# Patient Record
Sex: MALE | Race: WHITE | NOT HISPANIC OR LATINO | Employment: UNEMPLOYED | URBAN - METROPOLITAN AREA
[De-identification: names, ages, dates, MRNs, and addresses within clinical notes are randomized per-mention and may not be internally consistent; named-entity substitution may affect disease eponyms.]

---

## 2017-08-22 ENCOUNTER — ALLSCRIPTS OFFICE VISIT (OUTPATIENT)
Dept: OTHER | Facility: OTHER | Age: 52
End: 2017-08-22

## 2017-08-22 DIAGNOSIS — E66.01 MORBID (SEVERE) OBESITY DUE TO EXCESS CALORIES (HCC): ICD-10-CM

## 2017-08-22 DIAGNOSIS — R03.0 ELEVATED BLOOD PRESSURE READING WITHOUT DIAGNOSIS OF HYPERTENSION: ICD-10-CM

## 2017-08-22 DIAGNOSIS — Z12.5 ENCOUNTER FOR SCREENING FOR MALIGNANT NEOPLASM OF PROSTATE: ICD-10-CM

## 2017-08-22 DIAGNOSIS — E78.5 HYPERLIPIDEMIA: ICD-10-CM

## 2017-08-22 DIAGNOSIS — R74.8 ABNORMAL LEVELS OF OTHER SERUM ENZYMES: ICD-10-CM

## 2017-08-22 DIAGNOSIS — E11.9 TYPE 2 DIABETES MELLITUS WITHOUT COMPLICATIONS (HCC): ICD-10-CM

## 2018-01-13 VITALS
WEIGHT: 315 LBS | OXYGEN SATURATION: 98 % | SYSTOLIC BLOOD PRESSURE: 134 MMHG | BODY MASS INDEX: 59.91 KG/M2 | HEART RATE: 75 BPM | TEMPERATURE: 98.8 F | DIASTOLIC BLOOD PRESSURE: 84 MMHG

## 2018-02-10 DIAGNOSIS — E11.9 TYPE 2 DIABETES MELLITUS WITHOUT COMPLICATION, WITHOUT LONG-TERM CURRENT USE OF INSULIN (HCC): Primary | ICD-10-CM

## 2018-02-12 RX ORDER — GLIMEPIRIDE 4 MG/1
TABLET ORAL
Qty: 30 TABLET | Refills: 5 | Status: SHIPPED | OUTPATIENT
Start: 2018-02-12 | End: 2018-09-20 | Stop reason: SDUPTHER

## 2018-06-09 DIAGNOSIS — K21.9 GASTROESOPHAGEAL REFLUX DISEASE, ESOPHAGITIS PRESENCE NOT SPECIFIED: Primary | ICD-10-CM

## 2018-06-12 RX ORDER — OMEPRAZOLE 40 MG/1
CAPSULE, DELAYED RELEASE ORAL
Qty: 90 CAPSULE | Refills: 3 | Status: SHIPPED | OUTPATIENT
Start: 2018-06-12 | End: 2018-09-20 | Stop reason: SDUPTHER

## 2018-06-12 RX ORDER — OMEPRAZOLE 20 MG/1
CAPSULE, DELAYED RELEASE ORAL
Qty: 90 CAPSULE | Refills: 3 | Status: SHIPPED | OUTPATIENT
Start: 2018-06-12 | End: 2018-09-20 | Stop reason: SDUPTHER

## 2018-08-13 DIAGNOSIS — E11.9 TYPE 2 DIABETES MELLITUS WITHOUT COMPLICATION, WITHOUT LONG-TERM CURRENT USE OF INSULIN (HCC): ICD-10-CM

## 2018-08-13 RX ORDER — GLIMEPIRIDE 4 MG/1
TABLET ORAL
Qty: 30 TABLET | Refills: 5 | OUTPATIENT
Start: 2018-08-13

## 2018-08-13 NOTE — TELEPHONE ENCOUNTER
Pt overdue for labs/appointment, please schedule so we can continue to rx his medication    Thanks!   Jordan Mendoza, DO

## 2018-09-19 ENCOUNTER — TRANSCRIBE ORDERS (OUTPATIENT)
Dept: LAB | Facility: CLINIC | Age: 53
End: 2018-09-19

## 2018-09-20 ENCOUNTER — OFFICE VISIT (OUTPATIENT)
Dept: FAMILY MEDICINE CLINIC | Facility: OTHER | Age: 53
End: 2018-09-20

## 2018-09-20 VITALS
TEMPERATURE: 99.3 F | DIASTOLIC BLOOD PRESSURE: 84 MMHG | BODY MASS INDEX: 55.81 KG/M2 | OXYGEN SATURATION: 98 % | HEIGHT: 63 IN | HEART RATE: 102 BPM | WEIGHT: 315 LBS | SYSTOLIC BLOOD PRESSURE: 128 MMHG

## 2018-09-20 DIAGNOSIS — E66.01 MORBID OBESITY (HCC): ICD-10-CM

## 2018-09-20 DIAGNOSIS — Z87.2 HISTORY OF CELLULITIS: ICD-10-CM

## 2018-09-20 DIAGNOSIS — I10 ESSENTIAL HYPERTENSION: ICD-10-CM

## 2018-09-20 DIAGNOSIS — R03.0 PREHYPERTENSION: ICD-10-CM

## 2018-09-20 DIAGNOSIS — I87.2 VENOUS INSUFFICIENCY: ICD-10-CM

## 2018-09-20 DIAGNOSIS — K21.9 GASTROESOPHAGEAL REFLUX DISEASE WITHOUT ESOPHAGITIS: ICD-10-CM

## 2018-09-20 DIAGNOSIS — E11.9 TYPE 2 DIABETES MELLITUS WITHOUT COMPLICATION, WITHOUT LONG-TERM CURRENT USE OF INSULIN (HCC): ICD-10-CM

## 2018-09-20 DIAGNOSIS — K21.9 GASTROESOPHAGEAL REFLUX DISEASE, ESOPHAGITIS PRESENCE NOT SPECIFIED: ICD-10-CM

## 2018-09-20 DIAGNOSIS — R60.9 EDEMA, UNSPECIFIED TYPE: ICD-10-CM

## 2018-09-20 DIAGNOSIS — E11.8 TYPE 2 DIABETES MELLITUS WITH COMPLICATION, UNSPECIFIED WHETHER LONG TERM INSULIN USE: Primary | ICD-10-CM

## 2018-09-20 DIAGNOSIS — Z12.11 SCREENING FOR COLON CANCER: ICD-10-CM

## 2018-09-20 DIAGNOSIS — L40.9 PSORIASIS: ICD-10-CM

## 2018-09-20 DIAGNOSIS — R74.8 ELEVATED LIVER ENZYMES: ICD-10-CM

## 2018-09-20 DIAGNOSIS — E78.2 MIXED HYPERLIPIDEMIA: ICD-10-CM

## 2018-09-20 DIAGNOSIS — Z12.5 SCREENING FOR PROSTATE CANCER: ICD-10-CM

## 2018-09-20 PROBLEM — M10.9 GOUT: Status: ACTIVE | Noted: 2018-09-20

## 2018-09-20 PROCEDURE — 99213 OFFICE O/P EST LOW 20 MIN: CPT | Performed by: NURSE PRACTITIONER

## 2018-09-20 RX ORDER — LISINOPRIL 5 MG/1
5 TABLET ORAL DAILY
Qty: 30 TABLET | Refills: 5 | Status: SHIPPED | OUTPATIENT
Start: 2018-09-20 | End: 2019-03-08 | Stop reason: SDUPTHER

## 2018-09-20 RX ORDER — OMEPRAZOLE 20 MG/1
20 CAPSULE, DELAYED RELEASE ORAL DAILY
Qty: 30 CAPSULE | Refills: 5 | Status: SHIPPED | OUTPATIENT
Start: 2018-09-20 | End: 2019-09-18 | Stop reason: HOSPADM

## 2018-09-20 RX ORDER — GLIMEPIRIDE 4 MG/1
4 TABLET ORAL DAILY
Qty: 30 TABLET | Refills: 5 | Status: SHIPPED | OUTPATIENT
Start: 2018-09-20 | End: 2019-03-06 | Stop reason: SDUPTHER

## 2018-09-20 RX ORDER — OMEPRAZOLE 40 MG/1
40 CAPSULE, DELAYED RELEASE ORAL DAILY
Qty: 30 CAPSULE | Refills: 5 | Status: SHIPPED | OUTPATIENT
Start: 2018-09-20 | End: 2019-11-17 | Stop reason: SDUPTHER

## 2018-09-20 RX ORDER — CEPHALEXIN 500 MG/1
500 CAPSULE ORAL EVERY 6 HOURS SCHEDULED
Qty: 28 CAPSULE | Refills: 1 | Status: SHIPPED | OUTPATIENT
Start: 2018-09-20 | End: 2018-09-27

## 2018-09-20 NOTE — PROGRESS NOTES
Assessment/Plan:         Problem List Items Addressed This Visit     Diabetes mellitus ([facility] ) - Primary  --Suspect suboptimally controlled, but he is overdue for A1C + other labs (ordered a year ago, never done)-->reordered, advised to get done ASAP  --May need to start on insulin/other injectable, but potential cost is an issue  --Foot exam today  --Overdue for diabetic eye exam which he was advised to schedule  Relevant Medications    glimepiride (AMARYL) 4 mg tablet    metFORMIN (GLUCOPHAGE) 1000 MG tablet    Other Relevant Orders    CBC and differential    Comprehensive metabolic panel    Hemoglobin A1C    TSH, 3rd generation with Free T4 reflex    Urinalysis with reflex to microscopic    Microalbumin / creatinine urine ratio    C-peptide    Diabetic foot exam    Essential hypertension  --Suboptimal for diabetic--restart low dose ACEI  --Continue dietary, weight loss interventions  --Home BP readings encouraged  Relevant Medications   lisinopril (ZESTRIL) 5 mg tablet       Esophageal reflux  --Advised tapering off to PRN use only  He will consider  Previously discussed potential AE's  --Overdue for repeat EGD which he was advised to schedule with GI (FH gastric cancer also)  --Continue dietary, weight loss interventions  Relevant Medications    omeprazole (PriLOSEC) 20 mg delayed release capsule    omeprazole (PriLOSEC) 40 MG capsule    Hyperlipidemia  --He stopped statin d/t AE's (ED)  Does not wish to restart at this time, despite my recommendation (based on diabetic guidelines)  --Repeat lipids  Relevant Orders    Lipid Panel with Direct LDL reflex    Morbid obesity ([facility] )  --Continued weight loss measures discussed/encouraged, including regular use of gym, vegetarian, low-sugar/low-carb diet, with small meals  --Would likely benefit from bariatric referral, back lack of insurance is an issue at present     --Denies DAHIANA symptoms as long as he sleeps on his side      Venous insufficiency + edema  --Improved c/w past   Ongoing venous stasis changes +/- lymphedema noted on exam  --Continued use of compression stockings, leg elevation encouraged  --Weight loss, avoid excess sodium in diet  --Call for worsening and/or signs of cellulitis (see below)      Elevated liver enzymes  --Likely weight related (fatty liver)  Does not drink alcohol  Slip given for repeat CMP  Psoriasis  --Mild symptoms, arms only  Controlled with OTC cream            Other Visit Diagnoses     Screening for colon cancer + family history  --Overdue for screening colonoscopy which he was advised to schedule    Relevant Orders    Ambulatory referral to Gastroenterology    Screening for prostate cancer        Relevant Orders    PSA, Total Screen    History of cellulitis      --No recent episodes, but he is requesting written Rx to have on hand just in case  Advised to call office should he have any symptoms  Relevant Medications    cephalexin (KEFLEX) 500 mg capsule          Wishes to go to health clinic for flu shot (more affordable for him)    RTO 6 months      Subjective:      Patient ID: Agata Castillo is a 48 y o  male  Here with wife for routine follow-up  Ran out of meds two weeks ago  Needs refills  Tolerating without AE's  Never got blood work done (ordered last year)  Home sugars low 100's-300's when he checks  Some increase in nighttime urination since running out of meds  No acute vision changes  Continued vegetarian diet, avoidance of most sugars, unhealthy carbs  Goes to [employer] regularly  Mix of cardio and weights  Decent stamina  No longer takes statin because it seemed to case ED--resolved since stopping  No longer takes BP medication  Didn't feel like he needed it  No recent home readings  No CP, palpitations, cough, dyspnea  Minimal heartburn symptoms as long as he takes his Prilosec   Was able to decrease from 40 mg bid to 40 mg in morning, 20 mg in evening, over the past year  Tries to avoid dietary triggers  No N/V, abdominal pain, stool changes  Never scheduled EGD/colonoscopy d/t continued lack of insurance (previously referred)  Continued mild intermittent toe numbness  No pain, sores  Checks feet regularly  Chronic LE swelling improved over the past 1-2 years  No recent episodes of cellulitis, but would like to have written Rx for antibiotic (Keflex) to have on hand just in case  Denies fatigue, unrefreshed sleep, apnea, as long as he sleeps on his die  Denies depression, mood issues  Denies smoking, alcohol use  No eye exam x years  The following portions of the patient's history were reviewed and updated as appropriate: current medications, past family history, past medical history, past social history, past surgical history and problem list     Review of Systems   Constitutional: Negative for fatigue, fever and unexpected weight change  HENT: Negative for sore throat  Respiratory: Negative for cough and shortness of breath  Cardiovascular: Negative for chest pain and palpitations  Gastrointestinal: Negative for abdominal pain, blood in stool, nausea and vomiting  Genitourinary: Negative for dysuria  Musculoskeletal: Negative for arthralgias  Skin: Negative for rash  Neurological: Negative for dizziness  Psychiatric/Behavioral: Negative for dysphoric mood  Objective:      /84   Pulse 102   Temp 99 3 °F (37 4 °C) (Tympanic)   Ht 5' 3" (1 6 m)   Wt (!) 166 kg (365 lb 8 oz)   SpO2 98%   BMI 64 75 kg/m²           Physical Exam   Constitutional: He is oriented to person, place, and time  He appears well-developed and well-nourished  HENT:   Head: Normocephalic and atraumatic  Right Ear: External ear normal    Left Ear: External ear normal    Nose: Nose normal    Mouth/Throat: Oropharynx is clear and moist    Eyes: Conjunctivae are normal  Pupils are equal, round, and reactive to light  Neck: Normal range of motion  Neck supple  No thyromegaly present  Cardiovascular: Normal rate, regular rhythm, normal heart sounds and intact distal pulses  Pulses are no weak pulses  Pulses:       Dorsalis pedis pulses are 2+ on the right side, and 2+ on the left side  Posterior tibial pulses are 2+ on the right side, and 2+ on the left side  Pulmonary/Chest: Effort normal and breath sounds normal    Abdominal: Soft  Bowel sounds are normal  There is no tenderness  Musculoskeletal: He exhibits edema  He exhibits no tenderness  Both LE's with ongoing 2+ pitting edema + venous stasis changes  Nontender without signs of cellulitis  Feet:   Right Foot:   Skin Integrity: Negative for ulcer, skin breakdown, erythema, warmth, callus or dry skin  Left Foot:   Skin Integrity: Negative for ulcer, skin breakdown, erythema, warmth, callus or dry skin  Lymphadenopathy:     He has no cervical adenopathy  Neurological: He is alert and oriented to person, place, and time  He has normal reflexes  Skin: Skin is warm and dry  Psychiatric: He has a normal mood and affect  His behavior is normal  Judgment and thought content normal          Patient's shoes and socks removed  Right Foot/Ankle   Right Foot Inspection  Skin Exam: skin normal and skin intact no dry skin, no warmth, no callus, no erythema, no maceration, no abnormal color, no pre-ulcer, no ulcer and no callus                          Toe Exam: ROM and strength within normal limits  Sensory   Vibration: intact  Proprioception: intact   Monofilament testing: intact  Vascular  Capillary refills: < 3 seconds  The right DP pulse is 2+  The right PT pulse is 2+       Left Foot/Ankle  Left Foot Inspection  Skin Exam: skin normal and skin intactno dry skin, no warmth, no erythema, no maceration, normal color, no pre-ulcer, no ulcer and no callus                         Toe Exam: ROM and strength within normal limits                   Sensory Vibration: intact  Proprioception: intact  Monofilament: intact  Vascular  Capillary refills: < 3 seconds  The left DP pulse is 2+  The left PT pulse is 2+  Assign Risk Category:  No deformity present; No loss of protective sensation;  No weak pulses       Risk: 0   fo

## 2018-10-05 ENCOUNTER — PATIENT MESSAGE (OUTPATIENT)
Dept: FAMILY MEDICINE CLINIC | Facility: OTHER | Age: 53
End: 2018-10-05

## 2018-10-05 ENCOUNTER — TELEPHONE (OUTPATIENT)
Dept: FAMILY MEDICINE CLINIC | Facility: OTHER | Age: 53
End: 2018-10-05

## 2018-10-08 ENCOUNTER — TELEPHONE (OUTPATIENT)
Dept: FAMILY MEDICINE CLINIC | Facility: OTHER | Age: 53
End: 2018-10-08

## 2018-10-08 NOTE — TELEPHONE ENCOUNTER
Shannan Fiore, [address] 10/5/2018  2:29 PM EDT        ----- Message -----  From: Peggy Ayoub  Sent: 10/5/2018   2:26 PM  To: [facility]  Subject: Visit Follow-Up Question                         Wally Beyer, I left a message for you today at the office  I was in to see you for a follow up visit  I am a self-payer and I paid $67 50 at my visit  Later, [employer] then sends me an additional invoice for $60 50  Can you tell me why they would do this please? thanks in advance    Mamadou Montelongo, [phone]      10/5/18- spoke to billing and they are going to write off the balance and spoke to patient is informed

## 2018-10-18 DIAGNOSIS — L03.119 CELLULITIS OF LOWER EXTREMITY, UNSPECIFIED LATERALITY: Primary | ICD-10-CM

## 2018-10-18 RX ORDER — CEPHALEXIN 500 MG/1
500 CAPSULE ORAL EVERY 6 HOURS SCHEDULED
Qty: 40 CAPSULE | Refills: 1 | Status: SHIPPED | OUTPATIENT
Start: 2018-10-18 | End: 2018-10-28

## 2018-10-18 RX ORDER — CEPHALEXIN 500 MG/1
CAPSULE ORAL
Qty: 28 CAPSULE | Refills: 0 | OUTPATIENT
Start: 2018-10-18

## 2018-11-15 ENCOUNTER — TELEPHONE (OUTPATIENT)
Dept: FAMILY MEDICINE CLINIC | Facility: OTHER | Age: 53
End: 2018-11-15

## 2018-11-15 DIAGNOSIS — L03.119 RECURRENT CELLULITIS OF LOWER EXTREMITY: Primary | ICD-10-CM

## 2018-11-15 RX ORDER — CEPHALEXIN 500 MG/1
500 CAPSULE ORAL EVERY 6 HOURS SCHEDULED
Qty: 40 CAPSULE | Refills: 1 | Status: SHIPPED | OUTPATIENT
Start: 2018-11-15 | End: 2018-11-25

## 2018-11-15 NOTE — TELEPHONE ENCOUNTER
----- Message from Mirella Daigle MA sent at 11/14/2018 10:22 AM EST -----  Regarding: FW: Prescription Question  Contact: [phone]      ----- Message -----  From: Delfin Escalante  Sent: 11/14/2018   9:59 AM  To: [facility]  Subject: RE: Prescription Question                        Debbie Suarez,  Thanks for the additional prescription  I ended up taking a course of about 13 days, and felt fully recovered, no more symptoms etc  However, after about a week, I again relapsed for the third time  Once again, I caught it super early, AND the symptoms started going away within 48 hrs, however, this has never happened to me before  For now, after 5 days or so, I am just taking Cephalexin, like the last times, but I am writing you for advice as to what I should or could be doing this time to make absolute sure I don't get cellulitis back a 4th time  What do you recommend, additional days of Cephalexin? Something else? I REALLY need to stop it from coming back  Please let me know or give me a call at [phone]  For now I am on the final refill, with a few days left    ----- Message -----  From: ANN Davies  Sent: 10/18/2018  9:33 AM EDT  To: Delfin Escalante  Subject: RE: Prescription Question    Sorry about that, Dick Brown  I went ahead and sent in a new full 10-day prescription for the antibiotic to [facility], with one additional refill if needed again in the future  You should, of course, contact us if the antibiotic is not resulting in improvement within 48-72 hours of starting  Warm compresses and elevation/rest are usually recommended also      Thanks,   Debbie Suarez      ----- Message -----     From: Delfin Escalante     Sent: 10/17/2018  4:07 PM EDT       To: ANN Davies  Subject: Prescription Question    Debbie Suarez,  It was a very good thing my wife asked you for an advance prescription of cephalexin for cellulitis, because three days later thats exactly what I came down with, apparently due to the kids with strep in your office waiting room that day  However this is what happened: The round a prescription you prescribed was only seven days worth, and while I caught the cellulitis very early, and also felt fine by the end of the course of antibiotics, I relapsed a couple of weeks later  A few days ago in the middle of the night I came down with cellulitis once again in a different area of the body  So this time what I need is to make sure I have a 10 day round of cephalexin  I was able to use the one refill to get seven days worth, but that will run out in two days  I have asked the pharmacy to refill it so I have enough to cover me, however it was not approved because apparently no one is in the office today  I am writing to make sure that I am approved for at least one more refill so I can get 10 day coverage  Im already recovering but want to be sure I dont relapse this time, thank you!     Sincerely Artis Montgomery, [phone]

## 2018-12-17 DIAGNOSIS — L03.119 RECURRENT CELLULITIS OF LOWER EXTREMITY: Primary | ICD-10-CM

## 2018-12-17 RX ORDER — DOXYCYCLINE HYCLATE 100 MG/1
100 CAPSULE ORAL EVERY 12 HOURS SCHEDULED
Qty: 20 CAPSULE | Refills: 1 | Status: SHIPPED | OUTPATIENT
Start: 2018-12-17 | End: 2019-09-03 | Stop reason: SDUPTHER

## 2018-12-17 RX ORDER — CLINDAMYCIN HYDROCHLORIDE 150 MG/1
150 CAPSULE ORAL DAILY
Qty: 30 CAPSULE | Refills: 3 | Status: SHIPPED | OUTPATIENT
Start: 2018-12-17 | End: 2019-01-04 | Stop reason: SINTOL

## 2019-01-02 ENCOUNTER — TELEPHONE (OUTPATIENT)
Dept: FAMILY MEDICINE CLINIC | Facility: OTHER | Age: 54
End: 2019-01-02

## 2019-01-02 NOTE — TELEPHONE ENCOUNTER
----- Message from Bettina Doeah sent at 1/1/2019  5:39 PM EST -----  Regarding: RE: Prescription Question  Contact: [phone]  Drake Wally Beyer,  had to completely stop taking it yesterday after taking it around 6 days  The side effects were so amazingly painful I was not able to function or work  Both unstoppable diarrhea as well as lower abdominal cramping and pain  The symptoms would appear around eight hours after taking the antibiotic, and would last for around eight hours  Since stopping yesterday things are getting better already  I was taking probiotics two times per day  Please advise, although I am still taking the other advised treatments     Shi Osborne      ----- Message -----  From: ANN Fletcher  Sent: 12/31/18, 2:50 PM  To: Bettina Brownlee  Subject: RE: Prescription Question      It is a possible side effect and should get better, but not a definite  Can try taking antibiotic every other day and adding probiotic/yogurt to diet to see if this helps  Let me know  Thanks  Wally Beyer      ----- Message -----     From: Bettina Brownlee     Sent: 12/28/2018  3:39 PM EST       To: ANN Fletcher  Subject: RE: Prescription Question    Wally Beyer: ok  finished the 7 days of doxycycline, no problems, but  John Miller upon starting the other, preventative, one a day antibiotic I am experiencing very bad diarrhea  I know this is a side effect of the drug, but will it gradually dissipate or should I perhaps try something different? Sorry to bug you but, It's been rough! Shi Osborne  ----- Message -----  From: ANN Fletcher  Sent: 12/19/2018  5:49 PM EST  To: Bettina Brownlee  Subject: RE: Prescription Question    That's fine  I would take the doxycyline for at least 7 days  Extend to 10 days if not fully resolved at the end of this period      Wally Beyer       ----- Message -----     From: Bettina Brownlee     Sent: 12/19/2018  9:15 AM EST       To: ANN Fletcher  Subject: RE: Prescription Question    Aw carlos  I already switched for one day  And I have to tell you that my stomach disturbances are better as a result  ( I have been having alot of stomach issues from the Cephalexin ) Hope this is ok? How long should I continue taking the Doxycycline? I was on Cephalexin for about 6 days  ----- Message -----  From: ANN Crooks  Sent: 12/18/2018  4:41 PM EST  To: Malik Vela  Subject: RE: Prescription Question    Samantha Gordon: If things are improving with the cephalexin and you haven't yet taken it for a full 7-10 days, I would complete that, reserving the doxycycline for the next time you have a flare  If you are currently taking the cephalexin and it is NOT helping, I would stop it and take the doxycycline for 10 days  Then, when things are back to normal, you can start the daily low dose clindamycin as a preventative  Hope this helps  Theodore Hamilton       ----- Message -----     From: Malik Dane     Sent: 12/18/2018  9:21 AM EST       To: ANN Crooks  Subject: RE: Prescription Question    Theodore Hamilton: Thanks so much for the alternative antibiotic  I will get it picked up today  Just to confirm: I have been using Cephalexin again and should discontinue that round and start anew with the Doxycycline, yes? Should I use that for an additional 10 days? Afterwards, I will use the clindamycin as prescribed, as well as the other measures  Abdifatah Gordon  ----- Message -----  From: ANN Crooks  Sent: 12/17/2018  9:24 AM EST  To: Malik Vela  Subject: RE: Prescription Question    Samantha Gordon,     I sent in a different antibiotic (doxycyline, taken twice a day x 10 days) for outbreaks of cellulitis  To prevent future episodes we can try the following:    --Once a day low dose antibiotic    Rx for clindamycin, which is used for this, sent to pharmacy  --Compression stockings--let me know if you need Rx for this  --Reduce bacteria on the skin with the following recommended regimen: 1) topical antibiotic (mupirocin; Rx sent in previously) applied to inside of nose three times a day for a week; 2) antibacterial body washes three times a week x 1-2 weeks; can either use chlorhexadine wash (available OTC) or dilute bleach baths (add 1-2 TBS of bleach to tub of water)  Hope this helps  Let me know if further questions         ----- Message -----     From: Anne Garcia     Sent: 12/14/2018  5:30 PM EST       To: ANN Granda  Subject: Prescription Question    Colletta Legato: thank you for all your messages and advice  I was doing well for about three weeks, and used the nasal antibiotic as you prescribed  Unfortunately I have relapsed three weeks later, in the same area  The Cephalexin does not seem to be knocking it out of me completely, even though the last two rounds I did were 14 days each  Outside of a referral to infectious diseases, which is probably going to be way above my budget, isn't there anything else that can be done? Maybe a different antibiotic? Please let me know thank you       Ford Pinedo

## 2019-01-04 DIAGNOSIS — L03.119 RECURRENT CELLULITIS OF LOWER EXTREMITY: Primary | ICD-10-CM

## 2019-01-04 RX ORDER — SULFAMETHOXAZOLE AND TRIMETHOPRIM 800; 160 MG/1; MG/1
1 TABLET ORAL DAILY
Qty: 30 TABLET | Refills: 3 | Status: SHIPPED | OUTPATIENT
Start: 2019-01-04 | End: 2019-02-03

## 2019-01-04 NOTE — TELEPHONE ENCOUNTER
Can we call back Hernan:    Sorry to hear that he had trouble with the clindamycin, but glad that he is getting better  I will put this on his "allergy" list     As an alternative, for prevention of cellulitis, I sent in an Rx for a different antibiotic (Bactrim) which he can take once a day  This is usually easier on the stomach, so I would not expect him to have any problems with this, but he should let us know if he does       Thanks

## 2019-01-28 DIAGNOSIS — R10.9 ABDOMINAL CRAMPING: ICD-10-CM

## 2019-01-28 DIAGNOSIS — R19.7 DIARRHEA, UNSPECIFIED TYPE: Primary | ICD-10-CM

## 2019-01-28 RX ORDER — DICYCLOMINE HCL 20 MG
20 TABLET ORAL EVERY 6 HOURS
Qty: 20 TABLET | Refills: 1 | Status: SHIPPED | OUTPATIENT
Start: 2019-01-28 | End: 2019-09-18 | Stop reason: HOSPADM

## 2019-02-14 DIAGNOSIS — R19.7 DIARRHEA, UNSPECIFIED TYPE: Primary | ICD-10-CM

## 2019-02-14 DIAGNOSIS — R10.9 ABDOMINAL CRAMPING: ICD-10-CM

## 2019-03-06 DIAGNOSIS — E11.9 TYPE 2 DIABETES MELLITUS WITHOUT COMPLICATION, WITHOUT LONG-TERM CURRENT USE OF INSULIN (HCC): ICD-10-CM

## 2019-03-07 RX ORDER — GLIMEPIRIDE 4 MG/1
TABLET ORAL
Qty: 30 TABLET | Refills: 5 | Status: SHIPPED | OUTPATIENT
Start: 2019-03-07 | End: 2019-08-22 | Stop reason: SDUPTHER

## 2019-03-08 DIAGNOSIS — E11.8 TYPE 2 DIABETES MELLITUS WITH COMPLICATION, UNSPECIFIED WHETHER LONG TERM INSULIN USE: ICD-10-CM

## 2019-03-08 DIAGNOSIS — I10 ESSENTIAL HYPERTENSION: ICD-10-CM

## 2019-03-08 RX ORDER — LISINOPRIL 5 MG/1
TABLET ORAL
Qty: 30 TABLET | Refills: 5 | Status: SHIPPED | OUTPATIENT
Start: 2019-03-08 | End: 2019-08-26 | Stop reason: SDUPTHER

## 2019-06-28 DIAGNOSIS — E11.8 TYPE 2 DIABETES MELLITUS WITH COMPLICATION, UNSPECIFIED WHETHER LONG TERM INSULIN USE: Primary | ICD-10-CM

## 2019-08-22 DIAGNOSIS — E11.9 TYPE 2 DIABETES MELLITUS WITHOUT COMPLICATION, WITHOUT LONG-TERM CURRENT USE OF INSULIN (HCC): ICD-10-CM

## 2019-08-22 RX ORDER — GLIMEPIRIDE 4 MG/1
TABLET ORAL
Qty: 90 TABLET | Refills: 1 | Status: SHIPPED | OUTPATIENT
Start: 2019-08-22 | End: 2019-09-18 | Stop reason: HOSPADM

## 2019-08-26 DIAGNOSIS — I10 ESSENTIAL HYPERTENSION: ICD-10-CM

## 2019-08-26 DIAGNOSIS — E11.8 TYPE 2 DIABETES MELLITUS WITH COMPLICATION, UNSPECIFIED WHETHER LONG TERM INSULIN USE: ICD-10-CM

## 2019-08-26 RX ORDER — LISINOPRIL 5 MG/1
TABLET ORAL
Qty: 90 TABLET | Refills: 1 | Status: SHIPPED | OUTPATIENT
Start: 2019-08-26 | End: 2019-09-18 | Stop reason: HOSPADM

## 2019-09-03 DIAGNOSIS — L03.119 RECURRENT CELLULITIS OF LOWER EXTREMITY: ICD-10-CM

## 2019-09-03 RX ORDER — DOXYCYCLINE HYCLATE 100 MG/1
100 CAPSULE ORAL EVERY 12 HOURS SCHEDULED
Qty: 20 CAPSULE | Refills: 1 | Status: SHIPPED | OUTPATIENT
Start: 2019-09-03 | End: 2019-09-18 | Stop reason: HOSPADM

## 2019-09-06 ENCOUNTER — ANESTHESIA EVENT (INPATIENT)
Dept: PERIOP | Facility: HOSPITAL | Age: 54
DRG: 710 | End: 2019-09-06
Payer: COMMERCIAL

## 2019-09-06 ENCOUNTER — APPOINTMENT (INPATIENT)
Dept: RADIOLOGY | Facility: HOSPITAL | Age: 54
DRG: 710 | End: 2019-09-06
Payer: COMMERCIAL

## 2019-09-06 ENCOUNTER — HOSPITAL ENCOUNTER (INPATIENT)
Facility: HOSPITAL | Age: 54
LOS: 12 days | Discharge: HOME HEALTH | DRG: 710 | End: 2019-09-18
Attending: EMERGENCY MEDICINE | Admitting: INTERNAL MEDICINE
Payer: COMMERCIAL

## 2019-09-06 ENCOUNTER — ANESTHESIA (INPATIENT)
Dept: PERIOP | Facility: HOSPITAL | Age: 54
DRG: 710 | End: 2019-09-06
Payer: COMMERCIAL

## 2019-09-06 ENCOUNTER — APPOINTMENT (EMERGENCY)
Dept: CT IMAGING | Facility: HOSPITAL | Age: 54
DRG: 710 | End: 2019-09-06
Payer: COMMERCIAL

## 2019-09-06 ENCOUNTER — TELEPHONE (OUTPATIENT)
Dept: FAMILY MEDICINE CLINIC | Facility: OTHER | Age: 54
End: 2019-09-06

## 2019-09-06 ENCOUNTER — APPOINTMENT (EMERGENCY)
Dept: RADIOLOGY | Facility: HOSPITAL | Age: 54
DRG: 710 | End: 2019-09-06
Payer: COMMERCIAL

## 2019-09-06 DIAGNOSIS — M79.89 NECROTIZING SOFT TISSUE INFECTION: ICD-10-CM

## 2019-09-06 DIAGNOSIS — A41.9 SEPSIS, DUE TO UNSPECIFIED ORGANISM: ICD-10-CM

## 2019-09-06 DIAGNOSIS — G47.00 INSOMNIA: ICD-10-CM

## 2019-09-06 DIAGNOSIS — N49.3 FOURNIER GANGRENE: ICD-10-CM

## 2019-09-06 DIAGNOSIS — E11.8 TYPE 2 DIABETES MELLITUS WITH COMPLICATION, WITH LONG-TERM CURRENT USE OF INSULIN (HCC): ICD-10-CM

## 2019-09-06 DIAGNOSIS — F41.9 ANXIETY: ICD-10-CM

## 2019-09-06 DIAGNOSIS — Z79.4 TYPE 2 DIABETES MELLITUS WITH COMPLICATION, WITH LONG-TERM CURRENT USE OF INSULIN (HCC): ICD-10-CM

## 2019-09-06 DIAGNOSIS — E11.8 TYPE 2 DIABETES MELLITUS WITH COMPLICATION, UNSPECIFIED WHETHER LONG TERM INSULIN USE: Primary | ICD-10-CM

## 2019-09-06 DIAGNOSIS — L03.119 RECURRENT CELLULITIS OF LOWER EXTREMITY: ICD-10-CM

## 2019-09-06 DIAGNOSIS — E66.01 MORBID OBESITY (HCC): ICD-10-CM

## 2019-09-06 DIAGNOSIS — E11.8 TYPE 2 DIABETES MELLITUS WITH COMPLICATION (HCC): ICD-10-CM

## 2019-09-06 DIAGNOSIS — I95.9 HYPOTENSION: ICD-10-CM

## 2019-09-06 PROBLEM — E87.1 HYPONATREMIA: Status: ACTIVE | Noted: 2019-09-06

## 2019-09-06 PROBLEM — D72.825 BANDEMIA: Status: ACTIVE | Noted: 2019-09-06

## 2019-09-06 PROBLEM — R65.21 SEPTIC SHOCK (HCC): Status: ACTIVE | Noted: 2019-09-06

## 2019-09-06 PROBLEM — E80.6 HYPERBILIRUBINEMIA: Status: ACTIVE | Noted: 2019-09-06

## 2019-09-06 PROBLEM — E87.20 LACTIC ACIDOSIS: Status: ACTIVE | Noted: 2019-09-06

## 2019-09-06 PROBLEM — N17.9 AKI (ACUTE KIDNEY INJURY) (HCC): Status: ACTIVE | Noted: 2019-09-06

## 2019-09-06 PROBLEM — R00.0 SINUS TACHYCARDIA: Status: ACTIVE | Noted: 2019-09-06

## 2019-09-06 PROBLEM — E87.2 LACTIC ACIDOSIS: Status: ACTIVE | Noted: 2019-09-06

## 2019-09-06 LAB
ABO GROUP BLD: NORMAL
ALBUMIN SERPL BCP-MCNC: 1.9 G/DL (ref 3.5–5)
ALBUMIN SERPL BCP-MCNC: 2 G/DL (ref 3.5–5)
ALP SERPL-CCNC: 130 U/L (ref 46–116)
ALP SERPL-CCNC: 165 U/L (ref 46–116)
ALT SERPL W P-5'-P-CCNC: 26 U/L (ref 12–78)
ALT SERPL W P-5'-P-CCNC: 37 U/L (ref 12–78)
ANION GAP SERPL CALCULATED.3IONS-SCNC: 13 MMOL/L (ref 4–13)
ANION GAP SERPL CALCULATED.3IONS-SCNC: 15 MMOL/L (ref 4–13)
APTT PPP: 31 SECONDS (ref 23–37)
AST SERPL W P-5'-P-CCNC: 28 U/L (ref 5–45)
AST SERPL W P-5'-P-CCNC: 40 U/L (ref 5–45)
BASE EXCESS BLDA CALC-SCNC: -13.6 MMOL/L
BASE EXCESS BLDA CALC-SCNC: -7 MMOL/L (ref -2–3)
BASOPHILS # BLD AUTO: 0.04 THOUSANDS/ΜL (ref 0–0.1)
BASOPHILS # BLD MANUAL: 0 THOUSAND/UL (ref 0–0.1)
BASOPHILS NFR BLD AUTO: 0 % (ref 0–1)
BASOPHILS NFR MAR MANUAL: 0 % (ref 0–1)
BILIRUB SERPL-MCNC: 1.2 MG/DL (ref 0.2–1)
BILIRUB SERPL-MCNC: 1.4 MG/DL (ref 0.2–1)
BLD GP AB SCN SERPL QL: NEGATIVE
BUN SERPL-MCNC: 19 MG/DL (ref 5–25)
BUN SERPL-MCNC: 20 MG/DL (ref 5–25)
CA-I BLD-SCNC: 1.03 MMOL/L (ref 1.12–1.32)
CA-I BLD-SCNC: 1.18 MMOL/L (ref 1.12–1.32)
CALCIUM SERPL-MCNC: 8 MG/DL (ref 8.3–10.1)
CALCIUM SERPL-MCNC: 9.2 MG/DL (ref 8.3–10.1)
CHLORIDE SERPL-SCNC: 93 MMOL/L (ref 100–108)
CHLORIDE SERPL-SCNC: 99 MMOL/L (ref 100–108)
CO2 SERPL-SCNC: 18 MMOL/L (ref 21–32)
CO2 SERPL-SCNC: 21 MMOL/L (ref 21–32)
CREAT SERPL-MCNC: 1 MG/DL (ref 0.6–1.3)
CREAT SERPL-MCNC: 1.37 MG/DL (ref 0.6–1.3)
DOHLE BOD BLD QL SMEAR: PRESENT
EOSINOPHIL # BLD AUTO: 0.13 THOUSAND/ΜL (ref 0–0.61)
EOSINOPHIL # BLD MANUAL: 0 THOUSAND/UL (ref 0–0.4)
EOSINOPHIL NFR BLD AUTO: 1 % (ref 0–6)
EOSINOPHIL NFR BLD MANUAL: 0 % (ref 0–6)
ERYTHROCYTE [DISTWIDTH] IN BLOOD BY AUTOMATED COUNT: 13.6 % (ref 11.6–15.1)
ERYTHROCYTE [DISTWIDTH] IN BLOOD BY AUTOMATED COUNT: 13.7 % (ref 11.6–15.1)
EST. AVERAGE GLUCOSE BLD GHB EST-MCNC: 275 MG/DL
GFR SERPL CREATININE-BSD FRML MDRD: 59 ML/MIN/1.73SQ M
GFR SERPL CREATININE-BSD FRML MDRD: 86 ML/MIN/1.73SQ M
GLUCOSE SERPL-MCNC: 268 MG/DL (ref 65–140)
GLUCOSE SERPL-MCNC: 297 MG/DL (ref 65–140)
GLUCOSE SERPL-MCNC: 298 MG/DL (ref 65–140)
GLUCOSE SERPL-MCNC: 298 MG/DL (ref 65–140)
GLUCOSE SERPL-MCNC: 299 MG/DL (ref 65–140)
GLUCOSE SERPL-MCNC: 375 MG/DL (ref 65–140)
HBA1C MFR BLD: 11.2 % (ref 4.2–6.3)
HCO3 BLDA-SCNC: 13.2 MMOL/L (ref 22–28)
HCO3 BLDA-SCNC: 21.2 MMOL/L (ref 22–28)
HCT VFR BLD AUTO: 33.9 % (ref 36.5–49.3)
HCT VFR BLD AUTO: 41.9 % (ref 36.5–49.3)
HCT VFR BLD CALC: 40 % (ref 36.5–49.3)
HGB BLD-MCNC: 10.1 G/DL (ref 12–17)
HGB BLD-MCNC: 11.3 G/DL (ref 12–17)
HGB BLD-MCNC: 14.2 G/DL (ref 12–17)
HGB BLDA-MCNC: 13.6 G/DL (ref 12–17)
IMM GRANULOCYTES # BLD AUTO: >0.5 THOUSAND/UL (ref 0–0.2)
IMM GRANULOCYTES NFR BLD AUTO: 6 % (ref 0–2)
INR PPP: 1.29 (ref 0.84–1.19)
LACTATE SERPL-SCNC: 3.2 MMOL/L (ref 0.5–2)
LACTATE SERPL-SCNC: 3.2 MMOL/L (ref 0.5–2)
LACTATE SERPL-SCNC: 3.4 MMOL/L (ref 0.5–2)
LACTATE SERPL-SCNC: 4.7 MMOL/L (ref 0.5–2)
LYMPHOCYTES # BLD AUTO: 1.96 THOUSAND/UL (ref 0.6–4.47)
LYMPHOCYTES # BLD AUTO: 10 % (ref 14–44)
LYMPHOCYTES # BLD AUTO: 4.24 THOUSANDS/ΜL (ref 0.6–4.47)
LYMPHOCYTES NFR BLD AUTO: 16 % (ref 14–44)
MAGNESIUM SERPL-MCNC: 1.3 MG/DL (ref 1.6–2.6)
MCH RBC QN AUTO: 31.1 PG (ref 26.8–34.3)
MCH RBC QN AUTO: 31.4 PG (ref 26.8–34.3)
MCHC RBC AUTO-ENTMCNC: 33.3 G/DL (ref 31.4–37.4)
MCHC RBC AUTO-ENTMCNC: 33.9 G/DL (ref 31.4–37.4)
MCV RBC AUTO: 92 FL (ref 82–98)
MCV RBC AUTO: 94 FL (ref 82–98)
METAMYELOCYTES NFR BLD MANUAL: 2 % (ref 0–1)
MONOCYTES # BLD AUTO: 1.76 THOUSAND/UL (ref 0–1.22)
MONOCYTES # BLD AUTO: 2.23 THOUSAND/ΜL (ref 0.17–1.22)
MONOCYTES NFR BLD AUTO: 8 % (ref 4–12)
MONOCYTES NFR BLD: 9 % (ref 4–12)
NEUTROPHILS # BLD AUTO: 18.9 THOUSANDS/ΜL (ref 1.85–7.62)
NEUTROPHILS # BLD MANUAL: 14.71 THOUSAND/UL (ref 1.85–7.62)
NEUTS BAND NFR BLD MANUAL: 18 % (ref 0–8)
NEUTS SEG NFR BLD AUTO: 57 % (ref 43–75)
NEUTS SEG NFR BLD AUTO: 69 % (ref 43–75)
NRBC BLD AUTO-RTO: 0 /100 WBCS
NRBC BLD AUTO-RTO: 0 /100 WBCS
O2 CT BLDA-SCNC: 26.1 ML/DL (ref 16–23)
OXYHGB MFR BLDA: 95.5 % (ref 94–97)
PCO2 BLD: 23 MMOL/L (ref 21–32)
PCO2 BLD: 52.2 MM HG (ref 36–44)
PCO2 BLDA: 34.3 MM HG (ref 36–44)
PH BLD: 7.22 [PH] (ref 7.35–7.45)
PH BLDA: 7.2 [PH] (ref 7.35–7.45)
PHOSPHATE SERPL-MCNC: 4 MG/DL (ref 2.7–4.5)
PLATELET # BLD AUTO: 221 THOUSANDS/UL (ref 149–390)
PLATELET # BLD AUTO: 226 THOUSANDS/UL (ref 149–390)
PLATELET BLD QL SMEAR: ADEQUATE
PMV BLD AUTO: 9.1 FL (ref 8.9–12.7)
PMV BLD AUTO: 9.2 FL (ref 8.9–12.7)
PO2 BLD: 65 MM HG (ref 75–129)
PO2 BLDA: 96.9 MM HG (ref 75–129)
POLYCHROMASIA BLD QL SMEAR: PRESENT
POTASSIUM BLD-SCNC: 3.7 MMOL/L (ref 3.5–5.3)
POTASSIUM SERPL-SCNC: 3.6 MMOL/L (ref 3.5–5.3)
POTASSIUM SERPL-SCNC: 4 MMOL/L (ref 3.5–5.3)
PROCALCITONIN SERPL-MCNC: 2.8 NG/ML
PROT SERPL-MCNC: 6 G/DL (ref 6.4–8.2)
PROT SERPL-MCNC: 7.5 G/DL (ref 6.4–8.2)
PROTHROMBIN TIME: 15.4 SECONDS (ref 11.6–14.5)
RBC # BLD AUTO: 3.6 MILLION/UL (ref 3.88–5.62)
RBC # BLD AUTO: 4.56 MILLION/UL (ref 3.88–5.62)
RBC MORPH BLD: NORMAL
RH BLD: POSITIVE
SAO2 % BLD FROM PO2: 87 % (ref 95–98)
SODIUM BLD-SCNC: 133 MMOL/L (ref 136–145)
SODIUM SERPL-SCNC: 127 MMOL/L (ref 136–145)
SODIUM SERPL-SCNC: 132 MMOL/L (ref 136–145)
SPECIMEN EXPIRATION DATE: NORMAL
SPECIMEN SOURCE: ABNORMAL
SPECIMEN SOURCE: ABNORMAL
TOTAL CELLS COUNTED SPEC: 100
TOXIC GRANULES BLD QL SMEAR: PRESENT
VARIANT LYMPHS # BLD AUTO: 4 %
WBC # BLD AUTO: 19.61 THOUSAND/UL (ref 4.31–10.16)
WBC # BLD AUTO: 27.1 THOUSAND/UL (ref 4.31–10.16)

## 2019-09-06 PROCEDURE — 83036 HEMOGLOBIN GLYCOSYLATED A1C: CPT | Performed by: INTERNAL MEDICINE

## 2019-09-06 PROCEDURE — 71045 X-RAY EXAM CHEST 1 VIEW: CPT

## 2019-09-06 PROCEDURE — 85018 HEMOGLOBIN: CPT | Performed by: PHYSICIAN ASSISTANT

## 2019-09-06 PROCEDURE — ND001 PR NO DOCUMENTATION: Performed by: INTERNAL MEDICINE

## 2019-09-06 PROCEDURE — 5A1955Z RESPIRATORY VENTILATION, GREATER THAN 96 CONSECUTIVE HOURS: ICD-10-PCS | Performed by: INTERNAL MEDICINE

## 2019-09-06 PROCEDURE — 88307 TISSUE EXAM BY PATHOLOGIST: CPT | Performed by: PATHOLOGY

## 2019-09-06 PROCEDURE — 83605 ASSAY OF LACTIC ACID: CPT | Performed by: NURSE PRACTITIONER

## 2019-09-06 PROCEDURE — 87176 TISSUE HOMOGENIZATION CULTR: CPT | Performed by: SURGERY

## 2019-09-06 PROCEDURE — 73701 CT LOWER EXTREMITY W/DYE: CPT

## 2019-09-06 PROCEDURE — 99291 CRITICAL CARE FIRST HOUR: CPT | Performed by: PHYSICIAN ASSISTANT

## 2019-09-06 PROCEDURE — 0JDM0ZZ EXTRACTION OF LEFT UPPER LEG SUBCUTANEOUS TISSUE AND FASCIA, OPEN APPROACH: ICD-10-PCS | Performed by: SURGERY

## 2019-09-06 PROCEDURE — 87070 CULTURE OTHR SPECIMN AEROBIC: CPT | Performed by: SURGERY

## 2019-09-06 PROCEDURE — 85007 BL SMEAR W/DIFF WBC COUNT: CPT | Performed by: PHYSICIAN ASSISTANT

## 2019-09-06 PROCEDURE — 36415 COLL VENOUS BLD VENIPUNCTURE: CPT | Performed by: PHYSICIAN ASSISTANT

## 2019-09-06 PROCEDURE — 96361 HYDRATE IV INFUSION ADD-ON: CPT

## 2019-09-06 PROCEDURE — 84295 ASSAY OF SERUM SODIUM: CPT

## 2019-09-06 PROCEDURE — 86901 BLOOD TYPING SEROLOGIC RH(D): CPT | Performed by: ANESTHESIOLOGY

## 2019-09-06 PROCEDURE — 82330 ASSAY OF CALCIUM: CPT | Performed by: NURSE PRACTITIONER

## 2019-09-06 PROCEDURE — 83735 ASSAY OF MAGNESIUM: CPT | Performed by: NURSE PRACTITIONER

## 2019-09-06 PROCEDURE — 99285 EMERGENCY DEPT VISIT HI MDM: CPT

## 2019-09-06 PROCEDURE — 82948 REAGENT STRIP/BLOOD GLUCOSE: CPT

## 2019-09-06 PROCEDURE — 71046 X-RAY EXAM CHEST 2 VIEWS: CPT

## 2019-09-06 PROCEDURE — 84100 ASSAY OF PHOSPHORUS: CPT | Performed by: NURSE PRACTITIONER

## 2019-09-06 PROCEDURE — 87040 BLOOD CULTURE FOR BACTERIA: CPT | Performed by: PHYSICIAN ASSISTANT

## 2019-09-06 PROCEDURE — 87205 SMEAR GRAM STAIN: CPT | Performed by: SURGERY

## 2019-09-06 PROCEDURE — 86900 BLOOD TYPING SEROLOGIC ABO: CPT | Performed by: ANESTHESIOLOGY

## 2019-09-06 PROCEDURE — 96365 THER/PROPH/DIAG IV INF INIT: CPT

## 2019-09-06 PROCEDURE — 85025 COMPLETE CBC W/AUTO DIFF WBC: CPT | Performed by: NURSE PRACTITIONER

## 2019-09-06 PROCEDURE — 94150 VITAL CAPACITY TEST: CPT

## 2019-09-06 PROCEDURE — 87075 CULTR BACTERIA EXCEPT BLOOD: CPT | Performed by: SURGERY

## 2019-09-06 PROCEDURE — 0JDB0ZZ EXTRACTION OF PERINEUM SUBCUTANEOUS TISSUE AND FASCIA, OPEN APPROACH: ICD-10-PCS | Performed by: SURGERY

## 2019-09-06 PROCEDURE — 85027 COMPLETE CBC AUTOMATED: CPT | Performed by: PHYSICIAN ASSISTANT

## 2019-09-06 PROCEDURE — 94760 N-INVAS EAR/PLS OXIMETRY 1: CPT

## 2019-09-06 PROCEDURE — 83605 ASSAY OF LACTIC ACID: CPT | Performed by: PHYSICIAN ASSISTANT

## 2019-09-06 PROCEDURE — 0BH18EZ INSERTION OF ENDOTRACHEAL AIRWAY INTO TRACHEA, VIA NATURAL OR ARTIFICIAL OPENING ENDOSCOPIC: ICD-10-PCS | Performed by: INTERNAL MEDICINE

## 2019-09-06 PROCEDURE — 87077 CULTURE AEROBIC IDENTIFY: CPT | Performed by: SURGERY

## 2019-09-06 PROCEDURE — 82805 BLOOD GASES W/O2 SATURATION: CPT | Performed by: NURSE PRACTITIONER

## 2019-09-06 PROCEDURE — 80053 COMPREHEN METABOLIC PANEL: CPT | Performed by: NURSE PRACTITIONER

## 2019-09-06 PROCEDURE — 87181 SC STD AGAR DILUTION PER AGT: CPT | Performed by: SURGERY

## 2019-09-06 PROCEDURE — 80053 COMPREHEN METABOLIC PANEL: CPT | Performed by: PHYSICIAN ASSISTANT

## 2019-09-06 PROCEDURE — 82330 ASSAY OF CALCIUM: CPT

## 2019-09-06 PROCEDURE — 82803 BLOOD GASES ANY COMBINATION: CPT

## 2019-09-06 PROCEDURE — 85610 PROTHROMBIN TIME: CPT | Performed by: PHYSICIAN ASSISTANT

## 2019-09-06 PROCEDURE — 82947 ASSAY GLUCOSE BLOOD QUANT: CPT

## 2019-09-06 PROCEDURE — 84145 PROCALCITONIN (PCT): CPT | Performed by: NURSE PRACTITIONER

## 2019-09-06 PROCEDURE — 84132 ASSAY OF SERUM POTASSIUM: CPT

## 2019-09-06 PROCEDURE — 85014 HEMATOCRIT: CPT

## 2019-09-06 PROCEDURE — 87147 CULTURE TYPE IMMUNOLOGIC: CPT | Performed by: SURGERY

## 2019-09-06 PROCEDURE — 85730 THROMBOPLASTIN TIME PARTIAL: CPT | Performed by: PHYSICIAN ASSISTANT

## 2019-09-06 PROCEDURE — 94002 VENT MGMT INPAT INIT DAY: CPT

## 2019-09-06 PROCEDURE — 86850 RBC ANTIBODY SCREEN: CPT | Performed by: ANESTHESIOLOGY

## 2019-09-06 RX ORDER — FENTANYL CITRATE 50 UG/ML
50 INJECTION, SOLUTION INTRAMUSCULAR; INTRAVENOUS
Status: DISCONTINUED | OUTPATIENT
Start: 2019-09-06 | End: 2019-09-07

## 2019-09-06 RX ORDER — PROPOFOL 10 MG/ML
5-50 INJECTION, EMULSION INTRAVENOUS
Status: DISCONTINUED | OUTPATIENT
Start: 2019-09-06 | End: 2019-09-06

## 2019-09-06 RX ORDER — FENTANYL CITRATE-0.9 % NACL/PF 10 MCG/ML
25 PLASTIC BAG, INJECTION (ML) INTRAVENOUS CONTINUOUS
Status: DISCONTINUED | OUTPATIENT
Start: 2019-09-06 | End: 2019-09-11

## 2019-09-06 RX ORDER — DICYCLOMINE HCL 20 MG
20 TABLET ORAL EVERY 6 HOURS
Status: DISCONTINUED | OUTPATIENT
Start: 2019-09-06 | End: 2019-09-06

## 2019-09-06 RX ORDER — SODIUM CHLORIDE 9 MG/ML
INJECTION, SOLUTION INTRAVENOUS CONTINUOUS PRN
Status: DISCONTINUED | OUTPATIENT
Start: 2019-09-06 | End: 2019-09-06 | Stop reason: SURG

## 2019-09-06 RX ORDER — ALBUTEROL SULFATE 90 UG/1
AEROSOL, METERED RESPIRATORY (INHALATION) AS NEEDED
Status: DISCONTINUED | OUTPATIENT
Start: 2019-09-06 | End: 2019-09-06 | Stop reason: SURG

## 2019-09-06 RX ORDER — SUCCINYLCHOLINE/SOD CL,ISO/PF 100 MG/5ML
SYRINGE (ML) INTRAVENOUS AS NEEDED
Status: DISCONTINUED | OUTPATIENT
Start: 2019-09-06 | End: 2019-09-06 | Stop reason: SURG

## 2019-09-06 RX ORDER — VASOPRESSIN 20 U/ML
INJECTION PARENTERAL AS NEEDED
Status: DISCONTINUED | OUTPATIENT
Start: 2019-09-06 | End: 2019-09-06 | Stop reason: SURG

## 2019-09-06 RX ORDER — ALBUMIN, HUMAN INJ 5% 5 %
12.5 SOLUTION INTRAVENOUS ONCE
Status: COMPLETED | OUTPATIENT
Start: 2019-09-06 | End: 2019-09-06

## 2019-09-06 RX ORDER — PROPOFOL 10 MG/ML
INJECTION, EMULSION INTRAVENOUS AS NEEDED
Status: DISCONTINUED | OUTPATIENT
Start: 2019-09-06 | End: 2019-09-06 | Stop reason: SURG

## 2019-09-06 RX ORDER — CLINDAMYCIN PHOSPHATE 600 MG/50ML
600 INJECTION INTRAVENOUS ONCE
Status: COMPLETED | OUTPATIENT
Start: 2019-09-06 | End: 2019-09-07

## 2019-09-06 RX ORDER — SODIUM CHLORIDE, SODIUM GLUCONATE, SODIUM ACETATE, POTASSIUM CHLORIDE, MAGNESIUM CHLORIDE, SODIUM PHOSPHATE, DIBASIC, AND POTASSIUM PHOSPHATE .53; .5; .37; .037; .03; .012; .00082 G/100ML; G/100ML; G/100ML; G/100ML; G/100ML; G/100ML; G/100ML
1000 INJECTION, SOLUTION INTRAVENOUS ONCE
Status: COMPLETED | OUTPATIENT
Start: 2019-09-06 | End: 2019-09-06

## 2019-09-06 RX ORDER — SODIUM CHLORIDE, SODIUM LACTATE, POTASSIUM CHLORIDE, CALCIUM CHLORIDE 600; 310; 30; 20 MG/100ML; MG/100ML; MG/100ML; MG/100ML
INJECTION, SOLUTION INTRAVENOUS CONTINUOUS PRN
Status: DISCONTINUED | OUTPATIENT
Start: 2019-09-06 | End: 2019-09-06 | Stop reason: SURG

## 2019-09-06 RX ORDER — PANTOPRAZOLE SODIUM 40 MG/1
40 TABLET, DELAYED RELEASE ORAL
Status: DISCONTINUED | OUTPATIENT
Start: 2019-09-06 | End: 2019-09-06

## 2019-09-06 RX ORDER — LIDOCAINE HYDROCHLORIDE 10 MG/ML
INJECTION, SOLUTION EPIDURAL; INFILTRATION; INTRACAUDAL; PERINEURAL
Status: COMPLETED
Start: 2019-09-06 | End: 2019-09-06

## 2019-09-06 RX ORDER — CHLORHEXIDINE GLUCONATE 0.12 MG/ML
15 RINSE ORAL EVERY 12 HOURS SCHEDULED
Status: DISCONTINUED | OUTPATIENT
Start: 2019-09-06 | End: 2019-09-11

## 2019-09-06 RX ORDER — SODIUM CHLORIDE, SODIUM GLUCONATE, SODIUM ACETATE, POTASSIUM CHLORIDE, MAGNESIUM CHLORIDE, SODIUM PHOSPHATE, DIBASIC, AND POTASSIUM PHOSPHATE .53; .5; .37; .037; .03; .012; .00082 G/100ML; G/100ML; G/100ML; G/100ML; G/100ML; G/100ML; G/100ML
75 INJECTION, SOLUTION INTRAVENOUS CONTINUOUS
Status: DISCONTINUED | OUTPATIENT
Start: 2019-09-06 | End: 2019-09-09

## 2019-09-06 RX ORDER — MAGNESIUM HYDROXIDE 1200 MG/15ML
LIQUID ORAL AS NEEDED
Status: DISCONTINUED | OUTPATIENT
Start: 2019-09-06 | End: 2019-09-06 | Stop reason: HOSPADM

## 2019-09-06 RX ORDER — CHLORHEXIDINE GLUCONATE 0.12 MG/ML
15 RINSE ORAL EVERY 12 HOURS SCHEDULED
Status: DISCONTINUED | OUTPATIENT
Start: 2019-09-06 | End: 2019-09-06

## 2019-09-06 RX ORDER — CLINDAMYCIN PHOSPHATE 600 MG/50ML
600 INJECTION INTRAVENOUS EVERY 6 HOURS
Status: DISCONTINUED | OUTPATIENT
Start: 2019-09-06 | End: 2019-09-07

## 2019-09-06 RX ORDER — ROCURONIUM BROMIDE 10 MG/ML
INJECTION, SOLUTION INTRAVENOUS AS NEEDED
Status: DISCONTINUED | OUTPATIENT
Start: 2019-09-06 | End: 2019-09-06 | Stop reason: SURG

## 2019-09-06 RX ORDER — FENTANYL CITRATE/PF 50 MCG/ML
100 SYRINGE (ML) INJECTION ONCE
Status: COMPLETED | OUTPATIENT
Start: 2019-09-06 | End: 2019-09-06

## 2019-09-06 RX ORDER — ALBUMIN, HUMAN INJ 5% 5 %
SOLUTION INTRAVENOUS CONTINUOUS PRN
Status: DISCONTINUED | OUTPATIENT
Start: 2019-09-06 | End: 2019-09-06 | Stop reason: SURG

## 2019-09-06 RX ORDER — FENTANYL CITRATE 50 UG/ML
INJECTION, SOLUTION INTRAMUSCULAR; INTRAVENOUS AS NEEDED
Status: DISCONTINUED | OUTPATIENT
Start: 2019-09-06 | End: 2019-09-06 | Stop reason: SURG

## 2019-09-06 RX ORDER — MAGNESIUM SULFATE HEPTAHYDRATE 40 MG/ML
2 INJECTION, SOLUTION INTRAVENOUS ONCE
Status: COMPLETED | OUTPATIENT
Start: 2019-09-06 | End: 2019-09-06

## 2019-09-06 RX ADMIN — CLINDAMYCIN PHOSPHATE 600 MG: 600 INJECTION, SOLUTION INTRAVENOUS at 14:12

## 2019-09-06 RX ADMIN — MAGNESIUM SULFATE HEPTAHYDRATE 2 G: 40 INJECTION, SOLUTION INTRAVENOUS at 19:38

## 2019-09-06 RX ADMIN — PIPERACILLIN SODIUM AND TAZOBACTAM SODIUM 3.38 G: 36; 4.5 INJECTION, POWDER, FOR SOLUTION INTRAVENOUS at 13:40

## 2019-09-06 RX ADMIN — ROCURONIUM BROMIDE 20 MG: 10 INJECTION, SOLUTION INTRAVENOUS at 17:26

## 2019-09-06 RX ADMIN — CHLORHEXIDINE GLUCONATE 0.12% ORAL RINSE 15 ML: 1.2 LIQUID ORAL at 18:43

## 2019-09-06 RX ADMIN — SODIUM CHLORIDE 2000 ML: 0.9 INJECTION, SOLUTION INTRAVENOUS at 13:56

## 2019-09-06 RX ADMIN — ALBUMIN (HUMAN): 12.5 SOLUTION INTRAVENOUS at 16:50

## 2019-09-06 RX ADMIN — ALBUMIN (HUMAN) 12.5 G: 12.5 SOLUTION INTRAVENOUS at 19:36

## 2019-09-06 RX ADMIN — ROCURONIUM BROMIDE 20 MG: 10 INJECTION, SOLUTION INTRAVENOUS at 16:01

## 2019-09-06 RX ADMIN — VANCOMYCIN HYDROCHLORIDE 2000 MG: 1 INJECTION, POWDER, LYOPHILIZED, FOR SOLUTION INTRAVENOUS at 15:42

## 2019-09-06 RX ADMIN — CHLORHEXIDINE GLUCONATE 0.12% ORAL RINSE 15 ML: 1.2 LIQUID ORAL at 21:11

## 2019-09-06 RX ADMIN — SODIUM CHLORIDE 1000 ML: 0.9 INJECTION, SOLUTION INTRAVENOUS at 12:11

## 2019-09-06 RX ADMIN — ALBUMIN (HUMAN): 12.5 SOLUTION INTRAVENOUS at 16:35

## 2019-09-06 RX ADMIN — VASOPRESSIN 2 UNITS: 20 INJECTION INTRAVENOUS at 17:17

## 2019-09-06 RX ADMIN — CALCIUM GLUCONATE 1 G: 98 INJECTION, SOLUTION INTRAVENOUS at 20:25

## 2019-09-06 RX ADMIN — LIDOCAINE HYDROCHLORIDE 3 ML: 10 INJECTION, SOLUTION EPIDURAL; INFILTRATION; INTRACAUDAL; PERINEURAL at 21:57

## 2019-09-06 RX ADMIN — FENTANYL CITRATE 50 MCG: 50 INJECTION INTRAMUSCULAR; INTRAVENOUS at 15:49

## 2019-09-06 RX ADMIN — PROPOFOL 200 MG: 10 INJECTION, EMULSION INTRAVENOUS at 15:54

## 2019-09-06 RX ADMIN — ALBUMIN (HUMAN) 12.5 G: 12.5 SOLUTION INTRAVENOUS at 21:11

## 2019-09-06 RX ADMIN — PROPOFOL 50 MCG/KG/MIN: 10 INJECTION, EMULSION INTRAVENOUS at 18:44

## 2019-09-06 RX ADMIN — SODIUM CHLORIDE, SODIUM GLUCONATE, SODIUM ACETATE, POTASSIUM CHLORIDE, MAGNESIUM CHLORIDE, SODIUM PHOSPHATE, DIBASIC, AND POTASSIUM PHOSPHATE 1000 ML: .53; .5; .37; .037; .03; .012; .00082 INJECTION, SOLUTION INTRAVENOUS at 19:39

## 2019-09-06 RX ADMIN — SODIUM CHLORIDE, SODIUM LACTATE, POTASSIUM CHLORIDE, AND CALCIUM CHLORIDE: .6; .31; .03; .02 INJECTION, SOLUTION INTRAVENOUS at 15:51

## 2019-09-06 RX ADMIN — PHENYLEPHRINE HYDROCHLORIDE 400 MCG: 10 INJECTION INTRAVENOUS at 16:39

## 2019-09-06 RX ADMIN — FENTANYL CITRATE 50 MCG: 50 INJECTION INTRAMUSCULAR; INTRAVENOUS at 15:45

## 2019-09-06 RX ADMIN — CEFEPIME HYDROCHLORIDE 2000 MG: 2 INJECTION, POWDER, FOR SOLUTION INTRAVENOUS at 19:38

## 2019-09-06 RX ADMIN — Medication 7 UNITS/HR: at 19:04

## 2019-09-06 RX ADMIN — PHENYLEPHRINE HYDROCHLORIDE 200 MCG: 10 INJECTION INTRAVENOUS at 16:36

## 2019-09-06 RX ADMIN — VASOPRESSIN 2 UNITS: 20 INJECTION INTRAVENOUS at 16:40

## 2019-09-06 RX ADMIN — CLINDAMYCIN PHOSPHATE 600 MG: 600 INJECTION, SOLUTION INTRAVENOUS at 20:25

## 2019-09-06 RX ADMIN — Medication 200 MG: at 15:54

## 2019-09-06 RX ADMIN — ALBUTEROL SULFATE 8 PUFF: 90 AEROSOL, METERED RESPIRATORY (INHALATION) at 16:09

## 2019-09-06 RX ADMIN — NOREPINEPHRINE BITARTRATE 2 MCG/MIN: 1 INJECTION INTRAVENOUS at 19:01

## 2019-09-06 RX ADMIN — SODIUM CHLORIDE, SODIUM GLUCONATE, SODIUM ACETATE, POTASSIUM CHLORIDE, MAGNESIUM CHLORIDE, SODIUM PHOSPHATE, DIBASIC, AND POTASSIUM PHOSPHATE 150 ML/HR: .53; .5; .37; .037; .03; .012; .00082 INJECTION, SOLUTION INTRAVENOUS at 22:30

## 2019-09-06 RX ADMIN — SODIUM CHLORIDE: 0.9 INJECTION, SOLUTION INTRAVENOUS at 15:51

## 2019-09-06 RX ADMIN — METRONIDAZOLE 500 MG: 500 INJECTION, SOLUTION INTRAVENOUS at 18:43

## 2019-09-06 RX ADMIN — FENTANYL CITRATE 100 MCG: 50 INJECTION INTRAMUSCULAR; INTRAVENOUS at 19:36

## 2019-09-06 RX ADMIN — VASOPRESSIN 2 UNITS: 20 INJECTION INTRAVENOUS at 16:52

## 2019-09-06 RX ADMIN — VASOPRESSIN 2 UNITS: 20 INJECTION INTRAVENOUS at 17:23

## 2019-09-06 RX ADMIN — FENTANYL CITRATE 100 MCG: 50 INJECTION INTRAMUSCULAR; INTRAVENOUS at 15:54

## 2019-09-06 RX ADMIN — NOREPINEPHRINE BITARTRATE 18 MCG/MIN: 1 INJECTION INTRAVENOUS at 23:14

## 2019-09-06 RX ADMIN — IOHEXOL 100 ML: 350 INJECTION, SOLUTION INTRAVENOUS at 13:01

## 2019-09-06 RX ADMIN — SODIUM CHLORIDE, SODIUM LACTATE, POTASSIUM CHLORIDE, AND CALCIUM CHLORIDE: .6; .31; .03; .02 INJECTION, SOLUTION INTRAVENOUS at 17:26

## 2019-09-06 RX ADMIN — ENOXAPARIN SODIUM 40 MG: 40 INJECTION SUBCUTANEOUS at 18:44

## 2019-09-06 RX ADMIN — VASOPRESSIN 2 UNITS: 20 INJECTION INTRAVENOUS at 17:13

## 2019-09-06 RX ADMIN — SODIUM CHLORIDE: 0.9 INJECTION, SOLUTION INTRAVENOUS at 16:48

## 2019-09-06 RX ADMIN — Medication 50 MCG/HR: at 18:43

## 2019-09-06 RX ADMIN — METRONIDAZOLE 500 MG: 500 INJECTION, SOLUTION INTRAVENOUS at 22:48

## 2019-09-06 RX ADMIN — DEXMEDETOMIDINE HYDROCHLORIDE 0.7 MCG/KG/HR: 100 INJECTION, SOLUTION INTRAVENOUS at 22:49

## 2019-09-06 RX ADMIN — VASOPRESSIN 0.04 UNITS/MIN: 20 INJECTION INTRAVENOUS at 19:37

## 2019-09-06 RX ADMIN — VASOPRESSIN 2 UNITS: 20 INJECTION INTRAVENOUS at 16:43

## 2019-09-06 RX ADMIN — DEXMEDETOMIDINE HYDROCHLORIDE 0.2 MCG/KG/HR: 100 INJECTION, SOLUTION INTRAVENOUS at 19:55

## 2019-09-06 RX ADMIN — ROCURONIUM BROMIDE 30 MG: 10 INJECTION, SOLUTION INTRAVENOUS at 16:26

## 2019-09-06 NOTE — H&P
History and Physical - Critical Care   Binh Fisher 48 y o  male MRN: [id number]  Unit/Bed#: OR [place] Encounter: [id number]    Reason for Admission / Chief Complaint:  Worsening cellulitis    History of Present Illness:  Binh Fisher is a 48 y o  male with a history of uncontrolled diabetes and morbidly obese , who presents in ER with worsening cellulitis for the last week  He has history of recurrent cellulitis in different areas, which according to him started on the left lower leg in the lateral area which looked like his typical cellulitis  He was taking antibiotic doxcycline 100 mg every 12 hours for episodes of cellulitis  Within the 5 days, he said that cellulitis went from lower left leg in the lateral area continued to go upward to the left upper thigh in the medial area up to his groin which he described as fast   Sometimes 1 area of the cellulitis will improve but will show up on another area  He did not worry about it because it looked similar to his previous cellulitis  He noticed, when he sat on the toilet that there was bleeding with foul smell  Since, it is not getting better, and the pain is worsening to the point that he cannot lay flat in bed  He decided to go to the hospital     In the ER patient was found out to be tachycardic heart rate in 110s, respiration of 22, systolic blood pressure from 90 -110, lactic acid was 4 7, WBC of 19 61, his CT scan of the left femur showed gas-forming organism (Anna's gangrene) in the left groin and left proximal to mid thigh    Patient was quickly brought to the OR    History obtained from patient   ______________________________________________________________________    Assessment:   Principal Problem:    Anna gangrene  Active Problems:    Septic shock ([address] )    Esophageal reflux    Hyperlipidemia    Morbid obesity (HCC)    Recurrent cellulitis of lower extremity    Type 2 diabetes mellitus with complication (HCC)    Hyponatremia    DEAN (acute kidney injury) ([facility] [address] )    Hyperbilirubinemia    Sinus tachycardia    Lactic acidosis  Resolved Problems:    * No resolved hospital problems  *        Plan:    Neuro:   Pain control:  Patient has no pain when laying down and not moving will have significant pain when he turns  · Will order combination of PO and IV analgesia based on patient presentation after surgery    CV:   HTN  · Will hold BP meds for now: lisinopril    Tachycardic, hyperlipedemia  · In tele monitor:  Sinus tachycardia possibly from low-grade fever and pain  · IV fluids per protocol ideal body weight  · Based on patient history patient was refusing to take his blood cholesterol medications due to adverse effects    Pulm:   · Patient on room air, respiration 16-22, SaO2 99%  · Encourage deep breathing  · IS use post surgery    GI:   Esophageal reflux  · Change patient home med omeprazole BID  to Protonix 40 mg b i d  Hyperbilirubinemia likely from sepsis  · 1 4 total bilirubin: will trend bilirubin with CMP in am  · Consult GI if does not improve    :   · DEAN secondary to prerenal/ septic shock/duration  · Creatinine of 1 37 (baseline 1 02)  · IV fluids per septic shock protocol using ideal body weight   · Will address IV fluids after OR procedure  · Repeat BMP post operation    F/E/N:  Morbid obesity  · NPO for now  · Calorie controlled diet when patient is no longer NPO  · Inpatient consult for nutritional service    Hyponatremia  · Sodium of 127 (baseline 135)  · CMP in a m  · BMP after surgery    ID:    Anna gangrene/  Recurrent cellulitis of lower extremity  · CT femur left w contrast: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  Mildly prominent left external iliac chain and left inguinal lymph nodes, likely reactive    · Surgery consult  · Patient was brought to the OR  · Blood culture x2 pending      Septic shock   · Patient presented with tachycardia heart rate 110, tachypnea respiration 22, Leukocytosis 19 61, lactic acid of 4 7>3 2  · Patient was placed on IV clindamycin x1 dose  · His Zosyn x1 dose  · Cefepime 2 g every 8 hours,  Flagyl 500mg every 8 hours, vancomycin per pharmacy    Lactic acidosis  · lactic acid of 4 7>3 2  · Will recheck lactic acidosis after OR to trend  · IV fluids as needed    Heme:   Hemoglobin 14 2 hematocrit of 41 9  · CBC in a m  Endo:   Uncontrolled Type 2 diabetes mellitus with complication neuropathy  · Previous record hemoglobin A1c of 12 (2015)  · Recheck hemoglobin A1c  · Sliding scale insulin  · Hold Amaryl 4mg OD and metformin 1g BID  · Will keep -180 will start insulin gtt as needed      Msk/Skin:   · PT OT consult when stable    Disposition:  Critical care admission after OR      Counseling / Coordination of Care  Total Critical Care time spent 40 minutes excluding procedures, teaching and family updates  ______________________________________________________________________  Past Medical History:  Past Medical History:   Diagnosis Date    Cellulitis     last assessed 12/10/15    Diabetes mellitus ([address] )     Edema     Elevated liver enzymes     Esophageal reflux     Gluten intolerance     Gout     last assessed 09/05/13    Hyperglycemia     Hypertension     IBS (irritable bowel syndrome)     Insomnia     Obesity     Osteoarthritis of knee     last assessed 02/10/14    Prehypertension     last assessed 08/22/17    Venous insufficiency     last assessed 08/22/17    Villonodular synovitis of the hand, right     last assessed 11/14/2013       Past Surgical History:  History reviewed  No pertinent surgical history      Past Family History:  Family History   Problem Relation Age of Onset   [facility] Cancer Mother         gastrc    Colon cancer Father     Heart failure Father        Social History:  Social History     Tobacco Use   Smoking Status Never Smoker   Smokeless Tobacco Never Used Social History     Substance and Sexual Activity   Alcohol Use No     Social History     Substance and Sexual Activity   Drug Use No     Marital Status: /Civil Union      Medications:  Current Facility-Administered Medications   Medication Dose Route Frequency    [MAR Hold] cefepime (MAXIPIME) 2 g/50 mL dextrose IVPB  2,000 mg Intravenous Q8H    [MAR Hold] chlorhexidine (PERIDEX) 0 12 % oral rinse 15 mL  15 mL Swish & Spit Q12H [facility]    [MAR Hold] enoxaparin (LOVENOX) subcutaneous injection 40 mg  40 mg Subcutaneous Daily    [MAR Hold] insulin lispro (HumaLOG) 100 units/mL subcutaneous injection 1-6 Units  1-6 Units Subcutaneous HS    [MAR Hold] insulin lispro (HumaLOG) 100 units/mL subcutaneous injection 2-12 Units  2-12 Units Subcutaneous TID AC    [MAR Hold] metroNIDAZOLE (FLAGYL) IVPB (premix) 500 mg  500 mg Intravenous Q8H    [MAR Hold] pantoprazole (PROTONIX) EC tablet 40 mg  40 mg Oral BID AC    [MAR Hold] vancomycin (VANCOCIN) 2,000 mg in sodium chloride 0 9 % 500 mL IVPB  2,000 mg Intravenous Once     Home medications:  Prior to Admission medications    Medication Sig Start Date End Date Taking?  Authorizing Provider   doxycycline hyclate (VIBRAMYCIN) 100 mg capsule Take 1 capsule (100 mg total) by mouth every 12 (twelve) hours for 20 days For episodes of cellulitis 9/3/19 9/23/19 Yes ANN Pereira   glimepiride (AMARYL) 4 mg tablet TAKE 1 TABLET BY MOUTH EVERY DAY 8/22/19  Yes ANN Pereira   lisinopril (ZESTRIL) 5 mg tablet TAKE 1 TABLET BY MOUTH EVERY DAY 8/26/19  Yes ANN Pereira   metFORMIN (GLUCOPHAGE) 1000 MG tablet TAKE 1 TABLET BY MOUTH TWICE A DAY 8/26/19  Yes ANN Pereira   omeprazole (PriLOSEC) 20 mg delayed release capsule Take 1 capsule (20 mg total) by mouth daily Take in PM in addition to 40 mg dose (taken in AM) 9/20/18  Yes ANN Pereira   omeprazole (PriLOSEC) 40 MG capsule Take 1 capsule (40 mg total) by mouth daily Take in AM (in addition to 20 mg dose taken in PM) 18  Yes ANN Esqueda   dicyclomine (BENTYL) 20 mg tablet Take 1 tablet (20 mg total) by mouth every 6 (six) hours as needed for abdominal cramping/diarrhea 19   ANN Esqueda   Insulin Pen Needle (B-D UF III MINI PEN NEEDLES) 31G X 5 MM MISC by Does not apply route 13   Historical Provider, MD   mupirocin (BACTROBAN) 2 % ointment Apply topically to inside of nose three times a day for a week  Patient not taking: Reported on 2019 11/15/18   ANN Esqueda     Allergies: Allergies   Allergen Reactions    Clindamycin Diarrhea       ROS:   Review of Systems   Constitutional: Positive for activity change and appetite change  Negative for chills, diaphoresis and fever  HENT: Positive for congestion and postnasal drip  Respiratory: Positive for cough  Negative for shortness of breath  Gastrointestinal: Negative  Genitourinary: Negative for dysuria  Neurological: Positive for headaches (occasional )  Negative for dizziness  Vitals:  Vitals:    19 1240 19 1359 19 1415 19 1447   BP: 96/62 115/67 113/61 118/63   BP Location: Right arm Left arm Left arm    Pulse: (!) 114 (!) 108 (!) 110 (!) 110   Resp: 16 16 16 20   Temp:    100 5 °F (38 1 °C)   TempSrc:    Temporal   SpO2: 97% 98% 95% 96%   Weight:       Height:    5' 4" (1 626 m)     Temperature:   Temp (24hrs), Av °F (37 2 °C), Min:97 4 °F (36 3 °C), Max:100 5 °F (38 1 °C)    Current Temperature: 100 5 °F (38 1 °C)    Weights:   IBW: 59 2 kg  Body mass index is 61 79 kg/m²  Hemodynamic Monitoring:  N/A     Non-Invasive/Invasive Ventilation Settings:  Respiratory    Lab Data (Last 4 hours)    None         O2/Vent Data (Last 4 hours)    None              No results found for: PHART, SBR0DNR, PO2ART, WZK7UFI, Q0WESDJG, BEART, SOURCE  SpO2: SpO2: 96 %     Physical Exam:  Physical Exam   Constitutional: He is oriented to person, place, and time   He appears well-developed and well-nourished  Morbidly obese BMI of 63 77   HENT:   Head: Normocephalic and atraumatic  Eyes: EOM are normal    Neck: Normal range of motion  Neck supple  Cardiovascular: Regular rhythm, normal heart sounds and intact distal pulses  Tachycardic   Pulmonary/Chest: Effort normal and breath sounds normal    Abdominal: Soft  Bowel sounds are normal    Musculoskeletal: He exhibits edema (Bilateral lower extremity +2 edema)  Full range of motion in upper extremity 5/5 motor strength  Limited range of motion and lower extremity due to pain 3/5 monitor   Neurological: He is alert and oriented to person, place, and time  Skin: Skin is warm and dry  2 cm ulcer and the left inner thigh area,  Erythema, in  both groin area, and left medial and posterior thigh and some redness on the left lower lateral leg   Psychiatric: He has a normal mood and affect  Labs:  Results from last 7 days   Lab Units 09/06/19  1212   WBC Thousand/uL 19 61*   HEMOGLOBIN g/dL 14 2   HEMATOCRIT % 41 9   PLATELETS Thousands/uL 226   BANDS PCT % 18*   MONO PCT % 9     Results from last 7 days   Lab Units 09/06/19  1212   SODIUM mmol/L 127*   POTASSIUM mmol/L 3 6   CHLORIDE mmol/L 93*   CO2 mmol/L 21   ANION GAP mmol/L 13   BUN mg/dL 20   CREATININE mg/dL 1 37*   CALCIUM mg/dL 9 2   GLUCOSE RANDOM mg/dL 375*   ALT U/L 37   AST U/L 40   ALK PHOS U/L 165*   ALBUMIN g/dL 2 0*   TOTAL BILIRUBIN mg/dL 1 40*          Results from last 7 days   Lab Units 09/06/19  1212   INR  1 29*   PTT seconds 31          Results from last 7 days   Lab Units 09/06/19  1356 09/06/19  1230   LACTIC ACID mmol/L 3 2* 4 7*     ABG:    VBG:            Imaging: CT femur Left with contrast, Xray of chest  I have personally reviewed pertinent reports        Micro:  Blood culture x2 pending          VTE Pharmacologic Prophylaxis: Enoxaparin (Lovenox)  VTE Mechanical Prophylaxis: sequential compression device    Invasive lines and devices: Invasive Devices     Peripheral Intravenous Line            Peripheral IV 09/06/19 Right Antecubital less than 1 day    Peripheral IV 09/06/19 Right Hand less than 1 day                Code Status: Level 1 - Full Code  POA:   according to patient he wants his wife to make medical decisions for him if anything happens to him, where he cannot make his own decisions  POLST:   not discussed    Given critical illness, patient length of stay will require greater than two midnights  Portions of the record may have been created with voice recognition software  Occasional wrong word or "sound a like" substitutions may have occurred due to the inherent limitations of voice recognition software  Read the chart carefully and recognize, using context, where substitutions have occurred        Kg Lim MD

## 2019-09-06 NOTE — OP NOTE
OPERATIVE REPORT  PATIENT NAME: Aguilar Cao    :  1965  MRN: [id number]  Pt Location: AN OR ROOM 04    SURGERY DATE: 2019    Surgeon(s) and Role:     * Liya Whittington,  - Primary     * Jason Hernandez PA-C - Assisting  No qualified resident was available  Her assistance was required for exposure retraction throughout the case    Preop Diagnosis:  Left thigh and perineal necrotizing soft tissue infection    Post-Op Diagnosis Codes:   Left thigh and perineal necrotizing soft tissue infection    Procedure: Incision, drainage, and debridement of left thigh and perineal necrotizing soft tissue infection    Specimen(s):  ID Type Source Tests Collected by Time Destination   1 : left thigh tissue Tissue Soft Tissue, Other TISSUE EXAM Carlos Ruano, DO 2019 1635    A : left thigh Tissue Groin ANAEROBIC CULTURE AND GRAM STAIN, CULTURE, TISSUE AND GRAM STAIN Carlos Ruano, DO 2019 1534        Estimated Blood Loss:   500 mL    Drains:  Urethral Catheter Latex 16 Fr  (Active)   Number of days: 0       Anesthesia Type:   General    Operative Indications:  Type 2 diabetes mellitus with complication, unspecified whether long term insulin use (HCC) [E11 8]  Morbid obesity ([address] )   Extensive necrotizing soft tissue infection of left thigh and perineum    Operative Findings:  Extensive soft tissue infection of the subcutaneous fat of the upper left thigh up into the perineal region  All muscular tissue was pink and healthy  No signs of necrotizing fasciitis  Aerobic and anaerobic cultures were taken  Two rolls of Betadine-soaked Kerlix were placed into the wound  ASA 4A    Wound class 3  Height 64 inches weight 100 and 63 kg/260 lb BMI 62       Cardiovascular  Hyperlipidemia, Hypertension ,     Pulmonary     Comment: cough      GI/Hepatic     GERD ,                 Endo/Other  Diabetes poorly controlled type 2 ,        GYN         Hematology  Negative hematology ROS        Musculoskeletal     Arthritis      Neurology  Negative neurology ROS        Psychology   Negative psychology ROS                 Complications:   None    Procedure and Technique:  Patient was brought to the operative suite and identified by visualization, conversation, by armband  He just received Zosyn, vancomycin, clindamycin in the emergency room  Sequential compression pumps were placed  He was placed under general anesthesia  Duarte catheter was placed under sterile technique  His legs were placed up in stirrups help expose his upper left thigh and perineal region  This areas prepped with Betadine and sterilely draped  Time-out was performed  Skin incision was made through large indurated area the left upper inner thigh  Underlying subcutaneous tissue revealed bloody looking purulent type drainage  Aerobic and anaerobic cultures were taken  Using digital fractionation of the fatty tissue was able to get up towards the top of the adductor muscles towards the pubic tubercle  All muscle in countered was healthy in appearance  He did have necrotic fatty tissue which was removed using sharp scalpel dissection as well as hot cautery  Few bleeding points in the fatty tissue were controlled with 0 Vicryl suture ligatures  Evident that more of the inferior aspect of the skin and to be removed as some necrotic fatty tissue was removed and I was concerned about the viability of the skin  Ultimately could get both hands into the wound  It appeared to be adequately drained  There was good hemostasis  3 L of Pulsavac irrigation was then carried out of the entire wound  Two rolls of Betadine-soaked Kerlix were placed into the wound  Dressings and mesh underwear were then placed to help hold the dressings in place  Anesthesia was able place both arterial line as well as central line  He remained intubated postoperatively  He returned to the ICU in critical condition on a ventilator        I was present for the entire procedure    Patient Disposition:  PACU     SIGNATURE: Cassi Mullen DO  DATE: September 6, 2019  TIME: 5:56 PM

## 2019-09-06 NOTE — SEPSIS NOTE
Sepsis Note   Rashawn Galvan 48 y o  male MRN: [id number]  Unit/Bed#: ED 07 Encounter: [id number]      qSOFA     Row Name 09/06/19 1240 09/06/19 1113             Altered mental status GCS < 15           Respiratory Rate > / =22  0  1       Systolic BP < / =849  1         Q Sofa Score  1             Initial Sepsis Screening     Row Name 09/06/19 1300 09/06/19 1221             Is the patient's history suggestive of a new or worsening infection? (!) Yes (Proceed)  -GS  (!) Yes (Proceed)  -GS       Suspected source of infection  soft tissue  -GS  soft tissue  -GS       Are two or more of the following signs & symptoms of infection both present and new to the patient? (!) Yes (Proceed)  -GS  (!) Yes (Proceed)  -GS       Indicate SIRS criteria  Tachycardia > 90 bpm;Tachypnea > 20 resp per min;WBC > 10% bands  -GS  Tachypnea > 20 resp per min; Tachycardia > 90 bpm  -GS       If the answer is yes to both questions, suspicion of sepsis is present  [de-identified]         If severe sepsis is present AND tissue hypoperfusion perists in the hour after fluid resuscitation or lactate > 4, the patient meets criteria for SEPTIC SHOCK           Are any of the following organ dysfunction criteria present within 6 hours of suspected infection and SIRS criteria that are NOT considered to be chronic conditions? (!) Yes  -GS         Organ dysfunction  Lactate > 2 0 mmol/L  -GS         Date of presentation of severe sepsis           Time of presentation of severe sepsis           Tissue hypoperfusion persists in the hour after crystalloid fluid administration, evidenced, by either:           Was hypotension present within one hour of the conclusion of crystalloid fluid administration?   Derinda Seton       Date of presentation of septic shock           Time of presentation of septic shock             User Key  (r) = Recorded By, (t) = Taken By, (c) = Cosigned By    [address] Name Provider Type    DANTE Carlos PA-C Physician Assistant

## 2019-09-06 NOTE — SEPSIS NOTE
Sepsis Note   Ralph Ríos 48 y o  male MRN: [id number]  Unit/Bed#: ED 07 Encounter: [id number]      qSOFA     Row Name 09/06/19 1240 09/06/19 1113             Altered mental status GCS < 15           Respiratory Rate > / =22  0  1       Systolic BP < / =728  1         Q Sofa Score  1             Initial Sepsis Screening     Row Name 09/06/19 1300 09/06/19 1221             Is the patient's history suggestive of a new or worsening infection? (!) Yes (Proceed)  -GS  (!) Yes (Proceed)  -GS       Suspected source of infection  soft tissue  -GS  soft tissue  -GS       Are two or more of the following signs & symptoms of infection both present and new to the patient? (!) Yes (Proceed)  -GS  (!) Yes (Proceed)  -GS       Indicate SIRS criteria  Tachycardia > 90 bpm;Tachypnea > 20 resp per min;WBC > 10% bands  -GS  Tachypnea > 20 resp per min; Tachycardia > 90 bpm  -GS       If the answer is yes to both questions, suspicion of sepsis is present  [employer]         If severe sepsis is present AND tissue hypoperfusion perists in the hour after fluid resuscitation or lactate > 4, the patient meets criteria for SEPTIC SHOCK           Are any of the following organ dysfunction criteria present within 6 hours of suspected infection and SIRS criteria that are NOT considered to be chronic conditions? (!) Yes  -GS         Organ dysfunction  Lactate >/equal 4 0 mmol/L  -AW         Date of presentation of severe sepsis  09/06/19  -AW         Time of presentation of severe sepsis  1335  -AW         Tissue hypoperfusion persists in the hour after crystalloid fluid administration, evidenced, by either:           Was hypotension present within one hour of the conclusion of crystalloid fluid administration?   [place] Quick       Date of presentation of septic shock           Time of presentation of septic shock             User Key  (r) = Recorded By, (t) = Taken By, (c) = Cosigned By    [address] Name Provider Type    DANTE Hendricks JORDAN Mukherjee Physician Assistant    Otoniel Roblero DO Physician          Patient given IV fluids based on ideal body weight of 56 9 kg

## 2019-09-06 NOTE — PROGRESS NOTES
Vancomycin Assessment    Main Maynard is a 48 y o  male who is currently receiving vancomycin 2000 mg every 12 hours for skin-soft tissue infection     Relevant clinical data and objective history reviewed:  Creatinine   Date Value Ref Range Status   09/06/2019 1 00 0 60 - 1 30 mg/dL Final     Comment:     Standardized to IDMS reference method   09/06/2019 1 37 (H) 0 60 - 1 30 mg/dL Final     Comment:     Standardized to IDMS reference method   12/03/2015 1 02 0 60 - 1 30 mg/dL Final     Comment:     Standardized to IDMS reference method   02/06/2014 0 76 0 60 - 1 30 mg/dL Final     Comment:     Standardized to IDMS reference method   10/10/2013 0 73 0 60 - 1 30 mg/dL Final     /56   Pulse (!) 118   Temp 98 8 °F (37 1 °C) (Oral)   Resp (!) 34   Ht 5' 4" (1 626 m)   Wt (!) 166 kg (367 lb 1 1 oz)   SpO2 95%   BMI 63 01 kg/m²   I/O last 3 completed shifts: In: 3500 [I V :3000; IV Piggyback:500]  Out: 700 [Urine:200; Blood:500]  Lab Results   Component Value Date/Time    BUN 19 09/06/2019 06:34 PM    BUN 14 12/03/2015 01:25 PM    WBC 27 10 (H) 09/06/2019 06:34 PM    WBC 6 71 12/03/2015 01:25 PM    HGB 11 3 (L) 09/06/2019 06:34 PM    HGB 16 7 12/03/2015 01:25 PM    HCT 33 9 (L) 09/06/2019 06:34 PM    HCT 48 1 12/03/2015 01:25 PM    MCV 94 09/06/2019 06:34 PM    MCV 90 12/03/2015 01:25 PM     09/06/2019 06:34 PM     12/03/2015 01:25 PM     Temp Readings from Last 3 Encounters:   09/06/19 98 8 °F (37 1 °C) (Oral)   09/20/18 99 3 °F (37 4 °C) (Tympanic)   08/22/17 98 8 °F (37 1 °C) (Tympanic)     Vancomycin Days of Therapy: 1    Assessment/Plan  The patient is currently on vancomycin utilizing scheduled dosing based on adjusted body weight (due to obesity)  The patient is currently receiving 2000 mg every 12 hours and is clinically appropriate and dose will be continued  Pharmacy will also follow closely for s/sx of nephrotoxicity, infusion reactions and appropriateness of therapy    BMP and CBC will be ordered per protocol  Plan for trough as patient approaches steady state, prior to the 5th  dose at approximately 1500 on 9/8  Due to infection severity, will target a trough of 15-20 (appropriate for most indications)   Pharmacy will continue to follow the patients culture results and clinical progress daily      Leonor Herrera, Pharmacist

## 2019-09-06 NOTE — ED NOTES
Dr Neida Edwards and surgical NP at bedside  Will go to OR  #2 lactic drawn and sent       Di Kerr RN  09/06/19 8944

## 2019-09-06 NOTE — SEPSIS NOTE
Sepsis Note   Andreina Alcala 48 y o  male MRN: [id number]  Unit/Bed#: OR POOL Encounter: [id number]      qSOFA     Row Name 09/06/19 1447 09/06/19 1415 09/06/19 1359 09/06/19 1240 09/06/19 1113    Altered mental status GCS < 15              Respiratory Rate > / =22  0  0  0  0  1    Systolic BP < / =938  0  0  0  1      Q Sofa Score  0  0  0  1          Initial Sepsis Screening     Row Name 09/06/19 1300 09/06/19 1221             Is the patient's history suggestive of a new or worsening infection? (!) Yes (Proceed)  -GS  (!) Yes (Proceed)  -GS       Suspected source of infection  soft tissue  -GS  soft tissue  -GS       Are two or more of the following signs & symptoms of infection both present and new to the patient? (!) Yes (Proceed)  -GS  (!) Yes (Proceed)  -GS       Indicate SIRS criteria  Tachycardia > 90 bpm;Tachypnea > 20 resp per min;WBC > 10% bands  -GS  Tachypnea > 20 resp per min; Tachycardia > 90 bpm  -GS       If the answer is yes to both questions, suspicion of sepsis is present  Arnol Patella         If severe sepsis is present AND tissue hypoperfusion perists in the hour after fluid resuscitation or lactate > 4, the patient meets criteria for SEPTIC SHOCK           Are any of the following organ dysfunction criteria present within 6 hours of suspected infection and SIRS criteria that are NOT considered to be chronic conditions? (!) Yes  -GS         Organ dysfunction  Lactate >/equal 4 0 mmol/L  -AW         Date of presentation of severe sepsis  09/06/19  -AW         Time of presentation of severe sepsis  1335  -AW         Tissue hypoperfusion persists in the hour after crystalloid fluid administration, evidenced, by either:           Was hypotension present within one hour of the conclusion of crystalloid fluid administration?   Crissie Sacramento       Date of presentation of septic shock           Time of presentation of septic shock             User Key  (r) = Recorded By, (t) = Taken By, (c) = Cosigned By    [address] Name Provider Type    DANTE Herring PA-C Physician Assistant    BRAN Griffin DO Physician               Default Flowsheet Data (last 720 hours)      Sepsis Reassess     Row Name 09/06/19 2660                   Repeat Volume Status and Tissue Perfusion Assessment Performed    Repeat Volume Status and Tissue Perfusion Assessment Performed  Yes  -SP           Volume Status and Tissue Perfusion Post Fluid Resuscitation * Must Document All *    Vital Signs Reviewed (HR, RR, BP, T)  Yes  -SP        Shock Index Reviewed  Yes  -SP        Arterial Oxygen Saturation Reviewed (POx, SaO2 or SpO2)  Yes (comment %) SpO2 95%  -SP        Cardio  (!) Normal S1/S2; Regular rate and rhythm; Tachycardia  -SP        Pulmonary  (!) Normal effort; Tachypnea  -SP        Capillary Refill  Brisk  -SP        Peripheral Pulses  Radial;Dorsalis Pedis  -SP        Peripheral Pulse  +2  -SP        Dorsalis Pedis  +1  -SP        Skin  Warm;Dry;Normal  -SP        Urine output assessed  None  -SP           *OR*   Intensive Monitoring- Must Document One of the Following Four *:    Vital Signs Reviewed          * Central Venous Pressure (CVP or RAP)          * Central Venous Oxygen (SVO2, ScvO2 or Oxygen saturation via central catheter)          * Bedside Cardiovascular US in IVC diameter and % collapse          * Passive Leg Raise OR Crystalloid Challenge            User Key  (r) = Recorded By, (t) = Taken By, (c) = Cosigned By    Initials Name Provider Type    Zamzam Stevenson Nurse Practitioner          Patient given 3L IVF, which is over 30ml/kg based on ideal body weight given obesity  Lactate improved to 3 2

## 2019-09-06 NOTE — ED PROVIDER NOTES
History  Chief Complaint   Patient presents with    Cellulitis     Pt  c/o left leg wound with pain, swelling and redness for six days and has been tx outpatient doxycycline  Pt  has hx  of cellulitis  44-year-old male presents to the emergency department with complaints of cellulitis of the left lower extremity  States that he started with a small area of cellulitis on the left thigh about midway down the leg  States that symptoms started 6 days ago and have travel  Notes that he started on doxycycline 5 days ago and has been taking this twice daily  States that he had a fever upon starting the antibiotic which dissipated the next day  Reports history of recurrent cellulitis in this extremity  States that pain is worse with ambulation or when palpated  Noticed some bloody drainage from an area on the left upper inner thigh yesterday and today  He denies testicular pain or swelling  History provided by:  Patient and spouse   used: No        Prior to Admission Medications   Prescriptions Last Dose Informant Patient Reported? Taking?    Insulin Pen Needle (B-D UF III MINI PEN NEEDLES) 31G X 5 MM MISC   Yes No   Sig: by Does not apply route   dicyclomine (BENTYL) 20 mg tablet Past Week at Unknown time  No Yes   Sig: Take 1 tablet (20 mg total) by mouth every 6 (six) hours as needed for abdominal cramping/diarrhea   doxycycline hyclate (VIBRAMYCIN) 100 mg capsule 9/6/2019 at Unknown time  No Yes   Sig: Take 1 capsule (100 mg total) by mouth every 12 (twelve) hours for 20 days For episodes of cellulitis   glimepiride (AMARYL) 4 mg tablet Past Week at Unknown time  No Yes   Sig: TAKE 1 TABLET BY MOUTH EVERY DAY   lisinopril (ZESTRIL) 5 mg tablet Past Week at Unknown time  No Yes   Sig: TAKE 1 TABLET BY MOUTH EVERY DAY   metFORMIN (GLUCOPHAGE) 1000 MG tablet Past Week at Unknown time  No Yes   Sig: TAKE 1 TABLET BY MOUTH TWICE A DAY   mupirocin (BACTROBAN) 2 % ointment Not Taking at Unknown time  No No   Sig: Apply topically to inside of nose three times a day for a week   Patient not taking: Reported on 9/6/2019   omeprazole (PriLOSEC) 20 mg delayed release capsule Past Week at Unknown time  No Yes   Sig: Take 1 capsule (20 mg total) by mouth daily Take in PM in addition to 40 mg dose (taken in AM)   omeprazole (PriLOSEC) 40 MG capsule Past Week at Unknown time  No Yes   Sig: Take 1 capsule (40 mg total) by mouth daily Take in AM (in addition to 20 mg dose taken in PM)      Facility-Administered Medications: None       Past Medical History:   Diagnosis Date    Cellulitis     last assessed 12/10/15    Diabetes mellitus ([facility] [address] )     Edema     Elevated liver enzymes     Esophageal reflux     Gluten intolerance     Gout     last assessed 09/05/13    Hyperglycemia     Hypertension     IBS (irritable bowel syndrome)     Insomnia     Obesity     Osteoarthritis of knee     last assessed 02/10/14    Prehypertension     last assessed 08/22/17    Venous insufficiency     last assessed 08/22/17    Villonodular synovitis of the hand, right     last assessed 11/14/2013       History reviewed  No pertinent surgical history  Family History   Problem Relation Age of Onset    Cancer Mother         gastrc    Colon cancer Father     Heart failure Father      I have reviewed and agree with the history as documented  Social History     Tobacco Use    Smoking status: Never Smoker    Smokeless tobacco: Never Used   Substance Use Topics    Alcohol use: No    Drug use: No        Review of Systems   Constitutional: Positive for chills and fever  Negative for activity change and appetite change  HENT: Negative for congestion, dental problem, drooling, ear discharge, ear pain, mouth sores, nosebleeds, rhinorrhea, sore throat and trouble swallowing  Eyes: Negative for pain, discharge and itching  Respiratory: Negative for cough, chest tightness, shortness of breath and wheezing  Cardiovascular: Negative for chest pain and palpitations  Gastrointestinal: Negative for abdominal pain, blood in stool, constipation, diarrhea, nausea and vomiting  Endocrine: Negative for cold intolerance and heat intolerance  Genitourinary: Negative for difficulty urinating, dysuria, flank pain, frequency and urgency  Musculoskeletal:        Leg pain, drainage     Skin: Positive for color change  Allergic/Immunologic: Negative for food allergies and immunocompromised state  Neurological: Negative for dizziness, seizures, syncope, weakness, numbness and headaches  Psychiatric/Behavioral: Negative for agitation, behavioral problems and confusion  Physical Exam  Physical Exam   Constitutional: He is oriented to person, place, and time  He appears well-developed and well-nourished  No distress  HENT:   Head: Normocephalic and atraumatic  Right Ear: External ear normal    Left Ear: External ear normal    Mouth/Throat: Oropharynx is clear and moist  No oropharyngeal exudate  Eyes: Conjunctivae are normal    Neck: No JVD present  No tracheal deviation present  Cardiovascular: Regular rhythm and normal heart sounds  Tachycardia present  Exam reveals no gallop and no friction rub  No murmur heard  Pulmonary/Chest: Effort normal and breath sounds normal  No respiratory distress  He has no wheezes  He has no rales  He exhibits no tenderness  Genitourinary:   Genitourinary Comments: Erythema of the scrotum without tenderness  Musculoskeletal: Normal range of motion  He exhibits no edema, tenderness or deformity  Left hip: He exhibits swelling  He exhibits no crepitus  Legs:  Lymphadenopathy:     He has no cervical adenopathy  Neurological: He is alert and oriented to person, place, and time  Skin: Skin is warm and dry  No rash noted  He is not diaphoretic  No erythema  Psychiatric: He has a normal mood and affect   His behavior is normal    Nursing note and vitals reviewed        Vital Signs  ED Triage Vitals   Temperature Pulse Respirations Blood Pressure SpO2   09/06/19 1118 09/06/19 1113 09/06/19 1113 09/06/19 1240 09/06/19 1113   (!) 97 4 °F (36 3 °C) (!) 138 22 96/62 99 %      Temp Source Heart Rate Source Patient Position - Orthostatic VS BP Location FiO2 (%)   09/06/19 1118 09/06/19 1240 09/06/19 1240 09/06/19 1240 --   Oral Monitor Lying Right arm       Pain Score       09/06/19 1240       No Pain           Vitals:    09/06/19 1240 09/06/19 1359 09/06/19 1415 09/06/19 1447   BP: 96/62 115/67 113/61 118/63   Pulse: (!) 114 (!) 108 (!) 110 (!) 110   Patient Position - Orthostatic VS: Lying Lying Lying          Visual Acuity      ED Medications  Medications   vancomycin (VANCOCIN) 2,000 mg in sodium chloride 0 9 % 500 mL IVPB ( Intravenous MAR Hold 9/6/19 1441)   chlorhexidine (PERIDEX) 0 12 % oral rinse 15 mL ( Swish & Spit Dose Auto Held 9/9/19 2100)   enoxaparin (LOVENOX) subcutaneous injection 40 mg ( Subcutaneous Dose Auto Held 9/9/19 0900)   pantoprazole (PROTONIX) EC tablet 40 mg ( Oral Dose Auto Held 9/9/19 1600)   cefepime (MAXIPIME) 2 g/50 mL dextrose IVPB ( Intravenous Dose Auto Held 9/9/19 2230)   metroNIDAZOLE (FLAGYL) IVPB (premix) 500 mg ( Intravenous Dose Auto Held 9/9/19 2230)   insulin lispro (HumaLOG) 100 units/mL subcutaneous injection 2-12 Units ( Subcutaneous Dose Auto Held 9/9/19 1600)   insulin lispro (HumaLOG) 100 units/mL subcutaneous injection 1-6 Units ( Subcutaneous Dose Auto Held 9/9/19 2200)   sodium chloride 0 9 % bolus 1,000 mL (0 mL Intravenous Stopped 9/6/19 1311)   iohexol (OMNIPAQUE) 350 MG/ML injection (MULTI-DOSE) 100 mL (100 mL Intravenous Given 9/6/19 1301)   piperacillin-tazobactam (ZOSYN) 3 375 g in sodium chloride 0 9 % 50 mL IVPB (0 g Intravenous Stopped 9/6/19 1412)   sodium chloride 0 9 % bolus 2,000 mL (2,000 mL Intravenous New Bag 9/6/19 1356)   clindamycin (CLEOCIN) IVPB (premix) 600 mg (600 mg Intravenous New Bag 9/6/19 1412)       Diagnostic Studies  Results Reviewed     Procedure Component Value Units Date/Time    Hemoglobin A1C w/ EAG Estimation [[id number]] Collected:  09/06/19 1212    Lab Status: In process Specimen:  Blood from Arm, Right Updated:  09/06/19 1450    Lactic acid x2 Q2H [[id number]]  (Abnormal) Collected:  09/06/19 1356    Lab Status:  Final result Specimen:  Blood from Hand, Right Updated:  09/06/19 1432     LACTIC ACID 3 2 mmol/L     Narrative:       Result may be elevated if tourniquet was used during collection  Platelet count [[id number]]     Lab Status:  No result Specimen:  Blood     Lactic acid x2 Q2H [[id number]]  (Abnormal) Collected:  09/06/19 1230    Lab Status:  Final result Specimen:  Blood from Arm, Right Updated:  09/06/19 1316     LACTIC ACID 4 7 mmol/L     Narrative:       Result may be elevated if tourniquet was used during collection  CBC and differential [[id number]]  (Abnormal) Collected:  09/06/19 1212    Lab Status:  Final result Specimen:  Blood from Arm, Right Updated:  09/06/19 1259     WBC 19 61 Thousand/uL      RBC 4 56 Million/uL      Hemoglobin 14 2 g/dL      Hematocrit 41 9 %      MCV 92 fL      MCH 31 1 pg      MCHC 33 9 g/dL      RDW 13 6 %      MPV 9 1 fL      Platelets 725 Thousands/uL      nRBC 0 /100 WBCs     Narrative: This is an appended report  These results have been appended to a previously verified report      Comprehensive metabolic panel [[id number]]  (Abnormal) Collected:  09/06/19 1212    Lab Status:  Final result Specimen:  Blood from Arm, Right Updated:  09/06/19 1240     Sodium 127 mmol/L      Potassium 3 6 mmol/L      Chloride 93 mmol/L      CO2 21 mmol/L      ANION GAP 13 mmol/L      BUN 20 mg/dL      Creatinine 1 37 mg/dL      Glucose 375 mg/dL      Calcium 9 2 mg/dL      AST 40 U/L      ALT 37 U/L      Alkaline Phosphatase 165 U/L      Total Protein 7 5 g/dL      Albumin 2 0 g/dL      Total Bilirubin 1 40 mg/dL      eGFR 59 ml/min/1 73sq m Narrative:       National Kid[employer] Foundation guidelines for Chronic Kidney Disease (CKD):     Stage 1 with normal or high GFR (GFR > 90 mL/min/1 73 square meters)    Stage 2 Mild CKD (GFR = 60-89 mL/min/1 73 square meters)    Stage 3A Moderate CKD (GFR = 45-59 mL/min/1 73 square meters)    Stage 3B Moderate CKD (GFR = 30-44 mL/min/1 73 square meters)    Stage 4 Severe CKD (GFR = 15-29 mL/min/1 73 square meters)    Stage 5 End Stage CKD (GFR <15 mL/min/1 73 square meters)  Note: GFR calculation is accurate only with a steady state creatinine    Protime-INR [[id number]]  (Abnormal) Collected:  09/06/19 1212    Lab Status:  Final result Specimen:  Blood from Arm, Right Updated:  09/06/19 1236     Protime 15 4 seconds      INR 1 29    APTT [[id number]]  (Normal) Collected:  09/06/19 1212    Lab Status:  Final result Specimen:  Blood from Arm, Right Updated:  09/06/19 1236     PTT 31 seconds     Blood culture #2 [[id number]] Collected:  09/06/19 1212    Lab Status: In process Specimen:  Blood from Arm, Right Updated:  09/06/19 1220    Blood culture #1 [[id number]] Collected:  09/06/19 1212    Lab Status: In process Specimen:  Blood from Arm, Right Updated:  09/06/19 1220                 CT femur left w contrast   Final Result by Elian Fields MD (09/06 9234)      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  No localized fluid collection /abscess  Immediate surgical consultation is recommended  Mildly prominent left external iliac chain and left inguinal lymph nodes, likely reactive  I personally discussed this study with Dr Katlin Junior on 9/6/2019 at 1:33 PM                Workstation performed: [id number]         XR chest 2 views   ED Interpretation by Megan Ochoa PA-C (09/06 8459)   Cardiomegaly  No focal consolidation  Final Result by Ned Brownlee MD (09/06 3292)      Cardiomegaly    No active pulmonary disease  Workstation performed: [id number]                    Procedures  CriticalCare Time  Performed by: Klarissa Collins PA-C  Authorized by: Klarissa Collins PA-C     Critical care provider statement:     Critical care time (minutes):  35    Critical care was necessary to treat or prevent imminent or life-threatening deterioration of the following conditions:  Sepsis    Critical care was time spent personally by me on the following activities:  Blood draw for specimens, obtaining history from patient or surrogate, development of treatment plan with patient or surrogate, discussions with consultants, examination of patient, re-evaluation of patient's condition, ordering and review of radiographic studies, ordering and review of laboratory studies and ordering and performing treatments and interventions           ED Course  ED Course as of Sep 06 1502   Fri Sep 06, 2019   1318 Call placed to reading room for official read on CT       1318 Call placed to surgical PA       8607 Spoke with surgery  Will come and evaluate  1323 Sepsis alert called  Initial Sepsis Screening     Row Name 09/06/19 1300 09/06/19 1221             Is the patient's history suggestive of a new or worsening infection? (!) Yes (Proceed)  -GS  (!) Yes (Proceed)  -GS       Suspected source of infection  soft tissue  -GS  soft tissue  -GS       Are two or more of the following signs & symptoms of infection both present and new to the patient? (!) Yes (Proceed)  -GS  (!) Yes (Proceed)  -GS       Indicate SIRS criteria  Tachycardia > 90 bpm;Tachypnea > 20 resp per min;WBC > 10% bands  -GS  Tachypnea > 20 resp per min; Tachycardia > 90 bpm  -GS       If the answer is yes to both questions, suspicion of sepsis is present  [de-identified]         If severe sepsis is present AND tissue hypoperfusion perists in the hour after fluid resuscitation or lactate > 4, the patient meets criteria for SEPTIC SHOCK     Are any of the following organ dysfunction criteria present within 6 hours of suspected infection and SIRS criteria that are NOT considered to be chronic conditions? (!) Yes  -GS         Organ dysfunction  Lactate >/equal 4 0 mmol/L  -AW         Date of presentation of severe sepsis  09/06/19  -AW         Time of presentation of severe sepsis  1335  -AW         Tissue hypoperfusion persists in the hour after crystalloid fluid administration, evidenced, by either:           Was hypotension present within one hour of the conclusion of crystalloid fluid administration? Virginia Bobo       Date of presentation of septic shock           Time of presentation of septic shock             User Key  (r) = Recorded By, (t) = Taken By, (c) = Cosigned By    Initials Name Provider Type    DANTE Romero PA-C Physician Assistant    BRAN Valdez DO Physician           Default Flowsheet Data (last 720 hours)      Sepsis Reassess     Row Name 09/06/19 1459                   Repeat Volume Status and Tissue Perfusion Assessment Performed    Repeat Volume Status and Tissue Perfusion Assessment Performed  Yes  -SP           Volume Status and Tissue Perfusion Post Fluid Resuscitation * Must Document All *    Vital Signs Reviewed (HR, RR, BP, T)  Yes  -SP        Shock Index Reviewed  Yes  -SP        Arterial Oxygen Saturation Reviewed (POx, SaO2 or SpO2)  Yes (comment %) SpO2 95%  -SP        Cardio  (!) Normal S1/S2; Regular rate and rhythm; Tachycardia  -SP        Pulmonary  (!) Normal effort; Tachypnea  -SP        Capillary Refill  Brisk  -SP        Peripheral Pulses  Radial;Dorsalis Pedis  -SP        Peripheral Pulse  +2  -SP        Dorsalis Pedis  +1  -SP        Skin  Warm;Dry;Normal  -SP        Urine output assessed  None  -SP           *OR*   Intensive Monitoring- Must Document One of the Following Four *:    Vital Signs Reviewed          * Central Venous Pressure (CVP or RAP)          * Central Venous Oxygen (SVO2, ScvO2 or Oxygen saturation via central catheter)          * Bedside Cardiovascular US in IVC diameter and % collapse          * Passive Leg Raise OR Crystalloid Challenge            User Key  (r) = Recorded By, (t) = Taken By, (c) = Cosigned By    Initials Name Provider Type    IVON Cordero, Zamzam Bishop Nurse Practitioner                MDM  Number of Diagnoses or Management Options  Necrotizing soft tissue infection:   Sepsis, due to unspecified organism [facility]):   Diagnosis management comments: Differential diagnosis includes but not limited to:  Cellulitis, necrotizing fasciitis, necrotizing skin infection, sepsis         Amount and/or Complexity of Data Reviewed  Clinical lab tests: reviewed and ordered  Tests in the radiology section of CPT®: reviewed and ordered  Discussion of test results with the performing providers: yes  Obtain history from someone other than the patient: yes  Discuss the patient with other providers: yes  Independent visualization of images, tracings, or specimens: yes        Disposition  Final diagnoses:   Necrotizing soft tissue infection   Sepsis, due to unspecified organism [facility])     Time reflects when diagnosis was documented in both MDM as applicable and the Disposition within this note     Time User Action Codes Description Comment    9/6/2019  1:50 PM Sha Aldana Add [E11 8] Type 2 diabetes mellitus with complication, unspecified whether long term insulin use ([facility] )     9/6/2019  1:50 PM Sha Aldana Modify [E11 8] Type 2 diabetes mellitus with complication, unspecified whether long term insulin use ([facility] )     9/6/2019  1:50 PM Sha Aldana Add [E66 01] Morbid obesity ([facility] )     9/6/2019  2:06 PM Karl Snyder 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     9/6/2019  2:06 PM Mar Mukherjee Sera Add [M79 89] Necrotizing soft tissue infection     9/6/2019  2:06 PM Mar Mukherjee Sera Add [A41 9] Sepsis, due to unspecified organism [facility])       ED Disposition     ED Disposition Condition Date/Time Comment    Admit Stable Fri Sep 6, 2019  2:06 PM Case was discussed with Critical Care and the patient's admission status was agreed to be Admission Status: inpatient status to the service of Dr Tony Mg   Follow-up Information    None         Current Discharge Medication List      CONTINUE these medications which have NOT CHANGED    Details   dicyclomine (BENTYL) 20 mg tablet Take 1 tablet (20 mg total) by mouth every 6 (six) hours as needed for abdominal cramping/diarrhea  Qty: 20 tablet, Refills: 1    Associated Diagnoses: Diarrhea, unspecified type; Abdominal cramping      doxycycline hyclate (VIBRAMYCIN) 100 mg capsule Take 1 capsule (100 mg total) by mouth every 12 (twelve) hours for 20 days For episodes of cellulitis  Qty: 20 capsule, Refills: 1    Associated Diagnoses: Recurrent cellulitis of lower extremity      glimepiride (AMARYL) 4 mg tablet TAKE 1 TABLET BY MOUTH EVERY DAY  Qty: 90 tablet, Refills: 1    Associated Diagnoses: Type 2 diabetes mellitus without complication, without long-term current use of insulin (HCC)      lisinopril (ZESTRIL) 5 mg tablet TAKE 1 TABLET BY MOUTH EVERY DAY  Qty: 90 tablet, Refills: 1    Associated Diagnoses: Essential hypertension      metFORMIN (GLUCOPHAGE) 1000 MG tablet TAKE 1 TABLET BY MOUTH TWICE A DAY  Qty: 180 tablet, Refills: 1    Associated Diagnoses: Type 2 diabetes mellitus with complication, unspecified whether long term insulin use (HCC)      ! ! omeprazole (PriLOSEC) 20 mg delayed release capsule Take 1 capsule (20 mg total) by mouth daily Take in PM in addition to 40 mg dose (taken in AM)  Qty: 30 capsule, Refills: 5    Comments: PLEASE REFILL FOR #90  PATIENT STATES NOT ALTERNATING  TAKING 1 DAILY OF 20MG AND 40MG   TOTAL DOSE 60MG  Associated Diagnoses: Gastroesophageal reflux disease, esophagitis presence not specified      !! omeprazole (PriLOSEC) 40 MG capsule Take 1 capsule (40 mg total) by mouth daily Take in AM (in addition to 20 mg dose taken in PM)  Qty: 30 capsule, Refills: 5    Comments: PLEASE REFILL FOR #90  PATIENT STATES NOT ALTERNATING  TAKING 1 DAILY OF 20 AND 40MG CAPSULE  TOTAL DOSE 60MG  Associated Diagnoses: Gastroesophageal reflux disease, esophagitis presence not specified      Insulin Pen Needle (B-D UF III MINI PEN NEEDLES) 31G X 5 MM MISC by Does not apply route      mupirocin (BACTROBAN) 2 % ointment Apply topically to inside of nose three times a day for a week  Qty: 22 g, Refills: 0    Associated Diagnoses: Recurrent cellulitis of lower extremity       ! ! - Potential duplicate medications found  Please discuss with provider  No discharge procedures on file      ED Provider  Electronically Signed by           Elham Bain PA-C  09/06/19 8936

## 2019-09-06 NOTE — CONSULTS
Consultation -General Surgery  Nova Vasquez 48 y o  male MRN: [id number]  Unit/Bed#: ED 07 Encounter: [id number]        Consults    ASSESSMENT/PLAN:  Problem List     Diabetes mellitus [facility])    Lab Results   Component Value Date    HGBA1C 12 0 (H) 12/03/2015       No results for input(s): POCGLU in the last 72 hours  Blood Sugar Average: Last 72 hrs:          Edema    Elevated liver enzymes    Esophageal reflux    Hyperlipidemia    Irritable bowel syndrome    Morbid obesity ([facility] )    Psoriasis    Venous insufficiency    Gout    Overview Signed 9/20/2018  2:50 PM by ANN Tiwari     last assessed 09/05/13         Essential hypertension    Recurrent cellulitis of lower extremity    Abdominal cramping    Diarrhea    Type 2 diabetes mellitus with complication ([facility] )    Overview Signed 9/6/2019  1:50 PM by Cherise Celeste PA-C     Added automatically from request for surgery [id number]         Lab Results   Component Value Date    HGBA1C 12 0 (H) 12/03/2015       No results for input(s): POCGLU in the last 72 hours  Blood Sugar Average: Last 72 hrs:            -Admit to critical care  -OR for I&D  -NPO  -IV antibiotics  -pain control  -fluids      Reason for Consult / Principal Problem: cellulitis     HPI: Nova Vasquez is a 48y o  year old male who presents with worsening cellulitis for the last week  He has history of recurrent cellulitis in different areas but this area hr states is new  He was placed on PO antibiotics by his family care doctor 5 days ago and came to the ED because it was not getting better  He denies fevers/chills  Poorly controlled diabetic  Review of Systems   Constitutional: Negative  HENT: Negative  Eyes: Negative  Respiratory: Negative  Cardiovascular: Negative  Gastrointestinal: Negative  Endocrine: Negative  Genitourinary: Negative  Musculoskeletal: Negative  Skin: Positive for color change and wound  Allergic/Immunologic: Negative  Neurological: Negative  Hematological: Negative  Psychiatric/Behavioral: Negative  Historical Information   Past Medical History:   Diagnosis Date    Cellulitis     last assessed 12/10/15    Diabetes mellitus ([address] )     Edema     Elevated liver enzymes     Esophageal reflux     Gluten intolerance     Gout     last assessed 09/05/13    Hyperglycemia     Hypertension     IBS (irritable bowel syndrome)     Insomnia     Obesity     Osteoarthritis of knee     last assessed 02/10/14    Prehypertension     last assessed 08/22/17    Venous insufficiency     last assessed 08/22/17    Villonodular synovitis of the hand, right     last assessed 11/14/2013     History reviewed  No pertinent surgical history  Social History   Social History     Substance and Sexual Activity   Alcohol Use No     Social History     Substance and Sexual Activity   Drug Use No     Social History     Tobacco Use   Smoking Status Never Smoker   Smokeless Tobacco Never Used     Family History   Problem Relation Age of Onset    Cancer Mother         gastrc    Colon cancer Father        Meds/Allergies       (Not in a hospital admission)  Current Facility-Administered Medications   Medication Dose Route Frequency    clindamycin (CLEOCIN) IVPB (premix) 600 mg  600 mg Intravenous Once    piperacillin-tazobactam (ZOSYN) 3 375 g in sodium chloride 0 9 % 50 mL IVPB  3 375 g Intravenous Once    sodium chloride 0 9 % bolus 2,000 mL  2,000 mL Intravenous Once    vancomycin (VANCOCIN) 2,000 mg in sodium chloride 0 9 % 500 mL IVPB  2,000 mg Intravenous Once       Allergies   Allergen Reactions    Clindamycin Diarrhea       Objective     Blood pressure 96/62, pulse (!) 114, temperature (!) 97 4 °F (36 3 °C), temperature source Oral, resp  rate 16, weight (!) 163 kg (360 lb), SpO2 97 %    No intake or output data in the 24 hours ending 09/06/19 1352    PHYSICAL EXAM  General appearance: alert and oriented, in no acute distress and alert  Skin: extensive cellulitis extending from bilateral groin fold, left greater than right into perineal space  areas weeping sporadically  foul smell     Head: Normocephalic, without obvious abnormality  Heart: regular rate and rhythm, S1, S2 normal, no murmur, click, rub or gallop  Lungs: clear to auscultation bilaterally  Abdomen: soft, non-tender; bowel sounds normal; no masses,  no organomegaly  Back: negative  Rectal: deferred  Neurological: normal without focal findings, mental status, speech normal, alert and oriented x3, GAUTAM and reflexes normal and symmetric    Lab Results:   Admission on 09/06/2019   Component Date Value    WBC 09/06/2019 19 61*    RBC 09/06/2019 4 56     Hemoglobin 09/06/2019 14 2     Hematocrit 09/06/2019 41 9     MCV 09/06/2019 92     MCH 09/06/2019 31 1     MCHC 09/06/2019 33 9     RDW 09/06/2019 13 6     MPV 09/06/2019 9 1     Platelets 67/43/2852 226     nRBC 09/06/2019 0     Protime 09/06/2019 15 4*    INR 09/06/2019 1 29*    PTT 09/06/2019 31     Sodium 09/06/2019 127*    Potassium 09/06/2019 3 6     Chloride 09/06/2019 93*    CO2 09/06/2019 21     ANION GAP 09/06/2019 13     BUN 09/06/2019 20     Creatinine 09/06/2019 1 37*    Glucose 09/06/2019 375*    Calcium 09/06/2019 9 2     AST 09/06/2019 40     ALT 09/06/2019 37     Alkaline Phosphatase 09/06/2019 165*    Total Protein 09/06/2019 7 5     Albumin 09/06/2019 2 0*    Total Bilirubin 09/06/2019 1 40*    eGFR 09/06/2019 59     LACTIC ACID 09/06/2019 4 7*    Segmented % 09/06/2019 57     Bands % 09/06/2019 18*    Lymphocytes % 09/06/2019 10*    Monocytes % 09/06/2019 9     Eosinophils, % 09/06/2019 0     Basophils % 09/06/2019 0     Metamyelocytes% 09/06/2019 2*    Atypical Lymphocytes % 09/06/2019 4*    Absolute Neutrophils 09/06/2019 14 71*    Lymphocytes Absolute 09/06/2019 1 96     Monocytes Absolute 09/06/2019 1 76*    Eosinophils Absolute 09/06/2019 0 00     Basophils Absolute 09/06/2019 0 00     Total Counted 09/06/2019 100     Dohle Bodies 09/06/2019 Present     Toxic Granulation 09/06/2019 Present     RBC Morphology 09/06/2019 Normal     Polychromasia 09/06/2019 Present     Platelet Estimate 68/01/0845 Adequate      Imaging Studies: I have personally reviewed pertinent reports  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

## 2019-09-06 NOTE — ANESTHESIA PREPROCEDURE EVALUATION
Review of Systems/Medical History      No history of anesthetic complications     Cardiovascular  Hyperlipidemia, Hypertension ,    Pulmonary    Comment: cough     GI/Hepatic    GERD ,             Endo/Other  Diabetes poorly controlled type 2 ,      GYN       Hematology  Negative hematology ROS      Musculoskeletal    Arthritis     Neurology  Negative neurology ROS      Psychology   Negative psychology ROS              Physical Exam    Airway    Mallampati score: II  TM Distance: >3 FB  Neck ROM: full     Dental       Cardiovascular      Pulmonary      Other Findings        Anesthesia Plan  ASA Score- 3     Anesthesia Type- general with ASA Monitors  Additional Monitors:   Airway Plan: ETT  Comment: General anesthesia, endotracheal tube; standard ASA monitors  Risks and benefits discussed with patient; patient consented and agrees to proceed  I saw and evaluated the patient  If seen with CRNA, we have discussed the anesthetic plan and I am in agreement that the plan is appropriate for the patient        Plan Factors-    Induction- intravenous  Postoperative Plan- Plan for postoperative opioid use  Planned trial extubation    Informed Consent- Anesthetic plan and risks discussed with patient  I personally reviewed this patient with the CRNA  Discussed and agreed on the Anesthesia Plan with the CRNA  Diana Ye

## 2019-09-06 NOTE — SEPSIS NOTE
Sepsis Note   Agata Castillo 48 y o  male MRN: [id number]  Unit/Bed#: ED 07 Encounter: [id number]      qSOFA     [address] Name 09/06/19 1113                Altered mental status GCS < 15          Respiratory Rate > / =28  1        Systolic BP < / =267          Q Sofa Score              Initial Sepsis Screening     Row Name 09/06/19 1221                Is the patient's history suggestive of a new or worsening infection? (!) Yes (Proceed)  -GS        Suspected source of infection  soft tissue  -GS        Are two or more of the following signs & symptoms of infection both present and new to the patient? (!) Yes (Proceed)  -GS        Indicate SIRS criteria  Tachypnea > 20 resp per min; Tachycardia > 90 bpm  -GS        If the answer is yes to both questions, suspicion of sepsis is present          If severe sepsis is present AND tissue hypoperfusion perists in the hour after fluid resuscitation or lactate > 4, the patient meets criteria for SEPTIC SHOCK          Are any of the following organ dysfunction criteria present within 6 hours of suspected infection and SIRS criteria that are NOT considered to be chronic conditions?         Organ dysfunction          Date of presentation of severe sepsis          Time of presentation of severe sepsis          Tissue hypoperfusion persists in the hour after crystalloid fluid administration, evidenced, by either:          Was hypotension present within one hour of the conclusion of crystalloid fluid administration?           Date of presentation of septic shock          Time of presentation of septic shock            User Key  (r) = Recorded By, (t) = Taken By, (c) = Cosigned By    Initials Name Provider Type    GS Carmenza Roldan PA-C Physician Assistant

## 2019-09-06 NOTE — ANESTHESIA PROCEDURE NOTES
Central Line Insertion  Performed by: Colin Vickers MD  Authorized by: Colin Vickers MD   Date/Time: 9/6/2019 5:23 PM  Catheter Type:  triple lumen  Consent: The procedure was performed in an emergent situation  Consent given by: decision made intra-op  Required items: required blood products, implants, devices, and special equipment available  Patient identity confirmed: verbally with patient and arm band  Time out: Immediately prior to procedure a "time out" was called to verify the correct patient, procedure, equipment, support staff and site/side marked as required  Indications: central pressure monitoring  Location details: right internal jugular  Anesthesia method: ga    Assessment: blood return through all ports and free fluid flow  Preparation: skin prepped with ChloraPrep  Skin prep agent dried: skin prep agent completely dried prior to procedure  Sterile barriers: all five maximum sterile barriers used - cap, mask, sterile gown, sterile gloves, and large sterile sheet  Hand hygiene: hand hygiene performed prior to central venous catheter insertion  Ultrasound guidance: yes  sterile gel and probe cover used in ultrasound-guided central venous catheter insertionVessel of Catheter Tip End: central venous  Number of attempts: 1  Successful placement: yes  Post-procedure: chlorhexidine patch applied,  dressing applied and line sutured  Patient tolerance: Patient tolerated the procedure well with no immediate complications

## 2019-09-06 NOTE — ED ATTENDING ATTESTATION
IKim, DO, saw and evaluated the patient  I have discussed the patient with the resident/non-physician practitioner and agree with the resident's/non-physician practitioner's findings, Plan of Care, and MDM as documented in the resident's/non-physician practitioner's note, except where noted  All available labs and Radiology studies were reviewed  I was present for key portions of any procedure(s) performed by the resident/non-physician practitioner and I was immediately available to provide assistance  At this point I agree with the current assessment done in the Emergency Department  I have conducted an independent evaluation of this patient a history and physical is as follows:      Critical Care Time  Procedures   61-year-old male with a history of recurrent cellulitis and morbid obesity presents emergency department for evaluation of left lower leg pain  Patient states that he was started on clindamycin last week for recurrent cellulitis of the posterior aspect of the left thigh  Patient states that the initial site of inflammation has improved however redness and tenderness has tracked cephalad to involve the skin of the medial thigh just below the buttocks to the perineum  Patient denies scrotal pain or discomfort  Patient states he did have fever last week however this resolved when he started taking clindamycin  Patient has noticed drainage from the medial aspect of the left upper inner thigh  He has increased pain when he ambulates  Past medical history:  Insulin-dependent diabetes, morbid obesity, recurrent cellulitis    Physical exam:  Obese male resting in gurney in mild distress, mucous membranes are dry  Neck is supple  Heart is tachycardic  Lungs are decreased sounds at the bases  Abdomen is soft and nontender  There is erythema warmth and tenderness of the posterior aspect of the left thigh that tracks from the mid  to the perineum    There is foul-smelling bloody drainage expressed with palpation of the edematous area  No crepitus  Strength and sensation is intact  No focal neurologic deficits  Plan: Will check CT scan of lower extremity to evaluate for deep space infection  Start IV fluids and IV antibiotics  Pain control  Patient will need admission, dispo to be decided based on CT scan results

## 2019-09-06 NOTE — ANESTHESIA PROCEDURE NOTES
Arterial Line Insertion  Performed by: Yoly Cortes MD  Authorized by: Yoly Cortes MD   Consent given by: decision made post-induction in OR  Required items: required blood products, implants, devices, and special equipment available  Patient identity confirmed: verbally with patient and arm band  Time out: Immediately prior to procedure a "time out" was called to verify the correct patient, procedure, equipment, support staff and site/side marked as required  Preparation: Patient was prepped and draped in the usual sterile fashion    Indications: multiple ABGs and hemodynamic monitoring  Orientation:  Right  Location: radial artery  Procedure Details:  Needle gauge: 20  Seldinger technique: Seldinger technique used  Number of attempts: 1    Post-procedure:  Post-procedure: dressing applied  Waveform: good waveform and waveform confirmed  Patient tolerance: Patient tolerated the procedure well with no immediate complications

## 2019-09-07 ENCOUNTER — ANESTHESIA (INPATIENT)
Dept: PERIOP | Facility: HOSPITAL | Age: 54
DRG: 710 | End: 2019-09-07
Payer: COMMERCIAL

## 2019-09-07 ENCOUNTER — APPOINTMENT (INPATIENT)
Dept: RADIOLOGY | Facility: HOSPITAL | Age: 54
DRG: 710 | End: 2019-09-07
Payer: COMMERCIAL

## 2019-09-07 PROBLEM — I95.9 HYPOTENSION: Status: ACTIVE | Noted: 2019-09-07

## 2019-09-07 PROBLEM — R79.89 ELEVATED PROCALCITONIN: Status: ACTIVE | Noted: 2019-09-07

## 2019-09-07 PROBLEM — R41.82 ALTERED MENTAL STATUS: Status: ACTIVE | Noted: 2019-09-07

## 2019-09-07 PROBLEM — E87.6 HYPOKALEMIA: Status: ACTIVE | Noted: 2019-09-07

## 2019-09-07 PROBLEM — R00.0 SINUS TACHYCARDIA: Status: RESOLVED | Noted: 2019-09-06 | Resolved: 2019-09-07

## 2019-09-07 PROBLEM — J96.01 ACUTE HYPOXEMIC RESPIRATORY FAILURE (HCC): Status: ACTIVE | Noted: 2019-09-07

## 2019-09-07 PROBLEM — G93.41 METABOLIC ENCEPHALOPATHY: Status: ACTIVE | Noted: 2019-09-07

## 2019-09-07 PROBLEM — E80.6 HYPERBILIRUBINEMIA: Status: RESOLVED | Noted: 2019-09-06 | Resolved: 2019-09-07

## 2019-09-07 PROBLEM — E83.51 HYPOCALCEMIA: Status: ACTIVE | Noted: 2019-09-07

## 2019-09-07 LAB
ALBUMIN SERPL BCP-MCNC: 2 G/DL (ref 3.5–5)
ALP SERPL-CCNC: 102 U/L (ref 46–116)
ALT SERPL W P-5'-P-CCNC: 25 U/L (ref 12–78)
ANION GAP SERPL CALCULATED.3IONS-SCNC: 10 MMOL/L (ref 4–13)
ANION GAP SERPL CALCULATED.3IONS-SCNC: 12 MMOL/L (ref 4–13)
ARTERIAL PATENCY WRIST A: YES
AST SERPL W P-5'-P-CCNC: 27 U/L (ref 5–45)
BASE EXCESS BLDA CALC-SCNC: -10.8 MMOL/L
BASE EXCESS BLDA CALC-SCNC: -7.6 MMOL/L
BASE EXCESS BLDA CALC-SCNC: -8 MMOL/L
BASE EXCESS BLDA CALC-SCNC: -8.9 MMOL/L
BASE EXCESS BLDA CALC-SCNC: -9 MMOL/L (ref -2–3)
BASE EXCESS BLDA CALC-SCNC: -9.5 MMOL/L
BASOPHILS # BLD MANUAL: 0 THOUSAND/UL (ref 0–0.1)
BASOPHILS NFR MAR MANUAL: 0 % (ref 0–1)
BILIRUB SERPL-MCNC: 0.9 MG/DL (ref 0.2–1)
BODY TEMPERATURE: 100.4 DEGREES FEHRENHEIT
BUN SERPL-MCNC: 22 MG/DL (ref 5–25)
BUN SERPL-MCNC: 23 MG/DL (ref 5–25)
CA-I BLD-SCNC: 1.01 MMOL/L (ref 1.12–1.32)
CALCIUM SERPL-MCNC: 7.1 MG/DL (ref 8.3–10.1)
CALCIUM SERPL-MCNC: 7.5 MG/DL (ref 8.3–10.1)
CHLORIDE SERPL-SCNC: 102 MMOL/L (ref 100–108)
CHLORIDE SERPL-SCNC: 103 MMOL/L (ref 100–108)
CO2 SERPL-SCNC: 20 MMOL/L (ref 21–32)
CO2 SERPL-SCNC: 21 MMOL/L (ref 21–32)
CREAT SERPL-MCNC: 0.97 MG/DL (ref 0.6–1.3)
CREAT SERPL-MCNC: 1.09 MG/DL (ref 0.6–1.3)
DOHLE BOD BLD QL SMEAR: PRESENT
EOSINOPHIL # BLD MANUAL: 0 THOUSAND/UL (ref 0–0.4)
EOSINOPHIL NFR BLD MANUAL: 0 % (ref 0–6)
ERYTHROCYTE [DISTWIDTH] IN BLOOD BY AUTOMATED COUNT: 14.1 % (ref 11.6–15.1)
GFR SERPL CREATININE-BSD FRML MDRD: 78 ML/MIN/1.73SQ M
GFR SERPL CREATININE-BSD FRML MDRD: 89 ML/MIN/1.73SQ M
GLUCOSE SERPL-MCNC: 140 MG/DL (ref 65–140)
GLUCOSE SERPL-MCNC: 144 MG/DL (ref 65–140)
GLUCOSE SERPL-MCNC: 151 MG/DL (ref 65–140)
GLUCOSE SERPL-MCNC: 153 MG/DL (ref 65–140)
GLUCOSE SERPL-MCNC: 153 MG/DL (ref 65–140)
GLUCOSE SERPL-MCNC: 154 MG/DL (ref 65–140)
GLUCOSE SERPL-MCNC: 158 MG/DL (ref 65–140)
GLUCOSE SERPL-MCNC: 158 MG/DL (ref 65–140)
GLUCOSE SERPL-MCNC: 159 MG/DL (ref 65–140)
GLUCOSE SERPL-MCNC: 162 MG/DL (ref 65–140)
GLUCOSE SERPL-MCNC: 177 MG/DL (ref 65–140)
GLUCOSE SERPL-MCNC: 189 MG/DL (ref 65–140)
GLUCOSE SERPL-MCNC: 239 MG/DL (ref 65–140)
GLUCOSE SERPL-MCNC: 246 MG/DL (ref 65–140)
HCO3 BLDA-SCNC: 15.1 MMOL/L (ref 22–28)
HCO3 BLDA-SCNC: 15.6 MMOL/L (ref 22–28)
HCO3 BLDA-SCNC: 16.3 MMOL/L (ref 22–28)
HCO3 BLDA-SCNC: 17.4 MMOL/L (ref 22–28)
HCO3 BLDA-SCNC: 17.9 MMOL/L (ref 22–28)
HCO3 BLDA-SCNC: 18 MMOL/L (ref 22–28)
HCT VFR BLD AUTO: 29.3 % (ref 36.5–49.3)
HCT VFR BLD CALC: 29 % (ref 36.5–49.3)
HGB BLD-MCNC: 9.7 G/DL (ref 12–17)
HGB BLD-MCNC: 9.8 G/DL (ref 12–17)
HGB BLDA-MCNC: 9.9 G/DL (ref 12–17)
HOROWITZ INDEX BLDA+IHG-RTO: 50 MM[HG]
HOROWITZ INDEX BLDA+IHG-RTO: 60 MM[HG]
LACTATE SERPL-SCNC: 1.3 MMOL/L (ref 0.5–2)
LACTATE SERPL-SCNC: 1.9 MMOL/L (ref 0.5–2)
LACTATE SERPL-SCNC: 2.3 MMOL/L (ref 0.5–2)
LACTATE SERPL-SCNC: 3.1 MMOL/L (ref 0.5–2)
LYMPHOCYTES # BLD AUTO: 14 % (ref 14–44)
LYMPHOCYTES # BLD AUTO: 3.7 THOUSAND/UL (ref 0.6–4.47)
MAGNESIUM SERPL-MCNC: 2 MG/DL (ref 1.6–2.6)
MCH RBC QN AUTO: 31.4 PG (ref 26.8–34.3)
MCHC RBC AUTO-ENTMCNC: 33.1 G/DL (ref 31.4–37.4)
MCV RBC AUTO: 95 FL (ref 82–98)
MONOCYTES # BLD AUTO: 2.12 THOUSAND/UL (ref 0–1.22)
MONOCYTES NFR BLD: 8 % (ref 4–12)
NEUTROPHILS # BLD MANUAL: 20.64 THOUSAND/UL (ref 1.85–7.62)
NEUTS BAND NFR BLD MANUAL: 17 % (ref 0–8)
NEUTS SEG NFR BLD AUTO: 61 % (ref 43–75)
NRBC BLD AUTO-RTO: 1 /100 WBC (ref 0–2)
NRBC BLD AUTO-RTO: 1 /100 WBCS
O2 CT BLDA-SCNC: 11.5 ML/DL (ref 16–23)
O2 CT BLDA-SCNC: 12.8 ML/DL (ref 16–23)
O2 CT BLDA-SCNC: 13.3 ML/DL (ref 16–23)
O2 CT BLDA-SCNC: 13.4 ML/DL (ref 16–23)
O2 CT BLDA-SCNC: 14.1 ML/DL (ref 16–23)
OXYHGB MFR BLDA: 88.6 % (ref 94–97)
OXYHGB MFR BLDA: 90 % (ref 94–97)
OXYHGB MFR BLDA: 90.4 % (ref 94–97)
OXYHGB MFR BLDA: 90.9 % (ref 94–97)
OXYHGB MFR BLDA: 93.2 % (ref 94–97)
PCO2 BLD: 19 MMOL/L (ref 21–32)
PCO2 BLD: 43 MM HG (ref 36–44)
PCO2 BLDA: 26.4 MM HG (ref 36–44)
PCO2 BLDA: 29.3 MM HG (ref 36–44)
PCO2 BLDA: 31 MM HG (ref 36–44)
PCO2 BLDA: 33.3 MM HG (ref 36–44)
PCO2 BLDA: 52.8 MM HG (ref 36–44)
PEEP RESPIRATORY: 8 CM[H2O]
PEEP RESPIRATORY: 8 CM[H2O]
PH BLD: 7.23 [PH] (ref 7.35–7.45)
PH BLDA: 7.15 [PH] (ref 7.35–7.45)
PH BLDA: 7.32 [PH] (ref 7.35–7.45)
PH BLDA: 7.33 [PH] (ref 7.35–7.45)
PH BLDA: 7.36 [PH] (ref 7.35–7.45)
PH BLDA: 7.38 [PH] (ref 7.35–7.45)
PHOSPHATE SERPL-MCNC: 3.1 MG/DL (ref 2.7–4.5)
PLATELET # BLD AUTO: 213 THOUSANDS/UL (ref 149–390)
PLATELET BLD QL SMEAR: ADEQUATE
PMV BLD AUTO: 8.9 FL (ref 8.9–12.7)
PO2 BLD: 61 MM HG (ref 75–129)
PO2 BLDA: 60.1 MM HG (ref 75–129)
PO2 BLDA: 61.8 MM HG (ref 75–129)
PO2 BLDA: 62.3 MM HG (ref 75–129)
PO2 BLDA: 69.3 MM HG (ref 75–129)
PO2 BLDA: 69.6 MM HG (ref 75–129)
POLYCHROMASIA BLD QL SMEAR: PRESENT
POTASSIUM BLD-SCNC: 3.5 MMOL/L (ref 3.5–5.3)
POTASSIUM SERPL-SCNC: 3.4 MMOL/L (ref 3.5–5.3)
POTASSIUM SERPL-SCNC: 4 MMOL/L (ref 3.5–5.3)
PROCALCITONIN SERPL-MCNC: 2.96 NG/ML
PROT SERPL-MCNC: 5.8 G/DL (ref 6.4–8.2)
RBC # BLD AUTO: 3.09 MILLION/UL (ref 3.88–5.62)
SAO2 % BLD FROM PO2: 86 % (ref 95–98)
SODIUM BLD-SCNC: 136 MMOL/L (ref 136–145)
SODIUM SERPL-SCNC: 134 MMOL/L (ref 136–145)
SODIUM SERPL-SCNC: 134 MMOL/L (ref 136–145)
SPECIMEN SOURCE: ABNORMAL
TOTAL CELLS COUNTED SPEC: 100
TOXIC GRANULES BLD QL SMEAR: PRESENT
VENT AC: 12
VENT- AC: AC
VENT- AC: AC
VT SETTING VENT: 450 ML
VT SETTING VENT: 450 ML
WBC # BLD AUTO: 26.46 THOUSAND/UL (ref 4.31–10.16)

## 2019-09-07 PROCEDURE — 80053 COMPREHEN METABOLIC PANEL: CPT | Performed by: NURSE PRACTITIONER

## 2019-09-07 PROCEDURE — 83605 ASSAY OF LACTIC ACID: CPT | Performed by: PHYSICIAN ASSISTANT

## 2019-09-07 PROCEDURE — 82330 ASSAY OF CALCIUM: CPT

## 2019-09-07 PROCEDURE — 84295 ASSAY OF SERUM SODIUM: CPT

## 2019-09-07 PROCEDURE — 83735 ASSAY OF MAGNESIUM: CPT | Performed by: NURSE PRACTITIONER

## 2019-09-07 PROCEDURE — 82805 BLOOD GASES W/O2 SATURATION: CPT | Performed by: PHYSICIAN ASSISTANT

## 2019-09-07 PROCEDURE — 82947 ASSAY GLUCOSE BLOOD QUANT: CPT

## 2019-09-07 PROCEDURE — 82948 REAGENT STRIP/BLOOD GLUCOSE: CPT

## 2019-09-07 PROCEDURE — 85018 HEMOGLOBIN: CPT | Performed by: NURSE PRACTITIONER

## 2019-09-07 PROCEDURE — 94003 VENT MGMT INPAT SUBQ DAY: CPT

## 2019-09-07 PROCEDURE — 84132 ASSAY OF SERUM POTASSIUM: CPT

## 2019-09-07 PROCEDURE — 99291 CRITICAL CARE FIRST HOUR: CPT | Performed by: INTERNAL MEDICINE

## 2019-09-07 PROCEDURE — 99254 IP/OBS CNSLTJ NEW/EST MOD 60: CPT | Performed by: INTERNAL MEDICINE

## 2019-09-07 PROCEDURE — 0JBM0ZZ EXCISION OF LEFT UPPER LEG SUBCUTANEOUS TISSUE AND FASCIA, OPEN APPROACH: ICD-10-PCS | Performed by: SURGERY

## 2019-09-07 PROCEDURE — 85007 BL SMEAR W/DIFF WBC COUNT: CPT | Performed by: NURSE PRACTITIONER

## 2019-09-07 PROCEDURE — 84100 ASSAY OF PHOSPHORUS: CPT | Performed by: NURSE PRACTITIONER

## 2019-09-07 PROCEDURE — 80048 BASIC METABOLIC PNL TOTAL CA: CPT | Performed by: NURSE PRACTITIONER

## 2019-09-07 PROCEDURE — 83605 ASSAY OF LACTIC ACID: CPT | Performed by: NURSE PRACTITIONER

## 2019-09-07 PROCEDURE — 71045 X-RAY EXAM CHEST 1 VIEW: CPT

## 2019-09-07 PROCEDURE — 82803 BLOOD GASES ANY COMBINATION: CPT

## 2019-09-07 PROCEDURE — 94760 N-INVAS EAR/PLS OXIMETRY 1: CPT

## 2019-09-07 PROCEDURE — 84145 PROCALCITONIN (PCT): CPT | Performed by: NURSE PRACTITIONER

## 2019-09-07 PROCEDURE — 0JBB0ZZ EXCISION OF PERINEUM SUBCUTANEOUS TISSUE AND FASCIA, OPEN APPROACH: ICD-10-PCS | Performed by: SURGERY

## 2019-09-07 PROCEDURE — 85027 COMPLETE CBC AUTOMATED: CPT | Performed by: NURSE PRACTITIONER

## 2019-09-07 PROCEDURE — 94150 VITAL CAPACITY TEST: CPT

## 2019-09-07 PROCEDURE — 82805 BLOOD GASES W/O2 SATURATION: CPT | Performed by: NURSE PRACTITIONER

## 2019-09-07 PROCEDURE — 85014 HEMATOCRIT: CPT

## 2019-09-07 RX ORDER — SODIUM HYPOCHLORITE 0.5 %
SOLUTION, NON-ORAL MISCELLANEOUS AS NEEDED
Status: DISCONTINUED | OUTPATIENT
Start: 2019-09-07 | End: 2019-09-07 | Stop reason: HOSPADM

## 2019-09-07 RX ORDER — POTASSIUM CHLORIDE 14.9 MG/ML
20 INJECTION INTRAVENOUS
Status: DISCONTINUED | OUTPATIENT
Start: 2019-09-07 | End: 2019-09-07

## 2019-09-07 RX ORDER — CLINDAMYCIN PHOSPHATE 600 MG/50ML
600 INJECTION INTRAVENOUS EVERY 6 HOURS
Status: DISCONTINUED | OUTPATIENT
Start: 2019-09-07 | End: 2019-09-07

## 2019-09-07 RX ORDER — SODIUM CHLORIDE, SODIUM GLUCONATE, SODIUM ACETATE, POTASSIUM CHLORIDE, MAGNESIUM CHLORIDE, SODIUM PHOSPHATE, DIBASIC, AND POTASSIUM PHOSPHATE .53; .5; .37; .037; .03; .012; .00082 G/100ML; G/100ML; G/100ML; G/100ML; G/100ML; G/100ML; G/100ML
1000 INJECTION, SOLUTION INTRAVENOUS ONCE
Status: COMPLETED | OUTPATIENT
Start: 2019-09-07 | End: 2019-09-07

## 2019-09-07 RX ORDER — MAGNESIUM HYDROXIDE 1200 MG/15ML
LIQUID ORAL AS NEEDED
Status: DISCONTINUED | OUTPATIENT
Start: 2019-09-07 | End: 2019-09-07 | Stop reason: HOSPADM

## 2019-09-07 RX ORDER — ROCURONIUM BROMIDE 10 MG/ML
INJECTION, SOLUTION INTRAVENOUS AS NEEDED
Status: DISCONTINUED | OUTPATIENT
Start: 2019-09-07 | End: 2019-09-07 | Stop reason: SURG

## 2019-09-07 RX ORDER — SODIUM CHLORIDE 9 MG/ML
INJECTION, SOLUTION INTRAVENOUS CONTINUOUS PRN
Status: DISCONTINUED | OUTPATIENT
Start: 2019-09-07 | End: 2019-09-07 | Stop reason: SURG

## 2019-09-07 RX ORDER — ACETAMINOPHEN 160 MG/5ML
650 SUSPENSION, ORAL (FINAL DOSE FORM) ORAL EVERY 6 HOURS PRN
Status: DISCONTINUED | OUTPATIENT
Start: 2019-09-07 | End: 2019-09-08

## 2019-09-07 RX ORDER — POTASSIUM CHLORIDE 20MEQ/15ML
20 LIQUID (ML) ORAL ONCE
Status: COMPLETED | OUTPATIENT
Start: 2019-09-07 | End: 2019-09-07

## 2019-09-07 RX ORDER — FENTANYL CITRATE 50 UG/ML
100 INJECTION, SOLUTION INTRAMUSCULAR; INTRAVENOUS
Status: DISCONTINUED | OUTPATIENT
Start: 2019-09-07 | End: 2019-09-09

## 2019-09-07 RX ADMIN — CEFEPIME HYDROCHLORIDE 2000 MG: 2 INJECTION, POWDER, FOR SOLUTION INTRAVENOUS at 03:27

## 2019-09-07 RX ADMIN — NOREPINEPHRINE BITARTRATE 7 MCG/MIN: 1 INJECTION INTRAVENOUS at 04:49

## 2019-09-07 RX ADMIN — FENTANYL CITRATE 50 MCG: 50 INJECTION INTRAMUSCULAR; INTRAVENOUS at 10:49

## 2019-09-07 RX ADMIN — VASOPRESSIN 0.04 UNITS/MIN: 20 INJECTION INTRAVENOUS at 11:29

## 2019-09-07 RX ADMIN — Medication 20 MG: at 17:49

## 2019-09-07 RX ADMIN — VANCOMYCIN HYDROCHLORIDE 2000 MG: 1 INJECTION, POWDER, LYOPHILIZED, FOR SOLUTION INTRAVENOUS at 15:31

## 2019-09-07 RX ADMIN — VANCOMYCIN HYDROCHLORIDE 2000 MG: 1 INJECTION, POWDER, LYOPHILIZED, FOR SOLUTION INTRAVENOUS at 04:18

## 2019-09-07 RX ADMIN — Medication 20 MG: at 09:00

## 2019-09-07 RX ADMIN — SODIUM CHLORIDE, SODIUM GLUCONATE, SODIUM ACETATE, POTASSIUM CHLORIDE, MAGNESIUM CHLORIDE, SODIUM PHOSPHATE, DIBASIC, AND POTASSIUM PHOSPHATE 150 ML/HR: .53; .5; .37; .037; .03; .012; .00082 INJECTION, SOLUTION INTRAVENOUS at 05:18

## 2019-09-07 RX ADMIN — CEFEPIME HYDROCHLORIDE 2000 MG: 2 INJECTION, POWDER, FOR SOLUTION INTRAVENOUS at 20:25

## 2019-09-07 RX ADMIN — VASOPRESSIN 0.04 UNITS/MIN: 20 INJECTION INTRAVENOUS at 02:26

## 2019-09-07 RX ADMIN — FENTANYL CITRATE 50 MCG: 50 INJECTION INTRAMUSCULAR; INTRAVENOUS at 19:56

## 2019-09-07 RX ADMIN — FENTANYL CITRATE 50 MCG: 50 INJECTION INTRAMUSCULAR; INTRAVENOUS at 18:48

## 2019-09-07 RX ADMIN — NOREPINEPHRINE BITARTRATE 5 MCG/MIN: 1 INJECTION INTRAVENOUS at 16:03

## 2019-09-07 RX ADMIN — FENTANYL CITRATE 50 MCG: 50 INJECTION INTRAMUSCULAR; INTRAVENOUS at 00:18

## 2019-09-07 RX ADMIN — FENTANYL CITRATE 50 MCG: 50 INJECTION INTRAMUSCULAR; INTRAVENOUS at 21:01

## 2019-09-07 RX ADMIN — DEXMEDETOMIDINE HYDROCHLORIDE 0.7 MCG/KG/HR: 100 INJECTION, SOLUTION INTRAVENOUS at 07:08

## 2019-09-07 RX ADMIN — METRONIDAZOLE 500 MG: 500 INJECTION, SOLUTION INTRAVENOUS at 14:02

## 2019-09-07 RX ADMIN — POTASSIUM CHLORIDE 20 MEQ: 20 SOLUTION ORAL at 10:20

## 2019-09-07 RX ADMIN — CLINDAMYCIN PHOSPHATE 600 MG: 600 INJECTION, SOLUTION INTRAVENOUS at 11:47

## 2019-09-07 RX ADMIN — DEXMEDETOMIDINE HYDROCHLORIDE 1 MCG/KG/HR: 100 INJECTION, SOLUTION INTRAVENOUS at 16:42

## 2019-09-07 RX ADMIN — CHLORHEXIDINE GLUCONATE 0.12% ORAL RINSE 15 ML: 1.2 LIQUID ORAL at 21:26

## 2019-09-07 RX ADMIN — CHLORHEXIDINE GLUCONATE 0.12% ORAL RINSE 15 ML: 1.2 LIQUID ORAL at 10:20

## 2019-09-07 RX ADMIN — METRONIDAZOLE 500 MG: 500 INJECTION, SOLUTION INTRAVENOUS at 23:11

## 2019-09-07 RX ADMIN — SODIUM CHLORIDE, SODIUM GLUCONATE, SODIUM ACETATE, POTASSIUM CHLORIDE, MAGNESIUM CHLORIDE, SODIUM PHOSPHATE, DIBASIC, AND POTASSIUM PHOSPHATE 150 ML/HR: .53; .5; .37; .037; .03; .012; .00082 INJECTION, SOLUTION INTRAVENOUS at 13:11

## 2019-09-07 RX ADMIN — FENTANYL CITRATE 100 MCG: 50 INJECTION INTRAMUSCULAR; INTRAVENOUS at 23:07

## 2019-09-07 RX ADMIN — ROCURONIUM BROMIDE 30 MG: 10 SOLUTION INTRAVENOUS at 08:22

## 2019-09-07 RX ADMIN — DEXMEDETOMIDINE HYDROCHLORIDE 1 MCG/KG/HR: 100 INJECTION, SOLUTION INTRAVENOUS at 11:01

## 2019-09-07 RX ADMIN — DEXMEDETOMIDINE HYDROCHLORIDE 1.2 MCG/KG/HR: 100 INJECTION, SOLUTION INTRAVENOUS at 22:42

## 2019-09-07 RX ADMIN — FENTANYL CITRATE 50 MCG: 50 INJECTION INTRAMUSCULAR; INTRAVENOUS at 16:43

## 2019-09-07 RX ADMIN — CLINDAMYCIN PHOSPHATE 600 MG: 600 INJECTION, SOLUTION INTRAVENOUS at 02:26

## 2019-09-07 RX ADMIN — ENOXAPARIN SODIUM 40 MG: 40 INJECTION SUBCUTANEOUS at 10:19

## 2019-09-07 RX ADMIN — Medication 4 UNITS/HR: at 07:08

## 2019-09-07 RX ADMIN — CEFEPIME HYDROCHLORIDE 2000 MG: 2 INJECTION, POWDER, FOR SOLUTION INTRAVENOUS at 12:17

## 2019-09-07 RX ADMIN — DEXMEDETOMIDINE HYDROCHLORIDE 1 MCG/KG/HR: 100 INJECTION, SOLUTION INTRAVENOUS at 13:13

## 2019-09-07 RX ADMIN — Medication 20 MG: at 12:17

## 2019-09-07 RX ADMIN — FENTANYL CITRATE 50 MCG: 50 INJECTION INTRAMUSCULAR; INTRAVENOUS at 22:07

## 2019-09-07 RX ADMIN — VASOPRESSIN 0.04 UNITS/MIN: 20 INJECTION INTRAVENOUS at 20:25

## 2019-09-07 RX ADMIN — DEXMEDETOMIDINE HYDROCHLORIDE 1.2 MCG/KG/HR: 100 INJECTION, SOLUTION INTRAVENOUS at 19:56

## 2019-09-07 RX ADMIN — ACETAMINOPHEN 650 MG: 160 SUSPENSION ORAL at 17:22

## 2019-09-07 RX ADMIN — SODIUM CHLORIDE: 0.9 INJECTION, SOLUTION INTRAVENOUS at 08:18

## 2019-09-07 RX ADMIN — ROCURONIUM BROMIDE 20 MG: 10 SOLUTION INTRAVENOUS at 09:01

## 2019-09-07 RX ADMIN — Medication 75 MCG/HR: at 05:10

## 2019-09-07 RX ADMIN — ACETAMINOPHEN 650 MG: 160 SUSPENSION ORAL at 23:06

## 2019-09-07 RX ADMIN — Medication 75 MCG/HR: at 17:21

## 2019-09-07 RX ADMIN — SODIUM CHLORIDE, SODIUM GLUCONATE, SODIUM ACETATE, POTASSIUM CHLORIDE, MAGNESIUM CHLORIDE, SODIUM PHOSPHATE, DIBASIC, AND POTASSIUM PHOSPHATE 1000 ML: .53; .5; .37; .037; .03; .012; .00082 INJECTION, SOLUTION INTRAVENOUS at 10:22

## 2019-09-07 RX ADMIN — VASOPRESSIN 0.04 UNITS/MIN: 20 INJECTION INTRAVENOUS at 10:14

## 2019-09-07 RX ADMIN — METRONIDAZOLE 500 MG: 500 INJECTION, SOLUTION INTRAVENOUS at 07:10

## 2019-09-07 RX ADMIN — Medication 1 APPLICATION: at 10:21

## 2019-09-07 RX ADMIN — DEXMEDETOMIDINE HYDROCHLORIDE 0.7 MCG/KG/HR: 100 INJECTION, SOLUTION INTRAVENOUS at 03:05

## 2019-09-07 NOTE — ANESTHESIA PREPROCEDURE EVALUATION
Review of Systems/Medical History      No history of anesthetic complications     Cardiovascular  Hyperlipidemia, Hypertension ,    Pulmonary    Comment: cough     GI/Hepatic    GERD ,             Endo/Other  Diabetes poorly controlled type 2 ,      GYN       Hematology  Negative hematology ROS      Musculoskeletal    Arthritis     Neurology  Negative neurology ROS      Psychology   Negative psychology ROS              Physical Exam    Airway  Comment: Intubated  Mallampati score: II  TM Distance: >3 FB  Neck ROM: full     Dental       Cardiovascular      Pulmonary      Other Findings        Anesthesia Plan  ASA Score- 4     Anesthesia Type- general with ASA Monitors  Additional Monitors:   Airway Plan: ETT  Comment: General anesthesia, endotracheal tube; standard ASA monitors  Risks and benefits discussed with patient; patient consented and agrees to proceed  I saw and evaluated the patient  If seen with CRNA, we have discussed the anesthetic plan and I am in agreement that the plan is appropriate for the patient        Plan Factors-    Induction- inhalational     Postoperative Plan-     Informed Consent- Anesthetic plan and risks discussed with patient  I personally reviewed this patient with the CRNA  Discussed and agreed on the Anesthesia Plan with the CRNA  Nahid Rockwell

## 2019-09-07 NOTE — PROGRESS NOTES
Progress Note - Critical Care   Matt Zamarripa 48 y o  male MRN: [id number]  Unit/Bed#: OR POOL Encounter: [id number]    Attending Physician: Nilay Membreno MD      ______________________________________________________________________  Assessment and Plan:   Principal Problem:    Necrotizing soft tissue infection  Active Problems:    Septic shock ([facility] )    Type 2 diabetes mellitus with complication ([place] [address] )    Recurrent cellulitis of lower extremity    Hyponatremia    Lactic acidosis    Bandemia    Hypotension    Hypokalemia    Elevated procalcitonin    Esophageal reflux    Hyperlipidemia    Morbid obesity ([place] [address] )    DEAN (acute kidney injury) ([place] [address] )    Hypocalcemia    Metabolic encephalopathy    Acute hypoxemic respiratory failure ([place] [address] )  Resolved Problems:    Sinus tachycardia    Hyperbilirubinemia    Neuro:     Metabolic encephalopathy secondary to sedatives and pain medication  Patient respond to name, responded that he was at home but was very easy to reorient  · Delirium precautions  · Come ICU per protocol  · Regular  sleep-wake cycle   · Soft restraints x2  · Precedex 0 70 mcg/ kg/ per hour  · JASPREET with a goal : -1  · Will adjust his pain medication and sedative for patient comfort    Pain control:  · Patient pain controlled with fentanyl 75 mcg/hr  · With prn fentanyl     CV:   Hx HTN  · Will hold BP meds for now: lisinopril    Septic shock   Patient presented with tachycardia heart rate 110, tachypnea respiration 22, Leukocytosis 19 61, lactic acid of 4 7>3 2>1 9  · Keep map over 65  · Norepinephrine at 15mcg/min  · Vasopressin 0 04 units per minute  · Blood pressure 84 -120/ 58-86  · MAP> 65 to the night  · 9/6 :Patient received a total of 7 L IV fluids  · 9/6:Albumin 500  · Isolyte 150 mL/hour will change when patient comes back from OR  · WBC 19 61-27      Tachycardic(resolved) , hyperlipedemia  · In tele monitor:  Normal Sinus   · Based on patient history patient was refusing to take his blood cholesterol medications due to adverse effects     Pulm:   Acute hypoxic respiratory failure  · Mechanical ventilator  · Title volume 420, PEEP from 8-10, FiO2 decreased from 70-60%  · Will maintain ventilator patient is going for surgery today  · Mechanical ventilator order set was ordered yesterday  · 9/7:Chest x-ray for ET tube placement  · Chest Xray in am after surgery for possible vascular congestion/CHF? · Peridex as ordered  · Monitor secretion Suction ET p r n  · Chest Xray am  · Will consider CT chest after OR     GI:   Esophageal reflux  · Prilosec 20 mg p o  B i d  Per home med     Hyperbilirubinemia likely from sepsis (resolved)  · 1 4 total bilirubin> 1 2>0 9      Oneill catheter  · Will continue oneill for strict I&O     :   · DEAN secondary to prerenal/ septic shock/duration  · Creatinine of 1 37 (baseline 1 02)> 1  · 9/4: IV fluids per septic shock protocol using ideal body weight   · Continue Isolyte 150 mL/hour will adjust when he come back from OR  · CMP in am     F/E/N:  Morbid obesity  · NPO for now  · Will consider tube feeding when he comes back from OR  · Calorie controlled diet when patient is no longer in ventilator  · Inpatient consult for nutritional service     Hyponatremia  · Sodium of 127 (baseline 135)>134  · Will monitor sodium trend    Hypokalemia  · Potassium of 3 4 will give 40 mEq KCL IV x1  · Recheck in a m  Hypocalcemia in CMP  · Calcium of 7 5 (corrected calcium 9 1), falsely lowered due to albumin       ID:    necrotizing fascitis/ Recurrent cellulitis of lower extremity  · CT femur left w contrast: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  Mildly prominent left external iliac chain and left inguinal lymph nodes, likely reactive  · OR notes:Extensive soft tissue infection of the subcutaneous fat of the upper left thigh up into the perineal region  All muscular tissue was pink and healthy    No signs of necrotizing fasciitis  Aerobic and anaerobic cultures were taken  Two rolls of Betadine-soaked Kerlix were placed into the wound  · Surgery seeing the patient will go to the OR at 8:00 a m  · Blood culture x2 in process  · Tissue from the groin cultures and Gram stain in process  Antibiotics  · Cefepime 2 g every 8 hours day 2  · Clindamycin 600 mg q 6 hours day 2  · Metronidazole 500 q 8 hours day 2  · Vancomycin 2 g q 12 hours per pharmacy consult day 2  · In the ER patient receives Zosyn, clindamycin and Vancomycin  · ID consult          Elevated procalcitonin  · Procalcitonin 2 8     Lactic acidosis  · lactic acid of 4 7>1 9  · Continue IV fluids      Heme:   Hemoglobin 14 2>10 1 hematocrit of 41 9>33 9  · Estimated blood loss 500, decrease in hemoglobin and hematocrit combination of blood loss and hemodilution received 7 L of IV fluid yesterday  · Will trend CBC in a m   · Transfuse packed RBC if hemoglobin is less than 7     Endo:   Uncontrolled Type 2 diabetes mellitus with complication neuropathy  · Previous record hemoglobin A1c of 12 (2015)> recheck hemoglobin A1c 11 2  · Insulin regular 4 units /hr  · Hold Amaryl 4mg OD and metformin 1g BID  · Will keep -180      Msk/Skin:   · PT OT consult when stable     Disposition:  Critical care         Counseling / Coordination of Care  Total Critical Care time spent 40 minutes excluding procedures, teaching and family updates        Code Status: Level 1 - Full Code    Counseling / Coordination of Care  Total Critical Care time spent 30 minutes excluding procedures, teaching and family updates  ______________________________________________________________________    Chief Complaint:  Worsening cellulitis    24 Hour Events:   Upon admission to the ER patient was taken directly from the ER to the OR where they did debridement, patient was put on a ventilator during the procedure arterial line was placed    Ventilator settings tidal volume 420, PEEP 8, FIO2 70% AC,  Received a total IV fluids of 7 L, estimated blood loss 500, albumin 500, the when patient was awake patient was confused tried to removed the lines patient was placed on sedatives Precedex, patient was also hypotensive was given vasopressor and norepinephrine and IV fluids 150 mL/hour  Arterial line was lost during the night  IV antibiotics was started cefepime, clindamycin, metronidazole and vancomycin    ______________________________________________________________________    Physical Exam:   Physical Exam   Constitutional: He appears well-developed and well-nourished  Morbidly obese   HENT:   Head: Normocephalic and atraumatic  Eyes: EOM are normal    Neck: Normal range of motion  Neck supple  Cardiovascular: Normal rate, regular rhythm, normal heart sounds and intact distal pulses  DP +1, cold extremities   Pulmonary/Chest: Effort normal and breath sounds normal    No secretions during ET suction   Abdominal: Soft  Bowel sounds are normal    Musculoskeletal: He exhibits edema (+1 ankles)  During assessment patient can follow commands tense crease fingers when instructed, controlled 2 fingers when instructed, can wiggle  the toes when instructed in both feet   Neurological:   Drowsy, Alert x2   Skin: Skin is dry     Patient has wound the abdominal area, dressing is dry and intact, outer dressing recently just been changed by the nurse       ______________________________________________________________________  Vitals:    19 0615 19 0630 19 0653 19 0717   BP: (!) 89/54 91/58 (!) 89/55    BP Location:   Left arm    Pulse: 83 80 81    Resp: (!) 28 (!) 28 (!) 30    Temp:   (!) 97 1 °F (36 2 °C)    TempSrc:   Axillary    SpO2: 93% 92% 92% 96%   Weight:       Height:           Temperature:   Temp (24hrs), Av 6 °F (37 °C), Min:97 1 °F (36 2 °C), Max:100 5 °F (38 1 °C)    Current Temperature: (!) 97 1 °F (36 2 °C)  Weights:   IBW: 59 2 kg    Body mass index is 63 95 kg/m²  Weight (last 2 days)     Date/Time   Weight    09/07/19 0551   (!) 169 (372 58)    09/06/19 1828   (!) 166 (367 07)    09/06/19 1113   (!) 163 (360)            Hemodynamic Monitoring:  N/A     Non-Invasive/Invasive Ventilation Settings:  Respiratory    Lab Data (Last 4 hours)      09/07 0501            pH, Arterial       7 363           pCO2, Arterial       29 3           pO2, Arterial       69 6           HCO3, Arterial       16 3           Base Excess, Arterial       -8 0                O2/Vent Data       09/07 0349   Most Recent         Vent Mode AC/VC  AC/VC      Resp Rate (BPM) (BPM) 12  12      Vt (mL) (mL) 450  420      FIO2 (%) (%) 70  60      PEEP (cmH2O) (cmH2O) 8  10      MV 13 5  12 7                Lab Results   Component Value Date    PHART 7 363 09/07/2019    JDA5BDX 29 3 (LL) 09/07/2019    PO2ART 69 6 (L) 09/07/2019    RXI2ALJ 16 3 (L) 09/07/2019    BEART -8 0 09/07/2019    SOURCE Radial, Left 09/07/2019     SpO2: SpO2: 96 %  Intake and Outputs:  I/O       09/05 0701 - 09/06 0700 09/06 0701 - 09/07 0700    I V  (mL/kg)  6221 3 (36 8)    IV Piggyback  4050    Total Intake(mL/kg)  90415 3 (60 8)    Urine (mL/kg/hr)  875    Emesis/NG output  400    Blood  500    Total Output  1775    Net  +8496 3              UOP: 56 25 ml/hr   Nutrition:        Diet Orders   (From admission, onward)             Start     Ordered    09/06/19 1928  Room Service  Once     Question:  Type of Service  Answer:  Room Service- Not Appropriate    09/06/19 1927 09/06/19 1410  Diet NPO  Diet effective now     Question Answer Comment   Diet Type NPO    RD to adjust diet per protocol?  No        09/06/19 1416                Labs:   Results from last 7 days   Lab Units 09/07/19  0444 09/06/19  2118 09/06/19  1834 09/06/19  1636 09/06/19  1212   WBC Thousand/uL 26 46*  --  27 10*  --  19 61*   HEMOGLOBIN g/dL 9 7* 10 1* 11 3*  --  14 2   I STAT HEMOGLOBIN g/dl  --   --   --  13 6  --    HEMATOCRIT % 29 3*  -- 33 9*  --  41 9   HEMATOCRIT, ISTAT %  --   --   --  40  --    PLATELETS Thousands/uL 213  --  221  --  226   NEUTROS PCT %  --   --  69  --   --    BANDS PCT % 17*  --   --   --  18*   MONOS PCT %  --   --  8  --   --    MONO PCT % 8  --   --   --  9     Results from last 7 days   Lab Units 09/07/19  0444 09/06/19  1834 09/06/19  1636 09/06/19  1212   SODIUM mmol/L 134* 132*  --  127*   POTASSIUM mmol/L 3 4* 4 0  --  3 6   CHLORIDE mmol/L 102 99*  --  93*   CO2 mmol/L 20* 18*  --  21   CO2, I-STAT mmol/L  --   --  23  --    ANION GAP mmol/L 12 15*  --  13   BUN mg/dL 22 19  --  20   CREATININE mg/dL 0 97 1 00  --  1 37*   CALCIUM mg/dL 7 5* 8 0*  --  9 2   GLUCOSE RANDOM mg/dL 154* 297*  --  375*   ALT U/L 25 26  --  37   AST U/L 27 28  --  40   ALK PHOS U/L 102 130*  --  165*   ALBUMIN g/dL 2 0* 1 9*  --  2 0*   TOTAL BILIRUBIN mg/dL 0 90 1 20*  --  1 40*     Results from last 7 days   Lab Units 09/07/19 0444 09/06/19  1834   MAGNESIUM mg/dL 2 0 1 3*   PHOSPHORUS mg/dL 3 1 4 0      Results from last 7 days   Lab Units 09/06/19  1212   INR  1 29*   PTT seconds 31          Results from last 7 days   Lab Units 09/07/19  0444 09/07/19  0244 09/07/19  0016 09/06/19  2118 09/06/19  1834 09/06/19  1356 09/06/19  1230   LACTIC ACID mmol/L 1 9 2 3* 3 1* 3 4* 3 2* 3 2* 4 7*     ABG:  Results from last 7 days   Lab Units 09/07/19  0501   PH ART  7 363   PCO2 ART mm Hg 29 3*   PO2 ART mm Hg 69 6*   HCO3 ART mmol/L 16 3*   BASE EXC ART mmol/L -8 0   ABG SOURCE  Radial, Left     VBG:  Results from last 7 days   Lab Units 09/07/19  0501   ABG SOURCE  Radial, Left     Results from last 7 days   Lab Units 09/06/19  1834   PROCALCITONIN ng/ml 2 80*       Imaging:  Chest x-ray I have personally reviewed pertinent reports  Micro: In process      Allergies:    Allergies   Allergen Reactions    Clindamycin Diarrhea     Medications:   Scheduled Meds:    Current Facility-Administered Medications:  [MAR Hold] cefepime 2,000 mg Intravenous Q8H Alena Delacruz CRNP Last Rate: 2,000 mg (09/07/19 0327)   chlorhexidine 15 [facility]    Farhat Font Hold] clindamycin 600 mg Intravenous Q6H [place] Saber, CRNP Last Rate: Stopped (09/07/19 0300)   dexmedetomidine 0 1-0 7 mcg/kg/hr Intravenous Titrated Lajune Carroll, PA-C Last Rate: 0 7 mcg/kg/hr (09/07/19 0818)   enoxaparin 40 mg Subcutaneous Daily Lelo Saber, CRNP    fentaNYL 75 mcg/hr Intravenous Continuous Lajune Carroll, PA-C Last Rate: 75 mcg/hr (09/07/19 0818)   [MAR Hold] fentanyl citrate (PF) 50 mcg Intravenous Q1H PRN Moon Joyner MD    insulin regular (HumuLIN R,NovoLIN R) infusion 0 3-21 Units/hr Intravenous Titrated Lelo Saber, CRNP Last Rate: 4 Units/hr (09/07/19 0818)   metroNIDAZOLE 500 mg Intravenous Q8H ANN Lopez Last Rate: 500 mg (09/07/19 0710)   multi-electrolyte 150 mL/hr Intravenous Continuous Lajune Carroll, PA-C Last Rate: 150 mL/hr (09/07/19 0518)   norepinephrine 1-30 mcg/min Intravenous Titrated Lelo Saber, CRNP Last Rate: 5 mcg/min (09/07/19 0821)   [MAR Hold] omeprazole (PRILOSEC) suspension 2 mg/mL 20 mg Oral BID Moon Joyner MD    Farhat Font Hold] potassium chloride 20 mEq Intravenous Q2H Moon Joyner MD    sodium hypochlorite 1 application Irrigation Daily Carlos Ruano DO    Farhat Font Hold] vancomycin 20 mg/kg (Adjusted) Intravenous Q12H Moon Joyner MD Last Rate: 2,000 mg (09/07/19 0418)   vasopressin (PITRESSIN) in 0 9 % sodium chloride 100 mL 0 04 Units/min Intravenous Continuous Lajune Carroll, PA-C Last Rate: 0 1 Units/min (09/07/19 9430)     Facility-Administered Medications Ordered in Other Encounters:  norepinephrine   JOSE Holt MD   ROCuronium   JOSE Holt MD   sodium chloride   Continuous JOSE Holt MD     Continuous Infusions:    dexmedetomidine 0 1-0 7 mcg/kg/hr Last Rate: 0 7 mcg/kg/hr (09/07/19 0818)   fentaNYL 75 mcg/hr Last Rate: 75 mcg/hr (09/07/19 0818)   insulin regular (HumuLIN R,NovoLIN R) infusion 0 3-21 Units/hr Last Rate: 4 Units/hr (09/07/19 0818)   multi-electrolyte 150 mL/hr Last Rate: 150 mL/hr (09/07/19 0518)   norepinephrine 1-30 mcg/min Last Rate: 5 mcg/min (09/07/19 0821)   vasopressin (PITRESSIN) in 0 9 % sodium chloride 100 mL 0 04 Units/min Last Rate: 0 1 Units/min (09/07/19 0821)     PRN Meds:    [MAR Hold] fentanyl citrate (PF) 50 mcg Q1H PRN     VTE Pharmacologic Prophylaxis: Enoxaparin (Lovenox)  VTE Mechanical Prophylaxis: sequential compression device  Invasive lines and devices: Invasive Devices     Central Venous Catheter Line            CVC Central Lines 09/06/19 Triple Right less than 1 day          Peripheral Intravenous Line            Peripheral IV 09/06/19 Left Hand less than 1 day    Peripheral IV 09/06/19 Right Antecubital less than 1 day    Peripheral IV 09/06/19 Right Hand less than 1 day          Arterial Line            Arterial Line 09/07/19 Right Radial less than 1 day          Drain            NG/OG/Enteral Tube Orogastric 14 Fr less than 1 day    Urethral Catheter Latex 16 Fr  less than 1 day          Airway            ETT  Cuffed;Oral;Inflated 7 mm less than 1 day                     Portions of the record may have been created with voice recognition software  Occasional wrong word or "sound a like" substitutions may have occurred due to the inherent limitations of voice recognition software  Read the chart carefully and recognize, using context, where substitutions have occurred      Pat Leblanc MD

## 2019-09-07 NOTE — CONSULTS
Consultation - Infectious Disease   Aguilar Cao 48 y o  male MRN: [id number]  Unit/Bed#:  Encounter: [id number]      IMPRESSION & RECOMMENDATIONS:   Impression/Recommendations:  1  Septic shock  Leukocytosis, tachycardia, fever, lactic acidosis  Secondary to #2  Also consideration for bacteremia  Admission blood cultures are still pending  Patient is still requiring multiple vasopressors  Will leave on broad-spectrum antibiotics for now pending further culture data     -antibiotic plan as below  -monitor temperatures and hemodynamics  -wean off vasopressors as able  -follow up pending blood cultures  -repeat CBC in a m   -supportive care per critical care service    2  Left thigh/groin soft tissue infection  Status post debridement on 09/06  Intraoperative findings revealed extensive infection involving subcutaneous fat, which was debrided  Muscle appeared healthy and there was no intraoperative evidence of necrotizing fasciitis  Would continue broad coverage for gram-positive and gram-negative organisms at this point  Consider GAS toxic shock     -continue IV vancomycin for now with dosing recommendations per pharmacy  -continue IV cefepime  -continue IV Flagyl  -continue high-dose IV clindamycin  -follow up OR cultures and blood cultures to guide further recommendations  -if blood cultures are negative, we can discontinue clindamycin  -close surgical follow-up ongoing  -serial exams    3  Acute hypoxic respiratory failure  Likely in the setting of #1  Chest x-ray did reveal bilateral opacities which I suspect are more likely secondary to volume rather than pneumonia  Patient is requiring significant amount of ventilatory support     -continue antibiotics as above  -monitor secretions  -wean off vent as able  -monitor vent requirements    4  Acute kidney injury  In the setting of septic shock  Renal function is improving  Will dose adjust antibiotics based on renal function    Continue daily monitoring of creatinine  5  Uncontrolled diabetes mellitus  With hyperglycemia  Hemoglobin A1c was 12  Likely primary risk factor for development of such severe infection  Recommend much improved blood sugar control to aid in adequate wound healing and control of infection  6  Morbid obesity  Antibiotics:  Vancomycin/cefepime/Flagyl/clindamycin  POD1    I discussed above plan with patient, and with Abbie Puente from [address] [place] [address]  Thank you for this consultation  We will follow along with you  HISTORY OF PRESENT ILLNESS:  Reason for Consult:  Left thigh/groin soft tissue infection    HPI: Trent Pringle is a 48 y o  male with morbid obesity, uncontrolled diabetes mellitus, recurrent lower extremity cellulitis who was started on oral doxycycline on 09/01 for treatment of recurrent left thigh cellulitis  Due to persistence of infection, patient presented to the ER on 09/06  Evaluation revealed extensive cellulitis of the left thigh and groin with a necrotic area in the left groin  CT revealed extensive inflammation with gas in the subcutaneous tissues of the left upper in inner thigh into the groin  Patient was taken emergently to the OR on 09/06 and underwent debridement of extensive soft tissue infection involving the subcutaneous fat  All muscular tissue appeared healthy and there was no intraoperative evidence of necrotizing fasciitis  Patient was started empirically on vancomycin, cefepime, Flagyl, clindamycin  He returned to the OR this morning for reexploration and entire wound appeared healthy with no further evidence of undrained infection  Overnight, patient required initiation of vasopressors and currently remains on vasopressin and Levophed drips  Currently intubated and sedated  REVIEW OF SYSTEMS:  A complete system-based review of systems is otherwise negative      PAST MEDICAL HISTORY:  Past Medical History:   Diagnosis Date    Cellulitis     last assessed 12/10/15    Diabetes mellitus ([facility] )     Edema     Elevated liver enzymes     Esophageal reflux     Gluten intolerance     Gout     last assessed 13    Hyperglycemia     Hypertension     IBS (irritable bowel syndrome)     Insomnia     Obesity     Osteoarthritis of knee     last assessed 02/10/14    Prehypertension     last assessed 17    Venous insufficiency     last assessed 17    Villonodular synovitis of the hand, right     last assessed 2013     History reviewed  No pertinent surgical history  FAMILY HISTORY:  Non-contributory    SOCIAL HISTORY:  Social History     Substance and Sexual Activity   Alcohol Use No     Social History     Substance and Sexual Activity   Drug Use No     Social History     Tobacco Use   Smoking Status Never Smoker   Smokeless Tobacco Never Used       ALLERGIES:  Allergies   Allergen Reactions    Clindamycin Diarrhea       MEDICATIONS:  All current active medications have been reviewed  PHYSICAL EXAM:  Vitals:  Temp:  [97 1 °F (36 2 °C)-100 5 °F (38 1 °C)] 97 1 °F (36 2 °C)  HR:  [] 82  Resp:  [16-46] 27  BP: ()/(52-86) 128/58  SpO2:  [91 %-100 %] 92 %  Temp (24hrs), Av 9 °F (37 2 °C), Min:97 1 °F (36 2 °C), Max:100 5 °F (38 1 °C)  Current: Temperature: (!) 97 1 °F (36 2 °C)     Physical Exam:  General:  Intubated, sedated  Eyes:  Conjunctive clear with no hemorrhages or effusions  Oropharynx:  ET tube in place  Neck:  Supple, no lymphadenopathy  Lungs:  Ventilated breath sounds  Cardiac:  Regular rate and rhythm, no murmurs  Abdomen:  Soft, non-tender, non-distended  Extremities:  Bilateral lower extremity edema  Skin:  No rashes, left groin dressing intact with some periwound erythema  Neurological:  Sedated      LABS, IMAGING, & OTHER STUDIES:  Lab Results:  I have personally reviewed pertinent labs    Results from last 7 days   Lab Units 19  0846 19  0444 19  1834 19  1636 19  1212   POTASSIUM mmol/L  --  3 4* 4 0  --  3 6   CHLORIDE mmol/L  --  102 99*  --  93*   CO2 mmol/L  --  20* 18*  --  21   CO2, I-STAT mmol/L 19*  --   --  23  --    BUN mg/dL  --  22 19  --  20   CREATININE mg/dL  --  0 97 1 00  --  1 37*   EGFR ml/min/1 73sq m  --  89 86  --  59   GLUCOSE, ISTAT mg/dl 144*  --   --  298*  --    CALCIUM mg/dL  --  7 5* 8 0*  --  9 2   AST U/L  --  27 28  --  40   ALT U/L  --  25 26  --  37   ALK PHOS U/L  --  102 130*  --  165*     Results from last 7 days   Lab Units 09/07/19  1218 09/07/19  0846 09/07/19  0444  09/06/19  1834  09/06/19  1212   WBC Thousand/uL  --   --  26 46*  --  27 10*  --  19 61*   HEMOGLOBIN g/dL 9 8*  --  9 7*   < > 11 3*  --  14 2   I STAT HEMOGLOBIN g/dl  --  9 9*  --   --   --    < >  --    PLATELETS Thousands/uL  --   --  213  --  221  --  226    < > = values in this interval not displayed  Imaging Studies:   I have personally reviewed pertinent imaging study reports and images in PACS  Chest x-ray shows persistent bilateral opacities  CT left femur suggestive of infection with gas-forming organism in the left groin and left proximal to mid thigh  No localized fluid collection  EKG, Pathology, and Other Studies:   I have personally reviewed pertinent reports

## 2019-09-07 NOTE — PLAN OF CARE
Problem: SAFETY,RESTRAINT: NV/NON-SELF DESTRUCTIVE BEHAVIOR  Goal: Remains free of harm/injury (restraint for non violent/non self-detsructive behavior)  Description  INTERVENTIONS:  - Instruct patient/family regarding restraint use   - Assess and monitor physiologic and psychological status   - Provide interventions and comfort measures to meet assessed patient needs   - Identify and implement measures to help patient regain control  - Assess readiness for release of restraint   Outcome: Progressing  Goal: Returns to optimal restraint-free functioning  Description  INTERVENTIONS:  - Assess the patient's behavior and symptoms that indicate continued need for restraint  - Identify and implement measures to help patient regain control  - Assess readiness for release of restraint   Outcome: Progressing

## 2019-09-07 NOTE — RESPIRATORY THERAPY NOTE
RT Ventilator Management Note  Andreina Alcala 48 y o  male MRN: [id number]  Unit/Bed#:  Encounter: [id number]      Daily Screen     No data found in the last 10 encounters  Physical Exam:   Assessment Type: Assess only  General Appearance: Sedated  Respiratory Pattern: Assisted, Normal  Chest Assessment: Chest expansion symmetrical  Bilateral Breath Sounds: Coarse      Patient currently intubated with size 8 0 ETT well secured with ETT crespo at 23 cm at lip  Breath sounds coarse bilaterally throughout shift  Patient remains sedated and intubated  Currently on [address] 20/450/50%/10P  Tolerating current vent settings at this time without issue, will continue to monitor

## 2019-09-07 NOTE — ANESTHESIA PROCEDURE NOTES
Arterial Line Insertion  Performed by: Singh Lin MD  Authorized by: Singh Lin MD   Consent given by: patient  Preparation: Patient was prepped and draped in the usual sterile fashion    Indications: multiple ABGs and hemodynamic monitoring  Orientation:  Right  Location: radial artery  Procedure Details:  Needle gauge: 20  Seldinger technique: Seldinger technique used  Number of attempts: 2    Post-procedure:  Post-procedure: chlorhexidine patch applied  Waveform: good waveform  Post-procedure CNS: normal

## 2019-09-07 NOTE — PROGRESS NOTES
Vancomycin IV Pharmacy-to-Dose Consultation    Joyce Abrams is a 48 y o  male who is currently receiving Vancomycin IV with management by the Pharmacy Consult service  Assessment/Plan:  The patient was reviewed  Renal function is stable and no signs or symptoms of nephrotoxicity and/or infusion reactions were documented in the chart  Based on todays assessment, continue current vancomycin (day # 2 ) dosing of 2000 mg IV q12h with a plan for trough to be drawn at 1500 on 09/08  We will continue to follow the patients culture results and clinical progress daily      Eulalia Ribeiro, Pharmacist

## 2019-09-07 NOTE — CONSULTS
Suggest TwoCalHN ( use magnifying glass to search for product - does not automatically appear as enteral option) This product is lower in carbohydrate  Goal rate 45 ml/hr, provides 2160 kcal, 90 g protein, 756 ml free water    Add 3 packets Prosource for additional 180 kcal  45 g protein  With addition of Prosource Total calories 2340, total protein 135 g  ~800 ml total  free water  Suggest continue IV  fluids taking into account 800 ml free water provided by formula/Prosource; adjust as appropriate

## 2019-09-07 NOTE — ANESTHESIA POSTPROCEDURE EVALUATION
Post-Op Assessment Note    CV Status:  Stable    Pain management: adequate     Mental Status:  Alert and awake   Hydration Status:  Euvolemic   PONV Controlled:  Controlled   Airway Patency:  Patent   Post Op Vitals Reviewed: Yes      Staff: CRNA, Anesthesiologist   Comments: transported to icu with monitor and 100% o2  vss report to rn  dismissed by icu team  all questions answered           BP  120/74   Temp     Pulse  74   Resp     SpO2   93

## 2019-09-07 NOTE — PLAN OF CARE
Problem: Prexisting or High Potential for Compromised Skin Integrity  Goal: Skin integrity is maintained or improved  Description  INTERVENTIONS:  - Identify patients at risk for skin breakdown  - Assess and monitor skin integrity  - Assess and monitor nutrition and hydration status  - Monitor labs   - Assess for incontinence   - Turn and reposition patient  - Assist with mobility/ambulation  - Relieve pressure over bony prominences  - Avoid friction and shearing  - Provide appropriate hygiene as needed including keeping skin clean and dry  - Evaluate need for skin moisturizer/barrier cream  - Collaborate with interdisciplinary team   - Patient/family teaching  - Consider wound care consult   Outcome: Progressing     Problem: Potential for Falls  Goal: Patient will remain free of falls  Description  INTERVENTIONS:  - Assess patient frequently for physical needs  -  Identify cognitive and physical deficits and behaviors that affect risk of falls    -  Robson fall precautions as indicated by assessment   - Educate patient/family on patient safety including physical limitations  - Instruct patient to call for assistance with activity based on assessment  - Modify environment to reduce risk of injury  - Consider OT/PT consult to assist with strengthening/mobility  Outcome: Progressing     Problem: CARDIOVASCULAR - ADULT  Goal: Maintains optimal cardiac output and hemodynamic stability  Description  INTERVENTIONS:  - Monitor I/O, vital signs and rhythm  - Monitor for S/S and trends of decreased cardiac output  - Administer and titrate ordered vasoactive medications to optimize hemodynamic stability  - Assess quality of pulses, skin color and temperature  - Assess for signs of decreased coronary artery perfusion  - Instruct patient to report change in severity of symptoms  Outcome: Progressing  Goal: Absence of cardiac dysrhythmias or at baseline rhythm  Description  INTERVENTIONS:  - Continuous cardiac monitoring, vital signs, obtain 12 lead EKG if ordered  - Administer antiarrhythmic and heart rate control medications as ordered  - Monitor electrolytes and administer replacement therapy as ordered  Outcome: Progressing     Problem: RESPIRATORY - ADULT  Goal: Achieves optimal ventilation and oxygenation  Description  INTERVENTIONS:  - Assess for changes in respiratory status  - Assess for changes in mentation and behavior  - Position to facilitate oxygenation and minimize respiratory effort  - Oxygen administered by appropriate delivery if ordered  - Initiate smoking cessation education as indicated  - Encourage broncho-pulmonary hygiene including cough, deep breathe, Incentive Spirometry  - Assess the need for suctioning and aspirate as needed  - Assess and instruct to report SOB or any respiratory difficulty  - Respiratory Therapy support as indicated  Outcome: Progressing     Problem: METABOLIC, FLUID AND ELECTROLYTES - ADULT  Goal: Electrolytes maintained within normal limits  Description  INTERVENTIONS:  - Monitor labs and assess patient for signs and symptoms of electrolyte imbalances  - Administer electrolyte replacement as ordered  - Monitor response to electrolyte replacements, including repeat lab results as appropriate  - Instruct patient on fluid and nutrition as appropriate  Outcome: Progressing  Goal: Fluid balance maintained  Description  INTERVENTIONS:  - Monitor labs   - Monitor I/O and WT  - Instruct patient on fluid and nutrition as appropriate  - Assess for signs & symptoms of volume excess or deficit  Outcome: Progressing  Goal: Glucose maintained within target range  Description  INTERVENTIONS:  - Monitor Blood Glucose as ordered  - Assess for signs and symptoms of hyperglycemia and hypoglycemia  - Administer ordered medications to maintain glucose within target range  - Assess nutritional intake and initiate nutrition service referral as needed  Outcome: Progressing     Problem: SKIN/TISSUE INTEGRITY - ADULT  Goal: Skin integrity remains intact  Description  INTERVENTIONS  - Identify patients at risk for skin breakdown  - Assess and monitor skin integrity  - Assess and monitor nutrition and hydration status  - Monitor labs (i e  albumin)  - Assess for incontinence   - Turn and reposition patient  - Assist with mobility/ambulation  - Relieve pressure over bony prominences  - Avoid friction and shearing  - Provide appropriate hygiene as needed including keeping skin clean and dry  - Evaluate need for skin moisturizer/barrier cream  - Collaborate with interdisciplinary team (i e  Nutrition, Rehabilitation, etc )   - Patient/family teaching  Outcome: Progressing  Goal: Incision(s), wounds(s) or drain site(s) healing without S/S of infection  Description  INTERVENTIONS  - Assess and document risk factors for skin impairment   - Assess and document dressing, incision, wound bed, drain sites and surrounding tissue  - Consider nutrition services referral as needed  - Oral mucous membranes remain intact  - Provide patient/ family education  Outcome: Progressing  Goal: Oral mucous membranes remain intact  Description  INTERVENTIONS  - Assess oral mucosa and hygiene practices  - Implement preventative oral hygiene regimen  - Implement oral medicated treatments as ordered  - Initiate Nutrition services referral as needed  Outcome: Progressing     Problem: HEMATOLOGIC - ADULT  Goal: Maintains hematologic stability  Description  INTERVENTIONS  - Assess for signs and symptoms of bleeding or hemorrhage  - Monitor labs  - Administer supportive blood products/factors as ordered and appropriate  Outcome: Progressing

## 2019-09-08 ENCOUNTER — APPOINTMENT (INPATIENT)
Dept: RADIOLOGY | Facility: HOSPITAL | Age: 54
DRG: 710 | End: 2019-09-08
Payer: COMMERCIAL

## 2019-09-08 PROBLEM — D72.825 BANDEMIA: Status: RESOLVED | Noted: 2019-09-06 | Resolved: 2019-09-08

## 2019-09-08 PROBLEM — E87.20 LACTIC ACIDOSIS: Status: RESOLVED | Noted: 2019-09-06 | Resolved: 2019-09-08

## 2019-09-08 PROBLEM — E83.51 HYPOCALCEMIA: Status: RESOLVED | Noted: 2019-09-07 | Resolved: 2019-09-08

## 2019-09-08 PROBLEM — N17.9 AKI (ACUTE KIDNEY INJURY) (HCC): Status: RESOLVED | Noted: 2019-09-06 | Resolved: 2019-09-08

## 2019-09-08 PROBLEM — E87.2 LACTIC ACIDOSIS: Status: RESOLVED | Noted: 2019-09-06 | Resolved: 2019-09-08

## 2019-09-08 LAB
ALBUMIN SERPL BCP-MCNC: 1.7 G/DL (ref 3.5–5)
ALP SERPL-CCNC: 101 U/L (ref 46–116)
ALT SERPL W P-5'-P-CCNC: 23 U/L (ref 12–78)
ANION GAP SERPL CALCULATED.3IONS-SCNC: 11 MMOL/L (ref 4–13)
ANION GAP SERPL CALCULATED.3IONS-SCNC: 9 MMOL/L (ref 4–13)
AST SERPL W P-5'-P-CCNC: 41 U/L (ref 5–45)
BACTERIA SPEC ANAEROBE CULT: NORMAL
BASE EXCESS BLDA CALC-SCNC: -8.4 MMOL/L
BASE EXCESS BLDA CALC-SCNC: -8.6 MMOL/L
BASE EXCESS BLDA CALC-SCNC: -9.1 MMOL/L
BASOPHILS # BLD AUTO: 0.18 THOUSANDS/ΜL (ref 0–0.1)
BASOPHILS NFR BLD AUTO: 1 % (ref 0–1)
BILIRUB SERPL-MCNC: 0.6 MG/DL (ref 0.2–1)
BUN SERPL-MCNC: 29 MG/DL (ref 5–25)
BUN SERPL-MCNC: 30 MG/DL (ref 5–25)
CA-I BLD-SCNC: 0.98 MMOL/L (ref 1.12–1.32)
CALCIUM SERPL-MCNC: 7.1 MG/DL (ref 8.3–10.1)
CALCIUM SERPL-MCNC: 7.3 MG/DL (ref 8.3–10.1)
CHLORIDE SERPL-SCNC: 105 MMOL/L (ref 100–108)
CHLORIDE SERPL-SCNC: 106 MMOL/L (ref 100–108)
CO2 SERPL-SCNC: 19 MMOL/L (ref 21–32)
CO2 SERPL-SCNC: 20 MMOL/L (ref 21–32)
CREAT SERPL-MCNC: 0.88 MG/DL (ref 0.6–1.3)
CREAT SERPL-MCNC: 1.09 MG/DL (ref 0.6–1.3)
EOSINOPHIL # BLD AUTO: 0.06 THOUSAND/ΜL (ref 0–0.61)
EOSINOPHIL NFR BLD AUTO: 0 % (ref 0–6)
ERYTHROCYTE [DISTWIDTH] IN BLOOD BY AUTOMATED COUNT: 14.7 % (ref 11.6–15.1)
GFR SERPL CREATININE-BSD FRML MDRD: 78 ML/MIN/1.73SQ M
GFR SERPL CREATININE-BSD FRML MDRD: 99 ML/MIN/1.73SQ M
GLUCOSE SERPL-MCNC: 132 MG/DL (ref 65–140)
GLUCOSE SERPL-MCNC: 133 MG/DL (ref 65–140)
GLUCOSE SERPL-MCNC: 134 MG/DL (ref 65–140)
GLUCOSE SERPL-MCNC: 146 MG/DL (ref 65–140)
GLUCOSE SERPL-MCNC: 150 MG/DL (ref 65–140)
GLUCOSE SERPL-MCNC: 151 MG/DL (ref 65–140)
GLUCOSE SERPL-MCNC: 151 MG/DL (ref 65–140)
GLUCOSE SERPL-MCNC: 167 MG/DL (ref 65–140)
GLUCOSE SERPL-MCNC: 188 MG/DL (ref 65–140)
GLUCOSE SERPL-MCNC: 189 MG/DL (ref 65–140)
GLUCOSE SERPL-MCNC: 199 MG/DL (ref 65–140)
GLUCOSE SERPL-MCNC: 200 MG/DL (ref 65–140)
GLUCOSE SERPL-MCNC: 97 MG/DL (ref 65–140)
HCO3 BLDA-SCNC: 16.4 MMOL/L (ref 22–28)
HCO3 BLDA-SCNC: 16.5 MMOL/L (ref 22–28)
HCO3 BLDA-SCNC: 16.7 MMOL/L (ref 22–28)
HCT VFR BLD AUTO: 28.6 % (ref 36.5–49.3)
HGB BLD-MCNC: 9.2 G/DL (ref 12–17)
HOROWITZ INDEX BLDA+IHG-RTO: 50 MM[HG]
HOROWITZ INDEX BLDA+IHG-RTO: 80 MM[HG]
HOROWITZ INDEX BLDA+IHG-RTO: 85 MM[HG]
IMM GRANULOCYTES # BLD AUTO: >0.5 THOUSAND/UL (ref 0–0.2)
IMM GRANULOCYTES NFR BLD AUTO: 6 % (ref 0–2)
LYMPHOCYTES # BLD AUTO: 4.32 THOUSANDS/ΜL (ref 0.6–4.47)
LYMPHOCYTES NFR BLD AUTO: 19 % (ref 14–44)
MAGNESIUM SERPL-MCNC: 2.2 MG/DL (ref 1.6–2.6)
MCH RBC QN AUTO: 30.7 PG (ref 26.8–34.3)
MCHC RBC AUTO-ENTMCNC: 32.2 G/DL (ref 31.4–37.4)
MCV RBC AUTO: 95 FL (ref 82–98)
MONOCYTES # BLD AUTO: 1.39 THOUSAND/ΜL (ref 0.17–1.22)
MONOCYTES NFR BLD AUTO: 6 % (ref 4–12)
NEUTROPHILS # BLD AUTO: 15.65 THOUSANDS/ΜL (ref 1.85–7.62)
NEUTS SEG NFR BLD AUTO: 68 % (ref 43–75)
NRBC BLD AUTO-RTO: 1 /100 WBCS
O2 CT BLDA-SCNC: 12.5 ML/DL (ref 16–23)
O2 CT BLDA-SCNC: 13 ML/DL (ref 16–23)
O2 CT BLDA-SCNC: 13.2 ML/DL (ref 16–23)
OXYHGB MFR BLDA: 91.5 % (ref 94–97)
OXYHGB MFR BLDA: 92.5 % (ref 94–97)
OXYHGB MFR BLDA: 93.1 % (ref 94–97)
PCO2 BLDA: 32 MM HG (ref 36–44)
PCO2 BLDA: 33.3 MM HG (ref 36–44)
PCO2 BLDA: 33.7 MM HG (ref 36–44)
PEEP RESPIRATORY: 12 CM[H2O]
PEEP RESPIRATORY: 15 CM[H2O]
PEEP RESPIRATORY: 15 CM[H2O]
PH BLDA: 7.3 [PH] (ref 7.35–7.45)
PH BLDA: 7.32 [PH] (ref 7.35–7.45)
PH BLDA: 7.33 [PH] (ref 7.35–7.45)
PHOSPHATE SERPL-MCNC: 2.7 MG/DL (ref 2.7–4.5)
PLATELET # BLD AUTO: 189 THOUSANDS/UL (ref 149–390)
PMV BLD AUTO: 9.2 FL (ref 8.9–12.7)
PO2 BLDA: 66.7 MM HG (ref 75–129)
PO2 BLDA: 69.9 MM HG (ref 75–129)
PO2 BLDA: 72.3 MM HG (ref 75–129)
POTASSIUM SERPL-SCNC: 3.4 MMOL/L (ref 3.5–5.3)
POTASSIUM SERPL-SCNC: 3.5 MMOL/L (ref 3.5–5.3)
PROCALCITONIN SERPL-MCNC: 2.16 NG/ML
PROT SERPL-MCNC: 5.7 G/DL (ref 6.4–8.2)
RBC # BLD AUTO: 3 MILLION/UL (ref 3.88–5.62)
SODIUM SERPL-SCNC: 135 MMOL/L (ref 136–145)
SODIUM SERPL-SCNC: 135 MMOL/L (ref 136–145)
SPECIMEN SOURCE: ABNORMAL
VANCOMYCIN TROUGH SERPL-MCNC: 21.8 UG/ML (ref 10–20)
VENT - PS: ABNORMAL
VENT AC: 20
VENT- AC: AC
VT SETTING VENT: 450 ML
WBC # BLD AUTO: 22.96 THOUSAND/UL (ref 4.31–10.16)

## 2019-09-08 PROCEDURE — 99233 SBSQ HOSP IP/OBS HIGH 50: CPT | Performed by: INTERNAL MEDICINE

## 2019-09-08 PROCEDURE — 82948 REAGENT STRIP/BLOOD GLUCOSE: CPT

## 2019-09-08 PROCEDURE — 80202 ASSAY OF VANCOMYCIN: CPT | Performed by: INTERNAL MEDICINE

## 2019-09-08 PROCEDURE — 82805 BLOOD GASES W/O2 SATURATION: CPT | Performed by: NURSE PRACTITIONER

## 2019-09-08 PROCEDURE — 94760 N-INVAS EAR/PLS OXIMETRY 1: CPT

## 2019-09-08 PROCEDURE — 80053 COMPREHEN METABOLIC PANEL: CPT | Performed by: NURSE PRACTITIONER

## 2019-09-08 PROCEDURE — 84145 PROCALCITONIN (PCT): CPT | Performed by: NURSE PRACTITIONER

## 2019-09-08 PROCEDURE — 71045 X-RAY EXAM CHEST 1 VIEW: CPT

## 2019-09-08 PROCEDURE — 80048 BASIC METABOLIC PNL TOTAL CA: CPT | Performed by: PHYSICIAN ASSISTANT

## 2019-09-08 PROCEDURE — 82805 BLOOD GASES W/O2 SATURATION: CPT | Performed by: PHYSICIAN ASSISTANT

## 2019-09-08 PROCEDURE — 82330 ASSAY OF CALCIUM: CPT | Performed by: NURSE PRACTITIONER

## 2019-09-08 PROCEDURE — 84100 ASSAY OF PHOSPHORUS: CPT | Performed by: NURSE PRACTITIONER

## 2019-09-08 PROCEDURE — 99291 CRITICAL CARE FIRST HOUR: CPT | Performed by: INTERNAL MEDICINE

## 2019-09-08 PROCEDURE — 94003 VENT MGMT INPAT SUBQ DAY: CPT

## 2019-09-08 PROCEDURE — 83735 ASSAY OF MAGNESIUM: CPT | Performed by: NURSE PRACTITIONER

## 2019-09-08 PROCEDURE — 85025 COMPLETE CBC W/AUTO DIFF WBC: CPT | Performed by: NURSE PRACTITIONER

## 2019-09-08 PROCEDURE — 94150 VITAL CAPACITY TEST: CPT

## 2019-09-08 RX ORDER — ACETAMINOPHEN 160 MG/5ML
650 SUSPENSION, ORAL (FINAL DOSE FORM) ORAL EVERY 4 HOURS PRN
Status: DISCONTINUED | OUTPATIENT
Start: 2019-09-08 | End: 2019-09-11

## 2019-09-08 RX ORDER — POTASSIUM CHLORIDE 20MEQ/15ML
40 LIQUID (ML) ORAL ONCE
Status: COMPLETED | OUTPATIENT
Start: 2019-09-08 | End: 2019-09-08

## 2019-09-08 RX ORDER — PROPOFOL 10 MG/ML
5-50 INJECTION, EMULSION INTRAVENOUS
Status: DISCONTINUED | OUTPATIENT
Start: 2019-09-08 | End: 2019-09-09

## 2019-09-08 RX ORDER — MIDAZOLAM HYDROCHLORIDE 1 MG/ML
INJECTION INTRAMUSCULAR; INTRAVENOUS
Status: COMPLETED
Start: 2019-09-08 | End: 2019-09-08

## 2019-09-08 RX ORDER — IBUPROFEN 400 MG/1
600 TABLET ORAL ONCE
Status: COMPLETED | OUTPATIENT
Start: 2019-09-08 | End: 2019-09-08

## 2019-09-08 RX ORDER — MINERAL OIL AND PETROLATUM 150; 830 MG/G; MG/G
OINTMENT OPHTHALMIC
Status: DISCONTINUED | OUTPATIENT
Start: 2019-09-08 | End: 2019-09-09

## 2019-09-08 RX ORDER — SENNOSIDES 8.8 MG/5ML
8.8 LIQUID ORAL
Status: DISCONTINUED | OUTPATIENT
Start: 2019-09-08 | End: 2019-09-09

## 2019-09-08 RX ORDER — BISACODYL 10 MG
10 SUPPOSITORY, RECTAL RECTAL DAILY PRN
Status: DISCONTINUED | OUTPATIENT
Start: 2019-09-08 | End: 2019-09-10

## 2019-09-08 RX ORDER — LORAZEPAM 2 MG/ML
1 INJECTION INTRAMUSCULAR EVERY 4 HOURS PRN
Status: DISCONTINUED | OUTPATIENT
Start: 2019-09-08 | End: 2019-09-11

## 2019-09-08 RX ORDER — MIDAZOLAM HYDROCHLORIDE 1 MG/ML
2 INJECTION INTRAMUSCULAR; INTRAVENOUS ONCE
Status: COMPLETED | OUTPATIENT
Start: 2019-09-08 | End: 2019-09-08

## 2019-09-08 RX ADMIN — Medication: at 22:08

## 2019-09-08 RX ADMIN — DEXMEDETOMIDINE HYDROCHLORIDE 1 MCG/KG/HR: 100 INJECTION, SOLUTION INTRAVENOUS at 20:38

## 2019-09-08 RX ADMIN — DEXMEDETOMIDINE HYDROCHLORIDE 1.2 MCG/KG/HR: 100 INJECTION, SOLUTION INTRAVENOUS at 14:05

## 2019-09-08 RX ADMIN — PROPOFOL 30 MCG/KG/MIN: 10 INJECTION, EMULSION INTRAVENOUS at 11:12

## 2019-09-08 RX ADMIN — VANCOMYCIN HYDROCHLORIDE 2000 MG: 1 INJECTION, POWDER, LYOPHILIZED, FOR SOLUTION INTRAVENOUS at 15:54

## 2019-09-08 RX ADMIN — CEFEPIME HYDROCHLORIDE 2000 MG: 2 INJECTION, POWDER, FOR SOLUTION INTRAVENOUS at 18:17

## 2019-09-08 RX ADMIN — NOREPINEPHRINE BITARTRATE 4 MCG/MIN: 1 INJECTION INTRAVENOUS at 06:39

## 2019-09-08 RX ADMIN — Medication 1 APPLICATION: at 14:02

## 2019-09-08 RX ADMIN — LORAZEPAM 1 MG: 2 INJECTION INTRAMUSCULAR; INTRAVENOUS at 00:11

## 2019-09-08 RX ADMIN — Medication 1 APPLICATION: at 17:39

## 2019-09-08 RX ADMIN — METRONIDAZOLE 500 MG: 500 INJECTION, SOLUTION INTRAVENOUS at 22:24

## 2019-09-08 RX ADMIN — CHLORHEXIDINE GLUCONATE 0.12% ORAL RINSE 15 ML: 1.2 LIQUID ORAL at 08:23

## 2019-09-08 RX ADMIN — VASOPRESSIN 0.04 UNITS/MIN: 20 INJECTION INTRAVENOUS at 02:29

## 2019-09-08 RX ADMIN — Medication 100 MCG/HR: at 05:48

## 2019-09-08 RX ADMIN — DEXMEDETOMIDINE HYDROCHLORIDE 1.2 MCG/KG/HR: 100 INJECTION, SOLUTION INTRAVENOUS at 11:19

## 2019-09-08 RX ADMIN — Medication: at 20:08

## 2019-09-08 RX ADMIN — CEFEPIME HYDROCHLORIDE 2000 MG: 2 INJECTION, POWDER, FOR SOLUTION INTRAVENOUS at 12:17

## 2019-09-08 RX ADMIN — CEFEPIME HYDROCHLORIDE 2000 MG: 2 INJECTION, POWDER, FOR SOLUTION INTRAVENOUS at 02:04

## 2019-09-08 RX ADMIN — SODIUM CHLORIDE, SODIUM GLUCONATE, SODIUM ACETATE, POTASSIUM CHLORIDE, MAGNESIUM CHLORIDE, SODIUM PHOSPHATE, DIBASIC, AND POTASSIUM PHOSPHATE 75 ML/HR: .53; .5; .37; .037; .03; .012; .00082 INJECTION, SOLUTION INTRAVENOUS at 15:56

## 2019-09-08 RX ADMIN — Medication 1 APPLICATION: at 08:23

## 2019-09-08 RX ADMIN — Medication 9 UNITS/HR: at 06:11

## 2019-09-08 RX ADMIN — DEXMEDETOMIDINE HYDROCHLORIDE 1.2 MCG/KG/HR: 100 INJECTION, SOLUTION INTRAVENOUS at 09:17

## 2019-09-08 RX ADMIN — PROPOFOL 40 MCG/KG/MIN: 10 INJECTION, EMULSION INTRAVENOUS at 13:04

## 2019-09-08 RX ADMIN — MIDAZOLAM HYDROCHLORIDE 2 MG: 1 INJECTION, SOLUTION INTRAMUSCULAR; INTRAVENOUS at 09:17

## 2019-09-08 RX ADMIN — VASOPRESSIN 0.04 UNITS/MIN: 20 INJECTION INTRAVENOUS at 18:17

## 2019-09-08 RX ADMIN — Medication 1 APPLICATION: at 16:29

## 2019-09-08 RX ADMIN — Medication 20 MG: at 17:39

## 2019-09-08 RX ADMIN — SODIUM CHLORIDE 0.1 MCG/KG/MIN: 0.9 INJECTION, SOLUTION INTRAVENOUS at 13:41

## 2019-09-08 RX ADMIN — ENOXAPARIN SODIUM 40 MG: 40 INJECTION SUBCUTANEOUS at 20:08

## 2019-09-08 RX ADMIN — ACETAMINOPHEN 650 MG: 160 SUSPENSION ORAL at 03:29

## 2019-09-08 RX ADMIN — VASOPRESSIN 0.04 UNITS/MIN: 20 INJECTION INTRAVENOUS at 09:17

## 2019-09-08 RX ADMIN — METRONIDAZOLE 500 MG: 500 INJECTION, SOLUTION INTRAVENOUS at 14:03

## 2019-09-08 RX ADMIN — DEXMEDETOMIDINE HYDROCHLORIDE 1.2 MCG/KG/HR: 100 INJECTION, SOLUTION INTRAVENOUS at 00:26

## 2019-09-08 RX ADMIN — Medication 20 MG: at 08:29

## 2019-09-08 RX ADMIN — MIDAZOLAM HYDROCHLORIDE 2 MG: 1 INJECTION INTRAMUSCULAR; INTRAVENOUS at 09:17

## 2019-09-08 RX ADMIN — PROPOFOL 30 MCG/KG/MIN: 10 INJECTION, EMULSION INTRAVENOUS at 18:16

## 2019-09-08 RX ADMIN — VANCOMYCIN HYDROCHLORIDE 2000 MG: 1 INJECTION, POWDER, LYOPHILIZED, FOR SOLUTION INTRAVENOUS at 03:10

## 2019-09-08 RX ADMIN — PROPOFOL 40 MCG/KG/MIN: 10 INJECTION, EMULSION INTRAVENOUS at 15:25

## 2019-09-08 RX ADMIN — Medication 100 MCG/HR: at 16:26

## 2019-09-08 RX ADMIN — METRONIDAZOLE 500 MG: 500 INJECTION, SOLUTION INTRAVENOUS at 05:56

## 2019-09-08 RX ADMIN — DEXMEDETOMIDINE HYDROCHLORIDE 1.2 MCG/KG/HR: 100 INJECTION, SOLUTION INTRAVENOUS at 06:51

## 2019-09-08 RX ADMIN — LORAZEPAM 1 MG: 2 INJECTION INTRAMUSCULAR; INTRAVENOUS at 11:26

## 2019-09-08 RX ADMIN — LORAZEPAM 1 MG: 2 INJECTION INTRAMUSCULAR; INTRAVENOUS at 05:51

## 2019-09-08 RX ADMIN — DEXMEDETOMIDINE HYDROCHLORIDE 1.2 MCG/KG/HR: 100 INJECTION, SOLUTION INTRAVENOUS at 16:23

## 2019-09-08 RX ADMIN — DEXMEDETOMIDINE HYDROCHLORIDE 1.2 MCG/KG/HR: 100 INJECTION, SOLUTION INTRAVENOUS at 18:17

## 2019-09-08 RX ADMIN — ENOXAPARIN SODIUM 40 MG: 40 INJECTION SUBCUTANEOUS at 08:23

## 2019-09-08 RX ADMIN — PROPOFOL 20 MCG/KG/MIN: 10 INJECTION, EMULSION INTRAVENOUS at 20:05

## 2019-09-08 RX ADMIN — IBUPROFEN 600 MG: 400 TABLET ORAL at 01:18

## 2019-09-08 RX ADMIN — POTASSIUM CHLORIDE 40 MEQ: 20 SOLUTION ORAL at 05:55

## 2019-09-08 RX ADMIN — DEXMEDETOMIDINE HYDROCHLORIDE 1.2 MCG/KG/HR: 100 INJECTION, SOLUTION INTRAVENOUS at 05:02

## 2019-09-08 RX ADMIN — CHLORHEXIDINE GLUCONATE 0.12% ORAL RINSE 15 ML: 1.2 LIQUID ORAL at 20:08

## 2019-09-08 RX ADMIN — DEXMEDETOMIDINE HYDROCHLORIDE 1.2 MCG/KG/HR: 100 INJECTION, SOLUTION INTRAVENOUS at 02:29

## 2019-09-08 NOTE — ASSESSMENT & PLAN NOTE
S/p Excisional debridement and washout in OR 9/6, 9/7  Continue antibiotics -- currently on vanc/cefepime/flagyl  Plan for bedside dressing change today with Dakin's solution  Maintain glucose control with insulin gtt per ICU  WBC slowly improving  Wean pressors as tolerated

## 2019-09-08 NOTE — ADDENDUM NOTE
Addendum  created 09/07/19 2107 by Amy Walter MD    Intraprocedure LDAs edited, LDA properties accepted

## 2019-09-08 NOTE — PROGRESS NOTES
Progress Note - Critical Care   Joelle Hughes 48 y o  male MRN: [id number]  Unit/Bed#:  Encounter: [id number]    Attending Physician: Brandt Rashid MD      ______________________________________________________________________  Assessment and Plan:   Principal Problem:    Necrotizing soft tissue infection  Active Problems:    Esophageal reflux    Hyperlipidemia    Morbid obesity ([facility] )    Recurrent cellulitis of lower extremity    Type 2 diabetes mellitus with complication ([facility] )    Septic shock ([facility] )    Hyponatremia    Hypotension    Hypokalemia    Elevated procalcitonin    Metabolic encephalopathy    Acute hypoxemic respiratory failure ([facility] )  Resolved Problems:    DEAN (acute kidney injury) ([facility] )    Hyperbilirubinemia    Sinus tachycardia    Lactic acidosis    Bandemia    Hypocalcemia    Neuro:   Acute metabolic encephalopathy likely secondary to sedatives and pain medication  -Trend neuro exam  -Delirium precautions  -CAM ICU per protocol  -Regulate sleep-wake cycle   -Precedex 1 20 mcg/ kg/hour  -Ativan prn: received 1 mg given x2 overnight  -RASS with a goal : -1  -Pain controlled on fentanyl 100 mcg/hr, With prn fentanyl-received 100 mcg x1 overnight    CV:   Hx HTN  -Will hold lisinopril 2/2 hypotension     Hypotension 2/2 Septic shock   -Norepinephrine at 4 mcg/min  -Vasopressin at 0 04 units/min  -Map goal >65  -Isolyte 75 mL/hour  -NSR on telemetry     Hx hyperlipedemia  -Patient was refusing to take his blood cholesterol medications due to adverse effects according to chart review     Pulm:   Acute hypoxic respiratory failure  -AC/VC rate 20, TV volume 450, PEEP 15, FiO2 80%  -Maintain mechanical ventilation- possible OR 09/09  -CXR-patchy opacities, slight improvement in right lower lobe     GI:   GERD  -Prilosec 20 mg B i d      :   DEAN secondary to prerenal/ septic shock-resolved  -Baseline creatinine baseline 1 02, now 1 09  -Continue Isolyte 75 mL/hour  -Trend BUN/creatinine  -Strict I&O  -Maintain oneill catheter for accurate I&O     F/E/N:  Morbid obesity  -Diet: NPO, Jevity at 45 ml/hour via OG tube  -Calorie controlled diet when patient is no longer in ventilator  -Inpatient consult for nutritional services     Hyponatremia  -Sodium of 127 on admission, now 135 which is baseline  -Will monitor sodium trend     Hypokalemia  -Potassium of 3 4, replacement given  -Trend electrolytes     ID:   Septic shock 2/2 necrotizing fascitis/ Hx of recurrent cellulitis  -Initial presentation with tachycardia heart rate 110, tachypnea respiration 22, Leukocytosis 19 61, lactic acid of 4 7, bandemia 18  -CT femur left w contrast: 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  Mildly prominent left external iliac chain and left inguinal lymph nodes, likely reactive    -S/P debridement 09/06 and 09/07  -Surgery on consult  -Blood culture x2 oending  -Tissue from groin cultures and Gram stain pending  -Cefepime 2 g every 8 hours day 3  -Metronidazole 500 q 8 hours day 3  -Vancomycin 2 g q 12 hours per pharmacy consult day 3  -Clindamycin 600 mg q 6 hours-discontinued 09/07  -Zosyn given x1 in ED  -ID on consult-appreciate recommendations  -WBCs trending down: 27, 26, 22  -Bandemia resolved  -PCT pending today, was 2 8, 2 9  -Lactic acid cleared on 09/07      Heme:   -Hemoglobin and plts trending down: hgb 9 2 from 9 7, plts 189 from 213  -Decreased likely secondary to EBL and IV resuscitation, will trend CBC  -Transfuse PRBCs for hgb < 7 0     Endo:   Uncontrolled Type 2 diabetes mellitus with complication neuropathy  -Hemoglobin A1c of 12 (2015), recheck hemoglobin A1c 11 2  -Continue insulin gtt, goal glucose 140-180  -Hold Amaryl 4mg OD and metformin 1g BID     Msk/Skin:   -PT OT consult when stable/appropriate     Disposition:  Continue ICU level care      Code Status: Level 1 - Full Code    Counseling / Coordination of Care  Total Critical Care time spent 30 minutes excluding procedures, teaching and family updates  ______________________________________________________________________    24 Hour Events:   Remains febrile with max temperature of 102 2 farenheit; now on cooling blanket  Patient restless/aggitated with repositioning  Ativan 2 mg IV given total and fentanyl 100 mcg IV given overnight in addition to Fentanyl infusion at 100 mcg/hr and precedex infusion at 1 2 mcg/kg/hr  Levophed remains at 0 4 mcg/min and vasopressin at 0 04 units/min  Review of Systems   Unable to perform ROS: Mental status change     ______________________________________________________________________    Physical Exam:   Physical Exam   Constitutional: No distress  HENT:   Head: Normocephalic and atraumatic  Mouth/Throat: Oropharynx is clear and moist  No oropharyngeal exudate  Eyes: Pupils are equal, round, and reactive to light  Conjunctivae are normal  No scleral icterus  Neck: Normal range of motion  Neck supple  No JVD present  Cardiovascular: Normal rate, regular rhythm, normal heart sounds and intact distal pulses  No murmur heard  Pulmonary/Chest: Effort normal  No stridor  No respiratory distress  He has no wheezes  He has no rales  He exhibits no tenderness  Diminished breath sounds   Abdominal: Soft  Bowel sounds are normal    Genitourinary:   Genitourinary Comments: Duarte in place, mesh pants with dressing intact to perineum/abdomen   Musculoskeletal: He exhibits no edema, tenderness or deformity  Lymphadenopathy:     He has no cervical adenopathy  Neurological: GCS eye subscore is 2  GCS verbal subscore is 1  GCS motor subscore is 5  Skin: Skin is warm and dry  Capillary refill takes less than 2 seconds  No rash noted  He is not diaphoretic  No erythema  No pallor          ______________________________________________________________________  Vitals:    09/08/19 0500 09/08/19 3070 09/08/19 0600 09/08/19 9641 BP:  (!) 80/53 108/58    BP Location:       Pulse: 76  77    Resp: (!) 24  (!) 24    Temp: (!) 101 5 °F (38 6 °C)  (!) 101 5 °F (38 6 °C)    TempSrc: Rectal  Rectal Rectal   SpO2: 98%  96%    Weight:   (!) 175 kg (386 lb 3 9 oz)    Height:           Temperature:   Temp (24hrs), Av 2 °F (38 4 °C), Min:97 6 °F (36 4 °C), Max:102 2 °F (39 °C)    Current Temperature: (!) 101 5 °F (38 6 °C)  Weights:   IBW: 59 2 kg    Body mass index is 66 3 kg/m²  Weight (last 2 days)     Date/Time   Weight    19 0600   (!) 175 (386 25)    19 0551   (!) 169 (372 58)    19 1828   (!) 166 (367 07)    19 1113   (!) 163 (360)            Hemodynamic Monitoring:  N/A     Non-Invasive/Invasive Ventilation Settings:  Respiratory    Lab Data (Last 4 hours)       0509            pH, Arterial       7 317           pCO2, Arterial       33 3           pO2, Arterial       66 7           HCO3, Arterial       16 7           Base Excess, Arterial       -8 6                O2/Vent Data        0334   Most Recent         Vent Mode AC/VC  AC/VC      Resp Rate (BPM) (BPM) 20  20      Vt (mL) (mL) 450  450      FIO2 (%) (%) 85  85      PEEP (cmH2O) (cmH2O) 12  12      MV 12  12                Lab Results   Component Value Date    PHART 7 317 (L) 2019    ULM1NRZ 33 3 (L) 2019    PO2ART 66 7 (L) 2019    EVR2KXQ 16 7 (L) 2019    BEART -8 6 2019    SOURCE Line, Arterial 2019     SpO2 Activity: SpO2 Activity: At Rest, SpO2 Device: O2 Device: Other (comment)(bipap)  Intake and Outputs:  I/O       701 -  07 -  07    P  O   0    I V  (mL/kg) 6221 3 (36 8) 6308 7 (36)    NG/GT  781    IV Piggyback 4050 1900    Feedings  311    Total Intake(mL/kg) 83552 3 (60 8) 9300 7 (53 1)    Urine (mL/kg/hr) 875 905 (0 2)    Emesis/NG output 400     Blood 500     Total Output 1775 905    Net +8496 3 +8395 7              UOP: >37 ml/hr   Nutrition:        Diet Orders (From admission, onward)             Start     Ordered    09/07/19 1301  Diet Enteral/Parenteral; Tube Feeding No Oral Diet; Two Terrence HN; Continuous; 45; Prosource Protein Liquid - Three Packets  Diet effective now     Question Answer Comment   Diet Type Enteral/Parenteral    Enteral/Parenteral Tube Feeding No Oral Diet    Tube Feeding Formula: Two Terrence HN    Bolus/Cyclic/Continuous Continuous    Tube Feeding Goal Rate (mL/hr): 45    Prosource Protein Liquid - No Carb Prosource Protein Liquid - Three Packets    RD to adjust diet per protocol? No        09/07/19 1301    09/06/19 1928  Room Service  Once     Question:  Type of Service  Answer:  Room Service- Not Appropriate    09/06/19 1927              TF currently running at goal; 45 ml/hr    Labs:   Results from last 7 days   Lab Units 09/08/19  0508 09/07/19  1218 09/07/19  0846 09/07/19  0444 09/06/19  2118 09/06/19  1834 09/06/19  1636 09/06/19  1212   WBC Thousand/uL 22 96*  --   --  26 46*  --  27 10*  --  19 61*   HEMOGLOBIN g/dL 9 2* 9 8*  --  9 7* 10 1* 11 3*  --  14 2   I STAT HEMOGLOBIN g/dl  --   --  9 9*  --   --   --  13 6  --    HEMATOCRIT % 28 6*  --   --  29 3*  --  33 9*  --  41 9   HEMATOCRIT, ISTAT %  --   --  29*  --   --   --  40  --    PLATELETS Thousands/uL 189  --   --  213  --  221  --  226   NEUTROS PCT % 68  --   --   --   --  69  --   --    BANDS PCT %  --   --   --  17*  --   --   --  18*   MONOS PCT % 6  --   --   --   --  8  --   --    MONO PCT %  --   --   --  8  --   --   --  9     Results from last 7 days   Lab Units 09/08/19  0508 09/07/19  1218 09/07/19  0846 09/07/19  0444 09/06/19  1834 09/06/19  1636 09/06/19  1212   SODIUM mmol/L 135* 134*  --  134* 132*  --  127*   POTASSIUM mmol/L 3 4* 4 0  --  3 4* 4 0  --  3 6   CHLORIDE mmol/L 105 103  --  102 99*  --  93*   CO2 mmol/L 19* 21  --  20* 18*  --  21   CO2, I-STAT mmol/L  --   --  19*  --   --  23  --    ANION GAP mmol/L 11 10  --  12 15*  --  13   BUN mg/dL 29* 23  --  22 19 --  20   CREATININE mg/dL 1 09 1 09  --  0 97 1 00  --  1 37*   CALCIUM mg/dL 7 3* 7 1*  --  7 5* 8 0*  --  9 2   ALT U/L 23  --   --  25 26  --  37   AST U/L 41  --   --  27 28  --  40   ALK PHOS U/L 101  --   --  102 130*  --  165*   ALBUMIN g/dL 1 7*  --   --  2 0* 1 9*  --  2 0*   TOTAL BILIRUBIN mg/dL 0 60  --   --  0 90 1 20*  --  1 40*     Results from last 7 days   Lab Units 09/08/19  0508 09/07/19  0444 09/06/19  1834   MAGNESIUM mg/dL 2 2 2 0 1 3*   PHOSPHORUS mg/dL 2 7 3 1 4 0      Results from last 7 days   Lab Units 09/06/19  1212   INR  1 29*   PTT seconds 31         Results from last 7 days   Lab Units 09/07/19  0946 09/07/19  0444 09/07/19  0244 09/07/19  0016 09/06/19  2118 09/06/19  1834 09/06/19  1356   LACTIC ACID mmol/L 1 3 1 9 2 3* 3 1* 3 4* 3 2* 3 2*     ABG:  Lab Results   Component Value Date    PHART 7 317 (L) 09/08/2019    [id number] 33 3 (L) 09/08/2019    PO2ART 66 7 (L) 09/08/2019    BWH0YEN 16 7 (L) 09/08/2019    BEART -8 6 09/08/2019    SOURCE Line, Arterial 09/08/2019     VBG:  Results from last 7 days   Lab Units 09/08/19  0509   ABG SOURCE  Line, Arterial     Results from last 7 days   Lab Units 09/07/19  0946 09/06/19  1834   PROCALCITONIN ng/ml 2 96* 2 80*     No results found for: [facility]   Imaging: CXR shows improvement of right lower lobe, persistent patchy opacities   I have personally reviewed pertinent reports  and I have personally reviewed pertinent films in PACS  EKG: NSR on telemetry  Micro:  Results from last 7 days   Lab Units 09/06/19  1534 09/06/19  1212   BLOOD CULTURE   --  No Growth at 24 hrs  No Growth at 24 hrs  GRAM STAIN RESULT  No polys seen*  4+ Gram negative rods*  3+ Gram positive cocci in pairs*  --      Allergies:    Allergies   Allergen Reactions    Clindamycin Diarrhea     Medications:   Scheduled Meds:  Current Facility-Administered Medications:  acetaminophen 650 mg Oral Q4H PRN Clair Ace PA-C    cefepime 2,000 mg Intravenous Q8H Sami Naga, CRNP Last Rate: 2,000 mg (09/08/19 0204)   chlorhexidine 15 mL Swish & Spit Q12H [facility] Sami Naga, CRNP    dexmedetomidine 0 1-1 2 mcg/kg/hr Intravenous Titrated Sami Naga, CRNP Last Rate: 1 2 mcg/kg/hr (09/08/19 0651)   enoxaparin 40 mg Subcutaneous Daily Sami Phoenix, CRNP    fentaNYL 100 mcg/hr Intravenous Continuous Erasto Simmonds, PA-C Last Rate: 100 mcg/hr (09/08/19 0548)   fentanyl citrate (PF) 100 mcg Intravenous Q1H PRN Erasto Simmonds, PA-C    insulin regular (HumuLIN R,NovoLIN R) infusion 0 3-21 Units/hr Intravenous Titrated Sami Naga, CRNP Last Rate: 9 Units/hr (09/08/19 7111)   LORazepam 1 mg Intravenous Q4H PRN Erasto Simmonds, PA-C    metroNIDAZOLE 500 mg Intravenous Q8H Sami Phoenix, CRNP Last Rate: 500 mg (09/08/19 0556)   multi-electrolyte 75 mL/hr Intravenous Continuous Erasto Simmonds, PA-C Last Rate: 75 mL/hr (09/07/19 2138)   norepinephrine 1-30 mcg/min Intravenous Titrated Sami Phoenix, CRNP Last Rate: 4 mcg/min (09/08/19 8643)   omeprazole (PRILOSEC) suspension 2 mg/mL 20 mg Oral BID Sami Naga, CRNP    sodium hypochlorite 1 application Irrigation Daily Carlos Ruano DO    vancomycin 20 mg/kg (Adjusted) Intravenous Q12H Sami Naga, CRNP Last Rate: 2,000 mg (09/08/19 0310)   vasopressin (PITRESSIN) in 0 9 % sodium chloride 100 mL 0 04 Units/min Intravenous Continuous Erasto Simmonds, PA-C Last Rate: 0 04 Units/min (09/08/19 0229)     Continuous Infusions:  dexmedetomidine 0 1-1 2 mcg/kg/hr Last Rate: 1 2 mcg/kg/hr (09/08/19 0651)   fentaNYL 100 mcg/hr Last Rate: 100 mcg/hr (09/08/19 0548)   insulin regular (HumuLIN R,NovoLIN R) infusion 0 3-21 Units/hr Last Rate: 9 Units/hr (09/08/19 2240)   multi-electrolyte 75 mL/hr Last Rate: 75 mL/hr (09/07/19 5760)   norepinephrine 1-30 mcg/min Last Rate: 4 mcg/min (09/08/19 5067)   vasopressin (PITRESSIN) in 0 9 % sodium chloride 100 mL 0 04 Units/min Last Rate: 0 04 Units/min (09/08/19 0229)     PRN Meds:    acetaminophen 650 mg Q4H PRN   fentanyl citrate (PF) 100 mcg Q1H PRN   LORazepam 1 mg Q4H PRN     VTE Pharmacologic Prophylaxis: Enoxaparin (Lovenox)  VTE Mechanical Prophylaxis: sequential compression device  Invasive lines and devices: Invasive Devices     Central Venous Catheter Line            CVC Central Lines 09/06/19 Triple Right 1 day          Peripheral Intravenous Line            Peripheral IV 09/06/19 Left Hand 1 day    Peripheral IV 09/06/19 Right Antecubital 1 day    Peripheral IV 09/06/19 Right Hand 1 day          Arterial Line            Arterial Line 09/07/19 Right Radial less than 1 day          Drain            NG/OG/Enteral Tube Orogastric 14 Fr 1 day    Urethral Catheter Latex 16 Fr  1 day          Airway            ETT  Cuffed;Oral;Inflated 8 mm 1 day                     Portions of the record may have been created with voice recognition software  Occasional wrong word or "sound a like" substitutions may have occurred due to the inherent limitations of voice recognition software  Read the chart carefully and recognize, using context, where substitutions have occurred      ANN Grace

## 2019-09-08 NOTE — UTILIZATION REVIEW
Initial Clinical Review    Admission: Date/Time/Statement: Inpatient Admission Orders (From admission, onward)     Ordered        09/06/19 1416  Inpatient Admission  Once         09/06/19 1408  Inpatient Admission (expected length of stay for this patient Order details is greater than two midnights)  Once                   Orders Placed This Encounter   Procedures    Inpatient Admission (expected length of stay for this patient Order details is greater than two midnights)     Standing Status:   Standing     Number of Occurrences:   1     Order Specific Question:   Admitting Physician     Answer: Berto Peoples [id number]     Order Specific Question:   Level of Care     Answer:   Level 1 Stepdown [13]     Order Specific Question:   Estimated length of stay     Answer:   More than 2 Midnights     Order Specific Question:   Certification     Answer:   I certify that inpatient services are medically necessary for this patient for a duration of greater than two midnights  See H&P and MD Progress Notes for additional information about the patient's course of treatment   Inpatient Admission     Standing Status:   Standing     Number of Occurrences:   1     Order Specific Question:   Admitting Physician     Answer: Berto Peoples [id number]     Order Specific Question:   Level of Care     Answer:   Critical Care [15]     Order Specific Question:   Estimated length of stay     Answer:   More than 2 Midnights     Order Specific Question:   Certification     Answer:   I certify that inpatient services are medically necessary for this patient for a duration of greater than two midnights  See H&P and MD Progress Notes for additional information about the patient's course of treatment       ED Arrival Information     Expected Arrival Acuity Means of Arrival Escorted By Service Admission Type    - 9/6/2019 11:06 Urgent Walk-In Family Member Critical Care/ICU Urgent    Arrival Complaint    cellulitis        Chief Complaint Patient presents with    Cellulitis     Pt  c/o left leg wound with pain, swelling and redness for six days and has been tx outpatient doxycycline  Pt  has hx  of cellulitis  Assessment/Plan: 49 yo male presents to ED 9/6 due to cellulitis left thigh  Started doxycycline for this cellulitis 5 days ago  Exam reveals erythema of left upperthigh extending to inguinal skin and posteriorly to perineal area  +tenderness  + erythema of scrotum  + drainage bloody, foul smelling from medial thigh posteriorly  CT scan reveals extensive inflammation with gas and the subcutaneous tissues of the left upper inner thigh into the groin  Pt admitted as INPATIENT to critical care due to Necrotizing soft tissue infection left thigh groinSurgery consulted and plan for I&D, debridement of left thigh, groin and perineum   To OR 9/6  Incision, drainage, and debridement of left thigh and perineal necrotizing soft tissue infection  Operative findings  Extensive soft tissue infection of the subcutaneous fat of the upper left thigh up into the perineal region  All muscular tissue was pink and healthy  No signs of necrotizing fasciitis  Aerobic and anaerobic cultures were taken  Two rolls of Betadine-soaked Kerlix were placed into the wound  ASA 4A    Wound class 3     ED Triage Vitals   Temperature Pulse Respirations Blood Pressure SpO2   09/06/19 1118 09/06/19 1113 09/06/19 1113 09/06/19 1240 09/06/19 1113   (!) 97 4 °F (36 3 °C) (!) 138 22 96/62 99 %      Temp Source Heart Rate Source Patient Position - Orthostatic VS BP Location FiO2 (%)   09/06/19 1118 09/06/19 1240 09/06/19 1240 09/06/19 1240 09/06/19 1810   Oral Monitor Lying Right arm 70      Pain Score       09/06/19 1240       No Pain        Wt Readings from Last 1 Encounters:   09/08/19 (!) 175 kg (386 lb 3 9 oz)     Additional Vital Signs:   09/08/19 1200    71  20      96/64  75 mmHg  93 %       O2 Device: vent at 09/08/19 1127   09/08/19 0800  100 6 °F (38 1 °C)Abnormal                      O2 Device: vent at 09/08/19 0743   09/08/19 0714  101 3 °F (38 5 °C)Abnormal   77  26   122                     Pertinent Labs/Diagnostic Test Results:   Results from last 7 days   Lab Units 09/08/19  0508 09/07/19  1218 09/07/19  0846 09/07/19  0444 09/06/19  2118 09/06/19  1834 09/06/19  1636 09/06/19  1212   WBC Thousand/uL 22 96*  --   --  26 46*  --  27 10*  --  19 61*   HEMOGLOBIN g/dL 9 2* 9 8*  --  9 7* 10 1* 11 3*  --  14 2   I STAT HEMOGLOBIN g/dl  --   --  9 9*  --   --   --  13 6  --    HEMATOCRIT % 28 6*  --   --  29 3*  --  33 9*  --  41 9   HEMATOCRIT, ISTAT %  --   --  29*  --   --   --  40  --    PLATELETS Thousands/uL 189  --   --  213  --  221  --  226   NEUTROS ABS Thousands/µL 15 65*  --   --   --   --  18 90*  --   --    BANDS PCT %  --   --   --  17*  --   --   --  18*         Results from last 7 days   Lab Units 09/08/19  0508 09/07/19  1218 09/07/19  0846 09/07/19  0444 09/06/19  1834 09/06/19  1636 09/06/19  1212   SODIUM mmol/L 135* 134*  --  134* 132*  --  127*   POTASSIUM mmol/L 3 4* 4 0  --  3 4* 4 0  --  3 6   CHLORIDE mmol/L 105 103  --  102 99*  --  93*   CO2 mmol/L 19* 21  --  20* 18*  --  21   CO2, I-STAT mmol/L  --   --  19*  --   --  23  --    ANION GAP mmol/L 11 10  --  12 15*  --  13   BUN mg/dL 29* 23  --  22 19  --  20   CREATININE mg/dL 1 09 1 09  --  0 97 1 00  --  1 37*   EGFR ml/min/1 73sq m 78 78  --  89 86  --  59   CALCIUM mg/dL 7 3* 7 1*  --  7 5* 8 0*  --  9 2   CALCIUM, IONIZED mmol/L 0 98*  --   --   --  1 03*  --   --    CALCIUM, IONIZED, ISTAT mmol/L  --   --  1 01*  --   --  1 18  --    MAGNESIUM mg/dL 2 2  --   --  2 0 1 3*  --   --    PHOSPHORUS mg/dL 2 7  --   --  3 1 4 0  --   --      Results from last 7 days   Lab Units 09/08/19  0508 09/07/19  0444 09/06/19  1834 09/06/19  1212   AST U/L 41 27 28 40   ALT U/L 23 25 26 37   ALK PHOS U/L 101 102 130* 165*   TOTAL PROTEIN g/dL 5 7* 5 8* 6 0* 7 5   ALBUMIN g/dL 1 7* 2  0* 1 9* 2 0*   TOTAL BILIRUBIN mg/dL 0 60 0 90 1 20* 1 40*     Results from last 7 days   Lab Units 09/08/19  0757 09/08/19  0607 09/08/19  0423 09/08/19  0154 09/07/19  2357 09/07/19  2203 09/07/19  1958 09/07/19  1758 09/07/19  1537 09/07/19  1353   POC GLUCOSE mg/dl 134 189* 188* 200* 167* 153* 151* 159* 189* 162*     Results from last 7 days   Lab Units 09/08/19  0508 09/07/19  1218 09/07/19  0444 09/06/19  1834 09/06/19  1212   GLUCOSE RANDOM mg/dL 199* 153* 154* 297* 375*         Results from last 7 days   Lab Units 09/06/19  1212   HEMOGLOBIN A1C % 11 2*   EAG mg/dl 275     No results found for: BETA-HYDROXYBUTYRATE   Results from last 7 days   Lab Units 09/08/19  0509 09/08/19  0211 09/07/19  2315   PH ART  7 317* 7 331* 7 319*   PCO2 ART mm Hg 33 3* 32 0* 31 0*   PO2 ART mm Hg 66 7* 69 9* 61 8*   HCO3 ART mmol/L 16 7* 16 5* 15 6*   BASE EXC ART mmol/L -8 6 -8 4 -9 5   O2 CONTENT ART mL/dL 12 5* 13 2* 12 8*   O2 HGB, ARTERIAL % 91 5* 92 5* 90 0*   ABG SOURCE  Line, Arterial Line, Arterial Line, Arterial         Results from last 7 days   Lab Units 09/07/19  0846 09/06/19  1636   I STAT BASE EXC mmol/L -9* -7*   I STAT O2 SAT % 86* 87*   ISTAT PH ART  7 226* 7 216*   I STAT ART PCO2 mm HG 43 0 52 2*   I STAT ART PO2 mm HG 61 0* 65 0*   I STAT ART HCO3 mmol/L 17 9* 21 2*                 Results from last 7 days   Lab Units 09/06/19  1212   PROTIME seconds 15 4*   INR  1 29*   PTT seconds 31         Results from last 7 days   Lab Units 09/07/19  0946 09/06/19  1834   PROCALCITONIN ng/ml 2 96* 2 80*     Results from last 7 days   Lab Units 09/07/19  0946 09/07/19  0444 09/07/19  0244 09/07/19  0016 09/06/19  2118 09/06/19  1834 09/06/19  1356   LACTIC ACID mmol/L 1 3 1 9 2 3* 3 1* 3 4* 3 2* 3 2*           Results from last 7 days   Lab Units 09/06/19  1534 09/06/19  1212   BLOOD CULTURE   --  No Growth at 24 hrs  No Growth at 24 hrs     GRAM STAIN RESULT  No polys seen*  4+ Gram negative rods*  3+ Gram positive cocci in pairs*  --      Results from last 7 days   Lab Units 09/07/19  0444 09/06/19  1212   TOTAL COUNTED  100 100           ED Treatment:   Medication Administration from 09/06/2019 1106 to 09/06/2019 1441       Date/Time Order Dose Route Action Action by Comments     09/06/2019 1311 sodium chloride 0 9 % bolus 1,000 mL 0 mL Intravenous Stopped Virgel Aschoff, MOHAN      09/06/2019 1211 sodium chloride 0 9 % bolus 1,000 mL 1,000 mL Intravenous New Bag Lacie Swetha, MOHAN      09/06/2019 1301 iohexol (OMNIPAQUE) 350 MG/ML injection (MULTI-DOSE) 100 mL 100 mL Intravenous Given Tripp Givens      09/06/2019 1412 piperacillin-tazobactam (ZOSYN) 3 375 g in sodium chloride 0 9 % 50 mL IVPB 0 g Intravenous Stopped Lacie Posada, MOHAN      09/06/2019 1340 piperacillin-tazobactam (ZOSYN) 3 375 g in sodium chloride 0 9 % 50 mL IVPB 3 375 g Intravenous New Bag Lacie Posada, MOHAN      09/06/2019 1441 vancomycin (VANCOCIN) 2,000 mg in sodium chloride 0 9 % 500 mL IVPB   Intravenous MAR Hold Automatic Transfer Provider      09/06/2019 1356 sodium chloride 0 9 % bolus 2,000 mL 2,000 mL Intravenous New Bag Lacie Posada, MOHAN      09/06/2019 1412 clindamycin (CLEOCIN) IVPB (premix) 600 mg 600 mg Intravenous New Bag Lacie Posada, MOHAN      09/06/2019 1441 chlorhexidine (PERIDEX) 0 12 % oral rinse 15 mL   Swish & Spit [facility] Hold Automatic Transfer Provider      09/06/2019 1441 enoxaparin (LOVENOX) subcutaneous injection 40 mg   Subcutaneous MAR Hold Automatic Transfer Provider      09/06/2019 1441 pantoprazole (PROTONIX) EC tablet 40 mg   Oral MAR Hold Automatic Transfer Provider      09/06/2019 1441 cefepime (MAXIPIME) 2 g/50 mL dextrose IVPB   Intravenous MAR Hold Automatic Transfer Provider      09/06/2019 1441 metroNIDAZOLE (FLAGYL) IVPB (premix) 500 mg   Intravenous MAR Hold Automatic Transfer Provider         Past Medical History:   Diagnosis Date    Cellulitis     last assessed 12/10/15    Diabetes mellitus ([facility] )     Edema     Elevated liver enzymes     Esophageal reflux     Gluten intolerance     Gout     last assessed 09/05/13    Hyperglycemia     Hypertension     IBS (irritable bowel syndrome)     Insomnia     Obesity     Osteoarthritis of knee     last assessed 02/10/14    Prehypertension     last assessed 08/22/17    Venous insufficiency     last assessed 08/22/17    Villonodular synovitis of the hand, right     last assessed 11/14/2013     Present on Admission:   Morbid obesity ([place] [address] )   Type 2 diabetes mellitus with complication ([place] [address] )   Septic shock ([place] [address] )   Hyponatremia   Esophageal reflux   Recurrent cellulitis of lower extremity   Hyperlipidemia   (Resolved) DEAN (acute kidney injury) ([place] [address] )   (Resolved) Hyperbilirubinemia   (Resolved) Sinus tachycardia   (Resolved) Lactic acidosis   Necrotizing soft tissue infection   (Resolved) Bandemia      Admitting Diagnosis: Morbid obesity ([place] [address] ) [E66 01]  Cellulitis [L03 90]  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]  Necrotizing soft tissue infection [M79 89]  Sepsis, due to unspecified organism ([place] [address] ) [A41 9]  Type 2 diabetes mellitus with complication, unspecified whether long term insulin use ([place] [address] ) [E11 8]  Age/Sex: 48 y o  male  Admission Orders:    Current Facility-Administered Medications:  acetaminophen 650 mg Oral Q4H PRN Tanja Sherman PA-C    bisacodyl 10 mg Rectal Daily PRN ANN Mobley    cefepime 2,000 mg Intravenous Q8H ANN Lopez Last Rate: 2,000 mg (09/08/19 0204)   chlorhexidine 15 mL Swish & Spit Q12H [address] NAN Lopez    dexmedetomidine 0 1-1 2 mcg/kg/hr Intravenous Titrated ANN Montano Last Rate: 1 2 mcg/kg/hr (09/08/19 1119)   enoxaparin 40 mg Subcutaneous Daily ANN Castro    fentaNYL 100 mcg/hr Intravenous Continuous Tanja Sherman PA-C Last Rate: 100 mcg/hr (09/08/19 0800)   fentanyl citrate (PF) 100 mcg Intravenous Q1H PRN Tanja Sherman PA-C    insulin regular (HumuLIN R,NovoLIN R) infusion 0 3-21 Units/hr Intravenous Titrated Otilia Parody, CRNP Last Rate: 3 Units/hr (09/08/19 0805)   LORazepam 1 mg Intravenous Q4H PRN Mally Cronin PA-C    metroNIDAZOLE 500 mg Intravenous Q8H Otilia Parody, CRNP Last Rate: 500 mg (09/08/19 0556)   multi-electrolyte 75 mL/hr Intravenous Continuous Mally Cronin PA-C Last Rate: 75 mL/hr (09/08/19 0800)   norepinephrine 1-30 mcg/min Intravenous Titrated Otilia Parody, CRNP Last Rate: 4 mcg/min (09/08/19 0800)   omeprazole (PRILOSEC) suspension 2 mg/mL 20 mg Oral BID Otilia Parocooper, CRNP    propofol 5-50 mcg/kg/min Intravenous Titrated Suly Patterson CRNP Last Rate: 30 mcg/kg/min (09/08/19 1112)   senna 8 8 mg Oral HS Suly Patterson CRGREGORY    sodium hypochlorite 1 application Irrigation Daily Carlos Ruano DO    vancomycin 20 mg/kg (Adjusted) Intravenous Q12H Otilia Parody, CRNP Last Rate: 2,000 mg (09/08/19 0310)   vasopressin (PITRESSIN) in 0 9 % sodium chloride 100 mL 0 04 Units/min Intravenous Continuous Mally Cronin PA-C Last Rate: 0 04 Units/min (09/08/19 0917)     Critical care post-op  Mechanical ventilation /A line monitoring    IP CONSULT TO CASE MANAGEMENT  IP CONSULT TO PHARMACY  IP CONSULT TO NUTRITION SERVICES  IP CONSULT TO INFECTIOUS DISEASES    Network Utilization Review Department  Phone: [phone]; Fax [phone]  [email or web address]  ATTENTION: Please call with any questions or concerns to [phone]  and carefully listen to the prompts so that you are directed to the right person  Send all requests for admission clinical reviews, approved or denied determinations and any other requests to fax [phone]   All voicemails are confidential

## 2019-09-08 NOTE — PLAN OF CARE
Problem: Prexisting or High Potential for Compromised Skin Integrity  Goal: Skin integrity is maintained or improved  Description  INTERVENTIONS:  - Identify patients at risk for skin breakdown  - Assess and monitor skin integrity  - Assess and monitor nutrition and hydration status  - Monitor labs   - Assess for incontinence   - Turn and reposition patient  - Assist with mobility/ambulation  - Relieve pressure over bony prominences  - Avoid friction and shearing  - Provide appropriate hygiene as needed including keeping skin clean and dry  - Evaluate need for skin moisturizer/barrier cream  - Collaborate with interdisciplinary team   - Patient/family teaching  - Consider wound care consult   Outcome: Progressing     Problem: Potential for Falls  Goal: Patient will remain free of falls  Description  INTERVENTIONS:  - Assess patient frequently for physical needs  -  Identify cognitive and physical deficits and behaviors that affect risk of falls    -  [place] fall precautions as indicated by assessment   - Educate patient/family on patient safety including physical limitations  - Instruct patient to call for assistance with activity based on assessment  - Modify environment to reduce risk of injury  - Consider OT/PT consult to assist with strengthening/mobility  Outcome: Progressing     Problem: CARDIOVASCULAR - ADULT  Goal: Maintains optimal cardiac output and hemodynamic stability  Description  INTERVENTIONS:  - Monitor I/O, vital signs and rhythm  - Monitor for S/S and trends of decreased cardiac output  - Administer and titrate ordered vasoactive medications to optimize hemodynamic stability  - Assess quality of pulses, skin color and temperature  - Assess for signs of decreased coronary artery perfusion  - Instruct patient to report change in severity of symptoms  Outcome: Progressing  Goal: Absence of cardiac dysrhythmias or at baseline rhythm  Description  INTERVENTIONS:  - Continuous cardiac monitoring, vital signs, obtain 12 lead EKG if ordered  - Administer antiarrhythmic and heart rate control medications as ordered  - Monitor electrolytes and administer replacement therapy as ordered  Outcome: Progressing     Problem: RESPIRATORY - ADULT  Goal: Achieves optimal ventilation and oxygenation  Description  INTERVENTIONS:  - Assess for changes in respiratory status  - Assess for changes in mentation and behavior  - Position to facilitate oxygenation and minimize respiratory effort  - Oxygen administered by appropriate delivery if ordered  - Initiate smoking cessation education as indicated  - Encourage broncho-pulmonary hygiene including cough, deep breathe, Incentive Spirometry  - Assess the need for suctioning and aspirate as needed  - Assess and instruct to report SOB or any respiratory difficulty  - Respiratory Therapy support as indicated  Outcome: Progressing     Problem: METABOLIC, FLUID AND ELECTROLYTES - ADULT  Goal: Electrolytes maintained within normal limits  Description  INTERVENTIONS:  - Monitor labs and assess patient for signs and symptoms of electrolyte imbalances  - Administer electrolyte replacement as ordered  - Monitor response to electrolyte replacements, including repeat lab results as appropriate  - Instruct patient on fluid and nutrition as appropriate  Outcome: Progressing  Goal: Fluid balance maintained  Description  INTERVENTIONS:  - Monitor labs   - Monitor I/O and WT  - Instruct patient on fluid and nutrition as appropriate  - Assess for signs & symptoms of volume excess or deficit  Outcome: Progressing  Goal: Glucose maintained within target range  Description  INTERVENTIONS:  - Monitor Blood Glucose as ordered  - Assess for signs and symptoms of hyperglycemia and hypoglycemia  - Administer ordered medications to maintain glucose within target range  - Assess nutritional intake and initiate nutrition service referral as needed  Outcome: Progressing     Problem: SKIN/TISSUE INTEGRITY - ADULT  Goal: Skin integrity remains intact  Description  INTERVENTIONS  - Identify patients at risk for skin breakdown  - Assess and monitor skin integrity  - Assess and monitor nutrition and hydration status  - Monitor labs (i e  albumin)  - Assess for incontinence   - Turn and reposition patient  - Assist with mobility/ambulation  - Relieve pressure over bony prominences  - Avoid friction and shearing  - Provide appropriate hygiene as needed including keeping skin clean and dry  - Evaluate need for skin moisturizer/barrier cream  - Collaborate with interdisciplinary team (i e  Nutrition, Rehabilitation, etc )   - Patient/family teaching  Outcome: Progressing  Goal: Incision(s), wounds(s) or drain site(s) healing without S/S of infection  Description  INTERVENTIONS  - Assess and document risk factors for skin impairment   - Assess and document dressing, incision, wound bed, drain sites and surrounding tissue  - Consider nutrition services referral as needed  - Oral mucous membranes remain intact  - Provide patient/ family education  Outcome: Progressing  Goal: Oral mucous membranes remain intact  Description  INTERVENTIONS  - Assess oral mucosa and hygiene practices  - Implement preventative oral hygiene regimen  - Implement oral medicated treatments as ordered  - Initiate Nutrition services referral as needed  Outcome: Progressing     Problem: HEMATOLOGIC - ADULT  Goal: Maintains hematologic stability  Description  INTERVENTIONS  - Assess for signs and symptoms of bleeding or hemorrhage  - Monitor labs  - Administer supportive blood products/factors as ordered and appropriate  Outcome: Progressing     Problem: Nutrition/Hydration-ADULT  Goal: Nutrient/Hydration intake appropriate for improving, restoring or maintaining nutritional needs  Description  Monitor and assess patient's nutrition/hydration status for malnutrition  Collaborate with interdisciplinary team and initiate plan and interventions as ordered  Monitor patient's weight and dietary intake as ordered or per policy  Utilize nutrition screening tool and intervene as necessary  Determine patient's food preferences and provide high-protein, high-caloric foods as appropriate       INTERVENTIONS:  - Monitor oral intake, urinary output, labs, and treatment plans  - Assess nutrition and hydration status and recommend course of action  - Evaluate amount of meals eaten  - Assist patient with eating if necessary   - Allow adequate time for meals  - Recommend/ encourage appropriate diets, oral nutritional supplements, and vitamin/mineral supplements  - Order, calculate, and assess calorie counts as needed  - Recommend, monitor, and adjust tube feedings and TPN/PPN based on assessed needs  - Assess need for intravenous fluids  - Provide specific nutrition/hydration education as appropriate  - Include patient/family/caregiver in decisions related to nutrition  Outcome: Progressing     Problem: SAFETY,RESTRAINT: NV/NON-SELF DESTRUCTIVE BEHAVIOR  Goal: Remains free of harm/injury (restraint for non violent/non self-detsructive behavior)  Description  INTERVENTIONS:  - Instruct patient/family regarding restraint use   - Assess and monitor physiologic and psychological status   - Provide interventions and comfort measures to meet assessed patient needs   - Identify and implement measures to help patient regain control  - Assess readiness for release of restraint   Outcome: Progressing  Goal: Returns to optimal restraint-free functioning  Description  INTERVENTIONS:  - Assess the patient's behavior and symptoms that indicate continued need for restraint  - Identify and implement measures to help patient regain control  - Assess readiness for release of restraint   Outcome: Progressing

## 2019-09-08 NOTE — PROGRESS NOTES
Progress Note - General Surgery   Rashawn Galvan 48 y o  male MRN: [id number]  Unit/Bed#:  Encounter: [id number]    Assessment/Plan:  48 y o  male with LLE necrotizing soft tissue infection    * Necrotizing soft tissue infection  Assessment & Plan  S/p Excisional debridement and washout in OR 9/6, 9/7  Continue antibiotics -- currently on vanc/cefepime/flagyl  Plan for bedside dressing change 9/9  WBC slowly improving  Wean pressors as tolerated        Subjective/Objective     Subjective: Patient remains critically ill on pressors  Pressor requirements slowly improving  Objective:     Vitals: Temp:  [97 6 °F (36 4 °C)-102 2 °F (39 °C)] 101 3 °F (38 5 °C)  HR:  [76-87] 77  Resp:  [19-29] 26  BP: ()/(53-64) 122/62  Arterial Line BP: ()/(51-75) 105/65  FiO2 (%):  [50-85] 80  Body mass index is 66 3 kg/m²  I/O       09/06 0701 - 09/07 0700 09/07 0701 - 09/08 0700 09/08 0701 - 09/09 0700    P  O   0     I V  (mL/kg) 6221 3 (36 8) 6308 7 (36)     NG/GT  781     IV Piggyback 4050 1900     Feedings  311     Total Intake(mL/kg) 20051 3 (60 8) 9300 7 (53 1)     Urine (mL/kg/hr) 875 905 (0 2)     Emesis/NG output 400      Blood 500      Total Output 1775 905     Net +8496 3 +8395 7                  Physical Exam:  GEN: Critically ill  HEENT: MMM  CV: RRR  Lung: on vent AC 20 450 50% 10  Ab:  Soft, NT/ND  Extrem: L thigh with posterior erythema, warm to touch  Neuro: intubated and sedated    Lab, Imaging and other studies:   CBC with diff:   Lab Results   Component Value Date    WBC 22 96 (H) 09/08/2019    HGB 9 2 (L) 09/08/2019    HCT 28 6 (L) 09/08/2019    MCV 95 09/08/2019     09/08/2019    MCH 30 7 09/08/2019    MCHC 32 2 09/08/2019    RDW 14 7 09/08/2019    MPV 9 2 09/08/2019    NRBC 1 09/08/2019   , BMP/CMP:   Lab Results   Component Value Date    SODIUM 135 (L) 09/08/2019    K 3 4 (L) 09/08/2019     09/08/2019    CO2 19 (L) 09/08/2019    CO2 19 (L) 09/07/2019    BUN 29 (H) 09/08/2019    CREATININE 1 09 09/08/2019    GLUCOSE 144 (H) 09/07/2019    CALCIUM 7 3 (L) 09/08/2019    AST 41 09/08/2019    ALT 23 09/08/2019    ALKPHOS 101 09/08/2019    EGFR 78 09/08/2019   , Magnesium: No components found for: MAG  VTE Pharmacologic Prophylaxis: Enoxaparin (Lovenox)  VTE Mechanical Prophylaxis: sequential compression device

## 2019-09-08 NOTE — RESPIRATORY THERAPY NOTE
RT Ventilator Management Note  Main Maynard 48 y o  male MRN: [id number]  Unit/Bed#:  Encounter: [id number]      Daily Screen     No data found in the last 10 encounters  Physical Exam:   Assessment Type: Assess only  General Appearance: Sedated  Respiratory Pattern: Assisted, Normal  Chest Assessment: Chest expansion symmetrical  Bilateral Breath Sounds: Diminished, Coarse      Patient currently intubated with size 8 0 ETT well secured with ETT crespo at 26 cm at lip  Breath sounds coarse and diminished bilaterally throughout shift  FIO2 and PEEP weaned appropriately per SPO2 and ABG results  Patient is currently on [address] 20/450/60/12P  He is tolerating these vent settings without issue at this time  Will continue to monitor

## 2019-09-08 NOTE — PROGRESS NOTES
Vancomycin Assessment    Rashawn Galvan is a 48 y o  male who is currently receiving vancomycin 2000 mg IV q12h  for skin-soft tissue infection     Relevant clinical data and objective history reviewed:  Creatinine   Date Value Ref Range Status   09/08/2019 1 09 0 60 - 1 30 mg/dL Final     Comment:     Standardized to IDMS reference method   09/07/2019 1 09 0 60 - 1 30 mg/dL Final     Comment:     Standardized to IDMS reference method   09/07/2019 0 97 0 60 - 1 30 mg/dL Final     Comment:     Standardized to IDMS reference method   12/03/2015 1 02 0 60 - 1 30 mg/dL Final     Comment:     Standardized to IDMS reference method   02/06/2014 0 76 0 60 - 1 30 mg/dL Final     Comment:     Standardized to IDMS reference method   10/10/2013 0 73 0 60 - 1 30 mg/dL Final     /62 (BP Location: Left arm)   Pulse 64   Temp (!) 100 6 °F (38 1 °C) (Probe)   Resp 20   Ht 5' 4" (1 626 m)   Wt (!) 175 kg (386 lb 3 9 oz)   SpO2 97%   BMI 66 30 kg/m²   I/O last 3 completed shifts: In: 63120 9 [I V :9529 9; NG/GT:781; IV [id number]:7348; Feedings:311]  Out: 1980 [Urine:1580; Emesis/NG output:400]  Lab Results   Component Value Date/Time    BUN 29 (H) 09/08/2019 05:08 AM    BUN 14 12/03/2015 01:25 PM    WBC 22 96 (H) 09/08/2019 05:08 AM    WBC 6 71 12/03/2015 01:25 PM    HGB 9 2 (L) 09/08/2019 05:08 AM    HGB 16 7 12/03/2015 01:25 PM    HCT 28 6 (L) 09/08/2019 05:08 AM    HCT 48 1 12/03/2015 01:25 PM    MCV 95 09/08/2019 05:08 AM    MCV 90 12/03/2015 01:25 PM     09/08/2019 05:08 AM     12/03/2015 01:25 PM     Temp Readings from Last 3 Encounters:   09/08/19 (!) 100 6 °F (38 1 °C) (Probe)   09/20/18 99 3 °F (37 4 °C) (Tympanic)   08/22/17 98 8 °F (37 1 °C) (Tympanic)     Vancomycin Days of Therapy: 3    Assessment/Plan  The patient is currently on vancomycin utilizing scheduled dosing  Baseline risks associated with therapy include: concomitant nephrotoxic medications and dehydration    The patient is receiving 2000 mg IV q12h  with the most recent vancomycin level being not at steady-state and supratherapeutic  (21 8) based on a goal of 15-20 (appropriate for most indications) ; therefore, after clinical evaluation will be changed to 1500 mg IV q12h    Pharmacy will continue to follow closely for s/sx of nephrotoxicity, infusion reactions and appropriateness of therapy  BMP and CBC will be ordered per protocol  Plan for trough as patient approaches steady state, prior to the 4th  dose at approximately 1930 on 09/10  Pharmacy will continue to follow the patients culture results and clinical progress daily      Francine Lin, Pharmacist

## 2019-09-08 NOTE — PROGRESS NOTES
Progress Note - Infectious Disease   Paul Fink 48 y o  male MRN: [id number]  Unit/Bed#:  Encounter: [id number]      IMPRESSION & RECOMMENDATIONS:   Impression/Recommendations:  1  Septic shock  Leukocytosis, tachycardia, fever, lactic acidosis  Secondary to #2  Also consideration for bacteremia  Admission blood cultures are negative at 24 hours  Patient is still requiring multiple vasopressors but with decreasing requirement  Patient is still having high fevers  WBC count is slowly trending down  Will leave on broad-spectrum antibiotics for now pending further culture data      -antibiotic plan as below  -monitor temperatures and hemodynamics  -wean off vasopressors as able  -follow up pending blood cultures  -repeat CBC in a m   -supportive care per critical care service     2  Left thigh/groin soft tissue infection  Status post debridement on 09/06  Intraoperative findings revealed extensive infection involving subcutaneous fat, which was debrided  Muscle appeared healthy and there was no intraoperative evidence of necrotizing fasciitis  Gram stain stain reveals gram-negative rods, gram-positive cocci in pairs  Would continue broad coverage for now pending further culture results      -continue IV vancomycin for now with dosing recommendations per pharmacy  Follow up trough today   -continue IV cefepime  -continue IV Flagyl  -discontinue clindamycin as blood cultures are negative and no intraoperative evidence of necrotizing fasciitis  -follow up OR cultures and blood cultures to guide further recommendations  -close surgical follow-up ongoing  -serial exams     3  Acute hypoxic respiratory failure  Likely in the setting of #1  Chest x-ray did reveal bilateral opacities which I suspect are more likely secondary to volume rather than pneumonia  Also possible ARDS  Patient is still requiring significant amount of ventilatory support    No secretions      -continue antibiotics as above  -monitor secretions  -wean off vent as able  -monitor vent requirements     4  Acute kidney injury  In the setting of septic shock  Renal function is improving  Will dose adjust antibiotics based on renal function  Continue daily monitoring of creatinine      5  Uncontrolled diabetes mellitus  With hyperglycemia  Hemoglobin A1c was 12  Likely primary risk factor for development of such severe infection  Recommend much improved blood sugar control to aid in adequate wound healing and control of infection      6  Morbid obesity      Antibiotics:  Vancomycin/cefepime/Flagyl/clindamycin  POD2     I discussed above with with Dr Gladys Braswell from critical care service  Subjective:  Still requiring vasopressors  T-max was 102 2°  Remains sedated and intubated  Still high FiO2 requirements  No secretions per nursing  Patient having periods of agitation so he is going to be paralyzed today  Intraoperative wound re-evaluation yesterday showed clean healthy tissues  Objective:  Vitals:  Temp:  [97 6 °F (36 4 °C)-102 2 °F (39 °C)] 100 6 °F (38 1 °C)  HR:  [76-87] 77  Resp:  [19-29] 26  BP: ()/(53-64) 122/62  SpO2:  [82 %-98 %] 95 %  Temp (24hrs), Av 2 °F (38 4 °C), Min:97 6 °F (36 4 °C), Max:102 2 °F (39 °C)  Current: Temperature: (!) 100 6 °F (38 1 °C)    Physical Exam:   General:  Intubated, sedated  Eyes:  Conjunctive clear with no hemorrhages or effusions  Oropharynx:  ET tube in place  Neck:  Supple, no lymphadenopathy  Lungs:  Clear to auscultation bilaterally, no accessory muscle use  Cardiac:  Regular rate and rhythm, no murmurs  Abdomen:  Soft, non-tender, non-distented  Extremities:  Bilateral lower extremity edema  Skin:  No rashes, left groin dressing intact with stable mild surrounding erythema  Neurological:  Sedated    Lab Results:  I have personally reviewed pertinent labs    Results from last 7 days   Lab Units 19  0508 19  1218 19  7267 19  9510 09/06/19  1834 09/06/19  1636   POTASSIUM mmol/L 3 4* 4 0  --  3 4* 4 0  --    CHLORIDE mmol/L 105 103  --  102 99*  --    CO2 mmol/L 19* 21  --  20* 18*  --    CO2, I-STAT mmol/L  --   --  19*  --   --  23   BUN mg/dL 29* 23  --  22 19  --    CREATININE mg/dL 1 09 1 09  --  0 97 1 00  --    EGFR ml/min/1 73sq m 78 78  --  89 86  --    GLUCOSE, ISTAT mg/dl  --   --  144*  --   --  298*   CALCIUM mg/dL 7 3* 7 1*  --  7 5* 8 0*  --    AST U/L 41  --   --  27 28  --    ALT U/L 23  --   --  25 26  --    ALK PHOS U/L 101  --   --  102 130*  --      Results from last 7 days   Lab Units 09/08/19  0508 09/07/19  1218 09/07/19  0846 09/07/19  0444  09/06/19  1834   WBC Thousand/uL 22 96*  --   --  26 46*  --  27 10*   HEMOGLOBIN g/dL 9 2* 9 8*  --  9 7*   < > 11 3*   I STAT HEMOGLOBIN g/dl  --   --  9 9*  --   --   --    PLATELETS Thousands/uL 189  --   --  213  --  221    < > = values in this interval not displayed  Results from last 7 days   Lab Units 09/06/19  1534 09/06/19  1212   BLOOD CULTURE   --  No Growth at 24 hrs  No Growth at 24 hrs  GRAM STAIN RESULT  No polys seen*  4+ Gram negative rods*  3+ Gram positive cocci in pairs*  --        Imaging Studies:   I have personally reviewed pertinent imaging study reports and images in PACS  Repeat chest x-ray from 09/08 shows mild interval improvement in aeration in the right lower lung field  Patchy opacities in the retrocardiac region and left lower lung re-demonstrated    EKG, Pathology, and Other Studies:   I have personally reviewed pertinent reports

## 2019-09-09 ENCOUNTER — APPOINTMENT (INPATIENT)
Dept: RADIOLOGY | Facility: HOSPITAL | Age: 54
DRG: 710 | End: 2019-09-09
Payer: COMMERCIAL

## 2019-09-09 ENCOUNTER — APPOINTMENT (INPATIENT)
Dept: NON INVASIVE DIAGNOSTICS | Facility: HOSPITAL | Age: 54
DRG: 710 | End: 2019-09-09
Payer: COMMERCIAL

## 2019-09-09 LAB
ALBUMIN SERPL BCP-MCNC: 1.7 G/DL (ref 3.5–5)
ALP SERPL-CCNC: 105 U/L (ref 46–116)
ALT SERPL W P-5'-P-CCNC: 30 U/L (ref 12–78)
ANION GAP SERPL CALCULATED.3IONS-SCNC: 11 MMOL/L (ref 4–13)
ANION GAP SERPL CALCULATED.3IONS-SCNC: 13 MMOL/L (ref 4–13)
AST SERPL W P-5'-P-CCNC: 64 U/L (ref 5–45)
BASE EXCESS BLDA CALC-SCNC: -9.8 MMOL/L
BASOPHILS # BLD AUTO: 0.17 THOUSANDS/ΜL (ref 0–0.1)
BASOPHILS NFR BLD AUTO: 1 % (ref 0–1)
BILIRUB SERPL-MCNC: 0.7 MG/DL (ref 0.2–1)
BUN SERPL-MCNC: 27 MG/DL (ref 5–25)
BUN SERPL-MCNC: 30 MG/DL (ref 5–25)
CALCIUM SERPL-MCNC: 7.1 MG/DL (ref 8.3–10.1)
CALCIUM SERPL-MCNC: 7.5 MG/DL (ref 8.3–10.1)
CHLORIDE SERPL-SCNC: 107 MMOL/L (ref 100–108)
CHLORIDE SERPL-SCNC: 107 MMOL/L (ref 100–108)
CO2 SERPL-SCNC: 18 MMOL/L (ref 21–32)
CO2 SERPL-SCNC: 19 MMOL/L (ref 21–32)
CREAT SERPL-MCNC: 0.82 MG/DL (ref 0.6–1.3)
CREAT SERPL-MCNC: 0.85 MG/DL (ref 0.6–1.3)
EOSINOPHIL # BLD AUTO: 0.1 THOUSAND/ΜL (ref 0–0.61)
EOSINOPHIL NFR BLD AUTO: 1 % (ref 0–6)
ERYTHROCYTE [DISTWIDTH] IN BLOOD BY AUTOMATED COUNT: 15.3 % (ref 11.6–15.1)
GFR SERPL CREATININE-BSD FRML MDRD: 100 ML/MIN/1.73SQ M
GFR SERPL CREATININE-BSD FRML MDRD: 102 ML/MIN/1.73SQ M
GLUCOSE SERPL-MCNC: 119 MG/DL (ref 65–140)
GLUCOSE SERPL-MCNC: 125 MG/DL (ref 65–140)
GLUCOSE SERPL-MCNC: 125 MG/DL (ref 65–140)
GLUCOSE SERPL-MCNC: 132 MG/DL (ref 65–140)
GLUCOSE SERPL-MCNC: 134 MG/DL (ref 65–140)
GLUCOSE SERPL-MCNC: 145 MG/DL (ref 65–140)
GLUCOSE SERPL-MCNC: 156 MG/DL (ref 65–140)
GLUCOSE SERPL-MCNC: 159 MG/DL (ref 65–140)
GLUCOSE SERPL-MCNC: 166 MG/DL (ref 65–140)
GLUCOSE SERPL-MCNC: 168 MG/DL (ref 65–140)
GLUCOSE SERPL-MCNC: 168 MG/DL (ref 65–140)
GLUCOSE SERPL-MCNC: 176 MG/DL (ref 65–140)
GLUCOSE SERPL-MCNC: 177 MG/DL (ref 65–140)
HCO3 BLDA-SCNC: 15.6 MMOL/L (ref 22–28)
HCT VFR BLD AUTO: 30.6 % (ref 36.5–49.3)
HGB BLD-MCNC: 10.1 G/DL (ref 12–17)
HOROWITZ INDEX BLDA+IHG-RTO: 50 MM[HG]
IMM GRANULOCYTES # BLD AUTO: >0.5 THOUSAND/UL (ref 0–0.2)
IMM GRANULOCYTES NFR BLD AUTO: 7 % (ref 0–2)
LYMPHOCYTES # BLD AUTO: 2.77 THOUSANDS/ΜL (ref 0.6–4.47)
LYMPHOCYTES NFR BLD AUTO: 13 % (ref 14–44)
MAGNESIUM SERPL-MCNC: 2.1 MG/DL (ref 1.6–2.6)
MAGNESIUM SERPL-MCNC: 2.3 MG/DL (ref 1.6–2.6)
MCH RBC QN AUTO: 31.9 PG (ref 26.8–34.3)
MCHC RBC AUTO-ENTMCNC: 33 G/DL (ref 31.4–37.4)
MCV RBC AUTO: 97 FL (ref 82–98)
MONOCYTES # BLD AUTO: 1.2 THOUSAND/ΜL (ref 0.17–1.22)
MONOCYTES NFR BLD AUTO: 6 % (ref 4–12)
NEUTROPHILS # BLD AUTO: 15.72 THOUSANDS/ΜL (ref 1.85–7.62)
NEUTS SEG NFR BLD AUTO: 72 % (ref 43–75)
NRBC BLD AUTO-RTO: 1 /100 WBCS
O2 CT BLDA-SCNC: 14.4 ML/DL (ref 16–23)
OXYHGB MFR BLDA: 95.9 % (ref 94–97)
PCO2 BLDA: 32.9 MM HG (ref 36–44)
PEEP RESPIRATORY: 12 CM[H2O]
PH BLDA: 7.29 [PH] (ref 7.35–7.45)
PHOSPHATE SERPL-MCNC: 2.8 MG/DL (ref 2.7–4.5)
PLATELET # BLD AUTO: 179 THOUSANDS/UL (ref 149–390)
PMV BLD AUTO: 9.6 FL (ref 8.9–12.7)
PO2 BLDA: 96.9 MM HG (ref 75–129)
POTASSIUM SERPL-SCNC: 3.4 MMOL/L (ref 3.5–5.3)
POTASSIUM SERPL-SCNC: 4 MMOL/L (ref 3.5–5.3)
PROT SERPL-MCNC: 6.3 G/DL (ref 6.4–8.2)
RBC # BLD AUTO: 3.17 MILLION/UL (ref 3.88–5.62)
SODIUM SERPL-SCNC: 137 MMOL/L (ref 136–145)
SODIUM SERPL-SCNC: 138 MMOL/L (ref 136–145)
SPECIMEN SOURCE: ABNORMAL
VENT AC: 20
VENT- AC: AC
VT SETTING VENT: 450 ML
WBC # BLD AUTO: 21.54 THOUSAND/UL (ref 4.31–10.16)

## 2019-09-09 PROCEDURE — 84100 ASSAY OF PHOSPHORUS: CPT | Performed by: NURSE PRACTITIONER

## 2019-09-09 PROCEDURE — 93306 TTE W/DOPPLER COMPLETE: CPT | Performed by: INTERNAL MEDICINE

## 2019-09-09 PROCEDURE — 99292 CRITICAL CARE ADDL 30 MIN: CPT | Performed by: INTERNAL MEDICINE

## 2019-09-09 PROCEDURE — 94640 AIRWAY INHALATION TREATMENT: CPT

## 2019-09-09 PROCEDURE — 71045 X-RAY EXAM CHEST 1 VIEW: CPT

## 2019-09-09 PROCEDURE — 82948 REAGENT STRIP/BLOOD GLUCOSE: CPT

## 2019-09-09 PROCEDURE — 99291 CRITICAL CARE FIRST HOUR: CPT | Performed by: INTERNAL MEDICINE

## 2019-09-09 PROCEDURE — 94760 N-INVAS EAR/PLS OXIMETRY 1: CPT

## 2019-09-09 PROCEDURE — 94664 DEMO&/EVAL PT USE INHALER: CPT

## 2019-09-09 PROCEDURE — 83735 ASSAY OF MAGNESIUM: CPT | Performed by: NURSE PRACTITIONER

## 2019-09-09 PROCEDURE — 82805 BLOOD GASES W/O2 SATURATION: CPT | Performed by: NURSE PRACTITIONER

## 2019-09-09 PROCEDURE — 93306 TTE W/DOPPLER COMPLETE: CPT

## 2019-09-09 PROCEDURE — 94003 VENT MGMT INPAT SUBQ DAY: CPT

## 2019-09-09 PROCEDURE — 85025 COMPLETE CBC W/AUTO DIFF WBC: CPT | Performed by: NURSE PRACTITIONER

## 2019-09-09 PROCEDURE — 99233 SBSQ HOSP IP/OBS HIGH 50: CPT | Performed by: INTERNAL MEDICINE

## 2019-09-09 PROCEDURE — 80053 COMPREHEN METABOLIC PANEL: CPT | Performed by: NURSE PRACTITIONER

## 2019-09-09 PROCEDURE — 80048 BASIC METABOLIC PNL TOTAL CA: CPT | Performed by: NURSE PRACTITIONER

## 2019-09-09 PROCEDURE — 94150 VITAL CAPACITY TEST: CPT

## 2019-09-09 RX ORDER — DOCUSATE SODIUM 100 MG/1
100 CAPSULE, LIQUID FILLED ORAL 2 TIMES DAILY
Status: DISCONTINUED | OUTPATIENT
Start: 2019-09-09 | End: 2019-09-10

## 2019-09-09 RX ORDER — FENTANYL CITRATE 50 UG/ML
75 INJECTION, SOLUTION INTRAMUSCULAR; INTRAVENOUS
Status: DISCONTINUED | OUTPATIENT
Start: 2019-09-09 | End: 2019-09-09

## 2019-09-09 RX ORDER — SENNOSIDES 8.8 MG/5ML
8.8 LIQUID ORAL 2 TIMES DAILY
Status: DISCONTINUED | OUTPATIENT
Start: 2019-09-09 | End: 2019-09-10

## 2019-09-09 RX ORDER — SODIUM CHLORIDE FOR INHALATION 0.9 %
3 VIAL, NEBULIZER (ML) INHALATION
Status: DISCONTINUED | OUTPATIENT
Start: 2019-09-09 | End: 2019-09-12

## 2019-09-09 RX ORDER — BISACODYL 10 MG
10 SUPPOSITORY, RECTAL RECTAL ONCE
Status: COMPLETED | OUTPATIENT
Start: 2019-09-09 | End: 2019-09-09

## 2019-09-09 RX ORDER — FENTANYL CITRATE 50 UG/ML
100 INJECTION, SOLUTION INTRAMUSCULAR; INTRAVENOUS
Status: DISCONTINUED | OUTPATIENT
Start: 2019-09-09 | End: 2019-09-11

## 2019-09-09 RX ORDER — FUROSEMIDE 10 MG/ML
40 INJECTION INTRAMUSCULAR; INTRAVENOUS ONCE
Status: COMPLETED | OUTPATIENT
Start: 2019-09-09 | End: 2019-09-09

## 2019-09-09 RX ORDER — LEVALBUTEROL 1.25 MG/.5ML
1.25 SOLUTION, CONCENTRATE RESPIRATORY (INHALATION)
Status: DISCONTINUED | OUTPATIENT
Start: 2019-09-09 | End: 2019-09-12

## 2019-09-09 RX ORDER — PROPOFOL 10 MG/ML
5-50 INJECTION, EMULSION INTRAVENOUS
Status: DISCONTINUED | OUTPATIENT
Start: 2019-09-09 | End: 2019-09-10

## 2019-09-09 RX ADMIN — Medication: at 08:06

## 2019-09-09 RX ADMIN — ISODIUM CHLORIDE 3 ML: 0.03 SOLUTION RESPIRATORY (INHALATION) at 19:54

## 2019-09-09 RX ADMIN — CEFEPIME HYDROCHLORIDE 2000 MG: 2 INJECTION, POWDER, FOR SOLUTION INTRAVENOUS at 03:20

## 2019-09-09 RX ADMIN — LEVALBUTEROL 1.25 MG: 1.25 SOLUTION, CONCENTRATE RESPIRATORY (INHALATION) at 19:54

## 2019-09-09 RX ADMIN — PROPOFOL 20 MCG/KG/MIN: 10 INJECTION, EMULSION INTRAVENOUS at 11:28

## 2019-09-09 RX ADMIN — ENOXAPARIN SODIUM 40 MG: 40 INJECTION SUBCUTANEOUS at 21:34

## 2019-09-09 RX ADMIN — METRONIDAZOLE 500 MG: 500 INJECTION, SOLUTION INTRAVENOUS at 06:25

## 2019-09-09 RX ADMIN — Medication: at 00:29

## 2019-09-09 RX ADMIN — PROPOFOL 15 MCG/KG/MIN: 10 INJECTION, EMULSION INTRAVENOUS at 16:08

## 2019-09-09 RX ADMIN — DEXMEDETOMIDINE HYDROCHLORIDE 0.4 MCG/KG/HR: 100 INJECTION, SOLUTION INTRAVENOUS at 16:10

## 2019-09-09 RX ADMIN — Medication 20 MG: at 08:57

## 2019-09-09 RX ADMIN — CEFEPIME HYDROCHLORIDE 2000 MG: 2 INJECTION, POWDER, FOR SOLUTION INTRAVENOUS at 11:58

## 2019-09-09 RX ADMIN — METRONIDAZOLE 500 MG: 500 INJECTION, SOLUTION INTRAVENOUS at 22:30

## 2019-09-09 RX ADMIN — CHLORHEXIDINE GLUCONATE 0.12% ORAL RINSE 15 ML: 1.2 LIQUID ORAL at 08:56

## 2019-09-09 RX ADMIN — SENNOSIDES A AND B 8.8 MG: 415.36 LIQUID ORAL at 18:03

## 2019-09-09 RX ADMIN — BISACODYL 10 MG: 10 SUPPOSITORY RECTAL at 11:00

## 2019-09-09 RX ADMIN — ENOXAPARIN SODIUM 40 MG: 40 INJECTION SUBCUTANEOUS at 08:57

## 2019-09-09 RX ADMIN — Medication 75 MCG/HR: at 16:11

## 2019-09-09 RX ADMIN — Medication 20 MG: at 18:03

## 2019-09-09 RX ADMIN — VASOPRESSIN 0.04 UNITS/MIN: 20 INJECTION INTRAVENOUS at 11:37

## 2019-09-09 RX ADMIN — SODIUM CHLORIDE, SODIUM GLUCONATE, SODIUM ACETATE, POTASSIUM CHLORIDE, MAGNESIUM CHLORIDE, SODIUM PHOSPHATE, DIBASIC, AND POTASSIUM PHOSPHATE 75 ML/HR: .53; .5; .37; .037; .03; .012; .00082 INJECTION, SOLUTION INTRAVENOUS at 06:31

## 2019-09-09 RX ADMIN — Medication 1 APPLICATION: at 08:57

## 2019-09-09 RX ADMIN — FUROSEMIDE 40 MG: 10 INJECTION, SOLUTION INTRAMUSCULAR; INTRAVENOUS at 16:02

## 2019-09-09 RX ADMIN — CEFEPIME HYDROCHLORIDE 2000 MG: 2 INJECTION, POWDER, FOR SOLUTION INTRAVENOUS at 18:36

## 2019-09-09 RX ADMIN — Medication: at 06:25

## 2019-09-09 RX ADMIN — METRONIDAZOLE 500 MG: 500 INJECTION, SOLUTION INTRAVENOUS at 14:38

## 2019-09-09 RX ADMIN — PERFLUTREN 0.8 ML/MIN: 6.52 INJECTION, SUSPENSION INTRAVENOUS at 09:00

## 2019-09-09 RX ADMIN — LEVALBUTEROL 1.25 MG: 1.25 SOLUTION, CONCENTRATE RESPIRATORY (INHALATION) at 13:09

## 2019-09-09 RX ADMIN — VANCOMYCIN HYDROCHLORIDE 1500 MG: 1 INJECTION, POWDER, LYOPHILIZED, FOR SOLUTION INTRAVENOUS at 19:47

## 2019-09-09 RX ADMIN — Medication: at 03:21

## 2019-09-09 RX ADMIN — DEXMEDETOMIDINE HYDROCHLORIDE 0.7 MCG/KG/HR: 100 INJECTION, SOLUTION INTRAVENOUS at 20:59

## 2019-09-09 RX ADMIN — NOREPINEPHRINE BITARTRATE 2 MCG/MIN: 1 INJECTION INTRAVENOUS at 03:20

## 2019-09-09 RX ADMIN — Medication: at 02:09

## 2019-09-09 RX ADMIN — VASOPRESSIN 0.04 UNITS/MIN: 20 INJECTION INTRAVENOUS at 03:20

## 2019-09-09 RX ADMIN — FUROSEMIDE 40 MG: 10 INJECTION, SOLUTION INTRAMUSCULAR; INTRAVENOUS at 10:14

## 2019-09-09 RX ADMIN — Medication 75 MCG/HR: at 03:27

## 2019-09-09 RX ADMIN — DEXMEDETOMIDINE HYDROCHLORIDE 0.4 MCG/KG/HR: 100 INJECTION, SOLUTION INTRAVENOUS at 00:23

## 2019-09-09 RX ADMIN — CHLORHEXIDINE GLUCONATE 0.12% ORAL RINSE 15 ML: 1.2 LIQUID ORAL at 21:33

## 2019-09-09 RX ADMIN — Medication 4 UNITS/HR: at 02:10

## 2019-09-09 RX ADMIN — VANCOMYCIN HYDROCHLORIDE 1500 MG: 1 INJECTION, POWDER, LYOPHILIZED, FOR SOLUTION INTRAVENOUS at 07:49

## 2019-09-09 RX ADMIN — PROPOFOL 5 MCG/KG/MIN: 10 INJECTION, EMULSION INTRAVENOUS at 00:54

## 2019-09-09 NOTE — PROGRESS NOTES
Progress Note - General Surgery   Socrates Olsen 48 y o  male MRN: [id number]  Unit/Bed#:  Encounter: [id number]    Assessment/Plan:  48 y o  male with NSTI of L thigh/groin     * Necrotizing soft tissue infection  Assessment & Plan  S/p Excisional debridement and washout in OR 9/6, 9/7  Continue antibiotics -- currently on vanc/cefepime/flagyl  Plan for bedside dressing change today with Dakin's solution  Maintain glucose control with insulin gtt per ICU  WBC slowly improving  Wean pressors as tolerated        Subjective/Objective     Subjective: Patient remains critically ill  Nimbex started yesterday  Febrile yesterday morning  Per nursing, dark purulent drainage from wound  Objective:     Vitals: Temp:  [97 7 °F (36 5 °C)-101 3 °F (38 5 °C)] 99 1 °F (37 3 °C)  HR:  [63-78] 72  Resp:  [12-26] 20  BP: (101-122)/(57-65) 105/65  Arterial Line BP: ()/(58-73) 93/62  FiO2 (%):  [50-80] 50  Body mass index is 66 41 kg/m²  I/O       09/07 0701 - 09/08 0700 09/08 0701 - 09/09 0700    P  O  0 0    I V  (mL/kg) 6308 7 (36) 4309 5 (24 6)    NG/ 30    IV Piggyback 1900 400    Feedings 311 0    Total Intake(mL/kg) 9300 7 (53 1) 4739 5 (27 1)    Urine (mL/kg/hr) 905 (0 2) 1155 (0 3)    Emesis/NG output  1275    Total Output 905 2430    Net +8395 7 +2309 5                Physical Exam:  GEN: critically ill  HEENT: MMM  CV: RRR  Lung: Normal effort  Ab: Soft, NT/ND  Extrem: No CCE  Neuro: intubated and sedated            Lab, Imaging and other studies:   CBC with diff:   Lab Results   Component Value Date    WBC 21 54 (H) 09/09/2019    HGB 10 1 (L) 09/09/2019    HCT 30 6 (L) 09/09/2019    MCV 97 09/09/2019     09/09/2019    MCH 31 9 09/09/2019    MCHC 33 0 09/09/2019    RDW 15 3 (H) 09/09/2019    MPV 9 6 09/09/2019    NRBC 1 09/09/2019   , BMP/CMP:   Lab Results   Component Value Date    SODIUM 137 09/09/2019    K 4 0 09/09/2019     09/09/2019    CO2 19 (L) 09/09/2019    BUN 30 (H) 09/09/2019    CREATININE 0 82 09/09/2019    CALCIUM 7 5 (L) 09/09/2019    AST 64 (H) 09/09/2019    ALT 30 09/09/2019    ALKPHOS 105 09/09/2019    EGFR 102 09/09/2019   , Magnesium: No components found for: MAG  VTE Pharmacologic Prophylaxis: Enoxaparin (Lovenox)  VTE Mechanical Prophylaxis: sequential compression device

## 2019-09-09 NOTE — PROGRESS NOTES
Noted pt now receiving propofol at 21 ml/hr, providing about 554 kcal daily  Recommend to adjust TF goal to TwoCal HN @ 35 ml/hr + 4 packets of Prosource daily to meet increased protein needs  Including kcal from propofol, goal rate provides 2470 kcal, 130g protein  Consider addition of extra fluids as goal TF only provides about 588 ml free water daily, insufficient to meet pt's estimated fluid needs  Will continue to monitor tolerance, changes in propofol rate, etc  and make further recommendations as indicated

## 2019-09-09 NOTE — PROGRESS NOTES
Progress Note - Critical Care   Sandhya Godfrey 48 y o  male MRN: [id number]  Unit/Bed#:  Encounter: [id number]    Attending Physician: Deborah Habermann, MD      ______________________________________________________________________  Assessment and Plan:    Septic shock secondary to soft tissue infection  Acute hypoxic respiratory failure secondary to severe ARDS  Necrotizing soft tissue infection  Acute metabolic encephalopathy  Recurrent cellulitis  Type 2 diabetes mellitus  Morbid obesity  Hyperlipidemia  GERD      Neuro:   Acute metabolic encephalopathy likely secondary to sedatives and pain medication with component of shock  -Trend neuro exam  -Delirium precautions  -[facility] ICU per protocol  -Regulate sleep-wake cycle   -Precedex 0 4 mcg/ kg/hour, weaning as able  -Fentanyl 75 mcg/hr  -Fentanyl prn-none in past 24 hours  -Ativan prn-last dose yesterday 1126 AM  -Started on nimbex and propofol yesterday for BIS goal 40-60 and TOF 2/4    -Propofol at 5 mcg/kg  min    -Nimbex at 0 9 mcg/kg/min     CV:   Hypotension 2/2 Septic shock   -Norepinephrine at 1 mcg/min, continue to wean off as able  -Vasopressin at 0 04 units/min  -Map goal >65  -Isolyte 75 mL/hour  -NSR on telemetry  -ECHO pending     Hx hyperlipedemia  -Patient was refusing to take his blood cholesterol medications due to adverse effects according to chart review    Hx HTN  -Will hold lisinopril 2/2 hypotension     Pulm:   Acute hypoxic respiratory failure 2/2 suspected ARDS  -AC/VC 20/450/50/12  -ABG this am: 7 29/32 9/96 9/15/6  -PF ratio 194 on PEEP 12  -Ppl 22, Ppeak 28  -Started on nimbex yesterday for severe ARDS  -ECHO pending  -CXR-patchy bilateral opacities  -No SBT done 2/2 nimbex  -Respiratory protocol    GI:   GERD  -Prilosec 20 mg B i d   -NPO  -Bowel regimen with dulcolax prn, senna HS     :   -DEAN secondary to prerenal/ septic shock-resolved  -Baseline creatinine baseline 1 02, now 0 82  -Continue Isolyte 75 mL/hour  -Trend BUN/creatinine  -Strict I&O  -Maintain oneill catheter for accurate I&O     F/E/N:  Morbid obesity  -BMI 66  -Diet: NPO  -Calorie controlled diet when patient is no longer in ventilator  -Inpatient consult for nutritional services  -Hyponatremia-resolved  -Sodium of 127 on admission, now 135 which is baseline  -Hypokalemia-resolved  -Trend electrolytes and replete as needed  -Net positive 19 liters since admission, positive 2 2 liters over past 24 hours  Consider diuresis      ID:   Septic shock 2/2 necrotizing fascitis with Hx of recurrent cellulitis  -Initial presentation with tachycardia , tachypnea-rate22, Leukocytosis 19 61 bandemia 18, lactic acid 4 7  -CT femur left w contrast: 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 and left proximal to mid thigh  Mildly prominent left external iliac chain and left inguinal lymph nodes, likely reactive    -S/P OR debridement 09/06 and 09/07  -Anticipate bedside debridement today by Surgery  -Surgery on consult  -Blood culture x2 pending, no growth 48 hours  -Re-culture is febrile, patient has been afebrile for 24 hours  -Tissue from groin cultures and Gram stain pending  -Cefepime 2 g every 8 hours day 4  -Metronidazole 500 q 8 hours day 4  -Vancomycin with pharmacy consult day 4  -Clindamycin 600 mg q 6 hours-discontinued 09/07  -Zosyn given x1 in ED  -ID on consult-appreciate recommendations  -WBCs trending down: 27, 26, 22, 21  -Bandemia resolved  -PCT pending today, was 2 8, 2 9, 2 16  -Lactic acid cleared on 09/07      Heme:   -Hemoglobin stable, currently 10 1  -Platelets 944  -Monitor CBC  -Transfuse PRBCs for hgb < 7 0     Endo:   Uncontrolled Type 2 diabetes mellitus with complication neuropathy  -Hemoglobin A1c of 12 (2015), recheck hemoglobin A1c 11 2  -Continue insulin gtt, goal glucose 140-180  -Hold Amaryl 4mg OD and metformin 1g BID     Msk/Skin:   -PT OT consult when stable/appropriate  -Dressing changes per surgery     Disposition:  Continue ICU level care  Plan for bedside debridement by Surgery  Code Status: Level 1 - Full Code    Counseling / Coordination of Care  Total Critical Care time spent 35 minutes excluding procedures, teaching and family updates  ______________________________________________________________________    24 Hour Events: Started on propofolol and nimbex yesterday for severe ARDS  Overnight was able to be weaned to levophed 1 and precedex off  Review of Systems   Unable to perform ROS: Mental status change     ______________________________________________________________________    Physical Exam:   Physical Exam   Constitutional: He appears well-developed and well-nourished  No distress  HENT:   Head: Normocephalic and atraumatic  Mouth/Throat: Oropharynx is clear and moist  No oropharyngeal exudate  Eyes: Pupils are equal, round, and reactive to light  Conjunctivae are normal  No scleral icterus  Neck: Normal range of motion  Neck supple  No JVD present  Cardiovascular: Normal rate, regular rhythm and normal heart sounds  Pulmonary/Chest: Effort normal and breath sounds normal  No respiratory distress  Abdominal: Soft  He exhibits no distension  Hypoactive bowel sounds   Musculoskeletal: He exhibits no edema, tenderness or deformity  Left inner thigh firm and warm to touch, dressing intact to abd/yaya area   Lymphadenopathy:     He has no cervical adenopathy  Neurological: GCS eye subscore is 1  GCS verbal subscore is 1  GCS motor subscore is 1  On paralytic   Skin: Skin is warm and dry  Capillary refill takes less than 2 seconds  He is not diaphoretic  Vitals reviewed         ______________________________________________________________________  Vitals:    09/09/19 0515 09/09/19 0600 09/09/19 0625 09/09/19 0700   BP:       BP Location:       Pulse:  70 72 73   Resp:  20 20 20   Temp:  99 1 °F (37 3 °C) 99 1 °F (37 3 °C) 99 7 °F (37 6 °C) TempSrc:  Rectal Rectal Probe   SpO2:  99% 100% 100%   Weight: (!) 176 kg (386 lb 14 5 oz)      Height:           Temperature:   Temp (24hrs), Av 8 °F (37 1 °C), Min:97 7 °F (36 5 °C), Max:100 6 °F (38 1 °C)    Current Temperature: 99 7 °F (37 6 °C)  Weights:   IBW: 59 2 kg    Body mass index is 66 41 kg/m²  Weight (last 2 days)     Date/Time   Weight    19 0515   (!) 176 (386 91)    19 0600   (!) 175 (386 25)    19 0551   (!) 169 (372 58)            Hemodynamic Monitoring:  N/A     Non-Invasive/Invasive Ventilation Settings:  Respiratory    Lab Data (Last 4 hours)       0426            pH, Arterial       7 295           pCO2, Arterial       32 9           pO2, Arterial       96 9           HCO3, Arterial       15 6           Base Excess, Arterial       -9 8                O2/Vent Data        0313   Most Recent         Vent Mode AC/VC        Resp Rate (BPM) (BPM) 20        Vt (mL) (mL) 450        FIO2 (%) (%) 50        PEEP (cmH2O) (cmH2O) 12        MV 10 1                  Lab Results   Component Value Date    PHART 7 295 (L) 2019    WAQ1HBV 32 9 (L) 2019    PO2ART 96 9 2019    MNC4ACG 15 6 (L) 2019    BEART -9 8 2019    SOURCE Line, Arterial 2019     SpO2 Activity: SpO2 Activity: At Rest, SpO2 Device: O2 Device: Ventilator  Intake and Outputs:  I/O       701 -  07 -  07 - 09/10 0700    P  O  0 0     I V  (mL/kg) 6308 7 (36) 4309 5 (24 6)     NG/ 30     IV Piggyback 1900 400     Feedings 311 0     Total Intake(mL/kg) 9300 7 (53 1) 4739 5 (27 1)     Urine (mL/kg/hr) 905 (0 2) 1155 (0 3)     Emesis/NG output  1275     Blood       Total Output 905 2430     Net +8395 7 +2309 5                UOP: >48  ml/hr   Nutrition:        Diet Orders   (From admission, onward)             Start     Ordered    19 1306  Diet NPO  Diet effective now     Question Answer Comment   Diet Type NPO    RD to adjust diet per protocol?  No        09/08/19 1305    09/06/19 1928  Room Service  Once     Question:  Type of Service  Answer:  Room Service- Not Appropriate    09/06/19 1927                Labs:   Results from last 7 days   Lab Units 09/09/19 0426 09/08/19  0508 09/07/19  1218 09/07/19  0846 09/07/19  0444 09/06/19  2118 09/06/19  1834 09/06/19  1636 09/06/19  1212   WBC Thousand/uL 21 54* 22 96*  --   --  26 46*  --  27 10*  --  19 61*   HEMOGLOBIN g/dL 10 1* 9 2* 9 8*  --  9 7* 10 1* 11 3*  --  14 2   I STAT HEMOGLOBIN g/dl  --   --   --  9 9*  --   --   --  13 6  --    HEMATOCRIT % 30 6* 28 6*  --   --  29 3*  --  33 9*  --  41 9   HEMATOCRIT, ISTAT %  --   --   --  29*  --   --   --  40  --    PLATELETS Thousands/uL 179 189  --   --  213  --  221  --  226   NEUTROS PCT % 72 68  --   --   --   --  69  --   --    BANDS PCT %  --   --   --   --  17*  --   --   --  18*   MONOS PCT % 6 6  --   --   --   --  8  --   --    MONO PCT %  --   --   --   --  8  --   --   --  9     Results from last 7 days   Lab Units 09/09/19 0426 09/08/19 2018 09/08/19  0508 09/07/19  1218 09/07/19  0846 09/07/19  0444 09/06/19  1834  09/06/19  1212   SODIUM mmol/L 137 135* 135* 134*  --  134* 132*  --  127*   POTASSIUM mmol/L 4 0 3 5 3 4* 4 0  --  3 4* 4 0  --  3 6   CHLORIDE mmol/L 107 106 105 103  --  102 99*  --  93*   CO2 mmol/L 19* 20* 19* 21  --  20* 18*  --  21   CO2, I-STAT mmol/L  --   --   --   --  19*  --   --    < >  --    ANION GAP mmol/L 11 9 11 10  --  12 15*  --  13   BUN mg/dL 30* 30* 29* 23  --  22 19  --  20   CREATININE mg/dL 0 82 0 88 1 09 1 09  --  0 97 1 00  --  1 37*   CALCIUM mg/dL 7 5* 7 1* 7 3* 7 1*  --  7 5* 8 0*  --  9 2   ALT U/L 30  --  23  --   --  25 26  --  37   AST U/L 64*  --  41  --   --  27 28  --  40   ALK PHOS U/L 105  --  101  --   --  102 130*  --  165*   ALBUMIN g/dL 1 7*  --  1 7*  --   --  2 0* 1 9*  --  2 0*   TOTAL BILIRUBIN mg/dL 0 70  --  0 60  --   --  0 90 1 20*  --  1 40*    < > = values in this interval not displayed  Results from last 7 days   Lab Units 09/09/19  0426 09/08/19  0508 09/07/19  0444 09/06/19  1834   MAGNESIUM mg/dL 2 3 2 2 2 0 1 3*   PHOSPHORUS mg/dL 2 8 2 7 3 1 4 0      Results from last 7 days   Lab Units 09/06/19  1212   INR  1 29*   PTT seconds 31         Results from last 7 days   Lab Units 09/07/19  0946 09/07/19  0444 09/07/19  0244 09/07/19  0016 09/06/19  2118 09/06/19  1834 09/06/19  1356   LACTIC ACID mmol/L 1 3 1 9 2 3* 3 1* 3 4* 3 2* 3 2*     ABG:  Lab Results   Component Value Date    PHART 7 295 (L) 09/09/2019    NMW0WVN 32 9 (L) 09/09/2019    PO2ART 96 9 09/09/2019    RRZ5IYE 15 6 (L) 09/09/2019    BEART -9 8 09/09/2019    SOURCE Line, Arterial 09/09/2019     VBG:  Results from last 7 days   Lab Units 09/09/19  0426   ABG SOURCE  Line, Arterial     Results from last 7 days   Lab Units 09/08/19  0802 09/07/19  0946 09/06/19  1834   PROCALCITONIN ng/ml 2 16* 2 96* 2 80*     Vancomycin Tr   Date Value Ref Range Status   09/08/2019 21 8 (HH) 10 0 - 20 0 ug/mL Final      Imaging: CXR bilateral opacities, improvement from previous imagine on 09/08 I have personally reviewed pertinent reports  and I have personally reviewed pertinent films in PACS  EKG: NSR on telemetry  I have personally reviewed this  Micro:  Results from last 7 days   Lab Units 09/06/19  1534 09/06/19  1212   BLOOD CULTURE   --  No Growth at 48 hrs  No Growth at 48 hrs  GRAM STAIN RESULT  No polys seen*  4+ Gram negative rods*  3+ Gram positive cocci in pairs*  --      Allergies:    Allergies   Allergen Reactions    Clindamycin Diarrhea     Medications:   Scheduled Meds:  Current Facility-Administered Medications:  acetaminophen 650 mg Oral Q4H PRN Mar Nava PA-C    cisatracurium (NIMBEX) in 0 9 % sodium chloride 500 mL infusion 0 1-5 mcg/kg/min Intravenous Titrated Sis Alexandra , PA-C Last Rate: 0 9 mcg/kg/min (09/09/19 8068)   And        artificial tear  Both Eyes Q2H Opal Vila Jr, PA-C    bisacodyl 10 mg Rectal Daily PRN ANN Mobley    cefepime 2,000 mg Intravenous Q8H ANN Newton Last Rate: 2,000 mg (09/09/19 0320)   chlorhexidine 15 mL Swish & Spit Q12H [facility] ANN Newton    dexmedetomidine 0 1-1 2 mcg/kg/hr Intravenous Titrated ANN Newton Last Rate: 0 2 mcg/kg/hr (09/09/19 0624)   enoxaparin 40 mg Subcutaneous Q12H [facility] ANN Mobley    fentaNYL 75 mcg/hr Intravenous Continuous Kathrin Mason PA-C Last Rate: 75 mcg/hr (09/09/19 0327)   fentanyl citrate (PF) 100 mcg Intravenous Q1H PRN Kathrin Mason PA-C    insulin regular (HumuLIN R,NovoLIN R) infusion 0 3-21 Units/hr Intravenous Titrated ANN Newton Last Rate: 4 Units/hr (09/09/19 0425)   LORazepam 1 mg Intravenous Q4H PRN Kathrin Mason PA-C    metroNIDAZOLE 500 mg Intravenous Q8H ANN Newton Last Rate: 500 mg (09/09/19 0625)   multi-electrolyte 75 mL/hr Intravenous Continuous Kathrin Mason PA-C Last Rate: 75 mL/hr (09/09/19 0631)   norepinephrine 1-30 mcg/min Intravenous Titrated Kathrin Mason PA-C Last Rate: 2 mcg/min (09/09/19 0320)   omeprazole (PRILOSEC) suspension 2 mg/mL 20 mg Oral BID ANN Newton    propofol 5-50 mcg/kg/min Intravenous Titrated ANN Mobley Last Rate: 5 mcg/kg/min (09/09/19 0054)   senna 8 8 mg Oral HS ANN Mobley    sodium hypochlorite 1 application Irrigation Daily Carlos Ruano, DO    vancomycin 1,500 mg Intravenous Q12H ANN Lopez    vasopressin (PITRESSIN) in 0 9 % sodium chloride 100 mL 0 04 Units/min Intravenous Continuous Kathrin Mason PA-C Last Rate: 0 04 Units/min (09/09/19 0320)     Continuous Infusions:  cisatracurium (NIMBEX) in 0 9 % sodium chloride 500 mL infusion 0 1-5 mcg/kg/min Last Rate: 0 9 mcg/kg/min (09/09/19 0648)   dexmedetomidine 0 1-1 2 mcg/kg/hr Last Rate: 0 2 mcg/kg/hr (09/09/19 0624)   fentaNYL 75 mcg/hr Last Rate: 75 mcg/hr (09/09/19 0327)   insulin regular (HumuLIN R,NovoLIN R) infusion 0 3-21 Units/hr Last Rate: 4 Units/hr (09/09/19 0425)   multi-electrolyte 75 mL/hr Last Rate: 75 mL/hr (09/09/19 0631)   norepinephrine 1-30 mcg/min Last Rate: 2 mcg/min (09/09/19 0320)   propofol 5-50 mcg/kg/min Last Rate: 5 mcg/kg/min (09/09/19 0054)   vasopressin (PITRESSIN) in 0 9 % sodium chloride 100 mL 0 04 Units/min Last Rate: 0 04 Units/min (09/09/19 0320)     PRN Meds:    acetaminophen 650 mg Q4H PRN   bisacodyl 10 mg Daily PRN   fentanyl citrate (PF) 100 mcg Q1H PRN   LORazepam 1 mg Q4H PRN     VTE Pharmacologic Prophylaxis: Enoxaparin (Lovenox)  VTE Mechanical Prophylaxis: sequential compression device  Invasive lines and devices: Invasive Devices     Central Venous Catheter Line            CVC Central Lines 09/06/19 Triple Right 2 days          Peripheral Intravenous Line            Peripheral IV 09/06/19 Left Hand 2 days    Peripheral IV 09/06/19 Right Antecubital 2 days    Peripheral IV 09/06/19 Right Hand 2 days          Arterial Line            Arterial Line 09/07/19 Right Radial 1 day          Drain            NG/OG/Enteral Tube Orogastric 14 Fr 2 days    Urethral Catheter Latex 16 Fr  2 days          Airway            ETT  Cuffed;Oral;Inflated 8 mm 2 days                     Portions of the record may have been created with voice recognition software  Occasional wrong word or "sound a like" substitutions may have occurred due to the inherent limitations of voice recognition software  Read the chart carefully and recognize, using context, where substitutions have occurred      ANN Carrasco

## 2019-09-09 NOTE — PROGRESS NOTES
Progress Note - Infectious Disease   Krystin Quintanilla 48 y o  male MRN: [id number]  Unit/Bed#:  Encounter: [id number]      Impression/Recommendations:  1  Septic shock   POA:  Fever, WBC, refractory hypotension  Due to #2   No other appreciable source  Blood cultures negative  Fevers and WBC improving but remains critically ill on pressors, vent    Rec:  · Continue antibiotics as below  · Follow up blood cultures as below  · Follow temperatures closely;  · Recheck CBC in AM     2  Left thigh/groin soft tissue infection   Prelim cultures with Group B Strep, Gamma-Strep  Gram stain also with GNRs  Consider also anaerobed  Status post I&D 9/6   OR findings showed infection confined to SQ tissues; muscle healthy, no necrotizing fasciitis   Wound noted to be clean on dressing change today  Rec:  · Continue vancomycin/cefepime/Flagyl for now  · Follow up final OR cultures and tailor antibiotics as indicated  · Serial exams and [address] per surgery  · Anticipate eventual transition to PO antibiotics once clinically improved      3  Acute hypoxic respiratory failure   Likely ARDS in the setting of #1  Scant secretions makes pneumonia less likely  Antibiotics as above would treat regardless  Rec:  · No additional antibiotics indicated  · Supportive care as per the primary service     4  DEAN   In the setting of septic shock   Improved  Rec:  · Follow creatinine closely and dose-adjust antibiotics as indicated  · Recheck BMP in AM     5  Uncontrolled DM with hyperglycemia   Hemoglobin A1c 12  Risk factor for all of above  Rec:  · Continue management per primary     6  Morbid obesity  Risk factor for all of above  Rec:  · Needs weight loss     Antibiotics:  Vancomycin/cefepime/Flagyl/clindamycin  POD #3    Subjective:  Patient seen on PM rounds  Unable to provide ROS due to intubation and sedation  24 Hour Events:  Dressing changed by surgery  Wound clean and no further debridement planned    Worsening hypoxia, felt to have ARDS  No documented fevers, chills, sweats, nausea, vomiting, or diarrhea  WBC trending down  Objective:  Vitals:  Temp:  [97 7 °F (36 5 °C)-99 7 °F (37 6 °C)] 99 1 °F (37 3 °C)  HR:  [63-89] 84  Resp:  [12-34] 34  BP: (101-114)/(55-65) 103/60  SpO2:  [76 %-100 %] 97 %  Temp (24hrs), Av 8 °F (37 1 °C), Min:97 7 °F (36 5 °C), Max:99 7 °F (37 6 °C)  Current: Temperature: 99 1 °F (37 3 °C)    Physical Exam:   General:  No acute distress, sedated  Eyes:  Normal lids and conjunctivae  ENT:  Normal external ears and nose  Neck:  Neck symmetric with midline trachea  Pulmonary:  Normal respiratory effort without accessory muscle use  Cardiovascular:  Regular rate and rhythm; no peripheral edema  Gastrointestinal:  No tenderness or distention, obese abdomen  Musculoskeletal:  No digital clubbing or cyanosis  Skin:  No visible rashes; No palpable nodules; Left thigh wound packed with no appreciable surrounding cellulitis  Wound photo reviewed:  klarge beefy red wound left inner thigh without purulence or necrosis  Neurologic:  Sensation grossly intact to light touch  Psychiatric:  Intubated and sedated    Lab Results:  I have personally reviewed pertinent labs    Results from last 7 days   Lab Units 19  0426 19  0508  19  0846 19  0444  19  1636   POTASSIUM mmol/L 4 0 3 5 3 4*   < >  --  3 4*   < >  --    CHLORIDE mmol/L 107 106 105   < >  --  102   < >  --    CO2 mmol/L 19* 20* 19*   < >  --  20*   < >  --    CO2, I-STAT mmol/L  --   --   --   --  19*  --   --  23   BUN mg/dL 30* 30* 29*   < >  --  22   < >  --    CREATININE mg/dL 0 82 0 88 1 09   < >  --  0 97   < >  --    EGFR ml/min/1 73sq m 102 99 78   < >  --  89   < >  --    GLUCOSE, ISTAT mg/dl  --   --   --   --  144*  --   --  298*   CALCIUM mg/dL 7 5* 7 1* 7 3*   < >  --  7 5*   < >  --    AST U/L 64*  --  41  --   --  27   < >  --    ALT U/L 30  --  23  --   --  25   < >  --    ALK PHOS U/L 105  --  101  --   --  102   < >  --     < > = values in this interval not displayed  Results from last 7 days   Lab Units 09/09/19  0426 09/08/19  0508 09/07/19  1218  09/07/19  0444   WBC Thousand/uL 21 54* 22 96*  --   --  26 46*   HEMOGLOBIN g/dL 10 1* 9 2* 9 8*  --  9 7*   I STAT HEMOGLOBIN   --   --   --    < >  --    PLATELETS Thousands/uL 179 189  --   --  213    < > = values in this interval not displayed  Results from last 7 days   Lab Units 09/06/19  1534 09/06/19  1212   BLOOD CULTURE   --  No Growth at 48 hrs  No Growth at 48 hrs  GRAM STAIN RESULT  No polys seen*  4+ Gram negative rods*  3+ Gram positive cocci in pairs*  --        Imaging Studies:   I have personally reviewed pertinent imaging study reports and images in PACS  CXR reviewed personally limited by body habitus, slightly increased prominance of left basilar airspace opacity    EKG, Pathology, and Other Studies:   I have personally reviewed pertinent reports

## 2019-09-09 NOTE — RESPIRATORY THERAPY NOTE
RT Ventilator Management Note  Aguilar Cao 48 y o  male MRN: [id number]  Unit/Bed#:  Encounter: [id number]      Daily Screen     No data found in the last 10 encounters  09/08/19 2322   Vent Information   Vent    Vent type     Vent Mode AC/VC   $ Pulse Oximetry Spot Check Charge Completed   SpO2 97 %   AC/VC Settings   Resp Rate (BPM) 20 BPM   Vt (mL) 450 mL   FIO2 (%) 50 %   PEEP (cmH2O) 12 cmH2O   Flow (LPM) 60 L/min   Trigger Sensitivity Flow (lpm) -3 %   Humidification Heater   Heater Temperature (Set) 98 8 °F (37 1 °C)   AC/VC Actuals   Resp Rate (BPM) 50 BPM   VT (mL) 474   MV 9 49   MAP (cmH2O) 17 cmH2O   Peak Pressure (cmH2O) 29 cmH2O   I/E Ratio (Obs) 1:2 7   Heater Temperature (Obs) 98 4 °F (36 9 °C)   Static Compliance (mL/cmH20) 45 mL/cmH2O   Plateau Pressure (cm H2O) 25 cm H2O   AC/VC Alarms   High Peak Pressure (cmH2O) 40   High Resp Rate (BPM) 40 BPM   MV High (L/min) 18 L/min   MV Low (L/min) 4 L/min   Vt High (mL) 950 mL   Vt Low (mL) 255 mL   AC/VC Apnea Settings   Resp Rate (BPM) 20 BPM   VT (mL) 450 mL   FIO2 (%) 100 %   Apnea Time (s) 20 S   Apnea Flow (L/min) 60 L/min   Maintenance   Alarm (pink) cable attached Yes   Resuscitation bag with peep valve at bedside Yes   Water bag changed No   Circuit changed No   IHI Ventilator Associated Pneumonia Bundle   Head of Bed Elevated HOB 30  (reverse trendelenberg)   Oral Care Mouth suctioned   ETT  Cuffed;Oral;Inflated 8 mm   Placement Date/Time: 09/06/19 1555   Mask Ventilation: Mask ventilation not attempted (0)  Preoxygenated: Yes  Technique: Direct laryngoscopy;Rapid sequence  Type: Cuffed;Oral;Inflated  Tube Size: 8 mm  Laryngoscope: Mac  Blade Size: 3  Location: Oral     Secured at (cm) 26   Measured from Lips   Secured Location Left   Secured by Commercial tube crespo   Site Condition Dry   Cuff Pressure (cm H2O) 30 cm H2O       Physical Exam:   Assessment Type: Assess only  General Appearance: Sedated  Respiratory Pattern: Assisted, Normal  Chest Assessment: Chest expansion symmetrical  Bilateral Breath Sounds: Diminished, Coarse  O2 Device: /450/50/12p & #8 ETT secured at [email or web address]  Pt has periods of desaturation with any movement  Pt is repositioned with left lung up & spO2 is now 96-97  Pt will continue to be monitored        Resp Comments: Pt remains on ventilator at [facility] 20

## 2019-09-09 NOTE — RESPIRATORY THERAPY NOTE
RT Ventilator Management Note  Rosanne Ayers 48 y o  male MRN: [id number]  Unit/Bed#:  Encounter: [id number]      Daily Screen       9/9/2019  5938             Patient safety screen outcome[de-identified]  Failed    Not Ready for Weaning due to[de-identified]  PEEP > 8cmH2O;Hemodynamically unstable; Underline problem not resolved            Physical Exam:   Assessment Type: Assess only  General Appearance: Sedated  Respiratory Pattern: Assisted  Chest Assessment: Chest expansion symmetrical  Bilateral Breath Sounds: Diminished  Cough: None  Suction: ET Tube      Unable to wean patient today, due to high PEEP levels, bedside procedure and patient was paraylized this morning, did titrate PEEP down to +8 will continue to monitor and re evaluate   Patient remains on [address] 20/450/50%/8P

## 2019-09-09 NOTE — RESPIRATORY THERAPY NOTE
RT Protocol Note  Nabeel Allan 48 y o  male MRN: [id number]  Unit/Bed#:  Encounter: [id number]    Assessment    Principal Problem:    Necrotizing soft tissue infection  Active Problems:    Esophageal reflux    Hyperlipidemia    [facility] ([facility] )    Recurrent cellulitis of lower extremity    Type 2 diabetes mellitus with complication (HCC)    Septic shock (HCC)    Hyponatremia    Hypotension    Hypokalemia    Elevated procalcitonin    Metabolic encephalopathy    Acute hypoxemic respiratory failure (HCC)      Home Pulmonary Medications:  No home meds       Past Medical History:   Diagnosis Date    Cellulitis     last assessed 12/10/15    Diabetes mellitus ([facility] )     Edema     Elevated liver enzymes     Esophageal reflux     Gluten intolerance     Gout     last assessed 09/05/13    Hyperglycemia     Hypertension     IBS (irritable bowel syndrome)     Insomnia     Obesity     Osteoarthritis of knee     last assessed 02/10/14    Prehypertension     last assessed 08/22/17    Venous insufficiency     last assessed 08/22/17    Villonodular synovitis of the hand, right     last assessed 11/14/2013     Social History     Socioeconomic History    Marital status: /Civil Union     Spouse name: None    Number of children: None    Years of education: None    Highest education level: None   Occupational History    None   Social Needs    Financial resource strain: None    Food insecurity:     Worry: None     Inability: None    Transportation needs:     Medical: None     Non-medical: None   Tobacco Use    Smoking status: Never Smoker    Smokeless tobacco: Never Used   Substance and Sexual Activity    Alcohol use: No    Drug use: No    Sexual activity: None   Lifestyle    Physical activity:     Days per week: None     Minutes per session: None    Stress: None   Relationships    Social connections:     Talks on phone: None     Gets together: None     Attends Christianity service: None Active member of club or organization: None     Attends meetings of clubs or organizations: None     Relationship status: None    Intimate partner violence:     Fear of current or ex partner: None     Emotionally abused: None     Physically abused: None     Forced sexual activity: None   Other Topics Concern    None   Social History Narrative    None       Subjective         Objective    Physical Exam:   Assessment Type: Assess only  General Appearance: Sedated  Respiratory Pattern: Assisted  Chest Assessment: Chest expansion symmetrical  Bilateral Breath Sounds: Diminished  Cough: None  Suction: ET Tube  O2 Device: /450/50/12p & #8 ETT secured at [email or web address]  Pt has periods of desaturation with any movement  Pt is repositioned with left lung up & spO2 is now 96-97  Pt will continue to be monitored  Vitals:  Blood pressure 114/55, pulse 86, temperature 99 7 °F (37 6 °C), resp  rate 20, height 5' 4" (1 626 m), weight (!) 176 kg (386 lb 14 5 oz), SpO2 100 %  Results from last 7 days   Lab Units 09/09/19  0426  09/07/19  0501   PH ART  7 295*   < > 7 363   PCO2 ART mm Hg 32 9*   < > 29 3*   PO2 ART mm Hg 96 9   < > 69 6*   HCO3 ART mmol/L 15 6*   < > 16 3*   BASE EXC ART mmol/L -9 8   < > -8 0   O2 CONTENT ART mL/dL 14 4*   < > 13 4*   O2 HGB, ARTERIAL % 95 9   < > 93 2*   ABG SOURCE  Line, Arterial   < > Radial, Left   SARA TEST   --   --  Yes    < > = values in this interval not displayed  Imaging and other studies: reviewed    O2 Device: /450/50/12p & #8 ETT secured at [email or web address]  Pt has periods of desaturation with any movement  Pt is repositioned with left lung up & spO2 is now 96-97  Pt will continue to be monitored       Plan    Respiratory Plan: Vent/NIV/HFNC        Resp Comments: Pt remains on ventilator at [facility]   Pt started on Xopenex 1 25mg

## 2019-09-09 NOTE — PROGRESS NOTES
Vancomycin IV Pharmacy-to-Dose Consultation    Andreina Alcala is a 48 y o  male who is currently receiving Vancomycin IV with management by the Pharmacy Consult service  Assessment/Plan:  The patient was reviewed  Renal function is stable and no signs or symptoms of nephrotoxicity and/or infusion reactions were documented in the chart  Based on todays assessment, continue current vancomycin (day # 4) dosing of 1500 mg every 12 hours, with a plan for trough to be drawn at [address] [place] [address] on 9/10  We will continue to follow the patients culture results and clinical progress daily      Gwyn Lopez, Pharmacist

## 2019-09-10 ENCOUNTER — APPOINTMENT (INPATIENT)
Dept: RADIOLOGY | Facility: HOSPITAL | Age: 54
DRG: 710 | End: 2019-09-10
Payer: COMMERCIAL

## 2019-09-10 PROBLEM — I95.9 HYPOTENSION: Status: RESOLVED | Noted: 2019-09-07 | Resolved: 2019-09-10

## 2019-09-10 PROBLEM — E87.70 VOLUME OVERLOAD: Status: ACTIVE | Noted: 2019-09-10

## 2019-09-10 PROBLEM — E87.1 HYPONATREMIA: Status: RESOLVED | Noted: 2019-09-06 | Resolved: 2019-09-10

## 2019-09-10 LAB
ANION GAP SERPL CALCULATED.3IONS-SCNC: 10 MMOL/L (ref 4–13)
ANION GAP SERPL CALCULATED.3IONS-SCNC: 8 MMOL/L (ref 4–13)
ANISOCYTOSIS BLD QL SMEAR: PRESENT
BASE EXCESS BLDA CALC-SCNC: -4.7 MMOL/L
BASOPHILS # BLD MANUAL: 0 THOUSAND/UL (ref 0–0.1)
BASOPHILS NFR MAR MANUAL: 0 % (ref 0–1)
BUN SERPL-MCNC: 27 MG/DL (ref 5–25)
BUN SERPL-MCNC: 28 MG/DL (ref 5–25)
CALCIUM SERPL-MCNC: 7.3 MG/DL (ref 8.3–10.1)
CALCIUM SERPL-MCNC: 7.5 MG/DL (ref 8.3–10.1)
CHLORIDE SERPL-SCNC: 110 MMOL/L (ref 100–108)
CHLORIDE SERPL-SCNC: 111 MMOL/L (ref 100–108)
CO2 SERPL-SCNC: 21 MMOL/L (ref 21–32)
CO2 SERPL-SCNC: 22 MMOL/L (ref 21–32)
CREAT SERPL-MCNC: 0.99 MG/DL (ref 0.6–1.3)
CREAT SERPL-MCNC: 1 MG/DL (ref 0.6–1.3)
EOSINOPHIL # BLD MANUAL: 0.34 THOUSAND/UL (ref 0–0.4)
EOSINOPHIL NFR BLD MANUAL: 2 % (ref 0–6)
ERYTHROCYTE [DISTWIDTH] IN BLOOD BY AUTOMATED COUNT: 15.4 % (ref 11.6–15.1)
GFR SERPL CREATININE-BSD FRML MDRD: 86 ML/MIN/1.73SQ M
GFR SERPL CREATININE-BSD FRML MDRD: 87 ML/MIN/1.73SQ M
GLUCOSE SERPL-MCNC: 148 MG/DL (ref 65–140)
GLUCOSE SERPL-MCNC: 148 MG/DL (ref 65–140)
GLUCOSE SERPL-MCNC: 151 MG/DL (ref 65–140)
GLUCOSE SERPL-MCNC: 155 MG/DL (ref 65–140)
GLUCOSE SERPL-MCNC: 162 MG/DL (ref 65–140)
GLUCOSE SERPL-MCNC: 162 MG/DL (ref 65–140)
GLUCOSE SERPL-MCNC: 172 MG/DL (ref 65–140)
GLUCOSE SERPL-MCNC: 174 MG/DL (ref 65–140)
GLUCOSE SERPL-MCNC: 177 MG/DL (ref 65–140)
GLUCOSE SERPL-MCNC: 183 MG/DL (ref 65–140)
GLUCOSE SERPL-MCNC: 184 MG/DL (ref 65–140)
GLUCOSE SERPL-MCNC: 189 MG/DL (ref 65–140)
GLUCOSE SERPL-MCNC: 206 MG/DL (ref 65–140)
GLUCOSE SERPL-MCNC: 212 MG/DL (ref 65–140)
GLUCOSE SERPL-MCNC: 213 MG/DL (ref 65–140)
HCO3 BLDA-SCNC: 20 MMOL/L (ref 22–28)
HCT VFR BLD AUTO: 27.1 % (ref 36.5–49.3)
HGB BLD-MCNC: 8.7 G/DL (ref 12–17)
HOROWITZ INDEX BLDA+IHG-RTO: 50 MM[HG]
LYMPHOCYTES # BLD AUTO: 20 % (ref 14–44)
LYMPHOCYTES # BLD AUTO: 3.39 THOUSAND/UL (ref 0.6–4.47)
MAGNESIUM SERPL-MCNC: 2.3 MG/DL (ref 1.6–2.6)
MCH RBC QN AUTO: 31.3 PG (ref 26.8–34.3)
MCHC RBC AUTO-ENTMCNC: 32.1 G/DL (ref 31.4–37.4)
MCV RBC AUTO: 98 FL (ref 82–98)
METAMYELOCYTES NFR BLD MANUAL: 3 % (ref 0–1)
MONOCYTES # BLD AUTO: 1.19 THOUSAND/UL (ref 0–1.22)
MONOCYTES NFR BLD: 7 % (ref 4–12)
MYELOCYTES NFR BLD MANUAL: 1 % (ref 0–1)
NEUTROPHILS # BLD MANUAL: 11.34 THOUSAND/UL (ref 1.85–7.62)
NEUTS BAND NFR BLD MANUAL: 7 % (ref 0–8)
NEUTS SEG NFR BLD AUTO: 60 % (ref 43–75)
NRBC BLD AUTO-RTO: 1 /100 WBC (ref 0–2)
NRBC BLD AUTO-RTO: 1 /100 WBCS
O2 CT BLDA-SCNC: 12.6 ML/DL (ref 16–23)
OXYHGB MFR BLDA: 93.8 % (ref 94–97)
PCO2 BLDA: 35.1 MM HG (ref 36–44)
PEEP RESPIRATORY: 8 CM[H2O]
PH BLDA: 7.37 [PH] (ref 7.35–7.45)
PLATELET # BLD AUTO: 168 THOUSANDS/UL (ref 149–390)
PLATELET BLD QL SMEAR: ADEQUATE
PMV BLD AUTO: 9.4 FL (ref 8.9–12.7)
PO2 BLDA: 75 MM HG (ref 75–129)
POLYCHROMASIA BLD QL SMEAR: PRESENT
POTASSIUM SERPL-SCNC: 3.1 MMOL/L (ref 3.5–5.3)
POTASSIUM SERPL-SCNC: 3.8 MMOL/L (ref 3.5–5.3)
RBC # BLD AUTO: 2.78 MILLION/UL (ref 3.88–5.62)
SODIUM SERPL-SCNC: 141 MMOL/L (ref 136–145)
SODIUM SERPL-SCNC: 141 MMOL/L (ref 136–145)
SPECIMEN SOURCE: ABNORMAL
TOTAL CELLS COUNTED SPEC: 100
VENT AC: 20
VENT- AC: AC
VT SETTING VENT: 450 ML
WBC # BLD AUTO: 16.93 THOUSAND/UL (ref 4.31–10.16)

## 2019-09-10 PROCEDURE — 83735 ASSAY OF MAGNESIUM: CPT | Performed by: PHYSICIAN ASSISTANT

## 2019-09-10 PROCEDURE — 82948 REAGENT STRIP/BLOOD GLUCOSE: CPT

## 2019-09-10 PROCEDURE — 87040 BLOOD CULTURE FOR BACTERIA: CPT | Performed by: NURSE PRACTITIONER

## 2019-09-10 PROCEDURE — 94640 AIRWAY INHALATION TREATMENT: CPT

## 2019-09-10 PROCEDURE — 94003 VENT MGMT INPAT SUBQ DAY: CPT

## 2019-09-10 PROCEDURE — 94760 N-INVAS EAR/PLS OXIMETRY 1: CPT

## 2019-09-10 PROCEDURE — 85027 COMPLETE CBC AUTOMATED: CPT | Performed by: PHYSICIAN ASSISTANT

## 2019-09-10 PROCEDURE — 82805 BLOOD GASES W/O2 SATURATION: CPT | Performed by: PHYSICIAN ASSISTANT

## 2019-09-10 PROCEDURE — 85007 BL SMEAR W/DIFF WBC COUNT: CPT | Performed by: PHYSICIAN ASSISTANT

## 2019-09-10 PROCEDURE — 99291 CRITICAL CARE FIRST HOUR: CPT | Performed by: INTERNAL MEDICINE

## 2019-09-10 PROCEDURE — 80048 BASIC METABOLIC PNL TOTAL CA: CPT | Performed by: NURSE PRACTITIONER

## 2019-09-10 PROCEDURE — 80048 BASIC METABOLIC PNL TOTAL CA: CPT | Performed by: PHYSICIAN ASSISTANT

## 2019-09-10 PROCEDURE — 99232 SBSQ HOSP IP/OBS MODERATE 35: CPT | Performed by: INTERNAL MEDICINE

## 2019-09-10 PROCEDURE — 94150 VITAL CAPACITY TEST: CPT

## 2019-09-10 RX ORDER — FUROSEMIDE 10 MG/ML
40 INJECTION INTRAMUSCULAR; INTRAVENOUS ONCE
Status: COMPLETED | OUTPATIENT
Start: 2019-09-10 | End: 2019-09-10

## 2019-09-10 RX ORDER — POTASSIUM CHLORIDE 29.8 MG/ML
40 INJECTION INTRAVENOUS ONCE
Status: COMPLETED | OUTPATIENT
Start: 2019-09-10 | End: 2019-09-10

## 2019-09-10 RX ORDER — FUROSEMIDE 10 MG/ML
40 INJECTION INTRAMUSCULAR; INTRAVENOUS
Status: DISCONTINUED | OUTPATIENT
Start: 2019-09-11 | End: 2019-09-15

## 2019-09-10 RX ORDER — FUROSEMIDE 10 MG/ML
40 INJECTION INTRAMUSCULAR; INTRAVENOUS
Status: DISCONTINUED | OUTPATIENT
Start: 2019-09-10 | End: 2019-09-10

## 2019-09-10 RX ORDER — POTASSIUM CHLORIDE 20MEQ/15ML
40 LIQUID (ML) ORAL ONCE
Status: COMPLETED | OUTPATIENT
Start: 2019-09-10 | End: 2019-09-10

## 2019-09-10 RX ORDER — POTASSIUM CHLORIDE 20MEQ/15ML
20 LIQUID (ML) ORAL ONCE
Status: COMPLETED | OUTPATIENT
Start: 2019-09-10 | End: 2019-09-10

## 2019-09-10 RX ADMIN — Medication 75 MCG/HR: at 17:41

## 2019-09-10 RX ADMIN — CEFTRIAXONE SODIUM 2000 MG: 10 INJECTION, POWDER, FOR SOLUTION INTRAVENOUS at 10:40

## 2019-09-10 RX ADMIN — ACETAMINOPHEN 650 MG: 160 SUSPENSION ORAL at 00:06

## 2019-09-10 RX ADMIN — LEVALBUTEROL 1.25 MG: 1.25 SOLUTION, CONCENTRATE RESPIRATORY (INHALATION) at 07:53

## 2019-09-10 RX ADMIN — DEXMEDETOMIDINE HYDROCHLORIDE 0.7 MCG/KG/HR: 100 INJECTION, SOLUTION INTRAVENOUS at 04:46

## 2019-09-10 RX ADMIN — FUROSEMIDE 40 MG: 10 INJECTION, SOLUTION INTRAMUSCULAR; INTRAVENOUS at 16:24

## 2019-09-10 RX ADMIN — Medication 75 MCG/HR: at 05:36

## 2019-09-10 RX ADMIN — PROPOFOL 5 MCG/KG/MIN: 10 INJECTION, EMULSION INTRAVENOUS at 17:42

## 2019-09-10 RX ADMIN — FUROSEMIDE 40 MG: 10 INJECTION, SOLUTION INTRAMUSCULAR; INTRAVENOUS at 22:38

## 2019-09-10 RX ADMIN — Medication 75 MCG/HR: at 19:19

## 2019-09-10 RX ADMIN — Medication 1 APPLICATION: at 09:14

## 2019-09-10 RX ADMIN — ISODIUM CHLORIDE 3 ML: 0.03 SOLUTION RESPIRATORY (INHALATION) at 13:52

## 2019-09-10 RX ADMIN — POTASSIUM CHLORIDE 40 MEQ: 20 SOLUTION ORAL at 06:43

## 2019-09-10 RX ADMIN — ISODIUM CHLORIDE 3 ML: 0.03 SOLUTION RESPIRATORY (INHALATION) at 19:31

## 2019-09-10 RX ADMIN — DEXMEDETOMIDINE HYDROCHLORIDE 0.7 MCG/KG/HR: 100 INJECTION, SOLUTION INTRAVENOUS at 16:47

## 2019-09-10 RX ADMIN — PROPOFOL 5 MCG/KG/MIN: 10 INJECTION, EMULSION INTRAVENOUS at 14:00

## 2019-09-10 RX ADMIN — LEVALBUTEROL 1.25 MG: 1.25 SOLUTION, CONCENTRATE RESPIRATORY (INHALATION) at 13:52

## 2019-09-10 RX ADMIN — DEXMEDETOMIDINE HYDROCHLORIDE 0.7 MCG/KG/HR: 100 INJECTION, SOLUTION INTRAVENOUS at 12:55

## 2019-09-10 RX ADMIN — CHLORHEXIDINE GLUCONATE 0.12% ORAL RINSE 15 ML: 1.2 LIQUID ORAL at 21:30

## 2019-09-10 RX ADMIN — Medication 20 MG: at 09:15

## 2019-09-10 RX ADMIN — LEVALBUTEROL 1.25 MG: 1.25 SOLUTION, CONCENTRATE RESPIRATORY (INHALATION) at 19:31

## 2019-09-10 RX ADMIN — FUROSEMIDE 40 MG: 10 INJECTION, SOLUTION INTRAMUSCULAR; INTRAVENOUS at 10:03

## 2019-09-10 RX ADMIN — CEFEPIME HYDROCHLORIDE 2000 MG: 2 INJECTION, POWDER, FOR SOLUTION INTRAVENOUS at 03:05

## 2019-09-10 RX ADMIN — DEXMEDETOMIDINE HYDROCHLORIDE 0.6 MCG/KG/HR: 100 INJECTION, SOLUTION INTRAVENOUS at 20:41

## 2019-09-10 RX ADMIN — FENTANYL CITRATE 100 MCG: 50 INJECTION INTRAMUSCULAR; INTRAVENOUS at 05:36

## 2019-09-10 RX ADMIN — Medication 20 MG: at 17:42

## 2019-09-10 RX ADMIN — DEXMEDETOMIDINE HYDROCHLORIDE 0.7 MCG/KG/HR: 100 INJECTION, SOLUTION INTRAVENOUS at 08:30

## 2019-09-10 RX ADMIN — PROPOFOL 10 MCG/KG/MIN: 10 INJECTION, EMULSION INTRAVENOUS at 00:20

## 2019-09-10 RX ADMIN — ENOXAPARIN SODIUM 40 MG: 40 INJECTION SUBCUTANEOUS at 21:30

## 2019-09-10 RX ADMIN — POTASSIUM CHLORIDE 40 MEQ: 400 INJECTION, SOLUTION INTRAVENOUS at 06:41

## 2019-09-10 RX ADMIN — ACETAMINOPHEN 650 MG: 160 SUSPENSION ORAL at 14:04

## 2019-09-10 RX ADMIN — ENOXAPARIN SODIUM 40 MG: 40 INJECTION SUBCUTANEOUS at 09:14

## 2019-09-10 RX ADMIN — DEXMEDETOMIDINE HYDROCHLORIDE 0.7 MCG/KG/HR: 100 INJECTION, SOLUTION INTRAVENOUS at 00:30

## 2019-09-10 RX ADMIN — ISODIUM CHLORIDE 3 ML: 0.03 SOLUTION RESPIRATORY (INHALATION) at 07:53

## 2019-09-10 RX ADMIN — FENTANYL CITRATE 100 MCG: 50 INJECTION INTRAMUSCULAR; INTRAVENOUS at 03:37

## 2019-09-10 RX ADMIN — POTASSIUM CHLORIDE 20 MEQ: 20 SOLUTION ORAL at 17:41

## 2019-09-10 RX ADMIN — CHLORHEXIDINE GLUCONATE 0.12% ORAL RINSE 15 ML: 1.2 LIQUID ORAL at 09:14

## 2019-09-10 RX ADMIN — METRONIDAZOLE 500 MG: 500 INJECTION, SOLUTION INTRAVENOUS at 05:36

## 2019-09-10 RX ADMIN — Medication 4 UNITS/HR: at 05:36

## 2019-09-10 RX ADMIN — FENTANYL CITRATE 100 MCG: 50 INJECTION INTRAMUSCULAR; INTRAVENOUS at 13:14

## 2019-09-10 NOTE — PROGRESS NOTES
Progress Note - General Surgery   Paul Fink 48 y o  male MRN: [id number]  Unit/Bed#:  Encounter: [id number]    Assessment/Plan  Paul Fink is a 48 y o  male     1  Necrotizing soft tissue infection s/p excisional debridement and washout in OR 9/6  On vanc/cefepime/flagyl  WBC 16 93 trending down from  21 54, febrile early this AM Tmax 100 6  Weaning pressors, levophed currently held    Daily bedside packing changes by nursing  Pack wound with 2 Kerlix soaked in Dakin's solution and cover   Continue antibiotics, ID following, blood cultures negative after 72hr  Insulin gtt per ICU  Trend labs  Wean pressors as tolerated  Subjective/Objective     Subjective: Intubated, sedated, weaning pressors as tolerated    Objective:     Blood pressure (!) 83/51, pulse 86, temperature (!) 100 6 °F (38 1 °C), temperature source Axillary, resp  rate (!) 24, height 5' 4" (1 626 m), weight (!) 176 kg (388 lb 10 7 oz), SpO2 96 %  ,Body mass index is 66 72 kg/m²        Intake/Output Summary (Last 24 hours) at 9/10/2019 0858  Last data filed at 9/10/2019 0800  Gross per 24 hour   Intake 2596 86 ml   Output 2275 ml   Net 321 86 ml       Invasive Devices     Central Venous Catheter Line            CVC Central Lines 09/06/19 Triple Right 3 days          Peripheral Intravenous Line            Peripheral IV 09/06/19 Left Hand 3 days    Peripheral IV 09/06/19 Right Antecubital 3 days    Peripheral IV 09/06/19 Right Hand 3 days    Peripheral IV 09/10/19 Left Forearm less than 1 day          Arterial Line            Arterial Line 09/07/19 Right Radial 3 days          Drain            NG/OG/Enteral Tube Orogastric 14 Fr 3 days    Urethral Catheter Latex 16 Fr  3 days          Airway            ETT  Cuffed;Oral;Inflated 8 mm 3 days                Physical Exam: BP (!) 83/51 (BP Location: Left arm)   Pulse 86   Temp (!) 100 6 °F (38 1 °C) (Axillary)   Resp (!) 24   Ht 5' 4" (1 626 m)   Wt (!) 176 kg (388 lb 10 7 oz) SpO2 96%   BMI 66 72 kg/m²   General appearance: intubated, sedated, ill appearing  Lungs: clear to auscultation bilaterally, ventilated  Heart: regular rate and rhythm, S1, S2 normal, no murmur, click, rub or gallop  Extremities: large wound on medial left thigh, packed with kerlix, improved erythema and induration, no purulent drainage    Lab, Imaging and other studies:I have personally reviewed pertinent lab results       VTE Pharmacologic Prophylaxis: Enoxaparin (Lovenox)  VTE Mechanical Prophylaxis: sequential compression device    Recent Results (from the past 36 hour(s))   Fingerstick Glucose (POCT)    Collection Time: 09/08/19 10:26 PM   Result Value Ref Range    POC Glucose 97 65 - 140 mg/dl   Fingerstick Glucose (POCT)    Collection Time: 09/09/19 12:26 AM   Result Value Ref Range    POC Glucose 134 65 - 140 mg/dl   Fingerstick Glucose (POCT)    Collection Time: 09/09/19  2:18 AM   Result Value Ref Range    POC Glucose 166 (H) 65 - 140 mg/dl   Fingerstick Glucose (POCT)    Collection Time: 09/09/19  4:22 AM   Result Value Ref Range    POC Glucose 159 (H) 65 - 140 mg/dl   Comprehensive metabolic panel    Collection Time: 09/09/19  4:26 AM   Result Value Ref Range    Sodium 137 136 - 145 mmol/L    Potassium 4 0 3 5 - 5 3 mmol/L    Chloride 107 100 - 108 mmol/L    CO2 19 (L) 21 - 32 mmol/L    ANION GAP 11 4 - 13 mmol/L    BUN 30 (H) 5 - 25 mg/dL    Creatinine 0 82 0 60 - 1 30 mg/dL    Glucose 156 (H) 65 - 140 mg/dL    Calcium 7 5 (L) 8 3 - 10 1 mg/dL    AST 64 (H) 5 - 45 U/L    ALT 30 12 - 78 U/L    Alkaline Phosphatase 105 46 - 116 U/L    Total Protein 6 3 (L) 6 4 - 8 2 g/dL    Albumin 1 7 (L) 3 5 - 5 0 g/dL    Total Bilirubin 0 70 0 20 - 1 00 mg/dL    eGFR 102 ml/min/1 73sq m   CBC and differential    Collection Time: 09/09/19  4:26 AM   Result Value Ref Range    WBC 21 54 (H) 4 31 - 10 16 Thousand/uL    RBC 3 17 (L) 3 88 - 5 62 Million/uL    Hemoglobin 10 1 (L) 12 0 - 17 0 g/dL    Hematocrit 30 6 (L) 36 5 - 49 3 %    MCV 97 82 - 98 fL    MCH 31 9 26 8 - 34 3 pg    MCHC 33 0 31 4 - 37 4 g/dL    RDW 15 3 (H) 11 6 - 15 1 %    MPV 9 6 8 9 - 12 7 fL    Platelets 676 047 - 888 Thousands/uL    nRBC 1 /100 WBCs    Neutrophils Relative 72 43 - 75 %    Immat GRANS % 7 (H) 0 - 2 %    Lymphocytes Relative 13 (L) 14 - 44 %    Monocytes Relative 6 4 - 12 %    Eosinophils Relative 1 0 - 6 %    Basophils Relative 1 0 - 1 %    Neutrophils Absolute 15 72 (H) 1 85 - 7 62 Thousands/µL    Immature Grans Absolute >0 50 (H) 0 00 - 0 20 Thousand/uL    Lymphocytes Absolute 2 77 0 60 - 4 47 Thousands/µL    Monocytes Absolute 1 20 0 17 - 1 22 Thousand/µL    Eosinophils Absolute 0 10 0 00 - 0 61 Thousand/µL    Basophils Absolute 0 17 (H) 0 00 - 0 10 Thousands/µL   Blood gas, arterial    Collection Time: 09/09/19  4:26 AM   Result Value Ref Range    pH, Arterial 7 295 (L) 7 350 - 7 450    pCO2, Arterial 32 9 (L) 36 0 - 44 0 mm Hg    pO2, Arterial 96 9 75 0 - 129 0 mm Hg    HCO3, Arterial 15 6 (L) 22 0 - 28 0 mmol/L    Base Excess, Arterial -9 8 mmol/L    O2 Content, Arterial 14 4 (L) 16 0 - 23 0 mL/dL    O2 HGB,Arterial  95 9 94 0 - 97 0 %    SOURCE Line, Arterial     Vent Type- AC AC     AC Rate 20     Tidal Volume 450 ml    Inspired Air (FIO2) 50     PEEP 12    Magnesium    Collection Time: 09/09/19  4:26 AM   Result Value Ref Range    Magnesium 2 3 1 6 - 2 6 mg/dL   Phosphorus    Collection Time: 09/09/19  4:26 AM   Result Value Ref Range    Phosphorus 2 8 2 7 - 4 5 mg/dL   Fingerstick Glucose (POCT)    Collection Time: 09/09/19  6:23 AM   Result Value Ref Range    POC Glucose 145 (H) 65 - 140 mg/dl   Fingerstick Glucose (POCT)    Collection Time: 09/09/19  8:05 AM   Result Value Ref Range    POC Glucose 132 65 - 140 mg/dl   Fingerstick Glucose (POCT)    Collection Time: 09/09/19 10:06 AM   Result Value Ref Range    POC Glucose 125 65 - 140 mg/dl   Fingerstick Glucose (POCT)    Collection Time: 09/09/19 12:03 PM   Result Value Ref Range POC Glucose 119 65 - 140 mg/dl   Fingerstick Glucose (POCT)    Collection Time: 09/09/19  1:56 PM   Result Value Ref Range    POC Glucose 125 65 - 140 mg/dl   Fingerstick Glucose (POCT)    Collection Time: 09/09/19  4:04 PM   Result Value Ref Range    POC Glucose 168 (H) 65 - 140 mg/dl   Basic metabolic panel    Collection Time: 09/09/19  5:43 PM   Result Value Ref Range    Sodium 138 136 - 145 mmol/L    Potassium 3 4 (L) 3 5 - 5 3 mmol/L    Chloride 107 100 - 108 mmol/L    CO2 18 (L) 21 - 32 mmol/L    ANION GAP 13 4 - 13 mmol/L    BUN 27 (H) 5 - 25 mg/dL    Creatinine 0 85 0 60 - 1 30 mg/dL    Glucose 176 (H) 65 - 140 mg/dL    Calcium 7 1 (L) 8 3 - 10 1 mg/dL    eGFR 100 ml/min/1 73sq m   Magnesium    Collection Time: 09/09/19  5:43 PM   Result Value Ref Range    Magnesium 2 1 1 6 - 2 6 mg/dL   Fingerstick Glucose (POCT)    Collection Time: 09/09/19  6:08 PM   Result Value Ref Range    POC Glucose 168 (H) 65 - 140 mg/dl   Fingerstick Glucose (POCT)    Collection Time: 09/09/19  8:00 PM   Result Value Ref Range    POC Glucose 177 (H) 65 - 140 mg/dl   Fingerstick Glucose (POCT)    Collection Time: 09/09/19 10:04 PM   Result Value Ref Range    POC Glucose 189 (H) 65 - 140 mg/dl   Fingerstick Glucose (POCT)    Collection Time: 09/10/19 12:02 AM   Result Value Ref Range    POC Glucose 177 (H) 65 - 140 mg/dl   Fingerstick Glucose (POCT)    Collection Time: 09/10/19  2:09 AM   Result Value Ref Range    POC Glucose 213 (H) 65 - 140 mg/dl   Fingerstick Glucose (POCT)    Collection Time: 09/10/19  4:13 AM   Result Value Ref Range    POC Glucose 172 (H) 65 - 140 mg/dl   Blood gas, arterial    Collection Time: 09/10/19  4:46 AM   Result Value Ref Range    pH, Arterial 7 373 7 350 - 7 450    pCO2, Arterial 35 1 (L) 36 0 - 44 0 mm Hg    pO2, Arterial 75 0 75 0 - 129 0 mm Hg    HCO3, Arterial 20 0 (L) 22 0 - 28 0 mmol/L    Base Excess, Arterial -4 7 mmol/L    O2 Content, Arterial 12 6 (L) 16 0 - 23 0 mL/dL    O2 HGB,Arterial 93 8 (L) 94 0 - 97 0 %    SOURCE Line, Arterial     Vent Type- AC AC     AC Rate 20     Tidal Volume 450 ml    Inspired Air (FIO2) 50     PEEP 8    CBC and differential    Collection Time: 09/10/19  4:47 AM   Result Value Ref Range    WBC 16 93 (H) 4 31 - 10 16 Thousand/uL    RBC 2 78 (L) 3 88 - 5 62 Million/uL    Hemoglobin 8 7 (L) 12 0 - 17 0 g/dL    Hematocrit 27 1 (L) 36 5 - 49 3 %    MCV 98 82 - 98 fL    MCH 31 3 26 8 - 34 3 pg    MCHC 32 1 31 4 - 37 4 g/dL    RDW 15 4 (H) 11 6 - 15 1 %    MPV 9 4 8 9 - 12 7 fL    Platelets 967 760 - 434 Thousands/uL    nRBC 1 /100 WBCs   Basic metabolic panel    Collection Time: 09/10/19  4:47 AM   Result Value Ref Range    Sodium 141 136 - 145 mmol/L    Potassium 3 1 (L) 3 5 - 5 3 mmol/L    Chloride 110 (H) 100 - 108 mmol/L    CO2 21 21 - 32 mmol/L    ANION GAP 10 4 - 13 mmol/L    BUN 28 (H) 5 - 25 mg/dL    Creatinine 0 99 0 60 - 1 30 mg/dL    Glucose 183 (H) 65 - 140 mg/dL    Calcium 7 5 (L) 8 3 - 10 1 mg/dL    eGFR 87 ml/min/1 73sq m   Magnesium    Collection Time: 09/10/19  4:47 AM   Result Value Ref Range    Magnesium 2 3 1 6 - 2 6 mg/dL   Manual Differential(PHLEBS Do Not Order)    Collection Time: 09/10/19  4:47 AM   Result Value Ref Range    Segmented % 60 43 - 75 %    Bands % 7 0 - 8 %    Lymphocytes % 20 14 - 44 %    Monocytes % 7 4 - 12 %    Eosinophils, % 2 0 - 6 %    Basophils % 0 0 - 1 %    Metamyelocytes% 3 (H) 0 - 1 %    Myelocytes % 1 0 - 1 %    Absolute Neutrophils 11 34 (H) 1 85 - 7 62 Thousand/uL    Lymphocytes Absolute 3 39 0 60 - 4 47 Thousand/uL    Monocytes Absolute 1 19 0 00 - 1 22 Thousand/uL    Eosinophils Absolute 0 34 0 00 - 0 40 Thousand/uL    Basophils Absolute 0 00 0 00 - 0 10 Thousand/uL    Total Counted 100     nRBC 1 0 - 2 /100 WBC    Anisocytosis Present     Polychromasia Present     Platelet Estimate Adequate Adequate   Fingerstick Glucose (POCT)    Collection Time: 09/10/19  6:13 AM   Result Value Ref Range    POC Glucose 162 (H) 65 - 140 mg/dl   Fingerstick Glucose (POCT)    Collection Time: 09/10/19  7:55 AM   Result Value Ref Range    POC Glucose 174 (H) 65 - 140 mg/dl

## 2019-09-10 NOTE — PROGRESS NOTES
Progress Note - Critical Care   Giacomo Ambriz 48 y o  male MRN: [id number]  Unit/Bed#:  Encounter: [id number]    Attending Physician: Jacki Kong MD  ______________________________________________________________________  Assessment and Plan:   Principal Problem:    Necrotizing soft tissue infection  Active Problems:    Esophageal reflux    Hyperlipidemia    Morbid obesity ([facility] )    Recurrent cellulitis of lower extremity    Type 2 diabetes mellitus with complication ([facility] )    Septic shock ([facility] )    Hypotension    Hypokalemia    Elevated procalcitonin    Metabolic encephalopathy    Acute hypoxemic respiratory failure ([facility] )  Resolved Problems:    Hyponatremia    DEAN (acute kidney injury) ([facility] )    Hyperbilirubinemia    Sinus tachycardia    Lactic acidosis    Bandemia    Hypocalcemia    Neuro  Acute Toxic Metabolic Encephalopathy  -Delirium precautions  -CAM ICU per protocol  -Regulate sleep-wake cycle   -Precedex 0 4 mcg/kg/hour, weaning as able  -Fentanyl 75 mcg/hr  -Propofol 10mcg/kg/min    Cardiovascular  Hypotension 2/2 Septic Shock   -Norepinephrine at 1 mcg/min, continue to wean off as able  -Map goal >65  -NSR on telemetry   Hx: Hyperlipedemia  -Patient was refusing to take his blood cholesterol medications due to adverse effects according to chart review   Hx: HTN  -Holding lisinopril 2/2 hypotension/DEAN     Respiratory  Acute Hypoxic Respiratory Failure  -IBW: 59 2; AC/VC 20/450/50/8  -Paralyzed with Nimbex for approximately 24 hours, D/C'd on 9/9 after improvement in oxygenation   -Respiratory protocol  -Wean as able     GI  Hx: GERD  -Continue PTA Prilosec  -OGT for enteral access  -Continue tube feeds with Two Terrence HN  -Bowel regimen: Senna, Colace     Renal/  DEAN - Resolved  -Received 80mg IV Lasix yesterday with 2 2L output, still 19L positive since admission  -Will need additional Lasix dosing, consider 40mg IV BID with goal net negative 500-1000mL per day   -Trend labs and replace electrolytes PRN  -Duarte for accurate I&O's    Infectious Disease  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  Hx: Recurrent Cellulitis  -Initial presentation with tachycardia , tachypnea-rate22, Leukocytosis 19 61 bandemia 18, lactic acid 4 7  -CT femur left w contrast: 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 and left proximal to mid thigh  Mildly prominent left external iliac chain and left inguinal lymph nodes, likely reactive  -S/P OR debridement 09/06 and 09/07, now daily dressing changes at the bedside with surgery team  -No evidence of necrosis within muscle tissue, necrotic fat removed   -Blood cultures NGTD, tissue from groin cultures pending   -Cefepime 2 g every 8 hours day 5, Metronidazole 500 q 8 hours day 5, Vancomycin with pharmacy consult day 5, Clindamycin 600 mg q 6 hours-discontinued 09/07, Zosyn given x1 in ED     Heme/Onc  -No acute issues  -Lovenox and SCDs for DVT PPx     Endocrine  DM2  -Hemoglobin A1c of 12 (2015), recheck hemoglobin A1c 11 2  -Continue insulin gtt, goal glucose 140-180  -Hold Amaryl 4mg OD and metformin 1g BID     Musculoskeletal/Derm  Fourier's Gangrene   -POD#4 from I&D - surgery changing packing daily at the bedside; see ID section  -Frequent turns and repositioning  -Will need PT/OT when medically able      Disposition: Continue ICU level care  Plan for daily bedside debridement by surgery team      Code Status: Level 1 - Full Code    Counseling / Coordination of Care  Total Critical Care time spent 37 minutes excluding procedures, teaching and family updates  ______________________________________________________________________    24 Hour Events: Mr Missy Soriano was examined bedside this AM, with no acute events overnight  He remains sedated and intubated  Weaning vasopressors  Mildly elevated temps overnight       Review of Systems   Unable to perform ROS: Intubated _____________________________________________________________________    Physical Exam:   Physical Exam   Constitutional: He appears well-developed  He appears ill  He is sedated, intubated and restrained  HENT:   Head: Normocephalic and atraumatic  Mouth/Throat: Oropharynx is clear and moist and mucous membranes are normal    Eyes: Pupils are equal, round, and reactive to light  Conjunctivae are normal    Neck: Neck supple  Cardiovascular: Normal rate, regular rhythm, normal heart sounds and intact distal pulses  Exam reveals no gallop and no friction rub  No murmur heard  Pulmonary/Chest: Effort normal  He is intubated  He has decreased breath sounds  He has no wheezes  He has rhonchi  Coarse, mechanical breath sounds   Abdominal: Soft  Normal appearance and bowel sounds are normal  He exhibits no distension  There is no tenderness  obese   Musculoskeletal: He exhibits edema  Skin: Skin is warm  He is diaphoretic  Incision covered with kerlex and mesh pants    Nursing note and vitals reviewed  ______________________________________________________________________  Vitals:    09/10/19 0300 09/10/19 0356 09/10/19 0400 09/10/19 0500   BP:       BP Location:       Pulse: 75  78 77   Resp: 22  20 21   Temp: (!) 100 6 °F (38 1 °C)      TempSrc: Axillary      SpO2: 98% 95% 95% 97%   Weight:       Height:         Temperature:   Temp (24hrs), Av 8 °F (37 7 °C), Min:99 °F (37 2 °C), Max:100 8 °F (38 2 °C)    Current Temperature: (!) 100 6 °F (38 1 °C)     Weights:   IBW: 59 2 kg    Body mass index is 66 41 kg/m²    Weight (last 2 days)     Date/Time   Weight    19 0515   (!) 176 (386 91)    19 0600   (!) 175 (386 25)            Non-Invasive/Invasive Ventilation Settings:  Respiratory    Lab Data (Last 4 hours)      09/10 0446            pH, Arterial       7 373           pCO2, Arterial       35 1           pO2, Arterial       75 0           HCO3, Arterial       20 0           Base Excess, Arterial       -4 7                O2/Vent Data       09/10 0356   Most Recent         Vent Mode AC/VC  AC/VC      Resp Rate (BPM) (BPM) 20  20      Vt (mL) (mL) 450  450      FIO2 (%) (%) 50  50      PEEP (cmH2O) (cmH2O) 8  8      MV 9 07  9 07                Lab Results   Component Value Date    PHART 7 373 09/10/2019    TSE8HZM 35 1 (L) 09/10/2019    PO2ART 75 0 09/10/2019    FQK6TPN 20 0 (L) 09/10/2019    BEART -4 7 09/10/2019    SOURCE Line, Arterial 09/10/2019     SpO2: SpO2: 97 %     Intake and Outputs:  I/O       09/08 0701 - 09/09 0700 09/09 0701 - 09/10 0700    P  O  0     I V  (mL/kg) 4309 5 (24 6) 1360 4 (7 8)    NG/GT 30 0    IV Piggyback 400 680    Feedings 0 264    Total Intake(mL/kg) 4739 5 (27 1) 2304 4 (13 2)    Urine (mL/kg/hr) 1155 (0 3) 1990 (0 5)    Emesis/NG output 1275 250    Stool  0    Total Output 2430 2240    Net +2309 5 +64 4          Unmeasured Stool Occurrence  1 x        Nutrition:        Diet Orders   (From admission, onward)             Start     Ordered    09/09/19 1108  Diet Enteral/Parenteral; Tube Feeding No Oral Diet; Two Terrence HN; Continuous; 35; Prosource Protein Liquid - Three Packets  Diet effective now     Comments:  Start at 20 ml/hr, increase every 4 hours by 20 ml under goal reached    Add prosource protein liquid-four packets   Question Answer Comment   Diet Type Enteral/Parenteral    Enteral/Parenteral Tube Feeding No Oral Diet    Tube Feeding Formula: Two Terrence HN    Bolus/Cyclic/Continuous Continuous    Tube Feeding Goal Rate (mL/hr): 35    Prosource Protein Liquid - No Carb Prosource Protein Liquid - Three Packets    RD to adjust diet per protocol? No        09/09/19 1109    09/06/19 1928  Room Service  Once     Question:  Type of Service  Answer:  Room Service- Not Appropriate    09/06/19 1927              TF currently running at 45 ml/hr with a goal of 45mL/hr   Formula: Two Terrence HN     Labs:   Results from last 7 days   Lab Units 09/10/19  3795 09/09/19  0426 09/08/19  0508 09/07/19  1218 09/07/19  0846 09/07/19  0444 09/06/19  2118 09/06/19  1834 09/06/19  1636 09/06/19  1212   WBC Thousand/uL 16 93* 21 54* 22 96*  --   --  26 46*  --  27 10*  --  19 61*   HEMOGLOBIN g/dL 8 7* 10 1* 9 2* 9 8*  --  9 7* 10 1* 11 3*  --  14 2   I STAT HEMOGLOBIN g/dl  --   --   --   --  9 9*  --   --   --  13 6  --    HEMATOCRIT % 27 1* 30 6* 28 6*  --   --  29 3*  --  33 9*  --  41 9   HEMATOCRIT, ISTAT %  --   --   --   --  29*  --   --   --  40  --    PLATELETS Thousands/uL 168 179 189  --   --  213  --  221  --  226   NEUTROS PCT %  --  72 68  --   --   --   --  69  --   --    BANDS PCT % 7  --   --   --   --  17*  --   --   --  18*   MONOS PCT %  --  6 6  --   --   --   --  8  --   --    MONO PCT % 7  --   --   --   --  8  --   --   --  9     Results from last 7 days   Lab Units 09/10/19  0447 09/09/19  1743 09/09/19  0426 09/08/19 2018 09/08/19  0508 09/07/19  1218 09/07/19  0846 09/07/19  0444 09/06/19  1834  09/06/19  1212   SODIUM mmol/L 141 138 137 135* 135* 134*  --  134* 132*  --  127*   POTASSIUM mmol/L 3 1* 3 4* 4 0 3 5 3 4* 4 0  --  3 4* 4 0  --  3 6   CHLORIDE mmol/L 110* 107 107 106 105 103  --  102 99*  --  93*   CO2 mmol/L 21 18* 19* 20* 19* 21  --  20* 18*  --  21   CO2, I-STAT mmol/L  --   --   --   --   --   --  19*  --   --    < >  --    ANION GAP mmol/L 10 13 11 9 11 10  --  12 15*  --  13   BUN mg/dL 28* 27* 30* 30* 29* 23  --  22 19  --  20   CREATININE mg/dL 0 99 0 85 0 82 0 88 1 09 1 09  --  0 97 1 00  --  1 37*   CALCIUM mg/dL 7 5* 7 1* 7 5* 7 1* 7 3* 7 1*  --  7 5* 8 0*  --  9 2   ALT U/L  --   --  30  --  23  --   --  25 26  --  37   AST U/L  --   --  64*  --  41  --   --  27 28  --  40   ALK PHOS U/L  --   --  105  --  101  --   --  102 130*  --  165*   ALBUMIN g/dL  --   --  1 7*  --  1 7*  --   --  2 0* 1 9*  --  2 0*   TOTAL BILIRUBIN mg/dL  --   --  0 70  --  0 60  --   --  0 90 1 20*  --  1 40*    < > = values in this interval not displayed  Results from last 7 days   Lab Units 09/10/19  0447 09/09/19  1743 09/09/19  0426 09/08/19  0508 09/07/19  0444 09/06/19  1834   MAGNESIUM mg/dL 2 3 2 1 2 3 2 2 2 0 1 3*   PHOSPHORUS mg/dL  --   --  2 8 2 7 3 1 4 0      Results from last 7 days   Lab Units 09/06/19  1212   INR  1 29*   PTT seconds 31         Results from last 7 days   Lab Units 09/07/19  0946 09/07/19  0444 09/07/19  0244 09/07/19  0016 09/06/19  2118 09/06/19  1834 09/06/19  1356   LACTIC ACID mmol/L 1 3 1 9 2 3* 3 1* 3 4* 3 2* 3 2*     ABG:  Lab Results   Component Value Date    PHART 7 373 09/10/2019    UTZ8DNN 35 1 (L) 09/10/2019    PO2ART 75 0 09/10/2019    NTW3DHN 20 0 (L) 09/10/2019    BEART -4 7 09/10/2019    SOURCE Line, Arterial 09/10/2019     VBG:  Results from last 7 days   Lab Units 09/10/19  0446   ABG SOURCE  Line, Arterial     Results from last 7 days   Lab Units 09/08/19  0802 09/07/19  0946 09/06/19  1834   PROCALCITONIN ng/ml 2 16* 2 96* 2 80*     Vancomycin Tr   Date Value Ref Range Status   09/08/2019 21 8 (HH) 10 0 - 20 0 ug/mL Final      Imaging:  I have personally reviewed pertinent reports  and I have personally reviewed pertinent films in PACS     EKG: Telemetry reviewed  Micro:  Results from last 7 days   Lab Units 09/06/19  1534 09/06/19  1212   BLOOD CULTURE   --  No Growth at 72 hrs  No Growth at 72 hrs  GRAM STAIN RESULT  No polys seen*  4+ Gram negative rods*  3+ Gram positive cocci in pairs*  --      Allergies:    Allergies   Allergen Reactions    Clindamycin Diarrhea     Medications:   Scheduled Meds:    Current Facility-Administered Medications:  acetaminophen 650 mg Oral Q4H PRN George Arteaga PA-C    bisacodyl 10 mg Rectal Daily PRN ANN Mobley    cefepime 2,000 mg Intravenous Q8H ANN Elizabeth Last Rate: 2,000 mg (09/10/19 0305)   chlorhexidine 15 mL [employer]ish [employer] Spit Q12H [address] ANN Lopez    dexmedetomidine 0 1-1 2 mcg/kg/hr Intravenous Titrated ANN Mobley Last Rate: 0 7 mcg/kg/hr (09/10/19 0446)   docusate sodium 100 mg Oral BID ANN Mobley    enoxaparin 40 mg Subcutaneous Q12H [facility] ANN Mobley    fentaNYL 75 mcg/hr Intravenous Continuous Gabe Simmonds, PA-C Last Rate: 75 mcg/hr (09/10/19 0536)   fentanyl citrate (PF) 100 mcg Intravenous Q1H PRN Gabe Simmonds, PA-C    insulin regular (HumuLIN R,NovoLIN R) infusion 0 3-21 Units/hr Intravenous Titrated Sami Naga, CRNP Last Rate: 4 Units/hr (09/10/19 0536)   levalbuterol 1 25 mg Nebulization Q6H While awake Marilin Lanier MD    LORazepam 1 mg Intravenous Q4H PRN Gabe Simmonds, PA-C    metroNIDAZOLE 500 mg Intravenous Q8H Sami NagaHALEY roaNP Last Rate: 500 mg (09/10/19 0536)   norepinephrine 1-30 mcg/min Intravenous Titrated Gabe Simmonds, PA-C Last Rate: 1 mcg/min (09/10/19 0317)   omeprazole (PRILOSEC) suspension 2 mg/mL 20 mg Oral BID ANN Pitts    propofol 5-50 mcg/kg/min Intravenous Titrated ANN Mobley Last Rate: 10 mcg/kg/min (09/10/19 0020)   senna 8 8 mg Oral BID ANN Mobley    sodium chloride 3 mL Nebulization Q6H Elder Gutierrez PA-C    sodium hypochlorite 1 application Irrigation Daily Carlos Ruano DO    vancomycin 1,500 mg Intravenous Q12H Sami [place], CRNP Last Rate: Stopped (09/09/19 2117)     Continuous Infusions:    dexmedetomidine 0 1-1 2 mcg/kg/hr Last Rate: 0 7 mcg/kg/hr (09/10/19 0446)   fentaNYL 75 mcg/hr Last Rate: 75 mcg/hr (09/10/19 0536)   insulin regular (HumuLIN R,NovoLIN R) infusion 0 3-21 Units/hr Last Rate: 4 Units/hr (09/10/19 0536)   norepinephrine 1-30 mcg/min Last Rate: 1 mcg/min (09/10/19 0317)   propofol 5-50 mcg/kg/min Last Rate: 10 mcg/kg/min (09/10/19 0020)     PRN Meds:    acetaminophen 650 mg Q4H PRN   bisacodyl 10 mg Daily PRN   fentanyl citrate (PF) 100 mcg Q1H PRN   LORazepam 1 mg Q4H PRN     VTE Pharmacologic Prophylaxis: Enoxaparin (Lovenox)  VTE Mechanical Prophylaxis: sequential compression device     Invasive lines and devices: Invasive Devices     Central Venous Catheter Line            CVC Central Lines 09/06/19 Triple Right 3 days          Peripheral Intravenous Line            Peripheral IV 09/06/19 Left Hand 3 days    Peripheral IV 09/06/19 Right Antecubital 3 days    Peripheral IV 09/06/19 Right Hand 3 days          Arterial Line            Arterial Line 09/07/19 Right Radial 2 days          Drain            NG/OG/Enteral Tube Orogastric 14 Fr 3 days    Urethral Catheter Latex 16 Fr  3 days          Airway            ETT  Cuffed;Oral;Inflated 8 mm 3 days              Portions of the record may have been created with voice recognition software  Occasional wrong word or "sound a like" substitutions may have occurred due to the inherent limitations of voice recognition software  Read the chart carefully and recognize, using context, where substitutions have occurred      Darrian Hurtado PA-C

## 2019-09-10 NOTE — PROGRESS NOTES
Progress Note - Infectious Disease   Yosvany Mitchell 48 y o  male MRN: [id number]  Unit/Bed#:  Encounter: [id number]      Impression/Recommendations:  1  Septic shock   POA:  Fever, WBC, refractory hypotension  Due to #2   No other appreciable source  Blood cultures negative  Fevers and WBC improving but remains critically ill on vent  Low grade temps likely multifactorial - consider drug fever versus atelectasis    Rec:  ? Continue antibiotics as below  ? Follow temperatures closely  ? Recheck CBC in AM     2  Left thigh/groin soft tissue infection   Cultures with Group B Strep, Strep anginosis  Status post I&D 9/6   OR findings showed infection confined to SQ tissues; muscle healthy, no necrotizing fasciitis   Wound noted to be clean on recent dressing changes  Rec:  ? Change antibiotics to ceftriaxone  ? Follow up final OR cultures and tailor antibiotics as indicated  ? Serial exams and [address] per surgery  ? Anticipate eventual transition to PO antibiotics once clinically improved      3  Acute hypoxic respiratory failure   Likely ARDS in the setting of #1  Scant secretions makes pneumonia less likely  Antibiotics as above would treat regardless  Rec:  ? No additional antibiotics indicated  ? Supportive care as per the primary service     4  DEAN   In the setting of septic shock   Improved  Rec:  ? Fortunately ceftriaxone requires no renal dose adjustment     5  Uncontrolled DM with hyperglycemia   Hemoglobin A1c 12  Risk factor for all of above  Rec:  ? Continue management per primary     6  Morbid obesity  Risk factor for all of above  Rec:  ? Needs weight loss     Antibiotics:  Vancomycin/cefepime/Flagyl  POD #4    Subjective:  Patient seen on AM rounds  Unable to provide ROS due to sedation  24 Hour Events:  Low-grade temps  WBC trending down  Pressors weaning  RN reports wound continues to look clean at dressing changes      Objective:  Vitals:  Temp:  [99 °F (37 2 °C)-100 8 °F (38 2 °C)] 100 6 °F (38 1 °C)  HR:  [] 86  Resp:  [20-34] 24  BP: ()/(50-72) 83/51  SpO2:  [92 %-100 %] 92 %  Temp (24hrs), Av 1 °F (37 8 °C), Min:99 °F (37 2 °C), Max:100 8 °F (38 2 °C)  Current: Temperature: (!) 100 6 °F (38 1 °C)    Physical Exam:   General:  No acute distress  Psychiatric:  Awake and alert  Pulmonary:  Normal respiratory excursion without accessory muscle use  Abdomen:  Soft, nontender  Extremities:  Left thigh wound packed with wet gauze, no surrounding cellulitis  Skin:  No rashes    Lab Results:  I have personally reviewed pertinent labs  Results from last 7 days   Lab Units 09/10/19  0447 19  1743 19  0426  19  0508  19  0846 19  0444  19  1636   POTASSIUM mmol/L 3 1* 3 4* 4 0   < > 3 4*   < >  --  3 4*   < >  --    CHLORIDE mmol/L 110* 107 107   < > 105   < >  --  102   < >  --    CO2 mmol/L 21 18* 19*   < > 19*   < >  --  20*   < >  --    CO2, I-STAT mmol/L  --   --   --   --   --   --  19*  --   --  23   BUN mg/dL 28* 27* 30*   < > 29*   < >  --  22   < >  --    CREATININE mg/dL 0 99 0 85 0 82   < > 1 09   < >  --  0 97   < >  --    EGFR ml/min/1 73sq m 87 100 102   < > 78   < >  --  89   < >  --    GLUCOSE, ISTAT mg/dl  --   --   --   --   --   --  144*  --   --  298*   CALCIUM mg/dL 7 5* 7 1* 7 5*   < > 7 3*   < >  --  7 5*   < >  --    AST U/L  --   --  64*  --  41  --   --  27   < >  --    ALT U/L  --   --  30  --  23  --   --  25   < >  --    ALK PHOS U/L  --   --  105  --  101  --   --  102   < >  --     < > = values in this interval not displayed  Results from last 7 days   Lab Units 09/10/19  0447 19  0426 19  0508   WBC Thousand/uL 16 93* 21 54* 22 96*   HEMOGLOBIN g/dL 8 7* 10 1* 9 2*   PLATELETS Thousands/uL 168 179 189     Results from last 7 days   Lab Units 19  1534 19  1212   BLOOD CULTURE   --  No Growth at 72 hrs  No Growth at 72 hrs     GRAM STAIN RESULT  No polys seen*  4+ Gram negative rods*  3+ Gram positive cocci in pairs*  --        Imaging Studies:   I have personally reviewed pertinent imaging study reports and images in PACS  EKG, Pathology, and Other Studies:   I have personally reviewed pertinent reports

## 2019-09-10 NOTE — RESPIRATORY THERAPY NOTE
RT Ventilator Management Note  Nova Vasquez 48 y o  male MRN: [id number]  Unit/Bed#:  Encounter: [id number]      Daily Screen       9/9/2019  1067             Patient safety screen outcome[de-identified]  Failed    Not Ready for Weaning due to[de-identified]  PEEP > 8cmH2O;Hemodynamically unstable; Underline problem not resolved            Physical Exam:          Resp Comments: Recieved pt mechanically ventilated with settings of AC/VC 20/450/50%/8+  ETT size 8 0 secured at 26 cm at lips  All alarms on and functioning  Sx thick moderate yellow secretions  Pt tolerating settings well  Goal is to wean pt when pt meets requirements  Will continue to monitor pt

## 2019-09-11 ENCOUNTER — APPOINTMENT (INPATIENT)
Dept: RADIOLOGY | Facility: HOSPITAL | Age: 54
DRG: 710 | End: 2019-09-11
Payer: COMMERCIAL

## 2019-09-11 LAB
ANION GAP SERPL CALCULATED.3IONS-SCNC: 7 MMOL/L (ref 4–13)
ANION GAP SERPL CALCULATED.3IONS-SCNC: 9 MMOL/L (ref 4–13)
BACTERIA BLD CULT: NORMAL
BACTERIA BLD CULT: NORMAL
BACTERIA TISS AEROBE CULT: ABNORMAL
BASE EXCESS BLDA CALC-SCNC: -2.2 MMOL/L
BASOPHILS # BLD AUTO: 0.11 THOUSANDS/ΜL (ref 0–0.1)
BASOPHILS NFR BLD AUTO: 1 % (ref 0–1)
BUN SERPL-MCNC: 22 MG/DL (ref 5–25)
BUN SERPL-MCNC: 24 MG/DL (ref 5–25)
CALCIUM SERPL-MCNC: 7.6 MG/DL (ref 8.3–10.1)
CALCIUM SERPL-MCNC: 7.6 MG/DL (ref 8.3–10.1)
CHLORIDE SERPL-SCNC: 111 MMOL/L (ref 100–108)
CHLORIDE SERPL-SCNC: 111 MMOL/L (ref 100–108)
CO2 SERPL-SCNC: 23 MMOL/L (ref 21–32)
CO2 SERPL-SCNC: 26 MMOL/L (ref 21–32)
CREAT SERPL-MCNC: 0.77 MG/DL (ref 0.6–1.3)
CREAT SERPL-MCNC: 0.87 MG/DL (ref 0.6–1.3)
EOSINOPHIL # BLD AUTO: 0.21 THOUSAND/ΜL (ref 0–0.61)
EOSINOPHIL NFR BLD AUTO: 1 % (ref 0–6)
ERYTHROCYTE [DISTWIDTH] IN BLOOD BY AUTOMATED COUNT: 15.7 % (ref 11.6–15.1)
GFR SERPL CREATININE-BSD FRML MDRD: 104 ML/MIN/1.73SQ M
GFR SERPL CREATININE-BSD FRML MDRD: 99 ML/MIN/1.73SQ M
GLUCOSE SERPL-MCNC: 120 MG/DL (ref 65–140)
GLUCOSE SERPL-MCNC: 126 MG/DL (ref 65–140)
GLUCOSE SERPL-MCNC: 128 MG/DL (ref 65–140)
GLUCOSE SERPL-MCNC: 133 MG/DL (ref 65–140)
GLUCOSE SERPL-MCNC: 142 MG/DL (ref 65–140)
GLUCOSE SERPL-MCNC: 145 MG/DL (ref 65–140)
GLUCOSE SERPL-MCNC: 145 MG/DL (ref 65–140)
GLUCOSE SERPL-MCNC: 149 MG/DL (ref 65–140)
GLUCOSE SERPL-MCNC: 165 MG/DL (ref 65–140)
GLUCOSE SERPL-MCNC: 176 MG/DL (ref 65–140)
GLUCOSE SERPL-MCNC: 182 MG/DL (ref 65–140)
GLUCOSE SERPL-MCNC: 183 MG/DL (ref 65–140)
GLUCOSE SERPL-MCNC: 185 MG/DL (ref 65–140)
GLUCOSE SERPL-MCNC: 187 MG/DL (ref 65–140)
GRAM STN SPEC: ABNORMAL
HCO3 BLDA-SCNC: 20.8 MMOL/L (ref 22–28)
HCT VFR BLD AUTO: 27.5 % (ref 36.5–49.3)
HGB BLD-MCNC: 9 G/DL (ref 12–17)
IMM GRANULOCYTES # BLD AUTO: >0.5 THOUSAND/UL (ref 0–0.2)
IMM GRANULOCYTES NFR BLD AUTO: 8 % (ref 0–2)
LYMPHOCYTES # BLD AUTO: 3.05 THOUSANDS/ΜL (ref 0.6–4.47)
LYMPHOCYTES NFR BLD AUTO: 15 % (ref 14–44)
MAGNESIUM SERPL-MCNC: 2.1 MG/DL (ref 1.6–2.6)
MAGNESIUM SERPL-MCNC: 2.1 MG/DL (ref 1.6–2.6)
MCH RBC QN AUTO: 31.6 PG (ref 26.8–34.3)
MCHC RBC AUTO-ENTMCNC: 32.7 G/DL (ref 31.4–37.4)
MCV RBC AUTO: 97 FL (ref 82–98)
MONOCYTES # BLD AUTO: 1.31 THOUSAND/ΜL (ref 0.17–1.22)
MONOCYTES NFR BLD AUTO: 6 % (ref 4–12)
NEUTROPHILS # BLD AUTO: 14.02 THOUSANDS/ΜL (ref 1.85–7.62)
NEUTS SEG NFR BLD AUTO: 69 % (ref 43–75)
NRBC BLD AUTO-RTO: 1 /100 WBCS
O2 CT BLDA-SCNC: 13.8 ML/DL (ref 16–23)
OXYHGB MFR BLDA: 90.7 % (ref 94–97)
PCO2 BLDA: 30.1 MM HG (ref 36–44)
PH BLDA: 7.46 [PH] (ref 7.35–7.45)
PHOSPHATE SERPL-MCNC: 1.6 MG/DL (ref 2.7–4.5)
PHOSPHATE SERPL-MCNC: 2.1 MG/DL (ref 2.7–4.5)
PLATELET # BLD AUTO: 195 THOUSANDS/UL (ref 149–390)
PMV BLD AUTO: 9.7 FL (ref 8.9–12.7)
PO2 BLDA: 58.5 MM HG (ref 75–129)
POTASSIUM SERPL-SCNC: 3.5 MMOL/L (ref 3.5–5.3)
POTASSIUM SERPL-SCNC: 3.5 MMOL/L (ref 3.5–5.3)
RBC # BLD AUTO: 2.85 MILLION/UL (ref 3.88–5.62)
SODIUM SERPL-SCNC: 143 MMOL/L (ref 136–145)
SODIUM SERPL-SCNC: 144 MMOL/L (ref 136–145)
SPECIMEN SOURCE: ABNORMAL
WBC # BLD AUTO: 20.37 THOUSAND/UL (ref 4.31–10.16)

## 2019-09-11 PROCEDURE — 83735 ASSAY OF MAGNESIUM: CPT | Performed by: INTERNAL MEDICINE

## 2019-09-11 PROCEDURE — 94640 AIRWAY INHALATION TREATMENT: CPT

## 2019-09-11 PROCEDURE — 94150 VITAL CAPACITY TEST: CPT

## 2019-09-11 PROCEDURE — 83735 ASSAY OF MAGNESIUM: CPT | Performed by: PHYSICIAN ASSISTANT

## 2019-09-11 PROCEDURE — 80048 BASIC METABOLIC PNL TOTAL CA: CPT | Performed by: INTERNAL MEDICINE

## 2019-09-11 PROCEDURE — 80048 BASIC METABOLIC PNL TOTAL CA: CPT | Performed by: PHYSICIAN ASSISTANT

## 2019-09-11 PROCEDURE — 99291 CRITICAL CARE FIRST HOUR: CPT | Performed by: INTERNAL MEDICINE

## 2019-09-11 PROCEDURE — 82948 REAGENT STRIP/BLOOD GLUCOSE: CPT

## 2019-09-11 PROCEDURE — 84100 ASSAY OF PHOSPHORUS: CPT | Performed by: PHYSICIAN ASSISTANT

## 2019-09-11 PROCEDURE — 93005 ELECTROCARDIOGRAM TRACING: CPT

## 2019-09-11 PROCEDURE — 94003 VENT MGMT INPAT SUBQ DAY: CPT

## 2019-09-11 PROCEDURE — 94760 N-INVAS EAR/PLS OXIMETRY 1: CPT

## 2019-09-11 PROCEDURE — 99233 SBSQ HOSP IP/OBS HIGH 50: CPT | Performed by: INTERNAL MEDICINE

## 2019-09-11 PROCEDURE — 82805 BLOOD GASES W/O2 SATURATION: CPT | Performed by: NURSE PRACTITIONER

## 2019-09-11 PROCEDURE — 85025 COMPLETE CBC W/AUTO DIFF WBC: CPT | Performed by: INTERNAL MEDICINE

## 2019-09-11 PROCEDURE — 71045 X-RAY EXAM CHEST 1 VIEW: CPT

## 2019-09-11 RX ORDER — ACETAMINOPHEN 325 MG/1
650 TABLET ORAL EVERY 6 HOURS PRN
Status: DISCONTINUED | OUTPATIENT
Start: 2019-09-11 | End: 2019-09-18 | Stop reason: HOSPADM

## 2019-09-11 RX ORDER — FENTANYL CITRATE 50 UG/ML
50 INJECTION, SOLUTION INTRAMUSCULAR; INTRAVENOUS EVERY 2 HOUR PRN
Status: DISCONTINUED | OUTPATIENT
Start: 2019-09-11 | End: 2019-09-12

## 2019-09-11 RX ORDER — SENNOSIDES 8.6 MG
1 TABLET ORAL 2 TIMES DAILY
Status: DISCONTINUED | OUTPATIENT
Start: 2019-09-11 | End: 2019-09-12

## 2019-09-11 RX ORDER — POTASSIUM CHLORIDE 20MEQ/15ML
40 LIQUID (ML) ORAL ONCE
Status: COMPLETED | OUTPATIENT
Start: 2019-09-11 | End: 2019-09-11

## 2019-09-11 RX ORDER — OXYCODONE HYDROCHLORIDE 5 MG/1
5 TABLET ORAL EVERY 4 HOURS PRN
Status: DISCONTINUED | OUTPATIENT
Start: 2019-09-11 | End: 2019-09-12

## 2019-09-11 RX ADMIN — ISODIUM CHLORIDE 3 ML: 0.03 SOLUTION RESPIRATORY (INHALATION) at 13:40

## 2019-09-11 RX ADMIN — ACETAMINOPHEN 650 MG: 325 TABLET, FILM COATED ORAL at 18:39

## 2019-09-11 RX ADMIN — CALCIUM GLUCONATE 1 G: 98 INJECTION, SOLUTION INTRAVENOUS at 18:14

## 2019-09-11 RX ADMIN — ISODIUM CHLORIDE 3 ML: 0.03 SOLUTION RESPIRATORY (INHALATION) at 19:48

## 2019-09-11 RX ADMIN — LEVALBUTEROL 1.25 MG: 1.25 SOLUTION, CONCENTRATE RESPIRATORY (INHALATION) at 19:48

## 2019-09-11 RX ADMIN — LEVALBUTEROL 1.25 MG: 1.25 SOLUTION, CONCENTRATE RESPIRATORY (INHALATION) at 08:20

## 2019-09-11 RX ADMIN — POTASSIUM PHOSPHATE, MONOBASIC AND POTASSIUM PHOSPHATE, DIBASIC 30 MMOL: 224; 236 INJECTION, SOLUTION, CONCENTRATE INTRAVENOUS at 18:21

## 2019-09-11 RX ADMIN — Medication 9 UNITS/HR: at 00:09

## 2019-09-11 RX ADMIN — POTASSIUM CHLORIDE 40 MEQ: 20 SOLUTION ORAL at 06:39

## 2019-09-11 RX ADMIN — ISODIUM CHLORIDE 3 ML: 0.03 SOLUTION RESPIRATORY (INHALATION) at 08:20

## 2019-09-11 RX ADMIN — FUROSEMIDE 40 MG: 10 INJECTION, SOLUTION INTRAMUSCULAR; INTRAVENOUS at 17:00

## 2019-09-11 RX ADMIN — SODIUM PHOSPHATE, MONOBASIC, MONOHYDRATE 30 MMOL: 276; 142 INJECTION, SOLUTION INTRAVENOUS at 08:53

## 2019-09-11 RX ADMIN — DEXMEDETOMIDINE HYDROCHLORIDE 0.6 MCG/KG/HR: 100 INJECTION, SOLUTION INTRAVENOUS at 01:06

## 2019-09-11 RX ADMIN — ENOXAPARIN SODIUM 40 MG: 40 INJECTION SUBCUTANEOUS at 21:34

## 2019-09-11 RX ADMIN — Medication 1 SPRAY: at 22:40

## 2019-09-11 RX ADMIN — LEVALBUTEROL 1.25 MG: 1.25 SOLUTION, CONCENTRATE RESPIRATORY (INHALATION) at 13:40

## 2019-09-11 RX ADMIN — FENTANYL CITRATE 50 MCG: 50 INJECTION INTRAMUSCULAR; INTRAVENOUS at 17:44

## 2019-09-11 RX ADMIN — CHLORHEXIDINE GLUCONATE 0.12% ORAL RINSE 15 ML: 1.2 LIQUID ORAL at 08:47

## 2019-09-11 RX ADMIN — LEVALBUTEROL 1.25 MG: 1.25 SOLUTION, CONCENTRATE RESPIRATORY (INHALATION) at 01:49

## 2019-09-11 RX ADMIN — ENOXAPARIN SODIUM 40 MG: 40 INJECTION SUBCUTANEOUS at 08:46

## 2019-09-11 RX ADMIN — Medication 20 MG: at 08:53

## 2019-09-11 RX ADMIN — ISODIUM CHLORIDE 3 ML: 0.03 SOLUTION RESPIRATORY (INHALATION) at 01:49

## 2019-09-11 RX ADMIN — Medication 1 APPLICATION: at 08:46

## 2019-09-11 RX ADMIN — OXYCODONE HYDROCHLORIDE 5 MG: 5 TABLET ORAL at 21:34

## 2019-09-11 RX ADMIN — Medication 12 UNITS/HR: at 20:22

## 2019-09-11 RX ADMIN — DEXMEDETOMIDINE HYDROCHLORIDE 0.6 MCG/KG/HR: 100 INJECTION, SOLUTION INTRAVENOUS at 05:40

## 2019-09-11 RX ADMIN — FUROSEMIDE 40 MG: 10 INJECTION, SOLUTION INTRAMUSCULAR; INTRAVENOUS at 08:49

## 2019-09-11 RX ADMIN — Medication 8 UNITS/HR: at 13:30

## 2019-09-11 RX ADMIN — CEFTRIAXONE SODIUM 2000 MG: 10 INJECTION, POWDER, FOR SOLUTION INTRAVENOUS at 10:13

## 2019-09-11 NOTE — PROGRESS NOTES
Progress Note - General Surgery   Yaakov Morse 48 y o  male MRN: [id number]  Unit/Bed#:  Encounter: [id number]    Assessment:  Necrotizing soft tissue infection, status post excisional debridement and washout in OR on in 9/ 6/19    Plan:  Continue IV antibiotics per ID  Daily bedside packing changes by nursing-done today, Pac wound with Kerlix soaked in Dakin solution and cover  Medical management per ICU  Check labs in the morning-blood culture pending    Subjective/Objective       Subjective: Intubated, sedated, opening his eyes, and moving  his head  WBC yesterday 16 93 today is 20  Blood pressure 95/57, pulse 77, temperature 99 7 °F (37 6 °C), temperature source Axillary, resp  rate 19, height 5' 4" (1 626 m), weight (!) 176 kg (387 lb 12 6 oz), SpO2 95 %  ,Body mass index is 66 56 kg/m²        Intake/Output Summary (Last 24 hours) at 9/11/2019 1010  Last data filed at 9/11/2019 0800  Gross per 24 hour   Intake 1726 31 ml   Output 2740 ml   Net -1013 69 ml       Invasive Devices     Central Venous Catheter Line            CVC Central Lines 09/06/19 Triple Right 4 days          Peripheral Intravenous Line            Peripheral IV 09/10/19 Left Forearm 1 day          Arterial Line            Arterial Line 09/07/19 Right Radial 4 days          Drain            NG/OG/Enteral Tube Orogastric 14 Fr 4 days    Urethral Catheter Latex 16 Fr  4 days          Airway            ETT  Cuffed;Oral;Inflated 8 mm 4 days                Physical Exam: General appearance: Intubated, sedated, ill appearing  Lungs: clear to auscultation bilaterally  Heart: regular rate and rhythm, S1, S2 normal, no murmur, click, rub or gallop  Extremities: Large wound on the medial left thigh, packed with Kerlix, appears improving erythema and induration, no purulent dicharge    Lab, Imaging and other studies:  CBC:   Lab Results   Component Value Date    WBC 20 37 (H) 09/11/2019    HGB 9 0 (L) 09/11/2019    HCT 27 5 (L) 09/11/2019 MCV 97 09/11/2019     09/11/2019    MCH 31 6 09/11/2019    MCHC 32 7 09/11/2019    RDW 15 7 (H) 09/11/2019    MPV 9 7 09/11/2019    NRBC 1 09/11/2019   , CMP:   Lab Results   Component Value Date    SODIUM 143 09/11/2019    K 3 5 09/11/2019     (H) 09/11/2019    CO2 23 09/11/2019    BUN 24 09/11/2019    CREATININE 0 87 09/11/2019    CALCIUM 7 6 (L) 09/11/2019    EGFR 99 09/11/2019   , Coagulation: No results found for: PT, INR, APTT, Urinalysis: No results found for: COLORU, CLARITYU, SPECGRAV, PHUR, LEUKOCYTESUR, NITRITE, PROTEINUA, GLUCOSEU, KETONESU, BILIRUBINUR, BLOODU, Amylase: No results found for: AMYLASE, Lipase: No results found for: LIPASE  VTE Pharmacologic Prophylaxis: Enoxaparin (Lovenox)  VTE Mechanical Prophylaxis: sequential compression device

## 2019-09-11 NOTE — PROGRESS NOTES
Progress Note - Critical Care   Vel Turner 48 y o  male MRN: [id number]  Unit/Bed#:  Encounter: [id number]    ----------------------------------------------------------------------------------------------  Chief Complaint: Patient intubated and sedated    HPI/24hr events: Patient remained off vasopressors  He was transitioned to PSV 5/5 for which he was tolerating yesterday  He was placed back on [facility] for rest overnight  He was given 2 doses of lasix for volume overload for a net negative of 1L yesterday   ID changed abx to Rocephin   ----------------------------------------------------------------------------------------------  Assessment and Plan  Principal Problem:    Necrotizing soft tissue infection  Active Problems:    GERD (gastroesophageal reflux disease)    Hyperlipidemia    Morbid obesity (HCC)    Recurrent cellulitis of lower extremity    Type 2 diabetes mellitus with complication ([facility])    Septic shock ([facility])    Hypokalemia    Elevated procalcitonin    Acute metabolic encephalopathy    Acute hypoxemic respiratory failure ([facility])    Volume overload  Resolved Problems:    Hyponatremia    DEAN (acute kidney injury) ([facility] [address] )    Hyperbilirubinemia    Sinus tachycardia    Lactic acidosis    Bandemia    Hypotension    Hypocalcemia        Neuro:    Sleep/wake cycle regulation   CAM-ICU daily   Continue daily sedation vacation   Sedation/analgesia   o  Current medications: , Fentanyl 75 mcg/hr and Precedex 0 6 mcg/kg/hr   o DC prn ativan (patient has not received sicne 9/8)  o Continue prn fentanyl   o Goal RASS 0 to -1    CV:    Patient continues to appear overload  Continue with lasix BID  Goal net negative 1000ml   Monitor on Telemetry   Maintain MAP >65    Pulm:   Continue with PSV wean  Will attempt to wean sedation and liberate from the ventilator    Suction as needed and closely monitor secretions  Maintain HOB >30 degrees   Q4h oral care with chlorhexidine BID   Maintain SpO2 >92%   Continue xopenex     GI:    DC OGT if extubated    Stress ulcer prophylaxis: Continue PPI    Bowel regimen:  Senna    :    Surgery following s/p surgical debridement for NSTI  Continue daily bedside packing changes with dakin's    Baseline creatinine 0 9   Creatinine peak 1 0   Current creatinine 0 87   Strict q1h I/O monitoring   Continue to follow renal function tests    F/E/N:    Patient tolerating TF  Will hold in order to extuated    Replete electrolytes with as needed to maintain K >4 0, Mag >2 0, Phos >3 0    Heme/Onc:    VTE prophylaxis:  Lovenox , SCD's to BLE    Endo:    Continue insulin gtt  Currently at 6u/hr    Lab Results   Component Value Date    HGBA1C 11 2 (H) 2019    HGBA1C 12 0 (H) 2015     ID:    ID following  Antibiotics changes to Rocephin yesterday   Continue to monitor fever and WBC curve     MSK/Skin:    Reposition q2h, eliminate pressure points while in bed   Close skin surveillance     VTE Pharmacologic Prophylaxis: Enoxaparin (Lovenox)  VTE Mechanical Prophylaxis: sequential compression device    Dispo: Remain in ICU    Code Status: Level 1 - Full Code    ------------------------------------------------------------------------------------------    Vitals:  T maximum in 24 hours: Temp (24hrs), Av 1 °F (37 8 °C), Min:98 1 °F (36 7 °C), Max:102 5 °F (39 2 °C)    Current: Temperature: 98 1 °F (36 7 °C) Height and Weights:  Height: 5' 4" (162 6 cm)  IBW: 59 2 kg  Weight (last 2 days)     Date/Time   Weight    09/10/19 0600   (!) 176 (388 67)    19 0515   (!) 176 (386 91)    19 0600   (!) 175 (386 25)           Intake and Output:  I/O       701 - 09/10 0700 09/10 0701 - 09/11 0700    P  O       I V  (mL/kg) 1663 3 (9 5) 712 9 (4 1)    NG/GT 0 30    IV Piggyback 780 100    Feedings 474 385    Total Intake(mL/kg) 2917 3 (16 6) 1227 9 (7)    Urine (mL/kg/hr) 2115 (0 5) 1560 (0 6)    Emesis/NG output 250     Stool 0 0    Total Output 4964 7461    Net +552 3 -332 1          Unmeasured Stool Occurrence 1 x 2 x           ---------------------------------------------------------------------------------------  Physical Exam   Constitutional: No distress  He is intubated  HENT:   Head: Normocephalic  Eyes: Pupils are equal, round, and reactive to light  Cardiovascular: Normal rate and regular rhythm  Pulmonary/Chest: He is intubated  He has decreased breath sounds in the right lower field and the left lower field  He has no wheezes  He has no rales  Abdominal:   Obese   Genitourinary:   Genitourinary Comments: Duarte in place with dark yellow urine  L groin wound packed with kerlix  Surrounding skin without erythema   Musculoskeletal: Normal range of motion  Neurological:   Patient awake and alert on precedex and fentanyl infusions  He appears calm   Skin: Skin is warm and dry  Capillary refill takes less than 2 seconds  Vitals reviewed  ---------------------------------------------------------------------------------------    Review of Systems - Unable to be obtained secondary to the patient being intubated       Invasive/non-invasive ventilation settings   Respiratory    Lab Data (Last 4 hours)    None         O2/Vent Data (Last 4 hours)      09/10 1931           Vent Mode CPAP/PS Spont       FIO2 (%) (%) 50       PEEP (cmH2O) (cmH2O) 5       Pressure Support (cmH2O) (cmH20) 5       MV (Obs) 12 1                   Hemodynamic Monitoring  N/A       Nutrition       Diet Orders   (From admission, onward)             Start     Ordered    09/09/19 1108  Diet Enteral/Parenteral; Tube Feeding No Oral Diet;  Two Terrence HN; Continuous; 35; Prosource Protein Liquid - Three Packets  Diet effective now     Comments:  Start at 20 ml/hr, increase every 4 hours by 20 ml under goal reached    Add prosource protein liquid-four packets   Question Answer Comment   Diet Type Enteral/Parenteral    Enteral/Parenteral Tube Feeding No Oral Diet Tube Feeding Formula: Two Terrence HN    Bolus/Cyclic/Continuous Continuous    Tube Feeding Goal Rate (mL/hr): 35    Prosource Protein Liquid - No Carb Prosource Protein Liquid - Three Packets    RD to adjust diet per protocol? No        09/09/19 1109    09/06/19 1928  Room Service  Once     Question:  Type of Service  Answer:  Room Service- Not Appropriate    09/06/19 1927              TF currently running at 35 ml/hr with a goal of 35   Formula:2calHN    Laboratory and Diagnostics:  Results from last 7 days   Lab Units 09/10/19  0447 09/09/19  0426 09/08/19  0508 09/07/19  1218 09/07/19  0846 09/07/19  0444 09/06/19  2118 09/06/19  1834 09/06/19  1636 09/06/19  1212   WBC Thousand/uL 16 93* 21 54* 22 96*  --   --  26 46*  --  27 10*  --  19 61*   HEMOGLOBIN g/dL 8 7* 10 1* 9 2* 9 8*  --  9 7* 10 1* 11 3*  --  14 2   I STAT HEMOGLOBIN g/dl  --   --   --   --  9 9*  --   --   --  13 6  --    HEMATOCRIT % 27 1* 30 6* 28 6*  --   --  29 3*  --  33 9*  --  41 9   HEMATOCRIT, ISTAT %  --   --   --   --  29*  --   --   --  40  --    PLATELETS Thousands/uL 168 179 189  --   --  213  --  221  --  226   NEUTROS PCT %  --  72 68  --   --   --   --  69  --   --    BANDS PCT % 7  --   --   --   --  17*  --   --   --  18*   MONOS PCT %  --  6 6  --   --   --   --  8  --   --    MONO PCT % 7  --   --   --   --  8  --   --   --  9     Results from last 7 days   Lab Units 09/10/19  1613 09/10/19  0447 09/09/19  1743 09/09/19  0426 09/08/19  2018 09/08/19  0508 09/07/19  1218  09/07/19  0444 09/06/19  1834 09/06/19  1212   SODIUM mmol/L 141 141 138 137 135* 135* 134*  --  134* 132*  --  127*   POTASSIUM mmol/L 3 8 3 1* 3 4* 4 0 3 5 3 4* 4 0  --  3 4* 4 0  --  3 6   CHLORIDE mmol/L 111* 110* 107 107 106 105 103  --  102 99*  --  93*   CO2 mmol/L 22 21 18* 19* 20* 19* 21  --  20* 18*  --  21   CO2, I-STAT   --   --   --   --   --   --   --    < >  --   --    < >  --    ANION GAP mmol/L 8 10 13 11 9 11 10  --  12 15*  --  13   BUN mg/dL 27* 28* 27* 30* 30* 29* 23  --  22 19  --  20   CREATININE mg/dL 1 00 0 99 0 85 0 82 0 88 1 09 1 09  --  0 97 1 00  --  1 37*   CALCIUM mg/dL 7 3* 7 5* 7 1* 7 5* 7 1* 7 3* 7 1*  --  7 5* 8 0*  --  9 2   ALT U/L  --   --   --  30  --  23  --   --  25 26  --  37   AST U/L  --   --   --  64*  --  41  --   --  27 28  --  40   ALK PHOS U/L  --   --   --  105  --  101  --   --  102 130*  --  165*   ALBUMIN g/dL  --   --   --  1 7*  --  1 7*  --   --  2 0* 1 9*  --  2 0*   TOTAL BILIRUBIN mg/dL  --   --   --  0 70  --  0 60  --   --  0 90 1 20*  --  1 40*    < > = values in this interval not displayed  Results from last 7 days   Lab Units 09/10/19  0447 09/09/19  1743 09/09/19  0426 09/08/19  0508 09/07/19  0444 09/06/19  1834   MAGNESIUM mg/dL 2 3 2 1 2 3 2 2 2 0 1 3*   PHOSPHORUS mg/dL  --   --  2 8 2 7 3 1 4 0     Results from last 7 days   Lab Units 09/06/19  1212   INR  1 29*   PTT seconds 31         Results from last 7 days   Lab Units 09/07/19  0946 09/07/19  0444 09/07/19  0244 09/07/19  0016 09/06/19  2118 09/06/19  1834 09/06/19  1356   LACTIC ACID mmol/L 1 3 1 9 2 3* 3 1* 3 4* 3 2* 3 2*     ABG:  Lab Results   Component Value Date    PHART 7 373 09/10/2019    DUN2JPM 35 1 (L) 09/10/2019    PO2ART 75 0 09/10/2019    ADL2BOG 20 0 (L) 09/10/2019    BEART -4 7 09/10/2019    SOURCE Line, Arterial 09/10/2019     VBG:  Results from last 7 days   Lab Units 09/10/19  0446   ABG SOURCE  Line, Arterial     Results from last 7 days   Lab Units 09/08/19  0802 09/07/19  0946 09/06/19  1834   PROCALCITONIN ng/ml 2 16* 2 96* 2 80*     Vancomycin Tr   Date Value Ref Range Status   09/08/2019 21 8 (HH) 10 0 - 20 0 ug/mL Final        Micro  Results from last 7 days   Lab Units 09/06/19  1534 09/06/19  1212   BLOOD CULTURE   --  No Growth After 4 Days  No Growth After 4 Days     GRAM STAIN RESULT  No polys seen*  4+ Gram negative rods*  3+ Gram positive cocci in pairs*  --        EKG: NSR on the monitor   Imaging: I have personally reviewed pertinent reports   , I have personally reviewed pertinent films in PACS and CXR does not show any consolidation    Allergies   Allergies   Allergen Reactions    Clindamycin Diarrhea       Active Medications:  Scheduled Meds:    Current Facility-Administered Medications:  acetaminophen 650 mg Oral Q4H PRN Rosalind Wright PA-C    cefTRIAXone 2,000 mg Intravenous Q24H Kinsey David MD Last Rate: 2,000 mg (09/10/19 1040)   chlorhexidine 15 mL Swish & Spit Q12H [address] ANN Roca    dexmedetomidine 0 1-1 2 mcg/kg/hr Intravenous Titrated ANN Mobley Last Rate: 0 6 mcg/kg/hr (09/10/19 2041)   enoxaparin 40 mg Subcutaneous Q12H [address] ANN Mobley    fentaNYL 75 mcg/hr Intravenous Continuous Rosalind Wright PA-C Last Rate: 75 mcg/hr (09/10/19 1919)   fentanyl citrate (PF) 100 mcg Intravenous Q1H PRN Rosalind Wright PA-C    [START ON 9/11/2019] furosemide 40 mg Intravenous BID (diuretic) ANN Mobley    insulin regular (HumuLIN R,NovoLIN R) infusion 0 3-21 Units/hr Intravenous Titrated ANN Roca Last Rate: 6 Units/hr (09/10/19 2200)   levalbuterol 1 25 mg Nebulization Q6H While awake Irma Terry MD    LORazepam 1 mg Intravenous Q4H PRN Rosalind Wright PA-C    omeprazole (PRILOSEC) suspension 2 mg/mL 20 mg Oral BID ANN Roca    propofol 5-50 mcg/kg/min Intravenous Titrated ANN Mobley Last Rate: Stopped (09/10/19 2009)   sodium chloride 3 mL Nebulization Q6H Rosanne Vila Jr, PA-C    sodium hypochlorite 1 application Irrigation Daily Carlos Ruano DO     Continuous Infusions:    dexmedetomidine 0 1-1 2 mcg/kg/hr Last Rate: 0 6 mcg/kg/hr (09/10/19 2041)   fentaNYL 75 mcg/hr Last Rate: 75 mcg/hr (09/10/19 1919)   insulin regular (HumuLIN R,NovoLIN R) infusion 0 3-21 Units/hr Last Rate: 6 Units/hr (09/10/19 2200)   propofol 5-50 mcg/kg/min Last Rate: Stopped (09/10/19 2009)      PRN Meds:     acetaminophen 650 mg Q4H PRN   fentanyl citrate (PF) 100 mcg Q1H PRN   LORazepam 1 mg Q4H PRN        Invasive  Devices  Invasive Devices     Central Venous Catheter Line            CVC Central Lines 09/06/19 Triple Right 4 days          Peripheral Intravenous Line            Peripheral IV 09/10/19 Left Forearm less than 1 day    Peripheral IV 09/10/19 Left;Upper;Ventral (anterior) Arm less than 1 day          Arterial Line            Arterial Line 09/07/19 Right Radial 3 days          Drain            NG/OG/Enteral Tube Orogastric 14 Fr 4 days    Urethral Catheter Latex 16 Fr  4 days          Airway            ETT  Cuffed;Oral;Inflated 8 mm 4 days              Counseling / Coordination of Care  Total Critical Care time spent 35 minutes excluding procedures, teaching and family updates  Sheridan Perez PA-C    Portions of the record may have been created with voice recognition software  Occasional wrong word or "sound a like" substitutions may have occurred due to the inherent limitations of voice recognition software    Read the chart carefully and recognize, using context, where substitutions have occurred

## 2019-09-11 NOTE — RESPIRATORY THERAPY NOTE
09/10/19 2310   Vent Information   Vent    Vent type     Vent Mode AC/VC   $ Vent Daily Charge-Subsequent Yes   $ Vital Capacity Mech/Peak Flow Yes   $ Pulse Oximetry Spot Check Charge Completed   SpO2 95 %   AC/VC Settings   Resp Rate (BPM) 14 BPM   Vt (mL) 450 mL   FIO2 (%) 50 %   PEEP (cmH2O) 5 cmH2O   Flow (LPM) 60 L/min   Trigger Sensitivity Flow (lpm) 3 %   Humidification Heater   Heater Temperature (Set) 98 6 °F (37 °C)   AC/VC Actuals   Resp Rate (BPM) 22 BPM   VT (mL) 478   MV 11 1   MAP (cmH2O) 7 2 cmH2O   Peak Pressure (cmH2O) 17 cmH2O   I/E Ratio (Obs) 1:2 6   Heater Temperature (Obs) 96 8 °F (36 °C)   Static Compliance (mL/cmH20) 19 mL/cmH2O   Plateau Pressure (cm H2O) 21 cm H2O   AC/VC Alarms   High Peak Pressure (cmH2O) 45   High Resp Rate (BPM) 45 BPM   MV High (L/min) 20 L/min   MV Low (L/min) 4 L/min   Vt High (mL) 850 mL   Vt Low (mL) 250 mL   AC/VC Apnea Settings   FIO2 (%) 100 %   Apnea Time (s) 20 S   Maintenance   Alarm (pink) cable attached Yes   Resuscitation bag with peep valve at bedside Yes   Water bag changed Yes   Circuit changed No   ETT  Cuffed;Oral;Inflated 8 mm   Placement Date/Time: 09/06/19 1555   Mask Ventilation: Mask ventilation not attempted (0)  Preoxygenated: Yes  Technique: Direct laryngoscopy;Rapid sequence  Type: Cuffed;Oral;Inflated  Tube Size: 8 mm  Laryngoscope: Mac  Blade Size: 3  Location: Oral  Secured at (cm) 26   Measured from [facility] by Commercial tube crespo   RT Ventilator Management Note  Paul Fink 48 y o  male MRN: [id number]  Unit/Bed#:  Encounter: [id number]      Daily Screen       9/9/2019 0758 9/10/2019  0752          Patient safety screen outcome[de-identified]  Failed  Failed      Not Ready for Weaning due to[de-identified]  PEEP > 8cmH2O;Hemodynamically unstable; Underline problem not resolved  Underline problem not resolved              Physical Exam:          Pt resting comfortably on vent at this time no distress no changes made

## 2019-09-11 NOTE — PROGRESS NOTES
Pt tolerating current TF order per chart review and discussion with RN  Noted propofol now d/c, no longer receiving additional kcal from propofol  Consider increasing rate of TF to TwoCal HN @ 45 ml/hr with 3 packets of Prosource daily  New goal rate will provide 2340 kcal (13 kcal/kg), 135g protein (2 2 g/kg IBW), 756 ml free water  Will need additional free water to meet estimated fluid needs  Will continue to monitor

## 2019-09-11 NOTE — RESPIRATORY THERAPY NOTE
09/11/19 0545   Vent Information   Vent    Vent type     Vent Mode CPAP/PS Spont   $ Vent Daily Charge-Subsequent Yes   $ Pulse Oximetry Spot Check Charge Completed   SpO2 96 %   CPAP/PS Spont Settings   FIO2 (%) 50 %   PEEP (cmH2O) 5 cmH2O   Pressure Support (cmH2O) 5 cmH20   Trigger Sensitivity Flow (lpm) 3 LPM   Rise Time (%) 50 %   Esens % 25 %   Humidification Heater   Heater Temp 98 6 °F (37 °C)   CPAP/PS Spont Actuals   Resp Rate (BPM) 24 BPM   VT (mL) 592 mL   MV (Obs) 13 6   MAP (cmH2O) 7 6 cmH2O   Peak Pressure (cmH2O) 12 cmH2O   I/E Ratio (Obs) 1:2   Heater Temperature (Obs) 96 8 °F (36 °C)   CPAP/PS Spont Alarms   High Peak Pressure (cmH20) 45 cmH2O   High Resp Rate (BPM) 45 BPM   High MV (L/min) 24 L/min   Low MV (L/min) 4 L/min   High Subhash VTE (mL) 850 mL   Low Subhash VTE (mL) 250 mL   High SPONT VTE (mL) 850 mL   Low Spont VTE (mL) 250 mL   CPAP/PS Spont Apnea Settings   Resp Rate (BPM) 14 BPM   VT (mL) 450 mL   FIO2 (%) 100 %   Apnea Time (s) 20 S   Apnea Flow (LPM) 60 LPM   Maintenance   Alarm (pink) cable attached Yes   Resuscitation bag with peep valve at bedside Yes   Water bag changed No   Circuit changed No   ETT  Cuffed;Oral;Inflated 8 mm   Placement Date/Time: 09/06/19 1555   Mask Ventilation: Mask ventilation not attempted (0)  Preoxygenated: Yes  Technique: Direct laryngoscopy;Rapid sequence  Type: Cuffed;Oral;Inflated  Tube Size: 8 mm  Laryngoscope: Mac  Blade Size: 3  Location: Oral  Secured at (cm) 26   Measured from [address] by Commercial tube crespo   RT Ventilator Management Note  Hedda Lapping 48 y o  male MRN: [id number]  Unit/Bed#:  Encounter: [id number]      Daily Screen       9/9/2019  0758 9/10/2019  0752          Patient safety screen outcome[de-identified]  Failed  Failed      Not Ready for Weaning due to[de-identified]  PEEP > 8cmH2O;Hemodynamically unstable; Underline problem not resolved  Underline problem not resolved Physical Exam:        Pt placed onto CPAP/PS Spontaneous for daily weaning

## 2019-09-11 NOTE — RESPIRATORY THERAPY NOTE
RT Ventilator Management Note  Yaakov Morse 48 y o  male MRN: [id number]  Unit/Bed#:  Encounter: [id number]       09/11/19 0329   Vent Information   Vent    Vent type     Vent Mode AC/VC   $ Vent Daily Charge-Subsequent Yes   $ Vital Capacity Mech/Peak Flow Yes   $ Pulse Oximetry Spot Check Charge Completed   SpO2 97 %   AC/VC Settings   Resp Rate (BPM) 14 BPM   Vt (mL) 450 mL   FIO2 (%) 50 %   PEEP (cmH2O) 5 cmH2O   Flow (LPM) 60 L/min   Trigger Sensitivity Flow (lpm) 3 %   Humidification Heater   Heater Temperature (Set) 98 6 °F (37 °C)   AC/VC Actuals   Resp Rate (BPM) 24 BPM   VT (mL) 463   MV 11 7   MAP (cmH2O) 5 7 cmH2O   Peak Pressure (cmH2O) 16 cmH2O   I/E Ratio (Obs) 1:2   Heater Temperature (Obs) 97 3 °F (36 3 °C)   Static Compliance (mL/cmH20) 14 mL/cmH2O   Plateau Pressure (cm H2O) 25 cm H2O   AC/VC Alarms   High Peak Pressure (cmH2O) 45   High Resp Rate (BPM) 45 BPM   MV High (L/min) 20 L/min   MV Low (L/min) 4 L/min   Vt High (mL) 850 mL   Vt Low (mL) 250 mL   AC/VC Apnea Settings   FIO2 (%) 100 %   Apnea Time (s) 20 S   Maintenance   Alarm (pink) cable attached Yes   Resuscitation bag with peep valve at bedside Yes   Water bag changed No   Circuit changed No   ETT  Cuffed;Oral;Inflated 8 mm   Placement Date/Time: 09/06/19 1555   Mask Ventilation: Mask ventilation not attempted (0)  Preoxygenated: Yes  Technique: Direct laryngoscopy;Rapid sequence  Type: Cuffed;Oral;Inflated  Tube Size: 8 mm  Laryngoscope: Mac  Blade Size: 3  Location: Oral  Secured at (cm) 26   Measured from [address] by Commercial tube crespo     Daily Screen       9/9/2019  1846 9/10/2019  0752          Patient safety screen outcome[de-identified]  Failed  Failed      Not Ready for Weaning due to[de-identified]  PEEP > 8cmH2O;Hemodynamically unstable; Underline problem not resolved  Underline problem not resolved              Physical Exam:    Pt resting comfortably on vent at this time no distress no changes made

## 2019-09-11 NOTE — PROGRESS NOTES
Progress Note - Infectious Disease   Matt Zamarripa 48 y o  male MRN: [id number]  Unit/Bed#:  Encounter: [id number]      Impression/Recommendations:  1  Septic shock   POA:  Fever, WBC, refractory hypotension   Due to #2   No other appreciable source   Blood cultures negative   Worsening fever, WBC over past 24 hours, although overall clinically improving  Repeat blood cultures pending  CXR unchanged  Consider drug fever, atelectasis  Remains critically ill  Rec:  ? Continue antibiotics as below  ? Follow temperatures closely  ? Follow up repeat blood cultures  ? Recheck CBC, procalcitonin in AM     2  Left thigh/groin soft tissue infection   Cultures with Group B Strep, Strep anginosis  Status post I&D   OR findings showed infection confined to SQ tissues; muscle healthy, no necrotizing fasciitis   Wound noted to be clean on recent dressing changes  Rec:  ? Continue ceftriaxone for at least another day (?7 days total)  ? Serial exams and [address] per surgery     3  Acute hypoxic respiratory failure   Likely ARDS in the setting of #1   Scant secretions makes pneumonia less likely   Antibiotics as above would treat regardless  Improving slowly  Vent parameters weaning  Rec:  ? No additional antibiotics indicated  ? Supportive care as per the primary service     4  DEAN   In the setting of septic shock   Improved  Rec:  ? Fortunately ceftriaxone requires no renal dose adjustment     5  Uncontrolled DM with hyperglycemia   Hemoglobin A1c 12  Risk factor for all of above  Rec:  ? Continue management per primary     6  Morbid obesity   Risk factor for all of above  Rec:  ? Needs weight loss    The above plan was discussed in detail with the [facility] service      Antibiotics:  Ceftriaxone #2  Antibiotics #6  POD #5    Subjective:  Patient seen on AM rounds  Unable to provide ROS due to intubation,k lethargy  24 Hour Events: Tolerated PSV yesterday  Remains off pressors    Fever in the afternoon and WBC slightly up  Repeat blood cultures obtained  No nausea, vomiting, or diarrhea  Objective:  Vitals:  Temp:  [98 1 °F (36 7 °C)-102 5 °F (39 2 °C)] 99 7 °F (37 6 °C)  HR:  [] 77  Resp:  [18-33] 19  BP: ()/(51-75) 95/57  SpO2:  [90 %-97 %] 95 %  Temp (24hrs), Av 7 °F (37 6 °C), Min:98 1 °F (36 7 °C), Max:102 5 °F (39 2 °C)  Current: Temperature: 99 7 °F (37 6 °C)    Physical Exam:   General:  No acute distress  Eyes:  Normal lids and conjunctivae  ENT:  Normal external ears and nose  Neck:  Neck symmetric with midline trachea  Pulmonary:  Ventilated breath sounds without accessory muscle use  Cardiovascular:  Regular rate and rhythm; no peripheral edema  Gastrointestinal:  No tenderness or distention  Musculoskeletal:  No digital clubbing or cyanosis  Skin:  No visible rashes; No palpable nodules, left though wound packed without surrounding cellulitis  Neurologic:  Sensation grossly intact to light touch  Psychiatric:  Arousable but then lethargic  Lab Results:  I have personally reviewed pertinent labs    Results from last 7 days   Lab Units 19  0315 09/10/19  1613 09/10/19  0447  19  0426  19  0508  19  0846 19  0444  19  1636   POTASSIUM mmol/L 3 5 3 8 3 1*   < > 4 0   < > 3 4*   < >  --  3 4*   < >  --    CHLORIDE mmol/L 111* 111* 110*   < > 107   < > 105   < >  --  102   < >  --    CO2 mmol/L 23 22 21   < > 19*   < > 19*   < >  --  20*   < >  --    CO2, I-STAT mmol/L  --   --   --   --   --   --   --   --  19*  --   --  23   BUN mg/dL 24 27* 28*   < > 30*   < > 29*   < >  --  22   < >  --    CREATININE mg/dL 0 87 1 00 0 99   < > 0 82   < > 1 09   < >  --  0 97   < >  --    EGFR ml/min/1 73sq m 99 86 87   < > 102   < > 78   < >  --  89   < >  --    GLUCOSE, ISTAT mg/dl  --   --   --   --   --   --   --   --  144*  --   --  298*   CALCIUM mg/dL 7 6* 7 3* 7 5*   < > 7 5*   < > 7 3*   < >  --  7 5*   < >  --    AST U/L  --   --   --   --  64*  --  41  --   -- 27   < >  --    ALT U/L  --   --   --   --  30  --  23  --   --  25   < >  --    ALK PHOS U/L  --   --   --   --  105  --  101  --   --  102   < >  --     < > = values in this interval not displayed  Results from last 7 days   Lab Units 09/11/19  0315 09/10/19  0447 09/09/19  0426   WBC Thousand/uL 20 37* 16 93* 21 54*   HEMOGLOBIN g/dL 9 0* 8 7* 10 1*   PLATELETS Thousands/uL 195 168 179     Results from last 7 days   Lab Units 09/06/19  1534 09/06/19  1212   BLOOD CULTURE   --  No Growth After 4 Days  No Growth After 4 Days  GRAM STAIN RESULT  No polys seen*  4+ Gram negative rods*  3+ Gram positive cocci in pairs*  --        Imaging Studies:   I have personally reviewed pertinent imaging study reports and images in PACS  CXR reviewed personally limited due to body habitus but clear lungs with no new infiltrates    EKG, Pathology, and Other Studies:   I have personally reviewed pertinent reports

## 2019-09-11 NOTE — RESPIRATORY THERAPY NOTE
RT Ventilator Management Note  Rashawn Galvan 48 y o  male MRN: [id number]  Unit/Bed#:  Encounter: [id number]     09/10/19 1931   Vent Information   Vent    Vent type     Vent Mode CPAP/PS Spont   $ Vent Daily Charge-Subsequent Yes   $ Vital Capacity Mech/Peak Flow Yes   $ Pulse Oximetry Spot Check Charge Completed   SpO2 96 %   CPAP/PS Spont Settings   FIO2 (%) 50 %   PEEP (cmH2O) 5 cmH2O   Pressure Support (cmH2O) 5 cmH20   Trigger Sensitivity Flow (lpm) 3 LPM   Rise Time (%) 50 %   Esens % 25 %   Humidification Heater   Heater Temp 98 6 °F (37 °C)   CPAP/PS Spont Actuals   Resp Rate (BPM) 21 BPM   VT (mL) 529 mL   MV (Obs) 12 1   MAP (cmH2O) 7 cmH2O   Peak Pressure (cmH2O) 13 cmH2O   I/E Ratio (Obs) 1:2 9   Heater Temperature (Obs) 97 9 °F (36 6 °C)   CPAP/PS Spont Alarms   High Peak Pressure (cmH20) 45 cmH2O   High Resp Rate (BPM) 45 BPM   High MV (L/min) 24 L/min   Low MV (L/min) 4 L/min   High Subhash VTE (mL) 850 mL   Low Subhash VTE (mL) 250 mL   High SPONT VTE (mL) 850 mL   Low Spont VTE (mL) 250 mL   CPAP/PS Spont Apnea Settings   Resp Rate (BPM) 20 BPM   VT (mL) 450 mL   FIO2 (%) 100 %   Apnea Time (s) 20 S   Apnea Flow (LPM) 60 LPM   Maintenance   Alarm (pink) cable attached Yes   Resuscitation bag with peep valve at bedside Yes   Water bag changed No   Circuit changed No   ETT  Cuffed;Oral;Inflated 8 mm   Placement Date/Time: 09/06/19 1555   Mask Ventilation: Mask ventilation not attempted (0)  Preoxygenated: Yes  Technique: Direct laryngoscopy;Rapid sequence  Type: Cuffed;Oral;Inflated  Tube Size: 8 mm  Laryngoscope: Mac  Blade Size: 3  Location: Oral  Secured at (cm) 26   Measured from [facility] by Commercial tube crespo       Daily Screen       9/9/2019  6907 9/10/2019  9030          Patient safety screen outcome[de-identified]  Failed  Failed      Not Ready for Weaning due to[de-identified]  PEEP > 8cmH2O;Hemodynamically unstable; Underline problem not resolved  Underline problem not resolved              Physical Exam:        Pt resting comfortably on vent at this time no distress no changes made

## 2019-09-12 PROBLEM — K90.41 GLUTEN INTOLERANCE: Status: ACTIVE | Noted: 2019-09-12

## 2019-09-12 LAB
ANION GAP SERPL CALCULATED.3IONS-SCNC: 11 MMOL/L (ref 4–13)
ATRIAL RATE: 114 BPM
BUN SERPL-MCNC: 23 MG/DL (ref 5–25)
CALCIUM SERPL-MCNC: 7.6 MG/DL (ref 8.3–10.1)
CHLORIDE SERPL-SCNC: 105 MMOL/L (ref 100–108)
CO2 SERPL-SCNC: 23 MMOL/L (ref 21–32)
CREAT SERPL-MCNC: 0.74 MG/DL (ref 0.6–1.3)
ERYTHROCYTE [DISTWIDTH] IN BLOOD BY AUTOMATED COUNT: 16.5 % (ref 11.6–15.1)
GFR SERPL CREATININE-BSD FRML MDRD: 106 ML/MIN/1.73SQ M
GLUCOSE SERPL-MCNC: 104 MG/DL (ref 65–140)
GLUCOSE SERPL-MCNC: 106 MG/DL (ref 65–140)
GLUCOSE SERPL-MCNC: 114 MG/DL (ref 65–140)
GLUCOSE SERPL-MCNC: 120 MG/DL (ref 65–140)
GLUCOSE SERPL-MCNC: 123 MG/DL (ref 65–140)
GLUCOSE SERPL-MCNC: 126 MG/DL (ref 65–140)
GLUCOSE SERPL-MCNC: 139 MG/DL (ref 65–140)
GLUCOSE SERPL-MCNC: 143 MG/DL (ref 65–140)
GLUCOSE SERPL-MCNC: 156 MG/DL (ref 65–140)
GLUCOSE SERPL-MCNC: 158 MG/DL (ref 65–140)
GLUCOSE SERPL-MCNC: 171 MG/DL (ref 65–140)
GLUCOSE SERPL-MCNC: 172 MG/DL (ref 65–140)
GLUCOSE SERPL-MCNC: 179 MG/DL (ref 65–140)
HCT VFR BLD AUTO: 29.4 % (ref 36.5–49.3)
HGB BLD-MCNC: 9.5 G/DL (ref 12–17)
MAGNESIUM SERPL-MCNC: 1.9 MG/DL (ref 1.6–2.6)
MCH RBC QN AUTO: 31.6 PG (ref 26.8–34.3)
MCHC RBC AUTO-ENTMCNC: 32.3 G/DL (ref 31.4–37.4)
MCV RBC AUTO: 98 FL (ref 82–98)
P AXIS: 41 DEGREES
PHOSPHATE SERPL-MCNC: 3.2 MG/DL (ref 2.7–4.5)
PLATELET # BLD AUTO: 240 THOUSANDS/UL (ref 149–390)
PMV BLD AUTO: 9.6 FL (ref 8.9–12.7)
POTASSIUM SERPL-SCNC: 3.8 MMOL/L (ref 3.5–5.3)
PROCALCITONIN SERPL-MCNC: 0.66 NG/ML
QRS AXIS: 71 DEGREES
QRSD INTERVAL: 84 MS
QT INTERVAL: 292 MS
QTC INTERVAL: 402 MS
RBC # BLD AUTO: 3.01 MILLION/UL (ref 3.88–5.62)
SODIUM SERPL-SCNC: 139 MMOL/L (ref 136–145)
T WAVE AXIS: 21 DEGREES
VENTRICULAR RATE: 114 BPM
WBC # BLD AUTO: 24.91 THOUSAND/UL (ref 4.31–10.16)

## 2019-09-12 PROCEDURE — 84100 ASSAY OF PHOSPHORUS: CPT | Performed by: INTERNAL MEDICINE

## 2019-09-12 PROCEDURE — 83735 ASSAY OF MAGNESIUM: CPT | Performed by: INTERNAL MEDICINE

## 2019-09-12 PROCEDURE — 99233 SBSQ HOSP IP/OBS HIGH 50: CPT | Performed by: INTERNAL MEDICINE

## 2019-09-12 PROCEDURE — G8979 MOBILITY GOAL STATUS: HCPCS

## 2019-09-12 PROCEDURE — NC001 PR NO CHARGE: Performed by: PHYSICIAN ASSISTANT

## 2019-09-12 PROCEDURE — 94760 N-INVAS EAR/PLS OXIMETRY 1: CPT

## 2019-09-12 PROCEDURE — 94640 AIRWAY INHALATION TREATMENT: CPT

## 2019-09-12 PROCEDURE — 80048 BASIC METABOLIC PNL TOTAL CA: CPT | Performed by: INTERNAL MEDICINE

## 2019-09-12 PROCEDURE — 87493 C DIFF AMPLIFIED PROBE: CPT | Performed by: PHYSICIAN ASSISTANT

## 2019-09-12 PROCEDURE — G8978 MOBILITY CURRENT STATUS: HCPCS

## 2019-09-12 PROCEDURE — 82948 REAGENT STRIP/BLOOD GLUCOSE: CPT

## 2019-09-12 PROCEDURE — 93010 ELECTROCARDIOGRAM REPORT: CPT | Performed by: INTERNAL MEDICINE

## 2019-09-12 PROCEDURE — 84145 PROCALCITONIN (PCT): CPT | Performed by: INTERNAL MEDICINE

## 2019-09-12 PROCEDURE — 92610 EVALUATE SWALLOWING FUNCTION: CPT

## 2019-09-12 PROCEDURE — 97163 PT EVAL HIGH COMPLEX 45 MIN: CPT

## 2019-09-12 PROCEDURE — 97110 THERAPEUTIC EXERCISES: CPT

## 2019-09-12 PROCEDURE — G8997 SWALLOW GOAL STATUS: HCPCS

## 2019-09-12 PROCEDURE — 99232 SBSQ HOSP IP/OBS MODERATE 35: CPT | Performed by: INTERNAL MEDICINE

## 2019-09-12 PROCEDURE — 85027 COMPLETE CBC AUTOMATED: CPT | Performed by: INTERNAL MEDICINE

## 2019-09-12 PROCEDURE — G8996 SWALLOW CURRENT STATUS: HCPCS

## 2019-09-12 RX ORDER — POTASSIUM CHLORIDE 14.9 MG/ML
20 INJECTION INTRAVENOUS ONCE
Status: DISCONTINUED | OUTPATIENT
Start: 2019-09-12 | End: 2019-09-12

## 2019-09-12 RX ORDER — SACCHAROMYCES BOULARDII 250 MG
250 CAPSULE ORAL 2 TIMES DAILY
Status: DISCONTINUED | OUTPATIENT
Start: 2019-09-12 | End: 2019-09-18 | Stop reason: HOSPADM

## 2019-09-12 RX ORDER — MAGNESIUM SULFATE HEPTAHYDRATE 40 MG/ML
2 INJECTION, SOLUTION INTRAVENOUS ONCE
Status: COMPLETED | OUTPATIENT
Start: 2019-09-12 | End: 2019-09-12

## 2019-09-12 RX ORDER — POTASSIUM CHLORIDE 20 MEQ/1
20 TABLET, EXTENDED RELEASE ORAL ONCE
Status: COMPLETED | OUTPATIENT
Start: 2019-09-12 | End: 2019-09-12

## 2019-09-12 RX ORDER — HYDROCODONE BITARTRATE AND ACETAMINOPHEN 5; 325 MG/1; MG/1
1 TABLET ORAL EVERY 6 HOURS PRN
Status: DISCONTINUED | OUTPATIENT
Start: 2019-09-12 | End: 2019-09-18 | Stop reason: HOSPADM

## 2019-09-12 RX ORDER — ACETAMINOPHEN 325 MG/1
325 TABLET ORAL EVERY 6 HOURS SCHEDULED
Status: DISCONTINUED | OUTPATIENT
Start: 2019-09-12 | End: 2019-09-18 | Stop reason: HOSPADM

## 2019-09-12 RX ADMIN — FENTANYL CITRATE 50 MCG: 50 INJECTION INTRAMUSCULAR; INTRAVENOUS at 13:39

## 2019-09-12 RX ADMIN — ENOXAPARIN SODIUM 40 MG: 40 INJECTION SUBCUTANEOUS at 09:45

## 2019-09-12 RX ADMIN — ACETAMINOPHEN 325 MG: 325 TABLET ORAL at 18:09

## 2019-09-12 RX ADMIN — SENNOSIDES 8.6 MG: 8.6 TABLET, FILM COATED ORAL at 09:46

## 2019-09-12 RX ADMIN — MAGNESIUM SULFATE HEPTAHYDRATE 2 G: 40 INJECTION, SOLUTION INTRAVENOUS at 07:44

## 2019-09-12 RX ADMIN — ENOXAPARIN SODIUM 40 MG: 40 INJECTION SUBCUTANEOUS at 21:28

## 2019-09-12 RX ADMIN — FENTANYL CITRATE 50 MCG: 50 INJECTION INTRAMUSCULAR; INTRAVENOUS at 11:32

## 2019-09-12 RX ADMIN — ACETAMINOPHEN 325 MG: 325 TABLET ORAL at 23:34

## 2019-09-12 RX ADMIN — POTASSIUM CHLORIDE 20 MEQ: 1500 TABLET, EXTENDED RELEASE ORAL at 07:47

## 2019-09-12 RX ADMIN — FUROSEMIDE 40 MG: 10 INJECTION, SOLUTION INTRAMUSCULAR; INTRAVENOUS at 07:44

## 2019-09-12 RX ADMIN — ISODIUM CHLORIDE 3 ML: 0.03 SOLUTION RESPIRATORY (INHALATION) at 07:16

## 2019-09-12 RX ADMIN — Medication 1 APPLICATION: at 11:36

## 2019-09-12 RX ADMIN — OXYCODONE HYDROCHLORIDE 5 MG: 5 TABLET ORAL at 01:36

## 2019-09-12 RX ADMIN — ISODIUM CHLORIDE 3 ML: 0.03 SOLUTION RESPIRATORY (INHALATION) at 01:41

## 2019-09-12 RX ADMIN — Medication 250 MG: at 18:07

## 2019-09-12 RX ADMIN — Medication 20 MG: at 13:02

## 2019-09-12 RX ADMIN — Medication 250 MG: at 09:46

## 2019-09-12 RX ADMIN — ACETAMINOPHEN 650 MG: 325 TABLET, FILM COATED ORAL at 19:46

## 2019-09-12 RX ADMIN — HYDROCODONE BITARTRATE AND ACETAMINOPHEN 1 TABLET: 5; 325 TABLET ORAL at 18:09

## 2019-09-12 RX ADMIN — HYDROCODONE BITARTRATE AND ACETAMINOPHEN 1 TABLET: 5; 325 TABLET ORAL at 23:34

## 2019-09-12 RX ADMIN — CEFTRIAXONE SODIUM 2000 MG: 10 INJECTION, POWDER, FOR SOLUTION INTRAVENOUS at 09:36

## 2019-09-12 RX ADMIN — LEVALBUTEROL 1.25 MG: 1.25 SOLUTION, CONCENTRATE RESPIRATORY (INHALATION) at 07:16

## 2019-09-12 RX ADMIN — LEVALBUTEROL 1.25 MG: 1.25 SOLUTION, CONCENTRATE RESPIRATORY (INHALATION) at 01:41

## 2019-09-12 RX ADMIN — Medication 3 UNITS/HR: at 09:33

## 2019-09-12 RX ADMIN — FUROSEMIDE 40 MG: 10 INJECTION, SOLUTION INTRAMUSCULAR; INTRAVENOUS at 17:00

## 2019-09-12 RX ADMIN — ACETAMINOPHEN 325 MG: 325 TABLET ORAL at 14:52

## 2019-09-12 RX ADMIN — Medication 20 MG: at 18:07

## 2019-09-12 NOTE — PHYSICAL THERAPY NOTE
PHYSICAL THERAPY TREATMENT NOTE    Patient Name: Nabeel Preston  LFZTH'D Date: 9/12/2019 09/12/19 1621   Pain Assessment   Pain Assessment FLACC   Pain Location Abdomen;Buttocks;Leg   Pain Orientation Bilateral   Pain Rating: FLACC (Rest) - Face 1   Pain Rating: FLACC (Rest) - Legs 0   Pain Rating: FLACC (Rest) - Activity 0   Pain Rating: FLACC (Rest) - Cry 1   Pain Rating: FLACC (Rest) - Consolability 1   Score: FLACC (Rest) 3   Pain Rating: FLACC (Activity) - Face 1   Pain Rating: FLACC (Activity) - Legs 1   Pain Rating: FLACC (Activity) - Activity 1   Pain Rating: FLACC (Activity) - Cry 1   Pain Rating: FLACC (Activity) - Consolability 1   Score: FLACC (Activity) 5   Restrictions/Precautions   Other Precautions Multiple lines;Telemetry; Fall Risk;Pain   General   Chart Reviewed Yes   Family/Caregiver Present Yes   Cognition   Arousal/Participation Cooperative   Attention Attends with cues to redirect   Orientation Level Oriented to person; Other (Comment)  (pt was identified w/ full name, brith date)   Following Commands Follows one step commands with increased time or repetition   Subjective   Subjective pt agreed to participate in PT intervention  Activity Tolerance   Activity Tolerance Patient limited by fatigue;Patient limited by pain   Nurse Made Aware [facility] Use   Comments ankle pumps 20  quad sets and glut sets 10 each  Assessment   Prognosis Guarded   Problem List Decreased strength;Decreased range of motion;Decreased endurance; Impaired balance;Decreased mobility; Decreased coordination;Decreased safety awareness; Obesity; Decreased skin integrity;Pain   Assessment Pt was initiated w/ participation in exercise program to address physical and mobility deficits noted during eval  Pt required occasional input to maintain technique and complete all reps   Handout was provided to expedite understanding of technique  Rest breaks were needed due to fatigue and pain  Further inpatient PT intervention is necessary to decrease fall risk and maximize functional independence  Goals   Patient Goals get back to everything   STG Expiration Date 09/22/19   Short Term Goal #1 pt will: Increase bilateral LE strength 1/2 grade to facilitate independent mobility, Perform rolling and repositioning in bed w/ modx1 to decrease fall risk factors, Complete exercise program independently to increase strength and endurance, Tolerate 3 hr OOB to faciliate upright tolerance and Improve Barthel Index score to 45 or greater to facilitate independence  PT to see when sitting edge of bed is appropriate  Treatment Day 1   Plan   Treatment/Interventions LE strengthening/ROM; Therapeutic exercise; Endurance training;Patient/family training;Equipment eval/education; Bed mobility  (PT to see when sitting edge of bed is appropriate)   Progress Progressing toward goals   PT Frequency 5x/wk   Recommendation   Recommendation Short-term skilled PT;OT consult   Equipment Recommended Other (Comment)  (pt needs dependent means for out of bed mobilization )     Skilled inpatient PT recommended while in hospital to progress pt toward treatment goals      Zully Bceerra, PT

## 2019-09-12 NOTE — PLAN OF CARE
Problem: PHYSICAL THERAPY ADULT  Goal: Performs mobility at highest level of function for planned discharge setting  See evaluation for individualized goals  Description  Treatment/Interventions: LE strengthening/ROM, Therapeutic exercise, Endurance training, Patient/family training, Equipment eval/education, Bed mobility(PT to see when sitting edge of bed is appropriate )  Equipment Recommended: Other (Comment)(pt needs dependent means for out of bed mobilization )       See flowsheet documentation for full assessment, interventions and recommendations  Outcome: Progressing  Note:   Prognosis: Guarded  Problem List: Decreased strength, Decreased range of motion, Decreased endurance, Impaired balance, Decreased mobility, Decreased coordination, Decreased safety awareness, Obesity, Decreased skin integrity, Pain  Assessment: Pt presents in ER with worsening cellulitis for the last week  Dx: septic shock, left thigh/groin soft tissue infection, diarrhea, acute hypoxic respiratory failure, a-fib, DEAN, uncontrolled DM w/ hyperglycemia, and morbid obesity  9/7/19 excisional debridement of wound  9/6/19 incision, drainage, and debridement of left thigh and perineal necrotizing soft tissue infection  order placed for PT eval and tx  Akiko Schmidt stated pt is appropriate for PT assessment and mobility training  pt presents w/ comorbidities of morbid obesity, cellulitis, gout, HTN, OA, venous insufficiency, and DM and personal factors of living in 2 story house and stair(s) to enter home  pt presents w/ pain, weakness, decreased ROM, decreased endurance, impaired coordination, decreased safety awareness, fall risk, LE edema and impaired skin integrity   these impairments are evident in findings from physical examination (weakness, decreased ROM, impaired coordination, impaired skin integrity and edema of extremities), mobility assessment (need for assist x2 for rolling and repositioning in bed, inability to mobilize out of bed, need for input for mobility and breathing technique), and Barthel Index: 20/100  pt needed input for task focus and mobility technique  pt is at risk for falls due to physical and safety awareness deficits  pt's clinical presentation is unstable/unpredictable (evident in tachycardia, need for assist x2 w/ bed mobility when usually mobilizing independently, pain impacting overall mobility status and need for input for mobility technique/safety)  pt needs inpatient PT tx to improve mobility deficits and progress mobility training as appropriate  discharge recommendation is for inpatient rehab to reduce fall risk and maximize level of functional independence  Pt would benefit from OT consult to address cognition and safety awareness  Pt needs dependent means for out of bed mobilization  Recommendation: Short-term skilled PT, OT consult          See flowsheet documentation for full assessment

## 2019-09-12 NOTE — DISCHARGE INSTR - DIET
Regular diet w/ thin liquids  meds as tolerated  Fully upright as able for meals  Aspiration precautions

## 2019-09-12 NOTE — PLAN OF CARE
Delayed cough noted after liquids- ?  Aspiration risk  Maintain aspiration precautions- fully upright as able

## 2019-09-12 NOTE — PROGRESS NOTES
Progress Note - Infectious Disease   Mari Pedroza 48 y o  male MRN: [id number]  Unit/Bed#:  Encounter: [id number]      Impression/Recommendations:  1  Septic shock   POA:  Fever, WBC, refractory hypotension   Due to #2   No other appreciable source   Blood cultures negative   Worsening fever, WBC over past 48 hours, although overall clinically improving  Repeat blood cultures negative  Repeat CXR unchanged  Consider drug fever, atelectasis  Consider C  Diff  Procalcitonin improving 0 66 << 2 96  Rec:  ? Discontinue antibiotics as below to complete treatment course  ? Follow temperatures closely  ? Follow up repeat blood cultures  ? Recheck CBC in AM  ? If worsens without source, may need repeat imaging of leg/perineum     2  Left thigh/groin soft tissue infection   Cultures with Group B Strep, Strep anginosis   Status post I&D   OR findings showed infection confined to SQ tissues; muscle healthy, no necrotizing fasciitis   Wound noted to be clean on recent dressing changes  Rec:  ? Discontinue ceftriaxone today to complete treatment course  ? Serial exams and [address] per surgery     3  Diarrhea  Consider antibiotic associated  Consider due to bowel regimen although only got Senna x1 9/9 and again this AM   Consider C  Diff given fever, rising WBC  Rec:  · Follow stool output closely  · If diarrhea continues check C  Diff  4   Acute hypoxic respiratory failure   Likely ARDS in the setting of #1   Scant secretions makes pneumonia less likely   Antibiotics as above would treat regardless  Improved, now extubated  Rec:  ? No additional antibiotics indicated  ? Supportive care as per the primary service     5   DEAN   In the setting of septic shock   Improved  Rec:  ? Fortunately ceftriaxone requires no renal dose adjustment     6  Uncontrolled DM with hyperglycemia   Hemoglobin A1c 12  Risk factor for all of above  Rec:  ? Continue management per primary     7    Morbid obesity   Risk factor for all of above  Rec:  ? Needs weight loss     The above plan was discussed in detail with the [facility] service       Antibiotics:  Ceftriaxone #3  Antibiotics #7  POD #6    Subjective:  Patient seen on PM rounds  Denies fevers, chills, sweats, nausea, vomiting  Tired after just being rolled to be cleaned up     24 Hour Events:  Seen by Speech and noted to have delayed cough after liquids, possibly aspirating  Multiple watery stools yesterday but none so far today   Fever overnight and WBC rising  Objective:  Vitals:  Temp:  [98 °F (36 7 °C)-101 3 °F (38 5 °C)] 98 1 °F (36 7 °C)  HR:  [104-116] 115  Resp:  [16-30] 22  BP: (123-150)/(73-90) 129/80  SpO2:  [94 %-100 %] 96 %  Temp (24hrs), Av 3 °F (37 4 °C), Min:98 °F (36 7 °C), Max:101 3 °F (38 5 °C)  Current: Temperature: 98 1 °F (36 7 °C)    Physical Exam:   General:  No acute distress  Eyes:  Normal lids and conjunctivae  ENT:  Normal external ears and nose  Neck:  Neck symmetric with midline trachea  Pulmonary:  Normal respiratory effort without accessory muscle use  Cardiovascular:  Regular rate and rhythm; no peripheral edema  Gastrointestinal:  No tenderness or distention  Musculoskeletal:  No digital clubbing or cyanosis  Skin:  No visible rashes; No palpable nodules  Neurologic:  Sensation grossly intact to light touch  Psychiatric:  Alert and oriented; Normal mood    Lab Results:  I have personally reviewed pertinent labs    Results from last 7 days   Lab Units 19  0354 19  1708 19  0315  19  0426  19  0508  19  0846 19  0444  19  1636   POTASSIUM mmol/L 3 8 3 5 3 5   < > 4 0   < > 3 4*   < >  --  3 4*   < >  --    CHLORIDE mmol/L 105 111* 111*   < > 107   < > 105   < >  --  102   < >  --    CO2 mmol/L 23 26 23   < > 19*   < > 19*   < >  --  20*   < >  --    CO2, I-STAT mmol/L  --   --   --   --   --   --   --   --  19*  --   --  23   BUN mg/dL 23 22 24   < > 30*   < > 29*   < >  --  22   < >  -- CREATININE mg/dL 0 74 0 77 0 87   < > 0 82   < > 1 09   < >  --  0 97   < >  --    EGFR ml/min/1 73sq m 106 104 99   < > 102   < > 78   < >  --  89   < >  --    GLUCOSE, ISTAT mg/dl  --   --   --   --   --   --   --   --  144*  --   --  298*   CALCIUM mg/dL 7 6* 7 6* 7 6*   < > 7 5*   < > 7 3*   < >  --  7 5*   < >  --    AST U/L  --   --   --   --  64*  --  41  --   --  27   < >  --    ALT U/L  --   --   --   --  30  --  23  --   --  25   < >  --    ALK PHOS U/L  --   --   --   --  105  --  101  --   --  102   < >  --     < > = values in this interval not displayed  Results from last 7 days   Lab Units 09/12/19  0354 09/11/19  0315 09/10/19  0447   WBC Thousand/uL 24 91* 20 37* 16 93*   HEMOGLOBIN g/dL 9 5* 9 0* 8 7*   PLATELETS Thousands/uL 240 195 168     Results from last 7 days   Lab Units 09/10/19  1403 09/10/19  1401 09/06/19  1534 09/06/19  1212   BLOOD CULTURE  No Growth at 24 hrs  No Growth at 24 hrs   --  No Growth After 5 Days  No Growth After 5 Days  GRAM STAIN RESULT   --   --  No polys seen*  4+ Gram negative rods*  3+ Gram positive cocci in pairs*  --        Imaging Studies:   I have personally reviewed pertinent imaging study reports and images in PACS  EKG, Pathology, and Other Studies:   I have personally reviewed pertinent reports

## 2019-09-12 NOTE — SPEECH THERAPY NOTE
Speech-Language Pathology Bedside Swallow Evaluation        Patient Name: Pedro Phelps    TQIYI'W Date: 9/12/2019     Problem List  Principal Problem:    Necrotizing soft tissue infection  Active Problems:    GERD (gastroesophageal reflux disease)    Hyperlipidemia    Morbid obesity ([address] )    Recurrent cellulitis of lower extremity    Type 2 diabetes mellitus with complication (HCC)    Septic shock (HCC)    Hypokalemia    Elevated procalcitonin    Acute metabolic encephalopathy    Acute hypoxemic respiratory failure (HCC)    Volume overload    Gluten intolerance         Past Medical History  Past Medical History:   Diagnosis Date    Cellulitis     last assessed 12/10/15    Diabetes mellitus ([address] )     Edema     Elevated liver enzymes     Esophageal reflux     Gluten intolerance     Gout     last assessed 09/05/13    Hyperglycemia     Hypertension     IBS (irritable bowel syndrome)     Insomnia     Obesity     Osteoarthritis of knee     last assessed 02/10/14    Prehypertension     last assessed 08/22/17    Venous insufficiency     last assessed 08/22/17    Villonodular synovitis of the hand, right     last assessed 11/14/2013       Past Surgical History  Past Surgical History:   Procedure Laterality Date    INCISION AND DRAINAGE OF WOUND Left 9/6/2019    Procedure: INCISION AND DRAINAGE (I&D) GROIN;  Surgeon: Evelio Arteaga DO;  Location: [facility];  Service: General    WOUND DEBRIDEMENT Left 9/7/2019    Procedure: EXCISIONAL DEBRIDEMENT;  Surgeon: Evelio Arteaga DO;  Location[facility];  Service: General       Summary    Pt presents with questionable mild oropharyngeal dysphagia due to generalized weakness, fatigue, and difficulty sitting fully upright  occas delayed cough noted during meal- ?  Aspiration risk      Recommendations:   Diet: regular diet and thin liquids   Meds: whole with liquid   Frequent Oral care: 2x/day  Aspiration precautions and compensatory swallowing strategies: upright posture, slow rate of feeding and small bites/sips  Other Recommendations/ considerations: will follow up x 1-2 for diet tolerance and ensure minimal aspiration risk        Current Medical Status  Pt is a 48 y o  male who presented to [address] [place] [address] with necrotizing soft tissue infection  Pt presents in ER with worsening cellulitis for the last week  He has history of recurrent cellulitis in different areas, which according to him started on the left lower leg in the lateral area which looked like his typical cellulitis  He was taking antibiotic doxcycline 100 mg every 12 hours for episodes of cellulitis  Within the 5 days, he said that cellulitis went from lower left leg in the lateral area continued to go upward to the left upper thigh in the medial area up to his groin which he described as fast   Sometimes 1 area of the cellulitis will improve but will show up on another area  He did not worry about it because it looked similar to his previous cellulitis  He noticed, when he sat on the toilet that there was bleeding with foul smell  Since, it is not getting better, and the pain is worsening to the point that he cannot lay flat in bed  He decided to go to the hospital     Pt was intubated and taken emergently to the OR for excision and debridement  Pt was extubated 9/11/19  Past medical history:   Please see H&P for details    Special Studies:  CXR: 9/11/19 Improved aeration at the lung bases      Social/Education/Vocational Hx:  Pt lives with family    Swallow Information   Current Risks for Dysphagia & Aspiration: recent intubation  Current Symptoms/Concerns: passed nsg screen   Current Diet: regular diet and thin liquids   Baseline Diet: regular diet and thin liquids    Baseline Assessment   Behavior/Cognition: alert and confused at times   Speech/Language Status: able to participate in conversation and able to follow commands  Patient Positioning: upright in bed     Swallow Mechanism Exam Facial: symmetrical  Labial: WFL  Lingual: WFL  Velum: unable to visualize  Mandible: adequate ROM  Dentition: adequate  Vocal quality:clear/adequate   Volitional Cough: strong/productive   Respiratory: NC    Consistencies Assessed and Performance   Consistencies Administered:  Pt seen at breakfast w/ omelet, fresh fruit cup and diet soda by straw    Oral Stage: pt was able to bite large fruit pcs  And drink from straw w/o difficulty  Pt w/ slow but functional mastication/manipulation & transfer  Adequate oral control of liquids  Pharyngeal Stage: Swallows appeared timely and complete, occas delayed cough w/ fresh fruit (pineapple) and after drinking soda by straw- ? Aspiration risk         Esophageal Concerns: none reported      Results Reviewed with: patient, RN, MD and family   Dysphagia Goals: pt will tolerate regular diet  with thin liquids without s/s of aspiration x48 hours       Peter Ng CCC-SLP  Speech Pathogist  Available via  tiger text

## 2019-09-12 NOTE — PLAN OF CARE
Problem: PHYSICAL THERAPY ADULT  Goal: Performs mobility at highest level of function for planned discharge setting  See evaluation for individualized goals  Description  Treatment/Interventions: LE strengthening/ROM, Therapeutic exercise, Endurance training, Patient/family training, Equipment eval/education, Bed mobility(PT to see when sitting edge of bed is appropriate )  Equipment Recommended: Other (Comment)(pt needs dependent means for out of bed mobilization )       See flowsheet documentation for full assessment, interventions and recommendations  9/12/2019 1707 by Robel Mandujano PT  Outcome: Progressing  Note:   Prognosis: Guarded  Problem List: Decreased strength, Decreased range of motion, Decreased endurance, Impaired balance, Decreased mobility, Decreased coordination, Decreased safety awareness, Obesity, Decreased skin integrity, Pain  Assessment: Pt was initiated w/ participation in exercise program to address physical and mobility deficits noted during eval  Pt required occasional input to maintain technique and complete all reps  Handout was provided to expedite understanding of technique  Rest breaks were needed due to fatigue and pain  Further inpatient PT intervention is necessary to decrease fall risk and maximize functional independence  Recommendation: Short-term skilled PT, OT consult          See flowsheet documentation for full assessment  9/12/2019 1703 by Robel Mandujano PT  Outcome: Progressing  Note:   Prognosis: Guarded  Problem List: Decreased strength, Decreased range of motion, Decreased endurance, Impaired balance, Decreased mobility, Decreased coordination, Decreased safety awareness, Obesity, Decreased skin integrity, Pain  Assessment: Pt presents in ER with worsening cellulitis for the last week   Dx: septic shock, left thigh/groin soft tissue infection, diarrhea, acute hypoxic respiratory failure, a-fib, DEAN, uncontrolled DM w/ hyperglycemia, and morbid obesity  9/7/19 excisional debridement of wound  9/6/19 incision, drainage, and debridement of left thigh and perineal necrotizing soft tissue infection  order placed for PT eval and tx  Milind Clemons stated pt is appropriate for PT assessment and mobility training  pt presents w/ comorbidities of morbid obesity, cellulitis, gout, HTN, OA, venous insufficiency, and DM and personal factors of living in 2 story house and stair(s) to enter home  pt presents w/ pain, weakness, decreased ROM, decreased endurance, impaired coordination, decreased safety awareness, fall risk, LE edema and impaired skin integrity  these impairments are evident in findings from physical examination (weakness, decreased ROM, impaired coordination, impaired skin integrity and edema of extremities), mobility assessment (need for assist x2 for rolling and repositioning in bed, inability to mobilize out of bed, need for input for mobility and breathing technique), and Barthel Index: 20/100  pt needed input for task focus and mobility technique  pt is at risk for falls due to physical and safety awareness deficits  pt's clinical presentation is unstable/unpredictable (evident in tachycardia, need for assist x2 w/ bed mobility when usually mobilizing independently, pain impacting overall mobility status and need for input for mobility technique/safety)  pt needs inpatient PT tx to improve mobility deficits and progress mobility training as appropriate  discharge recommendation is for inpatient rehab to reduce fall risk and maximize level of functional independence  Pt would benefit from OT consult to address cognition and safety awareness  Pt needs dependent means for out of bed mobilization  Recommendation: Short-term skilled PT, OT consult          See flowsheet documentation for full assessment

## 2019-09-12 NOTE — PHYSICAL THERAPY NOTE
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]  Necrotizing soft tissue infection [M79 89]  Sepsis, due to unspecified organism ([place] [address] ) [A41 9]  Type 2 diabetes mellitus with complication, unspecified whether long term insulin use ([place] [address] ) [E11 8]    Past Medical History:   Diagnosis Date    Cellulitis     last assessed 12/10/15    Diabetes mellitus ([place] [address] )     Edema     Elevated liver enzymes     Esophageal reflux     Gluten intolerance     Gout     last assessed 09/05/13    Hyperglycemia     Hypertension     IBS (irritable bowel syndrome)     Insomnia     Obesity     Osteoarthritis of knee     last assessed 02/10/14    Prehypertension     last assessed 08/22/17    Venous insufficiency     last assessed 08/22/17    Villonodular synovitis of the hand, right     last assessed 11/14/2013     Length Of Stay: 6  PHYSICAL THERAPY EVALUATION :    09/12/19 1611   Pain Assessment   Pain Assessment FLACC   Pain Location Abdomen;Buttocks;Leg   Pain Rating: FLACC (Rest) - Face 1   Pain Rating: FLACC (Rest) - Legs 0   Pain Rating: FLACC (Rest) - Activity 0   Pain Rating: FLACC (Rest) - Cry 1   Pain Rating: FLACC (Rest) - Consolability 1   Score: FLACC (Rest) 3   Pain Rating: FLACC (Activity) - Face 1   Pain Rating: FLACC (Activity) - Legs 1   Pain Rating: FLACC (Activity) - Activity 1   Pain Rating: FLACC (Activity) - Cry 1   Pain Rating: FLACC (Activity) - Consolability 1   Score: FLACC (Activity) 5   Home Living   Type of Home House   Home Layout Two level;1/2 bath on main level;Bed/bath upstairs; Other (Comment)  (2 ANTELMO)   Additional Comments lives w/ spouse  ambulates w/o device  independent w/ ADLs and driving  no falls in last 6 months      Prior Function   Comments pt seen supine in bed w/ wife present  agreed to participate in PT eval  c/o pain as noted above, unable to provide numeric rating  pt states goal is to get back to everything  Restrictions/Precautions   Other Precautions Multiple lines;Telemetry; Fall Risk;Pain   General   Additional Pertinent History room air resting pulse ox 96% and 112 BPM, acvtive 94% and 126 BPM    Family/Caregiver Present Yes   Cognition   Arousal/Participation Alert   Orientation Level Oriented to person; Other (Comment)  (pt was identified w/ full name, brith date)   Following Commands Follows one step commands with increased time or repetition   Comments 2+ edema all extremities   RUE Assessment   RUE Assessment X  (3/5, shoulder 3-/5)   LUE Assessment   LUE Assessment X  (3/5, shoulders 3-/5)   RLE Assessment   RLE Assessment X  (hip 2+/5, knee flex 2-/5 extension 3-/5, ankle 3-/5)   LLE Assessment   LLE Assessment X  (hip 2+/5, knee flex 2-/5 extension 3-/5, ankle 3-/5)   Coordination   Movements are Fluid and Coordinated 0   Coordination and Movement Description impaired coordination UEs   Light Touch   RLE Light Touch Grossly intact   LLE Light Touch Grossly intact   Bed Mobility   Rolling R 2  Maximal assistance   Additional items Assist x 2;Bedrails; Increased time required;Verbal cues;LE management   Rolling L 1  Dependent   Additional items Assist x 2;Bedrails; Increased time required;Verbal cues;LE management   Supine to Sit Unable to assess   Balance   Static Sitting Zero   Activity Tolerance   Activity Tolerance Patient limited by fatigue;Patient limited by pain   Nurse Made Aware [facility] CM   Assessment   Prognosis Guarded   Problem List Decreased strength;Decreased range of motion;Decreased endurance; Impaired balance;Decreased mobility; Decreased coordination;Decreased safety awareness; Obesity; Decreased skin integrity;Pain   Assessment Pt presents in ER with worsening cellulitis for the last week  Dx: septic shock, left thigh/groin soft tissue infection, diarrhea, acute hypoxic respiratory failure, a-fib, DEAN, uncontrolled DM w/ hyperglycemia, and morbid obesity  9/7/19 excisional debridement of wound  9/6/19 incision, drainage, and debridement of left thigh and perineal necrotizing soft tissue infection  order placed for PT eval and tx  Radha Salgado stated pt is appropriate for PT assessment and mobility training  pt presents w/ comorbidities of morbid obesity, cellulitis, gout, HTN, OA, venous insufficiency, and DM and personal factors of living in 2 story house and stair(s) to enter home  pt presents w/ pain, weakness, decreased ROM, decreased endurance, impaired coordination, decreased safety awareness, fall risk, LE edema and impaired skin integrity  these impairments are evident in findings from physical examination (weakness, decreased ROM, impaired coordination, impaired skin integrity and edema of extremities), mobility assessment (need for assist x2 for rolling and repositioning in bed, inability to mobilize out of bed, need for input for mobility and breathing technique), and Barthel Index: 20/100  pt needed input for task focus and mobility technique  pt is at risk for falls due to physical and safety awareness deficits  pt's clinical presentation is unstable/unpredictable (evident in tachycardia, need for assist x2 w/ bed mobility when usually mobilizing independently, pain impacting overall mobility status and need for input for mobility technique/safety)  pt needs inpatient PT tx to improve mobility deficits and progress mobility training as appropriate  discharge recommendation is for inpatient rehab to reduce fall risk and maximize level of functional independence  Pt would benefit from OT consult to address cognition and safety awareness  Pt needs dependent means for out of bed mobilization     Goals   Patient Goals get back to everything   STG Expiration Date 09/22/19   Short Term Goal #1 pt will: Increase bilateral LE strength 1/2 grade to facilitate independent mobility, Perform rolling and repositioning in bed w/ modx1 to decrease fall risk factors, Complete exercise program independently to increase strength and endurance, Tolerate 3 hr OOB to faciliate upright tolerance and Improve Barthel Index score to 45 or greater to facilitate independence  PT to see when sitting edge of bed is appropriate  Plan   Treatment/Interventions LE strengthening/ROM; Therapeutic exercise; Endurance training;Patient/family training;Equipment eval/education; Bed mobility  (PT to see when sitting edge of bed is appropriate )   PT Frequency 5x/wk   Recommendation   Recommendation Short-term skilled PT;OT consult   Equipment Recommended Other (Comment)  (pt needs dependent means for out of bed mobilization )   Barthel Index   Feeding 10   Bathing 0   Grooming Score 5   Dressing Score 5   Bladder Score 0   Bowels Score 0   Toilet Use Score 0   Transfers (Bed/Chair) Score 0   Mobility (Level Surface) Score 0   Stairs Score 0   Barthel Index Score 20     Skilled PT recommended while in hospital and upon DC to progress pt toward treatment goals       Celsa Pena, PT

## 2019-09-12 NOTE — PROGRESS NOTES
Progress Note - Critical Care   Manju Casanova 48 y o  male MRN: [id number]  Unit/Bed#:  Encounter: [id number]    ----------------------------------------------------------------------------------------------  Chief Complaint: Mild groin pain     HPI/24hr events: Patient extubated yesterday  He was weaned off precedex and fentanyl infusions  Patient spike temperature yesterday/overnigt and received tylenol  Tmax 101 3  He was cleared for a diet following bedside dysphagia screen  Groin wound being packed daily with Dakin's soaked kerlix   ----------------------------------------------------------------------------------------------  Assessment and Plan  Principal Problem:    Necrotizing soft tissue infection  Active Problems:    GERD (gastroesophageal reflux disease)    Hyperlipidemia    Morbid obesity (HCC)    Recurrent cellulitis of lower extremity    Type 2 diabetes mellitus with complication ([facility])    Septic shock ([facility])    Hypokalemia    Elevated procalcitonin    Acute metabolic encephalopathy    Acute hypoxemic respiratory failure ([facility])    Volume overload  Resolved Problems:    Hyponatremia    DEAN (acute kidney injury) ([facility] )    Hyperbilirubinemia    Sinus tachycardia    Lactic acidosis    Bandemia    Hypotension    Hypocalcemia        Neuro:    Tylenol, oxycodone for mild-severe pain  Fentanyl IV for severe breakthrough pain and dressing change    Sleep/wake cycle regulation   CAM-ICU daily    CV:    Increasing tachycardia overnight likely secondary to fever/pain  Will continue to monitor    Monitor on Telemetry   Patient continues to appear volume overload  He continues to respond well to lasix  He is negative 500cc in the past 24h  Plan to continue BID lasix for now     Pulm:   Continue xopenex Q6 neb    Continue pulmonary hygiene  Incentive spirometer q1h while awake, encourage coughing and deep breathing   Upright positioning   Maintain SpO2 >92%    GI:    Patient cleared for regular diet following nursing dysphagia screening   Stress ulcer prophylaxis: Continue home PPI    Bowel regimen:  Continue senna     :    Duarte removed yesterday    DEAN resolved  Creatine 0 74   Monitor I/Os as we continue with diuresis    Continue to follow renal function tests    F/E/N:    Continue carb controlled diet    Replete electrolytes with as needed to maintain K >4 0, Mag >2 0, Phos >3 0    Heme/Onc:    Continue to trend CBC   Current hemoglobin 9 5   VTE prophylaxis:  Heparin SQ, SCD's to BLE    Endo:    Continue insulin gtt  Consider starting long acting insulin    Will benefit from endo consult following transfer out of the ICU     Lab Results   Component Value Date    HGBA1C 11 2 (H) 2019    HGBA1C 12 0 (H) 2015     ID:    ID following  Patient to complete 7 day course of antibiotics  Will defer to ID for possible longer course    Continue to monitor fever and WBC curve     MSK/Skin:    Surgery following for NSTI  Continue Dakin's soaked kerlix dressings  Will likely need to go to the OR later this week    Reposition q2h, eliminate pressure points while in bed   Close skin surveillance     VTE Pharmacologic Prophylaxis: Heparin  VTE Mechanical Prophylaxis: sequential compression device    Dispo: Transfer to [place]    Code Status: Level 1 - Full Code    ------------------------------------------------------------------------------------------    Vitals:  T maximum in 24 hours: Temp (24hrs), Av 6 °F (37 6 °C), Min:98 °F (36 7 °C), Max:101 3 °F (38 5 °C)    Current: Temperature: 100 °F (37 8 °C) Height and Weights:  Height: 5' 4" (162 6 cm)  IBW: 59 2 kg  Weight (last 2 days)     Date/Time   Weight    19 06   (!) 176 (387 79)    09/10/19 06   (!) 176 (388 67)           Intake and Output:  I/O       09/10 0701 -  0700 701 -  07    P  O   450    I V  (mL/kg) 1042 6 (5 9) 247 4 (1 4)    NG/ 30    IV Piggyback 100 400    Feedings 595 105    Total Intake(mL/kg) 1837 6 (10 4) 1232 4 (7)    Urine (mL/kg/hr) 2785 (0 7) 2201 (0 9)    Emesis/NG output      Stool 0 1    Total Output 2785 2202    Net -947 4 -529 6          Unmeasured Urine Occurrence  1 x    Unmeasured Stool Occurrence 2 x 1 x           ---------------------------------------------------------------------------------------  Physical Exam   Constitutional: He is oriented to person, place, and time  No distress  HENT:   Head: Normocephalic  Eyes: Pupils are equal, round, and reactive to light  Cardiovascular: Tachycardia present  Pulmonary/Chest: Effort normal  No respiratory distress  Abdominal: There is no tenderness  Obese   Neurological: He is alert and oriented to person, place, and time  Skin: Skin is warm  L groin wound without surrounding erythema  Wound packed with kerlix   Psychiatric: He has a normal mood and affect  Vitals reviewed  ---------------------------------------------------------------------------------------    Review of Systems - groin pain      Invasive/non-invasive ventilation settings   Respiratory    Lab Data (Last 4 hours)    None         O2/Vent Data (Last 4 hours)    None                Hemodynamic Monitoring  N/A       Nutrition       Diet Orders   (From admission, onward)             Start     Ordered    09/11/19 2019  Diet Terrence/CHO Controlled; Consistent Carbohydrate Diet Level 1 (4 carb servings/60 grams CHO/meal)  Diet effective now     Question Answer Comment   Diet Type Terrence/CHO Controlled    Terrence/CHO Controlled Consistent Carbohydrate Diet Level 1 (4 carb servings/60 grams CHO/meal)    RD to adjust diet per protocol?  No        09/11/19 2019 09/06/19 1928  Room Service  Once     Question:  Type of Service  Answer:  Room Service- Not Appropriate    09/06/19 1927                Laboratory and Diagnostics:  Results from last 7 days   Lab Units 09/12/19  0354 09/11/19  0315 09/10/19  3460 09/09/19  0083 09/08/19  5951 09/07/19  1218 09/07/19  6684 09/07/19  0444  09/06/19  1834  09/06/19  1212   WBC Thousand/uL 24 91* 20 37* 16 93* 21 54* 22 96*  --   --  26 46*  --  27 10*  --  19 61*   HEMOGLOBIN g/dL 9 5* 9 0* 8 7* 10 1* 9 2* 9 8*  --  9 7*   < > 11 3*  --  14 2   I STAT HEMOGLOBIN g/dl  --   --   --   --   --   --  9 9*  --   --   --    < >  --    HEMATOCRIT % 29 4* 27 5* 27 1* 30 6* 28 6*  --   --  29 3*  --  33 9*  --  41 9   HEMATOCRIT, ISTAT %  --   --   --   --   --   --  29*  --   --   --    < >  --    PLATELETS Thousands/uL 240 195 168 179 189  --   --  213  --  221  --  226   NEUTROS PCT %  --  69  --  72 68  --   --   --   --  69  --   --    BANDS PCT %  --   --  7  --   --   --   --  17*  --   --   --  18*   MONOS PCT %  --  6  --  6 6  --   --   --   --  8  --   --    MONO PCT %  --   --  7  --   --   --   --  8  --   --   --  9    < > = values in this interval not displayed       Results from last 7 days   Lab Units 09/12/19  0354 09/11/19  1708 09/11/19  0315 09/10/19  1613 09/10/19  0447 09/09/19  1743 09/09/19  0426  09/08/19  0508  09/07/19  0444 09/06/19  1834  09/06/19  1212   SODIUM mmol/L 139 144 143 141 141 138 137   < > 135*   < > 134* 132*  --  127*   POTASSIUM mmol/L 3 8 3 5 3 5 3 8 3 1* 3 4* 4 0   < > 3 4*   < > 3 4* 4 0  --  3 6   CHLORIDE mmol/L 105 111* 111* 111* 110* 107 107   < > 105   < > 102 99*  --  93*   CO2 mmol/L 23 26 23 22 21 18* 19*   < > 19*   < > 20* 18*  --  21   CO2, I-STAT   --   --   --   --   --   --   --   --   --    < >  --   --    < >  --    ANION GAP mmol/L 11 7 9 8 10 13 11   < > 11   < > 12 15*  --  13   BUN mg/dL 23 22 24 27* 28* 27* 30*   < > 29*   < > 22 19  --  20   CREATININE mg/dL 0 74 0 77 0 87 1 00 0 99 0 85 0 82   < > 1 09   < > 0 97 1 00  --  1 37*   CALCIUM mg/dL 7 6* 7 6* 7 6* 7 3* 7 5* 7 1* 7 5*   < > 7 3*   < > 7 5* 8 0*  --  9 2   ALT U/L  --   --   --   --   --   --  30  --  23  --  25 26  --  37   AST U/L  --   --   --   --   --   --  64*  --  41  --  27 28  --  40   ALK PHOS U/L  --   -- --   --   --   --  105  --  101  --  102 130*  --  165*   ALBUMIN g/dL  --   --   --   --   --   --  1 7*  --  1 7*  --  2 0* 1 9*  --  2 0*   TOTAL BILIRUBIN mg/dL  --   --   --   --   --   --  0 70  --  0 60  --  0 90 1 20*  --  1 40*    < > = values in this interval not displayed  Results from last 7 days   Lab Units 09/12/19  0354 09/11/19  1708 09/11/19  0315 09/10/19  0447 09/09/19  1743 09/09/19  0426 09/08/19  0508 09/07/19  0444 09/06/19  1834   MAGNESIUM mg/dL 1 9 2 1 2 1 2 3 2 1 2 3 2 2 2 0 1 3*   PHOSPHORUS mg/dL 3 2 2 1* 1 6*  --   --  2 8 2 7 3 1 4 0     Results from last 7 days   Lab Units 09/06/19  1212   INR  1 29*   PTT seconds 31         Results from last 7 days   Lab Units 09/07/19  0946 09/07/19  0444 09/07/19  0244 09/07/19  0016 09/06/19  2118 09/06/19  1834 09/06/19  1356   LACTIC ACID mmol/L 1 3 1 9 2 3* 3 1* 3 4* 3 2* 3 2*     ABG:  Lab Results   Component Value Date    PHART 7 458 (H) 09/11/2019    OUL1ZDA 30 1 (L) 09/11/2019    PO2ART 58 5 (LL) 09/11/2019    TIV1XRB 20 8 (L) 09/11/2019    BEART -2 2 09/11/2019    SOURCE Line, Arterial 09/11/2019     VBG:  Results from last 7 days   Lab Units 09/11/19  0908   ABG SOURCE  Line, Arterial     Results from last 7 days   Lab Units 09/08/19  0802 09/07/19  0946 09/06/19  1834   PROCALCITONIN ng/ml 2 16* 2 96* 2 80*     Vancomycin Tr   Date Value Ref Range Status   09/08/2019 21 8 (HH) 10 0 - 20 0 ug/mL Final        Micro  Results from last 7 days   Lab Units 09/10/19  1403 09/10/19  1401 09/06/19  1534 09/06/19  1212   BLOOD CULTURE  No Growth at 24 hrs  No Growth at 24 hrs   --  No Growth After 5 Days  No Growth After 5 Days     GRAM STAIN RESULT   --   --  No polys seen*  4+ Gram negative rods*  3+ Gram positive cocci in pairs*  --        EKG: ST on the monitor  Imaging: NA    Allergies   Allergies   Allergen Reactions    Clindamycin Diarrhea       Active Medications:  Scheduled Meds:    Current Facility-Administered Medications:  acetaminophen 650 mg Oral Q6H PRN Jose Luis Porras PA-C    cefTRIAXone 2,000 mg Intravenous Q24H Renate Morales MD Last Rate: Stopped (09/11/19 1100)   enoxaparin 40 mg Subcutaneous Q12H [address] ANN [place]    fentanyl citrate (PF) 50 mcg Intravenous Q2H PRN Jose Luis Porras PA-C    furosemide 40 mg Intravenous BID (diuretic) ANN oMbley    insulin regular (HumuLIN R,NovoLIN R) infusion 0 3-21 Units/hr Intravenous Titrated HALEYNP Last Rate: 6 Units/hr (09/12/19 0359)   levalbuterol 1 25 mg Nebulization Q6H While awake Neo Winter MD    omeprazole (PRILOSEC) suspension 2 mg/mL 20 mg Oral BID ANN    oxyCODONE 5 mg Oral Q4H PRN Jose Luis Porras PA-C    phenol 1 spray Mouth/Throat Q2H PRN Jose Luis Porras PA-C    senna 1 tablet Oral BID Jose Luis Porras PA-C    sodium chloride 3 mL Nebulization Q6H Mahnaz Vila Jr, PA-C    sodium hypochlorite 1 application Irrigation Daily Carlos Ruano DO     Continuous Infusions:    insulin regular (HumuLIN R,NovoLIN R) infusion 0 3-21 Units/hr Last Rate: 6 Units/hr (09/12/19 0359)      PRN Meds:     acetaminophen 650 mg Q6H PRN   fentanyl citrate (PF) 50 mcg Q2H PRN   oxyCODONE 5 mg Q4H PRN   phenol 1 spray Q2H PRN        Invasive  Devices  Invasive Devices     Peripheral Intravenous Line            Peripheral IV 09/10/19 Left Forearm 1 day    Long-Dwell Peripheral IV (Midline) 84/63/27 Left Cephalic less than 1 day                Advance Directive and Living Will:      Power of :    POLST:      Counseling / Coordination of Care  Total Critical Care time spent 27 minutes excluding procedures, teaching and family updates  Jose Luis Porras PA-C    Portions of the record may have been created with voice recognition software  Occasional wrong word or "sound a like" substitutions may have occurred due to the inherent limitations of voice recognition software    Read the chart carefully and recognize, using context, where substitutions have occurred

## 2019-09-13 DIAGNOSIS — Z12.11 SCREENING FOR COLON CANCER: Primary | ICD-10-CM

## 2019-09-13 PROBLEM — R79.89 ELEVATED PROCALCITONIN: Status: RESOLVED | Noted: 2019-09-07 | Resolved: 2019-09-13

## 2019-09-13 PROBLEM — D64.9 ANEMIA: Status: ACTIVE | Noted: 2019-09-13

## 2019-09-13 PROBLEM — G93.41 ACUTE METABOLIC ENCEPHALOPATHY: Status: RESOLVED | Noted: 2019-09-07 | Resolved: 2019-09-13

## 2019-09-13 LAB
ANION GAP SERPL CALCULATED.3IONS-SCNC: 12 MMOL/L (ref 4–13)
BASOPHILS # BLD AUTO: 0.14 THOUSANDS/ΜL (ref 0–0.1)
BASOPHILS NFR BLD AUTO: 1 % (ref 0–1)
BUN SERPL-MCNC: 24 MG/DL (ref 5–25)
C DIFF TOX GENS STL QL NAA+PROBE: NORMAL
CALCIUM SERPL-MCNC: 8 MG/DL (ref 8.3–10.1)
CHLORIDE SERPL-SCNC: 101 MMOL/L (ref 100–108)
CO2 SERPL-SCNC: 26 MMOL/L (ref 21–32)
CREAT SERPL-MCNC: 0.73 MG/DL (ref 0.6–1.3)
EOSINOPHIL # BLD AUTO: 0.51 THOUSAND/ΜL (ref 0–0.61)
EOSINOPHIL NFR BLD AUTO: 3 % (ref 0–6)
ERYTHROCYTE [DISTWIDTH] IN BLOOD BY AUTOMATED COUNT: 16.6 % (ref 11.6–15.1)
FERRITIN SERPL-MCNC: 150 NG/ML (ref 8–388)
GFR SERPL CREATININE-BSD FRML MDRD: 107 ML/MIN/1.73SQ M
GLUCOSE SERPL-MCNC: 105 MG/DL (ref 65–140)
GLUCOSE SERPL-MCNC: 114 MG/DL (ref 65–140)
GLUCOSE SERPL-MCNC: 126 MG/DL (ref 65–140)
GLUCOSE SERPL-MCNC: 130 MG/DL (ref 65–140)
GLUCOSE SERPL-MCNC: 131 MG/DL (ref 65–140)
GLUCOSE SERPL-MCNC: 132 MG/DL (ref 65–140)
GLUCOSE SERPL-MCNC: 154 MG/DL (ref 65–140)
GLUCOSE SERPL-MCNC: 195 MG/DL (ref 65–140)
GLUCOSE SERPL-MCNC: 210 MG/DL (ref 65–140)
GLUCOSE SERPL-MCNC: 216 MG/DL (ref 65–140)
HCT VFR BLD AUTO: 29.1 % (ref 36.5–49.3)
HGB BLD-MCNC: 9.2 G/DL (ref 12–17)
IMM GRANULOCYTES # BLD AUTO: >0.5 THOUSAND/UL (ref 0–0.2)
IMM GRANULOCYTES NFR BLD AUTO: 8 % (ref 0–2)
IRON SATN MFR SERPL: 17 %
IRON SERPL-MCNC: 33 UG/DL (ref 65–175)
LYMPHOCYTES # BLD AUTO: 3.89 THOUSANDS/ΜL (ref 0.6–4.47)
LYMPHOCYTES NFR BLD AUTO: 19 % (ref 14–44)
MCH RBC QN AUTO: 31.1 PG (ref 26.8–34.3)
MCHC RBC AUTO-ENTMCNC: 31.6 G/DL (ref 31.4–37.4)
MCV RBC AUTO: 98 FL (ref 82–98)
MONOCYTES # BLD AUTO: 1.08 THOUSAND/ΜL (ref 0.17–1.22)
MONOCYTES NFR BLD AUTO: 5 % (ref 4–12)
NEUTROPHILS # BLD AUTO: 13.27 THOUSANDS/ΜL (ref 1.85–7.62)
NEUTS SEG NFR BLD AUTO: 64 % (ref 43–75)
NRBC BLD AUTO-RTO: 1 /100 WBCS
PLATELET # BLD AUTO: 256 THOUSANDS/UL (ref 149–390)
PMV BLD AUTO: 9.3 FL (ref 8.9–12.7)
POTASSIUM SERPL-SCNC: 3.8 MMOL/L (ref 3.5–5.3)
RBC # BLD AUTO: 2.96 MILLION/UL (ref 3.88–5.62)
SODIUM SERPL-SCNC: 139 MMOL/L (ref 136–145)
TIBC SERPL-MCNC: 193 UG/DL (ref 250–450)
WBC # BLD AUTO: 20.47 THOUSAND/UL (ref 4.31–10.16)

## 2019-09-13 PROCEDURE — 85025 COMPLETE CBC W/AUTO DIFF WBC: CPT | Performed by: INTERNAL MEDICINE

## 2019-09-13 PROCEDURE — G8988 SELF CARE GOAL STATUS: HCPCS

## 2019-09-13 PROCEDURE — 80048 BASIC METABOLIC PNL TOTAL CA: CPT | Performed by: INTERNAL MEDICINE

## 2019-09-13 PROCEDURE — 99254 IP/OBS CNSLTJ NEW/EST MOD 60: CPT | Performed by: INTERNAL MEDICINE

## 2019-09-13 PROCEDURE — 99233 SBSQ HOSP IP/OBS HIGH 50: CPT | Performed by: INTERNAL MEDICINE

## 2019-09-13 PROCEDURE — 99232 SBSQ HOSP IP/OBS MODERATE 35: CPT | Performed by: INTERNAL MEDICINE

## 2019-09-13 PROCEDURE — G8987 SELF CARE CURRENT STATUS: HCPCS

## 2019-09-13 PROCEDURE — 97167 OT EVAL HIGH COMPLEX 60 MIN: CPT

## 2019-09-13 PROCEDURE — 83550 IRON BINDING TEST: CPT | Performed by: NURSE PRACTITIONER

## 2019-09-13 PROCEDURE — 82948 REAGENT STRIP/BLOOD GLUCOSE: CPT

## 2019-09-13 PROCEDURE — NC001 PR NO CHARGE: Performed by: NURSE PRACTITIONER

## 2019-09-13 PROCEDURE — 97535 SELF CARE MNGMENT TRAINING: CPT

## 2019-09-13 PROCEDURE — 97530 THERAPEUTIC ACTIVITIES: CPT

## 2019-09-13 PROCEDURE — 83540 ASSAY OF IRON: CPT | Performed by: NURSE PRACTITIONER

## 2019-09-13 PROCEDURE — 82728 ASSAY OF FERRITIN: CPT | Performed by: NURSE PRACTITIONER

## 2019-09-13 RX ORDER — INSULIN GLARGINE 100 [IU]/ML
45 INJECTION, SOLUTION SUBCUTANEOUS
Status: DISCONTINUED | OUTPATIENT
Start: 2019-09-14 | End: 2019-09-15

## 2019-09-13 RX ORDER — PANTOPRAZOLE SODIUM 40 MG/1
40 TABLET, DELAYED RELEASE ORAL
Status: DISCONTINUED | OUTPATIENT
Start: 2019-09-14 | End: 2019-09-18 | Stop reason: HOSPADM

## 2019-09-13 RX ORDER — OMEPRAZOLE 20 MG/1
20 CAPSULE, DELAYED RELEASE ORAL
Status: DISCONTINUED | OUTPATIENT
Start: 2019-09-13 | End: 2019-09-13

## 2019-09-13 RX ORDER — KETOROLAC TROMETHAMINE 30 MG/ML
15 INJECTION, SOLUTION INTRAMUSCULAR; INTRAVENOUS ONCE
Status: DISCONTINUED | OUTPATIENT
Start: 2019-09-13 | End: 2019-09-13

## 2019-09-13 RX ORDER — KETOROLAC TROMETHAMINE 30 MG/ML
15 INJECTION, SOLUTION INTRAMUSCULAR; INTRAVENOUS ONCE
Status: COMPLETED | OUTPATIENT
Start: 2019-09-13 | End: 2019-09-13

## 2019-09-13 RX ORDER — POTASSIUM CHLORIDE 20 MEQ/1
20 TABLET, EXTENDED RELEASE ORAL ONCE
Status: COMPLETED | OUTPATIENT
Start: 2019-09-13 | End: 2019-09-13

## 2019-09-13 RX ORDER — INSULIN GLARGINE 100 [IU]/ML
45 INJECTION, SOLUTION SUBCUTANEOUS ONCE
Status: COMPLETED | OUTPATIENT
Start: 2019-09-13 | End: 2019-09-13

## 2019-09-13 RX ADMIN — ACETAMINOPHEN 325 MG: 325 TABLET ORAL at 17:55

## 2019-09-13 RX ADMIN — FUROSEMIDE 40 MG: 10 INJECTION, SOLUTION INTRAMUSCULAR; INTRAVENOUS at 08:53

## 2019-09-13 RX ADMIN — ENOXAPARIN SODIUM 40 MG: 40 INJECTION SUBCUTANEOUS at 20:30

## 2019-09-13 RX ADMIN — ACETAMINOPHEN 325 MG: 325 TABLET ORAL at 13:03

## 2019-09-13 RX ADMIN — HYDROCODONE BITARTRATE AND ACETAMINOPHEN 1 TABLET: 5; 325 TABLET ORAL at 20:29

## 2019-09-13 RX ADMIN — ACETAMINOPHEN 325 MG: 325 TABLET ORAL at 05:00

## 2019-09-13 RX ADMIN — INSULIN LISPRO 2 UNITS: 100 INJECTION, SOLUTION INTRAVENOUS; SUBCUTANEOUS at 13:04

## 2019-09-13 RX ADMIN — Medication 250 MG: at 08:52

## 2019-09-13 RX ADMIN — POTASSIUM CHLORIDE 20 MEQ: 1500 TABLET, EXTENDED RELEASE ORAL at 08:52

## 2019-09-13 RX ADMIN — INSULIN LISPRO 4 UNITS: 100 INJECTION, SOLUTION INTRAVENOUS; SUBCUTANEOUS at 16:41

## 2019-09-13 RX ADMIN — ACETAMINOPHEN 650 MG: 325 TABLET, FILM COATED ORAL at 16:13

## 2019-09-13 RX ADMIN — Medication 250 MG: at 17:54

## 2019-09-13 RX ADMIN — INSULIN LISPRO 4 UNITS: 100 INJECTION, SOLUTION INTRAVENOUS; SUBCUTANEOUS at 22:54

## 2019-09-13 RX ADMIN — Medication 1 APPLICATION: at 08:53

## 2019-09-13 RX ADMIN — INSULIN LISPRO 15 UNITS: 100 INJECTION, SOLUTION INTRAVENOUS; SUBCUTANEOUS at 20:09

## 2019-09-13 RX ADMIN — INSULIN GLARGINE 45 UNITS: 100 INJECTION, SOLUTION SUBCUTANEOUS at 16:40

## 2019-09-13 RX ADMIN — HYDROCODONE BITARTRATE AND ACETAMINOPHEN 1 TABLET: 5; 325 TABLET ORAL at 13:32

## 2019-09-13 RX ADMIN — FUROSEMIDE 40 MG: 10 INJECTION, SOLUTION INTRAMUSCULAR; INTRAVENOUS at 16:40

## 2019-09-13 RX ADMIN — ENOXAPARIN SODIUM 40 MG: 40 INJECTION SUBCUTANEOUS at 08:53

## 2019-09-13 RX ADMIN — HYDROCODONE BITARTRATE AND ACETAMINOPHEN 1 TABLET: 5; 325 TABLET ORAL at 07:50

## 2019-09-13 RX ADMIN — Medication 20 MG: at 08:52

## 2019-09-13 RX ADMIN — KETOROLAC TROMETHAMINE 15 MG: 30 INJECTION, SOLUTION INTRAMUSCULAR; INTRAVENOUS at 11:18

## 2019-09-13 NOTE — ASSESSMENT & PLAN NOTE
· History of recurrent cellulitis  · Presented to ED with worsening cellulitis for one week-Within the 5 days, he said that cellulitis went from lower left leg in the lateral area continued to go upward to the left upper thigh in the medial area up to his groin which he described as fast   · Reported when he sat on the toilet that there was bleeding with foul smell  · PTA was taking doxicycline 100 mg Q 12 hours  · 9/6 CT left femur: infection with gas-forming organism in the L groin and L prox-mid thigh  Consistent with Anna's gangrene   No localized fluid collection  · S/p excisional debridement and washout in OR 9/6 and 9/7  · Remained intubated after OR 9/6, extubated 9/11  · Completed 7 days of antibiotics on 9/12  · ID following  · Daily dressing changes by nursing-per surgery

## 2019-09-13 NOTE — ASSESSMENT & PLAN NOTE
· Now resolved as 9/12  · Intubated for OR on 9/6, remained intubated post operatively for anticipation of repeat OR  · Patient extubated on 9/11  · Hypoxia post operatively, initially felt to have ARDS however confirmed to be volume overload  · Continue lasix 40 mg BID  · Continue pulmonary hygiene  Incentive spirometer q1h while awake, encourage coughing and deep breathing     · Upright positioning and OOB to chair  · Maintain SpO2 >92%

## 2019-09-13 NOTE — CONSULTS
Consultation - Paul Fink 48 y o  male MRN: [id number]    Unit/Bed#:  Encounter: [id number]      Assessment/Plan     Assessment: This is a 48y o -year-old male with type 2 diabetes, uncontrolled admitted with hyperglycemia, as well as necrotizing soft tissue infection of left groin and left proximal to mid thigh, status post debridement and washout on September 6 and September 11, 2019, completed antibiotic course,  Blood sugars are improving    Plan:  1  Type 2 diabetes with hyperglycemia-based on his A1c of 11 2% average blood sugars are elevated in 300-350 range     -start Lantus 45 units at bedtime, from tomorrow  -give Lantus 45 units now  -start Humalog 15 units with meals plus scale  -start Humalog scale with algorithm 4  -follow-up hypoglycemia protoco in blood sugar less than 70  -keep carbohydrate consistent with meals  -he will need basal bolus insulin regimen for discharge  -he will also need insulin pen teaching before discharge   -he will need follow-up appointment with endocrinology in 2 weeks after discharge      Upon discharge, if Lantus is not the preferred basal insulin for the patient's insurance company, we can use Tresiba, Basaglar, Levemir, Toujeo at the same dose instead      Upon discharge, if Humalog is not the preferred mealtime insulin for the patient's insurance, we can use NovoLog, Fiasp, or Apidra at the same dose instead      If Novolin 70/30 is more affordable given work and insurance/affordability concerns, total daily dose of insulin would be the same split into twice a day with breakfast and dinner    2  Soft tissue infection of groin- followed by ID and critical care  Completed course of antibiotics    3  Acute hypoxic respiratory failure ,resolved    4   Morbid obesity- he will need medical nutrition consultation, for diabetes as well as for low-calorie diet    Thank you for consulting Endocrinology, please do not hesitate to call if you have any questions        CC: Diabetes Consult    History of Present Illness     HPI: Erica Terrazas is a 48y o  year old male with type type 2 diabetes, uncontrolled on oral medications, was admitted for necrotizing soft tissue infection of left groin and left proximal to mid thigh, status post debridement and washout in OR on September 6, 2019, and again on September 11, 2019  He was started on insulin infusion, was requiring 2 units/hour overnight, insulin infusion was stopped in the morning  Blood sugars have improved    Patient gives history of type 2 diabetes for many years and was treated with Lantus, and oral medication 3 years ago  He lost insurance, was not able to get Lantus because of the cost   At home he is only taking Amaryl and metformin  His A1c is uncontrolled at 11 2%  He also complains of diabetic neuropathy, tingling and numbness in his extremities  He denies blurry vision  He denies chest pain, shortness of breath    Lab Results   Component Value Date    HGBA1C 11 2 (H) 09/06/2019         Consults    Review of Systems   Constitutional: Positive for activity change and fatigue  Negative for diaphoresis, fever and unexpected weight change  HENT: Negative  Eyes: Negative for visual disturbance  Respiratory: Negative for cough, chest tightness and shortness of breath  Cardiovascular: Negative for chest pain, palpitations and leg swelling  Gastrointestinal: Negative for abdominal pain, constipation, diarrhea, nausea and vomiting  Endocrine: Negative for cold intolerance, heat intolerance, polydipsia, polyphagia and polyuria  Genitourinary: Negative for dysuria, enuresis, frequency and urgency  Musculoskeletal: Positive for myalgias  Negative for arthralgias  Skin: Negative for pallor, rash and wound  Allergic/Immunologic: Negative  Neurological: Positive for weakness  Negative for dizziness, tremors and numbness  Hematological: Negative  Psychiatric/Behavioral: Negative  Historical Information   Past Medical History:   Diagnosis Date    Cellulitis     last assessed 12/10/15    Diabetes mellitus ([facility] [address] )     Edema     Elevated liver enzymes     Esophageal reflux     Gluten intolerance     Gout     last assessed 09/05/13    Hyperglycemia     Hypertension     IBS (irritable bowel syndrome)     Insomnia     Obesity     Osteoarthritis of knee     last assessed 02/10/14    Prehypertension     last assessed 08/22/17    Venous insufficiency     last assessed 08/22/17    Villonodular synovitis of the hand, right     last assessed 11/14/2013     Past Surgical History:   Procedure Laterality Date    INCISION AND DRAINAGE OF WOUND Left 9/6/2019    Procedure: INCISION AND DRAINAGE (I&D) GROIN;  Surgeon: Gautam Childers DO;  [facility];  Service: General    WOUND DEBRIDEMENT Left 9/7/2019    Procedure: EXCISIONAL DEBRIDEMENT;  Surgeon: Gautam Childers DO;  [facility];  Service: General     Social History   Social History     Substance and Sexual Activity   Alcohol Use No     Social History     Substance and Sexual Activity   Drug Use No     Social History     Tobacco Use   Smoking Status Never Smoker   Smokeless Tobacco Never Used     Family History:   Family History   Problem Relation Age of Onset    Cancer Mother         gastrc    Colon cancer Father     Heart failure Father        Meds/Allergies   Current Facility-Administered Medications   Medication Dose Route Frequency Provider Last Rate Last Dose    HYDROcodone-acetaminophen (NORCO) 5-325 mg per tablet 1 tablet  1 tablet Oral Q6H PRN Andres Chris PA-C   1 tablet at 09/13/19 1332    And    acetaminophen (TYLENOL) tablet 325 mg  325 mg Oral Q6H [address] Alena Sahu PA-C   325 mg at 09/13/19 1303    acetaminophen (TYLENOL) tablet 650 mg  650 mg Oral Q6H PRN Sotero Oseguera PA-C   650 mg at 09/12/19 1946    enoxaparin (LOVENOX) subcutaneous injection 40 mg  40 mg Subcutaneous Q12H [address] ANN Mobley   40 mg at 09/13/19 0853    furosemide (LASIX) injection 40 mg  40 mg Intravenous BID (diuretic) HALEY MobleyNP   40 mg at 09/13/19 0853    insulin lispro (HumaLOG) 100 units/mL subcutaneous injection 1-6 Units  1-6 Units Subcutaneous HS ANN Mobley        insulin lispro (HumaLOG) 100 units/mL subcutaneous injection 2-12 Units  2-12 Units Subcutaneous TID AC ANN Mobley   2 Units at 09/13/19 1304    [START ON 9/14/2019] pantoprazole (PROTONIX) EC tablet 40 mg  40 mg Oral Early Morning ANN Mobley        phenol (CHLORASEPTIC) 1 4 % mucosal liquid 1 spray  1 spray Mouth/Throat Q2H PRN Janice Turner PA-C   1 spray at 09/11/19 2240    saccharomyces boulardii (FLORASTOR) capsule 250 mg  250 mg Oral BID EVE Ferguson-C   250 mg at 09/13/19 7674    sodium hypochlorite (DAKIN'S) 0 5 percent topical solution 1 application  1 application Irrigation Daily Carlos Ruano, DO   1 application at 75/70/64 0853     Allergies   Allergen Reactions    Clindamycin Diarrhea       Objective   Vitals: Blood pressure 124/74, pulse (!) 106, temperature 97 9 °F (36 6 °C), temperature source Axillary, resp  rate 22, height 5' 4" (1 626 m), weight (!) 176 kg (387 lb 9 1 oz), SpO2 98 %  Intake/Output Summary (Last 24 hours) at 9/13/2019 1511  Last data filed at 9/13/2019 1007  Gross per 24 hour   Intake 1068 61 ml   Output 1075 ml   Net -6 39 ml     Invasive Devices     Peripheral Intravenous Line            Peripheral IV 09/10/19 Left Forearm 3 days    Long-Dwell Peripheral IV (Midline) 87/05/25 Left Cephalic 2 days                Physical Exam   Constitutional: He is oriented to person, place, and time  He appears well-developed and well-nourished  No distress  HENT:   Head: Normocephalic and atraumatic  Eyes: Pupils are equal, round, and reactive to light  Conjunctivae and EOM are normal  Right eye exhibits no discharge  Left eye exhibits no discharge     Neck: Normal range of motion  Neck supple  No thyromegaly present  Cardiovascular: Normal rate, regular rhythm, normal heart sounds and intact distal pulses  Exam reveals no friction rub  No murmur heard  Pulmonary/Chest: Effort normal and breath sounds normal  No respiratory distress  He has no wheezes  Abdominal: Soft  Bowel sounds are normal  He exhibits no distension  There is no tenderness  Musculoskeletal: Normal range of motion  He exhibits edema and deformity  He exhibits no tenderness  Neurological: He is alert and oriented to person, place, and time  He has normal reflexes  He displays normal reflexes  Skin: Skin is warm and dry  No rash noted  He is not diaphoretic  No erythema  Psychiatric: He has a normal mood and affect  His behavior is normal    Vitals reviewed  The history was obtained from the review of the chart, patient  Lab Results:       Lab Results   Component Value Date    WBC 20 47 (H) 09/13/2019    HGB 9 2 (L) 09/13/2019    HCT 29 1 (L) 09/13/2019    MCV 98 09/13/2019     09/13/2019     Lab Results   Component Value Date/Time    BUN 24 09/13/2019 04:16 AM    BUN 14 12/03/2015 01:25 PM     (L) 12/03/2015 01:25 PM    K 3 8 09/13/2019 04:16 AM    K 4 0 12/03/2015 01:25 PM     09/13/2019 04:16 AM    CL 98 12/03/2015 01:25 PM    CO2 26 09/13/2019 04:16 AM    CO2 19 (L) 09/07/2019 08:46 AM    CREATININE 0 73 09/13/2019 04:16 AM    CREATININE 1 02 12/03/2015 01:25 PM    AST 64 (H) 09/09/2019 04:26 AM    AST 64 (H) 12/03/2015 01:25 PM    ALT 30 09/09/2019 04:26 AM     (H) 12/03/2015 01:25 PM    ALB 1 7 (L) 09/09/2019 04:26 AM    ALB 3 7 12/03/2015 01:25 PM     No results for input(s): CHOL, HDL, LDL, TRIG, VLDL in the last 72 hours    No results found for: [place]  POC Glucose (mg/dl)   Date Value   09/13/2019 195 (H)   09/13/2019 130   09/13/2019 105   09/13/2019 132   09/13/2019 114   09/13/2019 154 (H)   09/13/2019 131   09/12/2019 104 09/12/2019 172 (H)   09/12/2019 171 (H)       Imaging Studies: I have personally reviewed pertinent reports  Portions of the record may have been created with voice recognition software

## 2019-09-13 NOTE — ASSESSMENT & PLAN NOTE
· Body mass index is 66 53 kg/m²     · Nutrition on consult  · Terrence/CHO controlled diet, Fluid restriction of 1500 ml

## 2019-09-13 NOTE — ASSESSMENT & PLAN NOTE
· History of recurrent left lower extremity cellulitis  · PTA doxycycline not continued on admission  · See plan under Necrotizing soft tissue infection  · ID on consult

## 2019-09-13 NOTE — ASSESSMENT & PLAN NOTE
· 9/9 ECHO: EF 65%, normal size and function of LV and RV  · Lasix 40 mg BID  · Net positive >19 liters from admission  · Daily goal negative one liter  · Monitor I&O, net balance likely inaccurate 2/2 leaking of urine around oneill  · 1 5 L fluid restriction  · Daily weight

## 2019-09-13 NOTE — ASSESSMENT & PLAN NOTE
Lab Results   Component Value Date    HGBA1C 11 2 (H) 09/06/2019       Recent Labs     09/13/19  0412 09/13/19  0609 09/13/19  0738 09/13/19  0956   POCGLU 114 132 105 130       Blood Sugar Average: Last 72 hrs:  (P) 132 [id number]     · PTA metformin and glimepiride on hold  · Patient does not have Endocrinologist that he follows with  · Consult Endocrinology for assistance with management of uncontrolled DM  · Insulin gtt discontinued 9/13, continue accuchecks with sliding scale insulin coverage QID  · Nutrition on consult  · Terrence/CHO consistent diet

## 2019-09-13 NOTE — PROGRESS NOTES
Progress Note - Critical Care   Agata Castillo 48 y o  male MRN: [id number]  Unit/Bed#:  Encounter: [id number]    ----------------------------------------------------------------------------------------------  Chief Complaint: Mild groin pain     HPI/24hr events: Antibiotics discontinued yesterday by ID  Patient having lose stool- Cdiff ordered to rule out source of fevers and leukocytosis   ----------------------------------------------------------------------------------------------  Assessment and Plan  Principal Problem:    Necrotizing soft tissue infection  Active Problems:    GERD (gastroesophageal reflux disease)    Hyperlipidemia    Morbid obesity ([facility] [address] )    Recurrent cellulitis of lower extremity    Type 2 diabetes mellitus with complication (HCC)    Septic shock (HCC)    Hypokalemia    Elevated procalcitonin    Acute metabolic encephalopathy    Acute hypoxemic respiratory failure (HCC)    Volume overload    Gluten intolerance  Resolved Problems:    Hyponatremia    DEAN (acute kidney injury) ([facility] [address] )    Hyperbilirubinemia    Sinus tachycardia    Lactic acidosis    Bandemia    Hypotension    Hypocalcemia      Neuro:    Continue norco for pain  DC IV fentanyl    Sleep/wake cycle regulation   CAM-ICU daily    CV:    Tachycardia improving  HR remains in low 100's    Monitor on Telemetry   He continues to respond to lasix  He is net negative x 24h  Continue BID lasix for today  Pulm:   Continue xopenex Q6 neb    Continue pulmonary hygiene  Incentive spirometer q1h while awake, encourage coughing and deep breathing  Upright positioning    Maintain SpO2 >92%   Monitor for risk of aspiration per SLP     GI:    Continue regular diet  SLP concern for aspiration  Continue aspiration risks    Stress ulcer prophylaxis: Continue home PPI    Patient having intermittent lose stool  Cdiff ordered yesterday     :    DEAN resolved   Creatinine 0 73   I/O monitoring   Continue to follow renal function tests    F/E/N:    Continue carb controlled diet    Replete electrolytes with as needed to maintain K >4 0, Mag >2 0, Phos >3 0    Heme/Onc:    Continue to trend leukocytosis      VTE prophylaxis:  Lovenox SQ, SCD's to BLE    Endo:    Insulin gtt 3-6u  Consider starting long acting insulin and transitioning to ISS    Lab Results   Component Value Date    HGBA1C 11 2 (H) 2019    HGBA1C 12 0 (H) 2015     ID:    ID following and recommend completion of IV rocephin for NSTI    Cdiff ordered to evaluate source of leukocytosis   Continue to monitor fever and WBC curve     MSK/Skin:    Surgery following for NSTI of the L groin s/p debridement  Currently packing with kerlix soaked in Dakin's  ?plan to return to the OR   Reposition q2h, eliminate pressure points while in bed   Close skin surveillance     VTE Pharmacologic Prophylaxis: Enoxaparin (Lovenox)  VTE Mechanical Prophylaxis: sequential compression device    Dispo: Transfer to [facility]     Code Status: Level 1 - Full Code    ------------------------------------------------------------------------------------------    Vitals:  T maximum in 24 hours: Temp (24hrs), Av 7 °F (37 1 °C), Min:98 °F (36 7 °C), Max:101 °F (38 3 °C)    Current: Temperature: 98 3 °F (36 8 °C) Height and Weights:  Height: 5' 4" (162 6 cm)  IBW: 59 2 kg  Weight (last 2 days)     Date/Time   Weight    19   (!) 176 (387 57)    19 06   (!) 176 (387 79)           Intake and Output:  I/O       701 -  0700  07 -  0700    P  O  2370 1020    I V  (mL/kg) 314 8 (1 8) 143 (0 8)    NG/GT 30     IV Piggyback 750 100    Feedings 105     Total Intake(mL/kg) 3569 8 (20 3) 1263 (7 2)    Urine (mL/kg/hr) 2821 (0 7) 1425 (0 5)    Stool 2     Total Output 2823 1425    Net +746 8 -162 1          Unmeasured Urine Occurrence 2 x     Unmeasured Stool Occurrence 2 x ---------------------------------------------------------------------------------------  Physical Exam   Constitutional: He is oriented to person, place, and time  No distress  HENT:   Head: Normocephalic  Eyes: Pupils are equal, round, and reactive to light  Neck: Normal range of motion  Cardiovascular: Normal pulses  Tachycardia present  Pulmonary/Chest: Effort normal and breath sounds normal  No respiratory distress  Abdominal: There is no tenderness  Obese   Neurological: He is alert and oriented to person, place, and time  Skin: Skin is warm and dry  Capillary refill takes less than 2 seconds  L groin wound without surrounding erythema  Wound packed with Kerlix    Psychiatric: He has a normal mood and affect  Vitals reviewed  ---------------------------------------------------------------------------------------    Review of Systems - Negative except left groin pain       Invasive/non-invasive ventilation settings   Respiratory    Lab Data (Last 4 hours)    None         O2/Vent Data (Last 4 hours)    None                Hemodynamic Monitoring  N/A       Nutrition       Diet Orders   (From admission, onward)             Start     Ordered    09/12/19 0820  Diet Terrence/CHO Controlled; Consistent Carbohydrate Diet Level 1 (4 carb servings/60 grams CHO/meal); Fluid Restriction 1500 ML, Gluten Free  Diet effective now     Question Answer Comment   Diet Type Terrence/CHO Controlled    Terrence/CHO Controlled Consistent Carbohydrate Diet Level 1 (4 carb servings/60 grams CHO/meal)    Other Restriction(s): Fluid Restriction 1500 ML    Other Restriction(s): Gluten Free    RD to adjust diet per protocol?  No        09/12/19 0819 09/06/19 1928  Room Service  Once     Question:  Type of Service  Answer:  Room Service- Not Appropriate    09/06/19 1927                Laboratory and Diagnostics:  Results from last 7 days   Lab Units 09/13/19  0416 09/12/19  0354 09/11/19  0315 09/10/19  0447 09/09/19  7756 09/08/19  0508 09/07/19  1218 09/07/19  0846 09/07/19 0444 09/06/19  1834 09/06/19  1212   WBC Thousand/uL 20 47* 24 91* 20 37* 16 93* 21 54* 22 96*  --   --  26 46*  --  27 10*  --  19 61*   HEMOGLOBIN g/dL 9 2* 9 5* 9 0* 8 7* 10 1* 9 2* 9 8*  --  9 7*   < > 11 3*  --  14 2   I STAT HEMOGLOBIN g/dl  --   --   --   --   --   --   --  9 9*  --   --   --    < >  --    HEMATOCRIT % 29 1* 29 4* 27 5* 27 1* 30 6* 28 6*  --   --  29 3*  --  33 9*  --  41 9   HEMATOCRIT, ISTAT %  --   --   --   --   --   --   --  29*  --   --   --    < >  --    PLATELETS Thousands/uL 256 240 195 168 179 189  --   --  213  --  221  --  226   NEUTROS PCT % 64  --  69  --  72 68  --   --   --   --  69  --   --    BANDS PCT %  --   --   --  7  --   --   --   --  17*  --   --   --  18*   MONOS PCT % 5  --  6  --  6 6  --   --   --   --  8  --   --    MONO PCT %  --   --   --  7  --   --   --   --  8  --   --   --  9    < > = values in this interval not displayed       Results from last 7 days   Lab Units 09/13/19  0416 09/12/19  0354 09/11/19  1708 09/11/19  0315 09/10/19  1613 09/10/19  0447 09/09/19  1743 09/09/19  0426  09/08/19  0508  09/07/19 0444 09/06/19  1834 09/06/19  1212   SODIUM mmol/L 139 139 144 143 141 141 138 137   < > 135*   < > 134* 132*  --  127*   POTASSIUM mmol/L 3 8 3 8 3 5 3 5 3 8 3 1* 3 4* 4 0   < > 3 4*   < > 3 4* 4 0  --  3 6   CHLORIDE mmol/L 101 105 111* 111* 111* 110* 107 107   < > 105   < > 102 99*  --  93*   CO2 mmol/L 26 23 26 23 22 21 18* 19*   < > 19*   < > 20* 18*  --  21   CO2, I-STAT   --   --   --   --   --   --   --   --   --   --    < >  --   --    < >  --    ANION GAP mmol/L 12 11 7 9 8 10 13 11   < > 11   < > 12 15*  --  13   BUN mg/dL 24 23 22 24 27* 28* 27* 30*   < > 29*   < > 22 19  --  20   CREATININE mg/dL 0 73 0 74 0 77 0 87 1 00 0 99 0 85 0 82   < > 1 09   < > 0 97 1 00  --  1 37*   CALCIUM mg/dL 8 0* 7 6* 7 6* 7 6* 7 3* 7 5* 7 1* 7 5*   < > 7 3*   < > 7 5* 8 0*  --  9 2   ALT U/L  --   -- --   --   --   --   --  30  --  23  --  25 26  --  37   AST U/L  --   --   --   --   --   --   --  64*  --  41  --  27 28  --  40   ALK PHOS U/L  --   --   --   --   --   --   --  105  --  101  --  102 130*  --  165*   ALBUMIN g/dL  --   --   --   --   --   --   --  1 7*  --  1 7*  --  2 0* 1 9*  --  2 0*   TOTAL BILIRUBIN mg/dL  --   --   --   --   --   --   --  0 70  --  0 60  --  0 90 1 20*  --  1 40*    < > = values in this interval not displayed  Results from last 7 days   Lab Units 09/12/19  0354 09/11/19  1708 09/11/19  0315 09/10/19  0447 09/09/19  1743 09/09/19  0426 09/08/19  0508 09/07/19  0444 09/06/19  1834   MAGNESIUM mg/dL 1 9 2 1 2 1 2 3 2 1 2 3 2 2 2 0 1 3*   PHOSPHORUS mg/dL 3 2 2 1* 1 6*  --   --  2 8 2 7 3 1 4 0     Results from last 7 days   Lab Units 09/06/19  1212   INR  1 29*   PTT seconds 31         Results from last 7 days   Lab Units 09/07/19  0946 09/07/19  0444 09/07/19  0244 09/07/19  0016 09/06/19  2118 09/06/19  1834 09/06/19  1356   LACTIC ACID mmol/L 1 3 1 9 2 3* 3 1* 3 4* 3 2* 3 2*     ABG:  Lab Results   Component Value Date    PHART 7 458 (H) 09/11/2019    [id number] 30 1 (L) 09/11/2019    PO2ART 58 5 (LL) 09/11/2019    [id number] 20 8 (L) 09/11/2019    BEART -2 2 09/11/2019    SOURCE Line, Arterial 09/11/2019     VBG:  Results from last 7 days   Lab Units 09/11/19  0908   ABG SOURCE  Line, Arterial     Results from last 7 days   Lab Units 09/12/19  0354 09/08/19  0802 09/07/19  0946 09/06/19  1834   PROCALCITONIN ng/ml 0 66* 2 16* 2 96* 2 80*     Vancomycin Tr   Date Value Ref Range Status   09/08/2019 21 8 (HH) 10 0 - 20 0 ug/mL Final        Micro  Results from last 7 days   Lab Units 09/10/19  1403 09/10/19  1401 09/06/19  1534 09/06/19  1212   BLOOD CULTURE  No Growth at 48 hrs  No Growth at 48 hrs  --  No Growth After 5 Days  No Growth After 5 Days     GRAM STAIN RESULT   --   --  No polys seen*  4+ Gram negative rods*  3+ Gram positive cocci in pairs*  --        EKG: ST on the monitor   Imaging: NA    Allergies   Allergies   Allergen Reactions    Clindamycin Diarrhea       Active Medications:  Scheduled Meds:    Current Facility-Administered Medications:  HYDROcodone-acetaminophen 1 tablet Oral Q6H PRN Chriss Abarca PA-C    And        acetaminophen 325 mg Oral Q6H [facility] Alena BurchJORDAN cao    acetaminophen 650 mg Oral Q6H PRN Henry Duran PA-C    enoxaparin 40 mg Subcutaneous Q12H [facility] ANN Mobley    furosemide 40 mg Intravenous BID (diuretic) ANN Mobley    insulin regular (HumuLIN R,NovoLIN R) infusion 0 3-21 Units/hr Intravenous Titrated ANN Roca Last Rate: 2 Units/hr (09/13/19 0420)   omeprazole (PRILOSEC) suspension 2 mg/mL 20 mg Oral BID ANN Flower    phenol 1 spray Mouth/Throat Q2H PRN Henry Duran PA-C    saccharomyces boulardii 250 mg Oral BID JORDAN Rosario    sodium hypochlorite 1 application Irrigation Daily Carlos Ruano DO     Continuous Infusions:    insulin regular (HumuLIN R,NovoLIN R) infusion 0 3-21 Units/hr Last Rate: 2 Units/hr (09/13/19 0420)      PRN Meds:     acetaminophen 650 mg Q6H PRN   HYDROcodone-acetaminophen 1 tablet Q6H PRN   phenol 1 spray Q2H PRN        Invasive  Devices  Invasive Devices     Peripheral Intravenous Line            Peripheral IV 09/10/19 Left Forearm 2 days    Long-Dwell Peripheral IV (Midline) 35/98/73 Left Cephalic 1 day                  Counseling / Coordination of Care  Total Critical Care time spent 23 minutes excluding procedures, teaching and family updates  Henry Duran PA-C    Portions of the record may have been created with voice recognition software  Occasional wrong word or "sound a like" substitutions may have occurred due to the inherent limitations of voice recognition software    Read the chart carefully and recognize, using context, where substitutions have occurred

## 2019-09-13 NOTE — PROGRESS NOTES
Progress Note - Rosanne Ayers 1965, 48 y o  male MRN: [id number]    Unit/Bed#:  Encounter: [id number]    Primary Care Provider: ANN Moeller   Date and time admitted to hospital: 9/6/2019 11:12 AM        * Necrotizing soft tissue infection  Assessment & Plan  · History of recurrent cellulitis  · Presented to ED with worsening cellulitis for one week-Within the 5 days, he said that cellulitis went from lower left leg in the lateral area continued to go upward to the left upper thigh in the medial area up to his groin which he described as fast   · Reported when he sat on the toilet that there was bleeding with foul smell  · PTA was taking doxicycline 100 mg Q 12 hours  · 9/6 CT left femur: infection with gas-forming organism in the L groin and L prox-mid thigh  Consistent with Anna's gangrene   No localized fluid collection  · S/p excisional debridement and washout in OR 9/6 and 9/7  · Remained intubated after OR 9/6, extubated 9/11  · Completed 7 days of antibiotics on 9/12  · ID following  · Daily dressing changes by nursing-per surgery    Septic shock ([address] )  Assessment & Plan  · Now resolved  · In the ER patient was found out to be tachycardic in 110s, respiration of 22, systolic blood pressure from 90 -110, lactic acid was 4 7, WBC of 19 61  · CT scan of the left femur showed gas-forming organism (Anna's gangrene) in the left groin and left proximal to mid thigh  · S/p excisional debridement and washout in OR 9/6 and 9/7  · Remained intubated after OR 9/6, extubated 9/11  · Completed 7 days of antibiotics on 9/12  · ID following    Acute hypoxemic respiratory failure ([address] )  Assessment & Plan  · Now resolved as 9/12  · Intubated for OR on 9/6, remained intubated post operatively for anticipation of repeat OR  · Patient extubated on 9/11  · Hypoxia post operatively, initially felt to have ARDS however confirmed to be volume overload  · Continue lasix 40 mg BID  · Continue pulmonary hygiene  Incentive spirometer q1h while awake, encourage coughing and deep breathing  · Upright positioning and OOB to chair  · Maintain SpO2 >92%    Volume overload  Assessment & Plan  · 9/9 ECHO: EF 65%, normal size and function of LV and RV  · Lasix 40 mg BID  · Net positive >19 liters from admission  · Daily goal negative one liter  · Monitor I&O, net balance likely inaccurate 2/2 leaking of urine around oneill  · 1 5 L fluid restriction  · Daily weight    Type 2 diabetes mellitus with complication [facility])  Assessment & Plan  Lab Results   Component Value Date    HGBA1C 11 2 (H) 09/06/2019       Recent Labs     09/13/19  0412 09/13/19  0609 09/13/19  0738 09/13/19  0956   POCGLU 114 132 105 130       Blood Sugar Average: Last 72 hrs:  (P) 930 [id number]     · PTA metformin and glimepiride on hold  · Patient does not have Endocrinologist that he follows with  · Consult Endocrinology for assistance with management of uncontrolled DM  · Insulin gtt discontinued 9/13, continue accuchecks with sliding scale insulin coverage QID  · Nutrition on consult  · Terrence/CHO consistent diet    Morbid obesity ([address] )  Assessment & Plan  · Body mass index is 66 53 kg/m²     · Nutrition on consult  · Terrence/CHO controlled diet, Fluid restriction of 1500 ml    Recurrent cellulitis of lower extremity  Assessment & Plan  · History of recurrent left lower extremity cellulitis  · PTA doxycycline not continued on admission  · See plan under Necrotizing soft tissue infection  · ID on consult    Anemia  Assessment & Plan  · Continue to trend CBC  · Iron studies ordered  · VTE prophylaxis:  Heparin SQ, SCD's to BLE    Gluten intolerance  Assessment & Plan  · Nutrition consult    Hypokalemia  Assessment & Plan  · Monitor electrolytes and replete as needed    Diarrhea  Assessment & Plan  · Began with diarrhea on 9/10  · Antibiotics stopped 9/12  · Stool for C diff pending  · ID on consult  · Continue florastor    Hyperlipidemia  Assessment & Plan  · HDL 35, , Triglycerides 279  · Based on patient history patient was refusing to take his blood cholesterol medications due to adverse effects    GERD (gastroesophageal reflux disease)  Assessment & Plan  · PTA prilosec for GERD  · Continue with pharmacy substitute: Protonix PO        Code Status: Level 1 - Full Code  POA:    POLST:      Reason for ICU admission:   Septic Shock 2/2 soft tissue infection    Active problems:   Principal Problem:    Necrotizing soft tissue infection  Active Problems:    Septic shock ([address] )    Acute hypoxemic respiratory failure (HCC)    Volume overload    Type 2 diabetes mellitus with complication (HCC)    Morbid obesity (HCC)    Recurrent cellulitis of lower extremity    GERD (gastroesophageal reflux disease)    Hyperlipidemia    Diarrhea    Hypokalemia    Gluten intolerance    Anemia  Resolved Problems:    Hyponatremia    DEAN (acute kidney injury) ([address] )    Hyperbilirubinemia    Sinus tachycardia    Lactic acidosis    Bandemia    Hypotension    Elevated procalcitonin    Hypocalcemia    Acute metabolic encephalopathy      Consultants:   Infectious Disease  General Surgery    History of Present Illness:   Jossue Castillo is a 51-year-old male with significant past medical history of uncontrolled diabetes, morbid obesity, recurrent cellulitis, presented [facility] ED on 9/6 with complaints of worsening cellulitis of left lower extremity  Patient reported cellulitis started approximately 1 week ago and was on his left lower leg  Approximately 2 days after onset of infection/cellulitis, patient reports cellulitis continue to stent upward to the left upper thigh in the medial area and groin  Patient was taking doxycycline prior to admission  Patient states he was not initially concerned because it looked similar to previous episodes of cellulitis  Also reported when he sat on the toilet he did notice bleeding and foul smell    Patient was evaluated in [place] found to be tachycardic with heart rate 110s, tachypneic with rate 22, systolic blood pressure is 90 to 110, lactic acid 4 7, WBCs 19 61  CT scan of left femur revealed gas-forming organism in the left groin and left proximal to mid thigh  Surgery was consulted and patient was taken to OR  Summary of clinical course:   On 9/6 the patient was taken to the OR for suspicion of foreigners gangrene/necrotizing facititis  Patient underwent debridement and wash out for extensive soft tissue infection of the SQ fat of the upper L thigh into perineal area  All muscular tissue was pink and healthy and there was no sign of nec fasciitis  Patient remained intubated post operatively for planned repeat OR  On 9/7  Patient went to OR for small debridement and washout  On 9/7, CXR showed bibasilar opacities  Patient required increased PEEP and eventually 24 hours of paralytics (discontinued on 9/10) for concern of ARDS verses fluid overload  Paralytics were discontinue on 9/10 and after starting lasix  On 9/11 the patient was successfully weaned/extubated  ID followed patient throughout course and on 9/12 antibiotics were discontinued  Today the patient is hemodynamically stable and no longer has critical care needs; plan to downgrade to med-surg      Recent or scheduled procedures:   9/6 and 9/7 OR debridement and washout  9/9 ECHO: EF 65%, normal size and function of LV and RV     Outstanding/pending diagnostics:   9/12 C Diff pending  9/10 Blood cultures x2: NGTD at 48 hours    Cultures:   9/12 C Diff pending  9/10 Blood cultures x2: NGTD at 48 hours  9/6 Tissue culture gram stain: Group B strep, Strep anginosus  9/6 Blood cultures x2: negative     Mobilization Plan:   PT and OT consults pending    Nutrition Plan:   Terrence/CHO controlled diet, nutrition consulted    VTE Pharmacologic Prophylaxis: Enoxaparin (Lovenox)  VTE Mechanical Prophylaxis: sequential compression device    Discharge Plan:   Patient should be ready for discharge pending PT/OT evaluations and Surgery    Initial Physical Therapy Recommendations: pending  Initial Occupational Therapy Recommendations: pending  Initial /Plan: on-going    Home medications that are not reordered and reason why:   Lisinopril, glimepiride, metformin      Spoke with Dr Jeniffer Khan regarding transfer  Please call [phone] with any questions or concerns  Portions of the record may have been created with voice recognition software  Occasional wrong word or "sound a like" substitutions may have occurred due to the inherent limitations of voice recognition software    Read the chart carefully and

## 2019-09-13 NOTE — PHYSICAL THERAPY NOTE
PHYSICAL THERAPY TREATMENT NOTE    Patient Name: Yosvany CANALES Date: 9/13/2019 09/13/19 1226   Pain Assessment   Pain Assessment FLACC   Pain Rating: FLACC (Rest) - Face 0   Pain Rating: FLACC (Rest) - Legs 0   Pain Rating: FLACC (Rest) - Activity 0   Pain Rating: FLACC (Rest) - Cry 0   Pain Rating: FLACC (Rest) - Consolability 0   Score: FLACC (Rest) 0   Pain Rating: FLACC (Activity) - Face 1   Pain Rating: FLACC (Activity) - Legs 1   Pain Rating: FLACC (Activity) - Activity 1   Pain Rating: FLACC (Activity) - Cry 2   Pain Rating: FLACC (Activity) - Consolability 1   Score: FLACC (Activity) 6   Restrictions/Precautions   Other Precautions Contact/isolation;Multiple lines;Telemetry; Fall Risk;Pain   General   Chart Reviewed Yes   Additional Pertinent History spoke to [facility] prior to session - stated pt is appropriate for sitting in shuttle chair or bedside chair  Family/Caregiver Present Yes   Cognition   Arousal/Participation Cooperative   Attention Attends with cues to redirect   Orientation Level Oriented to person; Other (Comment)  (pt was identified w/ full name, birth date)   Following Commands Follows one step commands with increased time or repetition   Comments resting pulse ox 97% and 112 BPM, active 94% and 126 BPM    Subjective   Subjective pt seen supine in bed w/ spouse present  family entered during session  pt agreed to participate in PT intervention  pt needed occasional redirection for task focus  Bed Mobility   Rolling R 1  Dependent   Additional items Assist x 2;Bedrails; Increased time required;Verbal cues;LE management  (for bedrail use, breathing technique)   Rolling L 1  Dependent   Additional items Assist x 2;Bedrails; Increased time required;Verbal cues;LE management  (for bedrail use, breathing technique)   Supine to Sit 1  Dependent  (flat spin edge of bed)   Additional items HOB elevated; Bedrails; Increased time required;Verbal cues;LE management  (assist x4  input for bedrail use, breathing technique )   Sit to Supine 1  Dependent  (flat spin)   Additional items Increased time required;LE management  (assist x4  input for breathing technique )   Additional Comments pt sat edge of bed approximately 30 seconds w/ totalx2  pt was unable to maintain seated position due to loss of balance and was returned to bed  pt is not presently appropriate for sitting edge of bed and needs dependent means for out of bed mobilization  pt is not appropriate for use of mechanical lift due to lack of skin integrity and sling placement  pt will needs [facility] transition to shuttle chair for out of bed mobilization  Transfers   Sit to Stand Unable to assess   Balance   Static Sitting Zero   Activity Tolerance   Activity Tolerance Patient limited by fatigue;Patient limited by pain   Nurse Made Aware spoke to [facility] [place] PCA, Myriam OT, Chayo Armendariz [facility] Use   Comments Pt requires PT/OT co-treat due to signficant assistance with mobility and cognitive-behavioral impairments  Assessment   Prognosis Guarded   Problem List Decreased strength;Decreased range of motion;Decreased endurance; Impaired balance;Decreased mobility; Decreased coordination;Decreased safety awareness; Obesity; Decreased skin integrity;Pain   Assessment therapist attempted progression of mobility training to sitting on edge of bed  pt needed total assist x4 for mobilization to edge of bed and required total assist x2 to maintain seated position  given pt's significant assist for bed mobility and limited sitting tolerance, sitting edge of bed training is not presently appropriate  pt needs dependent means for out of bed mobilization  pt would benefit from continued inpatient PT to reduce fall risk factors and progress mobility training as appropriate   discharge recommendation is for inpatient rehab to maximize level of functional independence  Goals   Patient Goals get better and return to work   STG Expiration Date 09/22/19   Short Term Goal #1 pt will: Increase bilateral LE strength 1/2 grade to facilitate independent mobility, Perform rolling and repositioning in bed w/ modx1 to decrease fall risk factors, Complete exercise program independently to increase strength and endurance, Tolerate 3 hr OOB to faciliate upright tolerance and Improve Barthel Index score to 45 or greater to facilitate independence  PT to see when sitting edge of bed is appropriate  Treatment Day 2   Plan   Treatment/Interventions LE strengthening/ROM; Therapeutic exercise; Endurance training;Patient/family training;Equipment eval/education; Bed mobility  (PT to see when sitting edge of bed is appropriate)   Progress Progressing toward goals   PT Frequency 5x/wk; Other (Comment)  (and 1 to 2x on weekend)   Recommendation   Recommendation Short-term skilled PT   Equipment Recommended Other (Comment)  (pt needs dependent means for out of bed moblization)   Additional Comments therapist contacted purchasing about renting [address] chair B  pt's body habitus is 32 inches and sitting deck width for shuttle A chair that hospital owns is 28 inches  Skilled inpatient PT recommended while in hospital to progress pt toward treatment goals      Celsa Pena, PT

## 2019-09-13 NOTE — ASSESSMENT & PLAN NOTE
· Now resolved  · In the ER patient was found out to be tachycardic in 110s, respiration of 22, systolic blood pressure from 90 -110, lactic acid was 4 7, WBC of 19 61  · CT scan of the left femur showed gas-forming organism (Anna's gangrene) in the left groin and left proximal to mid thigh  · S/p excisional debridement and washout in OR 9/6 and 9/7  · Remained intubated after OR 9/6, extubated 9/11  · Completed 7 days of antibiotics on 9/12  · ID following

## 2019-09-13 NOTE — PROGRESS NOTES
Patient is now extubated  Feels quite well  Off supplemental oxygen  Noted pictures in the media tab from yesterday  Wound appears clean  Antibiotics have been completed  Will continue with [employer] wet to dry dressings of his thigh wound      Protein supplements    PT OT

## 2019-09-13 NOTE — OCCUPATIONAL THERAPY NOTE
[address]  Evaluation     Patient Name: Sandhya Godfrey  WICDA'U Date: 9/13/2019  Problem List  Principal Problem:    Necrotizing soft tissue infection  Active Problems:    GERD (gastroesophageal reflux disease)    Hyperlipidemia    Morbid obesity ([facility] )    Recurrent cellulitis of lower extremity    Diarrhea    Type 2 diabetes mellitus with complication (HCC)    Septic shock (HCC)    Hypokalemia    Acute hypoxemic respiratory failure (HCC)    Volume overload    Gluten intolerance    Anemia    Past Medical History  Past Medical History:   Diagnosis Date    Cellulitis     last assessed 12/10/15    Diabetes mellitus ([facility] )     Edema     Elevated liver enzymes     Esophageal reflux     Gluten intolerance     Gout     last assessed 09/05/13    Hyperglycemia     Hypertension     IBS (irritable bowel syndrome)     Insomnia     Obesity     Osteoarthritis of knee     last assessed 02/10/14    Prehypertension     last assessed 08/22/17    Venous insufficiency     last assessed 08/22/17    Villonodular synovitis of the hand, right     last assessed 11/14/2013     Past Surgical History  Past Surgical History:   Procedure Laterality Date    INCISION AND DRAINAGE OF WOUND Left 9/6/2019    Procedure: INCISION AND DRAINAGE (I&D) GROIN;  Surgeon: Tara Mederos DO;  [facility]: [facility];  Service: General    WOUND DEBRIDEMENT Left 9/7/2019    Procedure: EXCISIONAL DEBRIDEMENT;  Surgeon: Tara Mederos DO;  [facility];  Service: General           09/13/19 1216   Note Type   Note type Eval/Treat   Restrictions/Precautions   Weight Bearing Precautions Per Order No   Other Precautions Contact/isolation;Cognitive;Multiple lines;Telemetry; Fall Risk;Pain   Pain Assessment   Pain Assessment FLACC   Pain Location Buttocks;Groin; Foot   Pain Orientation Bilateral   Hospital Pain Intervention(s) Repositioned; Ambulation/increased activity; Emotional support   Pain Rating: FLACC (Rest) - Face 1   Pain Rating: FLACC (Rest) - Legs 1   Pain Rating: FLACC (Rest) - Activity 0   Pain Rating: FLACC (Rest) - Cry 1   Pain Rating: FLACC (Rest) - Consolability 1   Score: FLACC (Rest) 4   Pain Rating: FLACC (Activity) - Face 1   Pain Rating: FLACC (Activity) - Legs 1   Pain Rating: FLACC (Activity) - Activity 1   Pain Rating: FLACC (Activity) - Cry 2   Pain Rating: FLACC (Activity) - Consolability 1   Score: FLACC (Activity) 6   Home Living   Type of Home House   Home Layout Two level;Bed/bath upstairs; Other (Comment)  (full bath w/ small walk in shower on first floor)   Bathroom Shower/Tub Tub/shower unit  (jacuzzi tub / shower w/ seat and grab bars)   Bathroom Toilet Standard   Bathroom Equipment Other (Comment)  (no DME)   [address]   (no AD)   Additional Comments Pt reports living w/ wife, friend, and adult step child in [address] [place] [address]   Prior Function   Level of [place] Independent with ADLs and functional mobility   Lives With Spouse; Family;Friend(s)   ADL Assistance Independent   IADLs Independent   Falls in the last 6 months 0   Vocational Full time employment   Comments Pt reports I w/ ADL w/ out AD or DME; working full time   Lifestyle   Autonomy Pt reports I w/ ADL/ IADL PTA including driving w/ out use of AD or DME   Reciprocal Relationships Pt reports living w/ wife, friend, family  Pt's wife present during eval and assisted w/ social history   Service to Others 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, band PTA   Intrinsic Gratification Pt reports enjoying music   ADL   Grooming Assistance 4  Minimal Assistance   Grooming Deficit Setup;Supervision/safety; Increased time to complete   UB Bathing Assistance 2  Maximal Assistance   LB Bathing Assistance 1  Total Assistance   UB Dressing Assistance 3  Moderate Assistance   LB Dressing Assistance 1  Total Assistance   Toileting Assistance  1  Total Assistance   Toileting Deficit Use of bedpan/urinal setup; Setup;Verbal cueing; Increased time to complete;Perineal hygiene  (use of bedrail)   Additional Comments cue to consistently follow directions during ADL performance   Bed Mobility   Rolling R 2  Maximal assistance   Additional items Assist x 2; Increased time required; Bedrails;Verbal cues;LE management   Rolling L 2  Maximal assistance   Additional items Assist x 2;Bedrails; Increased time required;Verbal cues;LE management   Supine to Sit Unable to assess   Additional Comments Pt able to partially roll onto L side w/ max A x1 during toileting to assist w/ removing bedpan  Pt required max A x2 to complete roll R<>L  Transfers   Sit to Stand Unable to assess   Balance   Static Sitting Zero   Activity Tolerance   Activity Tolerance Patient limited by fatigue;Patient limited by pain   Medical Staff Made Aware spoke to PT, KARINA and Antonieta MARTIN spoke to Livia PRESTON; Sophie HEWITT   RUMAKAYLA Assessment   RUE Assessment WFL   RUE Strength   RUE Overall Strength Deficits;Due to cognitive deficits; Other (Comment)  (at least 3+/5 distally and 3/5 shoulder)   Edema   RUE Edema Non-pitting   LUE Assessment   LUE Assessment WFL   LUE Strength   LUE Overall Strength Deficits;Due to cognitive deficits; Other (Comment)  (at least 3+/5 distally (grasp) and 3/5 shoulder)   Edema   LUE Edema Non-pitting   Hand Function   Gross Motor Coordination Functional   Fine Motor Coordination   (R hand dominance; will continue to assess)   Sensation   Light Touch Not tested   Sharp/Dull Not tested   Additional Comments + time, cues to isolate R 2nd finger extension to manage bed cotnrols on bedrails   Cognition   Overall Cognitive Status Impaired   Arousal/Participation Alert; Cooperative   Attention Difficulty attending to directions   Orientation Level Oriented to person;Oriented to place  (will continue to assess; able to introduce wife present)   Memory Decreased recall of recent events;Decreased short term memory   Following Commands Follows one step commands with increased time or repetition   Comments Identified pt by full name and birthdate  Pt benefits from cues / prompts and + time to consistently follow directions and sustain attention to task during ADL and bed mobility  Pt alert and oriented to person, place  Pt able to provide home set- up, social history w/ + time and cues to sustain attention  Will continue to assess functional cognitive skills to assist in safe discharge planning   Assessment   Limitation Decreased ADL status; Decreased UE strength;Decreased cognition;Decreased Safe judgement during ADL;Decreased endurance;Decreased self-care trans;Decreased fine motor control;Decreased high-level ADLs   Assessment Pt is a 47yo male [facility]  Pt presents w/ necrotizing soft tissue infection and significant PMH impacting his occupational performance including DM, morbid obesity, HTN, edema, Gout, osteoarthritis of knee, villonodular synovitis of R hand  Pt presents w/ worsening cellulitis that progressed up to groin  Pt intubated on 9/6/2019 and s/p excisional debridement and washout on 9/6/2019; extubated 9/11/2019  Pt currently on room air  Pt reports living w/ wife and family / friend in [address] [place] [address] w/ 2nd floor bed / bath PTA  Pt reports I w/ ADL/ IADL w/ out use of AD or DME  Upon eval, pt alert and oriented to at least person, place  Pt required cues to consistently sustain attention and follow directions during ADL performance  Pt able to provide home set-up and introduce wife present  Pt demonstrated B UE AROM WFL to complete ADL and reports R hand dominance  Pt required max A x2 to roll R<>L  Pt completed UBD w/ mod A and LBD w/ total A  Pt required total A to complete toileting using bed pan   Pt presents w/ increased edema, increased pain, decreased activity tolerance, decreased endurance, decreased cognition, generalized weakness /deconditioning, decreased sitting tolerance impacting his I w/ dressing, bathing, functional mobility, functional transfers, activity tolerance, oral hygiene, community mobility  Pt would benefit from OT while in acute care to address deficits and post acute rehab when medically stable for discharge from acute care  Will continue to follow   Goals   Patient Goals Pt stated that he would like to get better so he can return to work  Pt added that he will have to start over  Plan   Treatment Interventions ADL retraining;Functional transfer training;UE strengthening/ROM; Endurance training;Patient/family training;Equipment evaluation/education; Compensatory technique education; Fine motor coordination activities;Continued evaluation; Energy conservation; Activityengagement   Goal Expiration Date 09/25/19   OT Frequency 3-5x/wk   Additional Treatment Session   Start Time 3326   End Time 9351   Treatment Assessment Pt seen for OT tx session day1 following eval focusing on challenging activity tolerance and continued evaluation to assess bed mobility supine <> sit  Pt agreeable and motivated to participate  Pt seen w/ PT, KARINA for co -tx and benefits from A x2 to max I w/ ADL performance and improve activity assistance due to assist of at least 2 and significant cues / prompts  Pt required total X 4 to complete supine <> sit at EOB briefly using sheet  Pt able to adjust trunk position when seated at EOB using L bedrail  Pt reurned to bed at end of session w/ total A x 2 sit to supine  Pt required total A x2 using sheet /pad w/ bed in trendelenburg to adjust position towards HOB  Continue to recommend post acute rehab when medically stable for discharge from acute care   Will continue to follow   Additional Treatment Day 1   Recommendation   OT Discharge Recommendation Other (Comment)  (post acute rehab)   Equipment Recommended   (will continue to assess)   OT - OK to Discharge   (post acute rehab when stable)   Barthel Index   Feeding 5   Bathing 0   Grooming Score 5   Dressing Score 0   Bladder Score 10   Bowels Score 5   Toilet Use Score 0   Transfers (Bed/Chair) Score 0   Mobility (Level Surface) Score 0   Stairs Score 5   Barthel Index Score 30   Modified Solano Scale   Modified Solano Scale 5   Pt goals to be met by 9/25/2019:  1  Pt will complete bed mobility supine <> sit w/ mod A x2 to actively participate in ADL for at least 10 minutes unsupported at EOB  2  Pt will complete bed mobility to roll R<>L w/ mod A x1 to max I w/ ADL performance and improve activity engagement  3  Pt will complete UBD w/ S after set- up  4  Pt will demonstrate good attention and participation in continued evaluation to assess functional cognitive skills to assist in safe discharge planning  5  Pt will demonstrate good attention and participation to participate in continued evaluation to assess functional transfers, standing tolerance to assist in safe discharge planning  6  Pt will demonstrate good attention and consistently follow multi - step direction w/ good recall to max I and safety w/ ADL performance  7  Pt will demonstrate increased activity tolerance to actively participate in ADL for at least 10 minutes while seated at EOB unsupported      KATE Marin/SHE

## 2019-09-13 NOTE — PLAN OF CARE
Problem: PHYSICAL THERAPY ADULT  Goal: Performs mobility at highest level of function for planned discharge setting  See evaluation for individualized goals  Description  Treatment/Interventions: LE strengthening/ROM, Therapeutic exercise, Endurance training, Patient/family training, Equipment eval/education, Bed mobility(PT to see when sitting edge of bed is appropriate )  Equipment Recommended: Other (Comment)(pt needs dependent means for out of bed mobilization )       See flowsheet documentation for full assessment, interventions and recommendations  Outcome: Progressing  Note:   Prognosis: Guarded  Problem List: Decreased strength, Decreased range of motion, Decreased endurance, Impaired balance, Decreased mobility, Decreased coordination, Decreased safety awareness, Obesity, Decreased skin integrity, Pain  Assessment: therapist attempted progression of mobility training to sitting on edge of bed  pt needed total assist x4 for mobilization to edge of bed and required total assist x2 to maintain seated position  given pt's significant assist for bed mobility and limited sitting tolerance, sitting edge of bed training is not presently appropriate  pt needs dependent means for out of bed mobilization  pt would benefit from continued inpatient PT to reduce fall risk factors and progress mobility training as appropriate  discharge recommendation is for inpatient rehab to maximize level of functional independence  Recommendation: Short-term skilled PT          See flowsheet documentation for full assessment

## 2019-09-13 NOTE — SOCIAL WORK
LOS 6  Patient is not a bundle  Patient is not a readmission  CM met with patient's wife Bobby Howard and completed CM assessment  Patient lives with his souse, his 27year old son, and a friend in a 2 story home  There are 2 ANTELMO  There is a full bath on the first floor with a small walk in shower however wife reported it may not be large enough for patient  The main bedroom and bathroom (with a tub and lots of grab bars) are on the 2nd floor and there are 2 rails on the stairs  Patient does not use any DME at home  No HX of VNA or STR  Patient was independent with ADL's prior to admission however required some assistance with his socks  Patient has no HX of D&A or MH issues  Patient is self employed and does not have any insurance or RX coverage  They pay $10/month for [employer] gold card and use [facility] in [place]  Patient was driving himself prior to admission and has a PCP but would like to find a different one within the [employer] network as they feel his PCP is part of the reason he ended up here  CM discussed patient's likely need for STR at discharge and that it will need to be at a facility that accepts MA  She stated that she had met with the PATHS team already and needs to send them some additional information  CM also explained that when it comes time to find a facility the search area may need to be increased due to patient's (MA status, weight, and mobility)  CM reviewed discharge planning process including the following: identifying caregivers at home, preference for d/c planning needs, [facility] Meds to Bed program, availability of treatment team to discuss questions or concerns patient and/or family may have regarding diagnosis, plan of care, old or new medications and discharge planning  CM name and role reviewed  CM will continue to follow for care coordination and update assessment as necessary

## 2019-09-13 NOTE — PROGRESS NOTES
Progress Note - Infectious Disease   Binh Fisher 48 y o  male MRN: [id number]  Unit/Bed#:  Encounter: [id number]      Impression/Recommendations:  1  Septic shock   POA:  Fever, WBC, refractory hypotension   Due to #2   No other appreciable source   Blood cultures negative   Recurrent fever, WBC, although overall clinically improving daily   Repeat blood cultures, CXR negative  Consider drug fever, atelectasis   Consider C  Diff  Procalcitonin improving 0 66 << 2 96  Rec:  ? Continue to follow closely off antibiotics  ? Follow up C  diff  ? Follow temperatures closely  ? Follow up repeat blood cultures  ? Recheck CBC in AM  ? If worsens without source, may need repeat imaging of leg/perineum     2  Left thigh/groin soft tissue infection   Cultures with Group B Strep, Strep anginosis   Status post I&D   OR findings showed infection confined to SQ tissues; muscle healthy, no necrotizing fasciitis   Wound noted to be clean on recent dressing changes  Rec:  ? Continue to follow closely off antibiotics  ? Serial exams and [address] per surgery     3  Diarrhea  Consider antibiotic associated  Consider due to bowel regimen although only got Senna x1 9/9 and again this AM   Consider C  Diff given fever, WBC  Rec:  ? Follow up C  Diff  ? Follow stool output closely     4  Acute hypoxic respiratory failure   Likely ARDS in the setting of #1   Scant secretions makes pneumonia less likely   Antibiotics as above would treat regardless   Improved, now extubated  Rec:  ? No additional antibiotics indicated  ? Supportive care as per the primary service     5   DEAN   In the setting of septic shock   Improved      6  Uncontrolled DM with hyperglycemia   Hemoglobin A1c 12  Risk factor for all of above     7    Morbid obesity   Risk factor for all of above      The above plan was discussed in detail with the patient and his family member at the bedside      Antibiotics:  Off antibiotics #1    Subjective:  Patient seen on AM rounds  Denies fevers, chills, sweats, nausea, vomiting  Working with PT     24 Hour Events:  Isolated temp to 101 last night but otherwise afebrile  WBC trending down  Loose stool overnight and C  Diff sent  Objective:  Vitals:  Temp:  [97 7 °F (36 5 °C)-101 °F (38 3 °C)] 97 7 °F (36 5 °C)  HR:  [] 105  Resp:  [20-33] 22  BP: (110-136)/(66-82) 136/82  SpO2:  [91 %-98 %] 93 %  Temp (24hrs), Av 6 °F (37 °C), Min:97 7 °F (36 5 °C), Max:101 °F (38 3 °C)  Current: Temperature: 97 7 °F (36 5 °C)    Physical Exam:   General:  No acute distress  Psychiatric:  Awake and alert  Pulmonary:  Normal respiratory excursion without accessory muscle use  Abdomen:  Soft, nontender  Extremities:  No edema  Skin:  No rashes  Wound pics reviewed:  Large open wound left thigh/groin with visible fat, some darker areas of fat necrosis  No purulence or cellulitis  Lab Results:  I have personally reviewed pertinent labs    Results from last 7 days   Lab Units 19  0416 19  0354 19  1708  19  0426  19  0508  19  0846 19  0444  19  1636   POTASSIUM mmol/L 3 8 3 8 3 5   < > 4 0   < > 3 4*   < >  --  3 4*   < >  --    CHLORIDE mmol/L 101 105 111*   < > 107   < > 105   < >  --  102   < >  --    CO2 mmol/L 26 23 26   < > 19*   < > 19*   < >  --  20*   < >  --    CO2, I-STAT mmol/L  --   --   --   --   --   --   --   --  19*  --   --  23   BUN mg/dL 24 23 22   < > 30*   < > 29*   < >  --  22   < >  --    CREATININE mg/dL 0 73 0 74 0 77   < > 0 82   < > 1 09   < >  --  0 97   < >  --    EGFR ml/min/1 73sq m 107 106 104   < > 102   < > 78   < >  --  89   < >  --    GLUCOSE, ISTAT mg/dl  --   --   --   --   --   --   --   --  144*  --   --  298*   CALCIUM mg/dL 8 0* 7 6* 7 6*   < > 7 5*   < > 7 3*   < >  --  7 5*   < >  --    AST U/L  --   --   --   --  64*  --  41  --   --  27   < >  --    ALT U/L  --   --   --   --  30  --  23  --   --  25   < >  --    ALK PHOS U/L  --   -- --   --  105  --  101  --   --  102   < >  --     < > = values in this interval not displayed  Results from last 7 days   Lab Units 09/13/19  0416 09/12/19  0354 09/11/19  0315   WBC Thousand/uL 20 47* 24 91* 20 37*   HEMOGLOBIN g/dL 9 2* 9 5* 9 0*   PLATELETS Thousands/uL 256 240 195     Results from last 7 days   Lab Units 09/10/19  1403 09/10/19  1401 09/06/19  1534 09/06/19  1212   BLOOD CULTURE  No Growth at 48 hrs  No Growth at 48 hrs  --  No Growth After 5 Days  No Growth After 5 Days  GRAM STAIN RESULT   --   --  No polys seen*  4+ Gram negative rods*  3+ Gram positive cocci in pairs*  --        Imaging Studies:   I have personally reviewed pertinent imaging study reports and images in PACS  EKG, Pathology, and Other Studies:   I have personally reviewed pertinent reports

## 2019-09-13 NOTE — PLAN OF CARE
Problem: OCCUPATIONAL THERAPY ADULT  Goal: Performs self-care activities at highest level of function for planned discharge setting  See evaluation for individualized goals  Description  Treatment Interventions: ADL retraining, Functional transfer training, UE strengthening/ROM, Endurance training, Patient/family training, Equipment evaluation/education, Compensatory technique education, Fine motor coordination activities, Continued evaluation, Energy conservation, Activityengagement  Equipment Recommended: (will continue to assess)       See flowsheet documentation for full assessment, interventions and recommendations  Note:   Limitation: Decreased ADL status, Decreased UE strength, Decreased cognition, Decreased Safe judgement during ADL, Decreased endurance, Decreased self-care trans, Decreased fine motor control, Decreased high-level ADLs     Assessment: Pt is a 47yo male admitted to [facility] on 9/6/2019  Pt presents w/ necrotizing soft tissue infection and significant PMH impacting his occupational performance including DM, morbid obesity, HTN, edema, Gout, osteoarthritis of knee, villonodular synovitis of R hand  Pt presents w/ worsening cellulitis that progressed up to groin  Pt intubated on 9/6/2019 and s/p excisional debridement and washout on 9/6/2019; extubated 9/11/2019  Pt currently on room air  Pt reports living w/ wife and family / friend in [address] [place] [address] w/ 2nd floor bed / bath PTA  Pt reports I w/ ADL/ IADL w/ out use of AD or DME  Upon eval, pt alert and oriented to at least person, place  Pt required cues to consistently sustain attention and follow directions during ADL performance  Pt able to provide home set-up and introduce wife present  Pt demonstrated B UE AROM WFL to complete ADL and reports R hand dominance  Pt required max A x2 to roll R<>L  Pt completed UBD w/ mod A and LBD w/ total A  Pt required total A to complete toileting using bed pan   Pt presents w/ increased edema, increased pain, decreased activity tolerance, decreased endurance, decreased cognition, generalized weakness /deconditioning, decreased sitting tolerance impacting his I w/ dressing, bathing, functional mobility, functional transfers, activity tolerance, oral hygiene, community mobility  Pt would benefit from OT while in acute care to address deficits and post acute rehab when medically stable for discharge from acute care   Will continue to follow     OT Discharge Recommendation: Other (Comment)(post acute rehab)  OT - OK to Discharge: (post acute rehab when stable)

## 2019-09-13 NOTE — ASSESSMENT & PLAN NOTE
· Began with diarrhea on 9/10  · Antibiotics stopped 9/12  · Stool for C diff pending  · ID on consult  · Continue florastor

## 2019-09-13 NOTE — ASSESSMENT & PLAN NOTE
· HDL 35, , Triglycerides 279  · Based on patient history patient was refusing to take his blood cholesterol medications due to adverse effects

## 2019-09-13 NOTE — PROGRESS NOTES
Nutrition Follow-up:    Pt now extubated, on diet  Pt reports that he chooses to follow gluten-free diet as was recommended in the past by his doctor and now feels better when he avoids gluten  Noted pt with loose stools, c diff pending  Based on family report of pt's intake 9/12, not meeting estimated needs  Agreeable to supplementation  Pt has had Glucerna in the past  Recommend to provide Diony BID at breakfast and dinner as well as Glucerna at lunch meals  RD currently does not have protocol to make orders  Also recommend to adjust diet to CCD 2 to better meet pt's estimated nutrition needs  Will continue to monitor and make additional recommendations as indicated

## 2019-09-14 LAB
ANION GAP SERPL CALCULATED.3IONS-SCNC: 7 MMOL/L (ref 4–13)
ANISOCYTOSIS BLD QL SMEAR: PRESENT
BASOPHILS # BLD MANUAL: 0 THOUSAND/UL (ref 0–0.1)
BASOPHILS NFR MAR MANUAL: 0 % (ref 0–1)
BUN SERPL-MCNC: 21 MG/DL (ref 5–25)
CALCIUM SERPL-MCNC: 8 MG/DL (ref 8.3–10.1)
CHLORIDE SERPL-SCNC: 103 MMOL/L (ref 100–108)
CO2 SERPL-SCNC: 25 MMOL/L (ref 21–32)
CREAT SERPL-MCNC: 0.68 MG/DL (ref 0.6–1.3)
EOSINOPHIL # BLD MANUAL: 0.33 THOUSAND/UL (ref 0–0.4)
EOSINOPHIL NFR BLD MANUAL: 2 % (ref 0–6)
ERYTHROCYTE [DISTWIDTH] IN BLOOD BY AUTOMATED COUNT: 16.7 % (ref 11.6–15.1)
GFR SERPL CREATININE-BSD FRML MDRD: 110 ML/MIN/1.73SQ M
GLUCOSE SERPL-MCNC: 160 MG/DL (ref 65–140)
GLUCOSE SERPL-MCNC: 168 MG/DL (ref 65–140)
GLUCOSE SERPL-MCNC: 168 MG/DL (ref 65–140)
GLUCOSE SERPL-MCNC: 178 MG/DL (ref 65–140)
GLUCOSE SERPL-MCNC: 196 MG/DL (ref 65–140)
GLUCOSE SERPL-MCNC: 210 MG/DL (ref 65–140)
HCT VFR BLD AUTO: 30.6 % (ref 36.5–49.3)
HGB BLD-MCNC: 9.8 G/DL (ref 12–17)
LYMPHOCYTES # BLD AUTO: 26 % (ref 14–44)
LYMPHOCYTES # BLD AUTO: 4.25 THOUSAND/UL (ref 0.6–4.47)
MAGNESIUM SERPL-MCNC: 2.1 MG/DL (ref 1.6–2.6)
MCH RBC QN AUTO: 31.8 PG (ref 26.8–34.3)
MCHC RBC AUTO-ENTMCNC: 32 G/DL (ref 31.4–37.4)
MCV RBC AUTO: 99 FL (ref 82–98)
METAMYELOCYTES NFR BLD MANUAL: 1 % (ref 0–1)
MONOCYTES # BLD AUTO: 0.82 THOUSAND/UL (ref 0–1.22)
MONOCYTES NFR BLD: 5 % (ref 4–12)
NEUTROPHILS # BLD MANUAL: 10.79 THOUSAND/UL (ref 1.85–7.62)
NEUTS BAND NFR BLD MANUAL: 3 % (ref 0–8)
NEUTS SEG NFR BLD AUTO: 63 % (ref 43–75)
NRBC BLD AUTO-RTO: 1 /100 WBCS
PLATELET # BLD AUTO: 288 THOUSANDS/UL (ref 149–390)
PLATELET BLD QL SMEAR: ADEQUATE
PMV BLD AUTO: 8.9 FL (ref 8.9–12.7)
POLYCHROMASIA BLD QL SMEAR: PRESENT
POTASSIUM SERPL-SCNC: 3.9 MMOL/L (ref 3.5–5.3)
RBC # BLD AUTO: 3.08 MILLION/UL (ref 3.88–5.62)
SODIUM SERPL-SCNC: 135 MMOL/L (ref 136–145)
TOTAL CELLS COUNTED SPEC: 100
WBC # BLD AUTO: 16.35 THOUSAND/UL (ref 4.31–10.16)

## 2019-09-14 PROCEDURE — 80048 BASIC METABOLIC PNL TOTAL CA: CPT | Performed by: NURSE PRACTITIONER

## 2019-09-14 PROCEDURE — 85027 COMPLETE CBC AUTOMATED: CPT | Performed by: NURSE PRACTITIONER

## 2019-09-14 PROCEDURE — 99232 SBSQ HOSP IP/OBS MODERATE 35: CPT | Performed by: INTERNAL MEDICINE

## 2019-09-14 PROCEDURE — 83735 ASSAY OF MAGNESIUM: CPT | Performed by: NURSE PRACTITIONER

## 2019-09-14 PROCEDURE — 85007 BL SMEAR W/DIFF WBC COUNT: CPT | Performed by: NURSE PRACTITIONER

## 2019-09-14 PROCEDURE — 82948 REAGENT STRIP/BLOOD GLUCOSE: CPT

## 2019-09-14 PROCEDURE — 97535 SELF CARE MNGMENT TRAINING: CPT

## 2019-09-14 PROCEDURE — 99232 SBSQ HOSP IP/OBS MODERATE 35: CPT | Performed by: FAMILY MEDICINE

## 2019-09-14 PROCEDURE — 97530 THERAPEUTIC ACTIVITIES: CPT

## 2019-09-14 RX ORDER — LANOLIN ALCOHOL/MO/W.PET/CERES
6 CREAM (GRAM) TOPICAL
Status: DISCONTINUED | OUTPATIENT
Start: 2019-09-14 | End: 2019-09-18 | Stop reason: HOSPADM

## 2019-09-14 RX ORDER — LANOLIN ALCOHOL/MO/W.PET/CERES
6 CREAM (GRAM) TOPICAL
Status: DISCONTINUED | OUTPATIENT
Start: 2019-09-14 | End: 2019-09-14

## 2019-09-14 RX ADMIN — PANTOPRAZOLE SODIUM 40 MG: 40 TABLET, DELAYED RELEASE ORAL at 05:56

## 2019-09-14 RX ADMIN — INSULIN LISPRO 4 UNITS: 100 INJECTION, SOLUTION INTRAVENOUS; SUBCUTANEOUS at 18:53

## 2019-09-14 RX ADMIN — ACETAMINOPHEN 325 MG: 325 TABLET ORAL at 22:21

## 2019-09-14 RX ADMIN — HYDROCODONE BITARTRATE AND ACETAMINOPHEN 1 TABLET: 5; 325 TABLET ORAL at 17:22

## 2019-09-14 RX ADMIN — INSULIN GLARGINE 45 UNITS: 100 INJECTION, SOLUTION SUBCUTANEOUS at 22:21

## 2019-09-14 RX ADMIN — MELATONIN 6 MG: at 22:21

## 2019-09-14 RX ADMIN — ENOXAPARIN SODIUM 40 MG: 40 INJECTION SUBCUTANEOUS at 08:17

## 2019-09-14 RX ADMIN — INSULIN LISPRO 2 UNITS: 100 INJECTION, SOLUTION INTRAVENOUS; SUBCUTANEOUS at 22:21

## 2019-09-14 RX ADMIN — FUROSEMIDE 40 MG: 10 INJECTION, SOLUTION INTRAMUSCULAR; INTRAVENOUS at 08:17

## 2019-09-14 RX ADMIN — FUROSEMIDE 40 MG: 10 INJECTION, SOLUTION INTRAMUSCULAR; INTRAVENOUS at 17:23

## 2019-09-14 RX ADMIN — ENOXAPARIN SODIUM 40 MG: 40 INJECTION SUBCUTANEOUS at 20:13

## 2019-09-14 RX ADMIN — INSULIN LISPRO 4 UNITS: 100 INJECTION, SOLUTION INTRAVENOUS; SUBCUTANEOUS at 02:32

## 2019-09-14 RX ADMIN — Medication 250 MG: at 17:23

## 2019-09-14 RX ADMIN — ACETAMINOPHEN 650 MG: 325 TABLET, FILM COATED ORAL at 10:07

## 2019-09-14 RX ADMIN — INSULIN LISPRO 15 UNITS: 100 INJECTION, SOLUTION INTRAVENOUS; SUBCUTANEOUS at 08:18

## 2019-09-14 RX ADMIN — ACETAMINOPHEN 325 MG: 325 TABLET ORAL at 00:54

## 2019-09-14 RX ADMIN — ACETAMINOPHEN 325 MG: 325 TABLET ORAL at 05:57

## 2019-09-14 RX ADMIN — HYDROCODONE BITARTRATE AND ACETAMINOPHEN 1 TABLET: 5; 325 TABLET ORAL at 05:57

## 2019-09-14 RX ADMIN — Medication 1 APPLICATION: at 10:07

## 2019-09-14 RX ADMIN — INSULIN LISPRO 15 UNITS: 100 INJECTION, SOLUTION INTRAVENOUS; SUBCUTANEOUS at 13:22

## 2019-09-14 RX ADMIN — INSULIN LISPRO 2 UNITS: 100 INJECTION, SOLUTION INTRAVENOUS; SUBCUTANEOUS at 13:22

## 2019-09-14 RX ADMIN — ACETAMINOPHEN 325 MG: 325 TABLET ORAL at 17:22

## 2019-09-14 RX ADMIN — INSULIN LISPRO 15 UNITS: 100 INJECTION, SOLUTION INTRAVENOUS; SUBCUTANEOUS at 18:54

## 2019-09-14 RX ADMIN — MELATONIN 6 MG: at 02:28

## 2019-09-14 RX ADMIN — Medication 250 MG: at 08:17

## 2019-09-14 RX ADMIN — HYDROCODONE BITARTRATE AND ACETAMINOPHEN 1 TABLET: 5; 325 TABLET ORAL at 11:02

## 2019-09-14 NOTE — PLAN OF CARE
Problem: PHYSICAL THERAPY ADULT  Goal: Performs mobility at highest level of function for planned discharge setting  See evaluation for individualized goals  Description  Treatment/Interventions: LE strengthening/ROM, Therapeutic exercise, Endurance training, Patient/family training, Equipment eval/education, Bed mobility(PT to see when sitting edge of bed is appropriate )  Equipment Recommended: Other (Comment)(pt needs dependent means for out of bed mobilization )       See flowsheet documentation for full assessment, interventions and recommendations  9/14/2019 1538 by Burt Harry, PT  Outcome: Progressing  Note:   Prognosis: Fair  Problem List: Decreased strength, Decreased range of motion, Decreased endurance, Impaired balance, Decreased mobility, Decreased coordination, Decreased safety awareness, Obesity, Decreased skin integrity, Pain  Assessment: pt was able to complete upper body mobilization in chair  additional mobility training not possible due to pain and fatigue  pt needs continued inpatient PT to reduce fall risk and improve mobility status  Recommendation: Short-term skilled PT          See flowsheet documentation for full assessment  9/14/2019 1346 by Burt Harry, PT  Outcome: Progressing  Note:   Prognosis: Fair  Problem List: Decreased strength, Decreased range of motion, Decreased endurance, Impaired balance, Decreased mobility, Decreased coordination, Decreased safety awareness, Obesity, Decreased skin integrity, Pain  Assessment: pt shows progression of mobility status w/ attainment of seated position at bedside using sizewise shuttle chair B  pt was better able to assist w/ rolling and repositioning in bed  pt remains at risk for falls and needs continued inpatient PT to progress mobility training as appropriate  discharge recommendation is for inpatient rehab to maximize level of independence          Recommendation: Short-term skilled PT          See flowsheet documentation for full assessment

## 2019-09-14 NOTE — PLAN OF CARE
Problem: Prexisting or High Potential for Compromised Skin Integrity  Goal: Skin integrity is maintained or improved  Description  INTERVENTIONS:  - Identify patients at risk for skin breakdown  - Assess and monitor skin integrity  - Assess and monitor nutrition and hydration status  - Monitor labs   - Assess for incontinence   - Turn and reposition patient  - Assist with mobility/ambulation  - Relieve pressure over bony prominences  - Avoid friction and shearing  - Provide appropriate hygiene as needed including keeping skin clean and dry  - Evaluate need for skin moisturizer/barrier cream  - Collaborate with interdisciplinary team   - Patient/family teaching  - Consider wound care consult   Outcome: Progressing     Problem: Potential for Falls  Goal: Patient will remain free of falls  Description  INTERVENTIONS:  - Assess patient frequently for physical needs  -  Identify cognitive and physical deficits and behaviors that affect risk of falls    -  [place] fall precautions as indicated by assessment   - Educate patient/family on patient safety including physical limitations  - Instruct patient to call for assistance with activity based on assessment  - Modify environment to reduce risk of injury  - Consider OT/PT consult to assist with strengthening/mobility  Outcome: Progressing     Problem: CARDIOVASCULAR - ADULT  Goal: Maintains optimal cardiac output and hemodynamic stability  Description  INTERVENTIONS:  - Monitor I/O, vital signs and rhythm  - Monitor for S/S and trends of decreased cardiac output  - Administer and titrate ordered vasoactive medications to optimize hemodynamic stability  - Assess quality of pulses, skin color and temperature  - Assess for signs of decreased coronary artery perfusion  - Instruct patient to report change in severity of symptoms  Outcome: Progressing  Goal: Absence of cardiac dysrhythmias or at baseline rhythm  Description  INTERVENTIONS:  - Continuous cardiac monitoring, vital signs, obtain 12 lead EKG if ordered  - Administer antiarrhythmic and heart rate control medications as ordered  - Monitor electrolytes and administer replacement therapy as ordered  Outcome: Progressing     Problem: RESPIRATORY - ADULT  Goal: Achieves optimal ventilation and oxygenation  Description  INTERVENTIONS:  - Assess for changes in respiratory status  - Assess for changes in mentation and behavior  - Position to facilitate oxygenation and minimize respiratory effort  - Oxygen administered by appropriate delivery if ordered  - Initiate smoking cessation education as indicated  - Encourage broncho-pulmonary hygiene including cough, deep breathe, Incentive Spirometry  - Assess the need for suctioning and aspirate as needed  - Assess and instruct to report SOB or any respiratory difficulty  - Respiratory Therapy support as indicated  Outcome: Progressing     Problem: METABOLIC, FLUID AND ELECTROLYTES - ADULT  Goal: Electrolytes maintained within normal limits  Description  INTERVENTIONS:  - Monitor labs and assess patient for signs and symptoms of electrolyte imbalances  - Administer electrolyte replacement as ordered  - Monitor response to electrolyte replacements, including repeat lab results as appropriate  - Instruct patient on fluid and nutrition as appropriate  Outcome: Progressing  Goal: Fluid balance maintained  Description  INTERVENTIONS:  - Monitor labs   - Monitor I/O and WT  - Instruct patient on fluid and nutrition as appropriate  - Assess for signs & symptoms of volume excess or deficit  Outcome: Progressing  Goal: Glucose maintained within target range  Description  INTERVENTIONS:  - Monitor Blood Glucose as ordered  - Assess for signs and symptoms of hyperglycemia and hypoglycemia  - Administer ordered medications to maintain glucose within target range  - Assess nutritional intake and initiate nutrition service referral as needed  Outcome: Progressing     Problem: SKIN/TISSUE INTEGRITY - ADULT  Goal: Skin integrity remains intact  Description  INTERVENTIONS  - Identify patients at risk for skin breakdown  - Assess and monitor skin integrity  - Assess and monitor nutrition and hydration status  - Monitor labs (i e  albumin)  - Assess for incontinence   - Turn and reposition patient  - Assist with mobility/ambulation  - Relieve pressure over bony prominences  - Avoid friction and shearing  - Provide appropriate hygiene as needed including keeping skin clean and dry  - Evaluate need for skin moisturizer/barrier cream  - Collaborate with interdisciplinary team (i e  Nutrition, Rehabilitation, etc )   - Patient/family teaching  Outcome: Progressing  Goal: Incision(s), wounds(s) or drain site(s) healing without S/S of infection  Description  INTERVENTIONS  - Assess and document risk factors for skin impairment   - Assess and document dressing, incision, wound bed, drain sites and surrounding tissue  - Consider nutrition services referral as needed  - Oral mucous membranes remain intact  - Provide patient/ family education  Outcome: Progressing  Goal: Oral mucous membranes remain intact  Description  INTERVENTIONS  - Assess oral mucosa and hygiene practices  - Implement preventative oral hygiene regimen  - Implement oral medicated treatments as ordered  - Initiate Nutrition services referral as needed  Outcome: Progressing     Problem: HEMATOLOGIC - ADULT  Goal: Maintains hematologic stability  Description  INTERVENTIONS  - Assess for signs and symptoms of bleeding or hemorrhage  - Monitor labs  - Administer supportive blood products/factors as ordered and appropriate  Outcome: Progressing     Problem: Nutrition/Hydration-ADULT  Goal: Nutrient/Hydration intake appropriate for improving, restoring or maintaining nutritional needs  Description  Monitor and assess patient's nutrition/hydration status for malnutrition  Collaborate with interdisciplinary team and initiate plan and interventions as ordered  Monitor patient's weight and dietary intake as ordered or per policy  Utilize nutrition screening tool and intervene as necessary  Determine patient's food preferences and provide high-protein, high-caloric foods as appropriate       INTERVENTIONS:  - Monitor oral intake, urinary output, labs, and treatment plans  - Assess nutrition and hydration status and recommend course of action  - Evaluate amount of meals eaten  - Assist patient with eating if necessary   - Allow adequate time for meals  - Recommend/ encourage appropriate diets, oral nutritional supplements, and vitamin/mineral supplements  - Order, calculate, and assess calorie counts as needed  - Assess need for intravenous fluids  - Provide specific nutrition/hydration education as appropriate  - Include patient/family/caregiver in decisions related to nutrition   Outcome: Progressing

## 2019-09-14 NOTE — PROGRESS NOTES
Progress Note - Ralph Ríos 1965, 48 y o  male MRN: [id number]    Unit/Bed#: -01 Encounter: [id number]    Primary Care Provider: ANN Fairchild   Date and time admitted to hospital: 9/6/2019 11:12 AM        Anemia  Assessment & Plan  · Hemoglobin was 9 8 today  Will continue to monitor  Type 2 diabetes mellitus with complication [facility])  Assessment & Plan  Lab Results   Component Value Date    HGBA1C 11 2 (H) 09/06/2019       Recent Labs     09/14/19  0154 09/14/19  0702 09/14/19  1040 09/14/19  1606   POCGLU 168* 168* 196* 210*       Blood Sugar Average: Last 72 hrs:  (P) 308 [id number]     · PTA metformin and glimepiride on hold  · Patient does not have Endocrinologist that he follows with  · Appreciate assistance from endocrinology  Will continue Lantus and Humalog for now  Patient does not have insurance and may need to go on Novolin 70/30 as an outpatient  His preference would be to go on Lantus if he is able to  Recurrent cellulitis of lower extremity  Assessment & Plan  · History of recurrent left lower extremity cellulitis  · PTA doxycycline not continued on admission  · See plan under Necrotizing soft tissue infection  · ID on consult    Morbid obesity ([address] )  Assessment & Plan  · Body mass index is 66 36 kg/m²  · Nutrition on consult  · Terrence/CHO controlled diet      Hyperlipidemia  Assessment & Plan  · HDL 35, , Triglycerides 279  · Based on patient history patient was refusing to take his blood cholesterol medications due to adverse effects    GERD (gastroesophageal reflux disease)  Assessment & Plan  · PTA prilosec for GERD  · Continue with pharmacy substitute: Protonix PO    * Necrotizing soft tissue infection  Assessment & Plan  · History of recurrent cellulitis  · Presented to ED with worsening cellulitis for one week-Within the 5 days, he said that cellulitis went from lower left leg in the lateral area continued to go upward to the left upper thigh in the medial area up to his groin which he described as fast   · Reported when he sat on the toilet that there was bleeding with foul smell  · PTA was taking doxicycline 100 mg Q 12 hours  · 9/6 CT left femur: infection with gas-forming organism in the L groin and L prox-mid thigh  Consistent with Anna's gangrene  No localized fluid collection  · S/p excisional debridement and washout in OR 9/6 and 9/7  It appears the infection was mainly in the subcutaneous tissue  The muscle appeared healthy  · Remained intubated after OR 9/6, extubated 9/11  · Completed 7 days of antibiotics on 9/12  · ID following  · Daily dressing changes by nursing-per surgery            Subjective/Objective     Subjective:   Patient was seen and examined this evening with family at bedside  He is doing fine his pain is controlled  He denies any fevers or chills  No other issues reported  Objective:  Vitals: Blood pressure 130/77, pulse (!) 111, temperature 98 2 °F (36 8 °C), temperature source Oral, resp  rate 18, height 5' 4" (1 626 m), weight (!) 175 kg (386 lb 9 6 oz), SpO2 97 %  ,Body mass index is 66 36 kg/m²  Intake/Output Summary (Last 24 hours) at 9/14/2019 1759  Last data filed at 9/14/2019 1100  Gross per 24 hour   Intake 200 ml   Output 800 ml   Net -600 ml       Invasive Devices     Peripheral Intravenous Line            Long-Dwell Peripheral IV (Midline) 32/86/00 Left Cephalic 3 days                Physical Exam: /77 (BP Location: Right arm)   Pulse (!) 111   Temp 98 2 °F (36 8 °C) (Oral)   Resp 18   Ht 5' 4" (1 626 m)   Wt (!) 175 kg (386 lb 9 6 oz)   SpO2 97%   BMI 66 36 kg/m²   General appearance: alert and oriented, in no acute distress  Head: Normocephalic, without obvious abnormality, atraumatic  Eyes: No scleral icterus    Lungs: clear to auscultation bilaterally  Heart:  Tachycardic with regular rhythm, S1, S2 normal, no murmur, click, rub or gallop  Extremities: Intact wound dressing on left upper thigh   Neurologic: Grossly normal    Lab, Imaging and other studies: I have personally reviewed pertinent reports      VTE Pharmacologic Prophylaxis: Enoxaparin (Lovenox)  VTE Mechanical Prophylaxis: sequential compression device

## 2019-09-14 NOTE — PROGRESS NOTES
Endocrine Ongoing Consultation           *Date*: 9/14/2019     *Time*: 9:21 AM        Medical Decision Making:      Impression  1  Necrotizing fasciitis, Anna's Gangrene  2  DM2 uncontrolled A1C 11%      Recommendations:  ?  His BGs this morning look adequate on basal/bolus insulin therapy  As inpatient for now continue lantus 45 units qHS and lispro 15 units TID AC  He presently has no health insurance  He will need to be discharged on insulin to help his infection heal and prevent further soft tissue infections  The most affordable insulin is at [employer] ([employer]) Novolin 70/30 Relion brand for $25/vial  If his inpatient insulin doses remain the same and require no further adjustment I would discharge him on 54 units in AM with breakfast and 36 units in PM with dinner  I would also continue metformin 1g BID AC on discharge if GFR stays >45 on discharge  No further amaryl  He will need insulin syringes on discharge  Follow up with our Endocrinology office and call anytime he has any issues with getting insulin  Information placed on d/c paperwork  Jhon ELDER             Reason for Endocrine Consult/Chief Complaint: DM management     History of Present Illness:   Mr Missy Soriano is a 54yr old male who presented to the hospital and was found to have necrotizing soft tissue infection Anna's gangrene  He had excisional debridement and washout 9/6 and 9/7 and completed antibiotic course  Has worsening DM2 on presentation to the hospital A1C 11%  Was previously on oral agents but was using insulin several years ago  ? Overnight Events:     NAEO, AM BG in 160s  Appetite slightly diminished  Has no insurance coverage right now          Review of Systems:     Review of Systems   Constitutional: Negative  HENT: Negative  Eyes: Negative  Respiratory: Negative  Cardiovascular: Negative  Gastrointestinal: Negative  Endocrine: Negative  Genitourinary: Negative      Musculoskeletal: Negative  Skin: Negative  Allergic/Immunologic: Negative  Neurological: Negative  Hematological: Negative  Psychiatric/Behavioral: Negative  ?      Patient History:      Past Medical History:   Diagnosis Date    Cellulitis     last assessed 12/10/15    Diabetes mellitus ([facility] [address] )     Edema     Elevated liver enzymes     Esophageal reflux     Gluten intolerance     Gout     last assessed 09/05/13    Hyperglycemia     Hypertension     IBS (irritable bowel syndrome)     Insomnia     Obesity     Osteoarthritis of knee     last assessed 02/10/14    Prehypertension     last assessed 08/22/17    Venous insufficiency     last assessed 08/22/17    Villonodular synovitis of the hand, right     last assessed 11/14/2013     Past Surgical History:   Procedure Laterality Date    INCISION AND DRAINAGE OF WOUND Left 9/6/2019    Procedure: INCISION AND DRAINAGE (I&D) GROIN;  Surgeon: Frantz Preston DO;  [facility];  Service: General    WOUND DEBRIDEMENT Left 9/7/2019    Procedure: EXCISIONAL DEBRIDEMENT;  Surgeon: Frantz Preston DO;  [facility];  Service: General     Social History     Socioeconomic History    Marital status: /Civil Union     Spouse name: Not on file    Number of children: Not on file    Years of education: Not on file    Highest education level: Not on file   Occupational History    Not on file   Social Needs    Financial resource strain: Not on file    Food insecurity:     Worry: Not on file     Inability: Not on file    Transportation needs:     Medical: Not on file     Non-medical: Not on file   Tobacco Use    Smoking status: Never Smoker    Smokeless tobacco: Never Used   Substance and Sexual Activity    Alcohol use: No    Drug use: No    Sexual activity: Not on file   Lifestyle    Physical activity:     Days per week: Not on file     Minutes per session: Not on file    Stress: Not on file   Relationships    Social connections: Talks on phone: Not on file     Gets together: Not on file     Attends [employer] service: Not on file     Active member of club or organization: Not on file     Attends meetings of clubs or organizations: Not on file     Relationship status: Not on file    Intimate partner violence:     Fear of current or ex partner: Not on file     Emotionally abused: Not on file     Physically abused: Not on file     Forced sexual activity: Not on file   Other Topics Concern    Not on file   Social History Narrative    Not on file     Family History   Problem Relation Age of Onset    Cancer Mother         gastrc    Colon cancer Father     Heart failure Father          Current Medications: At the time this note was written these were the medications the patient was on       Current Facility-Administered Medications   Medication Dose Route Frequency Provider Last Rate Last Dose    HYDROcodone-acetaminophen (NORCO) 5-325 mg per tablet 1 tablet  1 tablet Oral Q6H PRN ANN Mobley   1 tablet at 09/14/19 0557    And    acetaminophen (TYLENOL) tablet 325 mg  325 mg Oral Q6H [address] ANN Mobley   325 mg at 09/14/19 0557    acetaminophen (TYLENOL) tablet 650 mg  650 mg Oral Q6H PRN ANN Mobley   650 mg at 09/13/19 1613    enoxaparin (LOVENOX) subcutaneous injection 40 mg  40 mg Subcutaneous Q12H [address] ANN Mobley   40 mg at 09/14/19 0817    furosemide (LASIX) injection 40 mg  40 mg Intravenous BID (diuretic) ANN Mobley   40 mg at 09/14/19 0817    insulin glargine (LANTUS) subcutaneous injection 45 Units 0 45 mL  45 Units Subcutaneous HS Diego Reyna PA-C        insulin lispro (HumaLOG) 100 units/mL subcutaneous injection 15 Units  15 Units Subcutaneous TID With Meals Shanelle Whitehead PA-C   15 Units at 09/14/19 0818    insulin lispro (HumaLOG) 100 units/mL subcutaneous injection 2-12 Units  2-12 Units Subcutaneous TID AC ANN Mobley   4 Units at 09/13/19 1641    insulin lispro (HumaLOG) 100 units/mL subcutaneous injection 2-12 Units  2-12 Units Subcutaneous HS Nory JORDAN Perez   4 Units at 09/13/19 2254    melatonin tablet 6 mg  6 mg Oral HS Vanessa Mistry PA-C   6 mg at 09/14/19 0228    pantoprazole (PROTONIX) EC tablet 40 mg  40 mg Oral Early Morning ANN Mobley   40 mg at 09/14/19 0556    phenol (CHLORASEPTIC) 1 4 % mucosal liquid 1 spray  1 spray Mouth/Throat Q2H PRN ANN Mobley   1 spray at 09/11/19 2240    saccharomyces boulardii (FLORASTOR) capsule 250 mg  250 mg Oral BID ANN Mobley   250 mg at 09/14/19 0817    sodium hypochlorite (DAKIN'S) 0 5 percent topical solution 1 application  1 application Irrigation Daily ANN Mobley   1 application at 74/34/30 0853        Allergies: Clindamycin        Physical Exam:      Vital Signs:   /75 (BP Location: Right arm)   Pulse 100   Temp 98 °F (36 7 °C) (Oral)   Resp 18   Ht 5' 4" (1 626 m)   Wt (!) 175 kg (386 lb 9 6 oz)   SpO2 95%   BMI 66 36 kg/m²     Physical Exam   Constitutional: He is oriented to person, place, and time  He appears well-developed and well-nourished  HENT:   Head: Normocephalic and atraumatic  Nose: Nose normal    Mouth/Throat: Oropharynx is clear and moist  No oropharyngeal exudate  Eyes: Pupils are equal, round, and reactive to light  Conjunctivae and EOM are normal    Neck: Normal range of motion  Neck supple  Cardiovascular: Normal rate, regular rhythm and normal heart sounds  Exam reveals no gallop and no friction rub  No murmur heard  Pulmonary/Chest: Effort normal and breath sounds normal  No stridor  No respiratory distress  He has no wheezes  He has no rales  Abdominal: Soft  Bowel sounds are normal  He exhibits no distension and no mass  There is no tenderness  There is no rebound and no guarding  Musculoskeletal: Normal range of motion  He exhibits no edema     Neurological: He is alert and oriented to person, place, and time    Skin: Skin is warm and dry  Psychiatric: He has a normal mood and affect   His behavior is normal  Judgment and thought content normal          Labs and Imaging:          Recent Results (from the past 24 hour(s))   Fingerstick Glucose (POCT)    Collection Time: 09/13/19  9:56 AM   Result Value Ref Range    POC Glucose 130 65 - 140 mg/dl   Fingerstick Glucose (POCT)    Collection Time: 09/13/19 12:21 PM   Result Value Ref Range    POC Glucose 195 (H) 65 - 140 mg/dl   Fingerstick Glucose (POCT)    Collection Time: 09/13/19  4:38 PM   Result Value Ref Range    POC Glucose 210 (H) 65 - 140 mg/dl   Fingerstick Glucose (POCT)    Collection Time: 09/13/19  9:07 PM   Result Value Ref Range    POC Glucose 216 (H) 65 - 140 mg/dl   Fingerstick Glucose (POCT)    Collection Time: 09/14/19  1:54 AM   Result Value Ref Range    POC Glucose 168 (H) 65 - 140 mg/dl   CBC and differential    Collection Time: 09/14/19  6:00 AM   Result Value Ref Range    WBC 16 35 (H) 4 31 - 10 16 Thousand/uL    RBC 3 08 (L) 3 88 - 5 62 Million/uL    Hemoglobin 9 8 (L) 12 0 - 17 0 g/dL    Hematocrit 30 6 (L) 36 5 - 49 3 %    MCV 99 (H) 82 - 98 fL    MCH 31 8 26 8 - 34 3 pg    MCHC 32 0 31 4 - 37 4 g/dL    RDW 16 7 (H) 11 6 - 15 1 %    MPV 8 9 8 9 - 12 7 fL    Platelets 911 734 - 669 Thousands/uL    nRBC 1 /100 WBCs   Basic metabolic panel    Collection Time: 09/14/19  6:00 AM   Result Value Ref Range    Sodium 135 (L) 136 - 145 mmol/L    Potassium 3 9 3 5 - 5 3 mmol/L    Chloride 103 100 - 108 mmol/L    CO2 25 21 - 32 mmol/L    ANION GAP 7 4 - 13 mmol/L    BUN 21 5 - 25 mg/dL    Creatinine 0 68 0 60 - 1 30 mg/dL    Glucose 160 (H) 65 - 140 mg/dL    Calcium 8 0 (L) 8 3 - 10 1 mg/dL    eGFR 110 ml/min/1 73sq m   Magnesium    Collection Time: 09/14/19  6:00 AM   Result Value Ref Range    Magnesium 2 1 1 6 - 2 6 mg/dL   Manual Differential(PHLEBS Do Not Order)    Collection Time: 09/14/19  6:00 AM   Result Value Ref Range    Segmented % 63 43 - 75 %    Bands % 3 0 - 8 %    Lymphocytes % 26 14 - 44 %    Monocytes % 5 4 - 12 %    Eosinophils, % 2 0 - 6 %    Basophils % 0 0 - 1 %    Metamyelocytes% 1 0 - 1 %    Absolute Neutrophils 10 79 (H) 1 85 - 7 62 Thousand/uL    Lymphocytes Absolute 4 25 0 60 - 4 47 Thousand/uL    Monocytes Absolute 0 82 0 00 - 1 22 Thousand/uL    Eosinophils Absolute 0 33 0 00 - 0 40 Thousand/uL    Basophils Absolute 0 00 0 00 - 0 10 Thousand/uL    Total Counted 100     Anisocytosis Present     Polychromasia Present     Platelet Estimate Adequate Adequate   Fingerstick Glucose (POCT)    Collection Time: 09/14/19  7:02 AM   Result Value Ref Range    POC Glucose 168 (H) 65 - 140 mg/dl                ?

## 2019-09-14 NOTE — OCCUPATIONAL THERAPY NOTE
Occupational Therapy Cancellation Note        Patient Name: Agata Castillo  [id number] Date: 9/14/2019    Chart review completed  Care coordination w/ PT, RJ and RN  Contact made w/ pt  Pt declined participation at this time even w/ max encouragement from therapist due to pain following wound care  Pt added that he was motivated to participate but unable to tolerate at this time due to pain  Pt requested therapist return later after noon  Will continue to follow as appropriate and schedule allows      Joanna Jose OTR/L

## 2019-09-14 NOTE — PHYSICAL THERAPY NOTE
PHYSICAL THERAPY TREATMENT NOTE      Patient Name: Binh Fisher  [id number] Date: 9/14/2019 09/14/19 2011   Pain Assessment   Pain Assessment FLACC   Pain Rating: FLACC (Rest) - Face 0   Pain Rating: FLACC (Rest) - Legs 0   Pain Rating: FLACC (Rest) - Activity 0   Pain Rating: FLACC (Rest) - Cry 0   Pain Rating: FLACC (Rest) - Consolability 0   Score: FLACC (Rest) 0   Pain Rating: FLACC (Activity) - Face 1   Pain Rating: FLACC (Activity) - Legs 1   Pain Rating: FLACC (Activity) - Activity 1   Pain Rating: FLACC (Activity) - Cry 1   Pain Rating: FLACC (Activity) - Consolability 1   Score: FLACC (Activity) 5   Restrictions/Precautions   Weight Bearing Precautions Per Order No   Other Precautions Multiple lines;Telemetry; Fall Risk;Pain   General   Family/Caregiver Present No   Cognition   Attention Attends with cues to redirect   Orientation Level Oriented to person; Other (Comment)  (pt was identified w/ full name, birth date)   Following Commands Follows one step commands with increased time or repetition   Subjective   Subjective pt seen sitting in shuttle chair w/ friends present  pt c/o back pain w/ movement and wants to go back to bed  Bed Mobility   Rolling R 2  Maximal assistance   Additional items Assist x 2;Bedrails; Increased time required;Verbal cues;LE management   Rolling L 2  Maximal assistance   Additional items Assist x 2;Bedrails; Increased time required;Verbal cues;LE management   Additional Comments pt was returned to bed from shuttle chair using hovermatt and assist x4  pt rolled in bed to reposition and have linens changed  Transfers   Additional Comments while seated in chair pt utilized repositioning poles to pull upper body forward 5 seconds 5x  rest breaks were needed due to fatigue and pain     Balance   Static Sitting Fair   Static Standing Zero   Activity Tolerance   Activity Tolerance Patient limited by fatigue;Patient limited by pain   Nurse Made Aware spoke to [facility], Jaylin Rodriguez and Sade PCA   Assessment   Prognosis Fair   Problem List Decreased strength;Decreased range of motion;Decreased endurance; Impaired balance;Decreased mobility; Decreased coordination;Decreased safety awareness; Obesity; Decreased skin integrity;Pain   Assessment pt was able to complete upper body mobilization in chair  additional mobility training not possible due to pain and fatigue  pt needs continued inpatient PT to reduce fall risk and improve mobility status  Goals   Patient Goals go back to bed   STG Expiration Date 09/22/19   Short Term Goal #1 pt will: Increase bilateral LE strength 1/2 grade to facilitate independent mobility, Perform rolling and repositioning in bed w/ modx1 to decrease fall risk factors, Complete exercise program independently to increase strength and endurance, Tolerate 3 hr OOB to faciliate upright tolerance and Improve Barthel Index score to 45 or greater to facilitate independence  PT to see when sitting edge of bed is appropriate  Treatment Day 3   Plan   Treatment/Interventions LE strengthening/ROM; Therapeutic exercise; Endurance training;Patient/family training;Equipment eval/education; Bed mobility  (PT to see when sitting edge of bed is appropriate )   Progress Progressing toward goals   PT Frequency 5x/wk; Other (Comment)  (and 1 to 2x on weekend)   Recommendation   Recommendation Short-term skilled PT   Equipment Recommended Other (Comment)  (pt needs hovermatt transition to shuttle chair B for OOB )     Skilled inpatient PT recommended while in hospital to progress pt toward treatment goals      Payam Zuleta, PT

## 2019-09-14 NOTE — PLAN OF CARE
Problem: PHYSICAL THERAPY ADULT  Goal: Performs mobility at highest level of function for planned discharge setting  See evaluation for individualized goals  Description  Treatment/Interventions: LE strengthening/ROM, Therapeutic exercise, Endurance training, Patient/family training, Equipment eval/education, Bed mobility(PT to see when sitting edge of bed is appropriate )  Equipment Recommended: Other (Comment)(pt needs dependent means for out of bed mobilization )       See flowsheet documentation for full assessment, interventions and recommendations  Outcome: Progressing  Note:   Prognosis: Fair  Problem List: Decreased strength, Decreased range of motion, Decreased endurance, Impaired balance, Decreased mobility, Decreased coordination, Decreased safety awareness, Obesity, Decreased skin integrity, Pain  Assessment: pt shows progression of mobility status w/ attainment of seated position at bedside using sizewise shuttle chair B  pt was better able to assist w/ rolling and repositioning in bed  pt remains at risk for falls and needs continued inpatient PT to progress mobility training as appropriate  discharge recommendation is for inpatient rehab to maximize level of independence  Recommendation: Short-term skilled PT          See flowsheet documentation for full assessment

## 2019-09-14 NOTE — PROGRESS NOTES
Progress Note - Infectious Disease   Andreina Alcala 48 y o  male MRN: [id number]  Unit/Bed#: -01 Encounter: [id number]      Impression/Recommendations:  1  Septic shock   POA:  Fever, WBC, refractory hypotension   Due to #2   No other appreciable source   Blood cultures negative   Recurrent fever, WBC, although overall clinically improving daily   Repeat blood cultures, CXR, C  diff negative  Consider drug fever (antibiotics now stopped), atelectasis   Procalcitonin improving 2 96 >> 0 66  Now afebrile with WBC trending down  Rec:  ? Continue to follow closely off antibiotics  ? Follow temperatures closely  ? Follow up final repeat blood cultures     2  Left thigh/groin soft tissue infection   Cultures with Group B Strep, Strep anginosis   Status post I&D   OR findings showed infection confined to SQ tissues; muscle healthy, no necrotizing fasciitis   Wound noted to be clean on recent dressing changes  Status post 7 days IV antibiotics  Rec:  ? Continue to follow closely off antibiotics  ? Serial exams and LWC per surgery     3   Diarrhea   Consider antibiotic associated  Lemond Mcdaniel due to bowel regimen although only got Senna x1 9/9 and again this AM   C  Diff negative      4   Acute hypoxic respiratory failure   Likely ARDS in the setting of #1   Scant secretions makes pneumonia less likely   Antibiotics as above would treat regardless   Improved, now extubated      5   DEAN   In the setting of septic shock   Improved      6   Uncontrolled DM with hyperglycemia   Hemoglobin A1c 12  Risk factor for all of above     7   Morbid obesity   Risk factor for all of above  Patient motivated to continue to lose weight      The patient is stable from an ID standpoint  The above plan was discussed in detail with the patient and his family at the bedside      Antibiotics:  Off antibiotics #2    Subjective:  Patient seen on AM rounds  Feels well    Reports that he has actually been losing weight with diet and exercise after a peak of >>400 lbs  Denies fevers, chills, sweats, nausea, vomiting, or diarrhea  24 Hour Events:  No documented fevers, chills, sweats, nausea, vomiting, or diarrhea  C  Diff negative  Objective:  Vitals:  Temp:  [97 9 °F (36 6 °C)-98 4 °F (36 9 °C)] 98 °F (36 7 °C)  HR:  [100-111] 100  Resp:  [16-22] 18  BP: (124-143)/(74-90) 125/75  SpO2:  [93 %-98 %] 95 %  Temp (24hrs), Av 1 °F (36 7 °C), Min:97 9 °F (36 6 °C), Max:98 4 °F (36 9 °C)  Current: Temperature: 98 °F (36 7 °C)    Physical Exam:   General:  No acute distress  Psychiatric:  Awake and alert  Pulmonary:  Normal respiratory excursion without accessory muscle use  Abdomen:  Soft, nontender  Extremities:  No edema  Skin:  No rashes    Lab Results:  I have personally reviewed pertinent labs  Results from last 7 days   Lab Units 19  0600 19  0416 19  0354  19  0426  19  0508  19  0846   POTASSIUM mmol/L 3 9 3 8 3 8   < > 4 0   < > 3 4*   < >  --    CHLORIDE mmol/L 103 101 105   < > 107   < > 105   < >  --    CO2 mmol/L 25 26 23   < > 19*   < > 19*   < >  --    CO2, I-STAT mmol/L  --   --   --   --   --   --   --   --  19*   BUN mg/dL 21 24 23   < > 30*   < > 29*   < >  --    CREATININE mg/dL 0 68 0 73 0 74   < > 0 82   < > 1 09   < >  --    EGFR ml/min/1 73sq m 110 107 106   < > 102   < > 78   < >  --    GLUCOSE, ISTAT mg/dl  --   --   --   --   --   --   --   --  144*   CALCIUM mg/dL 8 0* 8 0* 7 6*   < > 7 5*   < > 7 3*   < >  --    AST U/L  --   --   --   --  64*  --  41  --   --    ALT U/L  --   --   --   --  30  --  23  --   --    ALK PHOS U/L  --   --   --   --  105  --  101  --   --     < > = values in this interval not displayed       Results from last 7 days   Lab Units 19  0600 19  0416 19  0354   WBC Thousand/uL 16 35* 20 47* 24 91*   HEMOGLOBIN g/dL 9 8* 9 2* 9 5*   PLATELETS Thousands/uL 288 256 240     Results from last 7 days   Lab Units 19  2100 09/10/19  1906 09/10/19  1401   BLOOD CULTURE   --  No Growth at 72 hrs  No Growth at 72 hrs  C DIFF TOXIN B  NEGATIVE for C difficle toxin by PCR    --   --        Imaging Studies:   I have personally reviewed pertinent imaging study reports and images in PACS  EKG, Pathology, and Other Studies:   I have personally reviewed pertinent reports

## 2019-09-14 NOTE — ASSESSMENT & PLAN NOTE
Lab Results   Component Value Date    HGBA1C 11 2 (H) 09/06/2019       Recent Labs     09/14/19  0154 09/14/19  0702 09/14/19  1040 09/14/19  1606   POCGLU 168* 168* 196* 210*       Blood Sugar Average: Last 72 hrs:  (P) 882 [id number]     · PTA metformin and glimepiride on hold  · Patient does not have Endocrinologist that he follows with  · Appreciate assistance from endocrinology  Will continue Lantus and Humalog for now  Patient does not have insurance and may need to go on Novolin 70/30 as an outpatient  His preference would be to go on Lantus if he is able to

## 2019-09-14 NOTE — PHYSICAL THERAPY NOTE
Physical Therapy Cancellation Note      Attempted to see pt for PT intervention but pt refused participation due to being in too much pain  Will follow and continue PT as appropriate      Orlean Notice, PT

## 2019-09-14 NOTE — ASSESSMENT & PLAN NOTE
· History of recurrent cellulitis  · Presented to ED with worsening cellulitis for one week-Within the 5 days, he said that cellulitis went from lower left leg in the lateral area continued to go upward to the left upper thigh in the medial area up to his groin which he described as fast   · Reported when he sat on the toilet that there was bleeding with foul smell  · PTA was taking doxicycline 100 mg Q 12 hours  · 9/6 CT left femur: infection with gas-forming organism in the L groin and L prox-mid thigh  Consistent with Anna's gangrene  No localized fluid collection  · S/p excisional debridement and washout in OR 9/6 and 9/7  It appears the infection was mainly in the subcutaneous tissue  The muscle appeared healthy    · Remained intubated after OR 9/6, extubated 9/11  · Completed 7 days of antibiotics on 9/12  · ID following  · Daily dressing changes by nursing-per surgery

## 2019-09-14 NOTE — PLAN OF CARE
Problem: OCCUPATIONAL THERAPY ADULT  Goal: Performs self-care activities at highest level of function for planned discharge setting  See evaluation for individualized goals  Description  Treatment Interventions: ADL retraining, Functional transfer training, UE strengthening/ROM, Endurance training, Patient/family training, Equipment evaluation/education, Compensatory technique education, Fine motor coordination activities, Continued evaluation, Energy conservation, Activityengagement  Equipment Recommended: (will continue to assess)       See flowsheet documentation for full assessment, interventions and recommendations  Outcome: Progressing  Note:   Limitation: Decreased ADL status, Decreased UE strength, Decreased cognition, Decreased Safe judgement during ADL, Decreased endurance, Decreased self-care trans, Decreased fine motor control, Decreased high-level ADLs     Assessment: Pt seen for OT tx session day 1 this afternoon w/ PT, RJ  Pt required consistent A x2 to rol R<>L using bedrail and mod cues  Pt required total A x 3-4 using Hovermatt to facilitated OOB ot [address]  Pt able to complete feeding w/ mod I while seated in Shuttle Chair at tray table  Continue to recommend post acute rehab when medically stable for discharge from acute care   Will continue to follow     OT Discharge Recommendation: Other (Comment)(post acute rehab)  OT - OK to Discharge: (post acute rehab when stable)

## 2019-09-14 NOTE — OCCUPATIONAL THERAPY NOTE
OccupationalTherapy Progress Note     Patient Name: Jayne Barros  WTWYA'A Date: 9/14/2019  Problem List  Principal Problem:    Necrotizing soft tissue infection  Active Problems:    GERD (gastroesophageal reflux disease)    Hyperlipidemia    Morbid obesity ([address] )    Recurrent cellulitis of lower extremity    Diarrhea    Type 2 diabetes mellitus with complication (HCC)    Septic shock (HCC)    Hypokalemia    Acute hypoxemic respiratory failure (HCC)    Volume overload    Gluten intolerance    Anemia          09/14/19 1319   Restrictions/Precautions   Weight Bearing Precautions Per Order No   Other Precautions Multiple lines; Fall Risk;Pain;Cognitive   General   Response to Previous Treatment Patient with no complaints from previous session   Family/Caregiver Present Pt's friend present   Pain Assessment   Pain Assessment FLACC   Pain Type Acute pain   Pain Location Groin;Leg;Foot  (B feet)   Pain Orientation Left  (B feet; L LE)   Hospital Pain Intervention(s) Repositioned; Ambulation/increased activity; Emotional support  (RNAwa medicated pt prior to session)   Pain Rating: FLACC (Rest) - Face 0   Pain Rating: FLACC (Rest) - Legs 0   Pain Rating: FLACC (Rest) - Activity 0   Pain Rating: FLACC (Rest) - Cry 0   Pain Rating: FLACC (Rest) - Consolability 0   Score: FLACC (Rest) 0   Pain Rating: FLACC (Activity) - Face 1   Pain Rating: FLACC (Activity) - Legs 1   Pain Rating: FLACC (Activity) - Activity 1   Pain Rating: FLACC (Activity) - Cry 2   Pain Rating: FLACC (Activity) - Consolability 1   Score: FLACC (Activity) 6   ADL   Eating Assistance 6  Modified independent   Eating Deficit Setup   Eating Comments seated in Shuttle Chair at tray table at end of session   LB Dressing Assistance 1  Total Assistance   LB Dressing Deficit Don/doff R sock; Don/doff L sock   Bed Mobility   Rolling R 2  Maximal assistance   Additional items Assist x 2;Bedrails; Increased time required;Verbal cues;LE management   Rolling L 2 Maximal assistance   Additional items Assist x 2;Bedrails; Increased time required;Verbal cues;LE management   Supine to Sit Unable to assess   Additional Comments Pt required max A 2 to roll R<>L using bedrail to faciliate placement of Hovermatt  Pt supine HOB elevated upon therapist arrival and seated OOB in [address] at end of session w/ needs met, call bell in reach   Transfers   Sit to Stand Unable to assess   Additional Comments Pt seen w/ PTKARINA  Pt benefits from co-tx due to physical assistance required and signficant cues / prompts  Utilized Hovermatt to facilitate transition bed to Shuttle Chair w/ total A x 3-4  Functional Mobility   Additional Comments will continue to assess as appropriate  Pt reported that he would like to get into the bathroom to use toilet and wash up   Cognition   Overall Cognitive Status Impaired   Arousal/Participation Alert; Cooperative  (w/ encouragement and cues for tech)   Attention Attends with cues to redirect   Orientation Level Oriented to person;Oriented to place;Oriented to situation   Memory Decreased recall of recent events;Decreased short term memory;Decreased recall of precautions   Following Commands Follows one step commands with increased time or repetition   Comments Identified pt by full name and wristband  Pt recalled therapist from previous session and able to follow one step directions w/ + time and mod cues  Pt alert and generally oriented  Limited recall details recent events, short term  Activity Tolerance   Activity Tolerance Patient limited by fatigue;Patient limited by pain   Medical Staff Made Aware spoke to PTKARINA; RN, Sanjiv Carlisle   Assessment   Assessment Pt seen for OT tx session day 1 this afternoon w/ KARINA RANKIN  Pt required consistent A x2 to rol R<>L using bedrail and mod cues  Pt required total A x 3-4 using Hovermatt to facilitated OOB ot [address]  Pt able to complete feeding w/ mod I while seated in Shuttle Chair at tray table   Continue to recommend post acute rehab when medically stable for discharge from acute care  Will continue to follow   Plan   Treatment Interventions ADL retraining;Functional transfer training; Endurance training;Patient/family training;Equipment evaluation/education; Compensatory technique education;Continued evaluation; Energy conservation; Activityengagement   Goal Expiration Date 09/25/19   Treatment Day 1   OT Frequency 3-5x/wk   Recommendation   OT Discharge Recommendation Other (Comment)  (post acute rehab)   Equipment Recommended Bedside commode;Tub seat with back; Other (comment)  (will continue to assess)   OT - OK to Discharge   (post acute rehab when stable)   Barthel Index   Feeding 10   Bathing 0   Grooming Score 5   Dressing Score 0   Bladder Score 10   Bowels Score 0   Toilet Use Score 0   Transfers (Bed/Chair) Score 0   Mobility (Level Surface) Score 0   Stairs Score 0   Barthel Index Score 25   Modified Peach Scale   Modified Peach Scale 5   Yue Escobar, OTR/L

## 2019-09-14 NOTE — PHYSICAL THERAPY NOTE
PHYSICAL THERAPY TREATMENT NOTE    Patient Name: Socrates LOYAZ Date: 9/14/2019 09/14/19 1322   Pain Assessment   Pain Assessment FLACC   Pain Rating: FLACC (Rest) - Face 0   Pain Rating: FLACC (Rest) - Legs 0   Pain Rating: FLACC (Rest) - Activity 0   Pain Rating: FLACC (Rest) - Cry 0   Pain Rating: FLACC (Rest) - Consolability 0   Score: FLACC (Rest) 0   Pain Rating: FLACC (Activity) - Face 1   Pain Rating: FLACC (Activity) - Legs 1   Pain Rating: FLACC (Activity) - Activity 1   Pain Rating: FLACC (Activity) - Cry 1   Pain Rating: FLACC (Activity) - Consolability 1   Score: FLACC (Activity) 5   Restrictions/Precautions   Weight Bearing Precautions Per Order No   Other Precautions Multiple lines; Fall Risk;Pain   General   Chart Reviewed Yes   Family/Caregiver Present No   Cognition   Overall Cognitive Status Impaired   Arousal/Participation Alert; Cooperative   Attention Difficulty attending to directions   Orientation Level Oriented to person;Oriented to place  (will continue to assess; able to introduce wife present)   Memory Decreased recall of recent events;Decreased short term memory   Following Commands Follows one step commands with increased time or repetition   Subjective   Subjective pt seen supine in bed w/ friend present  pt agreed to participate in PT session  reported groin and abdominal pain  pt wants to get out of bed and use the bathroom  Bed Mobility   Rolling R 2  Maximal assistance   Additional items Assist x 2;Bedrails; Increased time required;LE management   Rolling L 2  Maximal assistance   Additional items Assist x 2;Bedrails; Increased time required;LE management   Additional [facility]  pt was laterally mobilized from bed to [employer] B chair w/ assist x4  pt's lunch arrived during mobilization     Transfers   Additional Comments education was provided to pt during session regarding progression of mobility training, need for appropriate adaptive equipment to meet needs regarding mobility/skin integrity, appropriate progression of mobility training  Balance   Static Sitting Poor   Static Standing Zero   Activity Tolerance   Activity Tolerance Patient limited by fatigue;Patient limited by pain   Nurse Made Aware spoke to [facility], Stanton OT, Ritu Nava PCA   Equipment Use   Comments Pt requires PT/OT co-treat due to signficant assistance with mobility and cognitive-behavioral impairments  Assessment   Prognosis Fair   Problem List Decreased strength;Decreased range of motion;Decreased endurance; Impaired balance;Decreased mobility; Decreased coordination;Decreased safety awareness; Obesity; Decreased skin integrity;Pain   Assessment pt shows progression of mobility status w/ attainment of seated position at bedside using sizewise shuttle chair B  pt was better able to assist w/ rolling and repositioning in bed  pt remains at risk for falls and needs continued inpatient PT to progress mobility training as appropriate  discharge recommendation is for inpatient rehab to maximize level of independence  Goals   Patient Goals get out of bed, use the bathroom   STG Expiration Date 09/22/19   Short Term Goal #1 pt will: Increase bilateral LE strength 1/2 grade to facilitate independent mobility, Perform rolling and repositioning in bed w/ modx1 to decrease fall risk factors, Complete exercise program independently to increase strength and endurance, Tolerate 3 hr OOB to faciliate upright tolerance and Improve Barthel Index score to 45 or greater to facilitate independence  PT to see when sitting edge of bed is appropriate  Treatment Day 3   Plan   Treatment/Interventions LE strengthening/ROM; Therapeutic exercise; Endurance training;Patient/family training;Equipment eval/education; Bed mobility  (PT to see when sitting edge of bed is appropriate )   Progress Progressing toward goals   PT Frequency 5x/wk; Other (Comment)  (and 1 to 2x on weekend)   Recommendation   Recommendation Short-term skilled PT   Equipment Recommended Other (Comment)  (pt needs hovermatt transition to shuttle chair for OOB )     Skilled inpatient PT recommended while in hospital to progress pt toward treatment goals      Hallie Ca, PT

## 2019-09-15 ENCOUNTER — APPOINTMENT (INPATIENT)
Dept: RADIOLOGY | Facility: HOSPITAL | Age: 54
DRG: 710 | End: 2019-09-15
Payer: COMMERCIAL

## 2019-09-15 LAB
ANION GAP SERPL CALCULATED.3IONS-SCNC: 8 MMOL/L (ref 4–13)
BACTERIA BLD CULT: NORMAL
BACTERIA BLD CULT: NORMAL
BASOPHILS # BLD AUTO: 0.12 THOUSANDS/ΜL (ref 0–0.1)
BASOPHILS NFR BLD AUTO: 1 % (ref 0–1)
BUN SERPL-MCNC: 19 MG/DL (ref 5–25)
CALCIUM SERPL-MCNC: 7.5 MG/DL (ref 8.3–10.1)
CHLORIDE SERPL-SCNC: 107 MMOL/L (ref 100–108)
CO2 SERPL-SCNC: 24 MMOL/L (ref 21–32)
CREAT SERPL-MCNC: 0.66 MG/DL (ref 0.6–1.3)
EOSINOPHIL # BLD AUTO: 0.23 THOUSAND/ΜL (ref 0–0.61)
EOSINOPHIL NFR BLD AUTO: 2 % (ref 0–6)
ERYTHROCYTE [DISTWIDTH] IN BLOOD BY AUTOMATED COUNT: 16.6 % (ref 11.6–15.1)
GFR SERPL CREATININE-BSD FRML MDRD: 111 ML/MIN/1.73SQ M
GLUCOSE SERPL-MCNC: 123 MG/DL (ref 65–140)
GLUCOSE SERPL-MCNC: 142 MG/DL (ref 65–140)
GLUCOSE SERPL-MCNC: 161 MG/DL (ref 65–140)
GLUCOSE SERPL-MCNC: 163 MG/DL (ref 65–140)
GLUCOSE SERPL-MCNC: 169 MG/DL (ref 65–140)
HCT VFR BLD AUTO: 31.6 % (ref 36.5–49.3)
HGB BLD-MCNC: 9.9 G/DL (ref 12–17)
IMM GRANULOCYTES # BLD AUTO: 0.24 THOUSAND/UL (ref 0–0.2)
IMM GRANULOCYTES NFR BLD AUTO: 2 % (ref 0–2)
LYMPHOCYTES # BLD AUTO: 3.11 THOUSANDS/ΜL (ref 0.6–4.47)
LYMPHOCYTES NFR BLD AUTO: 24 % (ref 14–44)
MCH RBC QN AUTO: 31.4 PG (ref 26.8–34.3)
MCHC RBC AUTO-ENTMCNC: 31.3 G/DL (ref 31.4–37.4)
MCV RBC AUTO: 100 FL (ref 82–98)
MONOCYTES # BLD AUTO: 0.86 THOUSAND/ΜL (ref 0.17–1.22)
MONOCYTES NFR BLD AUTO: 7 % (ref 4–12)
NEUTROPHILS # BLD AUTO: 8.51 THOUSANDS/ΜL (ref 1.85–7.62)
NEUTS SEG NFR BLD AUTO: 64 % (ref 43–75)
NRBC BLD AUTO-RTO: 0 /100 WBCS
PLATELET # BLD AUTO: 317 THOUSANDS/UL (ref 149–390)
PMV BLD AUTO: 8.6 FL (ref 8.9–12.7)
POTASSIUM SERPL-SCNC: 3.4 MMOL/L (ref 3.5–5.3)
RBC # BLD AUTO: 3.15 MILLION/UL (ref 3.88–5.62)
SODIUM SERPL-SCNC: 139 MMOL/L (ref 136–145)
WBC # BLD AUTO: 13.07 THOUSAND/UL (ref 4.31–10.16)

## 2019-09-15 PROCEDURE — 71045 X-RAY EXAM CHEST 1 VIEW: CPT

## 2019-09-15 PROCEDURE — G8978 MOBILITY CURRENT STATUS: HCPCS

## 2019-09-15 PROCEDURE — 99233 SBSQ HOSP IP/OBS HIGH 50: CPT | Performed by: FAMILY MEDICINE

## 2019-09-15 PROCEDURE — 97530 THERAPEUTIC ACTIVITIES: CPT

## 2019-09-15 PROCEDURE — 82948 REAGENT STRIP/BLOOD GLUCOSE: CPT

## 2019-09-15 PROCEDURE — 97164 PT RE-EVAL EST PLAN CARE: CPT

## 2019-09-15 PROCEDURE — 80048 BASIC METABOLIC PNL TOTAL CA: CPT | Performed by: FAMILY MEDICINE

## 2019-09-15 PROCEDURE — 99232 SBSQ HOSP IP/OBS MODERATE 35: CPT | Performed by: INTERNAL MEDICINE

## 2019-09-15 PROCEDURE — 97535 SELF CARE MNGMENT TRAINING: CPT

## 2019-09-15 PROCEDURE — G8979 MOBILITY GOAL STATUS: HCPCS

## 2019-09-15 PROCEDURE — 85025 COMPLETE CBC W/AUTO DIFF WBC: CPT | Performed by: FAMILY MEDICINE

## 2019-09-15 RX ORDER — DIPHENHYDRAMINE HCL 25 MG
25 TABLET ORAL
Status: DISCONTINUED | OUTPATIENT
Start: 2019-09-15 | End: 2019-09-18 | Stop reason: HOSPADM

## 2019-09-15 RX ORDER — INSULIN ASPART 100 [IU]/ML
54 INJECTION, SUSPENSION SUBCUTANEOUS
Status: DISCONTINUED | OUTPATIENT
Start: 2019-09-16 | End: 2019-09-18 | Stop reason: HOSPADM

## 2019-09-15 RX ORDER — NYSTATIN 100000 [USP'U]/G
POWDER TOPICAL 2 TIMES DAILY
Status: DISCONTINUED | OUTPATIENT
Start: 2019-09-15 | End: 2019-09-18 | Stop reason: HOSPADM

## 2019-09-15 RX ORDER — INSULIN ASPART 100 [IU]/ML
36 INJECTION, SUSPENSION SUBCUTANEOUS
Status: DISCONTINUED | OUTPATIENT
Start: 2019-09-16 | End: 2019-09-18 | Stop reason: HOSPADM

## 2019-09-15 RX ADMIN — HYDROCODONE BITARTRATE AND ACETAMINOPHEN 1 TABLET: 5; 325 TABLET ORAL at 05:27

## 2019-09-15 RX ADMIN — ENOXAPARIN SODIUM 40 MG: 40 INJECTION SUBCUTANEOUS at 08:32

## 2019-09-15 RX ADMIN — DIPHENHYDRAMINE HCL 25 MG: 25 TABLET ORAL at 23:27

## 2019-09-15 RX ADMIN — Medication 250 MG: at 17:39

## 2019-09-15 RX ADMIN — HYDROCODONE BITARTRATE AND ACETAMINOPHEN 1 TABLET: 5; 325 TABLET ORAL at 11:04

## 2019-09-15 RX ADMIN — FUROSEMIDE 40 MG: 10 INJECTION, SOLUTION INTRAMUSCULAR; INTRAVENOUS at 08:32

## 2019-09-15 RX ADMIN — INSULIN LISPRO 2 UNITS: 100 INJECTION, SOLUTION INTRAVENOUS; SUBCUTANEOUS at 22:00

## 2019-09-15 RX ADMIN — NYSTATIN 1 APPLICATION: 100000 POWDER TOPICAL at 23:27

## 2019-09-15 RX ADMIN — INSULIN LISPRO 15 UNITS: 100 INJECTION, SOLUTION INTRAVENOUS; SUBCUTANEOUS at 07:34

## 2019-09-15 RX ADMIN — HYDROCODONE BITARTRATE AND ACETAMINOPHEN 1 TABLET: 5; 325 TABLET ORAL at 23:33

## 2019-09-15 RX ADMIN — ACETAMINOPHEN 325 MG: 325 TABLET ORAL at 05:03

## 2019-09-15 RX ADMIN — Medication 1 APPLICATION: at 08:41

## 2019-09-15 RX ADMIN — ENOXAPARIN SODIUM 40 MG: 40 INJECTION SUBCUTANEOUS at 21:59

## 2019-09-15 RX ADMIN — ACETAMINOPHEN 325 MG: 325 TABLET ORAL at 17:39

## 2019-09-15 RX ADMIN — ACETAMINOPHEN 325 MG: 325 TABLET ORAL at 12:50

## 2019-09-15 RX ADMIN — INSULIN LISPRO 15 UNITS: 100 INJECTION, SOLUTION INTRAVENOUS; SUBCUTANEOUS at 12:49

## 2019-09-15 RX ADMIN — PANTOPRAZOLE SODIUM 40 MG: 40 TABLET, DELAYED RELEASE ORAL at 05:04

## 2019-09-15 RX ADMIN — HYDROCODONE BITARTRATE AND ACETAMINOPHEN 1 TABLET: 5; 325 TABLET ORAL at 17:39

## 2019-09-15 RX ADMIN — INSULIN LISPRO 2 UNITS: 100 INJECTION, SOLUTION INTRAVENOUS; SUBCUTANEOUS at 18:39

## 2019-09-15 RX ADMIN — ACETAMINOPHEN 325 MG: 325 TABLET ORAL at 23:27

## 2019-09-15 NOTE — PLAN OF CARE
Problem: PHYSICAL THERAPY ADULT  Goal: Performs mobility at highest level of function for planned discharge setting  See evaluation for individualized goals  Description  Treatment/Interventions: LE strengthening/ROM, Therapeutic exercise, Endurance training, Patient/family training, Equipment eval/education, Bed mobility(PT to see when sitting edge of bed is appropriate )  Equipment Recommended: Other (Comment)(pt needs dependent means for out of bed mobilization )       See flowsheet documentation for full assessment, interventions and recommendations  Outcome: Progressing  Note:   Prognosis: Fair  Problem List: Decreased strength, Decreased range of motion, Decreased endurance, Impaired balance, Decreased mobility, Decreased coordination, Decreased safety awareness, Obesity, Decreased skin integrity, Pain  Assessment: Pt shows progression of mobility status w/ introduction of transfer and pregait activities  Pt needs assist during all phases of mobility and is unable to mobilize to chair (due to failure of Reena's Egress Test per hospital policy)  Mobility impairments are related to pain, weakness, impaired balance, decreased endurance, and edema of extremities  Mobility impairment is also evident in Barthel Index score of 35/100  Pt needs continued inpatient PT to reduce fall risk factors  Discharge recommendation is for inpatient rehab to expedite return to independent level of mobility and function  Recommendation: Short-term skilled PT          See flowsheet documentation for full assessment

## 2019-09-15 NOTE — PROGRESS NOTES
Progress Note - Infectious Disease   Ralph Ríos 48 y o  male MRN: [id number]  Unit/Bed#: -01 Encounter: [id number]      Impression/Recommendations:  1  Septic shock   POA:  Fever, WBC, refractory hypotension   Due to #2   No other appreciable source   Blood cultures negative   Blood cultures, CXR, C  diff negative   Procalcitonin 2 96 >> 0 66  Improved  Rec:  ? Continue to follow closely off antibiotics  ? Follow temperatures closely     2  Left thigh/groin soft tissue infection   Group B Strep, Strep anginosis   Status post I&D   OR findings showed infection confined to SQ tissues; muscle healthy, no necrotizing fasciitis   Status post 7 days IV antibiotics  Wound noted to be clean on recent dressing changes  Rec:  ? Continue to follow closely off antibiotics  ? Serial exams and [facility] per surgery     3   Uncontrolled DM with hyperglycemia   Hemoglobin A1c 12  Risk factor for all of above     4   Morbid obesity   Risk factor for all of above  Patient motivated to continue to lose weight      The patient is stable from an ID standpoint  Antibiotics:  Off antibiotics #3    Subjective:  Patient seen on PM rounds  Just work with PT so is tired  Denies fevers, chills, sweats, nausea, vomiting, or diarrhea  24 Hour Events:  No documented fevers, chills, sweats, nausea, vomiting, or diarrhea  WBC down to 13 07    Objective:  Vitals:  Temp:  [98 2 °F (36 8 °C)-98 6 °F (37 °C)] 98 2 °F (36 8 °C)  HR:  [102-113] 113  Resp:  [18] 18  BP: (129-134)/(77-83) 129/83  SpO2:  [94 %-97 %] 97 %  Temp (24hrs), Av 3 °F (36 8 °C), Min:98 2 °F (36 8 °C), Max:98 6 °F (37 °C)  Current: Temperature: 98 2 °F (36 8 °C)    Physical Exam:   General:  No acute distress  Psychiatric:  Awake and alert  Pulmonary:  Normal respiratory excursion without accessory muscle use  Abdomen:  Soft, nontender, obese  Extremities:  No edema  Skin:  No rashes    Lab Results:  I have personally reviewed pertinent labs    Results from last 7 days   Lab Units 09/15/19  0452 09/14/19  0600 09/13/19  0416  09/09/19  0426   POTASSIUM mmol/L 3 4* 3 9 3 8   < > 4 0   CHLORIDE mmol/L 107 103 101   < > 107   CO2 mmol/L 24 25 26   < > 19*   BUN mg/dL 19 21 24   < > 30*   CREATININE mg/dL 0 66 0 68 0 73   < > 0 82   EGFR ml/min/1 73sq m 111 110 107   < > 102   CALCIUM mg/dL 7 5* 8 0* 8 0*   < > 7 5*   AST U/L  --   --   --   --  64*   ALT U/L  --   --   --   --  30   ALK PHOS U/L  --   --   --   --  105    < > = values in this interval not displayed  Results from last 7 days   Lab Units 09/15/19  0627 09/14/19  0600 09/13/19  0416   WBC Thousand/uL 13 07* 16 35* 20 47*   HEMOGLOBIN g/dL 9 9* 9 8* 9 2*   PLATELETS Thousands/uL 317 288 256     Results from last 7 days   Lab Units 09/12/19  2100 09/10/19  1403 09/10/19  1401   BLOOD CULTURE   --  No Growth After 4 Days  No Growth After 4 Days  C DIFF TOXIN B  NEGATIVE for C difficle toxin by PCR    --   --        Imaging Studies:   I have personally reviewed pertinent imaging study reports and images in PACS  EKG, Pathology, and Other Studies:   I have personally reviewed pertinent reports

## 2019-09-15 NOTE — PLAN OF CARE
Problem: PHYSICAL THERAPY ADULT  Goal: Performs mobility at highest level of function for planned discharge setting  See evaluation for individualized goals  Description  Treatment/Interventions: LE strengthening/ROM, Therapeutic exercise, Endurance training, Patient/family training, Equipment eval/education, Bed mobility(PT to see when sitting edge of bed is appropriate )  Equipment Recommended: Other (Comment)(pt needs dependent means for out of bed mobilization )       See flowsheet documentation for full assessment, interventions and recommendations  9/15/2019 1548 by Robel Mandujano PT  Outcome: Progressing  Note:   Prognosis: Fair  Problem List: Decreased strength, Decreased range of motion, Decreased endurance, Impaired balance, Decreased mobility, Decreased coordination, Decreased safety awareness, Obesity, Decreased skin integrity, Pain  Assessment: pt was mobilized to shuttle chair using hovermatt  pt does not presently have adequate mobility status to mobilize to chair and per failing Reena's Egress Test  continued inpatient PT is needed to reduce fall risk factors  inpatient rehab is recommended to maximize functional independence  Recommendation: Short-term skilled PT          See flowsheet documentation for full assessment  9/15/2019 1412 by Robel Mandujano PT  Outcome: Progressing  Note:   Prognosis: Fair  Problem List: Decreased strength, Decreased range of motion, Decreased endurance, Impaired balance, Decreased mobility, Decreased coordination, Decreased safety awareness, Obesity, Decreased skin integrity, Pain  Assessment: Pt shows progression of mobility status w/ introduction of transfer and pregait activities  Pt needs assist during all phases of mobility and is unable to mobilize to chair (due to failure of Reena's Egress Test per hospital policy)  Mobility impairments are related to pain, weakness, impaired balance, decreased endurance, and edema of extremities  Mobility impairment is also evident in Barthel Index score of 35/100  Pt needs continued inpatient PT to reduce fall risk factors  Discharge recommendation is for inpatient rehab to expedite return to independent level of mobility and function  Recommendation: Short-term skilled PT          See flowsheet documentation for full assessment

## 2019-09-15 NOTE — OCCUPATIONAL THERAPY NOTE
[address] Progress Note     Patient Name: Luciano Addison  [id number]'[id number] Date: 9/15/2019  Problem List  Principal Problem:    Necrotizing soft tissue infection  Active Problems:    GERD (gastroesophageal reflux disease)    Hyperlipidemia    Morbid obesity ([address] )    Type 2 diabetes mellitus with complication (HCC)    Anemia        09/15/19 1155   Restrictions/Precautions   Weight Bearing Precautions Per Order No   Other Precautions Fall Risk;Pain;Multiple lines  (purewick catheter)   General   Response to Previous Treatment Patient with no complaints from previous session   Family/Caregiver Present Pt son and daughter present during session   Pain Assessment   Pain Assessment FLACC   Pain Type Acute pain   Pain Location [facility] Pain Intervention(s) Repositioned; Ambulation/increased activity; Emotional support  (RNAlex medicated pt prior to session)   Pain Rating: FLACC (Rest) - Face 0   Pain Rating: FLACC (Rest) - Legs 0   Pain Rating: FLACC (Rest) - Activity 0   Pain Rating: FLACC (Rest) - Cry 0   Pain Rating: FLACC (Rest) - Consolability 0   Score: FLACC (Rest) 0   Pain Rating: FLACC (Activity) - Face 1   Pain Rating: FLACC (Activity) - Legs 1   Pain Rating: FLACC (Activity) - Activity 1   Pain Rating: FLACC (Activity) - Cry 1   Pain Rating: FLACC (Activity) - Consolability 1   Score: FLACC (Activity) 5   ADL   Where Assessed Edge of bed  (vs supine HOB elevated vs standing)   Grooming Assistance 6  Modified Independent   Grooming Deficit Supervision/safety; Increased time to complete   Grooming Comments after set- up supine HOB elevated   UB Dressing Assistance 4  Minimal Assistance   UB Dressing Deficit Setup;Pull around back; Supervision/safety; Increased time to complete   UB Dressing Comments seated at EOB to adjust gown; able to shift weight while seated EOB to clear buttocks   LB Dressing Assistance 1  Total Assistance   LB Dressing Deficit Don/doff L sock; Don/doff R sock; Setup; Increased time to complete;Verbal cueing   Bed Mobility   Supine to Sit 1  Dependent   Additional items Verbal cues; Increased time required;Comment  (total A x4 using sheet)   Sit to Supine 3  Moderate assistance   Additional items Assist x 2; Increased time required;Verbal cues;LE management   Additional Comments Pt seen w/ PT, RJ for co -tx and benefits from co -tx due to physical assistance and cues needed  Pt completed bed mobility sit to supine w/ mod A x2 for LE mgmt and cues for tech  Transfers   Sit to Stand 3  Moderate assistance  (progressed to min A x2 (stand by A of 2nd for safety))   Additional items Assist x 2; Increased time required;Verbal cues   Stand to Sit 3  Moderate assistance  (progressed to min A x2 w/ additional trials)   Additional items Assist x 2; Increased time required;Verbal cues   Stand pivot Unable to assess  (pt unable to complete Dionnes Egress test)   Additional Comments Pt performed sit <> stand from EOB w/ mattress deflated 5-6 X requiing mod A x2 initial sit <> stand using modified belt and hammock tech  Pt progressed to min A x2 (assist of 2nd for safety, cues)   Functional Mobility   Additional Comments Will continue to assess as appropriate  Pt able to take few lateral side steps to his ride using wide / jaime RW w/ min A X 2  Cognition   Overall Cognitive Status WFL   Arousal/Participation Alert; Cooperative   Attention Attends with cues to redirect   Orientation Level Oriented X4   Memory Decreased recall of recent events;Decreased recall of precautions; Other (Comment)  (details, limited insight into deficits)   Following Commands Follows one step commands with increased time or repetition   Comments Identified pt by full name and birthdate  Pt recalled therapist from previous session  Pt able to follow one step directions and continues to benefit from cues / prompts to improve insight into defiticts and safety      Activity Tolerance   Activity Tolerance Patient limited by fatigue;Patient limited by pain   Medical Staff Made Aware spoke to PT, Luis Daniel giang RN, Cem HEWITT, Raina [place]   Assessment   Assessment Pt seen for OT tx session day 2  Pt agreeable and motivated to participate in session  Pt reports wanting to get OOB and added that he would like to be able to get into bathroom  Pt required less physical assistance to complete bed mobility sit to supine and tolerated sitting at EOB for at least 10 minutes this afternoon w/ fair - balance  Pt completed sit <> stand several times from EOB w/ min A x2  Continue to recommend post acute rehab when medically stable for discharge from acute care  Please see below for additional goals to be met by 9/20/2019  Will continue to follow   Plan   Treatment Interventions ADL retraining;Functional transfer training; Endurance training;Cognitive reorientation;Patient/family training;Equipment evaluation/education; Compensatory technique education; Energy conservation; Activityengagement   Goal Expiration Date 09/25/19   Treatment Day 2   OT Frequency 3-5x/wk   Recommendation   OT Discharge Recommendation Other (Comment)  (post acute rehab)   Equipment Recommended Bedside commode;Tub seat with back   OT - OK to Discharge   (post acute rehab when stable)   Barthel Index   Feeding 10   Bathing 0   Grooming Score 5   Dressing Score 5   Bladder Score 0   Bowels Score 0   Toilet Use Score 0   Transfers (Bed/Chair) Score 0   Mobility (Level Surface) Score 0   Stairs Score 0   Barthel Index Score 20   Modified Tynan Scale   Modified Aleks Scale 5   Yue Escobar, OTR/L    Pt goals to be met by 9/25/2019:  1  Pt will complete bed mobility supine <> sit w/ mod A x2 to actively participate in ADL for at least 10 minutes unsupported at EOB  2  Pt will complete bed mobility to roll R<>L w/ mod A x1 to max I w/ ADL performance and improve activity engagement  3  Pt will complete UBD w/ S after set- up  4   Pt will demonstrate good attention and participation in continued evaluation to assess functional cognitive skills to assist in safe discharge planning  5  Pt will demonstrate good attention and participation to participate in continued evaluation to assess functional transfers, standing tolerance to assist in safe discharge planning  6  Pt will demonstrate good attention and consistently follow multi - step direction w/ good recall to max I and safety w/ ADL performance  7  Pt will demonstrate increased activity tolerance to actively participate in ADL for at least 10 minutes while seated at EOB unsupported      8   Pt will complete sit <> stand from EOB w/ S using least restrictive device to max I w/ ADL performance and participate in continued evaluation to assess functional transfers to chair, commode, bed   9  Pt will demonstrate increased functional standing tolerance for at least 10' while engaged in grooming / oral hygiene w/ S to max I w/ ADL performance and functional mobility to return to [facility] [employer], OTR/L

## 2019-09-15 NOTE — PHYSICAL THERAPY NOTE
PHYSICAL THERAPY TREATMENT NOTE     Patient Name: Vel Turner  [id number]'U Date: 9/15/2019     09/15/19 6899   Pain Assessment   Pain Assessment 0-10   Pain Score 6   Pain Location Abdomen;Groin  (left leg)   General   Chart Reviewed Yes   Family/Caregiver Present Yes   Cognition   Arousal/Participation Alert; Cooperative   Attention Attends with cues to redirect   Orientation Level Oriented to person; Other (Comment)  (pt was identified w/ full name, birth date)   Following Commands Follows one step commands with increased time or repetition   Subjective   Subjective pt seen supine in bed w/ family present  pt agreed to participate in PT intervention  therapist provided repeated education that pt was unable to pass Reena's Egress Test and needs dependent means for out of bed mobilization  Bed Mobility   Rolling R 1  Dependent   Additional items Assist x 1; Increased time required;Verbal cues;LE management   Rolling L 1  Dependent   Additional items Assist x 1; Increased time required;Verbal cues;LE management   Additional Comments pt was mobilized to shuttle B chair using hovermatt and assist x3  pt had good tolerance to sitting position  Activity Tolerance   Activity Tolerance Patient limited by fatigue;Patient limited by pain   Nurse Made Aware spoke to CHAVEZ Kim SINGLETON Tirzi CM   Assessment   Prognosis Fair   Problem List Decreased strength;Decreased range of motion;Decreased endurance; Impaired balance;Decreased mobility; Decreased coordination;Decreased safety awareness; Obesity; Decreased skin integrity;Pain   Assessment pt was mobilized to shuttle chair using hovermatt  pt does not presently have adequate mobility status to mobilize to chair and per failing Reena's Egress Test  continued inpatient PT is needed to reduce fall risk factors  inpatient rehab is recommended to maximize functional independence     Goals Patient Goals be able to walk to the bathroom   STG Expiration Date 09/25/19   Short Term Goal #1 pt will: Increase bilateral LE strength to 4/5 or greater to facilitate independent mobility, Perform all bed mobility tasks modified independent to decrease fall risk factors, Perform sit <---> stand transfers 3x modified independent to improve independence,Complete standing weight shifts 3x bilaterally w/ jaime roller walker to decrease caregiver burden, Complete standing advance/retreat 3x bilaterally w/ jaime roller walker and supervision to facilitate mobilization to bathroom, Ambulate 25 ft  w/ jaime roller walker w/ supervision w/o LOB to facilitate safe return home, Increase all balance 1 grade to decrease risk for falls, Complete exercise program independently to improve strength and endurance, Tolerate 3 hr OOB to faciliate upright tolerance and Improve Barthel Index score to 60 or greater to facilitate independence   Treatment Day 1   Plan   Treatment/Interventions Functional transfer training;LE strengthening/ROM; Therapeutic exercise; Endurance training;Patient/family training;Equipment eval/education; Bed mobility;Gait training  (PT to see when stair training is appropriate )   Progress Progressing toward goals   PT Frequency 5x/wk; Other (Comment)  (and 2x on weekend)   Recommendation   Recommendation Short-term skilled PT   Equipment Recommended Other (Comment)  (jaime roller walker  needs shuttle chair for out of bed )     Skilled inpatient PT recommended while in hospital to progress pt toward treatment goals      Zully Becerra, PT

## 2019-09-15 NOTE — PROGRESS NOTES
Progress Note - Yuriy Guerrero 1965, 48 y o  male MRN: [id number]    Unit/Bed#: -01 Encounter: [id number]    Primary Care Provider: ANN Ricks   Date and time admitted to hospital: 9/6/2019 11:12 AM        Anemia  Assessment & Plan  · Hemoglobin stable  It was 9 9 today  Type 2 diabetes mellitus with complication (HCC)  Assessment & Plan  · Blood glucose has been controlled  However, because patient does not currently have insurance he will not be able to get Lantus as an outpatient as it is cost prohibitive  Discussed with Endocrinology this morning  Will go ahead and switched to Novolin 70/30 starting tomorrow  Morbid obesity ([facility] [address] )  Assessment & Plan  · Body mass index is 67 77 kg/m²  · Nutrition on consult  · Terrence/CHO controlled diet  Hyperlipidemia  Assessment & Plan  · HDL 35, , Triglycerides 279  · Based on patient history patient was refusing to take his blood cholesterol medications due to adverse effects    GERD (gastroesophageal reflux disease)  Assessment & Plan  · PTA prilosec for GERD  · Continue with pharmacy substitute: Protonix PO    * Necrotizing soft tissue infection  Assessment & Plan  · History of recurrent cellulitis  · Presented to ED with worsening cellulitis for one week-Within the 5 days, he said that cellulitis went from lower left leg in the lateral area continued to go upward to the left upper thigh in the medial area up to his groin which he described as fast   · Reported when he sat on the toilet that there was bleeding with foul smell  · PTA was taking doxicycline 100 mg Q 12 hours  · 9/6 CT left femur: infection with gas-forming organism in the L groin and L prox-mid thigh  Consistent with Anna's gangrene  No localized fluid collection  · S/p excisional debridement and washout in OR 9/6 and 9/7  It appears the infection was mainly in the subcutaneous tissue  The muscle appeared healthy    · Remained intubated after OR 9/6, extubated 9/11  · Completed 7 days of antibiotics on 9/12  · ID following  · Daily dressing changes by nursing-per surgery  Will go ahead and consult wound care  Subjective/Objective     Subjective:   Patient seen and examined with son and friend at bedside  He is doing okay  The pain in his left thigh is controlled  He expressed a lot of frustration at this current situation  No other issues reported  Objective:  Vitals: Blood pressure 124/84, pulse (!) 106, temperature 98 1 °F (36 7 °C), temperature source Oral, resp  rate 17, height 5' 4" (1 626 m), weight (!) 179 kg (394 lb 13 5 oz), SpO2 95 %  ,Body mass index is 67 77 kg/m²  Intake/Output Summary (Last 24 hours) at 9/15/2019 1743  Last data filed at 9/15/2019 0523  Gross per 24 hour   Intake 300 ml   Output 1110 ml   Net -810 ml       Invasive Devices     Peripheral Intravenous Line            Long-Dwell Peripheral IV (Midline) 62/26/31 Left Cephalic 4 days                Physical Exam: /84 (BP Location: Right arm)   Pulse (!) 106   Temp 98 1 °F (36 7 °C) (Oral)   Resp 17   Ht 5' 4" (1 626 m)   Wt (!) 179 kg (394 lb 13 5 oz)   SpO2 95%   BMI 67 77 kg/m²   General appearance: alert and oriented, in no acute distress  Head: Normocephalic, without obvious abnormality, atraumatic  Eyes: No scleral icterus  Lungs: clear to auscultation bilaterally  Heart: tachycardic with regular rhythm, S1, S2 normal, no murmur, click, rub or gallop  Extremities: Wound dressing in left thigh is intact  Lab, Imaging and other studies: I have personally reviewed pertinent reports      VTE Pharmacologic Prophylaxis: Enoxaparin (Lovenox)  VTE Mechanical Prophylaxis: sequential compression device

## 2019-09-15 NOTE — PHYSICAL THERAPY NOTE
PHYSICAL THERAPY REEVAL NOTE    Patient Name: Jayne RAY'Z Date: 9/15/2019     09/15/19 1156   Pain Assessment   Pain Assessment 0-10   Pain Score 7   Pain Location Abdomen  (left leg)   Restrictions/Precautions   Other Precautions Fall Risk;Pain;Multiple lines   General   Chart Reviewed Yes   Additional Pertinent History Pt seen previously for PT intervention  Reeval completed due to pt exceeding previously established goals  Dx: anemia, DM, recurrent LE cellulitis, morbid obesity, and necrotizing soft tissue infection  Comorbidities: morbid obesity, cellulitis, gout, HTN, OA, venous insufficiency, and DM  Personal factors: living in 2 story house and stair(s) to enter home  UEs: WFL active ROM  LEs: WFL active ROM (hip flexion and knee flexion limited by soft tissue approximation)  2+ edema LEs  Clinical presentation: unstable/unpredictable (evident in need for assist x 2 to 4 to maintain mobility safety, need for input for mobility technique, pain affecting mobility status)  Family/Caregiver Present Yes   Cognition   Arousal/Participation Alert; Cooperative   Attention Attends with cues to redirect   Orientation Level Oriented to person; Other (Comment)  (pt was identified w/ full name, birth date)   Following Commands Follows one step commands with increased time or repetition   Subjective   Subjective pt seen supine in bed w/ family present at bedside  pt agreed to paticipate in PT session  c/o abdominal and L LE pain  pt reports wanting to be able to ambulate to bathroom in the middle of the night  therapist provided education regarding need for appropriate progression of mobility status and Egress test for out of bed mobilization  pt stated understanding education provided  pt also relates difficulty sleeping due to discomfort of being in bed      Bed Mobility   Supine to Sit   (assist x4 using bed sheet for flat spin)   Additional items Increased time required;Verbal cues;LE management   Sit to Supine 3  Moderate assistance  (log roll)   Additional items Assist x 2; Increased time required;Verbal cues;LE management   Additional Comments sit to stand initially completed using belt in hammock technique initial 3x, progressing to use of roller walker for final 3x  pt utilized bariatric roller walker to side step to right towards head of bed  Transfers   Sit to Stand 3  Moderate assistance  (progressing to modx1 (w/ 2nd person for safety))   Additional items Assist x 2; Increased time required;Verbal cues  (for LE positioning, hand placement)   Stand to Sit 3  Moderate assistance  (progressing to modx1 (w/ 2nd person present for safety))   Additional items Assist x 2; Increased time required;Verbal cues; Bed elevated  (for body positioning, hand placement)   Stand pivot Unable to assess   Additional Comments pt stood 20 to 30 seconds x3 w/ belt in hammock assist  pt stood 45 seconds w/ jaime roller walker and modx1  pt then side stepped toward head of bed twice w/ jaime roller walker and maxx1  pt performed standing weight shift 3x bilaterally but was unable to complete advance/retreat  pt is Egress Test FAIL and needs dependent means for out of bed mobilization  Ambulation/Elevation   Gait pattern Not appropriate   Assistive Device Bariatric Rolling walker   Balance   Static Sitting Fair   Static Standing Poor  (w/ bariatric roller walker)   Activity Tolerance   Activity Tolerance Patient limited by fatigue;Patient limited by pain   Nurse Made Aware spoke to Myriam MONGE OT, Louise Gentle PCA   Equipment Use   Comments Pt requires PT/OT co-treat due to signficant assistance with mobility and cognitive-behavioral impairments  Assessment   Prognosis Fair   Problem List Decreased strength;Decreased range of motion;Decreased endurance; Impaired balance;Decreased mobility; Decreased coordination;Decreased safety awareness; Obesity; Decreased skin integrity;Pain   Assessment Pt shows progression of mobility status w/ introduction of transfer and pregait activities  Pt needs assist during all phases of mobility and is unable to mobilize to chair (due to failure of Reena's Egress Test per hospital policy)  Mobility impairments are related to pain, weakness, impaired balance, decreased endurance, and edema of extremities  Mobility impairment is also evident in Barthel Index score of 35/100  Pt needs continued inpatient PT to reduce fall risk factors  Discharge recommendation is for inpatient rehab to expedite return to independent level of mobility and function  Goals   Patient Goals be able to walk to the bathroom   STG Expiration Date 09/25/19   Short Term Goal #1 pt will: Increase bilateral LE strength to 4/5 or greater to facilitate independent mobility, Perform all bed mobility tasks modified independent to decrease fall risk factors, Perform sit <---> stand transfers 3x modified independent to improve independence,Complete standing weight shifts 3x bilaterally w/ jaime roller walker to decrease caregiver burden, Complete standing advance/retreat 3x bilaterally w/ jaime roller walker and supervision to facilitate mobilization to bathroom, Ambulate 25 ft  w/ jaime roller walker w/ supervision w/o LOB to facilitate safe return home, Increase all balance 1 grade to decrease risk for falls, Complete exercise program independently to improve strength and endurance, Tolerate 3 hr OOB to faciliate upright tolerance and Improve Barthel Index score to 60 or greater to facilitate independence   Treatment Day 1   Plan   Treatment/Interventions Functional transfer training;LE strengthening/ROM; Therapeutic exercise; Endurance training;Patient/family training;Equipment eval/education; Bed mobility;Gait training  (PT to see when stair training is appropriate)   Progress Progressing toward goals   PT Frequency 5x/wk; Other (Comment)  (and 2x on weekend)   Recommendation   Recommendation Short-term skilled PT   Equipment Recommended Other (Comment)  (jaime roller walker  need shuttle chair for out of bed )     Skilled inpatient PT recommended while in hospital to progress pt toward treatment goals      Richi Gonsales, PT

## 2019-09-15 NOTE — SOCIAL WORK
CM informed by SLIM that patient is nearing medical stability for discharge and will need SNF upon discharge  CM met with patient at bedside, patient alert and oriented  Patient accompanied by his friend/roommate Alexandra Montejo, his mother in law and his spouse Eloisa Zepeda who was sleeping on the couch  CM reviewed PT/OT recommendation for SNF  Patient stated his spouse is not in agreement and wants him to come home, however, he is open to exploring the possibility of SNF and would like for CM to send referrals  CM discussed blanket referral as previously mentioned by MARIO Sawant and patient was agreeable  [facility] d/t no insurance and need for bariatric bed  CM Department will follow up on referrals and will continue to follow patient

## 2019-09-15 NOTE — ASSESSMENT & PLAN NOTE
· Blood glucose has been controlled  However, because patient does not currently have insurance he will not be able to get Lantus as an outpatient as it is cost prohibitive  Discussed with Endocrinology this morning  Will go ahead and switched to Novolin 70/30 starting tomorrow

## 2019-09-15 NOTE — ASSESSMENT & PLAN NOTE
· History of recurrent cellulitis  · Presented to ED with worsening cellulitis for one week-Within the 5 days, he said that cellulitis went from lower left leg in the lateral area continued to go upward to the left upper thigh in the medial area up to his groin which he described as fast   · Reported when he sat on the toilet that there was bleeding with foul smell  · PTA was taking doxicycline 100 mg Q 12 hours  · 9/6 CT left femur: infection with gas-forming organism in the L groin and L prox-mid thigh  Consistent with Anna's gangrene  No localized fluid collection  · S/p excisional debridement and washout in OR 9/6 and 9/7  It appears the infection was mainly in the subcutaneous tissue  The muscle appeared healthy  · Remained intubated after OR 9/6, extubated 9/11  · Completed 7 days of antibiotics on 9/12  · ID following  · Daily dressing changes by nursing-per surgery  Will go ahead and consult wound care

## 2019-09-15 NOTE — PLAN OF CARE
Problem: OCCUPATIONAL THERAPY ADULT  Goal: Performs self-care activities at highest level of function for planned discharge setting  See evaluation for individualized goals  Description  Treatment Interventions: ADL retraining, Functional transfer training, UE strengthening/ROM, Endurance training, Patient/family training, Equipment evaluation/education, Compensatory technique education, Fine motor coordination activities, Continued evaluation, Energy conservation, Activityengagement  Equipment Recommended: (will continue to assess)       See flowsheet documentation for full assessment, interventions and recommendations  Outcome: Progressing  Note:   Limitation: Decreased ADL status, Decreased UE strength, Decreased cognition, Decreased Safe judgement during ADL, Decreased endurance, Decreased self-care trans, Decreased fine motor control, Decreased high-level ADLs     Assessment: Pt seen for OT tx session day 2  Pt agreeable and motivated to participate in session  Pt reports wanting to get OOB and added that he would like to be able to get into bathroom  Pt required less physical assistance to complete bed mobility sit to supine and tolerated sitting at EOB for at least 10 minutes this afternoon w/ fair - balance  Pt completed sit <> stand several times from EOB w/ min A x2  Continue to recommend post acute rehab when medically stable for discharge from acute care   Will continue to follow     OT Discharge Recommendation: Other (Comment)(post acute rehab)  OT - OK to Discharge: (post acute rehab when stable)

## 2019-09-16 LAB
GLUCOSE SERPL-MCNC: 149 MG/DL (ref 65–140)
GLUCOSE SERPL-MCNC: 181 MG/DL (ref 65–140)
GLUCOSE SERPL-MCNC: 222 MG/DL (ref 65–140)
GLUCOSE SERPL-MCNC: 80 MG/DL (ref 65–140)

## 2019-09-16 PROCEDURE — 97535 SELF CARE MNGMENT TRAINING: CPT

## 2019-09-16 PROCEDURE — 99222 1ST HOSP IP/OBS MODERATE 55: CPT | Performed by: PSYCHIATRY & NEUROLOGY

## 2019-09-16 PROCEDURE — 99232 SBSQ HOSP IP/OBS MODERATE 35: CPT | Performed by: INTERNAL MEDICINE

## 2019-09-16 PROCEDURE — 99232 SBSQ HOSP IP/OBS MODERATE 35: CPT | Performed by: FAMILY MEDICINE

## 2019-09-16 PROCEDURE — 97530 THERAPEUTIC ACTIVITIES: CPT

## 2019-09-16 PROCEDURE — 97116 GAIT TRAINING THERAPY: CPT

## 2019-09-16 PROCEDURE — 82948 REAGENT STRIP/BLOOD GLUCOSE: CPT

## 2019-09-16 PROCEDURE — 97112 NEUROMUSCULAR REEDUCATION: CPT

## 2019-09-16 RX ORDER — QUETIAPINE FUMARATE 25 MG/1
25 TABLET, FILM COATED ORAL
Status: DISCONTINUED | OUTPATIENT
Start: 2019-09-16 | End: 2019-09-18 | Stop reason: HOSPADM

## 2019-09-16 RX ORDER — QUETIAPINE FUMARATE 25 MG/1
25 TABLET, FILM COATED ORAL
Status: DISCONTINUED | OUTPATIENT
Start: 2019-09-16 | End: 2019-09-16

## 2019-09-16 RX ADMIN — PANTOPRAZOLE SODIUM 40 MG: 40 TABLET, DELAYED RELEASE ORAL at 05:15

## 2019-09-16 RX ADMIN — Medication 250 MG: at 17:17

## 2019-09-16 RX ADMIN — ACETAMINOPHEN 325 MG: 325 TABLET ORAL at 12:51

## 2019-09-16 RX ADMIN — Medication 250 MG: at 09:50

## 2019-09-16 RX ADMIN — INSULIN ASPART 36 UNITS: 100 INJECTION, SUSPENSION SUBCUTANEOUS at 17:17

## 2019-09-16 RX ADMIN — INSULIN LISPRO 4 UNITS: 100 INJECTION, SOLUTION INTRAVENOUS; SUBCUTANEOUS at 14:40

## 2019-09-16 RX ADMIN — NYSTATIN: 100000 POWDER TOPICAL at 09:51

## 2019-09-16 RX ADMIN — HYDROCODONE BITARTRATE AND ACETAMINOPHEN 1 TABLET: 5; 325 TABLET ORAL at 16:35

## 2019-09-16 RX ADMIN — MELATONIN 6 MG: at 22:33

## 2019-09-16 RX ADMIN — HYDROCODONE BITARTRATE AND ACETAMINOPHEN 1 TABLET: 5; 325 TABLET ORAL at 10:35

## 2019-09-16 RX ADMIN — INSULIN LISPRO 2 UNITS: 100 INJECTION, SOLUTION INTRAVENOUS; SUBCUTANEOUS at 10:36

## 2019-09-16 RX ADMIN — INSULIN ASPART 54 UNITS: 100 INJECTION, SUSPENSION SUBCUTANEOUS at 10:36

## 2019-09-16 RX ADMIN — NYSTATIN: 100000 POWDER TOPICAL at 17:17

## 2019-09-16 RX ADMIN — QUETIAPINE FUMARATE 25 MG: 25 TABLET ORAL at 22:33

## 2019-09-16 RX ADMIN — ENOXAPARIN SODIUM 40 MG: 40 INJECTION SUBCUTANEOUS at 09:50

## 2019-09-16 RX ADMIN — ACETAMINOPHEN 650 MG: 325 TABLET, FILM COATED ORAL at 14:41

## 2019-09-16 RX ADMIN — METOPROLOL TARTRATE 25 MG: 25 TABLET, FILM COATED ORAL at 17:17

## 2019-09-16 RX ADMIN — ACETAMINOPHEN 325 MG: 325 TABLET ORAL at 05:15

## 2019-09-16 RX ADMIN — ENOXAPARIN SODIUM 40 MG: 40 INJECTION SUBCUTANEOUS at 22:28

## 2019-09-16 NOTE — PROGRESS NOTES
Progress Note - General Surgery   Socrates Olsen 48 y o  male MRN: [id number]  Unit/Bed#: -01 Encounter: [id number]    Assessment/plan:  Left thigh/groin soft tissue infection  -s/p Excisional debridement and wash-out in OR 9/6, 9/7   -Wound continues to improve upon exam during dressing change     -Continue daily dressing changes, Kerlix x2 with Dakin solution   -Has completed antibiotics per ID, team his signed off  Continue to monitor closely for evidence of infection  S/p 7 days IV abx   -Continue medical management per hospitalist team, appreciate input  Subjective/Objective   Subjective:  Patient reports feeling continued improvement  Is happy to be getting out of bed periodically, but is anxious about return to normal life  Denies pain in the left thigh/groin area  Denies fevers, chills, nausea, vomiting  Objective:  Obese 51-year-old male lying comfortably in bed  No signs of acute distress, nontoxic appearance  No recent fevers  Requires assistance for moving but has been working with physical and occupational therapy  Blood pressure 141/85, pulse (!) 121, temperature 98 3 °F (36 8 °C), temperature source Oral, resp  rate 18, height 5' 4" (1 626 m), weight (!) 178 kg (393 lb), SpO2 95 %  ,Body mass index is 67 46 kg/m²  Intake/Output Summary (Last 24 hours) at 9/16/2019 1628  Last data filed at 9/16/2019 1438  Gross per 24 hour   Intake 180 ml   Output 275 ml   Net -95 ml       Invasive Devices     Peripheral Intravenous Line            Long-Dwell Peripheral IV (Midline) 56/94/48 Left Cephalic 5 days                Physical Exam: General appearance: alert and oriented, in no acute distress  Abdomen: soft, non-tender; bowel sounds normal; no masses,  no organomegaly  Extremities: Left thigh/groin incision area clean and dry, with dressings remaining in place    ABD pad and 2 Kerlix rolls were removed from wound and area was inspected revealing areas of healthy appearing granulation tissue with slight bleeding  No erythema, discharge, evidence of infection    Neurologic: Grossly normal      VTE Pharmacologic Prophylaxis: Enoxaparin (Lovenox)  VTE Mechanical Prophylaxis: sequential compression device

## 2019-09-16 NOTE — CONSULTS
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    Ame Forman is a 48 y o  male with complicated medical course post intubation from emergent debridement of gangrene now transferred to [facility] and had what is described as a panic attack last night  Patient and patient's family in friends reports that since his extubation he has had trouble sleeping, paranoia, delusions and that this is not like him  They understand that there was likely delirium due to his medical course better worried that the trauma from intubation was not addressed  While he is not in the time frame for PTSD there is likely an acute stress reaction from the extubation and he now has anticipatory anxiety with going to bed due to fears of being paralyzed again  We discussed starting low-dose Seroquel to help with both his sleep as well as the residual symptoms of his delirium (paranoia, delusions) he is educated on side effects and is told this would not be a long-term medication  We discussed with family present other coping skills such as deep breathing or guided relaxation and he is encouraged to write down his experience in the ICU as a way to the purge it  No prior psychiatric history other than occasional therapy no reported suicidal or homicidal ideation      Psychiatric Review Of Systems:  sleep: yes  energy/anergy:  Restless in bed due to pain  interest/pleasure/anhedonia: no  somatic symptoms:  Panic attack symptoms last night  anxiety/panic: yes  anthony: no  guilty/hopeless: no  self injurious behavior/risky behavior: no    Historical Information   Past Psychiatric History:   None  Past Suicide attempts:  None reported    Substance Abuse History:  Per chart none   I have assessed this patient for substance use within the past 12 months  Smoking history:  Never per chart  Family Psychiatric History:   Substance use in family    Social History  Marital history:   Living arrangement, social support: The patient lives in home with Family  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  Functioning Relationships: good support system    Other Pertinent History: None    Traumatic History:   Other Traumatic Events: Status post intubation and extubation ICU    Past Medical History:   Diagnosis Date    Cellulitis     last assessed 12/10/15    Diabetes mellitus ([address] )     Edema     Elevated liver enzymes     Esophageal reflux     Gluten intolerance     Gout     last assessed 09/05/13    Hyperglycemia     Hypertension     IBS (irritable bowel syndrome)     Insomnia     Obesity     Osteoarthritis of knee     last assessed 02/10/14    Prehypertension     last assessed 08/22/17    Venous insufficiency     last assessed 08/22/17    Villonodular synovitis of the hand, right     last assessed 11/14/2013       Medical Review Of Systems:  Review of Systems - Negative except HPI    Meds/Allergies   all current active meds have been reviewed and current meds:   Current Facility-Administered Medications   Medication Dose Route Frequency    HYDROcodone-acetaminophen (NORCO) 5-325 mg per tablet 1 tablet  1 tablet Oral Q6H PRN    And    acetaminophen (TYLENOL) tablet 325 mg  325 mg Oral Q6H [facility]    acetaminophen (TYLENOL) tablet 650 mg  650 mg Oral Q6H PRN    diphenhydrAMINE (BENADRYL) tablet 25 mg  25 mg Oral HS PRN    enoxaparin (LOVENOX) subcutaneous injection 40 mg  40 mg Subcutaneous Q12H [facility]    insulin aspart protamine-insulin aspart (NovoLOG 70/30) 100 units/mL subcutaneous injection 36 Units  36 Units Subcutaneous Daily With Dinner    insulin aspart protamine-insulin aspart (NovoLOG 70/30) 100 units/mL subcutaneous injection 54 Units  54 Units Subcutaneous Daily With Breakfast    insulin lispro (HumaLOG) 100 units/mL subcutaneous injection 2-12 Units  2-12 Units Subcutaneous TID AC    insulin lispro (HumaLOG) 100 units/mL subcutaneous injection 2-12 Units  2-12 Units Subcutaneous HS    melatonin tablet 6 mg  6 mg Oral HS    nystatin (MYCOSTATIN) powder   Topical BID    pantoprazole (PROTONIX) EC tablet 40 mg  40 mg Oral Early Morning    phenol (CHLORASEPTIC) 1 4 % mucosal liquid 1 spray  1 spray Mouth/Throat Q2H PRN    QUEtiapine (SEROquel) tablet 25 mg  25 mg Oral HS    saccharomyces boulardii (FLORASTOR) capsule 250 mg  250 mg Oral BID    sodium hypochlorite (DAKIN'S) 0 5 percent topical solution 1 application  1 application Irrigation Daily     Allergies   Allergen Reactions    Clindamycin Diarrhea       Objective   Vital signs in last 24 hours:  Temp:  [98 1 °F (36 7 °C)-98 6 °F (37 °C)] 98 6 °F (37 °C)  HR:  [106-119] 119  Resp:  [17-18] 18  BP: (124-140)/(65-84) 140/65      Intake/Output Summary (Last 24 hours) at 9/16/2019 1022  Last data filed at 9/15/2019 1600  Gross per 24 hour   Intake    Output 200 ml   Net -200 ml       Mental Status Evaluation:  Appearance:  overweight   Behavior:  normal   Speech:  normal pitch and normal volume   Mood:  anxious   Affect:  mood-congruent   Language: naming objects   Thought Process:  normal   Associations: intact associations   Thought Content:  obsessions   Perceptual Disturbances: None currently but describes very vivid hallucinations, delusions, paranoia   Risk Potential: Suicidal Ideations none and Homicidal Ideations none   Sensorium:  person, place, month of year and year   Memory:  recent and remote memory grossly intact   Cognition:  grossly intact   Consciousness:  alert and awake    Attention: attention span appeared shorter than expected for age   Intellect: not examined   Fund of Knowledge: awareness of current events: Intact   Insight:  age appropriate   Judgment: age appropriate   Muscle Strength and Tone: Laying in bed   Gait/Station: Laying in bed   Motor Activity: no abnormal movements     Lab Results:    I have personally reviewed all pertinent laboratory/tests results  Most Recent Labs:   Lab Results   Component Value Date    WBC 13 07 (H) 09/15/2019    RBC 3 15 (L) 09/15/2019    HGB 9 9 (L) 09/15/2019    HCT 31 6 (L) 09/15/2019     09/15/2019    RDW 16 6 (H) 09/15/2019    NEUTROABS 8 51 (H) 09/15/2019    SODIUM 139 09/15/2019    K 3 4 (L) 09/15/2019     09/15/2019    CO2 24 09/15/2019    BUN 19 09/15/2019    CREATININE 0 66 09/15/2019    GLUCOSE 144 (H) 09/07/2019    GLUC 142 (H) 09/15/2019    CALCIUM 7 5 (L) 09/15/2019    AST 64 (H) 09/09/2019    ALT 30 09/09/2019    ALKPHOS 105 09/09/2019    TP 6 3 (L) 09/09/2019    ALB 1 7 (L) 09/09/2019    TBILI 0 70 09/09/2019    HDL 35 12/03/2015    TRIG 279 12/03/2015    LDLCALC 140 (H) 12/03/2015    RKH8DOKSCLIV 4 493 (H) 12/03/2015    FREET4 1 15 12/03/2015    HGBA1C 11 2 (H) 09/06/2019     09/06/2019       Code Status: )Level 1 - Full Code    Assessment/Plan     Assessment:  Ame Forman is a 48 y o  male   Diagnosis:  Delirium, resolving  Acute stress disorder secondary to ICU stay, intubation/extubation  Plan:   1  Seroquel 25 mg HS started to help with insomnia, paranoia, delusions  Plan is to only stay on this medication while in the hospital and not to be discharged on it  2  Would benefit from CBT I as an outpatient  3  No need for inpatient psychiatric treatment  Risks, benefits and possible side effects of Medications:   Risks, benefits, and possible side effects of medications explained to patient and patient verbalizes understanding       Discussed EPS, metabolic syndrome      Parker Domínguez MD

## 2019-09-16 NOTE — PROGRESS NOTES
Patients wife removed guard rail to help patient reposition without nurse assistance  Patient slid off bed and landed onto the floor  Found sitting on floor  No bed alarm on the bed d/t the bariatric capella mattress  Patient in no pain and put back into bed with assistance of 3 nurses and 2 PCAs and use of hoverjack  Patient explained that his reasoning for repositioning without assistance was because he was trying to "escape the bed because I'm traumatized"  He further explained that he is terrified of falling asleep after having been intubated in the ICU  Patient says he "wants someone to talk to"  SLIM made aware of entire situation and psych consult was placed

## 2019-09-16 NOTE — PHYSICAL THERAPY NOTE
PHYSICAL THERAPY TREATMENT NOTE  NAME:  Aurelia Child  DATE: 09/16/19    Length Of Stay: 10  Performed at least 2 patient identifiers during session: Name and ID bracelet    TREATMENT:    09/16/19 4939   Pain Assessment   Pain Assessment 0-10   Pain Score 3   Pain Type Acute pain   Pain Location Leg; Foot   Pain Orientation Bilateral   Effect of Pain on Daily Activities limits speed and indep of mobility, limits AROM, limits ability to sit w/ legrests/calf pads while in the chair   Patient's Stated Pain Goal No pain   Hospital Pain Intervention(s) Repositioned; Ambulation/increased activity; Environmental changes; Emotional support  (requires use of step stool for LE support)   Restrictions/Precautions   Weight Bearing Precautions Per Order No   Other Precautions Bed Alarm; Chair Alarm; Fall Risk;Pain   General   Chart Reviewed Yes   Additional Pertinent History Per chart, pt slid OOB last night to the floor when family reportedly lowered the bedrail   Family/Caregiver Present Yes   Cognition   Overall Cognitive Status Impaired  (limited insight into deficits)   Arousal/Participation Alert   Attention Attends with cues to redirect   Orientation Level Oriented to person;Oriented to place;Oriented to time;Oriented to situation   Memory Decreased recall of recent events; Other (Comment)  (+ time, consistent recall details, events)   Following Commands Follows one step commands with increased time or repetition   Subjective   Subjective Pt agreeable to particiapte, needs intermittent redirection on plan of care during session as well as does family in room   Bed Mobility   Supine to Sit 4  Minimal assistance   Additional items Assist x 2;HOB elevated; Bedrails; Increased time required;Verbal cues   Sit to Supine 3  Moderate assistance   Additional items Assist x 2; Increased time required;Verbal cues; Bedrails;HOB elevated; Impulsive  (impulsive to lay down)   Additional Comments Pt able to reposition toward head of bed in [place] w/ a of 1  However substantial difficulty and caution for establishing DECK height and LEVEL thigh with Hill rom compella bariatric bed especially with pt's short stature even with waiting for seat deflate time delay (reported as 5'4" but pt appears shorter than that)    Transfers   Sit to Stand 4  Minimal assistance   Additional items Assist x 1;HOB elevated; Increased time required;Verbal cues  (belt and hammock avaible if needed)   Stand to Sit 4  Minimal assistance   Additional items Assist x 1; Increased time required;Verbal cues;Armrests  (belt and hammock avaible if needed)   Stand pivot 4  Minimal assistance   Additional items Assist x 2; Increased time required;Verbal cues;Armrests  (second for safety w  equipment)   Additional Comments eRena's Egresst test PASS  w/ min A for steadying A only, using wide rolling walker for support  However 2x during session pt stating "uhoh" and pretending to either fall back while sitting on edge of bed or leaning during WB transfer  Pt required redirection to participate with tasks   Ambulation/Elevation   Gait pattern Wide ALEXIS; Excessively slow; Step to;Short stride  (inceased walker advancement)   Gait Assistance 4  Minimal assist   Additional items Assist x 1;Verbal cues   Assistive Device   (wide rolling walker)   Distance First trial: advance retreat x3 then marching x3 each leg w/UE support of walker; later performed bed to WC and WC to bed as 2'x2 trials using sheet gait belt   Stair Management Assistance Not tested   Wheelchair Activities   Wheelchair Cushion Standard  (spoke to ORLÉANS who is getting [employer] cushion from [facility])   Pressure Relief Type Lateral lean  (but pt post pelvic tilts toward front of 26" wide WC)   Wheelchair Parts Management No  (despite education/cushion/stool, pt limited w/90-90 position)   Propulsion No   Balance   Static Sitting Fair   Static Standing Fair -   Ambulatory Fair -   Endurance Deficit   Endurance Deficit Yes Endurance Deficit Description limited sitting tolerance on egde of bed and in WC <1 hour in chair  Activity Tolerance   Activity Tolerance Patient limited by fatigue;Patient limited by pain; Other (Comment)  (treatment limited by fear/retreat, pt also needing redirecti)   Nurse Made Aware spoke to Ga Dubon [place] charge RN including re: bed recommendations   Medical Staff Made Aware messaged Psych, spoke to OT  Spoke to Dr China Gamez re: new bed recommendations --he agreed that pt was ok to NOT have daily weights in the bed to allow us to bring in Size wise lowboy bed  Exercises   Neuro re-ed Pt requires physical A and continued education/ facilitation to promote 90-90 positioning in chair, and needed A /for getting buttock, trunk to back of the jaime WC   Assessment   Prognosis Fair   Problem List Decreased strength;Decreased range of motion;Decreased endurance; Impaired balance;Decreased mobility; Decreased coordination;Decreased cognition; Impaired judgement;Decreased safety awareness; Obesity; Decreased skin integrity;Orthopedic restrictions  (fear/retreat)   Assessment Pt did have fall since last PT session -psych in to see pt in interim  From a PT mobility standpoint, pt continues to require less physical therapy A, and was able to progress to PASS Reena's Egress test using wide rolling walker  Pt able to tolerate short period of time in [facility], but is limited due to fatigue and pain  Pt would benefit from another bed such as low boy bed which has a deck height of about 7" deflated due to pt's reports of feeling like he is sliding off the edge of bed, and pt appears shorter than his reported height of 5'4"; this would promote pt to have a level thigh and proper deck height not only for transfers but also potentially for sitting edge of bed if pt had adequate longer time sitting tolerance and did not progress into posterior pelvic tilt (like he did in the [address])    Skilled PT recommended to progress pt toward treatment goals, emphasis on bed mobility from lower bed, and gait w/ rolling walker and WC follow  Goals still apply from 9/15/2019 session   Barriers to Discharge Decreased caregiver support; Inaccessible home environment   Goals   Patient Goals to get to a chair   STG Expiration Date 09/25/19   Treatment Day 2   Plan   Treatment/Interventions Functional transfer training;LE strengthening/ROM; Cognitive reorientation;Patient/family training;Gait training;Bed mobility; Equipment eval/education; Endurance training; Therapeutic exercise;Spoke to case management;Spoke to nursing   Progress Progressing toward goals   PT Frequency Weekend;7x/wk   Recommendation   Recommendation Short-term skilled PT   Equipment Recommended Walker; Wheelchair  (wide rolling walker, Luis WC for seating)   Pt requires PT /OT co-treat due to signficant assistance with mobility and cognitive-behavioral impairments including impulsivity, need for redirection        Vijaya Mcleod, PT, DPT

## 2019-09-16 NOTE — PROGRESS NOTES
Progress Note - Sandhya Godfrey 1965, 48 y o  male MRN: [id number]    Unit/Bed#: -01 Encounter: [id number]    Primary Care Provider: ANN Gonzalez   Date and time admitted to hospital: 9/6/2019 11:12 AM        Anemia  Assessment & Plan  · Hemoglobin stable  Sinus tachycardia  Assessment & Plan  Patient appears to have chronic sinus tachycardia  His echocardiogram was okay  Will start Lopressor  Type 2 diabetes mellitus with complication (HCC)  Assessment & Plan  · Blood glucose still high at times in the 200s  Most recent blood glucose that was 149  Also, we just switched him to NovoLog 70/30 as patient does not have insurance and will not be able to afford Lantus as an outpatient  Continue NovoLog 70/30  Upon discharge he should be prescribed Novolin 70/30  He can also resume metformin, but I would not resume glimepiride  Morbid obesity ([address] )  Assessment & Plan  · Body mass index is 67 46 kg/m²  · Nutrition on consult  · Terrence/CHO controlled diet  Hyperlipidemia  Assessment & Plan  · HDL 35, , Triglycerides 279  · Based on patient history patient was refusing to take his blood cholesterol medications due to adverse effects    GERD (gastroesophageal reflux disease)  Assessment & Plan  · PTA prilosec for GERD  · Continue with pharmacy substitute: Protonix PO    * Necrotizing soft tissue infection  Assessment & Plan  · History of recurrent cellulitis  · Presented to ED with worsening cellulitis for one week-Within the 5 days, he said that cellulitis went from lower left leg in the lateral area continued to go upward to the left upper thigh in the medial area up to his groin which he described as fast   · Reported when he sat on the toilet that there was bleeding with foul smell  · PTA was taking doxicycline 100 mg Q 12 hours  · 9/6 CT left femur: infection with gas-forming organism in the L groin and L prox-mid thigh  Consistent with Anna's gangrene   No localized fluid collection  · S/p excisional debridement and washout in OR 9/6 and 9/7  It appears the infection was mainly in the subcutaneous tissue  The muscle appeared healthy  · Remained intubated after OR 9/6, extubated 9/11  · Completed 7 days of antibiotics on 9/12  · ID following  · Discussed with surgery  Wound looks clean with healthy tissue  Continue current wound care as per surgery  Subjective/Objective     Subjective:   Patient was seen and examined this afternoon with General surgery and with wife and friend at bedside  He is doing okay  He had a fall out of bed last night  He also had a panic attack for which psychiatry saw him today  He states that his vision has not been the same since being admitted  His pain is controlled  He reports no other issues  Objective:  Vitals: Blood pressure 141/85, pulse (!) 121, temperature 98 3 °F (36 8 °C), temperature source Oral, resp  rate 18, height 5' 4" (1 626 m), weight (!) 178 kg (393 lb), SpO2 95 %  ,Body mass index is 67 46 kg/m²  Intake/Output Summary (Last 24 hours) at 9/16/2019 1705  Last data filed at 9/16/2019 1657  Gross per 24 hour   Intake 180 ml   Output 275 ml   Net -95 ml       Invasive Devices     Peripheral Intravenous Line            Long-Dwell Peripheral IV (Midline) 57/92/80 Left Cephalic 5 days                Physical Exam: /85 (BP Location: Right arm)   Pulse (!) 121   Temp 98 3 °F (36 8 °C) (Oral)   Resp 18   Ht 5' 4" (1 626 m)   Wt (!) 178 kg (393 lb)   SpO2 95%   BMI 67 46 kg/m²   General appearance: alert and oriented, in no acute distress  Head: Normocephalic, without obvious abnormality, atraumatic  Eyes: No scleral icterus  Lungs: clear to auscultation bilaterally  Heart: Tachycardic with regular rhythm, no murmurs, rubs or gallops  Extremities: Surgical wound and left thigh was viewed after packing was removed  Tissues appear healthy and there is granulation    No significant drainage noted   Neurologic: Grossly normal    Lab, Imaging and other studies: I have personally reviewed pertinent reports      VTE Pharmacologic Prophylaxis: Enoxaparin (Lovenox)  VTE Mechanical Prophylaxis: sequential compression device

## 2019-09-16 NOTE — PLAN OF CARE
Problem: OCCUPATIONAL THERAPY ADULT  Goal: Performs self-care activities at highest level of function for planned discharge setting  See evaluation for individualized goals  Description  Treatment Interventions: ADL retraining, Functional transfer training, UE strengthening/ROM, Endurance training, Patient/family training, Equipment evaluation/education, Compensatory technique education, Fine motor coordination activities, Continued evaluation, Energy conservation, Activityengagement  Equipment Recommended: (will continue to assess)       See flowsheet documentation for full assessment, interventions and recommendations  Note:   Limitation: Decreased ADL status, Decreased UE strength, Decreased cognition, Decreased Safe judgement during ADL, Decreased endurance, Decreased self-care trans, Decreased fine motor control, Decreased high-level ADLs     Assessment: Pt seen for OT tx session this afternoon w/ PT, Jael  Pt benefits from co -tx due significant cog - behavioral cues needed and physical assist to assess functional tranfers  Pt required less physical assistance to complete bed mobility supine to sit at EOB  Pt completed SPT bed <> bariatric w/c w/ wide/ jaime RW w/ min A x 2 ( assist 2nd for safety)  Pt educated on hand placement, tech and pacing  Care coordination w/ PT, nursing staff, and CM  Continue to recommend post acute rehab when medically stable for discharge from acute care at this time to return to baseline level of I   Will continue to follow     OT Discharge Recommendation: Other (Comment)(post acute rehab)  OT - OK to Discharge: (post acute rehab at this time)

## 2019-09-16 NOTE — SOCIAL WORK
Consult received for PCP as pt does not have a PCP to follow up with as he does not have insurance  Cm has provided information regarding clinica that are available to pt  This information will also be on pt's DCI

## 2019-09-16 NOTE — PROGRESS NOTES
Progress Note - Infectious Disease   Main Maynard 48 y o  male MRN: [id number]  Unit/Bed#: -01 Encounter: [id number]      Impression/Plan:  1  Septic shock   POA:  Fever, WBC, refractory hypotension   Due to #2   No other appreciable source   Blood cultures negative   Blood cultures, CXR, C  diff negative   Procalcitonin 2 96 >> 0 66   Improved  White count has declined to 13 07  Rec:  ? Continue to follow closely off antibiotics  ? Follow temperatures closely     2  Left thigh/groin soft tissue infection   Group B Strep, Strep anginosis   Status post I&D   OR findings showed infection confined to SQ tissues; muscle healthy, no necrotizing fasciitis   Status post 7 days IV antibiotics  Wound noted to be clean on recent dressing changes     Rec:  ? Continue to follow closely off antibiotics  ? Serial exams and [facility] per surgery     3   Uncontrolled DM with hyperglycemia   Hemoglobin A1c 12  Risk factor for all of above     4   Morbid obesity   Risk factor for all of above   Patient motivated to continue to lose weight      5  Groin intertrigo  Being managed with Nystatin powder  Above plan discussed in detail with patient, family at bedside, and RN      Antibiotics:  Off antibiotics #4     Subjective:  Patient denies fevers, chills, sweats, nausea, vomiting, or diarrhea  He reports that a yeast infection of groin was noted yesterday and powder started overnight  He has some mild itching under his abdominal pannus  Objective:  Vitals:  Temp:  [98 1 °F (36 7 °C)-98 6 °F (37 °C)] 98 6 °F (37 °C)  HR:  [106-119] 119  Resp:  [17-18] 18  BP: (124-140)/(65-84) 140/65  SpO2:  [95 %-96 %] 96 %  Temp (24hrs), Av 4 °F (36 9 °C), Min:98 1 °F (36 7 °C), Max:98 6 °F (37 °C)  Current: Temperature: 98 6 °F (37 °C)    Physical Exam:   General Appearance:  Alert, interactive, nontoxic, no acute distress  Throat: Oropharynx moist without lesions      Lungs:   Clear to auscultation bilaterally; respirations unlabored   Heart:  RRR   Abdomen:   Soft, non-tender, obese, positive bowel sounds  Groin:  Nystatin powder applied between groin and abdominal pannus folds without any pink macerated skin noted underneath  Gauze dressing intact over left groin wound without any strike through drainage or surrounding erythema of skin   Skin: No new rashes or lesions  Left upper arm IV site nontender     Labs, Imaging, & Other studies:   All pertinent labs and imaging studies were personally reviewed  Results from last 7 days   Lab Units 09/15/19  0627 09/14/19  0600 09/13/19  0416   WBC Thousand/uL 13 07* 16 35* 20 47*   HEMOGLOBIN g/dL 9 9* 9 8* 9 2*   PLATELETS Thousands/uL 317 288 256     Results from last 7 days   Lab Units 09/15/19  0452   POTASSIUM mmol/L 3 4*   CHLORIDE mmol/L 107   CO2 mmol/L 24   BUN mg/dL 19   CREATININE mg/dL 0 66   EGFR ml/min/1 73sq m 111   CALCIUM mg/dL 7 5*     Results from last 7 days   Lab Units 09/12/19  0354   PROCALCITONIN ng/ml 0 66*     Results from last 7 days   Lab Units 09/12/19  2100 09/10/19  1403 09/10/19  1401   BLOOD CULTURE   --  No Growth After 5 Days  No Growth After 5 Days     C DIFF TOXIN B  NEGATIVE for C difficle toxin by PCR    --   --

## 2019-09-16 NOTE — PROGRESS NOTES
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

## 2019-09-16 NOTE — PLAN OF CARE
Problem: Prexisting or High Potential for Compromised Skin Integrity  Goal: Skin integrity is maintained or improved  Description  INTERVENTIONS:  - Identify patients at risk for skin breakdown  - Assess and monitor skin integrity  - Assess and monitor nutrition and hydration status  - Monitor labs   - Assess for incontinence   - Turn and reposition patient  - Assist with mobility/ambulation  - Relieve pressure over bony prominences  - Avoid friction and shearing  - Provide appropriate hygiene as needed including keeping skin clean and dry  - Evaluate need for skin moisturizer/barrier cream  - Collaborate with interdisciplinary team   - Patient/family teaching  - Consider wound care consult   9/16/2019 1536 by Sharron Avila RN  Outcome: Progressing  9/16/2019 1535 by Sharron Avila RN  Outcome: Progressing  9/16/2019 0729 by Sharron Avila RN  Outcome: Progressing     Problem: Potential for Falls  Goal: Patient will remain free of falls  Description  INTERVENTIONS:  - Assess patient frequently for physical needs  -  Identify cognitive and physical deficits and behaviors that affect risk of falls    -  [place] fall precautions as indicated by assessment   - Educate patient/family on patient safety including physical limitations  - Instruct patient to call for assistance with activity based on assessment  - Modify environment to reduce risk of injury  - Consider OT/PT consult to assist with strengthening/mobility  9/16/2019 1536 by Sharron Avila RN  Outcome: Progressing  9/16/2019 1535 by Sharron Avila RN  Outcome: Progressing  9/16/2019 0729 by Sharron Avila RN  Outcome: Progressing     Problem: CARDIOVASCULAR - ADULT  Goal: Absence of cardiac dysrhythmias or at baseline rhythm  Description  INTERVENTIONS:  - Continuous cardiac monitoring, vital signs, obtain 12 lead EKG if ordered  - Administer antiarrhythmic and heart rate control medications as ordered  - Monitor electrolytes and administer replacement therapy as ordered  9/16/2019 1536 by Jami Davis RN  Outcome: Progressing  9/16/2019 1535 by Jami Davis RN  Outcome: Progressing  9/16/2019 0729 by Jami Davis RN  Outcome: Progressing     Problem: RESPIRATORY - ADULT  Goal: Achieves optimal ventilation and oxygenation  Description  INTERVENTIONS:  - Assess for changes in respiratory status  - Assess for changes in mentation and behavior  - Position to facilitate oxygenation and minimize respiratory effort  - Oxygen administered by appropriate delivery if ordered  - Initiate smoking cessation education as indicated  - Encourage broncho-pulmonary hygiene including cough, deep breathe, Incentive Spirometry  - Assess the need for suctioning and aspirate as needed  - Assess and instruct to report SOB or any respiratory difficulty  - Respiratory Therapy support as indicated  9/16/2019 1536 by Jami Davis RN  Outcome: Progressing  9/16/2019 1535 by Jami Davis RN  Outcome: Progressing  9/16/2019 0729 by Jami Davis RN  Outcome: Progressing     Problem: METABOLIC, FLUID AND ELECTROLYTES - ADULT  Goal: Electrolytes maintained within normal limits  Description  INTERVENTIONS:  - Monitor labs and assess patient for signs and symptoms of electrolyte imbalances  - Administer electrolyte replacement as ordered  - Monitor response to electrolyte replacements, including repeat lab results as appropriate  - Instruct patient on fluid and nutrition as appropriate  9/16/2019 1536 by Jami Dvais RN  Outcome: Progressing  9/16/2019 1535 by Jami Davis RN  Outcome: Progressing  9/16/2019 0729 by Jami Davis RN  Outcome: Progressing  Goal: Fluid balance maintained  Description  INTERVENTIONS:  - Monitor labs   - Monitor I/O and WT  - Instruct patient on fluid and nutrition as appropriate  - Assess for signs & symptoms of volume excess or deficit  9/16/2019 1536 by Jeanne Solano RN  Outcome: Progressing  9/16/2019 1535 by Jeanne Solano RN  Outcome: Progressing  9/16/2019 0729 by Jeanne Solano RN  Outcome: Progressing  Goal: Glucose maintained within target range  Description  INTERVENTIONS:  - Monitor Blood Glucose as ordered  - Assess for signs and symptoms of hyperglycemia and hypoglycemia  - Administer ordered medications to maintain glucose within target range  - Assess nutritional intake and initiate nutrition service referral as needed  9/16/2019 1536 by Jeanne Solano RN  Outcome: Progressing  9/16/2019 1535 by Jeanne Solano RN  Outcome: Progressing  9/16/2019 0729 by Jeanne Solano RN  Outcome: Progressing     Problem: METABOLIC, FLUID AND ELECTROLYTES - ADULT  Goal: Glucose maintained within target range  Description  INTERVENTIONS:  - Monitor Blood Glucose as ordered  - Assess for signs and symptoms of hyperglycemia and hypoglycemia  - Administer ordered medications to maintain glucose within target range  - Assess nutritional intake and initiate nutrition service referral as needed  9/16/2019 1536 by Jeanne Solano RN  Outcome: Progressing  9/16/2019 1535 by Jeanne Solano RN  Outcome: Progressing  9/16/2019 0729 by Jeanne Solano RN  Outcome: Progressing     Problem: SKIN/TISSUE INTEGRITY - ADULT  Goal: Skin integrity remains intact  Description  INTERVENTIONS  - Identify patients at risk for skin breakdown  - Assess and monitor skin integrity  - Assess and monitor nutrition and hydration status  - Monitor labs (i e  albumin)  - Assess for incontinence   - Turn and reposition patient  - Assist with mobility/ambulation  - Relieve pressure over bony prominences  - Avoid friction and shearing  - Provide appropriate hygiene as needed including keeping skin clean and dry  - Evaluate need for skin moisturizer/barrier cream  - Collaborate with interdisciplinary team (i e  Nutrition, Rehabilitation, etc )   - Patient/family teaching  9/16/2019 1536 by Chalino Mejia RN  Outcome: Progressing  9/16/2019 1535 by Chalino Mejia RN  Outcome: Progressing  9/16/2019 0729 by Chalino Mejia RN  Outcome: Progressing  Goal: Incision(s), wounds(s) or drain site(s) healing without S/S of infection  Description  INTERVENTIONS  - Assess and document risk factors for skin impairment   - Assess and document dressing, incision, wound bed, drain sites and surrounding tissue  - Consider nutrition services referral as needed  - Oral mucous membranes remain intact  - Provide patient/ family education  9/16/2019 1536 by Chalino Mejia RN  Outcome: Progressing  9/16/2019 1535 by Chalino Mejia RN  Outcome: Progressing  9/16/2019 0729 by Chalino Mejia RN  Outcome: Progressing  Goal: Oral mucous membranes remain intact  Description  INTERVENTIONS  - Assess oral mucosa and hygiene practices  - Implement preventative oral hygiene regimen  - Implement oral medicated treatments as ordered  - Initiate Nutrition services referral as needed  9/16/2019 1536 by Chalino Mejia RN  Outcome: Progressing  9/16/2019 1535 by Chalino Mejia RN  Outcome: Progressing  9/16/2019 0729 by Chalino Mejia RN  Outcome: Progressing     Problem: HEMATOLOGIC - ADULT  Goal: Maintains hematologic stability  Description  INTERVENTIONS  - Assess for signs and symptoms of bleeding or hemorrhage  - Monitor labs  - Administer supportive blood products/factors as ordered and appropriate  9/16/2019 1536 by Chalino Mejia RN  Outcome: Progressing  9/16/2019 1535 by Chalino Mejia RN  Outcome: Progressing  9/16/2019 0729 by Chalino Mejia RN  Outcome: Progressing     Problem: Nutrition/Hydration-ADULT  Goal: Nutrient/Hydration intake appropriate for improving, restoring or maintaining nutritional needs  Description  Monitor and assess patient's nutrition/hydration status for malnutrition  Collaborate with interdisciplinary team and initiate plan and interventions as ordered  Monitor patient's weight and dietary intake as ordered or per policy  Utilize nutrition screening tool and intervene as necessary  Determine patient's food preferences and provide high-protein, high-caloric foods as appropriate       INTERVENTIONS:  - Monitor oral intake, urinary output, labs, and treatment plans  - Assess nutrition and hydration status and recommend course of action  - Evaluate amount of meals eaten  - Assist patient with eating if necessary   - Allow adequate time for meals  - Recommend/ encourage appropriate diets, oral nutritional supplements, and vitamin/mineral supplements  - Order, calculate, and assess calorie counts as needed  - Assess need for intravenous fluids  - Provide specific nutrition/hydration education as appropriate  - Include patient/family/caregiver in decisions related to nutrition   9/16/2019 1536 by Brenna Ambriz RN  Outcome: Progressing  9/16/2019 1535 by Brenna Ambriz RN  Outcome: Progressing  9/16/2019 0729 by Brenna Ambriz, RN  Outcome: Progressing

## 2019-09-16 NOTE — PLAN OF CARE
Problem: PHYSICAL THERAPY ADULT  Goal: Performs mobility at highest level of function for planned discharge setting  See evaluation for individualized goals  Description  Treatment/Interventions: LE strengthening/ROM, Therapeutic exercise, Endurance training, Patient/family training, Equipment eval/education, Bed mobility(PT to see when sitting edge of bed is appropriate )  Equipment Recommended: Other (Comment)(pt needs dependent means for out of bed mobilization )       See flowsheet documentation for full assessment, interventions and recommendations  Outcome: Progressing  Note:   Prognosis: Fair  Problem List: Decreased strength, Decreased range of motion, Decreased endurance, Impaired balance, Decreased mobility, Decreased coordination, Decreased cognition, Impaired judgement, Decreased safety awareness, Obesity, Decreased skin integrity, Orthopedic restrictions(fear/retreat)  Assessment: Pt did have fall since last PT session -psych in to see pt in interim  From a PT mobility standpoint, pt continues to require less physical therapy A, and was able to progress to PASS Reena's Egress test using wide rolling walker  Pt able to tolerate short period of time in [facility], but is limited due to fatigue and pain  Pt would benefit from another bed such as low boy bed which has a deck height of about 7" deflated due to pt's reports of feeling like he is sliding off the edge of bed, and pt appears shorter than his reported height of 5'4"; this would promote pt to have a level thigh and proper deck height not only for transfers but also potentially for sitting edge of bed if pt had adequate longer time sitting tolerance and did not progress into posterior pelvic tilt (like he did in the [facility])  Skilled PT recommended to progress pt toward treatment goals, emphasis on bed mobility from lower bed, and gait w/ rolling walker and WC follow   Goals still apply from 9/15/2019 session  Barriers to Discharge: Decreased caregiver support, Inaccessible home environment     Recommendation: Short-term skilled PT          See flowsheet documentation for full assessment

## 2019-09-16 NOTE — PLAN OF CARE
Problem: Prexisting or High Potential for Compromised Skin Integrity  Goal: Skin integrity is maintained or improved  Description  INTERVENTIONS:  - Identify patients at risk for skin breakdown  - Assess and monitor skin integrity  - Assess and monitor nutrition and hydration status  - Monitor labs   - Assess for incontinence   - Turn and reposition patient  - Assist with mobility/ambulation  - Relieve pressure over bony prominences  - Avoid friction and shearing  - Provide appropriate hygiene as needed including keeping skin clean and dry  - Evaluate need for skin moisturizer/barrier cream  - Collaborate with interdisciplinary team   - Patient/family teaching  - Consider wound care consult   Outcome: Progressing     Problem: Potential for Falls  Goal: Patient will remain free of falls  Description  INTERVENTIONS:  - Assess patient frequently for physical needs  -  Identify cognitive and physical deficits and behaviors that affect risk of falls    -  [place] fall precautions as indicated by assessment   - Educate patient/family on patient safety including physical limitations  - Instruct patient to call for assistance with activity based on assessment  - Modify environment to reduce risk of injury  - Consider OT/PT consult to assist with strengthening/mobility  Outcome: Progressing     Problem: CARDIOVASCULAR - ADULT  Goal: Maintains optimal cardiac output and hemodynamic stability  Description  INTERVENTIONS:  - Monitor I/O, vital signs and rhythm  - Monitor for S/S and trends of decreased cardiac output  - Administer and titrate ordered vasoactive medications to optimize hemodynamic stability  - Assess quality of pulses, skin color and temperature  - Assess for signs of decreased coronary artery perfusion  - Instruct patient to report change in severity of symptoms  Outcome: Progressing  Goal: Absence of cardiac dysrhythmias or at baseline rhythm  Description  INTERVENTIONS:  - Continuous cardiac monitoring, vital signs, obtain 12 lead EKG if ordered  - Administer antiarrhythmic and heart rate control medications as ordered  - Monitor electrolytes and administer replacement therapy as ordered  Outcome: Progressing     Problem: RESPIRATORY - ADULT  Goal: Achieves optimal ventilation and oxygenation  Description  INTERVENTIONS:  - Assess for changes in respiratory status  - Assess for changes in mentation and behavior  - Position to facilitate oxygenation and minimize respiratory effort  - Oxygen administered by appropriate delivery if ordered  - Initiate smoking cessation education as indicated  - Encourage broncho-pulmonary hygiene including cough, deep breathe, Incentive Spirometry  - Assess the need for suctioning and aspirate as needed  - Assess and instruct to report SOB or any respiratory difficulty  - Respiratory Therapy support as indicated  Outcome: Progressing     Problem: METABOLIC, FLUID AND ELECTROLYTES - ADULT  Goal: Electrolytes maintained within normal limits  Description  INTERVENTIONS:  - Monitor labs and assess patient for signs and symptoms of electrolyte imbalances  - Administer electrolyte replacement as ordered  - Monitor response to electrolyte replacements, including repeat lab results as appropriate  - Instruct patient on fluid and nutrition as appropriate  Outcome: Progressing  Goal: Fluid balance maintained  Description  INTERVENTIONS:  - Monitor labs   - Monitor I/O and WT  - Instruct patient on fluid and nutrition as appropriate  - Assess for signs & symptoms of volume excess or deficit  Outcome: Progressing  Goal: Glucose maintained within target range  Description  INTERVENTIONS:  - Monitor Blood Glucose as ordered  - Assess for signs and symptoms of hyperglycemia and hypoglycemia  - Administer ordered medications to maintain glucose within target range  - Assess nutritional intake and initiate nutrition service referral as needed  Outcome: Progressing     Problem: SKIN/TISSUE INTEGRITY - ADULT  Goal: Skin integrity remains intact  Description  INTERVENTIONS  - Identify patients at risk for skin breakdown  - Assess and monitor skin integrity  - Assess and monitor nutrition and hydration status  - Monitor labs (i e  albumin)  - Assess for incontinence   - Turn and reposition patient  - Assist with mobility/ambulation  - Relieve pressure over bony prominences  - Avoid friction and shearing  - Provide appropriate hygiene as needed including keeping skin clean and dry  - Evaluate need for skin moisturizer/barrier cream  - Collaborate with interdisciplinary team (i e  Nutrition, Rehabilitation, etc )   - Patient/family teaching  Outcome: Progressing  Goal: Incision(s), wounds(s) or drain site(s) healing without S/S of infection  Description  INTERVENTIONS  - Assess and document risk factors for skin impairment   - Assess and document dressing, incision, wound bed, drain sites and surrounding tissue  - Consider nutrition services referral as needed  - Oral mucous membranes remain intact  - Provide patient/ family education  Outcome: Progressing  Goal: Oral mucous membranes remain intact  Description  INTERVENTIONS  - Assess oral mucosa and hygiene practices  - Implement preventative oral hygiene regimen  - Implement oral medicated treatments as ordered  - Initiate Nutrition services referral as needed  Outcome: Progressing     Problem: HEMATOLOGIC - ADULT  Goal: Maintains hematologic stability  Description  INTERVENTIONS  - Assess for signs and symptoms of bleeding or hemorrhage  - Monitor labs  - Administer supportive blood products/factors as ordered and appropriate  Outcome: Progressing     Problem: Nutrition/Hydration-ADULT  Goal: Nutrient/Hydration intake appropriate for improving, restoring or maintaining nutritional needs  Description  Monitor and assess patient's nutrition/hydration status for malnutrition  Collaborate with interdisciplinary team and initiate plan and interventions as ordered  Monitor patient's weight and dietary intake as ordered or per policy  Utilize nutrition screening tool and intervene as necessary  Determine patient's food preferences and provide high-protein, high-caloric foods as appropriate       INTERVENTIONS:  - Monitor oral intake, urinary output, labs, and treatment plans  - Assess nutrition and hydration status and recommend course of action  - Evaluate amount of meals eaten  - Assist patient with eating if necessary   - Allow adequate time for meals  - Recommend/ encourage appropriate diets, oral nutritional supplements, and vitamin/mineral supplements  - Order, calculate, and assess calorie counts as needed  - Assess need for intravenous fluids  - Provide specific nutrition/hydration education as appropriate  - Include patient/family/caregiver in decisions related to nutrition   Outcome: Progressing

## 2019-09-16 NOTE — SOCIAL WORK
Cm continues to review referrals that have been made within a 50 mile radius  Cm continues to provide additional clinical information that is being requested  Several facilities are requesting Optioning be completed  Cm to follow up with pt and wife

## 2019-09-16 NOTE — PLAN OF CARE
Problem: Prexisting or High Potential for Compromised Skin Integrity  Goal: Skin integrity is maintained or improved  Description  INTERVENTIONS:  - Identify patients at risk for skin breakdown  - Assess and monitor skin integrity  - Assess and monitor nutrition and hydration status  - Monitor labs   - Assess for incontinence   - Turn and reposition patient  - Assist with mobility/ambulation  - Relieve pressure over bony prominences  - Avoid friction and shearing  - Provide appropriate hygiene as needed including keeping skin clean and dry  - Evaluate need for skin moisturizer/barrier cream  - Collaborate with interdisciplinary team   - Patient/family teaching  - Consider wound care consult   Outcome: Progressing     Problem: Potential for Falls  Goal: Patient will remain free of falls  Description  INTERVENTIONS:  - Assess patient frequently for physical needs  -  Identify cognitive and physical deficits and behaviors that affect risk of falls    -  [facility] fall precautions as indicated by assessment   - Educate patient/family on patient safety including physical limitations  - Instruct patient to call for assistance with activity based on assessment  - Modify environment to reduce risk of injury  - Consider OT/PT consult to assist with strengthening/mobility  Outcome: Progressing     Problem: CARDIOVASCULAR - ADULT  Goal: Maintains optimal cardiac output and hemodynamic stability  Description  INTERVENTIONS:  - Monitor I/O, vital signs and rhythm  - Monitor for S/S and trends of decreased cardiac output  - Administer and titrate ordered vasoactive medications to optimize hemodynamic stability  - Assess quality of pulses, skin color and temperature  - Assess for signs of decreased coronary artery perfusion  - Instruct patient to report change in severity of symptoms  Outcome: Progressing  Goal: Absence of cardiac dysrhythmias or at baseline rhythm  Description  INTERVENTIONS:  - Continuous cardiac monitoring, vital signs, obtain 12 lead EKG if ordered  - Administer antiarrhythmic and heart rate control medications as ordered  - Monitor electrolytes and administer replacement therapy as ordered  Outcome: Progressing     Problem: RESPIRATORY - ADULT  Goal: Achieves optimal ventilation and oxygenation  Description  INTERVENTIONS:  - Assess for changes in respiratory status  - Assess for changes in mentation and behavior  - Position to facilitate oxygenation and minimize respiratory effort  - Oxygen administered by appropriate delivery if ordered  - Initiate smoking cessation education as indicated  - Encourage broncho-pulmonary hygiene including cough, deep breathe, Incentive Spirometry  - Assess the need for suctioning and aspirate as needed  - Assess and instruct to report SOB or any respiratory difficulty  - Respiratory Therapy support as indicated  Outcome: Progressing     Problem: METABOLIC, FLUID AND ELECTROLYTES - ADULT  Goal: Electrolytes maintained within normal limits  Description  INTERVENTIONS:  - Monitor labs and assess patient for signs and symptoms of electrolyte imbalances  - Administer electrolyte replacement as ordered  - Monitor response to electrolyte replacements, including repeat lab results as appropriate  - Instruct patient on fluid and nutrition as appropriate  Outcome: Progressing  Goal: Fluid balance maintained  Description  INTERVENTIONS:  - Monitor labs   - Monitor I/O and WT  - Instruct patient on fluid and nutrition as appropriate  - Assess for signs & symptoms of volume excess or deficit  Outcome: Progressing  Goal: Glucose maintained within target range  Description  INTERVENTIONS:  - Monitor Blood Glucose as ordered  - Assess for signs and symptoms of hyperglycemia and hypoglycemia  - Administer ordered medications to maintain glucose within target range  - Assess nutritional intake and initiate nutrition service referral as needed  Outcome: Progressing     Problem: SKIN/TISSUE INTEGRITY - ADULT  Goal: Skin integrity remains intact  Description  INTERVENTIONS  - Identify patients at risk for skin breakdown  - Assess and monitor skin integrity  - Assess and monitor nutrition and hydration status  - Monitor labs (i e  albumin)  - Assess for incontinence   - Turn and reposition patient  - Assist with mobility/ambulation  - Relieve pressure over bony prominences  - Avoid friction and shearing  - Provide appropriate hygiene as needed including keeping skin clean and dry  - Evaluate need for skin moisturizer/barrier cream  - Collaborate with interdisciplinary team (i e  Nutrition, Rehabilitation, etc )   - Patient/family teaching  Outcome: Progressing  Goal: Incision(s), wounds(s) or drain site(s) healing without S/S of infection  Description  INTERVENTIONS  - Assess and document risk factors for skin impairment   - Assess and document dressing, incision, wound bed, drain sites and surrounding tissue  - Consider nutrition services referral as needed  - Oral mucous membranes remain intact  - Provide patient/ family education  Outcome: Progressing  Goal: Oral mucous membranes remain intact  Description  INTERVENTIONS  - Assess oral mucosa and hygiene practices  - Implement preventative oral hygiene regimen  - Implement oral medicated treatments as ordered  - Initiate Nutrition services referral as needed  Outcome: Progressing     Problem: HEMATOLOGIC - ADULT  Goal: Maintains hematologic stability  Description  INTERVENTIONS  - Assess for signs and symptoms of bleeding or hemorrhage  - Monitor labs  - Administer supportive blood products/factors as ordered and appropriate  Outcome: Progressing     Problem: Nutrition/Hydration-ADULT  Goal: Nutrient/Hydration intake appropriate for improving, restoring or maintaining nutritional needs  Description  Monitor and assess patient's nutrition/hydration status for malnutrition  Collaborate with interdisciplinary team and initiate plan and interventions as ordered  Monitor patient's weight and dietary intake as ordered or per policy  Utilize nutrition screening tool and intervene as necessary  Determine patient's food preferences and provide high-protein, high-caloric foods as appropriate       INTERVENTIONS:  - Monitor oral intake, urinary output, labs, and treatment plans  - Assess nutrition and hydration status and recommend course of action  - Evaluate amount of meals eaten  - Assist patient with eating if necessary   - Allow adequate time for meals  - Recommend/ encourage appropriate diets, oral nutritional supplements, and vitamin/mineral supplements  - Order, calculate, and assess calorie counts as needed  - Assess need for intravenous fluids  - Provide specific nutrition/hydration education as appropriate  - Include patient/family/caregiver in decisions related to nutrition   Outcome: Progressing

## 2019-09-16 NOTE — ASSESSMENT & PLAN NOTE
Patient appears to have chronic sinus tachycardia  His echocardiogram was okay  Will start Lopressor

## 2019-09-16 NOTE — ASSESSMENT & PLAN NOTE
· Blood glucose still high at times in the 200s  Most recent blood glucose that was 149  Also, we just switched him to NovoLog 70/30 as patient does not have insurance and will not be able to afford Lantus as an outpatient  Continue NovoLog 70/30  Upon discharge he should be prescribed Novolin 70/30  He can also resume metformin, but I would not resume glimepiride

## 2019-09-16 NOTE — PROGRESS NOTES
09/16/19 1100   Clinical Encounter Type   Visited With Patient and family together   Referral From Nurse   Patient Spiritual Encounters   Spiritual Assessment 3  (Pt  is discussing his ICU experience & searching for meaning)   Suffering Severity 3  (Experiencing post-ICU spiritual distress)   Fear Level 1  (Patient reports fear preventing him from sleeping)   Feelings of Loneliness No   Feelings of Hopelessness Yes  (Patient fears he will never get over feeling of torture)   Coping 2   Social Interaction Cooperates in daily activities   Spiritual Encounter Notes Patient recently stepped down to Med/Surg unit after being in the ICU with septic shock and respiratory distress  Patient stated that he had been intubated for over five days, and that during this experience he was conscious while under sedation and experienced what was being done to him  Patient stated now that he is very affraid and cannot go to sleep because he is terrified of what will happen if he goes to sleep  Patient also expressed his frustration over a perception that people are not taking his feelings seriously   recognized that patient is in spiritual distress and also discribing the symptoms of ICU syndrome   validated the patient's feelings by pointing out that he is a "normal person who has experienced an abnormal situation"   encouraged patient to identify supportive persons and structures in his life that can help him through this experience   also encouraged patient to speak about his experience and allow himself to express the feelings that come up within a supportive environment   also encouraged the patient to consider attending the ICU support group post-discharge     Family Spiritual Encounters   Family Coping Anxiety

## 2019-09-16 NOTE — SOCIAL WORK
Cm returned to pt's room to discuss planning and pt's wife was there  Cm reviewed with pt and wife and friend that they had met with a CM over the weekend who had made rehab referrals in a 50 mile radius  Cm then asked pt if he still wants to go to rehab or if he and his wife discussed returning home  Pt asked what rehab inviolves and cm explained he would get 2 hours of therapy per day and he would have 24 hour care and once he was strong enough to go home, he would be able to do so  He stated he would prefer rehab and pt's wife is agreeable to this at this time  Cm reviewed those facilities that are considering or have accepted pt  All parties involved prefer pt got to [facility] in [facility] or [facility] in [facility] as this is close for both friend and wife to travel  Friend is concerned about rehab as she has been to [facility] and was not in the rehab portion of the facility  Cm explained the facilities are nursing facilities however they have separate rehab units for those who are there for rehab and short term care  She stated she would prefer to visit the 2 facilities  Cm explained she could go there without an appointment  Friend stated she will do this tomorrow  Cm reviewed that at this time pt needs rehab  Family stated that upon DC he may need a lower level of care as they do not know when the DC will be  If he does not need rehab at that time, we could always discuss VNA with therapy at home  Cm then asked both pt and wife if they would feel comfortable applying for Medicaid  Pt stated he has attempted to apply for Medicaid and he was denied  CM explained the difference between community Medicaid and Medicaid in the facility that because it costs so much to care for a pt in a facility, there are different income requirements  Both pt and wife are agreeable to applying for Medicaid for pt's rehab stay  Cm explained I would pass this on to the facilities    Cm will continue to follow throughout stay to assist with needs and planning

## 2019-09-16 NOTE — SOCIAL WORK
Cm met with PT/OT prior to going to see pt who stated pt did much better today and was able to stand and pivot with one person  Cm met with pt in who room who was sitting in a WC  Friend was present and pt stated she could be involved with the conversation as she assists with decisions  Cm congratulated pt and provided encouragement as he is progressing quite quickly  Pt stated he feels good about it however he was having pain and is very tired and would like to get back into bed  Cm informed staff however he wanted to know what I would be helping him with  Cm explained I was going to speak with him about DCP and rehab  He stated he wanted to talk with me however he wanted to get back into bed first      PT/OT came back into pt's room to assist with getting pt back into bed  Cm excused myself and explained cm would be back in a little while after he is settled  He reminded cm that he wanted to speak with me today    Cm will follow to assist

## 2019-09-16 NOTE — ASSESSMENT & PLAN NOTE
· History of recurrent cellulitis  · Presented to ED with worsening cellulitis for one week-Within the 5 days, he said that cellulitis went from lower left leg in the lateral area continued to go upward to the left upper thigh in the medial area up to his groin which he described as fast   · Reported when he sat on the toilet that there was bleeding with foul smell  · PTA was taking doxicycline 100 mg Q 12 hours  · 9/6 CT left femur: infection with gas-forming organism in the L groin and L prox-mid thigh  Consistent with Anna's gangrene  No localized fluid collection  · S/p excisional debridement and washout in OR 9/6 and 9/7  It appears the infection was mainly in the subcutaneous tissue  The muscle appeared healthy  · Remained intubated after OR 9/6, extubated 9/11  · Completed 7 days of antibiotics on 9/12  · ID following  · Discussed with surgery  Wound looks clean with healthy tissue  Continue current wound care as per surgery

## 2019-09-16 NOTE — PLAN OF CARE
Problem: Prexisting or High Potential for Compromised Skin Integrity  Goal: Skin integrity is maintained or improved  Description  INTERVENTIONS:  - Identify patients at risk for skin breakdown  - Assess and monitor skin integrity  - Assess and monitor nutrition and hydration status  - Monitor labs   - Assess for incontinence   - Turn and reposition patient  - Assist with mobility/ambulation  - Relieve pressure over bony prominences  - Avoid friction and shearing  - Provide appropriate hygiene as needed including keeping skin clean and dry  - Evaluate need for skin moisturizer/barrier cream  - Collaborate with interdisciplinary team   - Patient/family teaching  - Consider wound care consult   Outcome: Progressing     Problem: Potential for Falls  Goal: Patient will remain free of falls  Description  INTERVENTIONS:  - Assess patient frequently for physical needs  -  Identify cognitive and physical deficits and behaviors that affect risk of falls    -  [place] fall precautions as indicated by assessment   - Educate patient/family on patient safety including physical limitations  - Instruct patient to call for assistance with activity based on assessment  - Modify environment to reduce risk of injury  - Consider OT/PT consult to assist with strengthening/mobility  Outcome: Progressing     Problem: CARDIOVASCULAR - ADULT  Goal: Maintains optimal cardiac output and hemodynamic stability  Description  INTERVENTIONS:  - Monitor I/O, vital signs and rhythm  - Monitor for S/S and trends of decreased cardiac output  - Administer and titrate ordered vasoactive medications to optimize hemodynamic stability  - Assess quality of pulses, skin color and temperature  - Assess for signs of decreased coronary artery perfusion  - Instruct patient to report change in severity of symptoms  Outcome: Progressing  Goal: Absence of cardiac dysrhythmias or at baseline rhythm  Description  INTERVENTIONS:  - Continuous cardiac monitoring, vital signs, obtain 12 lead EKG if ordered  - Administer antiarrhythmic and heart rate control medications as ordered  - Monitor electrolytes and administer replacement therapy as ordered  Outcome: Progressing     Problem: RESPIRATORY - ADULT  Goal: Achieves optimal ventilation and oxygenation  Description  INTERVENTIONS:  - Assess for changes in respiratory status  - Assess for changes in mentation and behavior  - Position to facilitate oxygenation and minimize respiratory effort  - Oxygen administered by appropriate delivery if ordered  - Initiate smoking cessation education as indicated  - Encourage broncho-pulmonary hygiene including cough, deep breathe, Incentive Spirometry  - Assess the need for suctioning and aspirate as needed  - Assess and instruct to report SOB or any respiratory difficulty  - Respiratory Therapy support as indicated  Outcome: Progressing     Problem: METABOLIC, FLUID AND ELECTROLYTES - ADULT  Goal: Electrolytes maintained within normal limits  Description  INTERVENTIONS:  - Monitor labs and assess patient for signs and symptoms of electrolyte imbalances  - Administer electrolyte replacement as ordered  - Monitor response to electrolyte replacements, including repeat lab results as appropriate  - Instruct patient on fluid and nutrition as appropriate  Outcome: Progressing  Goal: Fluid balance maintained  Description  INTERVENTIONS:  - Monitor labs   - Monitor I/O and WT  - Instruct patient on fluid and nutrition as appropriate  - Assess for signs & symptoms of volume excess or deficit  Outcome: Progressing  Goal: Glucose maintained within target range  Description  INTERVENTIONS:  - Monitor Blood Glucose as ordered  - Assess for signs and symptoms of hyperglycemia and hypoglycemia  - Administer ordered medications to maintain glucose within target range  - Assess nutritional intake and initiate nutrition service referral as needed  Outcome: Progressing     Problem: SKIN/TISSUE INTEGRITY - ADULT  Goal: Skin integrity remains intact  Description  INTERVENTIONS  - Identify patients at risk for skin breakdown  - Assess and monitor skin integrity  - Assess and monitor nutrition and hydration status  - Monitor labs (i e  albumin)  - Assess for incontinence   - Turn and reposition patient  - Assist with mobility/ambulation  - Relieve pressure over bony prominences  - Avoid friction and shearing  - Provide appropriate hygiene as needed including keeping skin clean and dry  - Evaluate need for skin moisturizer/barrier cream  - Collaborate with interdisciplinary team (i e  Nutrition, Rehabilitation, etc )   - Patient/family teaching  Outcome: Progressing  Goal: Incision(s), wounds(s) or drain site(s) healing without S/S of infection  Description  INTERVENTIONS  - Assess and document risk factors for skin impairment   - Assess and document dressing, incision, wound bed, drain sites and surrounding tissue  - Consider nutrition services referral as needed  - Oral mucous membranes remain intact  - Provide patient/ family education  Outcome: Progressing  Goal: Oral mucous membranes remain intact  Description  INTERVENTIONS  - Assess oral mucosa and hygiene practices  - Implement preventative oral hygiene regimen  - Implement oral medicated treatments as ordered  - Initiate Nutrition services referral as needed  Outcome: Progressing     Problem: HEMATOLOGIC - ADULT  Goal: Maintains hematologic stability  Description  INTERVENTIONS  - Assess for signs and symptoms of bleeding or hemorrhage  - Monitor labs  - Administer supportive blood products/factors as ordered and appropriate  Outcome: Progressing

## 2019-09-16 NOTE — OCCUPATIONAL THERAPY NOTE
OccupationalTherapy Progress Note     Patient Name: Yuriy Guerrero  USFNF'H Date: 9/16/2019  Problem List  Principal Problem:    Necrotizing soft tissue infection  Active Problems:    GERD (gastroesophageal reflux disease)    Hyperlipidemia    Morbid obesity ([address] )    Type 2 diabetes mellitus with complication [facility])    Anemia     Split tx session [id number]; [id number]     09/16/19 1335   Restrictions/Precautions   Weight Bearing Precautions Per Order No   Other Precautions Chair Alarm;Cognitive; Fall Risk;Pain   General   Response to Previous Treatment Patient reporting fatigue but able to participate   Family/Caregiver Present Pt's wife and friend / tenant present during session   Pain Assessment   Pain Assessment 0-10   Pain Score 3   Pain Type Acute pain;Chronic pain   Pain Location Leg; Foot   Pain Orientation Bilateral  (sensitive to touch)   Effect of Pain on Daily Activities limits activity tolerance and I w/ ADL performance   Patient's Stated Pain Goal No pain   Bed Mobility   Supine to Sit 4  Minimal assistance   Additional items Assist x 2   Sit to Supine 3  Moderate assistance   Additional items Assist x 2;Verbal cues; Impulsive; Bedrails;LE management  (mod A X 2 sit to supine, impulsive; A for LE mgmt)   Additional Comments Pt seated OOB in bariatric w/c w/ chair alarm activated and B LE support on foot stool at end of session w/ needs met  Call bell in reach  Pt tolerated sitting OOB in w/c approx 20 minutes and requested to return to bed  Pt seen for split session, and completed SPT w/c to bed w/ min ( steadying ) assist x1 and assit of 2nd for safety  Pt required two attempts to complete sit <> stand from w/c and cues for hand placement  Following education, cues pt able to complete sit to stand from w/c min A  Transfers   Sit to Stand 4  Minimal assistance   Additional items Assist x 1; Increased time required;Verbal cues   Stand to Sit 4  Minimal assistance   Additional items Assist x 1; Increased time required;Verbal cues   Stand pivot 4  Minimal assistance  (min A x2 (A of 2nd for safety))   Additional items Assist x 1;Other;Verbal cues;Armrests   Additional Comments Pt seen w/ PTJael for co -tx and benefit from co-tx due to sifnificant cues / prompts for cog - behavioral and physical assist to participate in continued evaluation of functional transfesr and Dionnes Egress Test     Functional Mobility   Additional Comments Will continue to assess   Cognition   Overall Cognitive Status Impaired  (limited insight into deficits)   Arousal/Participation Alert; Cooperative   Attention Attends with cues to redirect   Orientation Level Oriented to person;Oriented to place;Oriented to time;Oriented to situation   Memory Decreased recall of recent events; Other (Comment)  (+ time, consistent recall details, events)   Following Commands Follows one step commands with increased time or repetition   Comments Identified pt by full name and wristband  Pt recalled therapist from previous session  Pt reported fatigue, tired today and added that he had a difficult night last night  Pt added that he had spoke w/ nurse overnight about getting OOB to commode, but was agreeable to use bedpan as he has not transferred to target surface from bed  Pt then reported that his wife lowered bedrail to assist pt reposition in bed, and that is when he ended up on the floor  Pt exhibited humor during session (inappropriately at times) during sit <> stand (joking that he was needed to sit)  Activity Tolerance   Activity Tolerance Patient limited by fatigue;Patient limited by pain   Medical Staff Made Aware spoke to RN, Ab Armando and [place], [facility]   Assessment   Assessment Pt seen for OT tx session this afternoon w/ [place], [facility] [place]  Pt benefits from co -tx due significant cog - behavioral cues needed and physical assist to assess functional tranfers  Pt required less physical assistance to complete bed mobility supine to sit at EOB   Pt completed SPT bed <> bariatric w/c w/ wide/ jaime RW w/ min A x 2 ( assist 2nd for safety)  Pt educated on hand placement, tech and pacing  Care coordination w/ PT, nursing staff, and CM  Continue to recommend post acute rehab when medically stable for discharge from acute care at this time to return to baseline level of I  Will continue to follow   Plan   Treatment Interventions ADL retraining;Functional transfer training; Endurance training;Cognitive reorientation;Patient/family training;Equipment evaluation/education; Compensatory technique education;Continued evaluation; Energy conservation; Activityengagement   Goal Expiration Date 09/25/19   Treatment Day 3   OT Frequency 3-5x/wk   Recommendation   OT Discharge Recommendation Other (Comment)  (post acute rehab)   Equipment Recommended Bedside commode;Tub seat with back; Other (comment)  (will continue to assess)   OT - OK to Discharge   (post acute rehab at this time)   Barthel Index   Feeding 10   Bathing 0   Grooming Score 5   Dressing Score 5   Bladder Score 0   Bowels Score 0   Toilet Use Score 0   Transfers (Bed/Chair) Score 10   Mobility (Level Surface) Score 0   Stairs Score 0   Barthel Index Score 30   Modified Nye Scale   Modified Nye Scale 4   Yue Escobar, OTR/L    Pt goals to be met by 9/25/2019:  1  Pt will complete bed mobility supine <> sit w/ mod A x2 to actively participate in ADL for at least 10 minutes unsupported at EOB  2  Pt will complete bed mobility to roll R<>L w/ mod A x1 to max I w/ ADL performance and improve activity engagement  3  Pt will complete UBD w/ S after set- up  4  Pt will demonstrate good attention and participation in continued evaluation to assess functional cognitive skills to assist in safe discharge planning  5  Pt will demonstrate good attention and participation to participate in continued evaluation to assess functional transfers, standing tolerance to assist in safe discharge planning  6   Pt will demonstrate good attention and consistently follow multi - step direction w/ good recall to max I and safety w/ ADL performance  7   Pt will demonstrate increased activity tolerance to actively participate in ADL for at least 10 minutes while seated at EOB unsupported    Additional goals to be met by 9/20/2019:  - Pt will complete functional transfers to bed, w/c, commode using AD w/ S to max I w/ ADL performance  - Pt will demonstrate increased functional standing tolerance for at least 10 minutes while engaged in ADL using AD as needed to max I w/ functional mobility and assist in safe discharge planning  - Pt will demonstrate good attention and participation in continued evaluation to assess functional mobility using least restrictive device to assist in safe discharge planning  - Pt will demonstrate good attention and verbalize understanding of energy conservation and coping strategies to max I w/ ADL performance  -Pt will complete bed mobility supine <> sit w/ min A x1 to max I w/ ADL performance  - Pt will demonstrate increased activity and sitting tolerance for at least 30 minutes while engage in ADL w/ S after set- up to improve activity engagement to return baseline level of I w/ out sign / symptoms of fatigue  Yue Escobar, OTR/L

## 2019-09-17 LAB
GLUCOSE SERPL-MCNC: 116 MG/DL (ref 65–140)
GLUCOSE SERPL-MCNC: 145 MG/DL (ref 65–140)
GLUCOSE SERPL-MCNC: 154 MG/DL (ref 65–140)
GLUCOSE SERPL-MCNC: 180 MG/DL (ref 65–140)

## 2019-09-17 PROCEDURE — 82948 REAGENT STRIP/BLOOD GLUCOSE: CPT

## 2019-09-17 PROCEDURE — 97116 GAIT TRAINING THERAPY: CPT

## 2019-09-17 PROCEDURE — 97535 SELF CARE MNGMENT TRAINING: CPT

## 2019-09-17 PROCEDURE — 99232 SBSQ HOSP IP/OBS MODERATE 35: CPT | Performed by: INTERNAL MEDICINE

## 2019-09-17 PROCEDURE — 97530 THERAPEUTIC ACTIVITIES: CPT

## 2019-09-17 RX ADMIN — Medication 250 MG: at 17:36

## 2019-09-17 RX ADMIN — Medication 1 APPLICATION: at 10:25

## 2019-09-17 RX ADMIN — QUETIAPINE FUMARATE 25 MG: 25 TABLET ORAL at 20:50

## 2019-09-17 RX ADMIN — ACETAMINOPHEN 650 MG: 325 TABLET, FILM COATED ORAL at 14:54

## 2019-09-17 RX ADMIN — ENOXAPARIN SODIUM 40 MG: 40 INJECTION SUBCUTANEOUS at 09:06

## 2019-09-17 RX ADMIN — ACETAMINOPHEN 325 MG: 325 TABLET ORAL at 00:58

## 2019-09-17 RX ADMIN — NYSTATIN: 100000 POWDER TOPICAL at 17:36

## 2019-09-17 RX ADMIN — ENOXAPARIN SODIUM 40 MG: 40 INJECTION SUBCUTANEOUS at 20:51

## 2019-09-17 RX ADMIN — ACETAMINOPHEN 325 MG: 325 TABLET ORAL at 12:33

## 2019-09-17 RX ADMIN — ACETAMINOPHEN 325 MG: 325 TABLET ORAL at 06:19

## 2019-09-17 RX ADMIN — HYDROCODONE BITARTRATE AND ACETAMINOPHEN 1 TABLET: 5; 325 TABLET ORAL at 17:39

## 2019-09-17 RX ADMIN — NYSTATIN: 100000 POWDER TOPICAL at 11:00

## 2019-09-17 RX ADMIN — INSULIN ASPART 54 UNITS: 100 INJECTION, SUSPENSION SUBCUTANEOUS at 09:06

## 2019-09-17 RX ADMIN — METOPROLOL TARTRATE 25 MG: 25 TABLET, FILM COATED ORAL at 20:50

## 2019-09-17 RX ADMIN — MELATONIN 6 MG: at 20:50

## 2019-09-17 RX ADMIN — HYDROCODONE BITARTRATE AND ACETAMINOPHEN 1 TABLET: 5; 325 TABLET ORAL at 03:59

## 2019-09-17 RX ADMIN — INSULIN LISPRO 2 UNITS: 100 INJECTION, SOLUTION INTRAVENOUS; SUBCUTANEOUS at 13:16

## 2019-09-17 RX ADMIN — ACETAMINOPHEN 325 MG: 325 TABLET ORAL at 20:50

## 2019-09-17 RX ADMIN — HYDROCODONE BITARTRATE AND ACETAMINOPHEN 1 TABLET: 5; 325 TABLET ORAL at 10:23

## 2019-09-17 RX ADMIN — PANTOPRAZOLE SODIUM 40 MG: 40 TABLET, DELAYED RELEASE ORAL at 06:19

## 2019-09-17 RX ADMIN — INSULIN LISPRO 2 UNITS: 100 INJECTION, SOLUTION INTRAVENOUS; SUBCUTANEOUS at 17:36

## 2019-09-17 RX ADMIN — METOPROLOL TARTRATE 25 MG: 25 TABLET, FILM COATED ORAL at 09:06

## 2019-09-17 RX ADMIN — Medication 250 MG: at 09:06

## 2019-09-17 RX ADMIN — INSULIN ASPART 36 UNITS: 100 INJECTION, SUSPENSION SUBCUTANEOUS at 17:36

## 2019-09-17 NOTE — PHYSICAL THERAPY NOTE
PHYSICAL THERAPY TREATMENT NOTE  NAME:  Erica Terrazas  DATE: 09/17/19    Length Of Stay: 11  Performed at least 2 patient identifiers during session: Name and ID bracelet    TREATMENT:      09/17/19 1320   Pain Assessment   Pain Assessment 0-10   Pain Score 3   Pain Type Acute pain   Pain Location Groin   Pain Orientation Left   Effect of Pain on Daily Activities limits speed and indep of mobility, limits sitting tolerance in chair/edge of bed   Patient's Stated Pain Goal No pain   Hospital Pain Intervention(s) Repositioned; Ambulation/increased activity; Emotional support   Restrictions/Precautions   Weight Bearing Precautions Per Order No   Other Precautions Bed Alarm; Chair Alarm;Cognitive; Fall Risk;Pain; Impulsive   General   Chart Reviewed Yes   Response to Previous Treatment Patient reporting fatigue but able to participate  Family/Caregiver Present Yes  (family)   Cognition   Overall Cognitive Status Impaired   Arousal/Participation Alert  (first trial, lethargic initially on second trial)   Attention Attends with cues to redirect   Memory Decreased recall of recent events   Following Commands Follows one step commands with increased time or repetition   Subjective   Subjective Pt >family needs redirection to focus on task, perfom better with goal-directed task at hand  Bed Mobility   Supine to Sit 3  Moderate assistance   Additional items Assist x 1;HOB elevated; Bedrails; Increased time required;Verbal cues; Trapeze bar   Sit to Supine 3  Moderate assistance   Additional items Assist x 2;HOB elevated; Bedrails; Increased time required;Verbal cues; Trapeze bar  (A of 2 first trial, A of 1 second trial)   Additional Comments Pt able to reposition w/ only S using head board of bed and trapeze when in [place] positioning, S on second attempt during latter split session   Transfers   Sit to Stand 4  Minimal assistance   Additional items Assist x 1;HOB elevated; Increased time required;Verbal cues;Armrests  (instruction for hand placement, LE placement)   Stand to Sit 4  Minimal assistance   Additional items Assist x 1; Increased time required;Verbal cues; Bedrails  (instruction for hand placement, &to minimize trunk lean)   Toilet transfer 4  Minimal assistance   Additional items Assist x 1; Increased time required;Verbal cues;Armrests  (grab bars; instruction for completion approachx 2 trials)   Ambulation/Elevation   Gait pattern Wide ALEXIS; Excessively slow  (intermittent incresed walker advancement)   Gait Assistance 4  Minimal assist   Additional items Assist x 1;Verbal cues   Assistive Device   (wide rolling walker, gait belt)   Distance 20+seated rest @ toilet+5' w/seated rest @ WC+10' to bed first session; 20'+20' w/ seated rest @ toilet second session   Stair Management Assistance Not tested   Wheelchair Activities   Wheelchair Cushion Standard  (but bariatric waffle cushion weaight rated to pt)   Pressure Relief Type Lateral lean   Balance   Static Sitting Fair +   Static Standing Fair -   Ambulatory Fair -   Endurance Deficit   Endurance Deficit Yes   Endurance Deficit Description limited amb distance, limited standing tolerance   Activity Tolerance   Activity Tolerance Patient limited by fatigue;Patient limited by pain  (limited by fear/retreat)   Nurse Made Aware spoke to Steven Zhang and PCA   Medical Staff Made Aware spoke to Dr Lindsay Spears from case management   Exercises   Neuro re-ed Pt requires physical A and continued education/ facilitation to promote 90-90 positioning in [facility] And on edge of bed   Assessment   Prognosis Fair   Problem List Decreased strength;Decreased range of motion;Decreased endurance;Decreased mobility; Impaired balance;Decreased coordination;Decreased cognition;Decreased skin integrity;Obesity   Assessment Pt continues to make mobility progress compared to yesterday   Pt requires only min A for transfers, and is amb further with rolling walker as well as more trials  Pt required split session due to fatigue, and needed encouragement on second split session due to lethargy  Pt declined sitting tolerance  Skilled PT recommended to progress pt toward treatment goals  Recommend continued amb trials w/rolling walker and WC follow, and trials w/ sitting tolerance as able      Barriers to Discharge Decreased caregiver support; Inaccessible home environment   Goals   Patient Goals to continue to walk to the bathroom or get OOB  in the evening   STG Expiration Date 09/25/19   Treatment Day 3   Plan   Treatment/Interventions Functional transfer training;LE strengthening/ROM; Therapeutic exercise; Endurance training;Patient/family training;Equipment eval/education; Bed mobility;Gait training;Cognitive reorientation;Spoke to nursing   Progress Progressing toward goals   PT Frequency 7x/wk   Recommendation   Recommendation Short-term skilled PT   Equipment Recommended Walker; Wheelchair   Pt requires PT /OT co-treat for part of first split session due to signficant assistance with mobility and cognitive-behavioral impairments      Anna Montejo, PT, DPT

## 2019-09-17 NOTE — ASSESSMENT & PLAN NOTE
· History of recurrent cellulitis  · Presented to ED with worsening cellulitis for one week-Within the 5 days, he said that cellulitis went from lower left leg in the lateral area continued to go upward to the left upper thigh in the medial area up to his groin which he described as fast   · Reported when he sat on the toilet that there was bleeding with foul smell  · PTA was taking doxicycline 100 mg Q 12 hours  · 9/6 CT left femur: infection with gas-forming organism in the L groin and L prox-mid thigh  Consistent with Anna's gangrene  No localized fluid collection  · S/p excisional debridement and washout in OR 9/6 and 9/7  It appears the infection was mainly in the subcutaneous tissue  The muscle appeared healthy    · Remained intubated after OR 9/6, extubated 9/11  · Completed 7 days of antibiotics on 9/12  · ID following  · Seen by surgery

## 2019-09-17 NOTE — PLAN OF CARE
Problem: Prexisting or High Potential for Compromised Skin Integrity  Goal: Skin integrity is maintained or improved  Description  INTERVENTIONS:  - Identify patients at risk for skin breakdown  - Assess and monitor skin integrity  - Assess and monitor nutrition and hydration status  - Monitor labs   - Assess for incontinence   - Turn and reposition patient  - Assist with mobility/ambulation  - Relieve pressure over bony prominences  - Avoid friction and shearing  - Provide appropriate hygiene as needed including keeping skin clean and dry  - Evaluate need for skin moisturizer/barrier cream  - Collaborate with interdisciplinary team   - Patient/family teaching  - Consider wound care consult   Outcome: Progressing     Problem: Potential for Falls  Goal: Patient will remain free of falls  Description  INTERVENTIONS:  - Assess patient frequently for physical needs  -  Identify cognitive and physical deficits and behaviors that affect risk of falls    -  [place] fall precautions as indicated by assessment   - Educate patient/family on patient safety including physical limitations  - Instruct patient to call for assistance with activity based on assessment  - Modify environment to reduce risk of injury  - Consider OT/PT consult to assist with strengthening/mobility  Outcome: Progressing     Problem: RESPIRATORY - ADULT  Goal: Achieves optimal ventilation and oxygenation  Description  INTERVENTIONS:  - Assess for changes in respiratory status  - Assess for changes in mentation and behavior  - Position to facilitate oxygenation and minimize respiratory effort  - Oxygen administered by appropriate delivery if ordered  - Initiate smoking cessation education as indicated  - Encourage broncho-pulmonary hygiene including cough, deep breathe, Incentive Spirometry  - Assess the need for suctioning and aspirate as needed  - Assess and instruct to report SOB or any respiratory difficulty  - Respiratory Therapy support as indicated  Outcome: Progressing     Problem: METABOLIC, FLUID AND ELECTROLYTES - ADULT  Goal: Electrolytes maintained within normal limits  Description  INTERVENTIONS:  - Monitor labs and assess patient for signs and symptoms of electrolyte imbalances  - Administer electrolyte replacement as ordered  - Monitor response to electrolyte replacements, including repeat lab results as appropriate  - Instruct patient on fluid and nutrition as appropriate  Outcome: Progressing  Goal: Fluid balance maintained  Description  INTERVENTIONS:  - Monitor labs   - Monitor I/O and WT  - Instruct patient on fluid and nutrition as appropriate  - Assess for signs & symptoms of volume excess or deficit  Outcome: Progressing  Goal: Glucose maintained within target range  Description  INTERVENTIONS:  - Monitor Blood Glucose as ordered  - Assess for signs and symptoms of hyperglycemia and hypoglycemia  - Administer ordered medications to maintain glucose within target range  - Assess nutritional intake and initiate nutrition service referral as needed  Outcome: Progressing     Problem: SKIN/TISSUE INTEGRITY - ADULT  Goal: Skin integrity remains intact  Description  INTERVENTIONS  - Identify patients at risk for skin breakdown  - Assess and monitor skin integrity  - Assess and monitor nutrition and hydration status  - Monitor labs (i e  albumin)  - Assess for incontinence   - Turn and reposition patient  - Assist with mobility/ambulation  - Relieve pressure over bony prominences  - Avoid friction and shearing  - Provide appropriate hygiene as needed including keeping skin clean and dry  - Evaluate need for skin moisturizer/barrier cream  - Collaborate with interdisciplinary team (i e  Nutrition, Rehabilitation, etc )   - Patient/family teaching  Outcome: Progressing  Goal: Incision(s), wounds(s) or drain site(s) healing without S/S of infection  Description  INTERVENTIONS  - Assess and document risk factors for skin impairment   - Assess and document dressing, incision, wound bed, drain sites and surrounding tissue  - Consider nutrition services referral as needed  - Oral mucous membranes remain intact  - Provide patient/ family education  Outcome: Progressing  Goal: Oral mucous membranes remain intact  Description  INTERVENTIONS  - Assess oral mucosa and hygiene practices  - Implement preventative oral hygiene regimen  - Implement oral medicated treatments as ordered  - Initiate Nutrition services referral as needed  Outcome: Progressing     Problem: HEMATOLOGIC - ADULT  Goal: Maintains hematologic stability  Description  INTERVENTIONS  - Assess for signs and symptoms of bleeding or hemorrhage  - Monitor labs  - Administer supportive blood products/factors as ordered and appropriate  Outcome: Progressing     Problem: Nutrition/Hydration-ADULT  Goal: Nutrient/Hydration intake appropriate for improving, restoring or maintaining nutritional needs  Description  Monitor and assess patient's nutrition/hydration status for malnutrition  Collaborate with interdisciplinary team and initiate plan and interventions as ordered  Monitor patient's weight and dietary intake as ordered or per policy  Utilize nutrition screening tool and intervene as necessary  Determine patient's food preferences and provide high-protein, high-caloric foods as appropriate       INTERVENTIONS:  - Monitor oral intake, urinary output, labs, and treatment plans  - Assess nutrition and hydration status and recommend course of action  - Evaluate amount of meals eaten  - Assist patient with eating if necessary   - Allow adequate time for meals  - Recommend/ encourage appropriate diets, oral nutritional supplements, and vitamin/mineral supplements  - Order, calculate, and assess calorie counts as needed  - Assess need for intravenous fluids  - Provide specific nutrition/hydration education as appropriate  - Include patient/family/caregiver in decisions related to nutrition   Outcome: Progressing     Problem: INFECTION - ADULT  Goal: Absence or prevention of progression during hospitalization  Description  INTERVENTIONS:  - Assess and monitor for signs and symptoms of infection  - Monitor lab/diagnostic results  - Monitor all insertion sites, i e  indwelling lines, tubes, and drains  - Monitor endotracheal if appropriate and nasal secretions for changes in amount and color  - [place] appropriate cooling/warming therapies per order  - Administer medications as ordered  - Instruct and encourage patient and family to use good hand hygiene technique  - Identify and instruct in appropriate isolation precautions for identified infection/condition  Outcome: Progressing  Goal: Absence of fever/infection during neutropenic period  Description  INTERVENTIONS:  - Monitor WBC    Outcome: Progressing     Problem: DISCHARGE PLANNING  Goal: Discharge to home or other facility with appropriate resources  Description  INTERVENTIONS:  - Identify barriers to discharge w/patient and caregiver  - Arrange for needed discharge resources and transportation as appropriate  - Identify discharge learning needs (meds, wound care, etc )  - Arrange for interpretive services to assist at discharge as needed  - Refer to Case Management Department for coordinating discharge planning if the patient needs post-hospital services based on physician/advanced practitioner order or complex needs related to functional status, cognitive ability, or social support system  Outcome: Progressing     Problem: Knowledge Deficit  Goal: Patient/family/caregiver demonstrates understanding of disease process, treatment plan, medications, and discharge instructions  Description  Complete learning assessment and assess knowledge base    Interventions:  - Provide teaching at level of understanding  - Provide teaching via preferred learning methods  Outcome: Progressing

## 2019-09-17 NOTE — PROGRESS NOTES
Left  upper thigh wound is pink and clean  Continue local care with [facility] [place]  Other medical issues per Medicine team     Can certainly fully participate in activities and physical therapy from my standpoint

## 2019-09-17 NOTE — PROGRESS NOTES
Progress Note - [address] 48 y o  male MRN: [id number]  Unit/Bed#: -01 Encounter: [id number]    Reports sleeping better last night and had a good day of therapy today  Felt less anticipatory anxiety with going to sleep but still had conflict with staff overnight about getting out of bed  Will cont time limited course of seroquel for now  Behavior over the last 24 hours:  improved  Sleep: improved  Appetite: normal  Medication side effects: No  ROS: no complaints    Mental Status Evaluation:  Appearance:  overweight   Behavior:  normal   Speech:  normal pitch and normal volume   Mood:  normal   Affect:  normal   Language: naming objects   Thought Process:  normal   Associations: intact associations   Thought Content:  normal   Perceptual Disturbances: None   Risk Potential: Suicidal Ideations none and Homicidal Ideations none   Sensorium:  person, place and situation   Memory:  recent and remote memory grossly intact   Cognition:  grossly intact   Consciousness:  alert and awake    Attention: attention span appeared shorter than expected for age   Intellect: within normal limits   Fund of Knowledge: awareness of current events: intact   Insight:  fair   Judgment: fair   Muscle Strength and Tone: in bed   Gait/Station: in bed, but did ambulated with PT to restroom   Motor Activity: no abnormal movements         Assessment/Plan  Erica Terrazas is a 48 y o  male   Diagnosis:  Delirium ,resolving  Acute stress reaction    Recommended Treatment:   1  cont short course of seroquel  2  Cont PT/OT  3  May benefit from outpatient psychiatry referral at D/C if interested        Medications: all current active meds have been reviewed      Risks, benefits and possible side effects of Medications:     Risks, benefits, and possible side effects of medications explained to patient and patient verbalizes understanding  Labs: I have personally reviewed all pertinent laboratory results  I have personally reviewed all pertinent laboratory/tests results    Most Recent Labs:   Lab Results   Component Value Date    WBC 13 07 (H) 09/15/2019    RBC 3 15 (L) 09/15/2019    HGB 9 9 (L) 09/15/2019    HCT 31 6 (L) 09/15/2019     09/15/2019    RDW 16 6 (H) 09/15/2019    NEUTROABS 8 51 (H) 09/15/2019    SODIUM 139 09/15/2019    K 3 4 (L) 09/15/2019     09/15/2019    CO2 24 09/15/2019    BUN 19 09/15/2019    CREATININE 0 66 09/15/2019    GLUCOSE 144 (H) 09/07/2019    GLUC 142 (H) 09/15/2019    CALCIUM 7 5 (L) 09/15/2019    AST 64 (H) 09/09/2019    ALT 30 09/09/2019    ALKPHOS 105 09/09/2019    TP 6 3 (L) 09/09/2019    ALB 1 7 (L) 09/09/2019    TBILI 0 70 09/09/2019    HDL 35 12/03/2015    TRIG 279 12/03/2015    LDLCALC 140 (H) 12/03/2015    PUR9QMCKEVUK 4 493 (H) 12/03/2015    FREET4 1 15 12/03/2015    HGBA1C 11 2 (H) 09/06/2019     09/06/2019         Kiah Tony MD

## 2019-09-17 NOTE — OCCUPATIONAL THERAPY NOTE
OccupationalTherapy Progress Note     Patient Name: Nabeel Preston  KFDCX'F Date: 9/17/2019  Problem List  Principal Problem:    Necrotizing soft tissue infection  Active Problems:    GERD (gastroesophageal reflux disease)    Hyperlipidemia    Morbid obesity ([facility] [address] )    Type 2 diabetes mellitus with complication ([facility] [address] )    Sinus tachycardia    Anemia          09/17/19 1039   Restrictions/Precautions   Weight Bearing Precautions Per Order No   Other Precautions Chair Alarm; Bed Alarm; Fall Risk;Pain   General   Response to Previous Treatment Patient with no complaints from previous session   Pain Assessment   Pain Assessment No/denies pain   Pain Score No Pain   Pain Rating: FLACC (Rest) - Face 1   Pain Rating: FLACC (Rest) - Legs 0   Pain Rating: FLACC (Rest) - Activity 0   Pain Rating: FLACC (Rest) - Cry 1   Pain Rating: FLACC (Rest) - Consolability 0   Score: FLACC (Rest) 2   Pain Rating: FLACC (Activity) - Face 1   Pain Rating: FLACC (Activity) - Legs 0   Pain Rating: FLACC (Activity) - Activity 0   Pain Rating: FLACC (Activity) - Cry 1   Pain Rating: FLACC (Activity) - Consolability 0   Score: FLACC (Activity) 2   ADL   Grooming Assistance 5  Supervision/Setup   Grooming Deficit Supervision/safety   Grooming Comments supine in bed   UB Bathing Assistance 4  Minimal Assistance   UB Bathing Deficit Abdomen   UB Bathing Comments supine in bed; encouragement provided for completion of task with maximal independence  educated that increased participation in ADL improve UB strength and overall activity tolerance needed for increased INDEP in daily routine   LB Bathing Assistance 1  Total Assistance   LB Bathing Comments supine   [address]  Total Assistance   LB Dressing Comments educated that [facility] can facilitate increased INDEP in [facility]   Pt agreeable to learning,   Toileting Assistance  2  Maximal Assistance Toileting Deficit Perineal hygiene   Toileting Comments able to complete commode transfer with Min A x 1 with SBA of 2nd  Pt required Max A for posterior hygiene   Bed Mobility   Supine to Sit 3  Moderate assistance   Additional items Assist x 1; Increased time required;Trapeze bar;Verbal cues;LE management  (SBA of 2nd)   Sit to Supine 3  Moderate assistance   Additional items Assist x 2; Increased time required;Verbal cues;LE management;Trapeze bar   Additional Comments Pt able to resposition self with SBA toward HOB using trapeze bar and with hospital bed in trandelenburg position  Pt supine in bed at start of session and agreeable to OT  Pt seen with PT due to decreased activity tolerance and need for Ax2 for safe pt handling and due to fall risk  Pt supine in bed at end of session with all needs met, call bell within reach, and bed alarm active  PCA and Psychiatrist present at end of session   Transfers   Sit to Stand 4  Minimal assistance   Additional items Assist x 1; Increased time required;Verbal cues  (VCs for safe hand placement and body mechanics)   Stand to Sit 4  Minimal assistance   Additional items Assist x 1; Increased time required;Verbal cues   Toilet transfer 4  Minimal assistance   Additional items Assist x 1; Increased time required;Verbal cues;Standard toilet  (SBA of 2nd for safety; VCs for safe positioning prior to sit)   Additional Comments RW for UE support   Functional Mobility   Functional Mobility 4  Minimal assistance   Additional Comments Pt completed functional mobility from EOB to bathroom with Min A x 1 using RW  Pt required SBA of 2nd, and A of 3rd for chair follow  Upon return to EOB pt required 2 seated rest breaks due to fatigue, in w/c  VCs provided for PLB, however pt reports he knows how to breathe due to his experience in [employer]     Additional items Rolling walker   Cognition   Overall Cognitive Status Impaired   Arousal/Participation Alert; Responsive;Arousable; Cooperative   Attention Attends with cues to redirect   Orientation Level Oriented X4   Memory Decreased recall of recent events   Following Commands Follows one step commands with increased time or repetition   Comments pt identified by full name and birthdate  Activity Tolerance   Activity Tolerance Patient limited by fatigue;Patient limited by pain   Medical Staff Made Aware RN Isaias Cain, PT [facility] [place]   Assessment Patient participated in Skilled OT session (time 2331-5024) this date with interventions consisting of ADL retraining with the use of correct body mechanics, energy conservation technique education, deep breathing technique education, safety awareness and fall prevention technique education, increase dynamic sit/stand balance during functional activity and increase OOB and sitting tolerance  Patient agreeable to OT treatment session, upon arrival patient was found supine in bed  Pt completed full ADL this session  Please refer to flowsheet for details  Pt required total A for LB ADLs, min A for UB ADLs, Max A for toileting hygiene, Mod A x 2 for sit>supine bed mobility, Min A x 1 for supine>sit, Min A x 1 for sit<>stand transfers, and Min A x 1 for functional mobility using RW  In comparison to previous session, patient with improvements in functional activity tolerance*   Patient requiring verbal cues for safety, verbal cues for pacing thru activity steps and frequent rest periods  Patient continues to be functioning below baseline level, occupational performance remains limited secondary to factors listed above and increased risk for falls and injury  From OT standpoint, recommendation at time of d/c would be post acute rehab  Patient to benefit from continued Occupational Therapy treatment while in the hospital to address deficits as defined above and maximize level of functional independence with ADLs and functional mobility      Plan   Treatment Interventions ADL retraining;Functional transfer training;UE strengthening/ROM; Endurance training;Patient/family training;Equipment evaluation/education;Cognitive reorientation; Compensatory technique education;Continued evaluation; Energy conservation; Activityengagement   Goal Expiration Date 09/25/19   Treatment Day 4   OT Frequency 3-5x/wk   Recommendation   OT Discharge Recommendation Other (Comment)  (to post acute rehab)   Equipment Recommended Bedside commode  (cont  to eval at rehab)   OT - OK to Discharge   (to post acute rehab)   Barthel Index   Feeding 10   Bathing 0   Grooming Score 5   Dressing Score 5   Bladder Score 0   Bowels Score 5   Toilet Use Score 5   Transfers (Bed/Chair) Score 10   Mobility (Level Surface) Score 0   Stairs Score 0   Barthel Index Score 40   Modified [place] Scale   Modified [place] Scale 4     Additional goals to be met by 9/25/19:      Pt will complete grooming/oral hygiene tasks Min A while standing at sink    Pt will complete UB bathing/dressing Supervision after set-up while seated at sink    Pt will complete LB bathing/dressing Supervision after set-up while seated using LHAE  Pt will improve functional activity tolerance while standing at sink to 5 minutes in order to increase safety and independence during functional transfers and ADL tasks  Pt will improve dynamic sitting/standing balance to good to increase safety and independence during functional transfers and ADL and decrease risk for falls  Pt will increase independence during STS transfers with or without AD Mod I     Pt will complete transfer to standard toilet without use of grab bars with Supervision after set-up     Pt will complete toileting tasks (clothing management up/down, hygiene) Min A     Pt will attend to continued cognitive assessment 100% of the time in order to provide most appropriate recommendations for d/c plans      Pt will identify and implement at least 3 healthy coping strategies to increase participation in meaningful activities and decrease risk for readmission      Georgia Li, OTR/L

## 2019-09-17 NOTE — ASSESSMENT & PLAN NOTE
· Patient appears to have chronic sinus tachycardia  · His echocardiogram was okay    · On metoprolol

## 2019-09-17 NOTE — PLAN OF CARE
Problem: OCCUPATIONAL THERAPY ADULT  Goal: Performs self-care activities at highest level of function for planned discharge setting  See evaluation for individualized goals  Description  Treatment Interventions: ADL retraining, Functional transfer training, UE strengthening/ROM, Endurance training, Patient/family training, Equipment evaluation/education, Compensatory technique education, Fine motor coordination activities, Continued evaluation, Energy conservation, Activityengagement  Equipment Recommended: (will continue to assess)       See flowsheet documentation for full assessment, interventions and recommendations  Outcome: Progressing  Note:   Limitation: Decreased ADL status, Decreased UE strength, Decreased cognition, Decreased Safe judgement during ADL, Decreased endurance, Decreased self-care trans, Decreased fine motor control, Decreased high-level ADLs     Assessment: Patient participated in Skilled OT session (time 6488-8438) this date with interventions consisting of ADL retraining with the use of correct body mechanics, energy conservation technique education, deep breathing technique education, safety awareness and fall prevention technique education, increase dynamic sit/stand balance during functional activity and increase OOB and sitting tolerance  Patient agreeable to OT treatment session, upon arrival patient was found supine in bed  Pt completed full ADL this session  Please refer to flowsheet for details  Pt required total A for LB ADLs, min A for UB ADLs, Max A for toileting hygiene, Mod A x 2 for sit>supine bed mobility, Min A x 1 for supine>sit, Min A x 1 for sit<>stand transfers, and Min A x 1 for functional mobility using RW  In comparison to previous session, patient with improvements in functional activity tolerance*   Patient requiring verbal cues for safety, verbal cues for pacing thru activity steps and frequent rest periods   Patient continues to be functioning below baseline level, occupational performance remains limited secondary to factors listed above and increased risk for falls and injury  From OT standpoint, recommendation at time of d/c would be post acute rehab  Patient to benefit from continued Occupational Therapy treatment while in the hospital to address deficits as defined above and maximize level of functional independence with ADLs and functional mobility        OT Discharge Recommendation: Other (Comment)(to post acute rehab)  OT - OK to Discharge: (to post acute rehab)

## 2019-09-17 NOTE — CONSULTS
Consult Note - Wound   Matt Zamarripa 48 y o  male MRN: [id number]  Unit/Bed#: -01 Encounter: [id number]      Assessment:   Pt  On bariatric bed  Left groin surgical wound  Surgery is covering this wound and has written wound care orders  Right groin with intact reddened fungal rash  Nystatin has been started  Heels unremarkable  Pt  Declined assessment of sacrum/buttocks  Wound/Skin Care Plan:   1  Calazime to buttocks/sacrum three times a day due to moisture from left groin wound  2  Continue Nystatin powder as ordered to right groin  3  Continue pressure redistribution measures  4  Bariatric pressure redistribution cushion to chair  5  Assist patient with frequent repositioning  Use wedges  6  Elevate heels off the bed  7  Hydraguard to heels once a day  Discussed with Danny Lopez RN, patient and family  Vitals: Blood pressure 141/85, pulse (!) 121, temperature 98 3 °F (36 8 °C), temperature source Oral, resp  rate 18, height 5' 4" (1 626 m), weight (!) 178 kg (393 lb), SpO2 95 %  ,Body mass index is 67 46 kg/m²  Wound 09/06/19 Incision Groin Bilateral (Active)   Wound Description Bleeding; Beefy red;Drainage;Fragile;Granulation tissue 9/16/2019  4:57 PM   Staging Unstagable 9/15/2019  7:20 AM   Chelsie-wound Assessment Fragile;Pink 9/15/2019  7:20 AM   Drainage Amount Moderate 9/16/2019  4:57 PM   Drainage Description Purulent;Bloody 9/16/2019  4:57 PM   Non-staged Wound Description Full thickness 9/16/2019  4:57 PM   Treatments Cleansed;Irrigation with NSS 9/15/2019  7:20 AM   Dressing ABD 9/16/2019  4:57 PM   Wound packed? Yes 9/16/2019  4:57 PM   Packing- # removed 2 9/16/2019  4:57 PM   Packing- # inserted 2 9/16/2019  4:57 PM   Dressing Changed New 9/16/2019  4:57 PM   Patient Tolerance Tolerated well 9/16/2019  4:57 PM   Dressing Status Clean;Dry; Intact 9/16/2019  4:57 PM       Wound 09/07/19 Incision Groin Left (Active)   Wound Description Pink; White;Brown 9/15/2019  7:20 AM   Chelsie-wound Assessment Pink;Fragile; Excoriated 9/15/2019  7:20 AM   Closure KALEB 9/16/2019  4:57 PM   Drainage Amount Moderate 9/15/2019  7:20 AM   Drainage Description Bloody;Purulent 9/15/2019  7:20 AM   Non-staged Wound Description Full thickness 9/15/2019  7:20 AM   Treatments Irrigation with NSS;Cleansed 9/15/2019  7:20 AM   Dressing ABD; Moist to Moist;Gauze 9/15/2019  7:20 AM   Wound packed? Yes 9/15/2019  7:20 AM   Packing- # removed 2 9/15/2019  7:20 AM   Packing- # inserted 2 9/15/2019  7:20 AM   Dressing Changed Changed 9/15/2019  7:20 AM   Patient Tolerance Tolerated well 9/15/2019  7:20 AM   Dressing Status Clean;Dry; Intact 9/15/2019  7:20 AM       Wound 09/10/19 Moisture associated skin damage Other (Comment) Buttocks Left (Active)   Wound Description Pink;Light purple;Fragile 9/16/2019  4:57 PM   Chelsie-wound Assessment Pink 9/15/2019  7:20 AM   Drainage Amount None 9/15/2019  7:20 AM   Treatments Cleansed 9/15/2019  8:00 PM   Dressing Open to air;Protective barrier 9/16/2019  4:57 PM   Patient Tolerance Tolerated well 9/15/2019  7:20 AM

## 2019-09-17 NOTE — SOCIAL WORK
Cm attempted to meet with pt and family  Pt was trying to sleep  Cm will follow up to provide assistance with DCP

## 2019-09-17 NOTE — PLAN OF CARE
Problem: PHYSICAL THERAPY ADULT  Goal: Performs mobility at highest level of function for planned discharge setting  See evaluation for individualized goals  Description  Treatment/Interventions: LE strengthening/ROM, Therapeutic exercise, Endurance training, Patient/family training, Equipment eval/education, Bed mobility(PT to see when sitting edge of bed is appropriate )  Equipment Recommended: Other (Comment)(pt needs dependent means for out of bed mobilization )       See flowsheet documentation for full assessment, interventions and recommendations  Outcome: Progressing  Note:   Prognosis: Fair  Problem List: Decreased strength, Decreased range of motion, Decreased endurance, Decreased mobility, Impaired balance, Decreased coordination, Decreased cognition, Decreased skin integrity, Obesity  Assessment: Pt continues to make mobility progress compared to yesterday  Pt requires only min A for transfers, and is amb further with rolling walker as well as more trials  Pt required split session due to fatigue, and needed encouragement on second split session due to lethargy  Pt declined sitting tolerance  Skilled PT recommended to progress pt toward treatment goals  Recommend continued amb trials w/rolling walker and WC follow, and trials w/ sitting tolerance as able     Barriers to Discharge: Decreased caregiver support, Inaccessible home environment     Recommendation: Short-term skilled PT          See flowsheet documentation for full assessment

## 2019-09-17 NOTE — PROGRESS NOTES
Progress Note - Andreina Alcala 1965, 48 y o  male MRN: [id number]    Unit/Bed#: -01 Encounter: [id number]    Primary Care Provider: ANN Becerril   Date and time admitted to hospital: 9/6/2019 11:12 AM      * Necrotizing soft tissue infection  Assessment & Plan  · History of recurrent cellulitis  · Presented to ED with worsening cellulitis for one week-Within the 5 days, he said that cellulitis went from lower left leg in the lateral area continued to go upward to the left upper thigh in the medial area up to his groin which he described as fast   · Reported when he sat on the toilet that there was bleeding with foul smell  · PTA was taking doxicycline 100 mg Q 12 hours  · 9/6 CT left femur: infection with gas-forming organism in the L groin and L prox-mid thigh  Consistent with Anna's gangrene  No localized fluid collection  · S/p excisional debridement and washout in OR 9/6 and 9/7  It appears the infection was mainly in the subcutaneous tissue  The muscle appeared healthy  · Remained intubated after OR 9/6, extubated 9/11  · Completed 7 days of antibiotics on 9/12  · ID following  · Seen by surgery    Type 2 diabetes mellitus with complication ([facility] [place] [facility] )  Assessment & Plan  · Blood glucose still high at times in the 200s  Most recent blood glucose that was 149  Also, we just switched him to NovoLog 70/30 as patient does not have insurance and will not be able to afford Lantus as an outpatient  Continue NovoLog 70/30  Upon discharge he should be prescribed Novolin 70/30  · Resume metformin on discharge    Morbid obesity ([facility] [address] )  Assessment & Plan  · Body mass index is 67 46 kg/m²  · Nutrition on consult  · Terrence/CHO controlled diet  GERD (gastroesophageal reflux disease)  Assessment & Plan  · PTA prilosec for GERD  · Continue with pharmacy substitute: Protonix PO    Sinus tachycardia  Assessment & Plan  · Patient appears to have chronic sinus tachycardia     · His echocardiogram was okay   · On metoprolol    Hyperlipidemia  Assessment & Plan  · HDL 35, , Triglycerides 279  · Based on patient history patient was refusing to take his blood cholesterol medications due to adverse effects    Anemia  Assessment & Plan  · Hemoglobin stable  VTE Pharmacologic Prophylaxis:   Pharmacologic: Enoxaparin (Lovenox)  Mechanical VTE Prophylaxis in Place: Yes    Patient Centered Rounds: I have performed bedside rounds with nursing staff today  Discussions with Specialists or Other Care Team Provider:  Nursing    Education and Discussions with Family / Patient:  Patient/family at bedside    Time Spent for Care: 30 minutes  More than 50% of total time spent on counseling and coordination of care as described above  Current Length of Stay: 11 day(s)    Current Patient Status: Inpatient   Certification Statement: The patient will continue to require additional inpatient hospital stay due to Uncontrolled diabetes    Discharge Plan:  SNF placement    Code Status: Level 1 - Full Code      Subjective:   No events reported  Objective:     Vitals:   Temp (24hrs), Av 2 °F (36 8 °C), Min:98 2 °F (36 8 °C), Max:98 3 °F (36 8 °C)    Temp:  [98 2 °F (36 8 °C)-98 3 °F (36 8 °C)] 98 2 °F (36 8 °C)  HR:  [119-126] 119  Resp:  [18] 18  BP: (123-147)/(79-87) 123/79  SpO2:  [92 %-96 %] 92 %  Body mass index is 67 46 kg/m²  Input and Output Summary (last 24 hours): Intake/Output Summary (Last 24 hours) at 2019 1424  Last data filed at 2019 1327  Gross per 24 hour   Intake 660 ml   Output 1900 ml   Net -1240 ml       Physical Exam:     Physical Exam    Gen -Patient comfortable at rest  Morbidly obese  Neck- Supple  No thyromegaly or lymphadenopathy  Lungs-Clear bilaterally without any wheeze or rales   Heart S1-S2, regular rate and rhythm, no murmurs  Abdomen-soft nontender, no organomegaly   Bowel sounds present  Extremities-no cyanosi,  clubbing or edema  Groin wound left  Skin- no rash  Neuro-nonfocal       Additional Data:     Labs:    Results from last 7 days   Lab Units 09/15/19  0627 09/14/19  0600   WBC Thousand/uL 13 07* 16 35*   HEMOGLOBIN g/dL 9 9* 9 8*   HEMATOCRIT % 31 6* 30 6*   PLATELETS Thousands/uL 317 288   BANDS PCT %  --  3   NEUTROS PCT % 64  --    LYMPHS PCT % 24  --    LYMPHO PCT %  --  26   MONOS PCT % 7  --    MONO PCT %  --  5   EOS PCT % 2 2     Results from last 7 days   Lab Units 09/15/19  0452   SODIUM mmol/L 139   POTASSIUM mmol/L 3 4*   CHLORIDE mmol/L 107   CO2 mmol/L 24   BUN mg/dL 19   CREATININE mg/dL 0 66   ANION GAP mmol/L 8   CALCIUM mg/dL 7 5*   GLUCOSE RANDOM mg/dL 142*         Results from last 7 days   Lab Units 09/17/19  1153 09/17/19  0835 09/16/19  2112 09/16/19  1612 09/16/19  1138 09/16/19  0730 09/15/19  2129 09/15/19  1641 09/15/19  1105 09/15/19  0733 09/14/19  2014 09/14/19  1606   POC GLUCOSE mg/dl 180* 145* 80 149* 222* 181* 163* 169* 123 161* 178* 210*         Results from last 7 days   Lab Units 09/12/19  0354   PROCALCITONIN ng/ml 0 66*           * I Have Reviewed All Lab Data Listed Above  * Additional Pertinent Lab Tests Reviewed: All Labs Within Last 24 Hours Reviewed    Imaging:    Imaging Reports Reviewed Today Include:   Imaging Personally Reviewed by Myself Includes:      Recent Cultures (last 7 days):     Results from last 7 days   Lab Units 09/12/19  2100   C DIFF TOXIN B  NEGATIVE for C difficle toxin by PCR          Last 24 Hours Medication List:     Current Facility-Administered Medications:  HYDROcodone-acetaminophen 1 tablet Oral Q6H PRN ANN Mobley   And       acetaminophen 325 mg Oral Q6H [address] ANN Mobley   acetaminophen 650 mg Oral Q6H PRN ANN Mobley   diphenhydrAMINE 25 mg Oral HS PRN Vanessa Mistry PA-C   enoxaparin 40 mg Subcutaneous Q12H [address] ANN Mobley   insulin aspart protamine-insulin aspart 36 Units Subcutaneous Daily With Dinner Ari GUY Rollins DO   insulin aspart protamine-insulin aspart 54 Units Subcutaneous Daily With Breakfast Ari Rollins,    insulin lispro 2-12 Units Subcutaneous TID AC ANN Mobley   insulin lispro 2-12 Units Subcutaneous HS Diego Reyna PA-C   melatonin 6 mg Oral HS Vanessa Mistry PA-C   metoprolol tartrate 25 mg Oral Q12H [address] Ari F Ria,    nystatin  Topical BID Vanessa Mistry PA-C   pantoprazole 40 mg Oral Early Morning ANN Mobley   phenol 1 spray Mouth/Throat Q2H PRN ANN Mobley   QUEtiapine 25 mg Oral HS Fanny Winter MD   saccharomyces boulardii 250 mg Oral BID ANN Mobley   sodium hypochlorite 1 application Irrigation Daily ANN Mobley        Today, Patient Was Seen By: Keyana Stephen MD    ** Please Note: Dictation voice to text software may have been used in the creation of this document   **

## 2019-09-17 NOTE — ASSESSMENT & PLAN NOTE
· Blood glucose still high at times in the 200s  Most recent blood glucose that was 149  Also, we just switched him to NovoLog 70/30 as patient does not have insurance and will not be able to afford Lantus as an outpatient  Continue NovoLog 70/30  Upon discharge he should be prescribed Novolin 70/30      · Resume metformin on discharge

## 2019-09-18 VITALS
DIASTOLIC BLOOD PRESSURE: 80 MMHG | WEIGHT: 315 LBS | HEIGHT: 64 IN | RESPIRATION RATE: 18 BRPM | SYSTOLIC BLOOD PRESSURE: 118 MMHG | OXYGEN SATURATION: 98 % | HEART RATE: 123 BPM | BODY MASS INDEX: 53.78 KG/M2 | TEMPERATURE: 98.3 F

## 2019-09-18 LAB
ANION GAP SERPL CALCULATED.3IONS-SCNC: 7 MMOL/L (ref 4–13)
BUN SERPL-MCNC: 14 MG/DL (ref 5–25)
CALCIUM SERPL-MCNC: 8.4 MG/DL (ref 8.3–10.1)
CHLORIDE SERPL-SCNC: 106 MMOL/L (ref 100–108)
CO2 SERPL-SCNC: 26 MMOL/L (ref 21–32)
CREAT SERPL-MCNC: 0.69 MG/DL (ref 0.6–1.3)
ERYTHROCYTE [DISTWIDTH] IN BLOOD BY AUTOMATED COUNT: 16.4 % (ref 11.6–15.1)
GFR SERPL CREATININE-BSD FRML MDRD: 109 ML/MIN/1.73SQ M
GLUCOSE SERPL-MCNC: 133 MG/DL (ref 65–140)
GLUCOSE SERPL-MCNC: 152 MG/DL (ref 65–140)
GLUCOSE SERPL-MCNC: 153 MG/DL (ref 65–140)
GLUCOSE SERPL-MCNC: 155 MG/DL (ref 65–140)
HCT VFR BLD AUTO: 30.7 % (ref 36.5–49.3)
HGB BLD-MCNC: 9.4 G/DL (ref 12–17)
MCH RBC QN AUTO: 30.6 PG (ref 26.8–34.3)
MCHC RBC AUTO-ENTMCNC: 30.6 G/DL (ref 31.4–37.4)
MCV RBC AUTO: 100 FL (ref 82–98)
PLATELET # BLD AUTO: 323 THOUSANDS/UL (ref 149–390)
PMV BLD AUTO: 8.2 FL (ref 8.9–12.7)
POTASSIUM SERPL-SCNC: 4.3 MMOL/L (ref 3.5–5.3)
RBC # BLD AUTO: 3.07 MILLION/UL (ref 3.88–5.62)
SODIUM SERPL-SCNC: 139 MMOL/L (ref 136–145)
WBC # BLD AUTO: 9.14 THOUSAND/UL (ref 4.31–10.16)

## 2019-09-18 PROCEDURE — 97535 SELF CARE MNGMENT TRAINING: CPT

## 2019-09-18 PROCEDURE — 97530 THERAPEUTIC ACTIVITIES: CPT

## 2019-09-18 PROCEDURE — 97116 GAIT TRAINING THERAPY: CPT

## 2019-09-18 PROCEDURE — 82948 REAGENT STRIP/BLOOD GLUCOSE: CPT

## 2019-09-18 PROCEDURE — 80048 BASIC METABOLIC PNL TOTAL CA: CPT | Performed by: INTERNAL MEDICINE

## 2019-09-18 PROCEDURE — 99239 HOSP IP/OBS DSCHRG MGMT >30: CPT | Performed by: INTERNAL MEDICINE

## 2019-09-18 PROCEDURE — 85027 COMPLETE CBC AUTOMATED: CPT | Performed by: INTERNAL MEDICINE

## 2019-09-18 RX ORDER — HYDROCODONE BITARTRATE AND ACETAMINOPHEN 5; 325 MG/1; MG/1
1 TABLET ORAL EVERY 6 HOURS PRN
Qty: 30 TABLET | Refills: 0 | Status: SHIPPED | OUTPATIENT
Start: 2019-09-18 | End: 2019-09-28

## 2019-09-18 RX ORDER — QUETIAPINE FUMARATE 25 MG/1
25 TABLET, FILM COATED ORAL
Qty: 20 TABLET | Refills: 0 | Status: SHIPPED | OUTPATIENT
Start: 2019-09-18 | End: 2019-11-13 | Stop reason: SDUPTHER

## 2019-09-18 RX ORDER — NYSTATIN 100000 [USP'U]/G
POWDER TOPICAL 2 TIMES DAILY
Qty: 15 G | Refills: 0 | Status: SHIPPED | OUTPATIENT
Start: 2019-09-18 | End: 2019-10-10 | Stop reason: SDUPTHER

## 2019-09-18 RX ADMIN — Medication 250 MG: at 09:12

## 2019-09-18 RX ADMIN — HYDROCODONE BITARTRATE AND ACETAMINOPHEN 1 TABLET: 5; 325 TABLET ORAL at 01:56

## 2019-09-18 RX ADMIN — ENOXAPARIN SODIUM 40 MG: 40 INJECTION SUBCUTANEOUS at 09:12

## 2019-09-18 RX ADMIN — NYSTATIN: 100000 POWDER TOPICAL at 17:21

## 2019-09-18 RX ADMIN — Medication 1 APPLICATION: at 13:43

## 2019-09-18 RX ADMIN — ACETAMINOPHEN 325 MG: 325 TABLET ORAL at 05:26

## 2019-09-18 RX ADMIN — INSULIN LISPRO 2 UNITS: 100 INJECTION, SOLUTION INTRAVENOUS; SUBCUTANEOUS at 17:21

## 2019-09-18 RX ADMIN — NYSTATIN 1 APPLICATION: 100000 POWDER TOPICAL at 09:12

## 2019-09-18 RX ADMIN — PANTOPRAZOLE SODIUM 40 MG: 40 TABLET, DELAYED RELEASE ORAL at 05:26

## 2019-09-18 RX ADMIN — ACETAMINOPHEN 650 MG: 325 TABLET, FILM COATED ORAL at 11:56

## 2019-09-18 RX ADMIN — HYDROCODONE BITARTRATE AND ACETAMINOPHEN 1 TABLET: 5; 325 TABLET ORAL at 15:53

## 2019-09-18 RX ADMIN — Medication 250 MG: at 17:21

## 2019-09-18 RX ADMIN — METOPROLOL TARTRATE 25 MG: 25 TABLET, FILM COATED ORAL at 09:11

## 2019-09-18 RX ADMIN — ACETAMINOPHEN 325 MG: 325 TABLET ORAL at 17:20

## 2019-09-18 RX ADMIN — INSULIN ASPART 36 UNITS: 100 INJECTION, SUSPENSION SUBCUTANEOUS at 17:21

## 2019-09-18 RX ADMIN — INSULIN ASPART 54 UNITS: 100 INJECTION, SUSPENSION SUBCUTANEOUS at 09:09

## 2019-09-18 RX ADMIN — HYDROCODONE BITARTRATE AND ACETAMINOPHEN 1 TABLET: 5; 325 TABLET ORAL at 09:19

## 2019-09-18 RX ADMIN — INSULIN LISPRO 2 UNITS: 100 INJECTION, SOLUTION INTRAVENOUS; SUBCUTANEOUS at 09:21

## 2019-09-18 NOTE — SOCIAL WORK
Cm met with pt and family to discuss DCP  [facility] come to visit with pt as this is one of the facilities that has accepted him for DC  Pt stated he does not want to go to rehab but wants to now go home  PT/OT came to the room and the care team discussed this with pt and family to develop the best POC possible for pt to go home  Cm suggested pt go home with at least VNA services to assist with PT, OT and his wound  Pt is agreeable and feels this is the best idea  Pt does not have active insurance and will need to use [facility]  Pt requested a referral be made to them  Referral has been made  They are able to accept pt  Wound Care instructions have been included with referral       PT/OT will treat pt and follow up with cm regarding equipment that is needed

## 2019-09-18 NOTE — OCCUPATIONAL THERAPY NOTE
OccupationalTherapy Progress Note     Patient Name: Sandhya Godfrey  WGILU'H Date: 9/18/2019  Problem List  Principal Problem:    Necrotizing soft tissue infection  Active Problems:    GERD (gastroesophageal reflux disease)    Hyperlipidemia    Morbid obesity ([facility] [address] )    Type 2 diabetes mellitus with complication ([facility] [address] )    Sinus tachycardia    Anemia        09/18/19 1233   Restrictions/Precautions   Weight Bearing Precautions Per Order No   Other Precautions Impulsive; Chair Alarm; Fall Risk;Pain   General   Response to Previous Treatment Patient with no complaints from previous session   Family/Caregiver Present Pt's wife and friend / tenant present during session   Pain Assessment   Pain Assessment 0-10   Pain Score 3   Pain Type Acute pain   Pain Location Groin;Leg   Pain Orientation Left   Effect of Pain on Daily Activities limits standing tolerance and activity tolerance during ADL performance   Patient's Stated Pain Goal No pain   Hospital Pain Intervention(s) Repositioned; Ambulation/increased activity; Emotional support   Response to Interventions tolerated   ADL   Where Assessed Edge of bed  (vs standing at sink)   Eating Assistance 6  Modified independent   Eating Deficit Setup   Eating Comments seated at EOB to manage beverage to take meds w/ Danyell PRESTON Cera   Grooming Assistance 4  Minimal Assistance   Grooming Deficit Setup;Steadying; Increased time to complete;Supervision/safety;Verbal cueing;Standing with assistive device   Grooming Comments tolerated standing approx 2' at sink w/ B UE forearm support on couner to complete oral hygiene  1 LOB w/ out B UE support when placing towel in garbage   [address] Unable to assess   LB Bathing Assistance Unable to assess    [address] around back; Fasteners;Supervision/safety; Increased time to complete   UB Dressing Comments to adjust hospital gown from under buttocks while seaeted at EOB; able to shift weight R<>L while seated   LB Dressing Assistance 2  Maximal Assistance   LB Dressing Deficit Don/doff R shoe;Don/doff L shoe  (don / doff slip on Crocs while seated at EOB)   LB Dressing Comments Pt able to lift R/L LE off floor when trunk on bed (supine at side of bed w/ feet on floor)  Impuslive and benefits from S, cues for safety / tech   Bed Mobility   Supine to Sit 4  Minimal assistance   Additional items Assist x 1;HOB elevated;Verbal cues; Increased time required; Bedrails;Trapeze bar   Sit to Supine 4  Minimal assistance   Additional items Assist x 1;Bedrails;Trapeze bar; Increased time required; Impulsive;Verbal cues;LE management   Additional Comments Pt able to adjust position towards HOB using headboard and trapeze bar w/ min A x 1  Pt impulsive during bed mobility and requires cues for pacing, tech, safety, and LE/ hand placement  Transfers   Sit to Stand 4  Minimal assistance  (A of 2nd for safety, walker mgmt)   Additional items Assist x 1; Increased time required; Bedrails; Impulsive;Verbal cues  (bed height elevated)   Stand to Sit 4  Minimal assistance  (w/ A of 2nd for walker mgmt, safety)   Additional items Assist x 1; Increased time required;Verbal cues; Bedrails   Additional Comments Pt requires trunk forward / back rocking for momentum   Functional Mobility   Functional Mobility 4  Minimal assistance   Additional Comments to / from bathroom w/ min A x1 and assist of 2nd for safety, walker mgmt   Additional items   (bariatric RW)   Cognition   Overall Cognitive Status Impaired   Arousal/Participation Alert; Cooperative   Attention Attends with cues to redirect   Orientation Level Oriented to person;Oriented to place;Oriented to situation;Oriented to time  (limited insight into deficits)   Memory Decreased recall of recent events;Decreased recall of precautions  (inconsistent, limited recall recent events)   Following Commands Follows one step commands with increased time or repetition Comments Identified pt by full name and birthdate  Pt recalled therapist but demonstrated limited recall recent and confusing events  Pt continues to benefit from education and cues to improve insight into deficits and safety during ADL performance  Chris Calix spoke w/ MD and CM regarding recommendation of post acute rehab  Pt reports that he is aware of recommendation but feels he can manage at home w/ family assist  Pt impulsive and requires cues / prompts to consistently follow directions  Activity Tolerance   Activity Tolerance Patient limited by fatigue;Patient limited by pain  (impulsive, cues to sustain attention at times)   Medical Staff Made Aware spoke to PT, [address] [place] [address]; CM, Leelee Alfred; RN, Morenita Hoff; PT spoke w/ MD, Dr Etienne Garrett participated in OT tx session this afternoon w/ PT, KARINA and benefits from co -tx due to signficant cues / prompts needed  Pt demonstrated increased activity tolerance and required less physical assistance this afternoon  Pt agreeable and motivated to participate and reports that he feels he can manage at home w/ his family  Pt completed sit <> stand w/ min A and assist of 2nd for safety  Pt engaged in functional mobility to bathroom using jaime RW w/ min A x2 (assist of 2nd for safety, cues) to complete oral hygiene standing at sink  Pt tolerated standing approx 2' w/ B UE forearm support  Pt engaged in LBD w/ max A  to don crocs while supine (trunk on bed)  Care coordination w/ PT, CM to facilitate safe discahrge planning  Pt continues to benefits from significant cues /prompts throughout session to sustain attention and for consistent recall  Pt mixing up recent events although alert and oriented  Continue to recommend post acute rehab when medically stable for discharge from acute care to return to baseline level of I   If pt declines rehab and chooses to return to [facility] w/ wife, family assist pt will need jaime RW and jaime commode and physical assist w/ all functional transfers and mobility as well as assistance w/ LB ADL  Will continue to follow pt in acute care  Plan   Treatment Interventions ADL retraining;Functional transfer training; Endurance training;Patient/family training;Equipment evaluation/education; Compensatory technique education; Energy conservation; Activityengagement   Goal Expiration Date 09/25/19   Treatment Day 5   OT Frequency 3-5x/wk   Recommendation   OT Discharge Recommendation Other (Comment)  (post acute rehab)   Equipment Recommended Bedside commode;Tub seat with back; Other (comment)  (will continue to assess)   OT - OK to Discharge   (post acute rehab when stable)   Barthel Index   Feeding 10   Bathing 0   Grooming Score 5   Dressing Score 5   Bladder Score 0   Bowels Score 5   Toilet Use Score 5   Transfers (Bed/Chair) Score 10   Mobility (Level Surface) Score 0   Stairs Score 0   Barthel Index Score 40   Modified Wayne Scale   Modified Wayne Scale 4   Yue Escobar, OTR/L

## 2019-09-18 NOTE — PHYSICAL THERAPY NOTE
PHYSICAL THERAPY TREATMENT NOTE    Patient Name: Trent Pringle  IDXDF'P Date: 9/18/2019 09/18/19 1232   Pain Assessment   Pain Score 3   Pain Location Groin   Restrictions/Precautions   Other Precautions Impulsive; Bed Alarm; Fall Risk;Pain   General   Chart Reviewed Yes   Family/Caregiver Present Yes   Cognition   Arousal/Participation Cooperative   Attention Attends with cues to redirect   Orientation Level Oriented to person; Other (Comment)  (pt was identified w/ full name, birth date)   Following Commands Follows one step commands with increased time or repetition   Subjective   Subjective pt seen supine in bed w/ spouse, friend, and Jeneile CM present  pt agreed to participate in PT session  pt reports wanting to return home  Bed Mobility   Supine to Sit 3  Moderate assistance   Additional items Assist x 1;HOB elevated; Bedrails;Trapeze bar; Increased time required;Verbal cues;LE management   Sit to Supine 3  Moderate assistance  (pt returned to bed due to need for NSG to replace dressings)   Additional items Assist x 1;Bedrails;Trapeze bar; Increased time required;Verbal cues;LE management   Additional Comments pt declined education and training in use of bariatric wheelchair, stating he will be "too active" to need a wheelchair at home  Transfers   Sit to Stand 4  Minimal assistance  (w/ assist of 2nd perform for safety/walker management)   Additional items Assist x 1; Impulsive;Verbal cues  (for hand placement, LE positioning, safety)   Stand to Sit 4  Minimal assistance  (w/ assist of 2nd perform for safety/walker management)   Additional items Impulsive;Verbal cues  (for hand placement, controlled descent, safety)   Additional Comments pt stood 30 seconds w/ bariatric roller walker and minx1  seated rest break x 4 minutes   pt then ambulated to bathroom, standing at sink and brushing teeth  while standing at sink, pt had 2 losses of balance that required substantial assist to maintain safety  pt ambulated back to edge of bed  seated rest break x 5 minutes  during review of mobility status, pt did not recall specifics of losing balance  pt stood to trial curb step then seated rest break x 3 minutes  pt stood 45 seconds w/ jaime roller walker and modx1 to change linens  pt was then returned to bed  Ambulation/Elevation   Gait pattern Wide ALEXIS; Forward Flexion; Excessively slow   Gait Assistance 4  Minimal assist  (assist varied between min and max to correct loss of balance)   Additional items Assist x 1;Verbal cues; Tactile cues  (for walker positionign)   Assistive Device Bariatric Rolling walker  (likely benefit from jaime wheelchair but pt refusing)   Curbs pt has 2+1 steps to enter front door or 1 ten inch step through garage  pt trialed performing step tap w/ jaime roller walker and 8 inch curb step  pt completed right step tap w/ maxx1 and was unable to complete w/ L LE  additional attempts not possible due to pain and fatigue  Balance   Static Sitting Fair +   Static Standing Poor +   Ambulatory Poor -  (w/ bariatric roller walker)   Activity Tolerance   Activity Tolerance Patient limited by fatigue;Patient limited by pain   Nurse Made Aware [facility] OT   Equipment Use   Comments Pt requires PT/OT co-treat during part of session due to signficant assistance with mobility and cognitive-behavioral impairments  Assessment   Prognosis Fair   Problem List Decreased strength;Decreased range of motion;Decreased endurance;Decreased mobility; Impaired balance;Decreased coordination;Decreased cognition;Decreased skin integrity;Obesity;Pain   Assessment Pt shows overall progression of mobility status w/ mobilizing to bathroom and attempt at stair training  Pt continues to need assist w/ all phases of mobility and frequent long rest breaks w/ activity   Input is needed for mobility technique/safety w/ inconsistent carryover of education received  Pt continues to be at risk for falling due to physical and safety awareness limitations  Pt reports wanting to go home after discharge from hospital  PT recommendation for discharge continues to be for inpatient rehab due to fall risk and high previous level of function  Therapist provided education regarding rehab levels, role of home/outpatient PT, typical treatment frequency, and homebound status  Therapist recommended use of wheelchair to improve mobility in home and access to community - pt refused  Pt was agreeable to continued use of bariatric roller walker, though inquired about cane use (which is not recommended at this point due to inadequate support)  Jaime roller is needed for home use to increase gait stability and level of independence  Pt would benefit from bariatric commode to improve safety w/ toileting  Again, inpatient rehab is recommended following discharge from hospital to maximize functional independence  Goals   Patient Goals go home   STG Expiration Date 09/25/19   Short Term Goal #1 pt will:  Increase bilateral LE strength to 4/5 or greater to facilitate independent mobility, Perform all bed mobility tasks modified independent to decrease fall risk factors, Perform sit <---> stand transfers 3x modified independent to improve independence,Complete standing weight shifts 3x bilaterally w/ jaime roller walker to decrease caregiver burden, Complete standing advance/retreat 3x bilaterally w/ jaime roller walker and supervision to facilitate mobilization to bathroom, Ambulate 25 ft  w/ jaime roller walker w/ supervision w/o LOB to facilitate safe return home, Increase all balance 1 grade to decrease risk for falls, Complete exercise program independently to improve strength and endurance, Tolerate 3 hr OOB to faciliate upright tolerance and Improve Barthel Index score to 60 or greater to facilitate independence   Treatment Day 4   Plan Treatment/Interventions Functional transfer training;LE strengthening/ROM; Therapeutic exercise; Endurance training;Cognitive reorientation;Patient/family training;Equipment eval/education; Bed mobility;Gait training  (PT to see when stair training is appropriate )   Progress Progressing toward goals   PT Frequency 7x/wk   Recommendation   Recommendation Short-term skilled PT   Equipment Recommended Other (Comment)  (bariatric roller walker, bariatric commode, jaime wheelchair)     Skilled inpatient PT recommended while in hospital to progress pt toward treatment goals      Payam Zuleta, PT

## 2019-09-18 NOTE — PLAN OF CARE
Problem: PHYSICAL THERAPY ADULT  Goal: Performs mobility at highest level of function for planned discharge setting  See evaluation for individualized goals  Description  Treatment/Interventions: LE strengthening/ROM, Therapeutic exercise, Endurance training, Patient/family training, Equipment eval/education, Bed mobility(PT to see when sitting edge of bed is appropriate )  Equipment Recommended: Other (Comment)(pt needs dependent means for out of bed mobilization )       See flowsheet documentation for full assessment, interventions and recommendations  Outcome: Progressing  Note:   Prognosis: Fair  Problem List: Decreased strength, Decreased range of motion, Decreased endurance, Decreased mobility, Impaired balance, Decreased coordination, Decreased cognition, Decreased skin integrity, Obesity, Pain  Assessment: (S) Pt shows overall progression of mobility status w/ mobilizing to bathroom and attempt at stair training  Pt continues to need assist w/ all phases of mobility and frequent long rest breaks w/ activity  Input is needed for mobility technique/safety w/ inconsistent carryover of education received  Pt continues to be at risk for falling due to physical and safety awareness limitations  Pt reports wanting to go home after discharge from hospital  PT recommendation for discharge continues to be for inpatient rehab due to fall risk and high previous level of function  Therapist provided education regarding rehab levels, role of home/outpatient PT, typical treatment frequency, and homebound status  Therapist recommended use of wheelchair to improve mobility in home and access to community - pt refused  Pt was agreeable to continued use of bariatric roller walker, though inquired about cane use (which is not recommended at this point due to inadequate support)  Luis roller is needed for home use to increase gait stability and level of independence   Pt would benefit from bariatric commode to improve safety w/ toileting  Again, inpatient rehab is recommended following discharge from hospital to maximize functional independence  Barriers to Discharge: Decreased caregiver support, Inaccessible home environment     Recommendation: (S) Short-term skilled PT          See flowsheet documentation for full assessment

## 2019-09-18 NOTE — DISCHARGE SUMMARY
[facility]    Patient Information: Giacomo Ambriz 48 y o  male MRN: [id number]  Unit/Bed#: -01 Encounter: [id number]    Discharging Physician / Practitioner: Jorge De Oliveira MD  PCP: ANN Alexander  Admission Date: 9/6/2019  Discharge Date: 09/18/19    Disposition:     Home    Reason for Admission:  Worsening cellulitis    Discharge Diagnoses:     Principal Problem:    Necrotizing soft tissue infection  Active Problems:    Type 2 diabetes mellitus with complication ([facility] )    Morbid obesity ([facility] )    GERD (gastroesophageal reflux disease)    Sinus tachycardia    Hyperlipidemia    Anemia  Resolved Problems:    Hyponatremia    DEAN (acute kidney injury) ([place] [id number] )    Hyperbilirubinemia    Lactic acidosis    Bandemia    Hypotension    Elevated procalcitonin    Hypocalcemia    Acute metabolic encephalopathy      Consultations During Hospital Stay:  · Infectious Disease  · General surgery  · Endocrinology  · Wound care  · Psychiatry    Procedures Performed:     · Chest x-ray showed no active disease  · CT left femur: 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  No localized fluid collection /abscess  Immediate surgical consultation is recommended  · 2D echocardiogram:  Ejection fraction noted to be at 65%  No regional wall motion abnormalities noted  · Arterial line placed and removed  · Central line placed and removed  · Endotracheal intubation performed and tube removed  · Incision and drainage of left thigh and perineal necrotizing soft tissue infection    Significant Findings / Test Results:     · As above    Incidental Findings:   · As above    Test Results Pending at Discharge (will require follow up):    · Non     Outpatient Tests Requested:  · None    Complications:  None    Hospital Course:     Giacomo Ambriz is a 48 y o  male patient with past medical significant for type 2 diabetes mellitus, and morbid obesity who originally presented to the hospital on 9/6/2019 due to worsening cellulitis  Patient reported that initially started cellulitis in the left lower leg, and he started taking doxycycline  Despite taking antibiotics infection continue to spread to left groin area  The emergency department patient was evaluated and noted to be tachycardic in 110s with tachypnea  Systolic blood pressure was 90 to 110  Lactic acid was elevated at 4 7  He had significant leukocytosis at 19 61  He was subsequently evaluated with left femur CT which showed 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  He was quickly evaluated by surgical team and was taken to OR for incision and drainage  He was subsequently admitted to intensive care unit for close monitoring  He was taken again to OR for further debridement and incision  Patient was seen by infectious disease team and received intravenous broad-spectrum antibiotics  Once clinically stabilized she was transferred to [facility] unit for further management  He was noted to have uncontrolled diabetes with elevated A1c at 11  He was seen by Endocrinology and was started on insulin regimen  Unfortunately patient does not have insurance, he was taking metformin and glipizide before  It was recommended patient to go home on [facility] [address] 30 and with outpatient endocrinology follow-up  Patient has completed antibiotic course  He was seen by surgical team for wound management  His wound is nicely healing  Patient was evaluated by Physical therapy and Occupational therapy, and strongly recommended short-term rehabilitation  However patient and family declined to go to rehabilitation  He wished to go home with home physical therapy and VNA services  He is being discharged home with above services  He was provided contact numbers for primary care for close follow-up    In addition he will also follow with Endocrinology for diabetes management  Condition at Discharge: good     Discharge Day Visit / Exam:     Subjective:  Feeling better  Wishes to go home instead of going to rehab  Vitals: Blood Pressure: 115/66 (09/18/19 0700)  Pulse: (!) 122 (09/18/19 0700)  Temperature: 98 5 °F (36 9 °C) (09/18/19 0700)  Temp Source: Oral (09/18/19 0700)  Respirations: 18 (09/18/19 0700)  Height: 5' 4" (162 6 cm) (09/07/19 1053)  Weight - Scale: (!) 178 kg (393 lb) (09/16/19 0600)  SpO2: 99 % (09/18/19 0700)  Exam:   Physical Exam     Gen -Patient comfortable at rest  Neck- Supple  No thyromegaly or lymphadenopathy  Lungs-Clear bilaterally without any wheeze or rales   Heart S1-S2, regular rate and rhythm, no murmurs  Abdomen-soft nontender, no organomegaly  Bowel sounds present  Left groin wound healing  Extremities-no cyanosi,  clubbing ; trace edema  Skin- no rash  Neuro-awake alert and oriented       Discussion with Family:  Family at bedside discussed in detail    Discharge instructions/Information to patient and family:   See after visit summary for information provided to patient and family  Provisions for Follow-Up Care:  See after visit summary for information related to follow-up care and any pertinent home health orders  Planned Readmission:  No     Discharge Statement:  I spent 35 minutes discharging the patient  This time was spent on the day of discharge  I had direct contact with the patient on the day of discharge  Greater than 50% of the total time was spent examining patient, answering all patient questions, arranging and discussing plan of care with patient as well as directly providing post-discharge instructions  Additional time then spent on discharge activities  Discharge Medications:  See after visit summary for reconciled discharge medications provided to patient and family        ** Please Note: This note has been constructed using a voice recognition system **

## 2019-09-18 NOTE — SOCIAL WORK
Per RJ with PT the recommendations are for pt to go home with bariatric WC, bariatric commode and extra wide bariatric RW  Per RJ, pt does not want the WC or the commode however he would like the RW  Pt would like a referral made to [facility] for the RW  CM contacted [facility] to have equipment delivered  Pt stated Ashish Cohen wants pt to have an extra wide bariatric RW  [facility] discussed this with Ashish Ortizelia [facility] then spoke with cm and stated [facility] does not have this RW and they would need to order it and it would take 1 to 2 weeks  Cm measured walker and discussed it with RJ  Cm contacted several DME co's: [facility], [facility] and [facility] however they did not have them in stock and it would be a special order and take approximately 2 weeks  Cm discussed with CM CC and looked through the Internet  One extra wide jaime RW was found and she would discuss this with CM Manager  Cm met with pt to explain the trouble with finding the RW  Pt stated he wants to go home today and will be happy to take the jaime RW that is not extra wide  Cm reviewed this with cm CC and PT and they both agreed it was Pt Choice  Cm made referral to [facility] for the jaime RW  Liaison Reilly Combs) was notified and he delivered the [place] RW  Cm contacted [facility] and spoke with Supa Fink to request bariatric transport for pt to go home  [facility] is able to  pt at 2030  Pt, SLIM (reddimallu), nurse John Lambert) and wife have been notified  CM reviewed the availability of treatment team to discuss questions or concerns patient and/or family may have regarding  understanding medications and recognizing signs and symptoms at discharge  CM also encouraged patient to follow up with all recommended appointments after discharge  CM reviewed the information that will be provided to pt/family on the discharge instructions    Patient advised of importance for patient and family to participate in managing patients medical well being

## 2019-09-18 NOTE — DISCHARGE INSTR - APPOINTMENTS
Wound/Skin Care Plan:   1  Calazime to buttocks/sacrum three times a day due to moisture from left groin wound  2  Continue Nystatin powder as ordered to right groin  3  Continue pressure redistribution measures  4  Bariatric pressure redistribution cushion to chair  5  Assist patient with frequent repositioning  Use wedges  6  Elevate heels off the bed  7  Hydraguard to heels once a day

## 2019-09-18 NOTE — TRANSPORTATION MEDICAL NECESSITY
Section I - General Information    Name of Patient: Nova Vasquez                 : 1965    Medicare #:   Transport Date: 19 (PCS is valid for round trips on this date and for all repetitive trips in the 60-day range as noted below )  Origin: [address]: [address] [facility] [address], [place] [address]  Is the pt's stay covered under Medicare Part A (PPS/DRG)   []     Closest appropriate facility? If no, why is transport to more distant facility required? Yes  If hospice pt, is this transport related to pt's terminal illness? NA       Section II - Medical Necessity Questionnaire  Ambulance transportation is medically necessary only if other means of transport are contraindicated or would be potentially harmful to the patient  To meet this requirement, the patient must either be "bed confined" or suffer from a condition such that transport by means other than ambulance is contraindicated by the patient's condition  The following questions must be answered by the medical professional signing below for this form to be valid:    1)  Describe the MEDICAL CONDITION (physical and/or mental) of this patient AT [address] [place] [address] that requires the patient to be transported in an ambulance and why transport by other means is contraindicated by the patient's condition: morbidly obese; Not safe to ride in [facility]    2) Is the patient "bed confined" as defined below? No  To be "be confined" the patient must satisfy all three of the following conditions: (1) unable to get up from bed without Assistance; AND (2) unable to ambulate; AND (3) unable to sit in a chair or wheelchair  3) Can this patient safely be transported by car or wheelchair van (i e , seated during transport without a medical attendant or monitoring)?    No    4) In addition to completing questions 1-3 above, please check any of the following conditions that apply*:   *Note: supporting documentation for any boxes checked must be maintained in the patient's medical records  If hosp-hosp transfer, describe services needed at 2nd facility not available at 1st facility? Unable to tolerate seated position for time needed to transport   Morbid obesity requires additional personnel/equipment to safely handle patient       Section III - Signature of Physician or Healthcare Professional  I certify that the above information is true and correct based on my evaluation of this patient, and represent that the patient requires transport by ambulance and that other forms of transport are contraindicated  I understand that this information will be used by the Centers for Medicare and Medicaid Services (CMS) to support the determination of medical necessity for ambulance services, and I represent that I have personal knowledge of the patient's condition at time of transport  []  If this box is checked, I also certify that the patient is physically or mentally incapable of signing the ambulance service's claim and that the institution with which I am affiliated has furnished care, services, or assistance to the patient  My signature below is made on behalf of the patient pursuant to 42 CFR §424 36(b)(4)  In accordance with 42 CFR §424 37, the specific reason(s) that the patient is physically or mentally incapable of signing the claim form is as follows: N/A  Signature of Physician* or Healthcare Professional______________________________________________________________  Signature Date 09/18/19 @ [phone] (For scheduled repetitive transports, this form is not valid for transports performed more than 60 days after this date)    Printed Name & Credentials of Physician or Healthcare Professional (MD, DO, RN, etc )__CHAVEZ Matt__  *Form must be signed by patient's attending physician for scheduled, repetitive transports   For non-repetitive, unscheduled ambulance transports, if unable to obtain the signature of the attending physician, any of the following may sign (choose appropriate option below)  [] Physician Assistant []  Clinical Nurse Specialist []  Registered Nurse  []  Nurse Practitioner  [x] Discharge Planner

## 2019-09-18 NOTE — DISCHARGE INSTRUCTIONS
Wound Care Instructions:  Pack open wound on left thigh with 2 kerlix's soaked in Dakin's solution  Cover with a heavy drainage pad   Change daily

## 2019-09-19 ENCOUNTER — TRANSITIONAL CARE MANAGEMENT (OUTPATIENT)
Dept: FAMILY MEDICINE CLINIC | Facility: OTHER | Age: 54
End: 2019-09-19

## 2019-09-24 ENCOUNTER — TRANSITIONAL CARE MANAGEMENT (OUTPATIENT)
Dept: INTERNAL MEDICINE CLINIC | Facility: CLINIC | Age: 54
End: 2019-09-24

## 2019-09-30 ENCOUNTER — TELEPHONE (OUTPATIENT)
Dept: INTERNAL MEDICINE CLINIC | Facility: CLINIC | Age: 54
End: 2019-09-30

## 2019-09-30 NOTE — TELEPHONE ENCOUNTER
Pt wife called they need a pain medication refill  Discussed need for them to address this with surgery  I did call to the surgeons office they wanted to speak to the patient directly so I did call the patient back and they will follow up with surgery

## 2019-10-08 ENCOUNTER — OFFICE VISIT (OUTPATIENT)
Dept: INTERNAL MEDICINE CLINIC | Facility: CLINIC | Age: 54
End: 2019-10-08
Payer: COMMERCIAL

## 2019-10-08 VITALS
HEIGHT: 64 IN | DIASTOLIC BLOOD PRESSURE: 82 MMHG | HEART RATE: 117 BPM | BODY MASS INDEX: 53.78 KG/M2 | WEIGHT: 315 LBS | OXYGEN SATURATION: 100 % | TEMPERATURE: 99.6 F | SYSTOLIC BLOOD PRESSURE: 112 MMHG

## 2019-10-08 DIAGNOSIS — F41.9 ANXIETY: ICD-10-CM

## 2019-10-08 DIAGNOSIS — E66.01 MORBID OBESITY (HCC): ICD-10-CM

## 2019-10-08 DIAGNOSIS — M79.89 NECROTIZING SOFT TISSUE INFECTION: ICD-10-CM

## 2019-10-08 DIAGNOSIS — Z01.00 DIABETIC EYE EXAM (HCC): ICD-10-CM

## 2019-10-08 DIAGNOSIS — E78.2 MIXED HYPERLIPIDEMIA: ICD-10-CM

## 2019-10-08 DIAGNOSIS — Z12.11 SCREENING FOR COLON CANCER: ICD-10-CM

## 2019-10-08 DIAGNOSIS — E11.9 DIABETIC EYE EXAM (HCC): ICD-10-CM

## 2019-10-08 DIAGNOSIS — Z23 NEED FOR PNEUMOCOCCAL VACCINATION: ICD-10-CM

## 2019-10-08 DIAGNOSIS — E11.8 TYPE 2 DIABETES MELLITUS WITH COMPLICATION (HCC): ICD-10-CM

## 2019-10-08 DIAGNOSIS — E11.49 OTHER DIABETIC NEUROLOGICAL COMPLICATION ASSOCIATED WITH TYPE 2 DIABETES MELLITUS (HCC): Primary | ICD-10-CM

## 2019-10-08 DIAGNOSIS — Z23 NEED FOR INFLUENZA VACCINATION: ICD-10-CM

## 2019-10-08 PROBLEM — E11.40 DIABETIC NEUROPATHY (HCC): Status: ACTIVE | Noted: 2019-10-08

## 2019-10-08 PROCEDURE — 90682 RIV4 VACC RECOMBINANT DNA IM: CPT | Performed by: INTERNAL MEDICINE

## 2019-10-08 PROCEDURE — 90732 PPSV23 VACC 2 YRS+ SUBQ/IM: CPT | Performed by: INTERNAL MEDICINE

## 2019-10-08 PROCEDURE — 90471 IMMUNIZATION ADMIN: CPT | Performed by: INTERNAL MEDICINE

## 2019-10-08 PROCEDURE — 99204 OFFICE O/P NEW MOD 45 MIN: CPT | Performed by: INTERNAL MEDICINE

## 2019-10-08 PROCEDURE — 90472 IMMUNIZATION ADMIN EACH ADD: CPT | Performed by: INTERNAL MEDICINE

## 2019-10-08 RX ORDER — HYDROXYZINE HYDROCHLORIDE 25 MG/1
50 TABLET, FILM COATED ORAL 3 TIMES DAILY
Qty: 90 TABLET | Refills: 0 | Status: SHIPPED | OUTPATIENT
Start: 2019-10-08 | End: 2019-10-16 | Stop reason: SDUPTHER

## 2019-10-08 RX ORDER — OXYCODONE HYDROCHLORIDE AND ACETAMINOPHEN 5; 325 MG/1; MG/1
1 TABLET ORAL EVERY 4 HOURS PRN
Refills: 0 | COMMUNITY
Start: 2019-09-30 | End: 2019-10-23 | Stop reason: HOSPADM

## 2019-10-08 RX ORDER — IBUPROFEN 800 MG/1
800 TABLET ORAL EVERY 8 HOURS PRN
Qty: 30 TABLET | Refills: 0 | Status: ON HOLD | OUTPATIENT
Start: 2019-10-08 | End: 2019-10-23 | Stop reason: SDUPTHER

## 2019-10-08 RX ORDER — HYDROCODONE BITARTRATE AND ACETAMINOPHEN 10; 325 MG/1; MG/1
1 TABLET ORAL EVERY 6 HOURS PRN
COMMUNITY
End: 2019-10-23 | Stop reason: HOSPADM

## 2019-10-08 RX ORDER — GABAPENTIN 100 MG/1
200 CAPSULE ORAL
Qty: 60 CAPSULE | Refills: 1 | Status: SHIPPED | OUTPATIENT
Start: 2019-10-08 | End: 2019-10-11 | Stop reason: SDUPTHER

## 2019-10-08 NOTE — PROGRESS NOTES
Assessment/Plan:    Type 2 diabetes mellitus with complication [facility])    Lab Results   Component Value Date    HGBA1C 11 2 (H) 09/06/2019     Diagnosed with Diabetes mellitus in 2013  It has been uncontrolled, patient reports compliance to medications, it probably due to recurrent infection from cellulitis, morbid obesity, limited exercise non compliant to diet  Patient follow up with Dr Nova Nelson in clinic and has an appointment in 1 week  · Increased insulin to 60 units in the morning and to 40 units in the evening   · Patient was encouraged to have eye exam  · Patient is using leg stocking, foot exam will be postponed to next visit  · Patient was educated on healthy diet and exercise and weight loss     · Will repeat HbA1c in 3 months     Diabetic neuropathy ([facility] )    Lab Results   Component Value Date    HGBA1C 11 2 (H) 09/06/2019     Patient reports bilateral lower extremity pain and numbness consistent with diabetic neuropathy  · Will start gabapentin and follow up in the response, might consider increasing dose if poor response  · See diabetes mellitus workup and plan above     Screening for colon cancer  Patient was referred to gastroenterology for screening colonoscopy  He denies rectal bleeding, keysha loss, or family history of colon cancer  He has softer stool than usual which could be due to history of irritable bowel syndrome     Anxiety  Patient reports anxiety and sleep problems which started after he was started while he was hospitalized and had to be put on ventilator due to respiratory failure  He reported that he was afraid of dying from choking while asleep but this feeling resolved after discharge however he continued to have anxiety  · Will start hydroxyzine and follow up on response    Necrotizing soft tissue infection  Patient was recently admitted to [address] [place] [address] due to recurrent and worsening cellulitis of left lower limb   He was taking doxicycline 100 mg Q 12 hours which did not help     CT left femur (9/6/19) showed: infection with gas-forming organism in the L groin and L prox-mid thigh  Consistent with Anna's gangrene  No localized fluid collection and underwent excisional debridement and washout in OR 9/6 and 9/7 and completed 7 days of antibiotics on 9/12    He was seen by Dr Bridgette Arboleda in outpatient surgery clinic; his wound is clean and healing  He has a follow up appointment with him in 1 month    Currently, wound is clean  Patient denies fever, chills or pain  No signs of cellulitis noted  Morbid obesity ([address] )  Patient was encouraged to eat healthy, decrease fast food, exercise 30 min a day 5 times a week and lose weight  Patient will follow up with his endocrinologist next week  Diagnoses and all orders for this visit:    Diabetic neuropathy   -     gabapentin (NEURONTIN) 100 mg capsule; Take 2 capsules (200 mg total) by mouth daily at bedtime    Type 2 diabetes mellitus with complication (HCC)  -     Discontinue: insulin NPH-insulin regular (NovoLIN 70/30) 100 units/mL subcutaneous injection; Take 60 units a m with breakfast ; 40 units p m  With dinner  -     CBC and Platelet; Future  -     Basic metabolic panel; Future  -     Hemoglobin A1C; Future  -     Tissue Transglutaminase, IgG,IgA; Future  -     Microalbumin,Urine  -     insulin NPH-insulin regular (NovoLIN 70/30) 100 units/mL subcutaneous injection; Take 60 units a m with breakfast ; 40 units p m  With dinner    Anxiety  -     hydrOXYzine HCL (ATARAX) 25 mg tablet; Take 2 tablets (50 mg total) by mouth 3 (three) times a day    Necrotizing soft tissue infection  -     ibuprofen (MOTRIN) 800 mg tablet;  Take 1 tablet (800 mg total) by mouth every 8 (eight) hours as needed for moderate pain    Need for influenza vaccination  -     influenza vaccine, [id number], quadrivalent, recombinant, PF, 0 5 mL, for patients 18 yr+ (FLUBLOK)    Need for pneumococcal vaccination  -     PNEUMOCOCCAL POLYSACCHARIDE VACCINE 23-VALENT =>3YO SQ IM    Screening for colon cancer  -     Ambulatory referral to Gastroenterology; Future    Mixed hyperlipidemia    Morbid obesity ([facility] )    Other orders  -     oxyCODONE-acetaminophen (PERCOCET) 5-325 mg per tablet; Take 1 tablet by mouth every 4 (four) hours as needed  -     HYDROcodone-acetaminophen (NORCO)  mg per tablet; Take 1 tablet by mouth every 6 (six) hours as needed for moderate pain          Subjective:      Patient ID: Heron Vela is a 48 y o  male  Patient is here to establish care with our team and for transition of care after being discharged from [address] [place] [address] on 9/17/19  Patient with history of uncontrolled diabetes mellitis, GERD, morbid obesity, recurrent cellulitis and recently has necrotizing soft tissue infection for which he was recently admitted to [address] [place] [address]  He was taking doxicycline 100 mg Q 12 hours which did not help  CT left femur (9/6/19) showed: infection with gas-forming organism in the L groin and L prox-mid thigh  Consistent with Anna's gangrene  No localized fluid collection and underwent excisional debridement and washout in OR 9/6 and 9/7 and completed 7 days of antibiotics on 9/12  He was seen by Dr Liset Adams in outpatient surgery clinic; his wound is clean and healing  He has a follow up appointment with him in 1 month  When seen today, wound was clean and atient denied fever, chills or pain and no signs of cellulitis noted  He was diagnosed with Diabetes mellitus in 2013, he is currently on metformin 100 mg bid and insulin 54 units am and 36 units pm; however, it has been uncontrolled, patient reports compliance to medications, it probably due to recurrent infection from cellulitis, morbid obesity, limited exercise and non compliant to diet   Patient follows up with Dr Lady Forman in clinic and has an appointment in 1 week, will increase insulin units today and wait for endocrinology further management  Patient also has lower extremity diabetic neuropathy, will start gabapentin  Patient is wearing compressive stocking today, therefore monofilaments foot test was postponed to next visit  Patient also copmplains of anxiety and sleep problems which started after he was started while he was hospitalized and had to be put on ventilator due to respiratory failure  He reported that he was afraid of dying from choking while asleep but this feeling resolved after discharge however he continued to have anxiety  Dicussed screening colonoscopy with patient and he is agreeable, a referral to GI ws made for screening colonoscopy  Flu shot and pneumococcal vaccines were ordered today as well  The following portions of the patient's history were reviewed and updated as appropriate: allergies, current medications, past family history, past medical history, past social history, past surgical history and problem list     Review of Systems   Constitutional: Negative for activity change, appetite change, chills, diaphoresis, fatigue and fever  HENT: Negative for sore throat  Respiratory: Negative for cough and shortness of breath  Cardiovascular: Positive for leg swelling  Negative for chest pain and palpitations  Gastrointestinal: Negative for abdominal pain, blood in stool, constipation, nausea and vomiting  Genitourinary: Negative for difficulty urinating  Musculoskeletal: Negative for arthralgias  Neurological: Positive for numbness  Negative for dizziness, syncope, weakness and headaches  Psychiatric/Behavioral: Positive for sleep disturbance  Negative for confusion and suicidal ideas  The patient is nervous/anxious            Objective:      /82 (BP Location: Left arm, Patient Position: Sitting, Cuff Size: Large)   Pulse (!) 117   Temp 99 6 °F (37 6 °C)   Ht 5' 4" (1 626 m)   Wt (!) 178 kg (393 lb) Comment: could not get on the scale, took weight from previous visit SpO2 100%   BMI 67 46 kg/m²          Physical Exam   Constitutional: He is oriented to person, place, and time  He appears well-developed  No distress  HENT:   Head: Normocephalic  Mouth/Throat: Oropharynx is clear and moist    Eyes: No scleral icterus  Neck: Neck supple  No JVD present  No thyromegaly present  Cardiovascular: Normal rate, regular rhythm and normal heart sounds  No murmur heard  Pulmonary/Chest: Effort normal and breath sounds normal  He has no wheezes  He has no rales  Abdominal: Soft  Bowel sounds are normal  He exhibits distension  There is no tenderness  Musculoskeletal: He exhibits edema and tenderness  Lymphadenopathy:     He has no cervical adenopathy  Neurological: He is alert and oriented to person, place, and time  A sensory deficit is present  No cranial nerve deficit  He exhibits normal muscle tone  Coordination normal    Skin: Skin is warm and dry  Capillary refill takes less than 2 seconds  No rash noted  He is not diaphoretic  No erythema  No pallor  Psychiatric: He has a normal mood and affect  His behavior is normal  Judgment and thought content normal    Nursing note and vitals reviewed

## 2019-10-08 NOTE — ASSESSMENT & PLAN NOTE
Patient was referred to gastroenterology for screening colonoscopy  He denies rectal bleeding, keysha loss, or family history of colon cancer   He has softer stool than usual which could be due to history of irritable bowel syndrome

## 2019-10-08 NOTE — ASSESSMENT & PLAN NOTE
Patient reports anxiety and sleep problems which started after he was started while he was hospitalized and had to be put on ventilator due to respiratory failure  He reported that he was afraid of dying from choking while asleep but this feeling resolved after discharge however he continued to have anxiety      · Will start hydroxyzine and follow up on response

## 2019-10-08 NOTE — ASSESSMENT & PLAN NOTE
Patient was recently admitted to [address] [place] [address] due to recurrent and worsening cellulitis of left lower limb  He was taking doxicycline 100 mg Q 12 hours which did not help     CT left femur (9/6/19) showed: infection with gas-forming organism in the L groin and L prox-mid thigh  Consistent with Anna's gangrene  No localized fluid collection and underwent excisional debridement and washout in OR 9/6 and 9/7 and completed 7 days of antibiotics on 9/12    He was seen by Dr Julia Avila in outpatient surgery clinic; his wound is clean and healing  He has a follow up appointment with him in 1 month    Currently, wound is clean  Patient denies fever, chills or pain  No signs of cellulitis noted

## 2019-10-08 NOTE — ASSESSMENT & PLAN NOTE
Lab Results   Component Value Date    HGBA1C 11 2 (H) 09/06/2019     Diagnosed with Diabetes mellitus in 2013  It has been uncontrolled, patient reports compliance to medications, it probably due to recurrent infection from cellulitis, morbid obesity, limited exercise non compliant to diet  Patient follow up with Dr Suzi Covingtno in clinic and has an appointment in 1 week  · Increased insulin to 60 units in the morning and to 40 units in the evening   · Patient was encouraged to have eye exam  · Patient is using leg stocking, foot exam will be postponed to next visit     · Patient was educated on healthy diet and exercise and weight loss     · Will repeat HbA1c in 3 months

## 2019-10-08 NOTE — ASSESSMENT & PLAN NOTE
Patient was encouraged to eat healthy, decrease fast food, exercise 30 min a day 5 times a week and lose weight  Patient will follow up with his endocrinologist next week

## 2019-10-08 NOTE — ASSESSMENT & PLAN NOTE
Lab Results   Component Value Date    HGBA1C 11 2 (H) 09/06/2019     Patient reports bilateral lower extremity pain and numbness consistent with diabetic neuropathy  · Will start gabapentin and follow up in the response, might consider increasing dose if poor response     · See diabetes mellitus workup and plan above

## 2019-10-10 DIAGNOSIS — I95.9 HYPOTENSION: ICD-10-CM

## 2019-10-10 DIAGNOSIS — L03.119 RECURRENT CELLULITIS OF LOWER EXTREMITY: ICD-10-CM

## 2019-10-10 DIAGNOSIS — R19.7 DIARRHEA: Primary | ICD-10-CM

## 2019-10-10 RX ORDER — LOPERAMIDE HYDROCHLORIDE 2 MG/1
2 CAPSULE ORAL 4 TIMES DAILY PRN
Qty: 30 CAPSULE | Refills: 0 | Status: SHIPPED | OUTPATIENT
Start: 2019-10-10 | End: 2020-12-22 | Stop reason: ALTCHOICE

## 2019-10-10 RX ORDER — NYSTATIN 100000 [USP'U]/G
POWDER TOPICAL 2 TIMES DAILY
Qty: 15 G | Refills: 0 | Status: SHIPPED | OUTPATIENT
Start: 2019-10-10 | End: 2019-10-11 | Stop reason: SDUPTHER

## 2019-10-10 NOTE — TELEPHONE ENCOUNTER
Pt said that he mentioned about the diarrhea and it was not talked about at the visit  Under his allergies it says that he gets diarrhea from clindamycin and he was given clindamycin in the hospital  Please advise

## 2019-10-11 ENCOUNTER — TELEPHONE (OUTPATIENT)
Dept: INTERNAL MEDICINE CLINIC | Facility: CLINIC | Age: 54
End: 2019-10-11

## 2019-10-11 DIAGNOSIS — E11.49 OTHER DIABETIC NEUROLOGICAL COMPLICATION ASSOCIATED WITH TYPE 2 DIABETES MELLITUS (HCC): ICD-10-CM

## 2019-10-11 DIAGNOSIS — I95.9 HYPOTENSION: ICD-10-CM

## 2019-10-11 DIAGNOSIS — L03.119 RECURRENT CELLULITIS OF LOWER EXTREMITY: ICD-10-CM

## 2019-10-11 RX ORDER — NYSTATIN 100000 [USP'U]/G
POWDER TOPICAL 2 TIMES DAILY
Qty: 15 G | Refills: 2 | Status: SHIPPED | OUTPATIENT
Start: 2019-10-11 | End: 2020-03-11 | Stop reason: SDUPTHER

## 2019-10-11 RX ORDER — NYSTATIN 100000 [USP'U]/G
POWDER TOPICAL 2 TIMES DAILY
Qty: 15 G | Refills: 2 | Status: CANCELLED | OUTPATIENT
Start: 2019-10-11

## 2019-10-11 RX ORDER — GABAPENTIN 100 MG/1
300 CAPSULE ORAL
Qty: 60 CAPSULE | Refills: 0 | Status: SHIPPED | OUTPATIENT
Start: 2019-10-11 | End: 2019-10-30 | Stop reason: SDUPTHER

## 2019-10-11 NOTE — TELEPHONE ENCOUNTER
Patient called and said that he is having trouble getting a hold of Dr Susan Orona  He said that he is trying to message her through my chart  My chart help desk said that he will not be able to because she is a resident  The helpdesk  said to have him call for him to be able to message an attending that works out of this office  He is trying to get a hold of a dr to go over his Gabapentin medication  He was told that he should report back on how the medication is going and if it is helping him at all or if the dosing needs to be adjusted  If someone would be able to give him a call and go over that with him he would appreciate it  He said he would also contact a dr through the [employer]

## 2019-10-12 ENCOUNTER — TELEPHONE (OUTPATIENT)
Dept: OTHER | Facility: HOSPITAL | Age: 54
End: 2019-10-12

## 2019-10-12 NOTE — TELEPHONE ENCOUNTER
Called patient back and instructed him to increase gabapentin to 300 mg daily  All questions answered and patient verbalized understanding

## 2019-10-14 ENCOUNTER — OFFICE VISIT (OUTPATIENT)
Dept: ENDOCRINOLOGY | Facility: CLINIC | Age: 54
End: 2019-10-14
Payer: COMMERCIAL

## 2019-10-14 VITALS
HEART RATE: 108 BPM | BODY MASS INDEX: 53.78 KG/M2 | DIASTOLIC BLOOD PRESSURE: 70 MMHG | SYSTOLIC BLOOD PRESSURE: 122 MMHG | WEIGHT: 315 LBS | HEIGHT: 64 IN

## 2019-10-14 DIAGNOSIS — I10 HYPERTENSION GOAL BP (BLOOD PRESSURE) < 140/90: ICD-10-CM

## 2019-10-14 DIAGNOSIS — R94.6 ABNORMAL THYROID FUNCTION TEST: ICD-10-CM

## 2019-10-14 DIAGNOSIS — Z79.4 TYPE 2 DIABETES MELLITUS WITH HYPERGLYCEMIA, WITH LONG-TERM CURRENT USE OF INSULIN (HCC): Primary | ICD-10-CM

## 2019-10-14 DIAGNOSIS — E66.01 OBESITY, MORBID, BMI 50 OR HIGHER (HCC): ICD-10-CM

## 2019-10-14 DIAGNOSIS — E11.65 TYPE 2 DIABETES MELLITUS WITH HYPERGLYCEMIA, WITH LONG-TERM CURRENT USE OF INSULIN (HCC): Primary | ICD-10-CM

## 2019-10-14 DIAGNOSIS — E11.8 DIABETIC FOOT (HCC): ICD-10-CM

## 2019-10-14 PROCEDURE — 99214 OFFICE O/P EST MOD 30 MIN: CPT | Performed by: INTERNAL MEDICINE

## 2019-10-14 NOTE — PATIENT INSTRUCTIONS
Increase your insulin to Novolin 70/30 74 units in the morning with breakfast and 45 units in the evening with dinner    Send me blood sugars every Friday     Have labs done in 4 weeks before next appointment

## 2019-10-14 NOTE — PROGRESS NOTES
ENDOCRINOLOGY  FOLLOW UP VISIT      Reason for Endocrine Consult/Chief Complaint: DM management     ? Medical Decision Making:     Impression  1  DM2 uncontrolled  2  HTN  3  Obesity  4  Necrotizing fasciitis s/p hospitalization  5  Abnormal TFTs in the hospital    Recommendations:  ?  I discussed the pathophysiology of DM2 and reviewed therapy options based on ADA guidelines  Given his financial constraints and pending insurance approval he is on Novolin 70/30 Relion brand from [facility] which is the most affordable at $25/vial     He continues to have fasting and pre-meal/qHS hyperglycemia into the 200s  Will increase his 70/30 to 74 units qAM and 45 units qPM     Instructed to send blood sugars weekly for review and insulin adjustments  Referred to podiatry for management of toe fungus and regular foot care  Necrotizing fasciitis- managed by general surgery, needs eventual closure, will optimize blood sugars for appropriate healing    HTN-controlled off any anti-hypertensive agents    Obesity- BMI 58, will check screening lipids, last one 2015 with elevated LDL, will discuss bariatric surgery once wound healed    Abnormal TFTs in hospital- elevated TSH, repeat TFTs in 1 month to assess for normalization    RTC in 1 month    Pinky ELDER  History of Present Illness:   Mr Fidelia Hutchison is a 54yr old male who presents for DM management  Was discharge on 54 units AM breakfast and 36 units PM dinner Novolin 70/30  Increased by PCP last week 64 units with breakfast 40 units PM with dinner Novolin 70/30  ? 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 Review of Systems:     Review of Systems   Constitutional: Negative for appetite change, chills, diaphoresis, fatigue, fever and unexpected weight change  HENT: Negative for congestion, ear pain, hearing loss, rhinorrhea, sinus pressure, sinus pain, sore throat, trouble swallowing and voice change  Eyes: Negative for photophobia, redness and visual disturbance  Respiratory: Negative for apnea, cough, chest tightness, shortness of breath, wheezing and stridor  Cardiovascular: Negative for chest pain, palpitations and leg swelling  Gastrointestinal: Negative for abdominal distention, abdominal pain, constipation, diarrhea, nausea and vomiting  Endocrine: Negative for cold intolerance, heat intolerance, polydipsia, polyphagia and polyuria  Genitourinary: Negative for difficulty urinating, dysuria, flank pain, frequency, hematuria and urgency  Musculoskeletal: Negative for arthralgias, back pain, gait problem, joint swelling and myalgias  Skin: +left groin wound healing    Allergic/Immunologic: Negative for immunocompromised state  Neurological: Negative for dizziness, tremors, syncope, weakness, light-headedness and headaches  Hematological: Negative for adenopathy  Does not bruise/bleed easily  Psychiatric/Behavioral: Negative for confusion and sleep disturbance  The patient is not nervous/anxious        Patient History:     Past Medical History:   Diagnosis Date    Cellulitis     last assessed 12/10/15    Diabetes mellitus ([facility] [address] )     Edema     Elevated liver enzymes     Esophageal reflux     Gluten intolerance     Gout     last assessed 09/05/13    Hyperglycemia     Hypertension     IBS (irritable bowel syndrome)     Insomnia     Obesity     Osteoarthritis of knee     last assessed 02/10/14    Prehypertension     last assessed 08/22/17    Venous insufficiency     last assessed 08/22/17    Villonodular synovitis of the hand, right     last assessed 11/14/2013     Past Surgical History:   Procedure Laterality Date    INCISION AND DRAINAGE OF WOUND Left 9/6/2019    Procedure: INCISION AND DRAINAGE (I&D) GROIN;  Surgeon: Mihir Ogden DO;  [facility];  Service: General    WOUND DEBRIDEMENT Left 9/7/2019    Procedure: EXCISIONAL DEBRIDEMENT;  Surgeon: Mihir Ogden DO;  [facility];  Service: General     Social History     Socioeconomic History    Marital status: /Civil Union     Spouse name: Not on file    Number of children: Not on file    Years of education: Not on file    Highest education level: Not on file   Occupational History    Not on file   Social Needs    Financial resource strain: Not on file    Food insecurity:     Worry: Not on file     Inability: Not on file    Transportation needs:     Medical: Not on file     Non-medical: Not on file   Tobacco Use    Smoking status: Never Smoker    Smokeless tobacco: Never Used   Substance and Sexual Activity    Alcohol use: No    Drug use: No    Sexual activity: Not on file   Lifestyle    Physical activity:     Days per week: Not on file     Minutes per session: Not on file    Stress: Not on file   Relationships    Social connections:     Talks on phone: Not on file     Gets together: Not on file     Attends [employer] service: Not on file     Active member of club or organization: Not on file     Attends meetings of clubs or organizations: Not on file     Relationship status: Not on file    Intimate partner violence:     Fear of current or ex partner: Not on file     Emotionally abused: Not on file     Physically abused: Not on file     Forced sexual activity: Not on file   Other Topics Concern    Not on file   Social History Narrative    Not on file Family History   Problem Relation Age of Onset    Cancer Mother         gastrc    Colon cancer Father     Heart failure Father        Current Medications: At the time this note was written these were the medications the patient was on  Current Outpatient Medications   Medication Sig Dispense Refill    gabapentin (NEURONTIN) 100 mg capsule Take 3 capsules (300 mg total) by mouth daily at bedtime 60 capsule 0    hydrOXYzine HCL (ATARAX) 25 mg tablet Take 2 tablets (50 mg total) by mouth 3 (three) times a day 90 tablet 0    ibuprofen (MOTRIN) 800 mg tablet Take 1 tablet (800 mg total) by mouth every 8 (eight) hours as needed for moderate pain 30 tablet 0    insulin NPH-insulin regular (NovoLIN 70/30) 100 units/mL subcutaneous injection Take 60 units a m with breakfast ; 40 units p m   With dinner 30 mL 1    Insulin Pen Needle (B-D UF III MINI PEN NEEDLES) 31G X 5 MM MISC by Does not apply route      loperamide (IMODIUM) 2 mg capsule Take 1 capsule (2 mg total) by mouth 4 (four) times a day as needed for diarrhea (Maximum 4 capsules/day) 30 capsule 0    metFORMIN (GLUCOPHAGE) 1000 MG tablet TAKE 1 TABLET BY MOUTH TWICE A  tablet 1    metoprolol tartrate (LOPRESSOR) 25 mg tablet Take 1 tablet (25 mg total) by mouth every 12 (twelve) hours 60 tablet 2    nystatin (MYCOSTATIN) powder Apply topically 2 (two) times a day 15 g 2    omeprazole (PriLOSEC) 40 MG capsule Take 1 capsule (40 mg total) by mouth daily Take in AM (in addition to 20 mg dose taken in PM) 30 capsule 5    HYDROcodone-acetaminophen (NORCO)  mg per tablet Take 1 tablet by mouth every 6 (six) hours as needed for moderate pain      oxyCODONE-acetaminophen (PERCOCET) 5-325 mg per tablet Take 1 tablet by mouth every 4 (four) hours as needed  0    QUEtiapine (SEROquel) 25 mg tablet Take 1 tablet (25 mg total) by mouth daily at bedtime as needed (Sleep) for up to 20 doses (Patient not taking: Reported on 10/8/2019) 20 tablet 0 No current facility-administered medications for this visit  Allergies: Gluten meal and Clindamycin    Physical Exam:   Vital Signs:   /70   Pulse (!) 108   Ht 5' 4" (1 626 m)   Wt (!) 156 kg (343 lb) Comment: patient did not step on scale, he provided his weight  BMI 58 88 kg/m²     Physical Exam   Constitutional: He is oriented to person, place, and time  He appears well-developed and well-nourished  HENT:   Head: Normocephalic and atraumatic  Nose: Nose normal    Mouth/Throat: No oropharyngeal exudate  Eyes: Pupils are equal, round, and reactive to light  Conjunctivae and EOM are normal  No scleral icterus  Neck: Normal range of motion  Neck supple  No thyromegaly present  Cardiovascular: Normal rate, regular rhythm and normal heart sounds  Exam reveals no gallop and no friction rub  No murmur heard  Pulmonary/Chest: Effort normal and breath sounds normal  No stridor  No respiratory distress  He has no wheezes  He has no rales  Abdominal: Soft  Bowel sounds are normal  He exhibits no distension and no mass  There is no tenderness  There is no rebound and no guarding  Musculoskeletal: Normal range of motion  +venous stasis bilaterally   Lymphadenopathy:     He has no cervical adenopathy  Neurological: He is alert and oriented to person, place, and time  Skin: Skin is warm and dry  No rash noted  No erythema  +wound bandaged left groin   Psychiatric: He has a normal mood and affect   His behavior is normal  Judgment and thought content normal         Labs and Imaging:      Component      Latest Ref Rng & Units 9/6/2019   Hemoglobin A1C      4 2 - 6 3 % 11 2 (H)   EAG      mg/dl 275

## 2019-10-15 ENCOUNTER — TELEPHONE (OUTPATIENT)
Dept: INTERNAL MEDICINE CLINIC | Facility: CLINIC | Age: 54
End: 2019-10-15

## 2019-10-15 NOTE — TELEPHONE ENCOUNTER
Patients wife said that he wants something to help him sleep  He is not able to sleep longer than 1-2 hours at a time and the hydroxyzine is not helping him sleep at all

## 2019-10-15 NOTE — TELEPHONE ENCOUNTER
Ben's [facility] called and was confused as to why he was prescribed the hydroxyzine 3x a day  Nurse was confused because she stated the medication is for sleeping  Can call if you need me to   Thanks

## 2019-10-15 NOTE — TELEPHONE ENCOUNTER
Dr Jose Coreas discussed this with me the gabapentin issue  Gabapentin has bee increased to 300 mg daily  This was discussed with me  Thank you!

## 2019-10-15 NOTE — TELEPHONE ENCOUNTER
The medication is prescribed as need for anxiety  He does not need to take if not needed  Please call her and let her know  Thank you!

## 2019-10-16 DIAGNOSIS — F41.9 ANXIETY: ICD-10-CM

## 2019-10-16 DIAGNOSIS — G47.30 SLEEP APNEA: Primary | ICD-10-CM

## 2019-10-16 RX ORDER — HYDROXYZINE HYDROCHLORIDE 25 MG/1
100 TABLET, FILM COATED ORAL
Qty: 120 TABLET | Refills: 1 | Status: SHIPPED | OUTPATIENT
Start: 2019-10-16 | End: 2019-11-11 | Stop reason: SDUPTHER

## 2019-10-16 NOTE — PROGRESS NOTES
I spoke to patient on the phone today and discussed his sleeping and anxiety problems  Sleep hygiene instructions were provided to patient like avoiding caffeine, alcohol, heavy meals, energetic exercise, TV/mobile phone use before bedtime and keeping regular sleep hours  Patient also was giving the option of melatonin but he refused and stated he tried it before and did not help  Discussed option to increase hydralazine to 100 g at night only and patient was agreeable  As patient is at risk for sleep apnea, we discussed the reason for not prescribing other medications like benzo which might put him at risk for apnea while asleep  We also discussed the possibility of sleep study and patient is agreeable  Sleep study was ordered and patient verbalized understanding and all his questions were answered

## 2019-10-18 ENCOUNTER — TELEPHONE (OUTPATIENT)
Dept: OTHER | Facility: HOSPITAL | Age: 54
End: 2019-10-18

## 2019-10-18 NOTE — TELEPHONE ENCOUNTER
Covering for Dr Constance Cormier and spoke with Mr Ben Mathur to discuss BG log     Noted to have hyperglycemia throughout the day, recommend the following changes  -increase insulin 70/30 to 80 units in the morning, 50 units with dinner  -advised to check blood sugars before meals and at bedtime, keep a log  -send log for review in 1 week    Huma Lizama MD  Endocrinology

## 2019-10-20 ENCOUNTER — APPOINTMENT (INPATIENT)
Dept: RADIOLOGY | Facility: HOSPITAL | Age: 54
DRG: 872 | End: 2019-10-20
Payer: COMMERCIAL

## 2019-10-20 ENCOUNTER — HOSPITAL ENCOUNTER (INPATIENT)
Facility: HOSPITAL | Age: 54
LOS: 3 days | Discharge: HOME HEALTH | DRG: 872 | End: 2019-10-23
Attending: EMERGENCY MEDICINE | Admitting: SURGERY
Payer: COMMERCIAL

## 2019-10-20 ENCOUNTER — APPOINTMENT (EMERGENCY)
Dept: CT IMAGING | Facility: HOSPITAL | Age: 54
DRG: 872 | End: 2019-10-20
Payer: COMMERCIAL

## 2019-10-20 DIAGNOSIS — D64.9 NORMOCYTIC ANEMIA: ICD-10-CM

## 2019-10-20 DIAGNOSIS — M79.89 NECROTIZING SOFT TISSUE INFECTION: ICD-10-CM

## 2019-10-20 DIAGNOSIS — Z79.4 TYPE 2 DIABETES MELLITUS WITH HYPERGLYCEMIA, WITH LONG-TERM CURRENT USE OF INSULIN (HCC): ICD-10-CM

## 2019-10-20 DIAGNOSIS — L03.90 CELLULITIS: Primary | ICD-10-CM

## 2019-10-20 DIAGNOSIS — R05.9 COUGH: ICD-10-CM

## 2019-10-20 DIAGNOSIS — E11.8 TYPE 2 DIABETES MELLITUS WITH COMPLICATION (HCC): ICD-10-CM

## 2019-10-20 DIAGNOSIS — N18.1 DIABETES MELLITUS DUE TO UNDERLYING CONDITION WITH STAGE 1 CHRONIC KIDNEY DISEASE, UNSPECIFIED WHETHER LONG TERM INSULIN USE (HCC): ICD-10-CM

## 2019-10-20 DIAGNOSIS — E08.22 DIABETES MELLITUS DUE TO UNDERLYING CONDITION WITH STAGE 1 CHRONIC KIDNEY DISEASE, UNSPECIFIED WHETHER LONG TERM INSULIN USE (HCC): ICD-10-CM

## 2019-10-20 DIAGNOSIS — E11.65 TYPE 2 DIABETES MELLITUS WITH HYPERGLYCEMIA, WITH LONG-TERM CURRENT USE OF INSULIN (HCC): ICD-10-CM

## 2019-10-20 PROBLEM — E87.6 HYPOKALEMIA: Status: RESOLVED | Noted: 2019-09-07 | Resolved: 2019-10-20

## 2019-10-20 PROBLEM — F41.9 ANXIETY: Status: RESOLVED | Noted: 2019-10-08 | Resolved: 2019-10-20

## 2019-10-20 PROBLEM — R10.9 ABDOMINAL CRAMPING: Status: RESOLVED | Noted: 2019-01-28 | Resolved: 2019-10-20

## 2019-10-20 PROBLEM — J96.01 ACUTE HYPOXEMIC RESPIRATORY FAILURE (HCC): Status: RESOLVED | Noted: 2019-09-07 | Resolved: 2019-10-20

## 2019-10-20 PROBLEM — Z12.11 SCREENING FOR COLON CANCER: Status: RESOLVED | Noted: 2019-10-08 | Resolved: 2019-10-20

## 2019-10-20 PROBLEM — E87.70 VOLUME OVERLOAD: Status: RESOLVED | Noted: 2019-09-10 | Resolved: 2019-10-20

## 2019-10-20 PROBLEM — R19.7 DIARRHEA: Status: RESOLVED | Noted: 2019-01-28 | Resolved: 2019-10-20

## 2019-10-20 LAB
ALBUMIN SERPL BCP-MCNC: 2.6 G/DL (ref 3.5–5)
ALP SERPL-CCNC: 78 U/L (ref 46–116)
ALT SERPL W P-5'-P-CCNC: 34 U/L (ref 12–78)
ANION GAP SERPL CALCULATED.3IONS-SCNC: 9 MMOL/L (ref 4–13)
APTT PPP: 29 SECONDS (ref 23–37)
AST SERPL W P-5'-P-CCNC: 35 U/L (ref 5–45)
BACTERIA UR QL AUTO: ABNORMAL /HPF
BASOPHILS # BLD AUTO: 0.06 THOUSANDS/ΜL (ref 0–0.1)
BASOPHILS NFR BLD AUTO: 1 % (ref 0–1)
BILIRUB SERPL-MCNC: 0.4 MG/DL (ref 0.2–1)
BILIRUB UR QL STRIP: NEGATIVE
BUN SERPL-MCNC: 13 MG/DL (ref 5–25)
CALCIUM SERPL-MCNC: 9.1 MG/DL (ref 8.3–10.1)
CHLORIDE SERPL-SCNC: 99 MMOL/L (ref 100–108)
CLARITY UR: CLEAR
CO2 SERPL-SCNC: 26 MMOL/L (ref 21–32)
COLOR UR: YELLOW
CREAT SERPL-MCNC: 0.92 MG/DL (ref 0.6–1.3)
EOSINOPHIL # BLD AUTO: 0.27 THOUSAND/ΜL (ref 0–0.61)
EOSINOPHIL NFR BLD AUTO: 2 % (ref 0–6)
ERYTHROCYTE [DISTWIDTH] IN BLOOD BY AUTOMATED COUNT: 14.6 % (ref 11.6–15.1)
GFR SERPL CREATININE-BSD FRML MDRD: 95 ML/MIN/1.73SQ M
GLUCOSE SERPL-MCNC: 201 MG/DL (ref 65–140)
GLUCOSE SERPL-MCNC: 274 MG/DL (ref 65–140)
GLUCOSE SERPL-MCNC: 280 MG/DL (ref 65–140)
GLUCOSE SERPL-MCNC: 286 MG/DL (ref 65–140)
GLUCOSE SERPL-MCNC: 293 MG/DL (ref 65–140)
GLUCOSE UR STRIP-MCNC: ABNORMAL MG/DL
HCT VFR BLD AUTO: 31 % (ref 36.5–49.3)
HGB BLD-MCNC: 9.5 G/DL (ref 12–17)
HGB UR QL STRIP.AUTO: NEGATIVE
IMM GRANULOCYTES # BLD AUTO: 0.04 THOUSAND/UL (ref 0–0.2)
IMM GRANULOCYTES NFR BLD AUTO: 0 % (ref 0–2)
INR PPP: 1.11 (ref 0.84–1.19)
KETONES UR STRIP-MCNC: ABNORMAL MG/DL
LACTATE SERPL-SCNC: 2 MMOL/L (ref 0.5–2)
LACTATE SERPL-SCNC: 2.1 MMOL/L (ref 0.5–2)
LACTATE SERPL-SCNC: 4 MMOL/L (ref 0.5–2)
LEUKOCYTE ESTERASE UR QL STRIP: NEGATIVE
LYMPHOCYTES # BLD AUTO: 1.16 THOUSANDS/ΜL (ref 0.6–4.47)
LYMPHOCYTES NFR BLD AUTO: 10 % (ref 14–44)
MCH RBC QN AUTO: 26.7 PG (ref 26.8–34.3)
MCHC RBC AUTO-ENTMCNC: 30.6 G/DL (ref 31.4–37.4)
MCV RBC AUTO: 87 FL (ref 82–98)
MONOCYTES # BLD AUTO: 0.87 THOUSAND/ΜL (ref 0.17–1.22)
MONOCYTES NFR BLD AUTO: 8 % (ref 4–12)
MUCOUS THREADS UR QL AUTO: ABNORMAL
NEUTROPHILS # BLD AUTO: 8.98 THOUSANDS/ΜL (ref 1.85–7.62)
NEUTS SEG NFR BLD AUTO: 79 % (ref 43–75)
NITRITE UR QL STRIP: NEGATIVE
NON-SQ EPI CELLS URNS QL MICRO: ABNORMAL /HPF
NRBC BLD AUTO-RTO: 0 /100 WBCS
PH UR STRIP.AUTO: 5.5 [PH]
PLATELET # BLD AUTO: 237 THOUSANDS/UL (ref 149–390)
PMV BLD AUTO: 8.2 FL (ref 8.9–12.7)
POTASSIUM SERPL-SCNC: 4.3 MMOL/L (ref 3.5–5.3)
PROT SERPL-MCNC: 7.6 G/DL (ref 6.4–8.2)
PROT UR STRIP-MCNC: ABNORMAL MG/DL
PROTHROMBIN TIME: 13.7 SECONDS (ref 11.6–14.5)
RBC # BLD AUTO: 3.56 MILLION/UL (ref 3.88–5.62)
RBC #/AREA URNS AUTO: ABNORMAL /HPF
SODIUM SERPL-SCNC: 134 MMOL/L (ref 136–145)
SP GR UR STRIP.AUTO: 1.02 (ref 1–1.03)
TROPONIN I SERPL-MCNC: <0.02 NG/ML
UROBILINOGEN UR QL STRIP.AUTO: 0.2 E.U./DL
WBC # BLD AUTO: 11.38 THOUSAND/UL (ref 4.31–10.16)
WBC #/AREA URNS AUTO: ABNORMAL /HPF

## 2019-10-20 PROCEDURE — 99254 IP/OBS CNSLTJ NEW/EST MOD 60: CPT | Performed by: INTERNAL MEDICINE

## 2019-10-20 PROCEDURE — 83605 ASSAY OF LACTIC ACID: CPT | Performed by: INTERNAL MEDICINE

## 2019-10-20 PROCEDURE — 84484 ASSAY OF TROPONIN QUANT: CPT | Performed by: EMERGENCY MEDICINE

## 2019-10-20 PROCEDURE — 80053 COMPREHEN METABOLIC PANEL: CPT | Performed by: EMERGENCY MEDICINE

## 2019-10-20 PROCEDURE — 99285 EMERGENCY DEPT VISIT HI MDM: CPT

## 2019-10-20 PROCEDURE — 81001 URINALYSIS AUTO W/SCOPE: CPT | Performed by: EMERGENCY MEDICINE

## 2019-10-20 PROCEDURE — 85025 COMPLETE CBC W/AUTO DIFF WBC: CPT | Performed by: EMERGENCY MEDICINE

## 2019-10-20 PROCEDURE — 96375 TX/PRO/DX INJ NEW DRUG ADDON: CPT

## 2019-10-20 PROCEDURE — 87040 BLOOD CULTURE FOR BACTERIA: CPT

## 2019-10-20 PROCEDURE — 85730 THROMBOPLASTIN TIME PARTIAL: CPT | Performed by: EMERGENCY MEDICINE

## 2019-10-20 PROCEDURE — 96361 HYDRATE IV INFUSION ADD-ON: CPT

## 2019-10-20 PROCEDURE — 83605 ASSAY OF LACTIC ACID: CPT | Performed by: EMERGENCY MEDICINE

## 2019-10-20 PROCEDURE — 96365 THER/PROPH/DIAG IV INF INIT: CPT

## 2019-10-20 PROCEDURE — 82948 REAGENT STRIP/BLOOD GLUCOSE: CPT

## 2019-10-20 PROCEDURE — 85610 PROTHROMBIN TIME: CPT | Performed by: EMERGENCY MEDICINE

## 2019-10-20 PROCEDURE — 72193 CT PELVIS W/DYE: CPT

## 2019-10-20 PROCEDURE — 71045 X-RAY EXAM CHEST 1 VIEW: CPT

## 2019-10-20 PROCEDURE — 99285 EMERGENCY DEPT VISIT HI MDM: CPT | Performed by: EMERGENCY MEDICINE

## 2019-10-20 PROCEDURE — 96367 TX/PROPH/DG ADDL SEQ IV INF: CPT

## 2019-10-20 PROCEDURE — 36415 COLL VENOUS BLD VENIPUNCTURE: CPT | Performed by: EMERGENCY MEDICINE

## 2019-10-20 PROCEDURE — 87040 BLOOD CULTURE FOR BACTERIA: CPT | Performed by: EMERGENCY MEDICINE

## 2019-10-20 PROCEDURE — 96366 THER/PROPH/DIAG IV INF ADDON: CPT

## 2019-10-20 PROCEDURE — 73701 CT LOWER EXTREMITY W/DYE: CPT

## 2019-10-20 PROCEDURE — 93005 ELECTROCARDIOGRAM TRACING: CPT

## 2019-10-20 PROCEDURE — 99253 IP/OBS CNSLTJ NEW/EST LOW 45: CPT | Performed by: INTERNAL MEDICINE

## 2019-10-20 RX ORDER — NYSTATIN 100000 [USP'U]/G
POWDER TOPICAL 2 TIMES DAILY
Status: DISCONTINUED | OUTPATIENT
Start: 2019-10-20 | End: 2019-10-23 | Stop reason: HOSPADM

## 2019-10-20 RX ORDER — ACETAMINOPHEN 325 MG/1
650 TABLET ORAL EVERY 4 HOURS PRN
Status: DISCONTINUED | OUTPATIENT
Start: 2019-10-20 | End: 2019-10-20

## 2019-10-20 RX ORDER — SODIUM CHLORIDE 9 MG/ML
100 INJECTION, SOLUTION INTRAVENOUS CONTINUOUS
Status: DISCONTINUED | OUTPATIENT
Start: 2019-10-20 | End: 2019-10-21

## 2019-10-20 RX ORDER — ONDANSETRON 2 MG/ML
4 INJECTION INTRAMUSCULAR; INTRAVENOUS EVERY 4 HOURS PRN
Status: DISCONTINUED | OUTPATIENT
Start: 2019-10-20 | End: 2019-10-23 | Stop reason: HOSPADM

## 2019-10-20 RX ORDER — ACETAMINOPHEN 325 MG/1
650 TABLET ORAL EVERY 4 HOURS PRN
Status: DISCONTINUED | OUTPATIENT
Start: 2019-10-20 | End: 2019-10-23 | Stop reason: HOSPADM

## 2019-10-20 RX ORDER — QUETIAPINE FUMARATE 25 MG/1
25 TABLET, FILM COATED ORAL
Status: DISCONTINUED | OUTPATIENT
Start: 2019-10-20 | End: 2019-10-23 | Stop reason: HOSPADM

## 2019-10-20 RX ORDER — GUAIFENESIN/DEXTROMETHORPHAN 100-10MG/5
10 SYRUP ORAL EVERY 4 HOURS PRN
Status: DISCONTINUED | OUTPATIENT
Start: 2019-10-20 | End: 2019-10-23 | Stop reason: HOSPADM

## 2019-10-20 RX ORDER — GABAPENTIN 300 MG/1
300 CAPSULE ORAL
Status: DISCONTINUED | OUTPATIENT
Start: 2019-10-20 | End: 2019-10-23 | Stop reason: HOSPADM

## 2019-10-20 RX ORDER — BENZONATATE 100 MG/1
100 CAPSULE ORAL 3 TIMES DAILY PRN
Status: DISCONTINUED | OUTPATIENT
Start: 2019-10-20 | End: 2019-10-23 | Stop reason: HOSPADM

## 2019-10-20 RX ORDER — FLUTICASONE PROPIONATE 50 MCG
1 SPRAY, SUSPENSION (ML) NASAL DAILY
Status: DISCONTINUED | OUTPATIENT
Start: 2019-10-20 | End: 2019-10-22

## 2019-10-20 RX ORDER — PANTOPRAZOLE SODIUM 40 MG/1
40 TABLET, DELAYED RELEASE ORAL
Status: DISCONTINUED | OUTPATIENT
Start: 2019-10-20 | End: 2019-10-23 | Stop reason: HOSPADM

## 2019-10-20 RX ORDER — LOPERAMIDE HYDROCHLORIDE 2 MG/1
2 CAPSULE ORAL 4 TIMES DAILY PRN
Status: DISCONTINUED | OUTPATIENT
Start: 2019-10-20 | End: 2019-10-23 | Stop reason: HOSPADM

## 2019-10-20 RX ORDER — CEFAZOLIN SODIUM 2 G/50ML
2000 SOLUTION INTRAVENOUS EVERY 8 HOURS
Status: DISCONTINUED | OUTPATIENT
Start: 2019-10-20 | End: 2019-10-23 | Stop reason: HOSPADM

## 2019-10-20 RX ORDER — MORPHINE SULFATE 4 MG/ML
4 INJECTION, SOLUTION INTRAMUSCULAR; INTRAVENOUS ONCE
Status: COMPLETED | OUTPATIENT
Start: 2019-10-20 | End: 2019-10-20

## 2019-10-20 RX ORDER — HYDROCODONE BITARTRATE AND ACETAMINOPHEN 5; 325 MG/1; MG/1
1 TABLET ORAL EVERY 4 HOURS PRN
Status: DISCONTINUED | OUTPATIENT
Start: 2019-10-20 | End: 2019-10-23 | Stop reason: HOSPADM

## 2019-10-20 RX ORDER — HYDROXYZINE HYDROCHLORIDE 25 MG/1
100 TABLET, FILM COATED ORAL
Status: DISCONTINUED | OUTPATIENT
Start: 2019-10-20 | End: 2019-10-23 | Stop reason: HOSPADM

## 2019-10-20 RX ADMIN — HYDROXYZINE HYDROCHLORIDE 100 MG: 25 TABLET ORAL at 21:30

## 2019-10-20 RX ADMIN — SODIUM CHLORIDE 125 ML/HR: 0.9 INJECTION, SOLUTION INTRAVENOUS at 07:49

## 2019-10-20 RX ADMIN — BENZONATATE 100 MG: 100 CAPSULE ORAL at 12:39

## 2019-10-20 RX ADMIN — NYSTATIN 1 APPLICATION: 100000 POWDER TOPICAL at 09:07

## 2019-10-20 RX ADMIN — ENOXAPARIN SODIUM 40 MG: 40 INJECTION SUBCUTANEOUS at 09:07

## 2019-10-20 RX ADMIN — CEFAZOLIN SODIUM 2000 MG: 2 SOLUTION INTRAVENOUS at 22:36

## 2019-10-20 RX ADMIN — INSULIN HUMAN 45 UNITS: 100 INJECTION, SUSPENSION SUBCUTANEOUS at 17:00

## 2019-10-20 RX ADMIN — VANCOMYCIN HYDROCHLORIDE 2000 MG: 1 INJECTION, POWDER, LYOPHILIZED, FOR SOLUTION INTRAVENOUS at 03:39

## 2019-10-20 RX ADMIN — NYSTATIN 1 APPLICATION: 100000 POWDER TOPICAL at 17:07

## 2019-10-20 RX ADMIN — GABAPENTIN 300 MG: 300 CAPSULE ORAL at 21:30

## 2019-10-20 RX ADMIN — METOPROLOL TARTRATE 25 MG: 25 TABLET, FILM COATED ORAL at 09:06

## 2019-10-20 RX ADMIN — INSULIN LISPRO 1 UNITS: 100 INJECTION, SOLUTION INTRAVENOUS; SUBCUTANEOUS at 21:35

## 2019-10-20 RX ADMIN — CEFAZOLIN SODIUM 2000 MG: 2 SOLUTION INTRAVENOUS at 16:00

## 2019-10-20 RX ADMIN — BENZONATATE 100 MG: 100 CAPSULE ORAL at 21:30

## 2019-10-20 RX ADMIN — SODIUM CHLORIDE 2000 ML: 0.9 INJECTION, SOLUTION INTRAVENOUS at 02:29

## 2019-10-20 RX ADMIN — GUAIFENESIN AND DEXTROMETHORPHAN 10 ML: 100; 10 SYRUP ORAL at 22:36

## 2019-10-20 RX ADMIN — HYDROCODONE BITARTRATE AND ACETAMINOPHEN 1 TABLET: 5; 325 TABLET ORAL at 17:12

## 2019-10-20 RX ADMIN — HYDROCODONE BITARTRATE AND ACETAMINOPHEN 1 TABLET: 5; 325 TABLET ORAL at 09:05

## 2019-10-20 RX ADMIN — METOPROLOL TARTRATE 25 MG: 25 TABLET, FILM COATED ORAL at 21:30

## 2019-10-20 RX ADMIN — SODIUM CHLORIDE 100 ML/HR: 0.9 INJECTION, SOLUTION INTRAVENOUS at 17:05

## 2019-10-20 RX ADMIN — GUAIFENESIN AND DEXTROMETHORPHAN 10 ML: 100; 10 SYRUP ORAL at 12:39

## 2019-10-20 RX ADMIN — PIPERACILLIN SODIUM,TAZOBACTAM SODIUM 3.38 G: 3; .375 INJECTION, POWDER, FOR SOLUTION INTRAVENOUS at 03:02

## 2019-10-20 RX ADMIN — CEFAZOLIN SODIUM 2000 MG: 2 SOLUTION INTRAVENOUS at 09:05

## 2019-10-20 RX ADMIN — HYDROCODONE BITARTRATE AND ACETAMINOPHEN 1 TABLET: 5; 325 TABLET ORAL at 21:30

## 2019-10-20 RX ADMIN — PANTOPRAZOLE SODIUM 40 MG: 40 TABLET, DELAYED RELEASE ORAL at 09:06

## 2019-10-20 RX ADMIN — INSULIN LISPRO 6 UNITS: 100 INJECTION, SOLUTION INTRAVENOUS; SUBCUTANEOUS at 17:00

## 2019-10-20 RX ADMIN — MORPHINE SULFATE 4 MG: 4 INJECTION INTRAVENOUS at 02:40

## 2019-10-20 RX ADMIN — IOHEXOL 100 ML: 350 INJECTION, SOLUTION INTRAVENOUS at 02:55

## 2019-10-20 RX ADMIN — INSULIN LISPRO 4 UNITS: 100 INJECTION, SOLUTION INTRAVENOUS; SUBCUTANEOUS at 09:06

## 2019-10-20 RX ADMIN — HYDROCODONE BITARTRATE AND ACETAMINOPHEN 1 TABLET: 5; 325 TABLET ORAL at 13:15

## 2019-10-20 NOTE — PLAN OF CARE
Problem: Potential for Falls  Goal: Patient will remain free of falls  Description  INTERVENTIONS:  - Assess patient frequently for physical needs  -  Identify cognitive and physical deficits and behaviors that affect risk of falls    -  [place] fall precautions as indicated by assessment   - Educate patient/family on patient safety including physical limitations  - Instruct patient to call for assistance with activity based on assessment  - Modify environment to reduce risk of injury  - Consider OT/PT consult to assist with strengthening/mobility  Outcome: Progressing     Problem: Prexisting or High Potential for Compromised Skin Integrity  Goal: Skin integrity is maintained or improved  Description  INTERVENTIONS:  - Identify patients at risk for skin breakdown  - Assess and monitor skin integrity  - Assess and monitor nutrition and hydration status  - Monitor labs   - Assess for incontinence   - Turn and reposition patient  - Assist with mobility/ambulation  - Relieve pressure over bony prominences  - Avoid friction and shearing  - Provide appropriate hygiene as needed including keeping skin clean and dry  - Evaluate need for skin moisturizer/barrier cream  - Collaborate with interdisciplinary team   - Patient/family teaching  - Consider wound care consult   Outcome: Progressing

## 2019-10-20 NOTE — CONSULTS
Consult- Cherelle Pope 1965, 48 y o  male MRN: [id number]    Unit/Bed#: -01 Encounter: [id number]    Primary Care Provider: Dorothea Gross MD   Date and time admitted to hospital: 10/20/2019  1:24 AM      Inpatient consult to Internal Medicine  Consult performed by: Zachery Guo MD  Consult ordered by: Elza Reeves MD      Assessment/Plan: Patient is a 47 yo M with hx of morbid obesity, HTN, HLD, poorly controlled type 2 DM on insulin, hx of L groin wound infection with septic shock associated with group B strep infection in September 2019 who is now admitted with fevers/chills and increasing pain and redness of his LLE and groin area concerning for cellulitis  1   LLE cellulitis/complicating L groin wound with severe sepsis POA  Hx of group B strep infection  · ID consulted - Continue IV cefazolin per their recommendations  No evidence of abscess on CT  · Monitor fever curve and WBC ct  · Would recheck lactic acid until <2 - continue IV fluids for now, would hold further fluids if LA <2 and tachycardia resolves  · F/u blood cx, trend procalciton  · Continue to monitor clinically  · Wound care consulted - follow-up recs  May want to consider podiatry consultation as involvement of LE  · Surgical team is primary - No indication for surgery at this time   · Pt eval when appropriate for dispo    2  DM2 - poorly controlled with A1c 11 2  · Follows with endocrinology - recently seen 10/14/19  · He is on Novolin 70/30 Relion due to cost restraints- recently increased to 74u qAM and 45u qPM   Given cost restraints will continue with this at this time  Will increase SSI TID wmeals to alg 4 and qHS to alg 2   BG goal 140-180 while in the hospital, will titrate as needed  · Hold home metformin  · Continue gabapentin 300mg qHS    3  HLD  · Not on statin? Check lipid panel    4  HTN  · Continue BB  · Should be on ACEi with hx of DM2  Check microalb/Cr ratio    May consider starting    5  Morbid obesity  · Counseled extensively on weight loss  Consider outpatient bariatric sx eval with his BMI >60 Kg/m2     6  Normocytic anemia  · Suspect component of chronic disease  No overt evidence of bleeding  · Check iron  Panel  · Transfuse for Hgb <7 or symptomatic anemia    7  Cough/shortness of breath  · Check CXR  Saturating well on RA  · For now, will provide symptomatic support with tessalon perles prn and robitussin prn  · Will give Flonase p r n  for postnasal drip  · F/u CXR      VTE Prophylaxis: Enoxaparin (Lovenox)  / sequential compression device     Recommendations for Discharge:  · Nadiya Steele 56 / Coordination of Care Time: 30 minutes  Greater than 50% of total time spent on patient counseling and coordination of care  Collaboration of Care: Were Recommendations Directly Discussed with Primary Treatment Team? - No     History of Present Illness:    Shana Shen is a 48 y o  male who is originally admitted to the surgical service  service due to left inguinal open wound cellulitis  We are consulted for Management of poorly controlled type 2 DM2    Briefly, patient is a history of hospitalization in September of this year for septic shock in the setting of a left groin wound infection associated with group B strep infection  He was initially treated for possible necrotizing soft tissue infection at that time though this was ruled out  Patient was doing well following discharge completed all of his antibiotics  He has been off antibiotics since discharge last month  Yesterday, he developed acute onset of fevers and chills as well as increasing pain and erythema on the medial aspect of his left thigh and groin area  This prompted him to come to the emergency department  He was given a dose of vancomycin and Zosyn and was transitioned to cefazolin by ID  He is being seen in consultation by ID    He was admitted to the surgical service for possible surgical I&D  There are no surgical plans at this time  Podiatry has been consulted for further wound care recommendations  Patient currently is fatigued  He has some mild tachycardia though is hemodynamically stable  Mild shortness of breath with exertion though saturating well on room air  He does have a cough that he states began yesterday  He does report some congestion and postnasal drip  It is nonproductive  Denies any chest pain    Review of Systems:    Review of Systems   Constitutional: Positive for activity change, chills, fatigue and fever  Negative for appetite change  HENT: Positive for congestion, postnasal drip and rhinorrhea  Negative for sneezing and sore throat  Respiratory: Positive for cough and shortness of breath  Negative for wheezing  Cardiovascular: Positive for leg swelling  Negative for chest pain  Gastrointestinal: Negative for abdominal distention, abdominal pain, blood in stool, constipation, diarrhea, nausea and vomiting  Genitourinary: Negative for dysuria  Musculoskeletal: Positive for arthralgias and back pain  Skin: Positive for color change  Neurological: Positive for weakness  Negative for dizziness, tremors, seizures, syncope, light-headedness, numbness and headaches         Past Medical and Surgical History:     Past Medical History:   Diagnosis Date    Cellulitis     last assessed 12/10/15    Diabetes mellitus ([facility] [address] )     Edema     Elevated liver enzymes     Esophageal reflux     Gluten intolerance     Gout     last assessed 09/05/13    Hyperglycemia     Hypertension     IBS (irritable bowel syndrome)     Insomnia     Obesity     Osteoarthritis of knee     last assessed 02/10/14    Prehypertension     last assessed 08/22/17    Venous insufficiency     last assessed 08/22/17    Villonodular synovitis of the hand, right     last assessed 11/14/2013       Past Surgical History:   Procedure Laterality Date    INCISION AND DRAINAGE OF WOUND Left 9/6/2019    Procedure: INCISION AND DRAINAGE (I&D) GROIN;  Surgeon: Allison Cornejo DO;  [facility];  Service: General    WOUND DEBRIDEMENT Left 9/7/2019    Procedure: EXCISIONAL DEBRIDEMENT;  Surgeon: Allison Cornejo DO;  [facility];  Service: General       Meds/Allergies:    all medications and allergies reviewed    Allergies: Allergies   Allergen Reactions    Gluten Meal     Clindamycin Diarrhea       Social History:     Marital Status: /Civil Union    Substance Use History:   Social History     Substance and Sexual Activity   Alcohol Use No     Social History     Tobacco Use   Smoking Status Never Smoker   Smokeless Tobacco Never Used     Social History     Substance and Sexual Activity   Drug Use No       Family History:    Family History   Problem Relation Age of Onset    Cancer Mother         gastrc    Colon cancer Father     Heart failure Father        Physical Exam:     Vitals:   Blood Pressure: 124/75 (10/20/19 0717)  Pulse: (!) 120 (10/20/19 0717)  Temperature: 100 °F (37 8 °C) (10/20/19 1200)  Temp Source: Oral (10/20/19 1200)  Respirations: 20 (10/20/19 0717)  Height: 5' 4" (162 6 cm) (10/20/19 0717)  Weight - Scale: (!) 160 kg (353 lb 9 9 oz) (10/20/19 0134)  SpO2: 94 % (10/20/19 0717)    Physical Exam   Constitutional: He is oriented to person, place, and time  He appears well-developed and well-nourished  No distress  HENT:   Head: Normocephalic and atraumatic  Eyes: Pupils are equal, round, and reactive to light  Neck: Normal range of motion  No JVD present  Cardiovascular: Exam reveals no friction rub  No murmur heard  Sinus tachycardia   Pulmonary/Chest: Breath sounds normal  He is in respiratory distress  He has no wheezes  He has no rales  Abdominal: Soft  He exhibits no distension  There is no tenderness  Morbidly obese abdomen   Musculoskeletal: Normal range of motion  He exhibits edema and tenderness     Neurological: He is alert and oriented to person, place, and time  Skin: Skin is warm and dry  He is not diaphoretic  There is erythema  Nursing note and vitals reviewed  Additional Data:     Lab Results: I have personally reviewed pertinent reports  Results from last 7 days   Lab Units 10/20/19  0207   WBC Thousand/uL 11 38*   HEMOGLOBIN g/dL 9 5*   HEMATOCRIT % 31 0*   PLATELETS Thousands/uL 237   NEUTROS PCT % 79*   LYMPHS PCT % 10*   MONOS PCT % 8   EOS PCT % 2     Results from last 7 days   Lab Units 10/20/19  0207   SODIUM mmol/L 134*   POTASSIUM mmol/L 4 3   CHLORIDE mmol/L 99*   CO2 mmol/L 26   BUN mg/dL 13   CREATININE mg/dL 0 92   ANION GAP mmol/L 9   CALCIUM mg/dL 9 1   ALBUMIN g/dL 2 6*   TOTAL BILIRUBIN mg/dL 0 40   ALK PHOS U/L 78   ALT U/L 34   AST U/L 35   GLUCOSE RANDOM mg/dL 280*     Results from last 7 days   Lab Units 10/20/19  0207   INR  1 11     Results from last 7 days   Lab Units 10/20/19  0207   TROPONIN I ng/mL <0 02     Lab Results   Component Value Date/Time    HGBA1C 11 2 (H) 09/06/2019 12:12 PM    HGBA1C 12 0 (H) 12/03/2015 01:25 PM    HGBA1C 6 3 (H) 02/06/2014 12:35 PM    HGBA1C 5 6 10/10/2013 12:19 PM     Results from last 7 days   Lab Units 10/20/19  1046 10/20/19  0719   POC GLUCOSE mg/dl 274* 286*     Results from last 7 days   Lab Units 10/20/19  0548 10/20/19  0207   LACTIC ACID mmol/L 2 1* 4 0*       Imaging: I have personally reviewed pertinent reports  CT pelvis w contrast   Final Result by Lani Park DO (10/20 6968)   No acute fracture or dislocation is seen  Large soft tissue defect in the anterior left thigh with associated edema/cellulitis in the left inguinal region, left thigh, the left perineum, the anterior abdominal and pelvic wall and the medial bilateral thighs distally, as described; left greater    than right        A small amount of subcutaneous emphysema is seen in the medial left thigh anteriorly, in the left inguinal region (axial image 110, series 3 for example) as well as the anterior abdominal wall which could be tracking in the externally, related to prior    intervention or related to soft tissue necrosis  Correlation with the patient's physical exam, symptoms, and laboratory values recommended  Shotty left inguinal lymph nodes, small-volume ascites, colonic diverticulosis without evidence of acute diverticulitis and other findings as above  Findings discussed with       Dr Ana Laura Solis by Dr Nancy Verdugo at 4:35 AM on 10/20/2019  Workstation performed: [id number]         CT femur left w contrast   Final Result by Gentry Casillas DO (10/20 7094)   No acute fracture or dislocation is seen  Large soft tissue defect in the anterior left thigh with associated edema/cellulitis in the left inguinal region, left thigh, the left perineum, the anterior abdominal and pelvic wall and the medial bilateral thighs distally, as described; left greater    than right  A small amount of subcutaneous emphysema is seen in the medial left thigh anteriorly, in the left inguinal region (axial image 110, series 3 for example) as well as the anterior abdominal wall which could be tracking in the externally, related to prior    intervention or related to soft tissue necrosis  Correlation with the patient's physical exam, symptoms, and laboratory values recommended  Shotty left inguinal lymph nodes, small-volume ascites, colonic diverticulosis without evidence of acute diverticulitis and other findings as above  Findings discussed with       Dr Ana Laura Solis by Dr Nancy Verdugo at 4:35 AM on 10/20/2019  Workstation performed: [id number]         XR chest pa & lateral    (Results Pending)       EKG, Pathology, and Other Studies Reviewed on Admission:   · EKG: Sinus tachycardia    ** Please Note: This note has been constructed using a voice recognition system   **

## 2019-10-20 NOTE — H&P
H&P Exam - Chris Ramirez 48 y o  male MRN: [id number]    Unit/Bed#: -01 Encounter: [id number]    Assessment:  Patient presents with a large left inguinal open wound which is being managed at home with visiting nurses  This is chronic wound care  He has been using Dakin's solution until recently  He presents with cellulitis, fever, tachycardia, a SIRS component is associated with his presentation  Plan:  Continue with wound care with chlorhexidine  Will add daily showers  No evidence for abscess on exploration or CT scan  Cellulitis over the left upper inner thigh is prominent  Will consult Infectious Disease as well as wound care  Insulin-dependent diabetes  His insulin requirements have been rising given his cellulitis  Will consult [facility]  He does follow with [facility] endocrinology as an outpatient  Morbid obesity  Patient states that he is been compliant with his diet and has been losing weight gradually    Consult physical therapy to assess whether a he is a excessive fall risk  Would like patient to walk with assistance to shower for improved wound care  History of Present Illness   Emergency room history of present illness reviewed and I agree  Patient has a history of a soft tissue infection treated with incision and drainage and debridement of soft tissues  He is been followed by doctor Ruano on as an outpatient  He presents with fever, chills, tachycardia, and cellulitis  Review of Systems   Constitutional: Positive for activity change, chills, fatigue and fever  Skin: Positive for color change and wound         Historical Information   Past Medical History:   Diagnosis Date    Cellulitis     last assessed 12/10/15    Diabetes mellitus ([facility] [address] )     Edema     Elevated liver enzymes     Esophageal reflux     Gluten intolerance     Gout     last assessed 09/05/13    Hyperglycemia     Hypertension     IBS (irritable bowel syndrome)     Insomnia     Obesity     Osteoarthritis of knee     last assessed 02/10/14    Prehypertension     last assessed 08/22/17    Venous insufficiency     last assessed 08/22/17    Villonodular synovitis of the hand, right     last assessed 11/14/2013     Past Surgical History:   Procedure Laterality Date    INCISION AND DRAINAGE OF WOUND Left 9/6/2019    Procedure: INCISION AND DRAINAGE (I&D) GROIN;  Surgeon: Leydi Rivera DO;  [facility];  Service: General    WOUND DEBRIDEMENT Left 9/7/2019    Procedure: EXCISIONAL DEBRIDEMENT;  Surgeon: Leydi Rivera DO;  [facility];  Service: General     Social History   Social History     Substance and Sexual Activity   Alcohol Use No     Social History     Substance and Sexual Activity   Drug Use No     Social History     Tobacco Use   Smoking Status Never Smoker   Smokeless Tobacco Never Used       Meds/Allergies   all medications and allergies reviewed  Allergies   Allergen Reactions    Gluten Meal     Clindamycin Diarrhea       Objective   First Vitals:   Blood Pressure: 155/82 (10/20/19 0134)  Pulse: 105 (10/20/19 0134)  Temperature: 98 1 °F (36 7 °C) (10/20/19 0134)  Temp Source: Oral (10/20/19 0134)  Respirations: 20 (10/20/19 0134)  Height: 5' 4" (162 6 cm) (10/20/19 0134)  Weight - Scale: (!) 160 kg (353 lb 9 9 oz) (10/20/19 0134)  SpO2: 98 % (10/20/19 0134)    Current Vitals:   Blood Pressure: 124/75 (10/20/19 0717)  Pulse: (!) 120 (10/20/19 0717)  Temperature: 100 4 °F (38 °C) (10/20/19 0717)  Temp Source: Oral (10/20/19 0717)  Respirations: 20 (10/20/19 0717)  Height: 5' 4" (162 6 cm) (10/20/19 0717)  Weight - Scale: (!) 160 kg (353 lb 9 9 oz) (10/20/19 0134)  SpO2: 94 % (10/20/19 0717)      Intake/Output Summary (Last 24 hours) at 10/20/2019 0915  Last data filed at 10/20/2019 0547  Gross per 24 hour   Intake 2550 ml   Output    Net 2550 ml       Invasive Devices     Peripheral Intravenous Line            Long-Dwell Peripheral IV (Midline) 61/00/76 Left Cephalic 38 days    Peripheral IV 10/20/19 Right Antecubital less than 1 day                Physical Exam   Constitutional: He is oriented to person, place, and time  He appears well-nourished  HENT:   Head: Normocephalic and atraumatic  Nose: Nose normal    Eyes: Pupils are equal, round, and reactive to light  EOM are normal    Neck: Normal range of motion  Cardiovascular: Normal rate and regular rhythm  Pulmonary/Chest: Effort normal    Abdominal: Soft  Morbid obesity present   Musculoskeletal: Normal range of motion  Neurological: He is alert and oriented to person, place, and time  Skin:   Patient has a 15 cm large open wound in the left upper inner thigh  There is chronic granulation tissue present  It undermines lateral and superior is no evidence for undrained collection or abscess formation  There is excoriation of the skin secondary to fungal rash within the inguinal creases       15 x 10 cm region of cellulitis noted in the upper anterior and medial thigh  Lab Results:  Reviewed  Imaging:  Reviewed  EKG, Pathology, and Other Studies:  Not reviewed    Code Status: Level 1 - Full Code  Advance Directive and Living Will:      Power of :    POLST:      Counseling / Coordination of Care: Total floor / unit time spent today Forty-five minutes

## 2019-10-20 NOTE — CONSULTS
Consultation - Infectious Disease   Minerva Antonio 48 y o  male MRN: [id number]  Unit/Bed#: -01 Encounter: [id number]      IMPRESSION & RECOMMENDATIONS:   1  Severe sepsis-POA  Fever, leukocytosis, tachycardia , lactic acidosis  Appears to be all secondary to a left lower extremity soft tissue infection with notable cellulitis  No other clear sources appreciated  Consideration for the possibility of bacteremia  Fortunately the patient remains hemodynamically stable despite his systemic illness  He seems to be tolerating the antibiotics without difficulty  -continue cefazolin for now at current dose  -follow-up blood cultures  -recheck CBC with diff and CMP  -check procalcitonin level now and tomorrow a m   -supportive care    2  Left lower extremity cellulitis-complicating a left groin/thigh wound  Previously the patient had group B Streptococcus, and the organism may end up being the same  This does look most consistent with a streptococcal infection  No abscess collection noted on CT scan  -antibiotics as above  -close surgical follow-up  -local wound care  -serial exams    3  Diabetes mellitus-type 2 with hyperglycemia  Previous hemoglobin A1c was 12  This is certainly leading to increased risk of infection and relapses   -await internal medicine evaluation  -tighten glycemic control    4  Cough-with some notable shortness of breath when the patient 1st presented although this has improved  The patient feels it is secondary to postnasal drip   -check chest x-ray  -cough suppression for symptomatic relief  -monitor respiratory status      5  Morbid obesity-this is another important risk factor for the patient's the soft tissue infections    Recommend weight loss to decreased risk of recurrent infection    Reviewed medical records in [facility] from previous hospital stay including review of the notes, radiographs, and laboratory findings    HISTORY OF PRESENT ILLNESS:  Reason for Consult: Cellulitis  HPI: Kristen Dyer is a 48y o  year old male with a history of poorly controlled diabetes mellitus, chronic kidney disease, and more recently a left groin wound infraction with septic shock associated with a group B strep infection admitted to [facility] with worsening left thigh pain and redness as well as fever and chills who I am asked to assist with antibiotic management  The patient had been admitted to [facility] on the 6th of September this year with septic shock found to be secondary to a left groin wound infection with wound culture growing group B Streptococcus  He was initially treated for a possible necrotizing soft tissue infection and received extremely broad antibiotics  When the wound culture grew group B Streptococcus he was de-escalated to ceftriaxone after a 7 day treatment course  He was ultimately not found to have a necrotizing fasciitis  He was eventually discharged home off all antibiotics  He had been doing well until yesterday he developed the onset of fever and chills and increasing pain and redness involving the left thigh inferior to the groin wound  He also developed some cough and shortness of breath and therefore came to the ER for further evaluation  In the emergency department the patient was initially afebrile but subsequently spiked a low-grade fever  He was diagnosed with a left thigh cellulitis associated with an open wound, had blood cultures obtained, and was started on vancomycin and Zosyn admitted for further management  As he was hemodynamically stable in the infection was relatively well localized his antibiotics were de-escalated to cefazolin  He has continued to remained hemodynamically stable despite his systemic illness    He denies any headache or stiff neck, denies any sore throat or rhinorrhea or nasal congestion, does admit to a cough that is been persistent and worsening and some mild shortness of breath  He denies any nausea vomiting or diarrhea, denies any dysuria or hematuria, denies any other rash or skin lesions  REVIEW OF SYSTEMS:  A complete 12 point system-based review of systems is negative other than that noted in the HPI  PAST MEDICAL HISTORY:  Past Medical History:   Diagnosis Date    Cellulitis     last assessed 12/10/15    Diabetes mellitus ([facility] [address] )     Edema     Elevated liver enzymes     Esophageal reflux     Gluten intolerance     Gout     last assessed 13    Hyperglycemia     Hypertension     IBS (irritable bowel syndrome)     Insomnia     Obesity     Osteoarthritis of knee     last assessed 02/10/14    Prehypertension     last assessed 17    Venous insufficiency     last assessed 17    Villonodular synovitis of the hand, right     last assessed 2013     Past Surgical History:   Procedure Laterality Date    INCISION AND DRAINAGE OF WOUND Left 2019    Procedure: INCISION AND DRAINAGE (I&D) GROIN;  Surgeon: Daniela Stoddard DO;  Location: [facility];  Service: General    WOUND DEBRIDEMENT Left 2019    Procedure: EXCISIONAL DEBRIDEMENT;  Surgeon: Daniela Stoddard DO;  Location[facility];  Service: General       FAMILY HISTORY:  Non-contributory    SOCIAL HISTORY:  Social History   Social History     Substance and Sexual Activity   Alcohol Use No     Social History     Substance and Sexual Activity   Drug Use No     Social History     Tobacco Use   Smoking Status Never Smoker   Smokeless Tobacco Never Used       ALLERGIES:  Allergies   Allergen Reactions    Gluten Meal     Clindamycin Diarrhea       MEDICATIONS:  All current active medications have been reviewed    Antibiotics:  Cefazolin 1    PHYSICAL EXAM:  Temp:  [98 1 °F (36 7 °C)-100 4 °F (38 °C)] 100 4 °F (38 °C)  HR:  [105-120] 120  Resp:  [20-22] 20  BP: (124-155)/(75-88) 124/75  SpO2:  [94 %-98 %] 94 %  Temp (24hrs), Av 3 °F (37 4 °C), Min:98 1 °F (36 7 °C), Max:100 4 °F (38 °C)  Current: Temperature: 100 4 °F (38 °C)    Intake/Output Summary (Last 24 hours) at 10/20/2019 1214  Last data filed at 10/20/2019 1035  Gross per 24 hour   Intake 2895 83 ml   Output    Net 2895 83 ml       General Appearance:  Appearing well, nontoxic, and in no distress   Head:  Normocephalic, without obvious abnormality, atraumatic   Eyes:  Conjunctiva pink and sclera anicteric, both eyes   Nose: Nares normal, mucosa normal, no drainage   Throat: Oropharynx moist without lesions   Neck: Supple, symmetrical, no adenopathy, no tenderness/mass/nodules   Back:   Symmetric, no curvature, ROM normal, no CVA tenderness   Lungs:   Clear to auscultation bilaterally, respirations unlabored   Chest Wall:  No tenderness or deformity   Heart:  RRR; no murmur, rub or gallop   Abdomen:   Soft, non-tender, non-distended, positive bowel sounds    Extremities: No cyanosis, clubbing or edema   Skin: No rashes or lesions  No draining wounds noted  Lymph nodes: Cervical, supraclavicular nodes normal   Neurologic: Alert and oriented times 3, extremity strength 5/5 and symmetric       LABS, IMAGING, & OTHER STUDIES:  Lab Results:  I have personally reviewed pertinent labs  Results from last 7 days   Lab Units 10/20/19  0207   WBC Thousand/uL 11 38*   HEMOGLOBIN g/dL 9 5*   PLATELETS Thousands/uL 237     Results from last 7 days   Lab Units 10/20/19  0207   SODIUM mmol/L 134*   POTASSIUM mmol/L 4 3   CHLORIDE mmol/L 99*   CO2 mmol/L 26   BUN mg/dL 13   CREATININE mg/dL 0 92   EGFR ml/min/1 73sq m 95   CALCIUM mg/dL 9 1   AST U/L 35   ALT U/L 34   ALK PHOS U/L 78            blood cultures x2 sets pending    Imaging Studies:     CT pelvis/femur-large tissue defect left anterior thigh but with no abscess appreciated      Images personally reviewed by me in PACS

## 2019-10-20 NOTE — ED PROVIDER NOTES
History  Chief Complaint   Patient presents with    Cellulitis     Pt presents to ED from home w/ redness and open wound on upper left leg  Pt admitted for same two weeks ago  Pt morbidly obese, gait issues, DM, HTN  HPI   Patient is a 48year old male who presents with erythema and wound to the left upper leg  Tachycardic  History of recent wound infection in the same area that required extensive debridement and admission  Possibly mild nec fasc previously  No abdominal pain  No f/c/s  No chest pain  No cough or urinary symptoms  MDM 48 yom, will image to rule out nec fasc, treat infection, admit to surgery  ---------------------  IMPRESSION & RECOMMENDATIONS:   1  Severe sepsis-POA  Fever, leukocytosis, tachycardia , lactic acidosis  Appears to be all secondary to a left lower extremity soft tissue infection with notable cellulitis  No other clear sources appreciated  Consideration for the possibility of bacteremia  Fortunately the patient remains hemodynamically stable despite his systemic illness  He seems to be tolerating the antibiotics without difficulty  -continue cefazolin for now at current dose  -follow-up blood cultures  -recheck CBC with diff and CMP  -check procalcitonin level now and tomorrow a m   -supportive care     2  Left lower extremity cellulitis-complicating a left groin/thigh wound  Previously the patient had group B Streptococcus, and the organism may end up being the same  This does look most consistent with a streptococcal infection  No abscess collection noted on CT scan  -antibiotics as above  -close surgical follow-up  -local wound care  -serial exams     3  Diabetes mellitus-type 2 with hyperglycemia  Previous hemoglobin A1c was 12   This is certainly leading to increased risk of infection and relapses   -await internal medicine evaluation  -tighten glycemic control     4   Cough-with some notable shortness of breath when the patient 1st presented although this has improved  The patient feels it is secondary to postnasal drip   -check chest x-ray  -cough suppression for symptomatic relief  -monitor respiratory status       5  Morbid obesity-this is another important risk factor for the patient's the soft tissue infections  Recommend weight loss to decreased risk of recurrent infection   -------------------------------------------------------------------    Prior to Admission Medications   Prescriptions Last Dose Informant Patient Reported? Taking? HYDROcodone-acetaminophen (NORCO)  mg per tablet  Self Yes No   Sig: Take 1 tablet by mouth every 6 (six) hours as needed for moderate pain   Insulin Pen Needle (B-D UF III MINI PEN NEEDLES) 31G X 5 MM MISC  Self Yes No   Sig: by Does not apply route   QUEtiapine (SEROquel) 25 mg tablet  Self No No   Sig: Take 1 tablet (25 mg total) by mouth daily at bedtime as needed (Sleep) for up to 20 doses   Patient not taking: Reported on 10/8/2019   gabapentin (NEURONTIN) 100 mg capsule   No No   Sig: Take 3 capsules (300 mg total) by mouth daily at bedtime   hydrOXYzine HCL (ATARAX) 25 mg tablet   No No   Sig: Take 4 tablets (100 mg total) by mouth daily at bedtime   ibuprofen (MOTRIN) 800 mg tablet   No No   Sig: Take 1 tablet (800 mg total) by mouth every 8 (eight) hours as needed for moderate pain   insulin NPH-insulin regular (NovoLIN 70/30) 100 units/mL subcutaneous injection   No No   Sig: Take 60 units a m with breakfast ; 40 units p m  With dinner   Patient taking differently: Take 80 units a m with breakfast ; 44 units p m   With dinner   loperamide (IMODIUM) 2 mg capsule   No No   Sig: Take 1 capsule (2 mg total) by mouth 4 (four) times a day as needed for diarrhea (Maximum 4 capsules/day)   metFORMIN (GLUCOPHAGE) 1000 MG tablet  Self No No   Sig: TAKE 1 TABLET BY MOUTH TWICE A DAY   metoprolol tartrate (LOPRESSOR) 25 mg tablet   No No   Sig: Take 1 tablet (25 mg total) by mouth every 12 (twelve) hours   nystatin (MYCOSTATIN) powder   No No   Sig: Apply topically 2 (two) times a day   omeprazole (PriLOSEC) 40 MG capsule  Self No No   Sig: Take 1 capsule (40 mg total) by mouth daily Take in AM (in addition to 20 mg dose taken in PM)   oxyCODONE-acetaminophen (PERCOCET) 5-325 mg per tablet  Self Yes No   Sig: Take 1 tablet by mouth every 4 (four) hours as needed      Facility-Administered Medications: None       Past Medical History:   Diagnosis Date    Cellulitis     last assessed 12/10/15    Diabetes mellitus ([facility] [address] )     Edema     Elevated liver enzymes     Esophageal reflux     Gluten intolerance     Gout     last assessed 09/05/13    Hyperglycemia     Hypertension     IBS (irritable bowel syndrome)     Insomnia     Obesity     Osteoarthritis of knee     last assessed 02/10/14    Prehypertension     last assessed 08/22/17    Venous insufficiency     last assessed 08/22/17    Villonodular synovitis of the hand, right     last assessed 11/14/2013       Past Surgical History:   Procedure Laterality Date    INCISION AND DRAINAGE OF WOUND Left 9/6/2019    Procedure: INCISION AND DRAINAGE (I&D) GROIN;  Surgeon: Carleen Carrera DO;  [facility];  Service: General    WOUND DEBRIDEMENT Left 9/7/2019    Procedure: EXCISIONAL DEBRIDEMENT;  Surgeon: Carleen Carrera DO;  [facility];  Service: General       Family History   Problem Relation Age of Onset    Cancer Mother         gastrc    Colon cancer Father     Heart failure Father      I have reviewed and agree with the history as documented  Social History     Tobacco Use    Smoking status: Never Smoker    Smokeless tobacco: Never Used   Substance Use Topics    Alcohol use: No    Drug use: No        Review of Systems   Skin: Positive for color change and wound  All other systems reviewed and are negative  Physical Exam  Physical Exam   Constitutional: He is oriented to person, place, and time   He appears well-developed and well-nourished  HENT:   Head: Normocephalic and atraumatic  Eyes: Pupils are equal, round, and reactive to light  EOM are normal    Neck: Normal range of motion  Neck supple  Cardiovascular: Normal rate, regular rhythm and normal heart sounds  No murmur heard  Pulmonary/Chest: Effort normal and breath sounds normal  No respiratory distress  He has no wheezes  Abdominal: Soft  Bowel sounds are normal  He exhibits no distension  There is no tenderness  Musculoskeletal: Normal range of motion  He exhibits no edema or tenderness  Neurological: He is alert and oriented to person, place, and time  No cranial nerve deficit  Coordination normal    Skin: Skin is warm and dry  He is not diaphoretic  No erythema  Psychiatric: He has a normal mood and affect  His behavior is normal    Nursing note and vitals reviewed        Vital Signs  ED Triage Vitals [10/20/19 0134]   Temperature Pulse Respirations Blood Pressure SpO2   98 1 °F (36 7 °C) 105 20 155/82 98 %      Temp Source Heart Rate Source Patient Position - Orthostatic VS BP Location FiO2 (%)   Oral Monitor Sitting Right arm --      Pain Score       5           Vitals:    10/20/19 0145 10/20/19 0320 10/20/19 0717 10/20/19 1544   BP: 149/88 155/88 124/75 140/72   Pulse: (!) 120 (!) 120 (!) 120 (!) 120   Patient Position - Orthostatic VS: Lying Lying Lying Lying         Visual Acuity  Visual Acuity      Most Recent Value   L Pupil Size (mm)  3   R Pupil Size (mm)  3          ED Medications  Medications   gabapentin (NEURONTIN) capsule 300 mg (has no administration in time range)   hydrOXYzine HCL (ATARAX) tablet 100 mg (has no administration in time range)   loperamide (IMODIUM) capsule 2 mg (has no administration in time range)   metoprolol tartrate (LOPRESSOR) tablet 25 mg (25 mg Oral Given 10/20/19 0906)   nystatin (MYCOSTATIN) powder (1 application Topical Given 10/20/19 9807)   pantoprazole (PROTONIX) EC tablet 40 mg (40 mg Oral Given 10/20/19 0906)   HYDROcodone-acetaminophen (NORCO) 5-325 mg per tablet 1 tablet (1 tablet Oral Given 10/20/19 1712)   QUEtiapine (SEROquel) tablet 25 mg (has no administration in time range)   ondansetron (ZOFRAN) injection 4 mg (has no administration in time range)   sodium chloride 0 9 % infusion (100 mL/hr Intravenous New Bag 10/20/19 1705)   enoxaparin (LOVENOX) subcutaneous injection 40 mg (40 mg Subcutaneous Given 10/20/19 0907)   ceFAZolin (ANCEF) IVPB (premix) 2,000 mg (2,000 mg Intravenous New Bag 10/20/19 1600)   acetaminophen (TYLENOL) tablet 650 mg (has no administration in time range)   benzonatate (TESSALON PERLES) capsule 100 mg (100 mg Oral Given 10/20/19 1239)   dextromethorphan-guaiFENesin (ROBITUSSIN DM)  mg/5 mL oral syrup 10 mL (10 mL Oral Given 10/20/19 1239)   insulin NPH (HumuLIN N,NovoLIN N) 100 Units/mL subcutaneous injection 74 Units (has no administration in time range)   insulin NPH (HumuLIN N,NovoLIN N) 100 Units/mL subcutaneous injection 45 Units (45 Units Subcutaneous Given 10/20/19 1700)   insulin lispro (HumaLOG) 100 units/mL subcutaneous injection 2-12 Units (6 Units Subcutaneous Given 10/20/19 1700)   insulin lispro (HumaLOG) 100 units/mL subcutaneous injection 1-5 Units (has no administration in time range)   fluticasone (FLONASE) 50 mcg/act nasal spray 1 spray (1 spray Each Nare Refused 10/20/19 1705)   vancomycin (VANCOCIN) 2,000 mg in sodium chloride 0 9 % 500 mL IVPB (0 mg Intravenous Stopped 10/20/19 0547)   piperacillin-tazobactam (ZOSYN) 3 375 g in sodium chloride 0 9 % 50 mL IVPB (0 g Intravenous Stopped 10/20/19 0320)   sodium chloride 0 9 % bolus 2,000 mL (0 mL Intravenous Stopped 10/20/19 0546)   morphine (PF) 4 mg/mL injection 4 mg (4 mg Intravenous Given 10/20/19 0240)   iohexol (OMNIPAQUE) 350 MG/ML injection (SINGLE-DOSE) 100 mL (100 mL Intravenous Given 10/20/19 0255)       Diagnostic Studies  Results Reviewed     Procedure Component Value Units Date/Time Lactic acid, plasma [[id number]]  (Abnormal) Collected:  10/20/19 0548    Lab Status:  Final result Specimen:  Blood from Arm, Right Updated:  10/20/19 0612     LACTIC ACID 2 1 mmol/L     Narrative:       Result may be elevated if tourniquet was used during collection  Urine Microscopic [[id number]]  (Abnormal) Collected:  10/20/19 0320    Lab Status:  Final result Specimen:  Urine, Clean Catch Updated:  10/20/19 0344     RBC, UA None Seen /hpf      WBC, UA 0-1 /hpf      Epithelial Cells None Seen /hpf      Bacteria, UA None Seen /hpf      MUCUS THREADS Moderate    UA w Reflex to Microscopic w Reflex to Culture [[id number]]  (Abnormal) Collected:  10/20/19 0320    Lab Status:  Final result Specimen:  Urine, Clean Catch Updated:  10/20/19 0332     Color, UA Yellow     Clarity, UA Clear     Specific [place], UA 1 020     pH, UA 5 5     Leukocytes, UA Negative     Nitrite, UA Negative     Protein, UA Trace mg/dl      Glucose,  (1/4%) mg/dl      Ketones, UA Trace mg/dl      Urobilinogen, UA 0 2 E U /dl      Bilirubin, UA Negative     Blood, UA Negative    Lactic acid, plasma x2 [[id number]]  (Abnormal) Collected:  10/20/19 0207    Lab Status:  Final result Specimen:  Blood from Arm, Right Updated:  10/20/19 0240     LACTIC ACID 4 0 mmol/L     Narrative:       Result may be elevated if tourniquet was used during collection  Blood culture [[id number]] Collected:  10/20/19 0234    Lab Status: In process Specimen:  Blood from Arm, Right Updated:  10/20/19 0237    Blood culture [[id number]] Collected:  10/20/19 0234    Lab Status:   In process Specimen:  Blood from Arm, Right Updated:  10/20/19 0237    Troponin I [[id number]]  (Normal) Collected:  10/20/19 0207    Lab Status:  Final result Specimen:  Blood from Arm, Right Updated:  10/20/19 0232     Troponin I <0 02 ng/mL     Comprehensive metabolic panel [[id number]]  (Abnormal) Collected:  10/20/19 0207    Lab Status:  Final result Specimen:  Blood from Arm, Right Updated:  10/20/19 0230     Sodium 134 mmol/L      Potassium 4 3 mmol/L      Chloride 99 mmol/L      CO2 26 mmol/L      ANION GAP 9 mmol/L      BUN 13 mg/dL      Creatinine 0 92 mg/dL      Glucose 280 mg/dL      Calcium 9 1 mg/dL      AST 35 U/L      ALT 34 U/L      Alkaline Phosphatase 78 U/L      Total Protein 7 6 g/dL      Albumin 2 6 g/dL      Total Bilirubin 0 40 mg/dL      eGFR 95 ml/min/1 73sq m     Narrative:       National Kid[employer] Disease Foundation guidelines for Chronic Kidney Disease (CKD):     Stage 1 with normal or high GFR (GFR > 90 mL/min/1 73 square meters)    Stage 2 Mild CKD (GFR = 60-89 mL/min/1 73 square meters)    Stage 3A Moderate CKD (GFR = 45-59 mL/min/1 73 square meters)    Stage 3B Moderate CKD (GFR = 30-44 mL/min/1 73 square meters)    Stage 4 Severe CKD (GFR = 15-29 mL/min/1 73 square meters)    Stage 5 End Stage CKD (GFR <15 mL/min/1 73 square meters)  Note: GFR calculation is accurate only with a steady state creatinine    Protime-INR [[id number]]  (Normal) Collected:  10/20/19 0207    Lab Status:  Final result Specimen:  Blood from Arm, Right Updated:  10/20/19 0223     Protime 13 7 seconds      INR 1 11    APTT [[id number]]  (Normal) Collected:  10/20/19 0207    Lab Status:  Final result Specimen:  Blood from Arm, Right Updated:  10/20/19 0223     PTT 29 seconds     CBC and differential [[id number]]  (Abnormal) Collected:  10/20/19 0207    Lab Status:  Final result Specimen:  Blood from Arm, Right Updated:  10/20/19 0215     WBC 11 38 Thousand/uL      RBC 3 56 Million/uL      Hemoglobin 9 5 g/dL      Hematocrit 31 0 %      MCV 87 fL      MCH 26 7 pg      MCHC 30 6 g/dL      RDW 14 6 %      MPV 8 2 fL      Platelets 696 Thousands/uL      nRBC 0 /100 WBCs      Neutrophils Relative 79 %      Immat GRANS % 0 %      Lymphocytes Relative 10 %      Monocytes Relative 8 %      Eosinophils Relative 2 %      Basophils Relative 1 %      Neutrophils Absolute 8 98 Thousands/µL      Immature Grans Absolute 0 04 Thousand/uL      Lymphocytes Absolute 1 16 Thousands/µL      Monocytes Absolute 0 87 Thousand/µL      Eosinophils Absolute 0 27 Thousand/µL      Basophils Absolute 0 06 Thousands/µL                  CT pelvis w contrast   Final Result by Mally Tam DO (10/20 9702)   No acute fracture or dislocation is seen  Large soft tissue defect in the anterior left thigh with associated edema/cellulitis in the left inguinal region, left thigh, the left perineum, the anterior abdominal and pelvic wall and the medial bilateral thighs distally, as described; left greater    than right  A small amount of subcutaneous emphysema is seen in the medial left thigh anteriorly, in the left inguinal region (axial image 110, series 3 for example) as well as the anterior abdominal wall which could be tracking in the externally, related to prior    intervention or related to soft tissue necrosis  Correlation with the patient's physical exam, symptoms, and laboratory values recommended  Shotty left inguinal lymph nodes, small-volume ascites, colonic diverticulosis without evidence of acute diverticulitis and other findings as above  Findings discussed with       Dr Hellen Patrick by Dr Brenda Mohamud at 4:35 AM on 10/20/2019  Workstation performed: [id number]         CT femur left w contrast   Final Result by Mally Tam DO (10/20 3023)   No acute fracture or dislocation is seen  Large soft tissue defect in the anterior left thigh with associated edema/cellulitis in the left inguinal region, left thigh, the left perineum, the anterior abdominal and pelvic wall and the medial bilateral thighs distally, as described; left greater    than right        A small amount of subcutaneous emphysema is seen in the medial left thigh anteriorly, in the left inguinal region (axial image 110, series 3 for example) as well as the anterior abdominal wall which could be tracking in the externally, related to prior    intervention or related to soft tissue necrosis  Correlation with the patient's physical exam, symptoms, and laboratory values recommended  Shotty left inguinal lymph nodes, small-volume ascites, colonic diverticulosis without evidence of acute diverticulitis and other findings as above  Findings discussed with       Dr Neel Hubbard by Dr Leandra Sy at 4:35 AM on 10/20/2019  Workstation performed: [id number]         XR chest portable    (Results Pending)              Procedures  Procedures       ED Course                               MDM    Disposition  Final diagnoses:   Cellulitis     Time reflects when diagnosis was documented in both MDM as applicable and the Disposition within this note     Time User Action Codes Description Comment    10/20/2019  5:56 AM Joanierahulting Delaney BO Add [L03 90] Cellulitis     10/20/2019  6:31 AM Violeta Chacko Add [E08 22,  N18 1] Diabetes mellitus due to underlying condition with stage 1 chronic kidney disease, unspecified whether long term insulin use ([facility] [address] )     10/20/2019  9:15 AM Violeta Chacko Add [M79 89] Necrotizing soft tissue infection       ED Disposition     ED Disposition Condition Date/Time Comment    Admit Stable Sun Oct 20, 2019  6:12 AM Case was discussed with Berto and the patient's admission status was agreed to be Admission Status: inpatient status to the service of Dr Ashley Armando           Follow-up Information    None         Current Discharge Medication List      CONTINUE these medications which have NOT CHANGED    Details   gabapentin (NEURONTIN) 100 mg capsule Take 3 capsules (300 mg total) by mouth daily at bedtime  Qty: 60 capsule, Refills: 0    Associated Diagnoses: Other diabetic neurological complication associated with type 2 diabetes mellitus (HCC)      HYDROcodone-acetaminophen (NORCO)  mg per tablet Take 1 tablet by mouth every 6 (six) hours as needed for moderate pain      hydrOXYzine HCL (ATARAX) 25 mg tablet Take 4 tablets (100 mg total) by mouth daily at bedtime  Qty: 120 tablet, Refills: 1    Associated Diagnoses: Anxiety      ibuprofen (MOTRIN) 800 mg tablet Take 1 tablet (800 mg total) by mouth every 8 (eight) hours as needed for moderate pain  Qty: 30 tablet, Refills: 0    Associated Diagnoses: Necrotizing soft tissue infection      insulin NPH-insulin regular (NovoLIN 70/30) 100 units/mL subcutaneous injection Take 60 units a m with breakfast ; 40 units p m  With dinner  Qty: 30 mL, Refills: 1    Associated Diagnoses: Type 2 diabetes mellitus with complication (HCC)      Insulin Pen Needle (B-D UF III MINI PEN NEEDLES) 31G X 5 MM MISC by Does not apply route      loperamide (IMODIUM) 2 mg capsule Take 1 capsule (2 mg total) by mouth 4 (four) times a day as needed for diarrhea (Maximum 4 capsules/day)  Qty: 30 capsule, Refills: 0    Associated Diagnoses: Diarrhea      metFORMIN (GLUCOPHAGE) 1000 MG tablet TAKE 1 TABLET BY MOUTH TWICE A DAY  Qty: 180 tablet, Refills: 1    Associated Diagnoses: Type 2 diabetes mellitus with complication, unspecified whether long term insulin use      metoprolol tartrate (LOPRESSOR) 25 mg tablet Take 1 tablet (25 mg total) by mouth every 12 (twelve) hours  Qty: 60 tablet, Refills: 2    Associated Diagnoses: Hypotension      nystatin (MYCOSTATIN) powder Apply topically 2 (two) times a day  Qty: 15 g, Refills: 2    Associated Diagnoses: Recurrent cellulitis of lower extremity      omeprazole (PriLOSEC) 40 MG capsule Take 1 capsule (40 mg total) by mouth daily Take in AM (in addition to 20 mg dose taken in PM)  Qty: 30 capsule, Refills: 5    Comments: PLEASE REFILL FOR #90  PATIENT STATES NOT ALTERNATING  TAKING 1 DAILY OF 20 AND 40MG CAPSULE   TOTAL DOSE 60MG  Associated Diagnoses: Gastroesophageal reflux disease, esophagitis presence not specified      oxyCODONE-acetaminophen (PERCOCET) 5-325 mg per tablet Take 1 tablet by mouth every 4 (four) hours as needed  Refills: 0      QUEtiapine (SEROquel) 25 mg tablet Take 1 tablet (25 mg total) by mouth daily at bedtime as needed (Sleep) for up to 20 doses  Qty: 20 tablet, Refills: 0    Associated Diagnoses: Insomnia           No discharge procedures on file      ED Provider  Electronically Signed by           Cam Nicole MD  10/20/19 7357

## 2019-10-21 LAB
ANION GAP SERPL CALCULATED.3IONS-SCNC: 9 MMOL/L (ref 4–13)
ATRIAL RATE: 121 BPM
BASOPHILS # BLD AUTO: 0.07 THOUSANDS/ΜL (ref 0–0.1)
BASOPHILS NFR BLD AUTO: 1 % (ref 0–1)
BUN SERPL-MCNC: 13 MG/DL (ref 5–25)
CALCIUM SERPL-MCNC: 8.9 MG/DL (ref 8.3–10.1)
CHLORIDE SERPL-SCNC: 101 MMOL/L (ref 100–108)
CHOLEST SERPL-MCNC: 162 MG/DL (ref 50–200)
CO2 SERPL-SCNC: 24 MMOL/L (ref 21–32)
CREAT SERPL-MCNC: 0.77 MG/DL (ref 0.6–1.3)
CREAT UR-MCNC: 113 MG/DL
EOSINOPHIL # BLD AUTO: 0.32 THOUSAND/ΜL (ref 0–0.61)
EOSINOPHIL NFR BLD AUTO: 4 % (ref 0–6)
ERYTHROCYTE [DISTWIDTH] IN BLOOD BY AUTOMATED COUNT: 14.9 % (ref 11.6–15.1)
FERRITIN SERPL-MCNC: 32 NG/ML (ref 8–388)
GFR SERPL CREATININE-BSD FRML MDRD: 104 ML/MIN/1.73SQ M
GLUCOSE SERPL-MCNC: 198 MG/DL (ref 65–140)
GLUCOSE SERPL-MCNC: 200 MG/DL (ref 65–140)
GLUCOSE SERPL-MCNC: 205 MG/DL (ref 65–140)
GLUCOSE SERPL-MCNC: 244 MG/DL (ref 65–140)
GLUCOSE SERPL-MCNC: 282 MG/DL (ref 65–140)
HCT VFR BLD AUTO: 30.2 % (ref 36.5–49.3)
HDLC SERPL-MCNC: 29 MG/DL (ref 40–60)
HGB BLD-MCNC: 9.1 G/DL (ref 12–17)
IMM GRANULOCYTES # BLD AUTO: 0.05 THOUSAND/UL (ref 0–0.2)
IMM GRANULOCYTES NFR BLD AUTO: 1 % (ref 0–2)
IRON SATN MFR SERPL: 7 %
IRON SERPL-MCNC: 34 UG/DL (ref 65–175)
LDLC SERPL CALC-MCNC: 107 MG/DL (ref 0–100)
LYMPHOCYTES # BLD AUTO: 2.07 THOUSANDS/ΜL (ref 0.6–4.47)
LYMPHOCYTES NFR BLD AUTO: 23 % (ref 14–44)
MCH RBC QN AUTO: 26.1 PG (ref 26.8–34.3)
MCHC RBC AUTO-ENTMCNC: 30.1 G/DL (ref 31.4–37.4)
MCV RBC AUTO: 87 FL (ref 82–98)
MICROALBUMIN UR-MCNC: 46.1 MG/L (ref 0–20)
MICROALBUMIN/CREAT 24H UR: 41 MG/G CREATININE (ref 0–30)
MONOCYTES # BLD AUTO: 0.94 THOUSAND/ΜL (ref 0.17–1.22)
MONOCYTES NFR BLD AUTO: 10 % (ref 4–12)
NEUTROPHILS # BLD AUTO: 5.77 THOUSANDS/ΜL (ref 1.85–7.62)
NEUTS SEG NFR BLD AUTO: 61 % (ref 43–75)
NRBC BLD AUTO-RTO: 0 /100 WBCS
P AXIS: 61 DEGREES
PLATELET # BLD AUTO: 241 THOUSANDS/UL (ref 149–390)
PMV BLD AUTO: 8 FL (ref 8.9–12.7)
POTASSIUM SERPL-SCNC: 3.9 MMOL/L (ref 3.5–5.3)
PR INTERVAL: 148 MS
PROCALCITONIN SERPL-MCNC: 0.22 NG/ML
QRS AXIS: 75 DEGREES
QRSD INTERVAL: 78 MS
QT INTERVAL: 298 MS
QTC INTERVAL: 423 MS
RBC # BLD AUTO: 3.49 MILLION/UL (ref 3.88–5.62)
SODIUM SERPL-SCNC: 134 MMOL/L (ref 136–145)
T WAVE AXIS: 42 DEGREES
TIBC SERPL-MCNC: 471 UG/DL (ref 250–450)
TRIGL SERPL-MCNC: 128 MG/DL
VENTRICULAR RATE: 121 BPM
WBC # BLD AUTO: 9.22 THOUSAND/UL (ref 4.31–10.16)

## 2019-10-21 PROCEDURE — 83519 RIA NONANTIBODY: CPT | Performed by: INTERNAL MEDICINE

## 2019-10-21 PROCEDURE — 99233 SBSQ HOSP IP/OBS HIGH 50: CPT | Performed by: INTERNAL MEDICINE

## 2019-10-21 PROCEDURE — 84145 PROCALCITONIN (PCT): CPT | Performed by: INTERNAL MEDICINE

## 2019-10-21 PROCEDURE — 80061 LIPID PANEL: CPT | Performed by: INTERNAL MEDICINE

## 2019-10-21 PROCEDURE — 83550 IRON BINDING TEST: CPT | Performed by: INTERNAL MEDICINE

## 2019-10-21 PROCEDURE — 84681 ASSAY OF C-PEPTIDE: CPT | Performed by: INTERNAL MEDICINE

## 2019-10-21 PROCEDURE — 82570 ASSAY OF URINE CREATININE: CPT | Performed by: INTERNAL MEDICINE

## 2019-10-21 PROCEDURE — 85025 COMPLETE CBC W/AUTO DIFF WBC: CPT | Performed by: INTERNAL MEDICINE

## 2019-10-21 PROCEDURE — 82948 REAGENT STRIP/BLOOD GLUCOSE: CPT

## 2019-10-21 PROCEDURE — 99233 SBSQ HOSP IP/OBS HIGH 50: CPT | Performed by: NURSE PRACTITIONER

## 2019-10-21 PROCEDURE — 82728 ASSAY OF FERRITIN: CPT | Performed by: INTERNAL MEDICINE

## 2019-10-21 PROCEDURE — 82043 UR ALBUMIN QUANTITATIVE: CPT | Performed by: INTERNAL MEDICINE

## 2019-10-21 PROCEDURE — 80048 BASIC METABOLIC PNL TOTAL CA: CPT | Performed by: SURGERY

## 2019-10-21 PROCEDURE — 83540 ASSAY OF IRON: CPT | Performed by: INTERNAL MEDICINE

## 2019-10-21 PROCEDURE — 93010 ELECTROCARDIOGRAM REPORT: CPT | Performed by: INTERNAL MEDICINE

## 2019-10-21 PROCEDURE — 99231 SBSQ HOSP IP/OBS SF/LOW 25: CPT | Performed by: SURGERY

## 2019-10-21 RX ORDER — IBUPROFEN 400 MG/1
400 TABLET ORAL ONCE
Status: COMPLETED | OUTPATIENT
Start: 2019-10-21 | End: 2019-10-21

## 2019-10-21 RX ORDER — SODIUM HYPOCHLORITE 0.5 %
1 SOLUTION, NON-ORAL MISCELLANEOUS DAILY
Status: DISCONTINUED | OUTPATIENT
Start: 2019-10-21 | End: 2019-10-23 | Stop reason: HOSPADM

## 2019-10-21 RX ORDER — FERROUS SULFATE 325(65) MG
325 TABLET ORAL EVERY OTHER DAY
Status: DISCONTINUED | OUTPATIENT
Start: 2019-10-21 | End: 2019-10-23 | Stop reason: HOSPADM

## 2019-10-21 RX ORDER — LISINOPRIL 5 MG/1
5 TABLET ORAL DAILY
Status: DISCONTINUED | OUTPATIENT
Start: 2019-10-21 | End: 2019-10-23 | Stop reason: HOSPADM

## 2019-10-21 RX ADMIN — HYDROCODONE BITARTRATE AND ACETAMINOPHEN 1 TABLET: 5; 325 TABLET ORAL at 21:11

## 2019-10-21 RX ADMIN — ACETAMINOPHEN 650 MG: 325 TABLET, FILM COATED ORAL at 07:09

## 2019-10-21 RX ADMIN — INSULIN HUMAN 45 UNITS: 100 INJECTION, SUSPENSION SUBCUTANEOUS at 21:01

## 2019-10-21 RX ADMIN — Medication 1 APPLICATION: at 08:40

## 2019-10-21 RX ADMIN — NYSTATIN: 100000 POWDER TOPICAL at 08:40

## 2019-10-21 RX ADMIN — NYSTATIN: 100000 POWDER TOPICAL at 19:36

## 2019-10-21 RX ADMIN — CEFAZOLIN SODIUM 2000 MG: 2 SOLUTION INTRAVENOUS at 06:38

## 2019-10-21 RX ADMIN — HYDROCODONE BITARTRATE AND ACETAMINOPHEN 1 TABLET: 5; 325 TABLET ORAL at 07:54

## 2019-10-21 RX ADMIN — BENZONATATE 100 MG: 100 CAPSULE ORAL at 07:09

## 2019-10-21 RX ADMIN — INSULIN LISPRO 6 UNITS: 100 INJECTION, SOLUTION INTRAVENOUS; SUBCUTANEOUS at 13:20

## 2019-10-21 RX ADMIN — GUAIFENESIN AND DEXTROMETHORPHAN 10 ML: 100; 10 SYRUP ORAL at 19:30

## 2019-10-21 RX ADMIN — PSYLLIUM HUSK 1 PACKET: 3.4 POWDER ORAL at 22:58

## 2019-10-21 RX ADMIN — FERROUS SULFATE TAB 325 MG (65 MG ELEMENTAL FE) 325 MG: 325 (65 FE) TAB at 15:07

## 2019-10-21 RX ADMIN — METOPROLOL TARTRATE 25 MG: 25 TABLET, FILM COATED ORAL at 08:37

## 2019-10-21 RX ADMIN — CEFAZOLIN SODIUM 2000 MG: 2 SOLUTION INTRAVENOUS at 22:44

## 2019-10-21 RX ADMIN — CEFAZOLIN SODIUM 2000 MG: 2 SOLUTION INTRAVENOUS at 14:05

## 2019-10-21 RX ADMIN — INSULIN LISPRO 4 UNITS: 100 INJECTION, SOLUTION INTRAVENOUS; SUBCUTANEOUS at 08:39

## 2019-10-21 RX ADMIN — INSULIN LISPRO 4 UNITS: 100 INJECTION, SOLUTION INTRAVENOUS; SUBCUTANEOUS at 21:01

## 2019-10-21 RX ADMIN — GABAPENTIN 300 MG: 300 CAPSULE ORAL at 21:12

## 2019-10-21 RX ADMIN — ENOXAPARIN SODIUM 40 MG: 40 INJECTION SUBCUTANEOUS at 08:37

## 2019-10-21 RX ADMIN — IBUPROFEN 400 MG: 400 TABLET ORAL at 07:54

## 2019-10-21 RX ADMIN — HYDROCODONE BITARTRATE AND ACETAMINOPHEN 1 TABLET: 5; 325 TABLET ORAL at 16:41

## 2019-10-21 RX ADMIN — HYDROXYZINE HYDROCHLORIDE 100 MG: 25 TABLET ORAL at 21:12

## 2019-10-21 RX ADMIN — HYDROCODONE BITARTRATE AND ACETAMINOPHEN 1 TABLET: 5; 325 TABLET ORAL at 03:20

## 2019-10-21 RX ADMIN — BENZONATATE 100 MG: 100 CAPSULE ORAL at 19:30

## 2019-10-21 RX ADMIN — INSULIN HUMAN 74 UNITS: 100 INJECTION, SUSPENSION SUBCUTANEOUS at 08:39

## 2019-10-21 RX ADMIN — PANTOPRAZOLE SODIUM 40 MG: 40 TABLET, DELAYED RELEASE ORAL at 06:38

## 2019-10-21 RX ADMIN — METOPROLOL TARTRATE 25 MG: 25 TABLET, FILM COATED ORAL at 21:12

## 2019-10-21 RX ADMIN — LISINOPRIL 5 MG: 5 TABLET ORAL at 15:07

## 2019-10-21 RX ADMIN — HYDROCODONE BITARTRATE AND ACETAMINOPHEN 1 TABLET: 5; 325 TABLET ORAL at 11:52

## 2019-10-21 NOTE — ASSESSMENT & PLAN NOTE
Lab Results   Component Value Date    HGBA1C 11 2 (H) 09/06/2019       Recent Labs     10/20/19  1615 10/20/19  2112 10/21/19  0834 10/21/19  1128   POCGLU 293* 201* 200* 282*       Blood Sugar Average: Last 72 hrs:  (P) 256     · Glucoses ranging 200 to 300  · Continue to check sugars 4 times a day  · Blood sugar goal is 140-180,   · Home morning dose is 80 units of 70/30    Will increase from 74 to 78  · Continue to titrate  · Metformin on hold

## 2019-10-21 NOTE — ASSESSMENT & PLAN NOTE
· BMI 60 7  · Counseled on therapeutic lifestyle changes  · Outpatient consultation with bariatric surgery when wound healed

## 2019-10-21 NOTE — PROGRESS NOTES
[facility]    Progress Note - Rosi Gordon 1965, 48 y o  male MRN: [id number]    Unit/Bed#: -01 Encounter: [id number]    Primary Care Provider: Pascual Guzman MD   Date and time admitted to hospital: 10/20/2019  1:24 AM        * Cellulitis  Assessment & Plan  · Admitted through surgery service for left inguinal open wound cellulitis  · Was hospitalized for septic shock in September with left groin wound group B strep infection  · Has one-day history of acute fever and chills and increasing pain and redness around the wound on to the left thigh  · Continue Ancef per Infectious Disease   · Podiatry consult pending  · Monitor fever curve and WBCs  · Serial skin evaluations  Today the redness has receded from the ink drawn line left thigh  Open wound is healing by tertiary intention  · Lactic acid initially 4 0, now down to 2    · Procalcitonin pending  · Blood cultures 10/20:  No growth at 24 hours  · Wound Care consulted    Type 2 diabetes mellitus with hyperglycemia, with long-term current use of insulin [facility])  Assessment & Plan  Lab Results   Component Value Date    HGBA1C 11 2 (H) 09/06/2019       Recent Labs     10/20/19  1615 10/20/19  2112 10/21/19  0834 10/21/19  1128   POCGLU 293* 201* 200* 282*       Blood Sugar Average: Last 72 hrs:  (P) 256     · Glucoses ranging 200 to 300  · Continue to check sugars 4 times a day  · Blood sugar goal is 140-180,   · Home morning dose is 80 units of 70/30  Will increase from 74 to 78  · Continue to titrate  · Metformin on hold    Essential hypertension  Assessment & Plan  · BP controlled  · Continue metoprolol  · Microalbumin/creatinine ratio 41    Start low-dose ACE-inhibitor  · Monitor blood pressures      Normocytic anemia  Assessment & Plan  · Hemoglobin 9 1 Was 14 on 9/6/2019  · No evidence of bleeding, MCV is 82, suspect anemia is mixed type  · Start oral iron 325 mg every other day  · Daily CBC and monitor hemoglobin    Morbid obesity (HCC)  Assessment & Plan  · BMI 60 7  · Counseled on therapeutic lifestyle changes  · Outpatient consultation with bariatric surgery when wound healed          VTE Pharmacologic Prophylaxis:   Pharmacologic: Enoxaparin (Lovenox)  Mechanical VTE Prophylaxis in Place: Yes    Patient Centered Rounds: I have performed bedside rounds with nursing staff today  Discussions with Specialists or Other Care Team Provider:     Education and Discussions with Family / Patient:  Patient and wife    Time Spent for Care: 45 minutes  More than 50% of total time spent on counseling and coordination of care as described above  Current Length of Stay: 1 day(s)    Current Patient Status: Inpatient   Certification Statement: The patient will continue to require additional inpatient hospital stay due to IV antibiotics for cellulitis    Discharge Plan:  Home when medically stable    Code Status: Level 1 - Full Code      Subjective:   Patient says he has scrotal and left thigh pain  He takes Norco with good relief  He denies shortness of breath or chest pain, nausea, vomiting, dysuria, or focal weakness  Objective:     Vitals:   Temp (24hrs), Av 5 °F (36 9 °C), Min:98 °F (36 7 °C), Max:99 °F (37 2 °C)    Temp:  [98 °F (36 7 °C)-99 °F (37 2 °C)] 98 °F (36 7 °C)  HR:  [103-120] 103  Resp:  [20] 20  BP: (128-143)/(72-94) 132/83  SpO2:  [93 %-96 %] 94 %  Body mass index is 60 7 kg/m²  Input and Output Summary (last 24 hours): Intake/Output Summary (Last 24 hours) at 10/21/2019 1510  Last data filed at 10/20/2019 1705  Gross per 24 hour   Intake 650 ml   Output    Net 650 ml       Physical Exam:     Physical Exam   Constitutional: He is oriented to person, place, and time  He appears well-developed and well-nourished  Morbidly obese and in a specialty bed   HENT:   Head: Normocephalic and atraumatic  Eyes: Pupils are equal, round, and reactive to light   Conjunctivae and EOM are normal  Neck: Normal range of motion  Neck supple  Cardiovascular: Normal rate, regular rhythm, normal heart sounds and intact distal pulses  Pulmonary/Chest: Effort normal and breath sounds normal    Abdominal: Soft  Bowel sounds are normal    Musculoskeletal: Normal range of motion  Neurological: He is alert and oriented to person, place, and time  Skin: Skin is warm and dry  Capillary refill takes less than 2 seconds  There is an open wound in the left thigh near the hip fold, under a pannus which has no drainage, no foul odor  There is redness surrounding the wound, which leads to the anterior upper thigh, but has receded from the ink drawn line  Nursing note and vitals reviewed  Additional Data:     Labs:    Results from last 7 days   Lab Units 10/21/19  1320   WBC Thousand/uL 9 22   HEMOGLOBIN g/dL 9 1*   HEMATOCRIT % 30 2*   PLATELETS Thousands/uL 241   NEUTROS PCT % 61   LYMPHS PCT % 23   MONOS PCT % 10   EOS PCT % 4     Results from last 7 days   Lab Units 10/21/19  0641 10/20/19  0207   SODIUM mmol/L 134* 134*   POTASSIUM mmol/L 3 9 4 3   CHLORIDE mmol/L 101 99*   CO2 mmol/L 24 26   BUN mg/dL 13 13   CREATININE mg/dL 0 77 0 92   ANION GAP mmol/L 9 9   CALCIUM mg/dL 8 9 9 1   ALBUMIN g/dL  --  2 6*   TOTAL BILIRUBIN mg/dL  --  0 40   ALK PHOS U/L  --  78   ALT U/L  --  34   AST U/L  --  35   GLUCOSE RANDOM mg/dL 198* 280*     Results from last 7 days   Lab Units 10/20/19  0207   INR  1 11     Results from last 7 days   Lab Units 10/21/19  1128 10/21/19  0834 10/20/19  2112 10/20/19  1615 10/20/19  1046 10/20/19  0719   POC GLUCOSE mg/dl 282* 200* 201* 293* 274* 286*         Results from last 7 days   Lab Units 10/20/19  1350 10/20/19  0548 10/20/19  0207   LACTIC ACID mmol/L 2 0 2 1* 4 0*           * I Have Reviewed All Lab Data Listed Above  * Additional Pertinent Lab Tests Reviewed:  All Labs For Current Hospital Admission Reviewed    Imaging:    Imaging Reports Reviewed Today Include: Imaging Personally Reviewed by Myself Includes:      Recent Cultures (last 7 days):     Results from last 7 days   Lab Units 10/20/19  0234   BLOOD CULTURE  No Growth at 24 hrs  No Growth at 24 hrs  Last 24 Hours Medication List:     Current Facility-Administered Medications:  acetaminophen 650 mg Oral Q4H PRN Sandro Lindsey PA-C    benzonatate 100 mg Oral TID PRN Andre Oliver MD    cefazolin 2,000 mg Intravenous Q8H Desmond Lopez MD Last Rate: 2,000 mg (10/21/19 1405)   dextromethorphan-guaiFENesin 10 mL Oral Q4H PRN Andre Oliver MD    enoxaparin 40 mg Subcutaneous Daily Desmond Lopez MD    ferrous sulfate 325 mg Oral Every Other Day ANN Zuniga    fluticasone 1 spray Each Nare Daily Andre Oliver MD    gabapentin 300 mg Oral HS Desmond Lopez MD    HYDROcodone-acetaminophen 1 tablet Oral Q4H PRN Desmond Lopez MD    hydrOXYzine  mg Oral HS Desmond Lopez MD    insulin lispro 1-5 Units Subcutaneous HS Andre Oliver MD    insulin lispro 2-12 Units Subcutaneous TID [facility] Andre Oliver MD    insulin NPH 45 Units Subcutaneous Before Outlinejuliane Castillo MD    [START ON 10/22/2019] insulin NPH 78 Units Subcutaneous Daily Before Breakfast ANN Zuniga    lisinopril 5 mg Oral Daily ANN Zuniga    loperamide 2 mg Oral 4x Daily PRN Desmond Lopez MD    metoprolol tartrate 25 mg Oral Q12H [address] Desmond Lopez MD    nystatin  Topical BID Desmond Lopez MD    ondansetron 4 mg Intravenous Q4H PRN Desmond Lopez MD    pantoprazole 40 mg Oral Early Morning Desmond Lopez MD    psyllium 1 packet Oral Daily Jael Antonio PA-C    QUEtiapine 25 mg Oral HS PRN Desmond Lopez MD    sodium hypochlorite 1 application Irrigation Daily Elisabeth Mcdaniels DO         Today, Patient Was Seen By: ANN Zuniga    ** Please Note: Dictation voice to text software may have been used in the creation of this document   **

## 2019-10-21 NOTE — ASSESSMENT & PLAN NOTE
· Admitted through surgery service for left inguinal open wound cellulitis  · Was hospitalized for septic shock in September with left groin wound group B strep infection  · Has one-day history of acute fever and chills and increasing pain and redness around the wound on to the left thigh  · Continue Ancef per Infectious Disease   · Podiatry consult pending  · Monitor fever curve and WBCs  · Serial skin evaluations  Today the redness has receded from the ink drawn line left thigh    Open wound is healing by tertiary intention  · Lactic acid initially 4 0, now down to 2    · Procalcitonin pending  · Blood cultures 10/20:  No growth at 24 hours  · Wound Care consulted

## 2019-10-21 NOTE — ASSESSMENT & PLAN NOTE
· Hemoglobin 9 1 Was 14 on 9/6/2019  · No evidence of bleeding, MCV is 82, suspect anemia is mixed type  · Start oral iron 325 mg every other day  · Daily CBC and monitor hemoglobin

## 2019-10-21 NOTE — PROGRESS NOTES
Progress Note - Infectious Disease   Daine Thomas 48 y o  male MRN: [id number]  Unit/Bed#: -01 Encounter: [id number]      Impression/Plan:  1  Severe sepsis-POA  Fever, leukocytosis, tachycardia , lactic acidosis  Appears to be all secondary to a left lower extremity soft tissue infection with notable cellulitis  No other clear sources appreciated  Consideration for the possibility of bacteremia  Fortunately the patient remains hemodynamically stable despite his systemic illness  He seems to be tolerating the antibiotics without difficulty  The lactate level has normalized  No recurrent fever   -continue cefazolin for now at current dose  -follow-up blood cultures  -recheck CBC with diff and BMP  -check procalcitonin level now and tomorrow a m   -supportive care     2  Left lower extremity cellulitis-complicating a left groin/thigh wound  Previously the patient had group B Streptococcus, and the organism may end up being the same  This does look most consistent with a streptococcal infection  No abscess collection noted on CT scan  The infection remains relatively well localized but remains quite symptomatic  -antibiotics as above  -close surgical follow-up  -local wound care  -serial exams     3  Diabetes mellitus-type 2 with hyperglycemia  Previous hemoglobin A1c was 12  This is certainly leading to increased risk of infection and relapses   -await internal medicine evaluation  -tighten glycemic control     4   Cough-with some notable shortness of breath when the patient 1st presented although this has improved  The patient feels it is secondary to postnasal drip  No clear pneumonia noted on chest x-ray  -cough suppression for symptomatic relief  -monitor respiratory status  -await formal radiology report       5  Morbid obesity-this is another important risk factor for the patient's the soft tissue infections    Recommend weight loss to decreased risk of recurrent infection    Discussed the the clinical aspects of the case, and above management plan with the surgical service    Antibiotics:  Cefazolin 2    Subjective:  Patient has no fever, chills, sweats; no nausea, vomiting, diarrhea; no cough, shortness of breath; developed some burning pain involving the left side of the scrotum last night  Still with some thigh pain  No of new symptoms  Objective:  Vitals:  Temp:  [98 °F (36 7 °C)-100 °F (37 8 °C)] 98 °F (36 7 °C)  HR:  [103-120] 103  Resp:  [20] 20  BP: (128-143)/(72-94) 132/83  SpO2:  [93 %-96 %] 94 %  Temp (24hrs), Av 9 °F (37 2 °C), Min:98 °F (36 7 °C), Max:100 °F (37 8 °C)  Current: Temperature: 98 °F (36 7 °C)    Physical Exam:   General Appearance:  Alert, interactive, nontoxic, no acute distress  Throat: Oropharynx moist without lesions  Lungs:   Clear to auscultation bilaterally; no wheezes, rhonchi or rales; respirations unlabored   Heart:  Tachycardia; no murmur, rub or gallop   Abdomen:   Soft, non-tender, non-distended, positive bowel sounds  Extremities: No clubbing, cyanosis  Left groin wound with stable appearance with undermining but granulating tissue  No purulence  Dense erythema noted inferiorly from the wound tracking on the medial thigh  Small area of erythema on the left side of the scrotum but no edema   Skin: No new rashes or lesions  No draining wounds noted         Labs, Imaging, & Other studies:   All pertinent labs and imaging studies were personally reviewed  Results from last 7 days   Lab Units 10/20/19  0207   WBC Thousand/uL 11 38*   HEMOGLOBIN g/dL 9 5*   PLATELETS Thousands/uL 237     Results from last 7 days   Lab Units 10/21/19  0641 10/20/19  0207   SODIUM mmol/L 134* 134*   POTASSIUM mmol/L 3 9 4 3   CHLORIDE mmol/L 101 99*   CO2 mmol/L 24 26   BUN mg/dL 13 13   CREATININE mg/dL 0 77 0 92   EGFR ml/min/1 73sq m 104 95   CALCIUM mg/dL 8 9 9 1   AST U/L  --  35   ALT U/L  --  34   ALK PHOS U/L  --  78     Results from last 7 days   Lab Units 10/20/19  0234   BLOOD CULTURE  No Growth at 24 hrs  No Growth at 24 hrs  chest x-ray-no focal area of consolidation  Poor inspiration    Possible mild vascular congestion    Images personally reviewed by me in PACS    Await formal radiology report

## 2019-10-21 NOTE — PLAN OF CARE
Problem: Potential for Falls  Goal: Patient will remain free of falls  Description  INTERVENTIONS:  - Assess patient frequently for physical needs  -  Identify cognitive and physical deficits and behaviors that affect risk of falls    -  [place] fall precautions as indicated by assessment   - Educate patient/family on patient safety including physical limitations  - Instruct patient to call for assistance with activity based on assessment  - Modify environment to reduce risk of injury  - Consider OT/PT consult to assist with strengthening/mobility  Outcome: Progressing     Problem: Prexisting or High Potential for Compromised Skin Integrity  Goal: Skin integrity is maintained or improved  Description  INTERVENTIONS:  - Identify patients at risk for skin breakdown  - Assess and monitor skin integrity  - Assess and monitor nutrition and hydration status  - Monitor labs   - Assess for incontinence   - Turn and reposition patient  - Assist with mobility/ambulation  - Relieve pressure over bony prominences  - Avoid friction and shearing  - Provide appropriate hygiene as needed including keeping skin clean and dry  - Evaluate need for skin moisturizer/barrier cream  - Collaborate with interdisciplinary team   - Patient/family teaching  - Consider wound care consult   Outcome: Progressing     Problem: PAIN - ADULT  Goal: Verbalizes/displays adequate comfort level or baseline comfort level  Description  Interventions:  - Encourage patient to monitor pain and request assistance  - Assess pain using appropriate pain scale  - Administer analgesics based on type and severity of pain and evaluate response  - Implement non-pharmacological measures as appropriate and evaluate response  - Consider cultural and social influences on pain and pain management  - Notify physician/advanced practitioner if interventions unsuccessful or patient reports new pain  Outcome: Progressing     Problem: INFECTION - ADULT  Goal: Absence or prevention of progression during hospitalization  Description  INTERVENTIONS:  - Assess and monitor for signs and symptoms of infection  - Monitor lab/diagnostic results  - Monitor all insertion sites, i e  indwelling lines, tubes, and drains  - Monitor endotracheal if appropriate and nasal secretions for changes in amount and color  - [place] appropriate cooling/warming therapies per order  - Administer medications as ordered  - Instruct and encourage patient and family to use good hand hygiene technique  - Identify and instruct in appropriate isolation precautions for identified infection/condition  Outcome: Progressing     Problem: SKIN/TISSUE INTEGRITY - ADULT  Goal: Skin integrity remains intact  Description  INTERVENTIONS  - Identify patients at risk for skin breakdown  - Assess and monitor skin integrity  - Assess and monitor nutrition and hydration status  - Monitor labs (i e  albumin)  - Assess for incontinence   - Turn and reposition patient  - Assist with mobility/ambulation  - Relieve pressure over bony prominences  - Avoid friction and shearing  - Provide appropriate hygiene as needed including keeping skin clean and dry  - Evaluate need for skin moisturizer/barrier cream  - Collaborate with interdisciplinary team (i e  Nutrition, Rehabilitation, etc )   - Patient/family teaching  Outcome: Progressing  Goal: Incision(s), wounds(s) or drain site(s) healing without S/S of infection  Description  INTERVENTIONS  - Assess and document risk factors for skin impairment   - Assess and document dressing, incision, wound bed, drain sites and surrounding tissue  - Consider nutrition services referral as needed  - Oral mucous membranes remain intact  - Provide patient/ family education  Outcome: Progressing

## 2019-10-21 NOTE — UTILIZATION REVIEW
Initial Clinical Review    Admission: Date/Time/Statement: Inpatient Admission Orders (From admission, onward)     Ordered        10/20/19 0556  Inpatient Admission (expected length of stay for this patient Order details is greater than two midnights)  Once                   Orders Placed This Encounter   Procedures    Inpatient Admission (expected length of stay for this patient Order details is greater than two midnights)     Standing Status:   Standing     Number of Occurrences:   1     Order Specific Question:   Admitting Physician     Answer:   DARRELL Salmeron [[id number]]     Order Specific Question:   Level of Care     Answer:   Med Surg [[id number]]     Order Specific Question:   Estimated length of stay     Answer:   More than 2 Midnights     Order Specific Question:   Certification     Answer:   I certify that inpatient services are medically necessary for this patient for a duration of greater than two midnights  See H&P and MD Progress Notes for additional information about the patient's course of treatment  ED Arrival Information     Expected Arrival Acuity Means of Arrival Escorted By Service Admission Type    - 10/20/2019 00:43 Urgent Walk-In Family Member General Medicine Urgent    Arrival Complaint    Colt Piggs left thigh,fever        Chief Complaint   Patient presents with    Cellulitis     Pt presents to ED from home w/ redness and open wound on upper left leg  Pt admitted for same two weeks ago  Pt morbidly obese, gait issues, DM, HTN  Assessment/Plan: 47 y/o male presents to ED from home with erythema and wound to L upper leg  Hx recent wound infection in same area that required admission and extensive debridement for necrotizing fasciitis  On exam, tachycardia, 15 cm large open wound in L upper inner thigh with lateral and superior undermining, excoriation secondary to fungal rash, 15 x 10 cm region of cellulitis noted in upper anterior and medial thigh    Admitted as inpatient to service of general surgery due to severe sepsis secondary to LLE cellulitis, soft tissue infection L groin/thigh wound, DM 2 with hyperglycemia  Continue IV antibiotics  Blood cx pending  Wound care with chlorhexidine  ID consult  Internal medicine consult  PT/OT  10/20 ID consult:  Continue IV ancef  Repeat labs  Close surgical follow up   10/20 Internal medicine consult:  Poorly controlled DM2 with A1C 11 2  Increase SSI coverage and continue accuchecks  10/21 New redness to L side of scrotum  Pt c/o burning pain in wound and to L side of scrotum  Dressing change with Dakin's solution due to pain with chlorhexidine            ED Triage Vitals [10/20/19 0134]   Temperature Pulse Respirations Blood Pressure SpO2   98 1 °F (36 7 °C) 105 20 155/82 98 %      Temp Source Heart Rate Source Patient Position - Orthostatic VS BP Location FiO2 (%)   Oral Monitor Sitting Right arm --      Pain Score       5        Wt Readings from Last 1 Encounters:   10/20/19 (!) 160 kg (353 lb 9 9 oz)     Additional Vital Signs:   10/21/19 0835 98 °F (36 7 °C) 103 20 132/83 94 % None (Room air)   10/21/19 0104 99 °F (37 2 °C) 104 20 128/79 93 % None (Room air)   10/20/19 2130  116Abnormal   143/94     10/20/19 1544 98 5 °F (36 9 °C) 120Abnormal  20 140/72 96 % None (Room air)   10/20/19 0717 100 4 °F (38 °C) 120Abnormal  20 124/75 94 % None (Room air)   10/20/19 0320  120Abnormal  22 155/88 95 % None (Room air)   10/20/19 0145  120Abnormal  22 149/88 96 % None (Room air)       Pertinent Labs/Diagnostic Test Results:   Results from last 7 days   Lab Units 10/20/19  0207   WBC Thousand/uL 11 38*   HEMOGLOBIN g/dL 9 5*   HEMATOCRIT % 31 0*   PLATELETS Thousands/uL 237   NEUTROS ABS Thousands/µL 8 98*         Results from last 7 days   Lab Units 10/21/19  0641 10/20/19  0207   SODIUM mmol/L 134* 134*   POTASSIUM mmol/L 3 9 4 3   CHLORIDE mmol/L 101 99*   CO2 mmol/L 24 26   ANION GAP mmol/L 9 9   BUN mg/dL 13 13   CREATININE mg/dL 0 77 0 92   EGFR ml/min/1 73sq m 104 95   CALCIUM mg/dL 8 9 9 1     Results from last 7 days   Lab Units 10/20/19  0207   AST U/L 35   ALT U/L 34   ALK PHOS U/L 78   TOTAL PROTEIN g/dL 7 6   ALBUMIN g/dL 2 6*   TOTAL BILIRUBIN mg/dL 0 40     Results from last 7 days   Lab Units 10/20/19  2112 10/20/19  1615 10/20/19  1046 10/20/19  0719   POC GLUCOSE mg/dl 201* 293* 274* 286*     Results from last 7 days   Lab Units 10/21/19  0641 10/20/19  0207   GLUCOSE RANDOM mg/dL 198* 280*       Results from last 7 days   Lab Units 10/20/19  0207   TROPONIN I ng/mL <0 02         Results from last 7 days   Lab Units 10/20/19  0207   PROTIME seconds 13 7   INR  1 11   PTT seconds 29     Results from last 7 days   Lab Units 10/20/19  1350 10/20/19  0548 10/20/19  0207   LACTIC ACID mmol/L 2 0 2 1* 4 0*     Results from last 7 days   Lab Units 10/21/19  0641   FERRITIN ng/mL 32       Results from last 7 days   Lab Units 10/21/19  0710 10/20/19  0320   CLARITY UA   --  Clear   COLOR UA   --  Yellow   SPEC GRAV UA   --  1 020   PH UA   --  5 5   GLUCOSE UA mg/dl  --  250 (1/4%)*   KETONES UA mg/dl  --  Trace*   BLOOD UA   --  Negative   PROTEIN UA mg/dl  --  Trace*   NITRITE UA   --  Negative   BILIRUBIN UA   --  Negative   UROBILINOGEN UA E U /dl  --  0 2   LEUKOCYTES UA   --  Negative   WBC UA /hpf  --  0-1*   RBC UA /hpf  --  None Seen   BACTERIA UA /hpf  --  None Seen   EPITHELIAL CELLS WET PREP /hpf  --  None Seen   MUCUS THREADS   --  Moderate*   CREATININE UR mg/dL 113 0  --        Results from last 7 days   Lab Units 10/20/19  0234   BLOOD CULTURE  No Growth at 24 hrs  No Growth at 24 hrs  10/20 EKG:  Sinus tachycardia  Low voltage QRS    10/20 CT L femur/pelvis:  No acute fracture or dislocation is seen    Large soft tissue defect in the anterior left thigh with associated edema/cellulitis in the left inguinal region, left thigh, the left perineum, the anterior abdominal and pelvic wall and the medial bilateral thighs distally, as described; left greater   than right  A small amount of subcutaneous emphysema is seen in the medial left thigh anteriorly, in the left inguinal region (axial image 110, series 3 for example) as well as the anterior abdominal wall which could be tracking in the externally, related to prior   intervention or related to soft tissue necrosis   Correlation with the patient's physical exam, symptoms, and laboratory values recommended  Shotty left inguinal lymph nodes, small-volume ascites, colonic diverticulosis without evidence of acute diverticulitis     10/20 CXR:  No acute cardiopulmonary disease         ED Treatment:   Medication Administration from 10/20/2019 0042 to 10/20/2019 0357       Date/Time Order Dose Route Action     10/20/2019 0339 vancomycin (VANCOCIN) 2,000 mg in sodium chloride 0 9 % 500 mL IVPB 2,000 mg Intravenous New Bag     10/20/2019 0302 piperacillin-tazobactam (ZOSYN) 3 375 g in sodium chloride 0 9 % 50 mL IVPB 3 375 g Intravenous New Bag     10/20/2019 0229 sodium chloride 0 9 % bolus 2,000 mL 2,000 mL Intravenous New Bag     10/20/2019 0240 morphine (PF) 4 mg/mL injection 4 mg 4 mg Intravenous Given        Past Medical History:   Diagnosis Date    Cellulitis     last assessed 12/10/15    Diabetes mellitus ([address] )     Edema     Elevated liver enzymes     Esophageal reflux     Gluten intolerance     Gout     last assessed 09/05/13    Hyperglycemia     Hypertension     IBS (irritable bowel syndrome)     Insomnia     Obesity     Osteoarthritis of knee     last assessed 02/10/14    Prehypertension     last assessed 08/22/17    Venous insufficiency     last assessed 08/22/17    Villonodular synovitis of the hand, right     last assessed 11/14/2013     Present on Admission:   Hyperlipidemia   Morbid obesity ([address] )   Essential hypertension   Diabetic neuropathy ([address] )   Normocytic anemia      Admitting Diagnosis: Cellulitis [L03 90]  Diabetes mellitus due to underlying condition with stage 1 chronic kidney disease, unspecified whether long term insulin use ([facility] ) [E08 22, N18 1]  Age/Sex: 48 y o  male  Admission Orders:    Medications:  cefazolin 2,000 mg Intravenous Q8H   enoxaparin 40 mg Subcutaneous Daily   fluticasone 1 spray Each Nare Daily   gabapentin 300 mg Oral HS   hydrOXYzine  mg Oral HS   insulin lispro 1-5 Units Subcutaneous HS   insulin lispro 2-12 Units Subcutaneous TID AC   insulin NPH 45 Units Subcutaneous Before Dinner   insulin NPH 74 Units Subcutaneous Daily Before Breakfast   metoprolol tartrate 25 mg Oral Q12H AUNDREA   nystatin  Topical BID   pantoprazole 40 mg Oral Early Morning   psyllium 1 packet Oral Daily   sodium hypochlorite 1 application Irrigation Daily       sodium chloride 100 mL/hr Intravenous Continuous       acetaminophen 650 mg Oral Q4H PRN x1   benzonatate 100 mg Oral TID PRN x3   dextromethorphan-guaiFENesin 10 mL Oral Q4H PRN x2   HYDROcodone-acetaminophen 1 tablet Oral Q4H PRN x7   loperamide 2 mg Oral 4x Daily PRN   ondansetron 4 mg Intravenous Q4H PRN   QUEtiapine 25 mg Oral HS PRN       IP CONSULT TO INTERNAL MEDICINE  IP CONSULT TO INFECTIOUS DISEASES  IP CONSULT TO WOUND CARE   Wound care  Venous access consult  Accuchecks  VS  SCDs  Tele  PT/OT    Network Utilization Review Department  Phone: [phone]; Fax [phone]  [email or web address]  ATTENTION: Please call with any questions or concerns to [phone]  and carefully listen to the prompts so that you are directed to the right person  Send all requests for admission clinical reviews, approved or denied determinations and any other requests to fax [phone]   All voicemails are confidential

## 2019-10-21 NOTE — ASSESSMENT & PLAN NOTE
· BP controlled  · Continue metoprolol  · Microalbumin/creatinine ratio 41    Start low-dose ACE-inhibitor  · Monitor blood pressures

## 2019-10-21 NOTE — PROGRESS NOTES
Progress Note - General Surgery   Rox Palacios 48 y o  male MRN: [id number]  Unit/Bed#: -01 Encounter: [id number]    Assessment:  48 y o  M w hx of DM2 and necrotizing fasciitis of left thigh and septic shock in September of 2019, who presents w recurrent cellulitis of the left thigh  Afebrile  Mild tachycardia  Vitals are otherwise stable  Mild leukocytosis - WBC 11 38  Erythema not extending outside of marking which was marked yesterday  There is some erythema on the left side of the scrotum  Plan:  CCL2 diet  Jh[employer]  Continue Ancef per ID, appreciate recommendations   Appreciate IM recommendations for management of medical comorbidities   Change wound dressing daily Kerlix soaked Dakin's solution and ABD pads  Subjective/Objective     Subjective: No acute events overnight  Complains of burning pain in his wound and in the left side of his scrotum  Wife states that there is not redness on the left side of the scrotum which is new  Tolerating diet without nausea/vomiting  No fevers, chills  Patient states he would like to use Dakin's solution for his dressing changes since this is what he uses at wound care and the chlorhexidine solution caused him a lot of pain yesterday  Objective:     Blood pressure 128/79, pulse 104, temperature 99 °F (37 2 °C), temperature source Oral, resp  rate 20, height 5' 4" (1 626 m), weight (!) 160 kg (353 lb 9 9 oz), SpO2 93 %  ,Body mass index is 60 7 kg/m²        Intake/Output Summary (Last 24 hours) at 10/21/2019 0825  Last data filed at 10/20/2019 1705  Gross per 24 hour   Intake 995 83 ml   Output    Net 995 83 ml       Invasive Devices     Peripheral Intravenous Line            Long-Dwell Peripheral IV (Midline) 12/28/24 Left Cephalic 39 days    Peripheral IV 10/20/19 Right Antecubital 1 day                  NAD, alert and oriented x3  Normocephalic, atraumatic  MMM, EOMI, PERRLA  Norm resp effort on RA  RRR  Abd obese, soft, NT/ND  97dbl2nl wound to left groin which undermines 3 cm superiorly  Wound base appears healthy with granulation tissue present  No purulent discharge noted  Large area of erythema on the left thigh which is marked  Erythema does not extend outside of the marking  There is also some erythema on the left side of the scrotum  No calf tenderness or peripheral edema  Motor/sensation intact in distal extremities  CN grossly intact  -rash/lesions    Dressing changed today at 8:15 AM    Kerlix soaked in Dakin's solution was used to pack the tunneled portion of the wound and then laid on top of the wound bed  2 ABD pads were laid on top of the Kerlix         Lab, Imaging and other studies:CBC: No results found for: WBC, HGB, HCT, MCV, PLT, ADJUSTEDWBC, MCH, MCHC, RDW, MPV, NRBC, CMP:   Lab Results   Component Value Date    SODIUM 134 (L) 10/21/2019    K 3 9 10/21/2019     10/21/2019    CO2 24 10/21/2019    BUN 13 10/21/2019    CREATININE 0 77 10/21/2019    CALCIUM 8 9 10/21/2019    EGFR 104 10/21/2019     VTE Pharmacologic Prophylaxis: Lovenox   VTE Mechanical Prophylaxis: sequential compression device

## 2019-10-22 PROBLEM — R65.20 SEVERE SEPSIS (HCC): Status: ACTIVE | Noted: 2019-10-22

## 2019-10-22 PROBLEM — A41.9 SEVERE SEPSIS (HCC): Status: ACTIVE | Noted: 2019-10-22

## 2019-10-22 LAB
BASOPHILS # BLD MANUAL: 0 THOUSAND/UL (ref 0–0.1)
BASOPHILS NFR MAR MANUAL: 0 % (ref 0–1)
C PEPTIDE SERPL-MCNC: 2.8 NG/ML (ref 1.1–4.4)
EOSINOPHIL # BLD MANUAL: 0.54 THOUSAND/UL (ref 0–0.4)
EOSINOPHIL NFR BLD MANUAL: 6 % (ref 0–6)
ERYTHROCYTE [DISTWIDTH] IN BLOOD BY AUTOMATED COUNT: 14.9 % (ref 11.6–15.1)
GLUCOSE SERPL-MCNC: 116 MG/DL (ref 65–140)
GLUCOSE SERPL-MCNC: 119 MG/DL (ref 65–140)
GLUCOSE SERPL-MCNC: 144 MG/DL (ref 65–140)
GLUCOSE SERPL-MCNC: 206 MG/DL (ref 65–140)
HCT VFR BLD AUTO: 29.1 % (ref 36.5–49.3)
HGB BLD-MCNC: 8.7 G/DL (ref 12–17)
LYMPHOCYTES # BLD AUTO: 2.32 THOUSAND/UL (ref 0.6–4.47)
LYMPHOCYTES # BLD AUTO: 26 % (ref 14–44)
MCH RBC QN AUTO: 26 PG (ref 26.8–34.3)
MCHC RBC AUTO-ENTMCNC: 29.9 G/DL (ref 31.4–37.4)
MCV RBC AUTO: 87 FL (ref 82–98)
MONOCYTES # BLD AUTO: 1.43 THOUSAND/UL (ref 0–1.22)
MONOCYTES NFR BLD: 16 % (ref 4–12)
NEUTROPHILS # BLD MANUAL: 4.65 THOUSAND/UL (ref 1.85–7.62)
NEUTS BAND NFR BLD MANUAL: 1 % (ref 0–8)
NEUTS SEG NFR BLD AUTO: 51 % (ref 43–75)
NRBC BLD AUTO-RTO: 0 /100 WBCS
PLATELET # BLD AUTO: 249 THOUSANDS/UL (ref 149–390)
PLATELET BLD QL SMEAR: ADEQUATE
PMV BLD AUTO: 8.4 FL (ref 8.9–12.7)
PROCALCITONIN SERPL-MCNC: 0.2 NG/ML
RBC # BLD AUTO: 3.35 MILLION/UL (ref 3.88–5.62)
TOTAL CELLS COUNTED SPEC: 100
WBC # BLD AUTO: 8.94 THOUSAND/UL (ref 4.31–10.16)

## 2019-10-22 PROCEDURE — 85007 BL SMEAR W/DIFF WBC COUNT: CPT | Performed by: INTERNAL MEDICINE

## 2019-10-22 PROCEDURE — 97166 OT EVAL MOD COMPLEX 45 MIN: CPT

## 2019-10-22 PROCEDURE — 97116 GAIT TRAINING THERAPY: CPT

## 2019-10-22 PROCEDURE — G8979 MOBILITY GOAL STATUS: HCPCS

## 2019-10-22 PROCEDURE — G8987 SELF CARE CURRENT STATUS: HCPCS

## 2019-10-22 PROCEDURE — 82948 REAGENT STRIP/BLOOD GLUCOSE: CPT

## 2019-10-22 PROCEDURE — 85027 COMPLETE CBC AUTOMATED: CPT | Performed by: INTERNAL MEDICINE

## 2019-10-22 PROCEDURE — 99232 SBSQ HOSP IP/OBS MODERATE 35: CPT | Performed by: INTERNAL MEDICINE

## 2019-10-22 PROCEDURE — 99231 SBSQ HOSP IP/OBS SF/LOW 25: CPT | Performed by: SURGERY

## 2019-10-22 PROCEDURE — G8988 SELF CARE GOAL STATUS: HCPCS

## 2019-10-22 PROCEDURE — 84145 PROCALCITONIN (PCT): CPT | Performed by: INTERNAL MEDICINE

## 2019-10-22 PROCEDURE — G8978 MOBILITY CURRENT STATUS: HCPCS

## 2019-10-22 PROCEDURE — 99233 SBSQ HOSP IP/OBS HIGH 50: CPT | Performed by: INTERNAL MEDICINE

## 2019-10-22 PROCEDURE — 97163 PT EVAL HIGH COMPLEX 45 MIN: CPT

## 2019-10-22 PROCEDURE — 97535 SELF CARE MNGMENT TRAINING: CPT

## 2019-10-22 RX ORDER — FLUTICASONE PROPIONATE 50 MCG
1 SPRAY, SUSPENSION (ML) NASAL 2 TIMES DAILY
Status: DISCONTINUED | OUTPATIENT
Start: 2019-10-22 | End: 2019-10-23 | Stop reason: HOSPADM

## 2019-10-22 RX ORDER — INSULIN ASPART 100 [IU]/ML
45 INJECTION, SUSPENSION SUBCUTANEOUS
Status: DISCONTINUED | OUTPATIENT
Start: 2019-10-22 | End: 2019-10-23

## 2019-10-22 RX ORDER — INSULIN ASPART 100 [IU]/ML
78 INJECTION, SUSPENSION SUBCUTANEOUS
Status: DISCONTINUED | OUTPATIENT
Start: 2019-10-22 | End: 2019-10-22

## 2019-10-22 RX ORDER — ASPIRIN 325 MG
325 TABLET ORAL ONCE
Status: COMPLETED | OUTPATIENT
Start: 2019-10-22 | End: 2019-10-22

## 2019-10-22 RX ORDER — INSULIN ASPART 100 [IU]/ML
78 INJECTION, SUSPENSION SUBCUTANEOUS
Status: DISCONTINUED | OUTPATIENT
Start: 2019-10-23 | End: 2019-10-23

## 2019-10-22 RX ADMIN — BENZONATATE 100 MG: 100 CAPSULE ORAL at 16:55

## 2019-10-22 RX ADMIN — FLUTICASONE PROPIONATE 1 SPRAY: 50 SPRAY, METERED NASAL at 17:21

## 2019-10-22 RX ADMIN — HYDROCODONE BITARTRATE AND ACETAMINOPHEN 1 TABLET: 5; 325 TABLET ORAL at 08:26

## 2019-10-22 RX ADMIN — BENZONATATE 100 MG: 100 CAPSULE ORAL at 22:47

## 2019-10-22 RX ADMIN — FLUTICASONE PROPIONATE 1 SPRAY: 50 SPRAY, METERED NASAL at 08:12

## 2019-10-22 RX ADMIN — Medication 1 APPLICATION: at 08:22

## 2019-10-22 RX ADMIN — HYDROCODONE BITARTRATE AND ACETAMINOPHEN 1 TABLET: 5; 325 TABLET ORAL at 21:06

## 2019-10-22 RX ADMIN — CEFAZOLIN SODIUM 2000 MG: 2 SOLUTION INTRAVENOUS at 06:14

## 2019-10-22 RX ADMIN — HYDROCODONE BITARTRATE AND ACETAMINOPHEN 1 TABLET: 5; 325 TABLET ORAL at 16:32

## 2019-10-22 RX ADMIN — NYSTATIN: 100000 POWDER TOPICAL at 08:22

## 2019-10-22 RX ADMIN — BENZONATATE 100 MG: 100 CAPSULE ORAL at 08:26

## 2019-10-22 RX ADMIN — ASPIRIN 325 MG ORAL TABLET 325 MG: 325 PILL ORAL at 09:49

## 2019-10-22 RX ADMIN — PANTOPRAZOLE SODIUM 40 MG: 40 TABLET, DELAYED RELEASE ORAL at 06:14

## 2019-10-22 RX ADMIN — HYDROCODONE BITARTRATE AND ACETAMINOPHEN 1 TABLET: 5; 325 TABLET ORAL at 02:06

## 2019-10-22 RX ADMIN — METOPROLOL TARTRATE 25 MG: 25 TABLET, FILM COATED ORAL at 08:14

## 2019-10-22 RX ADMIN — ENOXAPARIN SODIUM 40 MG: 40 INJECTION SUBCUTANEOUS at 08:13

## 2019-10-22 RX ADMIN — PSYLLIUM HUSK 1 PACKET: 3.4 POWDER ORAL at 21:05

## 2019-10-22 RX ADMIN — GUAIFENESIN AND DEXTROMETHORPHAN 10 ML: 100; 10 SYRUP ORAL at 22:47

## 2019-10-22 RX ADMIN — GABAPENTIN 300 MG: 300 CAPSULE ORAL at 21:05

## 2019-10-22 RX ADMIN — INSULIN ASPART 45 UNITS: 100 INJECTION, SUSPENSION SUBCUTANEOUS at 21:06

## 2019-10-22 RX ADMIN — CEFAZOLIN SODIUM 2000 MG: 2 SOLUTION INTRAVENOUS at 16:28

## 2019-10-22 RX ADMIN — LISINOPRIL 5 MG: 5 TABLET ORAL at 08:14

## 2019-10-22 RX ADMIN — GUAIFENESIN AND DEXTROMETHORPHAN 10 ML: 100; 10 SYRUP ORAL at 16:55

## 2019-10-22 RX ADMIN — CEFAZOLIN SODIUM 2000 MG: 2 SOLUTION INTRAVENOUS at 22:38

## 2019-10-22 RX ADMIN — NYSTATIN: 100000 POWDER TOPICAL at 17:21

## 2019-10-22 RX ADMIN — GUAIFENESIN AND DEXTROMETHORPHAN 10 ML: 100; 10 SYRUP ORAL at 02:06

## 2019-10-22 RX ADMIN — HYDROXYZINE HYDROCHLORIDE 100 MG: 25 TABLET ORAL at 21:05

## 2019-10-22 RX ADMIN — METOPROLOL TARTRATE 25 MG: 25 TABLET, FILM COATED ORAL at 21:04

## 2019-10-22 RX ADMIN — HYDROCODONE BITARTRATE AND ACETAMINOPHEN 1 TABLET: 5; 325 TABLET ORAL at 12:34

## 2019-10-22 RX ADMIN — GUAIFENESIN AND DEXTROMETHORPHAN 10 ML: 100; 10 SYRUP ORAL at 08:26

## 2019-10-22 NOTE — PLAN OF CARE
Problem: PHYSICAL THERAPY ADULT  Goal: Performs mobility at highest level of function for planned discharge setting  See evaluation for individualized goals  Description  Treatment/Interventions: LE strengthening/ROM, Functional transfer training, Endurance training, Cognitive reorientation, Patient/family training, Equipment eval/education, Bed mobility, Gait training, Elevations, Therapeutic exercise, Spoke to nursing, Spoke to case management, OT  Equipment Recommended: Marley Zavaleta       See flowsheet documentation for full assessment, interventions and recommendations  Note:   Prognosis: Fair  Problem List: Decreased strength, Decreased range of motion, Decreased endurance, Impaired balance, Decreased mobility, Obesity, Pain, Decreased skin integrity, Decreased safety awareness  Assessment: Pt is a 48 y o  male seen for PT evaluation s/p admit to [facility] on 10/20/2019 w/ Cellulitis  Order placed for PT  Upon evaluation: Pt requires S assistance for bed mobility and transfers and S assistance for ambulation with pt's own rolling walker in room  Pt's clinical presentation is currently unstable/unpredictable given the functional mobility deficits above, especially weakness, decreased ROM, gait deviations, pain, decreased activity tolerance, decreased functional mobility tolerance and altered sensation, coupled with fall risks including hx of falls, impulsivity, impaired balance, impaired coordination and limited sensation/neuropathy, and combined with medical complications of tachycardia, abnormal H&H, multiple readmissions and abnormal sodium values  Pt to benefit from continued skilled PT tx while in hospital and upon DC to address deficits as defined above and maximize level of functional mobility   From PT/mobility standpoint, recommendation at time of d/c would be home with family support pending progress in order to maximize pt's functional independence and consistency w/ mobility in order to facilitate return to PLOF  Recommend trial with walker next 1-2 sessions, trial with cane next 1-2 sessions and ther ex next 1-2 sessions  Barriers to Discharge: (ANTELMO)  Barriers to Discharge Comments: Comorbidities affecting pt's physical performance at time of assessment include: DM, HTN, obesity, neuropathy and cellulitis, hyperglycemia, IBS, OA knee, venous insufficiency, I&D of wound  Personal factors affecting pt at time of IE include: affordability, steps to enter environment, past experience, behavioral pattern, inability to perform IADLs, inability to perform ADLs, inability to navigate community distances and recent fall(s  Recommendation: Home with family support          See flowsheet documentation for full assessment

## 2019-10-22 NOTE — PLAN OF CARE
Problem: OCCUPATIONAL THERAPY ADULT  Goal: Performs self-care activities at highest level of function for planned discharge setting  See evaluation for individualized goals  Description  Treatment Interventions: ADL retraining, Functional transfer training, UE strengthening/ROM, Endurance training, Patient/family training, Equipment evaluation/education, Compensatory technique education, Continued evaluation, Energy conservation, Activityengagement  Equipment Recommended: Tub seat with back       See flowsheet documentation for full assessment, interventions and recommendations  Note:   Limitation: Decreased ADL status, Decreased endurance, Decreased high-level ADLs, Decreased self-care trans     Assessment: Pt is a 48 y o  male seen for OT evaluation (time 7632-7640, 9944-2817) s/p [facility] on 10/20/2019 w/ Cellulitis  Comorbidities affecting pt's functional performance at time of assessment include: DM2, HLD, morbid obesity, HTN, anemia, diabetic neuropathy, cough with SOB, severe sepsis  Evaluation required a review of medical and/ or therapy records and extensive additional review of physical, cognitive, or psychosocial history related to current functional performance    Personal factors affecting pt at time of IE include:behavioral pattern, difficulty performing ADLS, difficulty performing IADLS , decreased initiation and engagement , health management  and environment  Split session required due to pt declining OOB mobility at time of initial attempt  Prior to admission, pt required A with ADLs, IADLs, and used RW Mod I for functional mobility   Upon evaluation: Pt requires set-up for grooming and UB ADLs, Max A for LB ADLs, SUP for toilet transfers, SUP using RW for functional mobility >bathroom as well as sit<>stand transfers; SUP for supine>sit bed mobility 2* the following deficits impacting occupational performance: decreased balance, decreased tolerance, impaired problem solving, impulsivity, decreased safety awareness, increased pain and decreased coping skills  Cognition appears to be [facility]  Vision is [facility]  Development of pt POC requires clinical decision making of moderate analytic complexity  Minimal to moderate modification of tasks or assistance (e g , physical or verbal) is necessary to enable completion of evaluation component    Pt to benefit from continued skilled OT tx while in the hospital to address deficits as defined above and maximize level of functional independence w ADL's and functional mobility  Occupational Performance areas to address include: grooming, bathing/shower, toilet hygiene, dressing, medication management, socialization, health maintenance, functional mobility, community mobility, household maintenance and social participation  From OT standpoint, recommendation at time of d/c would be home with family support  Tx assessment: Patient participated in Skilled OT session (time 2414-0500) this date with interventions consisting of ADL retraining with the use of correct body mechanics  Pt supine in bed upon start of session, call bell sounding  Pt requested A with toileting while in bed  Educated pt on importance of OOB mobility to improve muscle strength and endurance  Pt declined stating he would prefer using urinal  Pt required Mod-max A for placement of urinal as well as to complete yaya hygiene  Patient continues to be functioning below baseline level, occupational performance remains limited secondary to factors listed above and increased risk for falls and injury  From OT standpoint, recommendation at time of d/c would be home with family support  Patient to benefit from continued Occupational Therapy treatment while in the hospital to address deficits as defined above and maximize level of functional independence with ADLs and functional mobility        OT Discharge Recommendation: Home with family support  OT - OK to Discharge: (once medically cleared)

## 2019-10-22 NOTE — ASSESSMENT & PLAN NOTE
· Admitted through surgery service for recurrent cellulitis of the left  · Was hospitalized in September of 2019 for necrotizing fascitis of the left thigh & septic shock  · He has been hemodynamically stable this admission  · Continue cephazolin    Antibiotics per ID  · Overall clinically improved  · Dressing changes per primary team

## 2019-10-22 NOTE — ASSESSMENT & PLAN NOTE
· Blood pressure stable  Continue metoprolol  · Microalbumin/creatinine ratio 41   Started on low-dose ACE-inhibitor  · Continue to monitor

## 2019-10-22 NOTE — PLAN OF CARE
Problem: Potential for Falls  Goal: Patient will remain free of falls  Description  INTERVENTIONS:  - Assess patient frequently for physical needs  -  Identify cognitive and physical deficits and behaviors that affect risk of falls    -  [place] fall precautions as indicated by assessment   - Educate patient/family on patient safety including physical limitations  - Instruct patient to call for assistance with activity based on assessment  - Modify environment to reduce risk of injury  - Consider OT/PT consult to assist with strengthening/mobility  Outcome: Progressing     Problem: Prexisting or High Potential for Compromised Skin Integrity  Goal: Skin integrity is maintained or improved  Description  INTERVENTIONS:  - Identify patients at risk for skin breakdown  - Assess and monitor skin integrity  - Assess and monitor nutrition and hydration status  - Monitor labs   - Assess for incontinence   - Turn and reposition patient  - Assist with mobility/ambulation  - Relieve pressure over bony prominences  - Avoid friction and shearing  - Provide appropriate hygiene as needed including keeping skin clean and dry  - Evaluate need for skin moisturizer/barrier cream  - Collaborate with interdisciplinary team   - Patient/family teaching  - Consider wound care consult   Outcome: Progressing     Problem: PAIN - ADULT  Goal: Verbalizes/displays adequate comfort level or baseline comfort level  Description  Interventions:  - Encourage patient to monitor pain and request assistance  - Assess pain using appropriate pain scale  - Administer analgesics based on type and severity of pain and evaluate response  - Implement non-pharmacological measures as appropriate and evaluate response  - Consider cultural and social influences on pain and pain management  - Notify physician/advanced practitioner if interventions unsuccessful or patient reports new pain  Outcome: Progressing     Problem: INFECTION - ADULT  Goal: Absence or prevention of progression during hospitalization  Description  INTERVENTIONS:  - Assess and monitor for signs and symptoms of infection  - Monitor lab/diagnostic results  - Monitor all insertion sites, i e  indwelling lines, tubes, and drains  - Monitor endotracheal if appropriate and nasal secretions for changes in amount and color  - [place] appropriate cooling/warming therapies per order  - Administer medications as ordered  - Instruct and encourage patient and family to use good hand hygiene technique  - Identify and instruct in appropriate isolation precautions for identified infection/condition  Outcome: Progressing     Problem: SKIN/TISSUE INTEGRITY - ADULT  Goal: Skin integrity remains intact  Description  INTERVENTIONS  - Identify patients at risk for skin breakdown  - Assess and monitor skin integrity  - Assess and monitor nutrition and hydration status  - Monitor labs (i e  albumin)  - Assess for incontinence   - Turn and reposition patient  - Assist with mobility/ambulation  - Relieve pressure over bony prominences  - Avoid friction and shearing  - Provide appropriate hygiene as needed including keeping skin clean and dry  - Evaluate need for skin moisturizer/barrier cream  - Collaborate with interdisciplinary team (i e  Nutrition, Rehabilitation, etc )   - Patient/family teaching  Outcome: Progressing  Goal: Incision(s), wounds(s) or drain site(s) healing without S/S of infection  Description  INTERVENTIONS  - Assess and document risk factors for skin impairment   - Assess and document dressing, incision, wound bed, drain sites and surrounding tissue  - Consider nutrition services referral as needed  - Oral mucous membranes remain intact  - Provide patient/ family education  Outcome: Progressing

## 2019-10-22 NOTE — ASSESSMENT & PLAN NOTE
· POA, evidence by fever leukocytosis tachycardia, elevated lactic, secondary to left lower extremity cellulitis  · Overall clinically improved  Procalcitonin negative x2  · On IV cephazolin    Antibiotic management per ID  · Blood cultures negative times 48 hours

## 2019-10-22 NOTE — PROGRESS NOTES
Progress Note - Infectious Disease   Doak Peabody 48 y o  male MRN: [id number]  Unit/Bed#: -01 Encounter: [id number]      Impression/Plan:  1  Severe sepsis-POA   Fever, leukocytosis, tachycardia , lactic acidosis   Appears to be all secondary to a left lower extremity soft tissue infection with notable cellulitis   No other clear sources appreciated   Consideration for the possibility of bacteremia   Fortunately the patient remains hemodynamically stable despite his systemic illness   He seems to be tolerating the antibiotics without difficulty  The lactate level has normalized  No recurrent fever  Procalcitonin level remains normal   The cellulitis has significantly improved  -continue cefazolin for now at current dose  -follow-up blood cultures  -recheck CBC with diff and BMP  -recheck procalcitonin level  -supportive care  -if continues to improve likely to oral antibiotics tomorrow     2  Left lower extremity cellulitis-complicating a left groin/thigh wound   Previously the patient had group B Streptococcus, and the organism may end up being the same   This does look most consistent with a streptococcal infection   No abscess collection noted on CT scan  The infection remains relatively well localized but remains quite symptomatic  -antibiotics as above  -close surgical follow-up  -local wound care  -serial exams     3  Diabetes mellitus-type 2 with hyperglycemia   Previous hemoglobin A1c was 12  This is certainly leading to increased risk of infection and relapses  -internal Medicine follow-up  -tighten glycemic control     4   Cough-with some notable shortness of breath when the patient 1st presented although this has improved   The patient feels it is secondary to postnasal drip  No clear pneumonia noted on chest x-ray  The symptoms have improved  -cough suppression for symptomatic relief  -treatment of postnasal drip  -monitor respiratory status     5   Morbid obesity-this is another important risk factor for the patient's the soft tissue infections   Recommend weight loss to decreased risk of recurrent infection    Antibiotics:  Cefazolin 3    Subjective:  Patient has no fever, chills, sweats; no nausea, vomiting, diarrhea; scan cough, but no shortness of breath; decreased pain  No new symptoms  He feels better overall  Objective:  Vitals:  Temp:  [97 9 °F (36 6 °C)-98 8 °F (37 1 °C)] 97 9 °F (36 6 °C)  HR:  [] 99  Resp:  [18-20] 20  BP: (112-164)/(73-94) 142/87  SpO2:  [94 %-96 %] 94 %  Temp (24hrs), Av 4 °F (36 9 °C), Min:97 9 °F (36 6 °C), Max:98 8 °F (37 1 °C)  Current: Temperature: 97 9 °F (36 6 °C)    Physical Exam:   General Appearance:  Alert, interactive, nontoxic, no acute distress  Throat: Oropharynx moist without lesions  Lungs:   Clear to auscultation bilaterally; no wheezes, rhonchi or rales; respirations unlabored   Heart:  RRR; no murmur, rub or gallop   Abdomen:   Soft, non-tender, non-distended, positive bowel sounds  Extremities: No clubbing, cyanosis  Wound packed and dressed with dry dressing in place  Erythema significantly decreased since yesterday   Skin: No new rashes or lesions  No draining wounds noted  Labs, Imaging, & Other studies:   All pertinent labs and imaging studies were personally reviewed  Results from last 7 days   Lab Units 10/22/19  0622 10/21/19  1320 10/20/19  0207   WBC Thousand/uL 8 94 9 22 11 38*   HEMOGLOBIN g/dL 8 7* 9 1* 9 5*   PLATELETS Thousands/uL 249 241 237     Results from last 7 days   Lab Units 10/21/19  0641 10/20/19  0207   SODIUM mmol/L 134* 134*   POTASSIUM mmol/L 3 9 4 3   CHLORIDE mmol/L 101 99*   CO2 mmol/L 24 26   BUN mg/dL 13 13   CREATININE mg/dL 0 77 0 92   EGFR ml/min/1 73sq m 104 95   CALCIUM mg/dL 8 9 9 1   AST U/L  --  35   ALT U/L  --  34   ALK PHOS U/L  --  78     Results from last 7 days   Lab Units 10/20/19  0234   BLOOD CULTURE  No Growth at 48 hrs  No Growth at 48 hrs       Results from last 7 days   Lab Units 10/21/19  1251   PROCALCITONIN ng/ml 0 22

## 2019-10-22 NOTE — PROGRESS NOTES
Progress Note - General Surgery   Rox Palacios 48 y o  male MRN: [id number]  Unit/Bed#: -01 Encounter: [id number]    Assessment:  48year old male with hx of DM2 and nec fasc of the left thigh and septic shock in September of 2019, presenting with recurrent cellulitis of the left thigh    Plan:    1  Cellulitis of left thigh   -Dressing changed and wound was repacked this AM  Wound bed is healthy in appearance  Some serous drainage noted  No purulence appreciated  -Continue daily dressing changes with Dakins solution   -Continue to trend labs  -Physical therapy consult pending   -Continue abx per ID     Subjective/Objective     Subjective: Pt is doing well today  Denies pain, fevers or chills  States that he is feeling better     Objective:     Blood pressure 142/87, pulse 99, temperature 97 9 °F (36 6 °C), temperature source Oral, resp  rate 20, height 5' 4" (1 626 m), weight (!) 160 kg (353 lb 9 9 oz), SpO2 94 %  ,Body mass index is 60 7 kg/m²  Intake/Output Summary (Last 24 hours) at 10/22/2019 0841  Last data filed at 10/22/2019 0820  Gross per 24 hour   Intake 240 ml   Output 300 ml   Net -60 ml       Invasive Devices     Peripheral Intravenous Line            Long-Dwell Peripheral IV (Midline) 75/15/32 Left Cephalic 40 days    Long-Dwell Peripheral IV (Midline) 41/93/48 Left Cephalic less than 1 day                Physical Exam:  General: Alert and oriented in no acute distress  HEENT: Normocephalic, atraumatic  Cardiac: RRR  Pulmonary: Clear to auscultation bilaterally   Abdomen: Soft, nontender  Extremities: Wound to left thigh with pink/red wound bed  No evidence of purulence   Cellulitis to the left thigh has not gone beyond marked region     Lab, Imaging and other studies:  CBC:   Lab Results   Component Value Date    WBC 8 94 10/22/2019    HGB 8 7 (L) 10/22/2019    HCT 29 1 (L) 10/22/2019    MCV 87 10/22/2019     10/22/2019    MCH 26 0 (L) 10/22/2019    MCHC 29 9 (L) 10/22/2019    RDW 14 9 10/22/2019    MPV 8 4 (L) 10/22/2019    NRBC 0 10/22/2019   , CMP: No results found for: SODIUM, K, CL, CO2, ANIONGAP, BUN, CREATININE, GLUCOSE, CALCIUM, AST, ALT, ALKPHOS, PROT, BILITOT, EGFR  VTE Pharmacologic Prophylaxis: Sequential compression device (Venodyne)   VTE Mechanical Prophylaxis: sequential compression device

## 2019-10-22 NOTE — ASSESSMENT & PLAN NOTE
Lab Results   Component Value Date    HGBA1C 11 2 (H) 09/06/2019       Recent Labs     10/21/19  1602 10/21/19  2051 10/22/19  0804 10/22/19  1239   POCGLU 244* 205* 116 119       Blood Sugar Average: Last 72 hrs:  (P) 222     · Blood glucose improved on current regimen  The patient is unable to afford NPH insulin ondischarge  He had been on 70/30 mix insulin prior to admission    He will be transitioned to 70/30 prior to discharge in order to ensure adequate blood glucose control on his home regimen  · Resume metformin at discharge  · Continue Accu-Cheks a c  HS

## 2019-10-22 NOTE — CONSULTS
Consult Note - Wound   Gabriela Garza 48 y o  male MRN: [id number]  Unit/Bed#: -01 Encounter: [id number]      Assessment:   A wound care consult was placed by the surgery team    Large left inguinal wound  The surgical team is providing wound care for this wound  An order for Dakin's dressing changes has been written  The surgical resident changed the wound dressing this morning  According to staff RN, Anuradha Abernathy, the buttocks and heels are unremarkable  Plan:   1  The surgery team is following the left inguinal wound  The surgery team has written wound care orders and is changing the dressing  2  I am not clear on why I was consulted  Please re-consult if wound care nurse is needed and explain the purpose of the consult  Discussed with Anuradha Abernathy, RN  Vitals: Blood pressure 112/73, pulse 100, temperature 98 4 °F (36 9 °C), temperature source Oral, resp  rate 18, height 5' 4" (1 626 m), weight (!) 160 kg (353 lb 9 9 oz), SpO2 94 %  ,Body mass index is 60 7 kg/m²  Wound 09/06/19 Incision Groin Bilateral (Active)       Wound 09/07/19 Incision Groin Left (Active)       Wound 09/10/19 Moisture associated skin damage Other (Comment) Buttocks Left (Active)       Wound 09/17/19 Moisture associated skin damage Abdomen Left (Active)       Wound 10/20/19 Groin (Active)   Wound Image   10/20/2019  7:50 AM   Wound Description Beefy red 10/21/2019  9:52 PM   Chelsie-wound Assessment Swelling 10/21/2019  9:52 PM   Wound Length (cm) 13 cm 10/20/2019  7:50 AM   Wound Width (cm) 12 cm 10/20/2019  7:50 AM   Calculated Wound Area (cm^2) 156 cm^2 10/20/2019  7:50 AM   Drainage Amount Small 10/21/2019  9:52 PM   Drainage Description Bloody; Serosanguineous 10/21/2019  9:52 PM   Treatments Cleansed;Site care 10/21/2019  9:00 AM   Dressing ABD;Gauze 10/21/2019  9:00 AM   Wound packed?  Yes 10/21/2019  9:52 PM   Dressing Changed Changed by provider 10/21/2019  9:00 AM   Patient Tolerance Tolerated well 10/21/2019  9:00 AM Dressing Status Clean;Dry; Intact 10/21/2019  9:52 PM

## 2019-10-22 NOTE — PLAN OF CARE
Problem: Potential for Falls  Goal: Patient will remain free of falls  Description  INTERVENTIONS:  - Assess patient frequently for physical needs  -  Identify cognitive and physical deficits and behaviors that affect risk of falls    -  [place] fall precautions as indicated by assessment   - Educate patient/family on patient safety including physical limitations  - Instruct patient to call for assistance with activity based on assessment  - Modify environment to reduce risk of injury  - Consider OT/PT consult to assist with strengthening/mobility  Outcome: Progressing     Problem: Prexisting or High Potential for Compromised Skin Integrity  Goal: Skin integrity is maintained or improved  Description  INTERVENTIONS:  - Identify patients at risk for skin breakdown  - Assess and monitor skin integrity  - Assess and monitor nutrition and hydration status  - Monitor labs   - Assess for incontinence   - Turn and reposition patient  - Assist with mobility/ambulation  - Relieve pressure over bony prominences  - Avoid friction and shearing  - Provide appropriate hygiene as needed including keeping skin clean and dry  - Evaluate need for skin moisturizer/barrier cream  - Collaborate with interdisciplinary team   - Patient/family teaching  - Consider wound care consult   Outcome: Progressing     Problem: PAIN - ADULT  Goal: Verbalizes/displays adequate comfort level or baseline comfort level  Description  Interventions:  - Encourage patient to monitor pain and request assistance  - Assess pain using appropriate pain scale  - Administer analgesics based on type and severity of pain and evaluate response  - Implement non-pharmacological measures as appropriate and evaluate response  - Consider cultural and social influences on pain and pain management  - Notify physician/advanced practitioner if interventions unsuccessful or patient reports new pain  Outcome: Progressing     Problem: INFECTION - ADULT  Goal: Absence or prevention of progression during hospitalization  Description  INTERVENTIONS:  - Assess and monitor for signs and symptoms of infection  - Monitor lab/diagnostic results  - Monitor all insertion sites, i e  indwelling lines, tubes, and drains  - Monitor endotracheal if appropriate and nasal secretions for changes in amount and color  - [place] appropriate cooling/warming therapies per order  - Administer medications as ordered  - Instruct and encourage patient and family to use good hand hygiene technique  - Identify and instruct in appropriate isolation precautions for identified infection/condition  Outcome: Progressing     Problem: SKIN/TISSUE INTEGRITY - ADULT  Goal: Skin integrity remains intact  Description  INTERVENTIONS  - Identify patients at risk for skin breakdown  - Assess and monitor skin integrity  - Assess and monitor nutrition and hydration status  - Monitor labs (i e  albumin)  - Assess for incontinence   - Turn and reposition patient  - Assist with mobility/ambulation  - Relieve pressure over bony prominences  - Avoid friction and shearing  - Provide appropriate hygiene as needed including keeping skin clean and dry  - Evaluate need for skin moisturizer/barrier cream  - Collaborate with interdisciplinary team (i e  Nutrition, Rehabilitation, etc )   - Patient/family teaching  Outcome: Progressing  Goal: Incision(s), wounds(s) or drain site(s) healing without S/S of infection  Description  INTERVENTIONS  - Assess and document risk factors for skin impairment   - Assess and document dressing, incision, wound bed, drain sites and surrounding tissue  - Consider nutrition services referral as needed  - Oral mucous membranes remain intact  - Provide patient/ family education  Outcome: Progressing     Problem: Nutrition/Hydration-ADULT  Goal: Nutrient/Hydration intake appropriate for improving, restoring or maintaining nutritional needs  Description  Monitor and assess patient's nutrition/hydration status for malnutrition  Collaborate with interdisciplinary team and initiate plan and interventions as ordered  Monitor patient's weight and dietary intake as ordered or per policy  Utilize nutrition screening tool and intervene as necessary  Determine patient's food preferences and provide high-protein, high-caloric foods as appropriate       INTERVENTIONS:  - Monitor oral intake, urinary output, labs, and treatment plans  - Assess nutrition and hydration status and recommend course of action  - Evaluate amount of meals eaten  - Assist patient with eating if necessary   - Allow adequate time for meals  - Recommend/ encourage appropriate diets, oral nutritional supplements, and vitamin/mineral supplements  - Order, calculate, and assess calorie counts as needed  - Recommend, monitor, and adjust tube feedings and TPN/PPN based on assessed needs  - Assess need for intravenous fluids  - Provide specific nutrition/hydration education as appropriate  - Include patient/family/caregiver in decisions related to nutrition  Outcome: Progressing

## 2019-10-22 NOTE — OCCUPATIONAL THERAPY NOTE
OccupationalTherapy Evaluation and Treatment Note     Patient Name: Ronald Cordova  YOFCGPOOJA Date: 10/22/2019  Problem List  Principal Problem:    Cellulitis  Active Problems:    Type 2 diabetes mellitus with hyperglycemia, with long-term current use of insulin ([facility] )    Hyperlipidemia    Morbid obesity ([facility] )    Essential hypertension    Normocytic anemia    Diabetic neuropathy (HCC)    Cough with shortness of breath    Severe sepsis [facility])    Past Medical History  Past Medical History:   Diagnosis Date    Cellulitis     last assessed 12/10/15    Diabetes mellitus ([facility] )     Edema     Elevated liver enzymes     Esophageal reflux     Gluten intolerance     Gout     last assessed 09/05/13    Hyperglycemia     Hypertension     IBS (irritable bowel syndrome)     Insomnia     Obesity     Osteoarthritis of knee     last assessed 02/10/14    Prehypertension     last assessed 08/22/17    Venous insufficiency     last assessed 08/22/17    Villonodular synovitis of the hand, right     last assessed 11/14/2013     Past Surgical History  Past Surgical History:   Procedure Laterality Date    INCISION AND DRAINAGE OF WOUND Left 9/6/2019    Procedure: INCISION AND DRAINAGE (I&D) GROIN;  Surgeon: Carleen Carrera DO;  [facility];  Service: General    WOUND DEBRIDEMENT Left 9/7/2019    Procedure: EXCISIONAL DEBRIDEMENT;  Surgeon: Carleen Carrera DO;  [facility];  Service: General         10/22/19 1527   Note Type   Note type Eval/Treat  (4312-6707;2085-3884 split eval; tx 8615-0296 for toileting)   Restrictions/Precautions   Weight Bearing Precautions Per Order No   Other Precautions Fall Risk;Pain;Telemetry; Impulsive   Pain Assessment   Pain Assessment FLACC   Pain Rating: FLACC (Rest) - Face 1   Pain Rating: FLACC (Rest) - Legs 0   Pain Rating: FLACC (Rest) - Activity 0   Pain Rating: FLACC (Rest) - Cry 0   Pain Rating: FLACC (Rest) - Consolability 0   Score: FLACC (Rest) 1   Pain Rating: FLACC (Activity) - Face 1   Pain Rating: FLACC (Activity) - Legs 0   Pain Rating: FLACC (Activity) - Activity 0   Pain Rating: FLACC (Activity) - Cry 1   Pain Rating: FLACC (Activity) - Consolability 0   Score: FLACC (Activity) 2   Home Living   Type of Home House   Home Layout Two level; Able to live on main level with bedroom/bathroom; Performs ADLs on one level  (first floor set up; 2 ANTELMO 10 inch step with "yoga stepper")   Bathroom Shower/Tub Walk-in shower  (on first floor)   [address] [place] [address] Dr fritz   Bathroom Accessibility Accessible   Home Equipment Walker;Cane   Additional Comments Pt reports using RW for functional mobility however recently not using as much  Prior Function   Level of [place] Needs assistance with ADLs and functional mobility; Needs assistance with IADLs   Lives With Spouse;Daughter   Receives Help From Family   ADL Assistance Needs assistance   IADLs Needs assistance   Vocational Self employed   Comments Pt reports family members all have designated chores  Pt reports A with LB dressing and bathing for years  Lifestyle   Autonomy Pt requires A with ADLs, IADLs, and Mod I with functional mobility   Reciprocal Relationships Pt has supportive family and friends   Service to Others Pt on temporary leave from work   Pt is self employed   Intrinsic Gratification Pt enjoys his job   Psychosocial   Psychosocial (WDL) WDL   ADL   Eating Assistance 7  Independent   Grooming Assistance 5  Supervision/Setup   Grooming Deficit Setup   [address] [address] 5  Supervision/Setup   [address] 2  Maximal Assistance   LB Bathing Deficit Other (Comment)  (based on functional assessment of skills/deficits)   UB Dressing Assistance 5  Supervision/Setup   UB Dressing Deficit Setup   LB Dressing Assistance 2  Maximal Assistance   LB Dressing Deficit Other (Comment)  (pt reports he has required max-total a with this for "years") Toileting Assistance  3  Moderate Assistance  (use of urinal in bed)   Bed Mobility   Supine to Sit 5  Supervision   Additional items Increased time required;Verbal cues  (VCs to ensure safe exit due to sitting close to EOB)   Transfers   Sit to Stand 5  Supervision   Additional items Increased time required   Stand to Sit 5  Supervision   Additional items Increased time required   Toilet transfer 5  Supervision   Additional items Increased time required   Additional Comments RW for UE support   Functional Mobility   Functional Mobility 5  Supervision   Additional Comments pt completed functional mobility from EOB to bathroom using RW with SUP; no overt LOB noted  Additional items Rolling walker   Balance   Static Sitting Good   Static Standing Fair +   Ambulatory Fair +   Activity Tolerance   Activity Tolerance Patient limited by fatigue  (requires rest breaks)   Nurse Made Aware spoke with RN   RUE Assessment   RUE Assessment X   RUE Overall AROM   R Shoulder Flexion WFL   R Elbow Flexion WFL   R Wrist Flexion WFL   R Mass Grasp WFL   RUE Strength   RUE Overall Strength Within Functional Limits - strength 5/5   R Shoulder Flexion 5/5   R Elbow Extension 5/5   LUE Assessment   LUE Assessment X   LUE Overall AROM   L Shoulder Flexion WFL   L Elbow Flexion WFL   L Wrist Flexion WFL   L Mass Grasp WFL   LUE Strength   LUE Overall Strength Within Functional Limits - strength 5/5   L Shoulder Flexion 5/5   L Elbow Extension 5/5   Hand Function   Gross Motor Coordination Functional   Fine Motor Coordination Functional   Vision-Basic Assessment   Current Vision No visual deficits   Patient Visual Report Other (Comment)  (no acute visual changes per pt)   Vision - Complex Assessment   Acuity Able to read clock/calendar on wall without difficulty; Able to read employee name badge without difficulty   Cognition   Overall Cognitive Status [facility]   Arousal/Participation Alert; Responsive;Arousable; Cooperative   Attention Attends with cues to redirect   Orientation Level Oriented X4   Memory Within functional limits   Following Commands Follows all commands and directions without difficulty   Comments Pt identified by full name and birthdate   Assessment   Limitation Decreased ADL status; Decreased endurance;Decreased high-level ADLs; Decreased self-care trans   Assessment Pt is a 48 y o  male seen for OT evaluation (time 0340-7812, 7814-9965) s/p [facility] w/ Cellulitis  Comorbidities affecting pt's functional performance at time of assessment include: DM2, HLD, morbid obesity, HTN, anemia, diabetic neuropathy, cough with SOB, severe sepsis  Evaluation required a review of medical and/ or therapy records and extensive additional review of physical, cognitive, or psychosocial history related to current functional performance    Personal factors affecting pt at time of IE include:behavioral pattern, difficulty performing ADLS, difficulty performing IADLS , decreased initiation and engagement , health management  and environment  Split session required due to pt declining OOB mobility at time of initial attempt  Prior to admission, pt required A with ADLs, IADLs, and used RW Mod I for functional mobility  Upon evaluation: Pt requires set-up for grooming and UB ADLs, Max A for LB ADLs, SUP for toilet transfers, SUP using RW for functional mobility >bathroom as well as sit<>stand transfers; SUP for supine>sit bed mobility 2* the following deficits impacting occupational performance: decreased balance, decreased tolerance, impaired problem solving, impulsivity, decreased safety awareness, increased pain and decreased coping skills  Cognition appears to be [facility]  Vision is [facility]  Development of pt POC requires clinical decision making of moderate analytic complexity  Minimal to moderate modification of tasks or assistance (e g , physical or verbal) is necessary to enable completion of evaluation component     Pt to benefit from continued skilled OT tx while in the hospital to address deficits as defined above and maximize level of functional independence w ADL's and functional mobility  Occupational Performance areas to address include: grooming, bathing/shower, toilet hygiene, dressing, medication management, socialization, health maintenance, functional mobility, community mobility, household maintenance and social participation  From OT standpoint, recommendation at time of d/c would be home with family support  Goals   Patient Goals "I want to get back to doing everything I was doing before I got here  I was doing so well!"   Plan   Treatment Interventions ADL retraining;Functional transfer training;UE strengthening/ROM; Endurance training;Patient/family training;Equipment evaluation/education; Compensatory technique education;Continued evaluation; Energy conservation; Activityengagement   Goal Expiration Date 10/30/19   OT Frequency 3-5x/wk   Additional Treatment Session   Start Time 1220   End Time 1236   Treatment Assessment Patient participated in Skilled OT session (time 5983-4218) this date with interventions consisting of ADL retraining with the use of correct body mechanics  Pt supine in bed upon start of session, call bell sounding  Pt requested A with toileting while in bed  Educated pt on importance of OOB mobility to improve muscle strength and endurance  Pt declined stating he would prefer using urinal  Pt required Mod-max A for placement of urinal as well as to complete yaya hygiene  Patient continues to be functioning below baseline level, occupational performance remains limited secondary to factors listed above and increased risk for falls and injury  From OT standpoint, recommendation at time of d/c would be home with family support   Patient to benefit from continued Occupational Therapy treatment while in the hospital to address deficits as defined above and maximize level of functional independence with ADLs and functional mobility  Recommendation   OT Discharge Recommendation Home with family support   Equipment Recommended Tub seat with back   OT - OK to Discharge   (once medically cleared)   Barthel Index   Feeding 10   Bathing 0   Grooming Score 5   Dressing Score 5   Bladder Score 10   Bowels Score 10   Toilet Use Score 5   Transfers (Bed/Chair) Score 10   Mobility (Level Surface) Score 0   Stairs Score 0   Barthel Index Score 55   Modified [facility] Scale   Modified Aleks Scale 3     Goals to be met within 8 days:      Pt will complete grooming/oral hygiene tasks Mod I while standing at sink    Pt will complete UB bathing/dressing Mod I while seated    Pt will complete LB bathing/dressing Mod I while seated     Pt will improve functional activity tolerance while standing at sink to 7+ minutes in order to increase safety and independence during functional transfers and ADL tasks  Pt will improve dynamic sitting/standing balance to good to increase safety and independence during functional transfers and ADL and decrease risk for falls  Pt will increase independence during STS transfers with or without AD Mod I     Pt will complete transfer to [facility] frame over toilet with Mod I     Pt will complete toileting tasks (clothing management up/down, hygiene) Mod I     Pt will complete shower stall transfer  without LOB with Supervision after set-up using shower chair    Pt will identify and implement at least 3 healthy coping strategies to increase participation in meaningful activities and decrease risk for readmission      Candy Perez OTR/L

## 2019-10-22 NOTE — PLAN OF CARE
Problem: PHYSICAL THERAPY ADULT  Goal: Performs mobility at highest level of function for planned discharge setting  See evaluation for individualized goals  Description  Treatment/Interventions: LE strengthening/ROM, Functional transfer training, Endurance training, Cognitive reorientation, Patient/family training, Equipment eval/education, Bed mobility, Gait training, Elevations, Therapeutic exercise, Spoke to nursing, Spoke to case management, OT  Equipment Recommended: John Herrera       See flowsheet documentation for full assessment, interventions and recommendations  10/22/2019 1721 by Aylin Castillo PT  Outcome: Progressing  Note:   Prognosis: Fair  Problem List: Decreased strength, Decreased range of motion, Decreased endurance, Impaired balance, Decreased mobility, Obesity, Pain, Decreased skin integrity, Decreased safety awareness  A:  Pt amb community distances this session w/o losses of balance, with better gait quality using walker as opposed to cane  Barriers to Discharge: (ANTELMO)  Barriers to Discharge Comments: Comorbidities affecting pt's physical performance at time of assessment include: DM, HTN, obesity, neuropathy and cellulitis, hyperglycemia, IBS, OA knee, venous insufficiency, I&D of wound  Personal factors affecting pt at time of IE include: affordability, steps to enter environment, past experience, behavioral pattern, inability to perform IADLs, inability to perform ADLs, inability to navigate community distances and recent fall(s  Recommendation: Home with family support          See flowsheet documentation for full assessment

## 2019-10-22 NOTE — PHYSICAL THERAPY NOTE
PHYSICAL THERAPY EVALUATION  NAME:  Esvin Luis  DATE: 10/22/19    AGE:   48 y o  Mrn:   [id number]  ADMIT DX:  Cellulitis [L03 90]  Diabetes mellitus due to underlying condition with stage 1 chronic kidney disease, unspecified whether long term insulin use ([facility] ) [E08 22, N18 1]    Past Medical History:   Diagnosis Date    Cellulitis     last assessed 12/10/15    [facility]     Edema     Elevated liver enzymes     Esophageal reflux     Gluten intolerance     Gout     last assessed 09/05/13    Hyperglycemia     Hypertension     IBS (irritable bowel syndrome)     Insomnia     Obesity     Osteoarthritis of knee     last assessed 02/10/14    Prehypertension     last assessed 08/22/17    Venous insufficiency     last assessed 08/22/17    Villonodular synovitis of the hand, right     last assessed 11/14/2013     Length Of Stay: 2  Performed at least 2 patient identifiers during session: Name and Birthday  PHYSICAL THERAPY EVALUATION :    10/22/19 5629   Note Type   Note type Eval/Treat   Pain Assessment   Pain Assessment 0-10   Pain Type Acute pain   Pain Location Leg   Pain Orientation Left   Effect of Pain on Daily Activities limits speed and indep of mobility, limits AROM   Patient's Stated Pain Goal No pain   Hospital Pain Intervention(s) Repositioned; Ambulation/increased activity; Emotional support   Home Living   Type of [address] Two level; Able to live on main level with bedroom/bathroom; Performs ADLs on one level  (first floor set up; 2 ANTELMO 10 inch step with "yoga stepper")   Home Equipment Walker;Cane   Additional Comments Pt reports using RW for functional mobility however recently not using as much  Prior Function   Level of Patrick Needs assistance with ADLs and functional mobility; Needs assistance with IADLs   Lives With Spouse;Daughter   Receives Help From Family   ADL Assistance Needs assistance   IADLs Needs assistance   Vocational Self employed Comments Pt instructed to hold off on performing UE therex with Midline/picc line   Restrictions/Precautions   Other Precautions Fall Risk;Pain; Impulsive   General   Family/Caregiver Present No   Cognition   Overall Cognitive Status WFL   Attention Attends with cues to redirect   Orientation Level Oriented X4   Memory Within functional limits   Following Commands Follows all commands and directions without difficulty   RUE Strength   RUE Overall Strength Within Functional Limits - able to perform ADL tasks with strength   LUE Strength   LUE Overall Strength Within Functional Limits - able to perform ADL tasks with strength   RLE Assessment   RLE Assessment   (hemosiderin staining lower legs)   Strength RLE   R Hip Flexion   (able to SLR)   R Knee Extension   (tested to 3)   R Ankle Dorsiflexion 4/5   LLE Assessment   LLE Assessment   (hemosiderin staining lower legs)   Strength LLE   L Hip Flexion   (able to SLR)   L Knee Extension   (tested to 3)   L Ankle Dorsiflexion 4/5   Coordination   Movements are Fluid and Coordinated 0   Coordination and Movement Description antalgic   Sensation   (vision and hearing)   Light Touch   RLE Light Touch Grossly intact; although reports "not normal" sensation w/ stocking-glove distribution   LLE Light Touch Grossly intact; although reports "not normal" sensation w/ stocking-glove distribution      Bed Mobility   Supine to Sit 5  Supervision   Additional items Assist x 1; Increased time required;Verbal cues   Transfers   Sit to Stand 5  Supervision   Additional items Verbal cues; Increased time required  (sitting close to edge of surface due to pain)   Stand to Sit 5  Supervision   Additional items Increased time required;Verbal cues  (sitting close to edge of surface due to pain)   Additional Comments Reena's Egress test pass w/UE support of walker   Ambulation/Elevation   Gait pattern Excessively slow; Inconsistent meme; Wide ALEXIS; Antalgic;[place]   Gait Assistance 5 Supervision   Additional items Assist x 1   Assistive Device Rolling walker  (w/B double wheels)   Distance 20'+20'   Stair Management Assistance Not tested   Balance   Static Sitting Good   Static Standing Fair +   Ambulatory Fair ; TUG score of 29 seconds   Endurance Deficit   Endurance Deficit Yes   Endurance Deficit Description mild SOB w/ ambulation, rpeorts stopping and standing w/BUE's on walker   Activity Tolerance   Activity Tolerance Patient limited by pain; Patient limited by fatigue   Medical Staff Made Aware spoke to [facility]  from case management, Markos Coppola from [address] spoke to [facility]    Assessment   Prognosis Fair   Problem List Decreased strength;Decreased range of motion;Decreased endurance; Impaired balance;Decreased mobility;Obesity;Pain;Decreased skin integrity; Decreased safety awareness   Barriers to Discharge   (ANTELMO)   Goals   Patient Goals I want to eventually get off of the walker, and get stronger   STG Expiration Date 11/02/19   PT Treatment Day 1  (treatment documented in note)   Plan   Treatment/Interventions LE strengthening/ROM; Functional transfer training; Endurance training;Cognitive reorientation;Patient/family training;Equipment eval/education; Bed mobility;Gait training;Elevations; Therapeutic exercise;Spoke to nursing;Spoke to case management;OT   PT Frequency Other (Comment)  (3-5x/wk)   Recommendation   Recommendation Home with family support   Equipment Recommended Walker   Barthel Index   Feeding 5   Bathing 0   Grooming Score 0   Dressing Score 5   Bladder Score 10   Bowels Score 10   Toilet Use Score 5   Transfers (Bed/Chair) Score 10   Mobility (Level Surface) Score 0   Stairs Score 0   Barthel Index Score 45   Addendum: Pt had fall here on last admission--recommended low [facility] bariatric hospital bed  Pt /friend currently report that he is ok with current Excel bed here and does not wish to change in to another bed if being [facility] tomorrow     Pt requires PT /OT co-eval due to potential signficant assistance with mobility and cognitive-behavioral impairments including impulsivity  (Please find full objective findings from PT assessment regarding body systems outlined above)  Assessment: Pt is a 48 y o  male seen for PT evaluation s/p admit to [facility] on 10/20/2019 w/ Cellulitis  Order placed for PT  Upon evaluation: Pt requires S assistance for bed mobility and transfers and S assistance for ambulation with pt's own rolling walker in room  Pt's clinical presentation is currently unstable/unpredictable given the functional mobility deficits above, especially weakness, decreased ROM, gait deviations, pain, decreased activity tolerance, decreased functional mobility tolerance and altered sensation, coupled with fall risks including hx of falls, impulsivity, impaired balance, impaired coordination and limited sensation/neuropathy, and combined with medical complications of tachycardia, abnormal H&H, multiple readmissions and abnormal sodium values  Pt to benefit from continued skilled PT tx while in hospital and upon DC to address deficits as defined above and maximize level of functional mobility  From PT/mobility standpoint, recommendation at time of d/c would be home with family support pending progress in order to maximize pt's functional independence and consistency w/ mobility in order to facilitate return to PLOF  Recommend trial with walker next 1-2 sessions, trial with cane next 1-2 sessions and ther ex next 1-2 sessions  Goals: Pt will: Perform bed mobility tasks with modified I to prepare for transfers and reposition in bed  Perform transfers with modified I to improve ease of transfers  Perform ambulation with rolling walker for >300' with  Supervision  to increase Indep in home environment and in community  Perform 2 stairs w/DME and /or railnig and w/Supervision to return to home with ANTELMO   Perform TUG w/walker within 20 seconds to demonstrate improved balance    Comorbidities affecting pt's physical performance at time of assessment include: DM, HTN, obesity, neuropathy and cellulitis, hyperglycemia, IBS, OA knee, venous insufficiency, I&D of wound  Personal factors affecting pt at time of IE include: affordability, steps to enter environment, past experience, behavioral pattern, inability to perform IADLs, inability to perform ADLs, inability to navigate community distances and recent fall(s)  PHYSICAL THERAPY TREATMENT NOTE  Time In:1557  Time Out:1613  Total Time: 15min  S:  Pt agreeable to amb distances in halls  O:  Amb 250' w/ S--- 220' of it w/rolling walker and 30' w/o device but with increased gait deviations including but not limited due trendelenburg gait  Pt educated in recommendations for mall walking program as long as he has back up of places to sit in community  Pt positioned in recliner with attempt of 90-90 positioning using step stool  A:  Pt amb community distances this session w/o losses of balance, with better gait quality using walker as opposed to cane    P:  Recommend walker for distances, cane for short distances (household distances), and stair training  Brayden Mccurdy, PT, DPT

## 2019-10-22 NOTE — PROGRESS NOTES
Progress Note - Gretta Epley 1965, 48 y o  male MRN: [id number]    Unit/Bed#: -01 Encounter: [id number]    Primary Care Provider: Perez Melgoza MD   Date and time admitted to hospital: 10/20/2019  1:24 AM    * Cellulitis  Assessment & Plan  · Admitted through surgery service for recurrent cellulitis of the left  · Was hospitalized in September of 2019 for necrotizing fascitis of the left thigh & septic shock  · He has been hemodynamically stable this admission  · Continue cephazolin  Antibiotics per ID  · Overall clinically improved  · Dressing changes per primary team    Severe sepsis [facility])  Assessment & Plan  · POA, evidence by fever leukocytosis tachycardia, elevated lactic, secondary to left lower extremity cellulitis  · Overall clinically improved  Procalcitonin negative x2  · On IV cephazolin  Antibiotic management per ID  · Blood cultures negative times 48 hours    Type 2 diabetes mellitus with hyperglycemia, with long-term current use of insulin [facility])  Assessment & Plan  Lab Results   Component Value Date    HGBA1C 11 2 (H) 09/06/2019       Recent Labs     10/21/19  1602 10/21/19  2051 10/22/19  0804 10/22/19  1239   POCGLU 244* 205* 116 119       Blood Sugar Average: Last 72 hrs:  (P) 222     · Blood glucose improved on current regimen  The patient is unable to afford NPH insulin ondischarge  He had been on 70/30 mix insulin prior to admission  He will be transitioned to 70/30 prior to discharge in order to ensure adequate blood glucose control on his home regimen  · Resume metformin at discharge  · Continue Accu-Cheks a c  HS    Essential hypertension  Assessment & Plan  · Blood pressure stable  Continue metoprolol  · Microalbumin/creatinine ratio 41   Started on low-dose ACE-inhibitor  · Continue to monitor    Morbid obesity ([address] )  Assessment & Plan  · BMI 60 7  · Counseled on therapeutic lifestyle changes  · Outpatient consultation with bariatric surgery when wound healed    VTE Pharmacologic Prophylaxis:  The  Pharmacologic: Enoxaparin (Lovenox)  Mechanical VTE Prophylaxis in Place: Yes    Patient Centered Rounds: I have performed bedside rounds with nursing staff today  Discussions with Specialists or Other Care Team Provider: Nursing     Education and Discussions with Family / Patient:  Patient significant other updated  All questions answered  Current Length of Stay: 2 day(s)    Current Patient Status: Inpatient   Certification Statement: The patient will continue to require additional inpatient hospital stay due to Current diagnosis and care plan    Discharge Plan:  Resume mixed insulin at current doses on discharge    Code Status: Level 1 - Full Code      Subjective:   Seen and evaluated  Patient reports cough with postnasal drip  He denies any chest pain, no fever or chills  Scrotal and left thigh pain is better  Objective:     Vitals:   Temp (24hrs), Av 4 °F (36 9 °C), Min:97 9 °F (36 6 °C), Max:98 8 °F (37 1 °C)    Temp:  [97 9 °F (36 6 °C)-98 8 °F (37 1 °C)] 97 9 °F (36 6 °C)  HR:  [] 99  Resp:  [18-20] 20  BP: (112-164)/(73-94) 142/87  SpO2:  [94 %-96 %] 94 %  Body mass index is 60 7 kg/m²  Input and Output Summary (last 24 hours):        Intake/Output Summary (Last 24 hours) at 10/22/2019 1349  Last data filed at 10/22/2019 1346  Gross per 24 hour   Intake 900 ml   Output 500 ml   Net 400 ml       Physical Exam:  General Appearance:    Alert, cooperative, no distress, appropriately responsive    Head:    Normocephalic, without obvious abnormality, atraumatic, mucous membranes moist    Eyes:    Conjunctiva/corneas clear, EOM's intact   Neck:   Supple   Lungs:     Clear to auscultation bilaterally, respirations unlabored, no crackles or wheeze     Heart:    Regular rate and rhythm, S1 and S2    Abdomen:     Soft, morbidly obese, nontender   Extremities:   Chronic stasis changes extremities, trace ankle edema bilaterally, wound to left with improved cellulitis   Neurologic:  nonfocal         Additional Data:     Labs:    Results from last 7 days   Lab Units 10/22/19  0622 10/21/19  1320   WBC Thousand/uL 8 94 9 22   HEMOGLOBIN g/dL 8 7* 9 1*   HEMATOCRIT % 29 1* 30 2*   PLATELETS Thousands/uL 249 241   NEUTROS PCT %  --  61   LYMPHS PCT %  --  23   LYMPHO PCT % 26  --    MONOS PCT %  --  10   MONO PCT % 16*  --    EOS PCT % 6 4     Results from last 7 days   Lab Units 10/21/19  0641 10/20/19  0207   POTASSIUM mmol/L 3 9 4 3   CHLORIDE mmol/L 101 99*   CO2 mmol/L 24 26   BUN mg/dL 13 13   CREATININE mg/dL 0 77 0 92   CALCIUM mg/dL 8 9 9 1   ALK PHOS U/L  --  78   ALT U/L  --  34   AST U/L  --  35     Results from last 7 days   Lab Units 10/20/19  0207   INR  1 11       * I Have Reviewed All Lab Data Listed Above  * Additional Pertinent Lab Tests Reviewed: [address] Admission Reviewed    Cultures:   Blood Culture:   Lab Results   Component Value Date    BLOODCX No Growth at 48 hrs  10/20/2019    BLOODCX No Growth at 48 hrs  10/20/2019    BLOODCX No Growth After 5 Days  09/10/2019    BLOODCX No Growth After 5 Days  09/10/2019    BLOODCX No Growth After 5 Days  09/06/2019    BLOODCX No Growth After 5 Days   09/06/2019     Urine Culture: No results found for: URINECX  Sputum Culture: No components found for: SPUTUMCX  Wound Culture: No results found for: WOUNDCULT    Last 24 Hours Medication List:     Current Facility-Administered Medications:  acetaminophen 650 mg Oral Q4H PRN Hannah Ely PA-C    benzonatate 100 mg Oral TID PRN Lalo Chance MD    cefazolin 2,000 mg Intravenous Q8H Mally Hastings MD Last Rate: 2,000 mg (10/22/19 6210)   dextromethorphan-guaiFENesin 10 mL Oral Q4H PRN Lalo Chance MD    enoxaparin 40 mg Subcutaneous Daily Mally Hastings MD    ferrous sulfate 325 mg Oral Every Other Day ANN Ro    fluticasone 1 spray Each Nare BID Luther Kellogg MD    gabapentin 300 mg Oral HS Mally Hastings MD HYDROcodone-acetaminophen 1 tablet Oral Q4H PRN Carmel Jain MD    hydrOXYzine  mg Oral HS Carmel Jain MD    insulin aspart protamine-insulin aspart 45 Units Subcutaneous Before MD Caitlin Doss ON 10/23/2019] insulin aspart protamine-insulin aspart 78 Units Subcutaneous Daily Before Breakfast Jude Marroquin MD    lisinopril 5 mg Oral Daily ANN Holliday    loperamide 2 mg Oral 4x Daily PRN Carmel Jain MD    metoprolol tartrate 25 mg Oral Q12H [address] Carmel Jain MD    nystatin  Topical BID Carmel Jain MD    ondansetron 4 mg Intravenous Q4H PRN Carmel Jain MD    pantoprazole 40 mg Oral Early Morning Carmel Jain MD    psyllium 1 packet Oral Daily Deanne Newman PA-C    QUEtiapine 25 mg Oral HS PRN Carmel Jain MD    sodium hypochlorite 1 application Irrigation Daily Shoshana Quintero DO         Today, Patient Was Seen By: Anne Luna MD    ** Please Note: Dragon 360 Dictation voice to text software may have been used in the creation of this document   **

## 2019-10-23 VITALS
TEMPERATURE: 98.7 F | HEART RATE: 104 BPM | HEIGHT: 64 IN | SYSTOLIC BLOOD PRESSURE: 134 MMHG | BODY MASS INDEX: 53.78 KG/M2 | DIASTOLIC BLOOD PRESSURE: 78 MMHG | WEIGHT: 315 LBS | OXYGEN SATURATION: 97 % | RESPIRATION RATE: 20 BRPM

## 2019-10-23 LAB
GAD65 AB SER-ACNC: <5 U/ML (ref 0–5)
GLUCOSE SERPL-MCNC: 102 MG/DL (ref 65–140)
GLUCOSE SERPL-MCNC: 115 MG/DL (ref 65–140)
GLUCOSE SERPL-MCNC: 207 MG/DL (ref 65–140)

## 2019-10-23 PROCEDURE — 82948 REAGENT STRIP/BLOOD GLUCOSE: CPT

## 2019-10-23 PROCEDURE — 99238 HOSP IP/OBS DSCHRG MGMT 30/<: CPT | Performed by: SURGERY

## 2019-10-23 PROCEDURE — 99232 SBSQ HOSP IP/OBS MODERATE 35: CPT | Performed by: INTERNAL MEDICINE

## 2019-10-23 PROCEDURE — 99233 SBSQ HOSP IP/OBS HIGH 50: CPT | Performed by: INTERNAL MEDICINE

## 2019-10-23 RX ORDER — BENZONATATE 100 MG/1
100 CAPSULE ORAL 3 TIMES DAILY PRN
Qty: 20 CAPSULE | Refills: 0 | Status: SHIPPED | OUTPATIENT
Start: 2019-10-23 | End: 2020-05-19 | Stop reason: ALTCHOICE

## 2019-10-23 RX ORDER — HYDROCODONE BITARTRATE AND ACETAMINOPHEN 5; 325 MG/1; MG/1
1 TABLET ORAL EVERY 4 HOURS PRN
Qty: 10 TABLET | Refills: 0 | Status: SHIPPED | OUTPATIENT
Start: 2019-10-23 | End: 2019-11-02

## 2019-10-23 RX ORDER — GUAIFENESIN/DEXTROMETHORPHAN 100-10MG/5
10 SYRUP ORAL EVERY 4 HOURS PRN
Qty: 118 ML | Refills: 0 | Status: SHIPPED | OUTPATIENT
Start: 2019-10-23 | End: 2020-05-19 | Stop reason: ALTCHOICE

## 2019-10-23 RX ORDER — INSULIN ASPART 100 [IU]/ML
80 INJECTION, SUSPENSION SUBCUTANEOUS
Status: DISCONTINUED | OUTPATIENT
Start: 2019-10-23 | End: 2019-10-23 | Stop reason: HOSPADM

## 2019-10-23 RX ORDER — BENZONATATE 100 MG/1
100 CAPSULE ORAL 3 TIMES DAILY PRN
Qty: 20 CAPSULE | Refills: 0 | Status: SHIPPED | OUTPATIENT
Start: 2019-10-23 | End: 2019-10-23

## 2019-10-23 RX ORDER — FERROUS SULFATE 325(65) MG
325 TABLET ORAL EVERY OTHER DAY
Refills: 0
Start: 2019-10-25 | End: 2020-12-17 | Stop reason: ALTCHOICE

## 2019-10-23 RX ORDER — SODIUM HYPOCHLORITE 0.5 %
1 SOLUTION, NON-ORAL MISCELLANEOUS DAILY
Refills: 0 | Status: ON HOLD
Start: 2019-10-24 | End: 2019-11-28

## 2019-10-23 RX ORDER — INSULIN ASPART 100 [IU]/ML
80 INJECTION, SUSPENSION SUBCUTANEOUS
Refills: 0
Start: 2019-10-24 | End: 2020-04-28 | Stop reason: DRUGHIGH

## 2019-10-23 RX ORDER — CEPHALEXIN 500 MG/1
500 CAPSULE ORAL EVERY 6 HOURS SCHEDULED
Qty: 44 CAPSULE | Refills: 0 | Status: SHIPPED | OUTPATIENT
Start: 2019-10-23 | End: 2019-11-03

## 2019-10-23 RX ORDER — INSULIN ASPART 100 [IU]/ML
48 INJECTION, SUSPENSION SUBCUTANEOUS
Status: DISCONTINUED | OUTPATIENT
Start: 2019-10-23 | End: 2019-10-23 | Stop reason: HOSPADM

## 2019-10-23 RX ORDER — INSULIN ASPART 100 [IU]/ML
48 INJECTION, SUSPENSION SUBCUTANEOUS
Refills: 0
Start: 2019-10-23 | End: 2020-04-28

## 2019-10-23 RX ORDER — LISINOPRIL 5 MG/1
5 TABLET ORAL DAILY
Qty: 30 TABLET | Refills: 0 | Status: SHIPPED | OUTPATIENT
Start: 2019-10-24 | End: 2020-01-21 | Stop reason: ALTCHOICE

## 2019-10-23 RX ORDER — IBUPROFEN 200 MG
600 TABLET ORAL EVERY 8 HOURS PRN
Start: 2019-10-23 | End: 2019-10-30 | Stop reason: SDUPTHER

## 2019-10-23 RX ADMIN — FLUTICASONE PROPIONATE 1 SPRAY: 50 SPRAY, METERED NASAL at 09:23

## 2019-10-23 RX ADMIN — CEFAZOLIN SODIUM 2000 MG: 2 SOLUTION INTRAVENOUS at 06:00

## 2019-10-23 RX ADMIN — BENZONATATE 100 MG: 100 CAPSULE ORAL at 06:22

## 2019-10-23 RX ADMIN — LISINOPRIL 5 MG: 5 TABLET ORAL at 09:22

## 2019-10-23 RX ADMIN — METOPROLOL TARTRATE 25 MG: 25 TABLET, FILM COATED ORAL at 09:22

## 2019-10-23 RX ADMIN — ENOXAPARIN SODIUM 40 MG: 40 INJECTION SUBCUTANEOUS at 09:21

## 2019-10-23 RX ADMIN — INSULIN ASPART 80 UNITS: 100 INJECTION, SUSPENSION SUBCUTANEOUS at 09:22

## 2019-10-23 RX ADMIN — GUAIFENESIN AND DEXTROMETHORPHAN 10 ML: 100; 10 SYRUP ORAL at 06:22

## 2019-10-23 RX ADMIN — Medication 1 APPLICATION: at 09:24

## 2019-10-23 RX ADMIN — HYDROCODONE BITARTRATE AND ACETAMINOPHEN 1 TABLET: 5; 325 TABLET ORAL at 02:19

## 2019-10-23 RX ADMIN — NYSTATIN: 100000 POWDER TOPICAL at 09:24

## 2019-10-23 RX ADMIN — PANTOPRAZOLE SODIUM 40 MG: 40 TABLET, DELAYED RELEASE ORAL at 05:59

## 2019-10-23 RX ADMIN — FERROUS SULFATE TAB 325 MG (65 MG ELEMENTAL FE) 325 MG: 325 (65 FE) TAB at 09:22

## 2019-10-23 NOTE — PROGRESS NOTES
Progress Note - Cherelle Pope 1965, 48 y o  male MRN: [id number]    Unit/Bed#: -01 Encounter: [id number]    Primary Care Provider: Dorothea Gross MD   Date and time admitted to hospital: 10/20/2019  1:24 AM    * Cellulitis  Assessment & Plan  · Admitted through surgery service for recurrent cellulitis of the left  · Was hospitalized in September of 2019 for necrotizing fascitis of the left thigh & septic shock  · He has been hemodynamically stable this admission  · Antibiotic management per ID  · Overall clinically improved  · Dressing changes per primary team    Severe sepsis [facility])  Assessment & Plan  · POA, evidenced by fever leukocytosis tachycardia, elevated lactic, secondary to left lower extremity cellulitis  · Overall clinically improved  Procalcitonin negative x2  · Antibiotic management per ID  · Blood cultures negative    Type 2 diabetes mellitus with hyperglycemia, with long-term current use of insulin [facility])  Assessment & Plan  Lab Results   Component Value Date    HGBA1C 11 2 (H) 09/06/2019       Recent Labs     10/22/19  1603 10/22/19  2123 10/23/19  0729 10/23/19  1123   POCGLU 144* 206* 207* 115       Blood Sugar Average: Last 72 hrs:  (P) 206 [id number]     · Blood glucose improved on current regimen  The patient is unable to afford NPH insulin ondischarge  He had been on 70/30 mix insulin prior to admission  He will be continued on 80 units q a m  Increase evening dose to 48 units  Patient should be discharged on this current dose and follow up with his endocrinologist for continued management  · Resume metformin at discharge  · Continue Accu-Cheks a c  HS  · I discussed with his endocrinologist Dr Gannon Handler  He is in agreement with current insulin dose and will follow with him outpatient    Essential hypertension  Assessment & Plan  · Blood pressure stable  Continue metoprolol  · Microalbumin/creatinine ratio 41  Started on low-dose ACE-inhibitor this admission  This should be continued at discharge  · Continue to monitor      Morbid [address] )  Assessment & Plan  · BMI 60 7  · Counseled on therapeutic lifestyle changes  · Outpatient consultation with bariatric surgery when wound healed      VTE Pharmacologic Prophylaxis:   Pharmacologic: Enoxaparin (Lovenox)  Mechanical VTE Prophylaxis in Place: Yes    Patient Centered Rounds: I have performed bedside rounds with nursing staff today  Discussions with Specialists or Other Care Team Provider: Nursing/General surgery    Education and Discussions with Family / Patient:  Patient and spouse updated at the bedside all questions    Current Length of Stay: 3 day(s)    Current Patient Status: Inpatient     Discharge Plan:  Stable for discharge from the medicine standpoint    Code Status: Level 1 - Full Code      Subjective:   No acute overnight events  Blood glucose 206 morning  Objective:     Vitals:   Temp (24hrs), Av 2 °F (36 8 °C), Min:97 7 °F (36 5 °C), Max:98 7 °F (37 1 °C)    Temp:  [97 7 °F (36 5 °C)-98 7 °F (37 1 °C)] 98 7 °F (37 1 °C)  HR:  [102-118] 104  Resp:  [20] 20  BP: (110-134)/(73-81) 134/78  SpO2:  [97 %] 97 %  Body mass index is 60 7 kg/m²  Input and Output Summary (last 24 hours): Intake/Output Summary (Last 24 hours) at 10/23/2019 1155  Last data filed at 10/22/2019 1346  Gross per 24 hour   Intake    Output 200 ml   Net -200 ml       Physical Exam:  General Appearance:    Alert, cooperative, no distress, appropriately responsive    Head:    Normocephalictired   Eyes:    Conjunctiva/corneas clear, EOM's intact   Neck:   Supple   Abdomen:      Morbidly obese   Extremities:   Chronic stasis changes bilateral lower extremities   Neurologic:  Alert and oriented x3, moves all extremities         Additional Data:     Labs:    Results from last 7 days   Lab Units 10/22/19  0622 10/21/19  1320   WBC Thousand/uL 8 94 9 22   HEMOGLOBIN g/dL 8 7* 9 1*   HEMATOCRIT % 29 1* 30 2*   PLATELETS Thousands/uL 249 241   NEUTROS PCT %  --  61   LYMPHS PCT %  --  23   LYMPHO PCT % 26  --    MONOS PCT %  --  10   MONO PCT % 16*  --    EOS PCT % 6 4     Results from last 7 days   Lab Units 10/21/19  0641 10/20/19  0207   POTASSIUM mmol/L 3 9 4 3   CHLORIDE mmol/L 101 99*   CO2 mmol/L 24 26   BUN mg/dL 13 13   CREATININE mg/dL 0 77 0 92   CALCIUM mg/dL 8 9 9 1   ALK PHOS U/L  --  78   ALT U/L  --  34   AST U/L  --  35     Results from last 7 days   Lab Units 10/20/19  0207   INR  1 11       * I Have Reviewed All Lab Data Listed Above  * Additional Pertinent Lab Tests Reviewed: [address] Admission Reviewed    Cultures:   Blood Culture:   Lab Results   Component Value Date    BLOODCX No Growth at 72 hrs  10/20/2019    BLOODCX No Growth at 72 hrs  10/20/2019    BLOODCX No Growth After 5 Days  09/10/2019    BLOODCX No Growth After 5 Days  09/10/2019    BLOODCX No Growth After 5 Days  09/06/2019    BLOODCX No Growth After 5 Days   09/06/2019     Urine Culture: No results found for: URINECX  Sputum Culture: No components found for: SPUTUMCX  Wound Culture: No results found for: WOUNDCULT    Last 24 Hours Medication List:     Current Facility-Administered Medications:  acetaminophen 650 mg Oral Q4H PRN Seth Middleton PA-C    benzonatate 100 mg Oral TID PRN Vivek Jacobo MD    cefazolin 2,000 mg Intravenous Q8H Juana Baum MD Last Rate: 2,000 mg (10/23/19 0600)   dextromethorphan-guaiFENesin 10 mL Oral Q4H PRN Vivek Jacobo MD    enoxaparin 40 mg Subcutaneous Daily Juana Baum MD    ferrous sulfate 325 mg Oral Every Other Day Tresea Pill, CRNP    fluticasone 1 spray Each Nare BID Lupe Seymour MD    gabapentin 300 mg Oral HS Juana Baum MD    HYDROcodone-acetaminophen 1 tablet Oral Q4H PRN Juana Baum MD    hydrOXYzine  mg Oral HS Juana Baum MD    insulin aspart protamine-insulin aspart 48 Units Subcutaneous Before Dalila Ruiz MD    insulin aspart protamine-insulin aspart 80 Units Subcutaneous Daily Before Breakfast Jayy Noland MD    lisinopril 5 mg Oral Daily ANN Marie    loperamide 2 mg Oral 4x Daily PRN Ya Dueñas MD    metoprolol tartrate 25 mg Oral Q12H [address] Ya Dueñas MD    nystatin  Topical BID Ya Dueñas MD    ondansetron 4 mg Intravenous Q4H PRN Ya Dueñas MD    pantoprazole 40 mg Oral Early Morning Ya Dueñas MD    psyllium 1 packet Oral Daily Chely Doran PA-C    QUEtiapine 25 mg Oral HS PRN Ya Dueñas MD    sodium hypochlorite 1 application Irrigation Daily Kristi Brand,          Today, Patient Was Seen By: Jayy Noland MD    ** Please Note: Dragon 360 Dictation voice to text software may have been used in the creation of this document   **

## 2019-10-23 NOTE — PROGRESS NOTES
Progress Note -Surgery EVE Smith 48 y o  male MRN: [id number]  Unit/Bed#: -01 Encounter: [id number]    ASSESSMENT/PLAN:  Problem List     * (Principal) Cellulitis    Type 2 diabetes mellitus with hyperglycemia, with long-term current use of insulin ([facility] )    Lab Results   Component Value Date    HGBA1C 11 2 (H) 09/06/2019     Recent Labs     10/22/19  1239 10/22/19  1603 10/22/19  2123 10/23/19  0729   POCGLU 119 144* 206* 207*     Blood Sugar Average: Last 72 hrs:  (P) 672 [id number]    Hyperlipidemia    Morbid obesity (HCC)    Psoriasis    Venous insufficiency    Essential hypertension    Gluten intolerance    Normocytic anemia    Diabetic neuropathy (HCC)    Lab Results   Component Value Date    HGBA1C 11 2 (H) 09/06/2019     Recent Labs     10/22/19  1239 10/22/19  1603 10/22/19  2123 10/23/19  0729   POCGLU 119 144* 206* 207*     Blood Sugar Average: Last 72 hrs:  (P) 197 [id number]    Cough with shortness of breath    Se[facility] sepsis ([facility] )   54 with history of necrotizing fasciitis now with cellulitis  Improving cellulitis and is nontoxic  · Continue Dakins packing changes, wife to do these at home  · [facility] for discharge from surgical standpoint  · Follow up with wound care clinic   · Cleared by ID for discharge with PO Keflex  · SLIM-Dr Jh Dickson ok'd to go home on insulin 70/30 and Metformin  · Discussed with patient elevating scrotum and keep dry  Packing changed  Dakins/Kerlix packing L proximal thigh/groin area  Patient tolerated well  VTE Pharmacologic Prophylaxis: Sequential compression device (Venodyne)  and Enoxaparin (Lovenox)    Subjective/Objective     Subjective:   No events overnight    No fever or chills  Objective/Physical Exam: Blood pressure 134/78, pulse 104, temperature 98 7 °F (37 1 °C), temperature source Oral, resp  rate 20, height 5' 4" (1 626 m), weight (!) 160 kg (353 lb 9 9 oz), SpO2 97 %  ,Body mass index is 60 7 kg/m²      General appearance: alert and oriented, in no acute distress  Abdomen: soft, non-tender; bowel sounds normal; no masses,  no organomegaly  L thigh/groin: open granulating wound, no exudate  No odor   L scrotal skin with 1 cm area of erythema    Current Facility-Administered Medications:     acetaminophen (TYLENOL) tablet 650 mg, 650 mg, Oral, Q4H PRN, Soto Graves PA-C, 650 mg at 10/21/19 0709    benzonatate (TESSALON PERLES) capsule 100 mg, 100 mg, Oral, TID PRN, Bereket Mendez MD, 100 mg at 10/23/19 0622    ceFAZolin (ANCEF) IVPB (premix) 2,000 mg, 2,000 mg, Intravenous, Q8H, Francine Saravia MD, Last Rate: 100 mL/hr at 10/23/19 0600, 2,000 mg at 10/23/19 0600    dextromethorphan-guaiFENesin (ROBITUSSIN DM)  mg/5 mL oral syrup 10 mL, 10 mL, Oral, Q4H PRN, Bereket Mendez MD, 10 mL at 10/23/19 0622    enoxaparin (LOVENOX) subcutaneous injection 40 mg, 40 mg, Subcutaneous, Daily, Francine Saravia MD, 40 mg at 10/22/19 0813    ferrous sulfate tablet 325 mg, 325 mg, Oral, Every Other Day, ANN Rand, 325 mg at 10/21/19 1507    fluticasone (FLONASE) 50 mcg/act nasal spray 1 spray, 1 spray, Each Nare, BID, Lucien Walker MD, 1 spray at 10/22/19 1721    gabapentin (NEURONTIN) capsule 300 mg, 300 mg, Oral, HS, Francine Saravia MD, 300 mg at 10/22/19 2105    HYDROcodone-acetaminophen ([facility]) 5-325 mg per tablet 1 tablet, 1 tablet, Oral, Q4H PRN, Francine Saravia MD, 1 tablet at 10/23/19 0219    hydrOXYzine HCL (ATARAX) tablet 100 mg, 100 mg, Oral, HS, Francine Saravia MD, 100 mg at 10/22/19 2105    insulin aspart protamine-insulin aspart (NovoLOG 70/30) 100 units/mL subcutaneous injection 45 Units, 45 Units, Subcutaneous, Before Dinner, Lucien Walker MD, 45 Units at 10/22/19 2106    insulin aspart protamine-insulin aspart (NovoLOG 70/30) 100 units/mL subcutaneous injection 78 Units, 78 Units, Subcutaneous, Daily Before Breakfast, Jude Marroquin MD    lisinopril (ZESTRIL) tablet 5 mg, 5 mg, Oral, Daily, ANN Rand, 5 mg at 10/22/19 0814    loperamide (IMODIUM) capsule 2 mg, 2 mg, Oral, 4x Daily PRN, Desmond Lopez MD    metoprolol tartrate (LOPRESSOR) tablet 25 mg, 25 mg, Oral, Q12H [address], Desmond Lopez MD, 25 mg at 10/22/19 2104    nystatin (MYCOSTATIN) powder, , Topical, BID, Desmond Lopez MD    ondansetron [facility]) injection 4 mg, 4 mg, Intravenous, Q4H PRN, Desmond Lopez MD    pantoprazole (PROTONIX) EC tablet 40 mg, 40 mg, Oral, Early Morning, Desmond Lopez MD, 40 mg at 10/23/19 0559    psyllium (METAMUCIL) 1 packet, 1 packet, Oral, Daily, Jael Antonio PA-C, 1 packet at 10/22/19 2105    QUEtiapine (SEROquel) tablet 25 mg, 25 mg, Oral, HS PRN, Desmond Lopez MD    sodium hypochlorite (DAKIN'S,HYSEPT) 0 5 percent topical solution 1 application, 1 application, Irrigation, Daily, Elisabeth Mcdaniels DO, 1 application at 41/87/84 0895

## 2019-10-23 NOTE — ASSESSMENT & PLAN NOTE
· Blood pressure stable  Continue metoprolol  · Microalbumin/creatinine ratio 41  Started on low-dose ACE-inhibitor this admission    This should be continued at discharge  · Continue to monitor

## 2019-10-23 NOTE — ASSESSMENT & PLAN NOTE
· Admitted through surgery service for recurrent cellulitis of the left  · Was hospitalized in September of 2019 for necrotizing fascitis of the left thigh & septic shock  · He has been hemodynamically stable this admission  · Antibiotic management per ID  · Overall clinically improved  · Dressing changes per primary team

## 2019-10-23 NOTE — PHYSICAL THERAPY NOTE
PHYSICAL THERAPY NOTE  Patient Name: Minerva Antonio  EQKUI'G Date: 10/23/2019  Pt unavailable, staff still in room after extended time, will follow James Plata PT

## 2019-10-23 NOTE — ASSESSMENT & PLAN NOTE
Lab Results   Component Value Date    HGBA1C 11 2 (H) 09/06/2019       Recent Labs     10/22/19  1603 10/22/19  2123 10/23/19  0729 10/23/19  1123   POCGLU 144* 206* 207* 115       Blood Sugar Average: Last 72 hrs:  (P) 206 [id number]     · Blood glucose improved on current regimen  The patient is unable to afford NPH insulin ondischarge  He had been on 70/30 mix insulin prior to admission  He will be continued on 80 units q a m  Increase evening dose to 48 units    Patient should be discharged on this current dose and follow up with his endocrinologist for continued management  · Resume metformin at discharge  · Continue Accu-Cheks a c  HS

## 2019-10-23 NOTE — DISCHARGE SUMMARY
Discharge Summary - Alexandr Juan 48 y o  male MRN: [id number]    Unit/Bed#: -01 Encounter: [id number]    Admission Date: 10/20/2019   Discharge Date: 10/23/19    Admitting Diagnosis:   Cellulitis [L03 90]  Diabetes mellitus due to underlying condition with stage 1 chronic kidney disease, unspecified whether long term insulin use ([facility] ) [E08 22, N18 1]    Discharge Diagnoses: Principal Problem:    Cellulitis  Active Problems:    Type 2 diabetes mellitus with hyperglycemia, with long-term current use of insulin ([facility])    Hyperlipidemia    [facility] )    Essential hypertension    Normocytic anemia    Diabetic neuropathy ([facility])    Cough with shortness of breath    Severe sepsis ([facility] )      Consultations:   SLIM  ID     Procedures Performed: none    Hospital Course: Alexandr Juan is a 48 y o  male with history of necrotizing fasciitis L proximal thigh- s/p excisional debridement 9/6/19, poorly controlled DM HLD, HTN, morbid obesity, normocytic anemia admitted for sepsis and cellulitis and site of previous excisional debridement for necrotizing fasciitis  He was immediately placed on IVF and given intravenous antibiotics  Infectious disease and SLIM were asked for their recommendations  SLIM added low dose lisinopril for renal protection in the setting of DM  He also had a cough for which tessalon pearls and robitussin were ordered  His BP has remained stable  Slight adjustments to his insulin were also made  The wound has been packed with Dakins soaked Kerlix daily  The erythema has improved as has his discomfort  ID agreed with ancef during the hospitalization and has recommended PO keflex for discharge  He is stable and pain is controlled with a po regimen  No IV narcotics are needed for dressing changes  He is cleared for discharge home with VNA by all services and will follow up with the wound clinic, endocrine and his PCP      Condition at Discharge: fair     Discharge instructions/Information to patient and family:   See after visit summary for information provided to patient and family  Provisions for Follow-Up Care:  See after visit summary for information related to follow-up care and any pertinent home health orders  Disposition: Home    Planned Readmission: No    Discharge Statement   I spent 20 minutes discharging the patient  This time was spent on the day of discharge  I had direct contact with the patient on the day of discharge  Additional documentation is required if more than 30 minutes were spent on discharge  Discharge Medications:  See after visit summary for reconciled discharge medications provided to patient and family

## 2019-10-23 NOTE — ASSESSMENT & PLAN NOTE
· POA, evidenced by fever leukocytosis tachycardia, elevated lactic, secondary to left lower extremity cellulitis  · Overall clinically improved    Procalcitonin negative x2  · Antibiotic management per ID  · Blood cultures negative

## 2019-10-23 NOTE — PROGRESS NOTES
Progress Note - Infectious Disease   Jeremias Fenton 48 y o  male MRN: [id number]  Unit/Bed#: -01 Encounter: [id number]      Impression/Plan:  1  Severe sepsis-POA   Fever, leukocytosis, tachycardia , lactic acidosis   Appears to be all secondary to a left lower extremity soft tissue infection with notable cellulitis   No other clear sources appreciated   Consideration for the possibility of bacteremia   Fortunately the patient remains hemodynamically stable despite his systemic illness   He seems to be tolerating the antibiotics without difficulty   The lactate level has normalized   No recurrent fever  Procalcitonin level remains normal   The cellulitis has significantly improved  -discontinue cefazolin  -Keflex 500 mg p o  Q i d  Through 10/21/2019 to complete 10 days total treatment  -follow-up blood cultures  -supportive care     2  Left lower extremity cellulitis-complicating a left groin/thigh wound   Previously the patient had group B Streptococcus, and the organism may end up being the same   This does look most consistent with a streptococcal infection   No abscess collection noted on CT scan   The infection remains relatively well localized but remains quite symptomatic  -antibiotics as above  -close surgical follow-up  -local wound care  -serial exams     3  Diabetes mellitus-type 2 with hyperglycemia   Previous hemoglobin A1c was 12  This is certainly leading to increased risk of infection and relapses  -internal Medicine follow-up  -tighten glycemic control     4   Cough-with some notable shortness of breath when the patient 1st presented although this has improved   The patient feels it is secondary to postnasal drip   No clear pneumonia noted on chest x-ray  The symptoms have improved  -cough suppression for symptomatic relief  -treatment of postnasal drip  -monitor respiratory status     5  Morbid obesity-this is another important risk factor for the patient's the soft tissue infections   Recommend ongoing weight loss to decreased risk of recurrent infection  He has been losing weight steadily    Discussed in detail with the patient had his family    Discussed the above management plan with the surgical service    Antibiotics:  Cefazolin 4  Subjective:  Patient has no fever, chills, sweats; no nausea, vomiting, diarrhea; no cough, shortness of breath; no pain  No new symptoms  Objective:  Vitals:  Temp:  [97 7 °F (36 5 °C)-98 7 °F (37 1 °C)] 98 7 °F (37 1 °C)  HR:  [102-118] 104  Resp:  [20] 20  BP: (110-134)/(73-81) 134/78  SpO2:  [97 %] 97 %  Temp (24hrs), Av 2 °F (36 8 °C), Min:97 7 °F (36 5 °C), Max:98 7 °F (37 1 °C)  Current: Temperature: 98 7 °F (37 1 °C)    Physical Exam:   General Appearance:  Alert, interactive, nontoxic, no acute distress  Throat: Oropharynx moist without lesions  Lungs:   Clear to auscultation bilaterally; no wheezes, rhonchi or rales; respirations unlabored   Heart:  RRR; no murmur, rub or gallop   Abdomen:   Soft, non-tender, non-distended, positive bowel sounds  Extremities: No clubbing, cyanosis  Left thigh with decreased edema and erythema  Skin: No new rashes or lesions  No draining wounds noted  Labs, Imaging, & Other studies:   All pertinent labs and imaging studies were personally reviewed  Results from last 7 days   Lab Units 10/22/19  0622 10/21/19  1320 10/20/19  0207   WBC Thousand/uL 8 94 9 22 11 38*   HEMOGLOBIN g/dL 8 7* 9 1* 9 5*   PLATELETS Thousands/uL 249 241 237     Results from last 7 days   Lab Units 10/21/19  0641 10/20/19  0207   SODIUM mmol/L 134* 134*   POTASSIUM mmol/L 3 9 4 3   CHLORIDE mmol/L 101 99*   CO2 mmol/L 24 26   BUN mg/dL 13 13   CREATININE mg/dL 0 77 0 92   EGFR ml/min/1 73sq m 104 95   CALCIUM mg/dL 8 9 9 1   AST U/L  --  35   ALT U/L  --  34   ALK PHOS U/L  --  78     Results from last 7 days   Lab Units 10/20/19  0234   BLOOD CULTURE  No Growth at 72 hrs  No Growth at 72 hrs       Results from last 7 days   Lab Units 10/22/19  0622 10/21/19  1251   PROCALCITONIN ng/ml 0 20 0 22

## 2019-10-23 NOTE — SOCIAL WORK
LOS 3  GLOS not indicated  Pt is not currently a bundle or a 30 day readmission  Met with pt who provided information for initial assessment  Pt stated she lives with his spouse, adult son, and friend in a 2 story home but resides on the 1st floor of home 2STE  Prior to admission, pt ambulated with a walker and independent with ADLs  Pt stated he is currently open with [facility] for skilled nursing  PCP- Dr Koki Virk MD  Preferred [facility] [place]  Pt is cleared for discharge to home today and would like to continue VNA services with [facility]  Referral placed and pt is accepted for continuation of care  Pt does have transport home

## 2019-10-25 ENCOUNTER — TELEPHONE (OUTPATIENT)
Dept: ENDOCRINOLOGY | Facility: CLINIC | Age: 54
End: 2019-10-25

## 2019-10-25 LAB
BACTERIA BLD CULT: NORMAL
BACTERIA BLD CULT: NORMAL

## 2019-10-25 NOTE — TELEPHONE ENCOUNTER
I called patient back  BGs for following past few days:    10/23 9:55PM 102  10/24 12:43PM 203 and 8:53PM 218  This morning 222    Instructed to increase 70/30 to 88 units qAM and 56 unit qPM  Instructed to send me BG numbers next Tuesday to further adjust insulin doses    Pinky ELDER  Endocrinology         ----- Message from Pepe Hobbs [place] sent at 10/25/2019  7:34 AM EDT -----  Regarding: FW: Non-Urgent Medical Question  Contact: [phone]  Good Morning Dr Jana Dejesus,    Please see below  ----- Message -----  From: Rosalba Luis  Sent: 10/24/2019  10:12 PM EDT  To: , #  Subject: Non-Urgent Medical Question                      Dr Jana Dejesus,  Not sure if you are aware, but I was hospitalized at [address] [place] [address] once again from 10/20-10/23  At midnight on the 20th I had realized that I had another occurrence of cellulitis on my thigh, just below my wound  I was dizzy and running a fever, so I went directly to the ER  During the course of my stay the doctors managed to get my blood glucose level down  as low as 102  Please overlook my records because now that I am home my sugar is already elevating to around 200  I absolutely need the correct dosage of Novolin at home so I can stay healthy and progress  The internal medicine people there prescribed me to a dosage of 80 in the morning and 48 in the evening  Please let me know what dosage you would like me to be on from this point to stay safe at home  As soon as I feel well enough I will go out and get the blood work done that you requested  I have attached a screenshot of my testing since being home       Thank you,  Art Ayoub

## 2019-10-27 ENCOUNTER — PATIENT MESSAGE (OUTPATIENT)
Dept: ENDOCRINOLOGY | Facility: CLINIC | Age: 54
End: 2019-10-27

## 2019-10-29 ENCOUNTER — TELEPHONE (OUTPATIENT)
Dept: INTERNAL MEDICINE CLINIC | Facility: CLINIC | Age: 54
End: 2019-10-29

## 2019-10-29 NOTE — TELEPHONE ENCOUNTER
Patient called and said that he was recently discharged from the hospital  He wanted to know if you wanted to see him in the office or if you wanted to talk to him over the phone  If you want to talk to him on the phone his number is [phone]  He wanted to know if you could increase his gabapentin

## 2019-10-29 NOTE — TELEPHONE ENCOUNTER
Patient called to make an appointment  He also said he would like a call from you to talk about his hospital visit  His number is [phone]

## 2019-10-30 DIAGNOSIS — E11.49 OTHER DIABETIC NEUROLOGICAL COMPLICATION ASSOCIATED WITH TYPE 2 DIABETES MELLITUS (HCC): ICD-10-CM

## 2019-10-30 DIAGNOSIS — M79.89 NECROTIZING SOFT TISSUE INFECTION: ICD-10-CM

## 2019-10-30 RX ORDER — GABAPENTIN 100 MG/1
300 CAPSULE ORAL
Qty: 60 CAPSULE | Refills: 0 | Status: CANCELLED | OUTPATIENT
Start: 2019-10-30

## 2019-10-30 RX ORDER — IBUPROFEN 200 MG
600 TABLET ORAL EVERY 8 HOURS PRN
Qty: 30 TABLET | Refills: 0 | Status: SHIPPED | OUTPATIENT
Start: 2019-10-30 | End: 2019-11-27 | Stop reason: SDUPTHER

## 2019-10-30 RX ORDER — GABAPENTIN 100 MG/1
300 CAPSULE ORAL
Qty: 90 CAPSULE | Refills: 3 | Status: SHIPPED | OUTPATIENT
Start: 2019-10-30 | End: 2019-11-22

## 2019-10-30 NOTE — TELEPHONE ENCOUNTER
[facility] and said [facility] needs a [facility]ill on his [facility]apentin  Takes 100mg 3x at night  Pt would also like a call to speak to Dr Cari Reich   [facility]

## 2019-10-30 NOTE — TELEPHONE ENCOUNTER
I called patient back and discussed his hospitalization  No need to schedule an appointment with him in the meantime   Will see him on his next follow up appointment

## 2019-10-30 NOTE — PROGRESS NOTES
I called patient back and dicussed his recent hospitalization  He was discharged with keflex and scheduled to go to wound care  He does not have any complaints in the meantime other than neuropathic pain  Instructed patient to start taking gabapentin bid and I refilled his medications  Patient verbalized understanding and will follow up with him in clinic in December

## 2019-11-01 ENCOUNTER — TELEPHONE (OUTPATIENT)
Dept: ENDOCRINOLOGY | Facility: CLINIC | Age: 54
End: 2019-11-01

## 2019-11-01 NOTE — TELEPHONE ENCOUNTER
Evelyne Florez,    Not sure if Mr Radha Sinclair checked his mychart  Can you make sure he received my recommendations for increasing his insulin doses I sent him on Monday 10/28/19  Thanks    Karla Cisneros

## 2019-11-01 NOTE — TELEPHONE ENCOUNTER
I reviewed patient's blood sugars for the past week  His blood sugars are consistently above 200  I increased his 70/30 insulin to 120 units in the morning and 78 units with dinner  If his blood sugars are not improved to less than 200 in the next few days will start him on U 500  Jason ELDER  Endocrinology     ----- Message from [place], [place] sent at 11/1/2019 12:12 PM EDT -----  Regarding: FW: Non-Urgent Medical Question  Contact: [phone]  See below     ----- Message -----  From: Kristen Dyer  Sent: 11/1/2019  11:49 AM EDT  To: , #  Subject: Non-Urgent Medical Question                      Recent blood glucose measurements    Micheal Mendez

## 2019-11-04 ENCOUNTER — APPOINTMENT (OUTPATIENT)
Dept: WOUND CARE | Facility: HOSPITAL | Age: 54
End: 2019-11-04
Payer: COMMERCIAL

## 2019-11-04 PROCEDURE — 99213 OFFICE O/P EST LOW 20 MIN: CPT

## 2019-11-05 ENCOUNTER — LAB REQUISITION (OUTPATIENT)
Dept: LAB | Facility: HOSPITAL | Age: 54
End: 2019-11-05
Payer: COMMERCIAL

## 2019-11-05 DIAGNOSIS — E11.65 TYPE 2 DIABETES MELLITUS WITH HYPERGLYCEMIA (HCC): ICD-10-CM

## 2019-11-05 DIAGNOSIS — Z79.4 LONG TERM (CURRENT) USE OF INSULIN (HCC): ICD-10-CM

## 2019-11-05 DIAGNOSIS — R94.6 ABNORMAL RESULTS OF THYROID FUNCTION STUDIES: ICD-10-CM

## 2019-11-05 LAB
ALBUMIN SERPL BCP-MCNC: 2.8 G/DL (ref 3.5–5)
ALP SERPL-CCNC: 81 U/L (ref 46–116)
ALT SERPL W P-5'-P-CCNC: 27 U/L (ref 12–78)
ANION GAP SERPL CALCULATED.3IONS-SCNC: 9 MMOL/L (ref 4–13)
AST SERPL W P-5'-P-CCNC: 26 U/L (ref 5–45)
BILIRUB SERPL-MCNC: 0.3 MG/DL (ref 0.2–1)
BUN SERPL-MCNC: 14 MG/DL (ref 5–25)
CALCIUM SERPL-MCNC: 9.2 MG/DL (ref 8.3–10.1)
CHLORIDE SERPL-SCNC: 101 MMOL/L (ref 100–108)
CO2 SERPL-SCNC: 27 MMOL/L (ref 21–32)
CREAT SERPL-MCNC: 0.73 MG/DL (ref 0.6–1.3)
CREAT UR-MCNC: 71.9 MG/DL
EST. AVERAGE GLUCOSE BLD GHB EST-MCNC: 192 MG/DL
GFR SERPL CREATININE-BSD FRML MDRD: 107 ML/MIN/1.73SQ M
GLUCOSE SERPL-MCNC: 123 MG/DL (ref 65–140)
HBA1C MFR BLD: 8.3 % (ref 4.2–6.3)
MICROALBUMIN UR-MCNC: 36 MG/L (ref 0–20)
MICROALBUMIN/CREAT 24H UR: 50 MG/G CREATININE (ref 0–30)
POTASSIUM SERPL-SCNC: 4.2 MMOL/L (ref 3.5–5.3)
PROT SERPL-MCNC: 7.3 G/DL (ref 6.4–8.2)
SODIUM SERPL-SCNC: 137 MMOL/L (ref 136–145)
TSH SERPL DL<=0.05 MIU/L-ACNC: 4.18 UIU/ML (ref 0.36–3.74)

## 2019-11-05 PROCEDURE — 82043 UR ALBUMIN QUANTITATIVE: CPT | Performed by: INTERNAL MEDICINE

## 2019-11-05 PROCEDURE — 84443 ASSAY THYROID STIM HORMONE: CPT | Performed by: INTERNAL MEDICINE

## 2019-11-05 PROCEDURE — 83036 HEMOGLOBIN GLYCOSYLATED A1C: CPT | Performed by: INTERNAL MEDICINE

## 2019-11-05 PROCEDURE — 82570 ASSAY OF URINE CREATININE: CPT | Performed by: INTERNAL MEDICINE

## 2019-11-05 PROCEDURE — 83519 RIA NONANTIBODY: CPT | Performed by: INTERNAL MEDICINE

## 2019-11-05 PROCEDURE — 80053 COMPREHEN METABOLIC PANEL: CPT | Performed by: INTERNAL MEDICINE

## 2019-11-06 ENCOUNTER — TELEPHONE (OUTPATIENT)
Dept: ENDOCRINOLOGY | Facility: CLINIC | Age: 54
End: 2019-11-06

## 2019-11-06 NOTE — TELEPHONE ENCOUNTER
Spoke to patient, BG log reviewed by Dr Emily Avery    Continue current regimen  Send Bg log in 1-2 weeks for review

## 2019-11-07 LAB — GAD65 AB SER-ACNC: <5 U/ML (ref 0–5)

## 2019-11-11 ENCOUNTER — LAB REQUISITION (OUTPATIENT)
Dept: LAB | Facility: HOSPITAL | Age: 54
End: 2019-11-11
Payer: COMMERCIAL

## 2019-11-11 DIAGNOSIS — G47.00 INSOMNIA: ICD-10-CM

## 2019-11-11 DIAGNOSIS — E11.65 TYPE 2 DIABETES MELLITUS WITH HYPERGLYCEMIA (HCC): ICD-10-CM

## 2019-11-11 DIAGNOSIS — Z79.4 LONG TERM (CURRENT) USE OF INSULIN (HCC): ICD-10-CM

## 2019-11-11 DIAGNOSIS — F41.9 ANXIETY: ICD-10-CM

## 2019-11-11 LAB
CHOLEST SERPL-MCNC: 169 MG/DL (ref 50–200)
HDLC SERPL-MCNC: 37 MG/DL
LDLC SERPL CALC-MCNC: 112 MG/DL (ref 0–100)
NONHDLC SERPL-MCNC: 132 MG/DL
T4 FREE SERPL-MCNC: 0.9 NG/DL (ref 0.76–1.46)
TRIGL SERPL-MCNC: 100 MG/DL

## 2019-11-11 PROCEDURE — 84439 ASSAY OF FREE THYROXINE: CPT | Performed by: INTERNAL MEDICINE

## 2019-11-11 PROCEDURE — 80061 LIPID PANEL: CPT | Performed by: INTERNAL MEDICINE

## 2019-11-11 RX ORDER — HYDROXYZINE HYDROCHLORIDE 25 MG/1
100 TABLET, FILM COATED ORAL
Qty: 120 TABLET | Refills: 1 | Status: SHIPPED | OUTPATIENT
Start: 2019-11-11 | End: 2019-11-22

## 2019-11-11 RX ORDER — QUETIAPINE FUMARATE 25 MG/1
25 TABLET, FILM COATED ORAL
Qty: 20 TABLET | Refills: 0 | OUTPATIENT
Start: 2019-11-11

## 2019-11-12 ENCOUNTER — OFFICE VISIT (OUTPATIENT)
Dept: ENDOCRINOLOGY | Facility: CLINIC | Age: 54
End: 2019-11-12
Payer: COMMERCIAL

## 2019-11-12 VITALS
HEART RATE: 101 BPM | DIASTOLIC BLOOD PRESSURE: 60 MMHG | WEIGHT: 315 LBS | SYSTOLIC BLOOD PRESSURE: 128 MMHG | HEIGHT: 64 IN | BODY MASS INDEX: 53.78 KG/M2

## 2019-11-12 DIAGNOSIS — R79.89 ELEVATED TSH: ICD-10-CM

## 2019-11-12 DIAGNOSIS — I10 HYPERTENSION GOAL BP (BLOOD PRESSURE) < 140/90: ICD-10-CM

## 2019-11-12 DIAGNOSIS — E11.65 TYPE 2 DIABETES MELLITUS WITH HYPERGLYCEMIA, WITH LONG-TERM CURRENT USE OF INSULIN (HCC): Primary | ICD-10-CM

## 2019-11-12 DIAGNOSIS — E66.01 OBESITY, MORBID, BMI 50 OR HIGHER (HCC): ICD-10-CM

## 2019-11-12 DIAGNOSIS — Z79.4 TYPE 2 DIABETES MELLITUS WITH HYPERGLYCEMIA, WITH LONG-TERM CURRENT USE OF INSULIN (HCC): Primary | ICD-10-CM

## 2019-11-12 DIAGNOSIS — E78.00 PURE HYPERCHOLESTEROLEMIA: ICD-10-CM

## 2019-11-12 PROCEDURE — 99214 OFFICE O/P EST MOD 30 MIN: CPT | Performed by: INTERNAL MEDICINE

## 2019-11-12 RX ORDER — IBUPROFEN 600 MG/1
TABLET ORAL
Refills: 0 | COMMUNITY
Start: 2019-10-31 | End: 2019-11-27 | Stop reason: SDUPTHER

## 2019-11-12 NOTE — PATIENT INSTRUCTIONS
Decrease insulin 70/30 Novolin to 90 units in the morning and 55 units at dinner    Follow up in 4 weeks

## 2019-11-12 NOTE — TELEPHONE ENCOUNTER
Please review message regarding Seroquel  Pt was taking this and prescribed the med at discharge from the hospital  See message below  Respond to clinical inbasket at IM   Thank you

## 2019-11-12 NOTE — PROGRESS NOTES
ENDOCRINOLOGY  FOLLOW UP VISIT      Reason for Endocrine Consult/Chief Complaint:  Diabetes management        ? Medical Decision Making:     Impression  1  DM2 uncontrolled  2  HTN  3  Obesity  4  Necrotizing fasciitis s/p hospitalization  5  Hyperlipidemia  6  Elevated TSH     Recommendations:  ?  I discussed the pathophysiology of DM2 and reviewed therapy options based on ADA guidelines  Given his financial constraints and pending insurance approval he is on Novolin 70/30 Relion brand from [employer] which is the most affordable at $25/vial      He has been having a few low  blood sugars overnight however  his blood sugars overall are much improved        Will reduce to 90 units of Novolin  70/30 taken in the morning  and reduce his evening dose to  55 units taken at dinner        Instructed to send blood sugars weekly for review and insulin adjustments      Necrotizing fasciitis- managed by general surgery, blood sugars optimized at this time, okay to proceed with any intervention needed     Continue ACE-inhibitor for nephropathy       HTN-controlled off any anti-hypertensive agents     Obesity- BMI  61 , will discuss bariatric surgery once wound healed    Hyperlipidemia-slightly elevated  LDL of 112, will repeat lipids in 3 months to assess if improved, had reaction to statin therapy in past      Elevated TSH- still mildly elevated, differential includes recovering  of euthyroid sick syndrome or  true hypothyroidism, will repeat TSH  and free T4 and check TPO antibody  in 3 months once his  wound has healed up  to assess need for levothyroxine therapy     RTC in 1 month  Brooklynn ELDER            History of Present Illness:   Mr Kayce Ralph is a 54yr old male who presents for DM management       Was on glimepiride/metformin in the past       PMH-DM2, HTN, obesity   PSH-necrotizing fasciitis surgery   FHx-father with diabetes  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, self employed musician      Type of DM: 2  Age of onset: 2011   Most recent A1C: 11 2% Sept 2019  Microvascular complications:neuropathy  Macrovascular complications: none known   Nutrition:5 meals a day small   Exercise: limited     Events since last visit:   Presently on Novolin 70/30  120 units in the morning  and  78 units in the evening with dinner, decreased to 100 units in the morning and 65 units in the evening over past 48 hours but still getting low BG overnight     Blood sugars have been ranging  in the 50s to  mid 100s     He is having a few low blood sugars in the 50s to 60s overnight     Appetite is good     Wound healing well    Still on metformin 1g BID AC  ? Review of Systems:   Review of Systems   Constitutional: Negative for appetite change, chills, diaphoresis, fatigue, fever and unexpected weight change  HENT: Negative for congestion, ear pain, hearing loss, rhinorrhea, sinus pressure, sinus pain, sore throat, trouble swallowing and voice change  Eyes: Negative for photophobia, redness and visual disturbance  Respiratory: Negative for apnea, cough, chest tightness, shortness of breath, wheezing and stridor  Cardiovascular: Negative for chest pain, palpitations and leg swelling  Gastrointestinal: Negative for abdominal distention, abdominal pain, constipation, diarrhea, nausea and vomiting  Endocrine: Negative for cold intolerance, heat intolerance, polydipsia, polyphagia and polyuria  Genitourinary: Negative for difficulty urinating, dysuria, flank pain, frequency, hematuria and urgency  Musculoskeletal: Negative for arthralgias, back pain, gait problem, joint swelling and myalgias  Skin: +left groin wound healing   Allergic/Immunologic: Negative for immunocompromised state  Neurological: Negative for dizziness, tremors, syncope, weakness, light-headedness and headaches  Hematological: Negative for adenopathy  Does not bruise/bleed easily  Psychiatric/Behavioral: Negative for confusion and sleep disturbance   The patient is not nervous/anxious        Patient History:     Past Medical History:   Diagnosis Date    Cellulitis     last assessed 12/10/15    Diabetes mellitus ([facility] [address] )     Edema     Elevated liver enzymes     Esophageal reflux     Gluten intolerance     Gout     last assessed 09/05/13    Hyperglycemia     Hypertension     IBS (irritable bowel syndrome)     Insomnia     Obesity     Osteoarthritis of knee     last assessed 02/10/14    Prehypertension     last assessed 08/22/17    Venous insufficiency     last assessed 08/22/17    Villonodular synovitis of the hand, right     last assessed 11/14/2013     Past Surgical History:   Procedure Laterality Date    INCISION AND DRAINAGE OF WOUND Left 9/6/2019    Procedure: INCISION AND DRAINAGE (I&D) GROIN;  Surgeon: Todd Oconnor DO;  [facility];  Service: General    WOUND DEBRIDEMENT Left 9/7/2019    Procedure: EXCISIONAL DEBRIDEMENT;  Surgeon: Todd Oconnor DO;  [facility];  Service: General     Social History     Socioeconomic History    Marital status: /Civil Union     Spouse name: Not on file    Number of children: Not on file    Years of education: Not on file    Highest education level: Not on file   Occupational History    Not on file   Social Needs    Financial resource strain: Not on file    Food insecurity:     Worry: Not on file     Inability: Not on file    Transportation needs:     Medical: Not on file     Non-medical: Not on file   Tobacco Use    Smoking status: Never Smoker    Smokeless tobacco: Never Used   Substance and Sexual Activity    Alcohol use: No    Drug use: No    Sexual activity: Not on file   Lifestyle    Physical activity:     Days per week: Not on file     Minutes per session: Not on file    Stress: Not on file   Relationships    Social connections:     Talks on phone: Not on file     Gets together: Not on file     Attends [employer] service: Not on file     Active member of club or organization: Not on file     Attends meetings of clubs or organizations: Not on file     Relationship status: Not on file    Intimate partner violence:     Fear of current or ex partner: Not on file     Emotionally abused: Not on file     Physically abused: Not on file     Forced sexual activity: Not on file   Other Topics Concern    Not on file   Social History Narrative    Not on file     Family History   Problem Relation Age of Onset    Cancer Mother         gastrc    Colon cancer Father     Heart failure Father        Current Medications: At the time this note was written these were the medications the patient was on    Current Outpatient Medications   Medication Sig Dispense Refill    benzonatate (TESSALON PERLES) 100 mg capsule Take 1 capsule (100 mg total) by mouth 3 (three) times a day as needed for cough 20 capsule 0    dextromethorphan-guaiFENesin (ROBITUSSIN DM)  mg/5 mL syrup Take 10 mL by mouth every 4 (four) hours as needed for cough 118 mL 0    ferrous sulfate 325 (65 Fe) mg tablet Take 1 tablet (325 mg total) by mouth every other day  0    gabapentin (NEURONTIN) 100 mg capsule Take 3 capsules (300 mg total) by mouth daily at bedtime 90 capsule 3    hydrOXYzine HCL (ATARAX) 25 mg tablet Take 4 tablets (100 mg total) by mouth daily at bedtime 120 tablet 1     MG tablet TAKE 1 TABLET (600 MG TOTAL) BY MOUTH EVERY 8 (EIGHT) HOURS AS NEEDED FOR MODERATE PAIN  0    ibuprofen (MOTRIN) 200 mg tablet Take 3 tablets (600 mg total) by mouth every 8 (eight) hours as needed for moderate pain 30 tablet 0    insulin aspart protamine-insulin aspart (NovoLOG 70/30) 100 units/mL injection Inject 48 Units under the skin daily before dinner  0    insulin aspart protamine-insulin aspart (NovoLOG 70/30) 100 units/mL injection Inject 80 Units under the skin daily before breakfast  0    insulin NPH-insulin regular (NovoLIN 70/30) 100 units/mL subcutaneous injection Take 80 units a m with breakfast ; 48 units p m  With dinner 30 mL 0    Insulin Pen Needle (B-D UF III MINI PEN NEEDLES) 31G X 5 MM MISC by Does not apply route      lisinopril (ZESTRIL) 5 mg tablet Take 1 tablet (5 mg total) by mouth daily 30 tablet 0    loperamide (IMODIUM) 2 mg capsule Take 1 capsule (2 mg total) by mouth 4 (four) times a day as needed for diarrhea (Maximum 4 capsules/day) 30 capsule 0    metFORMIN (GLUCOPHAGE) 1000 MG tablet TAKE 1 TABLET BY MOUTH TWICE A  tablet 1    metoprolol tartrate (LOPRESSOR) 25 mg tablet Take 1 tablet (25 mg total) by mouth every 12 (twelve) hours 60 tablet 2    nystatin (MYCOSTATIN) powder Apply topically 2 (two) times a day 15 g 2    omeprazole (PriLOSEC) 40 MG capsule Take 1 capsule (40 mg total) by mouth daily Take in AM (in addition to 20 mg dose taken in PM) 30 capsule 5    psyllium (METAMUCIL) packet Take 1 packet by mouth daily  0    QUEtiapine (SEROquel) 25 mg tablet Take 1 tablet (25 mg total) by mouth daily at bedtime as needed (Sleep) for up to 20 doses (Patient not taking: Reported on 10/8/2019) 20 tablet 0    sodium hypochlorite (DAKIN'S,HYSEPT) 0 5 percent Irrigate with 1 application as directed daily  0     No current facility-administered medications for this visit  Allergies: Gluten meal and Clindamycin    Physical Exam:   Vital Signs:   /60   Pulse 101   Ht 5' 4" (1 626 m)   Wt (!) 162 kg (357 lb) Comment: unable to step on scale  BMI 61 28 kg/m²     Physical Exam   Constitutional: He is oriented to person, place, and time  He appears well-developed and well-nourished  HENT:   Head: Normocephalic and atraumatic  Nose: Nose normal    Mouth/Throat: Oropharynx is clear and moist  No oropharyngeal exudate  Eyes: Pupils are equal, round, and reactive to light  Conjunctivae and EOM are normal  No scleral icterus  Neck: Normal range of motion  Neck supple  No thyromegaly present  Cardiovascular: Normal rate, regular rhythm and normal heart sounds   Exam reveals no gallop and no friction rub  No murmur heard  Pulmonary/Chest: Effort normal and breath sounds normal  No stridor  No respiratory distress  He has no wheezes  He has no rales  Abdominal: Soft  Bowel sounds are normal  He exhibits no distension and no mass  There is no tenderness  There is no rebound and no guarding  Musculoskeletal: Normal range of motion  He exhibits no edema, tenderness or deformity  Venous stasis LE bilaterally   Lymphadenopathy:     He has no cervical adenopathy  Neurological: He is alert and oriented to person, place, and time  Skin: Skin is warm and dry  Psychiatric: He has a normal mood and affect   His behavior is normal  Judgment and thought content normal           Labs and Imaging:      Component      Latest Ref Rng & Units 11/5/2019 11/11/2019   Sodium      136 - 145 mmol/L 137    Potassium      3 5 - 5 3 mmol/L 4 2    Chloride      100 - 108 mmol/L 101    CO2      21 - 32 mmol/L 27    Anion Gap      4 - 13 mmol/L 9    BUN      5 - 25 mg/dL 14    Creatinine      0 60 - 1 30 mg/dL 0 73    Glucose, Random      65 - 140 mg/dL 123    Calcium      8 3 - 10 1 mg/dL 9 2    AST      5 - 45 U/L 26    ALT      12 - 78 U/L 27    Alkaline Phosphatase      46 - 116 U/L 81    Total Protein      6 4 - 8 2 g/dL 7 3    Albumin      3 5 - 5 0 g/dL 2 8 (L)    TOTAL BILIRUBIN      0 20 - 1 00 mg/dL 0 30    eGFR      ml/min/1 73sq m 107    Cholesterol      50 - 200 mg/dL  169   Triglycerides      <=150 mg/dL  100   HDL      >=40 mg/dL  37 (L)   LDL Direct      0 - 100 mg/dL  112 (H)   Non-HDL Cholesterol      mg/dl  132   EXT Creatinine Urine      mg/dL 71 9    MICROALBUM ,U,RANDOM      0 0 - 20 0 mg/L 36 0 (H)    MICROALBUMIN/CREATININE RATIO      0 - 30 mg/g creatinine 50 (H)    Hemoglobin A1C      4 2 - 6 3 % 8 3 (H)    EAG      mg/dl 192    TSH 3RD GENERATON      0 358 - 3 740 uIU/mL 4 179 (H)    Glutaminc Acid Decarboxylase 65 Ab      0 0 - 5 0 U/mL <5 0    Free T4      0 76 - 1 46 ng/dL  0 90

## 2019-11-13 DIAGNOSIS — G47.00 INSOMNIA: ICD-10-CM

## 2019-11-13 RX ORDER — QUETIAPINE FUMARATE 25 MG/1
12.5 TABLET, FILM COATED ORAL
Qty: 15 TABLET | Refills: 1 | Status: SHIPPED | OUTPATIENT
Start: 2019-11-13 | End: 2019-12-18 | Stop reason: SDDI

## 2019-11-13 NOTE — TELEPHONE ENCOUNTER
Patient could not tolerate Atarax and was switched to Seroquel 25 mg at night  Patient reports that he gets dizziness on Seroquel 25 mg and is now taking 12 5 mg  Requests a refill on 12 5 mg at HS which has been done

## 2019-11-17 DIAGNOSIS — K21.9 GASTROESOPHAGEAL REFLUX DISEASE, ESOPHAGITIS PRESENCE NOT SPECIFIED: ICD-10-CM

## 2019-11-18 ENCOUNTER — APPOINTMENT (OUTPATIENT)
Dept: WOUND CARE | Facility: HOSPITAL | Age: 54
End: 2019-11-18
Payer: COMMERCIAL

## 2019-11-18 ENCOUNTER — TELEPHONE (OUTPATIENT)
Dept: OTHER | Facility: HOSPITAL | Age: 54
End: 2019-11-18

## 2019-11-18 PROCEDURE — 99212 OFFICE O/P EST SF 10 MIN: CPT

## 2019-11-18 RX ORDER — OMEPRAZOLE 40 MG/1
40 CAPSULE, DELAYED RELEASE ORAL DAILY
Qty: 90 CAPSULE | Refills: 1 | Status: SHIPPED | OUTPATIENT
Start: 2019-11-18 | End: 2019-12-18 | Stop reason: SDUPTHER

## 2019-11-18 RX ORDER — OMEPRAZOLE 20 MG/1
CAPSULE, DELAYED RELEASE ORAL
Qty: 90 CAPSULE | Refills: 1 | Status: SHIPPED | OUTPATIENT
Start: 2019-11-18 | End: 2020-02-13 | Stop reason: SDUPTHER

## 2019-11-18 NOTE — TELEPHONE ENCOUNTER
I called pt and reviewed BGs    Having a few low BGs in 50s-70s overnight and in afternoon and late evening  Wound is healing well  Eating well  Will reduce 70/30 to 75 units in AM with breakfast and 45 units in PM with dinner  Encouraged to eat 3 full meals a day to prevent hypoglycemia  Tera Cisneros

## 2019-11-22 ENCOUNTER — TELEPHONE (OUTPATIENT)
Dept: INTERNAL MEDICINE CLINIC | Facility: CLINIC | Age: 54
End: 2019-11-22

## 2019-11-22 DIAGNOSIS — E11.49 OTHER DIABETIC NEUROLOGICAL COMPLICATION ASSOCIATED WITH TYPE 2 DIABETES MELLITUS (HCC): ICD-10-CM

## 2019-11-22 RX ORDER — GABAPENTIN 100 MG/1
300 CAPSULE ORAL
Qty: 90 CAPSULE | Refills: 3
Start: 2019-11-22 | End: 2019-12-18

## 2019-11-22 NOTE — TELEPHONE ENCOUNTER
Osito Mojica from [facility] called resting heart rate is in 90's 94, 96 today 130-80 blood pressure  2nd problem neuropathy lower extremity is interfering with sleep  seroquel 1/2 tablet is not helping, maybe increasing seroquel or neurontin to help with the neuropathy

## 2019-11-22 NOTE — TELEPHONE ENCOUNTER
I called the patient on the phone, we spoke he is going to increase his neurontin to 400 mg at night  Asked if we can call him Monday in followup to see how he is doing  He is not taking hydroxyzine  I warned him about potential side effects of Neurontin   Note to be dictated

## 2019-11-25 ENCOUNTER — TELEPHONE (OUTPATIENT)
Dept: ENDOCRINOLOGY | Facility: CLINIC | Age: 54
End: 2019-11-25

## 2019-11-25 NOTE — TELEPHONE ENCOUNTER
Lisa Norman,    Please call patient and let him know I reviewed his BGs    -having a few low BGs throughout the day and overnight    Reduce 70/30 insulin to the following doses:  68 units at breakfast and 40 units at dinner    Manolo ELDER    Endocrinology

## 2019-11-27 ENCOUNTER — OFFICE VISIT (OUTPATIENT)
Dept: INTERNAL MEDICINE CLINIC | Facility: CLINIC | Age: 54
End: 2019-11-27
Payer: COMMERCIAL

## 2019-11-27 ENCOUNTER — TELEPHONE (OUTPATIENT)
Dept: INTERNAL MEDICINE CLINIC | Facility: CLINIC | Age: 54
End: 2019-11-27

## 2019-11-27 VITALS
HEART RATE: 120 BPM | DIASTOLIC BLOOD PRESSURE: 80 MMHG | OXYGEN SATURATION: 100 % | TEMPERATURE: 101.3 F | SYSTOLIC BLOOD PRESSURE: 128 MMHG

## 2019-11-27 DIAGNOSIS — L03.116 CELLULITIS OF LEFT LOWER EXTREMITY: Primary | ICD-10-CM

## 2019-11-27 DIAGNOSIS — E11.65 TYPE 2 DIABETES MELLITUS WITH HYPERGLYCEMIA, WITH LONG-TERM CURRENT USE OF INSULIN (HCC): ICD-10-CM

## 2019-11-27 DIAGNOSIS — E11.49 OTHER DIABETIC NEUROLOGICAL COMPLICATION ASSOCIATED WITH TYPE 2 DIABETES MELLITUS (HCC): ICD-10-CM

## 2019-11-27 DIAGNOSIS — Z79.4 TYPE 2 DIABETES MELLITUS WITH HYPERGLYCEMIA, WITH LONG-TERM CURRENT USE OF INSULIN (HCC): ICD-10-CM

## 2019-11-27 PROCEDURE — 99215 OFFICE O/P EST HI 40 MIN: CPT | Performed by: INTERNAL MEDICINE

## 2019-11-27 RX ORDER — CEPHALEXIN 500 MG/1
500 CAPSULE ORAL EVERY 6 HOURS SCHEDULED
Qty: 20 CAPSULE | Refills: 0 | Status: SHIPPED | OUTPATIENT
Start: 2019-11-27 | End: 2019-12-02 | Stop reason: HOSPADM

## 2019-11-27 RX ORDER — IBUPROFEN 800 MG/1
800 TABLET ORAL EVERY 8 HOURS PRN
Qty: 30 TABLET | Refills: 0 | Status: SHIPPED | OUTPATIENT
Start: 2019-11-27 | End: 2020-12-22 | Stop reason: ALTCHOICE

## 2019-11-27 RX ORDER — SENNOSIDES 8.6 MG
650 CAPSULE ORAL EVERY 8 HOURS PRN
Qty: 30 TABLET | Refills: 0 | Status: SHIPPED | OUTPATIENT
Start: 2019-11-27 | End: 2019-12-07

## 2019-11-27 NOTE — PATIENT INSTRUCTIONS
Please return to the clinic after 7 days for a follow-up visit next week  If you experience persistence of fever 2 days from today (Friday), worsening of pain in the area, nausea/vomiting, please go to the Emergency Department

## 2019-11-27 NOTE — TELEPHONE ENCOUNTER
I had spoken on the phone with Katie Kwok after he asked to speak with me  He was seen in our office earlier today by Dr Jayden Quinteros and Dr Nasrin King and was prescribed Keflex for cellulitis  Harlan Borges told me over the phone that at the time of visit he did not have much pain which is also reflected in the office note as per chart review  As per note review Harlan Borges was already instructed to got to ER if any of his symptoms worsen, including if his pain gets worse as this could be a sign of severe infection  Harlan Borges said on the phone that his pain is now severe  He took ibuprofen 600 mg and it did not help  I have told him I will prescribe ibuprofen 800mg q8hPRN  and Tylenol 650 mg q12h PRN  Harlan Borges says this will not help and he DEMANDED Norco    I have calmly explained that cellulitis is not an indication for narcotics  I have explained to him that maximum dose of NSAID and Tylenol should completely tae care of the pain from cellulitis ,and if this amount of medication does not take care of the pain, that is a sign that the infection is becoming severe and that he should go to ER right away  Furthermore, I have urged him to go to ER right now if his pain is that severe and I have explained that this infection could become life threatening if not treated hence why he should go to ER  Hernan's wife took the phone from him and proceeded to scream at me demanding Norco  She had also creamed at staff earlier when she first called  We can not tolerate verbal abuse of staff and/or physicians in our office and if this happens again, we will calmly and clearly explain this to the patient and his wife who usually calls for him and accompanies him to all his appointments  Harlan Borges came back to the phone and said he has 2 Norcos from his surgeon from when he had wound debridement  I told him to take it pne tab 12 hours apart and within 24 h Keflex should work and improve his infection, and implicitly his pain    If his pain does not improve by tomorrow, I advised him to go to ER immediately  He states understanding  Patient verbalized understanding of all that was discusse today and agrees with plan  He was able to ask all of his questions and comprehensive answers were provided

## 2019-11-27 NOTE — TELEPHONE ENCOUNTER
Ben's wife called and said that Milana Cushing was in a lot of pain and they wanted pain medications sent in for him  I spoke to Dr Paulie Nunez and Dr Keiry Abdalla and they stated that he should not need a narcotic for his cellulitis and they would send in 800mg of Ibuprofen and Tylenol 650mg  His wife stated that it wouldn't be enough to help him  The  said If he was in that much pain he needed to go to the ER to be evaluated as it can be a complicated case of Cellulitis  I explained this to the patients wife  I again told the patients wife that they would be willing to send in the ibuprofen and tylenol  The ibuprofen and tylenol were sent in to the pharmacy and I made the patient aware that it was sent in for the patients pain

## 2019-11-27 NOTE — ASSESSMENT & PLAN NOTE
· Patient has a history of multiple episodes of cellulitis within the past year and it has always been in the same area which is the medial aspect of his L thigh  Most recent episode was about 1-2 months ago for which he was treated with Keflex  Infection resolved with no issues  · Last night, noted onset of fever as well as swelling and erythema on the medial side of his L thigh  · Patient took Keflex 500 mg q6h and applied warm compress to the area, used a marker for demarcation of the site    · Area looks erythematous, warm to touch, swollen and has progressed to include areas beyond the originally marked site    Plan:  · Continue Keflex 500 mg q6h for a total of 7 days  · Return for follow-up here at the clinic in 7 days  · Instructed to go the ED should he experience any persistence of fever 2 days from now, increasing and worsening pain, nausea/vomiting  · Ibuprofen PRN for pain and fever

## 2019-11-27 NOTE — PROGRESS NOTES
Assessment/Plan:    Cellulitis  · Patient has a history of multiple episodes of cellulitis within the past year and it has always been in the same area which is the medial aspect of his L thigh  Most recent episode was about 1-2 months ago for which he was treated with Keflex  Infection resolved with no issues  · Last night, noted onset of fever as well as swelling and erythema on the medial side of his L thigh  · Patient took Keflex 500 mg q6h and applied warm compress to the area, used a marker for demarcation of the site  · Area looks erythematous, warm to touch, swollen and has progressed to include areas beyond the originally marked site    Plan:  · Continue Keflex 500 mg q6h for a total of 7 days  · Return for follow-up here at the clinic in 7 days  · Instructed to go the ED should he experience any persistence of fever 2 days from now, increasing and worsening pain, nausea/vomiting  · Ibuprofen PRN for pain and fever    Type 2 diabetes mellitus with hyperglycemia, with long-term current use of insulin ([facility] [address] )    Lab Results   Component Value Date    HGBA1C 8 3 (H) 11/05/2019     · Patient is followed by outpatient endocrinologist Dr Marjorie Kim (last seen 11/12/19)  · Recent adjustments were made to his insulin dosing - currently at 68 units in the morning, 40 units in the evening  · Patient did report that his blood sugar levels were noted to be high yesterday right around the time his fever started but have trended down today    Plan:  · Continue current insulin dosing  · Referred by Dr Marjorie Kim to Podiatry    Diabetic neuropathy [facility])    Lab Results   Component Value Date    HGBA1C 8 3 (H) 11/05/2019     Patient reports occasional mild pain and numbness on both legs but that these are well controlled with his current dosing of Gabapentin which is 300 mg in the morning and 400 mg at night       · Continue Gabapentin    Cough, nonproductive  Patient reports that he has had nonproductive cough for several weeks now, occasionally feels short of breath especially during coughing episodes  No febrile episodes noted during the onset of cough as well as during the weeks that followed  Patient has been taking Flonase once a day in the morning  On exam, patient's nasal mucosa appeared pale  Symptoms likely due to allergic rhinitis  Plan:  · OTC Loratadine  · Flonase spray 2 sprays for each nostril at bedtime           Diagnoses and all orders for this visit:    Cellulitis of left lower extremity  -     cephalexin (KEFLEX) 500 mg capsule; Take 1 capsule (500 mg total) by mouth every 6 (six) hours for 7 days    Type 2 diabetes mellitus with hyperglycemia, with long-term current use of insulin (HCC)    Other diabetic neurological complication associated with type 2 diabetes mellitus (HCC)          Subjective:      Patient ID: Bryan Santana is a 48 y o  male  Patient comes in for a scheduled follow-up  He did however note that last night, he started having febrile episodes (Tmax 101) with associated swelling and redness of the medial aspect of his L thigh  He immediately applied warm compress and started taking Keflex 500 mg cap q6h  They also marked the area where the swelling was noted and during his visit today, it has progressed to include areas beyond the originally marked site  Patient denies any pain in the area, no vomiting, diarrhea, no difficulty with ambulation, no chills  Patient has a history of multiple episodes of cellulitis within the past year (approximately 5 episodes), most recent one was last month for which he completed the full antibiotic course with Keflex  The following portions of the patient's history were reviewed and updated as appropriate: allergies, current medications, past family history, past medical history, past social history, past surgical history and problem list     Review of Systems   Constitutional: Positive for fever  Negative for appetite change and fatigue     HENT: Positive for postnasal drip  Respiratory: Positive for cough  Cardiovascular: Negative for chest pain and palpitations  Gastrointestinal: Positive for nausea  Negative for abdominal pain, constipation and vomiting  Genitourinary: Negative for dysuria and hematuria  Musculoskeletal: Negative for back pain and joint swelling  Skin: Positive for wound  Negative for color change  Neurological: Negative for dizziness and headaches  Psychiatric/Behavioral: Negative for agitation  Objective:      /80 (BP Location: Left arm, Patient Position: Sitting, Cuff Size: Large)   Pulse (!) 120   Temp (!) 101 3 °F (38 5 °C)   SpO2 100%          Physical Exam   Constitutional: No distress  HENT:   Mouth/Throat: Oropharynx is clear and moist  No oropharyngeal exudate    (+) pallor of nasal mucosa   Eyes: Conjunctivae are normal    Cardiovascular: Normal rate, regular rhythm and normal heart sounds  Exam reveals no gallop and no friction rub  No murmur heard  Pulmonary/Chest: Effort normal and breath sounds normal  He has no wheezes  He has no rales  Abdominal: Soft  Bowel sounds are normal  There is no tenderness  Musculoskeletal: Normal range of motion  He exhibits no tenderness  Legs:  Skin: Skin is warm  Capillary refill takes less than 2 seconds  (+)erythema, warmth and redness at medial side of thigh (both anterior and posterior areas affected) - see illustration   (+) wound at L groin, still erythematous however looked clean and healing well; it was covered with gauze, gauze was dry   Vitals reviewed

## 2019-11-27 NOTE — ASSESSMENT & PLAN NOTE
Lab Results   Component Value Date    HGBA1C 8 3 (H) 11/05/2019     Patient reports occasional mild pain and numbness on both legs but that these are well controlled with his current dosing of Gabapentin which is 300 mg in the morning and 400 mg at night       · Continue Gabapentin

## 2019-11-27 NOTE — ASSESSMENT & PLAN NOTE
Lab Results   Component Value Date    HGBA1C 8 3 (H) 11/05/2019     · Patient is followed by outpatient endocrinologist Dr David Palomares (last seen 11/12/19)  · Recent adjustments were made to his insulin dosing - currently at 68 units in the morning, 40 units in the evening  · Patient did report that his blood sugar levels were noted to be high yesterday right around the time his fever started but have trended down today    Plan:  · Continue current insulin dosing  · Referred by Dr David Palomares to Podiatry

## 2019-11-27 NOTE — ASSESSMENT & PLAN NOTE
Patient reports that he has had nonproductive cough for several weeks now, occasionally feels short of breath especially during coughing episodes  No febrile episodes noted during the onset of cough as well as during the weeks that followed  Patient has been taking Flonase once a day in the morning  On exam, patient's nasal mucosa appeared pale  Symptoms likely due to allergic rhinitis      Plan:  · OTC Loratadine  · Flonase spray 2 sprays for each nostril at bedtime

## 2019-11-28 ENCOUNTER — HOSPITAL ENCOUNTER (INPATIENT)
Facility: HOSPITAL | Age: 54
LOS: 4 days | Discharge: HOME | DRG: 720 | End: 2019-12-02
Attending: EMERGENCY MEDICINE | Admitting: INTERNAL MEDICINE
Payer: COMMERCIAL

## 2019-11-28 ENCOUNTER — APPOINTMENT (EMERGENCY)
Dept: RADIOLOGY | Facility: HOSPITAL | Age: 54
DRG: 720 | End: 2019-11-28
Payer: COMMERCIAL

## 2019-11-28 DIAGNOSIS — L03.90 SEPSIS DUE TO CELLULITIS (HCC): ICD-10-CM

## 2019-11-28 DIAGNOSIS — A41.9 SEPSIS DUE TO CELLULITIS (HCC): ICD-10-CM

## 2019-11-28 DIAGNOSIS — S71.102A OPEN WOUND OF LEFT THIGH: ICD-10-CM

## 2019-11-28 DIAGNOSIS — R05.9 COUGH: ICD-10-CM

## 2019-11-28 DIAGNOSIS — A41.9 SEPSIS WITHOUT ACUTE ORGAN DYSFUNCTION, DUE TO UNSPECIFIED ORGANISM (HCC): Primary | ICD-10-CM

## 2019-11-28 DIAGNOSIS — L03.116 CELLULITIS OF LEFT LOWER EXTREMITY: ICD-10-CM

## 2019-11-28 DIAGNOSIS — D64.9 ANEMIA: ICD-10-CM

## 2019-11-28 PROBLEM — G47.00 INSOMNIA: Status: ACTIVE | Noted: 2019-11-28

## 2019-11-28 LAB
ALBUMIN SERPL BCP-MCNC: 2.8 G/DL (ref 3.5–5)
ALP SERPL-CCNC: 90 U/L (ref 46–116)
ALT SERPL W P-5'-P-CCNC: 37 U/L (ref 12–78)
ANION GAP SERPL CALCULATED.3IONS-SCNC: 10 MMOL/L (ref 4–13)
APTT PPP: 36 SECONDS (ref 23–37)
AST SERPL W P-5'-P-CCNC: 35 U/L (ref 5–45)
BASOPHILS # BLD AUTO: 0.1 THOUSANDS/ΜL (ref 0–0.1)
BASOPHILS NFR BLD AUTO: 1 % (ref 0–1)
BILIRUB SERPL-MCNC: 0.75 MG/DL (ref 0.2–1)
BUN SERPL-MCNC: 17 MG/DL (ref 5–25)
CALCIUM SERPL-MCNC: 9 MG/DL (ref 8.3–10.1)
CHLORIDE SERPL-SCNC: 97 MMOL/L (ref 100–108)
CO2 SERPL-SCNC: 24 MMOL/L (ref 21–32)
CREAT SERPL-MCNC: 1.03 MG/DL (ref 0.6–1.3)
EOSINOPHIL # BLD AUTO: 0.05 THOUSAND/ΜL (ref 0–0.61)
EOSINOPHIL NFR BLD AUTO: 0 % (ref 0–6)
ERYTHROCYTE [DISTWIDTH] IN BLOOD BY AUTOMATED COUNT: 17.5 % (ref 11.6–15.1)
GFR SERPL CREATININE-BSD FRML MDRD: 83 ML/MIN/1.73SQ M
GLUCOSE SERPL-MCNC: 149 MG/DL (ref 65–140)
GLUCOSE SERPL-MCNC: 158 MG/DL (ref 65–140)
GLUCOSE SERPL-MCNC: 193 MG/DL (ref 65–140)
HCT VFR BLD AUTO: 32.5 % (ref 36.5–49.3)
HGB BLD-MCNC: 9.7 G/DL (ref 12–17)
IMM GRANULOCYTES # BLD AUTO: 0.22 THOUSAND/UL (ref 0–0.2)
IMM GRANULOCYTES NFR BLD AUTO: 1 % (ref 0–2)
INR PPP: 1.27 (ref 0.84–1.19)
LACTATE SERPL-SCNC: 1.6 MMOL/L (ref 0.5–2)
LYMPHOCYTES # BLD AUTO: 1.24 THOUSANDS/ΜL (ref 0.6–4.47)
LYMPHOCYTES NFR BLD AUTO: 8 % (ref 14–44)
MCH RBC QN AUTO: 23.4 PG (ref 26.8–34.3)
MCHC RBC AUTO-ENTMCNC: 29.8 G/DL (ref 31.4–37.4)
MCV RBC AUTO: 79 FL (ref 82–98)
MONOCYTES # BLD AUTO: 0.94 THOUSAND/ΜL (ref 0.17–1.22)
MONOCYTES NFR BLD AUTO: 6 % (ref 4–12)
NEUTROPHILS # BLD AUTO: 13.2 THOUSANDS/ΜL (ref 1.85–7.62)
NEUTS SEG NFR BLD AUTO: 84 % (ref 43–75)
NRBC BLD AUTO-RTO: 0 /100 WBCS
PLATELET # BLD AUTO: 223 THOUSANDS/UL (ref 149–390)
PMV BLD AUTO: 8.2 FL (ref 8.9–12.7)
POTASSIUM SERPL-SCNC: 4.3 MMOL/L (ref 3.5–5.3)
PROCALCITONIN SERPL-MCNC: 1.25 NG/ML
PROT SERPL-MCNC: 7.6 G/DL (ref 6.4–8.2)
PROTHROMBIN TIME: 15.2 SECONDS (ref 11.6–14.5)
RBC # BLD AUTO: 4.14 MILLION/UL (ref 3.88–5.62)
SODIUM SERPL-SCNC: 131 MMOL/L (ref 136–145)
WBC # BLD AUTO: 15.75 THOUSAND/UL (ref 4.31–10.16)

## 2019-11-28 PROCEDURE — 85610 PROTHROMBIN TIME: CPT | Performed by: EMERGENCY MEDICINE

## 2019-11-28 PROCEDURE — 83605 ASSAY OF LACTIC ACID: CPT | Performed by: EMERGENCY MEDICINE

## 2019-11-28 PROCEDURE — 96365 THER/PROPH/DIAG IV INF INIT: CPT

## 2019-11-28 PROCEDURE — 36415 COLL VENOUS BLD VENIPUNCTURE: CPT | Performed by: EMERGENCY MEDICINE

## 2019-11-28 PROCEDURE — 85025 COMPLETE CBC W/AUTO DIFF WBC: CPT | Performed by: EMERGENCY MEDICINE

## 2019-11-28 PROCEDURE — 71046 X-RAY EXAM CHEST 2 VIEWS: CPT

## 2019-11-28 PROCEDURE — 80053 COMPREHEN METABOLIC PANEL: CPT | Performed by: EMERGENCY MEDICINE

## 2019-11-28 PROCEDURE — 87040 BLOOD CULTURE FOR BACTERIA: CPT | Performed by: EMERGENCY MEDICINE

## 2019-11-28 PROCEDURE — 99285 EMERGENCY DEPT VISIT HI MDM: CPT

## 2019-11-28 PROCEDURE — 84145 PROCALCITONIN (PCT): CPT | Performed by: EMERGENCY MEDICINE

## 2019-11-28 PROCEDURE — 99223 1ST HOSP IP/OBS HIGH 75: CPT | Performed by: INTERNAL MEDICINE

## 2019-11-28 PROCEDURE — 82948 REAGENT STRIP/BLOOD GLUCOSE: CPT

## 2019-11-28 PROCEDURE — 99285 EMERGENCY DEPT VISIT HI MDM: CPT | Performed by: EMERGENCY MEDICINE

## 2019-11-28 PROCEDURE — 85730 THROMBOPLASTIN TIME PARTIAL: CPT | Performed by: EMERGENCY MEDICINE

## 2019-11-28 PROCEDURE — 93005 ELECTROCARDIOGRAM TRACING: CPT

## 2019-11-28 RX ORDER — QUETIAPINE FUMARATE 25 MG/1
25 TABLET, FILM COATED ORAL
Status: DISCONTINUED | OUTPATIENT
Start: 2019-11-28 | End: 2019-12-02 | Stop reason: HOSPADM

## 2019-11-28 RX ORDER — GUAIFENESIN/DEXTROMETHORPHAN 100-10MG/5
10 SYRUP ORAL EVERY 4 HOURS PRN
Status: DISCONTINUED | OUTPATIENT
Start: 2019-11-28 | End: 2019-11-28

## 2019-11-28 RX ORDER — INSULIN ASPART 100 [IU]/ML
68 INJECTION, SUSPENSION SUBCUTANEOUS
Status: DISCONTINUED | OUTPATIENT
Start: 2019-11-29 | End: 2019-12-02 | Stop reason: HOSPADM

## 2019-11-28 RX ORDER — FERROUS SULFATE 325(65) MG
325 TABLET ORAL EVERY OTHER DAY
Status: DISCONTINUED | OUTPATIENT
Start: 2019-11-28 | End: 2019-12-02 | Stop reason: HOSPADM

## 2019-11-28 RX ORDER — LISINOPRIL 5 MG/1
5 TABLET ORAL DAILY
Status: CANCELLED | OUTPATIENT
Start: 2019-11-28

## 2019-11-28 RX ORDER — INSULIN ASPART 100 [IU]/ML
40 INJECTION, SUSPENSION SUBCUTANEOUS ONCE
Status: COMPLETED | OUTPATIENT
Start: 2019-11-28 | End: 2019-11-28

## 2019-11-28 RX ORDER — INSULIN ASPART 100 [IU]/ML
48 INJECTION, SUSPENSION SUBCUTANEOUS
Status: DISCONTINUED | OUTPATIENT
Start: 2019-11-28 | End: 2019-11-28

## 2019-11-28 RX ORDER — BENZONATATE 100 MG/1
100 CAPSULE ORAL 3 TIMES DAILY PRN
Status: DISCONTINUED | OUTPATIENT
Start: 2019-11-28 | End: 2019-12-02 | Stop reason: HOSPADM

## 2019-11-28 RX ORDER — INSULIN ASPART 100 [IU]/ML
80 INJECTION, SUSPENSION SUBCUTANEOUS
Status: DISCONTINUED | OUTPATIENT
Start: 2019-11-29 | End: 2019-11-28

## 2019-11-28 RX ORDER — LOPERAMIDE HYDROCHLORIDE 2 MG/1
2 CAPSULE ORAL 4 TIMES DAILY PRN
Status: DISCONTINUED | OUTPATIENT
Start: 2019-11-28 | End: 2019-12-02 | Stop reason: HOSPADM

## 2019-11-28 RX ORDER — PROMETHAZINE HYDROCHLORIDE 6.25 MG/5ML
12.5 SYRUP ORAL EVERY 6 HOURS PRN
Status: DISCONTINUED | OUTPATIENT
Start: 2019-11-28 | End: 2019-12-02 | Stop reason: HOSPADM

## 2019-11-28 RX ORDER — FERROUS SULFATE 325(65) MG
325 TABLET ORAL EVERY OTHER DAY
Status: CANCELLED | OUTPATIENT
Start: 2019-11-28

## 2019-11-28 RX ORDER — INSULIN ASPART 100 [IU]/ML
80 INJECTION, SUSPENSION SUBCUTANEOUS
Status: CANCELLED | OUTPATIENT
Start: 2019-11-29

## 2019-11-28 RX ORDER — ACETAMINOPHEN 325 MG/1
975 TABLET ORAL ONCE
Status: COMPLETED | OUTPATIENT
Start: 2019-11-28 | End: 2019-11-28

## 2019-11-28 RX ORDER — LISINOPRIL 5 MG/1
5 TABLET ORAL DAILY
Status: DISCONTINUED | OUTPATIENT
Start: 2019-11-28 | End: 2019-12-02 | Stop reason: HOSPADM

## 2019-11-28 RX ORDER — ACETAMINOPHEN 325 MG/1
650 TABLET ORAL EVERY 8 HOURS PRN
Status: DISCONTINUED | OUTPATIENT
Start: 2019-11-28 | End: 2019-12-02 | Stop reason: HOSPADM

## 2019-11-28 RX ORDER — GUAIFENESIN/DEXTROMETHORPHAN 100-10MG/5
10 SYRUP ORAL EVERY 4 HOURS PRN
Status: DISCONTINUED | OUTPATIENT
Start: 2019-11-28 | End: 2019-11-30

## 2019-11-28 RX ORDER — INSULIN ASPART 100 [IU]/ML
48 INJECTION, SUSPENSION SUBCUTANEOUS
Status: CANCELLED | OUTPATIENT
Start: 2019-11-28

## 2019-11-28 RX ORDER — IBUPROFEN 400 MG/1
800 TABLET ORAL EVERY 8 HOURS PRN
Status: DISCONTINUED | OUTPATIENT
Start: 2019-11-28 | End: 2019-12-02 | Stop reason: HOSPADM

## 2019-11-28 RX ORDER — INSULIN ASPART 100 [IU]/ML
40 INJECTION, SUSPENSION SUBCUTANEOUS
Status: DISCONTINUED | OUTPATIENT
Start: 2019-11-29 | End: 2019-11-28

## 2019-11-28 RX ORDER — BENZONATATE 100 MG/1
100 CAPSULE ORAL 3 TIMES DAILY PRN
Status: DISCONTINUED | OUTPATIENT
Start: 2019-11-28 | End: 2019-11-28

## 2019-11-28 RX ORDER — NYSTATIN 100000 [USP'U]/G
POWDER TOPICAL 2 TIMES DAILY
Status: DISCONTINUED | OUTPATIENT
Start: 2019-11-28 | End: 2019-12-02 | Stop reason: HOSPADM

## 2019-11-28 RX ORDER — CEFAZOLIN SODIUM 2 G/50ML
2000 SOLUTION INTRAVENOUS EVERY 8 HOURS
Status: DISCONTINUED | OUTPATIENT
Start: 2019-11-28 | End: 2019-12-02

## 2019-11-28 RX ORDER — INSULIN ASPART 100 [IU]/ML
40 INJECTION, SUSPENSION SUBCUTANEOUS
Status: DISCONTINUED | OUTPATIENT
Start: 2019-11-28 | End: 2019-12-02 | Stop reason: HOSPADM

## 2019-11-28 RX ORDER — PANTOPRAZOLE SODIUM 40 MG/1
40 TABLET, DELAYED RELEASE ORAL
Status: DISCONTINUED | OUTPATIENT
Start: 2019-11-29 | End: 2019-11-30

## 2019-11-28 RX ORDER — GABAPENTIN 300 MG/1
300 CAPSULE ORAL
Status: CANCELLED | OUTPATIENT
Start: 2019-11-28

## 2019-11-28 RX ORDER — GABAPENTIN 300 MG/1
300 CAPSULE ORAL
Status: DISCONTINUED | OUTPATIENT
Start: 2019-11-28 | End: 2019-11-29

## 2019-11-28 RX ADMIN — GUAIFENESIN AND DEXTROMETHORPHAN 10 ML: 100; 10 SYRUP ORAL at 20:37

## 2019-11-28 RX ADMIN — QUETIAPINE FUMARATE 25 MG: 25 TABLET ORAL at 23:45

## 2019-11-28 RX ADMIN — INSULIN ASPART 40 UNITS: 100 INJECTION, SUSPENSION SUBCUTANEOUS at 17:38

## 2019-11-28 RX ADMIN — FERROUS SULFATE TAB 325 MG (65 MG ELEMENTAL FE) 325 MG: 325 (65 FE) TAB at 17:39

## 2019-11-28 RX ADMIN — ENOXAPARIN SODIUM 40 MG: 40 INJECTION SUBCUTANEOUS at 17:39

## 2019-11-28 RX ADMIN — ACETAMINOPHEN 975 MG: 325 TABLET, FILM COATED ORAL at 13:46

## 2019-11-28 RX ADMIN — ACETAMINOPHEN 650 MG: 325 TABLET, FILM COATED ORAL at 22:32

## 2019-11-28 RX ADMIN — CEFEPIME HYDROCHLORIDE 2000 MG: 2 INJECTION, POWDER, FOR SOLUTION INTRAVENOUS at 13:46

## 2019-11-28 RX ADMIN — GABAPENTIN 300 MG: 300 CAPSULE ORAL at 21:13

## 2019-11-28 RX ADMIN — CEFAZOLIN SODIUM 2000 MG: 2 SOLUTION INTRAVENOUS at 23:45

## 2019-11-28 RX ADMIN — METOPROLOL TARTRATE 25 MG: 25 TABLET, FILM COATED ORAL at 20:36

## 2019-11-28 RX ADMIN — PROMETHAZINE HYDROCHLORIDE 12.5 MG: 6.25 SYRUP ORAL at 13:50

## 2019-11-28 RX ADMIN — NYSTATIN: 100000 POWDER TOPICAL at 17:39

## 2019-11-28 RX ADMIN — PSYLLIUM HUSK 1 PACKET: 3.4 POWDER ORAL at 17:39

## 2019-11-28 RX ADMIN — VANCOMYCIN HYDROCHLORIDE 1500 MG: 1 INJECTION, POWDER, LYOPHILIZED, FOR SOLUTION INTRAVENOUS at 14:20

## 2019-11-28 RX ADMIN — SODIUM CHLORIDE 1000 ML: 0.9 INJECTION, SOLUTION INTRAVENOUS at 13:47

## 2019-11-28 RX ADMIN — INSULIN ASPART 40 UNITS: 100 INJECTION, SUSPENSION SUBCUTANEOUS at 22:49

## 2019-11-28 RX ADMIN — CEFAZOLIN SODIUM 2000 MG: 2 SOLUTION INTRAVENOUS at 18:18

## 2019-11-28 NOTE — PROGRESS NOTES
Progress Note - Marta Domínguez 1965, 48 y o  male MRN: [id number]    Unit/Bed#: S -01 Encounter: [id number]    Primary Care Provider: Roberth Lomas MD   Date and time admitted to hospital: 11/28/2019  1:16 PM        Insomnia  Assessment & Plan  Continue patient's Seroquel 25 mg QD for sleep  Likely to improve with resolution of infection  Consider reevaluation following antibiotic course    Cough, nonproductive  Assessment & Plan  Continue patient's Benzonatate and Robitussin PRN for cough    Diabetic neuropathy ([address] )  Assessment & Plan  Continue patient's outpatient Gabapentin 300 QHS    Essential hypertension  Assessment & Plan  Blood pressure 162/73 on admission  Continue patient outpatient blood pressure medications  Monitor blood pressures    Type 2 diabetes mellitus with hyperglycemia, with long-term current use of insulin ([facility])  Assessment & Plan  Lab Results   Component Value Date    HGBA1C 8 3 (H) 11/05/2019       Recent Labs     11/28/19  1555   POCGLU 158*       Blood Sugar Average: Last 72 hrs:  (P) 158   Continue patient's outpatient insulin regimen  Hold metformin  Monitor blood sugars    * Sepsis due to cellulitis [facility])  Assessment & Plan  Patient has history of recurrent cellulitis  Presents today with fever, tachycardia, SOB, dizziness, diaphoresis and cellulitis of the upper LLE  Received 7-day course of keflex as outpatient two days ago but is refractory to treatment  Meets SIRS criteria  Given Cefepime and Vancomycin in ED with 1 L bolus NS     - CXR showed cardiomegaly and mild vascular congestion   - EKG showed sinus tachycardia at 132 bpm   - Lactic acid 1 6  - PT elevated at 15 2   PTT wnl   - Procalcitonin and blood cultures pending  - Ordered Cefazolin 2g IV Q8   - Monitor BMP and CBC  - Consulted infectious disease      VTE Prophylaxis: Enoxaparin (Lovenox)  / sequential compression device   Code Status: Level 1 - Full  POLST: POLST form is not discussed and not completed at this time  Anticipated Length of Stay:  Patient will be admitted on an Inpatient basis with an anticipated length of stay of  Greater than 2 midnights  Justification for Hospital Stay: Sepsis secondary to cellulitis which requires antibiotic course and further work up  Chief Complaint:   Shortness of breath and cough    History of Present Illness:    Ysabel Duncan is a 48 y o  male with past medical history diabetes mellitus , GERD, gout , hypertension , IBS , obesity , osteoarthritis, diabetic neuropathy, and of multiple admissions for cellulitis who presents with shortness of breath cough in the setting of new active cellulitis of the left lower extremity  Patient was seen 2 days ago by his PCP who started him on a 7 day course of Keflex  Despite treatment, patient started feeling dizzy, febrile, and lightheaded on Tuesday 11/26/2019  Noticed worsening erythema of his upper medial left lower extremity  Patient reports for similar episodes in the past year  Indicates that touching the inflamed area results in exquisite pain  Describes his pain as a burning sensation  Reports using compresses and creams at home to try to alleviate symptoms without success  Patient was diagnosed with sepsis secondary to cellulitis in the emergency department for meeting SIRS criteria  Patient was given cefepime and vancomycin as well as 1 L IV bolus normal saline  X-ray was obtained and revealed cardiomegaly with mild vascular congestion  EKG showed sinus tachycardia at 132 beats per minute  Patient was admitted to the hospital for further sepsis workup and treatment  Review of Systems:    Review of Systems   Constitutional: Positive for diaphoresis, fatigue and fever  Negative for appetite change  HENT: Positive for postnasal drip  Negative for hearing loss  Eyes: Negative for visual disturbance  Respiratory: Positive for cough and shortness of breath   Negative for chest tightness, wheezing and stridor  Cardiovascular: Positive for leg swelling  Negative for chest pain and palpitations  Gastrointestinal: Negative for abdominal pain, constipation, diarrhea, nausea and vomiting  Endocrine: Negative for polydipsia and polyuria  Genitourinary: Negative for difficulty urinating and dysuria  Musculoskeletal: Negative for gait problem  Skin: Positive for color change and wound  Neurological: Positive for dizziness, light-headedness, numbness and headaches  Negative for weakness  Psychiatric/Behavioral: Positive for sleep disturbance  Negative for behavioral problems  The patient is nervous/anxious  Past Medical and Surgical History:     Past Medical History:   Diagnosis Date    Cellulitis     last assessed 12/10/15    Diabetes mellitus ([facility] [address] )     Edema     Elevated liver enzymes     Esophageal reflux     Gluten intolerance     Gout     last assessed 09/05/13    Hyperglycemia     Hypertension     IBS (irritable bowel syndrome)     Insomnia     Obesity     Osteoarthritis of knee     last assessed 02/10/14    Prehypertension     last assessed 08/22/17    Renal disorder     Venous insufficiency     last assessed 08/22/17    Villonodular synovitis of the hand, right     last assessed 11/14/2013       Past Surgical History:   Procedure Laterality Date    INCISION AND DRAINAGE OF WOUND Left 9/6/2019    Procedure: INCISION AND DRAINAGE (I&D) GROIN;  Surgeon: Thanh Canas DO;  Location: [facility];  Service: General    SKIN BIOPSY      WOUND DEBRIDEMENT Left 9/7/2019    Procedure: EXCISIONAL DEBRIDEMENT;  Surgeon: Thanh Canas DO;  [facility]: [facility];  Service: General       Meds/Allergies:    Prior to Admission medications    Medication Sig Start Date End Date Taking?  Authorizing Provider   acetaminophen (TYLENOL) 650 mg CR tablet Take 1 tablet (650 mg total) by mouth every 8 (eight) hours as needed for mild pain for up to 10 days 11/27/19 12/7/19 Yes Marcela Cabrera MD   benzonatate (TESSALON PERLES) 100 mg capsule Take 1 capsule (100 mg total) by mouth 3 (three) times a day as needed for cough 10/23/19  Yes Edd Lopez PA-C   cephalexin [facility]) 500 mg capsule Take 1 capsule (500 mg total) by mouth every 6 (six) hours for 7 days 11/27/19 12/4/19 Yes Carmie Holter, MD   dextromethorphan-guaiFENesin [facility] DM)  mg/5 mL syrup Take 10 mL by mouth every 4 (four) hours as needed for cough 10/23/19  Yes Edd Lopez PA-C   ferrous sulfate 325 (65 Fe) mg tablet Take 1 tablet (325 mg total) by mouth every other day 10/25/19  Yes Edd Lopez PA-C   gabapentin (NEURONTIN) 100 mg capsule Take 3 capsules (300 mg total) by mouth daily at bedtime Take 3 capsules in am and 4 capsules at night 11/22/19  Yes Carlos Naranjo DO   ibuprofen (MOTRIN) 800 mg tablet Take 1 tablet (800 mg total) by mouth every 8 (eight) hours as needed for mild pain for up to 10 days 11/27/19 12/7/19 Yes Marcela Cabrera MD   insulin aspart protamine-insulin aspart (NovoLOG 70/30) 100 units/mL injection Inject 48 Units under the skin daily before dinner 10/23/19  Yes Edd Lopez PA-C   insulin aspart protamine-insulin aspart (NovoLOG 70/30) 100 units/mL injection Inject 80 Units under the skin daily before breakfast 10/24/19  Yes Edd Lopez PA-C   insulin NPH-insulin regular (NovoLIN 70/30) 100 units/mL subcutaneous injection Take 80 units a m with breakfast ; 48 units p m   With dinner 10/23/19  Yes Leonid Hope MD   Insulin Pen Needle (B-D UF III MINI PEN NEEDLES) 31G X 5 MM MISC by Does not apply route 5/23/13  Yes Tamika Shea MD   lisinopril (ZESTRIL) 5 mg tablet Take 1 tablet (5 mg total) by mouth daily 10/24/19  Yes Edd Lopez PA-C   loperamide (IMODIUM) 2 mg capsule Take 1 capsule (2 mg total) by mouth 4 (four) times a day as needed for diarrhea (Maximum 4 capsules/day) 10/10/19  Yes Philippe Caballero MD   metFORMIN (GLUCOPHAGE) 1000 MG tablet TAKE 1 TABLET BY MOUTH TWICE A DAY 8/26/19  Yes ANN Benitez   metoprolol tartrate (LOPRESSOR) 25 mg tablet Take 1 tablet (25 mg total) by mouth every 12 (twelve) hours 10/11/19  Yes Blaise John MD   nystatin (MYCOSTATIN) powder Apply topically 2 (two) times a day 10/11/19  Yes Blaise John MD   omeprazole (PriLOSEC) 20 mg delayed release capsule TAKE 1 CAPSULE (20 MG TOTAL) BY MOUTH DAILY TAKE IN PM (IN ADDITION TO 40 MG DOSE TAKEN IN AM) 11/18/19  Yes ANN Benitez   omeprazole (PriLOSEC) 40 MG capsule TAKE 1 CAPSULE (40 MG TOTAL) BY MOUTH DAILY TAKE IN AM (IN ADDITION TO 20 MG DOSE TAKEN IN PM) 11/18/19  Yes ANN Benitez   psyllium (METAMUCIL) packet Take 1 packet by mouth daily 10/23/19  Yes Mavis Jay PA-C   QUEtiapine (SEROquel) 25 mg tablet Take 0 5 tablets (12 5 mg total) by mouth daily at bedtime as needed (Sleep) for up to 20 doses  Patient taking differently: Take 25 mg by mouth daily at bedtime as needed (Sleep)  11/13/19  Yes Ifarh Wolf MD   sodium hypochlorite (DAKIN'S,HYSEPT) 0 5 percent Irrigate with 1 application as directed daily  Patient not taking: Reported on 11/28/2019 10/24/19 11/28/19  Mavis Jay PA-C     I have reviewed home medications with patient personally  Allergies: Allergies   Allergen Reactions    Gluten Meal     Clindamycin Diarrhea       Social History:     Marital Status: /Civil Union   Occupation: Music  Patient Pre-hospital Living Situation: [place], [place] with wife  Patient Pre-hospital Level of Mobility: Able to ambulate around home without restriction but uses a walker in public  Patient Pre-hospital Diet Restrictions: Diabetic Diet  Substance Use History:   Social History     Substance and Sexual Activity   Alcohol Use No     Social History     Tobacco Use   Smoking Status Never Smoker   Smokeless Tobacco Never Used     Social History     Substance and Sexual Activity   Drug Use No       Family History:     Mother - Gastric Cancer  Father - Colon Cancer and heart failure    Physical Exam:     Vitals:   Blood Pressure: 110/62 (11/28/19 1531)  Pulse: (!) 120 (11/28/19 1531)  Temperature: 99 8 °F (37 7 °C) (11/28/19 1531)  Temp Source: Axillary (11/28/19 1531)  Respirations: 20 (11/28/19 1531)  Height: 5' 4" (162 6 cm) (11/28/19 1531)  Weight - Scale: (!) 162 kg (357 lb 2 3 oz) (11/28/19 1320)  SpO2: 92 % (11/28/19 1531)    Physical Exam   Constitutional: He is oriented to person, place, and time  He appears well-nourished  He appears distressed  HENT:   Head: Normocephalic and atraumatic  Eyes: Pupils are equal, round, and reactive to light  Conjunctivae and EOM are normal  Right eye exhibits no discharge  Left eye exhibits no discharge  No scleral icterus  Neck: Normal range of motion  Neck supple  No thyromegaly present  Cardiovascular: Regular rhythm, normal heart sounds and intact distal pulses  Exam reveals no gallop and no friction rub  No murmur heard  Elevated rate     Pulmonary/Chest: Effort normal and breath sounds normal  No stridor  He has no wheezes  He has no rales  Abdominal: Soft  Bowel sounds are normal  He exhibits no distension  There is no tenderness  There is no rebound and no guarding  A hernia is present  Musculoskeletal: Normal range of motion  He exhibits edema and tenderness  B/l LE lymphadenopathy  Tenderness of Upper medial LLE   Neurological: He is alert and oriented to person, place, and time  No cranial nerve deficit or sensory deficit  Skin: Skin is warm  He is diaphoretic  There is erythema  Erythema of Upper Medial LLE   Psychiatric: He has a normal mood and affect  His behavior is normal  Judgment and thought content normal        Additional Data:     Lab Results: I have personally reviewed pertinent reports        Results from last 7 days   Lab Units 11/28/19  1325   WBC Thousand/uL 15 75*   HEMOGLOBIN g/dL 9 7*   HEMATOCRIT % 32 5*   PLATELETS Thousands/uL 223   NEUTROS PCT % 84* LYMPHS PCT % 8*   MONOS PCT % 6   EOS PCT % 0     Results from last 7 days   Lab Units 11/28/19  1325   POTASSIUM mmol/L 4 3   CHLORIDE mmol/L 97*   CO2 mmol/L 24   BUN mg/dL 17   CREATININE mg/dL 1 03   CALCIUM mg/dL 9 0   ALK PHOS U/L 90   ALT U/L 37   AST U/L 35     Results from last 7 days   Lab Units 11/28/19  1325   INR  1 27*       Imaging: I have personally reviewed pertinent reports  Xr Chest 2 Views    Result Date: 11/28/2019  Narrative: CHEST INDICATION:   fever, cough  COMPARISON:  10/20/2019 EXAM PERFORMED/VIEWS:  XR CHEST PA & LATERAL FINDINGS: Cardiomediastinal silhouette appears enlarged with mild vascular accentuation  No pneumothorax or pleural effusion  Osseous structures appear within normal limits for patient age  Impression: Cardiomegaly  Mild vascular congestion  Workstation performed: [id number]       EKG, Pathology, and Other Studies Reviewed on Admission:   · EKG: Sinus Tachycardia at 134 bpm    Epic / Care Everywhere Records Reviewed: Yes     ** Please Note: This note has been constructed using a voice recognition system   **

## 2019-11-28 NOTE — ASSESSMENT & PLAN NOTE
Blood pressure 162/73 on admission  Continue patient outpatient blood pressure medications  Monitor blood pressures

## 2019-11-28 NOTE — ASSESSMENT & PLAN NOTE
Continue patient's Seroquel 25 mg QD for sleep  Likely to improve with resolution of infection  Consider reevaluation following antibiotic course

## 2019-11-28 NOTE — ASSESSMENT & PLAN NOTE
Lab Results   Component Value Date    HGBA1C 8 3 (H) 11/05/2019       Recent Labs     11/28/19  1555   POCGLU 158*       Blood Sugar Average: Last 72 hrs:  (P) 158   Continue patient's outpatient insulin regimen  Hold metformin    Monitor blood sugars

## 2019-11-28 NOTE — H&P
[facility] Attending Physician Attestation Note - Admission    I have seen and examined Marta Domínguez personally and have reviewed the medical record independently  I have reviewed the case with the resident physician including all assessments and the plan of care for each  I agree with the resident physician and offer the following addendum to the below statements by the resident physician:     Date Evaluated:  11/28/2019  Time Evaluated:  3:40 p m  Subjective / HPI:   Patient is a pleasant 51-year-old male with history of diabetes mellitus and recurrent cellulitis presented to emergency department with fever, tachycardia and shortness of breath over last 2-3 days  Patient was initially seen by PCP in outpatient setting, he was prescribed Keflex for 7 days  In spite of taking antibiotics he continues to have fever and tachycardia, finally wife brought him to the emergency department for further evaluation  Exam:   Morbidly obese male  Comfortable in bed  Using oxygen via nasal cannula  Lungs decreased breath sounds  Heart S1-S2 without any murmurs  Abdomen obese soft non tender  Left lower extremity:  Erythema left thigh  Open wound without any signs of infection left upper thigh  Lymphedema of lower extremities    Assessment and Plan:  · 51-year-old with left lower extremity cellulitis with sepsis  Likely streptococcal   Start IV cefazolin 2 g IV q 8 hours  Follow blood cultures  Consult ID  · Diabetes mellitus:  Resume outpatient regimen closely monitor blood sugars  · Discussed with wife at bedside  · Agree with resident's assessment and plan    Education and Discussions with Family / Patient:  Wife    Time Spent for Care: 1 hour  More than 50% of total time spent on counseling and coordination of care as described above      Current Patient Status: Inpatient   Anticipated Length of Stay:  Patient will be admitted on an Inpatient basis with an anticipated length of stay of  > 2 midnights  Justification for Hospital Stay:  IV antibiotics for cellulitis    Epic / Care Everywhere Records Reviewed Independently: Yes     For detailed history, assessment, and plan of care, please review the statements below by Dr Saleem Will MD

## 2019-11-28 NOTE — ED PROVIDER NOTES
History  Chief Complaint   Patient presents with    Shortness of Breath     Pt  with sob and cough, cellulitis ongoing left thigh  History provided by:  Patient  Fever - 9 weeks to 74 years   Max temp prior to arrival:  102  Temp source:  Oral  Severity:  Moderate  Onset quality:  Gradual  Duration:  2 days  Timing:  Constant  Progression:  Worsening  Chronicity:  New  Relieved by:  Acetaminophen and ibuprofen  Worsened by:  Nothing  Ineffective treatments: Keflex  Patient has been taking oral Keflex for the past 2 days  Associated symptoms: chills and cough    Associated symptoms: no chest pain, no congestion, no diarrhea, no dysuria, no headaches, no nausea, no rash, no sore throat and no vomiting    Associated symptoms comment:  Increasing erythema to left thigh, site of her current cellulitis  History of necrotizing wound in this area 2 months prior  Patient also complaining of cough, mild shortness of breath, increasing generalized weakness      Prior to Admission Medications   Prescriptions Last Dose Informant Patient Reported? Taking?    Insulin Pen Needle (B-D UF III MINI PEN NEEDLES) 31G X 5 MM MISC  Self Yes No   Sig: by Does not apply route   QUEtiapine (SEROquel) 25 mg tablet  Self No No   Sig: Take 0 5 tablets (12 5 mg total) by mouth daily at bedtime as needed (Sleep) for up to 20 doses   Patient taking differently: Take 25 mg by mouth daily at bedtime as needed (Sleep)    acetaminophen (TYLENOL) 650 mg CR tablet   No No   Sig: Take 1 tablet (650 mg total) by mouth every 8 (eight) hours as needed for mild pain for up to 10 days   benzonatate (TESSALON PERLES) 100 mg capsule  Self No No   Sig: Take 1 capsule (100 mg total) by mouth 3 (three) times a day as needed for cough   cephalexin (KEFLEX) 500 mg capsule   No No   Sig: Take 1 capsule (500 mg total) by mouth every 6 (six) hours for 7 days   dextromethorphan-guaiFENesin (ROBITUSSIN DM)  mg/5 mL syrup  Self No No   Sig: Take 10 mL by mouth every 4 (four) hours as needed for cough   ferrous sulfate 325 (65 Fe) mg tablet  Self No No   Sig: Take 1 tablet (325 mg total) by mouth every other day   gabapentin (NEURONTIN) 100 mg capsule  Self No No   Sig: Take 3 capsules (300 mg total) by mouth daily at bedtime Take 3 capsules in am and 4 capsules at night   ibuprofen (MOTRIN) 800 mg tablet   No No   Sig: Take 1 tablet (800 mg total) by mouth every 8 (eight) hours as needed for mild pain for up to 10 days   insulin NPH-insulin regular (NovoLIN 70/30) 100 units/mL subcutaneous injection  Self No No   Sig: Take 80 units a m with breakfast ; 48 units p m   With dinner   insulin aspart protamine-insulin aspart (NovoLOG 70/30) 100 units/mL injection  Self No No   Sig: Inject 48 Units under the skin daily before dinner   Patient not taking: Reported on 11/27/2019   insulin aspart protamine-insulin aspart (NovoLOG 70/30) 100 units/mL injection  Self No No   Sig: Inject 80 Units under the skin daily before breakfast   Patient not taking: Reported on 11/12/2019   lisinopril (ZESTRIL) 5 mg tablet  Self No No   Sig: Take 1 tablet (5 mg total) by mouth daily   loperamide (IMODIUM) 2 mg capsule  Self No No   Sig: Take 1 capsule (2 mg total) by mouth 4 (four) times a day as needed for diarrhea (Maximum 4 capsules/day)   metFORMIN (GLUCOPHAGE) 1000 MG tablet  Self No No   Sig: TAKE 1 TABLET BY MOUTH TWICE A DAY   metoprolol tartrate (LOPRESSOR) 25 mg tablet  Self No No   Sig: Take 1 tablet (25 mg total) by mouth every 12 (twelve) hours   nystatin (MYCOSTATIN) powder  Self No No   Sig: Apply topically 2 (two) times a day   omeprazole (PriLOSEC) 20 mg delayed release capsule  Self No No   Sig: TAKE 1 CAPSULE (20 MG TOTAL) BY MOUTH DAILY TAKE IN PM (IN ADDITION TO 40 MG DOSE TAKEN IN AM)   omeprazole (PriLOSEC) 40 MG capsule  Self No No   Sig: TAKE 1 CAPSULE (40 MG TOTAL) BY MOUTH DAILY TAKE IN AM (IN ADDITION TO 20 MG DOSE TAKEN IN PM)   psyllium (METAMUCIL) packet Self No No   Sig: Take 1 packet by mouth daily   sodium hypochlorite (DAKIN'S,HYSEPT) 0 5 percent  Self No No   Sig: Irrigate with 1 application as directed daily      Facility-Administered Medications: None       Past Medical History:   Diagnosis Date    Cellulitis     last assessed 12/10/15    Diabetes mellitus ([facility] [address] )     Edema     Elevated liver enzymes     Esophageal reflux     Gluten intolerance     Gout     last assessed 09/05/13    Hyperglycemia     Hypertension     IBS (irritable bowel syndrome)     Insomnia     Obesity     Osteoarthritis of knee     last assessed 02/10/14    Prehypertension     last assessed 08/22/17    Venous insufficiency     last assessed 08/22/17    Villonodular synovitis of the hand, right     last assessed 11/14/2013       Past Surgical History:   Procedure Laterality Date    INCISION AND DRAINAGE OF WOUND Left 9/6/2019    Procedure: INCISION AND DRAINAGE (I&D) GROIN;  Surgeon: Bobo Sanchez DO;  [facility];  Service: General    WOUND DEBRIDEMENT Left 9/7/2019    Procedure: EXCISIONAL DEBRIDEMENT;  Surgeon: Bobo Sanchez DO;  [facility];  Service: General       Family History   Problem Relation Age of Onset    Cancer Mother         gastrc    Colon cancer Father     Heart failure Father      I have reviewed and agree with the history as documented  Social History     Tobacco Use    Smoking status: Never Smoker    Smokeless tobacco: Never Used   Substance Use Topics    Alcohol use: No    Drug use: No        Review of Systems   Constitutional: Positive for chills and fever  Negative for activity change and diaphoresis  HENT: Negative for congestion, sinus pressure and sore throat  Eyes: Negative for pain and visual disturbance  Respiratory: Positive for cough and shortness of breath  Negative for chest tightness, wheezing and stridor  Cardiovascular: Negative for chest pain and palpitations     Gastrointestinal: Negative for abdominal distention, abdominal pain, constipation, diarrhea, nausea and vomiting  Genitourinary: Negative for dysuria and frequency  Musculoskeletal: Negative for neck pain and neck stiffness  Skin: Positive for color change and wound  Negative for rash  Neurological: Positive for weakness  Negative for dizziness, speech difficulty, light-headedness, numbness and headaches  Physical Exam  Physical Exam   Constitutional: He is oriented to person, place, and time  He appears well-developed  He appears distressed  HENT:   Head: Normocephalic and atraumatic  Eyes: Pupils are equal, round, and reactive to light  Neck: Normal range of motion  Neck supple  No tracheal deviation present  Cardiovascular: Regular rhythm, normal heart sounds and intact distal pulses  Tachycardia present  No murmur heard  Sinus tachycardia   Pulmonary/Chest: Effort normal and breath sounds normal  No stridor  No respiratory distress  Abdominal: Soft  He exhibits no distension  There is no tenderness  There is no rebound and no guarding  Morbidly obese   Musculoskeletal: Normal range of motion  Neurological: He is alert and oriented to person, place, and time  Skin: Skin is warm and dry  He is not diaphoretic  There is erythema  No pallor  Warm erythematous left lower extremity consistent with cellulitis, patient does have a wound in the upper left inner thigh, good granulation tissue  There is no crepitus anywhere throughout the cellulitic area that would be suggestive of gas-forming organism, no tenderness out of proportion to examination patient has good sensation over this entire cellulitic area  Psychiatric: He has a normal mood and affect  Vitals reviewed        Vital Signs  ED Triage Vitals   Temperature Pulse Respirations Blood Pressure SpO2   11/28/19 1320 11/28/19 1320 11/28/19 1320 11/28/19 1320 11/28/19 1320   (!) 102 °F (38 9 °C) (!) 137 18 162/73 94 %      Temp Source Heart Rate Source Patient Position - Orthostatic VS BP Location FiO2 (%)   11/28/19 1320 11/28/19 1320 11/28/19 1320 11/28/19 1320 --   Oral Monitor Lying Right arm       Pain Score       11/28/19 1346       7           Vitals:    11/28/19 1320 11/28/19 1330 11/28/19 1345 11/28/19 1400   BP: 162/73 148/73 126/71 135/75   Pulse: (!) 137 (!) 132 (!) 130 (!) 126   Patient Position - Orthostatic VS: Lying            Visual Acuity      ED Medications  Medications   vancomycin (VANCOCIN) 1,500 mg in sodium chloride 0 9 % 250 mL IVPB (has no administration in time range)   sodium chloride 0 9 % bolus 1,000 mL (1,000 mL Intravenous New Bag 11/28/19 1347)   promethazine (PHENERGAN) 12 5 mg/10 mL oral syrup 12 5 mg (12 5 mg Oral Given 11/28/19 1350)   cefepime (MAXIPIME) 2 g/50 mL dextrose IVPB (2,000 mg Intravenous New Bag 11/28/19 1346)   acetaminophen (TYLENOL) tablet 975 mg (975 mg Oral Given 11/28/19 1346)       Diagnostic Studies  Results Reviewed     Procedure Component Value Units Date/Time    Comprehensive metabolic panel [[id number]]  (Abnormal) Collected:  11/28/19 1325    Lab Status:  Final result Specimen:  Blood from Arm, Right Updated:  11/28/19 1404     Sodium 131 mmol/L      Potassium 4 3 mmol/L      Chloride 97 mmol/L      CO2 24 mmol/L      ANION GAP 10 mmol/L      BUN 17 mg/dL      Creatinine 1 03 mg/dL      Glucose 149 mg/dL      Calcium 9 0 mg/dL      AST 35 U/L      ALT 37 U/L      Alkaline Phosphatase 90 U/L      Total Protein 7 6 g/dL      Albumin 2 8 g/dL      Total Bilirubin 0 75 mg/dL      eGFR 83 ml/min/1 73sq m     Narrative:       Laney guidelines for Chronic Kidney Disease (CKD):     Stage 1 with normal or high GFR (GFR > 90 mL/min/1 73 square meters)    Stage 2 Mild CKD (GFR = 60-89 mL/min/1 73 square meters)    Stage 3A Moderate CKD (GFR = 45-59 mL/min/1 73 square meters)    Stage 3B Moderate CKD (GFR = 30-44 mL/min/1 73 square meters)    Stage 4 Severe CKD (GFR = 15-29 mL/min/1 73 square meters)    Stage 5 End Stage CKD (GFR <15 mL/min/1 73 square meters)  Note: GFR calculation is accurate only with a steady state creatinine    Lactic acid, plasma x2 [[id number]]  (Normal) Collected:  11/28/19 1338    Lab Status:  Final result Specimen:  Blood Updated:  11/28/19 1357     LACTIC ACID 1 6 mmol/L     Narrative:       Result may be elevated if tourniquet was used during collection  Protime-INR [[id number]]  (Abnormal) Collected:  11/28/19 1325    Lab Status:  Final result Specimen:  Blood from Arm, Right Updated:  11/28/19 1351     Protime 15 2 seconds      INR 1 27    APTT [[id number]]  (Normal) Collected:  11/28/19 1325    Lab Status:  Final result Specimen:  Blood from Arm, Right Updated:  11/28/19 1351     PTT 36 seconds     CBC and differential [[id number]]  (Abnormal) Collected:  11/28/19 1325    Lab Status:  Final result Specimen:  Blood from Arm, Right Updated:  11/28/19 1340     WBC 15 75 Thousand/uL      RBC 4 14 Million/uL      Hemoglobin 9 7 g/dL      Hematocrit 32 5 %      MCV 79 fL      MCH 23 4 pg      MCHC 29 8 g/dL      RDW 17 5 %      MPV 8 2 fL      Platelets 428 Thousands/uL      nRBC 0 /100 WBCs      Neutrophils Relative 84 %      Immat GRANS % 1 %      Lymphocytes Relative 8 %      Monocytes Relative 6 %      Eosinophils Relative 0 %      Basophils Relative 1 %      Neutrophils Absolute 13 20 Thousands/µL      Immature Grans Absolute 0 22 Thousand/uL      Lymphocytes Absolute 1 24 Thousands/µL      Monocytes Absolute 0 94 Thousand/µL      Eosinophils Absolute 0 05 Thousand/µL      Basophils Absolute 0 10 Thousands/µL     Blood culture #1 [[id number]] Collected:  11/28/19 1325    Lab Status: In process Specimen:  Blood from Arm, Right Updated:  11/28/19 1338    Blood culture #2 [[id number]] Collected:  11/28/19 1327    Lab Status:   In process Specimen:  Blood from Arm, Left Updated:  11/28/19 1338    Procalcitonin [[id number]] Collected:  11/28/19 1325    Lab Status: In process Specimen:  Blood from Arm, Right Updated:  11/28/19 1338    Lactic acid, plasma x2 [[id number]] Collected:  11/28/19 1325    Lab Status:  No result Specimen:  Blood from Arm, Right                  XR chest 2 views   Final Result by Millicent Peres MD (11/28 1349)      Cardiomegaly  Mild vascular congestion              Workstation performed: [id number]                    Procedures  ECG 12 Lead Documentation Only  Date/Time: 11/28/2019 1:35 PM  Performed by: Tristan Whittington DO  Authorized by: Tristan Whittington DO     ECG reviewed by me, the ED Provider: yes    Patient location:  ED  Previous ECG:     Previous ECG:  Compared to current    Comparison ECG info:  10 20 2019    Similarity:  No change  Interpretation:     Interpretation: non-specific    Rate:     ECG rate:  132    ECG rate assessment: tachycardic    Rhythm:     Rhythm: sinus rhythm    Ectopy:     Ectopy: none    QRS:     QRS axis:  Normal    QRS intervals:  Normal  Conduction:     Conduction: normal    ST segments:     ST segments:  Normal  T waves:     T waves: normal      CriticalCare Time  Performed by: Tristan Whittington DO  Authorized by: Tristan Whittington DO     Critical care provider statement:     Critical care time (minutes):  40    Critical care time was exclusive of:  Separately billable procedures and treating other patients    Critical care was necessary to treat or prevent imminent or life-threatening deterioration of the following conditions:  Sepsis    Critical care was time spent personally by me on the following activities:  Obtaining history from patient or surrogate, development of treatment plan with patient or surrogate, discussions with consultants, evaluation of patient's response to treatment, examination of patient, ordering and performing treatments and interventions, ordering and review of laboratory studies, ordering and review of radiographic studies, re-evaluation of patient's condition and review of old charts           ED Course  ED Course as of Nov 28 1417   Thu Nov 28, 2019   1358 Repeat examination got, patient feels improved, clinically patient looks improved, as per patient's wife and friend, they also believe he looks better  , leg pain has significantly decreased, patient with a lactic acid of 1 6, I still feel at this time necrotizing fasciitis to be unlikely and cellulitis to being more likely diagnosis  , will proceed with antibiotics, await CMP, if unremarkable will admit to Internal Medicine      1412 Another repeat evaluation, continuing to improve  , will admit patient to hospital                       Initial Sepsis Screening     [address] Name 11/28/19 1348                Is the patient's history suggestive of a new or worsening infection? (!) Yes (Proceed)  -DA        Suspected source of infection  soft tissue  -DA        Are two or more of the following signs & symptoms of infection both present and new to the patient? (!) Yes (Proceed)  -DA        Indicate SIRS criteria  Hyperthemia > 38 3C (100 9F); Leukocytosis (WBC > 18776 IJL); Tachycardia > 90 bpm  -DA        If the answer is yes to both questions, suspicion of sepsis is present          If severe sepsis is present AND tissue hypoperfusion perists in the hour after fluid resuscitation or lactate > 4, the patient meets criteria for SEPTIC SHOCK          Are any of the following organ dysfunction criteria present within 6 hours of suspected infection and SIRS criteria that are NOT considered to be chronic conditions? No  -DA        Organ dysfunction          Date of presentation of severe sepsis          Time of presentation of severe sepsis          Tissue hypoperfusion persists in the hour after crystalloid fluid administration, evidenced, by either:          Was hypotension present within one hour of the conclusion of crystalloid fluid administration?           Date of presentation of septic shock          Time of presentation of septic shock            User Key  (r) = Recorded By, (t) = Taken By, (c) = Cosigned By    [address] Name Provider Type    YANDY Villalobos DO Physician                  MDM  Number of Diagnoses or Management Options  Cellulitis of left lower extremity: new and requires workup  Sepsis without acute organ dysfunction, due to unspecified organism [facility]): new and requires workup  Diagnosis management comments:       Initial ED assessment:  16-year-old male, recurrent cellulitis of the left lower leg, currently on outpatient Keflex for the past 2 days, presents febrile, with erythematous leg consistent with recurrent cellulitis    Initial DDx includes but is not limited to:   Recurrent cellulitis  , no physical exam evidence this time to suggest necrotizing infection  , this is been a gradual current over the past couple of days making necrotizing fasciitis unlikely    Initial ED plan:   Sepsis secondary to cellulitis, will start on broad-spectrum antibiotics, admit to hospital   Patient also having low pulse ox and mild cough will also check chest x-ray        Final ED summary/disposition:   After evaluation and workup in the emergency department, found to have sepsis secondary to left leg cellulitis  , started on cefepime and vanco, patient admitted to hospital for further workup and evaluation        Amount and/or Complexity of Data Reviewed  Clinical lab tests: ordered and reviewed  Tests in the radiology section of CPT®: ordered and reviewed  Decide to obtain previous medical records or to obtain history from someone other than the patient: yes  Obtain history from someone other than the patient: yes  Review and summarize past medical records: yes  Discuss the patient with other providers: yes  Independent visualization of images, tracings, or specimens: yes        Disposition  Final diagnoses:   Sepsis without acute organ dysfunction, due to unspecified organism ([address] )   Cellulitis of left lower extremity     Time reflects when diagnosis was documented in both MDM as applicable and the Disposition within this note     Time User Action Codes Description Comment    11/28/2019  2:12 PM Leeann Smoker [A41 9] Sepsis without acute organ dysfunction, due to unspecified organism ([facility] [address] )     11/28/2019  2:12 PM Yoana Can Add [L03 [id number]] Cellulitis of left lower extremity       ED Disposition     ED Disposition Condition Date/Time Comment    Admit Stable Thu Nov 28, 2019  2:16 PM Case was discussed with Dr Ruby Ruiz and the patient's admission status was agreed to be Admission Status: inpatient status to the service of Dr Ruby Ruiz   Follow-up Information    None         Patient's Medications   Discharge Prescriptions    No medications on file     No discharge procedures on file      ED Provider  Electronically Signed by           Tierney Brown DO  11/28/19 9442

## 2019-11-29 ENCOUNTER — APPOINTMENT (INPATIENT)
Dept: CT IMAGING | Facility: HOSPITAL | Age: 54
DRG: 720 | End: 2019-11-29
Payer: COMMERCIAL

## 2019-11-29 ENCOUNTER — APPOINTMENT (INPATIENT)
Dept: ULTRASOUND IMAGING | Facility: HOSPITAL | Age: 54
DRG: 720 | End: 2019-11-29
Payer: COMMERCIAL

## 2019-11-29 PROBLEM — S71.102A OPEN WOUND OF LEFT THIGH: Status: ACTIVE | Noted: 2019-11-29

## 2019-11-29 LAB
ANION GAP SERPL CALCULATED.3IONS-SCNC: 5 MMOL/L (ref 4–13)
ATRIAL RATE: 132 BPM
BASOPHILS # BLD AUTO: 0.09 THOUSANDS/ΜL (ref 0–0.1)
BASOPHILS NFR BLD AUTO: 1 % (ref 0–1)
BUN SERPL-MCNC: 18 MG/DL (ref 5–25)
CALCIUM SERPL-MCNC: 9.1 MG/DL (ref 8.3–10.1)
CHLORIDE SERPL-SCNC: 104 MMOL/L (ref 100–108)
CO2 SERPL-SCNC: 28 MMOL/L (ref 21–32)
CREAT SERPL-MCNC: 1.02 MG/DL (ref 0.6–1.3)
EOSINOPHIL # BLD AUTO: 0.5 THOUSAND/ΜL (ref 0–0.61)
EOSINOPHIL NFR BLD AUTO: 4 % (ref 0–6)
ERYTHROCYTE [DISTWIDTH] IN BLOOD BY AUTOMATED COUNT: 17.9 % (ref 11.6–15.1)
GFR SERPL CREATININE-BSD FRML MDRD: 84 ML/MIN/1.73SQ M
GLUCOSE SERPL-MCNC: 110 MG/DL (ref 65–140)
GLUCOSE SERPL-MCNC: 113 MG/DL (ref 65–140)
GLUCOSE SERPL-MCNC: 119 MG/DL (ref 65–140)
GLUCOSE SERPL-MCNC: 132 MG/DL (ref 65–140)
GLUCOSE SERPL-MCNC: 148 MG/DL (ref 65–140)
GLUCOSE SERPL-MCNC: 165 MG/DL (ref 65–140)
GLUCOSE SERPL-MCNC: 185 MG/DL (ref 65–140)
HCT VFR BLD AUTO: 32.7 % (ref 36.5–49.3)
HGB BLD-MCNC: 9.5 G/DL (ref 12–17)
IMM GRANULOCYTES # BLD AUTO: 0.04 THOUSAND/UL (ref 0–0.2)
IMM GRANULOCYTES NFR BLD AUTO: 0 % (ref 0–2)
LYMPHOCYTES # BLD AUTO: 1.72 THOUSANDS/ΜL (ref 0.6–4.47)
LYMPHOCYTES NFR BLD AUTO: 15 % (ref 14–44)
MCH RBC QN AUTO: 23.7 PG (ref 26.8–34.3)
MCHC RBC AUTO-ENTMCNC: 29.1 G/DL (ref 31.4–37.4)
MCV RBC AUTO: 82 FL (ref 82–98)
MONOCYTES # BLD AUTO: 1.1 THOUSAND/ΜL (ref 0.17–1.22)
MONOCYTES NFR BLD AUTO: 9 % (ref 4–12)
NEUTROPHILS # BLD AUTO: 8.33 THOUSANDS/ΜL (ref 1.85–7.62)
NEUTS SEG NFR BLD AUTO: 71 % (ref 43–75)
NRBC BLD AUTO-RTO: 0 /100 WBCS
P AXIS: 43 DEGREES
PLATELET # BLD AUTO: 223 THOUSANDS/UL (ref 149–390)
PMV BLD AUTO: 8.2 FL (ref 8.9–12.7)
POTASSIUM SERPL-SCNC: 4 MMOL/L (ref 3.5–5.3)
PR INTERVAL: 140 MS
QRS AXIS: 73 DEGREES
QRSD INTERVAL: 82 MS
QT INTERVAL: 276 MS
QTC INTERVAL: 408 MS
RBC # BLD AUTO: 4.01 MILLION/UL (ref 3.88–5.62)
SODIUM SERPL-SCNC: 137 MMOL/L (ref 136–145)
T WAVE AXIS: 23 DEGREES
VENTRICULAR RATE: 132 BPM
WBC # BLD AUTO: 11.78 THOUSAND/UL (ref 4.31–10.16)

## 2019-11-29 PROCEDURE — 80048 BASIC METABOLIC PNL TOTAL CA: CPT | Performed by: PSYCHIATRY & NEUROLOGY

## 2019-11-29 PROCEDURE — 94664 DEMO&/EVAL PT USE INHALER: CPT

## 2019-11-29 PROCEDURE — 93971 EXTREMITY STUDY: CPT

## 2019-11-29 PROCEDURE — 99232 SBSQ HOSP IP/OBS MODERATE 35: CPT | Performed by: INTERNAL MEDICINE

## 2019-11-29 PROCEDURE — 93010 ELECTROCARDIOGRAM REPORT: CPT | Performed by: INTERNAL MEDICINE

## 2019-11-29 PROCEDURE — 82948 REAGENT STRIP/BLOOD GLUCOSE: CPT

## 2019-11-29 PROCEDURE — 73701 CT LOWER EXTREMITY W/DYE: CPT

## 2019-11-29 PROCEDURE — 99255 IP/OBS CONSLTJ NEW/EST HI 80: CPT | Performed by: INTERNAL MEDICINE

## 2019-11-29 PROCEDURE — 85025 COMPLETE CBC W/AUTO DIFF WBC: CPT | Performed by: PSYCHIATRY & NEUROLOGY

## 2019-11-29 RX ORDER — GABAPENTIN 300 MG/1
300 CAPSULE ORAL DAILY
Status: DISCONTINUED | OUTPATIENT
Start: 2019-11-30 | End: 2019-12-02 | Stop reason: HOSPADM

## 2019-11-29 RX ORDER — DIPHENHYDRAMINE HCL 25 MG
25 TABLET ORAL
Status: DISCONTINUED | OUTPATIENT
Start: 2019-11-29 | End: 2019-12-02 | Stop reason: HOSPADM

## 2019-11-29 RX ORDER — GABAPENTIN 400 MG/1
400 CAPSULE ORAL
Status: DISCONTINUED | OUTPATIENT
Start: 2019-11-29 | End: 2019-12-02 | Stop reason: HOSPADM

## 2019-11-29 RX ORDER — OXYCODONE HCL 10 MG/1
10 TABLET, FILM COATED, EXTENDED RELEASE ORAL EVERY 12 HOURS SCHEDULED
Status: DISCONTINUED | OUTPATIENT
Start: 2019-11-29 | End: 2019-11-29

## 2019-11-29 RX ORDER — HYDROCODONE BITARTRATE AND ACETAMINOPHEN 5; 325 MG/1; MG/1
1 TABLET ORAL EVERY 6 HOURS PRN
Status: DISCONTINUED | OUTPATIENT
Start: 2019-11-29 | End: 2019-12-02 | Stop reason: HOSPADM

## 2019-11-29 RX ORDER — IPRATROPIUM BROMIDE AND ALBUTEROL SULFATE 2.5; .5 MG/3ML; MG/3ML
3 SOLUTION RESPIRATORY (INHALATION)
Status: DISCONTINUED | OUTPATIENT
Start: 2019-11-29 | End: 2019-11-29

## 2019-11-29 RX ORDER — IPRATROPIUM BROMIDE AND ALBUTEROL SULFATE 2.5; .5 MG/3ML; MG/3ML
3 SOLUTION RESPIRATORY (INHALATION) EVERY 6 HOURS PRN
Status: DISCONTINUED | OUTPATIENT
Start: 2019-11-29 | End: 2019-12-02 | Stop reason: HOSPADM

## 2019-11-29 RX ORDER — LORATADINE 10 MG/1
10 TABLET ORAL DAILY
Status: DISCONTINUED | OUTPATIENT
Start: 2019-11-29 | End: 2019-12-02 | Stop reason: HOSPADM

## 2019-11-29 RX ORDER — FLUTICASONE PROPIONATE 50 MCG
1 SPRAY, SUSPENSION (ML) NASAL DAILY
Status: DISCONTINUED | OUTPATIENT
Start: 2019-11-29 | End: 2019-12-02 | Stop reason: HOSPADM

## 2019-11-29 RX ADMIN — METOPROLOL TARTRATE 25 MG: 25 TABLET, FILM COATED ORAL at 08:08

## 2019-11-29 RX ADMIN — LORATADINE 10 MG: 10 TABLET ORAL at 11:39

## 2019-11-29 RX ADMIN — IBUPROFEN 800 MG: 400 TABLET ORAL at 08:58

## 2019-11-29 RX ADMIN — CEFAZOLIN SODIUM 2000 MG: 2 SOLUTION INTRAVENOUS at 17:14

## 2019-11-29 RX ADMIN — GUAIFENESIN AND DEXTROMETHORPHAN 10 ML: 100; 10 SYRUP ORAL at 07:17

## 2019-11-29 RX ADMIN — GUAIFENESIN AND DEXTROMETHORPHAN 10 ML: 100; 10 SYRUP ORAL at 17:15

## 2019-11-29 RX ADMIN — FLUTICASONE PROPIONATE 1 SPRAY: 50 SPRAY, METERED NASAL at 11:39

## 2019-11-29 RX ADMIN — LISINOPRIL 5 MG: 5 TABLET ORAL at 08:08

## 2019-11-29 RX ADMIN — FLUTICASONE PROPIONATE 1 SPRAY: 50 SPRAY, METERED NASAL at 22:38

## 2019-11-29 RX ADMIN — GABAPENTIN 400 MG: 400 CAPSULE ORAL at 22:37

## 2019-11-29 RX ADMIN — ENOXAPARIN SODIUM 40 MG: 40 INJECTION SUBCUTANEOUS at 08:09

## 2019-11-29 RX ADMIN — HYDROCODONE BITARTRATE AND ACETAMINOPHEN 1 TABLET: 5; 325 TABLET ORAL at 23:25

## 2019-11-29 RX ADMIN — PANTOPRAZOLE SODIUM 40 MG: 40 TABLET, DELAYED RELEASE ORAL at 05:23

## 2019-11-29 RX ADMIN — QUETIAPINE FUMARATE 25 MG: 25 TABLET ORAL at 23:25

## 2019-11-29 RX ADMIN — INSULIN ASPART 40 UNITS: 100 INJECTION, SUSPENSION SUBCUTANEOUS at 21:41

## 2019-11-29 RX ADMIN — CEFAZOLIN SODIUM 2000 MG: 2 SOLUTION INTRAVENOUS at 23:25

## 2019-11-29 RX ADMIN — CEFAZOLIN SODIUM 2000 MG: 2 SOLUTION INTRAVENOUS at 08:09

## 2019-11-29 RX ADMIN — OXYCODONE HYDROCHLORIDE 10 MG: 10 TABLET, FILM COATED, EXTENDED RELEASE ORAL at 11:39

## 2019-11-29 RX ADMIN — IBUPROFEN 800 MG: 400 TABLET ORAL at 20:49

## 2019-11-29 RX ADMIN — PSYLLIUM HUSK 1 PACKET: 3.4 POWDER ORAL at 08:08

## 2019-11-29 RX ADMIN — METOPROLOL TARTRATE 25 MG: 25 TABLET, FILM COATED ORAL at 22:37

## 2019-11-29 RX ADMIN — IOHEXOL 100 ML: 350 INJECTION, SOLUTION INTRAVENOUS at 13:33

## 2019-11-29 RX ADMIN — INSULIN ASPART 68 UNITS: 100 INJECTION, SUSPENSION SUBCUTANEOUS at 09:40

## 2019-11-29 NOTE — ASSESSMENT & PLAN NOTE
Patient indicates that this is a significant concern for him and is what will keep in the hospital if not addressed  States improvement of his symptoms when laying prone or with warmth  Wished to continue medications his PCP prescribed for him last week  - Continue patient's Benzonatate and Robitussin PRN for cough  - Ordered aqua K compress for chest d/t refractory cough upon patient request  - Continued PCP medications of Loratadine 10 mg QD and Flonase 50 mcg/spray PRN  - Ordered Duo-nebs  - Ordered benadryl 25 mg QHS PRN for allergies  Will also help with sleep  - Told nursing to give patient a course of humidified air   - Ordered respiratory protocol

## 2019-11-29 NOTE — CONSULTS
Consultation - Infectious Disease   Ángela Alaniz 48 y o  male MRN: [id number]  Unit/Bed#: S -01 Encounter: [id number]      IMPRESSION & RECOMMENDATIONS:     61-year-old male patient with diabetes mellitus, morbid obesity and previous history of left lower extremity cellulitis, admitted for sepsis attributed to recurrent left lower extremity cellulitis  1- sepsis, POA:  Fever of 102, [place] 14[address], tachycardia  Sepsis is likely secondary to left lower extremity cellulitis  Beside  tachycardia, patient has been hemodynamically stable  - supportive care as per primary team  - close clinical monitoring, trend fever curve  - continue antibiotics as below  - check CBC in a m   - follow-up blood culture collected 11/28/2019 to rule out bacteremia    2- left lower extremity cellulitis:  Erythema, warmth and swelling Over the medial aspect of the left thigh  Cellulitis is complicating a left groin/thigh wound  The wound itself is not tender, no signs of purulence  Cellulitis area is at the medial aspect of the thigh, distal to the wound site  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    - continue cefazolin IV  - check CT scan of pelvis and left femur to rule out underlying abscess  - repeat CBC in a m   - close clinical monitoring    3- diabetes mellitus type 2:  Previous HbA1c 8 3%  - management as per primary team     4- morbid obesity:  Along with chronic lower extremity venous stasis and lymphedema  These factors, along with diabetes, are predisposing patient to recurrence of his cellulitis      5- cough and shortness of breath:  Patient attributes his cough to postnasal drip  Chest x-ray shows some vascular congestion, no evidence of pneumonia  - monitor respiratory status  - treatment of postnasal drip as per primary team    Plan mentioned above discussed in detail with patient  Case also discussed with primary team    HISTORY OF PRESENT ILLNESS:  Reason for Consult: Cellulitis  HPI: Heron Vela is a 48y o  year old male with diabetes mellitus type 2, morbid obesity, history of hypertension, gout, admitted 11/28/2019 for 3 days of fever, left thigh pain, shortness of breath  Patient had I and D of left groin wound September 2019, at that time his wound culture grew group B strep and strep anginosus and he was treated with 7 days of antibiotics  10/20/2019 patient was admitted for sepsis and diagnosed to have left lower extremity cellulitis over the medial thigh, he was treated with cefazolin IV followed by Keflex p o  Total of 10 days course Ending 10/21/2019  Currently patient reports 3 days of fever and chills, associated with shortness of breath  Symptoms are accompanied by left medial thigh pain and erythema  Patient saw his primary care doctor 2 days prior to presentation and was started for Keflex for left lower extremity cellulitis  He did not improve  On hospital presentation, patient had a fever of 102, tachycardia, but otherwise hemodynamically stable  His white count was 97821, procalcitonin 1 25  Patient was started on cefazolin IV  Today he reports improvement  Chills has stopped, WBC count decreased to 11,000  REVIEW OF SYSTEMS:  A complete 12 point system-based review of systems is negative other than that noted in the HPI      PAST MEDICAL HISTORY:  Past Medical History:   Diagnosis Date    Cellulitis     last assessed 12/10/15    Diabetes mellitus ([address] )     Edema     Elevated liver enzymes     Esophageal reflux     Gluten intolerance     Gout     last assessed 09/05/13    Hyperglycemia     Hypertension     IBS (irritable bowel syndrome)     Insomnia     Obesity     Osteoarthritis of knee     last assessed 02/10/14    Prehypertension     last assessed 08/22/17    Renal disorder     Venous insufficiency     last assessed 08/22/17    Villonodular synovitis of the hand, right     last assessed 11/14/2013     Past Surgical History:   Procedure Laterality Date    INCISION AND DRAINAGE OF WOUND Left 2019    Procedure: INCISION AND DRAINAGE (I&D) GROIN;  Surgeon: Jordan Meraz DO;  [facility];  Service: General    SKIN BIOPSY      WOUND DEBRIDEMENT Left 2019    Procedure: EXCISIONAL DEBRIDEMENT;  Surgeon: Jordan Meraz DO;  [facility];  Service: General       FAMILY HISTORY:  Non-contributory    SOCIAL HISTORY:  Social History   Social History     Substance and Sexual Activity   Alcohol Use No     Social History     Substance and Sexual Activity   Drug Use No     Social History     Tobacco Use   Smoking Status Never Smoker   Smokeless Tobacco Never Used       ALLERGIES:  Allergies   Allergen Reactions    Gluten Meal     Clindamycin Diarrhea       MEDICATIONS:  All current active medications have been reviewed    Last antibiotic course Keflex given for 10 days ending 10/21/2019    PHYSICAL EXAM:  Temp:  [98 6 °F (37 °C)-102 1 °F (38 9 °C)] 99 °F (37 2 °C)  HR:  [] 113  Resp:  [18-22] 20  BP: (110-162)/(62-82) 139/82  SpO2:  [92 %-100 %] 99 %  Temp (24hrs), Av 3 °F (37 9 °C), Min:98 6 °F (37 °C), Max:102 1 °F (38 9 °C)  Current: Temperature: 99 °F (37 2 °C)    Intake/Output Summary (Last 24 hours) at 2019 0934  Last data filed at 2019 1800  Gross per 24 hour   Intake 50 ml   Output 775 ml   Net -725 ml       General Appearance:  Appearing well, nontoxic, and in no distress   Head:  Normocephalic, without obvious abnormality, atraumatic   Eyes:  Conjunctiva pink and sclera anicteric, both eyes   Nose: Nares normal, mucosa normal, no drainage   Throat: Oropharynx moist without lesions   Neck: Supple, symmetrical, no adenopathy, no tenderness/mass/nodules   Back:   Symmetric, no curvature, ROM normal, no CVA tenderness   Lungs:   Clear to auscultation bilaterally, respirations unlabored   Chest Wall:  No tenderness or deformity   Heart:  RRR; no murmur, rub or gallop   Abdomen: Soft, non-tender, non-distended, positive bowel sounds    Extremities: No cyanosis, clubbing or edema   Skin: No rashes or lesions  No draining wounds noted  Lymph nodes: Cervical, supraclavicular nodes normal   Neurologic: Alert and oriented times 3, extremity strength 5/5 and symmetric       LABS, IMAGING, & OTHER STUDIES:  Lab Results:  I have personally reviewed pertinent labs  Results from last 7 days   Lab Units 11/29/19  0704 11/28/19  1325   WBC Thousand/uL 11 78* 15 75*   HEMOGLOBIN g/dL 9 5* 9 7*   PLATELETS Thousands/uL 223 223     Results from last 7 days   Lab Units 11/29/19  0704 11/28/19  1325   SODIUM mmol/L 137 131*   POTASSIUM mmol/L 4 0 4 3   CHLORIDE mmol/L 104 97*   CO2 mmol/L 28 24   BUN mg/dL 18 17   CREATININE mg/dL 1 02 1 03   EGFR ml/min/1 73sq m 84 83   CALCIUM mg/dL 9 1 9 0   AST U/L  --  35   ALT U/L  --  37   ALK PHOS U/L  --  90     Results from last 7 days   Lab Units 11/28/19  1327 11/28/19  1325   BLOOD CULTURE  Received in Microbiology Lab  Culture in Progress  Received in Microbiology Lab  Culture in Progress  Results from last 7 days   Lab Units 11/28/19  1325   PROCALCITONIN ng/ml 1 25*       Imaging Studies:   I have personally reviewed pertinent imaging study reports and images in PACS  Chest x-ray showing vascular congestion    Other Studies:   I have personally reviewed pertinent reports    Wound culture from left groin September 2019 positive for group B strep and strep anginosus

## 2019-11-29 NOTE — PROGRESS NOTES
Progress Note - Diane Thomas 1965, 48 y o  male MRN: [id number]    Unit/Bed#: S -01 Encounter: [id number]    Primary Care Provider: Stephania Venegas MD   Date and time admitted to hospital: 11/28/2019  1:16 PM        * Sepsis due to cellulitis [facility])  Assessment & Plan  Patient has history of recurrent cellulitis  Presents today with fever, tachycardia, SOB, dizziness, diaphoresis and cellulitis of the upper LLE  Received 7-day course of keflex as outpatient two days ago but is refractory to treatment  Meets SIRS criteria  Given Cefepime and Vancomycin in ED with 1 L bolus NS     - CXR showed cardiomegaly and mild vascular congestion   - EKG showed sinus tachycardia at 132 bpm   - Lactic acid 1 6  - PT elevated at 15 2  PTT wnl   - Procalcitonin 1 25  - Blood cultures pending  - Continue Cefazolin 2g IV Q8   - Monitor BMP and CBC  - ID consulted: Recommended CT scan of L femur to r/o abscess  If CT scan abnormal or condition does not improve, surgery should be considered  CT scan was ordered  - Patient expressed significant pain on 11/29/19  Ibuprofen 800 Q8 PRN for mild or moderate pain  Oxycodone 10 mg BID also ordered  - Duplex venous ultrasound of the LLE ordered to r/o DVT  Cough, nonproductive  Assessment & Plan  Patient indicates that this is a significant concern for him and is what will keep in the hospital if not addressed  States improvement of his symptoms when laying prone or with warmth  Wished to continue medications his PCP prescribed for him last week  - Continue patient's Benzonatate and Robitussin PRN for cough  - Ordered aqua K compress for chest d/t refractory cough upon patient request  - Continued PCP medications of Loratadine 10 mg QD and Flonase 50 mcg/spray PRN  - Ordered Duo-nebs  - Ordered benadryl 25 mg QHS PRN for allergies  Will also help with sleep  - Told nursing to give patient a course of humidified air   - Ordered respiratory protocol      Type 2 diabetes mellitus with hyperglycemia, with long-term current use of insulin [facility])  Assessment & Plan  Lab Results   Component Value Date    HGBA1C 8 3 (H) 11/05/2019       Recent Labs     11/29/19  0055 11/29/19  0625 11/29/19  0930 11/29/19  1128   POCGLU 165* 113 132 185*       Blood Sugar Average: Last 72 hrs:  (P) 241 [id number]   Continue patient's outpatient insulin regimen  Hold metformin  Monitor blood sugars    Open wound of left thigh  Assessment & Plan  Patient had I&D with wound debridement performed on 9/7/19 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  Wound does not appear infected at this time  No erythema or discharge noted  Cellulitis observed inferiorly  - Ordered wound care  - Monitor site for changes    Essential hypertension  Assessment & Plan  Blood pressure 162/73 on admission  Continue outpatient blood pressure medications  Monitor blood pressures: latest 139/82    Diabetic neuropathy (HCC)  Assessment & Plan  Continue patient's outpatient Gabapentin 300 QHS    Insomnia  Assessment & Plan  Continue patient's Seroquel 25 mg QD for sleep  Likely to improve with resolution of infection  Consider reevaluation following antibiotic course        VTE Pharmacologic Prophylaxis:   Pharmacologic: Enoxaparin (Lovenox)  Mechanical VTE Prophylaxis in Place: Yes    Discussions with Specialists or Other Care Team Provider: Infectious Disease    Education and Discussions with Family / Patient: Yes    Current Length of Stay: 1 day(s)    Current Patient Status: Inpatient     Discharge Plan / Estimated Discharge Date: Undetermined     Code Status: Level 1 - Full Code      Subjective:   Patient seen at bedside  Patient is much more oriented and able to answer questions today  Appears much less lethargic and diaphoretic  Able to have a full conversation with interviewer  Indicates that he slept 6 hours last night, 8 without difficulty, and had a bowel movement last evening    Patient continues to express concern over his cough and postnasal drip which he indicates are 1 of his primary concerns for admission  States that he did have an oxygen saturation he took at home prior to arrival of 88%  Indicates that he is able to breathe brother when he lays prone and applies he to his chest   Requests that he receive compresses for this reason  Also indicates that his primary care doctor recently put him on allergy medications and he wishes to continue these as inpatient  Also expresses significant concern with pain and headache today  Requesting pain medication  Patient denied chest pain, palpitations, changes in vision or hearing, numbness/tingling, nausea/vomiting, or changes in bowel habitus  Objective:     Vitals:   Temp (24hrs), Av 9 °F (37 7 °C), Min:98 6 °F (37 °C), Max:102 1 °F (38 9 °C)    Temp:  [98 6 °F (37 °C)-102 1 °F (38 9 °C)] 99 °F (37 2 °C)  HR:  [] 113  Resp:  [20] 20  BP: (110-139)/(62-82) 139/82  SpO2:  [92 %-100 %] 99 %  Body mass index is 61 3 kg/m²  Input and Output Summary (last 24 hours): Intake/Output Summary (Last 24 hours) at 2019 1418  Last data filed at 2019 1800  Gross per 24 hour   Intake 50 ml   Output 775 ml   Net -725 ml       Physical Exam:     Physical Exam   Constitutional: He is oriented to person, place, and time  He appears well-developed and well-nourished  No distress  HENT:   Head: Normocephalic and atraumatic  Eyes: Pupils are equal, round, and reactive to light  Conjunctivae and EOM are normal  Right eye exhibits no discharge  Left eye exhibits no discharge  No scleral icterus  Neck: Normal range of motion  Neck supple  No thyromegaly present  Cardiovascular: Normal rate, regular rhythm, normal heart sounds and intact distal pulses  Exam reveals no gallop and no friction rub  No murmur heard  Elevated rate     Pulmonary/Chest: Breath sounds normal  No stridor  He is in respiratory distress  He has no wheezes  He has no rales  Moderate coughing   Abdominal: Soft  Bowel sounds are normal  He exhibits no distension  There is no tenderness  There is no rebound and no guarding  A hernia is present  Musculoskeletal: Normal range of motion  He exhibits edema and tenderness  B/l LE lymphadenopathy  Tenderness of Upper medial LLE   Neurological: He is alert and oriented to person, place, and time  No cranial nerve deficit or sensory deficit  Skin: Skin is warm  He is not diaphoretic  There is erythema  Improved erythema of Upper Medial LLE   Psychiatric: He has a normal mood and affect  His behavior is normal  Judgment and thought content normal        Additional Data:     Labs:    Results from last 7 days   Lab Units 11/29/19  0704   WBC Thousand/uL 11 78*   HEMOGLOBIN g/dL 9 5*   HEMATOCRIT % 32 7*   PLATELETS Thousands/uL 223   NEUTROS PCT % 71   LYMPHS PCT % 15   MONOS PCT % 9   EOS PCT % 4     Results from last 7 days   Lab Units 11/29/19  0704 11/28/19  1325   POTASSIUM mmol/L 4 0 4 3   CHLORIDE mmol/L 104 97*   CO2 mmol/L 28 24   BUN mg/dL 18 17   CREATININE mg/dL 1 02 1 03   CALCIUM mg/dL 9 1 9 0   ALK PHOS U/L  --  90   ALT U/L  --  37   AST U/L  --  35     Results from last 7 days   Lab Units 11/28/19  1325   INR  1 27*       * I Have Reviewed All Lab Data Listed Above  * Additional Pertinent Lab Tests Reviewed: [address] 68 Admission Reviewed    Imaging:    Imaging Reports Reviewed Today Include: None  Imaging Personally Reviewed by Myself Includes:  None    Recent Cultures (last 7 days):     Results from last 7 days   Lab Units 11/28/19  1327 11/28/19  1325   BLOOD CULTURE  Received in Microbiology Lab  Culture in Progress  Received in Microbiology Lab  Culture in Progress         Last 24 Hours Medication List:     Current Facility-Administered Medications:  acetaminophen 650 mg Oral Q8H PRN Melyssa Neal DO    benzonatate 100 mg Oral TID PRN Adeline Cerna MD    cefazolin 2,000 mg Intravenous Q8H Erskine Goldmann DO Ros[place] Last Rate: 2,000 mg (11/29/19 0809)   dextromethorphan-guaiFENesin 10 mL Oral Q4H PRN Adeline Cerna MD    diphenhydrAMINE 25 mg Oral HS PRN Valerie Safer, DO    enoxaparin 40 mg Subcutaneous Daily Valerie Safer, DO    ferrous sulfate 325 mg Oral Every Other Day Valerie Safer, DO    fluticasone 1 spray Each Nare Daily Valerie Safer, DO    gabapentin 300 mg Oral HS Valerie Safer, DO    ibuprofen 800 mg Oral Q8H PRN Valerie Safer, DO    insulin aspart protamine-insulin aspart 40 Units Subcutaneous Before Dinner Adeline Cerna MD    insulin aspart protamine-insulin aspart 68 Units Subcutaneous Daily Before Breakfast Shane Cohn MD    ipratropium-albuterol 3 mL Nebulization Q6H PRN Adeline Cerna MD    lisinopril 5 mg Oral Daily Valerie Safer, DO    loperamide 2 mg Oral 4x Daily PRN Valerie Safer, DO    loratadine 10 mg Oral Daily Valerie Safer, DO    metoprolol tartrate 25 mg Oral Q12H [address] Valerie Safer, DO    nystatin  Topical BID Valerie Safer, DO    oxyCODONE 10 mg Oral Q12H [address] Valerie Safer, DO    pantoprazole 40 mg Oral Early Morning Valerie Safer, DO    promethazine 12 5 mg Oral Q6H PRN Valerie Safer, DO    psyllium 1 packet Oral Daily Valerie Safer, DO    QUEtiapine 25 mg Oral HS PRN Valerie Safer, DO         Today, Patient Was Seen By: Valerie Diaz, DO    ** Please Note: This note has been constructed using a voice recognition system   **

## 2019-11-29 NOTE — UTILIZATION REVIEW
Initial Clinical Review    Admission: Date/Time/Statement: Inpatient Admission Orders (From admission, onward)     Ordered        11/28/19 1416  Inpatient Admission (expected length of stay for this patient Order details is greater than two midnights)  Once                   Orders Placed This Encounter   Procedures    Inpatient Admission (expected length of stay for this patient Order details is greater than two midnights)     Standing Status:   Standing     Number of Occurrences:   1     Order Specific Question:   Admitting Physician     Answer:   Haleigh Singh [[id number]]     Order Specific Question:   Level of Care     Answer:   Med Surg [[id number]]     Order Specific Question:   Estimated length of stay     Answer:   More than 2 Midnights     Order Specific Question:   Certification     Answer:   I certify that inpatient services are medically necessary for this patient for a duration of greater than two midnights  See H&P and MD Progress Notes for additional information about the patient's course of treatment  ED Arrival Information     Expected Arrival Acuity Means of Arrival Escorted By Service Admission Type    - 11/28/2019 13:12 Emergent [facility] Emergency    Arrival Complaint    Fever/Cellulitis        Chief Complaint   Patient presents with    Shortness of Breath     Pt  with sob and cough, cellulitis ongoing left thigh  Assessment/Plan:  this is a 48year old male from home to ED admitted to inpatient due to left lower extremity cellulitis with sepsis  History of diabetes  Presented due to worsening fever with increasing erythema to left thigh over the last 2 days despite Keflex  Had necrotizing wound there 2 months ago  Also has cough, shortness of breath and increasing weakness  On exam is tachycardic and febrile   Morbidly obese  There is warm erythema LLE consistent with cellulitis, has wound in upper thigh with good granulation  Glucose is 149  WBC 15 75  Procalcitonin is 1 25  Blood cultures done  CxR with congestion  IV antibiotics and follow cultures in progress  Consult ID    ED Triage Vitals   Temperature Pulse Respirations Blood Pressure SpO2   11/28/19 1320 11/28/19 1320 11/28/19 1320 11/28/19 1320 11/28/19 1320   (!) 102 °F (38 9 °C) (!) 137 18 162/73 94 %      Temp Source Heart Rate Source Patient Position - Orthostatic VS BP Location FiO2 (%)   11/28/19 1320 11/28/19 1320 11/28/19 1320 11/28/19 1320 --   Oral Monitor Lying Right arm       Pain Score       11/28/19 1346       7        Wt Readings from Last 1 Encounters:   11/28/19 (!) 162 kg (357 lb 2 3 oz)     Additional Vital Signs:   11/29/19 0750  99 °F (37 2 °C)  113Abnormal   20  139/82  105  99 %  Nasal cannula  Lying   11/28/19 2244  98 6 °F (37 °C)  89  20  129/78  97  100 %  Nasal cannula  Sitting   11/28/19 1531  99 8 °F (37 7 °C)  120Abnormal   20  110/62  80  92 %  None (Room air)  Lying   11/28/19 1420  102 1 °F (38 9 °C)Abnormal                  11/28/19 1415    124Abnormal   22  120/66    94 %           Pertinent Labs/Diagnostic Test Results:   11/28/2019  CxR - Cardiomegaly  Mild vascular congestion    11/28/2019 EKG - sinus tachycardia   Normal ST and T    Results from last 7 days   Lab Units 11/29/19  0704 11/28/19  1325   WBC Thousand/uL 11 78* 15 75*   HEMOGLOBIN g/dL 9 5* 9 7*   HEMATOCRIT % 32 7* 32 5*   PLATELETS Thousands/uL 223 223   NEUTROS ABS Thousands/µL 8 33* 13 20*     Results from last 7 days   Lab Units 11/29/19  0704 11/28/19  1325   SODIUM mmol/L 137 131*   POTASSIUM mmol/L 4 0 4 3   CHLORIDE mmol/L 104 97*   CO2 mmol/L 28 24   ANION GAP mmol/L 5 10   BUN mg/dL 18 17   CREATININE mg/dL 1 02 1 03   EGFR ml/min/1 73sq m 84 83   CALCIUM mg/dL 9 1 9 0     Results from last 7 days   Lab Units 11/28/19  1325   AST U/L 35   ALT U/L 37   ALK PHOS U/L 90   TOTAL PROTEIN g/dL 7 6   ALBUMIN g/dL 2 8*   TOTAL BILIRUBIN mg/dL 0 75     Results from last 7 days   Lab Units 11/29/19  0625 11/29/19  0055 11/28/19  2103 11/28/19  1555   POC GLUCOSE mg/dl 113 165* 193* 158*     Results from last 7 days   Lab Units 11/29/19  0704 11/28/19  1325   GLUCOSE RANDOM mg/dL 110 149*     Results from last 7 days   Lab Units 11/28/19  1325   PROTIME seconds 15 2*   INR  1 27*   PTT seconds 36     Results from last 7 days   Lab Units 11/28/19  1325   PROCALCITONIN ng/ml 1 25*     Results from last 7 days   Lab Units 11/28/19  1338   LACTIC ACID mmol/L 1 6     Results from last 7 days   Lab Units 11/28/19  1327 11/28/19  1325   BLOOD CULTURE  Received in Microbiology Lab  Culture in Progress  Received in Microbiology Lab  Culture in Progress       ED Treatment:   Medication Administration from 11/28/2019 1304 to 11/28/2019 1451       Date/Time Order Dose Route Action Comments     11/28/2019 1346 cefepime (MAXIPIME) 2 g/50 mL dextrose IVPB 2,000 mg Intravenous New Bag      11/28/2019 1420 vancomycin (VANCOCIN) 1,500 mg in sodium chloride 0 9 % 250 mL IVPB 1,500 mg Intravenous New Bag      11/28/2019 1347 sodium chloride 0 9 % bolus 1,000 mL 1,000 mL Intravenous New Bag      11/28/2019 1350 promethazine (PHENERGAN) 12 5 mg/10 mL oral syrup 12 5 mg 12 5 mg Oral Given      11/28/2019 1346 acetaminophen (TYLENOL) tablet 975 mg 975 mg Oral Given         Past Medical History:   Diagnosis Date    Cellulitis     last assessed 12/10/15    Diabetes mellitus ([facility] [address] )     Edema     Elevated liver enzymes     Esophageal reflux     Gluten intolerance     Gout     last assessed 09/05/13    Hyperglycemia     Hypertension     IBS (irritable bowel syndrome)     Insomnia     Obesity     Osteoarthritis of knee     last assessed 02/10/14    Prehypertension     last assessed 08/22/17    Renal disorder     Venous insufficiency     last assessed 08/22/17    Villonodular synovitis of the hand, right     last assessed 11/14/2013     Present on Admission:   Essential hypertension   Sepsis due to cellulitis ([facility] [address] )   Cough, nonproductive   Diabetic neuropathy (HCC)      Admitting Diagnosis: Fever [R50 9]  Cellulitis of left lower extremity [L03 116]  Sepsis without acute organ dysfunction, due to unspecified organism [facility]) [A41 9]  Age/Sex: 48 y o  male  Admission Orders: 11/28/2019 1416 Inpatient   Scheduled Medications:  Medications:  cefazolin 2,000 mg Intravenous Q8H   enoxaparin 40 mg Subcutaneous Daily   ferrous sulfate 325 mg Oral Every Other Day   gabapentin 300 mg Oral HS   insulin aspart protamine-insulin aspart 40 Units Subcutaneous Before Dinner   insulin aspart protamine-insulin aspart 68 Units Subcutaneous Daily Before Breakfast   lisinopril 5 mg Oral Daily   metoprolol tartrate 25 mg Oral Q12H AUNDREA   nystatin  Topical BID   pantoprazole 40 mg Oral Early Morning   psyllium 1 packet Oral Daily     Continuous IV Infusions: none     PRN Meds:  Acetaminophen - used x 1  650 mg Oral Q8H PRN   benzonatate 100 mg Oral TID PRN   dextromethorphan-guaiFENesin - used x 2  10 mL Oral Q4H PRN   Ibuprofen - used x 1 800 mg Oral Q8H PRN   loperamide 2 mg Oral 4x Daily PRN   Promethazine - used x 1  12 5 mg Oral Q6H PRN   QUEtiapine - used x 1  25 mg Oral HS PRN       IP CONSULT TO INFECTIOUS DISEASES    daily wound care to left abdomen and groin    Network Utilization Review Department  [email or web address]  ATTENTION: Please call with any questions or concerns to [phone] and carefully listen to the prompts so that you are directed to the right person  All voicemails are confidential   Cecilio Lynn all requests for admission clinical reviews, approved or denied determinations and any other requests to dedicated fax number below belonging to the campus where the patient is receiving treatment  FACILITY NAME UR FAX NUMBER   ADMISSION DENIALS (Administrative/Medical Necessity) [address] [facility] (Maternity/NICU/Pediatrics) [facility] [phone]     [place] Trinidad [phone]   Janae Antoine [phone]   [address]  [place] [address] [facility] [phone]   Cayey Bones [address] [phone]

## 2019-11-29 NOTE — ASSESSMENT & PLAN NOTE
Blood pressure 162/73 on admission  Continue outpatient blood pressure medications  Monitor blood pressures: latest 139/82 Patient information on fall and injury prevention

## 2019-11-29 NOTE — ASSESSMENT & PLAN NOTE
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

## 2019-11-29 NOTE — ASSESSMENT & PLAN NOTE
Patient has history of recurrent cellulitis  Presents today with fever, tachycardia, SOB, dizziness, diaphoresis and cellulitis of the upper LLE  Received 7-day course of keflex as outpatient two days ago but is refractory to treatment  Meets SIRS criteria  Given Cefepime and Vancomycin in ED with 1 L bolus NS  Currently patient reported improvement from initial presentation     - CXR showed cardiomegaly and mild vascular congestion   - EKG showed sinus tachycardia at 132 bpm   - Lactic acid 1 6  - PT elevated at 15 2  PTT wnl   - Procalcitonin 1 25  - Blood cultures preliminary results showed no growth 24h  - CT of the left femur showed: Skin thickening at the left groin surgical site; Subcutaneous edema noted at the medial aspect of both lower thighs, progressed from prior; No evidence of soft tissue air  - Ultrasound of LLE showed: (-) DVT  - Continue Cefazolin 2g IV Q8 for now, will await ID recs  - Adequate pain control with Ibuprofen 800 Q8 PRN for mild or moderate pain and Norco 5-325 Q6 PRN    - Monitor BMP and CBC

## 2019-11-29 NOTE — ASSESSMENT & PLAN NOTE
Lab Results   Component Value Date    HGBA1C 8 3 (H) 11/05/2019       Recent Labs     11/29/19  0055 11/29/19  0625 11/29/19  0930 11/29/19  1128   POCGLU 165* 113 132 185*       Blood Sugar Average: Last 72 hrs:  (P) 147 [id number]   Continue patient's outpatient insulin regimen  Hold metformin    Monitor blood sugars

## 2019-11-29 NOTE — ASSESSMENT & PLAN NOTE
Patient has history of recurrent cellulitis  Presents today with fever, tachycardia, SOB, dizziness, diaphoresis and cellulitis of the upper LLE  Received 7-day course of keflex as outpatient two days ago but is refractory to treatment  Meets SIRS criteria  Given Cefepime and Vancomycin in ED with 1 L bolus NS     - CXR showed cardiomegaly and mild vascular congestion   - EKG showed sinus tachycardia at 132 bpm   - Lactic acid 1 6  - PT elevated at 15 2  PTT wnl   - Procalcitonin 1 25  - Blood cultures pending  - Continue Cefazolin 2g IV Q8   - Monitor BMP and CBC  - ID consulted: Recommended CT scan of L femur to r/o abscess  If CT scan abnormal or condition does not improve, surgery should be considered  CT scan was ordered  - Patient expressed significant pain on 11/29/19  Ibuprofen 800 Q8 PRN for mild or moderate pain  Oxycodone 10 mg BID also ordered  - Duplex venous ultrasound of the LLE ordered to r/o DVT

## 2019-11-29 NOTE — PROGRESS NOTES
Pt prescribed aqua K pad for chest area for cough as a warm compress  Pt educated to not apply this to his left leg cellulitis  Pt informed that this could make his cellulitis worse  Pt believed this could help his cellulitis but after discussion between the resident and nurse, pt agreed that he would not apply the pad to his leg  Pt has had the pad on his chest on and off all day  Pt sitting in chair, relaxed with call bell in place and instructed to call if he has any questions

## 2019-11-30 ENCOUNTER — APPOINTMENT (INPATIENT)
Dept: RADIOLOGY | Facility: HOSPITAL | Age: 54
DRG: 720 | End: 2019-11-30
Payer: COMMERCIAL

## 2019-11-30 LAB
GLUCOSE SERPL-MCNC: 109 MG/DL (ref 65–140)
GLUCOSE SERPL-MCNC: 111 MG/DL (ref 65–140)
GLUCOSE SERPL-MCNC: 185 MG/DL (ref 65–140)
GLUCOSE SERPL-MCNC: 92 MG/DL (ref 65–140)

## 2019-11-30 PROCEDURE — 71045 X-RAY EXAM CHEST 1 VIEW: CPT

## 2019-11-30 PROCEDURE — 94640 AIRWAY INHALATION TREATMENT: CPT

## 2019-11-30 PROCEDURE — 82948 REAGENT STRIP/BLOOD GLUCOSE: CPT

## 2019-11-30 PROCEDURE — 94760 N-INVAS EAR/PLS OXIMETRY 1: CPT

## 2019-11-30 PROCEDURE — 93971 EXTREMITY STUDY: CPT | Performed by: SURGERY

## 2019-11-30 PROCEDURE — 99232 SBSQ HOSP IP/OBS MODERATE 35: CPT | Performed by: INTERNAL MEDICINE

## 2019-11-30 PROCEDURE — 94664 DEMO&/EVAL PT USE INHALER: CPT

## 2019-11-30 RX ORDER — PANTOPRAZOLE SODIUM 40 MG/1
40 TABLET, DELAYED RELEASE ORAL
Status: DISCONTINUED | OUTPATIENT
Start: 2019-11-30 | End: 2019-12-02 | Stop reason: HOSPADM

## 2019-11-30 RX ORDER — HYDROCODONE POLISTIREX AND CHLORPHENIRAMINE POLISTIREX 10; 8 MG/5ML; MG/5ML
5 SUSPENSION, EXTENDED RELEASE ORAL EVERY 12 HOURS PRN
Status: DISCONTINUED | OUTPATIENT
Start: 2019-11-30 | End: 2019-12-02 | Stop reason: HOSPADM

## 2019-11-30 RX ADMIN — INSULIN ASPART 68 UNITS: 100 INJECTION, SUSPENSION SUBCUTANEOUS at 08:16

## 2019-11-30 RX ADMIN — FERROUS SULFATE TAB 325 MG (65 MG ELEMENTAL FE) 325 MG: 325 (65 FE) TAB at 08:15

## 2019-11-30 RX ADMIN — INSULIN ASPART 40 UNITS: 100 INJECTION, SUSPENSION SUBCUTANEOUS at 16:40

## 2019-11-30 RX ADMIN — PSYLLIUM HUSK 1 PACKET: 3.4 POWDER ORAL at 08:16

## 2019-11-30 RX ADMIN — HYDROCODONE BITARTRATE AND ACETAMINOPHEN 1 TABLET: 5; 325 TABLET ORAL at 15:38

## 2019-11-30 RX ADMIN — GABAPENTIN 400 MG: 400 CAPSULE ORAL at 21:34

## 2019-11-30 RX ADMIN — BENZONATATE 100 MG: 100 CAPSULE ORAL at 21:34

## 2019-11-30 RX ADMIN — BENZONATATE 100 MG: 100 CAPSULE ORAL at 08:15

## 2019-11-30 RX ADMIN — METOPROLOL TARTRATE 25 MG: 25 TABLET, FILM COATED ORAL at 21:36

## 2019-11-30 RX ADMIN — HYDROCODONE BITARTRATE AND ACETAMINOPHEN 1 TABLET: 5; 325 TABLET ORAL at 09:13

## 2019-11-30 RX ADMIN — METOPROLOL TARTRATE 25 MG: 25 TABLET, FILM COATED ORAL at 08:15

## 2019-11-30 RX ADMIN — IPRATROPIUM BROMIDE AND ALBUTEROL SULFATE 3 ML: 2.5; .5 SOLUTION RESPIRATORY (INHALATION) at 01:40

## 2019-11-30 RX ADMIN — CEFAZOLIN SODIUM 2000 MG: 2 SOLUTION INTRAVENOUS at 16:40

## 2019-11-30 RX ADMIN — PANTOPRAZOLE SODIUM 40 MG: 40 TABLET, DELAYED RELEASE ORAL at 06:38

## 2019-11-30 RX ADMIN — PANTOPRAZOLE SODIUM 40 MG: 40 TABLET, DELAYED RELEASE ORAL at 16:39

## 2019-11-30 RX ADMIN — IBUPROFEN 800 MG: 400 TABLET ORAL at 19:54

## 2019-11-30 RX ADMIN — LISINOPRIL 5 MG: 5 TABLET ORAL at 08:15

## 2019-11-30 RX ADMIN — FLUTICASONE PROPIONATE 1 SPRAY: 50 SPRAY, METERED NASAL at 21:37

## 2019-11-30 RX ADMIN — LORATADINE 10 MG: 10 TABLET ORAL at 08:15

## 2019-11-30 RX ADMIN — ENOXAPARIN SODIUM 40 MG: 40 INJECTION SUBCUTANEOUS at 08:15

## 2019-11-30 RX ADMIN — HYDROCODONE POLISTIREX AND CHLORPHENIRAMINE POLISTIREX 5 ML: 10; 8 SUSPENSION, EXTENDED RELEASE ORAL at 13:46

## 2019-11-30 RX ADMIN — IPRATROPIUM BROMIDE AND ALBUTEROL SULFATE 3 ML: 2.5; .5 SOLUTION RESPIRATORY (INHALATION) at 08:34

## 2019-11-30 RX ADMIN — CEFAZOLIN SODIUM 2000 MG: 2 SOLUTION INTRAVENOUS at 08:05

## 2019-11-30 RX ADMIN — GABAPENTIN 300 MG: 300 CAPSULE ORAL at 08:20

## 2019-11-30 RX ADMIN — BENZONATATE 100 MG: 100 CAPSULE ORAL at 01:26

## 2019-11-30 NOTE — PROGRESS NOTES
Progress Note - Diane Thomas 1965, 48 y o  male MRN: [id number]    Unit/Bed#: S -01 Encounter: [id number]    Primary Care Provider: Stephania Venegas MD   Date and time admitted to hospital: 11/28/2019  1:16 PM        * Sepsis due to cellulitis [facility])  Assessment & Plan  Patient has history of recurrent cellulitis  Presents today with fever, tachycardia, SOB, dizziness, diaphoresis and cellulitis of the upper LLE  Received 7-day course of keflex as outpatient two days ago but is refractory to treatment  Meets SIRS criteria  Given Cefepime and Vancomycin in ED with 1 L bolus NS  Currently patient reported improvement from initial presentation     - CXR showed cardiomegaly and mild vascular congestion   - EKG showed sinus tachycardia at 132 bpm   - Lactic acid 1 6  - PT elevated at 15 2  PTT wnl   - Procalcitonin 1 25  - Blood cultures preliminary results showed no growth 24h  - CT of the left femur showed: Skin thickening at the left groin surgical site; Subcutaneous edema noted at the medial aspect of both lower thighs, progressed from prior; No evidence of soft tissue air  - Ultrasound of LLE showed: (-) DVT  - Continue Cefazolin 2g IV Q8 for now, will await ID recs  - Adequate pain control with Ibuprofen 800 Q8 PRN for mild or moderate pain and Norco 5-325 Q6 PRN  - Monitor BMP and CBC    Cough, nonproductive  Assessment & Plan  Patient indicates that this is a significant concern for him and is what will keep in the hospital if not addressed  Cough worse when supine and after eating  States improvement of his symptoms with heating pad  Wished to continue medications his PCP prescribed for him last week       - Continue patient's Benzonatate, will change Robitussin to Tussionex PRN  - Ordered aqua K compress for chest d/t refractory cough upon patient request  - Continued PCP medications of Loratadine 10 mg QD and Flonase 50 mcg/spray PRN  - Ordered Duo-nebs  - Ordered benadryl 25 mg QHS PRN for allergies  Will also help with sleep  - Increased Pantoprazole 40mg to BID for better acid control  - Told nursing to give patient a course of humidified air   - Ordered respiratory protocol  - Awaiting repeat CXR portable    Type 2 diabetes mellitus with hyperglycemia, with long-term current use of insulin [facility])  Assessment & Plan  Lab Results   Component Value Date    HGBA1C 8 3 (H) 11/05/2019       Recent Labs     11/29/19  1128 11/29/19  1619 11/29/19  2122 11/30/19  0751   POCGLU 185* 119 148* 109       Blood Sugar Average: Last 72 hrs:  (P) 806 [id number]   Continue patient's outpatient insulin regimen  Hold metformin  Monitor blood sugars    Open wound of left thigh  Assessment & Plan  Patient had I&D with wound debridement performed on 9/7/19 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  Wound does not appear infected at this time  No erythema or discharge noted  Cellulitis observed inferiorly  - Ordered wound care  - Monitor site for changes    Essential hypertension  Assessment & Plan  Blood pressure 162/73 on admission  Continue outpatient blood pressure medications  Monitor blood pressure    Diabetic neuropathy (HCC)  Assessment & Plan  Continue patient's outpatient Gabapentin 300 QHS    Insomnia  Assessment & Plan  Continue patient's Seroquel 25 mg QD for sleep  Likely to improve with resolution of infection  Consider reevaluation following antibiotic course        VTE Pharmacologic Prophylaxis:   Pharmacologic: Enoxaparin (Lovenox)  Mechanical VTE Prophylaxis in Place: Yes    Discussions with Specialists or Other Care Team Provider:  Discussed with the nurse, attending    Education and Discussions with Family / Patient:  Discussed with the patient and wife at bedside    Current Length of Stay: 2 day(s)    Current Patient Status: Inpatient     Discharge Plan / Estimated Discharge Date:   To be determined, likely within 24-48 hours based on clinical course    Code Status: Level 1 - Full Code      Subjective:   Patient seen and examined today  Patient reported improvement of left leg swelling and redness  Patient however is concerned about his cough, he says that it is worse when he is lying flat and also after eating  Patient reported that Robitussin is not really helping control his cough  Objective:     Vitals:   Temp (24hrs), Av 7 °F (37 1 °C), Min:98 4 °F (36 9 °C), Max:99 2 °F (37 3 °C)    Temp:  [98 4 °F (36 9 °C)-99 2 °F (37 3 °C)] 99 2 °F (37 3 °C)  HR:  [] 108  Resp:  [18-20] 20  BP: (122-136)/(77-94) 122/77  SpO2:  [97 %-100 %] 100 %  Body mass index is 61 3 kg/m²  Input and Output Summary (last 24 hours):     No intake or output data in the 24 hours ending 19 1128    Physical Exam:     Physical Exam   Constitutional: He is oriented to person, place, and time  Obese, with O2 via NC, not in any acute cardiopulmonary distress   HENT:   Head: Normocephalic and atraumatic  Mouth/Throat: Oropharynx is clear and moist  No oropharyngeal exudate  Eyes: Pupils are equal, round, and reactive to light  Conjunctivae and EOM are normal  No scleral icterus  Neck: Normal range of motion  No JVD present  Cardiovascular: Normal rate, regular rhythm, normal heart sounds and intact distal pulses  Pulmonary/Chest: Effort normal and breath sounds normal  No respiratory distress  Abdominal: Soft  Bowel sounds are normal  There is no tenderness  There is no guarding  Musculoskeletal: Normal range of motion    (+) swelling and erythema L upper thigh with tenderness to palpation, improving   Lymphadenopathy:     He has no cervical adenopathy  Neurological: He is alert and oriented to person, place, and time  No cranial nerve deficit or sensory deficit  He exhibits normal muscle tone  Skin: Skin is warm  No erythema  No pallor  Psychiatric: He has a normal mood and affect   His behavior is normal  Thought content normal    Nursing note and vitals reviewed  Additional Data:     Labs:    Results from last 7 days   Lab Units 11/29/19  0704   WBC Thousand/uL 11 78*   HEMOGLOBIN g/dL 9 5*   HEMATOCRIT % 32 7*   PLATELETS Thousands/uL 223   NEUTROS PCT % 71   LYMPHS PCT % 15   MONOS PCT % 9   EOS PCT % 4     Results from last 7 days   Lab Units 11/29/19  0704 11/28/19  1325   POTASSIUM mmol/L 4 0 4 3   CHLORIDE mmol/L 104 97*   CO2 mmol/L 28 24   BUN mg/dL 18 17   CREATININE mg/dL 1 02 1 03   CALCIUM mg/dL 9 1 9 0   ALK PHOS U/L  --  90   ALT U/L  --  37   AST U/L  --  35     Results from last 7 days   Lab Units 11/28/19  1325   INR  1 27*       * I Have Reviewed All Lab Data Listed Above  * Additional Pertinent Lab Tests Reviewed: Pen[address] Admission Reviewed    Imaging:    Imaging Reports Reviewed Today Include:  CT of the femur, ultrasound of left leg  Imaging Personally Reviewed by Myself Includes:  None    Recent Cultures (last 7 days):     Results from last 7 days   Lab Units 11/28/19  1327 11/28/19  1325   BLOOD CULTURE  No Growth at 24 hrs  No Growth at 24 hrs         Last 24 Hours Medication List:     Current Facility-Administered Medications:  acetaminophen 650 mg Oral Q8H PRN Vel Penaloza, DO    benzonatate 100 mg Oral TID PRN Adeline Cerna MD    cefazolin 2,000 mg Intravenous Q8H Vel Ca, DO Last Rate: 2,000 mg (11/30/19 0805)   diphenhydrAMINE 25 mg Oral HS PRN Vel Ca, DO    enoxaparin 40 mg Subcutaneous Daily Vel Ca, DO    ferrous sulfate 325 mg Oral Every Other Day Vel Ca, DO    fluticasone 1 spray Each Nare Daily Vel Ca, DO    gabapentin 300 mg Oral Daily Pawel JORDAN Shepherd    gabapentin 400 mg Oral HS Pawel JORDAN Shepherd    HYDROcodone-acetaminophen 1 tablet Oral Q6H PRN Vel Ca, DO    hydrocodone-chlorpheniramine polistirex 5 mL Oral Q12H PRN Joseph Verdugo MD    ibuprofen 800 mg Oral Q8H PRN Vel Koenigo, DO    insulin aspart protamine-insulin aspart 40 Units Subcutaneous Before Leonor Cerna MD    insulin aspart protamine-insulin aspart 68 Units Subcutaneous Daily Before Breakfast Tod Hummel MD    ipratropium-albuterol 3 mL Nebulization Q6H PRN Adeline Cerna MD    lisinopril 5 mg Oral Daily Melyssa Goody, DO    loperamide 2 mg Oral 4x Daily PRN Melyssa Goody, DO    loratadine 10 mg Oral Daily Melyssa Goody, DO    metoprolol tartrate 25 mg Oral Q12H [address] Melyssa Goody, DO    nystatin  Topical BID Melyssa Goody, DO    pantoprazole 40 mg Oral BID AC Lorna Solomon MD    promethazine 12 5 mg Oral Q6H PRN Emlyssa Goody, DO    psyllium 1 packet Oral Daily Melyssa Goody, DO    QUEtiapine 25 mg Oral HS PRN Melyssa Goody, DO         Today, Patient Was Seen By: Judith Ceja MD    ** Please Note: This note has been constructed using a voice recognition system   **

## 2019-11-30 NOTE — ASSESSMENT & PLAN NOTE
Blood pressure 162/73 on admission  Continue outpatient blood pressure medications  Monitor blood pressure

## 2019-11-30 NOTE — ASSESSMENT & PLAN NOTE
Lab Results   Component Value Date    HGBA1C 8 3 (H) 11/05/2019       Recent Labs     11/29/19  1128 11/29/19  1619 11/29/19 2122 11/30/19  0751   POCGLU 185* 119 148* 109       Blood Sugar Average: Last 72 hrs:  (P) 183 [id number]   Continue patient's outpatient insulin regimen  Hold metformin    Monitor blood sugars

## 2019-11-30 NOTE — ASSESSMENT & PLAN NOTE
Patient indicates that this is a significant concern for him and is what will keep in the hospital if not addressed  Cough worse when supine and after eating  States improvement of his symptoms with heating pad  Wished to continue medications his PCP prescribed for him last week  - Continue patient's Benzonatate, will change Robitussin to Tussionex PRN  - Ordered aqua K compress for chest d/t refractory cough upon patient request  - Continued PCP medications of Loratadine 10 mg QD and Flonase 50 mcg/spray PRN  - Ordered Duo-nebs  - Ordered benadryl 25 mg QHS PRN for allergies   Will also help with sleep  - Increased Pantoprazole 40mg to BID for better acid control  - Told nursing to give patient a course of humidified air   - Ordered respiratory protocol  - Awaiting repeat CXR portable

## 2019-11-30 NOTE — PLAN OF CARE
Problem: Potential for Falls  Goal: Patient will remain free of falls  Description  INTERVENTIONS:  - Assess patient frequently for physical needs  -  Identify cognitive and physical deficits and behaviors that affect risk of falls    -  [place] fall precautions as indicated by assessment   - Educate patient/family on patient safety including physical limitations  - Instruct patient to call for assistance with activity based on assessment  - Modify environment to reduce risk of injury  - Consider OT/PT consult to assist with strengthening/mobility  Outcome: Progressing     Problem: Prexisting or High Potential for Compromised Skin Integrity  Goal: Skin integrity is maintained or improved  Description  INTERVENTIONS:  - Identify patients at risk for skin breakdown  - Assess and monitor skin integrity  - Assess and monitor nutrition and hydration status  - Monitor labs   - Assess for incontinence   - Turn and reposition patient  - Assist with mobility/ambulation  - Relieve pressure over bony prominences  - Avoid friction and shearing  - Provide appropriate hygiene as needed including keeping skin clean and dry  - Evaluate need for skin moisturizer/barrier cream  - Collaborate with interdisciplinary team   - Patient/family teaching  - Consider wound care consult   Outcome: Progressing

## 2019-11-30 NOTE — PLAN OF CARE
Problem: Potential for Falls  Goal: Patient will remain free of falls  Description  INTERVENTIONS:  - Assess patient frequently for physical needs  -  Identify cognitive and physical deficits and behaviors that affect risk of falls    -  [place] fall precautions as indicated by assessment   - Educate patient/family on patient safety including physical limitations  - Instruct patient to call for assistance with activity based on assessment  - Modify environment to reduce risk of injury  - Consider OT/PT consult to assist with strengthening/mobility  Outcome: Progressing     Problem: Prexisting or High Potential for Compromised Skin Integrity  Goal: Skin integrity is maintained or improved  Description  INTERVENTIONS:  - Identify patients at risk for skin breakdown  - Assess and monitor skin integrity  - Assess and monitor nutrition and hydration status  - Monitor labs   - Assess for incontinence   - Turn and reposition patient  - Assist with mobility/ambulation  - Relieve pressure over bony prominences  - Avoid friction and shearing  - Provide appropriate hygiene as needed including keeping skin clean and dry  - Evaluate need for skin moisturizer/barrier cream  - Collaborate with interdisciplinary team   - Patient/family teaching  - Consider wound care consult   Outcome: Progressing

## 2019-12-01 LAB
ANION GAP SERPL CALCULATED.3IONS-SCNC: 8 MMOL/L (ref 4–13)
BUN SERPL-MCNC: 27 MG/DL (ref 5–25)
CALCIUM SERPL-MCNC: 9 MG/DL (ref 8.3–10.1)
CHLORIDE SERPL-SCNC: 106 MMOL/L (ref 100–108)
CO2 SERPL-SCNC: 25 MMOL/L (ref 21–32)
CREAT SERPL-MCNC: 1.21 MG/DL (ref 0.6–1.3)
ERYTHROCYTE [DISTWIDTH] IN BLOOD BY AUTOMATED COUNT: 17.5 % (ref 11.6–15.1)
FERRITIN SERPL-MCNC: 98 NG/ML (ref 8–388)
GFR SERPL CREATININE-BSD FRML MDRD: 68 ML/MIN/1.73SQ M
GLUCOSE SERPL-MCNC: 114 MG/DL (ref 65–140)
GLUCOSE SERPL-MCNC: 123 MG/DL (ref 65–140)
GLUCOSE SERPL-MCNC: 171 MG/DL (ref 65–140)
GLUCOSE SERPL-MCNC: 199 MG/DL (ref 65–140)
GLUCOSE SERPL-MCNC: 306 MG/DL (ref 65–140)
HCT VFR BLD AUTO: 29.3 % (ref 36.5–49.3)
HGB BLD-MCNC: 8.4 G/DL (ref 12–17)
IRON SATN MFR SERPL: 7 %
IRON SERPL-MCNC: 27 UG/DL (ref 65–175)
MCH RBC QN AUTO: 23.3 PG (ref 26.8–34.3)
MCHC RBC AUTO-ENTMCNC: 28.7 G/DL (ref 31.4–37.4)
MCV RBC AUTO: 81 FL (ref 82–98)
PLATELET # BLD AUTO: 225 THOUSANDS/UL (ref 149–390)
PMV BLD AUTO: 8.5 FL (ref 8.9–12.7)
POTASSIUM SERPL-SCNC: 4.3 MMOL/L (ref 3.5–5.3)
RBC # BLD AUTO: 3.6 MILLION/UL (ref 3.88–5.62)
SODIUM SERPL-SCNC: 139 MMOL/L (ref 136–145)
TIBC SERPL-MCNC: 362 UG/DL (ref 250–450)
WBC # BLD AUTO: 7.72 THOUSAND/UL (ref 4.31–10.16)

## 2019-12-01 PROCEDURE — 80048 BASIC METABOLIC PNL TOTAL CA: CPT | Performed by: INTERNAL MEDICINE

## 2019-12-01 PROCEDURE — 82728 ASSAY OF FERRITIN: CPT | Performed by: PSYCHIATRY & NEUROLOGY

## 2019-12-01 PROCEDURE — 83540 ASSAY OF IRON: CPT | Performed by: PSYCHIATRY & NEUROLOGY

## 2019-12-01 PROCEDURE — 99232 SBSQ HOSP IP/OBS MODERATE 35: CPT | Performed by: INTERNAL MEDICINE

## 2019-12-01 PROCEDURE — 82948 REAGENT STRIP/BLOOD GLUCOSE: CPT

## 2019-12-01 PROCEDURE — 85027 COMPLETE CBC AUTOMATED: CPT | Performed by: INTERNAL MEDICINE

## 2019-12-01 PROCEDURE — 83550 IRON BINDING TEST: CPT | Performed by: PSYCHIATRY & NEUROLOGY

## 2019-12-01 RX ORDER — FUROSEMIDE 40 MG/1
40 TABLET ORAL ONCE
Status: COMPLETED | OUTPATIENT
Start: 2019-12-01 | End: 2019-12-01

## 2019-12-01 RX ORDER — GUAIFENESIN 600 MG
600 TABLET, EXTENDED RELEASE 12 HR ORAL EVERY 12 HOURS SCHEDULED
Status: DISCONTINUED | OUTPATIENT
Start: 2019-12-01 | End: 2019-12-02 | Stop reason: HOSPADM

## 2019-12-01 RX ORDER — PREDNISONE 20 MG/1
40 TABLET ORAL ONCE
Status: COMPLETED | OUTPATIENT
Start: 2019-12-01 | End: 2019-12-01

## 2019-12-01 RX ADMIN — CEFAZOLIN SODIUM 2000 MG: 2 SOLUTION INTRAVENOUS at 15:26

## 2019-12-01 RX ADMIN — PANTOPRAZOLE SODIUM 40 MG: 40 TABLET, DELAYED RELEASE ORAL at 15:25

## 2019-12-01 RX ADMIN — IBUPROFEN 800 MG: 400 TABLET ORAL at 20:40

## 2019-12-01 RX ADMIN — FUROSEMIDE 40 MG: 40 TABLET ORAL at 12:17

## 2019-12-01 RX ADMIN — HYDROCODONE BITARTRATE AND ACETAMINOPHEN 1 TABLET: 5; 325 TABLET ORAL at 01:01

## 2019-12-01 RX ADMIN — INSULIN ASPART 40 UNITS: 100 INJECTION, SUSPENSION SUBCUTANEOUS at 22:31

## 2019-12-01 RX ADMIN — QUETIAPINE FUMARATE 25 MG: 25 TABLET ORAL at 01:32

## 2019-12-01 RX ADMIN — FLUTICASONE PROPIONATE 1 SPRAY: 50 SPRAY, METERED NASAL at 20:42

## 2019-12-01 RX ADMIN — HYDROCODONE BITARTRATE AND ACETAMINOPHEN 1 TABLET: 5; 325 TABLET ORAL at 23:18

## 2019-12-01 RX ADMIN — CEFAZOLIN SODIUM 2000 MG: 2 SOLUTION INTRAVENOUS at 23:19

## 2019-12-01 RX ADMIN — HYDROCODONE BITARTRATE AND ACETAMINOPHEN 1 TABLET: 5; 325 TABLET ORAL at 16:59

## 2019-12-01 RX ADMIN — METOPROLOL TARTRATE 25 MG: 25 TABLET, FILM COATED ORAL at 20:45

## 2019-12-01 RX ADMIN — GUAIFENESIN 600 MG: 600 TABLET, EXTENDED RELEASE ORAL at 20:40

## 2019-12-01 RX ADMIN — HYDROCODONE BITARTRATE AND ACETAMINOPHEN 1 TABLET: 5; 325 TABLET ORAL at 10:02

## 2019-12-01 RX ADMIN — PSYLLIUM HUSK 1 PACKET: 3.4 POWDER ORAL at 09:41

## 2019-12-01 RX ADMIN — GABAPENTIN 400 MG: 400 CAPSULE ORAL at 21:00

## 2019-12-01 RX ADMIN — HYDROCODONE POLISTIREX AND CHLORPHENIRAMINE POLISTIREX 5 ML: 10; 8 SUSPENSION, EXTENDED RELEASE ORAL at 01:32

## 2019-12-01 RX ADMIN — CEFAZOLIN SODIUM 2000 MG: 2 SOLUTION INTRAVENOUS at 00:14

## 2019-12-01 RX ADMIN — IBUPROFEN 800 MG: 400 TABLET ORAL at 13:08

## 2019-12-01 RX ADMIN — LORATADINE 10 MG: 10 TABLET ORAL at 09:43

## 2019-12-01 RX ADMIN — PREDNISONE 40 MG: 20 TABLET ORAL at 12:17

## 2019-12-01 RX ADMIN — CEFAZOLIN SODIUM 2000 MG: 2 SOLUTION INTRAVENOUS at 09:43

## 2019-12-01 RX ADMIN — PANTOPRAZOLE SODIUM 40 MG: 40 TABLET, DELAYED RELEASE ORAL at 06:17

## 2019-12-01 RX ADMIN — GUAIFENESIN 600 MG: 600 TABLET, EXTENDED RELEASE ORAL at 12:17

## 2019-12-01 RX ADMIN — ENOXAPARIN SODIUM 40 MG: 40 INJECTION SUBCUTANEOUS at 09:41

## 2019-12-01 RX ADMIN — GABAPENTIN 300 MG: 300 CAPSULE ORAL at 09:42

## 2019-12-01 RX ADMIN — INSULIN ASPART 68 UNITS: 100 INJECTION, SUSPENSION SUBCUTANEOUS at 09:43

## 2019-12-01 NOTE — PROGRESS NOTES
Progress Note - Gretta Epley 1965, 48 y o  male MRN: [id number]    Unit/Bed#: S -01 Encounter: [id number]    Primary Care Provider: Perez Melgoza MD   Date and time admitted to hospital: 11/28/2019  1:16 PM        * Sepsis due to cellulitis [facility])  Assessment & Plan  Patient has history of recurrent cellulitis  Presents today with fever, tachycardia, SOB, dizziness, diaphoresis and cellulitis of the upper LLE  Received 7-day course of keflex as outpatient two days ago but is refractory to treatment  Meets SIRS criteria  Given Cefepime and Vancomycin in ED with 1 L bolus NS  Currently patient reports improvement from initial presentation     - CXR showed cardiomegaly and mild vascular congestion   - EKG showed sinus tachycardia at 132 bpm   - Lactic acid 1 6  - PT elevated at 15 2  PTT wnl   - Procalcitonin 1 25  - Blood cultures preliminary results showed no growth 48 hours  - CT of the left femur showed: Skin thickening at the left groin surgical site; Subcutaneous edema noted at the medial aspect of both lower thighs, progressed from prior; No evidence of soft tissue air  - Ultrasound of LLE showed: (-) DVT  - Continue Cefazolin 2g IV Q8 for now, will await ID recs  - Adequate pain control with Ibuprofen 800 Q8 PRN for mild or moderate pain and Norco 5-325 Q6 PRN  - Monitor BMP and CBC    Cough, nonproductive  Assessment & Plan  Patient indicates that this is a significant concern for him and is what will keep in the hospital if not addressed  Cough worse when supine and after eating  States improvement of his symptoms with heating pad  Wished to continue medications his PCP prescribed for him last week  - Continue patient's Benzonatate, will change Robitussin to Tussionex PRN  - Ordered aqua K compress for chest d/t refractory cough upon patient request  - Continued PCP medications of Loratadine 10 mg QD and Flonase 50 mcg/spray PRN  - Patient received [facility]   Reports no improvement    - Ordered benadryl 25 mg QHS PRN for allergies  Will also help with sleep  - Increased Pantoprazole 40mg to BID for better acid control  - Told nursing to give patient a course of humidified air   - Patient received respiratory protocol  Reports no improvement   - Repeat CXR showed no acute cardiopulmonary disease  Some increase of vascular markings noted  - Ordered one time dose Lasix 40 mg and Prednisone 40 mg to help with residual inflammation or fluid congestion  Will reevaluate afterwards  Will weigh patient and compare to admission   - Mucinex 600 mg BID ordered  - Consider home O2 evaluation tomorrow    Type 2 diabetes mellitus with hyperglycemia, with long-term current use of insulin [facility])  Assessment & Plan  Lab Results   Component Value Date    HGBA1C 8 3 (H) 11/05/2019       Recent Labs     11/30/19  1200 11/30/19  1542 11/30/19  2129 12/01/19  0732   POCGLU 111 185* 92 123       Blood Sugar Average: Last 72 hrs:  (P) 141   Continue patient's outpatient insulin regimen  Hold metformin  Monitor blood sugars    Essential hypertension  Assessment & Plan  Blood pressure 162/73 on admission  Continued outpatient blood pressure medications  Latest blood pressure: 108/67  Lisinopril held with systolic BP <665  Anemia  Assessment & Plan  Patient Hemoglobin on admission 9 7  Found to be 8 4 AM 12/1/19  Patient on ferrous sulfate 325 mg every other day    - Ordered iron panel  - Asked patient about last colonoscopy  States it has been a long time  Consider ordering pending results of panel      Diabetic neuropathy ([facility] [address] )  Assessment & Plan  Continue patient's outpatient Gabapentin 300 QHS    Insomnia  Assessment & Plan  Continue patient's Seroquel 25 mg QD for sleep  Likely to improve with resolution of infection  Consider reevaluation following antibiotic course          VTE Pharmacologic Prophylaxis:   Pharmacologic: Enoxaparin (Lovenox)  Mechanical VTE Prophylaxis in Place: Yes    Discussions with Specialists or Other Care Team Provider: Discussed with the nurse, attending, ID    Education and Discussions with Family / Patient: Discussed with the patient and wife at bedside    Current Length of Stay: 3 day(s)    Current Patient Status: Inpatient     Discharge Plan / Estimated Discharge Date: Likely within 24-48 hours based on clinical course    Code Status: Level 1 - Full Code      Subjective:   Patient seen at bedside  Wife also present in the room  Indicates that he continues to experience cough and occasional shortness of breath  Reports improvement of left leg swelling and redness but states that it feels a little warmer today  Continues to experience worsening cough while laying flat and following eating  States that he had a great night last night    Reports having slept, ate, and had bowel movements without difficulty  States mild concern over his recently reduced blood pressure recorded by the nurse  Denies any side effects to current medication regimen  Results of x-ray in-hospital course moving forward were discussed with patient  Patient voiced understanding  Denies headache, shortness of breath, chest pain, palpitations, changes in vision or hearing, or nausea or vomiting  Objective:     Vitals:   Temp (24hrs), Av 5 °F (36 9 °C), Min:98 °F (36 7 °C), Max:99 °F (37 2 °C)    Temp:  [98 °F (36 7 °C)-99 °F (37 2 °C)] 98 °F (36 7 °C)  HR:  [102-113] 113  Resp:  [18-20] 20  BP: ()/(55-79) 108/67  SpO2:  [95 %-99 %] 95 %  Body mass index is 61 3 kg/m²  Input and Output Summary (last 24 hours):     No intake or output data in the 24 hours ending 19 1055    Physical Exam:     Physical Exam   Constitutional: He is oriented to person, place, and time  He appears well-developed and well-nourished  No distress  Obese, with O2 via NC, not in any acute cardiopulmonary distress   HENT:   Head: Normocephalic and atraumatic     Mouth/Throat: Oropharynx is clear and moist  No oropharyngeal exudate  Eyes: Pupils are equal, round, and reactive to light  Conjunctivae and EOM are normal  Right eye exhibits no discharge  Left eye exhibits no discharge  No scleral icterus  Neck: Normal range of motion  Neck supple  No JVD present  No thyromegaly present  Cardiovascular: Normal rate, regular rhythm, normal heart sounds and intact distal pulses  Exam reveals no gallop and no friction rub  No murmur heard  Elevated rate     Pulmonary/Chest: Effort normal and breath sounds normal  No stridor  No respiratory distress  He has no wheezes  He has no rales  Moderate coughing   Abdominal: Soft  Bowel sounds are normal  He exhibits no distension  There is no tenderness  There is no rebound and no guarding  A hernia is present  Musculoskeletal: Normal range of motion  He exhibits edema and tenderness    (+) swelling and erythema L upper thigh with tenderness to palpation, improving   Lymphadenopathy:     He has no cervical adenopathy  Neurological: He is alert and oriented to person, place, and time  No cranial nerve deficit or sensory deficit  He exhibits normal muscle tone  Skin: Skin is warm  He is not diaphoretic  There is erythema  No pallor  Improved erythema of Upper Medial LLE  Indurated at site  Psychiatric: He has a normal mood and affect  His behavior is normal  Judgment and thought content normal    Nursing note and vitals reviewed          Additional Data:     Labs:    Results from last 7 days   Lab Units 12/01/19  0519 11/29/19  0704   WBC Thousand/uL 7 72 11 78*   HEMOGLOBIN g/dL 8 4* 9 5*   HEMATOCRIT % 29 3* 32 7*   PLATELETS Thousands/uL 225 223   NEUTROS PCT %  --  71   LYMPHS PCT %  --  15   MONOS PCT %  --  9   EOS PCT %  --  4     Results from last 7 days   Lab Units 12/01/19  0519 11/28/19  1325   POTASSIUM mmol/L 4 3   < > 4 3   CHLORIDE mmol/L 106   < > 97*   CO2 mmol/L 25   < > 24   BUN mg/dL 27*   < > 17   CREATININE mg/dL 1 21   < > 1 03   CALCIUM mg/dL 9 0 < > 9 0   ALK PHOS U/L  --   --  90   ALT U/L  --   --  37   AST U/L  --   --  35    < > = values in this interval not displayed  Results from last 7 days   Lab Units 11/28/19  1325   INR  1 27*       * I Have Reviewed All Lab Data Listed Above  * Additional Pertinent Lab Tests Reviewed: Penelope Donnelly Admission Reviewed    Imaging:    Imaging Reports Reviewed Today Include: CXR  Imaging Personally Reviewed by Myself Includes:  None    Recent Cultures (last 7 days):     Results from last 7 days   Lab Units 11/28/19  1327 11/28/19  1325   BLOOD CULTURE  No Growth at 48 hrs  No Growth at 48 hrs         Last 24 Hours Medication List:     Current Facility-Administered Medications:  acetaminophen 650 mg Oral Q8H PRN Conchita Emma, DO    benzonatate 100 mg Oral TID PRN Adeline Cerna MD    cefazolin 2,000 mg Intravenous Q8H Conchita Elderton, DO Last Rate: 2,000 mg (12/01/19 0943)   diphenhydrAMINE 25 mg Oral HS PRN Conchita Emma, DO    enoxaparin 40 mg Subcutaneous Daily Conchita Emma, DO    ferrous sulfate 325 mg Oral Every Other Day Conchita Emma, DO    fluticasone 1 spray Each Nare Daily Conchita Emma, DO    furosemide 40 mg Oral Once Conchita Emma, DO    gabapentin 300 mg Oral Daily Nola Naylor PA-C    gabapentin 400 mg Oral HS Nola Naylor PA-C    guaiFENesin 600 mg Oral Q12H [address] Conchita Elderton, DO    HYDROcodone-acetaminophen 1 tablet Oral Q6H PRN Conchita Elderton, DO    hydrocodone-chlorpheniramine polistirex 5 mL Oral Q12H PRN Rodrigo Ann MD    ibuprofen 800 mg Oral Q8H PRN Conchita Emma, DO    insulin aspart protamine-insulin aspart 40 Units Subcutaneous Before Dinner Adeline Cerna MD    insulin aspart protamine-insulin aspart 68 Units Subcutaneous Daily Before Breakfast Viridiana Sahu MD    ipratropium-albuterol 3 mL Nebulization Q6H PRN Adeline Cerna MD    lisinopril 5 mg Oral Daily Conchita Emma, DO    loperamide 2 mg Oral 4x Daily PRN Conchita Emma, DO    loratadine 10 mg Oral Daily Garlan Thompson DO Rosalinda    metoprolol tartrate 25 mg Oral Q12H [id number] [facility], DO    nystatin  Topical BID Charles Goff,     pantoprazole 40 mg Oral BID AC Lorna Solomon MD    predniSONE 40 mg Oral Once Charles Goff,     promethazine 12 5 mg Oral Q6H PRN Charles Goff DO    psyllium 1 packet Oral Daily Charles Goff,     QUEtiapine 25 mg Oral HS PRN Charles Goff DO         Today, Patient Was Seen By: Charles Goff DO    ** Please Note: This note has been constructed using a voice recognition system   **

## 2019-12-01 NOTE — ASSESSMENT & PLAN NOTE
Patient Hemoglobin on admission 9 7  Found to be 8 4 AM 12/1/19  Patient on ferrous sulfate 325 mg every other day    - Ordered iron panel  - Asked patient about last colonoscopy  States it has been a long time  Consider ordering pending results of panel

## 2019-12-01 NOTE — ASSESSMENT & PLAN NOTE
Blood pressure 162/73 on admission  Continued outpatient blood pressure medications  Latest blood pressure: 108/67  Lisinopril held with systolic BP <335

## 2019-12-01 NOTE — PLAN OF CARE
Problem: Potential for Falls  Goal: Patient will remain free of falls  Description  INTERVENTIONS:  - Assess patient frequently for physical needs  -  Identify cognitive and physical deficits and behaviors that affect risk of falls    -  [place] fall precautions as indicated by assessment   - Educate patient/family on patient safety including physical limitations  - Instruct patient to call for assistance with activity based on assessment  - Modify environment to reduce risk of injury  - Consider OT/PT consult to assist with strengthening/mobility  Outcome: Progressing     Problem: Prexisting or High Potential for Compromised Skin Integrity  Goal: Skin integrity is maintained or improved  Description  INTERVENTIONS:  - Identify patients at risk for skin breakdown  - Assess and monitor skin integrity  - Assess and monitor nutrition and hydration status  - Monitor labs   - Assess for incontinence   - Turn and reposition patient  - Assist with mobility/ambulation  - Relieve pressure over bony prominences  - Avoid friction and shearing  - Provide appropriate hygiene as needed including keeping skin clean and dry  - Evaluate need for skin moisturizer/barrier cream  - Collaborate with interdisciplinary team   - Patient/family teaching  - Consider wound care consult   Outcome: Progressing

## 2019-12-01 NOTE — ASSESSMENT & PLAN NOTE
Patient indicates that this is a significant concern for him and is what will keep in the hospital if not addressed  Cough worse when supine and after eating  States improvement of his symptoms with heating pad  Wished to continue medications his PCP prescribed for him last week  - Continue patient's Benzonatate, will change Robitussin to Tussionex PRN  - Ordered aqua K compress for chest d/t refractory cough upon patient request  - Continued PCP medications of Loratadine 10 mg QD and Flonase 50 mcg/spray PRN  - Patient received Duo-nebs  Reports no improvement    - Ordered benadryl 25 mg QHS PRN for allergies  Will also help with sleep  - Increased Pantoprazole 40mg to BID for better acid control  - Told nursing to give patient a course of humidified air   - Patient received respiratory protocol  Reports no improvement   - Repeat CXR showed no acute cardiopulmonary disease  Some increase of vascular markings noted  - Ordered one time dose Lasix 40 mg and Prednisone 40 mg to help with residual inflammation or fluid congestion  Will reevaluate afterwards   Will weigh patient and compare to admission   - Mucinex 600 mg BID ordered  - Consider home O2 evaluation tomorrow

## 2019-12-01 NOTE — ASSESSMENT & PLAN NOTE
Patient has history of recurrent cellulitis  Presents today with fever, tachycardia, SOB, dizziness, diaphoresis and cellulitis of the upper LLE  Received 7-day course of keflex as outpatient two days ago but is refractory to treatment  Meets SIRS criteria  Given Cefepime and Vancomycin in ED with 1 L bolus NS  Currently patient reports improvement from initial presentation     - CXR showed cardiomegaly and mild vascular congestion   - EKG showed sinus tachycardia at 132 bpm   - Lactic acid 1 6  - PT elevated at 15 2  PTT wnl   - Procalcitonin 1 25  - Blood cultures preliminary results showed no growth 48 hours  - CT of the left femur showed: Skin thickening at the left groin surgical site; Subcutaneous edema noted at the medial aspect of both lower thighs, progressed from prior; No evidence of soft tissue air  - Ultrasound of LLE showed: (-) DVT  - Continue Cefazolin 2g IV Q8 for now, will await ID recs  - Adequate pain control with Ibuprofen 800 Q8 PRN for mild or moderate pain and Norco 5-325 Q6 PRN    - Monitor BMP and CBC

## 2019-12-01 NOTE — PROGRESS NOTES
Progress Note - Infectious Disease   Doak Peabody 48 y o  male MRN: [id number]  Unit/Bed#: S -01 Encounter: [id number]      Impression/Plan:    53-year-old male patient with diabetes mellitus, morbid obesity and previous history of left lower extremity cellulitis, admitted for sepsis attributed to recurrent left lower extremity cellulitis     1- sepsis, POA:  Fever of 102, [place] 432[address], tachycardia  Sepsis is likely secondary to left lower extremity cellulitis  Beside  tachycardia, patient has been hemodynamically stable  This morning patient is afebrile, WBC count down to normal 7 700  Left thigh pain has significantly improved  - supportive care as per primary team  - close clinical monitoring, trend fever curve  - continue antibiotics as below  - follow-up blood culture collected 11/28/2019 to rule out bacteremia; this blood culture is negative to date     2- left lower extremity cellulitis:  Erythema, warmth and swelling over the medial aspect of the left thigh  Cellulitis is complicating a left groin/thigh wound  The wound itself is not tender, no signs of purulence  Cellulitis area is at the medial aspect of the thigh, distal to the wound site  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  CT scan of left femur shows skin thickening and subcutaneous edema, no evidence of tissue air, no drainable collection  - continue cefazolin IV  - close clinical monitoring  - if patient continues to improve over the next 24 hours, would be okay for discharge from ID standpoint on p o   Antibiotics (cefadroxil p o ) to finish a total of 1 week course through 12/04/2019     3- diabetes mellitus type 2:  Previous HbA1c 8 3%  - management as per primary team      4- morbid obesity:  Along with chronic lower extremity venous stasis and lymphedema  These factors, along with diabetes, are predisposing patient to recurrence of his cellulitis        5- cough and shortness of breath:  Patient attributes his cough to postnasal drip  Chest x-ray shows some vascular congestion, no evidence of pneumonia  - monitor respiratory status  - treatment of postnasal drip as per primary team     Plan mentioned above discussed in detail with patient  Case also discussed with primary team     Antibiotics:  Cefazolin day 4    Subjective:  Patient has no fever, chills, sweats; no nausea, vomiting, diarrhea;  Complains of dry cough  Pain over the left medial thigh has resolved     Objective:  Vitals:  Temp:  [98 °F (36 7 °C)-99 °F (37 2 °C)] 98 °F (36 7 °C)  HR:  [102-113] 113  Resp:  [18-20] 20  BP: ()/(55-79) 108/67  SpO2:  [95 %-99 %] 95 %  Temp (24hrs), Av 5 °F (36 9 °C), Min:98 °F (36 7 °C), Max:99 °F (37 2 °C)  Current: Temperature: 98 °F (36 7 °C)    Physical Exam:   General Appearance:  Alert, interactive, nontoxic, no acute distress  Throat: Oropharynx moist without lesions  Lungs:   Clear to auscultation bilaterally; no wheezes, rhonchi or rales; respirations unlabored   Heart:  RRR; no murmur, rub or gallop   Abdomen:   Soft, non-tender, obese, positive bowel sounds  Extremities: No clubbing, cyanosis  Stable bilateral lower extremity edema  Erythema over the left medial thigh has improved  Warmth has decreased, area is not tender today   Skin: No new rashes or lesions  No draining wounds noted         Labs, Imaging, & Other studies:   All pertinent labs and imaging studies were personally reviewed  Results from last 7 days   Lab Units 19  0719  1325   WBC Thousand/uL 7 72 11 78* 15 75*   HEMOGLOBIN g/dL 8 4* 9 5* 9 7*   PLATELETS Thousands/uL 225 223 223     Results from last 7 days   Lab Units 19  0719  1325   SODIUM mmol/L 139 137 131*   POTASSIUM mmol/L 4 3 4 0 4 3   CHLORIDE mmol/L 106 104 97*   CO2 mmol/L 25 28 24   BUN mg/dL 27* 18 17   CREATININE mg/dL 1 21 1 02 1 03   EGFR ml/min/1 73sq m 68 84 83 CALCIUM mg/dL 9 0 9 1 9 0   AST U/L  --   --  35   ALT U/L  --   --  37   ALK PHOS U/L  --   --  90     Results from last 7 days   Lab Units 11/28/19  1327 11/28/19  1325   BLOOD CULTURE  No Growth at 48 hrs  No Growth at 48 hrs       Results from last 7 days   Lab Units 11/28/19  1325   PROCALCITONIN ng/ml 1 25*

## 2019-12-01 NOTE — PLAN OF CARE
Problem: Potential for Falls  Goal: Patient will remain free of falls  Description  INTERVENTIONS:  - Assess patient frequently for physical needs  -  Identify cognitive and physical deficits and behaviors that affect risk of falls    -  [place] fall precautions as indicated by assessment   - Educate patient/family on patient safety including physical limitations  - Instruct patient to call for assistance with activity based on assessment  - Modify environment to reduce risk of injury  - Consider OT/PT consult to assist with strengthening/mobility  Outcome: Progressing     Problem: Prexisting or High Potential for Compromised Skin Integrity  Goal: Skin integrity is maintained or improved  Description  INTERVENTIONS:  - Identify patients at risk for skin breakdown  - Assess and monitor skin integrity  - Assess and monitor nutrition and hydration status  - Monitor labs   - Assess for incontinence   - Turn and reposition patient  - Assist with mobility/ambulation  - Relieve pressure over bony prominences  - Avoid friction and shearing  - Provide appropriate hygiene as needed including keeping skin clean and dry  - Evaluate need for skin moisturizer/barrier cream  - Collaborate with interdisciplinary team   - Patient/family teaching  - Consider wound care consult   Outcome: Progressing

## 2019-12-01 NOTE — ASSESSMENT & PLAN NOTE
Lab Results   Component Value Date    HGBA1C 8 3 (H) 11/05/2019       Recent Labs     11/30/19  1200 11/30/19  1542 11/30/19 2129 12/01/19  0732   POCGLU 111 185* 92 123       Blood Sugar Average: Last 72 hrs:  (P) 141   Continue patient's outpatient insulin regimen  Hold metformin    Monitor blood sugars

## 2019-12-02 VITALS
HEIGHT: 64 IN | SYSTOLIC BLOOD PRESSURE: 140 MMHG | TEMPERATURE: 97.8 F | HEART RATE: 104 BPM | BODY MASS INDEX: 53.78 KG/M2 | OXYGEN SATURATION: 99 % | DIASTOLIC BLOOD PRESSURE: 92 MMHG | RESPIRATION RATE: 20 BRPM | WEIGHT: 315 LBS

## 2019-12-02 PROBLEM — R05.9 COUGH: Status: RESOLVED | Noted: 2019-10-20 | Resolved: 2019-12-02

## 2019-12-02 PROBLEM — G47.00 INSOMNIA: Status: RESOLVED | Noted: 2019-11-28 | Resolved: 2019-12-02

## 2019-12-02 LAB
ANION GAP SERPL CALCULATED.3IONS-SCNC: 10 MMOL/L (ref 4–13)
BASOPHILS # BLD AUTO: 0.03 THOUSANDS/ΜL (ref 0–0.1)
BASOPHILS NFR BLD AUTO: 1 % (ref 0–1)
BUN SERPL-MCNC: 23 MG/DL (ref 5–25)
CALCIUM SERPL-MCNC: 9 MG/DL (ref 8.3–10.1)
CHLORIDE SERPL-SCNC: 103 MMOL/L (ref 100–108)
CO2 SERPL-SCNC: 24 MMOL/L (ref 21–32)
CREAT SERPL-MCNC: 1.06 MG/DL (ref 0.6–1.3)
EOSINOPHIL # BLD AUTO: 0.01 THOUSAND/ΜL (ref 0–0.61)
EOSINOPHIL NFR BLD AUTO: 0 % (ref 0–6)
ERYTHROCYTE [DISTWIDTH] IN BLOOD BY AUTOMATED COUNT: 17.2 % (ref 11.6–15.1)
GFR SERPL CREATININE-BSD FRML MDRD: 80 ML/MIN/1.73SQ M
GLUCOSE SERPL-MCNC: 235 MG/DL (ref 65–140)
GLUCOSE SERPL-MCNC: 263 MG/DL (ref 65–140)
GLUCOSE SERPL-MCNC: 280 MG/DL (ref 65–140)
HCT VFR BLD AUTO: 30.7 % (ref 36.5–49.3)
HGB BLD-MCNC: 8.9 G/DL (ref 12–17)
IMM GRANULOCYTES # BLD AUTO: 0.02 THOUSAND/UL (ref 0–0.2)
IMM GRANULOCYTES NFR BLD AUTO: 0 % (ref 0–2)
LYMPHOCYTES # BLD AUTO: 1.25 THOUSANDS/ΜL (ref 0.6–4.47)
LYMPHOCYTES NFR BLD AUTO: 19 % (ref 14–44)
MCH RBC QN AUTO: 23.4 PG (ref 26.8–34.3)
MCHC RBC AUTO-ENTMCNC: 29 G/DL (ref 31.4–37.4)
MCV RBC AUTO: 81 FL (ref 82–98)
MONOCYTES # BLD AUTO: 0.7 THOUSAND/ΜL (ref 0.17–1.22)
MONOCYTES NFR BLD AUTO: 11 % (ref 4–12)
NEUTROPHILS # BLD AUTO: 4.64 THOUSANDS/ΜL (ref 1.85–7.62)
NEUTS SEG NFR BLD AUTO: 69 % (ref 43–75)
NRBC BLD AUTO-RTO: 0 /100 WBCS
PLATELET # BLD AUTO: 214 THOUSANDS/UL (ref 149–390)
PMV BLD AUTO: 8.5 FL (ref 8.9–12.7)
POTASSIUM SERPL-SCNC: 4.5 MMOL/L (ref 3.5–5.3)
RBC # BLD AUTO: 3.8 MILLION/UL (ref 3.88–5.62)
SODIUM SERPL-SCNC: 137 MMOL/L (ref 136–145)
WBC # BLD AUTO: 6.65 THOUSAND/UL (ref 4.31–10.16)

## 2019-12-02 PROCEDURE — 94761 N-INVAS EAR/PLS OXIMETRY MLT: CPT

## 2019-12-02 PROCEDURE — 80048 BASIC METABOLIC PNL TOTAL CA: CPT | Performed by: PSYCHIATRY & NEUROLOGY

## 2019-12-02 PROCEDURE — 99239 HOSP IP/OBS DSCHRG MGMT >30: CPT | Performed by: INTERNAL MEDICINE

## 2019-12-02 PROCEDURE — 99232 SBSQ HOSP IP/OBS MODERATE 35: CPT | Performed by: INTERNAL MEDICINE

## 2019-12-02 PROCEDURE — 85025 COMPLETE CBC W/AUTO DIFF WBC: CPT | Performed by: PSYCHIATRY & NEUROLOGY

## 2019-12-02 PROCEDURE — 82948 REAGENT STRIP/BLOOD GLUCOSE: CPT

## 2019-12-02 RX ORDER — CEPHALEXIN 500 MG/1
1000 CAPSULE ORAL EVERY 6 HOURS SCHEDULED
Qty: 40 CAPSULE | Refills: 0 | Status: SHIPPED | OUTPATIENT
Start: 2019-12-02 | End: 2019-12-07

## 2019-12-02 RX ORDER — CEPHALEXIN 500 MG/1
1000 CAPSULE ORAL EVERY 6 HOURS SCHEDULED
Status: DISCONTINUED | OUTPATIENT
Start: 2019-12-02 | End: 2019-12-02 | Stop reason: HOSPADM

## 2019-12-02 RX ORDER — FUROSEMIDE 40 MG/1
40 TABLET ORAL DAILY
Qty: 7 TABLET | Refills: 0 | Status: SHIPPED | OUTPATIENT
Start: 2019-12-02 | End: 2020-01-21 | Stop reason: ALTCHOICE

## 2019-12-02 RX ORDER — PREDNISONE 20 MG/1
40 TABLET ORAL ONCE
Status: COMPLETED | OUTPATIENT
Start: 2019-12-02 | End: 2019-12-02

## 2019-12-02 RX ORDER — HYDROCODONE BITARTRATE AND ACETAMINOPHEN 5; 325 MG/1; MG/1
1 TABLET ORAL EVERY 6 HOURS PRN
Qty: 20 TABLET | Refills: 0 | Status: SHIPPED | OUTPATIENT
Start: 2019-12-02 | End: 2019-12-07

## 2019-12-02 RX ORDER — FUROSEMIDE 10 MG/ML
40 INJECTION INTRAMUSCULAR; INTRAVENOUS ONCE
Status: COMPLETED | OUTPATIENT
Start: 2019-12-02 | End: 2019-12-02

## 2019-12-02 RX ADMIN — GABAPENTIN 300 MG: 300 CAPSULE ORAL at 08:17

## 2019-12-02 RX ADMIN — GUAIFENESIN 600 MG: 600 TABLET, EXTENDED RELEASE ORAL at 08:17

## 2019-12-02 RX ADMIN — HYDROCODONE BITARTRATE AND ACETAMINOPHEN 1 TABLET: 5; 325 TABLET ORAL at 06:32

## 2019-12-02 RX ADMIN — LISINOPRIL 5 MG: 5 TABLET ORAL at 08:17

## 2019-12-02 RX ADMIN — INSULIN ASPART 68 UNITS: 100 INJECTION, SUSPENSION SUBCUTANEOUS at 09:42

## 2019-12-02 RX ADMIN — IBUPROFEN 800 MG: 400 TABLET ORAL at 11:28

## 2019-12-02 RX ADMIN — PSYLLIUM HUSK 1 PACKET: 3.4 POWDER ORAL at 08:18

## 2019-12-02 RX ADMIN — FERROUS SULFATE TAB 325 MG (65 MG ELEMENTAL FE) 325 MG: 325 (65 FE) TAB at 08:17

## 2019-12-02 RX ADMIN — CEPHALEXIN 1000 MG: 500 CAPSULE ORAL at 11:28

## 2019-12-02 RX ADMIN — LORATADINE 10 MG: 10 TABLET ORAL at 08:17

## 2019-12-02 RX ADMIN — QUETIAPINE FUMARATE 25 MG: 25 TABLET ORAL at 00:18

## 2019-12-02 RX ADMIN — PREDNISONE 40 MG: 20 TABLET ORAL at 11:28

## 2019-12-02 RX ADMIN — CEFAZOLIN SODIUM 2000 MG: 2 SOLUTION INTRAVENOUS at 08:18

## 2019-12-02 RX ADMIN — PANTOPRAZOLE SODIUM 40 MG: 40 TABLET, DELAYED RELEASE ORAL at 06:23

## 2019-12-02 RX ADMIN — ENOXAPARIN SODIUM 40 MG: 40 INJECTION SUBCUTANEOUS at 08:18

## 2019-12-02 RX ADMIN — HYDROCODONE BITARTRATE AND ACETAMINOPHEN 1 TABLET: 5; 325 TABLET ORAL at 14:13

## 2019-12-02 RX ADMIN — METOPROLOL TARTRATE 25 MG: 25 TABLET, FILM COATED ORAL at 08:17

## 2019-12-02 RX ADMIN — FUROSEMIDE 40 MG: 10 INJECTION, SOLUTION INTRAMUSCULAR; INTRAVENOUS at 11:28

## 2019-12-02 NOTE — ASSESSMENT & PLAN NOTE
Patient indicates that this is a significant concern for him and is what will keep in the hospital if not addressed  Cough worse when supine and after eating  States improvement of his symptoms with heating pad  Wished to continue medications his PCP prescribed for him last week  - Continue patient's Benzonatate, will change Robitussin to Tussionex PRN  - Ordered aqua K compress for chest d/t refractory cough upon patient request  - Continued PCP medications of Loratadine 10 mg QD and Flonase 50 mcg/spray PRN with mild improvement  - Patient received Duo-nebs  Reports no improvement    - Ordered benadryl 25 mg QHS PRN for allergies  Will also help with sleep  - Increased Pantoprazole 40mg to BID for better acid control   - Told nursing to give patient a course of humidified air  Mild improvement expressed per patient  - Patient received respiratory protocol  Reports no improvement   - Repeat CXR showed no acute cardiopulmonary disease  Some increase of vascular markings noted  - One time dose Lasix 40 mg and Prednisone 40 mg to help with residual inflammation or fluid congestion was ordered on 12/1/19  Patient reported improvement of symptoms and repeat one time dose of each was ordered on 12/2/19 with further improvement  - Patient was weighted compared to admission  Gain of 20 lbs from 357 to 377  Patient will be discharged on 40 mg Lasix QD for 7 days  Patient was educated about importance of fluid restriction   - Mucinex 600 mg BID ordered  Patient reports improvement of PND and cough 12/2/19   - Home O2 evaluation showed patient saturating at 97% on room air and 92-97% with exertion  No home supplemental oxygen necessary   - Incentive spirometry ordered on 12/2/19  Patient reported improvement and stated he would continue this treatment as outpatient

## 2019-12-02 NOTE — RESPIRATORY THERAPY NOTE
Home Oxygen Qualifying Test       Patient name: Alice Hahn        : 1965   Date of Test:  2019  Diagnosis:      Home Oxygen Test:    **Medicare Guidelines require item(s) 1-5 on all ambulatory patients or 1 and 2 on non-ambulatory patients  1   Baseline SPO2 on Room Air at rest 97 %  2   SPO2 during exercise on Room Air 92-97 %  During exercise monitor SpO2  If SPO2 increases >=89% with ambulation do not add supplemental             oxygen  If <= 88% on room air add O2 via NC and titrate patient  Patient must be ambulated with O2 and titrated to > 88% with exertion  3   SPO2 on Oxygen at rest  %  lpm     4   SPO2 during exercise on Oxygen  % a liter flow of  lpm     5   Exercise performed:          walking 450 ft          []  Supplemental Home Oxygen is indicated  [x]  Client does not qualify for home oxygen  Respiratory Additional Notes- Patient does NOT qualify for home O2    Jannet Sanders, RT

## 2019-12-02 NOTE — ASSESSMENT & PLAN NOTE
Blood pressure 162/73 on admission  Continued outpatient blood pressure medications  Latest blood pressure: 118/69

## 2019-12-02 NOTE — ASSESSMENT & PLAN NOTE
Patient Hemoglobin on admission 9 7  Found to be 8 4 AM 12/1/19  Patient on ferrous sulfate 325 mg every other day    - Ordered iron panel  Displayed Iron saturation 7, TIBC 362, ferritin 98 all wnl  Iron low at 27   - Asked patient about last colonoscopy  States it has been a long time  Ambulatory referral to GI was ordered upon discharge

## 2019-12-02 NOTE — DISCHARGE SUMMARY
Discharge- Jeremias Fenton 1965, 48 y o  male MRN: [id number]    Unit/Bed#: S -01 Encounter: [id number]    Primary Care Provider: Doe Carr MD   Date and time admitted to hospital: 11/28/2019  1:16 PM        * Sepsis due to cellulitis [facility])  Assessment & Plan  Patient has history of recurrent cellulitis  Presents today with fever, tachycardia, SOB, dizziness, diaphoresis and cellulitis of the upper LLE  Received 7-day course of keflex as outpatient two days ago but is refractory to treatment  Meets SIRS criteria  Given Cefepime and Vancomycin in ED with 1 L bolus NS  Currently patient reports overall improvement from initial presentation  Erythema and warmth worse on 12/2/19     - CXR showed cardiomegaly and mild vascular congestion   - EKG showed sinus tachycardia at 132 bpm   - Lactic acid 1 6  - PT elevated at 15 2  PTT wnl   - Procalcitonin 1 25  - Blood cultures preliminary results showed no growth 72 hours  - CT of the left femur showed: Skin thickening at the left groin surgical site; Subcutaneous edema noted at the medial aspect of both lower thighs, progressed from prior; No evidence of soft tissue air  - Ultrasound of LLE showed: (-) DVT  - Patient was treated with Cefazolin 2g IV Q8 while inpatient  Discharged with 5 days of Keflex 1000 mg QID per infectious disease to complete the 10 day course of antibiotics  ID approves discharge from their standpoint   - Pain control with Ibuprofen 800 Q8 PRN for mild or moderate pain and Norco 5-325 Q6 PRN  Sequential compression device was ordered at patient's request as he reported improvement in pain the the past with that method  Reported improvement upon use  Patient will be discharged with 5 day supply of Norco for residual pain symptoms  Cough, nonproductiveresolved as of 12/2/2019  Assessment & Plan  Patient indicates that this is a significant concern for him and is what will keep in the hospital if not addressed   Cough worse when supine and after eating  States improvement of his symptoms with heating pad  Wished to continue medications his PCP prescribed for him last week  - Continue patient's Benzonatate, will change Robitussin to Tussionex PRN  - Ordered aqua K compress for chest d/t refractory cough upon patient request  - Continued PCP medications of Loratadine 10 mg QD and Flonase 50 mcg/spray PRN with mild improvement  - Patient received Duo-nebs  Reports no improvement    - Ordered benadryl 25 mg QHS PRN for allergies  Will also help with sleep  - Increased Pantoprazole 40mg to BID for better acid control   - Told nursing to give patient a course of humidified air  Mild improvement expressed per patient  - Patient received respiratory protocol  Reports no improvement   - Repeat CXR showed no acute cardiopulmonary disease  Some increase of vascular markings noted  - One time dose Lasix 40 mg and Prednisone 40 mg to help with residual inflammation or fluid congestion was ordered on 12/1/19  Patient reported improvement of symptoms and repeat one time dose of each was ordered on 12/2/19 with further improvement  - Patient was weighted compared to admission  Gain of 20 lbs from 357 to 377  Patient will be discharged on 40 mg Lasix QD for 7 days  Patient was educated about importance of fluid restriction   - Mucinex 600 mg BID ordered  Patient reports improvement of PND and cough 12/2/19   - Home O2 evaluation showed patient saturating at 97% on room air and 92-97% with exertion  No home supplemental oxygen necessary   - Incentive spirometry ordered on 12/2/19  Patient reported improvement and stated he would continue this treatment as outpatient      Type 2 diabetes mellitus with hyperglycemia, with long-term current use of insulin [facility])  Assessment & Plan  Lab Results   Component Value Date    HGBA1C 8 3 (H) 11/05/2019       Recent Labs     12/01/19  1109 12/01/19  1604 12/01/19  2145 12/02/19  0725   POCGLU 171* 199* 306* 263*       Blood Sugar Average: Last 72 hrs:  (P) 161 4   Patient's outpatient insulin regimen was continued while inpatient and metformin was held  Patient should continue metformin as outpatient  Blood sugars elevated following dose of steroids 12/2/19  Continue to monitor blood sugars    Open wound of left thigh  Assessment & Plan  Patient had I&D with wound debridement performed on 9/7/19 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  Wound does not appear infected at this time  No erythema or discharge noted  Cellulitis observed inferiorly  - Patient will follow up with wound care as outpatient with previously established appointment with his wound center   - Monitor site for changes    Essential hypertension  Assessment & Plan  Blood pressure 162/73 on admission  Continued outpatient blood pressure medications  Latest blood pressure: 118/69  Anemia  Assessment & Plan  Patient Hemoglobin on admission 9 7  Found to be 8 4 AM 12/1/19  Patient on ferrous sulfate 325 mg every other day    - Ordered iron panel  Displayed Iron saturation 7, TIBC 362, ferritin 98 all wnl  Iron low at 27   - Asked patient about last colonoscopy  States it has been a long time  Ambulatory referral to GI was ordered upon discharge  Diabetic neuropathy ([facility] [address] )  Assessment & Plan  Continue patient's outpatient Gabapentin 300 QHS    Insomniaresolved as of 12/2/2019  Assessment & Plan  Continue patient's Seroquel 25 mg QD for sleep  Likely to improve with resolution of infection  Patient reports sleep well while on the unit  Does not seem to be a concern currently        Discharging Resident Physician: Giles Bamberger, DO  Attending: Charlotte Luna MD  PCP: Barbara Radford MD  Admission Date: 11/28/2019  Discharge Date: 12/02/19    Disposition:     Home    Reason for Admission: Shortness of breath and cough    Consultations During Hospital Stay:  · Infectious Disease and Respiratory Therapy    Procedures Performed:     · Home O2 evaluation  · Respiratory Protocol  · CriticalCare Time    Significant Findings / Test Results:     · EKG - sinus tachycardia at 132 bpm  · CXR PA/lateral - cardiomegaly with mild vascular congestion  · CT femur left with contrast - "Skin thickening at the left groin surgical site  Subcutaneous edema noted at the medial aspect of both lower things, progressed from prior  No evidence of soft tissue air "  · Duplex venous ultrasound - No evidence of acute or chronic venous thrombi  · Repeat CXR portable - No acute cardiopulmonary disease    Incidental Findings:   · CBC showed significantly reduced hemoglobin and hematocrit during admission  Recommended follow-up colposcopy with ambulatory GI referral upon discharge  Test Results Pending at Discharge (will require follow up): · None     Outpatient Tests Requested:  · None    Complications:  None    Hospital Course:     Kristen Dyer is a 48 y o  male patient with past medical history of recurrent cellulitis, diabetes mellitus type 2, GERD, gout, hypertension, IBS, insomnia, obesity, osteoarthritis, and diabetic neuropathy who originally presented to the hospital with wife on 11/28/2019 due to cough and shortness of breath  Patient had excisional debridement for necrotizing fasciitis on 09/06/2019  Patient seen in his PCP's office on 11/27/2019 with recurrence of cellulitis  Patient described discomfort as a burning pain, worse with palpation  Patient was started on Keflex for 7 day duration  Patient presents to the emergency department refractory to this treatment and complaining of fever and left leg erythema which has been steadily worsening  Patient met SIRS criteria and was started on cefepime and vancomycin  Given 1 L IV bolus of normal saline  CXR was ordered and positive as above  EKG showed sinus tachycardia at 132 beats per minute  Patient had mild cough  Blood cultures were collected and later resulted with no growth at 72 hours  Tylenol given for fever and Phenergan given for nausea  Patient was admitted to inpatient for treatment of sepsis secondary to cellulitis  While on the inpatient, patient was transitioned to cefazolin 2 g IV Q8 and Infectious Disease was consulted  Procalcitonin was found to be 1 25, justifying antibiotic course  CT scan was ordered to investigate the presence of abscess and was negative as above  Duplex venous ultrasound was ordered to rule out DVT and was negative as above  PT/INR mildly elevated  PTT, free T4, and lactic acid all within normal limits  Lipid panel revealed elevated LDL at 112 and decreased HDL at 37  Patient was continued on outpatient statin  Patient was continued on PPI and increased from 20 mg to 40 mg during hospital course to treat symptoms that may have been caused by GERD  Patient was continued on gabapentin 300 QHS for diabetic neuropathy  Continued on Seroquel 25 mg QD for sleep, sleeping problems resolved with treatment of infection  Patient was continued on home regimens for hypertension and diabetes  Hemoglobin A1c  Metformin held while inpatient and restarted upon discharge  Patient's Metamucil and nystatin powder were continued  Patient reported significant pain in bilateral lower extremities well on the unit  Patient was given ibuprofen as needed and Norco Q6 PRN which resolved most of patient's symptoms  Patient requested sequential compression device which he stated also relieved many of his diabetic neuropathy symptoms  Patient was continued on benzonate and Robitussin PRN for cough during admission  Cough, respiratory symptoms, postnasal drip were refractory to initial treatments  Respiratory protocol was initiated and patient received duo nebs with no reported improvement  Patient was given course of humidified air by nursing staff with reported mild improvement  Requested awkward KK compress for chest due to past use with reported improvement  Continued on outpatient Flonase and Claritin, recently prescribed by his PCP  Benadryl 25 mg QHS PRN was also added for allergies  Mucinex was prescribed for patient's persistent postnasal drip and patient reported significant improvement following use  Repeat portable CXR was negative as above  Patient was weight and found to have gained 20 lb since admission  Patient was given 1 time dose of steroids Lasix on 12/01/2019 with reported improvement in respiratory symptoms  One time dose was repeated on 12/02/2019 with further improvement  Patient was discharged on 7 day course of Lasix 40 mg QD for further diuresis  Patient was educated on need for fluid restriction  Patient was provided with incentive spirometry prior to discharge, also with reported improvement  Patient was found to be anemic on CBC during admission with reduced hemoglobin and hematocrit  Patient was unable to recall when he had a previous colonoscopy, but states that it was many years ago  Iron study was ordered and displayed iron saturation, TIBC, and ferritin all within normal limits  Free on iron low at 27  Patient was continued on outpatient iron supplement while inpatient  Recommended patient received colonoscopy and ordered ambulatory referral to gastroenterology upon patient's discharge  Infectious disease recommended switching patient from cefazolin 2 Keflex 1000 mg QID for 5 days to complete 10 day antibiotic course  Patient was also given 5 day supply of Norco for residual pain symptoms  Condition at Discharge: good     Discharge Day Visit / Exam:     Subjective:  Patient seen at bedside  Patient reported that his legs hurt or last night and he required full use of his pain medication which he reports did relieve his symptoms  Indicates that his cough is improving but did have some production of phlegm last night  Patient reported significant improvement with Lasix and prednisone    Indicates that he was able to remove his nasal cannula without the saturating yesterday for periods of time  Patient voiced desire to be discharged as his respiratory symptoms seem to be resolving  Patient was told that infectious disease would be consulted for approval of discharge planning  Patient voiced understanding  Patient indicates that he was able to sleep, eat, and have bowel movements without difficulty  Reported increased urination while on Lasix  Denied shortness of breath, headache, numbness/tingling, nausea/vomiting, changes in vision or hearing, chest pain, or palpitations  Vitals: Blood Pressure: 140/92 (12/02/19 1128)  Pulse: 104 (12/02/19 1128)  Temperature: 97 8 °F (36 6 °C) (12/02/19 0700)  Temp Source: Temporal (12/02/19 0700)  Respirations: 20 (12/02/19 0700)  Height: 5' 4" (162 6 cm) (11/28/19 1531)  Weight - Scale: (!) 171 kg (377 lb 3 2 oz) (12/02/19 0600)  SpO2: 99 % (12/02/19 0700)  Exam:   Physical Exam   Constitutional: He is oriented to person, place, and time  He appears well-developed and well-nourished  No distress  Obese, with O2 via NC, not in any acute cardiopulmonary distress   HENT:   Head: Normocephalic and atraumatic  Mouth/Throat: Oropharynx is clear and moist  No oropharyngeal exudate  Eyes: Pupils are equal, round, and reactive to light  Conjunctivae and EOM are normal  Right eye exhibits no discharge  Left eye exhibits no discharge  No scleral icterus  Neck: Normal range of motion  Neck supple  No JVD present  No thyromegaly present  Cardiovascular: Normal rate, regular rhythm, normal heart sounds and intact distal pulses  Exam reveals no gallop and no friction rub  No murmur heard  Elevated rate     Pulmonary/Chest: Effort normal and breath sounds normal  No stridor  No respiratory distress  He has no wheezes  He has no rales  Moderate coughing   Abdominal: Soft  Bowel sounds are normal  He exhibits no distension  There is no tenderness  There is no rebound and no guarding   A hernia is present  Musculoskeletal: Normal range of motion  He exhibits edema and tenderness    (+) swelling and erythema L upper thigh   Tenderness to palpation, improving   Lymphadenopathy:     He has no cervical adenopathy  Neurological: He is alert and oriented to person, place, and time  No cranial nerve deficit or sensory deficit  He exhibits normal muscle tone  Skin: Skin is warm  He is not diaphoretic  There is erythema  No pallor  Improved erythema of Upper Medial LLE  Indurated at site  Psychiatric: He has a normal mood and affect  His behavior is normal  Judgment and thought content normal    Nursing note and vitals reviewed  Discussion with Family: Patient and wife    Discharge instructions/Information to patient and family:   See after visit summary for information provided to patient and family  Provisions for Follow-Up Care:  See after visit summary for information related to follow-up care and any pertinent home health orders  Planned Readmission: No     Discharge Medications:  See after visit summary for reconciled discharge medications provided to patient and family        ** Please Note: This note has been constructed using a voice recognition system **

## 2019-12-02 NOTE — PLAN OF CARE
Problem: Potential for Falls  Goal: Patient will remain free of falls  Description  INTERVENTIONS:  - Assess patient frequently for physical needs  -  Identify cognitive and physical deficits and behaviors that affect risk of falls    -  [place] fall precautions as indicated by assessment   - Educate patient/family on patient safety including physical limitations  - Instruct patient to call for assistance with activity based on assessment  - Modify environment to reduce risk of injury  - Consider OT/PT consult to assist with strengthening/mobility  Outcome: Progressing     Problem: Prexisting or High Potential for Compromised Skin Integrity  Goal: Skin integrity is maintained or improved  Description  INTERVENTIONS:  - Identify patients at risk for skin breakdown  - Assess and monitor skin integrity  - Assess and monitor nutrition and hydration status  - Monitor labs   - Assess for incontinence   - Turn and reposition patient  - Assist with mobility/ambulation  - Relieve pressure over bony prominences  - Avoid friction and shearing  - Provide appropriate hygiene as needed including keeping skin clean and dry  - Evaluate need for skin moisturizer/barrier cream  - Collaborate with interdisciplinary team   - Patient/family teaching  - Consider wound care consult   Outcome: Progressing

## 2019-12-02 NOTE — ASSESSMENT & PLAN NOTE
Patient had I&D with wound debridement performed on 9/7/19 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  Wound does not appear infected at this time  No erythema or discharge noted  Cellulitis observed inferiorly    - Patient will follow up with wound care as outpatient with previously established appointment with his wound center   - Monitor site for changes

## 2019-12-02 NOTE — ASSESSMENT & PLAN NOTE
Continue patient's Seroquel 25 mg QD for sleep  Likely to improve with resolution of infection  Patient reports sleep well while on the unit  Does not seem to be a concern currently

## 2019-12-02 NOTE — PROGRESS NOTES
Progress Note - Infectious Disease   Yumi Walsh 48 y o  male MRN: [id number]  Unit/Bed#: S -01 Encounter: [id number]      Impression/Plan:  1- sepsis, POA:  Fever of 102, [place] 8[address], tachycardia  Sepsis is likely secondary to left lower extremity cellulitis  Patient is clinically improved  Temperature is down  WBC normalized  He remains hemodynamically stable  Admission blood cultures remain negative  - supportive care as per primary team  - close clinical monitoring, trend fever curve  - continue antibiotics as below  - follow-up admission blood cultures     2- left lower extremity cellulitis:  Source is most likely left groin wound  Patient is clinically much improved  CT is without abscess or involvement of deeper structure  At this point, antibiotic can be transitioned to p o  Pain Given morbid obesity, will treat patient with higher dose p o  Antibiotic on for longer course  - change antibiotic to high-dose (1000 mg q i d ) p o  Keflex  - serial exam  -  treat x 10 days total, another 5 days      3- diabetes mellitus type 2:  Previous HbA1c 8 3%  - management as per primary team      4- morbid obesity:  Along with chronic lower extremity venous stasis and lymphedema  These factors, along with diabetes, are predisposing patient to recurrence of his cellulitis   - recommend continued aggressive leg elevation to control edema     5- cough and shortness of breath:  Patient attributes his cough to postnasal drip  Chest x-ray shows some vascular congestion, no evidence of pneumonia  - monitor respiratory status  - treatment of postnasal drip as per primary team     Discussed with patient and his wife in detail regarding the above plan  Okay for discharge from ID viewpoint      Antibiotics:  Cefazolin # 5     Subjective:  Patient feels better  Left leg is still swollen but pain is improved  Temperature stays down  No chills  He is tolerating antibiotic well    No nausea, vomiting or diarrhea  Objective:  Vitals:  Temp:  [97 8 °F (36 6 °C)-98 9 °F (37 2 °C)] 97 8 °F (36 6 °C)  HR:  [107-113] 108  Resp:  [20] 20  BP: (115-136)/(65-85) 118/69  SpO2:  [98 %-99 %] 99 %  Temp (24hrs), Av 5 °F (36 9 °C), Min:97 8 °F (36 6 °C), Max:98 9 °F (37 2 °C)  Current: Temperature: 97 8 °F (36 6 °C)    Physical Exam:     General: Awake, alert, cooperative, no distress  Neck:  Supple  No mass  No lymphadenopathy  Lungs: Expansion symmetric, no rales, no wheezing, respirations unlabored  Heart:  Regular rate and rhythm, S1 and S2 normal, no murmur  Abdomen: Soft, nondistended, non-tender, bowel sounds active all four quadrants,        no masses, no organomegaly  Extremities: 2+ leg edema  Moderately severe venous stasis changes  Mild erythema/warmth  No ulcer  Mild tenderness diffusely  Skin:  No rash  Neuro: Moves all extremities  Invasive Devices     Peripheral Intravenous Line            Peripheral IV 19 Left Antecubital 3 days    Peripheral IV 19 Right Antecubital 3 days                Labs studies:   I have personally reviewed pertinent labs  Results from last 7 days   Lab Units 19  0650 19  0519  0704 19  1325   POTASSIUM mmol/L 4 5 4 3 4 0 4 3   CHLORIDE mmol/L 103 106 104 97*   CO2 mmol/L 24 25 28 24   BUN mg/dL 23 27* 18 17   CREATININE mg/dL 1 06 1 21 1 02 1 03   EGFR ml/min/1 73sq m 80 68 84 83   CALCIUM mg/dL 9 0 9 0 9 1 9 0   AST U/L  --   --   --  35   ALT U/L  --   --   --  37   ALK PHOS U/L  --   --   --  90     Results from last 7 days   Lab Units 19  0650 19  0519 19  0704   WBC Thousand/uL 6 65 7 72 11 78*   HEMOGLOBIN g/dL 8 9* 8 4* 9 5*   PLATELETS Thousands/uL 214 225 223     Results from last 7 days   Lab Units 19  1327 19  1325   BLOOD CULTURE  No Growth at 72 hrs  No Growth at 72 hrs         Imaging Studies:   I have personally reviewed pertinent imaging study reports and images in PACS     EKG, Pathology, and Other Studies:   I have personally reviewed pertinent reports

## 2019-12-02 NOTE — PLAN OF CARE
Problem: Potential for Falls  Goal: Patient will remain free of falls  Description  INTERVENTIONS:  - Assess patient frequently for physical needs  -  Identify cognitive and physical deficits and behaviors that affect risk of falls    -  [place] fall precautions as indicated by assessment   - Educate patient/family on patient safety including physical limitations  - Instruct patient to call for assistance with activity based on assessment  - Modify environment to reduce risk of injury  - Consider OT/PT consult to assist with strengthening/mobility  Outcome: Progressing

## 2019-12-02 NOTE — ASSESSMENT & PLAN NOTE
Patient has history of recurrent cellulitis  Presents today with fever, tachycardia, SOB, dizziness, diaphoresis and cellulitis of the upper LLE  Received 7-day course of keflex as outpatient two days ago but is refractory to treatment  Meets SIRS criteria  Given Cefepime and Vancomycin in ED with 1 L bolus NS  Currently patient reports overall improvement from initial presentation  Erythema and warmth worse on 12/2/19     - CXR showed cardiomegaly and mild vascular congestion   - EKG showed sinus tachycardia at 132 bpm   - Lactic acid 1 6  - PT elevated at 15 2  PTT wnl   - Procalcitonin 1 25  - Blood cultures preliminary results showed no growth 72 hours  - CT of the left femur showed: Skin thickening at the left groin surgical site; Subcutaneous edema noted at the medial aspect of both lower thighs, progressed from prior; No evidence of soft tissue air  - Ultrasound of LLE showed: (-) DVT  - Patient was treated with Cefazolin 2g IV Q8 while inpatient  Discharged with 5 days of Keflex 1000 mg QID per infectious disease to complete the 10 day course of antibiotics  ID approves discharge from their standpoint   - Pain control with Ibuprofen 800 Q8 PRN for mild or moderate pain and Norco 5-325 Q6 PRN  Sequential compression device was ordered at patient's request as he reported improvement in pain the the past with that method  Reported improvement upon use  Patient will be discharged with 5 day supply of Norco for residual pain symptoms

## 2019-12-02 NOTE — PLAN OF CARE
Problem: Potential for Falls  Goal: Patient will remain free of falls  Description  INTERVENTIONS:  - Assess patient frequently for physical needs  -  Identify cognitive and physical deficits and behaviors that affect risk of falls    -  [place] fall precautions as indicated by assessment   - Educate patient/family on patient safety including physical limitations  - Instruct patient to call for assistance with activity based on assessment  - Modify environment to reduce risk of injury  - Consider OT/PT consult to assist with strengthening/mobility  12/2/2019 0330 by Anjana Srivastava RN  Outcome: Progressing  12/1/2019 2059 by Anjana Srivastava RN  Outcome: Progressing

## 2019-12-02 NOTE — ASSESSMENT & PLAN NOTE
Lab Results   Component Value Date    HGBA1C 8 3 (H) 11/05/2019       Recent Labs     12/01/19  1109 12/01/19  1604 12/01/19  2145 12/02/19  0725   POCGLU 171* 199* 306* 263*       Blood Sugar Average: Last 72 hrs:  (P) 161 4   Patient's outpatient insulin regimen was continued while inpatient and metformin was held  Patient should continue metformin as outpatient  Blood sugars elevated following dose of steroids 12/2/19    Continue to monitor blood sugars

## 2019-12-03 ENCOUNTER — TELEPHONE (OUTPATIENT)
Dept: INTERNAL MEDICINE CLINIC | Facility: CLINIC | Age: 54
End: 2019-12-03

## 2019-12-03 ENCOUNTER — TRANSITIONAL CARE MANAGEMENT (OUTPATIENT)
Dept: INTERNAL MEDICINE CLINIC | Facility: CLINIC | Age: 54
End: 2019-12-03

## 2019-12-03 DIAGNOSIS — R05.9 COUGH: Primary | ICD-10-CM

## 2019-12-03 LAB
BACTERIA BLD CULT: NORMAL
BACTERIA BLD CULT: NORMAL

## 2019-12-03 RX ORDER — BENZONATATE 100 MG/1
100 CAPSULE ORAL 3 TIMES DAILY PRN
Qty: 20 CAPSULE | Refills: 0 | Status: SHIPPED | OUTPATIENT
Start: 2019-12-03 | End: 2019-12-18 | Stop reason: SDUPTHER

## 2019-12-03 NOTE — TELEPHONE ENCOUNTER
Mr Steven Hay called after being discharged from the hospital with left lower lobe cellulitis to clarify his medications on discharge  His significant other was also with him on the phone  I reviewed his discharge documentation and medication list   I reviewed his lab work and images personally  He asked about his blood pressure medications, he was on lisinopril and metoprolol  before, but during hospitalization his blood pressure was low and he is concerned about taking both medications at the same time  Explained to him that he should continue lisinopril 5 mg once daily for now, and he should check his blood pressure at home  He was instructed to start metoprolol 25 mg twice a day if his blood pressure is above 140/90 or if his heart rate is above 115  He also requested a refill for [employer], since he is having cough for several weeks, he was worked up in the hospital, chest x-ray was negative for pneumonia, he had given a trial of Lasix and prednisone with improvement of his symptoms  Currently he is Lasix, and he asked for a prescription of steroids, I explained to him that at this point we will hold on steroids since he just is recovering for an episode infection, and will re-evaluate him when he comes for a visit, likely this week  Patient and significant other agree with the plan  I spoke with the hospitalist who discharged him, and he also agrees the steroid will be only considered if he does not improve from his cough  We will also consider changing his ACEi to ARB if cough does not improve

## 2019-12-04 NOTE — TELEPHONE ENCOUNTER
I spoke with Uzma Montgomery wife and she wanted to be sure that you did discuss with the hospitalist the need for Erica Sena to be sent home on a steroid  I called to do the transition of care call and she said after speaking with you the other day she did not think it was necessary for cinthya to come to the office for a follow up at this time  She also wants feedback about a conversation being had with the hospitalist who discharged Erica Sena regarding the need for steroid for his breathing  Please document any conversation convey to her either yourself or staff can call  Also state if you think he is in need of a follow up at this time   Thank you

## 2019-12-05 ENCOUNTER — TELEPHONE (OUTPATIENT)
Dept: ENDOCRINOLOGY | Facility: CLINIC | Age: 54
End: 2019-12-05

## 2019-12-05 NOTE — TELEPHONE ENCOUNTER
----- Message from Amol Aguilar sent at 12/5/2019 10:12 AM EST -----  Regarding: Non-Urgent Medical Question  Contact: [phone]  Latest readings after Prednisone    Sadaf Patton

## 2019-12-05 NOTE — TELEPHONE ENCOUNTER
I was able to talk to hospitalist about the use of steroids  He recommended Mr Azul to be re-evaluated to see the need steroids by his PCP if the cough does not improve  I would like to see the patient  no later than next week so we can reassess that concern, and also exclude other causes of chronic cough  He is currently on Lasix, and symptomatic treatment for allergic rhinitis/seasonal allergies and would like to wait to see if that will improve his symptoms    I also told him to check his blood pressure so we can re-evaluate the need to continue his metoprolol, since he had low blood pressure in the hospital

## 2019-12-05 NOTE — QUICK NOTE
Received tiger text from patients PCP office inquiring his PPi dosage and steroids which he thought would be continued outpatient  Patient was contacted at [phone]  Patient was reminded of attendings recommendation to ask PCP for medrol dose pack if his respiratory symptoms worsened in the following 48-72 after discharge  Patient voiced understanding  Patient was informed that this interviewer would contact his pharmacy [facility] on [address] to increase his omeprazole to 40 mg BID  Verbal orders placed with pharmacy  Patient will  at his convenience

## 2019-12-06 NOTE — TELEPHONE ENCOUNTER
Spoke to patient and provided the following  info from Dr Jhonny Elizalde:   Increase 70/30 for now to 75 units in AM and 45 units in PM     Send me BGs middle of next week for further dose adjustment    Patient stated that his BG have come in the last few days, they were 147, 169,253,719  So he does not think he should make dose adjustment to insulin   Wanted me to asked Dr Jhonny Elizalde to see what he thinks

## 2019-12-06 NOTE — TELEPHONE ENCOUNTER
That is okay, glad blood sugars have come down now  Can resume lower dose 70/30 insulin 68 units in AM and 40 units in Violet ELDER    Endocrinology

## 2019-12-06 NOTE — TELEPHONE ENCOUNTER
Remedios Raymundo,    Please call patient and let him know I reviewed his BGs    -having some BGs in 200s related to recent steroid use and cellulitis     Increase 70/30 for now to 75 units in AM and 45 units in PM    Send me BGs middle of next week for further dose adjustment    Pinyk ELDER    Endocrinology

## 2019-12-09 ENCOUNTER — APPOINTMENT (OUTPATIENT)
Dept: WOUND CARE | Facility: HOSPITAL | Age: 54
End: 2019-12-09

## 2019-12-09 PROCEDURE — 99212 OFFICE O/P EST SF 10 MIN: CPT

## 2019-12-09 NOTE — TELEPHONE ENCOUNTER
Called left message that Dr Jeanette Rivas advised to resume 70/30 insulin- 68 units in AM and 40 units in PM

## 2019-12-17 ENCOUNTER — TELEPHONE (OUTPATIENT)
Dept: ENDOCRINOLOGY | Facility: CLINIC | Age: 54
End: 2019-12-17

## 2019-12-17 NOTE — TELEPHONE ENCOUNTER
Wayne Ortiz,    Please call pt and let him know I reviewed his BGs    -majority of blood sugars at goal, only one low BG 64 at 1:34PM on 12/14/19    Continue 70/30 insulin at 68 units in the morning before breakfast and 40 units in the evening before dinner  Please let me know if developing more frequent low BGs <70 and then we can reduce the doses slightly  Nona ELDER    Endocrinology

## 2019-12-18 ENCOUNTER — OFFICE VISIT (OUTPATIENT)
Dept: INTERNAL MEDICINE CLINIC | Facility: CLINIC | Age: 54
End: 2019-12-18

## 2019-12-18 ENCOUNTER — TELEPHONE (OUTPATIENT)
Dept: INTERNAL MEDICINE CLINIC | Facility: CLINIC | Age: 54
End: 2019-12-18

## 2019-12-18 VITALS
OXYGEN SATURATION: 100 % | TEMPERATURE: 98.7 F | HEART RATE: 113 BPM | SYSTOLIC BLOOD PRESSURE: 126 MMHG | DIASTOLIC BLOOD PRESSURE: 86 MMHG

## 2019-12-18 DIAGNOSIS — E66.01 MORBID OBESITY WITH BMI OF 60.0-69.9, ADULT (HCC): ICD-10-CM

## 2019-12-18 DIAGNOSIS — R05.3 CHRONIC COUGH: ICD-10-CM

## 2019-12-18 DIAGNOSIS — I10 ESSENTIAL HYPERTENSION: ICD-10-CM

## 2019-12-18 DIAGNOSIS — E11.49 OTHER DIABETIC NEUROLOGICAL COMPLICATION ASSOCIATED WITH TYPE 2 DIABETES MELLITUS (HCC): Primary | ICD-10-CM

## 2019-12-18 DIAGNOSIS — D63.8 ANEMIA IN OTHER CHRONIC DISEASES CLASSIFIED ELSEWHERE: ICD-10-CM

## 2019-12-18 PROBLEM — A41.9 SEVERE SEPSIS (HCC): Status: RESOLVED | Noted: 2019-10-22 | Resolved: 2019-12-18

## 2019-12-18 PROBLEM — R65.20 SEVERE SEPSIS (HCC): Status: RESOLVED | Noted: 2019-10-22 | Resolved: 2019-12-18

## 2019-12-18 PROCEDURE — 99214 OFFICE O/P EST MOD 30 MIN: CPT | Performed by: INTERNAL MEDICINE

## 2019-12-18 RX ORDER — GABAPENTIN 300 MG/1
300 CAPSULE ORAL
Qty: 30 CAPSULE | Refills: 1 | Status: SHIPPED | OUTPATIENT
Start: 2019-12-18 | End: 2020-03-11

## 2019-12-18 RX ORDER — LOSARTAN POTASSIUM 25 MG/1
25 TABLET ORAL DAILY
Qty: 90 TABLET | Refills: 1 | Status: SHIPPED | OUTPATIENT
Start: 2019-12-18 | End: 2020-05-05 | Stop reason: SDUPTHER

## 2019-12-18 RX ORDER — GABAPENTIN 100 MG/1
200 CAPSULE ORAL 2 TIMES DAILY
Qty: 120 CAPSULE | Refills: 2 | Status: SHIPPED | OUTPATIENT
Start: 2019-12-18 | End: 2020-03-11

## 2019-12-18 NOTE — ASSESSMENT & PLAN NOTE
Likely a combination of iron deficiency anemia and anemia of chronic disease with multiple episodes leg cellulitis and sepsis in 2019  He has persistent tachycardia and that could be related to his anemia  His currently on iron supplementation  Continue that for now  Check CBC in 1 month  He will need colonoscopy,  Referral was made but will follow up again next visit

## 2019-12-18 NOTE — ASSESSMENT & PLAN NOTE
Oriented patient to stop taking lisinopril due to the possible side effect with cough, start losartan 25 mg daily  Restart metoprolol 25 mg twice daily

## 2019-12-18 NOTE — ASSESSMENT & PLAN NOTE
Patient is trying to exercise more than 3 times week and trying to eat healthy, cutting his carbohydrate intake

## 2019-12-18 NOTE — PATIENT INSTRUCTIONS
Anemia   AMBULATORY CARE:   Anemia  is a low number of red blood cells or a low amount of hemoglobin in your red blood cells  Hemoglobin is a protein that helps carry oxygen throughout your body  Red blood cells use iron to create hemoglobin  Anemia may develop if your body does not have enough iron  It may also develop if your body does not make enough red blood cells or they die faster than your body can make them  Common symptoms include the following:   · Chest pain or a fast heartbeat    · Lightheadedness, dizziness, or shortness of breath    · Cold or pale skin    · Tiredness, weakness, or confusion  Call 911 or have someone call 911 for any of the following:   · You lose consciousness  · You have severe chest pain  Seek care immediately if:   · You have dark or bloody bowel movements  Contact your healthcare provider if:   · Your symptoms are worse, even after treatment  · You have questions or concerns about your condition or care  Treatment for anemia  may include any of the following:  · Iron or folic acid supplements  help increase your red blood cell and hemoglobin levels  · Vitamin B12 injections  may help boost your red blood cell level and decrease your symptoms  Ask your healthcare provider how to inject B12  Prevent anemia:  Eat healthy foods rich in iron and vitamin C  Nuts, meat, dark leafy green vegetables, and beans are high in iron and protein  Vitamin C helps your body absorb iron  Foods rich in vitamin C include oranges and other citrus fruits  Ask your healthcare provider for a list of other foods that are high in iron or vitamin C  Ask if you need to be on a special diet  Follow up with your healthcare provider as directed:  Write down your questions so you remember to ask them during your visits  © 2017 [facility] Information is for End User's use only and may not be sold, redistributed or otherwise used for commercial purposes   All illustrations and images included in CareNotes® are the copyrighted property of [employer] , [facility]  or Travis Fenton  The above information is an  only  It is not intended as medical advice for individual conditions or treatments  Talk to your doctor, nurse or pharmacist before following any medical regimen to see if it is safe and effective for you  Please do a blood work 1 week before the appointment  Stop taking lisinopril 5 mg daily  Start taking losartan 25mg daily  Re-start taking metoprolol twice a day    Happy holidays and see you in 1 month!

## 2019-12-18 NOTE — PROGRESS NOTES
Assessment/Plan:    Anemia    Likely a combination of iron deficiency anemia and anemia of chronic disease with multiple episodes leg cellulitis and sepsis in 2019  He has persistent tachycardia and that could be related to his anemia  His currently on iron supplementation  Continue that for now  Check CBC in 1 month  He will need colonoscopy,  Referral was made but will follow up again next visit  Open wound of left thigh   Follows with wound care    Diabetic neuropathy ([facility] )    Lab Results   Component Value Date    HGBA1C 8 3 (H) 11/05/2019      patient has bilateral lower extremity neuropathy, his currently on gabapentin 300 mg in the morning from his mg afternoon  Will change the regimen to 200 mg in the morning, 200 mg at lunchtime and 300 mg at bedtime and re-evaluate in 1 month  Scripts were given  He follow with endocrinology and his current regimen has worked well for him with better glycemic control  Chronic cough   Symptoms are more compatible with GERD, he is already  empirical treatment with PPI twice a day  Continue that for now  He recently started on ACE inhibitors in October, will switch to losartan since that can be another cause of cough  Chest x-ray done last admission November 2019 showed only mild pulmonary congestion  Review of his echocardiogram from September 2019 showed normal ejection fraction to 65% and no wall motion abnormality  Recommended him continue Flonase, stop Mucinex and Tessalon Perles  We will re-evaluate in 1 month    Essential hypertension    Oriented patient to stop taking lisinopril due to the possible side effect with cough, start losartan 25 mg daily  Restart metoprolol 25 mg twice daily  Morbid obesity with BMI of 60 0-69 9, adult ([facility] )   Patient is trying to exercise more than 3 times week and trying to eat healthy, cutting his carbohydrate intake  Will check BMP to evaluate his kidney function in 1 month    His creatinine was up to 1 06 from his baseline of 0 66  This was during the use of NSAID's and episode of sepsis  Oriented to avoid NSAID's  Diagnoses and all orders for this visit:    Other diabetic neurological complication associated with type 2 diabetes mellitus ([facility] )  -     Basic metabolic panel; Future  -     gabapentin (NEURONTIN) 100 mg capsule; Take 2 capsules (200 mg total) by mouth 2 (two) times a day Take 200 mg by mouth in the morning and 200 mg at lunchtime in addition to 300 mg at night  -     gabapentin (NEURONTIN) 300 mg capsule; Take 1 capsule (300 mg total) by mouth daily at bedtime    Anemia in other chronic diseases classified elsewhere  -     CBC and differential; Future    Chronic cough    Essential hypertension  -     losartan (COZAAR) 25 mg tablet; Take 1 tablet (25 mg total) by mouth daily    Morbid obesity with BMI of 60 0-69 9, adult ([facility])          Subjective:      Patient ID: Phyllis Jc is a 48 y o  male  This is a 61-year-old male here for a follow-up visit after admission in the hospital in November 2019 due to recurrent episode of cellulitis  He was seen in this office after he started to signs and symptoms left lower extremity cellulitis, he was prescribed Keflex, but he did not improve, he had worsening pain and persistent fever, he was admitted in the hospital on Thanksgiving Day, he was treated with IV antibiotics and he was discharged on Keflex, which he finished  After his discharge in the hospital I had opportunity to talk to him over the phone, we reconciled his medications  At that time he was afraid to take all his blood pressure pills at once because of episode of  Low blood pressure in the hospital   The that time I oriented him to continue lisinopril 5 mg daily and hold off on metoprolol     He notice increased heart rate to more than 100 beats per minute for the past few weeks after discharge and he called the office today to talk to me about that, neuropathy on his feet and his chronic cough and I told him to come in for a visit to be evaluated  He states that he has chronic cough that has lasted for several weeks  I noticed that he has been on lisinopril since October 2019, and he believes that was around the time that he also started with a chronic cough  He has been treated for seasonal allergies and allergic rhinitis with minimal improvement  He also states that he usually feels the cough is worse after he eats or his laid down flat  He is currently on PPI twice daily  Patient also complains of worsening neuropathy on bilateral feet, but mostly in the left lower leg an feet  His gabapentin dose was increased from 300 mg twice a day to 300 mg in the morning and 500 mg at night about 1 month ago  He states that he had minimal improve with that  He has being seeing wound care, last visit was a week ago, he reports that wound care told him the wound is healing well, and he is using dressing at home  His next appointment is in 2 weeks  He also states that he stopped taking Seroquel because it was not helping him to sleep,  rather making him anxious and he decided to stop it by himself  He states that now he only takes naps during the day, he is sleeping on average of 6 hours a day and he has states that he feels better than taking the medication  I notice on review of his blood work that he has anemia,  likely a combination of her iron-deficiency anemia and anemia of chronic disease  He is currently on iron supplementation  He also had increased creatinine from baseline, from 0 66 to 1 06, and that could be from his use of NSAID's and episodes of sepsis  Since hospital discharge he has been more active, he has been able to do strengthening exercises and aerobic exercises, he is trying to eat healthy, he did increase his intake of red meat due to his anemia        The following portions of the patient's history were reviewed and updated as appropriate: allergies, current medications, past family history, past medical history, past social history, past surgical history and problem list     Review of Systems   Constitutional: Negative for appetite change, chills, fatigue and fever  HENT: Positive for congestion and postnasal drip  Negative for rhinorrhea, sore throat and trouble swallowing  Respiratory: Positive for cough  Negative for chest tightness, shortness of breath and wheezing  Cardiovascular: Positive for leg swelling  Negative for chest pain and palpitations  Elevated heart rate   Gastrointestinal: Negative for abdominal pain, constipation, diarrhea, nausea and vomiting  Endocrine: Negative for cold intolerance, heat intolerance, polydipsia and polyphagia  Genitourinary: Negative for dysuria, flank pain, frequency and hematuria  Musculoskeletal: Negative for arthralgias and joint swelling  Skin: Positive for pallor and wound  Negative for rash  Neurological: Negative for dizziness, weakness, light-headedness and numbness  Psychiatric/Behavioral: Positive for sleep disturbance  Negative for agitation  The patient is not nervous/anxious  Objective:      /86 (BP Location: Left arm, Patient Position: Sitting, Cuff Size: Large)   Pulse (!) 113   Temp 98 7 °F (37 1 °C)   SpO2 100%          Physical Exam   Constitutional: He is oriented to person, place, and time  He appears well-developed  Obese   HENT:   Head: Normocephalic and atraumatic  Mouth/Throat: Oropharynx is clear and moist  No oropharyngeal exudate  Poor dentition   Eyes: Pupils are equal, round, and reactive to light  EOM are normal    Pale conjuctivae   Neck: Normal range of motion  Neck supple  Cardiovascular: Normal rate, regular rhythm and normal heart sounds  Pulmonary/Chest: Breath sounds normal  No respiratory distress  He has no wheezes  He has no rales  Abdominal:   obese   Musculoskeletal: Normal range of motion   Edema: non-pitting on bilateral lower extremities  Lymphadenopathy:     He has no cervical adenopathy  Neurological: He is alert and oriented to person, place, and time  No sensory deficit  He exhibits normal muscle tone  Skin: Skin is warm and dry  Capillary refill takes less than 2 seconds  Venous stasis skin changes in lower legs, dry scaly skin, non-pitting edema  Wound on left groin is dressed and dress is clean, no  surrounding erythema   Skin maceration between toes - oriented hygiene and OCT antifungal   Psychiatric: He has a normal mood and affect  Judgment normal    Nursing note and vitals reviewed  BMI Counseling: There is no height or weight on file to calculate BMI  The BMI is above normal  Nutrition recommendations include reducing portion sizes, decreasing overall calorie intake, 3-5 servings of fruits/vegetables daily, reducing fast food intake, moderation in carbohydrate intake, reducing intake of saturated fat and trans fat and reducing intake of cholesterol  Exercise recommendations include moderate aerobic physical activity for 150 minutes/week and strength training exercises

## 2019-12-18 NOTE — ASSESSMENT & PLAN NOTE
Lab Results   Component Value Date    HGBA1C 8 3 (H) 11/05/2019      patient has bilateral lower extremity neuropathy, his currently on gabapentin 300 mg in the morning from his mg afternoon  Will change the regimen to 200 mg in the morning, 200 mg at lunchtime and 300 mg at bedtime and re-evaluate in 1 month  Scripts were given  He follow with endocrinology and his current regimen has worked well for him with better glycemic control

## 2019-12-30 ENCOUNTER — TELEPHONE (OUTPATIENT)
Dept: ENDOCRINOLOGY | Facility: CLINIC | Age: 54
End: 2019-12-30

## 2019-12-30 NOTE — TELEPHONE ENCOUNTER
Candelario Welsh,    Please let patient know I reviewed his BG logs    -BGs look very good, continue same regimen 70/30 68 units in AM and 40 units in Riya ELDER    Endocrinology

## 2020-01-13 ENCOUNTER — TELEPHONE (OUTPATIENT)
Dept: ENDOCRINOLOGY | Facility: CLINIC | Age: 55
End: 2020-01-13

## 2020-01-13 ENCOUNTER — APPOINTMENT (OUTPATIENT)
Dept: WOUND CARE | Facility: HOSPITAL | Age: 55
End: 2020-01-13

## 2020-01-13 PROCEDURE — 99213 OFFICE O/P EST LOW 20 MIN: CPT

## 2020-01-13 NOTE — TELEPHONE ENCOUNTER
Hayley Serrano,    Please let patient know I reviewed his BG log    BGs look great, continue same regimen for now    Tonja ELDER    Endocrinology

## 2020-01-18 ENCOUNTER — APPOINTMENT (OUTPATIENT)
Dept: LAB | Facility: CLINIC | Age: 55
End: 2020-01-18

## 2020-01-18 DIAGNOSIS — E11.8 TYPE 2 DIABETES MELLITUS WITH COMPLICATION (HCC): ICD-10-CM

## 2020-01-18 DIAGNOSIS — D63.8 ANEMIA IN OTHER CHRONIC DISEASES CLASSIFIED ELSEWHERE: ICD-10-CM

## 2020-01-18 DIAGNOSIS — E11.49 OTHER DIABETIC NEUROLOGICAL COMPLICATION ASSOCIATED WITH TYPE 2 DIABETES MELLITUS (HCC): ICD-10-CM

## 2020-01-18 LAB
ANION GAP SERPL CALCULATED.3IONS-SCNC: 10 MMOL/L (ref 4–13)
BASOPHILS # BLD AUTO: 0.08 THOUSANDS/ΜL (ref 0–0.1)
BASOPHILS NFR BLD AUTO: 1 % (ref 0–1)
BUN SERPL-MCNC: 24 MG/DL (ref 5–25)
CALCIUM SERPL-MCNC: 9.3 MG/DL (ref 8.3–10.1)
CHLORIDE SERPL-SCNC: 102 MMOL/L (ref 100–108)
CO2 SERPL-SCNC: 27 MMOL/L (ref 21–32)
CREAT SERPL-MCNC: 1.08 MG/DL (ref 0.6–1.3)
EOSINOPHIL # BLD AUTO: 0.84 THOUSAND/ΜL (ref 0–0.61)
EOSINOPHIL NFR BLD AUTO: 12 % (ref 0–6)
ERYTHROCYTE [DISTWIDTH] IN BLOOD BY AUTOMATED COUNT: 20.5 % (ref 11.6–15.1)
EST. AVERAGE GLUCOSE BLD GHB EST-MCNC: 140 MG/DL
GFR SERPL CREATININE-BSD FRML MDRD: 78 ML/MIN/1.73SQ M
GLUCOSE P FAST SERPL-MCNC: 131 MG/DL (ref 65–99)
HBA1C MFR BLD: 6.5 % (ref 4.2–6.3)
HCT VFR BLD AUTO: 38.7 % (ref 36.5–49.3)
HGB BLD-MCNC: 11.6 G/DL (ref 12–17)
IMM GRANULOCYTES # BLD AUTO: 0.01 THOUSAND/UL (ref 0–0.2)
IMM GRANULOCYTES NFR BLD AUTO: 0 % (ref 0–2)
LYMPHOCYTES # BLD AUTO: 2.1 THOUSANDS/ΜL (ref 0.6–4.47)
LYMPHOCYTES NFR BLD AUTO: 30 % (ref 14–44)
MCH RBC QN AUTO: 23.9 PG (ref 26.8–34.3)
MCHC RBC AUTO-ENTMCNC: 30 G/DL (ref 31.4–37.4)
MCV RBC AUTO: 80 FL (ref 82–98)
MONOCYTES # BLD AUTO: 0.64 THOUSAND/ΜL (ref 0.17–1.22)
MONOCYTES NFR BLD AUTO: 9 % (ref 4–12)
NEUTROPHILS # BLD AUTO: 3.46 THOUSANDS/ΜL (ref 1.85–7.62)
NEUTS SEG NFR BLD AUTO: 48 % (ref 43–75)
NRBC BLD AUTO-RTO: 0 /100 WBCS
PLATELET # BLD AUTO: 204 THOUSANDS/UL (ref 149–390)
PMV BLD AUTO: 8.9 FL (ref 8.9–12.7)
POTASSIUM SERPL-SCNC: 4.3 MMOL/L (ref 3.5–5.3)
RBC # BLD AUTO: 4.86 MILLION/UL (ref 3.88–5.62)
SODIUM SERPL-SCNC: 139 MMOL/L (ref 136–145)
WBC # BLD AUTO: 7.13 THOUSAND/UL (ref 4.31–10.16)

## 2020-01-18 PROCEDURE — 80048 BASIC METABOLIC PNL TOTAL CA: CPT

## 2020-01-18 PROCEDURE — 3044F HG A1C LEVEL LT 7.0%: CPT | Performed by: INTERNAL MEDICINE

## 2020-01-18 PROCEDURE — 85025 COMPLETE CBC W/AUTO DIFF WBC: CPT

## 2020-01-18 PROCEDURE — 83036 HEMOGLOBIN GLYCOSYLATED A1C: CPT

## 2020-01-18 PROCEDURE — 36415 COLL VENOUS BLD VENIPUNCTURE: CPT

## 2020-01-21 ENCOUNTER — OFFICE VISIT (OUTPATIENT)
Dept: INTERNAL MEDICINE CLINIC | Facility: CLINIC | Age: 55
End: 2020-01-21

## 2020-01-21 VITALS — HEART RATE: 84 BPM | SYSTOLIC BLOOD PRESSURE: 122 MMHG | TEMPERATURE: 99.7 F | DIASTOLIC BLOOD PRESSURE: 78 MMHG

## 2020-01-21 DIAGNOSIS — I10 ESSENTIAL HYPERTENSION: Primary | ICD-10-CM

## 2020-01-21 DIAGNOSIS — D63.8 ANEMIA IN OTHER CHRONIC DISEASES CLASSIFIED ELSEWHERE: ICD-10-CM

## 2020-01-21 DIAGNOSIS — Z79.4 TYPE 2 DIABETES MELLITUS WITH HYPERGLYCEMIA, WITH LONG-TERM CURRENT USE OF INSULIN (HCC): ICD-10-CM

## 2020-01-21 DIAGNOSIS — E11.65 TYPE 2 DIABETES MELLITUS WITH HYPERGLYCEMIA, WITH LONG-TERM CURRENT USE OF INSULIN (HCC): ICD-10-CM

## 2020-01-21 DIAGNOSIS — E78.00 PURE HYPERCHOLESTEROLEMIA: ICD-10-CM

## 2020-01-21 DIAGNOSIS — Z11.4 SCREENING FOR HIV (HUMAN IMMUNODEFICIENCY VIRUS): ICD-10-CM

## 2020-01-21 DIAGNOSIS — E11.8 TYPE 2 DIABETES MELLITUS WITH COMPLICATION (HCC): ICD-10-CM

## 2020-01-21 DIAGNOSIS — E11.49 OTHER DIABETIC NEUROLOGICAL COMPLICATION ASSOCIATED WITH TYPE 2 DIABETES MELLITUS (HCC): ICD-10-CM

## 2020-01-21 PROBLEM — R05.3 CHRONIC COUGH: Status: RESOLVED | Noted: 2019-10-20 | Resolved: 2020-01-21

## 2020-01-21 PROCEDURE — 99214 OFFICE O/P EST MOD 30 MIN: CPT | Performed by: INTERNAL MEDICINE

## 2020-01-21 NOTE — ASSESSMENT & PLAN NOTE
Lab Results   Component Value Date    HGBA1C 6 5 (H) 01/18/2020   Patient still having significant neuropathy at night and has keep him from sleeping well  Will increase night dose from 300mg to 400mg and re-evaluate in 1 week  If no improvement will try alternative treatment  I don't think increase much more the gabapentin will give him enough benefit without risk of side effects

## 2020-01-21 NOTE — ASSESSMENT & PLAN NOTE
Had side effects with statin, does not want to be put back on  I explained to him we could try different statin, but he rather try lifestyle modification and repeat lipid panel in 3 months

## 2020-01-21 NOTE — ASSESSMENT & PLAN NOTE
Lab Results   Component Value Date    HGBA1C 6 5 (H) 01/18/2020   HgA1c improved significantly  He is on insulin 70/30 and he is following with endocrinology  Diabetic foot exam done today  Mild skin maceration noted between 4th and 5th toes - oriented hygiene and OCT  Fungal topical treatment  He is trying to get his insuranie sort out to go to Ophthalmology for eye exam  On losartan for kidney protection  LDL goal is less than 70, currently 112, he states he had erectile dysfunction with statin and would like to try diet and lyfestyle modification

## 2020-01-21 NOTE — ASSESSMENT & PLAN NOTE
Improved Hg from 8 9 to 11 6  Tachycardia improved  Continue iron supplementation and repeat CBC in 3 months

## 2020-01-21 NOTE — PATIENT INSTRUCTIONS
Continue taking your medications as prescribed  Increase gabapentin to 400 mg at night for 1 week  Your A1c is down to 6 5%, you are doing a very good job!   Please follow up with Ophthalmology for annual diabetic eye exam  Please follow up with Endocrinology for your diabetes medications  Please do a blood work around 1 week before the next visit in 3 months

## 2020-01-21 NOTE — PROGRESS NOTES
Diabetic Foot Exam    Patient's shoes and socks removed  Right Foot/Ankle   Right Foot Inspection  Skin Exam: skin normal, skin intact, dry skin and maceration no warmth, no callus, no erythema, no abnormal color, no pre-ulcer, no ulcer and no callus                            Sensory       Monofilament testing: intact  Vascular  Capillary refills: < 3 seconds  The right DP pulse is 2+  The right PT pulse is 2+  Left Foot/Ankle  Left Foot Inspection  Skin Exam: skin normal, skin intact, dry skin and macerationno warmth, no erythema, normal color, no pre-ulcer, no ulcer and no callus                                         Sensory       Monofilament: intact  Vascular  Capillary refills: < 3 seconds  The left DP pulse is 2+  The left PT pulse is 2+  Assign Risk Category:  No deformity present; No loss of protective sensation; No weak pulses       Risk: 0  Assessment/Plan:    Type 2 diabetes mellitus with hyperglycemia, with long-term current use of insulin ([facility])    Lab Results   Component Value Date    HGBA1C 6 5 (H) 01/18/2020   HgA1c improved significantly  He is on insulin 70/30 and he is following with endocrinology  Diabetic foot exam done today  Mild skin maceration noted between 4th and 5th toes - oriented hygiene and OCT  Fungal topical treatment  He is trying to get his insuranie sort out to go to Ophthalmology for eye exam  On losartan for kidney protection  LDL goal is less than 70, currently 112, he states he had erectile dysfunction with statin and would like to try diet and lyfestyle modification  Essential hypertension  Well controlled on losartan 25mg daily and metoprolol 25 BID, continue the same    Psoriasis  Small lesions on elbows and abdomen, he states he feel it's related to increase meat intake  He would like to try stop eating meat to see if would improve, he doesn't want topical treatment for now since they are small and don't bother him      Hyperlipidemia  Had side effects with statin, does not want to be put back on  I explained to him we could try different statin, but he rather try lifestyle modification and repeat lipid panel in 3 months  Anemia  Improved Hg from 8 9 to 11 6  Tachycardia improved  Continue iron supplementation and repeat CBC in 3 months  Diabetic neuropathy ([place] [address] )    Lab Results   Component Value Date    HGBA1C 6 5 (H) 01/18/2020   Patient still having significant neuropathy at night and has keep him from sleeping well  Will increase night dose from 300mg to 400mg and re-evaluate in 1 week  If no improvement will try alternative treatment  I don't think increase much more the gabapentin will give him enough benefit without risk of side effects  I have reviewed colon cancer screening test with Neida Avila and he is currently trying to get insurance, and would like to wait until he can get coverage to have that done  Diagnoses and all orders for this visit:    Essential hypertension    Type 2 diabetes mellitus with complication ([place] [address] )    Anemia in other chronic diseases classified elsewhere  -     CBC and differential; Future    Screening for HIV (human immunodeficiency virus)  -     HIV 1/2 AG-AB combo; Future    Other diabetic neurological complication associated with type 2 diabetes mellitus ([place] [address] )    Type 2 diabetes mellitus with hyperglycemia, with long-term current use of insulin ([facility])    Pure hypercholesterolemia    Other orders  -     Cancel: Comprehensive metabolic panel; Future  -     Cancel: Lipid panel; Future          Subjective:      Patient ID: Esvin Luis is a 48 y o  male  Neida Avila is here for a follow up appointment and review of his blood work  He states he is doing well, except for his neuropathic pain on bilateral feet  That has been keeping him from sleeping well and  has had decreased concentration, irritability, fatigue and sleepiness during the day  He is currently on gabapentin 200mg am, 200 mg lunchtime and 300mg QHS   His blood work was reviewed ans shows significant improvement on his HgA1c to 6 5%  Hg has improved and Creatinine has remained stable  His cough has resolved after discontinuing lisinopril  He is tolerating losartan well  He has followed with wound center for his left groin wound and appears to be healing very well  The following portions of the patient's history were reviewed and updated as appropriate: allergies, current medications, past family history, past medical history, past social history, past surgical history and problem list     Review of Systems   Constitutional: Positive for fatigue  Negative for appetite change  Respiratory: Negative for cough, shortness of breath and wheezing  Cardiovascular: Negative for chest pain and palpitations  Gastrointestinal: Negative for abdominal pain, diarrhea, nausea and vomiting  Skin: Positive for rash  Neurological: Positive for numbness (bilateral feet and tip of his 5th fingers)  Negative for weakness, light-headedness and headaches  Psychiatric/Behavioral: Positive for decreased concentration and sleep disturbance  Objective:      /78 (BP Location: Left arm, Patient Position: Sitting, Cuff Size: Large)   Pulse 84   Temp 99 7 °F (37 6 °C)          Physical Exam   Constitutional: He is oriented to person, place, and time  He appears well-developed and well-nourished  Obese   HENT:   Head: Normocephalic and atraumatic  Eyes: Pupils are equal, round, and reactive to light  Conjunctivae and EOM are normal    Neck: Normal range of motion  Neck supple  No thyromegaly present  Cardiovascular: Normal rate, regular rhythm and normal heart sounds  Pulses are no weak pulses  Pulses:       Dorsalis pedis pulses are 2+ on the right side, and 2+ on the left side  Posterior tibial pulses are 2+ on the right side, and 2+ on the left side  Pulmonary/Chest: Breath sounds normal  No respiratory distress  He has no wheezes  Abdominal: Soft  Bowel sounds are normal  He exhibits no distension  There is no tenderness  Obese, difficult palpation due to body habitus   Musculoskeletal: Normal range of motion  He exhibits edema (non-pitting bilateral leg edema)  He exhibits no tenderness  Feet:   Right Foot:   Skin Integrity: Positive for skin breakdown and dry skin  Negative for ulcer, erythema, warmth or callus  Left Foot:   Skin Integrity: Positive for skin breakdown and dry skin  Negative for ulcer, erythema, warmth or callus  Neurological: He is alert and oriented to person, place, and time  No sensory deficit  He exhibits normal muscle tone  Skin: Skin is warm and dry  Capillary refill takes less than 2 seconds  Rash (psoriatic rash on periumbilical area and extensor surface of elbows) noted  Venous statis changes on bilateral lower legs  Left groin wound covered with dressing - follows with wound center   Psychiatric: He has a normal mood and affect  Nursing note and vitals reviewed

## 2020-01-27 ENCOUNTER — TELEPHONE (OUTPATIENT)
Dept: ENDOCRINOLOGY | Facility: CLINIC | Age: 55
End: 2020-01-27

## 2020-01-27 NOTE — TELEPHONE ENCOUNTER
Real Fall,    Please call pt and let him know I reviewed BG log    -BGs look great and stable, continue same regimen for now    Yanet ELDER    Endocrinology

## 2020-02-07 DIAGNOSIS — I95.9 HYPOTENSION: ICD-10-CM

## 2020-02-07 NOTE — TELEPHONE ENCOUNTER
Pharmacy called for patient, he needs his metoprolol refilled to [facility]   Thank you

## 2020-02-13 DIAGNOSIS — K21.9 GASTROESOPHAGEAL REFLUX DISEASE, ESOPHAGITIS PRESENCE NOT SPECIFIED: ICD-10-CM

## 2020-02-13 DIAGNOSIS — E11.49 OTHER DIABETIC NEUROLOGICAL COMPLICATION ASSOCIATED WITH TYPE 2 DIABETES MELLITUS (HCC): Primary | ICD-10-CM

## 2020-02-13 RX ORDER — OMEPRAZOLE 20 MG/1
40 CAPSULE, DELAYED RELEASE ORAL 2 TIMES DAILY
Qty: 60 CAPSULE | Refills: 2 | Status: SHIPPED | OUTPATIENT
Start: 2020-02-13 | End: 2020-02-19 | Stop reason: SDUPTHER

## 2020-02-13 RX ORDER — GABAPENTIN 300 MG/1
300 CAPSULE ORAL
Qty: 30 CAPSULE | Refills: 2 | Status: SHIPPED | OUTPATIENT
Start: 2020-02-13 | End: 2020-03-11

## 2020-02-13 NOTE — TELEPHONE ENCOUNTER
Dr Anna Villela increased gabpentin, and it helped him   He states that it makes a difference but wanted to know if you were going to increase it more, he also needs omeprazole he takes it differently 60mg twice daily

## 2020-02-13 NOTE — TELEPHONE ENCOUNTER
I called Mr Azul and he states his neuropathic pain and insomnia from persistent pain at night improved with increasing gabapentin  I will titrate up from 400mg to 500mg at night, and will re-address the next visit to seen if we need to go further up  I have told him to watch for side effects  He also stated that he stopped going to wound care center, since his wound is almost healed, and until he can get his insurance sort out  I told him to continue local treatment as per wound care center at home and if any sings of worsening in the aspect of the wound to call or schedule an appointment to be seeing by us or the wound care  Otherwise we will see him again in 2 months

## 2020-02-16 DIAGNOSIS — E11.49 OTHER DIABETIC NEUROLOGICAL COMPLICATION ASSOCIATED WITH TYPE 2 DIABETES MELLITUS (HCC): ICD-10-CM

## 2020-02-17 RX ORDER — GABAPENTIN 300 MG/1
CAPSULE ORAL
Qty: 30 CAPSULE | Refills: 1 | OUTPATIENT
Start: 2020-02-17

## 2020-02-18 ENCOUNTER — TELEPHONE (OUTPATIENT)
Dept: INTERNAL MEDICINE CLINIC | Facility: CLINIC | Age: 55
End: 2020-02-18

## 2020-02-18 NOTE — TELEPHONE ENCOUNTER
Pt would like you to order his omeprazole as 40 mg BID he is currently using 2 20 mg capsules twice a day  Please send in as 40 mg BID   Thank you

## 2020-02-19 DIAGNOSIS — K21.9 GASTROESOPHAGEAL REFLUX DISEASE, ESOPHAGITIS PRESENCE NOT SPECIFIED: Primary | ICD-10-CM

## 2020-02-19 RX ORDER — OMEPRAZOLE 40 MG/1
40 CAPSULE, DELAYED RELEASE ORAL 2 TIMES DAILY
Qty: 60 CAPSULE | Refills: 2 | Status: SHIPPED | OUTPATIENT
Start: 2020-02-19 | End: 2020-04-27

## 2020-02-24 RX ORDER — OMEPRAZOLE 40 MG/1
CAPSULE, DELAYED RELEASE ORAL
Qty: 60 CAPSULE | Refills: 0 | OUTPATIENT
Start: 2020-02-24

## 2020-03-11 ENCOUNTER — TELEPHONE (OUTPATIENT)
Dept: ENDOCRINOLOGY | Facility: CLINIC | Age: 55
End: 2020-03-11

## 2020-03-11 DIAGNOSIS — E11.49 OTHER DIABETIC NEUROLOGICAL COMPLICATION ASSOCIATED WITH TYPE 2 DIABETES MELLITUS (HCC): Primary | ICD-10-CM

## 2020-03-11 DIAGNOSIS — E11.49 OTHER DIABETIC NEUROLOGICAL COMPLICATION ASSOCIATED WITH TYPE 2 DIABETES MELLITUS (HCC): ICD-10-CM

## 2020-03-11 DIAGNOSIS — L03.119 RECURRENT CELLULITIS OF LOWER EXTREMITY: ICD-10-CM

## 2020-03-11 RX ORDER — GABAPENTIN 100 MG/1
CAPSULE ORAL
Qty: 120 CAPSULE | Refills: 2 | Status: SHIPPED | OUTPATIENT
Start: 2020-03-11 | End: 2020-03-11

## 2020-03-11 RX ORDER — GABAPENTIN 100 MG/1
200 CAPSULE ORAL 2 TIMES DAILY
Qty: 360 CAPSULE | Refills: 1 | Status: SHIPPED | OUTPATIENT
Start: 2020-03-11 | End: 2020-05-05 | Stop reason: SDUPTHER

## 2020-03-11 RX ORDER — NYSTATIN 100000 [USP'U]/G
POWDER TOPICAL 2 TIMES DAILY
Qty: 45 G | Refills: 1 | Status: SHIPPED | OUTPATIENT
Start: 2020-03-11 | End: 2020-10-27 | Stop reason: SDUPTHER

## 2020-03-11 RX ORDER — GABAPENTIN 300 MG/1
600 CAPSULE ORAL
Qty: 180 CAPSULE | Refills: 1 | Status: SHIPPED | OUTPATIENT
Start: 2020-03-11 | End: 2020-05-05 | Stop reason: SDUPTHER

## 2020-03-11 NOTE — TELEPHONE ENCOUNTER
Aren,    Please let pt know I reviewed BG log    -BGs look great    Having a few low BGs during the day and more recently overnight    Lets reduce the insulin doses slightly    Decrease 70/30 insulin to 64 units in the morning before breakfast and 36 units before dinner    Robbi Cisneros

## 2020-03-11 NOTE — TELEPHONE ENCOUNTER
Patient is stating that he needs a refill on his nystatin powder  He is also stating that his gabapentin got bumped up to 600mg at night  I read the last message about it and it stated he should be on 500mg at night  The pharmacy is stating that it is to early to refill the medication so he would need a new script with the new instructions   Please advise

## 2020-03-11 NOTE — PROGRESS NOTES
I called Chapin Mary and he is now taking 200mg BID (am and lunchtime) and 600mg at bedtime with no side effects of the medication and is having very good results on his neuropathic pain  Will continue current regiment and new prescription was sent to the pharmacy

## 2020-03-11 NOTE — TELEPHONE ENCOUNTER
Spoke with patient and reviewed his BG log and changes to his insulin 70/30 64 units before breakfast and 36 units before dinner

## 2020-04-20 NOTE — ANESTHESIA POSTPROCEDURE EVALUATION
Post-Op Assessment Note    CV Status:  Stable    Pain management: adequate     Mental Status:  Somnolent (sedated on propofol)   Hydration Status:  Euvolemic   PONV Controlled:  Controlled   Airway Patency:  Patent (intubated)  Airway: intubated   Post Op Vitals Reviewed: Yes      Staff: CRNA, Anesthesiologist   Comments: patient to icu on monitor and ambubag  vss  airway patent with ett   report to icu team  dismissed by icu team          BP      Temp      Pulse    Resp      SpO2
fever bodyaches

## 2020-04-26 DIAGNOSIS — K21.9 GASTROESOPHAGEAL REFLUX DISEASE, ESOPHAGITIS PRESENCE NOT SPECIFIED: ICD-10-CM

## 2020-04-26 DIAGNOSIS — I95.9 HYPOTENSION: ICD-10-CM

## 2020-04-27 ENCOUNTER — TELEPHONE (OUTPATIENT)
Dept: ENDOCRINOLOGY | Facility: CLINIC | Age: 55
End: 2020-04-27

## 2020-04-27 RX ORDER — OMEPRAZOLE 40 MG/1
CAPSULE, DELAYED RELEASE ORAL
Qty: 60 CAPSULE | Refills: 2 | Status: SHIPPED | OUTPATIENT
Start: 2020-04-27 | End: 2020-05-05 | Stop reason: SDUPTHER

## 2020-04-28 DIAGNOSIS — Z79.4 TYPE 2 DIABETES MELLITUS WITH HYPERGLYCEMIA, WITH LONG-TERM CURRENT USE OF INSULIN (HCC): ICD-10-CM

## 2020-04-28 DIAGNOSIS — E11.65 TYPE 2 DIABETES MELLITUS WITH HYPERGLYCEMIA, WITH LONG-TERM CURRENT USE OF INSULIN (HCC): ICD-10-CM

## 2020-05-03 RX ORDER — INSULIN ASPART 100 [IU]/ML
INJECTION, SUSPENSION SUBCUTANEOUS
Refills: 0
Start: 2020-05-03 | End: 2020-07-13 | Stop reason: SDUPTHER

## 2020-05-04 ENCOUNTER — TELEPHONE (OUTPATIENT)
Dept: ENDOCRINOLOGY | Facility: CLINIC | Age: 55
End: 2020-05-04

## 2020-05-04 ENCOUNTER — TELEPHONE (OUTPATIENT)
Dept: INTERNAL MEDICINE CLINIC | Facility: CLINIC | Age: 55
End: 2020-05-04

## 2020-05-05 ENCOUNTER — TELEMEDICINE (OUTPATIENT)
Dept: ENDOCRINOLOGY | Facility: CLINIC | Age: 55
End: 2020-05-05
Payer: COMMERCIAL

## 2020-05-05 DIAGNOSIS — E11.8 TYPE 2 DIABETES MELLITUS WITH COMPLICATION (HCC): ICD-10-CM

## 2020-05-05 DIAGNOSIS — Z79.4 TYPE 2 DIABETES MELLITUS WITH HYPERGLYCEMIA, WITH LONG-TERM CURRENT USE OF INSULIN (HCC): Primary | ICD-10-CM

## 2020-05-05 DIAGNOSIS — E11.49 OTHER DIABETIC NEUROLOGICAL COMPLICATION ASSOCIATED WITH TYPE 2 DIABETES MELLITUS (HCC): ICD-10-CM

## 2020-05-05 DIAGNOSIS — I95.9 HYPOTENSION: ICD-10-CM

## 2020-05-05 DIAGNOSIS — I10 HYPERTENSION GOAL BP (BLOOD PRESSURE) < 140/90: ICD-10-CM

## 2020-05-05 DIAGNOSIS — I10 ESSENTIAL HYPERTENSION: ICD-10-CM

## 2020-05-05 DIAGNOSIS — E11.65 TYPE 2 DIABETES MELLITUS WITH HYPERGLYCEMIA, WITH LONG-TERM CURRENT USE OF INSULIN (HCC): Primary | ICD-10-CM

## 2020-05-05 DIAGNOSIS — E78.00 PURE HYPERCHOLESTEROLEMIA: ICD-10-CM

## 2020-05-05 DIAGNOSIS — K21.9 GASTROESOPHAGEAL REFLUX DISEASE, ESOPHAGITIS PRESENCE NOT SPECIFIED: ICD-10-CM

## 2020-05-05 PROCEDURE — 99214 OFFICE O/P EST MOD 30 MIN: CPT | Performed by: INTERNAL MEDICINE

## 2020-05-05 RX ORDER — OMEPRAZOLE 40 MG/1
40 CAPSULE, DELAYED RELEASE ORAL 2 TIMES DAILY
Qty: 180 CAPSULE | Refills: 0 | Status: SHIPPED | OUTPATIENT
Start: 2020-05-05 | End: 2020-08-05

## 2020-05-05 RX ORDER — GABAPENTIN 300 MG/1
600 CAPSULE ORAL
Qty: 180 CAPSULE | Refills: 0 | Status: SHIPPED | OUTPATIENT
Start: 2020-05-05 | End: 2020-08-04 | Stop reason: SDUPTHER

## 2020-05-05 RX ORDER — LOSARTAN POTASSIUM 25 MG/1
25 TABLET ORAL DAILY
Qty: 90 TABLET | Refills: 0 | Status: SHIPPED | OUTPATIENT
Start: 2020-05-05 | End: 2020-06-05 | Stop reason: SDUPTHER

## 2020-05-05 RX ORDER — GABAPENTIN 100 MG/1
200 CAPSULE ORAL 2 TIMES DAILY
Qty: 360 CAPSULE | Refills: 0 | Status: SHIPPED | OUTPATIENT
Start: 2020-05-05 | End: 2021-06-09

## 2020-05-19 ENCOUNTER — TELEMEDICINE (OUTPATIENT)
Dept: INTERNAL MEDICINE CLINIC | Facility: CLINIC | Age: 55
End: 2020-05-19
Payer: COMMERCIAL

## 2020-05-19 DIAGNOSIS — N50.89 SCROTAL SWELLING: Primary | ICD-10-CM

## 2020-05-19 DIAGNOSIS — E11.49 OTHER DIABETIC NEUROLOGICAL COMPLICATION ASSOCIATED WITH TYPE 2 DIABETES MELLITUS (HCC): ICD-10-CM

## 2020-05-19 PROCEDURE — 99214 OFFICE O/P EST MOD 30 MIN: CPT | Performed by: INTERNAL MEDICINE

## 2020-06-05 DIAGNOSIS — I10 ESSENTIAL HYPERTENSION: ICD-10-CM

## 2020-06-05 PROCEDURE — 4010F ACE/ARB THERAPY RXD/TAKEN: CPT | Performed by: INTERNAL MEDICINE

## 2020-06-05 RX ORDER — LOSARTAN POTASSIUM 25 MG/1
TABLET ORAL
Qty: 90 TABLET | Refills: 1 | Status: SHIPPED | OUTPATIENT
Start: 2020-06-05 | End: 2020-10-14
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
## 2020-06-08 NOTE — UTILIZATION REVIEW
URGENT/EMERGENT  INPATIENT/SPU AUTHORIZATION REQUEST    Date: 06/08/20            # Pages in this Request:     X New Request   Additional Information for PA#:     Office Contact Name:  Ekta Winchester Title: Utilization Review, Jackie Nurse     Phone: [phone]  Ext  Availability (Date/Time): Wednesday - Friday 8 am- 4 pm    x Inpatient Review  SPU Review        Current       x Late Pick-up   · How your facility was first notified of the Late Pick-up: Paths Letter   · When your facility was first notified of the Late Pick-up (date): 6/1/2020         RECIPIENT INFORMATION    Recipient ID#: [id number]   Recipient Name:  Alexis Sousa         YOB: 1965  50 y o  Recipient Alias:     Gender:  X Male  Female Medicaid Eligibility ([address] [place] [address]): INSURANCE INFORMATION    (All other private or governmental health insurance benefits must be utilized prior to billing the [facility] Program)    Was this admission the result of an MVA, other accident, assault, injury, fall, gunshot, bite etc ? Yes x No                   If yes, provide a brief description of the incident  Does the recipient have other insurance coverage? Yes x No        Insurance Company Name/Policy #      Did that insurance pay on this claim? Yes  No        Did that insurance deny this claim? Yes  No    If yes, reason for denial:      Does the recipient have Medicare? Yes x No        Did Medicare exhaust prior to this admission? Yes  No        Did Medicare partially pay this claim? Yes  No        Did that insurance deny this claim? Yes  No    If yes, reason for denial:          Was the recipient a prisoner at the time of admission?   Yes x No            PROVIDER INFORMATION    [facility] Provider ID#: [id number]    [address] [place] [address] Physician Name:  David Momin Provider ID#: [id number]        ADMISSION INFORMATION    Type of Admission: (please choose one)    X ED Direct    If yes, from where? Transfer    If yes, transferring hospital (inpatient, rehab, or psych) Provider Name/Provider ID#: Admission Floor or Unit Type: med Surg     Dates/Times:        ED Date/Time: 10/20/2019  1:24 AM        Observation Date/Time:         Admission Date/Time: 10/20/19 0556        Discharge or Transfer Date/Time: 10/23/2019  1:55 PM        DIAGNOSIS/PROCEDURE CODES    Primary Diagnosis Code/Primary Diagnosis Code description:  A41 9 Sepsis, unspecified organism   L03 116 Cellulitis of left lower limb   R65 20 Severe sepsis without septic shock   E08 22 Diabetes mellitus due to underlying condition with diabetic chronic kidney disease   Additional Diagnosis Code(s) and Description(s)-(up to three additional codes):    Procedure Code (one) and description:        CLINICAL INFORMATION - PRIOR ADMISSION ONLY    Is there a prior admission with a discharge date within 30 days of the date of this admission? X No (Proceed to the next section - "Clinical Information - General Review Checklist:)      Yes (Provide the following information)     Prior admission dates:    MA Prior Authorization Number:        Review Outcome:     Diagnosis Code(s)/Description:    Procedure Code/Description:    Findings:    Treatment:    Condition on Discharge:   Vitals:    Labs:   Imaging:   Medications: Follow-up Instructions:    Disposition:        CLINICAL INFORMATION - GENERAL REVIEW CHECKLIST    EMERGENCY DEPARTMENT: (Proceed to "ADMISSION" if Direct Admission)    Presenting Signs/Symptoms:55 y/o male presents to ED from home with erythema and wound to L upper leg  Hx recent wound infection in same area that required admission and extensive debridement for necrotizing fasciitis  On exam, tachycardia, 15 cm large open wound in L upper inner thigh with lateral and superior undermining, excoriation secondary to fungal rash, 15 x 10 cm region of cellulitis noted in upper anterior and medial thigh    Admitted as inpatient to service of general surgery due to severe sepsis secondary to LLE cellulitis, soft tissue infection L groin/thigh wound, DM 2 with hyperglycemia  Continue IV antibiotics  Blood cx pending  Wound care with chlorhexidine  ID consult  Internal medicine consult  PT/OT      Medication/treatment prior to arrival in the ED:    Past Medical History:     Past Medical History:   Diagnosis Date    Cellulitis     Diabetes mellitus ([address] )     Edema     Esophageal reflux     Hyperglycemia     Hypertension     IBS (irritable bowel syndrome)     Obesity     Osteoarthritis of knee     Renal disorder     Villonodular synovitis of the hand, right        Clinical Exam:          Initial Vital Signs: (Temp, Pulse, Resp, and BP)   ED Triage Vitals [10/20/19 0134]   Temperature Pulse Respirations Blood Pressure SpO2   98 1 °F (36 7 °C) 105 20 155/82 98 %      Temp Source Heart Rate Source Patient Position - Orthostatic VS BP Location FiO2 (%)   Oral Monitor Sitting Right arm --      Pain Score       5           Pertinent Repeat Vital Signs: (include times they were obtained)  Additional Vital Signs:   10/21/19 0835 98 °F (36 7 °C) 103 20 132/83 94 % None (Room air)   10/21/19 0104 99 °F (37 2 °C) 104 20 128/79 93 % None (Room air)   10/20/19 2130  116Abnormal   143/94     10/20/19 1544 98 5 °F (36 9 °C) 120Abnormal  20 140/72 96 % None (Room air)   10/20/19 0717 100 4 °F (38 °C) 120Abnormal  20 124/75 94 % None (Room air)   10/20/19 0320  120Abnormal  22 155/88 95 % None (Room air)   10/20/19 0145  120Abnormal  22 149/88 96 % None (Room air)        Pertinent Sustained Findings: (include times they were obtained)    Weight in Kilograms:  Wt Readings from Last 1 Encounters:   12/02/19 (!) 171 kg (377 lb 3 2 oz)       Pertinent Labs (results):  Results from last 7 days   Lab Units 10/20/19  0207   WBC Thousand/uL 11 38*   HEMOGLOBIN g/dL 9 5*   HEMATOCRIT % 31 0*   PLATELETS Thousands/uL 237   NEUTROS ABS Thousands/µL 8 98*                Results from last 7 days   Lab Units 10/21/19  0641 10/20/19  0207   SODIUM mmol/L 134* 134*   POTASSIUM mmol/L 3 9 4 3   CHLORIDE mmol/L 101 99*   CO2 mmol/L 24 26   ANION GAP mmol/L 9 9   BUN mg/dL 13 13   CREATININE mg/dL 0 77 0 92   EGFR ml/min/1 73sq m 104 95   CALCIUM mg/dL 8 9 9 1           Results from last 7 days   Lab Units 10/20/19  0207   AST U/L 35   ALT U/L 34   ALK PHOS U/L 78   TOTAL PROTEIN g/dL 7 6   ALBUMIN g/dL 2 6*   TOTAL BILIRUBIN mg/dL 0 40              Results from last 7 days   Lab Units 10/20/19  2112 10/20/19  1615 10/20/19  1046 10/20/19  0719   POC GLUCOSE mg/dl 201* 293* 274* 286*      Results from last 7 days   Lab Units 10/21/19  0641 10/20/19  0207   GLUCOSE RANDOM mg/dL 198* 280*            Results from last 7 days   Lab Units 10/20/19  0207   TROPONIN I ng/mL <0 02               Results from last 7 days   Lab Units 10/20/19  0207   PROTIME seconds 13 7   INR   1 11   PTT seconds 29             Results from last 7 days   Lab Units 10/20/19  1350 10/20/19  0548 10/20/19  0207   LACTIC ACID mmol/L 2 0 2 1* 4 0*           Results from last 7 days   Lab Units 10/21/19  0641   FERRITIN ng/mL 32             Results from last 7 days   Lab Units 10/21/19  0710 10/20/19  0320   CLARITY UA    --  Clear   COLOR UA    --  Yellow   SPEC GRAV UA    --  1 020   PH UA    --  5 5   GLUCOSE UA mg/dl  --  250 (1/4%)*   KETONES UA mg/dl  --  Trace*   BLOOD UA    --  Negative   PROTEIN UA mg/dl  --  Trace*   NITRITE UA    --  Negative   BILIRUBIN UA    --  Negative   UROBILINOGEN UA E U /dl  --  0 2   LEUKOCYTES UA    --  Negative   WBC UA /hpf  --  0-1*   RBC UA /hpf  --  None Seen   BACTERIA UA /hpf  --  None Seen   EPITHELIAL CELLS WET PREP /hpf  --  None Seen   MUCUS THREADS    --  Moderate*   CREATININE UR mg/dL 113 0  --             Results from last 7 days   Lab Units 10/20/19  0234   BLOOD CULTURE   No Growth at 24 hrs    No Growth at 24 hrs         Radiology (results):  10/20 CT L femur/pelvis:  No acute fracture or dislocation is seen  Large soft tissue defect in the anterior left thigh with associated edema/cellulitis in the left inguinal region, left thigh, the left perineum, the anterior abdominal and pelvic wall and the medial bilateral thighs distally, as described; left greater   than right  A small amount of subcutaneous emphysema is seen in the medial left thigh anteriorly, in the left inguinal region (axial image 110, series 3 for example) as well as the anterior abdominal wall which could be tracking in the externally, related to prior   intervention or related to soft tissue necrosis   Correlation with the patient's physical exam, symptoms, and laboratory values recommended  Shotty left inguinal lymph nodes, small-volume ascites, colonic diverticulosis without evidence of acute diverticulitis      10/20 CXR:  No acute cardiopulmonary disease         EKG (results):   10/20 EKG:  Sinus tachycardia  Low voltage QRS  Other tests (results):    Tests pending final results:    Treatment in the ED:   Medication Administration from 10/20/2019 0042 to 10/20/2019 0651       Date/Time Order Dose Route Action     10/20/2019 0339 vancomycin (VANCOCIN) 2,000 mg in sodium chloride 0 9 % 500 mL IVPB 2,000 mg Intravenous New Bag     10/20/2019 0302 piperacillin-tazobactam (ZOSYN) 3 375 g in sodium chloride 0 9 % 50 mL IVPB 3 375 g Intravenous New Bag     10/20/2019 0229 sodium chloride 0 9 % bolus 2,000 mL 2,000 mL Intravenous New Bag     10/20/2019 0240 morphine (PF) 4 mg/mL injection 4 mg 4 mg Intravenous Given     10/20/2019 0255 iohexol (OMNIPAQUE) 350 MG/ML injection (SINGLE-DOSE) 100 mL 100 mL Intravenous Given           Other treatments:      Change in condition while in the ED:     Response to ED Treatment:          OBSERVATION: (Proceed to "ADMISSION" if Direct Admission)    Orders written during the observation period  Meds Name, dose, route, time, how may doses given:  PRN Meds Name, dose, route, time, how many doses given within the first 24 hrs :  IVs Type, rate, and total amt  ordered/given:  Labs, imaging, other:  Consults and findings:    Test Results during the observation period  Pertinent Lab tests (dates/results):  Culture results (blood, urine, spinal, wound, respiratory, etc ):  Imaging tests (dates/results):  EKG (dates/results):   Other test (dates/results):  Tests pending (dates/results):    Surgical or Invasive Procedures during the observation period  Name of surgery/procedure:  Date & Time:  Patient Response:  Post-operative orders:  Operative Report/Findings:    Response to Treatment, Major Change in Condition, Major Charge in Treatment during the observation period          ADMISSION:    DIRECT Admissions Only:    · Presenting Signs/Symptoms:   ·   · Medication/treatment prior to arrival:  ·   · Past Medical History:  ·   · Clinical Exam on admission:  ·   · Vital Signs on admission: (Temp, Pulse, Resp, and BP)  ·   · Weight in kilograms:     ALL Admissions:    Admission Orders and Other Orders written within the first 24 hrs after admission    Wound care  Venous access consult  Accuchecks  VS  SCDs  Tele  PT/OT       Meds Name, dose, route, time, how may doses given:  cefazolin 2,000 mg Intravenous Q8H   enoxaparin 40 mg Subcutaneous Daily   fluticasone 1 spray Each Nare Daily   gabapentin 300 mg Oral HS   hydrOXYzine  mg Oral HS   insulin lispro 1-5 Units Subcutaneous HS   insulin lispro 2-12 Units Subcutaneous TID AC   insulin NPH 45 Units Subcutaneous Before Dinner   insulin NPH 74 Units Subcutaneous Daily Before Breakfast   metoprolol tartrate 25 mg Oral Q12H AUNDREA   nystatin   Topical BID   pantoprazole 40 mg Oral Early Morning   psyllium 1 packet Oral Daily   sodium hypochlorite 1 application Irrigation Daily        PRN Meds Name, dose, route, time, how many doses given within the first 24 hrs :  acetaminophen 650 mg Oral Q4H PRN x1 benzonatate 100 mg Oral TID PRN x3   dextromethorphan-guaiFENesin 10 mL Oral Q4H PRN x2   HYDROcodone-acetaminophen 1 tablet Oral Q4H PRN x7   loperamide 2 mg Oral 4x Daily PRN   ondansetron 4 mg Intravenous Q4H PRN   QUEtiapine 25 mg Oral HS PRN        IVs Type, rate, and total amt  ordered/given:  sodium chloride 100 mL/hr Intravenous Continuous        Labs, imaging, other:      Consults and findings:10/20 ID consult:  Continue IV ancef  Repeat labs  Close surgical follow up   10/20 Internal medicine consult:  Poorly controlled DM2 with A1C 11 2  Increase SSI coverage and continue accuchecks  Test Results after admission  Pertinent Lab tests (dates/results):  Culture results (blood, urine, spinal, wound, respiratory, etc ):  Imaging tests (dates/results):  EKG (dates/results): Other test (dates/results):  Tests pending (dates/results):    Surgical or Invasive Procedures  Name of surgery/procedure:  Date & Time:  Patient Response:  Post-operative orders:  Operative Report/Findings:    Response to Treatment, Major Change in Condition, Major Charge in Treatment anytime during admission  Hospital Course: Charly Armas is a 48 y o  male with history of necrotizing fasciitis L proximal thigh- s/p excisional debridement 9/6/19, poorly controlled DM HLD, HTN, morbid obesity, normocytic anemia admitted for sepsis and cellulitis and site of previous excisional debridement for necrotizing fasciitis  He was immediately placed on IVF and given intravenous antibiotics  Infectious disease and SLIM were asked for their recommendations  SLIM added low dose lisinopril for renal protection in the setting of DM  He also had a cough for which tessalon pearls and robitussin were ordered  His BP has remained stable  Slight adjustments to his insulin were also made  The wound has been packed with Dakins soaked Kerlix daily  The erythema has improved as has his discomfort    ID agreed with ancef during the hospitalization and has recommended PO keflex for discharge  He is stable and pain is controlled with a po regimen  No IV narcotics are needed for dressing changes  He is cleared for discharge home with VNA by all services and will follow up with the wound clinic, endocrine and his PCP      Disposition on Discharge  Home, Rehab, SNF, LTC, Shelter, etc : Home/Self Care    Cease to Breathe (CTB)  If a patient expires during an admission, in addition to the above information, please include:    Summary/timeline of the patient's decline in condition:    Medications and treatment:    Patient response to treatment:    Date and time patient ceased to breathe:        Is there a Readmission that follows this admission? Yes  No    If yes, provide dates:          InterQual Review    InterQual Criteria Met:  Yes X No  N/A        Please include the InterQual Review, InterQual year/version used, and the criteria selected:   Created Using Review Status Review Entered   [employer]® In Primary 10/21/2019 12:05       Criteria Set Name - Subset   LOC:Acute Adult-Infection: Skin       Criteria Review   REVIEW SUMMARY     Patient: Nataliia Malone  Review Number: [id number]  Review Status:  In Primary  Criteria Status: Not Met     Condition Specific: Yes        OUTCOMES  Outcome Type: Primary           REVIEW DETAILS     Product: [facility] Adult  Subset: Infection: Skin      (Symptom or finding within 24h)         (Excludes PO medications unless noted)          Select Day, One:              [ ] Episode Day 1, One:                  [ ] ACUTE, >= One:                      [ ] Cellulitis, All:                          [X] Cellulitis                          [ ] Finding, >= One:                              [ ] Systemic symptom or finding, >= Two:                                  [X] Heart rate > 100/min, sustained and > baseline                          [X] Intervention, >= One:                              [X] Anti-infective     Version: InterQual® 2018  2  InterQual® and InterQual®  © 2018 [employer] and/or one of its [place]  All Rights Reserved  CPT only © 2017 [employer]  All Rights Reserved  PLEASE SUBMIT THE COMPLETED FORM TO THE DEPARTMENT OF HUMAN SERVICES - DIVISION OF CLINICAL  REVIEW VIA FAX [phone] or VIA E-MAIL TO [email or web address]    Signature: Jia Price Date:  06/08/20    Confidentiality Notice: The documents accompanying this telecopy may contain confidential information belonging to the sender  The information is intended only for the use of the individual named above  If you are not the intended recipient, you are hereby notified  That any disclosure, copying, distribution or taking of any telecopy is strictly prohibited

## 2020-06-18 ENCOUNTER — TELEPHONE (OUTPATIENT)
Dept: ENDOCRINOLOGY | Facility: CLINIC | Age: 55
End: 2020-06-18

## 2020-07-08 ENCOUNTER — TELEPHONE (OUTPATIENT)
Dept: ENDOCRINOLOGY | Facility: CLINIC | Age: 55
End: 2020-07-08

## 2020-07-08 DIAGNOSIS — Z79.4 TYPE 2 DIABETES MELLITUS WITH HYPERGLYCEMIA, WITH LONG-TERM CURRENT USE OF INSULIN (HCC): Primary | ICD-10-CM

## 2020-07-08 DIAGNOSIS — I10 HYPERTENSION GOAL BP (BLOOD PRESSURE) < 140/90: ICD-10-CM

## 2020-07-08 DIAGNOSIS — R79.89 ELEVATED TSH: ICD-10-CM

## 2020-07-08 DIAGNOSIS — E11.65 TYPE 2 DIABETES MELLITUS WITH HYPERGLYCEMIA, WITH LONG-TERM CURRENT USE OF INSULIN (HCC): Primary | ICD-10-CM

## 2020-07-08 DIAGNOSIS — E78.00 PURE HYPERCHOLESTEROLEMIA: ICD-10-CM

## 2020-07-08 DIAGNOSIS — R94.6 ABNORMAL THYROID FUNCTION TEST: ICD-10-CM

## 2020-07-08 NOTE — TELEPHONE ENCOUNTER
Please let patient know blood sugars look good  Continue same regimen for now  Will mail labs to have done before august appt

## 2020-07-08 NOTE — TELEPHONE ENCOUNTER
Spoke to patient, BG log reviewed  Continue current regimen  Pt would like to speak with Dr Shari Herrera regarding starting on Lantus    He scheduled phone appt for 7/16

## 2020-07-13 DIAGNOSIS — E11.65 TYPE 2 DIABETES MELLITUS WITH HYPERGLYCEMIA, WITH LONG-TERM CURRENT USE OF INSULIN (HCC): ICD-10-CM

## 2020-07-13 DIAGNOSIS — Z79.4 TYPE 2 DIABETES MELLITUS WITH HYPERGLYCEMIA, WITH LONG-TERM CURRENT USE OF INSULIN (HCC): ICD-10-CM

## 2020-07-13 DIAGNOSIS — E11.65 TYPE 2 DIABETES MELLITUS WITH HYPERGLYCEMIA, WITH LONG-TERM CURRENT USE OF INSULIN (HCC): Primary | ICD-10-CM

## 2020-07-13 DIAGNOSIS — Z79.4 TYPE 2 DIABETES MELLITUS WITH HYPERGLYCEMIA, WITH LONG-TERM CURRENT USE OF INSULIN (HCC): Primary | ICD-10-CM

## 2020-07-13 RX ORDER — INSULIN ASPART 100 [IU]/ML
INJECTION, SUSPENSION SUBCUTANEOUS
Qty: 30 ML | Refills: 0 | Status: SHIPPED | OUTPATIENT
Start: 2020-07-13 | End: 2020-07-13

## 2020-07-13 NOTE — TELEPHONE ENCOUNTER
I refilled novolog 70/30 by mistake    I think he is on Novolin 70/30 vials -- which I refilled     Can you check to see he is on Novolin vials instead of Novolog 70/30 pens?

## 2020-07-13 NOTE — TELEPHONE ENCOUNTER
[id number] Randi Levi if you can call the pharmacy and make sure they don't dispense the Novolog 70/30 pens that would be great

## 2020-07-14 DIAGNOSIS — E11.65 TYPE 2 DIABETES MELLITUS WITH HYPERGLYCEMIA, WITH LONG-TERM CURRENT USE OF INSULIN (HCC): Primary | ICD-10-CM

## 2020-07-14 DIAGNOSIS — Z79.4 TYPE 2 DIABETES MELLITUS WITH HYPERGLYCEMIA, WITH LONG-TERM CURRENT USE OF INSULIN (HCC): Primary | ICD-10-CM

## 2020-07-14 RX ORDER — INSULIN ASPART 100 [IU]/ML
INJECTION, SUSPENSION SUBCUTANEOUS
Qty: 30 ML | Refills: 0 | Status: SHIPPED | OUTPATIENT
Start: 2020-07-14 | End: 2020-07-16

## 2020-07-14 NOTE — TELEPHONE ENCOUNTER
Can you let him know I ordered the Novolog 70/30 vials   Hopefully that is the correct one just confirm with him

## 2020-07-15 ENCOUNTER — TELEPHONE (OUTPATIENT)
Dept: FAMILY MEDICINE CLINIC | Facility: CLINIC | Age: 55
End: 2020-07-15

## 2020-07-15 NOTE — TELEPHONE ENCOUNTER
[facility] called and they are stating pt needs a prior auth for Novolin 70/30 need to call [phone] for the PA   [facility] number is [phone]

## 2020-07-16 ENCOUNTER — TELEMEDICINE (OUTPATIENT)
Dept: ENDOCRINOLOGY | Facility: CLINIC | Age: 55
End: 2020-07-16
Payer: COMMERCIAL

## 2020-07-16 DIAGNOSIS — E78.00 PURE HYPERCHOLESTEROLEMIA: ICD-10-CM

## 2020-07-16 DIAGNOSIS — Z79.4 TYPE 2 DIABETES MELLITUS WITH HYPERGLYCEMIA, WITH LONG-TERM CURRENT USE OF INSULIN (HCC): Primary | ICD-10-CM

## 2020-07-16 DIAGNOSIS — E11.65 TYPE 2 DIABETES MELLITUS WITH HYPERGLYCEMIA, WITH LONG-TERM CURRENT USE OF INSULIN (HCC): Primary | ICD-10-CM

## 2020-07-16 DIAGNOSIS — R79.89 ELEVATED TSH: ICD-10-CM

## 2020-07-16 DIAGNOSIS — E66.01 OBESITY, MORBID, BMI 50 OR HIGHER (HCC): ICD-10-CM

## 2020-07-16 DIAGNOSIS — I10 HYPERTENSION GOAL BP (BLOOD PRESSURE) < 140/90: ICD-10-CM

## 2020-07-16 PROCEDURE — 1036F TOBACCO NON-USER: CPT | Performed by: INTERNAL MEDICINE

## 2020-07-16 PROCEDURE — 3074F SYST BP LT 130 MM HG: CPT | Performed by: INTERNAL MEDICINE

## 2020-07-16 PROCEDURE — 3078F DIAST BP <80 MM HG: CPT | Performed by: INTERNAL MEDICINE

## 2020-07-16 PROCEDURE — 99214 OFFICE O/P EST MOD 30 MIN: CPT | Performed by: INTERNAL MEDICINE

## 2020-07-16 NOTE — PROGRESS NOTES
Virtual Regular Visit      Assessment/Plan:    Problem List Items Addressed This Visit        Endocrine    Type 2 diabetes mellitus with hyperglycemia, with long-term current use of insulin ([facility] ) - Primary       Other    Hyperlipidemia      Other Visit Diagnoses     Hypertension goal BP (blood pressure) < 140/90        Elevated TSH        Obesity, morbid, BMI 50 or higher ([facility] )                   Reason for visit is   Chief Complaint   Patient presents with    Virtual Regular Visit        Encounter provider Antonio Stevens MD    Provider located at [address] [id number] [phone]      Recent Visits  No visits were found meeting these conditions  Showing recent visits within past 7 days and meeting all other requirements     Future Appointments  No visits were found meeting these conditions  Showing future appointments within next 150 days and meeting all other requirements        The patient was identified by name and date of birth  Gurvinder Joyce was informed that this is a telemedicine visit and that the visit is being conducted through [employer]  My office door was closed  No one else was in the room  He acknowledged consent and understanding of privacy and security of the video platform  The patient has agreed to participate and understands they can discontinue the visit at any time  Patient is aware this is a billable service  Medical Decision Making:      Impression  1  DM2   2  HTN  3  Obesity  4   Necrotizing fasciitis s/p hospitalization  5  Hyperlipidemia  6  Elevated TSH     Recommendations:        BGs controlled, he would like to switch to Novolog 70/30 pens- prescribed 60 units qAM and 36 units qPM  Continue metformin 1000mg BID AC       I discussed the FreeStyle Elvin CGM- he will think about it and start checking BG 4 times a day if he desires coverage for the device  He is due for all his diabetes labs now       Necrotizing fasciitis- managed by general surgery, blood sugars optimized at this time, now resolved     Continue ARB for nephropathy        HTN-continue ARB     Hyperlipidemia-slightly elevated  LDL of 112, will repeat lipids in now, had reaction to statin therapy in past       Elevated TSH- still mildly elevated, differential includes recovering  of euthyroid sick syndrome or  true hypothyroidism, will repeat TSH  and free T4 and check TPO antibody now     RTC in 3 month  Pam ELDER            History of Present Illness:   Mr Radha Ross is a 54yr old male who presents for DM management       Was on glimepiride/metformin in the past       PMH-DM2, HTN, obesity   PSH-necrotizing fasciitis surgery   FHx-father with diabetes  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 Review of Systems:   Review of Systems   Constitutional: Negative for appetite change, chills, diaphoresis, fatigue, fever and unexpected weight change  HENT: Negative for congestion, ear pain, hearing loss, rhinorrhea, sinus pressure, sinus pain, sore throat, trouble swallowing and voice change     Eyes: Negative for photophobia, redness and visual disturbance  Respiratory: Negative for apnea, cough, chest tightness, shortness of breath, wheezing and stridor     Cardiovascular: Negative for chest pain, palpitations and leg swelling  Gastrointestinal: Negative for abdominal distention, abdominal pain, constipation, diarrhea, nausea and vomiting     Endocrine: Negative for cold intolerance, heat intolerance, polydipsia, polyphagia and polyuria  Genitourinary: Negative for difficulty urinating, dysuria, flank pain, frequency, hematuria and urgency  Musculoskeletal: Negative for arthralgias, back pain, gait problem, joint swelling and myalgias  Skin: +left groin wound healed per patient  Allergic/Immunologic: Negative for immunocompromised state  Neurological: Negative for dizziness, tremors, syncope, weakness, light-headedness and headaches  Hematological: Negative for adenopathy  Does not bruise/bleed easily  Psychiatric/Behavioral: Negative for confusion and sleep disturbance   The patient is not nervous/anxious         Past Medical History:   Diagnosis Date    Cellulitis     last assessed 12/10/15    Diabetes mellitus ([facility] )     Edema     Elevated liver enzymes     Esophageal reflux     Gluten intolerance     Gout     last assessed 09/05/13    Hyperglycemia     Hypertension     IBS (irritable bowel syndrome)     Insomnia     Obesity     Osteoarthritis of knee     last assessed 02/10/14    Prehypertension     last assessed 08/22/17    Renal disorder     Venous insufficiency     last assessed 08/22/17    Villonodular synovitis of the hand, right     last assessed 11/14/2013       Past Surgical History:   Procedure Laterality Date    INCISION AND DRAINAGE OF WOUND Left 9/6/2019    Procedure: INCISION AND DRAINAGE (I&D) GROIN;  Surgeon: Pat Machado DO;  [facility]: [facility];  Service: General    SKIN BIOPSY      WOUND DEBRIDEMENT Left 9/7/2019    Procedure: EXCISIONAL DEBRIDEMENT;  Surgeon: Pat Machado DO;  [facility];  Service: General       Current Outpatient Medications   Medication Sig Dispense Refill    ferrous sulfate 325 (65 Fe) mg tablet Take 1 tablet (325 mg total) by mouth every other day  0    gabapentin (NEURONTIN) 100 mg capsule Take 2 capsules (200 mg total) by mouth 2 (two) times a day 360 capsule 0    gabapentin (NEURONTIN) 300 mg capsule Take 2 capsules (600 mg total) by mouth daily at bedtime 180 capsule 0    ibuprofen (MOTRIN) 800 mg tablet Take 1 tablet (800 mg total) by mouth every 8 (eight) hours as needed for mild pain for up to 10 days 30 tablet 0    insulin aspart protamine-insulin aspart (NovoLOG 70/30) 100 units/mL injection Inject under the skin 60 units in the morning before breakfast and 36 units in the evening before dinner 30 mL 0    Insulin Pen Needle (B-D UF III MINI PEN NEEDLES) 31G X 5 MM MISC by Does not apply route      loperamide (IMODIUM) 2 mg capsule Take 1 capsule (2 mg total) by mouth 4 (four) times a day as needed for diarrhea (Maximum 4 capsules/day) 30 capsule 0    losartan (COZAAR) 25 mg tablet TAKE ONE TABLET BY MOUTH EVERY DAY 90 tablet 1    metFORMIN (GLUCOPHAGE) 1000 MG tablet Take 1 tablet (1,000 mg total) by mouth 2 (two) times a day 180 tablet 3    metoprolol tartrate (LOPRESSOR) 25 mg tablet Take 1 tablet (25 mg total) by mouth every 12 (twelve) hours 180 tablet 0    nystatin (MYCOSTATIN) powder Apply topically 2 (two) times a day 45 g 1    omeprazole (PriLOSEC) 40 MG capsule Take 1 capsule (40 mg total) by mouth 2 (two) times a day 180 capsule 0    psyllium (METAMUCIL) packet Take 1 packet by mouth daily  0     No current facility-administered medications for this visit  Allergies   Allergen Reactions    Gluten Meal     Clindamycin Diarrhea       Video Exam    There were no vitals filed for this visit  Physical Exam   Constitutional: He is oriented to person, place, and time  He appears well-developed and well-nourished  HENT:   Head: Normocephalic and atraumatic  Right Ear: External ear normal    Left Ear: External ear normal    Nose: Nose normal    Eyes: Conjunctivae and EOM are normal  No scleral icterus  Pulmonary/Chest: Effort normal  No respiratory distress  Neurological: He is alert and oriented to person, place, and time  Skin: No rash noted  No erythema  No pallor  Psychiatric: He has a normal mood and affect  His behavior is normal  Judgment and thought content normal         I spent 20 minutes directly with the patient during this visit      [address] acknowledges that he has consented to an online visit or consultation  He understands that the online visit is based solely on information provided by him, and that, in the absence of a face-to-face physical evaluation by the physician, the diagnosis he receives is both limited and provisional in terms of accuracy and completeness  This is not intended to replace a full medical face-to-face evaluation by the physician  Boni Jacobs understands and accepts these terms

## 2020-07-17 ENCOUNTER — TELEPHONE (OUTPATIENT)
Dept: ENDOCRINOLOGY | Facility: CLINIC | Age: 55
End: 2020-07-17

## 2020-07-17 NOTE — TELEPHONE ENCOUNTER
Spoke with patient and informed him that accu-chek guide test strips are not covered  He will check with insurance to see what is and get back to me

## 2020-07-21 ENCOUNTER — TELEPHONE (OUTPATIENT)
Dept: ENDOCRINOLOGY | Facility: CLINIC | Age: 55
End: 2020-07-21

## 2020-07-21 DIAGNOSIS — E11.65 TYPE 2 DIABETES MELLITUS WITH HYPERGLYCEMIA, WITH LONG-TERM CURRENT USE OF INSULIN (HCC): Primary | ICD-10-CM

## 2020-07-21 DIAGNOSIS — Z79.4 TYPE 2 DIABETES MELLITUS WITH HYPERGLYCEMIA, WITH LONG-TERM CURRENT USE OF INSULIN (HCC): Primary | ICD-10-CM

## 2020-07-21 RX ORDER — BLOOD-GLUCOSE METER
EACH MISCELLANEOUS
Qty: 1 KIT | Refills: 0 | Status: SHIPPED | OUTPATIENT
Start: 2020-07-21

## 2020-07-21 RX ORDER — LANCETS
EACH MISCELLANEOUS
Qty: 100 EACH | Refills: 3 | Status: SHIPPED | OUTPATIENT
Start: 2020-07-21

## 2020-08-04 DIAGNOSIS — E11.49 OTHER DIABETIC NEUROLOGICAL COMPLICATION ASSOCIATED WITH TYPE 2 DIABETES MELLITUS (HCC): ICD-10-CM

## 2020-08-04 RX ORDER — GABAPENTIN 300 MG/1
CAPSULE ORAL
Qty: 30 CAPSULE | Refills: 1 | Status: SHIPPED | OUTPATIENT
Start: 2020-08-04 | End: 2020-08-05 | Stop reason: SDUPTHER

## 2020-08-05 DIAGNOSIS — E78.00 PURE HYPERCHOLESTEROLEMIA: ICD-10-CM

## 2020-08-05 DIAGNOSIS — E11.65 TYPE 2 DIABETES MELLITUS WITH HYPERGLYCEMIA, WITH LONG-TERM CURRENT USE OF INSULIN (HCC): ICD-10-CM

## 2020-08-05 DIAGNOSIS — R79.89 ELEVATED TSH: ICD-10-CM

## 2020-08-05 DIAGNOSIS — E66.01 OBESITY, MORBID, BMI 50 OR HIGHER (HCC): ICD-10-CM

## 2020-08-05 DIAGNOSIS — Z79.4 TYPE 2 DIABETES MELLITUS WITH HYPERGLYCEMIA, WITH LONG-TERM CURRENT USE OF INSULIN (HCC): ICD-10-CM

## 2020-08-05 DIAGNOSIS — K21.9 GASTROESOPHAGEAL REFLUX DISEASE, ESOPHAGITIS PRESENCE NOT SPECIFIED: ICD-10-CM

## 2020-08-05 DIAGNOSIS — I10 HYPERTENSION GOAL BP (BLOOD PRESSURE) < 140/90: ICD-10-CM

## 2020-08-05 DIAGNOSIS — I95.9 HYPOTENSION: ICD-10-CM

## 2020-08-05 RX ORDER — GABAPENTIN 300 MG/1
CAPSULE ORAL
Qty: 30 CAPSULE | Refills: 1 | Status: SHIPPED | OUTPATIENT
Start: 2020-08-05 | End: 2020-11-10

## 2020-08-05 RX ORDER — INSULIN ASPART 100 [IU]/ML
INJECTION, SUSPENSION SUBCUTANEOUS
Qty: 30 ML | Refills: 0 | OUTPATIENT
Start: 2020-08-05

## 2020-08-05 RX ORDER — INSULIN ASPART 100 [IU]/ML
INJECTION, SUSPENSION SUBCUTANEOUS
Qty: 90 ML | Refills: 3 | Status: SHIPPED | OUTPATIENT
Start: 2020-08-05 | End: 2020-11-24 | Stop reason: SDUPTHER

## 2020-08-05 RX ORDER — OMEPRAZOLE 40 MG/1
CAPSULE, DELAYED RELEASE ORAL
Qty: 60 CAPSULE | Refills: 2 | Status: SHIPPED | OUTPATIENT
Start: 2020-08-05 | End: 2021-08-02 | Stop reason: SDUPTHER

## 2020-08-05 NOTE — TELEPHONE ENCOUNTER
Received this from pharmacy- can you check with patient- I thought he wanted the novolog 70/30 pen instead

## 2020-08-05 NOTE — TELEPHONE ENCOUNTER
Kindra Lopez I will decline this script request  Does he have enough of the pens or does he need a refill on that one?

## 2020-08-24 ENCOUNTER — TELEPHONE (OUTPATIENT)
Dept: ENDOCRINOLOGY | Facility: CLINIC | Age: 55
End: 2020-08-24

## 2020-08-24 NOTE — TELEPHONE ENCOUNTER
----- Message from Doris Rosas sent at 8/21/2020  2:07 PM EDT -----  Regarding: Non-Urgent Medical Question  Contact: [phone]  Dr Bo Chaney:    Here are my most recent glucose test results      Rina Velazquez

## 2020-08-27 DIAGNOSIS — K21.9 GASTROESOPHAGEAL REFLUX DISEASE, ESOPHAGITIS PRESENCE NOT SPECIFIED: Primary | ICD-10-CM

## 2020-08-27 DIAGNOSIS — I95.9 HYPOTENSION: ICD-10-CM

## 2020-08-27 RX ORDER — OMEPRAZOLE 40 MG/1
CAPSULE, DELAYED RELEASE ORAL
Qty: 180 CAPSULE | Refills: 0 | Status: SHIPPED | OUTPATIENT
Start: 2020-08-27 | End: 2021-09-07 | Stop reason: SDUPTHER

## 2020-08-27 RX ORDER — OMEPRAZOLE 40 MG/1
40 CAPSULE, DELAYED RELEASE ORAL 2 TIMES DAILY
Qty: 180 CAPSULE | Refills: 0 | Status: SHIPPED | OUTPATIENT
Start: 2020-08-27 | End: 2020-12-16 | Stop reason: SDUPTHER

## 2020-09-03 DIAGNOSIS — E11.65 TYPE 2 DIABETES MELLITUS WITH HYPERGLYCEMIA, WITH LONG-TERM CURRENT USE OF INSULIN (HCC): Primary | ICD-10-CM

## 2020-09-03 DIAGNOSIS — Z79.4 TYPE 2 DIABETES MELLITUS WITH HYPERGLYCEMIA, WITH LONG-TERM CURRENT USE OF INSULIN (HCC): Primary | ICD-10-CM

## 2020-09-03 DIAGNOSIS — H53.8 BLURRY VISION, BILATERAL: ICD-10-CM

## 2020-09-03 NOTE — PROGRESS NOTES
Violeta Jerman has been complaining of blurry vision for several weeks, that is intermittent and bothering him  He tried eye drops with no improvement  He is due for ophtamological evaluation because of his long standing diabetes  Referral will be mail to his home

## 2020-09-14 ENCOUNTER — TELEPHONE (OUTPATIENT)
Dept: INTERNAL MEDICINE CLINIC | Facility: CLINIC | Age: 55
End: 2020-09-14

## 2020-09-21 ENCOUNTER — TELEPHONE (OUTPATIENT)
Dept: ENDOCRINOLOGY | Facility: CLINIC | Age: 55
End: 2020-09-21

## 2020-09-21 NOTE — TELEPHONE ENCOUNTER
----- Message from Nathalia Miller sent at 9/19/2020 11:36 PM EDT -----  Regarding: Non-Urgent Medical Question  Contact: [phone]  Dr Evelio Castro: other than some sleep issues here and there, my diet has not changed at all, you will notice an increase in my blood glucose levels, I attached three most recent screenshots of my readings        Zain Blackwell

## 2020-09-21 NOTE — TELEPHONE ENCOUNTER
Please let pt know I reviewed BG log    -looks like in August BGs were well controlled    Not sure why they are increasing slightly now    Increase 70/30 insulin to 66 units with breakfast and 40 units with dinner for now     You mentioned having occasional low BG near 70s--what time of day does that occur    If you experience low BGs please document time of day and let me know and we can reduce the insulin doses

## 2020-09-21 NOTE — TELEPHONE ENCOUNTER
----- Message from Pily Rajan sent at 9/19/2020 11:36 PM EDT -----  Regarding: Non-Urgent Medical Question  Contact: [phone]  Dr Obdulia Rayo: other than some sleep issues here and there, my diet has not changed at all, you will notice an increase in my blood glucose levels, I attached three most recent screenshots of my readings        Matilda Moctezuma

## 2020-09-23 NOTE — TELEPHONE ENCOUNTER
Pt returned called, provied info that BG log reviewed and increase 66 units with breakfast and 40 units with dinner and If you experience low BGs please document time of day and let me know and we can reduce the insulin doses   He understood  He stated that the low BG was once in the morning and once in the evening around meal times because he did not eat fast enough

## 2020-09-25 NOTE — UTILIZATION REVIEW
URGENT/EMERGENT  INPATIENT/SPU AUTHORIZATION REQUEST    Date: 09/25/20            # Pages in this Request:     X New Request   Additional Information for PA#:     Office Contact Name:  Starr Oliver Title: Utilization Review, Regjose manuel Nurse     Phone: [phone]  Ext  Availability (Date/Time): Wednesday - Friday 8 am- 4 pm    x Inpatient Review  SPU Review        Current       x Late Pick-up   · How your facility was first notified of the Late Pick-up: Paths Letter   · When your facility was first notified of the Late Pick-up (date): 9/22/2020         RECIPIENT INFORMATION    Recipient ID#: [id number]   Recipient Name: Lorraine Hennessy         YOB: 1965  50 y o  Recipient Alias:     Gender:  X Male  Female Medicaid Eligibility ([address] [place] [address]): INSURANCE INFORMATION    (All other private or governmental health insurance benefits must be utilized prior to billing the [place] Program)    Was this admission the result of an MVA, other accident, assault, injury, fall, gunshot, bite etc ? Yes x No                   If yes, provide a brief description of the incident  Does the recipient have other insurance coverage? Yes x No        Insurance Company Name/Policy #      Did that insurance pay on this claim? Yes  No        Did that insurance deny this claim? Yes  No    If yes, reason for denial:      Does the recipient have Medicare? Yes x No        Did Medicare exhaust prior to this admission? Yes  No        Did Medicare partially pay this claim? Yes  No        Did that insurance deny this claim? Yes  No    If yes, reason for denial:          Was the recipient a prisoner at the time of admission?   Yes x No            PROVIDER INFORMATION    Hospital Name: [facility] [place] Provider ID#: [id number]    [address] [place] [address] Physician Name: Darlin Shone Provider ID#: [id number]        ADMISSION INFORMATION    Type of Admission: (please choose one)    X ED      Direct    If yes, from where? Transfer    If yes, transferring hospital (inpatient, rehab, or psych) Provider Name/Provider ID#: Admission Floor or Unit Type: Med Surg     Dates/Times:        ED Date/Time: 11/28/2019  1:16 PM        Observation Date/Time:         Admission Date/Time: 11/28/19 1416        Discharge or Transfer Date/Time: 12/2/2019  3:43 PM        DIAGNOSIS/PROCEDURE CODES    Primary Diagnosis Code/Primary Diagnosis Code description:  A41 9  Sepsis, unspecified organism     L03 116  Cellulitis of left lower limb     E11 51  Type 2 diabetes mellitus with diabetic peripheral angiopathy without gangrene     I10  Essential (primary) hypertension    Additional Diagnosis Code(s) and Description(s)-(up to three additional codes):    Procedure Code (one) and description:        CLINICAL INFORMATION - PRIOR ADMISSION ONLY    Is there a prior admission with a discharge date within 30 days of the date of this admission? No (Proceed to the next section - "Clinical Information - General Review Checklist:)     X Yes (Provide the following information)     Prior admission dates:10/20-10/23/2019    [place] Prior Authorization Number: [id number]        Review Outcome:  DRG approved     Diagnosis Code(s)/Description:  A41 9     Sepsis, unspecified organism         L03 116 Cellulitis of left lower limb             R65  20  Severe sepsis without septic shock             E08 22   Diabetes mellitus due to underlying condition with diabetic chronic kidney disease            Procedure Code/Description:    Findings: Diane Thomas is a 48 y o  male with history of necrotizing fasciitis L proximal thigh- s/p excisional debridement 9/6/19, poorly controlled DM HLD, HTN, morbid obesity, normocytic anemia admitted for sepsis and cellulitis and site of previous excisional debridement for necrotizing fasciitis  He was immediately placed on IVF and given intravenous antibiotics    Infectious disease and SLIM were asked for their recommendations  SLIM added low dose lisinopril for renal protection in the setting of DM  He also had a cough for which tessalon pearls and robitussin were ordered  His BP has remained stable  Slight adjustments to his insulin were also made  The wound has been packed with Dakins soaked Kerlix daily  The erythema has improved as has his discomfort  ID agreed with ancef during the hospitalization and has recommended PO keflex for discharge  He is stable and pain is controlled with a po regimen  No IV narcotics are needed for dressing changes  He is cleared for discharge home with VNA by all services and will follow up with the wound clinic, endocrine and his PCP  Treatment:    Condition on Discharge:   Vitals:   Date/Time   Temp   Pulse   Resp   BP   MAP (mmHg)   SpO2   O2 Device   Patient Position - Orthostatic VS    10/23/19 0920                     None (Room air)       10/23/19 0731   98 7 °F (37 1 °C)   104   20   134/78   100   97 %   None (Room air)   Sitting    10/22/19 2242   97 7 °F (36 5 °C)   102   20   110/73      97 %   None (Room air)   Sitting         Labs:   Imaging:   Medications: Follow-up Instructions:    Disposition: Home         CLINICAL INFORMATION - GENERAL REVIEW CHECKLIST    EMERGENCY DEPARTMENT: (Proceed to "ADMISSION" if Direct Admission)    Presenting Signs/Symptoms:48year old male from home to ED admitted to inpatient due to left lower extremity cellulitis with sepsis  History of diabetes  Presented due to worsening fever with increasing erythema to left thigh over the last 2 days despite Keflex  Had necrotizing wound there 2 months ago  Also has cough, shortness of breath and increasing weakness  On exam is tachycardic and febrile   Morbidly obese  There is warm erythema LLE consistent with cellulitis, has wound in upper thigh with good granulation  Glucose is 149  WBC 15 75  Procalcitonin is 1 25  Blood cultures done  CxR with congestion    IV antibiotics and follow cultures in progress  Consult ID    Medication/treatment prior to arrival in the ED:    Past Medical History:     Past Medical History:   Diagnosis Date    Cellulitis     Diabetes mellitus ([address] )     Edema     Esophageal reflux     Hyperglycemia     Hypertension     IBS (irritable bowel syndrome)     Insomnia     Obesity     Osteoarthritis of knee     Renal disorder     Villonodular synovitis of the hand, right        Clinical Exam:Warm erythematous left lower extremity consistent with cellulitis, patient does have a wound in the upper left inner thigh, good granulation tissue  There is no crepitus anywhere throughout the cellulitic area that would be suggestive of gas-forming organism, no tenderness out of proportion to examination patient has good sensation over this entire cellulitic area       Initial Vital Signs: (Temp, Pulse, Resp, and BP)   ED Triage Vitals   Temperature Pulse Respirations Blood Pressure SpO2   11/28/19 1320 11/28/19 1320 11/28/19 1320 11/28/19 1320 11/28/19 1320   (!) 102 °F (38 9 °C) (!) 137 18 162/73 94 %      Temp Source Heart Rate Source Patient Position - Orthostatic VS BP Location FiO2 (%)   11/28/19 1320 11/28/19 1320 11/28/19 1320 11/28/19 1320 --   Oral Monitor Lying Right arm       Pain Score       11/28/19 1346       7           Pertinent Repeat Vital Signs: (include times they were obtained)  11/29/19 0750   99 °F (37 2 °C)   113Abnormal    20   139/82   105   99 %   Nasal cannula   Lying   11/28/19 2244   98 6 °F (37 °C)   89   20   129/78   97   100 %   Nasal cannula   Sitting   11/28/19 1531   99 8 °F (37 7 °C)   120Abnormal    20   110/62   80   92 %   None (Room air)   Lying   11/28/19 1420   102 1 °F (38 9 °C)Abnormal                         11/28/19 1415      124Abnormal    22   120/66      94 %               Pertinent Sustained Findings: (include times they were obtained)    Weight in Kilograms:  Wt Readings from Last 1 Encounters:   12/02/19 (!) 171 kg (377 lb 3 2 oz)       Pertinent Labs (results):           Results from last 7 days   Lab Units 11/29/19  0704 11/28/19  1325   WBC Thousand/uL 11 78* 15 75*   HEMOGLOBIN g/dL 9 5* 9 7*   HEMATOCRIT % 32 7* 32 5*   PLATELETS Thousands/uL 223 223   NEUTROS ABS Thousands/µL 8 33* 13 20*            Results from last 7 days   Lab Units 11/29/19  0704 11/28/19  1325   SODIUM mmol/L 137 131*   POTASSIUM mmol/L 4 0 4 3   CHLORIDE mmol/L 104 97*   CO2 mmol/L 28 24   ANION GAP mmol/L 5 10   BUN mg/dL 18 17   CREATININE mg/dL 1 02 1 03   EGFR ml/min/1 73sq m 84 83   CALCIUM mg/dL 9 1 9 0           Results from last 7 days   Lab Units 11/28/19  1325   AST U/L 35   ALT U/L 37   ALK PHOS U/L 90   TOTAL PROTEIN g/dL 7 6   ALBUMIN g/dL 2 8*   TOTAL BILIRUBIN mg/dL 0 75              Results from last 7 days   Lab Units 11/29/19  0625 11/29/19  0055 11/28/19  2103 11/28/19  1555   POC GLUCOSE mg/dl 113 165* 193* 158*            Results from last 7 days   Lab Units 11/29/19  0704 11/28/19  1325   GLUCOSE RANDOM mg/dL 110 149*           Results from last 7 days   Lab Units 11/28/19  1325   PROTIME seconds 15 2*   INR   1 27*   PTT seconds 36      Results from last 7 days   Lab Units 11/28/19  1325   PROCALCITONIN ng/ml 1 25*           Results from last 7 days   Lab Units 11/28/19  1338   LACTIC ACID mmol/L 1 6            Results from last 7 days   Lab Units 11/28/19  1327 11/28/19  1325   BLOOD CULTURE   Received in Microbiology Lab  Culture in Progress  Received in Microbiology Lab  Culture in Progress         Radiology (results):  11/28/2019  CxR - Cardiomegaly   Mild vascular congestion  EKG (results):   11/28/2019 EKG - sinus tachycardia   Normal ST and T  Other tests (results):    Tests pending final results:    Treatment in the ED:   Medication Administration from 11/28/2019 1304 to 11/28/2019 1451       Date/Time Order Dose Route Action Comments     11/28/2019 1346 cefepime (MAXIPIME) 2 g/50 mL dextrose IVPB 2,000 mg Intravenous New Bag      11/28/2019 1420 vancomycin (VANCOCIN) 1,500 mg in sodium chloride 0 9 % 250 mL IVPB 1,500 mg Intravenous New Bag      11/28/2019 1347 sodium chloride 0 9 % bolus 1,000 mL 1,000 mL Intravenous New Bag      11/28/2019 1350 promethazine (PHENERGAN) 12 5 mg/10 mL oral syrup 12 5 mg 12 5 mg Oral Given      11/28/2019 1346 acetaminophen (TYLENOL) tablet 975 mg 975 mg Oral Given            Other treatments:      Change in condition while in the ED:     Response to ED Treatment:          OBSERVATION: (Proceed to "ADMISSION" if Direct Admission)    Orders written during the observation period  Meds Name, dose, route, time, how may doses given:  PRN Meds Name, dose, route, time, how many doses given within the first 24 hrs :  IVs Type, rate, and total amt  ordered/given:  Labs, imaging, other:  Consults and findings:    Test Results during the observation period  Pertinent Lab tests (dates/results):  Culture results (blood, urine, spinal, wound, respiratory, etc ):  Imaging tests (dates/results):  EKG (dates/results):   Other test (dates/results):  Tests pending (dates/results):    Surgical or Invasive Procedures during the observation period  Name of surgery/procedure:  Date & Time:  Patient Response:  Post-operative orders:  Operative Report/Findings:    Response to Treatment, Major Change in Condition, Major Charge in Treatment during the observation period          ADMISSION:    DIRECT Admissions Only:    · Presenting Signs/Symptoms:   ·   · Medication/treatment prior to arrival:  ·   · Past Medical History:  ·   · Clinical Exam on admission:  ·   · Vital Signs on admission: (Temp, Pulse, Resp, and BP)  ·   · Weight in kilograms:     ALL Admissions:    Admission Orders and Other Orders written within the first 24 hrs after admission  Meds Name, dose, route, time, how may doses given:  cefazolin 2,000 mg Intravenous Q8H   enoxaparin 40 mg Subcutaneous Daily   ferrous sulfate 325 mg Oral Every Other Day   gabapentin 300 mg Oral HS   insulin aspart protamine-insulin aspart 40 Units Subcutaneous Before Dinner   insulin aspart protamine-insulin aspart 68 Units Subcutaneous Daily Before Breakfast   lisinopril 5 mg Oral Daily   metoprolol tartrate 25 mg Oral Q12H AUNDREA   nystatin   Topical BID   pantoprazole 40 mg Oral Early Morning   psyllium 1 packet Oral Daily       PRN Meds Name, dose, route, time, how many doses given within the first 24 hrs :  Acetaminophen - used x 1  650 mg Oral Q8H PRN   benzonatate 100 mg Oral TID PRN   dextromethorphan-guaiFENesin - used x 2  10 mL Oral Q4H PRN   Ibuprofen - used x 1 800 mg Oral Q8H PRN   loperamide 2 mg Oral 4x Daily PRN   Promethazine - used x 1  12 5 mg Oral Q6H PRN   QUEtiapine - used x 1  25 mg Oral HS PRN          IVs Type, rate, and total amt  ordered/given:  Labs, imaging, other:      Consults and findings: Infectious Disease - 11/29 - He was seen by his PCPC 2 days prior to admission started on Keflex po without improvement   - I & D on prior admit in Sept '19 - Positive group B strep and strep anginous had 7 days of IV abx's  Started on cefazolin iv  Continue abx's - follow blood cx - monitor CBC  Check CYTY scan to r/o abscess  Test Results after admission  Pertinent Lab tests (dates/results):  Culture results (blood, urine, spinal, wound, respiratory, etc ):  Imaging tests (dates/results):  EKG (dates/results):   Other test (dates/results):  Tests pending (dates/results):    Surgical or Invasive Procedures  Name of surgery/procedure:  Date & Time:  Patient Response:  Post-operative orders:  Operative Report/Findings:    Response to Treatment, Major Change in Condition, Major Charge in Treatment anytime during admission  Ronald Cordova is a 48 y o  male patient with past medical history of recurrent cellulitis, diabetes mellitus type 2, GERD, gout, hypertension, IBS, insomnia, obesity, osteoarthritis, and diabetic neuropathy who originally presented to the hospital with wife on 11/28/2019 due to cough and shortness of breath  Patient had excisional debridement for necrotizing fasciitis on 09/06/2019  Patient seen in his PCP's office on 11/27/2019 with recurrence of cellulitis  Patient described discomfort as a burning pain, worse with palpation  Patient was started on Keflex for 7 day duration  Patient presents to the emergency department refractory to this treatment and complaining of fever and left leg erythema which has been steadily worsening  Patient met SIRS criteria and was started on cefepime and vancomycin  Given 1 L IV bolus of normal saline  CXR was ordered and positive as above  EKG showed sinus tachycardia at 132 beats per minute  Patient had mild cough  Blood cultures were collected and later resulted with no growth at 72 hours  Tylenol given for fever and Phenergan given for nausea  Patient was admitted to inpatient for treatment of sepsis secondary to cellulitis      While on the inpatient, patient was transitioned to cefazolin 2 g IV Q8 and Infectious Disease was consulted  Procalcitonin was found to be 1 25, justifying antibiotic course  CT scan was ordered to investigate the presence of abscess and was negative as above  Duplex venous ultrasound was ordered to rule out DVT and was negative as above  PT/INR mildly elevated  PTT, free T4, and lactic acid all within normal limits  Lipid panel revealed elevated LDL at 112 and decreased HDL at 37  Patient was continued on outpatient statin  Patient was continued on PPI and increased from 20 mg to 40 mg during hospital course to treat symptoms that may have been caused by GERD  Patient was continued on gabapentin 300 QHS for diabetic neuropathy  Continued on Seroquel 25 mg QD for sleep, sleeping problems resolved with treatment of infection  Patient was continued on home regimens for hypertension and diabetes    Hemoglobin A1c  Metformin held while inpatient and restarted upon discharge  Patient's Metamucil and nystatin powder were continued  Patient reported significant pain in bilateral lower extremities well on the unit  Patient was given ibuprofen as needed and Norco Q6 PRN which resolved most of patient's symptoms  Patient requested sequential compression device which he stated also relieved many of his diabetic neuropathy symptoms  Patient was continued on benzonate and Robitussin PRN for cough during admission  Cough, respiratory symptoms, postnasal drip were refractory to initial treatments  Respiratory protocol was initiated and patient received duo nebs with no reported improvement  Patient was given course of humidified air by nursing staff with reported mild improvement  Requested awkward KK compress for chest due to past use with reported improvement  Continued on outpatient Flonase and Claritin, recently prescribed by his PCP  Benadryl 25 mg QHS PRN was also added for allergies  Mucinex was prescribed for patient's persistent postnasal drip and patient reported significant improvement following use  Repeat portable CXR was negative as above  Patient was weight and found to have gained 20 lb since admission  Patient was given 1 time dose of steroids Lasix on 12/01/2019 with reported improvement in respiratory symptoms  One time dose was repeated on 12/02/2019 with further improvement  Patient was discharged on 7 day course of Lasix 40 mg QD for further diuresis  Patient was educated on need for fluid restriction  Patient was provided with incentive spirometry prior to discharge, also with reported improvement  Patient was found to be anemic on CBC during admission with reduced hemoglobin and hematocrit  Patient was unable to recall when he had a previous colonoscopy, but states that it was many years ago  Iron study was ordered and displayed iron saturation, TIBC, and ferritin all within normal limits  Free on iron low at 27  Patient was continued on outpatient iron supplement while inpatient  Recommended patient received colonoscopy and ordered ambulatory referral to gastroenterology upon patient's discharge  Infectious disease recommended switching patient from cefazolin 2 Keflex 1000 mg QID for 5 days to complete 10 day antibiotic course  Patient was also given 5 day supply of Norco for residual pain symptoms  Disposition on Discharge  Home, Rehab, SNF, LTC, Shelter, etc : Home/Self Care    Cease to Breathe (CTB)  If a patient expires during an admission, in addition to the above information, please include:    Summary/timeline of the patient's decline in condition:    Medications and treatment:    Patient response to treatment:    Date and time patient ceased to breathe:        Is there a Readmission that follows this admission? Yes x No    If yes, provide dates:          InterQual Review    InterQual Criteria Met:  Yes X No  N/A        Please include the InterQual Review, InterQual year/version used, and the criteria selected:   Created Using  Review Status  Review Entered    [employer]   In Primary  11/29/2019 09:38         Criteria Set Name - Subset    LOC:Acute Adult-Infection: Skin        Criteria Review    REVIEW SUMMARY     Patient: Wojciech Ferguson  Review Number: [id number]  Review Status: In Primary  Criteria Status: Not Met     Condition Specific: Yes        OUTCOMES  Outcome Type: Primary           REVIEW DETAILS     Product: Brendan Montoya Adult  Subset: Infection: Skin      (Symptom or finding within 24h)         (Excludes PO medications unless noted)          Select Day, One:              [ ] Episode Day 1, One:                  [ ] ACUTE, >= One:                      [ ] Cellulitis and, Both:                          [X] Anti-infective     Version: [employer] 2019 1  [employer] and [employer]  © 2019 [employer] and/or one of its [place]  All Rights Reserved    CPT only © 2018 [employer]  All Rights Reserved  PLEASE SUBMIT THE COMPLETED FORM TO THE DEPARTMENT OF HUMAN SERVICES - DIVISION OF CLINICAL  REVIEW VIA FAX -[phone] or VIA E-MAIL TO [email or web address]    Signature: Delfino Jenna Date:  09/25/20    Confidentiality Notice: The documents accompanying this telecopy may contain confidential information belonging to the sender  The information is intended only for the use of the individual named above  If you are not the intended recipient, you are hereby notified  That any disclosure, copying, distribution or taking of any telecopy is strictly prohibited

## 2020-09-29 ENCOUNTER — TELEPHONE (OUTPATIENT)
Dept: LAB | Facility: HOSPITAL | Age: 55
End: 2020-09-29

## 2020-09-29 ENCOUNTER — TELEMEDICINE (OUTPATIENT)
Dept: INTERNAL MEDICINE CLINIC | Facility: CLINIC | Age: 55
End: 2020-09-29
Payer: COMMERCIAL

## 2020-09-29 DIAGNOSIS — L03.311 CELLULITIS OF ABDOMINAL WALL: Primary | ICD-10-CM

## 2020-09-29 PROBLEM — S71.102A OPEN WOUND OF LEFT THIGH: Status: RESOLVED | Noted: 2019-11-29 | Resolved: 2020-09-29

## 2020-09-29 PROBLEM — L03.90 SEPSIS DUE TO CELLULITIS (HCC): Status: RESOLVED | Noted: 2018-11-15 | Resolved: 2020-09-29

## 2020-09-29 PROBLEM — A41.9 SEPSIS DUE TO CELLULITIS (HCC): Status: RESOLVED | Noted: 2018-11-15 | Resolved: 2020-09-29

## 2020-09-29 PROCEDURE — 99213 OFFICE O/P EST LOW 20 MIN: CPT | Performed by: INTERNAL MEDICINE

## 2020-09-29 RX ORDER — SULFAMETHOXAZOLE AND TRIMETHOPRIM 800; 160 MG/1; MG/1
1 TABLET ORAL EVERY 12 HOURS SCHEDULED
Qty: 14 TABLET | Refills: 0 | Status: SHIPPED | OUTPATIENT
Start: 2020-09-29 | End: 2020-10-06

## 2020-09-29 NOTE — PROGRESS NOTES
Virtual Regular Visit      Assessment/Plan:    Problem List Items Addressed This Visit        Other    Cellulitis of abdominal wall - Primary     · Will give Rx Bactrim DS BID x7 days  · Patient advised to keep area clean and dry with cleanse soap and water daily  No need to continue topical mupirocin or benzoyl peroxide  Keep open to air, do not cover  · Patient advised that if he were to develop worsening erythema, pain/swelling, or fevers while on course of antibiotics he should go to the ED immediately for eval and IV antibiotics  Patient verbalizes understanding  · Continue optimized BG control         Relevant Medications    sulfamethoxazole-trimethoprim (BACTRIM DS) 800-160 mg per tablet               Reason for visit is   Chief Complaint   Patient presents with    Virtual Regular Visit        Encounter provider Sandy Arias MD    Provider located at [address] [place] [address]  [phone]      Recent Visits  No visits were found meeting these conditions  Showing recent visits within past 7 days and meeting all other requirements     Today's Visits  Date Type Provider Dept   09/29/20 Telemedicine Sandy Arias MD  Internal [facility]   Showing today's visits and meeting all other requirements     Future Appointments  No visits were found meeting these conditions  Showing future appointments within next 150 days and meeting all other requirements        The patient was identified by name and date of birth  Adam Devaughn was informed that this is a telemedicine visit and that the visit is being conducted through [employer] and patient was informed that this is not a secure, HIPAA-complaint platform  He agrees to proceed  Coty Wade He acknowledged consent and understanding of privacy and security of the video platform   The patient has agreed to participate and understands they can discontinue the visit at any time     Patient is aware this is a billable service  Subjective  Gael Mcginnis is a 50 y o  male who is seen today via a virtual visit for concerns regarding an abdominal wound  Patient noticed a bump/pimple on around 9/24 (see media section of patient's chart for photo or below) on his abdominal pannus  He thought it was an ingrown hair though noticed some erythema around the area at which time he contacted his PCP on 9/24 and sent pictures  Patient did not want to come into the office for evaluation as he is high risk for COVID infection and has been limiting his healthcare interactions  It was decided that patient could be evaluated via a virtual visit with the understanding of the diagnostic limitations of the platform  Patient reports applying topical benzoyl peroxide and mupirocin daily with improvement in the erythema  No pain  No lymphangitic streaking  No draining sinus tract though does feel a little more indurated than the surrounding tissue  He feels systemically well and denies any fevers  He has a hx of DM2 which recently has been much better controlled with improvement of his A1c to 6 5 on 1/18/20 from 11 2 on 9/6/19  He does report a history as well of LLE cellulitis and was admitted in November 2019 for sepsis 2/2 to this for which he received IV cefazolin and Keflex to complete a 10 day course of abx            Past Medical History:   Diagnosis Date    Cellulitis     last assessed 12/10/15    Diabetes mellitus ([facility] [address] )     Edema     Elevated liver enzymes     Esophageal reflux     Gluten intolerance     Gout     last assessed 09/05/13    Hyperglycemia     Hypertension     IBS (irritable bowel syndrome)     Insomnia     Obesity     Osteoarthritis of knee     last assessed 02/10/14    Prehypertension     last assessed 08/22/17    Renal disorder     Venous insufficiency     last assessed 08/22/17    Villonodular synovitis of the hand, right     last assessed 11/14/2013 Past Surgical History:   Procedure Laterality Date    INCISION AND DRAINAGE OF WOUND Left 9/6/2019    Procedure: INCISION AND DRAINAGE (I&D) GROIN;  Surgeon: Yandel Stewart DO;  [facility];  Service: General    SKIN BIOPSY      WOUND DEBRIDEMENT Left 9/7/2019    Procedure: EXCISIONAL DEBRIDEMENT;  Surgeon: Yandel Stewart DO;  [facility];  Service: General       Current Outpatient Medications   Medication Sig Dispense Refill    Blood Glucose Monitoring Suppl (Pamela Ochoa) w/Device KIT Use to test sugar daily 1 kit 0    ferrous sulfate 325 (65 Fe) mg tablet Take 1 tablet (325 mg total) by mouth every other day  0    gabapentin (NEURONTIN) 100 mg capsule Take 2 capsules (200 mg total) by mouth 2 (two) times a day 360 capsule 0    gabapentin (NEURONTIN) 300 mg capsule Take one capsule by mouth at bedtime (take in addition with 200mg for a total of 500mg at night) 30 capsule 1    glucose blood (OneTouch Verio) test strip Use to test sugar 2 times a day 100 each 3    ibuprofen (MOTRIN) 800 mg tablet Take 1 tablet (800 mg total) by mouth every 8 (eight) hours as needed for mild pain for up to 10 days 30 tablet 0    insulin aspart protamine-insulin aspart (NovoLOG Mix 70/30 FlexPen) 100 Units/mL injection pen Inject 60 units under the skin before breakfast and 36 units under the skin before dinner 90 mL 3    Insulin Pen Needle (BD Pen Needle Ludmila U/F) 32G X 4 MM MISC Use 2 pen needles a day to administer insulin under the skin 100 each 3    Lancets (ONETOUCH ULTRASOFT) lancets Use to test sugar 2 times a day 100 each 3    loperamide (IMODIUM) 2 mg capsule Take 1 capsule (2 mg total) by mouth 4 (four) times a day as needed for diarrhea (Maximum 4 capsules/day) 30 capsule 0    losartan (COZAAR) 25 mg tablet TAKE ONE TABLET BY MOUTH EVERY DAY 90 tablet 1    metFORMIN (GLUCOPHAGE) 1000 MG tablet Take 1 tablet (1,000 mg total) by mouth 2 (two) times a day 180 tablet 3    metoprolol tartrate (LOPRESSOR) 25 mg tablet TAKE ONE TABLET BY MOUTH EVERY 12 HOURS 180 tablet 0    nystatin (MYCOSTATIN) powder Apply topically 2 (two) times a day 45 g 1    omeprazole (PriLOSEC) 40 MG capsule TAKE ONE CAPSULE BY MOUTH TWICE A DAY 60 capsule 2    omeprazole (PriLOSEC) 40 MG capsule Take 1 capsule (40 mg total) by mouth 2 (two) times a day 180 capsule 0    omeprazole (PriLOSEC) 40 MG capsule TAKE ONE CAPSULE BY MOUTH TWICE DAILY 180 capsule 0    psyllium (METAMUCIL) packet Take 1 packet by mouth daily  0    sulfamethoxazole-trimethoprim (BACTRIM DS) 800-160 mg per tablet Take 1 tablet by mouth every 12 (twelve) hours for 7 days 14 tablet 0     No current facility-administered medications for this visit  Allergies   Allergen Reactions    Gluten Meal     Clindamycin Diarrhea       Review of Systems   Constitutional: Negative for activity change, appetite change, fatigue and fever  HENT: Positive for postnasal drip  Negative for sinus pressure, sinus pain and sneezing  Respiratory: Positive for cough  Cardiovascular: Negative for chest pain, palpitations and leg swelling  Gastrointestinal: Negative for abdominal distention, abdominal pain, constipation, diarrhea and nausea  Skin: Positive for color change  Video Exam    There were no vitals filed for this visit  Physical Exam  Constitutional:       General: He is not in acute distress  Appearance: He is not ill-appearing or toxic-appearing  Skin:     Findings: Erythema (Improved from image noted in chart on 9/24  Note image below is from 9/24  Physical exam limited due to virtual visit  Patient reports no drainage  Patient does report does feel a little harder than surrounding tissue) present        Comments: Abdominal wall              I spent 25 minutes directly with the patient during this visit      [address] [place] [address] acknowledges that he has consented to an online visit or consultation  He understands that the online visit is based solely on information provided by him, and that, in the absence of a face-to-face physical evaluation by the physician, the diagnosis he receives is both limited and provisional in terms of accuracy and completeness  This is not intended to replace a full medical face-to-face evaluation by the physician  Jorja Hamman understands and accepts these terms

## 2020-09-29 NOTE — ASSESSMENT & PLAN NOTE
· Will give Rx Bactrim DS BID x7 days  · Patient advised to keep area clean and dry with cleanse soap and water daily  No need to continue topical mupirocin or benzoyl peroxide  Keep open to air, do not cover  · Patient advised that if he were to develop worsening erythema, pain/swelling, or fevers while on course of antibiotics he should go to the ED immediately for eval and IV antibiotics  Patient verbalizes understanding    · Continue optimized BG control

## 2020-10-14 ENCOUNTER — TELEPHONE (OUTPATIENT)
Dept: ENDOCRINOLOGY | Facility: CLINIC | Age: 55
End: 2020-10-14

## 2020-10-14 DIAGNOSIS — I10 ESSENTIAL HYPERTENSION: ICD-10-CM

## 2020-10-14 PROCEDURE — 4010F ACE/ARB THERAPY RXD/TAKEN: CPT | Performed by: INTERNAL MEDICINE

## 2020-10-14 RX ORDER — LOSARTAN POTASSIUM 25 MG/1
TABLET ORAL
Qty: 90 TABLET | Refills: 2 | Status: SHIPPED | OUTPATIENT
Start: 2020-10-14 | End: 2021-07-07

## 2020-10-22 ENCOUNTER — TELEPHONE (OUTPATIENT)
Dept: LAB | Facility: HOSPITAL | Age: 55
End: 2020-10-22

## 2020-10-23 ENCOUNTER — TELEPHONE (OUTPATIENT)
Dept: INTERNAL MEDICINE CLINIC | Facility: CLINIC | Age: 55
End: 2020-10-23

## 2020-10-27 ENCOUNTER — TELEPHONE (OUTPATIENT)
Dept: INTERNAL MEDICINE CLINIC | Facility: CLINIC | Age: 55
End: 2020-10-27

## 2020-10-27 ENCOUNTER — TELEMEDICINE (OUTPATIENT)
Dept: INTERNAL MEDICINE CLINIC | Facility: CLINIC | Age: 55
End: 2020-10-27
Payer: COMMERCIAL

## 2020-10-27 DIAGNOSIS — L03.116 CELLULITIS OF LEFT LOWER EXTREMITY: Primary | ICD-10-CM

## 2020-10-27 PROBLEM — L03.311 CELLULITIS OF ABDOMINAL WALL: Status: RESOLVED | Noted: 2020-09-29 | Resolved: 2020-10-27

## 2020-10-27 PROCEDURE — 99213 OFFICE O/P EST LOW 20 MIN: CPT | Performed by: INTERNAL MEDICINE

## 2020-10-27 RX ORDER — NYSTATIN 100000 [USP'U]/G
POWDER TOPICAL 2 TIMES DAILY
Qty: 45 G | Refills: 1 | Status: SHIPPED | OUTPATIENT
Start: 2020-10-27 | End: 2021-02-24

## 2020-10-27 RX ORDER — CEPHALEXIN 500 MG/1
500 CAPSULE ORAL EVERY 6 HOURS SCHEDULED
Qty: 24 CAPSULE | Refills: 0 | Status: SHIPPED | OUTPATIENT
Start: 2020-10-28 | End: 2020-11-03

## 2020-11-02 DIAGNOSIS — R09.82 POST-NASAL DRIP: Primary | ICD-10-CM

## 2020-11-02 RX ORDER — CETIRIZINE HYDROCHLORIDE 10 MG/1
10 TABLET ORAL DAILY
Qty: 30 TABLET | Refills: 2 | Status: SHIPPED | OUTPATIENT
Start: 2020-11-02 | End: 2020-12-16 | Stop reason: SDUPTHER

## 2020-11-10 DIAGNOSIS — E11.49 OTHER DIABETIC NEUROLOGICAL COMPLICATION ASSOCIATED WITH TYPE 2 DIABETES MELLITUS (HCC): ICD-10-CM

## 2020-11-10 RX ORDER — GABAPENTIN 300 MG/1
CAPSULE ORAL
Qty: 30 CAPSULE | Refills: 1 | Status: SHIPPED | OUTPATIENT
Start: 2020-11-10 | End: 2021-07-08

## 2020-11-21 DIAGNOSIS — I10 HYPERTENSION GOAL BP (BLOOD PRESSURE) < 140/90: ICD-10-CM

## 2020-11-21 DIAGNOSIS — R79.89 ELEVATED TSH: ICD-10-CM

## 2020-11-21 DIAGNOSIS — Z79.4 TYPE 2 DIABETES MELLITUS WITH HYPERGLYCEMIA, WITH LONG-TERM CURRENT USE OF INSULIN (HCC): ICD-10-CM

## 2020-11-21 DIAGNOSIS — E66.01 OBESITY, MORBID, BMI 50 OR HIGHER (HCC): ICD-10-CM

## 2020-11-21 DIAGNOSIS — E78.00 PURE HYPERCHOLESTEROLEMIA: ICD-10-CM

## 2020-11-21 DIAGNOSIS — E11.65 TYPE 2 DIABETES MELLITUS WITH HYPERGLYCEMIA, WITH LONG-TERM CURRENT USE OF INSULIN (HCC): ICD-10-CM

## 2020-11-24 DIAGNOSIS — R79.89 ELEVATED TSH: ICD-10-CM

## 2020-11-24 DIAGNOSIS — Z79.4 TYPE 2 DIABETES MELLITUS WITH HYPERGLYCEMIA, WITH LONG-TERM CURRENT USE OF INSULIN (HCC): ICD-10-CM

## 2020-11-24 DIAGNOSIS — I10 HYPERTENSION GOAL BP (BLOOD PRESSURE) < 140/90: ICD-10-CM

## 2020-11-24 DIAGNOSIS — E78.00 PURE HYPERCHOLESTEROLEMIA: ICD-10-CM

## 2020-11-24 DIAGNOSIS — E66.01 OBESITY, MORBID, BMI 50 OR HIGHER (HCC): ICD-10-CM

## 2020-11-24 DIAGNOSIS — E11.65 TYPE 2 DIABETES MELLITUS WITH HYPERGLYCEMIA, WITH LONG-TERM CURRENT USE OF INSULIN (HCC): ICD-10-CM

## 2020-11-24 RX ORDER — INSULIN ASPART 100 [IU]/ML
INJECTION, SUSPENSION SUBCUTANEOUS
Qty: 90 ML | Refills: 3 | Status: SHIPPED | OUTPATIENT
Start: 2020-11-24 | End: 2020-11-25 | Stop reason: SDUPTHER

## 2020-11-24 RX ORDER — INSULIN ASPART 100 [IU]/ML
INJECTION, SUSPENSION SUBCUTANEOUS
Qty: 5 PEN | OUTPATIENT
Start: 2020-11-24

## 2020-11-25 DIAGNOSIS — E78.00 PURE HYPERCHOLESTEROLEMIA: ICD-10-CM

## 2020-11-25 DIAGNOSIS — Z79.4 TYPE 2 DIABETES MELLITUS WITH HYPERGLYCEMIA, WITH LONG-TERM CURRENT USE OF INSULIN (HCC): ICD-10-CM

## 2020-11-25 DIAGNOSIS — I10 HYPERTENSION GOAL BP (BLOOD PRESSURE) < 140/90: ICD-10-CM

## 2020-11-25 DIAGNOSIS — E11.65 TYPE 2 DIABETES MELLITUS WITH HYPERGLYCEMIA, WITH LONG-TERM CURRENT USE OF INSULIN (HCC): ICD-10-CM

## 2020-11-25 DIAGNOSIS — R79.89 ELEVATED TSH: ICD-10-CM

## 2020-11-25 DIAGNOSIS — E66.01 OBESITY, MORBID, BMI 50 OR HIGHER (HCC): ICD-10-CM

## 2020-11-25 RX ORDER — INSULIN ASPART 100 [IU]/ML
INJECTION, SUSPENSION SUBCUTANEOUS
Qty: 90 ML | Refills: 3 | Status: SHIPPED | OUTPATIENT
Start: 2020-11-25 | End: 2020-12-16 | Stop reason: ALTCHOICE

## 2020-11-30 ENCOUNTER — TELEPHONE (OUTPATIENT)
Dept: LAB | Facility: HOSPITAL | Age: 55
End: 2020-11-30

## 2020-12-09 ENCOUNTER — TELEPHONE (OUTPATIENT)
Dept: LAB | Facility: HOSPITAL | Age: 55
End: 2020-12-09

## 2020-12-10 ENCOUNTER — LAB (OUTPATIENT)
Dept: LAB | Facility: HOSPITAL | Age: 55
End: 2020-12-10
Payer: COMMERCIAL

## 2020-12-10 DIAGNOSIS — R79.89 ELEVATED TSH: ICD-10-CM

## 2020-12-10 DIAGNOSIS — Z11.4 SCREENING FOR HIV (HUMAN IMMUNODEFICIENCY VIRUS): ICD-10-CM

## 2020-12-10 DIAGNOSIS — R94.6 ABNORMAL THYROID FUNCTION TEST: ICD-10-CM

## 2020-12-10 DIAGNOSIS — I10 HYPERTENSION GOAL BP (BLOOD PRESSURE) < 140/90: ICD-10-CM

## 2020-12-10 DIAGNOSIS — E78.00 PURE HYPERCHOLESTEROLEMIA: ICD-10-CM

## 2020-12-10 DIAGNOSIS — D63.8 ANEMIA IN OTHER CHRONIC DISEASES CLASSIFIED ELSEWHERE: ICD-10-CM

## 2020-12-10 DIAGNOSIS — E11.65 TYPE 2 DIABETES MELLITUS WITH HYPERGLYCEMIA, WITH LONG-TERM CURRENT USE OF INSULIN (HCC): ICD-10-CM

## 2020-12-10 DIAGNOSIS — Z79.4 TYPE 2 DIABETES MELLITUS WITH HYPERGLYCEMIA, WITH LONG-TERM CURRENT USE OF INSULIN (HCC): ICD-10-CM

## 2020-12-10 LAB
ALBUMIN SERPL BCP-MCNC: 3.4 G/DL (ref 3.5–5)
ALP SERPL-CCNC: 80 U/L (ref 46–116)
ALT SERPL W P-5'-P-CCNC: 65 U/L (ref 12–78)
ANION GAP SERPL CALCULATED.3IONS-SCNC: 1 MMOL/L (ref 4–13)
AST SERPL W P-5'-P-CCNC: 33 U/L (ref 5–45)
BASOPHILS # BLD AUTO: 0.07 THOUSANDS/ΜL (ref 0–0.1)
BASOPHILS NFR BLD AUTO: 1 % (ref 0–1)
BILIRUB SERPL-MCNC: 0.59 MG/DL (ref 0.2–1)
BUN SERPL-MCNC: 20 MG/DL (ref 5–25)
CALCIUM ALBUM COR SERPL-MCNC: 10.3 MG/DL (ref 8.3–10.1)
CALCIUM SERPL-MCNC: 9.8 MG/DL (ref 8.3–10.1)
CHLORIDE SERPL-SCNC: 104 MMOL/L (ref 100–108)
CHOLEST SERPL-MCNC: 234 MG/DL (ref 50–200)
CO2 SERPL-SCNC: 33 MMOL/L (ref 21–32)
CREAT SERPL-MCNC: 0.98 MG/DL (ref 0.6–1.3)
EOSINOPHIL # BLD AUTO: 0.32 THOUSAND/ΜL (ref 0–0.61)
EOSINOPHIL NFR BLD AUTO: 5 % (ref 0–6)
ERYTHROCYTE [DISTWIDTH] IN BLOOD BY AUTOMATED COUNT: 13.2 % (ref 11.6–15.1)
EST. AVERAGE GLUCOSE BLD GHB EST-MCNC: 200 MG/DL
GFR SERPL CREATININE-BSD FRML MDRD: 88 ML/MIN/1.73SQ M
GLUCOSE SERPL-MCNC: 130 MG/DL (ref 65–140)
HBA1C MFR BLD: 8.6 %
HCT VFR BLD AUTO: 48.4 % (ref 36.5–49.3)
HDLC SERPL-MCNC: 49 MG/DL
HGB BLD-MCNC: 15.7 G/DL (ref 12–17)
IMM GRANULOCYTES # BLD AUTO: 0.02 THOUSAND/UL (ref 0–0.2)
IMM GRANULOCYTES NFR BLD AUTO: 0 % (ref 0–2)
LDLC SERPL CALC-MCNC: 158 MG/DL (ref 0–100)
LYMPHOCYTES # BLD AUTO: 2.41 THOUSANDS/ΜL (ref 0.6–4.47)
LYMPHOCYTES NFR BLD AUTO: 35 % (ref 14–44)
MCH RBC QN AUTO: 31.1 PG (ref 26.8–34.3)
MCHC RBC AUTO-ENTMCNC: 32.4 G/DL (ref 31.4–37.4)
MCV RBC AUTO: 96 FL (ref 82–98)
MONOCYTES # BLD AUTO: 0.67 THOUSAND/ΜL (ref 0.17–1.22)
MONOCYTES NFR BLD AUTO: 10 % (ref 4–12)
NEUTROPHILS # BLD AUTO: 3.34 THOUSANDS/ΜL (ref 1.85–7.62)
NEUTS SEG NFR BLD AUTO: 49 % (ref 43–75)
NONHDLC SERPL-MCNC: 185 MG/DL
NRBC BLD AUTO-RTO: 0 /100 WBCS
PLATELET # BLD AUTO: 190 THOUSANDS/UL (ref 149–390)
PMV BLD AUTO: 9 FL (ref 8.9–12.7)
POTASSIUM SERPL-SCNC: 4.7 MMOL/L (ref 3.5–5.3)
PROT SERPL-MCNC: 7.3 G/DL (ref 6.4–8.2)
RBC # BLD AUTO: 5.05 MILLION/UL (ref 3.88–5.62)
SODIUM SERPL-SCNC: 138 MMOL/L (ref 136–145)
T4 FREE SERPL-MCNC: 1.1 NG/DL (ref 0.76–1.46)
TRIGL SERPL-MCNC: 136 MG/DL
TSH SERPL DL<=0.05 MIU/L-ACNC: 5.12 UIU/ML (ref 0.36–3.74)
WBC # BLD AUTO: 6.83 THOUSAND/UL (ref 4.31–10.16)

## 2020-12-10 PROCEDURE — 80053 COMPREHEN METABOLIC PANEL: CPT

## 2020-12-10 PROCEDURE — 83036 HEMOGLOBIN GLYCOSYLATED A1C: CPT

## 2020-12-10 PROCEDURE — 84443 ASSAY THYROID STIM HORMONE: CPT

## 2020-12-10 PROCEDURE — 80061 LIPID PANEL: CPT

## 2020-12-10 PROCEDURE — 3052F HG A1C>EQUAL 8.0%<EQUAL 9.0%: CPT | Performed by: INTERNAL MEDICINE

## 2020-12-10 PROCEDURE — 87389 HIV-1 AG W/HIV-1&-2 AB AG IA: CPT

## 2020-12-10 PROCEDURE — 84439 ASSAY OF FREE THYROXINE: CPT

## 2020-12-10 PROCEDURE — 36415 COLL VENOUS BLD VENIPUNCTURE: CPT

## 2020-12-10 PROCEDURE — 86376 MICROSOMAL ANTIBODY EACH: CPT

## 2020-12-10 PROCEDURE — 85025 COMPLETE CBC W/AUTO DIFF WBC: CPT

## 2020-12-11 LAB
HIV 1+2 AB+HIV1 P24 AG SERPL QL IA: NORMAL
THYROPEROXIDASE AB SERPL-ACNC: <9 IU/ML (ref 0–34)

## 2020-12-16 ENCOUNTER — TELEMEDICINE (OUTPATIENT)
Dept: ENDOCRINOLOGY | Facility: CLINIC | Age: 55
End: 2020-12-16
Payer: COMMERCIAL

## 2020-12-16 DIAGNOSIS — R09.82 POST-NASAL DRIP: ICD-10-CM

## 2020-12-16 DIAGNOSIS — Z79.4 TYPE 2 DIABETES MELLITUS WITH HYPERGLYCEMIA, WITH LONG-TERM CURRENT USE OF INSULIN (HCC): Primary | ICD-10-CM

## 2020-12-16 DIAGNOSIS — K21.9 GASTROESOPHAGEAL REFLUX DISEASE: ICD-10-CM

## 2020-12-16 DIAGNOSIS — E78.00 PURE HYPERCHOLESTEROLEMIA: ICD-10-CM

## 2020-12-16 DIAGNOSIS — I10 HYPERTENSION GOAL BP (BLOOD PRESSURE) < 140/90: ICD-10-CM

## 2020-12-16 DIAGNOSIS — R94.6 ABNORMAL THYROID FUNCTION TEST: ICD-10-CM

## 2020-12-16 DIAGNOSIS — E66.01 OBESITY, MORBID, BMI 50 OR HIGHER (HCC): ICD-10-CM

## 2020-12-16 DIAGNOSIS — E11.65 TYPE 2 DIABETES MELLITUS WITH HYPERGLYCEMIA, WITH LONG-TERM CURRENT USE OF INSULIN (HCC): Primary | ICD-10-CM

## 2020-12-16 PROCEDURE — 99214 OFFICE O/P EST MOD 30 MIN: CPT | Performed by: INTERNAL MEDICINE

## 2020-12-16 RX ORDER — OMEPRAZOLE 40 MG/1
CAPSULE, DELAYED RELEASE ORAL
Qty: 180 CAPSULE | Refills: 1 | Status: SHIPPED | OUTPATIENT
Start: 2020-12-16 | End: 2021-06-09

## 2020-12-16 RX ORDER — CETIRIZINE HYDROCHLORIDE 10 MG/1
TABLET ORAL
Qty: 90 TABLET | Refills: 1 | Status: SHIPPED | OUTPATIENT
Start: 2020-12-16 | End: 2020-12-22 | Stop reason: ALTCHOICE

## 2020-12-16 RX ORDER — INSULIN GLARGINE 300 U/ML
INJECTION, SOLUTION SUBCUTANEOUS
Qty: 9 PEN | Refills: 1 | Status: SHIPPED | OUTPATIENT
Start: 2020-12-16 | End: 2021-11-01 | Stop reason: HOSPADM

## 2020-12-16 RX ORDER — LEVOTHYROXINE SODIUM 0.03 MG/1
TABLET ORAL
Qty: 30 TABLET | Refills: 3 | Status: SHIPPED | OUTPATIENT
Start: 2020-12-16 | End: 2021-04-12

## 2020-12-16 RX ORDER — INSULIN ASPART 100 [IU]/ML
INJECTION, SOLUTION INTRAVENOUS; SUBCUTANEOUS
Qty: 20 PEN | Refills: 1 | Status: SHIPPED | OUTPATIENT
Start: 2020-12-16 | End: 2021-02-17 | Stop reason: SDUPTHER

## 2020-12-17 ENCOUNTER — TELEMEDICINE (OUTPATIENT)
Dept: INTERNAL MEDICINE CLINIC | Facility: CLINIC | Age: 55
End: 2020-12-17
Payer: COMMERCIAL

## 2020-12-17 DIAGNOSIS — N52.9 ERECTILE DYSFUNCTION, UNSPECIFIED ERECTILE DYSFUNCTION TYPE: ICD-10-CM

## 2020-12-17 DIAGNOSIS — E66.01 MORBID OBESITY WITH BMI OF 60.0-69.9, ADULT (HCC): ICD-10-CM

## 2020-12-17 DIAGNOSIS — E78.00 PURE HYPERCHOLESTEROLEMIA: Primary | ICD-10-CM

## 2020-12-17 DIAGNOSIS — E11.49 OTHER DIABETIC NEUROLOGICAL COMPLICATION ASSOCIATED WITH TYPE 2 DIABETES MELLITUS (HCC): ICD-10-CM

## 2020-12-17 DIAGNOSIS — D63.8 ANEMIA IN OTHER CHRONIC DISEASES CLASSIFIED ELSEWHERE: ICD-10-CM

## 2020-12-17 DIAGNOSIS — I10 ESSENTIAL HYPERTENSION: ICD-10-CM

## 2020-12-17 PROBLEM — N50.89 SCROTAL SWELLING: Status: RESOLVED | Noted: 2020-05-19 | Resolved: 2020-12-17

## 2020-12-17 PROCEDURE — 1036F TOBACCO NON-USER: CPT | Performed by: INTERNAL MEDICINE

## 2020-12-17 PROCEDURE — 99214 OFFICE O/P EST MOD 30 MIN: CPT | Performed by: INTERNAL MEDICINE

## 2020-12-22 ENCOUNTER — TELEPHONE (OUTPATIENT)
Dept: ENDOCRINOLOGY | Facility: CLINIC | Age: 55
End: 2020-12-22

## 2020-12-22 ENCOUNTER — TELEMEDICINE (OUTPATIENT)
Dept: INTERNAL MEDICINE CLINIC | Facility: CLINIC | Age: 55
End: 2020-12-22
Payer: COMMERCIAL

## 2020-12-22 DIAGNOSIS — I10 ESSENTIAL HYPERTENSION: ICD-10-CM

## 2020-12-22 DIAGNOSIS — N52.9 ERECTILE DYSFUNCTION, UNSPECIFIED ERECTILE DYSFUNCTION TYPE: ICD-10-CM

## 2020-12-22 DIAGNOSIS — R60.0 BILATERAL LEG EDEMA: Primary | ICD-10-CM

## 2020-12-22 DIAGNOSIS — R05.9 COUGH: ICD-10-CM

## 2020-12-22 PROBLEM — D64.9 ANEMIA: Status: RESOLVED | Noted: 2019-09-13 | Resolved: 2020-12-22

## 2020-12-22 PROCEDURE — 1036F TOBACCO NON-USER: CPT | Performed by: INTERNAL MEDICINE

## 2020-12-22 PROCEDURE — 99214 OFFICE O/P EST MOD 30 MIN: CPT | Performed by: INTERNAL MEDICINE

## 2020-12-22 RX ORDER — FUROSEMIDE 20 MG/1
20 TABLET ORAL 2 TIMES DAILY
Qty: 30 TABLET | Refills: 2 | Status: SHIPPED | OUTPATIENT
Start: 2020-12-22 | End: 2021-01-11

## 2020-12-22 NOTE — TELEPHONE ENCOUNTER
Patient called to tell us that his Chris Matos has been denied  No insurance information is on file  Left message for patient to call back

## 2020-12-28 ENCOUNTER — PATIENT MESSAGE (OUTPATIENT)
Dept: ENDOCRINOLOGY | Facility: CLINIC | Age: 55
End: 2020-12-28

## 2020-12-28 ENCOUNTER — TELEPHONE (OUTPATIENT)
Dept: SLEEP CENTER | Facility: CLINIC | Age: 55
End: 2020-12-28

## 2020-12-28 DIAGNOSIS — E78.00 PURE HYPERCHOLESTEROLEMIA: ICD-10-CM

## 2020-12-28 DIAGNOSIS — Z79.4 TYPE 2 DIABETES MELLITUS WITH HYPERGLYCEMIA, WITH LONG-TERM CURRENT USE OF INSULIN (HCC): ICD-10-CM

## 2020-12-28 DIAGNOSIS — R79.89 ELEVATED TSH: ICD-10-CM

## 2020-12-28 DIAGNOSIS — Z79.4 TYPE 2 DIABETES MELLITUS WITH HYPERGLYCEMIA, WITH LONG-TERM CURRENT USE OF INSULIN (HCC): Primary | ICD-10-CM

## 2020-12-28 DIAGNOSIS — I10 HYPERTENSION GOAL BP (BLOOD PRESSURE) < 140/90: ICD-10-CM

## 2020-12-28 DIAGNOSIS — E11.65 TYPE 2 DIABETES MELLITUS WITH HYPERGLYCEMIA, WITH LONG-TERM CURRENT USE OF INSULIN (HCC): ICD-10-CM

## 2020-12-28 DIAGNOSIS — E11.65 TYPE 2 DIABETES MELLITUS WITH HYPERGLYCEMIA, WITH LONG-TERM CURRENT USE OF INSULIN (HCC): Primary | ICD-10-CM

## 2020-12-28 DIAGNOSIS — E66.01 OBESITY, MORBID, BMI 50 OR HIGHER (HCC): ICD-10-CM

## 2020-12-28 RX ORDER — INSULIN GLARGINE 100 [IU]/ML
45 INJECTION, SOLUTION SUBCUTANEOUS
Qty: 5 PEN | Refills: 1 | Status: SHIPPED | OUTPATIENT
Start: 2020-12-28 | End: 2021-02-17 | Stop reason: SDUPTHER

## 2020-12-28 RX ORDER — PEN NEEDLE, DIABETIC 32GX 5/32"
NEEDLE, DISPOSABLE MISCELLANEOUS
Qty: 150 EACH | Refills: 3 | Status: SHIPPED | OUTPATIENT
Start: 2020-12-28 | End: 2020-12-29 | Stop reason: SDUPTHER

## 2020-12-28 NOTE — TELEPHONE ENCOUNTER
Patients insurance doesnt cover a home sleep study, please place an order for an in lab diagnostic sleep study, the code is an Rafael  Thank you!

## 2020-12-29 ENCOUNTER — TRANSCRIBE ORDERS (OUTPATIENT)
Dept: SLEEP CENTER | Facility: CLINIC | Age: 55
End: 2020-12-29

## 2020-12-29 DIAGNOSIS — R60.0 BILATERAL LEG EDEMA: ICD-10-CM

## 2020-12-29 DIAGNOSIS — I10 ESSENTIAL HYPERTENSION: ICD-10-CM

## 2020-12-29 RX ORDER — PEN NEEDLE, DIABETIC 32GX 5/32"
NEEDLE, DISPOSABLE MISCELLANEOUS 4 TIMES DAILY
Qty: 360 EACH | Refills: 1 | Status: SHIPPED | OUTPATIENT
Start: 2020-12-29 | End: 2021-09-28

## 2020-12-29 RX ORDER — FUROSEMIDE 20 MG/1
TABLET ORAL
Qty: 30 TABLET | Refills: 2 | OUTPATIENT
Start: 2020-12-29

## 2021-01-04 ENCOUNTER — TELEPHONE (OUTPATIENT)
Dept: LAB | Facility: HOSPITAL | Age: 56
End: 2021-01-04

## 2021-01-11 DIAGNOSIS — R60.0 BILATERAL LEG EDEMA: ICD-10-CM

## 2021-01-11 DIAGNOSIS — I10 ESSENTIAL HYPERTENSION: ICD-10-CM

## 2021-01-11 RX ORDER — FUROSEMIDE 20 MG/1
TABLET ORAL
Qty: 30 TABLET | Refills: 2 | Status: SHIPPED | OUTPATIENT
Start: 2021-01-11 | End: 2021-01-25

## 2021-01-12 ENCOUNTER — APPOINTMENT (OUTPATIENT)
Dept: LAB | Facility: HOSPITAL | Age: 56
End: 2021-01-12
Payer: COMMERCIAL

## 2021-01-12 DIAGNOSIS — I10 ESSENTIAL HYPERTENSION: ICD-10-CM

## 2021-01-12 DIAGNOSIS — N52.9 ERECTILE DYSFUNCTION, UNSPECIFIED ERECTILE DYSFUNCTION TYPE: ICD-10-CM

## 2021-01-12 DIAGNOSIS — R94.6 ABNORMAL THYROID FUNCTION TEST: ICD-10-CM

## 2021-01-12 LAB
ANION GAP SERPL CALCULATED.3IONS-SCNC: 2 MMOL/L (ref 4–13)
BACTERIA UR QL AUTO: ABNORMAL /HPF
BILIRUB UR QL STRIP: NEGATIVE
BUN SERPL-MCNC: 18 MG/DL (ref 5–25)
CALCIUM SERPL-MCNC: 10.4 MG/DL (ref 8.3–10.1)
CHLORIDE SERPL-SCNC: 102 MMOL/L (ref 100–108)
CLARITY UR: ABNORMAL
CO2 SERPL-SCNC: 34 MMOL/L (ref 21–32)
COLOR UR: YELLOW
CREAT SERPL-MCNC: 0.98 MG/DL (ref 0.6–1.3)
GFR SERPL CREATININE-BSD FRML MDRD: 88 ML/MIN/1.73SQ M
GLUCOSE SERPL-MCNC: 128 MG/DL (ref 65–140)
GLUCOSE UR STRIP-MCNC: NEGATIVE MG/DL
HGB UR QL STRIP.AUTO: ABNORMAL
KETONES UR STRIP-MCNC: NEGATIVE MG/DL
LEUKOCYTE ESTERASE UR QL STRIP: ABNORMAL
NITRITE UR QL STRIP: NEGATIVE
NON-SQ EPI CELLS URNS QL MICRO: ABNORMAL /HPF
PH UR STRIP.AUTO: 7.5 [PH]
POTASSIUM SERPL-SCNC: 5 MMOL/L (ref 3.5–5.3)
PROT UR STRIP-MCNC: NEGATIVE MG/DL
RBC #/AREA URNS AUTO: ABNORMAL /HPF
SODIUM SERPL-SCNC: 138 MMOL/L (ref 136–145)
SP GR UR STRIP.AUTO: 1.01 (ref 1–1.03)
T4 FREE SERPL-MCNC: 0.99 NG/DL (ref 0.76–1.46)
TSH SERPL DL<=0.05 MIU/L-ACNC: 3.59 UIU/ML (ref 0.36–3.74)
UROBILINOGEN UR QL STRIP.AUTO: 0.2 E.U./DL
WBC #/AREA URNS AUTO: ABNORMAL /HPF

## 2021-01-12 PROCEDURE — 36415 COLL VENOUS BLD VENIPUNCTURE: CPT

## 2021-01-12 PROCEDURE — 80048 BASIC METABOLIC PNL TOTAL CA: CPT

## 2021-01-12 PROCEDURE — 84439 ASSAY OF FREE THYROXINE: CPT

## 2021-01-12 PROCEDURE — 81001 URINALYSIS AUTO W/SCOPE: CPT

## 2021-01-12 PROCEDURE — 84403 ASSAY OF TOTAL TESTOSTERONE: CPT

## 2021-01-12 PROCEDURE — 84402 ASSAY OF FREE TESTOSTERONE: CPT

## 2021-01-12 PROCEDURE — 84443 ASSAY THYROID STIM HORMONE: CPT

## 2021-01-13 LAB
TESTOST FREE SERPL-MCNC: 6 PG/ML (ref 7.2–24)
TESTOST SERPL-MCNC: 358 NG/DL (ref 264–916)

## 2021-01-14 ENCOUNTER — TELEMEDICINE (OUTPATIENT)
Dept: INTERNAL MEDICINE CLINIC | Facility: CLINIC | Age: 56
End: 2021-01-14
Payer: COMMERCIAL

## 2021-01-14 DIAGNOSIS — N52.9 ERECTILE DYSFUNCTION, UNSPECIFIED ERECTILE DYSFUNCTION TYPE: Primary | ICD-10-CM

## 2021-01-14 DIAGNOSIS — R79.81 ELEVATED CO2 LEVEL: ICD-10-CM

## 2021-01-14 DIAGNOSIS — G47.00 INSOMNIA, UNSPECIFIED TYPE: ICD-10-CM

## 2021-01-14 DIAGNOSIS — R60.0 BILATERAL LEG EDEMA: ICD-10-CM

## 2021-01-14 DIAGNOSIS — R94.6 ABNORMAL THYROID FUNCTION TEST: ICD-10-CM

## 2021-01-14 PROCEDURE — 1036F TOBACCO NON-USER: CPT | Performed by: INTERNAL MEDICINE

## 2021-01-14 PROCEDURE — 99214 OFFICE O/P EST MOD 30 MIN: CPT | Performed by: INTERNAL MEDICINE

## 2021-01-14 RX ORDER — TRAZODONE HYDROCHLORIDE 50 MG/1
25 TABLET ORAL
Qty: 30 TABLET | Refills: 1 | Status: SHIPPED | OUTPATIENT
Start: 2021-01-14 | End: 2021-02-24 | Stop reason: SDUPTHER

## 2021-01-14 RX ORDER — SILDENAFIL 25 MG/1
25 TABLET, FILM COATED ORAL DAILY PRN
Qty: 10 TABLET | Refills: 1 | Status: SHIPPED | OUTPATIENT
Start: 2021-01-14 | End: 2021-01-15 | Stop reason: SDUPTHER

## 2021-01-14 NOTE — ASSESSMENT & PLAN NOTE
Did not tolerate seroquel well in the past    Advice against benzodiazepines due to increase dependence risk, and patient agree  Will try low dose trazodone 25mg daily and re-evaluate in 4-6 weeks

## 2021-01-14 NOTE — PATIENT INSTRUCTIONS
Insomnia   AMBULATORY CARE:   Insomnia  is a condition that makes it hard to fall or stay asleep  Lack of sleep can lead to attention or memory problems during the day  You may also be coffey, depressed, clumsy, or have headaches  Contact your healthcare provider if:   · Your symptoms do not get better, or they get worse  · You begin to use drugs or alcohol to fall asleep  · You have questions or concerns about your condition or care  Medicines:   · Medicines  may help you sleep more regularly or help you feel less anxious  · Take your medicine as directed  Contact your healthcare provider if you think your medicine is not helping or if you have side effects  Tell him or her if you are allergic to any medicine  Keep a list of the medicines, vitamins, and herbs you take  Include the amounts, and when and why you take them  Bring the list or the pill bottles to follow-up visits  Carry your medicine list with you in case of an emergency  What you can do to improve your sleep:   · Create a sleep schedule  Try to go to sleep and wake up at the same times every day  Keep a record of your sleep patterns, and any sleeping problems you have  Bring the record to follow-up visits with healthcare providers  · Do not take naps  Naps could make it hard for you to fall asleep at bedtime  · Keep your bedroom cool, quiet, and dark  Turn on white noise, such as a fan, to help you relax  Do not use your bed for any activity that will keep you awake  Do not read, exercise, eat, or watch TV in your bedroom  · Get up if you do not fall asleep within 20 minutes  Move to another room and do something relaxing until you become sleepy  · Limit caffeine, alcohol, and food to earlier in the day  Only drink caffeine in the morning  Do not drink alcohol within 6 hours of bedtime  Do not eat a heavy meal right before you go to bed  · Exercise regularly  Daily exercise may help you sleep better   Do not exercise within 4 hours of bedtime  Follow up with your healthcare provider as directed: Your healthcare provider may refer you for cognitive behavioral therapy  A behavioral therapist may help you find ways to relax, decrease stress, and improve sleep  Write down your questions so you remember to ask them during your visits  © Copyright [address] Information is for End User's use only and may not be sold, redistributed or otherwise used for commercial purposes  All illustrations and images included in CareNotes® are the copyrighted property of A D A M , Inc  or [facility] Dina Ness   The above information is an  only  It is not intended as medical advice for individual conditions or treatments  Talk to your doctor, nurse or pharmacist before following any medical regimen to see if it is safe and effective for you  Erectile Dysfunction   AMBULATORY CARE:   Erectile dysfunction (ED) , or impotence, is when you cannot get or keep an erection for sexual activity  Call your doctor if:   · You have chest pain, dizziness, or nausea after you take ED medicines or during or after sex  · You have an erection for more than 4 hours after you take your ED medicine  · You see blood in your urine  · You have changes in your vision, headaches, or back pain after you take ED medicine  · You have a painful erection  · You have questions or concerns about your condition or care  Treatment  depends on the cause of your ED  You may need any of the following:  · ED medicines  help you have an erection  These medicines are taken before you have sex  Follow instructions on how to use these medicines  You may have a life-threatening reaction if you mix ED medicines with medicines that contain nitrates  Medicines with nitrates include nitroglycerin and other heart medicines  · Testosterone  may be given to increase the levels in your blood and improve your ED   You may need to use a skin cream or wear a patch  Testosterone is also given as an injection  · Penis injections  may be done to help improve your blood flow  · A vacuum device  is a tube that is placed over the penis  A hand pump is connected to the tube and acts as a vacuum  This may help increase blood flow to the penis  · Therapy  may be needed to treat emotional or relationship problems that may be causing your ED  · Surgery  may be recommended if other treatments do not work  Surgery includes a penile implant or prosthesis  Surgery may also be done to improve blood flow  Ask for more information about surgeries for ED  Decrease your risk for ED:   · Do not smoke  Smoking can increase your risk for ED  Nicotine and other chemicals in cigarettes and cigars can also cause lung damage  Ask your healthcare provider for information if you currently smoke and need help to quit  E-cigarettes or smokeless tobacco still contain nicotine  · Control your blood sugar levels if you have diabetes  Over time, high blood sugar levels can increase your risk for ED  · Limit alcohol  Men should limit alcohol to 2 drinks a day  A drink of alcohol is 12 ounces of beer, 5 ounces of wine, or 1½ ounces of liquor  · Manage your medical conditions  Eat a variety of healthy foods and stay physically active  Take your medicines as directed  They can help control conditions that may cause ED  · Manage stress  Learn ways to relax, such as deep breathing, meditation, and listening to music  · Do not use illegal drugs  They increase your risk for ED  Follow up with your doctor as directed:  Write down your questions so you remember to ask them during your visits  © Copyright [address] Information is for End User's use only and may not be sold, redistributed or otherwise used for commercial purposes   All illustrations and images included in CareNotes® are the copyrighted property of A D A M , Inc  or [employer]  The above information is an  only  It is not intended as medical advice for individual conditions or treatments  Talk to your doctor, nurse or pharmacist before following any medical regimen to see if it is safe and effective for you

## 2021-01-14 NOTE — ASSESSMENT & PLAN NOTE
Change medications did not had affect  Low free testosterone likely secondary to obesity, but we will repeat in 2 months and if persistent low will consider endocrinology evaluation  Total testosterone is normal   Will try low-dose sidenafil for now

## 2021-01-14 NOTE — ASSESSMENT & PLAN NOTE
Improved with Lasix 20 mg daily  BMP was within normal limits  Continue current treatment  Check BMP every 2-3 months  No need replacement of potassium, last potassium was 5

## 2021-01-14 NOTE — PROGRESS NOTES
Virtual Brief Visit    Assessment/Plan:    Problem List Items Addressed This Visit        Other    Insomnia     Did not tolerate seroquel well in the past    Advice against benzodiazepines due to increase dependence risk, and patient agree  Will try low dose trazodone 25mg daily and re-evaluate in 4-6 weeks  Relevant Medications    traZODone (DESYREL) 50 mg tablet    Erectile dysfunction - Primary      Change medications did not had affect  Low free testosterone likely secondary to obesity, but we will repeat in 2 months and if persistent low will consider endocrinology evaluation  Total testosterone is normal   Will try low-dose sidenafil for now  Relevant Medications    sildenafil (VIAGRA) 25 MG tablet    Bilateral leg edema       Improved with Lasix 20 mg daily  BMP was within normal limits  Continue current treatment  Check BMP every 2-3 months  No need replacement of potassium, last potassium was 5  Elevated CO2 level       I believe the patient has a component of 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 obesity  Also at risk for sleep apnea  Patient will discuss with his insurance about home study since he wants to avoid going to a lab study due to the pandemic  He will return a call to us was once he decides what type of study he will pursue  Abnormal thyroid function test       TSH has normalized with levothyroxine 25 mcg daily  T4 is normal   Continue current treatment  Follow-up with endocrinology  Reason for visit is   Chief Complaint   Patient presents with    Virtual Brief Visit        Encounter provider Justina Momin MD    Provider located at [address] [facility] [address] [facility] [address] [phone]    Recent Visits  No visits were found meeting these conditions     Showing recent visits within past 7 days and meeting all other requirements     Today's Visits  Date Type Provider Dept   01/14/21 Telemedicine Graciela Walters MD [facility]   Showing today's visits and meeting all other requirements     Future Appointments  No visits were found meeting these conditions  Showing future appointments within next 150 days and meeting all other requirements        After connecting through telephone, the patient was identified by name and date of birth  Ysabel Duncan was informed that this is a telemedicine visit and that the visit is being conducted through telephone  My office door was closed  No one else was in the room  He acknowledged consent and understanding of privacy and security of the platform  The patient has agreed to participate and understands he can discontinue the visit at any time  Patient is aware this is a billable service  Subjective    Ysabel Duncan is a 50 y o  male  Who is being followed today by telephone visit for follow-up of his blood work, leg edema, and erectile dysfunction  Patient insurance did not cover his home study for sleep apnea, they will only approve a lab study  I discussed with the patient about id and instructed to call insurance to see if there is any further step on my side that would made available the home study for him  I reviewed his blood work which showed elevated CO2, which is unchanged from prior studies  His UA showed innumerable bacteria and occasional obtainable cells, likely contamination, patient has no UTI symptoms  He denies dysuria, hematuria, abdominal pain, fever chills  Free testosterone was slightly low likely secondary to obesity, his total testosterone was normal   His TSH is now normalized after starting treatment with levothyroxine  Patient states that the changing medications with reducing gabapentin in stop metoprolol did not change his erectile dysfunction  He would like to try a treatment for that      Patient also states that he is having more trouble sleeping, is not sure if it secondary to reducing gabapentin, but he is also having anxiety with fear of falling asleep  He states that he use Seroquel in the past, which helped him to sleep but he did not tolerated in the next day feeling very drowsy  Patient states that since he started taking Lasix his swelling have improved significantly, and he continues to show decreased on leg swelling         Past Medical History:   Diagnosis Date    Anemia 9/13/2019    Cellulitis     last assessed 12/10/15    Diabetes mellitus ([address] )     Disease of thyroid gland     Edema     Elevated liver enzymes     Esophageal reflux     Gluten intolerance     Gout     last assessed 09/05/13    Hyperglycemia     Hypertension     IBS (irritable bowel syndrome)     Insomnia     Obesity     Osteoarthritis of knee     last assessed 02/10/14    Prehypertension     last assessed 08/22/17    Renal disorder     Venous insufficiency     last assessed 08/22/17    Villonodular synovitis of the hand, right     last assessed 11/14/2013       Past Surgical History:   Procedure Laterality Date    INCISION AND DRAINAGE OF WOUND Left 9/6/2019    Procedure: INCISION AND DRAINAGE (I&D) GROIN;  Surgeon: Bobo Sanchez DO;  [facility];  Service: General    SKIN BIOPSY      WOUND DEBRIDEMENT Left 9/7/2019    Procedure: EXCISIONAL DEBRIDEMENT;  Surgeon: Bobo Sanchez DO;  [facility];  Service: General       Current Outpatient Medications   Medication Sig Dispense Refill    Blood Glucose Monitoring Suppl (Chris Patel) w/Device KIT Use to test sugar daily 1 kit 0    Continuous Blood Gluc  (FreeStyle EyeICie Hutching 2 [place] Systm) BROOKS Use 1 Device as needed (use with sensors for bs checks) 1 Device 0    Continuous Blood Gluc Sensor (FreeStyle Elvin 2 Sensor Systm) MISC Use one sensor every 2 weeks 2 each 3    furosemide (LASIX) 20 mg tablet TAKE 1 TABLET BY MOUTH TWICE A DAY 30 tablet 2    gabapentin (NEURONTIN) 100 mg capsule Take 2 capsules (200 mg total) by mouth 2 (two) times a day (Patient taking differently: Take 200 mg by mouth daily ) 360 capsule 0    gabapentin (NEURONTIN) 300 mg capsule TAKE ONE CAPSULE BY MOUTH AT BEDTIME (TAKE IN ADDITION WITH 200MG FOR A TOTAL OF 500MG AT NIGHT) (Patient taking differently: 300 mg daily at bedtime ) 30 capsule 1    glucose blood (OneTouch Verio) test strip Use to test sugar 2 times a day 100 each 3    insulin aspart (NovoLOG FlexPen) 100 UNIT/ML injection pen Inject 15 units with meals+scale ( - 150-200 -2 units, 201-250-4 units, 251-300 -6 units, 301-350-8 units, > 350- 10 units ) 20 pen 1    insulin glargine (Lantus SoloStar) 100 units/mL injection pen Inject 45 Units under the skin daily at bedtime 5 pen 1    insulin glargine (Toujeo Max SoloStar) 300 units/mL CONCETRATED U-300 injection pen (2-unit dial) Inject 45 units at bedtime 9 pen 1    Insulin Pen Needle (BD Pen Needle Ludmila 2nd Gen) 32G X 4 MM MISC Inject into the skin 4 (four) times a day 360 each 1    Lancets (ONETOUCH ULTRASOFT) lancets Use to test sugar 2 times a day 100 each 3    levothyroxine 25 mcg tablet Take one tablet daily on empty stomach 1 hour before breakfast 30 tablet 3    losartan (COZAAR) 25 mg tablet TAKE ONE TABLET BY MOUTH EVERY DAY 90 tablet 2    metFORMIN (GLUCOPHAGE) 1000 MG tablet Take 1 tablet (1,000 mg total) by mouth 2 (two) times a day 180 tablet 3    nystatin (MYCOSTATIN) powder Apply topically 2 (two) times a day 45 g 1    omeprazole (PriLOSEC) 40 MG capsule TAKE ONE CAPSULE BY MOUTH TWICE A DAY 60 capsule 2    omeprazole (PriLOSEC) 40 MG capsule TAKE ONE CAPSULE BY MOUTH TWICE DAILY 180 capsule 0    omeprazole (PriLOSEC) 40 MG capsule TAKE 1 CAPSULE BY MOUTH TWICE A  capsule 1    psyllium (METAMUCIL) packet Take 1 packet by mouth daily  0    sildenafil (VIAGRA) 25 MG tablet Take 1 tablet (25 mg total) by mouth daily as needed for erectile dysfunction 10 tablet 1    traZODone (DESYREL) 50 mg tablet Take 0 5 tablets (25 mg total) by mouth daily at bedtime 30 tablet 1     No current facility-administered medications for this visit  Allergies   Allergen Reactions    Gluten Meal     Clindamycin Diarrhea       Review of Systems   Constitutional: Negative for appetite change, fatigue and fever  Eyes: Negative for visual disturbance  Respiratory: Negative for cough, chest tightness, shortness of breath and wheezing  Cardiovascular: Positive for leg swelling (bilateral, improving)  Negative for chest pain and palpitations  Gastrointestinal: Negative for abdominal pain, diarrhea, nausea and vomiting  Genitourinary: Negative for difficulty urinating, discharge, dysuria, frequency, hematuria and urgency  Musculoskeletal: Negative for arthralgias and joint swelling  Skin: Negative for rash  Neurological: Negative for dizziness and headaches  Psychiatric/Behavioral: Positive for sleep disturbance  Negative for confusion  The patient is nervous/anxious (before sleep)  There were no vitals filed for this visit  I spent 35 minutes directly with the patient during this visit    [address] acknowledges that he has consented to an online visit or consultation  He understands that the online visit is based solely on information provided by him, and that, in the absence of a face-to-face physical evaluation by the physician, the diagnosis he receives is both limited and provisional in terms of accuracy and completeness  This is not intended to replace a full medical face-to-face evaluation by the physician  Jeremias Fenton understands and accepts these terms

## 2021-01-14 NOTE — ASSESSMENT & PLAN NOTE
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

## 2021-01-14 NOTE — ASSESSMENT & PLAN NOTE
TSH has normalized with levothyroxine 25 mcg daily  T4 is normal   Continue current treatment  Follow-up with endocrinology

## 2021-01-15 DIAGNOSIS — N52.9 ERECTILE DYSFUNCTION, UNSPECIFIED ERECTILE DYSFUNCTION TYPE: ICD-10-CM

## 2021-01-15 RX ORDER — SILDENAFIL 25 MG/1
25 TABLET, FILM COATED ORAL DAILY PRN
Qty: 10 TABLET | Refills: 1 | Status: SHIPPED | OUTPATIENT
Start: 2021-01-15 | End: 2021-11-01 | Stop reason: HOSPADM

## 2021-01-15 NOTE — TELEPHONE ENCOUNTER
The Sildenafil was too expensive at [facility] can we [facility]   They have good rx

## 2021-01-20 ENCOUNTER — TELEPHONE (OUTPATIENT)
Dept: INTERNAL MEDICINE CLINIC | Facility: CLINIC | Age: 56
End: 2021-01-20

## 2021-01-20 NOTE — TELEPHONE ENCOUNTER
Renea Soto had called wishing to know if Dr Gretchen Gibson was still in the office  Informed him that she had just left the office  Georgia Lucas had wanted to know if she had received his message via [employer]  Informed him that I have no knowledge about any messages in [employer] directed to Dr Gretchen Gibson  Informed Kareen that I would tell Dr Gretchen Gibson about his message when she is in the office  Kareen accepted the response and ended the call  Will inform Dr Gretchen Gibson to look at Kareen's message the day she is back in the office

## 2021-01-21 ENCOUNTER — PATIENT MESSAGE (OUTPATIENT)
Dept: INTERNAL MEDICINE CLINIC | Facility: CLINIC | Age: 56
End: 2021-01-21

## 2021-01-21 DIAGNOSIS — E11.65 TYPE 2 DIABETES MELLITUS WITH HYPERGLYCEMIA, WITH LONG-TERM CURRENT USE OF INSULIN (HCC): ICD-10-CM

## 2021-01-21 DIAGNOSIS — Z79.4 TYPE 2 DIABETES MELLITUS WITH HYPERGLYCEMIA, WITH LONG-TERM CURRENT USE OF INSULIN (HCC): ICD-10-CM

## 2021-01-21 NOTE — TELEPHONE ENCOUNTER
bs log  lantus 45 units at bed  novolog 15 units with meals +scale  Metformin 1000 mg bid    Fasting blood sugars are elevated, blood sugars are also elevated after dinner  Overall blood sugars are in 170-300 range  Please inform patient to increase NovoLog to 18 units with breakfast, lunch and dinner,  Increase Lantus to 48 units at bedtime

## 2021-01-24 DIAGNOSIS — I10 ESSENTIAL HYPERTENSION: ICD-10-CM

## 2021-01-24 DIAGNOSIS — R60.0 BILATERAL LEG EDEMA: ICD-10-CM

## 2021-01-25 RX ORDER — FUROSEMIDE 20 MG/1
TABLET ORAL
Qty: 90 TABLET | Refills: 1 | Status: SHIPPED | OUTPATIENT
Start: 2021-01-25 | End: 2021-04-14

## 2021-01-28 ENCOUNTER — TELEPHONE (OUTPATIENT)
Dept: SLEEP CENTER | Facility: CLINIC | Age: 56
End: 2021-01-28

## 2021-01-28 DIAGNOSIS — E66.01 MORBID OBESITY WITH BMI OF 60.0-69.9, ADULT (HCC): ICD-10-CM

## 2021-01-28 DIAGNOSIS — I10 ESSENTIAL HYPERTENSION: Primary | ICD-10-CM

## 2021-01-28 NOTE — TELEPHONE ENCOUNTER
Xuan Guo, I had this patient call his insurance and he was told the home sleep study was covered, under the CPT code 06735  Please advise  Thank you!

## 2021-01-29 NOTE — TELEPHONE ENCOUNTER
Per our insurance referral specialist, she double checked with the insurance, and the code 44471 is NOT on our [place] Fee schedule so therefore it will not be a covered service when we submit the claim   Patient will need to come in lab for a sleep study or pay the $225 out of pocket for a home sleep study, they won't let him proceed with a home sleep study here with [facility] due to the insurance

## 2021-01-31 DIAGNOSIS — Z79.4 TYPE 2 DIABETES MELLITUS WITH HYPERGLYCEMIA, WITH LONG-TERM CURRENT USE OF INSULIN (HCC): ICD-10-CM

## 2021-01-31 DIAGNOSIS — E11.65 TYPE 2 DIABETES MELLITUS WITH HYPERGLYCEMIA, WITH LONG-TERM CURRENT USE OF INSULIN (HCC): ICD-10-CM

## 2021-02-01 RX ORDER — BLOOD SUGAR DIAGNOSTIC
STRIP MISCELLANEOUS
Qty: 100 EACH | Refills: 3 | Status: SHIPPED | OUTPATIENT
Start: 2021-02-01 | End: 2021-08-16

## 2021-02-04 ENCOUNTER — TRANSCRIBE ORDERS (OUTPATIENT)
Dept: SLEEP CENTER | Facility: CLINIC | Age: 56
End: 2021-02-04

## 2021-02-04 NOTE — TELEPHONE ENCOUNTER
Just an FYI, anywhere the patient tries to get a home sleep study done he will not be able to have a home sleep study with his insurance they will NOT cover it because it is not on the MA Fee schedule  If he would like to proceed with the home sleep study then he will have to pay out of pocket anywhere he goes      I will have our insurance referral specialist give the patient a call to explain all this to him

## 2021-02-05 ENCOUNTER — TELEPHONE (OUTPATIENT)
Dept: INTERNAL MEDICINE CLINIC | Facility: CLINIC | Age: 56
End: 2021-02-05

## 2021-02-05 NOTE — TELEPHONE ENCOUNTER
Pt called and said he wants to speak directly to Dr Samreen Odom  Said she had advised him to call his ins co regarding a Sleep Apnea test, but when he did, he said his ins co said the Dr had to call them directly, not the pt

## 2021-02-08 ENCOUNTER — TELEPHONE (OUTPATIENT)
Dept: INTERNAL MEDICINE CLINIC | Facility: CLINIC | Age: 56
End: 2021-02-08

## 2021-02-08 DIAGNOSIS — E66.01 MORBID OBESITY WITH BMI OF 60.0-69.9, ADULT (HCC): ICD-10-CM

## 2021-02-08 DIAGNOSIS — I10 ESSENTIAL HYPERTENSION: Primary | ICD-10-CM

## 2021-02-08 NOTE — TELEPHONE ENCOUNTER
Please check with patient if he still needs me to call insurance  I believe they would not cover anywhere he goes, not sure what else we could do to help  Thanks!

## 2021-02-08 NOTE — TELEPHONE ENCOUNTER
Ary Love and we reviewed the indications for his sleep study  Unfortunately his insurance does not cover home study, he would need to pay out-of-pocket for that, I have explained to him that the lab sleep study will be more complete and accurate, and he agreed to proceed with that  I will put order in for in-lab sleep study and follow up the results, I have asked for referral to specialist if necessary

## 2021-02-17 RX ORDER — INSULIN GLARGINE 100 [IU]/ML
INJECTION, SOLUTION SUBCUTANEOUS
Qty: 5 PEN | Refills: 1 | Status: SHIPPED | OUTPATIENT
Start: 2021-02-17 | End: 2021-03-23

## 2021-02-17 RX ORDER — INSULIN ASPART 100 [IU]/ML
INJECTION, SOLUTION INTRAVENOUS; SUBCUTANEOUS
Qty: 20 PEN | Refills: 1
Start: 2021-02-17 | End: 2021-03-23 | Stop reason: SDUPTHER

## 2021-02-17 NOTE — TELEPHONE ENCOUNTER
Spoke with patient and reviewed bg log and changes to medications  He understood      Updated med list

## 2021-02-22 ENCOUNTER — TELEPHONE (OUTPATIENT)
Dept: SLEEP CENTER | Facility: CLINIC | Age: 56
End: 2021-02-22

## 2021-02-22 NOTE — TELEPHONE ENCOUNTER
----- Message from Yudi Phelan MD sent at 2/22/2021 11:43 AM EST -----  approved  ----- Message -----  From: Jenna Shukla  Sent: 7/03/2272   9:42 AM EST  To: [facility] Departed Provider    This sleep study needs approval      If approved please sign and return to clerical pool  If denied please include reasons why  Also provide alternative testing if warranted  Please sign and return to clerical pool

## 2021-02-23 DIAGNOSIS — L03.116 CELLULITIS OF LEFT LOWER EXTREMITY: ICD-10-CM

## 2021-02-24 DIAGNOSIS — G47.00 INSOMNIA, UNSPECIFIED TYPE: ICD-10-CM

## 2021-02-24 RX ORDER — TRAZODONE HYDROCHLORIDE 50 MG/1
50 TABLET ORAL
Qty: 30 TABLET | Refills: 1 | Status: SHIPPED | OUTPATIENT
Start: 2021-02-24 | End: 2021-03-18

## 2021-02-24 RX ORDER — NYSTATIN 100000 [USP'U]/G
POWDER TOPICAL
Qty: 45 G | Refills: 1 | Status: SHIPPED | OUTPATIENT
Start: 2021-02-24 | End: 2021-04-12 | Stop reason: SDUPTHER

## 2021-02-24 NOTE — TELEPHONE ENCOUNTER
Patient is taking 1 whole tablet instead of half, he is out of medication and needs a refill   Patient stated he started 1 pill instead of half on his own without speaking to the

## 2021-02-25 ENCOUNTER — TELEMEDICINE (OUTPATIENT)
Dept: INTERNAL MEDICINE CLINIC | Facility: CLINIC | Age: 56
End: 2021-02-25
Payer: COMMERCIAL

## 2021-02-25 DIAGNOSIS — R60.0 BILATERAL LEG EDEMA: ICD-10-CM

## 2021-02-25 DIAGNOSIS — N50.89 SCROTAL SWELLING: Primary | ICD-10-CM

## 2021-02-25 DIAGNOSIS — N52.9 ERECTILE DYSFUNCTION, UNSPECIFIED ERECTILE DYSFUNCTION TYPE: ICD-10-CM

## 2021-02-25 DIAGNOSIS — G47.00 INSOMNIA, UNSPECIFIED TYPE: ICD-10-CM

## 2021-02-25 PROCEDURE — 99214 OFFICE O/P EST MOD 30 MIN: CPT | Performed by: INTERNAL MEDICINE

## 2021-02-25 PROCEDURE — 1036F TOBACCO NON-USER: CPT | Performed by: INTERNAL MEDICINE

## 2021-02-25 RX ORDER — TRAZODONE HYDROCHLORIDE 50 MG/1
75 TABLET ORAL
Qty: 45 TABLET | Refills: 1 | Status: SHIPPED | OUTPATIENT
Start: 2021-02-25 | End: 2021-08-02 | Stop reason: ALTCHOICE

## 2021-02-25 NOTE — ASSESSMENT & PLAN NOTE
He does have some inguinal/scrotal/upper inner tight swelling specially in the right side, and he was wondering if we could increase the dose of lasix to help with that  I have explained to him that he will need a urology evaluation and we could try increase the dose of lasix to 60mg for 2 days only and see if that improves this localized swelling  He will need a physical examination to better access that, but for now patient Is willing to do only virtual visits, and I have explained to him the limitations of that, specially to determine the cause and treatment of that complaint

## 2021-02-25 NOTE — ASSESSMENT & PLAN NOTE
Currently on 20mg b i d  He was never on once a day, I have sent a prescription to twice a day and will continue that

## 2021-02-25 NOTE — ASSESSMENT & PLAN NOTE
He increased the dose from 25mg to 50mg on his on, and had some improvement of his insomnia  I have explained to him that I would not recommend dosage of more than 100mg daily, and for now we can try to increase it to 75mg daily for 1 week and see if that improves further his sleep

## 2021-02-25 NOTE — PROGRESS NOTES
Virtual Regular Visit      Assessment/Plan:    Problem List Items Addressed This Visit        Other    Insomnia     He increased the dose from 25mg to 50mg on his on, and had some improvement of his insomnia  I have explained to him that I would not recommend dosage of more than 100mg daily, and for now we can try to increase it to 75mg daily for 1 week and see if that improves further his sleep  Relevant Medications    traZODone (DESYREL) 50 mg tablet    Scrotal swelling - Primary     He does have some inguinal/scrotal/upper inner tight swelling specially in the right side, and he was wondering if we could increase the dose of lasix to help with that  I have explained to him that he will need a urology evaluation and we could try increase the dose of lasix to 60mg for 2 days only and see if that improves this localized swelling  He will need a physical examination to better access that, but for now patient Is willing to do only virtual visits, and I have explained to him the limitations of that, specially to determine the cause and treatment of that complaint  Relevant Orders    Ambulatory referral to Urology    Erectile dysfunction     I have recommended evaluation by urologist since Sildenafil did not work well for him  Relevant Orders    Ambulatory referral to Urology    Bilateral leg edema     Currently on 20mg b i d  He was never on once a day, I have sent a prescription to twice a day and will continue that  Reason for visit is   Chief Complaint   Patient presents with    Virtual Regular Visit        Encounter provider Heather Velazco MD    Provider located at [address] [address] [phone]      Recent Visits  No visits were found meeting these conditions     Showing recent visits within past 7 days and meeting all other requirements     Today's Visits  Date Type Provider Dept   02/25/21 Telemedicine Yun Negron MD [facility] [place]   Showing today's visits and meeting all other requirements     Future Appointments  No visits were found meeting these conditions  Showing future appointments within next 150 days and meeting all other requirements        The patient was identified by name and date of birth  Brooke Hernadez was informed that this is a telemedicine visit and that the visit is being conducted through [employer] and patient was informed that this is a secure, HIPAA-compliant platform  He agrees to proceed     My office door was closed  No one else was in the room  He acknowledged consent and understanding of privacy and security of the video platform  The patient has agreed to participate and understands they can discontinue the visit at any time  Patient is aware this is a billable service  Subjective  Brooke Hernadez is a 50 y o  male    Who   Is evaluated today by telemedicine for a follow-up of his insomnia, leg edema and erectile dysfunction  Patient states he tried to take trazodone 25 mg before sleep, but that did not work  He did increase the dose on his own to 50 mg and that made him sleep for at least 4 hours straight, he was still waking up after that and had trouble going back to sleep  Patient is also on furosemide 20 mg twice a day  I initially planned to start him on once a day, but I have sent a prescription to twice a day and he has been taking as prescribed  He notices improvement on lower leg swelling, he also has a scrotal swelling and upper inner thigh swelling more in the right side, that improved as well with furosemide but is still bothersome  He is dealing with erectyle dysfunction, he had tried Sildenafil with minimal improvement   He  Believes the area with swelling and excess of skin is preventing him to  access his genitals, and wondered if an extra  Furosemide would help decrease in the swelling in the area and made it easy to manipulated  He does have a sleep study scheduling for next month and will follow up those results  He is concerned that he he will not be able to sleep in the sleep center  He still have an echocardiogram pending         Past Medical History:   Diagnosis Date    Anemia 9/13/2019    Cellulitis     last assessed 12/10/15    Diabetes mellitus ([address] )     Disease of thyroid gland     Edema     Elevated liver enzymes     Esophageal reflux     Gluten intolerance     Gout     last assessed 09/05/13    Hyperglycemia     Hypertension     IBS (irritable bowel syndrome)     Insomnia     Obesity     Osteoarthritis of knee     last assessed 02/10/14    Prehypertension     last assessed 08/22/17    Renal disorder     Venous insufficiency     last assessed 08/22/17    Villonodular synovitis of the hand, right     last assessed 11/14/2013       Past Surgical History:   Procedure Laterality Date    INCISION AND DRAINAGE OF WOUND Left 9/6/2019    Procedure: INCISION AND DRAINAGE (I&D) GROIN;  Surgeon: Darrel Koyanagi, DO;  [facility];  Service: General    SKIN BIOPSY      WOUND DEBRIDEMENT Left 9/7/2019    Procedure: EXCISIONAL DEBRIDEMENT;  Surgeon: Darrel Koyanagi, DO;  [facility];  Service: General       Current Outpatient Medications   Medication Sig Dispense Refill    Blood Glucose Monitoring Suppl (Lincoln Wen) w/Device KIT Use to test sugar daily 1 kit 0    Continuous Blood Gluc  (FreeStyle Connell 2 Gratz Systm) BROOKS Use 1 Device as needed (use with sensors for bs checks) 1 Device 0    Continuous Blood Gluc Sensor (FreeStyle Elvin 2 Sensor Systm) MISC Use one sensor every 2 weeks 2 each 3    furosemide (LASIX) 20 mg tablet TAKE 1 TABLET BY MOUTH TWICE A DAY 90 tablet 1    gabapentin (NEURONTIN) 100 mg capsule Take 2 capsules (200 mg total) by mouth 2 (two) times a day (Patient taking differently: Take 200 mg by mouth daily ) 360 capsule 0    gabapentin (NEURONTIN) 300 mg capsule TAKE ONE CAPSULE BY MOUTH AT BEDTIME (TAKE IN ADDITION WITH 200MG FOR A TOTAL OF 500MG AT NIGHT) (Patient taking differently: 300 mg daily at bedtime ) 30 capsule 1    glucose blood (OneTouch Verio) test strip USE TO TEST SUGAR 2 TIMES A  each 3    insulin aspart (NovoLOG FlexPen) 100 UNIT/ML injection pen Inject 18 units with meals+scale ( - 150-200 -2 units, 201-250-4 units, 251-300 -6 units, 301-350-8 units, > 350- 10 units ) 20 pen 1    insulin glargine (Lantus SoloStar) 100 units/mL injection pen Inject 48 units under the skin every bedtime 5 pen 1    insulin glargine (Toujeo Max SoloStar) 300 units/mL CONCETRATED U-300 injection pen (2-unit dial) Inject 45 units at bedtime 9 pen 1    Insulin Pen Needle (BD Pen Needle Ludmila 2nd Gen) 32G X 4 MM MISC Inject into the skin 4 (four) times a day 360 each 1    Lancets (ONETOUCH ULTRASOFT) lancets Use to test sugar 2 times a day 100 each 3    levothyroxine 25 mcg tablet Take one tablet daily on empty stomach 1 hour before breakfast 30 tablet 3    losartan (COZAAR) 25 mg tablet TAKE ONE TABLET BY MOUTH EVERY DAY 90 tablet 2    metFORMIN (GLUCOPHAGE) 1000 MG tablet Take 1 tablet (1,000 mg total) by mouth 2 (two) times a day 180 tablet 3    nystatin (MYCOSTATIN) powder APPLY TO AFFECTED AREA TWICE A DAY 45 g 1    omeprazole (PriLOSEC) 40 MG capsule TAKE ONE CAPSULE BY MOUTH TWICE A DAY 60 capsule 2    omeprazole (PriLOSEC) 40 MG capsule TAKE ONE CAPSULE BY MOUTH TWICE DAILY 180 capsule 0    omeprazole (PriLOSEC) 40 MG capsule TAKE 1 CAPSULE BY MOUTH TWICE A  capsule 1    psyllium (METAMUCIL) packet Take 1 packet by mouth daily  0    sildenafil (VIAGRA) 25 MG tablet Take 1 tablet (25 mg total) by mouth daily as needed for erectile dysfunction 10 tablet 1    traZODone (DESYREL) 50 mg tablet Take 1 tablet (50 mg total) by mouth daily at bedtime 30 tablet 1    traZODone (DESYREL) 50 mg tablet Take 1 5 tablets (75 mg total) by mouth daily at bedtime 45 tablet 1     No current facility-administered medications for this visit  Allergies   Allergen Reactions    Gluten Meal     Clindamycin Diarrhea       Review of Systems   Constitutional: Negative for appetite change and fatigue  Eyes: Negative for visual disturbance  Respiratory: Positive for cough (dry, improved)  Negative for chest tightness, shortness of breath and wheezing  Cardiovascular: Positive for leg swelling  Negative for chest pain and palpitations  Gastrointestinal: Negative for abdominal pain, nausea and vomiting  Genitourinary: Positive for scrotal swelling  Negative for difficulty urinating and frequency  Musculoskeletal: Negative for arthralgias and joint swelling  Skin: Negative for rash  Neurological: Negative for dizziness and headaches  Psychiatric/Behavioral: Positive for sleep disturbance  Negative for confusion  Video Exam    There were no vitals filed for this visit  Physical Exam  Constitutional:       Appearance: He is obese  Pulmonary:      Effort: Pulmonary effort is normal  No respiratory distress  Musculoskeletal:      Right lower leg: Edema present  Left lower leg: Edema present  Neurological:      Mental Status: He is alert and oriented to person, place, and time  Psychiatric:         Thought Content: Thought content normal          Judgment: Judgment normal           I spent 25 minutes directly with the patient during this visit      [address] acknowledges that he has consented to an online visit or consultation  He understands that the online visit is based solely on information provided by him, and that, in the absence of a face-to-face physical evaluation by the physician, the diagnosis he receives is both limited and provisional in terms of accuracy and completeness   This is not intended to replace a full medical face-to-face evaluation by the physician  Myriam Kinsey understands and accepts these terms

## 2021-03-09 DIAGNOSIS — I10 ESSENTIAL HYPERTENSION: Primary | ICD-10-CM

## 2021-03-10 DIAGNOSIS — Z23 ENCOUNTER FOR IMMUNIZATION: ICD-10-CM

## 2021-03-15 ENCOUNTER — TELEPHONE (OUTPATIENT)
Dept: INTERNAL MEDICINE CLINIC | Facility: CLINIC | Age: 56
End: 2021-03-15

## 2021-03-15 ENCOUNTER — TELEPHONE (OUTPATIENT)
Dept: LAB | Facility: HOSPITAL | Age: 56
End: 2021-03-15

## 2021-03-15 NOTE — TELEPHONE ENCOUNTER
I left message of cell phone about getting the lab work,as there was not anwer    mentioned no need for appt, dr Saida Pearson will call him the results

## 2021-03-15 NOTE — TELEPHONE ENCOUNTER
----- Message from Rosan Sandifer, MD sent at 3/15/2021  9:29 AM EDT -----  Regarding: Herbie Alston, can you please call the patient and remind him that I need a blood work done by the end of this week, because of adjustment on his medication? Order was already placed last week  There is no need for follow up, I will call him with results  Thank you!

## 2021-03-16 ENCOUNTER — IMMUNIZATIONS (OUTPATIENT)
Dept: FAMILY MEDICINE CLINIC | Facility: HOSPITAL | Age: 56
End: 2021-03-16

## 2021-03-16 DIAGNOSIS — Z23 ENCOUNTER FOR IMMUNIZATION: Primary | ICD-10-CM

## 2021-03-16 PROCEDURE — 91300 SARS-COV-2 / COVID-19 MRNA VACCINE (PFIZER-BIONTECH) 30 MCG: CPT

## 2021-03-16 PROCEDURE — 0001A SARS-COV-2 / COVID-19 MRNA VACCINE (PFIZER-BIONTECH) 30 MCG: CPT

## 2021-03-18 ENCOUNTER — TELEPHONE (OUTPATIENT)
Dept: ENDOCRINOLOGY | Facility: CLINIC | Age: 56
End: 2021-03-18

## 2021-03-18 ENCOUNTER — HOSPITAL ENCOUNTER (OUTPATIENT)
Dept: SLEEP CENTER | Facility: CLINIC | Age: 56
Discharge: HOME | End: 2021-03-18
Payer: COMMERCIAL

## 2021-03-18 DIAGNOSIS — G47.00 INSOMNIA, UNSPECIFIED TYPE: ICD-10-CM

## 2021-03-18 DIAGNOSIS — I10 ESSENTIAL HYPERTENSION: ICD-10-CM

## 2021-03-18 DIAGNOSIS — E66.01 MORBID OBESITY WITH BMI OF 60.0-69.9, ADULT (HCC): ICD-10-CM

## 2021-03-18 PROCEDURE — 95810 POLYSOM 6/> YRS 4/> PARAM: CPT

## 2021-03-18 RX ORDER — TRAZODONE HYDROCHLORIDE 50 MG/1
75 TABLET ORAL
Qty: 135 TABLET | Refills: 0 | Status: SHIPPED | OUTPATIENT
Start: 2021-03-18 | End: 2021-08-02 | Stop reason: ALTCHOICE

## 2021-03-19 NOTE — PROGRESS NOTES
Sleep Study Documentation    Pre-Sleep Study       Sleep testing procedure explained to patient:YES    Patient napped prior to study:NO    Caffeine:Dayshift worker after 12PM   Caffeine use:NO    Alcohol:Dayshift workers after 5PM: Alcohol use:NO    Typical day for patient:YES       Study Documentation    Sleep Study Indications: BMI, WITNESSED APNEAS, SNORING    Sleep Study: Diagnostic   Snore: Moderate  Supplemental O2: no      Minimum SaO2 74  Baseline SaO2 94            EKG abnormalities: yes:  EPOCH example and comments:     EEG abnormalities: no    Sleep Study Recorded < 2 hours: N/A    Sleep Study Recorded > 2 hours but incomplete study: yes Patient choose to leave    Sleep Study Recorded 6 hours but no sleep obtained: NO    Patient classification: disabled       Post-Sleep Study    Medication used at bedtime or during sleep study:YES prescription sleep aid    Patient reports time it took to fall asleep:30 to 60 minutes    Patient reports waking up during study:3 or more times  Patient reports returning to sleep in 10 to 30 minutes  Patient reports sleeping less than 2 hours without dreaming  Patient reports sleep during study:typical    Patient rated sleepiness: Very sleepy or tired    PAP treatment:no

## 2021-03-22 ENCOUNTER — TELEPHONE (OUTPATIENT)
Dept: SLEEP CENTER | Facility: CLINIC | Age: 56
End: 2021-03-22

## 2021-03-22 ENCOUNTER — PATIENT MESSAGE (OUTPATIENT)
Dept: ENDOCRINOLOGY | Facility: CLINIC | Age: 56
End: 2021-03-22

## 2021-03-22 DIAGNOSIS — Z79.4 TYPE 2 DIABETES MELLITUS WITH HYPERGLYCEMIA, WITH LONG-TERM CURRENT USE OF INSULIN (HCC): ICD-10-CM

## 2021-03-22 DIAGNOSIS — E11.65 TYPE 2 DIABETES MELLITUS WITH HYPERGLYCEMIA, WITH LONG-TERM CURRENT USE OF INSULIN (HCC): ICD-10-CM

## 2021-03-22 NOTE — TELEPHONE ENCOUNTER
Called patient to review results of sleep study  Very severe DAHIANA  Scheduled consult with Dr Dusty Nava 4/7/21

## 2021-03-23 RX ORDER — INSULIN GLARGINE 100 [IU]/ML
INJECTION, SOLUTION SUBCUTANEOUS
Qty: 43 ML | Refills: 0 | Status: ON HOLD | OUTPATIENT
Start: 2021-03-23 | End: 2021-11-01 | Stop reason: SDUPTHER

## 2021-03-23 RX ORDER — INSULIN GLARGINE 100 [IU]/ML
INJECTION, SOLUTION SUBCUTANEOUS
Qty: 15 ML | Refills: 1 | Status: SHIPPED | OUTPATIENT
Start: 2021-03-23 | End: 2021-10-08 | Stop reason: SDUPTHER

## 2021-03-23 RX ORDER — INSULIN ASPART 100 [IU]/ML
INJECTION, SOLUTION INTRAVENOUS; SUBCUTANEOUS
Qty: 75 ML | Refills: 0 | Status: SHIPPED | OUTPATIENT
Start: 2021-03-23 | End: 2021-04-12 | Stop reason: SDUPTHER

## 2021-03-26 ENCOUNTER — APPOINTMENT (OUTPATIENT)
Dept: LAB | Facility: HOSPITAL | Age: 56
End: 2021-03-26
Payer: COMMERCIAL

## 2021-03-26 DIAGNOSIS — I10 ESSENTIAL HYPERTENSION: ICD-10-CM

## 2021-03-26 LAB
ANION GAP SERPL CALCULATED.3IONS-SCNC: 5 MMOL/L (ref 4–13)
BUN SERPL-MCNC: 18 MG/DL (ref 5–25)
CALCIUM SERPL-MCNC: 9.9 MG/DL (ref 8.3–10.1)
CHLORIDE SERPL-SCNC: 103 MMOL/L (ref 100–108)
CO2 SERPL-SCNC: 31 MMOL/L (ref 21–32)
CREAT SERPL-MCNC: 1.12 MG/DL (ref 0.6–1.3)
GFR SERPL CREATININE-BSD FRML MDRD: 75 ML/MIN/1.73SQ M
GLUCOSE SERPL-MCNC: 132 MG/DL (ref 65–140)
POTASSIUM SERPL-SCNC: 4.5 MMOL/L (ref 3.5–5.3)
SODIUM SERPL-SCNC: 139 MMOL/L (ref 136–145)

## 2021-03-26 PROCEDURE — 80048 BASIC METABOLIC PNL TOTAL CA: CPT

## 2021-03-26 PROCEDURE — 36415 COLL VENOUS BLD VENIPUNCTURE: CPT

## 2021-03-27 DIAGNOSIS — Z79.4 TYPE 2 DIABETES MELLITUS WITH HYPERGLYCEMIA, WITH LONG-TERM CURRENT USE OF INSULIN (HCC): ICD-10-CM

## 2021-03-27 DIAGNOSIS — E11.65 TYPE 2 DIABETES MELLITUS WITH HYPERGLYCEMIA, WITH LONG-TERM CURRENT USE OF INSULIN (HCC): ICD-10-CM

## 2021-03-28 RX ORDER — FLASH GLUCOSE SENSOR
KIT MISCELLANEOUS
Qty: 2 EACH | Refills: 3 | Status: SHIPPED | OUTPATIENT
Start: 2021-03-28 | End: 2021-07-12

## 2021-04-06 DIAGNOSIS — B37.2 CANDIDIASIS OF SKIN: Primary | ICD-10-CM

## 2021-04-06 RX ORDER — SACCHAROMYCES BOULARDII 250 MG
250 CAPSULE ORAL 2 TIMES DAILY
Qty: 60 CAPSULE | Refills: 1 | Status: SHIPPED | OUTPATIENT
Start: 2021-04-06

## 2021-04-06 RX ORDER — KETOCONAZOLE 20 MG/G
CREAM TOPICAL DAILY
Qty: 60 G | Refills: 1 | Status: SHIPPED | OUTPATIENT
Start: 2021-04-06 | End: 2021-11-01 | Stop reason: HOSPADM

## 2021-04-07 ENCOUNTER — TELEPHONE (OUTPATIENT)
Dept: SLEEP CENTER | Facility: CLINIC | Age: 56
End: 2021-04-07

## 2021-04-07 ENCOUNTER — TELEMEDICINE (OUTPATIENT)
Dept: SLEEP CENTER | Facility: CLINIC | Age: 56
End: 2021-04-07
Payer: COMMERCIAL

## 2021-04-07 ENCOUNTER — IMMUNIZATIONS (OUTPATIENT)
Dept: FAMILY MEDICINE CLINIC | Facility: HOSPITAL | Age: 56
End: 2021-04-07

## 2021-04-07 DIAGNOSIS — E66.01 MORBID OBESITY WITH BMI OF 60.0-69.9, ADULT (HCC): ICD-10-CM

## 2021-04-07 DIAGNOSIS — I10 ESSENTIAL HYPERTENSION: ICD-10-CM

## 2021-04-07 DIAGNOSIS — N50.89 SCROTAL SWELLING: ICD-10-CM

## 2021-04-07 DIAGNOSIS — E66.2 OBESITY HYPOVENTILATION SYNDROME (HCC): ICD-10-CM

## 2021-04-07 DIAGNOSIS — R60.0 BILATERAL LEG EDEMA: ICD-10-CM

## 2021-04-07 DIAGNOSIS — E11.49 OTHER DIABETIC NEUROLOGICAL COMPLICATION ASSOCIATED WITH TYPE 2 DIABETES MELLITUS (HCC): ICD-10-CM

## 2021-04-07 DIAGNOSIS — Z23 ENCOUNTER FOR IMMUNIZATION: Primary | ICD-10-CM

## 2021-04-07 DIAGNOSIS — G47.33 OSA (OBSTRUCTIVE SLEEP APNEA): Primary | ICD-10-CM

## 2021-04-07 DIAGNOSIS — R79.81 ELEVATED CO2 LEVEL: ICD-10-CM

## 2021-04-07 PROCEDURE — 0002A SARS-COV-2 / COVID-19 MRNA VACCINE (PFIZER-BIONTECH) 30 MCG: CPT

## 2021-04-07 PROCEDURE — 1036F TOBACCO NON-USER: CPT | Performed by: INTERNAL MEDICINE

## 2021-04-07 PROCEDURE — 99215 OFFICE O/P EST HI 40 MIN: CPT | Performed by: INTERNAL MEDICINE

## 2021-04-07 PROCEDURE — 91300 SARS-COV-2 / COVID-19 MRNA VACCINE (PFIZER-BIONTECH) 30 MCG: CPT

## 2021-04-07 NOTE — TELEPHONE ENCOUNTER
Called patient to discuss APAP ordered by Dr Saintclair Malling  Patient is agreeable to use [employer]  Patient understands that he will be setup on Friday at home  He will need to sign a loaner agreement and secure with credit card until prior authorization is obtained  [facility] will call tomorrow with the Friday appointment time  We also discussed the patient's comfort needs for his next sleep study  Patient stated that he cannot walk too far, and he needs assistance to stand up and to move  His wife does not want to stay to assist him  He was unaware that the [facility] could be raised on sleep number bed  He states that he sleeps in recliner at home  He feels he may be able to sleep in bed if he was on PAP machine  He also states that the hookup chair is too painful to sit in  Also explained to patient that we would be arranging an appointment for follow-up with pulmonary sleep physician  Patient questioned treatment options outlined by Dr Saintclair Malling  Patient understands that our goal is safe and effective care  He understands that APAP would not be the best option, but he feels it is the option that will currently work for him  Faxed information to [facility] for set-up

## 2021-04-07 NOTE — PROGRESS NOTES
Review of Systems      Genitourinary need to urinate more than twice a night and difficulty with erection   Cardiology ankle/leg swelling   Gastrointestinal frequent heartburn/acid reflux   Neurology need to move extremities, muscle weakness, numbness/tingling of an extremity, forgetfulness and poor concentration or confusion,    Constitutional fatigue and weight change   Integumentary none   Psychiatry anxiety and depression   Musculoskeletal muscle aches, back pain and leg cramps   Pulmonary shortness of breath with activity, frequent cough, snoring and difficulty breathing when lying flat    ENT none   Endocrine frequent urination   Hematological none

## 2021-04-07 NOTE — PROGRESS NOTES
Virtual Regular Visit      Assessment/Plan:    Problem List Items Addressed This Visit        Endocrine    Diabetic neuropathy ([address] )       Respiratory    DAHIANA (obstructive sleep apnea) - Primary    Relevant Orders    Cpap DME    Ambulatory referral to Pulmonology       Cardiovascular and Mediastinum    Essential hypertension       Other    Morbid obesity with BMI of 60 0-69 9, adult (HCC)    Scrotal swelling    Bilateral leg edema    Elevated CO2 level    Relevant Orders    Ambulatory referral to Pulmonology      Other Visit Diagnoses     Obesity hypoventilation syndrome ([address] )        Relevant Orders    Cpap DME    Ambulatory referral to Pulmonology               Reason for visit is   Chief Complaint   Patient presents with    Virtual Regular Visit        Encounter provider Kacy Alvarenga MD    Provider located at [facility] [address]  [address] [facility] [address] [facility] [facility] [address] [phone]      Recent Visits  No visits were found meeting these conditions  Showing recent visits within past 7 days and meeting all other requirements     Today's Visits  Date Type Provider Dept   04/07/21 Telemedicine Kacy Alvarenga MD [facility]   Showing today's visits and meeting all other requirements     Future Appointments  No visits were found meeting these conditions  Showing future appointments within next 150 days and meeting all other requirements        The patient was identified by name and date of birth  Jeffry Mccloud was informed that this is a telemedicine visit and that the visit is being conducted through [facility] and patient was informed that this is a secure, HIPAA-compliant platform  He agrees to proceed     My office door was closed  No one else was in the room  He acknowledged consent and understanding of privacy and security of the video platform  The patient has agreed to participate and understands they can discontinue the visit at any time      Patient is aware this is a billable service  Consultation - [address] [facility] [place]  50 y o  male  :1965  ZQP:[phone]    Physician Requesting Consult: Modesta Bah MD     Reason for Consult : At your kind request I saw Mesha Lane for initial sleep evaluation today  Patient recently had a diagnostic sleep study and is here to review results / treatment options  I was unable to perform a suitable evaluation via video based on complexity of his medical problems, patient's and partners state of mind  Accordingly, patient has been informed that this is a limited evaluation and will need to follow-up in person for more throough assessment  The study demonstrated severe obstructive sleep apnea:  /hour     Minimum oxygen saturation 74%  and 88 9 minute of total sleep time was spent with saturations less than 90%  PFSH, Problem List, Medications & Allergies were reviewed in EMR  Nakia Méndez  has a past medical history of Anemia (2019), Cellulitis, Diabetes mellitus ([address] ), Disease of thyroid gland, Edema, Elevated liver enzymes, Esophageal reflux, Gluten intolerance, Gout, Hyperglycemia, Hypertension, IBS (irritable bowel syndrome), Insomnia, Obesity, Osteoarthritis of knee, Prehypertension, Renal disorder, Venous insufficiency, and Villonodular synovitis of the hand, right  He has a current medication list which includes the following prescription(s): onetouch verio, freestyle rebeca 2 reader systm, freestyle rebeca 2 sensor, furosemide, gabapentin, gabapentin, onetouch verio, insulin aspart, lantus solostar, lantus solostar, toujeo max solostar, bd pen needle katy 2nd gen, ketoconazole, onetouch ultrasoft, levothyroxine, losartan, metformin, nystatin, omeprazole, omeprazole, omeprazole, psyllium, saccharomyces boulardii, sildenafil, trazodone, trazodone, cetirizine, and omeprazole        HPI:  Study was undertaken for complaints of snoring, awakenings with choking and witnessed apneas of at least 1-1/2 years duration that started after a hospitalization during which she required ventilation for over a week  Other Complaints: none  Restless Leg Syndrome: has suggestive symptoms and pain due to diabetic neuropathy  Parasomnia: reports sleeptalking, but no other features of parasomnia     Sleep Routine (averages):  He sleeps in a recliner chair Typical Bedtime:  Midnight Gets OOB:  11:30 a m  TIB:11 5 hrs  Sleep latency: several hours  He attributes to anxiety about going to sleep  Sleep Interruptions:4-5/nite because of nocturia and struggles to fall back asleep  Awakens: with aid of an alarm  Upon awakening: never feels rested  He estimates getting 2-3 hrs sleep  Ian Alu reports Excessive Daytime Sleepiness is dozing off unintentionally in various settings and rated himself at Total score: 17 /24 on the [place] Sleepiness Scale  Habits:  reports that he has never smoked  He has never used smokeless tobacco  ;   E-Cigarette/Vaping    ;  reports no history of drug use ;  reports no history of alcohol use  ; Caffeine use:limited ; Exercise routine: none except for physical therapy  Family History: Negative for sleep disturbance  ROS - see attached  He has had significant weight gain since his hospitalization  He has shortness of breath, significant swelling of his legs up to lower abdomen  He reports very limited mobility  [facility] EXAM:  There were no vitals taken for this visit  General:  Appears morbidly obese (and according to medical record BMI is apparently greater than 60); recumbent in a recliner  Neurological: Alert and orientated;  cooperative; however he has wife is in the background shouting and screaming hurling obscenities at me and at [facility] the saying that she wants to eduardo me for not providing a CPAP machine and [facility] for delay in treament  She is also questioning why CPAP titration was not done on the night of his sleep study    I tried explaining this is the 1st encounter and I need to assimilate information before making any treatment decisions and requested the wife refrain from her shouting and abuse  He tells me she is having a panic attack and asked her to leave the room which she did briefly but came back and continued her ranting and threatening in the background  Psychiatric: Speech:clear and coherent; Normal mood, affect & thought   Skin:  Color& Hydration good; no facial rashes or lesions   HEENT:  Craniofacial anatomy: normal   Eyes: EOM's intact;  conjunctiva/corneas clear   Ears: Externallyappear normal     Nasal Airway: is patent   Mouth: Lips: normal posture; Oropharryx: crowded  Neck:is thick and excess fatty tissue;   Lungs: Respiratory Effort:normal    Abdomen: Obese,     Extremities: 3+ pedal edema  No clubbing or cyanosis  Musculoskeletal:  Gait:  He ambulates around the home with a walker  BMP on 01/12/2021 demonstrated elevated CO2 at 34 millimoles per L    IMPRESSION: Primary, Secondary (to Medical or Psych conditions) Diagnoses & Comorbidities   1  DAHIANA (obstructive sleep apnea)  Cpap DME    Ambulatory referral to Pulmonology   2  Obesity hypoventilation syndrome ([address] )  Cpap DME    Ambulatory referral to Pulmonology   3  Elevated CO2 level  Ambulatory referral to Pulmonology   4  Essential hypertension     5  Other diabetic neurological complication associated with type 2 diabetes mellitus ([address] )     6  Morbid obesity with BMI of 60 0-69 9, adult ([address] )     7  Bilateral leg edema      Probably also in CHF   8  Scrotal swelling          PLAN:   1  I reviewed results of the Sleep study with the patient  2  With respect to above conditions, I counseled on pathophysiology, diagnosis, treatment options, risks and benefits; inter-relationship and effects on symptoms and comorbidities; risks of no treatment     3  Patient elected positive airway pressure therapy but to start with insisted he does not want to undertake any further studies  His wife is even more insistent that they will not do any further studies  4  To avoid further delay in treatment, I offered to initiate auto titrating but explained the limitations  particularly in his situation, his comorbidities and that ultimately he will need to be scheduled for a titration study to determine whether he needs CPAP, BiPAP or may even need AVAP possibly with supplemental oxygen  5  I also advised on weight reduction  6  He will need to be under the care of a pulmonary specialist and I have provided a referral to Sleep/Pulmonary to determine whether he should have a BiPAP titration study with view to converting to an AVAP titration or whether he will need to go straight to an AVAP study  I have made arrangement for him to follow-up with Dr Beatriz Duggan  8  Follow-up to be scheduled with Pulmonary/Sleep specialist after the study to review results and to initiate therapy  Sincerely,     Authenticated electronically by Rola Brown MD   on 85/02/26   Board Certified Specialist      I spent 30 minutes directly with the patient during this visit and further 30 minutes liaising with others in health team and making arrangements for his follow-up  VIRTUAL VISIT DISCLAIMER    Gurvinder Joyce acknowledges that he has consented to an online visit or consultation  He understands that the online visit is based solely on information provided by him, and that, in the absence of a face-to-face physical evaluation by the physician, the diagnosis he receives is both limited and provisional in terms of accuracy and completeness  This is not intended to replace a full medical face-to-face evaluation by the physician  Gurvinder Joyce understands and accepts these terms

## 2021-04-07 NOTE — TELEPHONE ENCOUNTER
4/7 Called patient to schedule follow up after his virtual visit with Dr Amy Reed  Patient did not want to schedule follow up at this time   AR

## 2021-04-07 NOTE — PATIENT INSTRUCTIONS

## 2021-04-10 DIAGNOSIS — R94.6 ABNORMAL THYROID FUNCTION TEST: ICD-10-CM

## 2021-04-12 ENCOUNTER — PATIENT MESSAGE (OUTPATIENT)
Dept: ENDOCRINOLOGY | Facility: CLINIC | Age: 56
End: 2021-04-12

## 2021-04-12 ENCOUNTER — TELEPHONE (OUTPATIENT)
Dept: INTERNAL MEDICINE CLINIC | Facility: CLINIC | Age: 56
End: 2021-04-12

## 2021-04-12 DIAGNOSIS — L03.116 CELLULITIS OF LEFT LOWER EXTREMITY: ICD-10-CM

## 2021-04-12 DIAGNOSIS — E11.65 TYPE 2 DIABETES MELLITUS WITH HYPERGLYCEMIA, WITH LONG-TERM CURRENT USE OF INSULIN (HCC): ICD-10-CM

## 2021-04-12 DIAGNOSIS — Z79.4 TYPE 2 DIABETES MELLITUS WITH HYPERGLYCEMIA, WITH LONG-TERM CURRENT USE OF INSULIN (HCC): ICD-10-CM

## 2021-04-12 RX ORDER — LEVOTHYROXINE SODIUM 0.03 MG/1
TABLET ORAL
Qty: 90 TABLET | Refills: 1 | Status: SHIPPED | OUTPATIENT
Start: 2021-04-12 | End: 2021-10-04

## 2021-04-12 RX ORDER — NYSTATIN 100000 [USP'U]/G
1 POWDER TOPICAL 2 TIMES DAILY
Qty: 120 G | Refills: 3 | Status: SHIPPED | OUTPATIENT
Start: 2021-04-12 | End: 2021-08-16 | Stop reason: SDUPTHER

## 2021-04-12 RX ORDER — INSULIN ASPART 100 [IU]/ML
INJECTION, SOLUTION INTRAVENOUS; SUBCUTANEOUS
Qty: 60 ML | Refills: 0 | Status: SHIPPED | OUTPATIENT
Start: 2021-04-12 | End: 2021-07-07

## 2021-04-12 NOTE — TELEPHONE ENCOUNTER
Jose Palma called and said that he wanted to speak with Dr Trisha Valles  I let him know that she is in the room with a patient currently  He began to tell me that his endocrinologist is "with holding" his insulin from him  He said that they are not refilling his medication and has been "chasing them" for an appt  He asked if there was another endocrinologist, I advised him of the providers in [place] and [place]  Also told him that we have Dr Sherren Kinds that comes here on Friday mornings, however she doesn't do virtual appts and if he needed to be seen mon- thurs he would have to go to her [place] office   He asked to speak to dr Ruth Garcia

## 2021-04-12 NOTE — TELEPHONE ENCOUNTER
Please refill insulin aspart (NovoLOG FlexPen) 100 UNIT/ML injection pen  pt  Injects 18 units with meals+scale ( - 150-200 -2 units, 201-250-4 units, 251-300 -6 units, 301-350-8 units, > 350- 10 units )       Pharmacy - [facility] [address] [place] [address]

## 2021-04-14 DIAGNOSIS — I10 ESSENTIAL HYPERTENSION: ICD-10-CM

## 2021-04-14 DIAGNOSIS — R60.0 BILATERAL LEG EDEMA: ICD-10-CM

## 2021-04-14 RX ORDER — FUROSEMIDE 20 MG/1
TABLET ORAL
Qty: 180 TABLET | Refills: 1 | Status: SHIPPED | OUTPATIENT
Start: 2021-04-14 | End: 2021-07-07

## 2021-04-16 DIAGNOSIS — L89.892 PRESSURE INJURY OF OTHER SITE, STAGE 2 (HCC): Primary | ICD-10-CM

## 2021-04-16 RX ORDER — HYDROCOLLOID DRESSING 3" X 3"
1 BANDAGE TOPICAL 3 TIMES WEEKLY
Qty: 10 EACH | Refills: 2 | Status: SHIPPED | OUTPATIENT
Start: 2021-04-16 | End: 2021-04-16 | Stop reason: CLARIF

## 2021-04-16 RX ORDER — OSTOMY SUPPLY 1"
1 EACH MISCELLANEOUS
Qty: 5 EACH | Refills: 1 | Status: SHIPPED | OUTPATIENT
Start: 2021-04-16

## 2021-05-03 ENCOUNTER — OFFICE VISIT (OUTPATIENT)
Dept: SLEEP CENTER | Facility: CLINIC | Age: 56
End: 2021-05-03
Payer: COMMERCIAL

## 2021-05-03 VITALS
HEIGHT: 64 IN | HEART RATE: 110 BPM | WEIGHT: 315 LBS | SYSTOLIC BLOOD PRESSURE: 142 MMHG | DIASTOLIC BLOOD PRESSURE: 86 MMHG | BODY MASS INDEX: 53.78 KG/M2

## 2021-05-03 DIAGNOSIS — E66.2 OBESITY HYPOVENTILATION SYNDROME (HCC): ICD-10-CM

## 2021-05-03 DIAGNOSIS — R06.02 SHORTNESS OF BREATH: Primary | ICD-10-CM

## 2021-05-03 DIAGNOSIS — I10 ESSENTIAL HYPERTENSION: ICD-10-CM

## 2021-05-03 DIAGNOSIS — G47.00 INSOMNIA, UNSPECIFIED TYPE: ICD-10-CM

## 2021-05-03 DIAGNOSIS — R79.81 ELEVATED CO2 LEVEL: ICD-10-CM

## 2021-05-03 DIAGNOSIS — G47.33 OSA (OBSTRUCTIVE SLEEP APNEA): ICD-10-CM

## 2021-05-03 PROCEDURE — 99215 OFFICE O/P EST HI 40 MIN: CPT | Performed by: INTERNAL MEDICINE

## 2021-05-03 PROCEDURE — 3008F BODY MASS INDEX DOCD: CPT | Performed by: INTERNAL MEDICINE

## 2021-05-03 RX ORDER — ALBUTEROL SULFATE 90 UG/1
2 AEROSOL, METERED RESPIRATORY (INHALATION) EVERY 6 HOURS PRN
Qty: 8.5 G | Refills: 6 | Status: SHIPPED | OUTPATIENT
Start: 2021-05-03 | End: 2022-03-07

## 2021-05-03 NOTE — ASSESSMENT & PLAN NOTE
Complicated with obesity hypoventilation, he is using his CPAP now, he is motivated to cutting down his calories and exercising to lose weight

## 2021-05-03 NOTE — PROGRESS NOTES
Review of Systems      Genitourinary need to urinate more than twice a night and difficulty with erection   Cardiology ankle/leg swelling   Gastrointestinal frequent heartburn/acid reflux   Neurology numbness/tingling of an extremity and balance problems   Constitutional fatigue and weight change   Integumentary rash or dry skin   Psychiatry anxiety, depression and mood change   Musculoskeletal joint pain, muscle aches and back pain   Pulmonary shortness of breath with activity, wheezing, frequent cough, snoring and difficulty breathing when lying flat    ENT none   Endocrine frequent urination   Hematological none

## 2021-05-03 NOTE — ASSESSMENT & PLAN NOTE
·  Kumar Castillo has severe obstructive sleep apnea and evidence of obesity hypoventilation his most recent sleep study showed AHI of 129 6 events per hour consistent with severe obstructive sleep apnea he has chronically elevated bicarb on his BMP  ·  he has been using his CPAP any she had mask leak issues with a high residual AHI 5 days ago he got a new mask resmed AirFit F20 medium size he tells me was much better leak I reviewed that the map for application on his cellphone since he got the mask he has AHI residual less than 5 events per hour with great mask fit  ·  his compliance is today reflects for the whole month to that including his or her mask with a high residual AHI of 8 7 events per hour secondary to the large leak as detailed  ·  overall he feels more refreshed and better with using the new mask with CPAP he is motivated to continue using it

## 2021-05-03 NOTE — PROGRESS NOTES
Pulmonary/Sleep Follow Up Note   Peggy Ayoub 50 y o  male MRN: [id number]  5/3/2021      Assessment and Plan:    1  Shortness of breath  Assessment & Plan:  ·  That is multifactorial predominantly secondary to morbid obesity but the patient has been telling me that he has been occasionally wheezing with ambulation and exertion would like to rule out underlying asthma or reactive airway disease  ·  I started him on albuterol HFA as needed  ·  I would order PFTs with bronchodilator response    Orders:  -     albuterol (ProAir HFA) 90 mcg/act inhaler; Inhale 2 puffs every 6 (six) hours as needed for wheezing  -     Complete PFT with post bronchodilator; Future    2  DAHIANA (obstructive sleep apnea)  Assessment & Plan:  ·  Mac Phalen has severe obstructive sleep apnea and evidence of obesity hypoventilation his most recent sleep study showed AHI of 129 6 events per hour consistent with severe obstructive sleep apnea he has chronically elevated bicarb on his BMP  ·  he has been using his CPAP any she had mask leak issues with a high residual AHI 5 days ago he got a new mask resmed AirFit F20 medium size he tells me was much better leak I reviewed that the map for application on his cellphone since he got the mask he has AHI residual less than 5 events per hour with great mask fit  ·  his compliance is today reflects for the whole month to that including his or her mask with a high residual AHI of 8 7 events per hour secondary to the large leak as detailed  ·  overall he feels more refreshed and better with using the new mask with CPAP he is motivated to continue using it      3  Essential hypertension  Assessment & Plan:   Sleep apneas risk for uncontrolled hypertension      4  Insomnia, unspecified type  Assessment & Plan:   He is using his CPAP now he is able to get 6-7 hours per night of sleep          Return in about 3 months (around 8/3/2021)      History of Present Illness   HPI:  Peggy Ayoub is a 50 y o  male who  Here for for compliance visit he has been recently diagnosed with severe obstructive sleep apnea/OHS since then he has been started on auto titrating CPAP 5-20 cm H2O he had large leak in his original mask that was a large size 5 days ago he got a new mask medium size currently he has been using it since then he feels much better subjectively feels less leak and he on his [employer] romain  his got weight better mask fit and less residual AHI < than 5  He also tells me that he has been having issues falling asleep at night he goes to bed around 130 a m  falls asleep around 4:00 a m  till 11:00 a m  and he takes naps in the afternoon although he wakes up more refreshed but he still likes to take his daily naps of 2-3 hours and he was CPAP mask when he is doing this  He has been having shortness of breath with exertion with no other triggers occasionally associated with significant wheezing, associated with cough, he also has orthopnea so he sleeps in a recliner using his CPAP machine  Review of Systems   All other systems reviewed and are negative      Genitourinary need to urinate more than twice a night and difficulty with erection   Cardiology ankle/leg swelling   Gastrointestinal frequent heartburn/acid reflux   Neurology numbness/tingling of an extremity and balance problems   Constitutional fatigue and weight change   Integumentary rash or dry skin   Psychiatry anxiety, depression and mood change   Musculoskeletal joint pain, muscle aches and back pain   Pulmonary shortness of breath with activity, wheezing, frequent cough, snoring and difficulty breathing when lying flat    ENT none   Endocrine frequent urination   Hematological none          Historical Information   Past Medical History:   Diagnosis Date    Anemia 9/13/2019    Cellulitis     last assessed 12/10/15    Diabetes mellitus ([facility] [address] )     Disease of thyroid gland     Edema     Elevated liver enzymes     Esophageal reflux     Gluten intolerance     Gout     last assessed 09/05/13    Hyperglycemia     Hypertension     IBS (irritable bowel syndrome)     Insomnia     Obesity     Osteoarthritis of knee     last assessed 02/10/14    Prehypertension     last assessed 08/22/17    Renal disorder     Venous insufficiency     last assessed 08/22/17    Villonodular synovitis of the hand, right     last assessed 11/14/2013     Past Surgical History:   Procedure Laterality Date    INCISION AND DRAINAGE OF WOUND Left 9/6/2019    Procedure: INCISION AND DRAINAGE (I&D) GROIN;  Surgeon: Chao Cui DO;  [facility];  Service: General    SKIN BIOPSY      WOUND DEBRIDEMENT Left 9/7/2019    Procedure: EXCISIONAL DEBRIDEMENT;  Surgeon: Chao Cui DO;  [facility];  Service: General     Family History   Problem Relation Age of Onset    Cancer Mother         gastrc    Colon cancer Father     Heart failure Father          Meds/Allergies     Current Outpatient Medications:     Blood Glucose Monitoring Suppl (Gopi Ramirez) w/Device KIT, Use to test sugar daily, Disp: 1 kit, Rfl: 0    Continuous Blood Gluc  (FreeStyle Elvin 2 [place] [employer]) BROOKS, Use 1 Device as needed (use with sensors for bs checks), Disp: 1 Device, Rfl: 0    Continuous Blood Gluc Sensor (FreeStyle Elvin 2 Sensor) [facility], USE ONE SENSOR EVERY 2 WEEKS, Disp: 2 each, Rfl: 3    Control Gel Formula Dressing (DuoDERM CGF Dressing) [facility], Apply 1 application topically every 5 (five) days, Disp: 5 each, Rfl: 1    furosemide (LASIX) 20 mg tablet, Take 2 tablets (40 mg total) by mouth daily in the early morning AND 1 tablet (20 mg total) daily after lunch , Disp: 180 tablet, Rfl: 1    gabapentin (NEURONTIN) 300 mg capsule, TAKE ONE CAPSULE BY MOUTH AT BEDTIME (TAKE IN ADDITION WITH 200MG FOR A TOTAL OF 500MG AT NIGHT) (Patient taking differently: 300 mg daily at bedtime ), Disp: 30 capsule, Rfl: 1    glucose blood (OneTouch Verio) test strip, USE TO TEST SUGAR 2 TIMES A DAY, Disp: 100 each, Rfl: 3    insulin aspart (NovoLOG FlexPen) 100 UNIT/ML injection pen, Inject 18 units with meals+scale ( - 150-200 -2 units, 201-250-4 units, 251-300 -6 units, 301-350-8 units, > 350- 10 units ), Disp: 60 mL, Rfl: 0    insulin glargine (Lantus SoloStar) 100 units/mL injection pen, INJECT 45 UNITS UNDER THE SKIN DAILY AT BEDTIME, Disp: 15 mL, Rfl: 1    insulin glargine (Lantus SoloStar) 100 units/mL injection pen, Inject 48 units under the skin every bedtime, Disp: 43 mL, Rfl: 0    insulin glargine (Toujeo Max SoloStar) 300 units/mL CONCETRATED U-300 injection pen (2-unit dial), Inject 45 units at bedtime, Disp: 9 pen, Rfl: 1    Insulin Pen Needle (BD Pen Needle Ludmila 2nd Gen) 32G X 4 MM MISC, Inject into the skin 4 (four) times a day, Disp: 360 each, Rfl: 1    Lancets (ONETOUCH ULTRASOFT) lancets, Use to test sugar 2 times a day, Disp: 100 each, Rfl: 3    levothyroxine 25 mcg tablet, TAKE ONE TABLET DAILY ON EMPTY STOMACH 1 HOUR BEFORE BREAKFAST, Disp: 90 tablet, Rfl: 1    losartan (COZAAR) 25 mg tablet, TAKE ONE TABLET BY MOUTH EVERY DAY, Disp: 90 tablet, Rfl: 2    nystatin (MYCOSTATIN) powder, Apply 1 application topically 2 (two) times a day To affected area, Disp: 120 g, Rfl: 3    omeprazole (PriLOSEC) 40 MG capsule, TAKE ONE CAPSULE BY MOUTH TWICE A DAY, Disp: 60 capsule, Rfl: 2    omeprazole (PriLOSEC) 40 MG capsule, TAKE ONE CAPSULE BY MOUTH TWICE DAILY, Disp: 180 capsule, Rfl: 0    omeprazole (PriLOSEC) 40 MG capsule, TAKE 1 CAPSULE BY MOUTH TWICE A DAY, Disp: 180 capsule, Rfl: 1    psyllium (METAMUCIL) packet, Take 1 packet by mouth daily, Disp: , Rfl: 0    saccharomyces boulardii (FLORASTOR) 250 mg capsule, Take 1 capsule (250 mg total) by mouth 2 (two) times a day, Disp: 60 capsule, Rfl: 1    sildenafil (VIAGRA) 25 MG tablet, Take 1 tablet (25 mg total) by mouth daily as needed for erectile dysfunction, Disp: 10 tablet, Rfl: 1    traZODone (DESYREL) 50 mg tablet, Take 1 5 tablets (75 mg total) by mouth daily at bedtime, Disp: 45 tablet, Rfl: 1    traZODone (DESYREL) 50 mg tablet, Take 1 5 tablets (75 mg total) by mouth daily at bedtime, Disp: 135 tablet, Rfl: 0    albuterol (ProAir HFA) 90 mcg/act inhaler, Inhale 2 puffs every 6 (six) hours as needed for wheezing, Disp: 8 5 g, Rfl: 6    gabapentin (NEURONTIN) 100 mg capsule, Take 2 capsules (200 mg total) by mouth 2 (two) times a day (Patient taking differently: Take 200 mg by mouth daily ), Disp: 360 capsule, Rfl: 0    ketoconazole (NIZORAL) 2 % cream, Apply topically daily for 7 days Can extend up to 14 days if needed  , Disp: 60 g, Rfl: 1    metFORMIN (GLUCOPHAGE) 1000 MG tablet, Take 1 tablet (1,000 mg total) by mouth 2 (two) times a day, Disp: 180 tablet, Rfl: 3  Allergies   Allergen Reactions    Gluten Meal - Food Allergy     Clindamycin Diarrhea       Vitals: Blood pressure 142/86, pulse (!) 110, height 5' 4" (1 626 m), weight (!) 228 kg (503 lb)  Body mass index is 86 34 kg/m²  Physical Exam  Physical Exam  Constitutional:       General: He is not in acute distress  Appearance: Normal appearance  He is obese  He is not ill-appearing, toxic-appearing or diaphoretic  HENT:      Head: Normocephalic and atraumatic  Nose: No congestion or rhinorrhea  Mouth/Throat:      Mouth: Mucous membranes are moist       Pharynx: Oropharynx is clear  No oropharyngeal exudate  Eyes:      General: No scleral icterus  Right eye: No discharge  Left eye: No discharge  Extraocular Movements: Extraocular movements intact  Conjunctiva/sclera: Conjunctivae normal    Neck:      Musculoskeletal: Normal range of motion and neck supple  No neck rigidity  Cardiovascular:      Rate and Rhythm: Normal rate and regular rhythm  Pulses: Normal pulses  Heart sounds: Normal heart sounds  No murmur  No gallop      Pulmonary:      Effort: Pulmonary effort is normal  No respiratory distress  Breath sounds: Normal breath sounds  No wheezing, rhonchi or rales  Abdominal:      General: There is no distension  Palpations: Abdomen is soft  Tenderness: There is no abdominal tenderness  Musculoskeletal:         General: No swelling, tenderness or deformity  Right lower leg: Edema present  Left lower leg: Edema present  Skin:     General: Skin is warm and dry  Findings: Erythema and rash present  Neurological:      General: No focal deficit present  Mental Status: He is alert and oriented to person, place, and time  Mental status is at baseline  Psychiatric:         Mood and Affect: Mood normal          Behavior: Behavior normal          Thought Content: Thought content normal          Judgment: Judgment normal          Labs: I have personally reviewed pertinent lab results  , ABG: No results found for: PHART, LOC4YSB, PO2ART, OPW8LHL, M5ZSQZIC, BEART, SOURCE, BNP: No results found for: BNP, CBC: No results found for: WBC, HGB, HCT, MCV, PLT, ADJUSTEDWBC, MCH, MCHC, RDW, MPV, NRBC, CMP: No results found for: SODIUM, K, CL, CO2, ANIONGAP, BUN, CREATININE, GLUCOSE, CALCIUM, AST, ALT, ALKPHOS, PROT, BILITOT, EGFR, PT/INR: No results found for: PT, INR, Troponin: No results found for: TROPONINI  Lab Results   Component Value Date    WBC 6 83 12/10/2020    HGB 15 7 12/10/2020    HCT 48 4 12/10/2020    MCV 96 12/10/2020     12/10/2020     Lab Results   Component Value Date    GLUCOSE 144 (H) 09/07/2019    CALCIUM 9 9 03/26/2021     (L) 12/03/2015    K 4 5 03/26/2021    CO2 31 03/26/2021     03/26/2021    BUN 18 03/26/2021    CREATININE 1 12 03/26/2021     No results found for: IGE  Lab Results   Component Value Date    ALT 65 12/10/2020    AST 33 12/10/2020    ALKPHOS 80 12/10/2020    BILITOT 0 88 12/03/2015           Sleep studies: I have personally reviewed pertinent reports      HST 3/2021: KYLER 129 6 events/hr consistent with severe obstructive sleep apnea    Compliance report:I have personally reviewed pertinent reports  4/2021-5/2021   with an average use of 4 hours 34 minutes, residual AHI of 8 7 events per hour, with a large leak of 3 hours 39 minutes per night, that is reflecting majority of the time with the old mask before he gets the new mask      Antonio Wooten MD  [facility]       Portions of the record may have been created with voice recognition software  Occasional wrong word or "sound a like" substitutions may have occurred due to the inherent limitations of voice recognition software  Read the chart carefully and recognize, using context, where substitutions have occurred

## 2021-05-03 NOTE — ASSESSMENT & PLAN NOTE
·  That is multifactorial predominantly secondary to morbid obesity but the patient has been telling me that he has been occasionally wheezing with ambulation and exertion would like to rule out underlying asthma or reactive airway disease  ·  I started him on albuterol HFA as needed  ·  I would order PFTs with bronchodilator response

## 2021-05-03 NOTE — PATIENT INSTRUCTIONS
HOW TO CLEAN YOUR AUTO CPAP MACHINE    Mask: Clean the cushion of your mask daily  Dish soap and warm water  (Youre eligible for mask cushions every 3 months)    Headgear: Once a week  I find when you wash it daily it eats the material of the Velcro faster     (eligible for new headgear every 6 months)    Heated Tubing: Clean the tube every 3 days  One drop of dish soap run warm water through the tube then hang it over your shower curtain and let it drip dry  (eligible every 3 months)    Humidifier: Rinse out every 1-3 days  Dish soap warm water or , top shelf  (eligible every 6 months) **DISTILLED WATER ONLY**    Filters:    Dark blue (reusable for 6 months)- Rinse under warm water (no soap) sit and drip dry every 2-3 weeks  (eligible for a new one every 6 months)    Light Blue (disposable) throw away every 2-3weeks depending on how dirty it looks   (eligible for 6 every 3 months)

## 2021-05-06 NOTE — PLAN OF CARE
Problem: OCCUPATIONAL THERAPY ADULT  Goal: Performs self-care activities at highest level of function for planned discharge setting  See evaluation for individualized goals  Description  Treatment Interventions: ADL retraining, Functional transfer training, UE strengthening/ROM, Endurance training, Patient/family training, Equipment evaluation/education, Compensatory technique education, Fine motor coordination activities, Continued evaluation, Energy conservation, Activityengagement  Equipment Recommended: (will continue to assess)       See flowsheet documentation for full assessment, interventions and recommendations  Outcome: Progressing  Note:   Limitation: Decreased ADL status, Decreased UE strength, Decreased cognition, Decreased Safe judgement during ADL, Decreased endurance, Decreased self-care trans, Decreased fine motor control, Decreased high-level ADLs     Assessment: Pt participated in OT tx session this afternoon w/ PT, RJ and benefits from co -tx due to signficant cues / prompts needed  Pt demonstrated increased activity tolerance and required less physical assistance this afternoon  Pt agreeable and motivated to participate and reports that he feels he can manage at home w/ his family  Pt completed sit <> stand w/ min A and assist of 2nd for safety  Pt engaged in functional mobility to bathroom using jaime RW w/ min A x2 (assist of 2nd for safety, cues) to complete oral hygiene standing at sink  Pt tolerated standing approx 2' w/ B UE forearm support  Pt engaged in LBD w/ max A  to don crocs while supine (trunk on bed)  Care coordination w/ PT, CM to facilitate safe discahrge planning  Pt continues to benefits from significant cues /prompts throughout session to sustain attention and for consistent recall  Pt mixing up recent events although alert and oriented  Continue to recommend post acute rehab when medically stable for discharge from acute care to return to baseline level of I   If pt declines rehab and chooses to return to [facility] w/ wife, family assist pt will need jaime RW and jaime commode and physical assist w/ all functional transfers and mobility as well as assistance w/ LB ADL  Will continue to follow pt in acute care       OT Discharge Recommendation: Other (Comment)(post acute rehab)  OT - OK to Discharge: (post acute rehab when stable) (4) no impairment

## 2021-05-11 ENCOUNTER — TELEPHONE (OUTPATIENT)
Dept: SLEEP CENTER | Facility: CLINIC | Age: 56
End: 2021-05-11

## 2021-05-11 ENCOUNTER — TELEPHONE (OUTPATIENT)
Dept: INTERNAL MEDICINE CLINIC | Facility: CLINIC | Age: 56
End: 2021-05-11

## 2021-05-11 NOTE — TELEPHONE ENCOUNTER
Estelle Julianna has left a voicemail on the med refill line  Kareen wished to have a prior authorization completed for his ketoconazole (NIZORAL) 2% cream  Looked under medications to see the medication has ended  The medication has a sig of 7 days, and extended 14 days if needed, with only one refill  [facility] to inform him the medication has ended to see if he would like to continue the cream  If so the [facility] can be called to have the prior authorization form faxed to the office

## 2021-05-17 NOTE — TELEPHONE ENCOUNTER
Inés Ramirez has called the office in regards to his requested medication, ketoconaole cream  Informed Bonniejayysav the prior authorization has been sent to his insurance and are currently waiting a response  Kareen understood the information and ended the call

## 2021-06-01 DIAGNOSIS — G47.00 INSOMNIA, UNSPECIFIED TYPE: ICD-10-CM

## 2021-06-01 RX ORDER — TRAZODONE HYDROCHLORIDE 50 MG/1
75 TABLET ORAL
Qty: 135 TABLET | Refills: 0 | Status: SHIPPED | OUTPATIENT
Start: 2021-06-01 | End: 2021-08-02 | Stop reason: ALTCHOICE

## 2021-06-09 DIAGNOSIS — K21.9 GASTROESOPHAGEAL REFLUX DISEASE: ICD-10-CM

## 2021-06-09 DIAGNOSIS — E11.49 OTHER DIABETIC NEUROLOGICAL COMPLICATION ASSOCIATED WITH TYPE 2 DIABETES MELLITUS (HCC): ICD-10-CM

## 2021-06-09 RX ORDER — OMEPRAZOLE 40 MG/1
CAPSULE, DELAYED RELEASE ORAL
Qty: 180 CAPSULE | Refills: 1 | Status: SHIPPED | OUTPATIENT
Start: 2021-06-09 | End: 2021-08-02 | Stop reason: SDUPTHER

## 2021-06-09 RX ORDER — GABAPENTIN 100 MG/1
200 CAPSULE ORAL DAILY
Qty: 90 CAPSULE | Refills: 3 | Status: SHIPPED | OUTPATIENT
Start: 2021-06-09 | End: 2021-08-02 | Stop reason: SDUPTHER

## 2021-06-14 ENCOUNTER — TELEPHONE (OUTPATIENT)
Dept: INTERNAL MEDICINE CLINIC | Facility: CLINIC | Age: 56
End: 2021-06-14

## 2021-06-14 NOTE — TELEPHONE ENCOUNTER
Tahmina from [facility] has called regarding Jh Carey wished to inform Dr  Yohan Wilcox is being discharged from their services  Ranjit Carey states this is due to the fact the stage 2 pressure ulcers have been treated  There is a single ulcer located on the right thigh which Ranjit Carey states Kareen's wife stated she would take care of  The voicemail stated a call back was not needed  The call was just to inform the office of Kareen being discharged

## 2021-06-24 DIAGNOSIS — Z79.4 TYPE 2 DIABETES MELLITUS WITH HYPERGLYCEMIA, WITH LONG-TERM CURRENT USE OF INSULIN (HCC): Primary | ICD-10-CM

## 2021-06-24 DIAGNOSIS — E11.65 TYPE 2 DIABETES MELLITUS WITH HYPERGLYCEMIA, WITH LONG-TERM CURRENT USE OF INSULIN (HCC): Primary | ICD-10-CM

## 2021-06-24 DIAGNOSIS — E11.49 OTHER DIABETIC NEUROLOGICAL COMPLICATION ASSOCIATED WITH TYPE 2 DIABETES MELLITUS (HCC): ICD-10-CM

## 2021-06-28 DIAGNOSIS — E11.65 TYPE 2 DIABETES MELLITUS WITH HYPERGLYCEMIA, WITH LONG-TERM CURRENT USE OF INSULIN (HCC): ICD-10-CM

## 2021-06-28 DIAGNOSIS — Z79.4 TYPE 2 DIABETES MELLITUS WITH HYPERGLYCEMIA, WITH LONG-TERM CURRENT USE OF INSULIN (HCC): ICD-10-CM

## 2021-07-07 DIAGNOSIS — E11.65 TYPE 2 DIABETES MELLITUS WITH HYPERGLYCEMIA, WITH LONG-TERM CURRENT USE OF INSULIN (HCC): ICD-10-CM

## 2021-07-07 DIAGNOSIS — Z79.4 TYPE 2 DIABETES MELLITUS WITH HYPERGLYCEMIA, WITH LONG-TERM CURRENT USE OF INSULIN (HCC): ICD-10-CM

## 2021-07-07 DIAGNOSIS — E11.49 OTHER DIABETIC NEUROLOGICAL COMPLICATION ASSOCIATED WITH TYPE 2 DIABETES MELLITUS (HCC): ICD-10-CM

## 2021-07-07 PROCEDURE — 4010F ACE/ARB THERAPY RXD/TAKEN: CPT | Performed by: INTERNAL MEDICINE

## 2021-07-07 RX ORDER — INSULIN ASPART 100 [IU]/ML
INJECTION, SOLUTION INTRAVENOUS; SUBCUTANEOUS
Qty: 45 ML | Refills: 1 | Status: SHIPPED | OUTPATIENT
Start: 2021-07-07 | End: 2021-10-08 | Stop reason: SDUPTHER

## 2021-07-08 RX ORDER — GABAPENTIN 300 MG/1
CAPSULE ORAL
Qty: 30 CAPSULE | Refills: 1 | Status: SHIPPED | OUTPATIENT
Start: 2021-07-08 | End: 2021-08-02 | Stop reason: SDUPTHER

## 2021-07-11 DIAGNOSIS — E11.65 TYPE 2 DIABETES MELLITUS WITH HYPERGLYCEMIA, WITH LONG-TERM CURRENT USE OF INSULIN (HCC): ICD-10-CM

## 2021-07-11 DIAGNOSIS — Z79.4 TYPE 2 DIABETES MELLITUS WITH HYPERGLYCEMIA, WITH LONG-TERM CURRENT USE OF INSULIN (HCC): ICD-10-CM

## 2021-07-12 RX ORDER — FLASH GLUCOSE SENSOR
KIT MISCELLANEOUS
Qty: 2 EACH | Refills: 3 | Status: ON HOLD | OUTPATIENT
Start: 2021-07-12 | End: 2021-11-01

## 2021-08-02 ENCOUNTER — TELEMEDICINE (OUTPATIENT)
Dept: INTERNAL MEDICINE CLINIC | Facility: CLINIC | Age: 56
End: 2021-08-02
Payer: COMMERCIAL

## 2021-08-02 ENCOUNTER — TELEPHONE (OUTPATIENT)
Dept: INTERNAL MEDICINE CLINIC | Facility: CLINIC | Age: 56
End: 2021-08-02

## 2021-08-02 DIAGNOSIS — E11.49 OTHER DIABETIC NEUROLOGICAL COMPLICATION ASSOCIATED WITH TYPE 2 DIABETES MELLITUS (HCC): ICD-10-CM

## 2021-08-02 DIAGNOSIS — L03.116 CELLULITIS OF LEFT LOWER EXTREMITY: Primary | ICD-10-CM

## 2021-08-02 PROCEDURE — 99214 OFFICE O/P EST MOD 30 MIN: CPT | Performed by: INTERNAL MEDICINE

## 2021-08-02 RX ORDER — GABAPENTIN 100 MG/1
100 CAPSULE ORAL 2 TIMES DAILY
Qty: 180 CAPSULE | Refills: 2 | Status: SHIPPED | OUTPATIENT
Start: 2021-08-02 | End: 2022-04-26

## 2021-08-02 RX ORDER — GABAPENTIN 300 MG/1
300 CAPSULE ORAL 2 TIMES DAILY
Qty: 180 CAPSULE | Refills: 2 | Status: SHIPPED | OUTPATIENT
Start: 2021-08-02 | End: 2021-11-15 | Stop reason: SDUPTHER

## 2021-08-02 RX ORDER — CEPHALEXIN 500 MG/1
500 CAPSULE ORAL EVERY 6 HOURS SCHEDULED
Qty: 20 CAPSULE | Refills: 0 | Status: SHIPPED | OUTPATIENT
Start: 2021-08-02 | End: 2021-08-07

## 2021-08-02 NOTE — PROGRESS NOTES
Virtual Regular Visit    Verification of patient location:    Patient is located in the following state in which I hold an active license [place]      Assessment/Plan:    Problem List Items Addressed This Visit        Endocrine    Diabetic neuropathy ([facility] [address] )       Lab Results   Component Value Date    HGBA1C 8 6 (H) 12/10/2020     · On gabapentin 800 mg total daily  -- continue   · Patient takes 300 mg tab twice daily along with 100 mg tab twice daily for a total daily dose of 800 mg          Relevant Medications    gabapentin (NEURONTIN) 100 mg capsule    gabapentin (NEURONTIN) 300 mg capsule       Other    Cellulitis of left lower extremity - Primary     · Erythema noted on medial aspect of L thigh  · Prior to this visit, has taken Keflex 500 mg every 6 hours for 2 days  · Will continue Keflex 500 mg every 6 hours for 5 more days to complete a total of 7 days on antibiotics  · I advised him that should he experience any fever, worsening swelling, pain, erythema to give us a call immediately or go to ED         Relevant Medications    cephalexin (KEFLEX) 500 mg capsule               Reason for visit is   Chief Complaint   Patient presents with    Virtual Regular Visit        Encounter provider Juan F Negron MD    Provider located at [address] [place] [id number] [phone]      Recent Visits  No visits were found meeting these conditions  Showing recent visits within past 7 days and meeting all other requirements  Today's Visits  Date Type Provider Dept   08/02/21 Telemedicine Juan F Negron MD [facility]   08/02/21 Telephone Justyn Bose [facility]   Showing today's visits and meeting all other requirements  Future Appointments  No visits were found meeting these conditions    Showing future appointments within next 150 days and meeting all other requirements       The patient was identified by name and date of birth  Ame Forman was informed that this is a telemedicine visit and that the visit is being conducted through [address] Now and patient was informed that this is a secure, HIPAA-compliant platform  He agrees to proceed     My office door was closed  No one else was in the room  He acknowledged consent and understanding of privacy and security of the video platform  The patient has agreed to participate and understands they can discontinue the visit at any time  Patient is aware this is a billable service  Subjective  Ame Forman is a 49 y o  male  HPI     I had the pleasure of seeing Mr Tigist Marshall for a virtual visit today  He reports that he noted erythema on his left lower extremity about 4 days prior to this visit  Because of persistence of erythema, he started taking Keflex 500 mg every 6 hours the day after onset of symptoms  He states he feels much improved and denies any febrile episodes (Tmax 99F), reports that appetite is good  No chest pain, shortness of breath, abdominal pain  Of note, patient has had multiple episodes of cellulitis in the past and always in the same area which is the medial aspect of his left thigh      Past Medical History:   Diagnosis Date    Anemia 9/13/2019    Cellulitis     last assessed 12/10/15    Diabetes mellitus ([facility] [address] )     Disease of thyroid gland     Edema     Elevated liver enzymes     Esophageal reflux     Gluten intolerance     Gout     last assessed 09/05/13    Hyperglycemia     Hypertension     IBS (irritable bowel syndrome)     Insomnia     Obesity     Osteoarthritis of knee     last assessed 02/10/14    Prehypertension     last assessed 08/22/17    Renal disorder     Venous insufficiency     last assessed 08/22/17    Villonodular synovitis of the hand, right     last assessed 11/14/2013       Past Surgical History:   Procedure Laterality Date    INCISION AND DRAINAGE OF WOUND Left 9/6/2019    Procedure: INCISION AND DRAINAGE (I&D) GROIN;  Surgeon: Juan Pablo Hurtado DO;  [facility];  Service: General    SKIN BIOPSY      WOUND DEBRIDEMENT Left 9/7/2019    Procedure: EXCISIONAL DEBRIDEMENT;  Surgeon: Juan Pablo Hurtado DO;  [facility];  Service: General       Current Outpatient Medications   Medication Sig Dispense Refill    albuterol (ProAir HFA) 90 mcg/act inhaler Inhale 2 puffs every 6 (six) hours as needed for wheezing 8 5 g 6    Blood Glucose Monitoring Suppl (ONETOUCH VERIO) w/Device KIT Use to test sugar daily 1 kit 0    cephalexin (KEFLEX) 500 mg capsule Take 1 capsule (500 mg total) by mouth every 6 (six) hours for 5 days 20 capsule 0    Continuous Blood Gluc  (FreeStyle [place] [employer] [place] Systm) BROOKS Use 1 Device as needed (use with sensors for bs checks) 1 Device 0    Continuous Blood Gluc Sensor (FreeStyle Elvin 14 Day Sensor) MISC USE ONE SENSOR EVERY 2 WEEKS 2 each 3    Control Gel Formula Dressing (DuoDERM CGF Dressing) MIS Apply 1 application topically every 5 (five) days 5 each 1    furosemide (LASIX) 20 mg tablet TAKE 2 TABLETS EVERY DAY IN THE EARLY MORNING AND 1 TABLET DAILY AFTER LUNCH  270 tablet 2    gabapentin (NEURONTIN) 100 mg capsule Take 1 capsule (100 mg total) by mouth 2 (two) times a day Take 1 tab with your 300mg tab twice daily 180 capsule 2    gabapentin (NEURONTIN) 300 mg capsule Take 1 capsule (300 mg total) by mouth 2 (two) times a day Take 1 tab with your 100mg tab twice daily 180 capsule 2    glucose blood (OneTouch Verio) test strip USE TO TEST SUGAR 2 TIMES A  each 3    insulin aspart (NovoLOG FlexPen) 100 UNIT/ML injection pen INJECT 18 UNITS WITH MEALS+SCALE ( - 150-200 -2 UNITS, 201-250-4 UNITS, 251-300 -6 UNITS, 301-350-8 UNITS, > 350- 10 UNITS ) 45 mL 1    insulin glargine (Lantus SoloStar) 100 units/mL injection pen INJECT 45 UNITS UNDER THE SKIN DAILY AT BEDTIME 15 mL 1    insulin glargine (Lantus SoloStar) 100 units/mL injection pen Inject 48 units under the skin every bedtime 43 mL 0    insulin glargine (Toujeo Max SoloStar) 300 units/mL CONCETRATED U-300 injection pen (2-unit dial) Inject 45 units at bedtime 9 pen 1    Insulin Pen Needle (BD Pen Needle Ludmila 2nd Gen) 32G X 4 MM MISC Inject into the skin 4 (four) times a day 360 each 1    ketoconazole (NIZORAL) 2 % cream Apply topically daily for 7 days Can extend up to 14 days if needed  60 g 1    Lancets (ONETOUCH ULTRASOFT) lancets Use to test sugar 2 times a day 100 each 3    levothyroxine 25 mcg tablet TAKE ONE TABLET DAILY ON EMPTY STOMACH 1 HOUR BEFORE BREAKFAST 90 tablet 1    losartan (COZAAR) 25 mg tablet TAKE ONE TABLET BY MOUTH EVERY DAY 90 tablet 2    metFORMIN (GLUCOPHAGE) 1000 MG tablet TAKE ONE TABLET BY MOUTH TWICE DAILY 180 tablet 3    nystatin (MYCOSTATIN) powder Apply 1 application topically 2 (two) times a day To affected area 120 g 3    omeprazole (PriLOSEC) 40 MG capsule TAKE ONE CAPSULE BY MOUTH TWICE DAILY 180 capsule 0    psyllium (METAMUCIL) packet Take 1 packet by mouth daily  0    saccharomyces boulardii (FLORASTOR) 250 mg capsule Take 1 capsule (250 mg total) by mouth 2 (two) times a day 60 capsule 1    sildenafil (VIAGRA) 25 MG tablet Take 1 tablet (25 mg total) by mouth daily as needed for erectile dysfunction 10 tablet 1     No current facility-administered medications for this visit  Allergies   Allergen Reactions    Gluten Meal - Food Allergy     Clindamycin Diarrhea       Review of Systems   Constitutional: Negative for appetite change and chills  Respiratory: Negative for shortness of breath  Skin: Positive for color change  Neurological: Negative for dizziness and numbness  Psychiatric/Behavioral: Negative for confusion  All other systems reviewed and are negative  Video Exam    There were no vitals filed for this visit  Physical Exam  Constitutional:       General: He is not in acute distress       Appearance: He is not ill-appearing  Musculoskeletal:      Comments: Erythematous RLE (seen via video)   Neurological:      Mental Status: He is alert  Physical exam very limited as this visit was done through [address] [place] [address] virtual visit  VIRTUAL VISIT DISCLAIMER      Vel Johnny verbally agrees to participate in [employer]  Pt is aware that [employer] could be limited without vital signs or the ability to perform a full hands-on physical exam  Ben Mathur understands he or the provider may request at any time to terminate the video visit and request the patient to seek care or treatment in person

## 2021-08-02 NOTE — ASSESSMENT & PLAN NOTE
· Erythema noted on medial aspect of L thigh  · Prior to this visit, has taken Keflex 500 mg every 6 hours for 2 days  · Will continue Keflex 500 mg every 6 hours for 5 more days to complete a total of 7 days on antibiotics  · I advised him that should he experience any fever, worsening swelling, pain, erythema to give us a call immediately or go to ED

## 2021-08-02 NOTE — ASSESSMENT & PLAN NOTE
Lab Results   Component Value Date    HGBA1C 8 6 (H) 12/10/2020     · On gabapentin 800 mg total daily  -- continue   · Patient takes 300 mg tab twice daily along with 100 mg tab twice daily for a total daily dose of 800 mg

## 2021-08-04 ENCOUNTER — TELEPHONE (OUTPATIENT)
Dept: INTERNAL MEDICINE CLINIC | Facility: CLINIC | Age: 56
End: 2021-08-04

## 2021-08-04 NOTE — TELEPHONE ENCOUNTER
Monse Hall has called the office in regards to his gabapentin  Kevin Lindseys stated he had an appointment with Dr Melvi Knapp refilled with gabapentin 100 mg and gabapentin 300 mg tablt  Kevin Neighbours states he takes both dosages daily, yet is running out of gabapentin 100 mg tablet  Whenever Bonnieger attempts to get a refill, he is denied by the pharmacy who state it is too early for a refill  Will call the pharmacy tomorrow to clarify confusion

## 2021-08-05 ENCOUNTER — TELEPHONE (OUTPATIENT)
Dept: INTERNAL MEDICINE CLINIC | Facility: CLINIC | Age: 56
End: 2021-08-05

## 2021-08-05 DIAGNOSIS — L03.116 CELLULITIS OF LEFT LOWER EXTREMITY: Primary | ICD-10-CM

## 2021-08-05 RX ORDER — CEPHALEXIN 500 MG/1
500 CAPSULE ORAL EVERY 6 HOURS SCHEDULED
Qty: 20 CAPSULE | Refills: 0 | Status: SHIPPED | OUTPATIENT
Start: 2021-08-07 | End: 2021-08-12

## 2021-08-05 NOTE — TELEPHONE ENCOUNTER
Xander Vicente has called the office in regards to his keflex 500 mg  Sami Rojas stated the antibiotic is finished tomorrow, and while there is no fever, he is experiencing pain  Sami Rojas would like to know if the keflex could be extended to 10 days

## 2021-08-05 NOTE — TELEPHONE ENCOUNTER
Alex Basilio has called the office in regards to his cellulitis  Kareen stated the cellulitis is at the last stages and is present in the ankle area and inner thigh  Elizabeth Wayne stated he is experiencing pain, that OTC does not alleviate  Kareen wished to know if there was anything he can do to help with the pain

## 2021-08-05 NOTE — TELEPHONE ENCOUNTER
I sent additional 5 day supply of Keflex  If patient will require additional supply beyond that he will have to come into the office for evaluation; not sufficient for virtual visit  Thank you

## 2021-08-05 NOTE — TELEPHONE ENCOUNTER
Informed Kareen of taking 3 OTC Tylenols and using a cool compress for the cellulitis  If the pain worsens to go to the ER for evaluation  Kareen understood the information and ended the call

## 2021-08-05 NOTE — TELEPHONE ENCOUNTER
Is the patient's pain getting worse? Can we clarify if the cellulitis is improving, I am not sure what cellulitis in its "last stages" means  If cellulitis is getting worse or pain is worsening he should go to he ED for further eval   If it is improving, then I would expect improvement in his pain  He can take up to 3 OTC tylenol (975mg every 8 hours)  I would also advise cool compress, that may help as well  Thanks you

## 2021-08-09 ENCOUNTER — TELEMEDICINE (OUTPATIENT)
Dept: INTERNAL MEDICINE CLINIC | Facility: CLINIC | Age: 56
End: 2021-08-09
Payer: COMMERCIAL

## 2021-08-09 DIAGNOSIS — L03.116 CELLULITIS OF LEFT LOWER EXTREMITY: Primary | ICD-10-CM

## 2021-08-09 PROCEDURE — 99213 OFFICE O/P EST LOW 20 MIN: CPT | Performed by: INTERNAL MEDICINE

## 2021-08-09 NOTE — ASSESSMENT & PLAN NOTE
· Erythema resolved; no febrile episodes  · Will complete day 7/7 of abx today (keflex 500 mg every 6 hours)  · Follow up as needed  · Again emphasized that examination is very limited due to the virtual nature of this visit; advised patient to give us a call immediately or go to ED should he experience any worsening symptoms (eg  sudden onset of fever, swelling of affected area, worsening erythema and pain)

## 2021-08-09 NOTE — PROGRESS NOTES
Virtual Regular Visit    Verification of patient location:    Patient is located in the following state in which I hold an active license [place]      Assessment/Plan:    Problem List Items Addressed This Visit        Other    Cellulitis of left lower extremity - Primary     · Erythema resolved; no febrile episodes  · Will complete day 7/7 of abx today (keflex 500 mg every 6 hours)  · Follow up as needed  · Again emphasized that examination is very limited due to the virtual nature of this visit; advised patient to give us a call immediately or go to ED should he experience any worsening symptoms (eg  sudden onset of fever, swelling of affected area, worsening erythema and pain)                    Reason for visit is   Chief Complaint   Patient presents with    Virtual Regular Visit        Encounter provider Liliane Lyons MD    Provider located at [address] [place] [id number] [phone]      Recent Visits  Date Type Provider Dept   08/05/21 Telephone [address] [facility]   08/04/21 Telephone [address] [facility]   08/02/21 Telemedicine Liliane Lyons MD  [facility]   08/02/21 Telephone Omi Becerra  [facility]   Showing recent visits within past 7 days and meeting all other requirements  Today's Visits  Date Type Provider Dept   08/09/21 Telemedicine Liliane Lyons MD [facility]   Showing today's visits and meeting all other requirements  Future Appointments  No visits were found meeting these conditions  Showing future appointments within next 150 days and meeting all other requirements       The patient was identified by name and date of birth  Manju Casanova was informed that this is a telemedicine visit and that the visit is being conducted through [address] [place] [address] Now and patient was informed that this is a secure, HIPAA-compliant platform   He agrees to proceed     My office door was closed  No one else was in the room  He acknowledged consent and understanding of privacy and security of the video platform  The patient has agreed to participate and understands they can discontinue the visit at any time  Patient is aware this is a billable service  Subjective  Nabeel Preston is a 49 y o  male   I had the pleasure of seeing Mr Benny Albright for a virtual visit today  This is a follow-up to his last visit last week  He was treated for left lower extremity cellulitis and was given Keflex 500 mg every 6 hours, he states that today he would complete his last dose to make a total of 7 days on abx  He states he has been feeling much improved overall, states that erythema on his left medial thigh has now completely resolved, no worsening of pain noted  Still has some minimal pain in the area but overall much improved  No chest pain, no shortness of breath, no abdominal pain, no loss of appetite, no febrile episodes or chills, no vomiting  He did note that he is having more trouble sleeping this time while recovering from the infection  Of note, patient has had multiple episodes of cellulitis in the past and always in the same area which is the medial aspect of his left thigh        HPI     Past Medical History:   Diagnosis Date    Anemia 9/13/2019    Cellulitis     last assessed 12/10/15    Diabetes mellitus ([facility] [address] )     Disease of thyroid gland     Edema     Elevated liver enzymes     Esophageal reflux     Gluten intolerance     Gout     last assessed 09/05/13    Hyperglycemia     Hypertension     IBS (irritable bowel syndrome)     Insomnia     Obesity     Osteoarthritis of knee     last assessed 02/10/14    Prehypertension     last assessed 08/22/17    Renal disorder     Venous insufficiency     last assessed 08/22/17    Villonodular synovitis of the hand, right     last assessed 11/14/2013       Past Surgical History:   Procedure Laterality Date    INCISION AND DRAINAGE OF WOUND Left 9/6/2019    Procedure: INCISION AND DRAINAGE (I&D) GROIN;  Surgeon: Boom Atwood DO;  [facility];  Service: General    SKIN BIOPSY      WOUND DEBRIDEMENT Left 9/7/2019    Procedure: EXCISIONAL DEBRIDEMENT;  Surgeon: Boom Atwood DO;  [facility];  Service: General       Current Outpatient Medications   Medication Sig Dispense Refill    albuterol (ProAir HFA) 90 mcg/act inhaler Inhale 2 puffs every 6 (six) hours as needed for wheezing 8 5 g 6    Blood Glucose Monitoring Suppl (ONETOUCH VERIO) w/Device KIT Use to test sugar daily 1 kit 0    cephalexin (KEFLEX) 500 mg capsule Take 1 capsule (500 mg total) by mouth every 6 (six) hours for 5 days 20 capsule 0    Continuous Blood Gluc  (FreeStyle [place]ille [place] 2 [place] Systm) BROOKS Use 1 Device as needed (use with sensors for bs checks) 1 Device 0    Continuous Blood Gluc Sensor ([place] [employer] AssistStyle Elvin 14 Day Sensor) MISC USE ONE SENSOR EVERY 2 WEEKS 2 each 3    Control Gel Formula Dressing (DuoDERM CGF Dressing) [facility] Apply 1 application topically every 5 (five) days 5 each 1    furosemide (LASIX) 20 mg tablet TAKE 2 TABLETS EVERY DAY IN THE EARLY MORNING AND 1 TABLET DAILY AFTER LUNCH  270 tablet 2    gabapentin (NEURONTIN) 100 mg capsule Take 1 capsule (100 mg total) by mouth 2 (two) times a day Take 1 tab with your 300mg tab twice daily 180 capsule 2    gabapentin (NEURONTIN) 300 mg capsule Take 1 capsule (300 mg total) by mouth 2 (two) times a day Take 1 tab with your 100mg tab twice daily 180 capsule 2    glucose blood (OneTouch Verio) test strip USE TO TEST SUGAR 2 TIMES A  each 3    insulin aspart (NovoLOG FlexPen) 100 UNIT/ML injection pen INJECT 18 UNITS WITH MEALS+SCALE ( - 150-200 -2 UNITS, 201-250-4 UNITS, 251-300 -6 UNITS, 301-350-8 UNITS, > 350- 10 UNITS ) 45 mL 1    insulin glargine (Lantus SoloStar) 100 units/mL injection pen INJECT 45 UNITS UNDER THE SKIN DAILY AT BEDTIME 15 mL 1    insulin glargine (Lantus SoloStar) 100 units/mL injection pen Inject 48 units under the skin every bedtime 43 mL 0    insulin glargine (Toujeo Max SoloStar) 300 units/mL CONCETRATED U-300 injection pen (2-unit dial) Inject 45 units at bedtime 9 pen 1    Insulin Pen Needle (BD Pen Needle Ludmila 2nd Gen) 32G X 4 MM MISC Inject into the skin 4 (four) times a day 360 each 1    ketoconazole (NIZORAL) 2 % cream Apply topically daily for 7 days Can extend up to 14 days if needed  60 g 1    Lancets (ONETOUCH ULTRASOFT) lancets Use to test sugar 2 times a day 100 each 3    levothyroxine 25 mcg tablet TAKE ONE TABLET DAILY ON EMPTY STOMACH 1 HOUR BEFORE BREAKFAST 90 tablet 1    losartan (COZAAR) 25 mg tablet TAKE ONE TABLET BY MOUTH EVERY DAY 90 tablet 2    metFORMIN (GLUCOPHAGE) 1000 MG tablet TAKE ONE TABLET BY MOUTH TWICE DAILY 180 tablet 3    nystatin (MYCOSTATIN) powder Apply 1 application topically 2 (two) times a day To affected area 120 g 3    omeprazole (PriLOSEC) 40 MG capsule TAKE ONE CAPSULE BY MOUTH TWICE DAILY 180 capsule 0    psyllium (METAMUCIL) packet Take 1 packet by mouth daily  0    saccharomyces boulardii (FLORASTOR) 250 mg capsule Take 1 capsule (250 mg total) by mouth 2 (two) times a day 60 capsule 1    sildenafil (VIAGRA) 25 MG tablet Take 1 tablet (25 mg total) by mouth daily as needed for erectile dysfunction 10 tablet 1     No current facility-administered medications for this visit  Allergies   Allergen Reactions    Gluten Meal - Food Allergy     Clindamycin Diarrhea       Review of Systems   Constitutional: Negative for appetite change, chills, fever and unexpected weight change  Respiratory: Negative for cough and shortness of breath  Cardiovascular: Negative for chest pain  Gastrointestinal: Negative for abdominal pain, nausea and vomiting  Genitourinary: Negative for dysuria  Skin: Positive for color change  Negative for wound  Psychiatric/Behavioral: Negative for agitation and confusion  All other systems reviewed and are negative  Video Exam    There were no vitals filed for this visit  Physical Exam     Physical exam very limited as this visit was done virtually    Left medial thigh erythema appreciated on last visit has now resolved  VIRTUAL VISIT DISCLAIMER      Joyce Abrams verbally agrees to participate in [employer]  Pt is aware that [employer] could be limited without vital signs or the ability to perform a full hands-on physical exam  Ben Mathur understands he or the provider may request at any time to terminate the video visit and request the patient to seek care or treatment in person

## 2021-08-14 DIAGNOSIS — E11.65 TYPE 2 DIABETES MELLITUS WITH HYPERGLYCEMIA, WITH LONG-TERM CURRENT USE OF INSULIN (HCC): ICD-10-CM

## 2021-08-14 DIAGNOSIS — Z79.4 TYPE 2 DIABETES MELLITUS WITH HYPERGLYCEMIA, WITH LONG-TERM CURRENT USE OF INSULIN (HCC): ICD-10-CM

## 2021-08-16 ENCOUNTER — TELEMEDICINE (OUTPATIENT)
Dept: INTERNAL MEDICINE CLINIC | Facility: CLINIC | Age: 56
End: 2021-08-16
Payer: COMMERCIAL

## 2021-08-16 ENCOUNTER — TELEPHONE (OUTPATIENT)
Dept: INTERNAL MEDICINE CLINIC | Facility: CLINIC | Age: 56
End: 2021-08-16

## 2021-08-16 DIAGNOSIS — B49 FUNGAL INFECTION: Primary | ICD-10-CM

## 2021-08-16 DIAGNOSIS — L03.116 CELLULITIS OF LEFT LOWER EXTREMITY: ICD-10-CM

## 2021-08-16 PROCEDURE — 99213 OFFICE O/P EST LOW 20 MIN: CPT | Performed by: INTERNAL MEDICINE

## 2021-08-16 RX ORDER — NYSTATIN 100000 [USP'U]/G
1 POWDER TOPICAL 2 TIMES DAILY
Qty: 120 G | Refills: 3 | Status: SHIPPED | OUTPATIENT
Start: 2021-08-16 | End: 2022-04-20 | Stop reason: SDUPTHER

## 2021-08-16 RX ORDER — BLOOD SUGAR DIAGNOSTIC
STRIP MISCELLANEOUS
Qty: 100 STRIP | Refills: 3 | Status: SHIPPED | OUTPATIENT
Start: 2021-08-16 | End: 2022-03-07

## 2021-08-16 RX ORDER — FLUCONAZOLE 150 MG/1
150 TABLET ORAL DAILY
Qty: 7 TABLET | Refills: 0 | Status: SHIPPED | OUTPATIENT
Start: 2021-08-16 | End: 2021-08-23

## 2021-08-16 RX ORDER — LIDOCAINE HYDROCHLORIDE 20 MG/ML
JELLY TOPICAL AS NEEDED
Qty: 30 ML | Refills: 0 | Status: SHIPPED | OUTPATIENT
Start: 2021-08-16 | End: 2021-11-01 | Stop reason: HOSPADM

## 2021-08-16 NOTE — PROGRESS NOTES
Virtual Brief Visit    Verification of patient location:    Patient is located in the following state in which I hold an active license [place]      Assessment/Plan:    Problem List Items Addressed This Visit        Other    Cellulitis of left lower extremity    Relevant Medications    nystatin (MYCOSTATIN) powder    Fungal infection of the left thigh - Primary     · Patient was recently treated for a left lower extremity cellulitis, completed a 7/7 day course of Keflex with symptomatic improvement  · Per patient, following completion, he has now developed a scaly rash, which is painful without erythema  Non pruritic  · Denies fever, chills  · Has had prior fungal infections with similar symptoms  · He had tried nystatin powder with minimal improvement    Plan  1  Will give a 7 day course of fluconazole  2  Continue nystatin powder  3  Topical lidocaine gel for pain  4  Advised to call our office or visit the ED for a better physical examination if symptoms worsen, or if he develops new symptoms  5   Will re check in 5 days         Relevant Medications    fluconazole (DIFLUCAN) 150 mg tablet    nystatin (MYCOSTATIN) powder    lidocaine (XYLOCAINE) 2 % topical gel              Reason for visit is   Chief Complaint   Patient presents with    Virtual Brief Visit        Encounter provider Hussain Milner MD    Provider located at [address] [place], [place] [phone]    Recent Visits  Date Type Provider Dept   08/09/21 Telemedicine Lisa Montes De Oca MD [facility] recent visits within past 7 days and meeting all other requirements  Today's Visits  Date Type Provider Dept   08/16/21 Telephone Srinivasan [facility]   08/16/21 Telemedicine Hussain Milner MD [facility] [place]   Showing today's visits and meeting all other requirements  Future Appointments  No visits were found meeting these conditions  Showing future appointments within next 150 days and meeting all other requirements       After connecting through telephone, the patient was identified by name and date of birth  Zoraida Garner was informed that this is a telemedicine visit and that the visit is being conducted through telephone  My office door was closed  The patient was notified the following individuals were present in the room Dr Sonia García  He acknowledged consent and understanding of privacy and security of the platform  The patient has agreed to participate and understands he can discontinue the visit at any time  Patient is aware this is a billable service  Subjective    Zoraida Garner is a 49 y o  male  With a BMI of 86 34, diabetes mellitus with neuropathy, and recent left thigh cellulitis  He states that he completed a 7 day course of p o  antibiotics (Keflex), and had a follow-up visit on 08/09 with improvement in his symptoms  However, following this, he developed a new scaly rash on his left thigh over the same site of cellulitis  Denies erythema, swelling, pruritus  He is noticing pain, especially when applying nystatin  He has had prior fungal  Infections, and feels like this is similar to it  According to his wife, his thigh appears better than he had his cellulitis  However patient has ongoing pain  He denies systemic symptoms such as fevers, chills          Past Medical History:   Diagnosis Date    Anemia 9/13/2019    Cellulitis     last assessed 12/10/15    Diabetes mellitus ([address] )     Disease of thyroid gland     Edema     Elevated liver enzymes     Esophageal reflux     Gluten intolerance     Gout     last assessed 09/05/13    Hyperglycemia     Hypertension     IBS (irritable bowel syndrome)     Insomnia     Obesity     Osteoarthritis of knee     last assessed 02/10/14    Prehypertension     last assessed 08/22/17    Renal disorder     Venous insufficiency     last assessed 08/22/17    Villonodular synovitis of the hand, right     last assessed 11/14/2013       Past Surgical History:   Procedure Laterality Date    INCISION AND DRAINAGE OF WOUND Left 9/6/2019    Procedure: INCISION AND DRAINAGE (I&D) GROIN;  Surgeon: Marieta Cushing, DO;  [facility];  Service: General    SKIN BIOPSY      WOUND DEBRIDEMENT Left 9/7/2019    Procedure: EXCISIONAL DEBRIDEMENT;  Surgeon: Marieta Cushing, DO;  [facility];  Service: General       Current Outpatient Medications   Medication Sig Dispense Refill    albuterol (ProAir HFA) 90 mcg/act inhaler Inhale 2 puffs every 6 (six) hours as needed for wheezing 8 5 g 6    Blood Glucose Monitoring Suppl (ONETOUCH VERIO) w/Device KIT Use to test sugar daily 1 kit 0    Continuous Blood Gluc  (FreeStyle Con[place] 2 [place] Systm) BROOKS Use 1 Device as needed (use with sensors for bs checks) 1 Device 0    Continuous Blood Gluc Sensor (FreeStyle Elvin 14 Day Sensor) MISC USE ONE SENSOR EVERY 2 WEEKS 2 each 3    Control Gel Formula Dressing (DuoDERM CGF Dressing) [facility] Apply 1 application topically every 5 (five) days 5 each 1    fluconazole (DIFLUCAN) 150 mg tablet Take 1 tablet (150 mg total) by mouth daily for 7 days 7 tablet 0    furosemide (LASIX) 20 mg tablet TAKE 2 TABLETS EVERY DAY IN THE EARLY MORNING AND 1 TABLET DAILY AFTER LUNCH  270 tablet 2    gabapentin (NEURONTIN) 100 mg capsule Take 1 capsule (100 mg total) by mouth 2 (two) times a day Take 1 tab with your 300mg tab twice daily 180 capsule 2    gabapentin (NEURONTIN) 300 mg capsule Take 1 capsule (300 mg total) by mouth 2 (two) times a day Take 1 tab with your 100mg tab twice daily 180 capsule 2    insulin aspart (NovoLOG FlexPen) 100 UNIT/ML injection pen INJECT 18 UNITS WITH MEALS+SCALE ( - 150-200 -2 UNITS, 201-250-4 UNITS, 251-300 -6 UNITS, 301-350-8 UNITS, > 350- 10 UNITS ) 45 mL 1    insulin glargine (Lantus SoloStar) 100 units/mL injection pen INJECT 45 UNITS UNDER THE SKIN DAILY AT BEDTIME 15 mL 1    insulin glargine (Lantus SoloStar) 100 units/mL injection pen Inject 48 units under the skin every bedtime 43 mL 0    insulin glargine (Toujeo Max SoloStar) 300 units/mL CONCETRATED U-300 injection pen (2-unit dial) Inject 45 units at bedtime 9 pen 1    Insulin Pen Needle (BD Pen Needle Ludmila 2nd Gen) 32G X 4 MM MISC Inject into the skin 4 (four) times a day 360 each 1    ketoconazole (NIZORAL) 2 % cream Apply topically daily for 7 days Can extend up to 14 days if needed  60 g 1    Lancets (ONETOUCH ULTRASOFT) lancets Use to test sugar 2 times a day 100 each 3    levothyroxine 25 mcg tablet TAKE ONE TABLET DAILY ON EMPTY STOMACH 1 HOUR BEFORE BREAKFAST 90 tablet 1    lidocaine (XYLOCAINE) 2 % topical gel Apply topically as needed for mild pain 30 mL 0    losartan (COZAAR) 25 mg tablet TAKE ONE TABLET BY MOUTH EVERY DAY 90 tablet 2    metFORMIN (GLUCOPHAGE) 1000 MG tablet TAKE ONE TABLET BY MOUTH TWICE DAILY 180 tablet 3    nystatin (MYCOSTATIN) powder Apply 1 application topically 2 (two) times a day To affected area 120 g 3    omeprazole (PriLOSEC) 40 MG capsule TAKE ONE CAPSULE BY MOUTH TWICE DAILY 180 capsule 0    OneTouch Verio test strip USE TO TEST SUGAR 2 TIMES A  strip 3    psyllium (METAMUCIL) packet Take 1 packet by mouth daily  0    saccharomyces boulardii (FLORASTOR) 250 mg capsule Take 1 capsule (250 mg total) by mouth 2 (two) times a day 60 capsule 1    sildenafil (VIAGRA) 25 MG tablet Take 1 tablet (25 mg total) by mouth daily as needed for erectile dysfunction 10 tablet 1     No current facility-administered medications for this visit  Allergies   Allergen Reactions    Gluten Meal - Food Allergy     Clindamycin Diarrhea       Review of Systems   Constitutional: Negative  Negative for chills, diaphoresis, fatigue and fever  HENT: Negative  Cardiovascular: Negative    Negative for chest pain, palpitations and leg swelling  Gastrointestinal: Negative  Negative for diarrhea, nausea and vomiting  Genitourinary: Positive for scrotal swelling  Musculoskeletal: Negative  Skin: Positive for rash  Negative for pallor and wound  Neurological: Negative  Negative for dizziness, tremors, seizures and headaches  Hematological: Negative  Psychiatric/Behavioral: Negative  There were no vitals filed for this visit  I spent 10 minutes directly with the patient during this visit    [address] verbally agrees to participate in [employer]  Pt is aware that [employer] could be limited without vital signs or the ability to perform a full hands-on physical exam  Ben Mathur understands he or the provider may request at any time to terminate the video visit and request the patient to seek care or treatment in person

## 2021-08-16 NOTE — ASSESSMENT & PLAN NOTE
· Patient was recently treated for a left lower extremity cellulitis, completed a 7/7 day course of Keflex with symptomatic improvement  · Per patient, following completion, he has now developed a scaly rash, which is painful without erythema  Non pruritic  · Denies fever, chills  · Has had prior fungal infections with similar symptoms  · He had tried nystatin powder with minimal improvement    Plan  1  Will give a 7 day course of fluconazole  2  Continue nystatin powder  3  Topical lidocaine gel for pain  4  Advised to call our office or visit the ED for a better physical examination if symptoms worsen, or if he develops new symptoms  5   Will re check in 5 days

## 2021-08-16 NOTE — TELEPHONE ENCOUNTER
Attempted to call Tesha Pham to schedule a virtual f/u appointment to his appointment today at 3:45 PM with Dr Susanne Chu to call back

## 2021-08-17 ENCOUNTER — TELEPHONE (OUTPATIENT)
Dept: INTERNAL MEDICINE CLINIC | Facility: CLINIC | Age: 56
End: 2021-08-17

## 2021-08-17 NOTE — TELEPHONE ENCOUNTER
Patient called and said there was a problem with his lidocaine gel, I called the pharmacy and they said there is no 2% lidocaine due to a back order  He can get the 4% OTC   Called patient to let him know and he didn't answer so I left a message for him to call back

## 2021-08-20 ENCOUNTER — TELEMEDICINE (OUTPATIENT)
Dept: INTERNAL MEDICINE CLINIC | Facility: CLINIC | Age: 56
End: 2021-08-20
Payer: COMMERCIAL

## 2021-08-20 DIAGNOSIS — M79.605 PAIN IN LEFT LEG: ICD-10-CM

## 2021-08-20 DIAGNOSIS — B49 FUNGAL INFECTION: Primary | ICD-10-CM

## 2021-08-20 PROCEDURE — 99212 OFFICE O/P EST SF 10 MIN: CPT | Performed by: HOSPITALIST

## 2021-08-20 RX ORDER — CAPSAICIN 0.07 G/100G
CREAM TOPICAL 3 TIMES DAILY
Qty: 28.3 G | Refills: 0 | Status: SHIPPED | OUTPATIENT
Start: 2021-08-20 | End: 2021-11-01 | Stop reason: HOSPADM

## 2021-08-20 NOTE — ASSESSMENT & PLAN NOTE
· Completed treatment for left lower extremity cellulitis with improvement of erythema  · Started on antifungal treatment for scaly rash at the site  · Denies fever, chills  · Has had prior fungal infections with similar symptoms  · He had tried nystatin powder with minimal improvement    Plan  1  Continue fluconazole to complete a 7 day course  2  Continue nystatin powder  3  Capsaicin cream for pain relief  4  Alternate tylenol with ibuprofen for pain control    5  Advised to call our office or visit the ED for a better physical examination if symptoms worsen, or if he develops new symptoms

## 2021-08-20 NOTE — PROGRESS NOTES
Virtual Regular Visit    Verification of patient location:    Patient is located in the following state in which I hold an active license [place]      Assessment/Plan:    Problem List Items Addressed This Visit        Other    Fungal infection of the left thigh - Primary     · Completed treatment for left lower extremity cellulitis with improvement of erythema  · Started on antifungal treatment for scaly rash at the site  · Denies fever, chills  · Has had prior fungal infections with similar symptoms  · He had tried nystatin powder with minimal improvement    Plan  1  Continue fluconazole to complete a 7 day course  2  Continue nystatin powder  3  Capsaicin cream for pain relief  4  Alternate tylenol with ibuprofen for pain control  5  Advised to call our office or visit the ED for a better physical examination if symptoms worsen, or if he develops new symptoms           Other Visit Diagnoses     Pain in left leg        Relevant Medications    capsicum (ZOSTRIX) 0 075 % topical cream               Reason for visit is   Chief Complaint   Patient presents with    Virtual Regular Visit        Encounter provider Gerber Duenas MD    Provider located at [address] [place] [id number] [phone]      Recent Visits  No visits were found meeting these conditions  Showing recent visits within past 7 days and meeting all other requirements  Today's Visits  Date Type Provider Dept   08/20/21 Telemedicine Ministerio Tapia MD [facility] [place]   Showing today's visits and meeting all other requirements  Future Appointments  No visits were found meeting these conditions  Showing future appointments within next 150 days and meeting all other requirements       The patient was identified by name and date of birth   Judy Barney was informed that this is a telemedicine visit and that the visit is being conducted through [address] [place] [address] [facility] [employer] and patient was informed that this is a secure, HIPAA-compliant platform  He agrees to proceed     My office door was closed  No one else was in the room  He acknowledged consent and understanding of privacy and security of the video platform  The patient has agreed to participate and understands they can discontinue the visit at any time  Patient is aware this is a billable service  Subjective  Trent Pringle is a 49 y o  male  with a history of obstructive sleep apnea, hypertension, type 2 diabetes and obesity was last seen in the clinic 08/16/2021 for left thigh cellulitis  At the time, he had recently completed antibiotic course for thigh cellulitis with improvement of the erythematous rash  At that last visit, he was started on fluconazole complete a 7 day course with the addition of nystatin powder for suspected fungal infection at the same site  He presents today clinic today on a tele visit for follow-up on this rash  He reports that the rash is improved  Denies any erythema, swelling or warmth  However, he does endorse severe pain at the site that limits activity  He takes tylenol and ibuprofen for pain without significant relief  He denies any blisters or open sores  Non pruritic in nature  Does report being compliant to fluconazole is currently on day 4/7  Wife was also on the call does endorse improvement of the rash  She states that they were she is normally scaly and that the topical nystatin powder has worked really well for him  He was prescribed topical lidocaine for pain but was unable to be get up at the pharmacy  He otherwise denies any systemic symptoms  Denies any fever, chills, sweats      HPI     Past Medical History:   Diagnosis Date    Anemia 9/13/2019    Cellulitis     last assessed 12/10/15    Diabetes mellitus ([facility] )     Disease of thyroid gland     Edema     Elevated liver enzymes     Esophageal reflux     Gluten intolerance     Gout     last assessed 09/05/13    Hyperglycemia     Hypertension     IBS (irritable bowel syndrome)     Insomnia     Obesity     Osteoarthritis of knee     last assessed 02/10/14    Prehypertension     last assessed 08/22/17    Renal disorder     Venous insufficiency     last assessed 08/22/17    Villonodular synovitis of the hand, right     last assessed 11/14/2013       Past Surgical History:   Procedure Laterality Date    INCISION AND DRAINAGE OF WOUND Left 9/6/2019    Procedure: INCISION AND DRAINAGE (I&D) GROIN;  Surgeon: Piper Gorman DO;  [facility];  Service: General    SKIN BIOPSY      WOUND DEBRIDEMENT Left 9/7/2019    Procedure: EXCISIONAL DEBRIDEMENT;  Surgeon: Piper Gorman DO;  [facility];  Service: General       Current Outpatient Medications   Medication Sig Dispense Refill    albuterol (ProAir HFA) 90 mcg/act inhaler Inhale 2 puffs every 6 (six) hours as needed for wheezing 8 5 g 6    Blood Glucose Monitoring Suppl (ONETOUCH VERIO) w/Device KIT Use to test sugar daily 1 kit 0    capsicum (ZOSTRIX) 0 075 % topical cream Apply topically 3 (three) times a day 28 3 g 0    Continuous Blood Gluc  (Preceptis MedicalStyle Elvin 2 [place] Systm) BROOKS Use 1 Device as needed (use with sensors for bs checks) 1 Device 0    Continuous Blood Gluc Sensor (FreeStyle Elvin 14 Day Sensor) MISC USE ONE SENSOR EVERY 2 WEEKS 2 each 3    Control Gel Formula Dressing (DuoDERM CGF Dressing) [facility] Apply 1 application topically every 5 (five) days 5 each 1    fluconazole (DIFLUCAN) 150 mg tablet Take 1 tablet (150 mg total) by mouth daily for 7 days 7 tablet 0    furosemide (LASIX) 20 mg tablet TAKE 2 TABLETS EVERY DAY IN THE EARLY MORNING AND 1 TABLET DAILY AFTER LUNCH  270 tablet 2    gabapentin (NEURONTIN) 100 mg capsule Take 1 capsule (100 mg total) by mouth 2 (two) times a day Take 1 tab with your 300mg tab twice daily 180 capsule 2    gabapentin (NEURONTIN) 300 mg capsule Take 1 capsule (300 mg total) by mouth 2 (two) times a day Take 1 tab with your 100mg tab twice daily 180 capsule 2    insulin aspart (NovoLOG FlexPen) 100 UNIT/ML injection pen INJECT 18 UNITS WITH MEALS+SCALE ( - 150-200 -2 UNITS, 201-250-4 UNITS, 251-300 -6 UNITS, 301-350-8 UNITS, > 350- 10 UNITS ) 45 mL 1    insulin glargine (Lantus SoloStar) 100 units/mL injection pen INJECT 45 UNITS UNDER THE SKIN DAILY AT BEDTIME 15 mL 1    insulin glargine (Lantus SoloStar) 100 units/mL injection pen Inject 48 units under the skin every bedtime 43 mL 0    insulin glargine (Toujeo Max SoloStar) 300 units/mL CONCETRATED U-300 injection pen (2-unit dial) Inject 45 units at bedtime 9 pen 1    Insulin Pen Needle (BD Pen Needle Ludmila 2nd Gen) 32G X 4 MM MISC Inject into the skin 4 (four) times a day 360 each 1    ketoconazole (NIZORAL) 2 % cream Apply topically daily for 7 days Can extend up to 14 days if needed   60 g 1    Lancets (ONETOUCH ULTRASOFT) lancets Use to test sugar 2 times a day 100 each 3    levothyroxine 25 mcg tablet TAKE ONE TABLET DAILY ON EMPTY STOMACH 1 HOUR BEFORE BREAKFAST 90 tablet 1    lidocaine (XYLOCAINE) 2 % topical gel Apply topically as needed for mild pain 30 mL 0    losartan (COZAAR) 25 mg tablet TAKE ONE TABLET BY MOUTH EVERY DAY 90 tablet 2    metFORMIN (GLUCOPHAGE) 1000 MG tablet TAKE ONE TABLET BY MOUTH TWICE DAILY 180 tablet 3    nystatin (MYCOSTATIN) powder Apply 1 application topically 2 (two) times a day To affected area 120 g 3    omeprazole (PriLOSEC) 40 MG capsule TAKE ONE CAPSULE BY MOUTH TWICE DAILY 180 capsule 0    OneTouch Verio test strip USE TO TEST SUGAR 2 TIMES A  strip 3    psyllium (METAMUCIL) packet Take 1 packet by mouth daily  0    saccharomyces boulardii (FLORASTOR) 250 mg capsule Take 1 capsule (250 mg total) by mouth 2 (two) times a day 60 capsule 1    sildenafil (VIAGRA) 25 MG tablet Take 1 tablet (25 mg total) by mouth daily as needed for erectile dysfunction 10 tablet 1     No current facility-administered medications for this visit  Allergies   Allergen Reactions    Gluten Meal - Food Allergy     Clindamycin Diarrhea       Review of Systems   Constitutional: Negative for appetite change, chills, diaphoresis, fatigue and fever  Respiratory: Negative for cough, shortness of breath and wheezing  Cardiovascular: Negative for chest pain, palpitations and leg swelling  Gastrointestinal: Negative for abdominal distention, abdominal pain, constipation, diarrhea, nausea and vomiting  Genitourinary: Negative for difficulty urinating and dysuria  Musculoskeletal: Negative for arthralgias  Skin: Negative for color change, rash and wound  Neurological: Negative for dizziness, weakness, light-headedness, numbness and headaches  Video Exam    There were no vitals filed for this visit  Physical Exam  Constitutional:       General: He is not in acute distress  Appearance: Normal appearance  He is obese  He is not ill-appearing or diaphoretic  HENT:      Head: Normocephalic  Eyes:      Conjunctiva/sclera: Conjunctivae normal    Pulmonary:      Effort: Pulmonary effort is normal    Skin:     Findings: No bruising, erythema or lesion  Comments: No erythema  No open sores or blisters  No discharge   Neurological:      Mental Status: He is alert  Psychiatric:         Mood and Affect: Mood normal          Behavior: Behavior normal           I spent 15 minutes directly with the patient during this visit    [address] verbally agrees to participate in [employer]  Pt is aware that [employer] could be limited without vital signs or the ability to perform a full hands-on physical exam  Ben Mathur understands he or the provider may request at any time to terminate the video visit and request the patient to seek care or treatment in person

## 2021-09-07 ENCOUNTER — TELEPHONE (OUTPATIENT)
Dept: INTERNAL MEDICINE CLINIC | Facility: CLINIC | Age: 56
End: 2021-09-07

## 2021-09-07 DIAGNOSIS — K21.9 GASTROESOPHAGEAL REFLUX DISEASE: ICD-10-CM

## 2021-09-07 RX ORDER — OMEPRAZOLE 40 MG/1
40 CAPSULE, DELAYED RELEASE ORAL 2 TIMES DAILY
Qty: 180 CAPSULE | Refills: 1 | Status: SHIPPED | OUTPATIENT
Start: 2021-09-07 | End: 2022-05-23

## 2021-09-07 RX ORDER — OMEPRAZOLE 40 MG/1
40 CAPSULE, DELAYED RELEASE ORAL 2 TIMES DAILY
Qty: 180 CAPSULE | Refills: 1 | Status: SHIPPED | OUTPATIENT
Start: 2021-09-07 | End: 2021-09-07 | Stop reason: SDUPTHER

## 2021-09-13 ENCOUNTER — TELEPHONE (OUTPATIENT)
Dept: INTERNAL MEDICINE CLINIC | Facility: CLINIC | Age: 56
End: 2021-09-13

## 2021-09-13 DIAGNOSIS — L03.90 CELLULITIS, UNSPECIFIED CELLULITIS SITE: Primary | ICD-10-CM

## 2021-09-13 RX ORDER — CEPHALEXIN 500 MG/1
500 CAPSULE ORAL EVERY 6 HOURS SCHEDULED
Qty: 40 CAPSULE | Refills: 0 | Status: SHIPPED | OUTPATIENT
Start: 2021-09-13 | End: 2021-09-23

## 2021-09-16 ENCOUNTER — TELEMEDICINE (OUTPATIENT)
Dept: INTERNAL MEDICINE CLINIC | Facility: CLINIC | Age: 56
End: 2021-09-16
Payer: COMMERCIAL

## 2021-09-16 DIAGNOSIS — L03.116 CELLULITIS OF LEFT LOWER EXTREMITY: Primary | ICD-10-CM

## 2021-09-16 PROBLEM — L03.90 CELLULITIS: Status: ACTIVE | Noted: 2021-09-16

## 2021-09-16 PROCEDURE — 99213 OFFICE O/P EST LOW 20 MIN: CPT | Performed by: HOSPITALIST

## 2021-09-16 RX ORDER — FLUCONAZOLE 150 MG/1
150 TABLET ORAL DAILY
Qty: 7 TABLET | Refills: 0 | Status: SHIPPED | OUTPATIENT
Start: 2021-09-16 | End: 2021-09-23

## 2021-09-16 NOTE — PROGRESS NOTES
Virtual Brief Visit    Verification of patient location:    Patient is located in the following state in which I hold an active license [place]  It was my intent to perform this visit via video technology but the patient was not able to do a video connection so the visit was completed via audio telephone only  Assessment/Plan:    Problem List Items Addressed This Visit        Other    Cellulitis of left lower extremity - Primary     · Recurrent cellulitis, however now in the left ankle  · Currently on Lasix of oral Keflex   · Swelling, erythema appears to be improving per patient, however pain has worsened  · No fevers, chills  · Denies any open lesions over the skin   · Appears to be a recurrent issue given patient's body habitus, inability to ambulate  · Patient did informed that he  Has now developed bedsores  Offered VNA services as this helped him with his bedsores before,   Declined  · would prefer a bili like to see the patient for a proper physical examination especially given the recurrent nature of this issue however given difficulty in ambulating, patient is unable to come to our office at the moment  Plan   1  Continue p o  Keflex to complete total 10 days   2  Fluconazole 150 mg daily for 7 days for fungal superinfection   3  Voltaren gel for pain management   4  Over-the-counter Tylenol as needed for pain   5  Follow-up on Monday 9/20 for a video visit  6   Advised to call our office if symptoms worsen         Relevant Medications    fluconazole (DIFLUCAN) 150 mg tablet    Diclofenac Sodium (VOLTAREN) 1 %              Reason for visit is   Chief Complaint   Patient presents with    Virtual Brief Visit        Encounter provider Yareli Stoll MD    Provider located at [address] [place] [address] [address] [place] [address]  [phone]    Recent Visits  Date Type Provider Dept   09/13/21 Telephone [address] [facility]   Showing recent visits within past 7 days and meeting all other requirements  Today's Visits  Date Type Provider Dept   09/16/21 Telemedicine Hussain Milner MD [facility] [place]   Showing today's visits and meeting all other requirements  Future Appointments  No visits were found meeting these conditions  Showing future appointments within next 150 days and meeting all other requirements       After connecting through [employer] and patient was informed that this is a secure, HIPAA-compliant platform  He agrees to proceed  , the patient was identified by name and date of birth  Rashawn Galvan was informed that this is a telemedicine visit and that the visit is being conducted through [employer] and patient was informed that this is a secure, HIPAA-compliant platform  He agrees to proceed  My office door was closed  No one else was in the room  He acknowledged consent and understanding of privacy and security of the platform  The patient has agreed to participate and understands he can discontinue the visit at any time  Patient is aware this is a billable service  Subjective    Rashawn Galvan is a 49 y o  male  With type 2 diabetes with diabetic neuropathy, morbid obesity with a BMI of 86 34, obstructive sleep apnea, hypertension, hyperlipidemia, gout, and recurrent cellulitis  He was recently treated for a left thigh cellulitis followed by a fungal infection which responded to treatment  Today, he states that he developed left ankle cellulitis with erythema, pain, swelling  No trauma or wound noted  Stated that he had mild low-grade fever when it 1st started, however with initiation of p o  Keflex he has been fever free  And feels like his swelling has improved  He does however experiencing worsening ankle pain currently        Past Medical History:   Diagnosis Date    Anemia 9/13/2019    Cellulitis     last assessed 12/10/15    Diabetes mellitus ([facility] [address] )     Disease of thyroid gland     Edema     Elevated liver enzymes     Esophageal reflux     Gluten intolerance     Gout     last assessed 09/05/13    Hyperglycemia     Hypertension     IBS (irritable bowel syndrome)     Insomnia     Obesity     Osteoarthritis of knee     last assessed 02/10/14    Prehypertension     last assessed 08/22/17    Renal disorder     Venous insufficiency     last assessed 08/22/17    Villonodular synovitis of the hand, right     last assessed 11/14/2013       Past Surgical History:   Procedure Laterality Date    INCISION AND DRAINAGE OF WOUND Left 9/6/2019    Procedure: INCISION AND DRAINAGE (I&D) GROIN;  Surgeon: Frances Rogers DO;  [facility];  Service: General    SKIN BIOPSY      WOUND DEBRIDEMENT Left 9/7/2019    Procedure: EXCISIONAL DEBRIDEMENT;  Surgeon: Frances Rogers DO;  [facility];  Service: General       Current Outpatient Medications   Medication Sig Dispense Refill    albuterol (ProAir HFA) 90 mcg/act inhaler Inhale 2 puffs every 6 (six) hours as needed for wheezing 8 5 g 6    Blood Glucose Monitoring Suppl (ONETOUCH VERIO) w/Device KIT Use to test sugar daily 1 kit 0    capsicum (ZOSTRIX) 0 075 % topical cream Apply topically 3 (three) times a day 28 3 g 0    cephalexin (KEFLEX) 500 mg capsule Take 1 capsule (500 mg total) by mouth every 6 (six) hours for 10 days 40 capsule 0    Continuous Blood Gluc  (LEID Products 2 [place] SystHatchbuck) BROOKS Use 1 Device as needed (use with sensors for bs checks) 1 Device 0    Continuous Blood Gluc Sensor (ASC Information TechnologyStyle Elvin 14 Day Sensor) MISC USE ONE SENSOR EVERY 2 WEEKS 2 each 3    Control Gel Formula Dressing (DuoDERM CGF Dressing) [facility] Apply 1 application topically every 5 (five) days 5 each 1    Diclofenac Sodium (VOLTAREN) 1 % Apply 4 g topically 4 (four) times a day 50 g 0    fluconazole (DIFLUCAN) 150 mg tablet Take 1 tablet (150 mg total) by mouth daily for 7 days 7 tablet 0    furosemide (LASIX) 20 mg tablet TAKE 2 TABLETS EVERY DAY IN THE EARLY MORNING AND 1 TABLET DAILY AFTER LUNCH  270 tablet 2    gabapentin (NEURONTIN) 100 mg capsule Take 1 capsule (100 mg total) by mouth 2 (two) times a day Take 1 tab with your 300mg tab twice daily 180 capsule 2    gabapentin (NEURONTIN) 300 mg capsule Take 1 capsule (300 mg total) by mouth 2 (two) times a day Take 1 tab with your 100mg tab twice daily 180 capsule 2    insulin aspart (NovoLOG FlexPen) 100 UNIT/ML injection pen INJECT 18 UNITS WITH MEALS+SCALE ( - 150-200 -2 UNITS, 201-250-4 UNITS, 251-300 -6 UNITS, 301-350-8 UNITS, > 350- 10 UNITS ) 45 mL 1    insulin glargine (Lantus SoloStar) 100 units/mL injection pen INJECT 45 UNITS UNDER THE SKIN DAILY AT BEDTIME 15 mL 1    insulin glargine (Lantus SoloStar) 100 units/mL injection pen Inject 48 units under the skin every bedtime 43 mL 0    insulin glargine (Toujeo Max SoloStar) 300 units/mL CONCETRATED U-300 injection pen (2-unit dial) Inject 45 units at bedtime 9 pen 1    Insulin Pen Needle (BD Pen Needle Ludmila 2nd Gen) 32G X 4 MM MISC Inject into the skin 4 (four) times a day 360 each 1    ketoconazole (NIZORAL) 2 % cream Apply topically daily for 7 days Can extend up to 14 days if needed   60 g 1    Lancets (ONETOUCH ULTRASOFT) lancets Use to test sugar 2 times a day 100 each 3    levothyroxine 25 mcg tablet TAKE ONE TABLET DAILY ON EMPTY STOMACH 1 HOUR BEFORE BREAKFAST 90 tablet 1    lidocaine (XYLOCAINE) 2 % topical gel Apply topically as needed for mild pain 30 mL 0    losartan (COZAAR) 25 mg tablet TAKE ONE TABLET BY MOUTH EVERY DAY 90 tablet 2    metFORMIN (GLUCOPHAGE) 1000 MG tablet TAKE ONE TABLET BY MOUTH TWICE DAILY 180 tablet 3    nystatin (MYCOSTATIN) powder Apply 1 application topically 2 (two) times a day To affected area 120 g 3    omeprazole (PriLOSEC) 40 MG capsule Take 1 capsule (40 mg total) by mouth 2 (two) times a day 180 capsule 1    OneTouch Verio test strip USE TO TEST SUGAR 2 TIMES A  strip 3  psyllium (METAMUCIL) packet Take 1 packet by mouth daily  0    saccharomyces boulardii (FLORASTOR) 250 mg capsule Take 1 capsule (250 mg total) by mouth 2 (two) times a day 60 capsule 1    sildenafil (VIAGRA) 25 MG tablet Take 1 tablet (25 mg total) by mouth daily as needed for erectile dysfunction 10 tablet 1     No current facility-administered medications for this visit  Allergies   Allergen Reactions    Gluten Meal - Food Allergy     Clindamycin Diarrhea       Review of Systems   Constitutional: Negative  Negative for chills and fever  HENT: Negative  Respiratory: Negative  Negative for apnea, cough and wheezing  Cardiovascular: Negative  Negative for chest pain and palpitations  Gastrointestinal: Negative  Negative for diarrhea, nausea and vomiting  Genitourinary: Negative  Negative for dysuria, flank pain, hematuria and urgency  Musculoskeletal: Negative  Skin: Positive for rash  Negative for wound  Neurological: Negative  Negative for syncope, light-headedness, numbness and headaches  Psychiatric/Behavioral: Negative  All other systems reviewed and are negative  There were no vitals filed for this visit  I spent 20 minutes directly with the patient during this visit    [address] verbally agrees to participate in [facility]  Pt is aware that [facility] could be limited without vital signs or the ability to perform a full hands-on physical exam  Ben Mathur understands he or the provider may request at any time to terminate the video visit and request the patient to seek care or treatment in person

## 2021-09-16 NOTE — ASSESSMENT & PLAN NOTE
· Recurrent cellulitis, however now in the left ankle  · Currently on Lasix of oral Keflex   · Swelling, erythema appears to be improving per patient, however pain has worsened  · No fevers, chills  · Denies any open lesions over the skin   · Appears to be a recurrent issue given patient's body habitus, inability to ambulate  · Patient did informed that he  Has now developed bedsores  Offered VNA services as this helped him with his bedsores before,   Declined  · would prefer a bili like to see the patient for a proper physical examination especially given the recurrent nature of this issue however given difficulty in ambulating, patient is unable to come to our office at the moment  Plan   1  Continue p o  Keflex to complete total 10 days   2  Fluconazole 150 mg daily for 7 days for fungal superinfection   3  Voltaren gel for pain management   4  Over-the-counter Tylenol as needed for pain   5  Follow-up on Monday 9/20 for a video visit  6   Advised to call our office if symptoms worsen Former smoker

## 2021-09-20 ENCOUNTER — TELEMEDICINE (OUTPATIENT)
Dept: INTERNAL MEDICINE CLINIC | Facility: CLINIC | Age: 56
End: 2021-09-20
Payer: COMMERCIAL

## 2021-09-20 DIAGNOSIS — L03.116 CELLULITIS OF LEFT LOWER EXTREMITY: Primary | ICD-10-CM

## 2021-09-20 PROCEDURE — 99212 OFFICE O/P EST SF 10 MIN: CPT | Performed by: INTERNAL MEDICINE

## 2021-09-20 NOTE — ASSESSMENT & PLAN NOTE
Completed keflex, finishing fluconazole  Resolved, however one spot on inner thigh concerning patient  Not wanting to come to office, "still not well enough " did not want to video call  Plan:  · F/u next Monday to see if completely resolved    · dicussed preventative measures in detail

## 2021-09-28 DIAGNOSIS — R79.89 ELEVATED TSH: ICD-10-CM

## 2021-09-28 DIAGNOSIS — E66.01 OBESITY, MORBID, BMI 50 OR HIGHER (HCC): ICD-10-CM

## 2021-09-28 DIAGNOSIS — Z79.4 TYPE 2 DIABETES MELLITUS WITH HYPERGLYCEMIA, WITH LONG-TERM CURRENT USE OF INSULIN (HCC): ICD-10-CM

## 2021-09-28 DIAGNOSIS — I10 HYPERTENSION GOAL BP (BLOOD PRESSURE) < 140/90: ICD-10-CM

## 2021-09-28 DIAGNOSIS — E78.00 PURE HYPERCHOLESTEROLEMIA: ICD-10-CM

## 2021-09-28 DIAGNOSIS — E11.65 TYPE 2 DIABETES MELLITUS WITH HYPERGLYCEMIA, WITH LONG-TERM CURRENT USE OF INSULIN (HCC): ICD-10-CM

## 2021-09-28 RX ORDER — PEN NEEDLE, DIABETIC 32GX 5/32"
NEEDLE, DISPOSABLE MISCELLANEOUS
Qty: 150 EACH | Refills: 3 | Status: SHIPPED | OUTPATIENT
Start: 2021-09-28 | End: 2022-04-06

## 2021-10-04 ENCOUNTER — TELEMEDICINE (OUTPATIENT)
Dept: INTERNAL MEDICINE CLINIC | Facility: CLINIC | Age: 56
End: 2021-10-04
Payer: COMMERCIAL

## 2021-10-04 DIAGNOSIS — Z79.4 TYPE 2 DIABETES MELLITUS WITH HYPERGLYCEMIA, WITH LONG-TERM CURRENT USE OF INSULIN (HCC): ICD-10-CM

## 2021-10-04 DIAGNOSIS — E11.65 TYPE 2 DIABETES MELLITUS WITH HYPERGLYCEMIA, WITH LONG-TERM CURRENT USE OF INSULIN (HCC): ICD-10-CM

## 2021-10-04 DIAGNOSIS — L03.116 CELLULITIS OF LEFT LOWER EXTREMITY: Primary | ICD-10-CM

## 2021-10-04 PROCEDURE — 99213 OFFICE O/P EST LOW 20 MIN: CPT | Performed by: INTERNAL MEDICINE

## 2021-10-04 RX ORDER — SULFAMETHOXAZOLE AND TRIMETHOPRIM 800; 160 MG/1; MG/1
1 TABLET ORAL EVERY 12 HOURS SCHEDULED
Qty: 20 TABLET | Refills: 0 | Status: SHIPPED | OUTPATIENT
Start: 2021-10-04 | End: 2021-10-14

## 2021-10-08 DIAGNOSIS — Z79.4 TYPE 2 DIABETES MELLITUS WITH HYPERGLYCEMIA, WITH LONG-TERM CURRENT USE OF INSULIN (HCC): ICD-10-CM

## 2021-10-08 DIAGNOSIS — E11.65 TYPE 2 DIABETES MELLITUS WITH HYPERGLYCEMIA, WITH LONG-TERM CURRENT USE OF INSULIN (HCC): ICD-10-CM

## 2021-10-08 RX ORDER — INSULIN ASPART 100 [IU]/ML
INJECTION, SOLUTION INTRAVENOUS; SUBCUTANEOUS
Qty: 45 ML | Refills: 1 | Status: SHIPPED | OUTPATIENT
Start: 2021-10-08 | End: 2021-12-01 | Stop reason: SDUPTHER

## 2021-10-08 RX ORDER — INSULIN GLARGINE 100 [IU]/ML
INJECTION, SOLUTION SUBCUTANEOUS
Qty: 15 ML | Refills: 1 | Status: SHIPPED | OUTPATIENT
Start: 2021-10-08 | End: 2021-11-01 | Stop reason: HOSPADM

## 2021-10-19 DIAGNOSIS — L03.116 CELLULITIS OF LEFT LOWER EXTREMITY: ICD-10-CM

## 2021-10-25 ENCOUNTER — VBI (OUTPATIENT)
Dept: ADMINISTRATIVE | Facility: OTHER | Age: 56
End: 2021-10-25

## 2021-10-28 ENCOUNTER — OFFICE VISIT (OUTPATIENT)
Dept: INTERNAL MEDICINE CLINIC | Facility: CLINIC | Age: 56
End: 2021-10-28
Payer: COMMERCIAL

## 2021-10-28 ENCOUNTER — TELEPHONE (OUTPATIENT)
Dept: INTERNAL MEDICINE CLINIC | Facility: CLINIC | Age: 56
End: 2021-10-28

## 2021-10-28 ENCOUNTER — HOSPITAL ENCOUNTER (INPATIENT)
Facility: HOSPITAL | Age: 56
LOS: 4 days | Discharge: HOME | DRG: 720 | End: 2021-11-01
Attending: EMERGENCY MEDICINE | Admitting: INTERNAL MEDICINE
Payer: COMMERCIAL

## 2021-10-28 VITALS
BODY MASS INDEX: 53.78 KG/M2 | SYSTOLIC BLOOD PRESSURE: 140 MMHG | DIASTOLIC BLOOD PRESSURE: 88 MMHG | HEART RATE: 140 BPM | OXYGEN SATURATION: 97 % | HEIGHT: 64 IN | TEMPERATURE: 100.5 F | WEIGHT: 315 LBS

## 2021-10-28 DIAGNOSIS — L03.116 CELLULITIS OF LEFT LEG: ICD-10-CM

## 2021-10-28 DIAGNOSIS — L03.116 CELLULITIS OF LEFT LOWER EXTREMITY: ICD-10-CM

## 2021-10-28 DIAGNOSIS — N28.9 ACUTE KIDNEY INSUFFICIENCY: ICD-10-CM

## 2021-10-28 DIAGNOSIS — L03.119 RECURRENT CELLULITIS OF LOWER EXTREMITY: ICD-10-CM

## 2021-10-28 DIAGNOSIS — L03.116 CELLULITIS OF LEFT LOWER EXTREMITY: Primary | ICD-10-CM

## 2021-10-28 DIAGNOSIS — L03.90 CELLULITIS, UNSPECIFIED CELLULITIS SITE: ICD-10-CM

## 2021-10-28 DIAGNOSIS — E11.65 TYPE 2 DIABETES MELLITUS WITH HYPERGLYCEMIA, WITH LONG-TERM CURRENT USE OF INSULIN (HCC): ICD-10-CM

## 2021-10-28 DIAGNOSIS — M79.606 LEG PAIN: ICD-10-CM

## 2021-10-28 DIAGNOSIS — A41.9 SEVERE SEPSIS (HCC): Primary | ICD-10-CM

## 2021-10-28 DIAGNOSIS — R65.20 SEVERE SEPSIS (HCC): Primary | ICD-10-CM

## 2021-10-28 DIAGNOSIS — R60.0 BILATERAL LEG EDEMA: ICD-10-CM

## 2021-10-28 DIAGNOSIS — Z79.4 TYPE 2 DIABETES MELLITUS WITH HYPERGLYCEMIA, WITH LONG-TERM CURRENT USE OF INSULIN (HCC): ICD-10-CM

## 2021-10-28 DIAGNOSIS — I10 ESSENTIAL HYPERTENSION: ICD-10-CM

## 2021-10-28 LAB
ALBUMIN SERPL BCP-MCNC: 3.5 G/DL (ref 3.5–5)
ALP SERPL-CCNC: 68 U/L (ref 46–116)
ALT SERPL W P-5'-P-CCNC: 78 U/L (ref 12–78)
ANION GAP SERPL CALCULATED.3IONS-SCNC: 11 MMOL/L (ref 4–13)
APTT PPP: 30 SECONDS (ref 23–37)
AST SERPL W P-5'-P-CCNC: 43 U/L (ref 5–45)
BASOPHILS # BLD MANUAL: 0 THOUSAND/UL (ref 0–0.1)
BASOPHILS NFR MAR MANUAL: 0 % (ref 0–1)
BILIRUB SERPL-MCNC: 0.84 MG/DL (ref 0.2–1)
BILIRUB UR QL STRIP: NEGATIVE
BUN SERPL-MCNC: 26 MG/DL (ref 5–25)
CALCIUM SERPL-MCNC: 9.3 MG/DL (ref 8.3–10.1)
CHLORIDE SERPL-SCNC: 98 MMOL/L (ref 100–108)
CLARITY UR: CLEAR
CO2 SERPL-SCNC: 26 MMOL/L (ref 21–32)
COLOR UR: YELLOW
CREAT SERPL-MCNC: 1.9 MG/DL (ref 0.6–1.3)
EOSINOPHIL # BLD MANUAL: 0 THOUSAND/UL (ref 0–0.4)
EOSINOPHIL NFR BLD MANUAL: 0 % (ref 0–6)
ERYTHROCYTE [DISTWIDTH] IN BLOOD BY AUTOMATED COUNT: 13.9 % (ref 11.6–15.1)
GFR SERPL CREATININE-BSD FRML MDRD: 39 ML/MIN/1.73SQ M
GLUCOSE SERPL-MCNC: 158 MG/DL (ref 65–140)
GLUCOSE SERPL-MCNC: 183 MG/DL (ref 65–140)
GLUCOSE UR STRIP-MCNC: NEGATIVE MG/DL
HCT VFR BLD AUTO: 44.4 % (ref 36.5–49.3)
HGB BLD-MCNC: 14.5 G/DL (ref 12–17)
HGB UR QL STRIP.AUTO: NEGATIVE
INR PPP: 1.09 (ref 0.84–1.19)
KETONES UR STRIP-MCNC: NEGATIVE MG/DL
LACTATE SERPL-SCNC: 2.4 MMOL/L (ref 0.5–2)
LACTATE SERPL-SCNC: 2.7 MMOL/L (ref 0.5–2)
LACTATE SERPL-SCNC: 4.2 MMOL/L (ref 0.5–2)
LEUKOCYTE ESTERASE UR QL STRIP: NEGATIVE
LYMPHOCYTES # BLD AUTO: 0.7 THOUSAND/UL (ref 0.6–4.47)
LYMPHOCYTES # BLD AUTO: 5 % (ref 14–44)
MCH RBC QN AUTO: 31.2 PG (ref 26.8–34.3)
MCHC RBC AUTO-ENTMCNC: 32.7 G/DL (ref 31.4–37.4)
MCV RBC AUTO: 96 FL (ref 82–98)
MONOCYTES # BLD AUTO: 0.7 THOUSAND/UL (ref 0–1.22)
MONOCYTES NFR BLD: 5 % (ref 4–12)
NEUTROPHILS # BLD MANUAL: 12.52 THOUSAND/UL (ref 1.85–7.62)
NEUTS BAND NFR BLD MANUAL: 11 % (ref 0–8)
NEUTS SEG NFR BLD AUTO: 79 % (ref 43–75)
NITRITE UR QL STRIP: NEGATIVE
PH UR STRIP.AUTO: 7 [PH]
PLATELET # BLD AUTO: 176 THOUSANDS/UL (ref 149–390)
PLATELET BLD QL SMEAR: ADEQUATE
PMV BLD AUTO: 9.4 FL (ref 8.9–12.7)
POTASSIUM SERPL-SCNC: 4.9 MMOL/L (ref 3.5–5.3)
PROCALCITONIN SERPL-MCNC: 2.57 NG/ML
PROT SERPL-MCNC: 8.1 G/DL (ref 6.4–8.2)
PROT UR STRIP-MCNC: NEGATIVE MG/DL
PROTHROMBIN TIME: 14.1 SECONDS (ref 11.6–14.5)
RBC # BLD AUTO: 4.65 MILLION/UL (ref 3.88–5.62)
RBC MORPH BLD: NORMAL
SODIUM SERPL-SCNC: 135 MMOL/L (ref 136–145)
SP GR UR STRIP.AUTO: 1.01 (ref 1–1.03)
TSH SERPL DL<=0.05 MIU/L-ACNC: 1.95 UIU/ML (ref 0.36–3.74)
UROBILINOGEN UR QL STRIP.AUTO: 1 E.U./DL
WBC # BLD AUTO: 13.91 THOUSAND/UL (ref 4.31–10.16)

## 2021-10-28 PROCEDURE — 85007 BL SMEAR W/DIFF WBC COUNT: CPT

## 2021-10-28 PROCEDURE — 36415 COLL VENOUS BLD VENIPUNCTURE: CPT

## 2021-10-28 PROCEDURE — 85027 COMPLETE CBC AUTOMATED: CPT

## 2021-10-28 PROCEDURE — 85730 THROMBOPLASTIN TIME PARTIAL: CPT

## 2021-10-28 PROCEDURE — 93005 ELECTROCARDIOGRAM TRACING: CPT

## 2021-10-28 PROCEDURE — 81003 URINALYSIS AUTO W/O SCOPE: CPT

## 2021-10-28 PROCEDURE — 90471 IMMUNIZATION ADMIN: CPT | Performed by: INTERNAL MEDICINE

## 2021-10-28 PROCEDURE — 3079F DIAST BP 80-89 MM HG: CPT | Performed by: INTERNAL MEDICINE

## 2021-10-28 PROCEDURE — 1036F TOBACCO NON-USER: CPT | Performed by: INTERNAL MEDICINE

## 2021-10-28 PROCEDURE — 3077F SYST BP >= 140 MM HG: CPT | Performed by: INTERNAL MEDICINE

## 2021-10-28 PROCEDURE — 3725F SCREEN DEPRESSION PERFORMED: CPT | Performed by: INTERNAL MEDICINE

## 2021-10-28 PROCEDURE — 99284 EMERGENCY DEPT VISIT MOD MDM: CPT

## 2021-10-28 PROCEDURE — 96365 THER/PROPH/DIAG IV INF INIT: CPT

## 2021-10-28 PROCEDURE — 84145 PROCALCITONIN (PCT): CPT

## 2021-10-28 PROCEDURE — 90682 RIV4 VACC RECOMBINANT DNA IM: CPT | Performed by: INTERNAL MEDICINE

## 2021-10-28 PROCEDURE — 85610 PROTHROMBIN TIME: CPT

## 2021-10-28 PROCEDURE — 82948 REAGENT STRIP/BLOOD GLUCOSE: CPT

## 2021-10-28 PROCEDURE — 80053 COMPREHEN METABOLIC PANEL: CPT

## 2021-10-28 PROCEDURE — 99285 EMERGENCY DEPT VISIT HI MDM: CPT | Performed by: EMERGENCY MEDICINE

## 2021-10-28 PROCEDURE — 99213 OFFICE O/P EST LOW 20 MIN: CPT | Performed by: INTERNAL MEDICINE

## 2021-10-28 PROCEDURE — 84443 ASSAY THYROID STIM HORMONE: CPT | Performed by: INTERNAL MEDICINE

## 2021-10-28 PROCEDURE — 83605 ASSAY OF LACTIC ACID: CPT | Performed by: INTERNAL MEDICINE

## 2021-10-28 PROCEDURE — 96368 THER/DIAG CONCURRENT INF: CPT

## 2021-10-28 PROCEDURE — 83605 ASSAY OF LACTIC ACID: CPT

## 2021-10-28 PROCEDURE — 87040 BLOOD CULTURE FOR BACTERIA: CPT

## 2021-10-28 PROCEDURE — 3008F BODY MASS INDEX DOCD: CPT | Performed by: INTERNAL MEDICINE

## 2021-10-28 PROCEDURE — 99223 1ST HOSP IP/OBS HIGH 75: CPT | Performed by: INTERNAL MEDICINE

## 2021-10-28 RX ORDER — GABAPENTIN 300 MG/1
300 CAPSULE ORAL 2 TIMES DAILY
Status: DISCONTINUED | OUTPATIENT
Start: 2021-10-28 | End: 2021-10-30

## 2021-10-28 RX ORDER — PANTOPRAZOLE SODIUM 40 MG/1
40 TABLET, DELAYED RELEASE ORAL
Status: DISCONTINUED | OUTPATIENT
Start: 2021-10-29 | End: 2021-10-28

## 2021-10-28 RX ORDER — PANTOPRAZOLE SODIUM 40 MG/1
40 TABLET, DELAYED RELEASE ORAL
Status: DISCONTINUED | OUTPATIENT
Start: 2021-10-28 | End: 2021-11-01 | Stop reason: HOSPADM

## 2021-10-28 RX ORDER — SODIUM CHLORIDE, SODIUM LACTATE, POTASSIUM CHLORIDE, CALCIUM CHLORIDE 600; 310; 30; 20 MG/100ML; MG/100ML; MG/100ML; MG/100ML
30 INJECTION, SOLUTION INTRAVENOUS CONTINUOUS
Status: DISCONTINUED | OUTPATIENT
Start: 2021-10-28 | End: 2021-10-28

## 2021-10-28 RX ORDER — SODIUM CHLORIDE 9 MG/ML
100 INJECTION, SOLUTION INTRAVENOUS CONTINUOUS
Status: DISPENSED | OUTPATIENT
Start: 2021-10-28 | End: 2021-10-29

## 2021-10-28 RX ORDER — OXYCODONE HYDROCHLORIDE 5 MG/1
7.5 TABLET ORAL EVERY 6 HOURS PRN
Status: DISCONTINUED | OUTPATIENT
Start: 2021-10-28 | End: 2021-10-30

## 2021-10-28 RX ORDER — NYSTATIN 100000 [USP'U]/G
1 POWDER TOPICAL 2 TIMES DAILY
Status: DISCONTINUED | OUTPATIENT
Start: 2021-10-28 | End: 2021-10-30

## 2021-10-28 RX ORDER — ONDANSETRON 2 MG/ML
4 INJECTION INTRAMUSCULAR; INTRAVENOUS EVERY 6 HOURS PRN
Status: DISCONTINUED | OUTPATIENT
Start: 2021-10-28 | End: 2021-11-01 | Stop reason: HOSPADM

## 2021-10-28 RX ORDER — ACETAMINOPHEN 325 MG/1
650 TABLET ORAL EVERY 4 HOURS PRN
Status: DISCONTINUED | OUTPATIENT
Start: 2021-10-28 | End: 2021-11-01 | Stop reason: HOSPADM

## 2021-10-28 RX ORDER — METOPROLOL TARTRATE 5 MG/5ML
5 INJECTION INTRAVENOUS EVERY 6 HOURS PRN
Status: DISCONTINUED | OUTPATIENT
Start: 2021-10-28 | End: 2021-11-01 | Stop reason: HOSPADM

## 2021-10-28 RX ORDER — OXYCODONE HYDROCHLORIDE 5 MG/1
5 TABLET ORAL EVERY 6 HOURS PRN
Status: DISCONTINUED | OUTPATIENT
Start: 2021-10-28 | End: 2021-10-30

## 2021-10-28 RX ORDER — HEPARIN SODIUM 5000 [USP'U]/ML
5000 INJECTION, SOLUTION INTRAVENOUS; SUBCUTANEOUS EVERY 8 HOURS SCHEDULED
Status: DISCONTINUED | OUTPATIENT
Start: 2021-10-28 | End: 2021-11-01 | Stop reason: HOSPADM

## 2021-10-28 RX ORDER — INSULIN GLARGINE 100 [IU]/ML
46 INJECTION, SOLUTION SUBCUTANEOUS
Status: DISCONTINUED | OUTPATIENT
Start: 2021-10-28 | End: 2021-11-01 | Stop reason: HOSPADM

## 2021-10-28 RX ORDER — ACETAMINOPHEN 325 MG/1
650 TABLET ORAL ONCE
Status: COMPLETED | OUTPATIENT
Start: 2021-10-28 | End: 2021-10-28

## 2021-10-28 RX ORDER — ONDANSETRON 4 MG/1
4 TABLET, ORALLY DISINTEGRATING ORAL EVERY 6 HOURS PRN
Status: DISCONTINUED | OUTPATIENT
Start: 2021-10-28 | End: 2021-10-28

## 2021-10-28 RX ORDER — ALBUTEROL SULFATE 90 UG/1
2 AEROSOL, METERED RESPIRATORY (INHALATION) EVERY 6 HOURS PRN
Status: DISCONTINUED | OUTPATIENT
Start: 2021-10-28 | End: 2021-11-01 | Stop reason: HOSPADM

## 2021-10-28 RX ADMIN — GABAPENTIN 300 MG: 300 CAPSULE ORAL at 20:17

## 2021-10-28 RX ADMIN — SODIUM CHLORIDE, SODIUM LACTATE, POTASSIUM CHLORIDE, AND CALCIUM CHLORIDE 30 ML/KG/HR: .6; .31; .03; .02 INJECTION, SOLUTION INTRAVENOUS at 17:21

## 2021-10-28 RX ADMIN — SODIUM CHLORIDE, SODIUM LACTATE, POTASSIUM CHLORIDE, AND CALCIUM CHLORIDE 1000 ML: .6; .31; .03; .02 INJECTION, SOLUTION INTRAVENOUS at 17:00

## 2021-10-28 RX ADMIN — PANTOPRAZOLE SODIUM 40 MG: 40 TABLET, DELAYED RELEASE ORAL at 23:32

## 2021-10-28 RX ADMIN — ACETAMINOPHEN 650 MG: 325 TABLET, FILM COATED ORAL at 22:37

## 2021-10-28 RX ADMIN — ACETAMINOPHEN 650 MG: 325 TABLET, FILM COATED ORAL at 17:21

## 2021-10-28 RX ADMIN — OXYCODONE HYDROCHLORIDE 7.5 MG: 5 TABLET ORAL at 22:34

## 2021-10-28 RX ADMIN — DICLOFENAC SODIUM 4 G: 10 GEL TOPICAL at 22:34

## 2021-10-28 RX ADMIN — SODIUM CHLORIDE 1000 ML: 0.9 INJECTION, SOLUTION INTRAVENOUS at 20:20

## 2021-10-28 RX ADMIN — HEPARIN SODIUM 5000 UNITS: 5000 INJECTION INTRAVENOUS; SUBCUTANEOUS at 22:35

## 2021-10-28 RX ADMIN — INSULIN LISPRO 2 UNITS: 100 INJECTION, SOLUTION INTRAVENOUS; SUBCUTANEOUS at 22:35

## 2021-10-28 RX ADMIN — CEFTRIAXONE SODIUM 1000 MG: 10 INJECTION, POWDER, FOR SOLUTION INTRAVENOUS at 17:00

## 2021-10-28 RX ADMIN — INFLUENZA A VIRUS A/WISCONSIN/588/2019 (H1N1) RECOMBINANT HEMAGGLUTININ ANTIGEN, INFLUENZA A VIRUS A/TASMANIA/503/2020 (H3N2) RECOMBINANT HEMAGGLUTININ ANTIGEN, INFLUENZA B VIRUS B/WASHINGTON/02/2019 RECOMBINANT HEMAGGLUTININ ANTIGEN, AND INFLUENZA B VIRUS B/PHUKET/3073/2013 RECOMBINANT HEMAGGLUTININ ANTIGEN 0.5 ML: 45; 45; 45; 45 INJECTION INTRAMUSCULAR at 23:32

## 2021-10-28 RX ADMIN — VANCOMYCIN HYDROCHLORIDE 2000 MG: 1 INJECTION, POWDER, LYOPHILIZED, FOR SOLUTION INTRAVENOUS at 20:17

## 2021-10-28 RX ADMIN — SODIUM CHLORIDE 100 ML/HR: 0.9 INJECTION, SOLUTION INTRAVENOUS at 22:52

## 2021-10-28 RX ADMIN — SODIUM CHLORIDE 100 ML/HR: 0.9 INJECTION, SOLUTION INTRAVENOUS at 20:17

## 2021-10-28 RX ADMIN — INSULIN GLARGINE 46 UNITS: 100 INJECTION, SOLUTION SUBCUTANEOUS at 22:35

## 2021-10-29 PROBLEM — E87.1 HYPONATREMIA: Status: RESOLVED | Noted: 2019-09-06 | Resolved: 2021-10-29

## 2021-10-29 LAB
ANION GAP SERPL CALCULATED.3IONS-SCNC: 10 MMOL/L (ref 4–13)
ATRIAL RATE: 113 BPM
BASOPHILS # BLD AUTO: 0.06 THOUSANDS/ΜL (ref 0–0.1)
BASOPHILS NFR BLD AUTO: 1 % (ref 0–1)
BUN SERPL-MCNC: 26 MG/DL (ref 5–25)
CALCIUM SERPL-MCNC: 8.5 MG/DL (ref 8.3–10.1)
CHLORIDE SERPL-SCNC: 101 MMOL/L (ref 100–108)
CO2 SERPL-SCNC: 25 MMOL/L (ref 21–32)
CREAT SERPL-MCNC: 1.72 MG/DL (ref 0.6–1.3)
EOSINOPHIL # BLD AUTO: 0.11 THOUSAND/ΜL (ref 0–0.61)
EOSINOPHIL NFR BLD AUTO: 1 % (ref 0–6)
ERYTHROCYTE [DISTWIDTH] IN BLOOD BY AUTOMATED COUNT: 14.3 % (ref 11.6–15.1)
EST. AVERAGE GLUCOSE BLD GHB EST-MCNC: 140 MG/DL
FLUAV RNA RESP QL NAA+PROBE: NEGATIVE
FLUBV RNA RESP QL NAA+PROBE: NEGATIVE
GFR SERPL CREATININE-BSD FRML MDRD: 44 ML/MIN/1.73SQ M
GLUCOSE SERPL-MCNC: 134 MG/DL (ref 65–140)
GLUCOSE SERPL-MCNC: 154 MG/DL (ref 65–140)
GLUCOSE SERPL-MCNC: 174 MG/DL (ref 65–140)
GLUCOSE SERPL-MCNC: 177 MG/DL (ref 65–140)
GLUCOSE SERPL-MCNC: 199 MG/DL (ref 65–140)
HBA1C MFR BLD: 6.5 %
HCT VFR BLD AUTO: 38.2 % (ref 36.5–49.3)
HGB BLD-MCNC: 12.5 G/DL (ref 12–17)
IMM GRANULOCYTES # BLD AUTO: 0.04 THOUSAND/UL (ref 0–0.2)
IMM GRANULOCYTES NFR BLD AUTO: 0 % (ref 0–2)
LACTATE SERPL-SCNC: 1.5 MMOL/L (ref 0.5–2)
LACTATE SERPL-SCNC: 3.1 MMOL/L (ref 0.5–2)
LYMPHOCYTES # BLD AUTO: 1.38 THOUSANDS/ΜL (ref 0.6–4.47)
LYMPHOCYTES NFR BLD AUTO: 13 % (ref 14–44)
MCH RBC QN AUTO: 31.1 PG (ref 26.8–34.3)
MCHC RBC AUTO-ENTMCNC: 32.7 G/DL (ref 31.4–37.4)
MCV RBC AUTO: 95 FL (ref 82–98)
MONOCYTES # BLD AUTO: 0.87 THOUSAND/ΜL (ref 0.17–1.22)
MONOCYTES NFR BLD AUTO: 8 % (ref 4–12)
NEUTROPHILS # BLD AUTO: 7.9 THOUSANDS/ΜL (ref 1.85–7.62)
NEUTS SEG NFR BLD AUTO: 77 % (ref 43–75)
NRBC BLD AUTO-RTO: 0 /100 WBCS
P AXIS: 42 DEGREES
PLATELET # BLD AUTO: 141 THOUSANDS/UL (ref 149–390)
PMV BLD AUTO: 9.1 FL (ref 8.9–12.7)
POTASSIUM SERPL-SCNC: 4.2 MMOL/L (ref 3.5–5.3)
PR INTERVAL: 162 MS
PROCALCITONIN SERPL-MCNC: 3.68 NG/ML
QRS AXIS: 73 DEGREES
QRSD INTERVAL: 72 MS
QT INTERVAL: 298 MS
QTC INTERVAL: 408 MS
RBC # BLD AUTO: 4.02 MILLION/UL (ref 3.88–5.62)
RSV RNA RESP QL NAA+PROBE: NEGATIVE
SARS-COV-2 RNA RESP QL NAA+PROBE: NEGATIVE
SODIUM SERPL-SCNC: 136 MMOL/L (ref 136–145)
T WAVE AXIS: 38 DEGREES
VANCOMYCIN SERPL-MCNC: 9.6 UG/ML
VENTRICULAR RATE: 113 BPM
WBC # BLD AUTO: 10.36 THOUSAND/UL (ref 4.31–10.16)

## 2021-10-29 PROCEDURE — 82948 REAGENT STRIP/BLOOD GLUCOSE: CPT

## 2021-10-29 PROCEDURE — 80202 ASSAY OF VANCOMYCIN: CPT | Performed by: INTERNAL MEDICINE

## 2021-10-29 PROCEDURE — 0241U HB NFCT DS VIR RESP RNA 4 TRGT: CPT | Performed by: INTERNAL MEDICINE

## 2021-10-29 PROCEDURE — 85025 COMPLETE CBC W/AUTO DIFF WBC: CPT | Performed by: FAMILY MEDICINE

## 2021-10-29 PROCEDURE — 94660 CPAP INITIATION&MGMT: CPT

## 2021-10-29 PROCEDURE — 83036 HEMOGLOBIN GLYCOSYLATED A1C: CPT | Performed by: INTERNAL MEDICINE

## 2021-10-29 PROCEDURE — 84145 PROCALCITONIN (PCT): CPT

## 2021-10-29 PROCEDURE — 93010 ELECTROCARDIOGRAM REPORT: CPT | Performed by: INTERNAL MEDICINE

## 2021-10-29 PROCEDURE — 99232 SBSQ HOSP IP/OBS MODERATE 35: CPT | Performed by: INTERNAL MEDICINE

## 2021-10-29 PROCEDURE — 99255 IP/OBS CONSLTJ NEW/EST HI 80: CPT | Performed by: STUDENT IN AN ORGANIZED HEALTH CARE EDUCATION/TRAINING PROGRAM

## 2021-10-29 PROCEDURE — 83605 ASSAY OF LACTIC ACID: CPT | Performed by: FAMILY MEDICINE

## 2021-10-29 PROCEDURE — 94760 N-INVAS EAR/PLS OXIMETRY 1: CPT

## 2021-10-29 PROCEDURE — 3044F HG A1C LEVEL LT 7.0%: CPT | Performed by: INTERNAL MEDICINE

## 2021-10-29 PROCEDURE — 80048 BASIC METABOLIC PNL TOTAL CA: CPT | Performed by: FAMILY MEDICINE

## 2021-10-29 RX ORDER — CEFAZOLIN SODIUM 2 G/50ML
2000 SOLUTION INTRAVENOUS EVERY 8 HOURS
Status: DISCONTINUED | OUTPATIENT
Start: 2021-10-29 | End: 2021-10-29

## 2021-10-29 RX ORDER — SODIUM HYPOCHLORITE 1.25 MG/ML
SOLUTION TOPICAL DAILY
Status: DISCONTINUED | OUTPATIENT
Start: 2021-10-29 | End: 2021-11-01 | Stop reason: HOSPADM

## 2021-10-29 RX ORDER — SODIUM CHLORIDE 9 MG/ML
100 INJECTION, SOLUTION INTRAVENOUS CONTINUOUS
Status: DISCONTINUED | OUTPATIENT
Start: 2021-10-29 | End: 2021-10-30

## 2021-10-29 RX ORDER — CEFAZOLIN SODIUM 2 G/50ML
2000 SOLUTION INTRAVENOUS EVERY 6 HOURS
Status: DISCONTINUED | OUTPATIENT
Start: 2021-10-29 | End: 2021-11-01 | Stop reason: HOSPADM

## 2021-10-29 RX ADMIN — OXYCODONE HYDROCHLORIDE 7.5 MG: 5 TABLET ORAL at 15:00

## 2021-10-29 RX ADMIN — SODIUM CHLORIDE 100 ML/HR: 0.9 INJECTION, SOLUTION INTRAVENOUS at 21:09

## 2021-10-29 RX ADMIN — ACETAMINOPHEN 650 MG: 325 TABLET, FILM COATED ORAL at 02:43

## 2021-10-29 RX ADMIN — HEPARIN SODIUM 5000 UNITS: 5000 INJECTION INTRAVENOUS; SUBCUTANEOUS at 05:32

## 2021-10-29 RX ADMIN — GABAPENTIN 300 MG: 300 CAPSULE ORAL at 08:02

## 2021-10-29 RX ADMIN — CEFAZOLIN SODIUM 2000 MG: 2 SOLUTION INTRAVENOUS at 21:15

## 2021-10-29 RX ADMIN — OXYCODONE HYDROCHLORIDE 7.5 MG: 5 TABLET ORAL at 21:39

## 2021-10-29 RX ADMIN — PANTOPRAZOLE SODIUM 40 MG: 40 TABLET, DELAYED RELEASE ORAL at 08:02

## 2021-10-29 RX ADMIN — INSULIN LISPRO 4 UNITS: 100 INJECTION, SOLUTION INTRAVENOUS; SUBCUTANEOUS at 21:13

## 2021-10-29 RX ADMIN — INSULIN GLARGINE 46 UNITS: 100 INJECTION, SOLUTION SUBCUTANEOUS at 22:38

## 2021-10-29 RX ADMIN — INSULIN LISPRO 18 UNITS: 100 INJECTION, SOLUTION INTRAVENOUS; SUBCUTANEOUS at 21:10

## 2021-10-29 RX ADMIN — HEPARIN SODIUM 5000 UNITS: 5000 INJECTION INTRAVENOUS; SUBCUTANEOUS at 21:10

## 2021-10-29 RX ADMIN — HEPARIN SODIUM 5000 UNITS: 5000 INJECTION INTRAVENOUS; SUBCUTANEOUS at 15:00

## 2021-10-29 RX ADMIN — INSULIN LISPRO 18 UNITS: 100 INJECTION, SOLUTION INTRAVENOUS; SUBCUTANEOUS at 12:07

## 2021-10-29 RX ADMIN — OXYCODONE HYDROCHLORIDE 7.5 MG: 5 TABLET ORAL at 08:09

## 2021-10-29 RX ADMIN — SODIUM CHLORIDE 100 ML/HR: 0.9 INJECTION, SOLUTION INTRAVENOUS at 09:03

## 2021-10-29 RX ADMIN — PANTOPRAZOLE SODIUM 40 MG: 40 TABLET, DELAYED RELEASE ORAL at 15:00

## 2021-10-29 RX ADMIN — GABAPENTIN 300 MG: 300 CAPSULE ORAL at 17:45

## 2021-10-29 RX ADMIN — Medication 1 SPRAY: at 02:35

## 2021-10-29 RX ADMIN — INSULIN LISPRO 18 UNITS: 100 INJECTION, SOLUTION INTRAVENOUS; SUBCUTANEOUS at 07:57

## 2021-10-29 RX ADMIN — VANCOMYCIN HYDROCHLORIDE 1750 MG: 1 INJECTION, POWDER, LYOPHILIZED, FOR SOLUTION INTRAVENOUS at 11:06

## 2021-10-29 RX ADMIN — CEFAZOLIN SODIUM 2000 MG: 2 SOLUTION INTRAVENOUS at 16:01

## 2021-10-29 RX ADMIN — SODIUM HYPOCHLORITE 473 APPLICATION: 1.25 SOLUTION TOPICAL at 16:06

## 2021-10-29 RX ADMIN — PSYLLIUM HUSK 1 PACKET: 3.4 POWDER ORAL at 08:02

## 2021-10-29 RX ADMIN — ACETAMINOPHEN 650 MG: 325 TABLET, FILM COATED ORAL at 16:12

## 2021-10-30 LAB
ANION GAP SERPL CALCULATED.3IONS-SCNC: 3 MMOL/L (ref 4–13)
BASOPHILS # BLD AUTO: 0.03 THOUSANDS/ΜL (ref 0–0.1)
BASOPHILS NFR BLD AUTO: 0 % (ref 0–1)
BUN SERPL-MCNC: 18 MG/DL (ref 5–25)
CALCIUM SERPL-MCNC: 8.6 MG/DL (ref 8.3–10.1)
CHLORIDE SERPL-SCNC: 102 MMOL/L (ref 100–108)
CO2 SERPL-SCNC: 30 MMOL/L (ref 21–32)
CREAT SERPL-MCNC: 1.38 MG/DL (ref 0.6–1.3)
EOSINOPHIL # BLD AUTO: 0.41 THOUSAND/ΜL (ref 0–0.61)
EOSINOPHIL NFR BLD AUTO: 5 % (ref 0–6)
ERYTHROCYTE [DISTWIDTH] IN BLOOD BY AUTOMATED COUNT: 13.9 % (ref 11.6–15.1)
GFR SERPL CREATININE-BSD FRML MDRD: 58 ML/MIN/1.73SQ M
GLUCOSE SERPL-MCNC: 127 MG/DL (ref 65–140)
GLUCOSE SERPL-MCNC: 128 MG/DL (ref 65–140)
GLUCOSE SERPL-MCNC: 130 MG/DL (ref 65–140)
GLUCOSE SERPL-MCNC: 133 MG/DL (ref 65–140)
GLUCOSE SERPL-MCNC: 153 MG/DL (ref 65–140)
HCT VFR BLD AUTO: 39.7 % (ref 36.5–49.3)
HGB BLD-MCNC: 12.8 G/DL (ref 12–17)
IMM GRANULOCYTES # BLD AUTO: 0.03 THOUSAND/UL (ref 0–0.2)
IMM GRANULOCYTES NFR BLD AUTO: 0 % (ref 0–2)
LYMPHOCYTES # BLD AUTO: 1.61 THOUSANDS/ΜL (ref 0.6–4.47)
LYMPHOCYTES NFR BLD AUTO: 21 % (ref 14–44)
MCH RBC QN AUTO: 30.8 PG (ref 26.8–34.3)
MCHC RBC AUTO-ENTMCNC: 32.2 G/DL (ref 31.4–37.4)
MCV RBC AUTO: 95 FL (ref 82–98)
MONOCYTES # BLD AUTO: 0.71 THOUSAND/ΜL (ref 0.17–1.22)
MONOCYTES NFR BLD AUTO: 9 % (ref 4–12)
NEUTROPHILS # BLD AUTO: 4.78 THOUSANDS/ΜL (ref 1.85–7.62)
NEUTS SEG NFR BLD AUTO: 65 % (ref 43–75)
NRBC BLD AUTO-RTO: 0 /100 WBCS
PLATELET # BLD AUTO: 143 THOUSANDS/UL (ref 149–390)
PMV BLD AUTO: 8.9 FL (ref 8.9–12.7)
POTASSIUM SERPL-SCNC: 4.6 MMOL/L (ref 3.5–5.3)
RBC # BLD AUTO: 4.16 MILLION/UL (ref 3.88–5.62)
SODIUM SERPL-SCNC: 135 MMOL/L (ref 136–145)
WBC # BLD AUTO: 7.57 THOUSAND/UL (ref 4.31–10.16)

## 2021-10-30 PROCEDURE — 85025 COMPLETE CBC W/AUTO DIFF WBC: CPT | Performed by: FAMILY MEDICINE

## 2021-10-30 PROCEDURE — 80048 BASIC METABOLIC PNL TOTAL CA: CPT | Performed by: FAMILY MEDICINE

## 2021-10-30 PROCEDURE — 94760 N-INVAS EAR/PLS OXIMETRY 1: CPT

## 2021-10-30 PROCEDURE — 99232 SBSQ HOSP IP/OBS MODERATE 35: CPT | Performed by: INTERNAL MEDICINE

## 2021-10-30 PROCEDURE — 82948 REAGENT STRIP/BLOOD GLUCOSE: CPT

## 2021-10-30 PROCEDURE — 94660 CPAP INITIATION&MGMT: CPT

## 2021-10-30 RX ORDER — NYSTATIN 100000 U/G
CREAM TOPICAL 2 TIMES DAILY
Status: DISCONTINUED | OUTPATIENT
Start: 2021-10-30 | End: 2021-11-01 | Stop reason: HOSPADM

## 2021-10-30 RX ORDER — OXYCODONE HYDROCHLORIDE 5 MG/1
7.5 TABLET ORAL EVERY 4 HOURS PRN
Status: DISCONTINUED | OUTPATIENT
Start: 2021-10-30 | End: 2021-11-01 | Stop reason: HOSPADM

## 2021-10-30 RX ORDER — GABAPENTIN 400 MG/1
400 CAPSULE ORAL 2 TIMES DAILY
Status: DISCONTINUED | OUTPATIENT
Start: 2021-10-30 | End: 2021-11-01 | Stop reason: HOSPADM

## 2021-10-30 RX ORDER — OXYCODONE HYDROCHLORIDE 5 MG/1
5 TABLET ORAL EVERY 4 HOURS PRN
Status: DISCONTINUED | OUTPATIENT
Start: 2021-10-30 | End: 2021-11-01 | Stop reason: HOSPADM

## 2021-10-30 RX ADMIN — HEPARIN SODIUM 5000 UNITS: 5000 INJECTION INTRAVENOUS; SUBCUTANEOUS at 14:16

## 2021-10-30 RX ADMIN — CEFAZOLIN SODIUM 2000 MG: 2 SOLUTION INTRAVENOUS at 22:26

## 2021-10-30 RX ADMIN — INSULIN LISPRO 4 UNITS: 100 INJECTION, SOLUTION INTRAVENOUS; SUBCUTANEOUS at 11:46

## 2021-10-30 RX ADMIN — PANTOPRAZOLE SODIUM 40 MG: 40 TABLET, DELAYED RELEASE ORAL at 16:15

## 2021-10-30 RX ADMIN — HEPARIN SODIUM 5000 UNITS: 5000 INJECTION INTRAVENOUS; SUBCUTANEOUS at 05:48

## 2021-10-30 RX ADMIN — OXYCODONE HYDROCHLORIDE 7.5 MG: 5 TABLET ORAL at 10:27

## 2021-10-30 RX ADMIN — CEFAZOLIN SODIUM 2000 MG: 2 SOLUTION INTRAVENOUS at 09:23

## 2021-10-30 RX ADMIN — INSULIN LISPRO 18 UNITS: 100 INJECTION, SOLUTION INTRAVENOUS; SUBCUTANEOUS at 11:45

## 2021-10-30 RX ADMIN — NYSTATIN 1 APPLICATION: 100000 CREAM TOPICAL at 18:11

## 2021-10-30 RX ADMIN — NYSTATIN 1 APPLICATION: 100000 POWDER TOPICAL at 10:29

## 2021-10-30 RX ADMIN — CEFAZOLIN SODIUM 2000 MG: 2 SOLUTION INTRAVENOUS at 16:20

## 2021-10-30 RX ADMIN — OXYCODONE HYDROCHLORIDE 7.5 MG: 5 TABLET ORAL at 16:13

## 2021-10-30 RX ADMIN — INSULIN GLARGINE 46 UNITS: 100 INJECTION, SOLUTION SUBCUTANEOUS at 22:08

## 2021-10-30 RX ADMIN — INSULIN LISPRO 18 UNITS: 100 INJECTION, SOLUTION INTRAVENOUS; SUBCUTANEOUS at 08:46

## 2021-10-30 RX ADMIN — PANTOPRAZOLE SODIUM 40 MG: 40 TABLET, DELAYED RELEASE ORAL at 06:06

## 2021-10-30 RX ADMIN — SODIUM HYPOCHLORITE 473 APPLICATION: 1.25 SOLUTION TOPICAL at 09:17

## 2021-10-30 RX ADMIN — INSULIN LISPRO 18 UNITS: 100 INJECTION, SOLUTION INTRAVENOUS; SUBCUTANEOUS at 16:17

## 2021-10-30 RX ADMIN — CEFAZOLIN SODIUM 2000 MG: 2 SOLUTION INTRAVENOUS at 04:07

## 2021-10-30 RX ADMIN — HEPARIN SODIUM 5000 UNITS: 5000 INJECTION INTRAVENOUS; SUBCUTANEOUS at 22:07

## 2021-10-30 RX ADMIN — PSYLLIUM HUSK 1 PACKET: 3.4 POWDER ORAL at 10:31

## 2021-10-30 RX ADMIN — GABAPENTIN 400 MG: 400 CAPSULE ORAL at 18:10

## 2021-10-30 RX ADMIN — GABAPENTIN 300 MG: 300 CAPSULE ORAL at 08:48

## 2021-10-30 RX ADMIN — OXYCODONE HYDROCHLORIDE 7.5 MG: 5 TABLET ORAL at 22:17

## 2021-10-30 RX ADMIN — OXYCODONE HYDROCHLORIDE 7.5 MG: 5 TABLET ORAL at 04:07

## 2021-10-31 DIAGNOSIS — Z79.4 TYPE 2 DIABETES MELLITUS WITH HYPERGLYCEMIA, WITH LONG-TERM CURRENT USE OF INSULIN (HCC): ICD-10-CM

## 2021-10-31 DIAGNOSIS — E11.65 TYPE 2 DIABETES MELLITUS WITH HYPERGLYCEMIA, WITH LONG-TERM CURRENT USE OF INSULIN (HCC): ICD-10-CM

## 2021-10-31 LAB
ANION GAP SERPL CALCULATED.3IONS-SCNC: 9 MMOL/L (ref 4–13)
BUN SERPL-MCNC: 17 MG/DL (ref 5–25)
CALCIUM SERPL-MCNC: 8.6 MG/DL (ref 8.3–10.1)
CHLORIDE SERPL-SCNC: 100 MMOL/L (ref 100–108)
CO2 SERPL-SCNC: 26 MMOL/L (ref 21–32)
CREAT SERPL-MCNC: 1.27 MG/DL (ref 0.6–1.3)
GFR SERPL CREATININE-BSD FRML MDRD: 64 ML/MIN/1.73SQ M
GLUCOSE SERPL-MCNC: 122 MG/DL (ref 65–140)
GLUCOSE SERPL-MCNC: 128 MG/DL (ref 65–140)
GLUCOSE SERPL-MCNC: 131 MG/DL (ref 65–140)
GLUCOSE SERPL-MCNC: 143 MG/DL (ref 65–140)
GLUCOSE SERPL-MCNC: 181 MG/DL (ref 65–140)
POTASSIUM SERPL-SCNC: 4.2 MMOL/L (ref 3.5–5.3)
SODIUM SERPL-SCNC: 135 MMOL/L (ref 136–145)

## 2021-10-31 PROCEDURE — 82948 REAGENT STRIP/BLOOD GLUCOSE: CPT

## 2021-10-31 PROCEDURE — 99232 SBSQ HOSP IP/OBS MODERATE 35: CPT | Performed by: INTERNAL MEDICINE

## 2021-10-31 PROCEDURE — 94760 N-INVAS EAR/PLS OXIMETRY 1: CPT

## 2021-10-31 PROCEDURE — 80048 BASIC METABOLIC PNL TOTAL CA: CPT | Performed by: INTERNAL MEDICINE

## 2021-10-31 RX ADMIN — OXYCODONE HYDROCHLORIDE 5 MG: 5 TABLET ORAL at 21:44

## 2021-10-31 RX ADMIN — GABAPENTIN 400 MG: 400 CAPSULE ORAL at 18:26

## 2021-10-31 RX ADMIN — NYSTATIN 1 APPLICATION: 100000 CREAM TOPICAL at 09:03

## 2021-10-31 RX ADMIN — INSULIN LISPRO 18 UNITS: 100 INJECTION, SOLUTION INTRAVENOUS; SUBCUTANEOUS at 13:05

## 2021-10-31 RX ADMIN — PANTOPRAZOLE SODIUM 40 MG: 40 TABLET, DELAYED RELEASE ORAL at 06:34

## 2021-10-31 RX ADMIN — INSULIN LISPRO 18 UNITS: 100 INJECTION, SOLUTION INTRAVENOUS; SUBCUTANEOUS at 18:28

## 2021-10-31 RX ADMIN — PSYLLIUM HUSK 1 PACKET: 3.4 POWDER ORAL at 21:33

## 2021-10-31 RX ADMIN — ACETAMINOPHEN 650 MG: 325 TABLET, FILM COATED ORAL at 05:04

## 2021-10-31 RX ADMIN — CEFAZOLIN SODIUM 2000 MG: 2 SOLUTION INTRAVENOUS at 15:49

## 2021-10-31 RX ADMIN — SODIUM HYPOCHLORITE 473 APPLICATION: 1.25 SOLUTION TOPICAL at 09:02

## 2021-10-31 RX ADMIN — OXYCODONE HYDROCHLORIDE 5 MG: 5 TABLET ORAL at 15:49

## 2021-10-31 RX ADMIN — OXYCODONE HYDROCHLORIDE 5 MG: 5 TABLET ORAL at 10:55

## 2021-10-31 RX ADMIN — CEFAZOLIN SODIUM 2000 MG: 2 SOLUTION INTRAVENOUS at 21:37

## 2021-10-31 RX ADMIN — HEPARIN SODIUM 5000 UNITS: 5000 INJECTION INTRAVENOUS; SUBCUTANEOUS at 14:16

## 2021-10-31 RX ADMIN — CEFAZOLIN SODIUM 2000 MG: 2 SOLUTION INTRAVENOUS at 04:21

## 2021-10-31 RX ADMIN — PANTOPRAZOLE SODIUM 40 MG: 40 TABLET, DELAYED RELEASE ORAL at 15:49

## 2021-10-31 RX ADMIN — NYSTATIN 1 APPLICATION: 100000 CREAM TOPICAL at 18:26

## 2021-10-31 RX ADMIN — INSULIN LISPRO 18 UNITS: 100 INJECTION, SOLUTION INTRAVENOUS; SUBCUTANEOUS at 09:03

## 2021-10-31 RX ADMIN — INSULIN GLARGINE 46 UNITS: 100 INJECTION, SOLUTION SUBCUTANEOUS at 21:33

## 2021-10-31 RX ADMIN — CEFAZOLIN SODIUM 2000 MG: 2 SOLUTION INTRAVENOUS at 10:46

## 2021-10-31 RX ADMIN — HEPARIN SODIUM 5000 UNITS: 5000 INJECTION INTRAVENOUS; SUBCUTANEOUS at 05:04

## 2021-10-31 RX ADMIN — OXYCODONE HYDROCHLORIDE 5 MG: 5 TABLET ORAL at 05:03

## 2021-10-31 RX ADMIN — HEPARIN SODIUM 5000 UNITS: 5000 INJECTION INTRAVENOUS; SUBCUTANEOUS at 21:33

## 2021-10-31 RX ADMIN — INSULIN LISPRO 4 UNITS: 100 INJECTION, SOLUTION INTRAVENOUS; SUBCUTANEOUS at 13:05

## 2021-10-31 RX ADMIN — GABAPENTIN 400 MG: 400 CAPSULE ORAL at 09:01

## 2021-11-01 VITALS
RESPIRATION RATE: 18 BRPM | HEIGHT: 64 IN | DIASTOLIC BLOOD PRESSURE: 99 MMHG | WEIGHT: 315 LBS | OXYGEN SATURATION: 92 % | HEART RATE: 95 BPM | SYSTOLIC BLOOD PRESSURE: 160 MMHG | BODY MASS INDEX: 53.78 KG/M2 | TEMPERATURE: 98.2 F

## 2021-11-01 PROBLEM — R65.20 SEVERE SEPSIS (HCC): Status: RESOLVED | Noted: 2019-10-22 | Resolved: 2021-11-01

## 2021-11-01 PROBLEM — A41.9 SEVERE SEPSIS (HCC): Status: RESOLVED | Noted: 2019-10-22 | Resolved: 2021-11-01

## 2021-11-01 LAB
ALBUMIN SERPL BCP-MCNC: 2.4 G/DL (ref 3.5–5)
ALP SERPL-CCNC: 66 U/L (ref 46–116)
ALT SERPL W P-5'-P-CCNC: 25 U/L (ref 12–78)
ANION GAP SERPL CALCULATED.3IONS-SCNC: 7 MMOL/L (ref 4–13)
AST SERPL W P-5'-P-CCNC: 31 U/L (ref 5–45)
BASOPHILS # BLD AUTO: 0.04 THOUSANDS/ΜL (ref 0–0.1)
BASOPHILS NFR BLD AUTO: 1 % (ref 0–1)
BILIRUB SERPL-MCNC: 0.34 MG/DL (ref 0.2–1)
BUN SERPL-MCNC: 14 MG/DL (ref 5–25)
CALCIUM ALBUM COR SERPL-MCNC: 9.8 MG/DL (ref 8.3–10.1)
CALCIUM SERPL-MCNC: 8.5 MG/DL (ref 8.3–10.1)
CHLORIDE SERPL-SCNC: 101 MMOL/L (ref 100–108)
CO2 SERPL-SCNC: 26 MMOL/L (ref 21–32)
CREAT SERPL-MCNC: 1.09 MG/DL (ref 0.6–1.3)
EOSINOPHIL # BLD AUTO: 0.39 THOUSAND/ΜL (ref 0–0.61)
EOSINOPHIL NFR BLD AUTO: 7 % (ref 0–6)
ERYTHROCYTE [DISTWIDTH] IN BLOOD BY AUTOMATED COUNT: 13.6 % (ref 11.6–15.1)
GFR SERPL CREATININE-BSD FRML MDRD: 77 ML/MIN/1.73SQ M
GLUCOSE SERPL-MCNC: 139 MG/DL (ref 65–140)
GLUCOSE SERPL-MCNC: 148 MG/DL (ref 65–140)
GLUCOSE SERPL-MCNC: 153 MG/DL (ref 65–140)
HCT VFR BLD AUTO: 36.1 % (ref 36.5–49.3)
HGB BLD-MCNC: 11.6 G/DL (ref 12–17)
IMM GRANULOCYTES # BLD AUTO: 0.02 THOUSAND/UL (ref 0–0.2)
IMM GRANULOCYTES NFR BLD AUTO: 0 % (ref 0–2)
LYMPHOCYTES # BLD AUTO: 1.66 THOUSANDS/ΜL (ref 0.6–4.47)
LYMPHOCYTES NFR BLD AUTO: 28 % (ref 14–44)
MCH RBC QN AUTO: 30.3 PG (ref 26.8–34.3)
MCHC RBC AUTO-ENTMCNC: 32.1 G/DL (ref 31.4–37.4)
MCV RBC AUTO: 94 FL (ref 82–98)
MONOCYTES # BLD AUTO: 0.68 THOUSAND/ΜL (ref 0.17–1.22)
MONOCYTES NFR BLD AUTO: 12 % (ref 4–12)
NEUTROPHILS # BLD AUTO: 3.06 THOUSANDS/ΜL (ref 1.85–7.62)
NEUTS SEG NFR BLD AUTO: 52 % (ref 43–75)
NRBC BLD AUTO-RTO: 0 /100 WBCS
PLATELET # BLD AUTO: 174 THOUSANDS/UL (ref 149–390)
PMV BLD AUTO: 8.8 FL (ref 8.9–12.7)
POTASSIUM SERPL-SCNC: 4 MMOL/L (ref 3.5–5.3)
PROT SERPL-MCNC: 6.7 G/DL (ref 6.4–8.2)
RBC # BLD AUTO: 3.83 MILLION/UL (ref 3.88–5.62)
SODIUM SERPL-SCNC: 134 MMOL/L (ref 136–145)
WBC # BLD AUTO: 5.85 THOUSAND/UL (ref 4.31–10.16)

## 2021-11-01 PROCEDURE — 94760 N-INVAS EAR/PLS OXIMETRY 1: CPT

## 2021-11-01 PROCEDURE — 99239 HOSP IP/OBS DSCHRG MGMT >30: CPT | Performed by: INTERNAL MEDICINE

## 2021-11-01 PROCEDURE — 82948 REAGENT STRIP/BLOOD GLUCOSE: CPT

## 2021-11-01 PROCEDURE — 80053 COMPREHEN METABOLIC PANEL: CPT | Performed by: FAMILY MEDICINE

## 2021-11-01 PROCEDURE — 99232 SBSQ HOSP IP/OBS MODERATE 35: CPT | Performed by: INTERNAL MEDICINE

## 2021-11-01 PROCEDURE — 85025 COMPLETE CBC W/AUTO DIFF WBC: CPT | Performed by: FAMILY MEDICINE

## 2021-11-01 RX ORDER — IBUPROFEN 600 MG/1
600 TABLET ORAL ONCE
Status: DISCONTINUED | OUTPATIENT
Start: 2021-11-01 | End: 2021-11-01 | Stop reason: HOSPADM

## 2021-11-01 RX ORDER — FUROSEMIDE 20 MG/1
20 TABLET ORAL 2 TIMES DAILY
Qty: 270 TABLET | Refills: 0 | Status: SHIPPED | OUTPATIENT
Start: 2021-11-01 | End: 2022-03-10

## 2021-11-01 RX ORDER — CEPHALEXIN 500 MG/1
1000 CAPSULE ORAL EVERY 6 HOURS SCHEDULED
Qty: 56 CAPSULE | Refills: 0 | Status: SHIPPED | OUTPATIENT
Start: 2021-11-01 | End: 2021-11-08

## 2021-11-01 RX ORDER — METOCLOPRAMIDE HYDROCHLORIDE 5 MG/ML
10 INJECTION INTRAMUSCULAR; INTRAVENOUS EVERY 8 HOURS SCHEDULED
Status: DISCONTINUED | OUTPATIENT
Start: 2021-11-01 | End: 2021-11-01 | Stop reason: HOSPADM

## 2021-11-01 RX ORDER — OXYCODONE HYDROCHLORIDE 5 MG/1
TABLET ORAL
Qty: 15 TABLET | Refills: 0 | Status: ON HOLD | OUTPATIENT
Start: 2021-11-01 | End: 2021-11-26 | Stop reason: SDUPTHER

## 2021-11-01 RX ORDER — INSULIN GLARGINE 100 [IU]/ML
46 INJECTION, SOLUTION SUBCUTANEOUS
Qty: 43 ML | Refills: 0
Start: 2021-11-01 | End: 2021-12-01 | Stop reason: SDUPTHER

## 2021-11-01 RX ORDER — DIPHENHYDRAMINE HYDROCHLORIDE 50 MG/ML
25 INJECTION INTRAMUSCULAR; INTRAVENOUS EVERY 8 HOURS PRN
Status: DISCONTINUED | OUTPATIENT
Start: 2021-11-01 | End: 2021-11-01 | Stop reason: HOSPADM

## 2021-11-01 RX ORDER — FLASH GLUCOSE SENSOR
KIT MISCELLANEOUS
Qty: 2 EACH | Refills: 3 | Status: SHIPPED | OUTPATIENT
Start: 2021-11-01 | End: 2022-03-07

## 2021-11-01 RX ORDER — KETOROLAC TROMETHAMINE 30 MG/ML
30 INJECTION, SOLUTION INTRAMUSCULAR; INTRAVENOUS EVERY 8 HOURS
Status: DISCONTINUED | OUTPATIENT
Start: 2021-11-01 | End: 2021-11-01 | Stop reason: HOSPADM

## 2021-11-01 RX ORDER — SODIUM HYPOCHLORITE 1.25 MG/ML
1 SOLUTION TOPICAL DAILY
Qty: 473 ML | Refills: 0 | Status: SHIPPED | OUTPATIENT
Start: 2021-11-02

## 2021-11-01 RX ORDER — ACETAMINOPHEN 325 MG/1
650 TABLET ORAL EVERY 4 HOURS PRN
Refills: 0
Start: 2021-11-01

## 2021-11-01 RX ADMIN — GABAPENTIN 400 MG: 400 CAPSULE ORAL at 08:00

## 2021-11-01 RX ADMIN — KETOROLAC TROMETHAMINE 30 MG: 30 INJECTION, SOLUTION INTRAMUSCULAR at 07:55

## 2021-11-01 RX ADMIN — SODIUM HYPOCHLORITE 1 APPLICATION: 1.25 SOLUTION TOPICAL at 10:48

## 2021-11-01 RX ADMIN — OXYCODONE HYDROCHLORIDE 7.5 MG: 5 TABLET ORAL at 08:30

## 2021-11-01 RX ADMIN — INSULIN LISPRO 18 UNITS: 100 INJECTION, SOLUTION INTRAVENOUS; SUBCUTANEOUS at 08:00

## 2021-11-01 RX ADMIN — INSULIN LISPRO 4 UNITS: 100 INJECTION, SOLUTION INTRAVENOUS; SUBCUTANEOUS at 12:23

## 2021-11-01 RX ADMIN — CEFAZOLIN SODIUM 2000 MG: 2 SOLUTION INTRAVENOUS at 03:40

## 2021-11-01 RX ADMIN — CEFAZOLIN SODIUM 2000 MG: 2 SOLUTION INTRAVENOUS at 10:49

## 2021-11-01 RX ADMIN — NYSTATIN: 100000 CREAM TOPICAL at 10:48

## 2021-11-01 RX ADMIN — METOCLOPRAMIDE 10 MG: 5 INJECTION, SOLUTION INTRAMUSCULAR; INTRAVENOUS at 07:57

## 2021-11-01 RX ADMIN — DIPHENHYDRAMINE HYDROCHLORIDE 25 MG: 50 INJECTION, SOLUTION INTRAMUSCULAR; INTRAVENOUS at 07:54

## 2021-11-01 RX ADMIN — INSULIN LISPRO 18 UNITS: 100 INJECTION, SOLUTION INTRAVENOUS; SUBCUTANEOUS at 12:22

## 2021-11-01 RX ADMIN — PANTOPRAZOLE SODIUM 40 MG: 40 TABLET, DELAYED RELEASE ORAL at 07:59

## 2021-11-02 ENCOUNTER — TRANSITIONAL CARE MANAGEMENT (OUTPATIENT)
Dept: INTERNAL MEDICINE CLINIC | Facility: CLINIC | Age: 56
End: 2021-11-02

## 2021-11-02 ENCOUNTER — TELEPHONE (OUTPATIENT)
Dept: LAB | Facility: HOSPITAL | Age: 56
End: 2021-11-02

## 2021-11-02 LAB
BACTERIA BLD CULT: NORMAL
BACTERIA BLD CULT: NORMAL

## 2021-11-04 ENCOUNTER — VBI (OUTPATIENT)
Dept: ADMINISTRATIVE | Facility: OTHER | Age: 56
End: 2021-11-04

## 2021-11-11 ENCOUNTER — TELEPHONE (OUTPATIENT)
Dept: INTERNAL MEDICINE CLINIC | Facility: CLINIC | Age: 56
End: 2021-11-11

## 2021-11-11 ENCOUNTER — APPOINTMENT (OUTPATIENT)
Dept: LAB | Facility: HOSPITAL | Age: 56
End: 2021-11-11
Payer: COMMERCIAL

## 2021-11-11 ENCOUNTER — TELEMEDICINE (OUTPATIENT)
Dept: INTERNAL MEDICINE CLINIC | Facility: CLINIC | Age: 56
End: 2021-11-11
Payer: COMMERCIAL

## 2021-11-11 DIAGNOSIS — N28.9 ACUTE KIDNEY INSUFFICIENCY: ICD-10-CM

## 2021-11-11 DIAGNOSIS — Z79.4 TYPE 2 DIABETES MELLITUS WITH HYPERGLYCEMIA, WITH LONG-TERM CURRENT USE OF INSULIN (HCC): ICD-10-CM

## 2021-11-11 DIAGNOSIS — L03.90 CELLULITIS, UNSPECIFIED CELLULITIS SITE: ICD-10-CM

## 2021-11-11 DIAGNOSIS — E11.65 TYPE 2 DIABETES MELLITUS WITH HYPERGLYCEMIA, WITH LONG-TERM CURRENT USE OF INSULIN (HCC): ICD-10-CM

## 2021-11-11 DIAGNOSIS — E78.00 PURE HYPERCHOLESTEROLEMIA: ICD-10-CM

## 2021-11-11 DIAGNOSIS — L03.116 CELLULITIS OF LEFT LOWER EXTREMITY: ICD-10-CM

## 2021-11-11 DIAGNOSIS — B49 FUNGAL INFECTION: Primary | ICD-10-CM

## 2021-11-11 DIAGNOSIS — R94.6 ABNORMAL THYROID FUNCTION TEST: ICD-10-CM

## 2021-11-11 LAB
ALBUMIN SERPL BCP-MCNC: 3.5 G/DL (ref 3.5–5)
ALP SERPL-CCNC: 81 U/L (ref 46–116)
ALT SERPL W P-5'-P-CCNC: 49 U/L (ref 12–78)
ANION GAP SERPL CALCULATED.3IONS-SCNC: 9 MMOL/L (ref 4–13)
AST SERPL W P-5'-P-CCNC: 38 U/L (ref 5–45)
BILIRUB SERPL-MCNC: 0.7 MG/DL (ref 0.2–1)
BUN SERPL-MCNC: 22 MG/DL (ref 5–25)
CALCIUM SERPL-MCNC: 9.9 MG/DL (ref 8.3–10.1)
CHLORIDE SERPL-SCNC: 101 MMOL/L (ref 100–108)
CHOLEST SERPL-MCNC: 204 MG/DL (ref 50–200)
CO2 SERPL-SCNC: 28 MMOL/L (ref 21–32)
CREAT SERPL-MCNC: 1.33 MG/DL (ref 0.6–1.3)
GFR SERPL CREATININE-BSD FRML MDRD: 60 ML/MIN/1.73SQ M
GLUCOSE SERPL-MCNC: 141 MG/DL (ref 65–140)
HDLC SERPL-MCNC: 36 MG/DL
LDLC SERPL CALC-MCNC: 125 MG/DL (ref 0–100)
NONHDLC SERPL-MCNC: 168 MG/DL
POTASSIUM SERPL-SCNC: 4.7 MMOL/L (ref 3.5–5.3)
PROT SERPL-MCNC: 9 G/DL (ref 6.4–8.2)
SODIUM SERPL-SCNC: 138 MMOL/L (ref 136–145)
T4 FREE SERPL-MCNC: 1.12 NG/DL (ref 0.76–1.46)
TRIGL SERPL-MCNC: 214 MG/DL
TSH SERPL DL<=0.05 MIU/L-ACNC: 6.26 UIU/ML (ref 0.36–3.74)

## 2021-11-11 PROCEDURE — 84443 ASSAY THYROID STIM HORMONE: CPT

## 2021-11-11 PROCEDURE — 80053 COMPREHEN METABOLIC PANEL: CPT

## 2021-11-11 PROCEDURE — 99212 OFFICE O/P EST SF 10 MIN: CPT | Performed by: HOSPITALIST

## 2021-11-11 PROCEDURE — 80061 LIPID PANEL: CPT

## 2021-11-11 PROCEDURE — 1111F DSCHRG MED/CURRENT MED MERGE: CPT | Performed by: HOSPITALIST

## 2021-11-11 PROCEDURE — 84439 ASSAY OF FREE THYROXINE: CPT

## 2021-11-11 PROCEDURE — 36415 COLL VENOUS BLD VENIPUNCTURE: CPT

## 2021-11-11 RX ORDER — NYSTATIN 100000 U/G
CREAM TOPICAL 2 TIMES DAILY PRN
Qty: 30 G | Refills: 1 | Status: SHIPPED | OUTPATIENT
Start: 2021-11-11 | End: 2022-04-20 | Stop reason: SDUPTHER

## 2021-11-11 RX ORDER — FLUCONAZOLE 150 MG/1
150 TABLET ORAL DAILY
Qty: 7 TABLET | Refills: 0 | Status: SHIPPED | OUTPATIENT
Start: 2021-11-11 | End: 2021-11-18

## 2021-11-12 ENCOUNTER — TELEPHONE (OUTPATIENT)
Dept: ENDOCRINOLOGY | Facility: CLINIC | Age: 56
End: 2021-11-12

## 2021-11-15 ENCOUNTER — TELEMEDICINE (OUTPATIENT)
Dept: INTERNAL MEDICINE CLINIC | Facility: CLINIC | Age: 56
End: 2021-11-15
Payer: COMMERCIAL

## 2021-11-15 DIAGNOSIS — R94.6 ABNORMAL THYROID FUNCTION TEST: ICD-10-CM

## 2021-11-15 DIAGNOSIS — E11.49 OTHER DIABETIC NEUROLOGICAL COMPLICATION ASSOCIATED WITH TYPE 2 DIABETES MELLITUS (HCC): ICD-10-CM

## 2021-11-15 DIAGNOSIS — N28.9 ACUTE KIDNEY INSUFFICIENCY: ICD-10-CM

## 2021-11-15 DIAGNOSIS — B49 FUNGAL INFECTION: ICD-10-CM

## 2021-11-15 DIAGNOSIS — L03.116 CELLULITIS OF LEFT LOWER EXTREMITY: Primary | ICD-10-CM

## 2021-11-15 PROCEDURE — 99495 TRANSJ CARE MGMT MOD F2F 14D: CPT | Performed by: INTERNAL MEDICINE

## 2021-11-15 PROCEDURE — 1111F DSCHRG MED/CURRENT MED MERGE: CPT | Performed by: INTERNAL MEDICINE

## 2021-11-15 RX ORDER — GABAPENTIN 300 MG/1
300 CAPSULE ORAL 2 TIMES DAILY
Qty: 180 CAPSULE | Refills: 2 | Status: SHIPPED | OUTPATIENT
Start: 2021-11-15 | End: 2022-07-12

## 2021-11-16 ENCOUNTER — TELEPHONE (OUTPATIENT)
Dept: INTERNAL MEDICINE CLINIC | Facility: CLINIC | Age: 56
End: 2021-11-16

## 2021-11-17 ENCOUNTER — TELEMEDICINE (OUTPATIENT)
Dept: INFECTIOUS DISEASES | Facility: CLINIC | Age: 56
End: 2021-11-17
Payer: COMMERCIAL

## 2021-11-17 DIAGNOSIS — I87.2 VENOUS INSUFFICIENCY: ICD-10-CM

## 2021-11-17 DIAGNOSIS — L03.116 CELLULITIS OF LEFT LOWER EXTREMITY: Primary | ICD-10-CM

## 2021-11-17 DIAGNOSIS — E66.01 MORBID (SEVERE) OBESITY DUE TO EXCESS CALORIES (HCC): ICD-10-CM

## 2021-11-17 DIAGNOSIS — B49 FUNGAL INFECTION: ICD-10-CM

## 2021-11-17 DIAGNOSIS — E66.01 MORBID OBESITY WITH BMI OF 60.0-69.9, ADULT (HCC): ICD-10-CM

## 2021-11-17 DIAGNOSIS — Z79.4 TYPE 2 DIABETES MELLITUS WITH HYPERGLYCEMIA, WITH LONG-TERM CURRENT USE OF INSULIN (HCC): Primary | ICD-10-CM

## 2021-11-17 DIAGNOSIS — L03.116 CELLULITIS OF LEFT LOWER EXTREMITY: ICD-10-CM

## 2021-11-17 DIAGNOSIS — E11.65 TYPE 2 DIABETES MELLITUS WITH HYPERGLYCEMIA, WITH LONG-TERM CURRENT USE OF INSULIN (HCC): Primary | ICD-10-CM

## 2021-11-17 PROCEDURE — 1036F TOBACCO NON-USER: CPT | Performed by: PHYSICIAN ASSISTANT

## 2021-11-17 PROCEDURE — 99213 OFFICE O/P EST LOW 20 MIN: CPT | Performed by: PHYSICIAN ASSISTANT

## 2021-11-17 PROCEDURE — 1111F DSCHRG MED/CURRENT MED MERGE: CPT | Performed by: PHYSICIAN ASSISTANT

## 2021-11-17 RX ORDER — CEPHALEXIN 500 MG/1
1000 CAPSULE ORAL EVERY 6 HOURS SCHEDULED
Qty: 56 CAPSULE | Refills: 0 | Status: SHIPPED | OUTPATIENT
Start: 2021-11-17 | End: 2021-11-26 | Stop reason: HOSPADM

## 2021-11-19 ENCOUNTER — HOSPITAL ENCOUNTER (INPATIENT)
Facility: HOSPITAL | Age: 56
LOS: 7 days | Discharge: HOME | DRG: 383 | End: 2021-11-26
Attending: EMERGENCY MEDICINE | Admitting: INTERNAL MEDICINE
Payer: COMMERCIAL

## 2021-11-19 ENCOUNTER — TELEPHONE (OUTPATIENT)
Dept: INFECTIOUS DISEASES | Facility: CLINIC | Age: 56
End: 2021-11-19

## 2021-11-19 DIAGNOSIS — B35.1 ONYCHOMYCOSIS: ICD-10-CM

## 2021-11-19 DIAGNOSIS — L03.115 CELLULITIS OF RIGHT LOWER EXTREMITY: Primary | ICD-10-CM

## 2021-11-19 DIAGNOSIS — B49 FUNGAL INFECTION: ICD-10-CM

## 2021-11-19 DIAGNOSIS — M79.606 LEG PAIN: ICD-10-CM

## 2021-11-19 DIAGNOSIS — L03.116 CELLULITIS OF LEFT LOWER EXTREMITY: ICD-10-CM

## 2021-11-19 PROBLEM — R79.89 ELEVATED SERUM CREATININE: Status: ACTIVE | Noted: 2021-11-19

## 2021-11-19 LAB
ALBUMIN SERPL BCP-MCNC: 2.7 G/DL (ref 3.5–5)
ALP SERPL-CCNC: 102 U/L (ref 46–116)
ALT SERPL W P-5'-P-CCNC: 44 U/L (ref 12–78)
ANION GAP SERPL CALCULATED.3IONS-SCNC: 8 MMOL/L (ref 4–13)
APTT PPP: 35 SECONDS (ref 23–37)
AST SERPL W P-5'-P-CCNC: 38 U/L (ref 5–45)
BASOPHILS # BLD AUTO: 0.05 THOUSANDS/ΜL (ref 0–0.1)
BASOPHILS NFR BLD AUTO: 1 % (ref 0–1)
BILIRUB SERPL-MCNC: 0.56 MG/DL (ref 0.2–1)
BUN SERPL-MCNC: 22 MG/DL (ref 5–25)
CALCIUM ALBUM COR SERPL-MCNC: 10.4 MG/DL (ref 8.3–10.1)
CALCIUM SERPL-MCNC: 9.4 MG/DL (ref 8.3–10.1)
CHLORIDE SERPL-SCNC: 100 MMOL/L (ref 100–108)
CO2 SERPL-SCNC: 29 MMOL/L (ref 21–32)
CREAT SERPL-MCNC: 1.43 MG/DL (ref 0.6–1.3)
EOSINOPHIL # BLD AUTO: 0.21 THOUSAND/ΜL (ref 0–0.61)
EOSINOPHIL NFR BLD AUTO: 2 % (ref 0–6)
ERYTHROCYTE [DISTWIDTH] IN BLOOD BY AUTOMATED COUNT: 14.4 % (ref 11.6–15.1)
GFR SERPL CREATININE-BSD FRML MDRD: 55 ML/MIN/1.73SQ M
GLUCOSE SERPL-MCNC: 127 MG/DL (ref 65–140)
GLUCOSE SERPL-MCNC: 179 MG/DL (ref 65–140)
HCT VFR BLD AUTO: 41.3 % (ref 36.5–49.3)
HGB BLD-MCNC: 13 G/DL (ref 12–17)
IMM GRANULOCYTES # BLD AUTO: 0.08 THOUSAND/UL (ref 0–0.2)
IMM GRANULOCYTES NFR BLD AUTO: 1 % (ref 0–2)
INR PPP: 1.12 (ref 0.84–1.19)
LACTATE SERPL-SCNC: 1.3 MMOL/L (ref 0.5–2)
LYMPHOCYTES # BLD AUTO: 1.52 THOUSANDS/ΜL (ref 0.6–4.47)
LYMPHOCYTES NFR BLD AUTO: 17 % (ref 14–44)
MCH RBC QN AUTO: 29.8 PG (ref 26.8–34.3)
MCHC RBC AUTO-ENTMCNC: 31.5 G/DL (ref 31.4–37.4)
MCV RBC AUTO: 95 FL (ref 82–98)
MONOCYTES # BLD AUTO: 1.08 THOUSAND/ΜL (ref 0.17–1.22)
MONOCYTES NFR BLD AUTO: 12 % (ref 4–12)
NEUTROPHILS # BLD AUTO: 6.13 THOUSANDS/ΜL (ref 1.85–7.62)
NEUTS SEG NFR BLD AUTO: 67 % (ref 43–75)
NRBC BLD AUTO-RTO: 0 /100 WBCS
PLATELET # BLD AUTO: 233 THOUSANDS/UL (ref 149–390)
PMV BLD AUTO: 8.7 FL (ref 8.9–12.7)
POTASSIUM SERPL-SCNC: 4.5 MMOL/L (ref 3.5–5.3)
PROT SERPL-MCNC: 8.4 G/DL (ref 6.4–8.2)
PROTHROMBIN TIME: 14.4 SECONDS (ref 11.6–14.5)
RBC # BLD AUTO: 4.36 MILLION/UL (ref 3.88–5.62)
SODIUM SERPL-SCNC: 137 MMOL/L (ref 136–145)
WBC # BLD AUTO: 9.07 THOUSAND/UL (ref 4.31–10.16)

## 2021-11-19 PROCEDURE — 85610 PROTHROMBIN TIME: CPT | Performed by: EMERGENCY MEDICINE

## 2021-11-19 PROCEDURE — 85730 THROMBOPLASTIN TIME PARTIAL: CPT | Performed by: EMERGENCY MEDICINE

## 2021-11-19 PROCEDURE — 80053 COMPREHEN METABOLIC PANEL: CPT | Performed by: EMERGENCY MEDICINE

## 2021-11-19 PROCEDURE — 85025 COMPLETE CBC W/AUTO DIFF WBC: CPT | Performed by: EMERGENCY MEDICINE

## 2021-11-19 PROCEDURE — 36415 COLL VENOUS BLD VENIPUNCTURE: CPT | Performed by: EMERGENCY MEDICINE

## 2021-11-19 PROCEDURE — 99223 1ST HOSP IP/OBS HIGH 75: CPT | Performed by: INTERNAL MEDICINE

## 2021-11-19 PROCEDURE — 99285 EMERGENCY DEPT VISIT HI MDM: CPT | Performed by: EMERGENCY MEDICINE

## 2021-11-19 PROCEDURE — 82948 REAGENT STRIP/BLOOD GLUCOSE: CPT

## 2021-11-19 PROCEDURE — 87040 BLOOD CULTURE FOR BACTERIA: CPT | Performed by: EMERGENCY MEDICINE

## 2021-11-19 PROCEDURE — 96365 THER/PROPH/DIAG IV INF INIT: CPT

## 2021-11-19 PROCEDURE — 83605 ASSAY OF LACTIC ACID: CPT | Performed by: EMERGENCY MEDICINE

## 2021-11-19 PROCEDURE — 99284 EMERGENCY DEPT VISIT MOD MDM: CPT

## 2021-11-19 RX ORDER — HEPARIN SODIUM 5000 [USP'U]/ML
7500 INJECTION, SOLUTION INTRAVENOUS; SUBCUTANEOUS EVERY 8 HOURS SCHEDULED
Status: DISCONTINUED | OUTPATIENT
Start: 2021-11-19 | End: 2021-11-26 | Stop reason: HOSPADM

## 2021-11-19 RX ORDER — NYSTATIN 100000 U/G
CREAM TOPICAL 2 TIMES DAILY
Status: DISCONTINUED | OUTPATIENT
Start: 2021-11-19 | End: 2021-11-22

## 2021-11-19 RX ORDER — OXYCODONE HYDROCHLORIDE 10 MG/1
10 TABLET ORAL EVERY 4 HOURS PRN
Status: DISCONTINUED | OUTPATIENT
Start: 2021-11-19 | End: 2021-11-26 | Stop reason: HOSPADM

## 2021-11-19 RX ORDER — HYDROMORPHONE HCL/PF 1 MG/ML
0.5 SYRINGE (ML) INJECTION
Status: DISCONTINUED | OUTPATIENT
Start: 2021-11-19 | End: 2021-11-20

## 2021-11-19 RX ORDER — GABAPENTIN 300 MG/1
300 CAPSULE ORAL 2 TIMES DAILY
Status: DISCONTINUED | OUTPATIENT
Start: 2021-11-19 | End: 2021-11-20

## 2021-11-19 RX ORDER — CEFAZOLIN SODIUM 2 G/50ML
2000 SOLUTION INTRAVENOUS EVERY 8 HOURS
Status: DISCONTINUED | OUTPATIENT
Start: 2021-11-20 | End: 2021-11-20

## 2021-11-19 RX ORDER — CEFAZOLIN SODIUM 2 G/50ML
2000 SOLUTION INTRAVENOUS ONCE
Status: COMPLETED | OUTPATIENT
Start: 2021-11-19 | End: 2021-11-19

## 2021-11-19 RX ORDER — OXYCODONE HYDROCHLORIDE 5 MG/1
5 TABLET ORAL EVERY 4 HOURS PRN
Status: DISCONTINUED | OUTPATIENT
Start: 2021-11-19 | End: 2021-11-26 | Stop reason: HOSPADM

## 2021-11-19 RX ORDER — ALBUTEROL SULFATE 90 UG/1
2 AEROSOL, METERED RESPIRATORY (INHALATION) EVERY 6 HOURS PRN
Status: DISCONTINUED | OUTPATIENT
Start: 2021-11-19 | End: 2021-11-26 | Stop reason: HOSPADM

## 2021-11-19 RX ORDER — ACETAMINOPHEN 325 MG/1
650 TABLET ORAL EVERY 6 HOURS PRN
Status: DISCONTINUED | OUTPATIENT
Start: 2021-11-19 | End: 2021-11-26 | Stop reason: HOSPADM

## 2021-11-19 RX ORDER — INSULIN GLARGINE 100 [IU]/ML
46 INJECTION, SOLUTION SUBCUTANEOUS
Status: DISCONTINUED | OUTPATIENT
Start: 2021-11-19 | End: 2021-11-19

## 2021-11-19 RX ORDER — PANTOPRAZOLE SODIUM 40 MG/1
40 TABLET, DELAYED RELEASE ORAL
Status: DISCONTINUED | OUTPATIENT
Start: 2021-11-20 | End: 2021-11-26 | Stop reason: HOSPADM

## 2021-11-19 RX ORDER — HYDRALAZINE HYDROCHLORIDE 20 MG/ML
5 INJECTION INTRAMUSCULAR; INTRAVENOUS EVERY 6 HOURS PRN
Status: DISCONTINUED | OUTPATIENT
Start: 2021-11-19 | End: 2021-11-26 | Stop reason: HOSPADM

## 2021-11-19 RX ADMIN — GABAPENTIN 300 MG: 300 CAPSULE ORAL at 20:27

## 2021-11-19 RX ADMIN — CEFAZOLIN SODIUM 2000 MG: 2 SOLUTION INTRAVENOUS at 18:00

## 2021-11-19 RX ADMIN — HEPARIN SODIUM 7500 UNITS: 5000 INJECTION INTRAVENOUS; SUBCUTANEOUS at 23:00

## 2021-11-19 RX ADMIN — OXYCODONE HYDROCHLORIDE 10 MG: 10 TABLET ORAL at 22:28

## 2021-11-19 RX ADMIN — ACETAMINOPHEN 650 MG: 325 TABLET, FILM COATED ORAL at 20:27

## 2021-11-20 LAB
ANION GAP SERPL CALCULATED.3IONS-SCNC: 8 MMOL/L (ref 4–13)
BUN SERPL-MCNC: 23 MG/DL (ref 5–25)
CALCIUM SERPL-MCNC: 9.2 MG/DL (ref 8.3–10.1)
CHLORIDE SERPL-SCNC: 101 MMOL/L (ref 100–108)
CO2 SERPL-SCNC: 28 MMOL/L (ref 21–32)
CREAT SERPL-MCNC: 1.38 MG/DL (ref 0.6–1.3)
ERYTHROCYTE [DISTWIDTH] IN BLOOD BY AUTOMATED COUNT: 14.4 % (ref 11.6–15.1)
GFR SERPL CREATININE-BSD FRML MDRD: 58 ML/MIN/1.73SQ M
GLUCOSE SERPL-MCNC: 121 MG/DL (ref 65–140)
GLUCOSE SERPL-MCNC: 134 MG/DL (ref 65–140)
GLUCOSE SERPL-MCNC: 138 MG/DL (ref 65–140)
GLUCOSE SERPL-MCNC: 150 MG/DL (ref 65–140)
GLUCOSE SERPL-MCNC: 176 MG/DL (ref 65–140)
HCT VFR BLD AUTO: 39.3 % (ref 36.5–49.3)
HGB BLD-MCNC: 12.2 G/DL (ref 12–17)
MCH RBC QN AUTO: 29.3 PG (ref 26.8–34.3)
MCHC RBC AUTO-ENTMCNC: 31 G/DL (ref 31.4–37.4)
MCV RBC AUTO: 94 FL (ref 82–98)
PLATELET # BLD AUTO: 222 THOUSANDS/UL (ref 149–390)
PLATELET # BLD AUTO: 225 THOUSANDS/UL (ref 149–390)
PMV BLD AUTO: 8.6 FL (ref 8.9–12.7)
PMV BLD AUTO: 9 FL (ref 8.9–12.7)
POTASSIUM SERPL-SCNC: 4.8 MMOL/L (ref 3.5–5.3)
PROCALCITONIN SERPL-MCNC: 0.26 NG/ML
RBC # BLD AUTO: 4.17 MILLION/UL (ref 3.88–5.62)
SODIUM SERPL-SCNC: 137 MMOL/L (ref 136–145)
WBC # BLD AUTO: 8.68 THOUSAND/UL (ref 4.31–10.16)

## 2021-11-20 PROCEDURE — 84145 PROCALCITONIN (PCT): CPT | Performed by: INTERNAL MEDICINE

## 2021-11-20 PROCEDURE — 36415 COLL VENOUS BLD VENIPUNCTURE: CPT | Performed by: INTERNAL MEDICINE

## 2021-11-20 PROCEDURE — 99232 SBSQ HOSP IP/OBS MODERATE 35: CPT | Performed by: INTERNAL MEDICINE

## 2021-11-20 PROCEDURE — 85027 COMPLETE CBC AUTOMATED: CPT | Performed by: INTERNAL MEDICINE

## 2021-11-20 PROCEDURE — 82948 REAGENT STRIP/BLOOD GLUCOSE: CPT

## 2021-11-20 PROCEDURE — 85049 AUTOMATED PLATELET COUNT: CPT | Performed by: INTERNAL MEDICINE

## 2021-11-20 PROCEDURE — 80048 BASIC METABOLIC PNL TOTAL CA: CPT | Performed by: INTERNAL MEDICINE

## 2021-11-20 RX ORDER — LORATADINE 10 MG/1
10 TABLET ORAL DAILY
Status: DISCONTINUED | OUTPATIENT
Start: 2021-11-20 | End: 2021-11-26 | Stop reason: HOSPADM

## 2021-11-20 RX ORDER — GABAPENTIN 400 MG/1
400 CAPSULE ORAL 2 TIMES DAILY
Status: DISCONTINUED | OUTPATIENT
Start: 2021-11-20 | End: 2021-11-26 | Stop reason: HOSPADM

## 2021-11-20 RX ORDER — CEFAZOLIN SODIUM 2 G/50ML
2000 SOLUTION INTRAVENOUS EVERY 6 HOURS
Status: DISCONTINUED | OUTPATIENT
Start: 2021-11-20 | End: 2021-11-26 | Stop reason: HOSPADM

## 2021-11-20 RX ORDER — INSULIN GLARGINE 100 [IU]/ML
46 INJECTION, SOLUTION SUBCUTANEOUS
Status: DISCONTINUED | OUTPATIENT
Start: 2021-11-20 | End: 2021-11-26 | Stop reason: HOSPADM

## 2021-11-20 RX ORDER — INSULIN GLARGINE 100 [IU]/ML
15 INJECTION, SOLUTION SUBCUTANEOUS ONCE
Status: DISCONTINUED | OUTPATIENT
Start: 2021-11-20 | End: 2021-11-25

## 2021-11-20 RX ORDER — MAGNESIUM SULFATE HEPTAHYDRATE 40 MG/ML
2 INJECTION, SOLUTION INTRAVENOUS
Status: COMPLETED | OUTPATIENT
Start: 2021-11-20 | End: 2021-11-22

## 2021-11-20 RX ORDER — CYCLOBENZAPRINE HCL 10 MG
10 TABLET ORAL EVERY MORNING
Status: COMPLETED | OUTPATIENT
Start: 2021-11-20 | End: 2021-11-22

## 2021-11-20 RX ORDER — FLUTICASONE PROPIONATE 50 MCG
1 SPRAY, SUSPENSION (ML) NASAL 2 TIMES DAILY
Status: DISCONTINUED | OUTPATIENT
Start: 2021-11-20 | End: 2021-11-26 | Stop reason: HOSPADM

## 2021-11-20 RX ORDER — GABAPENTIN 100 MG/1
100 CAPSULE ORAL ONCE
Status: COMPLETED | OUTPATIENT
Start: 2021-11-20 | End: 2021-11-20

## 2021-11-20 RX ADMIN — INSULIN LISPRO 18 UNITS: 100 INJECTION, SOLUTION INTRAVENOUS; SUBCUTANEOUS at 15:54

## 2021-11-20 RX ADMIN — INSULIN LISPRO 18 UNITS: 100 INJECTION, SOLUTION INTRAVENOUS; SUBCUTANEOUS at 19:47

## 2021-11-20 RX ADMIN — PANTOPRAZOLE SODIUM 40 MG: 40 TABLET, DELAYED RELEASE ORAL at 15:48

## 2021-11-20 RX ADMIN — INSULIN GLARGINE 46 UNITS: 100 INJECTION, SOLUTION SUBCUTANEOUS at 22:34

## 2021-11-20 RX ADMIN — CEFAZOLIN SODIUM 2000 MG: 2 SOLUTION INTRAVENOUS at 22:34

## 2021-11-20 RX ADMIN — GABAPENTIN 100 MG: 100 CAPSULE ORAL at 12:34

## 2021-11-20 RX ADMIN — NYSTATIN 1 APPLICATION: 100000 CREAM TOPICAL at 18:34

## 2021-11-20 RX ADMIN — OXYCODONE HYDROCHLORIDE 10 MG: 10 TABLET ORAL at 08:38

## 2021-11-20 RX ADMIN — PANTOPRAZOLE SODIUM 40 MG: 40 TABLET, DELAYED RELEASE ORAL at 08:38

## 2021-11-20 RX ADMIN — OXYCODONE HYDROCHLORIDE 10 MG: 10 TABLET ORAL at 19:46

## 2021-11-20 RX ADMIN — GABAPENTIN 300 MG: 300 CAPSULE ORAL at 08:38

## 2021-11-20 RX ADMIN — LORATADINE 10 MG: 10 TABLET ORAL at 10:11

## 2021-11-20 RX ADMIN — MAGNESIUM SULFATE HEPTAHYDRATE 2 G: 40 INJECTION, SOLUTION INTRAVENOUS at 12:33

## 2021-11-20 RX ADMIN — GABAPENTIN 400 MG: 400 CAPSULE ORAL at 18:33

## 2021-11-20 RX ADMIN — FLUTICASONE PROPIONATE 1 SPRAY: 50 SPRAY, METERED NASAL at 18:33

## 2021-11-20 RX ADMIN — INSULIN LISPRO 18 UNITS: 100 INJECTION, SOLUTION INTRAVENOUS; SUBCUTANEOUS at 09:46

## 2021-11-20 RX ADMIN — HEPARIN SODIUM 7500 UNITS: 5000 INJECTION INTRAVENOUS; SUBCUTANEOUS at 06:51

## 2021-11-20 RX ADMIN — CEFAZOLIN SODIUM 2000 MG: 2 SOLUTION INTRAVENOUS at 18:34

## 2021-11-20 RX ADMIN — HYDROMORPHONE HYDROCHLORIDE 0.5 MG: 1 INJECTION, SOLUTION INTRAMUSCULAR; INTRAVENOUS; SUBCUTANEOUS at 04:35

## 2021-11-20 RX ADMIN — OXYCODONE HYDROCHLORIDE 10 MG: 10 TABLET ORAL at 15:48

## 2021-11-20 RX ADMIN — HEPARIN SODIUM 7500 UNITS: 5000 INJECTION INTRAVENOUS; SUBCUTANEOUS at 15:50

## 2021-11-20 RX ADMIN — HEPARIN SODIUM 7500 UNITS: 5000 INJECTION INTRAVENOUS; SUBCUTANEOUS at 22:33

## 2021-11-20 RX ADMIN — CEFAZOLIN SODIUM 2000 MG: 2 SOLUTION INTRAVENOUS at 09:49

## 2021-11-20 RX ADMIN — CEFAZOLIN SODIUM 2000 MG: 2 SOLUTION INTRAVENOUS at 01:56

## 2021-11-20 RX ADMIN — NYSTATIN: 100000 CREAM TOPICAL at 12:34

## 2021-11-20 RX ADMIN — PSYLLIUM HUSK 1 PACKET: 3.4 POWDER ORAL at 22:32

## 2021-11-20 RX ADMIN — FLUTICASONE PROPIONATE 1 SPRAY: 50 SPRAY, METERED NASAL at 10:11

## 2021-11-20 RX ADMIN — CYCLOBENZAPRINE HYDROCHLORIDE 10 MG: 10 TABLET, FILM COATED ORAL at 10:11

## 2021-11-21 LAB
ANION GAP SERPL CALCULATED.3IONS-SCNC: 7 MMOL/L (ref 4–13)
BUN SERPL-MCNC: 22 MG/DL (ref 5–25)
CALCIUM SERPL-MCNC: 8.9 MG/DL (ref 8.3–10.1)
CHLORIDE SERPL-SCNC: 100 MMOL/L (ref 100–108)
CO2 SERPL-SCNC: 27 MMOL/L (ref 21–32)
CREAT SERPL-MCNC: 1.28 MG/DL (ref 0.6–1.3)
GFR SERPL CREATININE-BSD FRML MDRD: 63 ML/MIN/1.73SQ M
GLUCOSE SERPL-MCNC: 137 MG/DL (ref 65–140)
GLUCOSE SERPL-MCNC: 137 MG/DL (ref 65–140)
GLUCOSE SERPL-MCNC: 180 MG/DL (ref 65–140)
GLUCOSE SERPL-MCNC: 180 MG/DL (ref 65–140)
GLUCOSE SERPL-MCNC: 185 MG/DL (ref 65–140)
GLUCOSE SERPL-MCNC: 219 MG/DL (ref 65–140)
POTASSIUM SERPL-SCNC: 4.5 MMOL/L (ref 3.5–5.3)
PROCALCITONIN SERPL-MCNC: 0.23 NG/ML
SODIUM SERPL-SCNC: 134 MMOL/L (ref 136–145)

## 2021-11-21 PROCEDURE — 80048 BASIC METABOLIC PNL TOTAL CA: CPT | Performed by: INTERNAL MEDICINE

## 2021-11-21 PROCEDURE — 82948 REAGENT STRIP/BLOOD GLUCOSE: CPT

## 2021-11-21 PROCEDURE — 99254 IP/OBS CNSLTJ NEW/EST MOD 60: CPT | Performed by: INTERNAL MEDICINE

## 2021-11-21 PROCEDURE — 99232 SBSQ HOSP IP/OBS MODERATE 35: CPT

## 2021-11-21 PROCEDURE — 84145 PROCALCITONIN (PCT): CPT | Performed by: INTERNAL MEDICINE

## 2021-11-21 RX ORDER — DIPHENHYDRAMINE HYDROCHLORIDE 50 MG/ML
12.5 INJECTION INTRAMUSCULAR; INTRAVENOUS ONCE
Status: COMPLETED | OUTPATIENT
Start: 2021-11-21 | End: 2021-11-21

## 2021-11-21 RX ORDER — KETOROLAC TROMETHAMINE 30 MG/ML
30 INJECTION, SOLUTION INTRAMUSCULAR; INTRAVENOUS ONCE
Status: COMPLETED | OUTPATIENT
Start: 2021-11-21 | End: 2021-11-21

## 2021-11-21 RX ORDER — METOCLOPRAMIDE HYDROCHLORIDE 5 MG/ML
10 INJECTION INTRAMUSCULAR; INTRAVENOUS ONCE
Status: COMPLETED | OUTPATIENT
Start: 2021-11-21 | End: 2021-11-21

## 2021-11-21 RX ADMIN — PANTOPRAZOLE SODIUM 40 MG: 40 TABLET, DELAYED RELEASE ORAL at 06:27

## 2021-11-21 RX ADMIN — NYSTATIN: 100000 CREAM TOPICAL at 17:43

## 2021-11-21 RX ADMIN — GABAPENTIN 400 MG: 400 CAPSULE ORAL at 08:27

## 2021-11-21 RX ADMIN — HEPARIN SODIUM 7500 UNITS: 5000 INJECTION INTRAVENOUS; SUBCUTANEOUS at 17:41

## 2021-11-21 RX ADMIN — PANTOPRAZOLE SODIUM 40 MG: 40 TABLET, DELAYED RELEASE ORAL at 17:42

## 2021-11-21 RX ADMIN — HEPARIN SODIUM 7500 UNITS: 5000 INJECTION INTRAVENOUS; SUBCUTANEOUS at 22:12

## 2021-11-21 RX ADMIN — DIPHENHYDRAMINE HYDROCHLORIDE 12.5 MG: 50 INJECTION, SOLUTION INTRAMUSCULAR; INTRAVENOUS at 06:25

## 2021-11-21 RX ADMIN — INSULIN GLARGINE 46 UNITS: 100 INJECTION, SOLUTION SUBCUTANEOUS at 22:12

## 2021-11-21 RX ADMIN — GABAPENTIN 400 MG: 400 CAPSULE ORAL at 17:42

## 2021-11-21 RX ADMIN — KETOROLAC TROMETHAMINE 30 MG: 30 INJECTION, SOLUTION INTRAMUSCULAR at 06:27

## 2021-11-21 RX ADMIN — METOCLOPRAMIDE 10 MG: 5 INJECTION, SOLUTION INTRAMUSCULAR; INTRAVENOUS at 06:25

## 2021-11-21 RX ADMIN — OXYCODONE HYDROCHLORIDE 10 MG: 10 TABLET ORAL at 11:06

## 2021-11-21 RX ADMIN — CEFAZOLIN SODIUM 2000 MG: 2 SOLUTION INTRAVENOUS at 22:19

## 2021-11-21 RX ADMIN — OXYCODONE HYDROCHLORIDE 10 MG: 10 TABLET ORAL at 15:13

## 2021-11-21 RX ADMIN — MAGNESIUM SULFATE HEPTAHYDRATE 2 G: 40 INJECTION, SOLUTION INTRAVENOUS at 08:27

## 2021-11-21 RX ADMIN — CYCLOBENZAPRINE HYDROCHLORIDE 10 MG: 10 TABLET, FILM COATED ORAL at 08:27

## 2021-11-21 RX ADMIN — INSULIN LISPRO 18 UNITS: 100 INJECTION, SOLUTION INTRAVENOUS; SUBCUTANEOUS at 12:18

## 2021-11-21 RX ADMIN — INSULIN LISPRO 4 UNITS: 100 INJECTION, SOLUTION INTRAVENOUS; SUBCUTANEOUS at 22:13

## 2021-11-21 RX ADMIN — INSULIN LISPRO 18 UNITS: 100 INJECTION, SOLUTION INTRAVENOUS; SUBCUTANEOUS at 19:36

## 2021-11-21 RX ADMIN — INSULIN LISPRO 18 UNITS: 100 INJECTION, SOLUTION INTRAVENOUS; SUBCUTANEOUS at 08:40

## 2021-11-21 RX ADMIN — OXYCODONE HYDROCHLORIDE 10 MG: 10 TABLET ORAL at 05:12

## 2021-11-21 RX ADMIN — PSYLLIUM HUSK 1 PACKET: 3.4 POWDER ORAL at 22:12

## 2021-11-21 RX ADMIN — CEFAZOLIN SODIUM 2000 MG: 2 SOLUTION INTRAVENOUS at 05:13

## 2021-11-21 RX ADMIN — CEFAZOLIN SODIUM 2000 MG: 2 SOLUTION INTRAVENOUS at 11:07

## 2021-11-21 RX ADMIN — INSULIN LISPRO 4 UNITS: 100 INJECTION, SOLUTION INTRAVENOUS; SUBCUTANEOUS at 12:18

## 2021-11-21 RX ADMIN — INSULIN LISPRO 2 UNITS: 100 INJECTION, SOLUTION INTRAVENOUS; SUBCUTANEOUS at 19:37

## 2021-11-21 RX ADMIN — NYSTATIN: 100000 CREAM TOPICAL at 08:29

## 2021-11-21 RX ADMIN — LORATADINE 10 MG: 10 TABLET ORAL at 08:27

## 2021-11-21 RX ADMIN — OXYCODONE HYDROCHLORIDE 10 MG: 10 TABLET ORAL at 19:43

## 2021-11-21 RX ADMIN — FLUTICASONE PROPIONATE 1 SPRAY: 50 SPRAY, METERED NASAL at 17:41

## 2021-11-21 RX ADMIN — CEFAZOLIN SODIUM 2000 MG: 2 SOLUTION INTRAVENOUS at 17:42

## 2021-11-21 RX ADMIN — FLUTICASONE PROPIONATE 1 SPRAY: 50 SPRAY, METERED NASAL at 08:28

## 2021-11-21 RX ADMIN — HEPARIN SODIUM 7500 UNITS: 5000 INJECTION INTRAVENOUS; SUBCUTANEOUS at 05:13

## 2021-11-22 LAB
ANION GAP SERPL CALCULATED.3IONS-SCNC: 8 MMOL/L (ref 4–13)
BUN SERPL-MCNC: 20 MG/DL (ref 5–25)
CALCIUM SERPL-MCNC: 8.8 MG/DL (ref 8.3–10.1)
CHLORIDE SERPL-SCNC: 100 MMOL/L (ref 100–108)
CO2 SERPL-SCNC: 25 MMOL/L (ref 21–32)
CREAT SERPL-MCNC: 1.13 MG/DL (ref 0.6–1.3)
GFR SERPL CREATININE-BSD FRML MDRD: 73 ML/MIN/1.73SQ M
GLUCOSE SERPL-MCNC: 127 MG/DL (ref 65–140)
GLUCOSE SERPL-MCNC: 160 MG/DL (ref 65–140)
GLUCOSE SERPL-MCNC: 167 MG/DL (ref 65–140)
GLUCOSE SERPL-MCNC: 177 MG/DL (ref 65–140)
POTASSIUM SERPL-SCNC: 4.5 MMOL/L (ref 3.5–5.3)
SODIUM SERPL-SCNC: 133 MMOL/L (ref 136–145)

## 2021-11-22 PROCEDURE — 80048 BASIC METABOLIC PNL TOTAL CA: CPT

## 2021-11-22 PROCEDURE — 82948 REAGENT STRIP/BLOOD GLUCOSE: CPT

## 2021-11-22 PROCEDURE — 99232 SBSQ HOSP IP/OBS MODERATE 35: CPT | Performed by: INTERNAL MEDICINE

## 2021-11-22 RX ORDER — KETOROLAC TROMETHAMINE 30 MG/ML
30 INJECTION, SOLUTION INTRAMUSCULAR; INTRAVENOUS EVERY 8 HOURS
Status: DISCONTINUED | OUTPATIENT
Start: 2021-11-22 | End: 2021-11-23

## 2021-11-22 RX ORDER — DIPHENHYDRAMINE HYDROCHLORIDE 50 MG/ML
25 INJECTION INTRAMUSCULAR; INTRAVENOUS EVERY 8 HOURS PRN
Status: DISCONTINUED | OUTPATIENT
Start: 2021-11-22 | End: 2021-11-23

## 2021-11-22 RX ORDER — METOCLOPRAMIDE HYDROCHLORIDE 5 MG/ML
10 INJECTION INTRAMUSCULAR; INTRAVENOUS EVERY 8 HOURS SCHEDULED
Status: DISCONTINUED | OUTPATIENT
Start: 2021-11-22 | End: 2021-11-23

## 2021-11-22 RX ADMIN — LORATADINE 10 MG: 10 TABLET ORAL at 08:59

## 2021-11-22 RX ADMIN — HEPARIN SODIUM 7500 UNITS: 5000 INJECTION INTRAVENOUS; SUBCUTANEOUS at 06:07

## 2021-11-22 RX ADMIN — GABAPENTIN 400 MG: 400 CAPSULE ORAL at 08:59

## 2021-11-22 RX ADMIN — CEFAZOLIN SODIUM 2000 MG: 2 SOLUTION INTRAVENOUS at 06:07

## 2021-11-22 RX ADMIN — INSULIN LISPRO 18 UNITS: 100 INJECTION, SOLUTION INTRAVENOUS; SUBCUTANEOUS at 13:13

## 2021-11-22 RX ADMIN — GABAPENTIN 400 MG: 400 CAPSULE ORAL at 17:06

## 2021-11-22 RX ADMIN — CEFAZOLIN SODIUM 2000 MG: 2 SOLUTION INTRAVENOUS at 11:44

## 2021-11-22 RX ADMIN — OXYCODONE HYDROCHLORIDE 10 MG: 10 TABLET ORAL at 06:14

## 2021-11-22 RX ADMIN — INSULIN LISPRO 8 UNITS: 100 INJECTION, SOLUTION INTRAVENOUS; SUBCUTANEOUS at 23:56

## 2021-11-22 RX ADMIN — HEPARIN SODIUM 7500 UNITS: 5000 INJECTION INTRAVENOUS; SUBCUTANEOUS at 13:12

## 2021-11-22 RX ADMIN — HEPARIN SODIUM 7500 UNITS: 5000 INJECTION INTRAVENOUS; SUBCUTANEOUS at 23:57

## 2021-11-22 RX ADMIN — PANTOPRAZOLE SODIUM 40 MG: 40 TABLET, DELAYED RELEASE ORAL at 06:07

## 2021-11-22 RX ADMIN — INSULIN LISPRO 18 UNITS: 100 INJECTION, SOLUTION INTRAVENOUS; SUBCUTANEOUS at 09:01

## 2021-11-22 RX ADMIN — CEFAZOLIN SODIUM 2000 MG: 2 SOLUTION INTRAVENOUS at 17:06

## 2021-11-22 RX ADMIN — PANTOPRAZOLE SODIUM 40 MG: 40 TABLET, DELAYED RELEASE ORAL at 17:06

## 2021-11-22 RX ADMIN — KETOROLAC TROMETHAMINE 30 MG: 30 INJECTION, SOLUTION INTRAMUSCULAR at 20:37

## 2021-11-22 RX ADMIN — OXYCODONE HYDROCHLORIDE 10 MG: 10 TABLET ORAL at 11:43

## 2021-11-22 RX ADMIN — FLUTICASONE PROPIONATE 1 SPRAY: 50 SPRAY, METERED NASAL at 17:06

## 2021-11-22 RX ADMIN — OXYCODONE HYDROCHLORIDE 10 MG: 10 TABLET ORAL at 17:05

## 2021-11-22 RX ADMIN — CEFAZOLIN SODIUM 2000 MG: 2 SOLUTION INTRAVENOUS at 23:53

## 2021-11-22 RX ADMIN — CYCLOBENZAPRINE HYDROCHLORIDE 10 MG: 10 TABLET, FILM COATED ORAL at 08:59

## 2021-11-22 RX ADMIN — NYSTATIN: 100000 CREAM TOPICAL at 17:07

## 2021-11-22 RX ADMIN — INSULIN LISPRO 4 UNITS: 100 INJECTION, SOLUTION INTRAVENOUS; SUBCUTANEOUS at 09:02

## 2021-11-22 RX ADMIN — MAGNESIUM SULFATE HEPTAHYDRATE 2 G: 40 INJECTION, SOLUTION INTRAVENOUS at 08:59

## 2021-11-23 PROBLEM — G43.909 MIGRAINE HEADACHE: Status: ACTIVE | Noted: 2021-11-23

## 2021-11-23 LAB
ANION GAP SERPL CALCULATED.3IONS-SCNC: 6 MMOL/L (ref 4–13)
BUN SERPL-MCNC: 20 MG/DL (ref 5–25)
CALCIUM SERPL-MCNC: 8.8 MG/DL (ref 8.3–10.1)
CHLORIDE SERPL-SCNC: 101 MMOL/L (ref 100–108)
CO2 SERPL-SCNC: 26 MMOL/L (ref 21–32)
CREAT SERPL-MCNC: 1.21 MG/DL (ref 0.6–1.3)
ERYTHROCYTE [DISTWIDTH] IN BLOOD BY AUTOMATED COUNT: 13.9 % (ref 11.6–15.1)
GFR SERPL CREATININE-BSD FRML MDRD: 67 ML/MIN/1.73SQ M
GLUCOSE SERPL-MCNC: 140 MG/DL (ref 65–140)
GLUCOSE SERPL-MCNC: 156 MG/DL (ref 65–140)
GLUCOSE SERPL-MCNC: 190 MG/DL (ref 65–140)
GLUCOSE SERPL-MCNC: 224 MG/DL (ref 65–140)
GLUCOSE SERPL-MCNC: 268 MG/DL (ref 65–140)
HCT VFR BLD AUTO: 38.2 % (ref 36.5–49.3)
HGB BLD-MCNC: 12 G/DL (ref 12–17)
MCH RBC QN AUTO: 29.8 PG (ref 26.8–34.3)
MCHC RBC AUTO-ENTMCNC: 31.4 G/DL (ref 31.4–37.4)
MCV RBC AUTO: 95 FL (ref 82–98)
PLATELET # BLD AUTO: 250 THOUSANDS/UL (ref 149–390)
PMV BLD AUTO: 8.8 FL (ref 8.9–12.7)
POTASSIUM SERPL-SCNC: 4.5 MMOL/L (ref 3.5–5.3)
RBC # BLD AUTO: 4.03 MILLION/UL (ref 3.88–5.62)
SODIUM SERPL-SCNC: 133 MMOL/L (ref 136–145)
WBC # BLD AUTO: 7.42 THOUSAND/UL (ref 4.31–10.16)

## 2021-11-23 PROCEDURE — 82948 REAGENT STRIP/BLOOD GLUCOSE: CPT

## 2021-11-23 PROCEDURE — 85027 COMPLETE CBC AUTOMATED: CPT

## 2021-11-23 PROCEDURE — 99232 SBSQ HOSP IP/OBS MODERATE 35: CPT | Performed by: INTERNAL MEDICINE

## 2021-11-23 PROCEDURE — 80048 BASIC METABOLIC PNL TOTAL CA: CPT

## 2021-11-23 RX ORDER — KETOROLAC TROMETHAMINE 30 MG/ML
30 INJECTION, SOLUTION INTRAMUSCULAR; INTRAVENOUS EVERY 8 HOURS
Status: DISCONTINUED | OUTPATIENT
Start: 2021-11-23 | End: 2021-11-24

## 2021-11-23 RX ORDER — METOCLOPRAMIDE HYDROCHLORIDE 5 MG/ML
5 INJECTION INTRAMUSCULAR; INTRAVENOUS EVERY 8 HOURS SCHEDULED
Status: COMPLETED | OUTPATIENT
Start: 2021-11-23 | End: 2021-11-24

## 2021-11-23 RX ORDER — DIPHENHYDRAMINE HYDROCHLORIDE 50 MG/ML
25 INJECTION INTRAMUSCULAR; INTRAVENOUS EVERY 8 HOURS PRN
Status: DISPENSED | OUTPATIENT
Start: 2021-11-23 | End: 2021-11-25

## 2021-11-23 RX ORDER — HYDROMORPHONE HCL/PF 1 MG/ML
0.2 SYRINGE (ML) INJECTION ONCE
Status: COMPLETED | OUTPATIENT
Start: 2021-11-23 | End: 2021-11-23

## 2021-11-23 RX ADMIN — HYDROMORPHONE HYDROCHLORIDE 0.2 MG: 1 INJECTION, SOLUTION INTRAMUSCULAR; INTRAVENOUS; SUBCUTANEOUS at 12:01

## 2021-11-23 RX ADMIN — INSULIN LISPRO 4 UNITS: 100 INJECTION, SOLUTION INTRAVENOUS; SUBCUTANEOUS at 08:08

## 2021-11-23 RX ADMIN — FLUTICASONE PROPIONATE 1 SPRAY: 50 SPRAY, METERED NASAL at 17:20

## 2021-11-23 RX ADMIN — CEFAZOLIN SODIUM 2000 MG: 2 SOLUTION INTRAVENOUS at 06:12

## 2021-11-23 RX ADMIN — INSULIN LISPRO 18 UNITS: 100 INJECTION, SOLUTION INTRAVENOUS; SUBCUTANEOUS at 22:01

## 2021-11-23 RX ADMIN — HEPARIN SODIUM 7500 UNITS: 5000 INJECTION INTRAVENOUS; SUBCUTANEOUS at 14:57

## 2021-11-23 RX ADMIN — CEFAZOLIN SODIUM 2000 MG: 2 SOLUTION INTRAVENOUS at 16:24

## 2021-11-23 RX ADMIN — OXYCODONE HYDROCHLORIDE 10 MG: 10 TABLET ORAL at 16:33

## 2021-11-23 RX ADMIN — CEFAZOLIN SODIUM 2000 MG: 2 SOLUTION INTRAVENOUS at 11:31

## 2021-11-23 RX ADMIN — KETOROLAC TROMETHAMINE 30 MG: 30 INJECTION, SOLUTION INTRAMUSCULAR at 12:02

## 2021-11-23 RX ADMIN — GABAPENTIN 400 MG: 400 CAPSULE ORAL at 17:19

## 2021-11-23 RX ADMIN — HEPARIN SODIUM 7500 UNITS: 5000 INJECTION INTRAVENOUS; SUBCUTANEOUS at 06:12

## 2021-11-23 RX ADMIN — CEFAZOLIN SODIUM 2000 MG: 2 SOLUTION INTRAVENOUS at 23:15

## 2021-11-23 RX ADMIN — OXYCODONE HYDROCHLORIDE 10 MG: 10 TABLET ORAL at 08:31

## 2021-11-23 RX ADMIN — PANTOPRAZOLE SODIUM 40 MG: 40 TABLET, DELAYED RELEASE ORAL at 16:24

## 2021-11-23 RX ADMIN — LORATADINE 10 MG: 10 TABLET ORAL at 08:07

## 2021-11-23 RX ADMIN — PANTOPRAZOLE SODIUM 40 MG: 40 TABLET, DELAYED RELEASE ORAL at 06:10

## 2021-11-23 RX ADMIN — HEPARIN SODIUM 7500 UNITS: 5000 INJECTION INTRAVENOUS; SUBCUTANEOUS at 22:02

## 2021-11-23 RX ADMIN — INSULIN GLARGINE 46 UNITS: 100 INJECTION, SOLUTION SUBCUTANEOUS at 22:02

## 2021-11-23 RX ADMIN — PSYLLIUM HUSK 1 PACKET: 3.4 POWDER ORAL at 00:01

## 2021-11-23 RX ADMIN — INSULIN LISPRO 18 UNITS: 100 INJECTION, SOLUTION INTRAVENOUS; SUBCUTANEOUS at 12:00

## 2021-11-23 RX ADMIN — FLUTICASONE PROPIONATE 1 SPRAY: 50 SPRAY, METERED NASAL at 08:07

## 2021-11-23 RX ADMIN — INSULIN LISPRO 18 UNITS: 100 INJECTION, SOLUTION INTRAVENOUS; SUBCUTANEOUS at 08:09

## 2021-11-23 RX ADMIN — PSYLLIUM HUSK 1 PACKET: 3.4 POWDER ORAL at 22:02

## 2021-11-23 RX ADMIN — INSULIN GLARGINE 46 UNITS: 100 INJECTION, SOLUTION SUBCUTANEOUS at 00:14

## 2021-11-23 RX ADMIN — OXYCODONE HYDROCHLORIDE 10 MG: 10 TABLET ORAL at 00:21

## 2021-11-23 RX ADMIN — OXYCODONE HYDROCHLORIDE 10 MG: 10 TABLET ORAL at 22:11

## 2021-11-23 RX ADMIN — GABAPENTIN 400 MG: 400 CAPSULE ORAL at 08:07

## 2021-11-23 RX ADMIN — METOCLOPRAMIDE 10 MG: 5 INJECTION, SOLUTION INTRAMUSCULAR; INTRAVENOUS at 00:00

## 2021-11-24 DIAGNOSIS — L03.116 CELLULITIS OF LEFT LOWER EXTREMITY: ICD-10-CM

## 2021-11-24 LAB
ANION GAP SERPL CALCULATED.3IONS-SCNC: 5 MMOL/L (ref 4–13)
BACTERIA BLD CULT: NORMAL
BACTERIA BLD CULT: NORMAL
BASOPHILS # BLD MANUAL: 0.15 THOUSAND/UL (ref 0–0.1)
BASOPHILS NFR MAR MANUAL: 2 % (ref 0–1)
BUN SERPL-MCNC: 18 MG/DL (ref 5–25)
CALCIUM SERPL-MCNC: 8.9 MG/DL (ref 8.3–10.1)
CHLORIDE SERPL-SCNC: 99 MMOL/L (ref 100–108)
CO2 SERPL-SCNC: 29 MMOL/L (ref 21–32)
CREAT SERPL-MCNC: 1.12 MG/DL (ref 0.6–1.3)
EOSINOPHIL # BLD MANUAL: 0.31 THOUSAND/UL (ref 0–0.4)
EOSINOPHIL NFR BLD MANUAL: 4 % (ref 0–6)
ERYTHROCYTE [DISTWIDTH] IN BLOOD BY AUTOMATED COUNT: 13.8 % (ref 11.6–15.1)
GFR SERPL CREATININE-BSD FRML MDRD: 74 ML/MIN/1.73SQ M
GLUCOSE SERPL-MCNC: 148 MG/DL (ref 65–140)
GLUCOSE SERPL-MCNC: 150 MG/DL (ref 65–140)
GLUCOSE SERPL-MCNC: 157 MG/DL (ref 65–140)
GLUCOSE SERPL-MCNC: 176 MG/DL (ref 65–140)
HCT VFR BLD AUTO: 37.6 % (ref 36.5–49.3)
HGB BLD-MCNC: 12 G/DL (ref 12–17)
LYMPHOCYTES # BLD AUTO: 1.75 THOUSAND/UL (ref 0.6–4.47)
LYMPHOCYTES # BLD AUTO: 23 % (ref 14–44)
MCH RBC QN AUTO: 29.8 PG (ref 26.8–34.3)
MCHC RBC AUTO-ENTMCNC: 31.9 G/DL (ref 31.4–37.4)
MCV RBC AUTO: 93 FL (ref 82–98)
MONOCYTES # BLD AUTO: 0.53 THOUSAND/UL (ref 0–1.22)
MONOCYTES NFR BLD: 7 % (ref 4–12)
NEUTROPHILS # BLD MANUAL: 4.88 THOUSAND/UL (ref 1.85–7.62)
NEUTS BAND NFR BLD MANUAL: 5 % (ref 0–8)
NEUTS SEG NFR BLD AUTO: 59 % (ref 43–75)
PLATELET # BLD AUTO: 266 THOUSANDS/UL (ref 149–390)
PLATELET BLD QL SMEAR: ADEQUATE
PMV BLD AUTO: 8.3 FL (ref 8.9–12.7)
POTASSIUM SERPL-SCNC: 5 MMOL/L (ref 3.5–5.3)
RBC # BLD AUTO: 4.03 MILLION/UL (ref 3.88–5.62)
RBC MORPH BLD: NORMAL
SODIUM SERPL-SCNC: 133 MMOL/L (ref 136–145)
WBC # BLD AUTO: 7.63 THOUSAND/UL (ref 4.31–10.16)

## 2021-11-24 PROCEDURE — 99232 SBSQ HOSP IP/OBS MODERATE 35: CPT | Performed by: INTERNAL MEDICINE

## 2021-11-24 PROCEDURE — 99254 IP/OBS CNSLTJ NEW/EST MOD 60: CPT | Performed by: PODIATRIST

## 2021-11-24 PROCEDURE — 80048 BASIC METABOLIC PNL TOTAL CA: CPT

## 2021-11-24 PROCEDURE — 85027 COMPLETE CBC AUTOMATED: CPT

## 2021-11-24 PROCEDURE — 85007 BL SMEAR W/DIFF WBC COUNT: CPT

## 2021-11-24 PROCEDURE — 0HBRXZZ EXCISION OF TOE NAIL, EXTERNAL APPROACH: ICD-10-PCS | Performed by: PODIATRIST

## 2021-11-24 PROCEDURE — 11721 DEBRIDE NAIL 6 OR MORE: CPT | Performed by: PODIATRIST

## 2021-11-24 PROCEDURE — 82948 REAGENT STRIP/BLOOD GLUCOSE: CPT

## 2021-11-24 RX ORDER — DICLOFENAC SODIUM 10 MG/G
GEL TOPICAL
Qty: 50 G | Refills: 0 | Status: SHIPPED | OUTPATIENT
Start: 2021-11-24 | End: 2022-07-25

## 2021-11-24 RX ORDER — HYDROMORPHONE HCL/PF 1 MG/ML
0.2 SYRINGE (ML) INJECTION EVERY 4 HOURS PRN
Status: DISCONTINUED | OUTPATIENT
Start: 2021-11-24 | End: 2021-11-26 | Stop reason: HOSPADM

## 2021-11-24 RX ORDER — LOSARTAN POTASSIUM 25 MG/1
25 TABLET ORAL DAILY
Status: DISCONTINUED | OUTPATIENT
Start: 2021-11-24 | End: 2021-11-26 | Stop reason: HOSPADM

## 2021-11-24 RX ORDER — FUROSEMIDE 20 MG/1
20 TABLET ORAL
Status: DISCONTINUED | OUTPATIENT
Start: 2021-11-24 | End: 2021-11-25

## 2021-11-24 RX ADMIN — DIPHENHYDRAMINE HYDROCHLORIDE 25 MG: 50 INJECTION, SOLUTION INTRAMUSCULAR; INTRAVENOUS at 00:03

## 2021-11-24 RX ADMIN — OXYCODONE HYDROCHLORIDE 10 MG: 10 TABLET ORAL at 21:43

## 2021-11-24 RX ADMIN — FLUTICASONE PROPIONATE 1 SPRAY: 50 SPRAY, METERED NASAL at 18:14

## 2021-11-24 RX ADMIN — CEFAZOLIN SODIUM 2000 MG: 2 SOLUTION INTRAVENOUS at 23:46

## 2021-11-24 RX ADMIN — PSYLLIUM HUSK 1 PACKET: 3.4 POWDER ORAL at 21:38

## 2021-11-24 RX ADMIN — CEFAZOLIN SODIUM 2000 MG: 2 SOLUTION INTRAVENOUS at 05:40

## 2021-11-24 RX ADMIN — HEPARIN SODIUM 7500 UNITS: 5000 INJECTION INTRAVENOUS; SUBCUTANEOUS at 21:39

## 2021-11-24 RX ADMIN — PANTOPRAZOLE SODIUM 40 MG: 40 TABLET, DELAYED RELEASE ORAL at 16:18

## 2021-11-24 RX ADMIN — METOCLOPRAMIDE 5 MG: 5 INJECTION, SOLUTION INTRAMUSCULAR; INTRAVENOUS at 00:01

## 2021-11-24 RX ADMIN — HEPARIN SODIUM 7500 UNITS: 5000 INJECTION INTRAVENOUS; SUBCUTANEOUS at 15:00

## 2021-11-24 RX ADMIN — OXYCODONE HYDROCHLORIDE 10 MG: 10 TABLET ORAL at 06:23

## 2021-11-24 RX ADMIN — CEFAZOLIN SODIUM 2000 MG: 2 SOLUTION INTRAVENOUS at 18:13

## 2021-11-24 RX ADMIN — HYDROMORPHONE HYDROCHLORIDE 0.2 MG: 1 INJECTION, SOLUTION INTRAMUSCULAR; INTRAVENOUS; SUBCUTANEOUS at 23:46

## 2021-11-24 RX ADMIN — GABAPENTIN 400 MG: 400 CAPSULE ORAL at 18:14

## 2021-11-24 RX ADMIN — GABAPENTIN 400 MG: 400 CAPSULE ORAL at 08:27

## 2021-11-24 RX ADMIN — OXYCODONE HYDROCHLORIDE 10 MG: 10 TABLET ORAL at 12:07

## 2021-11-24 RX ADMIN — KETOROLAC TROMETHAMINE 30 MG: 30 INJECTION, SOLUTION INTRAMUSCULAR at 00:04

## 2021-11-24 RX ADMIN — INSULIN LISPRO 4 UNITS: 100 INJECTION, SOLUTION INTRAVENOUS; SUBCUTANEOUS at 08:26

## 2021-11-24 RX ADMIN — PANTOPRAZOLE SODIUM 40 MG: 40 TABLET, DELAYED RELEASE ORAL at 06:19

## 2021-11-24 RX ADMIN — INSULIN LISPRO 18 UNITS: 100 INJECTION, SOLUTION INTRAVENOUS; SUBCUTANEOUS at 08:26

## 2021-11-24 RX ADMIN — LOSARTAN POTASSIUM 25 MG: 25 TABLET, FILM COATED ORAL at 12:37

## 2021-11-24 RX ADMIN — FLUTICASONE PROPIONATE 1 SPRAY: 50 SPRAY, METERED NASAL at 08:26

## 2021-11-24 RX ADMIN — FUROSEMIDE 20 MG: 20 TABLET ORAL at 16:18

## 2021-11-24 RX ADMIN — LORATADINE 10 MG: 10 TABLET ORAL at 08:27

## 2021-11-24 RX ADMIN — INSULIN LISPRO 18 UNITS: 100 INJECTION, SOLUTION INTRAVENOUS; SUBCUTANEOUS at 21:39

## 2021-11-24 RX ADMIN — INSULIN LISPRO 18 UNITS: 100 INJECTION, SOLUTION INTRAVENOUS; SUBCUTANEOUS at 12:37

## 2021-11-24 RX ADMIN — OXYCODONE HYDROCHLORIDE 10 MG: 10 TABLET ORAL at 16:18

## 2021-11-24 RX ADMIN — HEPARIN SODIUM 7500 UNITS: 5000 INJECTION INTRAVENOUS; SUBCUTANEOUS at 05:41

## 2021-11-24 RX ADMIN — CEFAZOLIN SODIUM 2000 MG: 2 SOLUTION INTRAVENOUS at 12:07

## 2021-11-24 RX ADMIN — INSULIN GLARGINE 46 UNITS: 100 INJECTION, SOLUTION SUBCUTANEOUS at 21:39

## 2021-11-24 RX ADMIN — INSULIN LISPRO 4 UNITS: 100 INJECTION, SOLUTION INTRAVENOUS; SUBCUTANEOUS at 12:37

## 2021-11-25 VITALS
BODY MASS INDEX: 83.28 KG/M2 | HEART RATE: 104 BPM | RESPIRATION RATE: 18 BRPM | TEMPERATURE: 98 F | HEIGHT: 64 IN | SYSTOLIC BLOOD PRESSURE: 136 MMHG | OXYGEN SATURATION: 94 % | DIASTOLIC BLOOD PRESSURE: 80 MMHG

## 2021-11-25 PROBLEM — B35.1 ONYCHOMYCOSIS: Status: ACTIVE | Noted: 2021-11-25

## 2021-11-25 LAB
GLUCOSE SERPL-MCNC: 112 MG/DL (ref 65–140)
GLUCOSE SERPL-MCNC: 166 MG/DL (ref 65–140)
GLUCOSE SERPL-MCNC: 176 MG/DL (ref 65–140)
GLUCOSE SERPL-MCNC: 176 MG/DL (ref 65–140)

## 2021-11-25 PROCEDURE — 99232 SBSQ HOSP IP/OBS MODERATE 35: CPT | Performed by: INTERNAL MEDICINE

## 2021-11-25 PROCEDURE — 82948 REAGENT STRIP/BLOOD GLUCOSE: CPT

## 2021-11-25 RX ORDER — METOCLOPRAMIDE HYDROCHLORIDE 5 MG/ML
5 INJECTION INTRAMUSCULAR; INTRAVENOUS EVERY 6 HOURS PRN
Status: DISPENSED | OUTPATIENT
Start: 2021-11-25 | End: 2021-11-26

## 2021-11-25 RX ORDER — DIPHENHYDRAMINE HYDROCHLORIDE 50 MG/ML
25 INJECTION INTRAMUSCULAR; INTRAVENOUS EVERY 8 HOURS PRN
Status: DISCONTINUED | OUTPATIENT
Start: 2021-11-25 | End: 2021-11-26 | Stop reason: HOSPADM

## 2021-11-25 RX ORDER — FUROSEMIDE 40 MG/1
40 TABLET ORAL DAILY
Status: DISCONTINUED | OUTPATIENT
Start: 2021-11-26 | End: 2021-11-26 | Stop reason: HOSPADM

## 2021-11-25 RX ORDER — KETOROLAC TROMETHAMINE 30 MG/ML
30 INJECTION, SOLUTION INTRAMUSCULAR; INTRAVENOUS EVERY 8 HOURS
Status: DISCONTINUED | OUTPATIENT
Start: 2021-11-25 | End: 2021-11-26

## 2021-11-25 RX ORDER — FUROSEMIDE 20 MG/1
20 TABLET ORAL ONCE
Status: COMPLETED | OUTPATIENT
Start: 2021-11-25 | End: 2021-11-25

## 2021-11-25 RX ADMIN — HEPARIN SODIUM 7500 UNITS: 5000 INJECTION INTRAVENOUS; SUBCUTANEOUS at 05:24

## 2021-11-25 RX ADMIN — FUROSEMIDE 20 MG: 20 TABLET ORAL at 14:17

## 2021-11-25 RX ADMIN — OXYCODONE HYDROCHLORIDE 10 MG: 10 TABLET ORAL at 22:13

## 2021-11-25 RX ADMIN — LORATADINE 10 MG: 10 TABLET ORAL at 09:18

## 2021-11-25 RX ADMIN — FUROSEMIDE 20 MG: 20 TABLET ORAL at 11:17

## 2021-11-25 RX ADMIN — GABAPENTIN 400 MG: 400 CAPSULE ORAL at 17:32

## 2021-11-25 RX ADMIN — INSULIN LISPRO 4 UNITS: 100 INJECTION, SOLUTION INTRAVENOUS; SUBCUTANEOUS at 09:19

## 2021-11-25 RX ADMIN — HEPARIN SODIUM 7500 UNITS: 5000 INJECTION INTRAVENOUS; SUBCUTANEOUS at 14:17

## 2021-11-25 RX ADMIN — INSULIN LISPRO 18 UNITS: 100 INJECTION, SOLUTION INTRAVENOUS; SUBCUTANEOUS at 09:19

## 2021-11-25 RX ADMIN — INSULIN LISPRO 18 UNITS: 100 INJECTION, SOLUTION INTRAVENOUS; SUBCUTANEOUS at 14:47

## 2021-11-25 RX ADMIN — INSULIN GLARGINE 46 UNITS: 100 INJECTION, SOLUTION SUBCUTANEOUS at 22:00

## 2021-11-25 RX ADMIN — CEFAZOLIN SODIUM 2000 MG: 2 SOLUTION INTRAVENOUS at 11:13

## 2021-11-25 RX ADMIN — KETOROLAC TROMETHAMINE 30 MG: 30 INJECTION, SOLUTION INTRAMUSCULAR at 06:06

## 2021-11-25 RX ADMIN — CEFAZOLIN SODIUM 2000 MG: 2 SOLUTION INTRAVENOUS at 17:33

## 2021-11-25 RX ADMIN — GABAPENTIN 400 MG: 400 CAPSULE ORAL at 09:18

## 2021-11-25 RX ADMIN — OXYCODONE HYDROCHLORIDE 10 MG: 10 TABLET ORAL at 15:40

## 2021-11-25 RX ADMIN — PANTOPRAZOLE SODIUM 40 MG: 40 TABLET, DELAYED RELEASE ORAL at 06:05

## 2021-11-25 RX ADMIN — CEFAZOLIN SODIUM 2000 MG: 2 SOLUTION INTRAVENOUS at 05:27

## 2021-11-25 RX ADMIN — LOSARTAN POTASSIUM 25 MG: 25 TABLET, FILM COATED ORAL at 09:18

## 2021-11-25 RX ADMIN — HEPARIN SODIUM 7500 UNITS: 5000 INJECTION INTRAVENOUS; SUBCUTANEOUS at 22:01

## 2021-11-25 RX ADMIN — DIPHENHYDRAMINE HYDROCHLORIDE 25 MG: 50 INJECTION, SOLUTION INTRAMUSCULAR; INTRAVENOUS at 06:05

## 2021-11-25 RX ADMIN — PSYLLIUM HUSK 1 PACKET: 3.4 POWDER ORAL at 21:59

## 2021-11-25 RX ADMIN — PANTOPRAZOLE SODIUM 40 MG: 40 TABLET, DELAYED RELEASE ORAL at 15:40

## 2021-11-25 RX ADMIN — INSULIN LISPRO 18 UNITS: 100 INJECTION, SOLUTION INTRAVENOUS; SUBCUTANEOUS at 20:05

## 2021-11-25 RX ADMIN — FLUTICASONE PROPIONATE 1 SPRAY: 50 SPRAY, METERED NASAL at 17:33

## 2021-11-25 RX ADMIN — INSULIN LISPRO 4 UNITS: 100 INJECTION, SOLUTION INTRAVENOUS; SUBCUTANEOUS at 20:05

## 2021-11-25 RX ADMIN — CEFAZOLIN SODIUM 2000 MG: 2 SOLUTION INTRAVENOUS at 22:45

## 2021-11-25 RX ADMIN — KETOROLAC TROMETHAMINE 30 MG: 30 INJECTION, SOLUTION INTRAMUSCULAR at 14:17

## 2021-11-25 RX ADMIN — FLUTICASONE PROPIONATE 1 SPRAY: 50 SPRAY, METERED NASAL at 09:19

## 2021-11-25 RX ADMIN — METOCLOPRAMIDE 5 MG: 5 INJECTION, SOLUTION INTRAMUSCULAR; INTRAVENOUS at 06:05

## 2021-11-26 LAB
GLUCOSE SERPL-MCNC: 146 MG/DL (ref 65–140)
GLUCOSE SERPL-MCNC: 154 MG/DL (ref 65–140)

## 2021-11-26 PROCEDURE — 99239 HOSP IP/OBS DSCHRG MGMT >30: CPT | Performed by: INTERNAL MEDICINE

## 2021-11-26 PROCEDURE — 82948 REAGENT STRIP/BLOOD GLUCOSE: CPT

## 2021-11-26 RX ORDER — KETOROLAC TROMETHAMINE 30 MG/ML
30 INJECTION, SOLUTION INTRAMUSCULAR; INTRAVENOUS EVERY 8 HOURS
Status: DISCONTINUED | OUTPATIENT
Start: 2021-11-26 | End: 2021-11-26 | Stop reason: HOSPADM

## 2021-11-26 RX ORDER — OXYCODONE HYDROCHLORIDE 5 MG/1
TABLET ORAL
Qty: 15 TABLET | Refills: 0 | Status: SHIPPED | OUTPATIENT
Start: 2021-11-26

## 2021-11-26 RX ORDER — CEPHALEXIN 500 MG/1
1000 CAPSULE ORAL EVERY 6 HOURS SCHEDULED
Qty: 60 CAPSULE | Refills: 0 | Status: SHIPPED | OUTPATIENT
Start: 2021-11-26 | End: 2021-12-04

## 2021-11-26 RX ADMIN — PANTOPRAZOLE SODIUM 40 MG: 40 TABLET, DELAYED RELEASE ORAL at 14:51

## 2021-11-26 RX ADMIN — PANTOPRAZOLE SODIUM 40 MG: 40 TABLET, DELAYED RELEASE ORAL at 06:02

## 2021-11-26 RX ADMIN — HEPARIN SODIUM 7500 UNITS: 5000 INJECTION INTRAVENOUS; SUBCUTANEOUS at 14:48

## 2021-11-26 RX ADMIN — LOSARTAN POTASSIUM 25 MG: 25 TABLET, FILM COATED ORAL at 08:38

## 2021-11-26 RX ADMIN — GABAPENTIN 400 MG: 400 CAPSULE ORAL at 08:38

## 2021-11-26 RX ADMIN — CEFAZOLIN SODIUM 2000 MG: 2 SOLUTION INTRAVENOUS at 11:53

## 2021-11-26 RX ADMIN — HEPARIN SODIUM 7500 UNITS: 5000 INJECTION INTRAVENOUS; SUBCUTANEOUS at 05:13

## 2021-11-26 RX ADMIN — FUROSEMIDE 40 MG: 40 TABLET ORAL at 08:38

## 2021-11-26 RX ADMIN — INSULIN LISPRO 2 UNITS: 100 INJECTION, SOLUTION INTRAVENOUS; SUBCUTANEOUS at 08:40

## 2021-11-26 RX ADMIN — DIPHENHYDRAMINE HYDROCHLORIDE 25 MG: 50 INJECTION, SOLUTION INTRAMUSCULAR; INTRAVENOUS at 05:08

## 2021-11-26 RX ADMIN — INSULIN LISPRO 18 UNITS: 100 INJECTION, SOLUTION INTRAVENOUS; SUBCUTANEOUS at 08:41

## 2021-11-26 RX ADMIN — OXYCODONE HYDROCHLORIDE 10 MG: 10 TABLET ORAL at 08:38

## 2021-11-26 RX ADMIN — METOCLOPRAMIDE 5 MG: 5 INJECTION, SOLUTION INTRAMUSCULAR; INTRAVENOUS at 05:07

## 2021-11-26 RX ADMIN — FLUTICASONE PROPIONATE 1 SPRAY: 50 SPRAY, METERED NASAL at 08:41

## 2021-11-26 RX ADMIN — CEFAZOLIN SODIUM 2000 MG: 2 SOLUTION INTRAVENOUS at 05:13

## 2021-11-26 RX ADMIN — OXYCODONE HYDROCHLORIDE 10 MG: 10 TABLET ORAL at 13:10

## 2021-11-26 RX ADMIN — INSULIN LISPRO 18 UNITS: 100 INJECTION, SOLUTION INTRAVENOUS; SUBCUTANEOUS at 11:59

## 2021-11-26 RX ADMIN — KETOROLAC TROMETHAMINE 30 MG: 30 INJECTION, SOLUTION INTRAMUSCULAR at 05:06

## 2021-11-29 ENCOUNTER — TRANSITIONAL CARE MANAGEMENT (OUTPATIENT)
Dept: INTERNAL MEDICINE CLINIC | Facility: CLINIC | Age: 56
End: 2021-11-29

## 2021-12-01 ENCOUNTER — TELEMEDICINE (OUTPATIENT)
Dept: INTERNAL MEDICINE CLINIC | Facility: CLINIC | Age: 56
End: 2021-12-01

## 2021-12-01 DIAGNOSIS — R60.0 BILATERAL LEG EDEMA: ICD-10-CM

## 2021-12-01 DIAGNOSIS — I87.2 VENOUS INSUFFICIENCY: ICD-10-CM

## 2021-12-01 DIAGNOSIS — Z76.89 ENCOUNTER FOR SUPPORT AND COORDINATION OF TRANSITION OF CARE: ICD-10-CM

## 2021-12-01 DIAGNOSIS — E11.65 TYPE 2 DIABETES MELLITUS WITH HYPERGLYCEMIA, WITH LONG-TERM CURRENT USE OF INSULIN (HCC): ICD-10-CM

## 2021-12-01 DIAGNOSIS — Z79.4 TYPE 2 DIABETES MELLITUS WITH HYPERGLYCEMIA, WITH LONG-TERM CURRENT USE OF INSULIN (HCC): ICD-10-CM

## 2021-12-01 DIAGNOSIS — B49 FUNGAL INFECTION: ICD-10-CM

## 2021-12-01 DIAGNOSIS — L03.115 CELLULITIS OF RIGHT LOWER EXTREMITY: Primary | ICD-10-CM

## 2021-12-01 PROBLEM — R05.9 COUGH: Status: RESOLVED | Noted: 2019-10-20 | Resolved: 2021-12-01

## 2021-12-01 PROBLEM — N28.9 ACUTE KIDNEY INSUFFICIENCY: Status: RESOLVED | Noted: 2021-10-28 | Resolved: 2021-12-01

## 2021-12-01 PROBLEM — R79.89 ELEVATED SERUM CREATININE: Status: RESOLVED | Noted: 2021-11-19 | Resolved: 2021-12-01

## 2021-12-01 PROCEDURE — 99215 OFFICE O/P EST HI 40 MIN: CPT | Performed by: INTERNAL MEDICINE

## 2021-12-01 PROCEDURE — 1036F TOBACCO NON-USER: CPT | Performed by: INTERNAL MEDICINE

## 2021-12-01 RX ORDER — INSULIN GLARGINE 100 [IU]/ML
46 INJECTION, SOLUTION SUBCUTANEOUS
Qty: 43 ML | Refills: 0 | Status: SHIPPED | OUTPATIENT
Start: 2021-12-01 | End: 2022-02-21

## 2021-12-01 RX ORDER — INSULIN ASPART 100 [IU]/ML
INJECTION, SOLUTION INTRAVENOUS; SUBCUTANEOUS
Qty: 45 ML | Refills: 1 | Status: SHIPPED | OUTPATIENT
Start: 2021-12-01 | End: 2022-05-06 | Stop reason: SDUPTHER

## 2021-12-08 ENCOUNTER — TELEPHONE (OUTPATIENT)
Dept: INTERNAL MEDICINE CLINIC | Facility: CLINIC | Age: 56
End: 2021-12-08

## 2021-12-08 NOTE — TELEPHONE ENCOUNTER
I would not change the dressing to another one, appears to be working well for him  Please let patient know they don't have it  Thank you!

## 2021-12-08 NOTE — TELEPHONE ENCOUNTER
[facility] called that they cannot order the interdry for patient  They dont have anything comparable   If you would like to order something new we will have to fax to [phone] or call [phone]

## 2021-12-27 DIAGNOSIS — L03.116 CELLULITIS OF LEFT LOWER EXTREMITY: Primary | ICD-10-CM

## 2021-12-27 RX ORDER — CEPHALEXIN 500 MG/1
1000 CAPSULE ORAL EVERY 6 HOURS SCHEDULED
Qty: 40 CAPSULE | Refills: 0 | Status: SHIPPED | OUTPATIENT
Start: 2021-12-27 | End: 2022-01-01

## 2022-02-14 ENCOUNTER — TELEMEDICINE (OUTPATIENT)
Dept: INTERNAL MEDICINE CLINIC | Facility: CLINIC | Age: 57
End: 2022-02-14
Payer: MEDICARE

## 2022-02-14 DIAGNOSIS — L97.921 ULCER OF LEFT LOWER EXTREMITY, LIMITED TO BREAKDOWN OF SKIN (HCC): ICD-10-CM

## 2022-02-14 DIAGNOSIS — L97.921 ULCER OF LEFT LOWER EXTREMITY, LIMITED TO BREAKDOWN OF SKIN (HCC): Primary | ICD-10-CM

## 2022-02-14 PROBLEM — L97.209 DIABETIC ULCER OF CALF (HCC): Status: ACTIVE | Noted: 2022-02-14

## 2022-02-14 PROBLEM — L97.209 DIABETIC ULCER OF CALF (HCC): Status: RESOLVED | Noted: 2022-02-14 | Resolved: 2022-02-14

## 2022-02-14 PROBLEM — E11.622 DIABETIC ULCER OF CALF (HCC): Status: RESOLVED | Noted: 2022-02-14 | Resolved: 2022-02-14

## 2022-02-14 PROBLEM — R06.02 SHORTNESS OF BREATH: Status: RESOLVED | Noted: 2021-05-03 | Resolved: 2022-02-14

## 2022-02-14 PROBLEM — E11.622 DIABETIC ULCER OF CALF (HCC): Status: ACTIVE | Noted: 2022-02-14

## 2022-02-14 PROBLEM — L97.229 ULCER OF LEFT CALF (HCC): Status: ACTIVE | Noted: 2022-02-14

## 2022-02-14 PROBLEM — L03.90 CELLULITIS: Status: RESOLVED | Noted: 2021-09-16 | Resolved: 2022-02-14

## 2022-02-14 PROCEDURE — 99214 OFFICE O/P EST MOD 30 MIN: CPT | Performed by: INTERNAL MEDICINE

## 2022-02-14 NOTE — TELEPHONE ENCOUNTER
Suleman Mercado has called the [facility] office in regards to his telemedicine appointment with Dr Danelle Amin from today  Go [facility] stated they have yet to receive the calcium alginate or gauze pads and dressings  Called [facility] who stated they had received the orders, but do not carry them  Relayed the information to Go Raymundo who stated he would like to the supplies to be sent to [address] [place] [address] or [facility]  Go Raymundo stated he has some gauzepads and calcium alginate remaining and would be able to make do without the current prescription for a couple of days  Nevertheless requested the supplies be sent to [facility]

## 2022-02-14 NOTE — PROGRESS NOTES
Virtual Regular Visit    Verification of patient location:    Patient is located in the following state in which I hold an active license [place]      Assessment/Plan:    Problem List Items Addressed This Visit        Musculoskeletal and Integument    Ulcer of left lower extremity, limited to breakdown of skin ([address] ) - Primary       Lab Results   Component Value Date    HGBA1C 6 5 (H) 10/29/2021   Wound on left calf Likely diabetic ulcer, but venous ulcer cannot be excluded  Need wound care for surgical and or chemical debridment  Increase gabapentin from 400 to 500mg HS, tylenol and ibuprofen at night for pain control  Maxsorb alginate for better absorbance to be changed daily for now or as needed until seen by wound care  To report if any fever, chills, increase wound drainage, swelling or erythema  Follow up in 1 week or before if needed  Relevant Medications    Calcium Alginate (Maxorb II Alginate Dressing) MISC    Gauze Pads & Dressings 5"X5" PADS          BMI Counseling: There is no height or weight on file to calculate BMI  The BMI is above normal  Nutrition recommendations include decreasing portion sizes and encouraging healthy choices of fruits and vegetables  Exercise recommendations include exercising 3-5 times per week  Rationale for BMI follow-up plan is due to patient being overweight or obese  Reason for visit is   Chief Complaint   Patient presents with    Virtual Regular Visit        Encounter provider Samantha Artis MD    Provider located at [address] [address] [phone]      Recent Visits  No visits were found meeting these conditions    Showing recent visits within past 7 days and meeting all other requirements  Today's Visits  Date Type Provider Dept   02/14/22 Telemedicine Samantha Artis MD  [facility] [place]   Showing today's visits and meeting all other requirements  Future Appointments  No visits were found meeting these conditions  Showing future appointments within next 150 days and meeting all other requirements       The patient was identified by name and date of birth  Johnna Mike was informed that this is a telemedicine visit and that the visit is being conducted through [address] and patient was informed this is a secure, HIPAA-complaint platform  He agrees to proceed     My office door was closed  No one else was in the room  He acknowledged consent and understanding of privacy and security of the video platform  The patient has agreed to participate and understands they can discontinue the visit at any time  Patient is aware this is a billable service  Subjective  Johnna Mike is a 49 y o  male    Shannon Silver is seen on telemedicine visit today due to worsening pain and increased wound size on his left calf for the past 2 weeks  He states he has used alginate and cleaning solution more often, elevated the leg, but the wound keep getting bigger  He is using tylenol and ibuprofen as needed for pain, but he has not been able to sleep at night due to pain  The pain is located on the wound and gets worse if he elevated the leg  He denies any fever, chills, night sweats, worsening leg edema, or erythema  He states he has been loosing weight and is on a meal plan and exercising regularly          Past Medical History:   Diagnosis Date    Acute kidney injury 10/28/2021    Anemia 09/13/2019    Cellulitis     last assessed 12/10/15    Cellulitis of left lower extremity     Cough 10/20/2019    Diabetes mellitus ([facility] [address] )     Disease of thyroid gland     Edema     Elevated liver enzymes     Elevated serum creatinine 11/19/2021    Esophageal reflux     Gluten intolerance     Gout     last assessed 09/05/13    Hyperglycemia     Hypertension     IBS (irritable bowel syndrome)     Insomnia     Obesity     Osteoarthritis of knee     last assessed 02/10/14  Shortness of breath 5/3/2021    Venous insufficiency     last assessed 08/22/17    Villonodular synovitis of the hand, right     last assessed 11/14/2013       Past Surgical History:   Procedure Laterality Date    INCISION AND DRAINAGE OF WOUND Left 9/6/2019    Procedure: INCISION AND DRAINAGE (I&D) GROIN;  Surgeon: Chao Cui DO;  [facility];  Service: General    SKIN BIOPSY      WOUND DEBRIDEMENT Left 9/7/2019    Procedure: EXCISIONAL DEBRIDEMENT;  Surgeon: Chao Cui DO;  [facility];  Service: General       Current Outpatient Medications   Medication Sig Dispense Refill    acetaminophen (TYLENOL) 325 mg tablet Take 2 tablets (650 mg total) by mouth every 4 (four) hours as needed for mild pain, headaches or fever  0    albuterol (ProAir HFA) 90 mcg/act inhaler Inhale 2 puffs every 6 (six) hours as needed for wheezing 8 5 g 6    BD Pen Needle Ludmila 2nd Gen 32G X 4 MM MISC USE 4 TIMES A  each 3    Blood Glucose Monitoring Suppl (ONETOUCH VERIO) w/Device KIT Use to test sugar daily (Patient not taking: Reported on 10/28/2021) 1 kit 0    Calcium Alginate (Maxorb II Alginate Dressing) MISC Apply 1 each topically in the morning 10 each 2    Continuous Blood Gluc  (FreeStyle Connell 2 [place] SystRong360) BROOKS Use 1 Device as needed (use with sensors for bs checks) 1 Device 0    Continuous Blood Gluc Sensor (FreeStyle Elvin 14 Day Sensor) MISC USE ONE SENSOR EVERY 2 WEEKS 2 each 3    Control Gel Formula Dressing (DuoDERM CGF Dressing) MISC Apply 1 application topically every 5 (five) days 5 each 1    [employer] Diclofenac Sodium 1 % APPLY 4 G TOPICALLY 4 (FOUR) TIMES A DAY AS NEEDED (JOINT PAIN) 50 g 0    furosemide (LASIX) 20 mg tablet Take 1 tablet (20 mg total) by mouth 2 (two) times a day 270 tablet 0    gabapentin (NEURONTIN) 100 mg capsule Take 1 capsule (100 mg total) by mouth 2 (two) times a day Take 1 tab with your 300mg tab twice daily 180 capsule 2    gabapentin (NEURONTIN) 300 mg capsule Take 1 capsule (300 mg total) by mouth 2 (two) times a day Take 1 tab with your 100mg tab twice daily 180 capsule 2    Gauze Pads & Dressings 5"X5" PADS Use in the morning 20 each 2    insulin aspart (NovoLOG FlexPen) 100 UNIT/ML injection pen INJECT 18 UNITS WITH MEALS+SCALE ( - 150-200 -2 UNITS, 201-250-4 UNITS, 251-300 -6 UNITS, 301-350-8 UNITS, > 350- 10 UNITS ) 45 mL 1    insulin glargine (Lantus SoloStar) 100 units/mL injection pen Inject 46 Units under the skin daily at bedtime Inject 48 units under the skin every bedtime 43 mL 0    Lancets (ONETOUCH ULTRASOFT) lancets Use to test sugar 2 times a day 100 each 3    losartan (COZAAR) 25 mg tablet TAKE ONE TABLET BY MOUTH EVERY DAY 90 tablet 2    metFORMIN (GLUCOPHAGE) 1000 MG tablet TAKE ONE TABLET BY MOUTH TWICE DAILY 180 tablet 3    nystatin (MYCOSTATIN) cream Apply topically 2 (two) times a day as needed (itching, redness) 30 g 1    nystatin (MYCOSTATIN) powder Apply 1 application topically 2 (two) times a day To affected area 120 g 3    omeprazole (PriLOSEC) 40 MG capsule Take 1 capsule (40 mg total) by mouth 2 (two) times a day 180 capsule 1    OneTouch Verio test strip USE TO TEST SUGAR 2 TIMES A  strip 3    oxyCODONE (ROXICODONE) 5 immediate release tablet Take 1-2 tabs by mouth every 6 hours as needed for moderate or severe pains respectively  15 tablet 0    psyllium (METAMUCIL) packet Take 1 packet by mouth daily  0    saccharomyces boulardii (FLORASTOR) 250 mg capsule Take 1 capsule (250 mg total) by mouth 2 (two) times a day 60 capsule 1    Skin Protectants, Misc  ([employer] Textile 28"M280") SHEE Apply 1 each topically every 5 (five) days 3 each 5    sodium hypochlorite (DAKIN'S QUARTER-STRENGTH) Irrigate with 1 application as directed daily 473 mL 0     No current facility-administered medications for this visit          Allergies   Allergen Reactions    Gluten Meal - Food Allergy     Clindamycin Diarrhea       Review of Systems   Constitutional: Negative for appetite change, fatigue and fever  Eyes: Negative for visual disturbance  Respiratory: Negative for cough, chest tightness, shortness of breath and wheezing  Cardiovascular: Positive for leg swelling  Negative for chest pain and palpitations  Gastrointestinal: Negative for abdominal pain, nausea and vomiting  Genitourinary: Negative for difficulty urinating and frequency  Musculoskeletal: Negative for arthralgias and joint swelling  Skin: Positive for wound (left calf)  Negative for rash  Neurological: Negative for dizziness and headaches  Psychiatric/Behavioral: Positive for sleep disturbance (due to pain)  Negative for confusion  Video Exam    There were no vitals filed for this visit  Physical Exam  Constitutional:       General: He is not in acute distress  Skin:     Findings: Lesion (wound as seen on pictures) present  Neurological:      Mental Status: He is alert and oriented to person, place, and time  Psychiatric:         Mood and Affect: Mood normal          Thought Content: Thought content normal          Judgment: Judgment normal                       I spent 30 minutes with patient today in which greater than 50% of the time was spent in counseling/coordination of care regarding to left calf ulcer  VIRTUAL VISIT DISCLAIMER      Piedad Wilson verbally agrees to participate in [employer]  Pt is aware that [employer] could be limited without vital signs or the ability to perform a full hands-on physical exam  Ben Mathur understands he or the provider may request at any time to terminate the video visit and request the patient to seek care or treatment in person

## 2022-02-14 NOTE — PATIENT INSTRUCTIONS
Apply new dressing over clean wound daily  You can use regular alginate and then cover with new dressing and gauze as prescribed  Change once a day or as needed  If the dressing becomes more dry change less frequently

## 2022-02-14 NOTE — ASSESSMENT & PLAN NOTE
Lab Results   Component Value Date    HGBA1C 6 5 (H) 10/29/2021   Wound on left calf Likely diabetic ulcer, but venous ulcer cannot be excluded  Need wound care for surgical and or chemical debridment  Increase gabapentin from 400 to 500mg HS, tylenol and ibuprofen at night for pain control  Maxsorb alginate for better absorbance to be changed daily for now or as needed until seen by wound care  To report if any fever, chills, increase wound drainage, swelling or erythema  Follow up in 1 week or before if needed

## 2022-02-16 NOTE — TELEPHONE ENCOUNTER
Spoke with Estelle Levine in regards to his calcium alginate (Maxorb ll Alginate Dressing) Misc and gauze pads & dressings  [facility] did not have supplies in stock, which Kareen requested the orders be sent to [facility] instead

## 2022-02-17 ENCOUNTER — TELEPHONE (OUTPATIENT)
Dept: WOUND CARE | Facility: CLINIC | Age: 57
End: 2022-02-17

## 2022-02-17 ENCOUNTER — OFFICE VISIT (OUTPATIENT)
Dept: WOUND CARE | Facility: CLINIC | Age: 57
End: 2022-02-17
Payer: MEDICARE

## 2022-02-17 VITALS — RESPIRATION RATE: 20 BRPM | TEMPERATURE: 97.8 F | HEART RATE: 92 BPM

## 2022-02-17 DIAGNOSIS — Z79.4 TYPE 2 DIABETES MELLITUS WITH HYPERGLYCEMIA, WITH LONG-TERM CURRENT USE OF INSULIN (HCC): ICD-10-CM

## 2022-02-17 DIAGNOSIS — E11.65 TYPE 2 DIABETES MELLITUS WITH HYPERGLYCEMIA, WITH LONG-TERM CURRENT USE OF INSULIN (HCC): ICD-10-CM

## 2022-02-17 DIAGNOSIS — L89.893 PRESSURE INJURY OF LEFT LEG, STAGE 3 (HCC): ICD-10-CM

## 2022-02-17 DIAGNOSIS — E11.42 DIABETIC POLYNEUROPATHY ASSOCIATED WITH TYPE 2 DIABETES MELLITUS (HCC): ICD-10-CM

## 2022-02-17 DIAGNOSIS — I89.0 LYMPHEDEMA OF BOTH LOWER EXTREMITIES: ICD-10-CM

## 2022-02-17 DIAGNOSIS — L89.893 PRESSURE INJURY OF RIGHT LEG, STAGE 3 (HCC): Primary | ICD-10-CM

## 2022-02-17 DIAGNOSIS — E66.01 MORBID OBESITY WITH BMI OF 70 AND OVER, ADULT (HCC): ICD-10-CM

## 2022-02-17 PROCEDURE — 97597 DBRDMT OPN WND 1ST 20 CM/<: CPT | Performed by: FAMILY MEDICINE

## 2022-02-17 PROCEDURE — 99213 OFFICE O/P EST LOW 20 MIN: CPT | Performed by: FAMILY MEDICINE

## 2022-02-17 PROCEDURE — 97598 DBRDMT OPN WND ADDL 20CM/<: CPT | Performed by: FAMILY MEDICINE

## 2022-02-17 PROCEDURE — 99204 OFFICE O/P NEW MOD 45 MIN: CPT | Performed by: FAMILY MEDICINE

## 2022-02-17 RX ORDER — LIDOCAINE 40 MG/G
CREAM TOPICAL ONCE
Status: COMPLETED | OUTPATIENT
Start: 2022-02-17 | End: 2022-02-17

## 2022-02-17 RX ADMIN — LIDOCAINE: 40 CREAM TOPICAL at 15:48

## 2022-02-17 NOTE — PATIENT INSTRUCTIONS
Orders Placed This Encounter   Procedures    Wound off loading     Off-loading Instructions:    Keep weight and pressure off wound at all times  and Turn every 2 hours  Avoid position directing pressure to Wound site  Limit side lying to 30 degree tilt  Limit the head of bed elevation to 30 degrees  Standing Status:   Future     Standing Expiration Date:   2/17/2023    Wound home care     Change dressings, unna boot change on Sunday or Monday  Patient has follow up appt on Thursday at wound center  Standing Status:   Future     Standing Expiration Date:   2/17/2023    Wound cleansing and dressings     B/L lower legs:     Wash your hands with soap and water  Remove old dressing, discard into plastic bag and place in trash  Cleanse the wound with normal saline prior to applying a clean dressing  Do not use tissue or cotton balls  Do not scrub the wound  Pat dry using gauze  Shower yes with protection, purchase cast cover  Or sponge bathe  Apply Lac Hydrin to B/L lower legs  Apply silver alginate to the wound  Cover with ABD pad  Secure with [employer] and [employer]  Change dressing 2x a week (home care nurse change Sunday or Mon and Wound center Thurs) and as needed if soiled or lose integrity  Follow up in 1 week       Standing Status:   Future     Standing Expiration Date:   2/17/2023    Ambulatory Referral to Home Health     Standing Status:   Future     Standing Expiration Date:   2/17/2023     Referral Priority:   Routine     Referral Type:   Home Health     Referral Reason:   Specialty Services Required     Requested Specialty:   [address]     Number of Visits Requested:   1     Expiration Date:   2/17/2023

## 2022-02-17 NOTE — PROGRESS NOTES
Patient ID: Boni Jacobs is a 49 y o  male Date of Birth 1965       Chief Complaint   Patient presents with    New Patient Visit     Left lower leg present for couple months per pt  Allergies:  Gluten meal - food allergy and Clindamycin    Diagnosis:      Diagnosis ICD-10-CM Associated Orders   1  Pressure injury of right leg, stage 3 ([facility])  L89 893 lidocaine (LMX) 4 % cream     Wound off loading     Ambulatory Referral to [address] [place] [address] home care     Wound cleansing and dressings     Debridement   2  Pressure injury of left leg, stage 3 ([facility])  L89 893 lidocaine (LMX) 4 % cream     Wound off loading     Ambulatory Referral to [address] [place] [address] home care     Wound cleansing and dressings     Debridement   3  Type 2 diabetes mellitus with hyperglycemia, with long-term current use of insulin ([facility])  E11 65     Z79 4    4  Diabetic polyneuropathy associated with type 2 diabetes mellitus ([facility])  E11 42    5  Morbid obesity with BMI of 70 and over, adult ([facility] )  E66 01     Z68 45    6  Lymphedema of both lower extremities  I89 0            Assessment & Plan:   Stage III pressure injuries of both right and left posterior calfs  This is due to pressure on the calves when in his recliner  Morbid obesity, type 2 diabetes with neuropathy and lymphedema of both lower extremities   Extensive discussion as to offloading and weight reduction  Eventually when his wounds are closed, he should be referred to lymphedema clinic   Unna boots applied today with silver alginate and ABDs   Selective debridement done to both legs  Subjective:   2/17/22: This is a 61-year-old male who comes in [address] [place] [address] with ulcers on the right and left calfs  He states that he was hospitalized on November of 2021 with cellulitis and he had the ulcers at that time  Patient is morbidly obese and has history of lymphedema as well  He does not use any form of compression other than Ace wraps    He had purchased alginate on the Internet as well as bordered foam is  He notes that there is heavy drainage requiring dressings to be changed once or twice a day  He believes that these ulcers had started due to  sleeping in a recliner and the foot rest causing pressure on the calfs  He is unable to sleep in a bed  He notes he has increased pain at nighttime when in there is recliner with pressure on his calf  He is ambulatory with a walker but does not go out of the house other than to doctor visits  Patient also has a history of type 2 diabetes with good control with last A1c 6 5 in October of 2021  He states that he has lost approximately 120 lb in the past nine months with a special meal plan  He plans on continuing with weight loss        The following portions of the patient's history were reviewed and updated as appropriate:   Patient Active Problem List   Diagnosis    Type 2 diabetes mellitus with hyperglycemia, with long-term current use of insulin ([address] )    Hyperlipidemia    Psoriasis    Venous insufficiency    Gout    Essential hypertension    Hyponatremia    Gluten intolerance    Diabetic neuropathy (HCC)    Insomnia    Scrotal swelling    Erectile dysfunction    Bilateral leg edema    Elevated CO2 level    Abnormal thyroid function test    DAHIANA (obstructive sleep apnea)    Fungal infection    Morbid obesity with BMI of 70 and over, adult ([address] )    Cellulitis of right lower extremity    Migraine headache    Onychomycosis    Ulcer of left lower extremity, limited to breakdown of skin ([address] )    Pressure injury of right leg, stage 3 ([address] )     Past Medical History:   Diagnosis Date    Acute kidney injury 10/28/2021    Anemia 09/13/2019    Cellulitis     last assessed 12/10/15    Cellulitis of left lower extremity     Cough 10/20/2019    Diabetes mellitus ([address] )     Disease of thyroid gland     Edema     Elevated liver enzymes     Elevated serum creatinine 11/19/2021    Esophageal reflux  Gluten intolerance     Gout     last assessed 09/05/13    Hyperglycemia     Hypertension     IBS (irritable bowel syndrome)     Insomnia     Obesity     Osteoarthritis of knee     last assessed 02/10/14    Shortness of breath 5/3/2021    Venous insufficiency     last assessed 08/22/17    Villonodular synovitis of the hand, right     last assessed 11/14/2013     Past Surgical History:   Procedure Laterality Date    INCISION AND DRAINAGE OF WOUND Left 9/6/2019    Procedure: INCISION AND DRAINAGE (I&D) GROIN;  Surgeon: Jf Lundberg DO;  [facility];  Service: General    SKIN BIOPSY      WOUND DEBRIDEMENT Left 9/7/2019    Procedure: EXCISIONAL DEBRIDEMENT;  Surgeon: Jf Lundberg DO;  [facility];  Service: General     Family History   Problem Relation Age of Onset    Cancer Mother         gastrc    Colon cancer Father     Heart failure Father       Social History     Socioeconomic History    Marital status: /Civil Union     Spouse name: None    Number of children: None    Years of education: None    Highest education level: None   Occupational History    None   Tobacco Use    Smoking status: Never Smoker    Smokeless tobacco: Never Used   Vaping Use    Vaping Use: Never used   Substance and Sexual Activity    Alcohol use: Not Currently     Alcohol/week: 0 0 standard drinks    Drug use: No    Sexual activity: Yes   Other Topics Concern    None   Social History Narrative    None     Social Determinants of Health     Financial Resource Strain: Not on file   Food Insecurity: Not on file   Transportation Needs: No Transportation Needs    Lack of Transportation (Medical): No    Lack of Transportation (Non-Medical):  No   Physical Activity: Not on file   Stress: Not on file   Social Connections: Not on file   Intimate Partner Violence: Not on file   Housing Stability: Not on file        Current Outpatient Medications:     acetaminophen (TYLENOL) 325 mg tablet, Take 2 tablets (650 mg total) by mouth every 4 (four) hours as needed for mild pain, headaches or fever, Disp: , Rfl: 0    albuterol (ProAir HFA) 90 mcg/act inhaler, Inhale 2 puffs every 6 (six) hours as needed for wheezing, Disp: 8 5 g, Rfl: 6    BD Pen Needle Ludmila 2nd Gen 32G X 4 MM MISC, USE 4 TIMES A DAY, Disp: 150 each, Rfl: 3    Blood Glucose Monitoring Suppl (ONETOUCH VERIO) w/Device KIT, Use to test sugar daily (Patient not taking: Reported on 10/28/2021), Disp: 1 kit, Rfl: 0    Calcium Alginate (Maxorb II Alginate Dressing) MISC, Apply 1 each topically in the morning, Disp: 10 each, Rfl: 2    Continuous Blood Gluc  (FreeStyle Connell 2 [place] Systm) BROOKS, Use 1 Device as needed (use with sensors for bs checks), Disp: 1 Device, Rfl: 0    Continuous Blood Gluc Sensor (Appcara IncStyle Elvin 14 Day Sensor) MISC, USE ONE SENSOR EVERY 2 WEEKS, Disp: 2 each, Rfl: 3    Control Gel Formula Dressing (DuoDERM CGF Dressing) MIS, Apply 1 application topically every 5 (five) days, Disp: 5 each, Rfl: 1    [employer] Diclofenac Sodium 1 %, APPLY 4 G TOPICALLY 4 (FOUR) TIMES A DAY AS NEEDED (JOINT PAIN), Disp: 50 g, Rfl: 0    furosemide (LASIX) 20 mg tablet, Take 1 tablet (20 mg total) by mouth 2 (two) times a day, Disp: 270 tablet, Rfl: 0    gabapentin (NEURONTIN) 100 mg capsule, Take 1 capsule (100 mg total) by mouth 2 (two) times a day Take 1 tab with your 300mg tab twice daily, Disp: 180 capsule, Rfl: 2    gabapentin (NEURONTIN) 300 mg capsule, Take 1 capsule (300 mg total) by mouth 2 (two) times a day Take 1 tab with your 100mg tab twice daily, Disp: 180 capsule, Rfl: 2    Gauze Pads & Dressings 5"X5" PADS, Use in the morning, Disp: 20 each, Rfl: 2    insulin aspart (NovoLOG FlexPen) 100 UNIT/ML injection pen, INJECT 18 UNITS WITH MEALS+SCALE ( - 150-200 -2 UNITS, 201-250-4 UNITS, 251-300 -6 UNITS, 301-350-8 UNITS, > 350- 10 UNITS ), Disp: 45 mL, Rfl: 1    Lancets (ONETOUCH ULTRASOFT) lancets, Use to test sugar 2 times a day, Disp: 100 each, Rfl: 3    Lantus SoloStar 100 units/mL injection pen, INJECT 46 UNITS UNDER THE SKIN DAILY AT BEDTIME INJECT 48 UNITS UNDER THE SKIN EVERY BEDTIME, Disp: 15 mL, Rfl: 0    losartan (COZAAR) 25 mg tablet, TAKE ONE TABLET BY MOUTH EVERY DAY, Disp: 90 tablet, Rfl: 2    metFORMIN (GLUCOPHAGE) 1000 MG tablet, TAKE ONE TABLET BY MOUTH TWICE DAILY, Disp: 180 tablet, Rfl: 3    nystatin (MYCOSTATIN) cream, Apply topically 2 (two) times a day as needed (itching, redness), Disp: 30 g, Rfl: 1    nystatin (MYCOSTATIN) powder, Apply 1 application topically 2 (two) times a day To affected area, Disp: 120 g, Rfl: 3    omeprazole (PriLOSEC) 40 MG capsule, Take 1 capsule (40 mg total) by mouth 2 (two) times a day, Disp: 180 capsule, Rfl: 1    OneTouch Verio test strip, USE TO TEST SUGAR 2 TIMES A DAY, Disp: 100 strip, Rfl: 3    oxyCODONE (ROXICODONE) 5 immediate release tablet, Take 1-2 tabs by mouth every 6 hours as needed for moderate or severe pains respectively  , Disp: 15 tablet, Rfl: 0    psyllium (METAMUCIL) packet, Take 1 packet by mouth daily, Disp: , Rfl: 0    saccharomyces boulardii (FLORASTOR) 250 mg capsule, Take 1 capsule (250 mg total) by mouth 2 (two) times a day, Disp: 60 capsule, Rfl: 1    Skin Protectants, Misc  ([employer]ile 10"x144") SHEE, Apply 1 each topically every 5 (five) days, Disp: 3 each, Rfl: 5    sodium hypochlorite (DAKIN'S QUARTER-STRENGTH), Irrigate with 1 application as directed daily, Disp: 473 mL, Rfl: 0    Review of Systems   Constitutional: Negative for appetite change, chills, fatigue, fever and unexpected weight change  HENT: Negative for congestion, hearing loss, postnasal drip and sinus pressure  Eyes: Negative for discharge and visual disturbance  Respiratory: Positive for shortness of breath (On exertion)  Negative for cough  Cardiovascular: Positive for leg swelling (Lymphedema)  Negative for chest pain and palpitations  Gastrointestinal: Negative for abdominal pain, blood in stool, constipation, diarrhea and nausea  Endocrine: Negative  Genitourinary: Negative for difficulty urinating, dysuria and urgency  Musculoskeletal: Positive for gait problem Lorsally Gotti)  Negative for back pain  Skin: Positive for wound (Both lower extremities)  Negative for rash  Allergic/Immunologic: Negative  Neurological: Positive for numbness  Negative for dizziness, tremors, seizures, weakness and headaches  Hematological: Does not bruise/bleed easily  Psychiatric/Behavioral: Negative  Negative for dysphoric mood  The patient is not nervous/anxious  Objective:  Pulse 92   Temp 97 8 °F (36 6 °C)   Resp 20   Pain Score: 0-No pain     Physical Exam  Vitals and nursing note reviewed  Constitutional:       Appearance: Normal appearance  He is well-developed  He is morbidly obese  HENT:      Head: Normocephalic and atraumatic  Right Ear: External ear normal       Left Ear: External ear normal       Mouth/Throat:      Comments: masked  Eyes:      General: Lids are normal          Right eye: No discharge  Left eye: No discharge  Conjunctiva/sclera: Conjunctivae normal       Pupils: Pupils are equal, round, and reactive to light  Cardiovascular:      Rate and Rhythm: Normal rate  Pulses:           Dorsalis pedis pulses are 2+ on the right side and 2+ on the left side  Posterior tibial pulses are 1+ on the right side and 1+ on the left side  Comments: Pulses palpable through heavy edema of the feet  Pulmonary:      Effort: Pulmonary effort is normal       Breath sounds: Normal air entry  Abdominal:      General: Abdomen is protuberant  Comments: Unable to assess for any organomegaly due to morbid obesity  Musculoskeletal:      Right lower le+ Edema present  Left lower le+ Edema present  Skin:     General: Skin is warm and dry  Findings: Wound present  No erythema  Comments: Large pressure injury on the left posterior calf with fibrin and thin slough  Smaller pressure injury on the right posterior calf with fibrin and thin slough  Both lower extremities with massive lymphedema and lipodermatosclerosis  Neurological:      Mental Status: He is alert and oriented to person, place, and time  Gait: Gait is intact  Psychiatric:         Attention and Perception: Attention normal          Mood and Affect: Mood and affect normal          Speech: Speech normal          Behavior: Behavior is cooperative  Cognition and Memory: Cognition normal          Wound 02/17/22 Pressure Injury Pretibial Right;Lateral (Active)   Wound Image       02/17/22 1511   Wound Description Granulation tissue;Pink;Slough; Yellow 02/17/22 1511   Pressure Injury Stage 3 02/17/22 1511   Chelsie-wound Assessment Edema 02/17/22 1511   Wound Length (cm) 3 7 cm 02/17/22 1511   Wound Width (cm) 1 3 cm 02/17/22 1511   Wound Depth (cm) 0 2 cm 02/17/22 1511   Wound Surface Area (cm^2) 4 81 cm^2 02/17/22 1511   Wound Volume (cm^3) 0 962 cm^3 02/17/22 1511   Calculated Wound Volume (cm^3) 0 96 cm^3 02/17/22 1511   Drainage Amount Moderate 02/17/22 1511   Drainage Description Serosanguineous; Serous 02/17/22 1511       Wound 02/17/22 Pressure Injury Pretibial Left;Lateral (Active)   Wound Image   02/17/22 1512   Wound Description Granulation tissue;Slough; Yellow;Pink 02/17/22 1512   Pressure Injury Stage 3 02/17/22 1512   Chelsie-wound Assessment Edema; Maceration 02/17/22 1512   Wound Length (cm) 4 3 cm 02/17/22 1512   Wound Width (cm) 5 2 cm 02/17/22 1512   Wound Depth (cm) 0 1 cm 02/17/22 1512   Wound Surface Area (cm^2) 22 36 cm^2 02/17/22 1512   Wound Volume (cm^3) 2 236 cm^3 02/17/22 1512   Calculated Wound Volume (cm^3) 2 24 cm^3 02/17/22 1512   Drainage Amount Large 02/17/22 1512   Drainage Description Serosanguineous; Serous 02/17/22 1512                            Debridement   Wound 02/17/22 Pressure Injury Leg Right;Lateral;Lower    Universal Protocol:  Consent: Verbal consent obtained  Written consent obtained  Consent given by: patient  Time out: Immediately prior to procedure a "time out" was called to verify the correct patient, procedure, equipment, support staff and site/side marked as required  Patient understanding: patient states understanding of the procedure being performed  Patient identity confirmed: verbally with patient      Performed by: physician  Debridement type: selective  Pain control: lidocaine 4%  Post-debridement measurements  Length (cm): 3 7  Width (cm): 1 3  Depth (cm): 0 2  Percent debrided: 100%  Surface Area (cm^2): 4 81  Area debrided (cm^2): 4 81  Volume (cm^3): 0 96  Devitalized tissue debrided: fibrin  Instrument(s) utilized: curette  Bleeding: small  Hemostasis obtained with: pressure  Procedural pain (0-10): 0  Post-procedural pain: 0   Response to treatment: procedure was tolerated well    Debridement   Wound 02/17/22 Pressure Injury Leg Left;Lateral;Lower; Posterior    Universal Protocol:  Consent: Verbal consent obtained  Written consent obtained  Consent given by: patient  Time out: Immediately prior to procedure a "time out" was called to verify the correct patient, procedure, equipment, support staff and site/side marked as required    Patient understanding: patient states understanding of the procedure being performed  Patient identity confirmed: verbally with patient      Performed by: physician  Debridement type: selective  Pain control: lidocaine 4%  Post-debridement measurements  Length (cm): 4 3  Width (cm): 5 2  Depth (cm): 0 1  Percent debrided: 100%  Surface Area (cm^2): 22 36  Area debrided (cm^2): 22 36  Volume (cm^3): 2 24  Devitalized tissue debrided: fibrin and slough  Instrument(s) utilized: curette  Bleeding: small  Hemostasis obtained with: pressure  Procedural pain (0-10): 3  Post-procedural pain: 0   Response to treatment: procedure was tolerated well                 Wound Instructions:  Orders Placed This Encounter   Procedures    Wound off loading     Off-loading Instructions:    Keep weight and pressure off wound at all times  and Turn every 2 hours  Avoid position directing pressure to Wound site  Limit side lying to 30 degree tilt  Limit the head of bed elevation to 30 degrees  Standing Status:   Future     Standing Expiration Date:   2/17/2023    Wound home care     Change dressings, unna boot change on Sunday or Monday  Patient has follow up appt on Thursday at wound center  Standing Status:   Future     Standing Expiration Date:   2/17/2023    Wound cleansing and dressings     B/L lower legs:     Wash your hands with soap and water  Remove old dressing, discard into plastic bag and place in trash  Cleanse the wound with normal saline prior to applying a clean dressing  Do not use tissue or cotton balls  Do not scrub the wound  Pat dry using gauze  Shower yes with protection, purchase cast cover  Or sponge bathe  Apply Lac Hydrin to B/L lower legs  Apply silver alginate to the wound  Cover with ABD pad  Secure with [employer] and [employer]  Change dressing 2x a week (home care nurse change Sunday or Mon and Wound center Thurs) and as needed if soiled or lose integrity  Follow up in 1 week  Standing Status:   Future     Standing Expiration Date:   2/17/2023    Debridement     This order was created via procedure documentation    Debridement     This order was created via procedure documentation    Ambulatory Referral to Home Health     Standing Status:   Future     Standing Expiration Date:   2/17/2023     Referral Priority:   Routine     Referral Type:   Home Health     Referral Reason:   Specialty Services Required     Requested Specialty:   [address]     Number of Visits Requested:   1     Expiration Date:   2/17/2023       Total time spent today:  30 minutes    This includes reviewing the patient's chart, pertinent physician records including hospitalization in November of 2021  Laboratory data was reviewed from November of 2021 including CBC and CMP as well as A1c from October of 2021  Jocelyn Hiltno MD, CHT, CWS    Portions of the record may have been created with voice recognition software  Occasional wrong word or "sound alike" substitutions may have occurred due to the inherent limitations of voice recognition software  Read the chart carefully and recognize, using context, where substitutions have occurred

## 2022-02-18 ENCOUNTER — TELEPHONE (OUTPATIENT)
Dept: WOUND CARE | Facility: CLINIC | Age: 57
End: 2022-02-18

## 2022-02-20 DIAGNOSIS — E11.65 TYPE 2 DIABETES MELLITUS WITH HYPERGLYCEMIA, WITH LONG-TERM CURRENT USE OF INSULIN (HCC): ICD-10-CM

## 2022-02-20 DIAGNOSIS — Z79.4 TYPE 2 DIABETES MELLITUS WITH HYPERGLYCEMIA, WITH LONG-TERM CURRENT USE OF INSULIN (HCC): ICD-10-CM

## 2022-02-21 RX ORDER — INSULIN GLARGINE 100 [IU]/ML
46 INJECTION, SOLUTION SUBCUTANEOUS
Qty: 15 ML | Refills: 0 | Status: SHIPPED | OUTPATIENT
Start: 2022-02-21 | End: 2022-04-20

## 2022-02-21 NOTE — TELEPHONE ENCOUNTER
Ayah Sahni has requested a refill of lantus solostar 100 units/mL injection pen  Pt meets the requirements placed by [facility]  Will refill medication

## 2022-02-21 NOTE — TELEPHONE ENCOUNTER
Patient called in to advise that he already has a Candace Joyce visiting nurse visit scheduled tomorrow 2/21, the visit for 2/22 is not necessary

## 2022-02-24 ENCOUNTER — OFFICE VISIT (OUTPATIENT)
Dept: WOUND CARE | Facility: CLINIC | Age: 57
End: 2022-02-24
Payer: MEDICARE

## 2022-02-24 VITALS
WEIGHT: 315 LBS | RESPIRATION RATE: 22 BRPM | TEMPERATURE: 98.2 F | SYSTOLIC BLOOD PRESSURE: 130 MMHG | HEART RATE: 96 BPM | BODY MASS INDEX: 78.67 KG/M2 | DIASTOLIC BLOOD PRESSURE: 96 MMHG

## 2022-02-24 DIAGNOSIS — L89.893 PRESSURE INJURY OF LEFT LEG, STAGE 3 (HCC): Primary | ICD-10-CM

## 2022-02-24 DIAGNOSIS — L89.893 PRESSURE INJURY OF RIGHT LEG, STAGE 3 (HCC): ICD-10-CM

## 2022-02-24 PROCEDURE — 97598 DBRDMT OPN WND ADDL 20CM/<: CPT | Performed by: FAMILY MEDICINE

## 2022-02-24 PROCEDURE — 97597 DBRDMT OPN WND 1ST 20 CM/<: CPT | Performed by: FAMILY MEDICINE

## 2022-02-24 RX ORDER — LIDOCAINE 40 MG/G
CREAM TOPICAL ONCE
Status: COMPLETED | OUTPATIENT
Start: 2022-02-24 | End: 2022-02-24

## 2022-02-24 RX ADMIN — LIDOCAINE: 40 CREAM TOPICAL at 14:55

## 2022-02-24 NOTE — PATIENT INSTRUCTIONS
Orders Placed This Encounter   Procedures    Wound cleansing and dressings     B/L lower legs:      Wash your hands with soap and water  Remove old dressing, discard into plastic bag and place in trash  Cleanse the wound with normal saline prior to applying a clean dressing  Do not use tissue or cotton balls  Do not scrub the wound  Pat dry using gauze  Shower yes with protection, purchase cast cover  Or sponge bathe       Apply Lac Hydrin to B/L lower legs  Apply skin prep to the periwound  Apply hydrofera blue to the wound  Cover with ABD pad  Secure with [employer] and [employer]  Change dressing 2x a week (home care nurse change Sunday or Mon and Wound center Thurs) and as needed if soiled or lose integrity       Follow up in 1 week  Cleansed with prophase today and the above dressing applied       Standing Status:   Future     Standing Expiration Date:   2/24/2023

## 2022-02-24 NOTE — PROGRESS NOTES
Patient ID: Alton Morales is a 49 y o  male Date of Birth 1965       Chief Complaint   Patient presents with    Follow Up Wound Care Visit     Pressure injury of right leg, stage 3       Allergies:  Gluten meal - food allergy and Clindamycin    Diagnosis:   Diagnosis ICD-10-CM Associated Orders   1  Pressure injury of left leg, stage 3 ([facility])  L89 [id number] Wound cleansing and dressings     lidocaine (LMX) 4 % cream     Debridement   2  Pressure injury of right leg, stage 3 ([facility])  L89 [id number] Wound cleansing and dressings     lidocaine (LMX) 4 % cream     Debridement        Assessment  & Plan:     Bilateral lower extremity pressure injury stage III  Unchanged  Tolerating Unna boots   Using memory foam pillows to try to offload the areas when in bed   Selective debridement of both sites   Continue with Unna boot but use Hydrofera both ulcers  Subjective:   2/17/22: This is a 29-year-old male who comes in [address] [place] [address] with ulcers on the right and left calfs  He states that he was hospitalized on November of 2021 with cellulitis and he had the ulcers at that time  Patient is morbidly obese and has history of lymphedema as well  He does not use any form of compression other than Ace wraps  He had purchased alginate on the Internet as well as bordered foam is  He notes that there is heavy drainage requiring dressings to be changed once or twice a day  He believes that these ulcers had started due to  sleeping in a recliner and the foot rest causing pressure on the calfs  He is unable to sleep in a bed  He notes he has increased pain at nighttime when in there is recliner with pressure on his calf  He is ambulatory with a walker but does not go out of the house other than to doctor visits  Patient also has a history of type 2 diabetes with good control with last A1c 6 5 in October of 2021  He states that he has lost approximately 120 lb in the past nine months with a special meal plan    He plans on continuing with weight loss  2/24/22: Followup stage III pressure injuries of right and left calfs  They purchased memory foam pillows which seems to help with both pain and offloading the areas when he is sleeping  Still has pain mainly at night  Left greater than right          The following portions of the patient's history were reviewed and updated as appropriate:   Patient Active Problem List   Diagnosis    Type 2 diabetes mellitus with hyperglycemia, with long-term current use of insulin ([facility] [address] )    Hyperlipidemia    Psoriasis    Venous insufficiency    Gout    Essential hypertension    Hyponatremia    Gluten intolerance    Diabetic neuropathy (HCC)    Insomnia    Scrotal swelling    Erectile dysfunction    Bilateral leg edema    Elevated CO2 level    Abnormal thyroid function test    DAHIANA (obstructive sleep apnea)    Fungal infection    Morbid obesity with BMI of 70 and over, adult ([facility] [address] )    Cellulitis of right lower extremity    Migraine headache    Onychomycosis    Ulcer of left lower extremity, limited to breakdown of skin ([address] )    Pressure injury of right leg, stage 3 ([facility] [address] )     Past Medical History:   Diagnosis Date    Acute kidney injury 10/28/2021    Anemia 09/13/2019    Cellulitis     last assessed 12/10/15    Cellulitis of left lower extremity     Cough 10/20/2019    Diabetes mellitus ([facility] [address] )     Disease of thyroid gland     Edema     Elevated liver enzymes     Elevated serum creatinine 11/19/2021    Esophageal reflux     Gluten intolerance     Gout     last assessed 09/05/13    Hyperglycemia     Hypertension     IBS (irritable bowel syndrome)     Insomnia     Obesity     Osteoarthritis of knee     last assessed 02/10/14    Shortness of breath 5/3/2021    Venous insufficiency     last assessed 08/22/17    Villonodular synovitis of the hand, right     last assessed 11/14/2013     Past Surgical History:   Procedure Laterality Date    INCISION AND DRAINAGE OF WOUND Left 9/6/2019    Procedure: INCISION AND DRAINAGE (I&D) GROIN;  Surgeon: Saúl Chaney DO;  [facility]: [facility];  Service: General    SKIN BIOPSY      WOUND DEBRIDEMENT Left 9/7/2019    Procedure: EXCISIONAL DEBRIDEMENT;  Surgeon: Saúl Chaney DO;  [facility];  Service: General     Family History   Problem Relation Age of Onset    Cancer Mother         gastrc    Colon cancer Father     Heart failure Father      Social History     Socioeconomic History    Marital status: /Civil Union     Spouse name: Not on file    Number of children: Not on file    Years of education: Not on file    Highest education level: Not on file   Occupational History    Not on file   Tobacco Use    Smoking status: Never Smoker    Smokeless tobacco: Never Used   Vaping Use    Vaping Use: Never used   Substance and Sexual Activity    Alcohol use: Not Currently     Alcohol/week: 0 0 standard drinks    Drug use: No    Sexual activity: Yes   Other Topics Concern    Not on file   Social History Narrative    Not on file     Social Determinants of Health     Financial Resource Strain: Not on file   Food Insecurity: Not on file   Transportation Needs: No Transportation Needs    Lack of Transportation (Medical): No    Lack of Transportation (Non-Medical):  No   Physical Activity: Not on file   Stress: Not on file   Social Connections: Not on file   Intimate Partner Violence: Not on file   Housing Stability: Not on file       Current Outpatient Medications:     acetaminophen (TYLENOL) 325 mg tablet, Take 2 tablets (650 mg total) by mouth every 4 (four) hours as needed for mild pain, headaches or fever, Disp: , Rfl: 0    albuterol (ProAir HFA) 90 mcg/act inhaler, Inhale 2 puffs every 6 (six) hours as needed for wheezing, Disp: 8 5 g, Rfl: 6    BD Pen Needle Ludmila 2nd Gen 32G X 4 MM MISC, USE 4 TIMES A DAY, Disp: 150 each, Rfl: 3    Blood Glucose Monitoring Suppl (ONETOUCH VERIO) w/Device KIT, Use to test sugar daily (Patient not taking: Reported on 10/28/2021), Disp: 1 kit, Rfl: 0    Calcium Alginate (Maxorb II Alginate Dressing) MISC, Apply 1 each topically in the morning, Disp: 10 each, Rfl: 2    Continuous Blood Gluc  (FreeStyle Connell 2 Veblen Systm) BROOKS, Use 1 Device as needed (use with sensors for bs checks), Disp: 1 Device, Rfl: 0    Continuous Blood Gluc Sensor (FreeStyle Elvin 14 Day Sensor) MISC, USE ONE SENSOR EVERY 2 WEEKS, Disp: 2 each, Rfl: 3    Control Gel Formula Dressing (DuoDERM CGF Dressing) MIS, Apply 1 application topically every 5 (five) days, Disp: 5 each, Rfl: 1    [employer] Diclofenac Sodium 1 %, APPLY 4 G TOPICALLY 4 (FOUR) TIMES A DAY AS NEEDED (JOINT PAIN), Disp: 50 g, Rfl: 0    furosemide (LASIX) 20 mg tablet, Take 1 tablet (20 mg total) by mouth 2 (two) times a day, Disp: 270 tablet, Rfl: 0    gabapentin (NEURONTIN) 100 mg capsule, Take 1 capsule (100 mg total) by mouth 2 (two) times a day Take 1 tab with your 300mg tab twice daily, Disp: 180 capsule, Rfl: 2    gabapentin (NEURONTIN) 300 mg capsule, Take 1 capsule (300 mg total) by mouth 2 (two) times a day Take 1 tab with your 100mg tab twice daily, Disp: 180 capsule, Rfl: 2    Gauze Pads & Dressings 5"X5" PADS, Use in the morning, Disp: 20 each, Rfl: 2    insulin aspart (NovoLOG FlexPen) 100 UNIT/ML injection pen, INJECT 18 UNITS WITH MEALS+SCALE ( - 150-200 -2 UNITS, 201-250-4 UNITS, 251-300 -6 UNITS, 301-350-8 UNITS, > 350- 10 UNITS ), Disp: 45 mL, Rfl: 1    Lancets (ONETOUCH ULTRASOFT) lancets, Use to test sugar 2 times a day, Disp: 100 each, Rfl: 3    Lantus SoloStar 100 units/mL injection pen, INJECT 46 UNITS UNDER THE SKIN DAILY AT BEDTIME INJECT 48 UNITS UNDER THE SKIN EVERY BEDTIME, Disp: 15 mL, Rfl: 0    losartan (COZAAR) 25 mg tablet, TAKE ONE TABLET BY MOUTH EVERY DAY, Disp: 90 tablet, Rfl: 2    metFORMIN (GLUCOPHAGE) 1000 MG tablet, TAKE ONE TABLET BY MOUTH TWICE DAILY, Disp: 180 tablet, Rfl: 3   nystatin (MYCOSTATIN) cream, Apply topically 2 (two) times a day as needed (itching, redness), Disp: 30 g, Rfl: 1    nystatin (MYCOSTATIN) powder, Apply 1 application topically 2 (two) times a day To affected area, Disp: 120 g, Rfl: 3    omeprazole (PriLOSEC) 40 MG capsule, Take 1 capsule (40 mg total) by mouth 2 (two) times a day, Disp: 180 capsule, Rfl: 1    OneTouch Verio test strip, USE TO TEST SUGAR 2 TIMES A DAY, Disp: 100 strip, Rfl: 3    oxyCODONE (ROXICODONE) 5 immediate release tablet, Take 1-2 tabs by mouth every 6 hours as needed for moderate or severe pains respectively  , Disp: 15 tablet, Rfl: 0    psyllium (METAMUCIL) packet, Take 1 packet by mouth daily, Disp: , Rfl: 0    saccharomyces boulardii (FLORASTOR) 250 mg capsule, Take 1 capsule (250 mg total) by mouth 2 (two) times a day, Disp: 60 capsule, Rfl: 1    Skin Protectants, Misc  ([employer]ile 10"x144") SHEE, Apply 1 each topically every 5 (five) days, Disp: 3 each, Rfl: 5    sodium hypochlorite (DAKIN'S QUARTER-STRENGTH), Irrigate with 1 application as directed daily, Disp: 473 mL, Rfl: 0  No current facility-administered medications for this visit  Review of Systems   Constitutional: Negative for appetite change, chills, fatigue, fever and unexpected weight change  HENT: Negative for congestion, hearing loss, postnasal drip and sinus pressure  Respiratory: Positive for shortness of breath (On exertion)  Negative for cough  Cardiovascular: Positive for leg swelling (Lymphedema)  Endocrine: Negative  Genitourinary: Positive for urgency  Musculoskeletal: Positive for gait problem Eliceo Mons)  Negative for back pain  Skin: Positive for wound (Both lower extremities)  Negative for rash  Allergic/Immunologic: Negative  Neurological: Positive for numbness  Hematological: Does not bruise/bleed easily  Psychiatric/Behavioral: Negative  Negative for dysphoric mood  The patient is not nervous/anxious  Objective:  /96   Pulse 96   Temp 98 2 °F (36 8 °C)   Resp 22   Wt (!) 208 kg (458 lb 4 8 oz)   BMI 78 67 kg/m²   Pain Score:   8     Physical Exam  Vitals and nursing note reviewed  Constitutional:       Appearance: Normal appearance  He is well-developed  He is morbidly obese  HENT:      Head: Normocephalic and atraumatic  Cardiovascular:      Rate and Rhythm: Normal rate  Pulmonary:      Effort: Pulmonary effort is normal    Skin:     General: Skin is warm and dry  Findings: Wound present  Comments: Stage III pressure injuries of both lateral calfs  Mostly with granular base but cover with fibrin  Neurological:      Mental Status: He is alert and oriented to person, place, and time  Psychiatric:         Attention and Perception: Attention normal          Mood and Affect: Mood and affect normal          Behavior: Behavior is cooperative  Cognition and Memory: Cognition normal                       Debridement   Wound 02/17/22 Pressure Injury Leg Right;Lateral;Lower    Universal Protocol:  Consent: Verbal consent obtained  Written consent obtained  Consent given by: patient  Time out: Immediately prior to procedure a "time out" was called to verify the correct patient, procedure, equipment, support staff and site/side marked as required    Patient understanding: patient states understanding of the procedure being performed  Patient identity confirmed: verbally with patient      Performed by: physician  Debridement type: selective  Pain control: lidocaine 4%  Post-debridement measurements  Length (cm): 3 8  Width (cm): 1 4  Depth (cm): 0 2  Percent debrided: 100%  Surface Area (cm^2): 5 32  Area debrided (cm^2): 5 32  Volume (cm^3): 1 06  Devitalized tissue debrided: fibrin  Instrument(s) utilized: curette  Bleeding: small  Hemostasis obtained with: pressure  Procedural pain (0-10): 0  Post-procedural pain: 0   Response to treatment: procedure was tolerated well    Debridement   Wound 02/17/22 Pressure Injury Leg Left;Lateral;Lower; Posterior    Universal Protocol:  Consent: Verbal consent obtained  Written consent obtained  Consent given by: patient  Time out: Immediately prior to procedure a "time out" was called to verify the correct patient, procedure, equipment, support staff and site/side marked as required  Patient understanding: patient states understanding of the procedure being performed  Patient identity confirmed: verbally with patient      Performed by: physician  Debridement type: selective  Pain control: lidocaine 4%  Post-debridement measurements  Length (cm): 4 6  Width (cm): 5 5  Depth (cm): 0 2  Percent debrided: 100%  Surface Area (cm^2): 25 3  Area debrided (cm^2): 25 3  Volume (cm^3): 5 06  Devitalized tissue debrided: fibrin  Instrument(s) utilized: curette  Bleeding: medium  Hemostasis obtained with: pressure  Procedural pain (0-10): 0  Post-procedural pain: 0   Response to treatment: procedure was tolerated well                     Wound Instructions:  Orders Placed This Encounter   Procedures    Wound cleansing and dressings     B/L lower legs:      Wash your hands with soap and water  Remove old dressing, discard into plastic bag and place in trash  Cleanse the wound with normal saline prior to applying a clean dressing  Do not use tissue or cotton balls  Do not scrub the wound  Pat dry using gauze  Shower yes with protection, purchase cast cover  Or sponge bathe       Apply Lac Hydrin to B/L lower legs  Apply skin prep to the periwound  Apply hydrofera blue to the wound  Cover with ABD pad  Secure with [employer] and [employer]  Change dressing 2x a week (home care nurse change Sunday or Mon and Wound center Thurs) and as needed if soiled or lose integrity       Follow up in 1 week  Cleansed with prophase today and the above dressing applied       Standing Status:   Future     Standing Expiration Date:   2/24/2023    Debridement This order was created via procedure documentation    Debridement     This order was created via procedure documentation       Romi Andrade MD, CHT, CWS       Portions of the record may have been created with voice recognition software  Occasional wrong word or "sound alike" substitutions may have occurred due to the inherent limitations of voice recognition software  Read the chart carefully and recognize, using context, where substitutions have occurred

## 2022-03-03 ENCOUNTER — OFFICE VISIT (OUTPATIENT)
Dept: WOUND CARE | Facility: CLINIC | Age: 57
End: 2022-03-03
Payer: MEDICARE

## 2022-03-03 VITALS
HEART RATE: 110 BPM | DIASTOLIC BLOOD PRESSURE: 82 MMHG | SYSTOLIC BLOOD PRESSURE: 152 MMHG | RESPIRATION RATE: 24 BRPM | TEMPERATURE: 98.5 F

## 2022-03-03 DIAGNOSIS — E66.01 MORBID OBESITY WITH BMI OF 70 AND OVER, ADULT (HCC): ICD-10-CM

## 2022-03-03 DIAGNOSIS — E11.42 DIABETIC POLYNEUROPATHY ASSOCIATED WITH TYPE 2 DIABETES MELLITUS (HCC): ICD-10-CM

## 2022-03-03 DIAGNOSIS — I89.0 LYMPHEDEMA OF BOTH LOWER EXTREMITIES: ICD-10-CM

## 2022-03-03 DIAGNOSIS — L89.893 PRESSURE INJURY OF LEFT LEG, STAGE 3 (HCC): ICD-10-CM

## 2022-03-03 DIAGNOSIS — L89.893 PRESSURE INJURY OF RIGHT LEG, STAGE 3 (HCC): Primary | ICD-10-CM

## 2022-03-03 PROCEDURE — 99213 OFFICE O/P EST LOW 20 MIN: CPT | Performed by: FAMILY MEDICINE

## 2022-03-03 PROCEDURE — 97597 DBRDMT OPN WND 1ST 20 CM/<: CPT | Performed by: FAMILY MEDICINE

## 2022-03-03 PROCEDURE — 97598 DBRDMT OPN WND ADDL 20CM/<: CPT | Performed by: FAMILY MEDICINE

## 2022-03-03 RX ORDER — LIDOCAINE 40 MG/G
CREAM TOPICAL ONCE
Status: COMPLETED | OUTPATIENT
Start: 2022-03-03 | End: 2022-03-03

## 2022-03-03 RX ORDER — AMMONIUM LACTATE 12 G/100G
LOTION TOPICAL 2 TIMES DAILY PRN
Qty: 400 G | Refills: 2 | Status: SHIPPED | OUTPATIENT
Start: 2022-03-03 | End: 2022-03-04

## 2022-03-03 RX ADMIN — LIDOCAINE: 40 CREAM TOPICAL at 16:47

## 2022-03-03 NOTE — PATIENT INSTRUCTIONS
Orders Placed This Encounter   Procedures    Wound cleansing and dressings     Procedures   Wound cleansing and dressings      B/L lower legs:      Wash your hands with soap and water  Remove old dressing, discard into plastic bag and place in trash  Cleanse the wound with normal saline prior to applying a clean dressing  Do not use tissue or cotton balls  Do not scrub the wound  Pat dry using gauze  Shower yes with protection, purchase cast cover  Or sponge bathe       Apply Lac Hydrin to B/L lower legs  Apply skin prep to the periwound  Apply hydrofera blue to the wound  Cover with ABD pad  Secure with [employer] and [employer]  Change dressing 2x a week (home care nurse change Sunday or Mon and Wound center Thurs) and as needed if soiled or lose integrity       Follow up in 1 week  Cleansed with nss and bacitracin applied to left leg where dead skin was removed by Dr Mauricio Burgos today and then dressed as above       Standing Status:   Future     Standing Expiration Date:   3/3/2023

## 2022-03-03 NOTE — PROGRESS NOTES
Patient ID: Spencer Molina is a 49 y o  male Date of Birth 1965       Chief Complaint   Patient presents with    Follow Up Wound Care Visit     Bilateral leg wounds  Allergies:  Gluten meal - food allergy and Clindamycin    Diagnosis:   Diagnosis ICD-10-CM Associated Orders   1  Pressure injury of right leg, stage 3 ([facility])  L89 893 lidocaine (LMX) 4 % cream     Wound cleansing and dressings     Debridement   2  Pressure injury of left leg, stage 3 ([facility])  L89 893 Debridement   3  Diabetic polyneuropathy associated with type 2 diabetes mellitus ([facility])  E11 42    4  Morbid obesity with BMI of 70 and over, adult ([facility] )  E66 01     Z68 45    5  Lymphedema of both lower extremities  I89 0 ammonium lactate (LAC-HYDRIN) 12 % lotion        Assessment  & Plan:     Pressure injuries of the right left calf  Improvement in the left calf and slight deterioration of the right   Selective debridement of both   New small open wound on the left leg above the ulcer  Subjective:   2/17/22: This is a 42-year-old male who comes in [address] [place] [address] with ulcers on the right and left calfs  He states that he was hospitalized on November of 2021 with cellulitis and he had the ulcers at that time  Patient is morbidly obese and has history of lymphedema as well  He does not use any form of compression other than Ace wraps  He had purchased alginate on the Internet as well as bordered foam is  He notes that there is heavy drainage requiring dressings to be changed once or twice a day  He believes that these ulcers had started due to  sleeping in a recliner and the foot rest causing pressure on the calfs  He is unable to sleep in a bed  He notes he has increased pain at nighttime when in there is recliner with pressure on his calf  He is ambulatory with a walker but does not go out of the house other than to doctor visits    Patient also has a history of type 2 diabetes with good control with last A1c 6 5 in October of 2021  He states that he has lost approximately 120 lb in the past nine months with a special meal plan  He plans on continuing with weight loss  2/24/22: Followup stage III pressure injuries of right and left calfs  They purchased memory foam pillows which seems to help with both pain and offloading the areas when he is sleeping  Still has pain mainly at night  Left greater than right  3/3/22: Followup stage III pressure injuries of the right left calfs  Patient is upset about visiting nurse not having proper supplies  Then there was leakage through the [employer]  Still has pain at nighttime  No fever chills          The following portions of the patient's history were reviewed and updated as appropriate:   Patient Active Problem List   Diagnosis    Type 2 diabetes mellitus with hyperglycemia, with long-term current use of insulin ([address] )    Hyperlipidemia    Psoriasis    Venous insufficiency    Gout    Essential hypertension    Hyponatremia    Gluten intolerance    Diabetic neuropathy (HCC)    Insomnia    Scrotal swelling    Erectile dysfunction    Bilateral leg edema    Elevated CO2 level    Abnormal thyroid function test    DAHIANA (obstructive sleep apnea)    Fungal infection    Morbid obesity with BMI of 70 and over, adult ([address] )    Cellulitis of right lower extremity    Migraine headache    Onychomycosis    Ulcer of left lower extremity, limited to breakdown of skin ([address] )    Pressure injury of right leg, stage 3 ([address] )     Past Medical History:   Diagnosis Date    Acute kidney injury 10/28/2021    Anemia 09/13/2019    Cellulitis     last assessed 12/10/15    Cellulitis of left lower extremity     Cough 10/20/2019    Diabetes mellitus ([address] )     Disease of thyroid gland     Edema     Elevated liver enzymes     Elevated serum creatinine 11/19/2021    Esophageal reflux     Gluten intolerance     Gout     last assessed 09/05/13    Hyperglycemia     Hypertension  IBS (irritable bowel syndrome)     Insomnia     Obesity     Osteoarthritis of knee     last assessed 02/10/14    Shortness of breath 5/3/2021    Venous insufficiency     last assessed 08/22/17    Villonodular synovitis of the hand, right     last assessed 11/14/2013     Past Surgical History:   Procedure Laterality Date    INCISION AND DRAINAGE OF WOUND Left 9/6/2019    Procedure: INCISION AND DRAINAGE (I&D) GROIN;  Surgeon: Ashu Akers DO;  [facility];  Service: General    SKIN BIOPSY      WOUND DEBRIDEMENT Left 9/7/2019    Procedure: EXCISIONAL DEBRIDEMENT;  Surgeon: Ashu Akers DO;  [facility];  Service: General     Family History   Problem Relation Age of Onset    Cancer Mother         gastrc    Colon cancer Father     Heart failure Father      Social History     Socioeconomic History    Marital status: /Civil Union     Spouse name: None    Number of children: None    Years of education: None    Highest education level: None   Occupational History    None   Tobacco Use    Smoking status: Never Smoker    Smokeless tobacco: Never Used   Vaping Use    Vaping Use: Never used   Substance and Sexual Activity    Alcohol use: Not Currently     Alcohol/week: 0 0 standard drinks    Drug use: No    Sexual activity: Yes   Other Topics Concern    None   Social History Narrative    None     Social Determinants of Health     Financial Resource Strain: Not on file   Food Insecurity: Not on file   Transportation Needs: No Transportation Needs    Lack of Transportation (Medical): No    Lack of Transportation (Non-Medical):  No   Physical Activity: Not on file   Stress: Not on file   Social Connections: Not on file   Intimate Partner Violence: Not on file   Housing Stability: Not on file       Current Outpatient Medications:     acetaminophen (TYLENOL) 325 mg tablet, Take 2 tablets (650 mg total) by mouth every 4 (four) hours as needed for mild pain, headaches or fever, Disp: , Rfl: 0    albuterol (ProAir HFA) 90 mcg/act inhaler, Inhale 2 puffs every 6 (six) hours as needed for wheezing, Disp: 8 5 g, Rfl: 6    ammonium lactate (LAC-HYDRIN) 12 % lotion, Apply topically 2 (two) times a day as needed for dry skin, Disp: 400 g, Rfl: 2    BD Pen Needle Ludmila 2nd Gen 32G X 4 MM MISC, USE 4 TIMES A DAY, Disp: 150 each, Rfl: 3    Blood Glucose Monitoring Suppl (ONETOUCH VERIO) w/Device KIT, Use to test sugar daily (Patient not taking: Reported on 10/28/2021), Disp: 1 kit, Rfl: 0    Calcium Alginate (Maxorb II Alginate Dressing) MISC, Apply 1 each topically in the morning, Disp: 10 each, Rfl: 2    Continuous Blood Gluc  (FreeStyle Connell 2 [place] Systm) BROOKS, Use 1 Device as needed (use with sensors for bs checks), Disp: 1 Device, Rfl: 0    Continuous Blood Gluc Sensor (TrudevStyle Elvin 14 Day Sensor) MISC, USE ONE SENSOR EVERY 2 WEEKS, Disp: 2 each, Rfl: 3    Control Gel Formula Dressing (DuoDERM CGF Dressing) MIS, Apply 1 application topically every 5 (five) days, Disp: 5 each, Rfl: 1    [employer] Diclofenac Sodium 1 %, APPLY 4 G TOPICALLY 4 (FOUR) TIMES A DAY AS NEEDED (JOINT PAIN), Disp: 50 g, Rfl: 0    furosemide (LASIX) 20 mg tablet, Take 1 tablet (20 mg total) by mouth 2 (two) times a day, Disp: 270 tablet, Rfl: 0    gabapentin (NEURONTIN) 100 mg capsule, Take 1 capsule (100 mg total) by mouth 2 (two) times a day Take 1 tab with your 300mg tab twice daily, Disp: 180 capsule, Rfl: 2    gabapentin (NEURONTIN) 300 mg capsule, Take 1 capsule (300 mg total) by mouth 2 (two) times a day Take 1 tab with your 100mg tab twice daily, Disp: 180 capsule, Rfl: 2    Gauze Pads & Dressings 5"X5" PADS, Use in the morning, Disp: 20 each, Rfl: 2    insulin aspart (NovoLOG FlexPen) 100 UNIT/ML injection pen, INJECT 18 UNITS WITH MEALS+SCALE ( - 150-200 -2 UNITS, 201-250-4 UNITS, 251-300 -6 UNITS, 301-350-8 UNITS, > 350- 10 UNITS ), Disp: 45 mL, Rfl: 1    Lancets (ONETOUCH ULTRASOFT) lancets, Use to test sugar 2 times a day, Disp: 100 each, Rfl: 3    Lantus SoloStar 100 units/mL injection pen, INJECT 46 UNITS UNDER THE SKIN DAILY AT BEDTIME INJECT 48 UNITS UNDER THE SKIN EVERY BEDTIME, Disp: 15 mL, Rfl: 0    losartan (COZAAR) 25 mg tablet, TAKE ONE TABLET BY MOUTH EVERY DAY, Disp: 90 tablet, Rfl: 2    metFORMIN (GLUCOPHAGE) 1000 MG tablet, TAKE ONE TABLET BY MOUTH TWICE DAILY, Disp: 180 tablet, Rfl: 3    nystatin (MYCOSTATIN) cream, Apply topically 2 (two) times a day as needed (itching, redness), Disp: 30 g, Rfl: 1    nystatin (MYCOSTATIN) powder, Apply 1 application topically 2 (two) times a day To affected area, Disp: 120 g, Rfl: 3    omeprazole (PriLOSEC) 40 MG capsule, Take 1 capsule (40 mg total) by mouth 2 (two) times a day, Disp: 180 capsule, Rfl: 1    OneTouch Verio test strip, USE TO TEST SUGAR 2 TIMES A DAY, Disp: 100 strip, Rfl: 3    oxyCODONE (ROXICODONE) 5 immediate release tablet, Take 1-2 tabs by mouth every 6 hours as needed for moderate or severe pains respectively  , Disp: 15 tablet, Rfl: 0    psyllium (METAMUCIL) packet, Take 1 packet by mouth daily, Disp: , Rfl: 0    saccharomyces boulardii (FLORASTOR) 250 mg capsule, Take 1 capsule (250 mg total) by mouth 2 (two) times a day, Disp: 60 capsule, Rfl: 1    Skin Protectants, Misc  ([employer] Textile 10"x144") SHEE, Apply 1 each topically every 5 (five) days, Disp: 3 each, Rfl: 5    sodium hypochlorite (DAKIN'S QUARTER-STRENGTH), Irrigate with 1 application as directed daily, Disp: 473 mL, Rfl: 0  No current facility-administered medications for this visit  Review of Systems   Constitutional: Negative for appetite change, chills, fatigue, fever and unexpected weight change  HENT: Negative for congestion, hearing loss, postnasal drip and sinus pressure  Respiratory: Positive for shortness of breath (On exertion)  Negative for cough  Cardiovascular: Positive for leg swelling (Lymphedema)  Endocrine: Negative  Genitourinary: Positive for urgency  Musculoskeletal: Positive for gait problem [facility] [place])  Negative for back pain  Skin: Positive for wound (Both lower extremities)  Negative for rash  Allergic/Immunologic: Negative  Neurological: Positive for numbness  Hematological: Does not bruise/bleed easily  Psychiatric/Behavioral: Negative  Negative for dysphoric mood  The patient is not nervous/anxious  Objective:  /82   Pulse (!) 110   Temp 98 5 °F (36 9 °C)   Resp (!) 24   Pain Score:   7     Physical Exam  Vitals and nursing note reviewed  Constitutional:       Appearance: Normal appearance  He is well-developed  He is morbidly obese  HENT:      Head: Normocephalic and atraumatic  Cardiovascular:      Rate and Rhythm: Normal rate  Pulmonary:      Effort: Pulmonary effort is normal    Skin:     General: Skin is warm and dry  Findings: Wound present  No erythema  Comments: Stage III pressure injuries of both lateral calfs  Thin fibrin covers both ulcers  There is a small new open wound just above the left ulcer  May have been a blister that open  Neurological:      Mental Status: He is alert and oriented to person, place, and time  Psychiatric:         Attention and Perception: Attention normal          Mood and Affect: Mood and affect normal          Behavior: Behavior is cooperative  Cognition and Memory: Cognition normal                      Wound 02/17/22 Pressure Injury Leg Left;Lateral;Lower; Posterior (Active)   Wound Description Slough;Granulation tissue;Pink 03/03/22 1601   Chelsie-wound Assessment Intact; Hyperpigmented;Dry 03/03/22 1605   Wound Length (cm) 4 8 cm 03/03/22 1601   Wound Width (cm) 5 cm 03/03/22 1601   Wound Depth (cm) 0 1 cm 03/03/22 1601   Wound Surface Area (cm^2) 24 cm^2 03/03/22 1601   Wound Volume (cm^3) 2 4 cm^3 03/03/22 1601   Calculated Wound Volume (cm^3) 2 4 cm^3 03/03/22 1601   Change in Wound Size % -7 14 03/03/22 1601   Drainage Amount Large 03/03/22 1601   Drainage Description Yellow; Tan 03/03/22 1601   Treatments Cleansed 03/03/22 1601       Wound 03/03/22 Pressure Injury Tibial Left;Proximal;Lateral (Active)   Wound Image Images linked 03/03/22 1603       Debridement   Wound 02/17/22 Pressure Injury Leg Right;Lateral;Lower    Universal Protocol:  Consent: Verbal consent obtained  Written consent obtained  Consent given by: patient  Time out: Immediately prior to procedure a "time out" was called to verify the correct patient, procedure, equipment, support staff and site/side marked as required  Patient understanding: patient states understanding of the procedure being performed  Patient identity confirmed: verbally with patient      Performed by: physician  Debridement type: selective  Pain control: lidocaine 4%  Post-debridement measurements  Length (cm): 3 5  Width (cm): 1 7  Depth (cm): 0 2  Percent debrided: 100%  Surface Area (cm^2): 5 95  Area debrided (cm^2): 5 95  Volume (cm^3): 1 19  Devitalized tissue debrided: fibrin  Instrument(s) utilized: curette  Bleeding: small  Hemostasis obtained with: pressure  Procedural pain (0-10): 0  Post-procedural pain: 0   Response to treatment: procedure was tolerated well    Debridement   Wound 02/17/22 Pressure Injury Leg Left;Lateral;Lower; Posterior    Universal Protocol:  Consent: Verbal consent obtained  Written consent obtained  Consent given by: patient  Time out: Immediately prior to procedure a "time out" was called to verify the correct patient, procedure, equipment, support staff and site/side marked as required    Patient understanding: patient states understanding of the procedure being performed  Patient identity confirmed: verbally with patient      Performed by: physician  Debridement type: selective  Pain control: lidocaine 4%  Post-debridement measurements  Length (cm): 4 8  Width (cm): 5  Depth (cm): 0 1  Percent debrided: 100%  Surface Area (cm^2): 24  Area debrided (cm^2): 24  Volume (cm^3): 2 4  Devitalized tissue debrided: fibrin  Instrument(s) utilized: curette  Bleeding: small  Hemostasis obtained with: pressure  Procedural pain (0-10): 0  Post-procedural pain: 0   Response to treatment: procedure was tolerated well                     Wound Instructions:  Orders Placed This Encounter   Procedures    Wound cleansing and dressings     Procedures   Wound cleansing and dressings      B/L lower legs:      Wash your hands with soap and water  Remove old dressing, discard into plastic bag and place in trash  Cleanse the wound with normal saline prior to applying a clean dressing  Do not use tissue or cotton balls  Do not scrub the wound  Pat dry using gauze  Shower yes with protection, purchase cast cover  Or sponge bathe       Apply Lac Hydrin to B/L lower legs  Apply skin prep to the periwound  Apply hydrofera blue to the wound  Cover with ABD pad  Secure with [employer] and [place]  Right and left leg please apply ABD to lower part of leg to make it even with top portion to help prevent slipping  Change dressing 2x a week (home care nurse change Sunday or Mon and Wound center Thurs) and as needed if soiled or lose integrity       Follow up in 1 week  Cleansed with nss and bacitracin applied to left leg where dead skin was removed by Dr Gutierrez More today and then dressed as above  Standing Status:   Future     Standing Expiration Date:   3/3/2023    Debridement     This order was created via procedure documentation    Debridement     This order was created via procedure documentation       Philip Mandujano MD, CHT, CWS       Portions of the record may have been created with voice recognition software  Occasional wrong word or "sound alike" substitutions may have occurred due to the inherent limitations of voice recognition software   Read the chart carefully and recognize, using context, where substitutions have occurred

## 2022-03-04 RX ORDER — AMMONIUM LACTATE 12 G/100G
LOTION TOPICAL
Qty: 400 ML | Refills: 2 | OUTPATIENT
Start: 2022-03-04

## 2022-03-05 DIAGNOSIS — Z79.4 TYPE 2 DIABETES MELLITUS WITH HYPERGLYCEMIA, WITH LONG-TERM CURRENT USE OF INSULIN (HCC): ICD-10-CM

## 2022-03-05 DIAGNOSIS — E11.65 TYPE 2 DIABETES MELLITUS WITH HYPERGLYCEMIA, WITH LONG-TERM CURRENT USE OF INSULIN (HCC): ICD-10-CM

## 2022-03-06 DIAGNOSIS — E11.65 TYPE 2 DIABETES MELLITUS WITH HYPERGLYCEMIA, WITH LONG-TERM CURRENT USE OF INSULIN (HCC): ICD-10-CM

## 2022-03-06 DIAGNOSIS — Z79.4 TYPE 2 DIABETES MELLITUS WITH HYPERGLYCEMIA, WITH LONG-TERM CURRENT USE OF INSULIN (HCC): ICD-10-CM

## 2022-03-07 RX ORDER — FLASH GLUCOSE SENSOR
KIT MISCELLANEOUS
Qty: 2 EACH | Refills: 3 | Status: SHIPPED | OUTPATIENT
Start: 2022-03-07 | End: 2022-06-28 | Stop reason: SDUPTHER

## 2022-03-07 RX ORDER — BLOOD SUGAR DIAGNOSTIC
STRIP MISCELLANEOUS
Qty: 100 STRIP | Refills: 3 | Status: SHIPPED | OUTPATIENT
Start: 2022-03-07

## 2022-03-08 DIAGNOSIS — R60.0 BILATERAL LEG EDEMA: ICD-10-CM

## 2022-03-08 DIAGNOSIS — I10 ESSENTIAL HYPERTENSION: ICD-10-CM

## 2022-03-10 ENCOUNTER — OFFICE VISIT (OUTPATIENT)
Dept: WOUND CARE | Facility: CLINIC | Age: 57
End: 2022-03-10
Payer: MEDICARE

## 2022-03-10 VITALS
RESPIRATION RATE: 18 BRPM | HEART RATE: 96 BPM | SYSTOLIC BLOOD PRESSURE: 150 MMHG | DIASTOLIC BLOOD PRESSURE: 88 MMHG | TEMPERATURE: 97.7 F

## 2022-03-10 DIAGNOSIS — L89.893 PRESSURE INJURY OF RIGHT LEG, STAGE 3 (HCC): ICD-10-CM

## 2022-03-10 DIAGNOSIS — L89.893 PRESSURE INJURY OF LEFT LEG, STAGE 3 (HCC): Primary | ICD-10-CM

## 2022-03-10 PROCEDURE — 11042 DBRDMT SUBQ TIS 1ST 20SQCM/<: CPT | Performed by: FAMILY MEDICINE

## 2022-03-10 PROCEDURE — 99213 OFFICE O/P EST LOW 20 MIN: CPT | Performed by: FAMILY MEDICINE

## 2022-03-10 RX ORDER — FUROSEMIDE 20 MG/1
TABLET ORAL
Qty: 180 TABLET | Refills: 1 | Status: SHIPPED | OUTPATIENT
Start: 2022-03-10 | End: 2022-05-03

## 2022-03-10 RX ORDER — LIDOCAINE 40 MG/G
CREAM TOPICAL ONCE
Status: COMPLETED | OUTPATIENT
Start: 2022-03-10 | End: 2022-03-10

## 2022-03-10 RX ADMIN — LIDOCAINE: 40 CREAM TOPICAL at 16:21

## 2022-03-10 NOTE — PROGRESS NOTES
Patient ID: Bao Montero is a 49 y o  male Date of Birth 1965       Chief Complaint   Patient presents with    Follow Up Wound Care Visit     Pressure injury of right leg, stage 3 ([facility])       Allergies:  Gluten meal - food allergy and Clindamycin    Diagnosis:   Diagnosis ICD-10-CM Associated Orders   1  Pressure injury of left leg, stage 3 ([facility])  L89 893 Wound cleansing and dressings   2  Pressure injury of right leg, stage 3 ([facility])  L89 893 lidocaine (LMX) 4 % cream     Wound cleansing and dressings     Debridement        Assessment  & Plan:     Improving pressure injuries of the right left calf   Continue same wound care   Surgical debridement of the right stage III pressure injury   Unna boots bilateral   Must be up to the back of the knee to stay up  Subjective:   2/17/22: This is a 28-year-old male who comes in [address] [place] [address] with ulcers on the right and left calfs  He states that he was hospitalized on November of 2021 with cellulitis and he had the ulcers at that time  Patient is morbidly obese and has history of lymphedema as well  He does not use any form of compression other than Ace wraps  He had purchased alginate on the Internet as well as bordered foam is  He notes that there is heavy drainage requiring dressings to be changed once or twice a day  He believes that these ulcers had started due to  sleeping in a recliner and the foot rest causing pressure on the calfs  He is unable to sleep in a bed  He notes he has increased pain at nighttime when in there is recliner with pressure on his calf  He is ambulatory with a walker but does not go out of the house other than to doctor visits  Patient also has a history of type 2 diabetes with good control with last A1c 6 5 in October of 2021  He states that he has lost approximately 120 lb in the past nine months with a special meal plan  He plans on continuing with weight loss  2/24/22:   Followup stage III pressure injuries of right and left calfs  They purchased memory foam pillows which seems to help with both pain and offloading the areas when he is sleeping  Still has pain mainly at night  Left greater than right  3/3/22: Followup stage III pressure injuries of the right left calfs  Patient is upset about visiting nurse not having proper supplies  Then there was leakage through the [employer]  Still has pain at nighttime  No fever chills  3/10/22: Followup stage III pressure injuries of the right and left calfs  Patient states that the visiting nurse had a "observer" do the Unna boot on his right leg which slid down  Still has pain mainly in the left calf  They are trying various offloading techniques          The following portions of the patient's history were reviewed and updated as appropriate:   Patient Active Problem List   Diagnosis    Type 2 diabetes mellitus with hyperglycemia, with long-term current use of insulin ([address] )    Hyperlipidemia    Psoriasis    Venous insufficiency    Gout    Essential hypertension    Hyponatremia    Gluten intolerance    Diabetic neuropathy (HCC)    Insomnia    Scrotal swelling    Erectile dysfunction    Bilateral leg edema    Elevated CO2 level    Abnormal thyroid function test    DAHIANA (obstructive sleep apnea)    Fungal infection    Morbid obesity with BMI of 70 and over, adult ([address] )    Cellulitis of right lower extremity    Migraine headache    Onychomycosis    Ulcer of left lower extremity, limited to breakdown of skin ([address] )    Pressure injury of right leg, stage 3 ([address] )     Past Medical History:   Diagnosis Date    Acute kidney injury 10/28/2021    Anemia 09/13/2019    Cellulitis     last assessed 12/10/15    Cellulitis of left lower extremity     Cough 10/20/2019    Diabetes mellitus ([address] )     Disease of thyroid gland     Edema     Elevated liver enzymes     Elevated serum creatinine 11/19/2021    Esophageal reflux     Gluten intolerance     Gout     last assessed 09/05/13    Hyperglycemia     Hypertension     IBS (irritable bowel syndrome)     Insomnia     Obesity     Osteoarthritis of knee     last assessed 02/10/14    Shortness of breath 5/3/2021    Venous insufficiency     last assessed 08/22/17    Villonodular synovitis of the hand, right     last assessed 11/14/2013     Past Surgical History:   Procedure Laterality Date    INCISION AND DRAINAGE OF WOUND Left 9/6/2019    Procedure: INCISION AND DRAINAGE (I&D) GROIN;  Surgeon: Keisha Bishop DO;  [facility];  Service: General    SKIN BIOPSY      WOUND DEBRIDEMENT Left 9/7/2019    Procedure: EXCISIONAL DEBRIDEMENT;  Surgeon: Keisha Bishop DO;  [facility];  Service: General     Family History   Problem Relation Age of Onset    Cancer Mother         gastrc    Colon cancer Father     Heart failure Father      Social History     Socioeconomic History    Marital status: /Civil Union     Spouse name: Not on file    Number of children: Not on file    Years of education: Not on file    Highest education level: Not on file   Occupational History    Not on file   Tobacco Use    Smoking status: Never Smoker    Smokeless tobacco: Never Used   Vaping Use    Vaping Use: Never used   Substance and Sexual Activity    Alcohol use: Not Currently     Alcohol/week: 0 0 standard drinks    Drug use: No    Sexual activity: Yes   Other Topics Concern    Not on file   Social History Narrative    Not on file     Social Determinants of Health     Financial Resource Strain: Not on file   Food Insecurity: Not on file   Transportation Needs: No Transportation Needs    Lack of Transportation (Medical): No    Lack of Transportation (Non-Medical):  No   Physical Activity: Not on file   Stress: Not on file   Social Connections: Not on file   Intimate Partner Violence: Not on file   Housing Stability: Not on file       Current Outpatient Medications:    acetaminophen (TYLENOL) 325 mg tablet, Take 2 tablets (650 mg total) by mouth every 4 (four) hours as needed for mild pain, headaches or fever, Disp: , Rfl: 0    albuterol (PROVENTIL HFA,VENTOLIN HFA) 90 mcg/act inhaler, TAKE 2 PUFFS BY MOUTH EVERY 6 HOURS AS NEEDED FOR WHEEZE, Disp: 8 5 g, Rfl: 0    BD Pen Needle Ludmila 2nd Gen 32G X 4 MM MISC, USE 4 TIMES A DAY, Disp: 150 each, Rfl: 3    Blood Glucose Monitoring Suppl (ONETOUCH VERIO) w/Device KIT, Use to test sugar daily (Patient not taking: Reported on 10/28/2021), Disp: 1 kit, Rfl: 0    Calcium Alginate (Maxorb II Alginate Dressing) MISC, Apply 1 each topically in the morning, Disp: 10 each, Rfl: 2    Continuous Blood Gluc  (FreeStyle Connell 2 [place] Systm) BROOKS, Use 1 Device as needed (use with sensors for bs checks), Disp: 1 Device, Rfl: 0    Continuous Blood Gluc Sensor (nParioStyle Elvin 14 Day Sensor) MISC, USE ONE SENSOR EVERY 2 WEEKS, Disp: 2 each, Rfl: 3    Control Gel Formula Dressing (DuoDERM CGF Dressing) MIS, Apply 1 application topically every 5 (five) days, Disp: 5 each, Rfl: 1    [employer]S Diclofenac Sodium 1 %, APPLY 4 G TOPICALLY 4 (FOUR) TIMES A DAY AS NEEDED (JOINT PAIN), Disp: 50 g, Rfl: 0    furosemide (LASIX) 20 mg tablet, TAKE 1 TABLET BY MOUTH TWICE A DAY, Disp: 180 tablet, Rfl: 1    gabapentin (NEURONTIN) 100 mg capsule, Take 1 capsule (100 mg total) by mouth 2 (two) times a day Take 1 tab with your 300mg tab twice daily, Disp: 180 capsule, Rfl: 2    gabapentin (NEURONTIN) 300 mg capsule, Take 1 capsule (300 mg total) by mouth 2 (two) times a day Take 1 tab with your 100mg tab twice daily, Disp: 180 capsule, Rfl: 2    Gauze Pads & Dressings 5"X5" PADS, Use in the morning, Disp: 20 each, Rfl: 2    insulin aspart (NovoLOG FlexPen) 100 UNIT/ML injection pen, INJECT 18 UNITS WITH MEALS+SCALE ( - 150-200 -2 UNITS, 201-250-4 UNITS, 251-300 -6 UNITS, 301-350-8 UNITS, > 350- 10 UNITS ), Disp: 45 mL, Rfl: 1    Lancets (ONETOUCH ULTRASOFT) lancets, Use to test sugar 2 times a day, Disp: 100 each, Rfl: 3    Lantus SoloStar 100 units/mL injection pen, INJECT 46 UNITS UNDER THE SKIN DAILY AT BEDTIME INJECT 48 UNITS UNDER THE SKIN EVERY BEDTIME, Disp: 15 mL, Rfl: 0    losartan (COZAAR) 25 mg tablet, TAKE ONE TABLET BY MOUTH EVERY DAY, Disp: 90 tablet, Rfl: 2    metFORMIN (GLUCOPHAGE) 1000 MG tablet, TAKE ONE TABLET BY MOUTH TWICE DAILY, Disp: 180 tablet, Rfl: 3    nystatin (MYCOSTATIN) cream, Apply topically 2 (two) times a day as needed (itching, redness), Disp: 30 g, Rfl: 1    nystatin (MYCOSTATIN) powder, Apply 1 application topically 2 (two) times a day To affected area, Disp: 120 g, Rfl: 3    omeprazole (PriLOSEC) 40 MG capsule, Take 1 capsule (40 mg total) by mouth 2 (two) times a day, Disp: 180 capsule, Rfl: 1    OneTouch Verio test strip, USE TO TEST SUGAR 2 TIMES A DAY, Disp: 100 strip, Rfl: 3    oxyCODONE (ROXICODONE) 5 immediate release tablet, Take 1-2 tabs by mouth every 6 hours as needed for moderate or severe pains respectively  , Disp: 15 tablet, Rfl: 0    psyllium (METAMUCIL) packet, Take 1 packet by mouth daily, Disp: , Rfl: 0    saccharomyces boulardii (FLORASTOR) 250 mg capsule, Take 1 capsule (250 mg total) by mouth 2 (two) times a day, Disp: 60 capsule, Rfl: 1    Skin Protectants, Misc  ([employer] Textile 10"x144") SHEE, Apply 1 each topically every 5 (five) days, Disp: 3 each, Rfl: 5    sodium hypochlorite (DAKIN'S QUARTER-STRENGTH), Irrigate with 1 application as directed daily, Disp: 473 mL, Rfl: 0  No current facility-administered medications for this visit  Review of Systems   Constitutional: Negative for appetite change, chills, fatigue, fever and unexpected weight change  HENT: Negative for congestion, hearing loss, postnasal drip and sinus pressure  Respiratory: Positive for shortness of breath (On exertion)  Negative for cough  Cardiovascular: Positive for leg swelling (Lymphedema)  Endocrine: Negative  Genitourinary: Positive for urgency  Musculoskeletal: Positive for gait problem Naresh Hameed)  Negative for back pain  Skin: Positive for wound (Both lower extremities)  Negative for rash  Allergic/Immunologic: Negative  Neurological: Positive for numbness  Hematological: Does not bruise/bleed easily  Psychiatric/Behavioral: Negative  Negative for dysphoric mood  The patient is not nervous/anxious  Objective:  /88   Pulse 96   Temp 97 7 °F (36 5 °C)   Resp 18   Pain Score:   5     Physical Exam  Vitals and nursing note reviewed  Constitutional:       Appearance: Normal appearance  He is well-developed  He is morbidly obese  HENT:      Head: Normocephalic and atraumatic  Cardiovascular:      Rate and Rhythm: Normal rate  Pulmonary:      Effort: Pulmonary effort is normal    Skin:     General: Skin is warm and dry  Findings: Wound present  No erythema  Comments: Stage III pressure injuries of both lateral calfs  Left calf ulcer slightly smaller with good granulation tissue  Right calf ulcer minimally smaller with fibrin  Neurological:      Mental Status: He is alert and oriented to person, place, and time  Psychiatric:         Attention and Perception: Attention normal          Mood and Affect: Mood and affect normal          Behavior: Behavior is cooperative  Cognition and Memory: Cognition normal          Wound 02/17/22 Pressure Injury Leg Right;Lateral;Lower (Active)   Wound Image       03/10/22 1625   Wound Description Granulation tissue;Pink;Slough 03/10/22 1554   Pressure Injury Stage 3 03/03/22 1555   Chelsie-wound Assessment Hyperpigmented; Intact;Dry 03/10/22 1554   Wound Length (cm) 3 7 cm 03/10/22 1554   Wound Width (cm) 1 5 cm 03/10/22 1554   Wound Depth (cm) 0 2 cm 03/10/22 1554   Wound Surface Area (cm^2) 5 55 cm^2 03/10/22 1554   Wound Volume (cm^3) 1 11 cm^3 03/10/22 1554   Calculated Wound Volume (cm^3) 1 11 cm^3 03/10/22 1554   Change in Wound Size % -15 63 03/10/22 1554   Drainage Amount Moderate 03/10/22 1554   Drainage Description Serosanguineous 03/10/22 1554   Treatments Irrigation with NSS 03/10/22 1554       Wound 02/17/22 Pressure Injury Leg Left;Lateral;Lower; Posterior (Active)   Wound Image   03/10/22 1551   Wound Description Yellow; Beefy red;Epithelialization 03/10/22 1557   Pressure Injury Stage 3 02/17/22 1512   Chelsie-wound Assessment Intact; Hyperpigmented;Dry;Maceration 03/10/22 1557   Wound Length (cm) 4 6 cm 03/10/22 1557   Wound Width (cm) 4 7 cm 03/10/22 1557   Wound Depth (cm) 0 1 cm 03/10/22 1557   Wound Surface Area (cm^2) 21 62 cm^2 03/10/22 1557   Wound Volume (cm^3) 2 162 cm^3 03/10/22 1557   Calculated Wound Volume (cm^3) 2 16 cm^3 03/10/22 1557   Change in Wound Size % 3 57 03/10/22 1557   Drainage Amount Moderate 03/10/22 1557   Drainage Description Serosanguineous 03/10/22 1557   Treatments Irrigation with NSS 03/10/22 1557       Wound 03/03/22 Pressure Injury Tibial Left;Proximal;Lateral (Active)   Wound Image   03/10/22 1552   Wound Description Epithelialization 03/10/22 1559   Chelsie-wound Assessment Hyperpigmented 03/10/22 1559   Wound Length (cm) 0 cm 03/10/22 1559   Wound Width (cm) 0 cm 03/10/22 1559   Wound Depth (cm) 0 cm 03/10/22 1559   Wound Surface Area (cm^2) 0 cm^2 03/10/22 1559   Wound Volume (cm^3) 0 cm^3 03/10/22 1559   Calculated Wound Volume (cm^3) 0 cm^3 03/10/22 1559   Change in Wound Size % 100 03/10/22 1559   Drainage Amount None 03/10/22 1559   Drainage Description Bloody 03/03/22 1557   Treatments Cleansed 03/03/22 1557                       Debridement   Wound 02/17/22 Pressure Injury Leg Right;Lateral;Lower    Universal Protocol:  Consent: Verbal consent obtained  Written consent obtained    Consent given by: patient  Time out: Immediately prior to procedure a "time out" was called to verify the correct patient, procedure, equipment, support staff and site/side marked as required  Patient understanding: patient states understanding of the procedure being performed  Patient identity confirmed: verbally with patient      Performed by: physician  Debridement type: surgical  Level of debridement: subcutaneous tissue  Pain control: lidocaine 4%  Post-debridement measurements  Length (cm): 3 7  Width (cm): 1 5  Depth (cm): 0 3  Percent debrided: 100%  Surface Area (cm^2): 5 55  Area debrided (cm^2): 5 55  Volume (cm^3): 1 67  Tissue and other material debrided: dermis and subcutaneous tissue  Devitalized tissue debrided: fibrin  Instrument(s) utilized: curette  Bleeding: small  Hemostasis obtained with: pressure  Procedural pain (0-10): 0  Post-procedural pain: 0   Response to treatment: procedure was tolerated well                         Wound Instructions:  Orders Placed This Encounter   Procedures    Wound cleansing and dressings                             B/L lower legs:      Wash your hands with soap and water  Remove old dressing, discard into plastic bag and place in trash  Cleanse the wound with mild soap and water prior to applying a clean dressing  Do not use tissue or cotton balls  Do not scrub the wound  Pat dry using gauze  Shower yes with protection, purchase cast cover  Or sponge bathe       Apply Lac Hydrin to B/L lower legs  Apply skin prep to the periwound  Apply hydrofera blue to the wound  MUST USE FULL PIECE  DO NOT CUT  Cover with ABD pad  Secure with rolled gauze  Cover with Unna boot and Coban  Make sure Unna boots are high enough so they do not slide down  Change dressing 2x a week (home care nurse change Sunday or Mon and Wound center Thurs) and as needed if soiled or lose integrity       Follow up at the wound center in one week  Cleansed and dressed as above       Standing Status:   Future     Standing Expiration Date:   3/10/2023    Debridement     This order was created via procedure documentation       Keyana Winchester MD, CHT, CWS       Portions of the record may have been created with voice recognition software  Occasional wrong word or "sound alike" substitutions may have occurred due to the inherent limitations of voice recognition software  Read the chart carefully and recognize, using context, where substitutions have occurred

## 2022-03-10 NOTE — PATIENT INSTRUCTIONS
Orders Placed This Encounter   Procedures    Wound cleansing and dressings                             B/L lower legs:      Wash your hands with soap and water  Remove old dressing, discard into plastic bag and place in trash  Cleanse the wound with mild soap and water prior to applying a clean dressing  Do not use tissue or cotton balls  Do not scrub the wound  Pat dry using gauze  Shower yes with protection, purchase cast cover  Or sponge bathe       Apply Lac Hydrin to B/L lower legs  Apply skin prep to the periwound  Apply hydrofera blue to the wound  MUST USE FULL PIECE  DO NOT CUT  Cover with ABD pad  Secure with rolled gauze  Cover with Unna boot and Coban  Make sure Unna boots are high enough so they do not slide down  Change dressing 2x a week (home care nurse change Sunday or Mon and Wound center Thurs) and as needed if soiled or lose integrity       Follow up at the wound center in one week  Cleansed and dressed as above       Standing Status:   Future     Standing Expiration Date:   3/10/2023

## 2022-03-17 ENCOUNTER — OFFICE VISIT (OUTPATIENT)
Dept: WOUND CARE | Facility: CLINIC | Age: 57
End: 2022-03-17
Payer: MEDICARE

## 2022-03-17 VITALS
DIASTOLIC BLOOD PRESSURE: 80 MMHG | TEMPERATURE: 98.5 F | SYSTOLIC BLOOD PRESSURE: 148 MMHG | HEART RATE: 104 BPM | RESPIRATION RATE: 20 BRPM

## 2022-03-17 DIAGNOSIS — L89.893 PRESSURE INJURY OF LEFT LEG, STAGE 3 (HCC): Primary | ICD-10-CM

## 2022-03-17 DIAGNOSIS — L89.893 PRESSURE INJURY OF RIGHT LEG, STAGE 3 (HCC): ICD-10-CM

## 2022-03-17 PROCEDURE — 99213 OFFICE O/P EST LOW 20 MIN: CPT | Performed by: FAMILY MEDICINE

## 2022-03-17 PROCEDURE — 97597 DBRDMT OPN WND 1ST 20 CM/<: CPT | Performed by: FAMILY MEDICINE

## 2022-03-17 PROCEDURE — 29580 STRAPPING UNNA BOOT: CPT

## 2022-03-17 RX ORDER — LIDOCAINE 40 MG/G
CREAM TOPICAL ONCE
Status: COMPLETED | OUTPATIENT
Start: 2022-03-17 | End: 2022-03-17

## 2022-03-17 RX ADMIN — LIDOCAINE: 40 CREAM TOPICAL at 11:52

## 2022-03-17 NOTE — PROGRESS NOTES
Cast Application    Date/Time: 3/17/2022 11:56 AM  Performed by: Lorene Hamilton RN  Authorized by: Jennifer Fernando MD   Universal Protocol:  Consent: Verbal consent obtained  Consent given by: patient  Timeout called at: 3/17/2022 11:56 AM   Patient identity confirmed: verbally with patient      Pre-procedure details:     Sensation:  Unchanged  Procedure details:     Laterality:  Left    Location:  Leg    Leg:  L lower legCast type:  Unna boot      Supplies used: endoform AM, hydrofera blue, white unna boot, coban, tubifast yellow  Post-procedure details:     Pain:  Improved    Sensation:  Unchanged    Patient tolerance of procedure: Tolerated well, no immediate complications  Comments:      UNNA BOOT wrap procedure     Before application, RAY and/or TBI determined to be adequate for healing and application of compression  Lower extremity washed prior to application of compression wrap  With the foot in dorsiflexed position, Unna boot was applied as per physician orders without complications or complaint of pain  The procedure was tolerated well  Toes warm & pink post application  Patient provided education & reinforced to observe toes for any discoloration, swelling or tingling and instructed when to report to the [facility] or to remove compression  Wound care as per providers orders, patient tolerated well               BLE cleansed with soap and water, rinsed  and dried well prior to application of unna boot

## 2022-03-17 NOTE — PROGRESS NOTES
Patient ID: Mary Kate Dyer is a 49 y o  male Date of Birth 1965       Chief Complaint   Patient presents with    Follow Up Wound Care Visit     BLE wounds       Allergies:  Gluten meal - food allergy and Clindamycin    Diagnosis:   Diagnosis ICD-10-CM Associated Orders   1  Pressure injury of left leg, stage 3 ([facility])  L89 893 lidocaine (LMX) 4 % cream     Wound cleansing and dressings     Cast Application   2  Pressure injury of right leg, stage 3 ([facility])  L89 893 lidocaine (LMX) 4 % cream     Wound cleansing and dressings     Debridement        Assessment  & Plan:     Bilateral lower extremity stage III pressure injuries  Minimal improvement  The left calf is granular  The right calf ulcer had to be selectively debrided   Add Endoform AM to the Hydrofera  Continue with Unna boot twice a week   Patient will purchase Lac Hydrin since it is not covered by his insurance  Subjective:   2/17/22: This is a 16-year-old male who comes in [address] [place] [address] with ulcers on the right and left calfs  He states that he was hospitalized on November of 2021 with cellulitis and he had the ulcers at that time  Patient is morbidly obese and has history of lymphedema as well  He does not use any form of compression other than Ace wraps  He had purchased alginate on the Internet as well as bordered foam is  He notes that there is heavy drainage requiring dressings to be changed once or twice a day  He believes that these ulcers had started due to  sleeping in a recliner and the foot rest causing pressure on the calfs  He is unable to sleep in a bed  He notes he has increased pain at nighttime when in there is recliner with pressure on his calf  He is ambulatory with a walker but does not go out of the house other than to doctor visits  Patient also has a history of type 2 diabetes with good control with last A1c 6 5 in October of 2021   He states that he has lost approximately 120 lb in the past nine months with a special meal plan  He plans on continuing with weight loss  2/24/22: Followup stage III pressure injuries of right and left calfs  They purchased memory foam pillows which seems to help with both pain and offloading the areas when he is sleeping  Still has pain mainly at night  Left greater than right  3/3/22: Followup stage III pressure injuries of the right left calfs  Patient is upset about visiting nurse not having proper supplies  Then there was leakage through the [employer]  Still has pain at nighttime  No fever chills  3/10/22: Followup stage III pressure injuries of the right and left calfs  Patient states that the visiting nurse had a "observer" do the Unna boot on his right leg which slid down  Still has pain mainly in the left calf  They are trying various offloading techniques  3/17/22: Followup stage III pressure injuries of the right left calfs  Patient states he still has pain but slightly less particularly on the left  Starting to have some itching  No fever or chills  Still have been found the proper offloading device          The following portions of the patient's history were reviewed and updated as appropriate:   Patient Active Problem List   Diagnosis    Type 2 diabetes mellitus with hyperglycemia, with long-term current use of insulin ([address] )    Hyperlipidemia    Psoriasis    Venous insufficiency    Gout    Essential hypertension    Hyponatremia    Gluten intolerance    Diabetic neuropathy (HCC)    Insomnia    Scrotal swelling    Erectile dysfunction    Bilateral leg edema    Elevated CO2 level    Abnormal thyroid function test    DAHIANA (obstructive sleep apnea)    Fungal infection    Morbid obesity with BMI of 70 and over, adult ([address] )    Cellulitis of right lower extremity    Migraine headache    Onychomycosis    Ulcer of left lower extremity, limited to breakdown of skin (HCC)    Pressure injury of right leg, stage 3 ([address] )     Past Medical History:   Diagnosis Date    Acute kidney injury 10/28/2021    Anemia 09/13/2019    Cellulitis     last assessed 12/10/15    Cellulitis of left lower extremity     Cough 10/20/2019    Diabetes mellitus ([address] )     Disease of thyroid gland     Edema     Elevated liver enzymes     Elevated serum creatinine 11/19/2021    Esophageal reflux     Gluten intolerance     Gout     last assessed 09/05/13    Hyperglycemia     Hypertension     IBS (irritable bowel syndrome)     Insomnia     Obesity     Osteoarthritis of knee     last assessed 02/10/14    Shortness of breath 5/3/2021    Venous insufficiency     last assessed 08/22/17    Villonodular synovitis of the hand, right     last assessed 11/14/2013     Past Surgical History:   Procedure Laterality Date    INCISION AND DRAINAGE OF WOUND Left 9/6/2019    Procedure: INCISION AND DRAINAGE (I&D) GROIN;  Surgeon: Saúl Chaney DO;  [facility];  Service: General    SKIN BIOPSY      WOUND DEBRIDEMENT Left 9/7/2019    Procedure: EXCISIONAL DEBRIDEMENT;  Surgeon: Saúl Chaney DO;  [facility];  Service: General     Family History   Problem Relation Age of Onset    Cancer Mother         gastrc    Colon cancer Father     Heart failure Father      Social History     Socioeconomic History    Marital status: /Civil Union     Spouse name: None    Number of children: None    Years of education: None    Highest education level: None   Occupational History    None   Tobacco Use    Smoking status: Never Smoker    Smokeless tobacco: Never Used   Vaping Use    Vaping Use: Never used   Substance and Sexual Activity    Alcohol use: Not Currently     Alcohol/week: 0 0 standard drinks    Drug use: No    Sexual activity: Yes   Other Topics Concern    None   Social History Narrative    None     Social Determinants of Health     Financial Resource Strain: Not on file   Food Insecurity: Not on file   Transportation Needs: No Transportation Needs    Lack of Transportation (Medical): No    Lack of Transportation (Non-Medical):  No   Physical Activity: Not on file   Stress: Not on file   Social Connections: Not on file   Intimate Partner Violence: Not on file   Housing Stability: Not on file       Current Outpatient Medications:     acetaminophen (TYLENOL) 325 mg tablet, Take 2 tablets (650 mg total) by mouth every 4 (four) hours as needed for mild pain, headaches or fever, Disp: , Rfl: 0    albuterol (PROVENTIL HFA,VENTOLIN HFA) 90 mcg/act inhaler, TAKE 2 PUFFS BY MOUTH EVERY 6 HOURS AS NEEDED FOR WHEEZE, Disp: 8 5 g, Rfl: 0    BD Pen Needle Ludmila 2nd Gen 32G X 4 MM MISC, USE 4 TIMES A DAY, Disp: 150 each, Rfl: 3    Blood Glucose Monitoring Suppl (ONETOUCH VERIO) w/Device KIT, Use to test sugar daily (Patient not taking: Reported on 10/28/2021), Disp: 1 kit, Rfl: 0    Calcium Alginate (Maxorb II Alginate Dressing) MISC, Apply 1 each topically in the morning, Disp: 10 each, Rfl: 2    Continuous Blood Gluc  (FreeStyle Connell 2 [place] [employer]) RBOOKS, Use 1 Device as needed (use with sensors for bs checks), Disp: 1 Device, Rfl: 0    Continuous Blood Gluc Sensor (NetMinderStyle Elvin 14 Day Sensor) MISC, USE ONE SENSOR EVERY 2 WEEKS, Disp: 2 each, Rfl: 3    Control Gel Formula Dressing (DuoDERM CGF Dressing) [facility], Apply 1 application topically every 5 (five) days, Disp: 5 each, Rfl: 1    [employer]S Diclofenac Sodium 1 %, APPLY 4 G TOPICALLY 4 (FOUR) TIMES A DAY AS NEEDED (JOINT PAIN), Disp: 50 g, Rfl: 0    furosemide (LASIX) 20 mg tablet, TAKE 1 TABLET BY MOUTH TWICE A DAY, Disp: 180 tablet, Rfl: 1    gabapentin (NEURONTIN) 100 mg capsule, Take 1 capsule (100 mg total) by mouth 2 (two) times a day Take 1 tab with your 300mg tab twice daily, Disp: 180 capsule, Rfl: 2    gabapentin (NEURONTIN) 300 mg capsule, Take 1 capsule (300 mg total) by mouth 2 (two) times a day Take 1 tab with your 100mg tab twice daily, Disp: 180 capsule, Rfl: 2   Gauze Pads & Dressings 5"X5" PADS, Use in the morning, Disp: 20 each, Rfl: 2    insulin aspart (NovoLOG FlexPen) 100 UNIT/ML injection pen, INJECT 18 UNITS WITH MEALS+SCALE ( - 150-200 -2 UNITS, 201-250-4 UNITS, 251-300 -6 UNITS, 301-350-8 UNITS, > 350- 10 UNITS ), Disp: 45 mL, Rfl: 1    Lancets (ONETOUCH ULTRASOFT) lancets, Use to test sugar 2 times a day, Disp: 100 each, Rfl: 3    Lantus SoloStar 100 units/mL injection pen, INJECT 46 UNITS UNDER THE SKIN DAILY AT BEDTIME INJECT 48 UNITS UNDER THE SKIN EVERY BEDTIME, Disp: 15 mL, Rfl: 0    losartan (COZAAR) 25 mg tablet, TAKE ONE TABLET BY MOUTH EVERY DAY, Disp: 90 tablet, Rfl: 2    metFORMIN (GLUCOPHAGE) 1000 MG tablet, TAKE ONE TABLET BY MOUTH TWICE DAILY, Disp: 180 tablet, Rfl: 3    nystatin (MYCOSTATIN) cream, Apply topically 2 (two) times a day as needed (itching, redness), Disp: 30 g, Rfl: 1    nystatin (MYCOSTATIN) powder, Apply 1 application topically 2 (two) times a day To affected area, Disp: 120 g, Rfl: 3    omeprazole (PriLOSEC) 40 MG capsule, Take 1 capsule (40 mg total) by mouth 2 (two) times a day, Disp: 180 capsule, Rfl: 1    OneTouch Verio test strip, USE TO TEST SUGAR 2 TIMES A DAY, Disp: 100 strip, Rfl: 3    oxyCODONE (ROXICODONE) 5 immediate release tablet, Take 1-2 tabs by mouth every 6 hours as needed for moderate or severe pains respectively  , Disp: 15 tablet, Rfl: 0    psyllium (METAMUCIL) packet, Take 1 packet by mouth daily, Disp: , Rfl: 0    saccharomyces boulardii (FLORASTOR) 250 mg capsule, Take 1 capsule (250 mg total) by mouth 2 (two) times a day, Disp: 60 capsule, Rfl: 1    Skin Protectants, Misc  ([employer] 10"x144") SHEE, Apply 1 each topically every 5 (five) days, Disp: 3 each, Rfl: 5    sodium hypochlorite (DAKIN'S QUARTER-STRENGTH), Irrigate with 1 application as directed daily, Disp: 473 mL, Rfl: 0  No current facility-administered medications for this visit      Review of Systems   Constitutional: Negative for appetite change, chills, fatigue, fever and unexpected weight change  HENT: Negative for congestion, hearing loss, postnasal drip and sinus pressure  Respiratory: Positive for shortness of breath (On exertion)  Negative for cough  Cardiovascular: Positive for leg swelling (Lymphedema)  Endocrine: Negative  Genitourinary: Positive for urgency  Musculoskeletal: Positive for gait problem Eliceo Mons)  Negative for back pain  Skin: Positive for wound (Both lower extremities)  Negative for rash  Allergic/Immunologic: Negative  Neurological: Positive for numbness  Hematological: Does not bruise/bleed easily  Psychiatric/Behavioral: Negative  Negative for dysphoric mood  The patient is not nervous/anxious  Objective:  /80   Pulse 104   Temp 98 5 °F (36 9 °C)   Resp 20   Pain Score:   5     Physical Exam  Vitals and nursing note reviewed  Constitutional:       Appearance: Normal appearance  He is well-developed  He is morbidly obese  HENT:      Head: Normocephalic and atraumatic  Cardiovascular:      Rate and Rhythm: Normal rate  Pulmonary:      Effort: Pulmonary effort is normal    Skin:     General: Skin is warm and dry  Findings: Wound present  No erythema  Comments: Stage III pressure injuries of both lateral calfs  Left calf ulcer slightly smaller with good granulation tissue  Right calf ulcer slightly larger with fibrin and slough  Neurological:      Mental Status: He is alert and oriented to person, place, and time  Psychiatric:         Attention and Perception: Attention normal          Mood and Affect: Mood and affect normal          Behavior: Behavior is cooperative  Cognition and Memory: Cognition normal                          Wound 02/17/22 Pressure Injury Leg Right;Lateral;Lower (Active)   Wound Image Images linked 03/17/22 1107   Wound Description Pink;Slough; Epithelialization (2 2 cm skin bridge) 03/17/22 1107 Chelsie-wound Assessment Hyperpigmented; Intact; Maceration 03/17/22 1107   Wound Length (cm) 3 6 cm 03/17/22 1107   Wound Width (cm) 2 4 cm 03/17/22 1107   Wound Depth (cm) 0 1 cm 03/17/22 1107   Wound Surface Area (cm^2) 8 64 cm^2 03/17/22 1107   Wound Volume (cm^3) 0 864 cm^3 03/17/22 1107   Calculated Wound Volume (cm^3) 0 86 cm^3 03/17/22 1107   Change in Wound Size % 10 42 03/17/22 1107   Drainage Amount Moderate 03/17/22 1107   Drainage Description Serosanguineous 03/17/22 1107   Treatments Cleansed 03/17/22 1107       Debridement   Wound 02/17/22 Pressure Injury Leg Right;Lateral;Lower    Universal Protocol:  Consent: Verbal consent obtained  Written consent obtained  Consent given by: patient  Time out: Immediately prior to procedure a "time out" was called to verify the correct patient, procedure, equipment, support staff and site/side marked as required  Patient understanding: patient states understanding of the procedure being performed  Patient identity confirmed: verbally with patient      Performed by: physician  Debridement type: selective  Pain control: lidocaine 4%  Post-debridement measurements  Length (cm): 3 6  Width (cm): 2 4  Depth (cm): 0 1  Percent debrided: 100%  Surface Area (cm^2): 8 64  Area debrided (cm^2): 8 64  Volume (cm^3): 0 86  Devitalized tissue debrided: fibrin and slough  Instrument(s) utilized: curette  Bleeding: small  Hemostasis obtained with: pressure  Procedural pain (0-10): 0  Post-procedural pain: 0   Response to treatment: procedure was tolerated well                     Wound Instructions:  Orders Placed This Encounter   Procedures    Wound cleansing and dressings     Wound cleansing and dressings  B/L lower legs:      Wash your hands with soap and water  Remove old dressing, discard into plastic bag and place in trash  Cleanse the wound with mild soap and water prior to applying a clean dressing   PLEASE [facility] LEGS WITH SOAP AND WATER PRIOR TO DRESSING CHANGES   Do not use tissue or cotton balls  Do not scrub the wound  Pat dry using gauze  Shower yes with protection, purchase cast cover  Or sponge bathe       Apply Lac Hydrin to B/L lower legs  Apply skin prep to the periwound  Apply endoform AM to wound beds (sent with patient)  Apply hydrofera blue over endoform  MUST USE FULL PIECE  DO NOT CUT  Cover with ABD pad  Secure with [employer] and [employer]  Make sure Unna boots are high enough so they do not slide down       Change dressing 2x a week (home care nurse change Sunday or Mon and Wound center Thurs) and as needed if soiled or lose integrity       Follow up at the wound center in one week  Today's wound treatment note:  Cleansed and dressed as above  Standing Status:   Future     Standing Expiration Date:   3/52/6709    Cast Application     This order was created via procedure documentation    Debridement     This order was created via procedure documentation       Yadi Delarosa MD, CHT, CWS       Portions of the record may have been created with voice recognition software  Occasional wrong word or "sound alike" substitutions may have occurred due to the inherent limitations of voice recognition software  Read the chart carefully and recognize, using context, where substitutions have occurred

## 2022-03-17 NOTE — PATIENT INSTRUCTIONS
Orders Placed This Encounter   Procedures    Wound cleansing and dressings     Wound cleansing and dressings  B/L lower legs:      Wash your hands with soap and water  Remove old dressing, discard into plastic bag and place in trash  Cleanse the wound with mild soap and water prior to applying a clean dressing  PLEASE WASH LEGS WITH SOAP AND WATER PRIOR TO DRESSING CHANGES   Do not use tissue or cotton balls  Do not scrub the wound  Pat dry using gauze  Shower yes with protection, purchase cast cover  Or sponge bathe       Apply Lac Hydrin to B/L lower legs  Apply skin prep to the periwound  Apply endoform AM to wound beds (sent with patient)  Apply hydrofera blue over endoform  MUST USE FULL PIECE  DO NOT CUT  Cover with ABD pad  Secure with [employer] and [employer]  Make sure Unna boots are high enough so they do not slide down       Change dressing 2x a week (home care nurse change Sunday or Mon and Wound center Thurs) and as needed if soiled or lose integrity       Follow up at the wound center in one week  Today's wound treatment note:  Cleansed and dressed as above       Standing Status:   Future     Standing Expiration Date:   3/17/2023

## 2022-03-22 DIAGNOSIS — I10 ESSENTIAL HYPERTENSION: ICD-10-CM

## 2022-03-22 RX ORDER — LOSARTAN POTASSIUM 25 MG/1
TABLET ORAL
Qty: 90 TABLET | Refills: 2 | Status: SHIPPED | OUTPATIENT
Start: 2022-03-22 | End: 2022-08-10 | Stop reason: SDUPTHER

## 2022-03-22 NOTE — TELEPHONE ENCOUNTER
Anthony Easley has requested a refill of losartan (COZAAR) 25 mg tablet  Pt meets the requirements placed by [facility]  Will refill medication

## 2022-03-24 ENCOUNTER — OFFICE VISIT (OUTPATIENT)
Dept: WOUND CARE | Facility: CLINIC | Age: 57
End: 2022-03-24
Payer: MEDICARE

## 2022-03-24 VITALS
SYSTOLIC BLOOD PRESSURE: 122 MMHG | HEART RATE: 92 BPM | DIASTOLIC BLOOD PRESSURE: 78 MMHG | RESPIRATION RATE: 20 BRPM | TEMPERATURE: 98.4 F

## 2022-03-24 DIAGNOSIS — I89.0 LYMPHEDEMA OF BOTH LOWER EXTREMITIES: ICD-10-CM

## 2022-03-24 DIAGNOSIS — L89.893 PRESSURE INJURY OF LEFT LEG, STAGE 3 (HCC): ICD-10-CM

## 2022-03-24 DIAGNOSIS — L89.893 PRESSURE INJURY OF RIGHT LEG, STAGE 3 (HCC): Primary | ICD-10-CM

## 2022-03-24 PROCEDURE — 11045 DBRDMT SUBQ TISS EACH ADDL: CPT | Performed by: FAMILY MEDICINE

## 2022-03-24 PROCEDURE — 11042 DBRDMT SUBQ TIS 1ST 20SQCM/<: CPT | Performed by: FAMILY MEDICINE

## 2022-03-24 RX ORDER — LIDOCAINE 40 MG/G
CREAM TOPICAL ONCE
Status: COMPLETED | OUTPATIENT
Start: 2022-03-24 | End: 2022-03-24

## 2022-03-24 RX ADMIN — LIDOCAINE: 40 CREAM TOPICAL at 11:02

## 2022-03-24 NOTE — PROGRESS NOTES
Patient ID: Carmencita Kumar is a 49 y o  male Date of Birth 1965       Chief Complaint   Patient presents with    Follow Up Wound Care Visit     BL LE wounds  Arrived with Unna Boots intact       Allergies:  Gluten meal - food allergy and Clindamycin    Diagnosis:   Diagnosis ICD-10-CM Associated Orders   1  Pressure injury of right leg, stage 3 ([facility])  L89 893 lidocaine (LMX) 4 % cream     Wound cleansing and dressings     Debridement   2  Pressure injury of left leg, stage 3 ([facility])  L89 893 lidocaine (LMX) 4 % cream     Wound cleansing and dressings     Debridement   3  Lymphedema of both lower extremities  I89 0 lidocaine (LMX) 4 % cream     Wound cleansing and dressings        Assessment  & Plan:     Stage III pressure injuries of both lateral calfs  No significant improvement in the past month   Surgical debridement done today   Continue to try to offload as best possible  Unna boots   Continue with Endoform and Hydrofera   Will see if PuraPly AM can be ordered to assist with healing  Subjective:   2/17/22: This is a 53-year-old male who comes in [address] [place] [address] with ulcers on the right and left calfs  He states that he was hospitalized on November of 2021 with cellulitis and he had the ulcers at that time  Patient is morbidly obese and has history of lymphedema as well  He does not use any form of compression other than Ace wraps  He had purchased alginate on the Internet as well as bordered foam is  He notes that there is heavy drainage requiring dressings to be changed once or twice a day  He believes that these ulcers had started due to  sleeping in a recliner and the foot rest causing pressure on the calfs  He is unable to sleep in a bed  He notes he has increased pain at nighttime when in there is recliner with pressure on his calf  He is ambulatory with a walker but does not go out of the house other than to doctor visits    Patient also has a history of type 2 diabetes with good control with last A1c 6 5 in October of 2021  He states that he has lost approximately 120 lb in the past nine months with a special meal plan  He plans on continuing with weight loss  2/24/22: Followup stage III pressure injuries of right and left calfs  They purchased memory foam pillows which seems to help with both pain and offloading the areas when he is sleeping  Still has pain mainly at night  Left greater than right  3/3/22: Followup stage III pressure injuries of the right left calfs  Patient is upset about visiting nurse not having proper supplies  Then there was leakage through the [employer]  Still has pain at nighttime  No fever chills  3/10/22: Followup stage III pressure injuries of the right and left calfs  Patient states that the visiting nurse had a "observer" do the Unna boot on his right leg which slid down  Still has pain mainly in the left calf  They are trying various offloading techniques  3/17/22: Followup stage III pressure injuries of the right and left calfs  Patient states he still has pain but slightly less particularly on the left  Starting to have some itching  No fever or chills  Still has not found the proper offloading device  3/24/22: Followup stage III pressure injuries of both calfs continues to have some pain as in the past   No fever or chills  Trying to offload when in bed              The following portions of the patient's history were reviewed and updated as appropriate:   Patient Active Problem List   Diagnosis    Type 2 diabetes mellitus with hyperglycemia, with long-term current use of insulin ([address] )    Hyperlipidemia    Psoriasis    Venous insufficiency    Gout    Essential hypertension    Hyponatremia    Gluten intolerance    Diabetic neuropathy (HCC)    Insomnia    Scrotal swelling    Erectile dysfunction    Bilateral leg edema    Elevated CO2 level    Abnormal thyroid function test    DAHIANA (obstructive sleep apnea)  Fungal infection    Morbid obesity with BMI of 70 and over, adult ([address] [facility] )    Cellulitis of right lower extremity    Migraine headache    Onychomycosis    Ulcer of left lower extremity, limited to breakdown of skin ([address] [facility] )    Pressure injury of right leg, stage 3 (HCC)     Past Medical History:   Diagnosis Date    Acute kidney injury 10/28/2021    Anemia 09/13/2019    Cellulitis     last assessed 12/10/15    Cellulitis of left lower extremity     Cough 10/20/2019    Diabetes mellitus ([address] )     Disease of thyroid gland     Edema     Elevated liver enzymes     Elevated serum creatinine 11/19/2021    Esophageal reflux     Gluten intolerance     Gout     last assessed 09/05/13    Hyperglycemia     Hypertension     IBS (irritable bowel syndrome)     Insomnia     Obesity     Osteoarthritis of knee     last assessed 02/10/14    Shortness of breath 5/3/2021    Venous insufficiency     last assessed 08/22/17    Villonodular synovitis of the hand, right     last assessed 11/14/2013     Past Surgical History:   Procedure Laterality Date    INCISION AND DRAINAGE OF WOUND Left 9/6/2019    Procedure: INCISION AND DRAINAGE (I&D) GROIN;  Surgeon: Oziel Etienne DO;  [facility];  Service: General    SKIN BIOPSY      WOUND DEBRIDEMENT Left 9/7/2019    Procedure: EXCISIONAL DEBRIDEMENT;  Surgeon: Oziel Etienne DO;  [facility];  Service: General     Family History   Problem Relation Age of Onset    Cancer Mother         gastrc    Colon cancer Father     Heart failure Father      Social History     Socioeconomic History    Marital status: /Civil Union     Spouse name: None    Number of children: None    Years of education: None    Highest education level: None   Occupational History    None   Tobacco Use    Smoking status: Never Smoker    Smokeless tobacco: Never Used   Vaping Use    Vaping Use: Never used   Substance and Sexual Activity    Alcohol use: Not Currently     Alcohol/week: 0 0 standard drinks    Drug use: No    Sexual activity: Yes   Other Topics Concern    None   Social History Narrative    None     Social Determinants of Health     Financial Resource Strain: Not on file   Food Insecurity: Not on file   Transportation Needs: No Transportation Needs    Lack of Transportation (Medical): No    Lack of Transportation (Non-Medical):  No   Physical Activity: Not on file   Stress: Not on file   Social Connections: Not on file   Intimate Partner Violence: Not on file   Housing Stability: Not on file       Current Outpatient Medications:     acetaminophen (TYLENOL) 325 mg tablet, Take 2 tablets (650 mg total) by mouth every 4 (four) hours as needed for mild pain, headaches or fever, Disp: , Rfl: 0    albuterol (PROVENTIL HFA,VENTOLIN HFA) 90 mcg/act inhaler, TAKE 2 PUFFS BY MOUTH EVERY 6 HOURS AS NEEDED FOR WHEEZE, Disp: 8 5 g, Rfl: 0    BD Pen Needle Ludmila 2nd Gen 32G X 4 MM MISC, USE 4 TIMES A DAY, Disp: 150 each, Rfl: 3    Blood Glucose Monitoring Suppl (ONETOUCH VERIO) w/Device KIT, Use to test sugar daily (Patient not taking: Reported on 10/28/2021), Disp: 1 kit, Rfl: 0    Calcium Alginate (Maxorb II Alginate Dressing) MISC, Apply 1 each topically in the morning, Disp: 10 each, Rfl: 2    Continuous Blood Gluc  (FreeStyle Connell 2 Lutz Systm) BROOKS, Use 1 Device as needed (use with sensors for bs checks), Disp: 1 Device, Rfl: 0    Continuous Blood Gluc Sensor (FreeStyle Elvin 14 Day Sensor) MISC, USE ONE SENSOR EVERY 2 WEEKS, Disp: 2 each, Rfl: 3    Control Gel Formula Dressing (DuoDERM CGF Dressing) MIS, Apply 1 application topically every 5 (five) days, Disp: 5 each, Rfl: 1    [employer]S Diclofenac Sodium 1 %, APPLY 4 G TOPICALLY 4 (FOUR) TIMES A DAY AS NEEDED (JOINT PAIN), Disp: 50 g, Rfl: 0    furosemide (LASIX) 20 mg tablet, TAKE 1 TABLET BY MOUTH TWICE A DAY, Disp: 180 tablet, Rfl: 1    gabapentin (NEURONTIN) 100 mg capsule, Take 1 capsule (100 mg total) by mouth 2 (two) times a day Take 1 tab with your 300mg tab twice daily, Disp: 180 capsule, Rfl: 2    gabapentin (NEURONTIN) 300 mg capsule, Take 1 capsule (300 mg total) by mouth 2 (two) times a day Take 1 tab with your 100mg tab twice daily, Disp: 180 capsule, Rfl: 2    Gauze Pads & Dressings 5"X5" PADS, Use in the morning, Disp: 20 each, Rfl: 2    insulin aspart (NovoLOG FlexPen) 100 UNIT/ML injection pen, INJECT 18 UNITS WITH MEALS+SCALE ( - 150-200 -2 UNITS, 201-250-4 UNITS, 251-300 -6 UNITS, 301-350-8 UNITS, > 350- 10 UNITS ), Disp: 45 mL, Rfl: 1    Lancets (ONETOUCH ULTRASOFT) lancets, Use to test sugar 2 times a day, Disp: 100 each, Rfl: 3    Lantus SoloStar 100 units/mL injection pen, INJECT 46 UNITS UNDER THE SKIN DAILY AT BEDTIME INJECT 48 UNITS UNDER THE SKIN EVERY BEDTIME, Disp: 15 mL, Rfl: 0    losartan (COZAAR) 25 mg tablet, TAKE 1 TABLET BY MOUTH EVERY DAY, Disp: 90 tablet, Rfl: 2    metFORMIN (GLUCOPHAGE) 1000 MG tablet, TAKE ONE TABLET BY MOUTH TWICE DAILY, Disp: 180 tablet, Rfl: 3    nystatin (MYCOSTATIN) cream, Apply topically 2 (two) times a day as needed (itching, redness), Disp: 30 g, Rfl: 1    nystatin (MYCOSTATIN) powder, Apply 1 application topically 2 (two) times a day To affected area, Disp: 120 g, Rfl: 3    omeprazole (PriLOSEC) 40 MG capsule, Take 1 capsule (40 mg total) by mouth 2 (two) times a day, Disp: 180 capsule, Rfl: 1    OneTouch Verio test strip, USE TO TEST SUGAR 2 TIMES A DAY, Disp: 100 strip, Rfl: 3    oxyCODONE (ROXICODONE) 5 immediate release tablet, Take 1-2 tabs by mouth every 6 hours as needed for moderate or severe pains respectively  , Disp: 15 tablet, Rfl: 0    psyllium (METAMUCIL) packet, Take 1 packet by mouth daily, Disp: , Rfl: 0    saccharomyces boulardii (FLORASTOR) 250 mg capsule, Take 1 capsule (250 mg total) by mouth 2 (two) times a day, Disp: 60 capsule, Rfl: 1    Skin Protectants, Misc   [facility] Textile 18"X264") SHEE, Apply 1 each topically every 5 (five) days, Disp: 3 each, Rfl: 5    sodium hypochlorite (DAKIN'S QUARTER-STRENGTH), Irrigate with 1 application as directed daily, Disp: 473 mL, Rfl: 0  No current facility-administered medications for this visit  Review of Systems   Constitutional: Negative for appetite change, chills, fatigue, fever and unexpected weight change  HENT: Negative for congestion, hearing loss, postnasal drip and sinus pressure  Respiratory: Positive for shortness of breath (On exertion)  Negative for cough  Cardiovascular: Positive for leg swelling (Lymphedema)  Endocrine: Negative  Genitourinary: Positive for urgency  Musculoskeletal: Positive for gait problem [address])  Negative for back pain  Skin: Positive for wound (Both lower extremities)  Negative for rash  Allergic/Immunologic: Negative  Neurological: Positive for numbness  Hematological: Does not bruise/bleed easily  Psychiatric/Behavioral: Negative  Negative for dysphoric mood  The patient is not nervous/anxious  Objective:  /78   Pulse 92   Temp 98 4 °F (36 9 °C)   Resp 20   Pain Score:   6     Physical Exam  Vitals and nursing note reviewed  Constitutional:       Appearance: Normal appearance  He is well-developed  He is morbidly obese  HENT:      Head: Normocephalic and atraumatic  Cardiovascular:      Rate and Rhythm: Normal rate  Pulmonary:      Effort: Pulmonary effort is normal    Skin:     General: Skin is warm and dry  Findings: Wound present  No erythema  Comments: Stage III pressure injuries of both lateral calfs  Left calf ulcer minimally smaller with fibrin and biofilm  Right calf ulcer slightly smaller with fibrin and slough  Neurological:      Mental Status: He is alert and oriented to person, place, and time     Psychiatric:         Attention and Perception: Attention normal          Mood and Affect: Mood and affect normal          Behavior: Behavior is cooperative  Cognition and Memory: Cognition normal                          Wound 02/17/22 Pressure Injury Leg Right;Lateral;Lower (Active)   Wound Image Images linked 03/24/22 1104   Wound Description Slough;Granulation tissue;Pink 03/24/22 1041   Chelsie-wound Assessment Maceration; Intact 03/24/22 1041   Wound Length (cm) 3 3 cm 03/24/22 1041   Wound Width (cm) 2 4 cm 03/24/22 1041   Wound Depth (cm) 0 1 cm 03/24/22 1041   Wound Surface Area (cm^2) 7 92 cm^2 03/24/22 1041   Wound Volume (cm^3) 0 792 cm^3 03/24/22 1041   Calculated Wound Volume (cm^3) 0 79 cm^3 03/24/22 1041   Change in Wound Size % 17 71 03/24/22 1041   Drainage Amount Moderate 03/24/22 1041   Drainage Description Serosanguineous 03/24/22 1041   Non-staged Wound Description Full thickness 03/24/22 1041       Wound 02/17/22 Pressure Injury Leg Left;Lateral;Lower; Posterior (Active)   Wound Image Images linked 03/24/22 1103   Wound Description Granulation tissue;Pink;Slough 03/24/22 1042   Chelsie-wound Assessment Maceration; Intact 03/24/22 1042   Wound Length (cm) 2 8 cm 03/24/22 1042   Wound Width (cm) 4 6 cm 03/24/22 1042   Wound Depth (cm) 0 1 cm 03/24/22 1042   Wound Surface Area (cm^2) 12 88 cm^2 03/24/22 1042   Wound Volume (cm^3) 1 288 cm^3 03/24/22 1042   Calculated Wound Volume (cm^3) 1 29 cm^3 03/24/22 1042   Change in Wound Size % 42 41 03/24/22 1042   Drainage Amount Moderate 03/24/22 1042   Drainage Description Serosanguineous 03/24/22 1042   Non-staged Wound Description Full thickness 03/24/22 1042       Wound 03/03/22 Pressure Injury Tibial Left;Proximal;Lateral (Active)   Wound Image Images linked 03/24/22 1027   Wound Description Epithelialization 03/24/22 1042   Wound Length (cm) 0 cm 03/24/22 1042   Wound Width (cm) 0 cm 03/24/22 1042   Wound Depth (cm) 0 cm 03/24/22 1042   Wound Surface Area (cm^2) 0 cm^2 03/24/22 1042   Wound Volume (cm^3) 0 cm^3 03/24/22 1042   Calculated Wound Volume (cm^3) 0 cm^3 03/24/22 1042 Change in Wound Size % 100 03/24/22 1042   Drainage Amount None 03/24/22 1042       Debridement   Wound 02/17/22 Pressure Injury Leg Right;Lateral;Lower    Universal Protocol:  Consent: Verbal consent obtained  Written consent obtained  Consent given by: patient  Time out: Immediately prior to procedure a "time out" was called to verify the correct patient, procedure, equipment, support staff and site/side marked as required  Patient understanding: patient states understanding of the procedure being performed  Patient identity confirmed: verbally with patient      Performed by: physician  Debridement type: surgical  Level of debridement: subcutaneous tissue  Pain control: lidocaine 4%  Post-debridement measurements  Length (cm): 3 3  Width (cm): 2 4  Depth (cm): 0 2  Percent debrided: 100%  Surface Area (cm^2): 7 92  Area debrided (cm^2): 7 92  Volume (cm^3): 1 58  Tissue and other material debrided: dermis and subcutaneous tissue  Devitalized tissue debrided: fibrin and slough  Instrument(s) utilized: curette  Bleeding: small  Hemostasis obtained with: pressure  Procedural pain (0-10): 0  Post-procedural pain: 0   Response to treatment: procedure was tolerated well    Debridement   Wound 02/17/22 Pressure Injury Leg Left;Lateral;Lower; Posterior    Universal Protocol:  Consent: Verbal consent obtained  Written consent obtained  Consent given by: patient  Time out: Immediately prior to procedure a "time out" was called to verify the correct patient, procedure, equipment, support staff and site/side marked as required    Patient understanding: patient states understanding of the procedure being performed  Patient identity confirmed: verbally with patient      Performed by: physician  Debridement type: surgical  Level of debridement: subcutaneous tissue  Pain control: lidocaine 4%  Post-debridement measurements  Length (cm): 2 8  Width (cm): 4 6  Depth (cm): 0 2  Percent debrided: 100%  Surface Area (cm^2): 12 88  Area debrided (cm^2): 12 88  Volume (cm^3): 2 58  Tissue and other material debrided: dermis and subcutaneous tissue  Devitalized tissue debrided: biofilm, fibrin and slough  Instrument(s) utilized: curette  Bleeding: medium  Hemostasis obtained with: pressure  Procedural pain (0-10): 0  Post-procedural pain: 0   Response to treatment: procedure was tolerated well                              Wound Instructions:  Orders Placed This Encounter   Procedures    Wound cleansing and dressings     Wound cleansing and dressings  B/L lower legs:      Wash your hands with soap and water  Remove old dressing, discard into plastic bag and place in trash  Cleanse the wound with mild soap and water prior to applying a clean dressing  PLEASE WASH LEGS WITH SOAP AND WATER PRIOR TO DRESSING CHANGES   Do not use tissue or cotton balls  Do not scrub the wound  Pat dry using gauze  Shower yes with protection, purchase cast cover  Or sponge bathe       Apply Lac Hydrin to B/L lower legs  Apply skin prep to the periwound  Apply endoform AM to wound beds (sent with patient)  Apply hydrofera blue over endoform  MUST USE FULL PIECE  DO NOT CUT  Cover with ABD pad  Secure with [employer] and [employer]  Make sure Unna boots are high enough so they do not slide down       Change dressing 2x a week (home care nurse change Sunday or Mon and Wound center Thurs) and as needed if soiled or lose integrity       Follow up at the wound center in one week  Today's wound treatment note:  Cleansed and dressed as above  Standing Status:   Future     Standing Expiration Date:   3/24/2023    Debridement     This order was created via procedure documentation    Debridement     This order was created via procedure documentation       Melissa Lugo MD, CHT, CWS       Portions of the record may have been created with voice recognition software   Occasional wrong word or "sound alike" substitutions may have occurred due to the inherent limitations of voice recognition software  Read the chart carefully and recognize, using context, where substitutions have occurred

## 2022-03-24 NOTE — PATIENT INSTRUCTIONS
Orders Placed This Encounter   Procedures    Wound cleansing and dressings     Wound cleansing and dressings  B/L lower legs:      Wash your hands with soap and water  Remove old dressing, discard into plastic bag and place in trash  Cleanse the wound with mild soap and water prior to applying a clean dressing  PLEASE WASH LEGS WITH SOAP AND WATER PRIOR TO DRESSING CHANGES   Do not use tissue or cotton balls  Do not scrub the wound  Pat dry using gauze  Shower yes with protection, purchase cast cover  Or sponge bathe       Apply Lac Hydrin to B/L lower legs  Apply skin prep to the periwound  Apply endoform AM to wound beds (sent with patient)  Apply hydrofera blue over endoform  MUST USE FULL PIECE  DO NOT CUT  Cover with ABD pad  Secure with [employer] and [employer]  Make sure Unna boots are high enough so they do not slide down       Change dressing 2x a week (home care nurse change Sunday or Mon and Wound center Thurs) and as needed if soiled or lose integrity       Follow up at the wound center in one week  Today's wound treatment note:  Cleansed and dressed as above       Standing Status:   Future     Standing Expiration Date:   3/24/2023

## 2022-03-25 ENCOUNTER — TELEPHONE (OUTPATIENT)
Dept: INTERNAL MEDICINE CLINIC | Facility: CLINIC | Age: 57
End: 2022-03-25

## 2022-03-25 NOTE — TELEPHONE ENCOUNTER
Anaya Driscoll has called the [facility] office in regards to his Continuous Blood Gluc Sensor (FreStyle Elvin 14 Day Sensor)  Mallory Epperson states he has issues with the continuous blood gluc sensor in which the sensor do not stick to his body  Kareen had then stated he called his insurance for additional sensors  Mallory Epperson stated a prior authorization would be needed to be done for additional sensors to be given  Kareen ended the call soon after

## 2022-03-31 ENCOUNTER — OFFICE VISIT (OUTPATIENT)
Dept: WOUND CARE | Facility: CLINIC | Age: 57
End: 2022-03-31
Payer: MEDICARE

## 2022-03-31 VITALS
RESPIRATION RATE: 22 BRPM | SYSTOLIC BLOOD PRESSURE: 136 MMHG | HEART RATE: 88 BPM | DIASTOLIC BLOOD PRESSURE: 80 MMHG | TEMPERATURE: 97.9 F

## 2022-03-31 DIAGNOSIS — L89.893 PRESSURE INJURY OF LEFT LEG, STAGE 3 (HCC): Primary | ICD-10-CM

## 2022-03-31 DIAGNOSIS — L89.893 PRESSURE INJURY OF RIGHT LEG, STAGE 3 (HCC): ICD-10-CM

## 2022-03-31 PROCEDURE — 97597 DBRDMT OPN WND 1ST 20 CM/<: CPT | Performed by: FAMILY MEDICINE

## 2022-03-31 PROCEDURE — 29580 STRAPPING UNNA BOOT: CPT

## 2022-03-31 PROCEDURE — 99213 OFFICE O/P EST LOW 20 MIN: CPT | Performed by: FAMILY MEDICINE

## 2022-03-31 RX ORDER — LIDOCAINE 40 MG/G
CREAM TOPICAL ONCE
Status: COMPLETED | OUTPATIENT
Start: 2022-03-31 | End: 2022-03-31

## 2022-03-31 RX ADMIN — LIDOCAINE: 40 CREAM TOPICAL at 14:02

## 2022-03-31 NOTE — PATIENT INSTRUCTIONS
Orders Placed This Encounter   Procedures    Wound cleansing and dressings     Wound cleansing and dressings  B/L lower legs:      Wash your hands with soap and water  Remove old dressing, discard into plastic bag and place in trash  Cleanse the wound with mild soap and water prior to applying a clean dressing  PLEASE WASH LEGS WITH SOAP AND WATER PRIOR TO DRESSING CHANGES   Do not use tissue or cotton balls  Do not scrub the wound  Pat dry using gauze  Shower yes with protection, purchase cast cover  Or sponge bathe       Apply Lac Hydrin to B/L lower legs  Apply skin prep to the periwound  Apply endoform AM to wound beds (sent with patient)  Apply moistened hydrofera blue over endoform  MUST USE FULL PIECE  DO NOT CUT  Cover with ABD pad  Secure with [employer] and [employer]  Make sure Unna boots are high enough so they do not slide down       Change dressing 2x a week (home care nurse change Sunday or Mon and Wound center Thurs) and as needed if soiled or lose integrity       Follow up at the wound center in one week  Today's wound treatment note:  Cleansed and dressed as above       Standing Status:   Future     Standing Expiration Date:   3/31/2023

## 2022-03-31 NOTE — PROGRESS NOTES
Cast Application    Date/Time: 3/31/2022 11:38 AM  Performed by: Mikayla Pittman RN  Authorized by: Elvie Craig MD   Universal Protocol:  Consent: Verbal consent obtained  Consent given by: patient  Timeout called at: 3/31/2022 11:38 AM   Patient understanding: patient states understanding of the procedure being performed  Patient identity confirmed: verbally with patient      Pre-procedure details:     Sensation:  Normal  Procedure details:     Laterality:  Left    Location:  Leg    Leg:  L lower legCast type:  Unna boot      Supplies used: econo paste, coban, tubifast yellow  Post-procedure details:     Pain:  Improved    Sensation:  Normal    Patient tolerance of procedure: Tolerated well, no immediate complications  Comments:      UNNA BOOT wrap procedure     Before application, RAY and/or TBI determined to be adequate for healing and application of compression  Lower extremity washed prior to application of compression wrap  With the foot in dorsiflexed position, Unna boot was applied as per physician orders without complications or complaint of pain  The procedure was tolerated well  Toes warm & pink post application  Patient provided education & reinforced to observe toes for any discoloration, swelling or tingling and instructed when to report to the [address] [facility],[address] or to remove compression  Wound care as per providers orders, patient tolerated well

## 2022-03-31 NOTE — PROGRESS NOTES
Patient ID: Mesha Lane is a 49 y o  male Date of Birth 1965       Chief Complaint   Patient presents with    Follow Up Wound Care Visit     Pressure injury of right leg, stage 3 ([facility])       Allergies:  Gluten meal - food allergy and Clindamycin    Diagnosis:   Diagnosis ICD-10-CM Associated Orders   1  Pressure injury of left leg, stage 3 ([facility])  L89 893 lidocaine (LMX) 4 % cream     Wound cleansing and dressings   2  Pressure injury of right leg, stage 3 ([facility])  L89 893 lidocaine (LMX) 4 % cream     Wound cleansing and dressings        Assessment  & Plan:     Improving pressure injuries of both lower extremities   Continue same wound care with Endoform and Hydrofera  Unna boot  Change 2 times a week   Continued offload as much as possible   Selective debridement  Subjective:   2/17/22: This is a 51-year-old male who comes in [address] [place] [address] with ulcers on the right and left calfs  He states that he was hospitalized on November of 2021 with cellulitis and he had the ulcers at that time  Patient is morbidly obese and has history of lymphedema as well  He does not use any form of compression other than Ace wraps  He had purchased alginate on the Internet as well as bordered foam is  He notes that there is heavy drainage requiring dressings to be changed once or twice a day  He believes that these ulcers had started due to  sleeping in a recliner and the foot rest causing pressure on the calfs  He is unable to sleep in a bed  He notes he has increased pain at nighttime when in there is recliner with pressure on his calf  He is ambulatory with a walker but does not go out of the house other than to doctor visits  Patient also has a history of type 2 diabetes with good control with last A1c 6 5 in October of 2021  He states that he has lost approximately 120 lb in the past nine months with a special meal plan  He plans on continuing with weight loss  2/24/22:   Followup stage III pressure injuries of right and left calfs  They purchased memory foam pillows which seems to help with both pain and offloading the areas when he is sleeping  Still has pain mainly at night  Left greater than right  3/3/22: Followup stage III pressure injuries of the right left calfs  Patient is upset about visiting nurse not having proper supplies  Then there was leakage through the [employer]  Still has pain at nighttime  No fever chills  3/10/22: Followup stage III pressure injuries of the right and left calfs  Patient states that the visiting nurse had a "observer" do the Unna boot on his right leg which slid down  Still has pain mainly in the left calf  They are trying various offloading techniques  3/17/22: Followup stage III pressure injuries of the right and left calfs  Patient states he still has pain but slightly less particularly on the left  Starting to have some itching  No fever or chills  Still has not found the proper offloading device  3/24/22: Followup stage III pressure injuries of both calfs continues to have some pain as in the past   No fever or chills  Trying to offload when in bed     3/31/22: Followup stage III pressure injuries of both lateral calf  Continues to have the same amount of pain  States he is trying offload  No fever chills              The following portions of the patient's history were reviewed and updated as appropriate:   Patient Active Problem List   Diagnosis    Type 2 diabetes mellitus with hyperglycemia, with long-term current use of insulin ([address] )    Hyperlipidemia    Psoriasis    Venous insufficiency    Gout    Essential hypertension    Hyponatremia    Gluten intolerance    Diabetic neuropathy (HCC)    Insomnia    Scrotal swelling    Erectile dysfunction    Bilateral leg edema    Elevated CO2 level    Abnormal thyroid function test    DAHIANA (obstructive sleep apnea)    Fungal infection    Morbid obesity with BMI of 70 and over, adult [facility])    Cellulitis of right lower extremity    Migraine headache    Onychomycosis    Ulcer of left lower extremity, limited to breakdown of skin ([facility] )    Pressure injury of right leg, stage 3 (HCC)     Past Medical History:   Diagnosis Date    Acute kidney injury 10/28/2021    Anemia 09/13/2019    Cellulitis     last assessed 12/10/15    Cellulitis of left lower extremity     Cough 10/20/2019    Diabetes mellitus ([facility] )     Disease of thyroid gland     Edema     Elevated liver enzymes     Elevated serum creatinine 11/19/2021    Esophageal reflux     Gluten intolerance     Gout     last assessed 09/05/13    Hyperglycemia     Hypertension     IBS (irritable bowel syndrome)     Insomnia     Obesity     Osteoarthritis of knee     last assessed 02/10/14    Shortness of breath 5/3/2021    Venous insufficiency     last assessed 08/22/17    Villonodular synovitis of the hand, right     last assessed 11/14/2013     Past Surgical History:   Procedure Laterality Date    INCISION AND DRAINAGE OF WOUND Left 9/6/2019    Procedure: INCISION AND DRAINAGE (I&D) GROIN;  Surgeon: Piedad Whaley DO;  [facility];  Service: General    SKIN BIOPSY      WOUND DEBRIDEMENT Left 9/7/2019    Procedure: EXCISIONAL DEBRIDEMENT;  Surgeon: Piedad Whaley DO;  [facility];  Service: General     Family History   Problem Relation Age of Onset    Cancer Mother         gastrc    Colon cancer Father     Heart failure Father      Social History     Socioeconomic History    Marital status: /Civil Union     Spouse name: Not on file    Number of children: Not on file    Years of education: Not on file    Highest education level: Not on file   Occupational History    Not on file   Tobacco Use    Smoking status: Never Smoker    Smokeless tobacco: Never Used   Vaping Use    Vaping Use: Never used   Substance and Sexual Activity    Alcohol use: Not Currently     Alcohol/week: 0 0 standard drinks    Drug use: No    Sexual activity: Yes   Other Topics Concern    Not on file   Social History Narrative    Not on file     Social Determinants of Health     Financial Resource Strain: Not on file   Food Insecurity: Not on file   Transportation Needs: No Transportation Needs    Lack of Transportation (Medical): No    Lack of Transportation (Non-Medical):  No   Physical Activity: Not on file   Stress: Not on file   Social Connections: Not on file   Intimate Partner Violence: Not on file   Housing Stability: Not on file       Current Outpatient Medications:     acetaminophen (TYLENOL) 325 mg tablet, Take 2 tablets (650 mg total) by mouth every 4 (four) hours as needed for mild pain, headaches or fever, Disp: , Rfl: 0    albuterol (PROVENTIL HFA,VENTOLIN HFA) 90 mcg/act inhaler, TAKE 2 PUFFS BY MOUTH EVERY 6 HOURS AS NEEDED FOR WHEEZE, Disp: 8 5 g, Rfl: 0    BD Pen Needle Ludmila 2nd Gen 32G X 4 MM MISC, USE 4 TIMES A DAY, Disp: 150 each, Rfl: 3    Blood Glucose Monitoring Suppl (ONETOUCH VERIO) w/Device KIT, Use to test sugar daily (Patient not taking: Reported on 10/28/2021), Disp: 1 kit, Rfl: 0    Calcium Alginate (Maxorb II Alginate Dressing) MISC, Apply 1 each topically in the morning, Disp: 10 each, Rfl: 2    Continuous Blood Gluc  (FreeStyle Connell 2 [employer]) BROOKS, Use 1 Device as needed (use with sensors for bs checks), Disp: 1 Device, Rfl: 0    Continuous Blood Gluc Sensor (FreeStyle Elvin 14 Day Sensor) MISC, USE ONE SENSOR EVERY 2 WEEKS, Disp: 2 each, Rfl: 3    Control Gel Formula Dressing (DuoDERM CGF Dressing) MIS, Apply 1 application topically every 5 (five) days, Disp: 5 each, Rfl: 1    [employer] Diclofenac Sodium 1 %, APPLY 4 G TOPICALLY 4 (FOUR) TIMES A DAY AS NEEDED (JOINT PAIN), Disp: 50 g, Rfl: 0    furosemide (LASIX) 20 mg tablet, TAKE 1 TABLET BY MOUTH TWICE A DAY, Disp: 180 tablet, Rfl: 1    gabapentin (NEURONTIN) 100 mg capsule, Take 1 capsule (100 mg total) by mouth 2 (two) times a day Take 1 tab with your 300mg tab twice daily, Disp: 180 capsule, Rfl: 2    gabapentin (NEURONTIN) 300 mg capsule, Take 1 capsule (300 mg total) by mouth 2 (two) times a day Take 1 tab with your 100mg tab twice daily, Disp: 180 capsule, Rfl: 2    Gauze Pads & Dressings 5"X5" PADS, Use in the morning, Disp: 20 each, Rfl: 2    insulin aspart (NovoLOG FlexPen) 100 UNIT/ML injection pen, INJECT 18 UNITS WITH MEALS+SCALE ( - 150-200 -2 UNITS, 201-250-4 UNITS, 251-300 -6 UNITS, 301-350-8 UNITS, > 350- 10 UNITS ), Disp: 45 mL, Rfl: 1    Lancets (ONETOUCH ULTRASOFT) lancets, Use to test sugar 2 times a day, Disp: 100 each, Rfl: 3    Lantus SoloStar 100 units/mL injection pen, INJECT 46 UNITS UNDER THE SKIN DAILY AT BEDTIME INJECT 48 UNITS UNDER THE SKIN EVERY BEDTIME, Disp: 15 mL, Rfl: 0    losartan (COZAAR) 25 mg tablet, TAKE 1 TABLET BY MOUTH EVERY DAY, Disp: 90 tablet, Rfl: 2    metFORMIN (GLUCOPHAGE) 1000 MG tablet, TAKE ONE TABLET BY MOUTH TWICE DAILY, Disp: 180 tablet, Rfl: 3    nystatin (MYCOSTATIN) cream, Apply topically 2 (two) times a day as needed (itching, redness), Disp: 30 g, Rfl: 1    nystatin (MYCOSTATIN) powder, Apply 1 application topically 2 (two) times a day To affected area, Disp: 120 g, Rfl: 3    omeprazole (PriLOSEC) 40 MG capsule, Take 1 capsule (40 mg total) by mouth 2 (two) times a day, Disp: 180 capsule, Rfl: 1    OneTouch Verio test strip, USE TO TEST SUGAR 2 TIMES A DAY, Disp: 100 strip, Rfl: 3    oxyCODONE (ROXICODONE) 5 immediate release tablet, Take 1-2 tabs by mouth every 6 hours as needed for moderate or severe pains respectively  , Disp: 15 tablet, Rfl: 0    psyllium (METAMUCIL) packet, Take 1 packet by mouth daily, Disp: , Rfl: 0    saccharomyces boulardii (FLORASTOR) 250 mg capsule, Take 1 capsule (250 mg total) by mouth 2 (two) times a day, Disp: 60 capsule, Rfl: 1    Skin Protectants, Misc   ([employer] 44"P521") SHEE, Apply 1 each topically every 5 (five) days, Disp: 3 each, Rfl: 5    sodium hypochlorite (DAKIN'S QUARTER-STRENGTH), Irrigate with 1 application as directed daily, Disp: 473 mL, Rfl: 0    Current Facility-Administered Medications:     lidocaine (LMX) 4 % cream, , Topical, Once, Kenton Demarco MD    Review of Systems   Constitutional: Negative for appetite change, chills, fatigue, fever and unexpected weight change  HENT: Negative for congestion, hearing loss, postnasal drip and sinus pressure  Respiratory: Positive for shortness of breath (On exertion)  Negative for cough  Cardiovascular: Positive for leg swelling (Lymphedema)  Endocrine: Negative  Genitourinary: Positive for urgency  Musculoskeletal: Positive for gait problem Marie Solano)  Negative for back pain  Skin: Positive for wound (Both lower extremities)  Negative for rash  Allergic/Immunologic: Negative  Neurological: Positive for numbness  Hematological: Does not bruise/bleed easily  Psychiatric/Behavioral: Negative  Negative for dysphoric mood  The patient is not nervous/anxious  Objective:  /80   Pulse 88   Temp 97 9 °F (36 6 °C)   Resp 22   Pain Score:   4     Physical Exam  Vitals and nursing note reviewed  Constitutional:       Appearance: Normal appearance  He is well-developed  He is morbidly obese  HENT:      Head: Normocephalic and atraumatic  Cardiovascular:      Rate and Rhythm: Normal rate  Pulmonary:      Effort: Pulmonary effort is normal    Skin:     General: Skin is warm and dry  Findings: Wound present  No erythema  Comments: Stage III pressure injuries of both lateral calfs  Left calf with skin bridging and skin islands  Overall improved  Right calf with fibrin and slough  Start of skin bridge  Neurological:      Mental Status: He is alert and oriented to person, place, and time     Psychiatric:         Attention and Perception: Attention normal          Mood and Affect: Mood and affect normal          Behavior: Behavior is cooperative  Cognition and Memory: Cognition normal                  Wound 02/17/22 Pressure Injury Leg Right;Lateral;Lower (Active)   Wound Image Images linked 03/31/22 1045   Wound Description Beefy red;Slough; Epithelialization 03/31/22 1046   Chelsie-wound Assessment Hyperpigmented;Scar Tissue 03/31/22 1046   Wound Length (cm) 2 8 cm 03/31/22 1046   Wound Width (cm) 1 7 cm 03/31/22 1046   Wound Depth (cm) 0 1 cm 03/31/22 1046   Wound Surface Area (cm^2) 4 76 cm^2 03/31/22 1046   Wound Volume (cm^3) 0 476 cm^3 03/31/22 1046   Calculated Wound Volume (cm^3) 0 48 cm^3 03/31/22 1046   Change in Wound Size % 50 03/31/22 1046   Drainage Amount Large 03/31/22 1046   Drainage Description Serosanguineous 03/31/22 1046   Non-staged Wound Description Full thickness 03/31/22 1046       Wound 02/17/22 Pressure Injury Leg Left;Lateral;Lower; Posterior (Active)   Wound Image Images linked 03/31/22 1045   Wound Description Epithelialization; Beefy red;Pink (0 4 skin bridge) 03/31/22 1048   Chelsie-wound Assessment Dry;Scaly; Hyperpigmented 03/31/22 1048   Wound Length (cm) 2 cm 03/31/22 1048   Wound Width (cm) 3 cm 03/31/22 1048   Wound Depth (cm) 0 1 cm 03/31/22 1048   Wound Surface Area (cm^2) 6 cm^2 03/31/22 1048   Wound Volume (cm^3) 0 6 cm^3 03/31/22 1048   Calculated Wound Volume (cm^3) 0 6 cm^3 03/31/22 1048   Change in Wound Size % 73 21 03/31/22 1048   Drainage Amount Large 03/31/22 1048   Drainage Description Serosanguineous 03/31/22 1048   Non-staged Wound Description Full thickness 03/31/22 1048   Patient Tolerance Tolerated well 03/31/22 1048   Dressing Status Removed 03/31/22 1048       Debridement   Wound 02/17/22 Pressure Injury Leg Right;Lateral;Lower    Universal Protocol:  Consent: Verbal consent obtained  Written consent obtained    Consent given by: patient  Time out: Immediately prior to procedure a "time out" was called to verify the correct patient, procedure, equipment, support staff and site/side marked as required  Patient understanding: patient states understanding of the procedure being performed  Patient identity confirmed: verbally with patient      Performed by: physician  Debridement type: selective  Pain control: lidocaine 4%  Post-debridement measurements  Length (cm): 2 8  Width (cm): 1 7  Depth (cm): 0 1  Percent debrided: 100%  Surface Area (cm^2): 4 76  Area debrided (cm^2): 4 76  Volume (cm^3): 0 48  Devitalized tissue debrided: fibrin and slough  Instrument(s) utilized: curette  Bleeding: small  Hemostasis obtained with: pressure  Procedural pain (0-10): 0  Post-procedural pain: 0   Response to treatment: procedure was tolerated well                     Wound Instructions:  Orders Placed This Encounter   Procedures    Wound cleansing and dressings     Wound cleansing and dressings  B/L lower legs:      Wash your hands with soap and water  Remove old dressing, discard into plastic bag and place in trash  Cleanse the wound with mild soap and water prior to applying a clean dressing  PLEASE WASH LEGS WITH SOAP AND WATER PRIOR TO DRESSING CHANGES   Do not use tissue or cotton balls  Do not scrub the wound  Pat dry using gauze  Shower yes with protection, purchase cast cover  Or sponge bathe       Apply Lac Hydrin to B/L lower legs  Apply skin prep to the periwound  Apply endoform AM to wound beds (sent with patient)  Apply moistened hydrofera blue over endoform  MUST USE FULL PIECE  DO NOT CUT  Cover with ABD pad  Secure with [facility] [employer] and [employer]  Make sure Unna boots are high enough so they do not slide down       Change dressing 2x a week (home care nurse change Sunday or Mon and Wound center Thurs) and as needed if soiled or lose integrity       Follow up at the wound center in one week  Today's wound treatment note:  Cleansed and dressed as above       Standing Status:   Future     Standing Expiration Date:   3/31/2023 Jenny Rascon MD, CHT, CWS       Portions of the record may have been created with voice recognition software  Occasional wrong word or "sound alike" substitutions may have occurred due to the inherent limitations of voice recognition software  Read the chart carefully and recognize, using context, where substitutions have occurred

## 2022-04-06 DIAGNOSIS — E11.65 TYPE 2 DIABETES MELLITUS WITH HYPERGLYCEMIA, WITH LONG-TERM CURRENT USE OF INSULIN (HCC): ICD-10-CM

## 2022-04-06 DIAGNOSIS — E78.00 PURE HYPERCHOLESTEROLEMIA: ICD-10-CM

## 2022-04-06 DIAGNOSIS — R79.89 ELEVATED TSH: ICD-10-CM

## 2022-04-06 DIAGNOSIS — I10 HYPERTENSION GOAL BP (BLOOD PRESSURE) < 140/90: ICD-10-CM

## 2022-04-06 DIAGNOSIS — E66.01 OBESITY, MORBID, BMI 50 OR HIGHER (HCC): ICD-10-CM

## 2022-04-06 DIAGNOSIS — Z79.4 TYPE 2 DIABETES MELLITUS WITH HYPERGLYCEMIA, WITH LONG-TERM CURRENT USE OF INSULIN (HCC): ICD-10-CM

## 2022-04-06 RX ORDER — PEN NEEDLE, DIABETIC 32GX 5/32"
NEEDLE, DISPOSABLE MISCELLANEOUS
Qty: 200 EACH | Refills: 3 | Status: SHIPPED | OUTPATIENT
Start: 2022-04-06

## 2022-04-07 ENCOUNTER — OFFICE VISIT (OUTPATIENT)
Dept: WOUND CARE | Facility: CLINIC | Age: 57
End: 2022-04-07
Payer: MEDICARE

## 2022-04-07 VITALS
TEMPERATURE: 97.9 F | DIASTOLIC BLOOD PRESSURE: 78 MMHG | RESPIRATION RATE: 24 BRPM | SYSTOLIC BLOOD PRESSURE: 118 MMHG | HEART RATE: 108 BPM

## 2022-04-07 DIAGNOSIS — L97.929 CHRONIC VENOUS HYPERTENSION (IDIOPATHIC) WITH ULCER OF LEFT LOWER EXTREMITY (HCC): ICD-10-CM

## 2022-04-07 DIAGNOSIS — L89.893 PRESSURE INJURY OF RIGHT LEG, STAGE 3 (HCC): ICD-10-CM

## 2022-04-07 DIAGNOSIS — E66.01 MORBID OBESITY WITH BMI OF 70 AND OVER, ADULT (HCC): ICD-10-CM

## 2022-04-07 DIAGNOSIS — I87.312 CHRONIC VENOUS HYPERTENSION (IDIOPATHIC) WITH ULCER OF LEFT LOWER EXTREMITY (HCC): ICD-10-CM

## 2022-04-07 DIAGNOSIS — L89.893 PRESSURE INJURY OF LEFT LEG, STAGE 3 (HCC): Primary | ICD-10-CM

## 2022-04-07 PROCEDURE — 11042 DBRDMT SUBQ TIS 1ST 20SQCM/<: CPT | Performed by: FAMILY MEDICINE

## 2022-04-07 PROCEDURE — 99213 OFFICE O/P EST LOW 20 MIN: CPT | Performed by: FAMILY MEDICINE

## 2022-04-07 RX ORDER — LIDOCAINE 40 MG/G
CREAM TOPICAL ONCE
Status: COMPLETED | OUTPATIENT
Start: 2022-04-07 | End: 2022-04-07

## 2022-04-07 RX ADMIN — LIDOCAINE: 40 CREAM TOPICAL at 14:54

## 2022-04-07 NOTE — PROGRESS NOTES
Patient ID: Graciela Smith is a 49 y o  male Date of Birth 1965     Chief Complaint  Chief Complaint   Patient presents with    Follow Up Wound Care Visit     Pressure injury of left leg, stage 3 (HCC)       Allergies  Gluten meal - food allergy and Clindamycin    Assessment:    No problem-specific Assessment & Plan notes found for this encounter  Diagnoses and all orders for this visit:    Pressure injury of left leg, stage 3 (HCC)  -     lidocaine (LMX) 4 % cream  -     Cancel: Wound cleansing and dressings; Future  -     Debridement    Pressure injury of right leg, stage 3 (HCC)  -     lidocaine (LMX) 4 % cream  -     Cancel: Wound cleansing and dressings; Future  -     Debridement    Morbid obesity with BMI of 70 and over, adult [facility])    Chronic venous hypertension (idiopathic) with ulcer of left lower extremity ([address] )    Other orders  -     Cancel: Debridement              Debridement   Wound 02/17/22 Pressure Injury Leg Right;Lateral;Lower    Universal Protocol:  Consent: Verbal consent obtained  Consent given by: patient  Time out: Immediately prior to procedure a "time out" was called to verify the correct patient, procedure, equipment, support staff and site/side marked as required    Timeout called at: 4/7/2022 2:30 PM   Patient understanding: patient states understanding of the procedure being performed  Patient identity confirmed: verbally with patient      Performed by: physician  Debridement type: surgical  Level of debridement: subcutaneous tissue  Pain control: lidocaine 4%  Post-debridement measurements  Length (cm): 3 1  Width (cm): 2 1  Depth (cm): 0 2  Percent debrided: 90%  Surface Area (cm^2): 6 51  Area debrided (cm^2): 5 86  Volume (cm^3): 1 3  Tissue and other material debrided: subcutaneous tissue  Devitalized tissue debrided: exudate and slough  Instrument(s) utilized: curette  Bleeding: small  Hemostasis obtained with: pressure  Procedural pain (0-10): 0  Post-procedural pain: 0   Response to treatment: procedure was tolerated well      Debridement   Wound 02/17/22 Pressure Injury Leg Left;Lateral;Lower; Posterior    Universal Protocol:  Consent: Verbal consent obtained  Consent given by: patient  Time out: Immediately prior to procedure a "time out" was called to verify the correct patient, procedure, equipment, support staff and site/side marked as required  Timeout called at: 4/7/2022 2:30 PM   Patient understanding: patient states understanding of the procedure being performed  Patient identity confirmed: verbally with patient      Performed by: physician  Debridement type: surgical  Level of debridement: subcutaneous tissue  Pain control: lidocaine 4%  Post-debridement measurements  Length (cm): 1 5  Width (cm): 1 1  Depth (cm): 0 2  Percent debrided: 100%  Surface Area (cm^2): 1 65  Area debrided (cm^2): 1 65  Volume (cm^3): 0 33  Tissue and other material debrided: subcutaneous tissue  Devitalized tissue debrided: exudate and slough  Instrument(s) utilized: curette  Bleeding: small  Hemostasis obtained with: pressure  Procedural pain (0-10): 0  Post-procedural pain: 0   Response to treatment: procedure was tolerated well          Plan:   New wound on the left lower extremity  Original wounds are improved  Wounds debrided as above  Continue wound management with antimicrobial and form and 100 for a blue, see wound orders below  Continue compression with Unna boots  Keep legs elevated whenever seated avoid prolonged standing  Keep pressure from the wounds  Control DM  Followup in 1 week with Dr Nell Brown or call sooner with questions or concerns    Wound 02/17/22 Pressure Injury Leg Right;Lateral;Lower (Active)   Wound Description Beefy red;Slough; Yellow 04/07/22 1412   Chelsie-wound Assessment Hyperpigmented;Scar Tissue 04/07/22 1412   Wound Length (cm) 3 1 cm 04/07/22 1412   Wound Width (cm) 2 1 cm 04/07/22 1412   Wound Depth (cm) 0 1 cm 04/07/22 1412   Wound Surface Area (cm^2) 6 51 cm^2 04/07/22 1412   Wound Volume (cm^3) 0 651 cm^3 04/07/22 1412   Calculated Wound Volume (cm^3) 0 65 cm^3 04/07/22 1412   Change in Wound Size % 32 29 04/07/22 1412   Drainage Amount Moderate 04/07/22 1412   Drainage Description Serosanguineous 04/07/22 1412   Patient Tolerance Tolerated well 04/07/22 1412   Dressing Status Removed 04/07/22 1412       Wound 02/17/22 Pressure Injury Leg Left;Lateral;Lower; Posterior (Active)   Wound Description Epithelialization;Pink 04/07/22 1414   Chelsie-wound Assessment Hyperpigmented;Scar Tissue 04/07/22 1414   Wound Length (cm) 1 5 cm 04/07/22 1414   Wound Width (cm) 1 1 cm 04/07/22 1414   Wound Depth (cm) 0 1 cm 04/07/22 1414   Wound Surface Area (cm^2) 1 65 cm^2 04/07/22 1414   Wound Volume (cm^3) 0 165 cm^3 04/07/22 1414   Calculated Wound Volume (cm^3) 0 17 cm^3 04/07/22 1414   Change in Wound Size % 92 41 04/07/22 1414   Drainage Amount Small 04/07/22 1414   Drainage Description Serosanguineous 04/07/22 1414   Patient Tolerance Tolerated well 04/07/22 1414   Dressing Status Removed 04/07/22 1414       Wound 04/07/22 Venous Ulcer Pretibial Left;Proximal (Active)   Wound Description Bleeding 04/07/22 1452   Chelsie-wound Assessment Edema 04/07/22 1452   Wound Length (cm) 0 3 cm 04/07/22 1452   Wound Width (cm) 0 2 cm 04/07/22 1452   Wound Depth (cm) 0 2 cm 04/07/22 1452   Wound Surface Area (cm^2) 0 06 cm^2 04/07/22 1452   Wound Volume (cm^3) 0 012 cm^3 04/07/22 1452   Calculated Wound Volume (cm^3) 0 01 cm^3 04/07/22 1452   Undermining 0 3 04/07/22 1452   Undermining is depth extending from 10-1 04/07/22 1452   Drainage Amount Small 04/07/22 1452   Drainage Description Bloody 04/07/22 1452   Non-staged Wound Description Full thickness 04/07/22 1452   Patient Tolerance Tolerated well 04/07/22 1452   Dressing Status Removed 04/07/22 1452       Wound 02/17/22 Pressure Injury Leg Right;Lateral;Lower (Active)   Date First Assessed: 02/17/22   Primary Wound Type: Pressure Injury  Location: Leg  Wound Location Orientation: Right;Lateral;Lower       Wound 02/17/22 Pressure Injury Leg Left;Lateral;Lower; Posterior (Active)   Date First Assessed: 02/17/22   Primary Wound Type: Pressure Injury  Location: Leg  Wound Location Orientation: Left;Lateral;Lower; Posterior       Wound 04/07/22 Venous Ulcer Pretibial Left;Proximal (Active)   Date First Assessed/Time First Assessed: 04/07/22 1452   Primary Wound Type: Venous Ulcer  Location: Pretibial  Wound Location Orientation: Left;Proximal       [REMOVED] Wound 09/06/19 Groin Bilateral (Removed)   Resolved Date: 02/17/22  Date First Assessed/Time First Assessed: 09/06/19 1653   Location: Groin  Wound Location Orientation: Bilateral  Wound Description (Comments): kerlix soaked with betadine   Incision's 1st Dressing: KERLIX (x2), DRESSING SPONGE    [REMOVED] Wound 09/07/19 Groin Left (Removed)   Resolved Date: 02/17/22  Date First Assessed/Time First Assessed: 09/07/19 0945   Location: Groin  Wound Location Orientation: Left  Wound Description (Comments): mesh underwear   Incision's 1st Dressing: KERLIX (x2), DRESSING SPONGE HEAVY (x2)       [REMOVED] Wound 09/10/19 Other (Comment) Buttocks Left (Removed)   Resolved Date: 02/17/22  Date First Assessed/Time First Assessed: 09/10/19 0730   Pre-Existing Wound: Yes  Traumatic Wound Type:  Other (Comment)  Location: Buttocks  Wound Location Orientation: Left  Wound Description (Comments): maceration       [REMOVED] Wound 09/17/19 Abdomen Left (Removed)   Resolved Date: 02/17/22  Date First Assessed/Time First Assessed: 09/17/19 1555   Location: (c) Abdomen  Wound Location Orientation: Left       [REMOVED] Wound 10/20/19 Groin (Removed)   Resolved Date: 02/17/22  Date First Assessed/Time First Assessed: 10/20/19 0900   Pre-Existing Wound: Yes  Location: Groin       [REMOVED] Wound 10/29/21 Thigh Posterior;Proximal;Left (Removed)   Resolved Date: 02/17/22  Date First Assessed/Time First Assessed: 10/29/21 1513   Location: Thigh  Wound Location Orientation: Posterior;Proximal;Left       [REMOVED] Wound 10/29/21 Thigh Posterior;Proximal;Right (Removed)   Resolved Date: 02/17/22  Date First Assessed/Time First Assessed: 10/29/21 1518   Location: Thigh  Wound Location Orientation: Posterior;Proximal;Right       [REMOVED] Wound 11/22/21 Pretibial Right (Removed)   Resolved Date: 02/17/22  Date First Assessed/Time First Assessed: 11/22/21 1810   Location: Pretibial  Wound Location Orientation: Right       [REMOVED] Wound 11/22/21 Pretibial Left;Lateral (Removed)   Resolved Date: 02/17/22  Date First Assessed/Time First Assessed: 11/22/21 1817   Location: Pretibial  Wound Location Orientation: Left;Lateral       [REMOVED] Wound 11/22/21 Toe (Comment  which one) Anterior; Left (Removed)   Resolved Date: 02/17/22  Date First Assessed/Time First Assessed: 11/22/21 1840   Location: Toe (Comment  which one)  Wound Location Orientation: Anterior; Left       [REMOVED] Wound 11/22/21 Toe (Comment  which one) Anterior;Right (Removed)   Resolved Date: 02/17/22  Date First Assessed/Time First Assessed: 11/22/21 1841   Location: Toe (Comment  which one)  Wound Location Orientation: Anterior;Right       [REMOVED] Wound 03/03/22 Pressure Injury Tibial Left;Proximal;Lateral (Removed)   Resolved Date/Resolved Time: 03/31/22 1046  Date First Assessed: 03/03/22   Primary Wound Type: Pressure Injury  Location: Tibial  Wound Location Orientation: Left;Proximal;Lateral  Wound Outcome: Healed       Subjective:        Patient is a patient of Dr Vincenzo Cowan who presents for followup of bilateral LE ulcers    Has had antimicrobial endoform and hydrofera blue on the wounds and Unna boot for compression      The following portions of the patient's history were reviewed and updated as appropriate:   He  has a past medical history of Acute kidney injury (10/28/2021), Anemia (09/13/2019), Cellulitis, Cellulitis of left lower extremity, Cough (10/20/2019), Diabetes mellitus ([place] [address] ), Disease of thyroid gland, Edema, Elevated liver enzymes, Elevated serum creatinine (11/19/2021), Esophageal reflux, Gluten intolerance, Gout, Hyperglycemia, Hypertension, IBS (irritable bowel syndrome), Insomnia, Obesity, Osteoarthritis of knee, Shortness of breath (5/3/2021), Venous insufficiency, and Villonodular synovitis of the hand, right  He   Patient Active Problem List    Diagnosis Date Noted    Pressure injury of right leg, stage 3 ([place] [address] ) 02/17/2022    Ulcer of left lower extremity, limited to breakdown of skin ([place] [address] ) 02/14/2022    Onychomycosis 11/25/2021    Migraine headache 11/23/2021    Cellulitis of right lower extremity 11/19/2021    Morbid obesity with BMI of 70 and over, adult ([place] [address] ) 11/17/2021    Fungal infection 08/16/2021    DAHIANA (obstructive sleep apnea)     Elevated CO2 level 01/14/2021    Abnormal thyroid function test 01/14/2021    Bilateral leg edema 12/22/2020    Erectile dysfunction 12/17/2020    Scrotal swelling 05/19/2020    Insomnia 11/28/2019    Diabetic neuropathy ([place] [address] ) 10/08/2019    Gluten intolerance 09/12/2019    Hyponatremia 09/06/2019    Gout 09/20/2018    Essential hypertension 09/20/2018    Venous insufficiency 12/10/2015    Hyperlipidemia 10/09/2013    Type 2 diabetes mellitus with hyperglycemia, with long-term current use of insulin ([place] [address] ) 05/30/2013    Psoriasis 05/20/2013     He  reports that he has never smoked  He has never used smokeless tobacco  He reports previous alcohol use  He reports that he does not use drugs    Current Outpatient Medications   Medication Sig Dispense Refill    acetaminophen (TYLENOL) 325 mg tablet Take 2 tablets (650 mg total) by mouth every 4 (four) hours as needed for mild pain, headaches or fever  0    albuterol (PROVENTIL HFA,VENTOLIN HFA) 90 mcg/act inhaler TAKE 2 PUFFS BY MOUTH EVERY 6 HOURS AS NEEDED FOR WHEEZE 8 5 g 0    BD Pen Needle Ludmila 2nd Gen 32G X 4 MM MISC USE 4 TIMES A  each 3    Blood Glucose Monitoring Suppl (ONETOUCH VERIO) w/Device KIT Use to test sugar daily (Patient not taking: Reported on 10/28/2021) 1 kit 0    Calcium Alginate (Maxorb II Alginate Dressing) MISC Apply 1 each topically in the morning 10 each 2    Continuous Blood Gluc  (FreeStyle Connell 2 Giddings Systm) BROOKS Use 1 Device as needed (use with sensors for bs checks) 1 Device 0    Continuous Blood Gluc Sensor (FreeStyle Elvin 14 Day Sensor) MISC USE ONE SENSOR EVERY 2 WEEKS 2 each 3    Control Gel Formula Dressing (DuoDERM CGF Dressing) [facility] Apply 1 application topically every 5 (five) days 5 each 1    [employer] Diclofenac Sodium 1 % APPLY 4 G TOPICALLY 4 (FOUR) TIMES A DAY AS NEEDED (JOINT PAIN) 50 g 0    furosemide (LASIX) 20 mg tablet TAKE 1 TABLET BY MOUTH TWICE A  tablet 1    gabapentin (NEURONTIN) 100 mg capsule Take 1 capsule (100 mg total) by mouth 2 (two) times a day Take 1 tab with your 300mg tab twice daily 180 capsule 2    gabapentin (NEURONTIN) 300 mg capsule Take 1 capsule (300 mg total) by mouth 2 (two) times a day Take 1 tab with your 100mg tab twice daily 180 capsule 2    Gauze Pads & Dressings 5"X5" PADS Use in the morning 20 each 2    insulin aspart (NovoLOG FlexPen) 100 UNIT/ML injection pen INJECT 18 UNITS WITH MEALS+SCALE ( - 150-200 -2 UNITS, 201-250-4 UNITS, 251-300 -6 UNITS, 301-350-8 UNITS, > 350- 10 UNITS ) 45 mL 1    Lancets (ONETOUCH ULTRASOFT) lancets Use to test sugar 2 times a day 100 each 3    Lantus SoloStar 100 units/mL injection pen INJECT 46 UNITS UNDER THE SKIN DAILY AT BEDTIME INJECT 48 UNITS UNDER THE SKIN EVERY BEDTIME 15 mL 0    losartan (COZAAR) 25 mg tablet TAKE 1 TABLET BY MOUTH EVERY DAY 90 tablet 2    metFORMIN (GLUCOPHAGE) 1000 MG tablet TAKE ONE TABLET BY MOUTH TWICE DAILY 180 tablet 3    nystatin (MYCOSTATIN) cream Apply topically 2 (two) times a day as needed (itching, redness) 30 g 1    nystatin (MYCOSTATIN) powder Apply 1 application topically 2 (two) times a day To affected area 120 g 3    omeprazole (PriLOSEC) 40 MG capsule Take 1 capsule (40 mg total) by mouth 2 (two) times a day 180 capsule 1    OneTouch Verio test strip USE TO TEST SUGAR 2 TIMES A  strip 3    oxyCODONE (ROXICODONE) 5 immediate release tablet Take 1-2 tabs by mouth every 6 hours as needed for moderate or severe pains respectively  15 tablet 0    psyllium (METAMUCIL) packet Take 1 packet by mouth daily  0    saccharomyces boulardii (FLORASTOR) 250 mg capsule Take 1 capsule (250 mg total) by mouth 2 (two) times a day 60 capsule 1    Skin Protectants, Misc  ([employer] Textile 18"S318") SHEE Apply 1 each topically every 5 (five) days 3 each 5    sodium hypochlorite (DAKIN'S QUARTER-STRENGTH) Irrigate with 1 application as directed daily 473 mL 0     No current facility-administered medications for this visit  He is allergic to gluten meal - food allergy and clindamycin       Review of Systems   Constitutional: Negative for chills and fever  HENT: Negative for congestion and sneezing  Respiratory: Negative for cough  Cardiovascular: Positive for leg swelling  Musculoskeletal: Positive for gait problem  Skin: Positive for wound  Psychiatric/Behavioral: Negative for agitation  Objective:       Wound 02/17/22 Pressure Injury Leg Right;Lateral;Lower (Active)   Wound Description Beefy red;Slough; Yellow 04/07/22 1412   Chelsie-wound Assessment Hyperpigmented;Scar Tissue 04/07/22 1412   Wound Length (cm) 3 1 cm 04/07/22 1412   Wound Width (cm) 2 1 cm 04/07/22 1412   Wound Depth (cm) 0 1 cm 04/07/22 1412   Wound Surface Area (cm^2) 6 51 cm^2 04/07/22 1412   Wound Volume (cm^3) 0 651 cm^3 04/07/22 1412   Calculated Wound Volume (cm^3) 0 65 cm^3 04/07/22 1412   Change in Wound Size % 32 29 04/07/22 1412   Drainage Amount Moderate 04/07/22 1412   Drainage Description Serosanguineous 04/07/22 1412   Patient Tolerance Tolerated well 04/07/22 1412   Dressing Status Removed 04/07/22 1412       Wound 02/17/22 Pressure Injury Leg Left;Lateral;Lower; Posterior (Active)   Wound Description Epithelialization;Pink 04/07/22 1414   Chelsie-wound Assessment Hyperpigmented;Scar Tissue 04/07/22 1414   Wound Length (cm) 1 5 cm 04/07/22 1414   Wound Width (cm) 1 1 cm 04/07/22 1414   Wound Depth (cm) 0 1 cm 04/07/22 1414   Wound Surface Area (cm^2) 1 65 cm^2 04/07/22 1414   Wound Volume (cm^3) 0 165 cm^3 04/07/22 1414   Calculated Wound Volume (cm^3) 0 17 cm^3 04/07/22 1414   Change in Wound Size % 92 41 04/07/22 1414   Drainage Amount Small 04/07/22 1414   Drainage Description Serosanguineous 04/07/22 1414   Patient Tolerance Tolerated well 04/07/22 1414   Dressing Status Removed 04/07/22 1414       Wound 04/07/22 Venous Ulcer Pretibial Left;Proximal (Active)   Wound Description Bleeding 04/07/22 1452   Chelsie-wound Assessment Edema 04/07/22 1452   Wound Length (cm) 0 3 cm 04/07/22 1452   Wound Width (cm) 0 2 cm 04/07/22 1452   Wound Depth (cm) 0 2 cm 04/07/22 1452   Wound Surface Area (cm^2) 0 06 cm^2 04/07/22 1452   Wound Volume (cm^3) 0 012 cm^3 04/07/22 1452   Calculated Wound Volume (cm^3) 0 01 cm^3 04/07/22 1452   Undermining 0 3 04/07/22 1452   Undermining is depth extending from 10-1 04/07/22 1452   Drainage Amount Small 04/07/22 1452   Drainage Description Bloody 04/07/22 1452   Non-staged Wound Description Full thickness 04/07/22 1452   Patient Tolerance Tolerated well 04/07/22 1452   Dressing Status Removed 04/07/22 1452       /78   Pulse (!) 108   Temp 97 9 °F (36 6 °C)   Resp (!) 24     Physical Exam  Constitutional:       General: He is not in acute distress  Appearance: Normal appearance  He is morbidly obese  He is not ill-appearing, toxic-appearing or diaphoretic  HENT:      Head: Normocephalic and atraumatic        Right Ear: External ear normal       Left Ear: External ear normal    Eyes:      Conjunctiva/sclera: Conjunctivae normal  Pulmonary:      Effort: Pulmonary effort is normal  No respiratory distress  Musculoskeletal:      Cervical back: Neck supple  Right lower leg: Edema present  Left lower leg: Edema present  Skin:     Comments: See wound assessment   Neurological:      Mental Status: He is alert  Gait: Gait abnormal (Uses walker)  Psychiatric:         Mood and Affect: Mood normal          Behavior: Behavior normal            Wound Instructions:  Orders Placed This Encounter   Procedures    Debridement     This order was created via procedure documentation    Debridement     This order was created via procedure documentation        Diagnosis ICD-10-CM Associated Orders   1  Pressure injury of left leg, stage 3 ([facility])  L89 893 lidocaine (LMX) 4 % cream     Debridement   2  Pressure injury of right leg, stage 3 ([facility])  L89 893 lidocaine (LMX) 4 % cream     Debridement   3  Morbid obesity with BMI of 70 and over, adult ([place] [address] )  E66 01     Z68 45    4   Chronic venous hypertension (idiopathic) with ulcer of left lower extremity ([facility] )  I87 312     L97 923

## 2022-04-07 NOTE — PATIENT INSTRUCTIONS
Orders Placed This Encounter   Procedures    Wound cleansing and dressings     Wound cleansing and dressings  B/L lower legs:      Wash your hands with soap and water  Remove old dressing, discard into plastic bag and place in trash  Cleanse the wound with mild soap and water prior to applying a clean dressing  PLEASE WASH LEGS WITH SOAP AND WATER PRIOR TO DRESSING CHANGES   Do not use tissue or cotton balls  Do not scrub the wound  Pat dry using gauze  Shower yes with protection, purchase cast cover  Or sponge bathe       Apply Lac Hydrin to B/L lower legs  Apply skin prep to the periwound  Apply endoform AM to wound beds (sent with patient)  Apply moistened hydrofera blue over endoform  MUST USE FULL PIECE  DO NOT CUT  Cover with ABD pad  Secure with [employer] and [employer]  Make sure Unna boots are high enough so they do not slide down       Change dressing 2x a week (home care nurse change Sunday or Mon and Wound center Thurs) and as needed if soiled or lose integrity       Follow up at the wound center in one week  Today's wound treatment note:  Cleansed and dressed as above       Standing Status:   Future     Standing Expiration Date:   4/7/2023

## 2022-04-14 ENCOUNTER — OFFICE VISIT (OUTPATIENT)
Dept: WOUND CARE | Facility: CLINIC | Age: 57
End: 2022-04-14
Payer: MEDICARE

## 2022-04-14 VITALS
DIASTOLIC BLOOD PRESSURE: 92 MMHG | SYSTOLIC BLOOD PRESSURE: 140 MMHG | TEMPERATURE: 98 F | RESPIRATION RATE: 22 BRPM | HEART RATE: 82 BPM

## 2022-04-14 DIAGNOSIS — I89.0 LYMPHEDEMA OF BOTH LOWER EXTREMITIES: ICD-10-CM

## 2022-04-14 DIAGNOSIS — L89.893 PRESSURE INJURY OF LEFT LEG, STAGE 3 (HCC): ICD-10-CM

## 2022-04-14 DIAGNOSIS — L89.893 PRESSURE INJURY OF RIGHT LEG, STAGE 3 (HCC): Primary | ICD-10-CM

## 2022-04-14 DIAGNOSIS — E66.01 MORBID OBESITY WITH BMI OF 70 AND OVER, ADULT (HCC): ICD-10-CM

## 2022-04-14 PROCEDURE — 29580 STRAPPING UNNA BOOT: CPT

## 2022-04-14 PROCEDURE — 99213 OFFICE O/P EST LOW 20 MIN: CPT | Performed by: FAMILY MEDICINE

## 2022-04-14 PROCEDURE — 11042 DBRDMT SUBQ TIS 1ST 20SQCM/<: CPT | Performed by: FAMILY MEDICINE

## 2022-04-14 RX ORDER — LIDOCAINE HYDROCHLORIDE 40 MG/ML
5 SOLUTION TOPICAL ONCE
Status: COMPLETED | OUTPATIENT
Start: 2022-04-14 | End: 2022-04-14

## 2022-04-14 RX ADMIN — LIDOCAINE HYDROCHLORIDE 5 ML: 40 SOLUTION TOPICAL at 11:32

## 2022-04-14 NOTE — PROGRESS NOTES
Patient ID: Jessy Espinoza is a 49 y o  male Date of Birth 1965       Chief Complaint   Patient presents with    Follow Up Wound Care Visit     BLE wounds       Allergies:  Gluten meal - food allergy and Clindamycin    Diagnosis:   Diagnosis ICD-10-CM Associated Orders   1  Pressure injury of right leg, stage 3 ([facility])  L89 893 lidocaine (XYLOCAINE) 4 % topical solution 5 mL     Wound cleansing and dressings     Debridement   2  Pressure injury of left leg, stage 3 ([facility])  L89 893 lidocaine (XYLOCAINE) 4 % topical solution 5 mL     Wound cleansing and dressings   3  Morbid obesity with BMI of 70 and over, adult ([facility] )  E66 01     Z68 45    4  Lymphedema of both lower extremities  I89 0          Assessment  & Plan:     Left lateral calf pressure injury stage III is almost closed  Non bordered foam and Unna boot   Right lateral calf pressure injury stage III unchanged  Surgical debridement  Unna boot   Relieve pressure by various modalities which he is already aware of    Morbid obesity  Lymphedema  Subjective:   2/17/22: This is a 61-year-old male who comes in [address] [place] [address] with ulcers on the right and left calfs  He states that he was hospitalized on November of 2021 with cellulitis and he had the ulcers at that time  Patient is morbidly obese and has history of lymphedema as well  He does not use any form of compression other than Ace wraps  He had purchased alginate on the Internet as well as bordered foam is  He notes that there is heavy drainage requiring dressings to be changed once or twice a day  He believes that these ulcers had started due to  sleeping in a recliner and the foot rest causing pressure on the calfs  He is unable to sleep in a bed  He notes he has increased pain at nighttime when in there is recliner with pressure on his calf  He is ambulatory with a walker but does not go out of the house other than to doctor visits    Patient also has a history of type 2 diabetes with good control with last A1c 6 5 in October of 2021  He states that he has lost approximately 120 lb in the past nine months with a special meal plan  He plans on continuing with weight loss  2/24/22: Followup stage III pressure injuries of right and left calfs  They purchased memory foam pillows which seems to help with both pain and offloading the areas when he is sleeping  Still has pain mainly at night  Left greater than right  3/3/22: Followup stage III pressure injuries of the right left calfs  Patient is upset about visiting nurse not having proper supplies  Then there was leakage through the [employer]  Still has pain at nighttime  No fever chills  3/10/22: Followup stage III pressure injuries of the right and left calfs  Patient states that the visiting nurse had a "observer" do the Unna boot on his right leg which slid down  Still has pain mainly in the left calf  They are trying various offloading techniques  3/17/22: Followup stage III pressure injuries of the right and left calfs  Patient states he still has pain but slightly less particularly on the left  Starting to have some itching  No fever or chills  Still has not found the proper offloading device  3/24/22: Followup stage III pressure injuries of both calfs continues to have some pain as in the past   No fever or chills  Trying to offload when in bed     3/31/22: Followup stage III pressure injuries of both lateral calf  Continues to have the same amount of pain  States he is trying offload  No fever chills  4/7/22:  Patient is a patient of Dr Stacie Mills who presents for followup of bilateral LE ulcers  Has had antimicrobial endoform and hydrofera blue on the wounds and Unna boot for compression    4/14/22: Followup stage III pressure injuries of both lateral calfs  Unna boots  Has no pain on the left calf anymore  Much less on the right  No fever chills              The following portions of the patient's history were reviewed and updated as appropriate:   Patient Active Problem List   Diagnosis    Type 2 diabetes mellitus with hyperglycemia, with long-term current use of insulin ([facility] )    Hyperlipidemia    Psoriasis    Venous insufficiency    Gout    Essential hypertension    Hyponatremia    Gluten intolerance    Diabetic neuropathy (HCC)    Insomnia    Scrotal swelling    Erectile dysfunction    Bilateral leg edema    Elevated CO2 level    Abnormal thyroid function test    DAHIANA (obstructive sleep apnea)    Fungal infection    Morbid obesity with BMI of 70 and over, adult ([facility] )    Cellulitis of right lower extremity    Migraine headache    Onychomycosis    Ulcer of left lower extremity, limited to breakdown of skin ([address] )    Pressure injury of right leg, stage 3 ([facility] )     Past Medical History:   Diagnosis Date    Acute kidney injury 10/28/2021    Anemia 09/13/2019    Cellulitis     last assessed 12/10/15    Cellulitis of left lower extremity     Cough 10/20/2019    Diabetes mellitus ([facility] )     Disease of thyroid gland     Edema     Elevated liver enzymes     Elevated serum creatinine 11/19/2021    Esophageal reflux     Gluten intolerance     Gout     last assessed 09/05/13    Hyperglycemia     Hypertension     IBS (irritable bowel syndrome)     Insomnia     Obesity     Osteoarthritis of knee     last assessed 02/10/14    Shortness of breath 5/3/2021    Venous insufficiency     last assessed 08/22/17    Villonodular synovitis of the hand, right     last assessed 11/14/2013     Past Surgical History:   Procedure Laterality Date    INCISION AND DRAINAGE OF WOUND Left 9/6/2019    Procedure: INCISION AND DRAINAGE (I&D) GROIN;  Surgeon: Jordan Meraz DO;  [facility];  Service: General    SKIN BIOPSY      WOUND DEBRIDEMENT Left 9/7/2019    Procedure: EXCISIONAL DEBRIDEMENT;  Surgeon: Jordan Meraz DO;  [facility];  Service: General     Family History   Problem Relation Age of Onset    Cancer Mother         gastrc    Colon cancer Father     Heart failure Father      Social History     Socioeconomic History    Marital status: /Civil Union     Spouse name: Not on file    Number of children: Not on file    Years of education: Not on file    Highest education level: Not on file   Occupational History    Not on file   Tobacco Use    Smoking status: Never Smoker    Smokeless tobacco: Never Used   Vaping Use    Vaping Use: Never used   Substance and Sexual Activity    Alcohol use: Not Currently     Alcohol/week: 0 0 standard drinks    Drug use: No    Sexual activity: Yes   Other Topics Concern    Not on file   Social History Narrative    Not on file     Social Determinants of Health     Financial Resource Strain: Not on file   Food Insecurity: Not on file   Transportation Needs: No Transportation Needs    Lack of Transportation (Medical): No    Lack of Transportation (Non-Medical):  No   Physical Activity: Not on file   Stress: Not on file   Social Connections: Not on file   Intimate Partner Violence: Not on file   Housing Stability: Not on file       Current Outpatient Medications:     acetaminophen (TYLENOL) 325 mg tablet, Take 2 tablets (650 mg total) by mouth every 4 (four) hours as needed for mild pain, headaches or fever, Disp: , Rfl: 0    albuterol (PROVENTIL HFA,VENTOLIN HFA) 90 mcg/act inhaler, TAKE 2 PUFFS BY MOUTH EVERY 6 HOURS AS NEEDED FOR WHEEZE, Disp: 8 5 g, Rfl: 0    BD Pen Needle Ludmila 2nd Gen 32G X 4 MM MISC, USE 4 TIMES A DAY, Disp: 200 each, Rfl: 3    Blood Glucose Monitoring Suppl (ONETOUCH VERIO) w/Device KIT, Use to test sugar daily (Patient not taking: Reported on 10/28/2021), Disp: 1 kit, Rfl: 0    Calcium Alginate (Maxorb II Alginate Dressing) MISC, Apply 1 each topically in the morning, Disp: 10 each, Rfl: 2    Continuous Blood Gluc  (FreeStyle Connell 2 [place] Systm) BROOKS, Use 1 Device as needed (use with sensors for bs checks), Disp: 1 Device, Rfl: 0    Continuous Blood Gluc Sensor (FreeStyle Elvin 14 Day Sensor) MISC, USE ONE SENSOR EVERY 2 WEEKS, Disp: 2 each, Rfl: 3    Control Gel Formula Dressing (DuoDERM CGF Dressing) [facility], Apply 1 application topically every 5 (five) days, Disp: 5 each, Rfl: 1    [employer] Diclofenac Sodium 1 %, APPLY 4 G TOPICALLY 4 (FOUR) TIMES A DAY AS NEEDED (JOINT PAIN), Disp: 50 g, Rfl: 0    furosemide (LASIX) 20 mg tablet, TAKE 1 TABLET BY MOUTH TWICE A DAY, Disp: 180 tablet, Rfl: 1    gabapentin (NEURONTIN) 100 mg capsule, Take 1 capsule (100 mg total) by mouth 2 (two) times a day Take 1 tab with your 300mg tab twice daily, Disp: 180 capsule, Rfl: 2    gabapentin (NEURONTIN) 300 mg capsule, Take 1 capsule (300 mg total) by mouth 2 (two) times a day Take 1 tab with your 100mg tab twice daily, Disp: 180 capsule, Rfl: 2    Gauze Pads & Dressings 5"X5" PADS, Use in the morning, Disp: 20 each, Rfl: 2    insulin aspart (NovoLOG FlexPen) 100 UNIT/ML injection pen, INJECT 18 UNITS WITH MEALS+SCALE ( - 150-200 -2 UNITS, 201-250-4 UNITS, 251-300 -6 UNITS, 301-350-8 UNITS, > 350- 10 UNITS ), Disp: 45 mL, Rfl: 1    Lancets (ONETOUCH ULTRASOFT) lancets, Use to test sugar 2 times a day, Disp: 100 each, Rfl: 3    Lantus SoloStar 100 units/mL injection pen, INJECT 46 UNITS UNDER THE SKIN DAILY AT BEDTIME INJECT 48 UNITS UNDER THE SKIN EVERY BEDTIME, Disp: 15 mL, Rfl: 0    losartan (COZAAR) 25 mg tablet, TAKE 1 TABLET BY MOUTH EVERY DAY, Disp: 90 tablet, Rfl: 2    metFORMIN (GLUCOPHAGE) 1000 MG tablet, TAKE ONE TABLET BY MOUTH TWICE DAILY, Disp: 180 tablet, Rfl: 3    nystatin (MYCOSTATIN) cream, Apply topically 2 (two) times a day as needed (itching, redness), Disp: 30 g, Rfl: 1    nystatin (MYCOSTATIN) powder, Apply 1 application topically 2 (two) times a day To affected area, Disp: 120 g, Rfl: 3    omeprazole (PriLOSEC) 40 MG capsule, Take 1 capsule (40 mg total) by mouth 2 (two) times a day, Disp: 180 capsule, Rfl: 1    OneTouch Verio test strip, USE TO TEST SUGAR 2 TIMES A DAY, Disp: 100 strip, Rfl: 3    oxyCODONE (ROXICODONE) 5 immediate release tablet, Take 1-2 tabs by mouth every 6 hours as needed for moderate or severe pains respectively  , Disp: 15 tablet, Rfl: 0    psyllium (METAMUCIL) packet, Take 1 packet by mouth daily, Disp: , Rfl: 0    saccharomyces boulardii (FLORASTOR) 250 mg capsule, Take 1 capsule (250 mg total) by mouth 2 (two) times a day, Disp: 60 capsule, Rfl: 1    Skin Protectants, Misc  (Open[employer] Textile 10"x144") SHEE, Apply 1 each topically every 5 (five) days, Disp: 3 each, Rfl: 5    sodium hypochlorite (DAKIN'S QUARTER-STRENGTH), Irrigate with 1 application as directed daily, Disp: 473 mL, Rfl: 0  No current facility-administered medications for this visit  Review of Systems   Constitutional: Negative for appetite change, chills, fatigue, fever and unexpected weight change  HENT: Negative for congestion, hearing loss, postnasal drip and sinus pressure  Respiratory: Positive for shortness of breath (On exertion)  Negative for cough  Cardiovascular: Positive for leg swelling (Lymphedema)  Endocrine: Negative  Genitourinary: Positive for urgency  Musculoskeletal: Positive for gait problem Courtney Nadiya)  Negative for back pain  Skin: Positive for wound (Both lower extremities)  Negative for rash  Allergic/Immunologic: Negative  Neurological: Positive for numbness  Hematological: Does not bruise/bleed easily  Psychiatric/Behavioral: Negative  Negative for dysphoric mood  The patient is not nervous/anxious  Objective:  /92   Pulse 82   Temp 98 °F (36 7 °C)   Resp 22   Pain Score:   5     Physical Exam  Vitals and nursing note reviewed  Constitutional:       Appearance: Normal appearance  He is well-developed  He is morbidly obese  HENT:      Head: Normocephalic and atraumatic     Cardiovascular:      Rate and Rhythm: Normal rate    Pulmonary:      Effort: Pulmonary effort is normal    Skin:     General: Skin is warm and dry  Findings: Wound present  No erythema  Comments: Stage III pressure injuries of both lateral calfs  Left calf with only three pinpoint open areas  Overall improved  Right calf with fibrin and slough  No improvement  Neurological:      Mental Status: He is alert and oriented to person, place, and time  Psychiatric:         Attention and Perception: Attention normal          Mood and Affect: Mood and affect normal          Behavior: Behavior is cooperative  Cognition and Memory: Cognition normal                  Wound 02/17/22 Pressure Injury Leg Right;Lateral;Lower (Active)   Wound Image Images linked 04/14/22 1142   Wound Description Beefy red;Slough; Yellow; White 04/14/22 1124   Chelsie-wound Assessment Hyperpigmented;Scar Tissue 04/14/22 1124   Wound Length (cm) 3 4 cm 04/14/22 1124   Wound Width (cm) 2 cm 04/14/22 1124   Wound Depth (cm) 0 1 cm 04/14/22 1124   Wound Surface Area (cm^2) 6 8 cm^2 04/14/22 1124   Wound Volume (cm^3) 0 68 cm^3 04/14/22 1124   Calculated Wound Volume (cm^3) 0 68 cm^3 04/14/22 1124   Change in Wound Size % 29 17 04/14/22 1124   Drainage Amount Moderate 04/14/22 1124   Drainage Description Serosanguineous 04/14/22 1124   Non-staged Wound Description Full thickness 04/14/22 1124   Dressing Status Removed (upon arrival) 04/14/22 1124       Wound 02/17/22 Pressure Injury Leg Left;Lateral;Lower; Posterior (Active)   Wound Image Images linked 04/14/22 1129   Wound Description Epithelialization;Pink 04/14/22 1128   Chelsie-wound Assessment Hyperpigmented;Scar Tissue 04/14/22 1128   Wound Length (cm) 0 3 cm 04/14/22 1128   Wound Width (cm) 0 2 cm 04/14/22 1128   Wound Depth (cm) 0 1 cm 04/14/22 1128   Wound Surface Area (cm^2) 0 06 cm^2 04/14/22 1128   Wound Volume (cm^3) 0 006 cm^3 04/14/22 1128   Calculated Wound Volume (cm^3) 0 01 cm^3 04/14/22 1128   Change in Wound Size % 99 55 04/14/22 1128   Drainage Amount Small 04/14/22 1128   Drainage Description Serosanguineous 04/14/22 1128   Non-staged Wound Description Full thickness 04/14/22 1128   Dressing Status Removed 04/14/22 1128       Wound 04/07/22 Venous Ulcer Pretibial Left;Proximal (Active)   Wound Image Images linked 04/14/22 1126   Wound Description Epithelialization;Pink;Slough; White 04/14/22 1126   Chelsie-wound Assessment Edema 04/14/22 1126   Wound Length (cm) 0 6 cm 04/14/22 1126   Wound Width (cm) 0 5 cm 04/14/22 1126   Wound Depth (cm) 0 1 cm 04/14/22 1126   Wound Surface Area (cm^2) 0 3 cm^2 04/14/22 1126   Wound Volume (cm^3) 0 03 cm^3 04/14/22 1126   Calculated Wound Volume (cm^3) 0 03 cm^3 04/14/22 1126   Change in Wound Size % -200 04/14/22 1126   Drainage Amount Small 04/14/22 1126   Drainage Description Bloody 04/14/22 1126   Non-staged Wound Description Full thickness 04/14/22 1126   Dressing Status Removed 04/14/22 1126       Debridement   Wound 02/17/22 Pressure Injury Leg Right;Lateral;Lower    Universal Protocol:  Consent: Verbal consent obtained  Written consent obtained  Consent given by: patient  Time out: Immediately prior to procedure a "time out" was called to verify the correct patient, procedure, equipment, support staff and site/side marked as required    Patient understanding: patient states understanding of the procedure being performed  Patient identity confirmed: verbally with patient      Performed by: physician  Debridement type: surgical  Level of debridement: subcutaneous tissue  Pain control: lidocaine 4%  Post-debridement measurements  Length (cm): 3 4  Width (cm): 2  Depth (cm): 0 2  Percent debrided: 100%  Surface Area (cm^2): 6 8  Area debrided (cm^2): 6 8  Volume (cm^3): 1 36  Tissue and other material debrided: dermis and subcutaneous tissue  Devitalized tissue debrided: fibrin and slough  Instrument(s) utilized: curette  Bleeding: medium  Hemostasis obtained with: pressure  Procedural pain (0-10): 0  Post-procedural pain: 0   Response to treatment: procedure was tolerated well                         Wound Instructions:  Orders Placed This Encounter   Procedures    Wound cleansing and dressings     B/L lower legs:      Wash your hands with soap and water  Remove old dressing, discard into plastic bag and place in trash  Cleanse the wound with mild soap and water prior to applying a clean dressing  PLEASE WASH LEGS WITH SOAP AND WATER PRIOR TO DRESSING CHANGES   Do not use tissue or cotton balls  Do not scrub the wound  Pat dry using gauze  Shower yes with protection, purchase cast cover  Or sponge bathe     RIGHT LOWER LEG  Apply Lac Hydrin to B/L lower legs  Apply skin prep to the periwound  Apply endoform AM to wound beds (sent with patient)  Apply moistened hydrofera blue over endoform  MUST USE FULL PIECE  DO NOT CUT  Cover with ABD pad  Secure with [employer] and [employer]  Make sure Unna boots are high enough so they do not slide down  LEFT LOWER LEG:   As above but apply non adherent pad such as polymem over wounds (cut piece large enough to cover open areas and periwound)     Change dressing 2x a week (home care nurse change Sunday or Mon and Wound center Thurs) and as needed if soiled or lose integrity       Follow up at the wound center in one week  Today's wound treatment note:  Cleansed and dressed as above            Standing Status:   Future     Standing Expiration Date:   4/14/2023    Debridement     This order was created via procedure documentation       Mare Ling MD, CHT, CWS       Portions of the record may have been created with voice recognition software  Occasional wrong word or "sound alike" substitutions may have occurred due to the inherent limitations of voice recognition software  Read the chart carefully and recognize, using context, where substitutions have occurred

## 2022-04-14 NOTE — PATIENT INSTRUCTIONS
Orders Placed This Encounter   Procedures    Wound cleansing and dressings     B/L lower legs:      Wash your hands with soap and water  Remove old dressing, discard into plastic bag and place in trash  Cleanse the wound with mild soap and water prior to applying a clean dressing  PLEASE WASH LEGS WITH SOAP AND WATER PRIOR TO DRESSING CHANGES   Do not use tissue or cotton balls  Do not scrub the wound  Pat dry using gauze  Shower yes with protection, purchase cast cover  Or sponge bathe     RIGHT LOWER LEG  Apply Lac Hydrin to B/L lower legs  Apply skin prep to the periwound  Apply endoform AM to wound beds (sent with patient)  Apply moistened hydrofera blue over endoform  MUST USE FULL PIECE  DO NOT CUT  Cover with ABD pad  Secure with [email or web address] and [employer]  Make sure Unna boots are high enough so they do not slide down  LEFT LOWER LEG:   As above but apply non adherent pad such as polymem over wounds (cut piece large enough to cover open areas and periwound)     Change dressing 2x a week (home care nurse change Sunday or Mon and Wound center Thurs) and as needed if soiled or lose integrity       Follow up at the wound center in one week    Today's wound treatment note:  Cleansed and dressed as above            Standing Status:   Future     Standing Expiration Date:   4/14/2023

## 2022-04-14 NOTE — PROGRESS NOTES
Cast Application    Date/Time: 4/14/2022 1:02 PM  Performed by: Koko So RN  Authorized by: Pepito Lopes MD   Universal Protocol:  Consent: Verbal consent obtained  Consent given by: patient  Patient identity confirmed: verbally with patient      Pre-procedure details:     Sensation:  Normal  Procedure details:     Laterality:  Left    Location:  Leg    Leg:  L lower legCast type:  Unna boot      Supplies:  2 layer wrap  Post-procedure details:     Pain:  Improved    Sensation:  Normal    Patient tolerance of procedure: Tolerated well, no immediate complications  Comments:      UNNA BOOT wrap procedure BLE unna boots applied  Right leg was surgically debrided, left leg procedure only    Before application, RAY and/or TBI determined to be adequate for healing and application of compression  Lower extremity washed prior to application of compression wrap  With the foot in dorsiflexed position, Unna boot was applied as per physician orders without complications or complaint of pain  The procedure was tolerated well  Toes warm & pink post application  Patient provided education & reinforced to observe toes for any discoloration, swelling or tingling and instructed when to report to the [address] [facility] or to remove compression  Wound care as per providers orders, patient tolerated well

## 2022-04-20 DIAGNOSIS — B49 FUNGAL INFECTION: ICD-10-CM

## 2022-04-20 DIAGNOSIS — Z79.4 TYPE 2 DIABETES MELLITUS WITH HYPERGLYCEMIA, WITH LONG-TERM CURRENT USE OF INSULIN (HCC): ICD-10-CM

## 2022-04-20 DIAGNOSIS — L03.116 CELLULITIS OF LEFT LOWER EXTREMITY: ICD-10-CM

## 2022-04-20 DIAGNOSIS — E11.65 TYPE 2 DIABETES MELLITUS WITH HYPERGLYCEMIA, WITH LONG-TERM CURRENT USE OF INSULIN (HCC): ICD-10-CM

## 2022-04-20 RX ORDER — INSULIN GLARGINE 100 [IU]/ML
46 INJECTION, SOLUTION SUBCUTANEOUS
Qty: 15 ML | Refills: 0 | Status: SHIPPED | OUTPATIENT
Start: 2022-04-20 | End: 2022-06-22

## 2022-04-20 NOTE — TELEPHONE ENCOUNTER
Konstantin Lara has requested a refill of lantus solostar 100 units/mL injection pen  Pt meets the requirements placed by [facility]  Will refill medication

## 2022-04-21 ENCOUNTER — OFFICE VISIT (OUTPATIENT)
Dept: WOUND CARE | Facility: CLINIC | Age: 57
End: 2022-04-21
Payer: MEDICARE

## 2022-04-21 VITALS
HEART RATE: 88 BPM | RESPIRATION RATE: 18 BRPM | DIASTOLIC BLOOD PRESSURE: 92 MMHG | TEMPERATURE: 98 F | SYSTOLIC BLOOD PRESSURE: 172 MMHG

## 2022-04-21 DIAGNOSIS — L89.893 PRESSURE INJURY OF RIGHT LEG, STAGE 3 (HCC): Primary | ICD-10-CM

## 2022-04-21 DIAGNOSIS — L89.893 PRESSURE INJURY OF LEFT LEG, STAGE 3 (HCC): ICD-10-CM

## 2022-04-21 PROCEDURE — 97597 DBRDMT OPN WND 1ST 20 CM/<: CPT | Performed by: FAMILY MEDICINE

## 2022-04-21 RX ORDER — NYSTATIN 100000 U/G
CREAM TOPICAL 2 TIMES DAILY PRN
Qty: 30 G | Refills: 1 | Status: SHIPPED | OUTPATIENT
Start: 2022-04-21

## 2022-04-21 RX ORDER — NYSTATIN 100000 [USP'U]/G
1 POWDER TOPICAL 2 TIMES DAILY
Qty: 120 G | Refills: 3 | Status: SHIPPED | OUTPATIENT
Start: 2022-04-21

## 2022-04-21 RX ORDER — LIDOCAINE 40 MG/G
CREAM TOPICAL ONCE
Status: COMPLETED | OUTPATIENT
Start: 2022-04-21 | End: 2022-04-21

## 2022-04-21 RX ADMIN — LIDOCAINE: 40 CREAM TOPICAL at 10:45

## 2022-04-21 NOTE — PATIENT INSTRUCTIONS
Orders Placed This Encounter   Procedures    Wound cleansing and dressings     B/L lower legs:      Wash your hands with soap and water  Remove old dressing, discard into plastic bag and place in trash  Cleanse the wound with mild soap and water prior to applying a clean dressing  PLEASE WASH LEGS WITH SOAP AND WATER PRIOR TO DRESSING CHANGES   Do not use tissue or cotton balls  Do not scrub the wound  Pat dry using gauze  Shower yes with protection, purchase cast cover  Or sponge bathe       RIGHT LOWER LEG  Apply Lac Hydrin to B/L lower legs  Apply skin prep to the periwound  Apply endoform AM to wound beds (sent with patient)  Apply hydrofera blue over endoform  MUST USE FULL PIECE  DO NOT CUT  Do NOT put ABD under unna boot  Secure with [employer] and [employer]  Make sure Unna boots are high enough so they do not slide down       LEFT LOWER LEG:   As above but apply non adherent pad such as polymem over wounds (cut piece large enough to cover open areas and periwound)  Don NOT put an ABD over the dressing under the unna boot     Change dressing 2x a week (home care nurse change Sunday or Mon and Wound center Thurs) and as needed if soiled or lose integrity       Follow up at the wound center in one week  Today's wound treatment note:  Cleansed and dressed as above       Standing Status:   Future     Standing Expiration Date:   4/21/2023

## 2022-04-21 NOTE — PROGRESS NOTES
Patient ID: Rosi Gordon is a 49 y o  male Date of Birth 1965       Chief Complaint   Patient presents with    Follow Up Wound Care Visit     BLE wounds       Allergies:  Gluten meal - food allergy and Clindamycin    Diagnosis:   Diagnosis ICD-10-CM Associated Orders   1  Pressure injury of right leg, stage 3 ([facility])  L89 893 lidocaine (LMX) 4 % cream     Wound cleansing and dressings     Debridement   2  Pressure injury of left leg, stage 3 ([facility])  L89 893 lidocaine (LMX) 4 % cream     Wound cleansing and dressings     Debridement        Assessment  & Plan:     Both pressure injuries of the right left lateral calfs are improved   Both were selectively debrided of fibrin   Continue with Hydrofera and Endoform on the right and non bordered foam on the left  Bilateral Unna boots  Subjective:   2/17/22: This is a 60-year-old male who comes in [address] [place] [address] with ulcers on the right and left calfs  He states that he was hospitalized on November of 2021 with cellulitis and he had the ulcers at that time  Patient is morbidly obese and has history of lymphedema as well  He does not use any form of compression other than Ace wraps  He had purchased alginate on the Internet as well as bordered foam is  He notes that there is heavy drainage requiring dressings to be changed once or twice a day  He believes that these ulcers had started due to  sleeping in a recliner and the foot rest causing pressure on the calfs  He is unable to sleep in a bed  He notes he has increased pain at nighttime when in there is recliner with pressure on his calf  He is ambulatory with a walker but does not go out of the house other than to doctor visits  Patient also has a history of type 2 diabetes with good control with last A1c 6 5 in October of 2021  He states that he has lost approximately 120 lb in the past nine months with a special meal plan  He plans on continuing with weight loss  2/24/22:   Followup stage III pressure injuries of right and left calfs  They purchased memory foam pillows which seems to help with both pain and offloading the areas when he is sleeping  Still has pain mainly at night  Left greater than right  3/3/22: Followup stage III pressure injuries of the right left calfs  Patient is upset about visiting nurse not having proper supplies  Then there was leakage through the [employer]  Still has pain at nighttime  No fever chills  3/10/22: Followup stage III pressure injuries of the right and left calfs  Patient states that the visiting nurse had a "observer" do the Unna boot on his right leg which slid down  Still has pain mainly in the left calf  They are trying various offloading techniques  3/17/22: Followup stage III pressure injuries of the right and left calfs  Patient states he still has pain but slightly less particularly on the left  Starting to have some itching  No fever or chills  Still has not found the proper offloading device  3/24/22: Followup stage III pressure injuries of both calfs continues to have some pain as in the past   No fever or chills  Trying to offload when in bed     3/31/22: Followup stage III pressure injuries of both lateral calf  Continues to have the same amount of pain  States he is trying offload  No fever chills  4/7/22:  Patient is a patient of Dr Nell Brown who presents for followup of bilateral LE ulcers  Has had antimicrobial endoform and hydrofera blue on the wounds and Unna boot for compression    4/14/22: Followup stage III pressure injuries of both lateral calfs  Unna boots  Has no pain on the left calf anymore  Much less on the right  No fever chills  4/21/22: Followup stage III pressure injuries of both lateral calfs  Tolerating Unna boots although they tend to slide down  No pain at this time              The following portions of the patient's history were reviewed and updated as appropriate:   Patient Active Problem List   Diagnosis    Type 2 diabetes mellitus with hyperglycemia, with long-term current use of insulin (HCC)    Hyperlipidemia    Psoriasis    Venous insufficiency    Gout    Essential hypertension    Hyponatremia    Gluten intolerance    Diabetic neuropathy (HCC)    Insomnia    Scrotal swelling    Erectile dysfunction    Bilateral leg edema    Elevated CO2 level    Abnormal thyroid function test    DAHIANA (obstructive sleep apnea)    Fungal infection    Morbid obesity with BMI of 70 and over, adult ([facility] )    Cellulitis of right lower extremity    Migraine headache    Onychomycosis    Ulcer of left lower extremity, limited to breakdown of skin ([facility] )    Pressure injury of right leg, stage 3 ([facility] )     Past Medical History:   Diagnosis Date    Acute kidney injury 10/28/2021    Anemia 09/13/2019    Cellulitis     last assessed 12/10/15    Cellulitis of left lower extremity     Cough 10/20/2019    Diabetes mellitus ([facility] )     Disease of thyroid gland     Edema     Elevated liver enzymes     Elevated serum creatinine 11/19/2021    Esophageal reflux     Gluten intolerance     Gout     last assessed 09/05/13    Hyperglycemia     Hypertension     IBS (irritable bowel syndrome)     Insomnia     Obesity     Osteoarthritis of knee     last assessed 02/10/14    Shortness of breath 5/3/2021    Venous insufficiency     last assessed 08/22/17    Villonodular synovitis of the hand, right     last assessed 11/14/2013     Past Surgical History:   Procedure Laterality Date    INCISION AND DRAINAGE OF WOUND Left 9/6/2019    Procedure: INCISION AND DRAINAGE (I&D) GROIN;  Surgeon: Michael Cronin DO;  [facility];  Service: General    SKIN BIOPSY      WOUND DEBRIDEMENT Left 9/7/2019    Procedure: EXCISIONAL DEBRIDEMENT;  Surgeon: Michael Cronin DO;  [facility];  Service: General     Family History   Problem Relation Age of Onset    Cancer Mother         gastrc   Bobby King Colon cancer Father     Heart failure Father      Social History     Socioeconomic History    Marital status: /Civil Union     Spouse name: Not on file    Number of children: Not on file    Years of education: Not on file    Highest education level: Not on file   Occupational History    Not on file   Tobacco Use    Smoking status: Never Smoker    Smokeless tobacco: Never Used   Vaping Use    Vaping Use: Never used   Substance and Sexual Activity    Alcohol use: Not Currently     Alcohol/week: 0 0 standard drinks    Drug use: No    Sexual activity: Yes   Other Topics Concern    Not on file   Social History Narrative    Not on file     Social Determinants of Health     Financial Resource Strain: Not on file   Food Insecurity: Not on file   Transportation Needs: No Transportation Needs    Lack of Transportation (Medical): No    Lack of Transportation (Non-Medical):  No   Physical Activity: Not on file   Stress: Not on file   Social Connections: Not on file   Intimate Partner Violence: Not on file   Housing Stability: Not on file       Current Outpatient Medications:     acetaminophen (TYLENOL) 325 mg tablet, Take 2 tablets (650 mg total) by mouth every 4 (four) hours as needed for mild pain, headaches or fever, Disp: , Rfl: 0    albuterol (PROVENTIL HFA,VENTOLIN HFA) 90 mcg/act inhaler, TAKE 2 PUFFS BY MOUTH EVERY 6 HOURS AS NEEDED FOR WHEEZE, Disp: 8 5 g, Rfl: 0    BD Pen Needle Ludmila 2nd Gen 32G X 4 MM MISC, USE 4 TIMES A DAY, Disp: 200 each, Rfl: 3    Blood Glucose Monitoring Suppl (ONETOUCH VERIO) w/Device KIT, Use to test sugar daily (Patient not taking: Reported on 10/28/2021), Disp: 1 kit, Rfl: 0    Calcium Alginate (Maxorb II Alginate Dressing) MISC, Apply 1 each topically in the morning, Disp: 10 each, Rfl: 2    Continuous Blood Gluc  (FreeStyle Elvin 2 [place] Systm) BROOKS, Use 1 Device as needed (use with sensors for bs checks), Disp: 1 Device, Rfl: 0    Continuous Blood Gluc Sensor (FreeStyle Elvin 14 Day Sensor) MISC, USE ONE SENSOR EVERY 2 WEEKS, Disp: 2 each, Rfl: 3    Control Gel Formula Dressing (DuoDERM CGF Dressing) [facility] [place], Apply 1 application topically every 5 (five) days, Disp: 5 each, Rfl: 1    [employer] Diclofenac Sodium 1 %, APPLY 4 G TOPICALLY 4 (FOUR) TIMES A DAY AS NEEDED (JOINT PAIN), Disp: 50 g, Rfl: 0    furosemide (LASIX) 20 mg tablet, TAKE 1 TABLET BY MOUTH TWICE A DAY, Disp: 180 tablet, Rfl: 1    gabapentin (NEURONTIN) 100 mg capsule, Take 1 capsule (100 mg total) by mouth 2 (two) times a day Take 1 tab with your 300mg tab twice daily, Disp: 180 capsule, Rfl: 2    gabapentin (NEURONTIN) 300 mg capsule, Take 1 capsule (300 mg total) by mouth 2 (two) times a day Take 1 tab with your 100mg tab twice daily, Disp: 180 capsule, Rfl: 2    Gauze Pads & Dressings 5"X5" PADS, Use in the morning, Disp: 20 each, Rfl: 2    insulin aspart (NovoLOG FlexPen) 100 UNIT/ML injection pen, INJECT 18 UNITS WITH MEALS+SCALE ( - 150-200 -2 UNITS, 201-250-4 UNITS, 251-300 -6 UNITS, 301-350-8 UNITS, > 350- 10 UNITS ), Disp: 45 mL, Rfl: 1    Lancets (ONETOUCH ULTRASOFT) lancets, Use to test sugar 2 times a day, Disp: 100 each, Rfl: 3    Lantus SoloStar 100 units/mL injection pen, INJECT 46 UNITS UNDER THE SKIN DAILY AT BEDTIME INJECT 48 UNITS UNDER THE SKIN EVERY BEDTIME, Disp: 15 mL, Rfl: 0    losartan (COZAAR) 25 mg tablet, TAKE 1 TABLET BY MOUTH EVERY DAY, Disp: 90 tablet, Rfl: 2    metFORMIN (GLUCOPHAGE) 1000 MG tablet, TAKE ONE TABLET BY MOUTH TWICE DAILY, Disp: 180 tablet, Rfl: 3    nystatin (MYCOSTATIN) cream, Apply topically 2 (two) times a day as needed (itching, redness), Disp: 30 g, Rfl: 1    nystatin (MYCOSTATIN) powder, Apply 1 application topically 2 (two) times a day To affected area, Disp: 120 g, Rfl: 3    omeprazole (PriLOSEC) 40 MG capsule, Take 1 capsule (40 mg total) by mouth 2 (two) times a day, Disp: 180 capsule, Rfl: 1    OneTouch Verio test strip, USE TO TEST SUGAR 2 TIMES A DAY, Disp: 100 strip, Rfl: 3    oxyCODONE (ROXICODONE) 5 immediate release tablet, Take 1-2 tabs by mouth every 6 hours as needed for moderate or severe pains respectively  , Disp: 15 tablet, Rfl: 0    psyllium (METAMUCIL) packet, Take 1 packet by mouth daily, Disp: , Rfl: 0    saccharomyces boulardii (FLORASTOR) 250 mg capsule, Take 1 capsule (250 mg total) by mouth 2 (two) times a day, Disp: 60 capsule, Rfl: 1    Skin Protectants, Misc  (Care [employer] Textile 10"x144") SHEE, Apply 1 each topically every 5 (five) days, Disp: 3 each, Rfl: 5    sodium hypochlorite (DAKIN'S QUARTER-STRENGTH), Irrigate with 1 application as directed daily, Disp: 473 mL, Rfl: 0  No current facility-administered medications for this visit  Review of Systems   Constitutional: Negative for appetite change, chills, fatigue, fever and unexpected weight change  HENT: Negative for congestion, hearing loss, postnasal drip and sinus pressure  Respiratory: Positive for shortness of breath (On exertion)  Negative for cough  Cardiovascular: Positive for leg swelling (Lymphedema)  Endocrine: Negative  Genitourinary: Positive for urgency  Musculoskeletal: Positive for gait problem Wyatt Bad)  Negative for back pain  Skin: Positive for wound (Both lower extremities)  Negative for rash  Allergic/Immunologic: Negative  Neurological: Positive for numbness  Hematological: Does not bruise/bleed easily  Psychiatric/Behavioral: Negative  Negative for dysphoric mood  The patient is not nervous/anxious  Objective:  BP (!) 172/92   Pulse 88   Temp 98 °F (36 7 °C)   Resp 18   Pain Score:   3     Physical Exam  Vitals and nursing note reviewed  Constitutional:       Appearance: Normal appearance  He is well-developed  He is morbidly obese  HENT:      Head: Normocephalic and atraumatic  Cardiovascular:      Rate and Rhythm: Normal rate     Pulmonary:      Effort: Pulmonary effort is normal  Skin:     General: Skin is warm and dry  Findings: Wound present  No erythema  Comments: Stage III pressure injuries of both lateral calfs  Left lateral calf with two small open areas  Fibrin  Overall improved  Right calf with fibrin and slough  Improving  Neurological:      Mental Status: He is alert and oriented to person, place, and time  Psychiatric:         Attention and Perception: Attention normal          Mood and Affect: Mood and affect normal          Behavior: Behavior is cooperative  Cognition and Memory: Cognition normal                  Wound 02/17/22 Pressure Injury Leg Right;Lateral;Lower (Active)   Wound Image Images linked 04/21/22 1042   Wound Description Beefy red;Slough; Yellow; White;Epithelialization 04/21/22 1042   Chelsie-wound Assessment Hyperpigmented 04/21/22 1042   Wound Length (cm) 3 cm 04/21/22 1042   Wound Width (cm) 1 8 cm 04/21/22 1042   Wound Depth (cm) 0 1 cm 04/21/22 1042   Wound Surface Area (cm^2) 5 4 cm^2 04/21/22 1042   Wound Volume (cm^3) 0 54 cm^3 04/21/22 1042   Calculated Wound Volume (cm^3) 0 54 cm^3 04/21/22 1042   Change in Wound Size % 43 75 04/21/22 1042   Drainage Amount Moderate 04/21/22 1042   Drainage Description Serosanguineous; Bloody 04/21/22 1042   Non-staged Wound Description Full thickness 04/21/22 1042   Dressing Status Removed (upon arrival) 04/21/22 1042       Debridement   Wound 02/17/22 Pressure Injury Leg Right;Lateral;Lower    Universal Protocol:  Consent: Verbal consent obtained  Written consent obtained  Consent given by: patient  Time out: Immediately prior to procedure a "time out" was called to verify the correct patient, procedure, equipment, support staff and site/side marked as required    Patient understanding: patient states understanding of the procedure being performed  Patient identity confirmed: verbally with patient      Performed by: physician  Debridement type: selective  Pain control: lidocaine 4%  Post-debridement measurements  Length (cm): 3  Width (cm): 1 8  Depth (cm): 0 1  Percent debrided: 100%  Surface Area (cm^2): 5 4  Area debrided (cm^2): 5 4  Volume (cm^3): 0 54  Devitalized tissue debrided: fibrin and slough  Instrument(s) utilized: curette  Bleeding: small  Hemostasis obtained with: pressure  Procedural pain (0-10): 0  Post-procedural pain: 0   Response to treatment: procedure was tolerated well    Debridement   Wound 04/07/22 Venous Ulcer Pretibial Left;Proximal    Universal Protocol:  Consent: Verbal consent obtained  Written consent obtained  Consent given by: patient  Time out: Immediately prior to procedure a "time out" was called to verify the correct patient, procedure, equipment, support staff and site/side marked as required  Patient understanding: patient states understanding of the procedure being performed  Patient identity confirmed: verbally with patient      Performed by: physician  Debridement type: selective  Pain control: lidocaine 4%  Post-debridement measurements  Length (cm): 0 5  Width (cm): 0 4  Depth (cm): 0 1  Percent debrided: 100%  Surface Area (cm^2): 0 2  Area debrided (cm^2): 0 2  Volume (cm^3): 0 02  Devitalized tissue debrided: fibrin  Instrument(s) utilized: curette  Bleeding: small  Hemostasis obtained with: pressure  Procedural pain (0-10): 0  Post-procedural pain: 0   Response to treatment: procedure was tolerated well                     Wound Instructions:  Orders Placed This Encounter   Procedures    Wound cleansing and dressings     B/L lower legs:      Wash your hands with soap and water  Remove old dressing, discard into plastic bag and place in trash  Cleanse the wound with mild soap and water prior to applying a clean dressing  PLEASE WASH LEGS WITH SOAP AND WATER PRIOR TO DRESSING CHANGES   Do not use tissue or cotton balls  Do not scrub the wound  Pat dry using gauze  Shower yes with protection, purchase cast cover  Or sponge bathe   RIGHT LOWER LEG  Apply Lac Hydrin to B/L lower legs  Apply skin prep to the periwound  Apply endoform AM to wound beds (sent with patient)  Apply hydrofera blue over endoform  MUST USE FULL PIECE  DO NOT CUT  Do NOT put ABD under unna boot  Secure with [employer] and [employer]  Make sure Unna boots are high enough so they do not slide down       LEFT LOWER LEG:   As above but apply non adherent pad such as polymem over wounds (cut piece large enough to cover open areas and periwound)  Don NOT put an ABD over the dressing under the unna boot     Change dressing 2x a week (home care nurse change Sunday or Mon and Wound center Thurs) and as needed if soiled or lose integrity       Follow up at the wound center in one week  Today's wound treatment note:  Cleansed and dressed as above  Standing Status:   Future     Standing Expiration Date:   4/21/2023    Debridement     This order was created via procedure documentation    Debridement     This order was created via procedure documentation       Mare Ling MD, CHT, CWS       Portions of the record may have been created with voice recognition software  Occasional wrong word or "sound alike" substitutions may have occurred due to the inherent limitations of voice recognition software  Read the chart carefully and recognize, using context, where substitutions have occurred

## 2022-04-26 DIAGNOSIS — E11.49 OTHER DIABETIC NEUROLOGICAL COMPLICATION ASSOCIATED WITH TYPE 2 DIABETES MELLITUS (HCC): ICD-10-CM

## 2022-04-26 RX ORDER — GABAPENTIN 100 MG/1
CAPSULE ORAL
Qty: 180 CAPSULE | Refills: 2 | Status: SHIPPED | OUTPATIENT
Start: 2022-04-26

## 2022-04-26 NOTE — TELEPHONE ENCOUNTER
Thai Chase has requested a refill of gabapentin (NEUROINTIN) 100 mg capsule  Pt meets the requirements placed by [facility]  Will refill medication

## 2022-04-27 ENCOUNTER — TELEPHONE (OUTPATIENT)
Dept: INTERNAL MEDICINE CLINIC | Facility: CLINIC | Age: 57
End: 2022-04-27

## 2022-04-27 NOTE — TELEPHONE ENCOUNTER
Received fax from [employer] in regards to [address]  for Dick [facility]  Fax has been signed, faxed back, and faxed into patient's chart  Will place in physical copy in faxed bin in nurse work station

## 2022-04-28 ENCOUNTER — OFFICE VISIT (OUTPATIENT)
Dept: WOUND CARE | Facility: CLINIC | Age: 57
End: 2022-04-28
Payer: MEDICARE

## 2022-04-28 VITALS
RESPIRATION RATE: 24 BRPM | DIASTOLIC BLOOD PRESSURE: 80 MMHG | TEMPERATURE: 97.9 F | HEART RATE: 106 BPM | SYSTOLIC BLOOD PRESSURE: 138 MMHG

## 2022-04-28 DIAGNOSIS — L89.893 PRESSURE INJURY OF RIGHT LEG, STAGE 3 (HCC): ICD-10-CM

## 2022-04-28 DIAGNOSIS — L89.893 PRESSURE INJURY OF LEFT LEG, STAGE 3 (HCC): Primary | ICD-10-CM

## 2022-04-28 PROCEDURE — 97597 DBRDMT OPN WND 1ST 20 CM/<: CPT | Performed by: FAMILY MEDICINE

## 2022-04-28 PROCEDURE — NC001 PR NO CHARGE

## 2022-04-28 PROCEDURE — 29580 STRAPPING UNNA BOOT: CPT

## 2022-04-28 RX ORDER — LIDOCAINE 40 MG/G
CREAM TOPICAL ONCE
Status: COMPLETED | OUTPATIENT
Start: 2022-04-28 | End: 2022-04-28

## 2022-04-28 RX ADMIN — LIDOCAINE: 40 CREAM TOPICAL at 12:13

## 2022-04-28 NOTE — PROGRESS NOTES
Patient ID: Mi Bird is a 49 y o  male Date of Birth 1965       Chief Complaint   Patient presents with    Follow Up Wound Care Visit     Pressure injury of right leg, stage 3 (HCC)       Allergies:  Gluten meal - food allergy and Clindamycin    Diagnosis:   Diagnosis ICD-10-CM Associated Orders   1  Pressure injury of left leg, stage 3 (HCC)  L89 893 lidocaine (LMX) 4 % cream     Wound cleansing and dressings   2  Pressure injury of right leg, stage 3 (HCC)  L89 893 lidocaine (LMX) 4 % cream     Wound cleansing and dressings     Debridement        Assessment  & Plan:     Stage III pressure injury of the left calf almost closed  No need for any dressing  Unna boot   Stage III pressure injury of the right lateral calf slightly smaller   Selectively debrided  Unna boot  See wound care orders  Subjective:   2/17/22: This is a 61-year-old male who comes in [address] [place] [address] with ulcers on the right and left calfs  He states that he was hospitalized on November of 2021 with cellulitis and he had the ulcers at that time  Patient is morbidly obese and has history of lymphedema as well  He does not use any form of compression other than Ace wraps  He had purchased alginate on the Internet as well as bordered foam is  He notes that there is heavy drainage requiring dressings to be changed once or twice a day  He believes that these ulcers had started due to  sleeping in a recliner and the foot rest causing pressure on the calfs  He is unable to sleep in a bed  He notes he has increased pain at nighttime when in there is recliner with pressure on his calf  He is ambulatory with a walker but does not go out of the house other than to doctor visits  Patient also has a history of type 2 diabetes with good control with last A1c 6 5 in October of 2021  He states that he has lost approximately 120 lb in the past nine months with a special meal plan    He plans on continuing with weight loss     2/24/22: Followup stage III pressure injuries of right and left calfs  They purchased memory foam pillows which seems to help with both pain and offloading the areas when he is sleeping  Still has pain mainly at night  Left greater than right  3/3/22: Followup stage III pressure injuries of the right left calfs  Patient is upset about visiting nurse not having proper supplies  Then there was leakage through the [employer]  Still has pain at nighttime  No fever chills  3/10/22: Followup stage III pressure injuries of the right and left calfs  Patient states that the visiting nurse had a "observer" do the Unna boot on his right leg which slid down  Still has pain mainly in the left calf  They are trying various offloading techniques  3/17/22: Followup stage III pressure injuries of the right and left calfs  Patient states he still has pain but slightly less particularly on the left  Starting to have some itching  No fever or chills  Still has not found the proper offloading device  3/24/22: Followup stage III pressure injuries of both calfs continues to have some pain as in the past   No fever or chills  Trying to offload when in bed     3/31/22: Followup stage III pressure injuries of both lateral calf  Continues to have the same amount of pain  States he is trying offload  No fever chills  4/7/22:  Patient is a patient of Dr Elijah Celeste who presents for followup of bilateral LE ulcers  Has had antimicrobial endoform and hydrofera blue on the wounds and Unna boot for compression    4/14/22: Followup stage III pressure injuries of both lateral calfs  Unna boots  Has no pain on the left calf anymore  Much less on the right  No fever chills  4/21/22: Followup stage III pressure injuries of both lateral calfs  Tolerating Unna boots although they tend to slide down  No pain at this time  4/28/22: Followup stage III pressure injuries of both lateral calfs    No complaints at this time  No pain              The following portions of the patient's history were reviewed and updated as appropriate:   Patient Active Problem List   Diagnosis    Type 2 diabetes mellitus with hyperglycemia, with long-term current use of insulin ([facility]    Hyperlipidemia    Psoriasis    Venous insufficiency    Gout    Essential hypertension    Hyponatremia    Gluten intolerance    Diabetic neuropathy (HCC)    Insomnia    Scrotal swelling    Erectile dysfunction    Bilateral leg edema    Elevated CO2 level    Abnormal thyroid function test    DAHIANA (obstructive sleep apnea)    Fungal infection    Morbid obesity with BMI of 70 and over, adult ([facility] )    Cellulitis of right lower extremity    Migraine headache    Onychomycosis    Ulcer of left lower extremity, limited to breakdown of skin ([address] )    Pressure injury of right leg, stage 3 ([facility] )     Past Medical History:   Diagnosis Date    Acute kidney injury 10/28/2021    Anemia 09/13/2019    Cellulitis     last assessed 12/10/15    Cellulitis of left lower extremity     Cough 10/20/2019    Diabetes mellitus ([facility] )     Disease of thyroid gland     Edema     Elevated liver enzymes     Elevated serum creatinine 11/19/2021    Esophageal reflux     Gluten intolerance     Gout     last assessed 09/05/13    Hyperglycemia     Hypertension     IBS (irritable bowel syndrome)     Insomnia     Obesity     Osteoarthritis of knee     last assessed 02/10/14    Shortness of breath 5/3/2021    Venous insufficiency     last assessed 08/22/17    Villonodular synovitis of the hand, right     last assessed 11/14/2013     Past Surgical History:   Procedure Laterality Date    INCISION AND DRAINAGE OF WOUND Left 9/6/2019    Procedure: INCISION AND DRAINAGE (I&D) GROIN;  Surgeon: Phill Ortega DO;  [facility];  Service: General    SKIN BIOPSY      WOUND DEBRIDEMENT Left 9/7/2019    Procedure: EXCISIONAL DEBRIDEMENT;  Surgeon: Penny Chris DO;  [facility];  Service: General     Family History   Problem Relation Age of Onset    Cancer Mother         gastrc    Colon cancer Father     Heart failure Father      Social History     Socioeconomic History    Marital status: /Civil Union     Spouse name: Not on file    Number of children: Not on file    Years of education: Not on file    Highest education level: Not on file   Occupational History    Not on file   Tobacco Use    Smoking status: Never Smoker    Smokeless tobacco: Never Used   Vaping Use    Vaping Use: Never used   Substance and Sexual Activity    Alcohol use: Not Currently     Alcohol/week: 0 0 standard drinks    Drug use: No    Sexual activity: Yes   Other Topics Concern    Not on file   Social History Narrative    Not on file     Social Determinants of Health     Financial Resource Strain: Not on file   Food Insecurity: Not on file   Transportation Needs: No Transportation Needs    Lack of Transportation (Medical): No    Lack of Transportation (Non-Medical):  No   Physical Activity: Not on file   Stress: Not on file   Social Connections: Not on file   Intimate Partner Violence: Not on file   Housing Stability: Not on file       Current Outpatient Medications:     acetaminophen (TYLENOL) 325 mg tablet, Take 2 tablets (650 mg total) by mouth every 4 (four) hours as needed for mild pain, headaches or fever, Disp: , Rfl: 0    albuterol (PROVENTIL HFA,VENTOLIN HFA) 90 mcg/act inhaler, TAKE 2 PUFFS BY MOUTH EVERY 6 HOURS AS NEEDED FOR WHEEZE, Disp: 8 5 g, Rfl: 0    BD Pen Needle Ludmila 2nd Gen 32G X 4 MM MISC, USE 4 TIMES A DAY, Disp: 200 each, Rfl: 3    Blood Glucose Monitoring Suppl (ONETOUCH VERIO) w/Device KIT, Use to test sugar daily (Patient not taking: Reported on 10/28/2021), Disp: 1 kit, Rfl: 0    Calcium Alginate (Maxorb II Alginate Dressing) MISC, Apply 1 each topically in the morning, Disp: 10 each, Rfl: 2    Continuous Blood Gluc  (Fresh Direct 2 McAlpin Systm) BROOKS, Use 1 Device as needed (use with sensors for bs checks), Disp: 1 Device, Rfl: 0    Continuous Blood Gluc Sensor (GillBusStyle Elvin 14 Day Sensor) MISC, USE ONE SENSOR EVERY 2 WEEKS, Disp: 2 each, Rfl: 3    Control Gel Formula Dressing (DuoDERM CGF Dressing) [facility], Apply 1 application topically every 5 (five) days, Disp: 5 each, Rfl: 1    [employer]S Diclofenac Sodium 1 %, APPLY 4 G TOPICALLY 4 (FOUR) TIMES A DAY AS NEEDED (JOINT PAIN), Disp: 50 g, Rfl: 0    furosemide (LASIX) 20 mg tablet, TAKE 1 TABLET BY MOUTH TWICE A DAY, Disp: 180 tablet, Rfl: 1    gabapentin (NEURONTIN) 100 mg capsule, TAKE 1 CAPSULE BY MOUTH TWICE A DAY WITH 300MG CAPSULE, Disp: 180 capsule, Rfl: 2    gabapentin (NEURONTIN) 300 mg capsule, Take 1 capsule (300 mg total) by mouth 2 (two) times a day Take 1 tab with your 100mg tab twice daily, Disp: 180 capsule, Rfl: 2    Gauze Pads & Dressings 5"X5" PADS, Use in the morning, Disp: 20 each, Rfl: 2    insulin aspart (NovoLOG FlexPen) 100 UNIT/ML injection pen, INJECT 18 UNITS WITH MEALS+SCALE ( - 150-200 -2 UNITS, 201-250-4 UNITS, 251-300 -6 UNITS, 301-350-8 UNITS, > 350- 10 UNITS ), Disp: 45 mL, Rfl: 1    Lancets (ONETOUCH ULTRASOFT) lancets, Use to test sugar 2 times a day, Disp: 100 each, Rfl: 3    Lantus SoloStar 100 units/mL injection pen, INJECT 46 UNITS UNDER THE SKIN DAILY AT BEDTIME INJECT 48 UNITS UNDER THE SKIN EVERY BEDTIME, Disp: 15 mL, Rfl: 0    losartan (COZAAR) 25 mg tablet, TAKE 1 TABLET BY MOUTH EVERY DAY, Disp: 90 tablet, Rfl: 2    metFORMIN (GLUCOPHAGE) 1000 MG tablet, TAKE ONE TABLET BY MOUTH TWICE DAILY, Disp: 180 tablet, Rfl: 3    nystatin (MYCOSTATIN) cream, Apply topically 2 (two) times a day as needed (itching, redness), Disp: 30 g, Rfl: 1    nystatin (MYCOSTATIN) powder, Apply 1 application topically 2 (two) times a day To affected area, Disp: 120 g, Rfl: 3    omeprazole (PriLOSEC) 40 MG capsule, Take 1 capsule (40 mg total) by mouth 2 (two) times a day, Disp: 180 capsule, Rfl: 1    OneTouch Verio test strip, USE TO TEST SUGAR 2 TIMES A DAY, Disp: 100 strip, Rfl: 3    oxyCODONE (ROXICODONE) 5 immediate release tablet, Take 1-2 tabs by mouth every 6 hours as needed for moderate or severe pains respectively  , Disp: 15 tablet, Rfl: 0    psyllium (METAMUCIL) packet, Take 1 packet by mouth daily, Disp: , Rfl: 0    saccharomyces boulardii (FLORASTOR) 250 mg capsule, Take 1 capsule (250 mg total) by mouth 2 (two) times a day, Disp: 60 capsule, Rfl: 1    Skin Protectants, Misc  ([employer] Textile 10"x144") SHEE, Apply 1 each topically every 5 (five) days, Disp: 3 each, Rfl: 5    sodium hypochlorite (DAKIN'S QUARTER-STRENGTH), Irrigate with 1 application as directed daily, Disp: 473 mL, Rfl: 0  No current facility-administered medications for this visit  Review of Systems   Constitutional: Negative for appetite change, chills, fatigue, fever and unexpected weight change  HENT: Negative for congestion, hearing loss, postnasal drip and sinus pressure  Respiratory: Positive for shortness of breath (On exertion)  Negative for cough  Cardiovascular: Positive for leg swelling (Lymphedema)  Endocrine: Negative  Genitourinary: Positive for urgency  Musculoskeletal: Positive for gait problem Bill Hard)  Negative for back pain  Skin: Positive for wound (Both lower extremities)  Negative for rash  Allergic/Immunologic: Negative  Neurological: Positive for numbness  Hematological: Does not bruise/bleed easily  Psychiatric/Behavioral: Negative  Negative for dysphoric mood  The patient is not nervous/anxious  Objective:  /80   Pulse (!) 106   Temp 97 9 °F (36 6 °C)   Resp (!) 24   Pain Score:   5     Physical Exam  Vitals and nursing note reviewed  Constitutional:       Appearance: Normal appearance  He is well-developed  He is morbidly obese  HENT:      Head: Normocephalic and atraumatic  Cardiovascular:      Rate and Rhythm: Normal rate  Pulmonary:      Effort: Pulmonary effort is normal    Skin:     General: Skin is warm and dry  Findings: Wound present  No erythema  Comments: Stage III pressure injuries of both lateral calfs  Left lateral calf with two small open areas  Overall improved  Right calf with fibrin and slough  Improving  Neurological:      Mental Status: He is alert and oriented to person, place, and time  Psychiatric:         Attention and Perception: Attention normal          Mood and Affect: Mood and affect normal          Behavior: Behavior is cooperative  Cognition and Memory: Cognition normal            Wound 02/17/22 Pressure Injury Leg Right;Lateral;Lower (Active)   Wound Image   04/28/22 1152   Wound Description Pink;Epithelialization;Yellow 04/28/22 1157   Pressure Injury Stage 3 03/03/22 1555   Chelsie-wound Assessment Intact; Hyperpigmented 04/28/22 1157   Wound Length (cm) 3 3 cm 04/28/22 1157   Wound Width (cm) 1 7 cm 04/28/22 1157   Wound Depth (cm) 0 1 cm 04/28/22 1157   Wound Surface Area (cm^2) 5 61 cm^2 04/28/22 1157   Wound Volume (cm^3) 0 561 cm^3 04/28/22 1157   Calculated Wound Volume (cm^3) 0 56 cm^3 04/28/22 1157   Change in Wound Size % 41 67 04/28/22 1157   Drainage Amount Small 04/28/22 1157   Drainage Description Serosanguineous 04/28/22 1157   Non-staged Wound Description Full thickness 04/28/22 1157   Treatments Cleansed 03/17/22 1107   Patient Tolerance Tolerated well 04/28/22 1157   Dressing Status Removed 04/28/22 1157       Wound 02/17/22 Pressure Injury Leg Left;Lateral;Lower; Posterior (Active)   Wound Image   04/28/22 1203   Wound Description Epithelialization;Pink 04/28/22 1204   Pressure Injury Stage 3 02/17/22 1512   Chelsie-wound Assessment Hyperpigmented;Scar Tissue 04/28/22 1204   Wound Length (cm) 0 2 cm 04/28/22 1204   Wound Width (cm) 0 1 cm 04/28/22 1204   Wound Depth (cm) 0 1 cm 04/28/22 1204   Wound Surface Area (cm^2) 0 02 cm^2 04/28/22 1204   Wound Volume (cm^3) 0 002 cm^3 04/28/22 1204   Calculated Wound Volume (cm^3) 0 cm^3 04/28/22 1204   Change in Wound Size % 100 04/28/22 1204   Drainage Amount Scant 04/28/22 1204   Drainage Description Serous 04/28/22 1204   Non-staged Wound Description Full thickness 04/28/22 1204   Treatments Irrigation with NSS 03/10/22 1557   Patient Tolerance Tolerated well 04/28/22 1204   Dressing Status Removed 04/28/22 1204       Wound 04/07/22 Venous Ulcer Pretibial Left;Proximal (Active)   Wound Image   04/28/22 1203   Wound Description Epithelialization 04/28/22 1206   Chelsie-wound Assessment Intact 04/28/22 1206   Wound Length (cm) 0 cm 04/28/22 1206   Wound Width (cm) 0 cm 04/28/22 1206   Wound Depth (cm) 0 cm 04/28/22 1206   Wound Surface Area (cm^2) 0 cm^2 04/28/22 1206   Wound Volume (cm^3) 0 cm^3 04/28/22 1206   Calculated Wound Volume (cm^3) 0 cm^3 04/28/22 1206   Change in Wound Size % 100 04/28/22 1206   Undermining 0 3 04/07/22 1452   Undermining is depth extending from 10-1 04/07/22 1452   Drainage Amount None 04/28/22 1206   Drainage Description Bloody 04/21/22 1039   Non-staged Wound Description Full thickness 04/21/22 1039   Patient Tolerance Tolerated well 04/07/22 1452   Dressing Status Removed 04/21/22 1039                     Debridement   Wound 02/17/22 Pressure Injury Leg Right;Lateral;Lower    Universal Protocol:  Consent: Verbal consent obtained  Written consent obtained  Consent given by: patient  Time out: Immediately prior to procedure a "time out" was called to verify the correct patient, procedure, equipment, support staff and site/side marked as required    Patient understanding: patient states understanding of the procedure being performed  Patient identity confirmed: verbally with patient      Performed by: physician  Debridement type: selective  Pain control: lidocaine 4%  Post-debridement measurements  Length (cm): 3 3  Width (cm): 1 7  Depth (cm): 0 1  Percent debrided: 100%  Surface Area (cm^2): 5 61  Area debrided (cm^2): 5 61  Volume (cm^3): 0 56  Devitalized tissue debrided: fibrin and slough  Instrument(s) utilized: curette  Bleeding: small  Hemostasis obtained with: pressure  Procedural pain (0-10): 3  Post-procedural pain: 0   Response to treatment: procedure was tolerated well                     Wound Instructions:  Orders Placed This Encounter   Procedures    Wound cleansing and dressings     B/L lower legs:      Wash your hands with soap and water  Remove old dressing, discard into plastic bag and place in trash  Cleanse the wound with mild soap and water prior to applying a clean dressing  PLEASE WASH LEGS WITH SOAP AND WATER PRIOR TO DRESSING CHANGES   Do not use tissue or cotton balls  Do not scrub the wound  Pat dry using gauze  Shower yes with protection, purchase cast cover  Or sponge bathe        RIGHT LOWER LEG  Apply Lac Hydrin to B/L lower legs  Apply skin prep to the periwound  Apply endoform AM to wound beds (sent with patient)  Apply hydrofera blue over endoform  MUST USE FULL PIECE  DO NOT CUT  Do NOT put ABD under unna boot  Secure with [employer] and Coban  Make sure Unna boots are high enough so they do not slide down       LEFT LOWER LEG:   Apply unna boot   Make sure Unna boots are high enough so they do not slide down  Change dressing 2x a week (home care nurse change Sunday or Mon and Wound center Thurs) and as needed if soiled or lose integrity       Follow up at the wound center in one week  Today's wound treatment note:  Cleansed and dressed as above  Standing Status:   Future     Standing Expiration Date:   4/28/2023    Debridement     This order was created via procedure documentation       Emeterio Umana MD, CHT, CWS       Portions of the record may have been created with voice recognition software   Occasional wrong word or "sound alike" substitutions may have occurred due to the inherent limitations of voice recognition software  Read the chart carefully and recognize, using context, where substitutions have occurred

## 2022-04-28 NOTE — PATIENT INSTRUCTIONS
Orders Placed This Encounter   Procedures    Wound cleansing and dressings     B/L lower legs:      Wash your hands with soap and water  Remove old dressing, discard into plastic bag and place in trash  Cleanse the wound with mild soap and water prior to applying a clean dressing  PLEASE WASH LEGS WITH SOAP AND WATER PRIOR TO DRESSING CHANGES   Do not use tissue or cotton balls  Do not scrub the wound  Pat dry using gauze  Shower yes with protection, purchase cast cover  Or sponge bathe        RIGHT LOWER LEG  Apply Lac Hydrin to B/L lower legs  Apply skin prep to the periwound  Apply endoform AM to wound beds (sent with patient)  Apply hydrofera blue over endoform  MUST USE FULL PIECE  DO NOT CUT  Do NOT put ABD under unna boot  Secure with [employer] and [employer]  Make sure Unna boots are high enough so they do not slide down       LEFT LOWER LEG:   Apply unna boot   Make sure Unna boots are high enough so they do not slide down  Change dressing 2x a week (home care nurse change Sunday or Mon and Wound center Thurs) and as needed if soiled or lose integrity       Follow up at the wound center in one week  Today's wound treatment note:  Cleansed and dressed as above       Standing Status:   Future     Standing Expiration Date:   4/28/2023

## 2022-05-03 DIAGNOSIS — I10 ESSENTIAL HYPERTENSION: ICD-10-CM

## 2022-05-03 DIAGNOSIS — R60.0 BILATERAL LEG EDEMA: ICD-10-CM

## 2022-05-03 RX ORDER — FUROSEMIDE 20 MG/1
TABLET ORAL
Qty: 270 TABLET | Refills: 2 | Status: SHIPPED | OUTPATIENT
Start: 2022-05-03

## 2022-05-03 NOTE — TELEPHONE ENCOUNTER
Esther Wheat has requested a refill of furosemide (LASIX) 20 mg tablet  Pt [facility]  Would a refill be appropriate?

## 2022-05-04 NOTE — PROGRESS NOTES
Cast Application    Date/Time: 4/28/2022 12:15 PM  Performed by: Jacqueline Andrade RN  Authorized by: Jocelyn Stein MD   Universal Protocol:  Consent: Verbal consent obtained  Consent given by: patient  Patient understanding: patient states understanding of the procedure being performed  Patient identity confirmed: verbally with patient      Pre-procedure details:     Sensation:  Normal  Procedure details:     Laterality:  Left    Location:  Leg    Leg:  L lower legCast type:  Unna boot      Supplies used: econo paste, coban  Post-procedure details:     Pain:  Improved    Sensation:  Normal    Patient tolerance of procedure: Tolerated well, no immediate complications  Comments:      UNNA BOOT wrap procedure     Before application, RAY and/or TBI determined to be adequate for healing and application of compression  Lower extremity washed prior to application of compression wrap  With the foot in dorsiflexed position, Unna boot was applied as per physician orders without complications or complaint of pain  The procedure was tolerated well  Toes warm & pink post application  Patient provided education & reinforced to observe toes for any discoloration, swelling or tingling and instructed when to report to the [facility] or to remove compression  Wound care as per providers orders, patient tolerated well

## 2022-05-05 ENCOUNTER — OFFICE VISIT (OUTPATIENT)
Dept: WOUND CARE | Facility: CLINIC | Age: 57
End: 2022-05-05
Payer: MEDICARE

## 2022-05-05 VITALS
SYSTOLIC BLOOD PRESSURE: 140 MMHG | DIASTOLIC BLOOD PRESSURE: 80 MMHG | TEMPERATURE: 98 F | HEART RATE: 108 BPM | RESPIRATION RATE: 22 BRPM

## 2022-05-05 DIAGNOSIS — L89.893 PRESSURE INJURY OF LEFT LEG, STAGE 3 (HCC): ICD-10-CM

## 2022-05-05 DIAGNOSIS — L89.893 PRESSURE INJURY OF RIGHT LEG, STAGE 3 (HCC): Primary | ICD-10-CM

## 2022-05-05 PROCEDURE — 29580 STRAPPING UNNA BOOT: CPT

## 2022-05-05 PROCEDURE — 99213 OFFICE O/P EST LOW 20 MIN: CPT | Performed by: FAMILY MEDICINE

## 2022-05-05 RX ORDER — LIDOCAINE 40 MG/G
CREAM TOPICAL ONCE
Status: COMPLETED | OUTPATIENT
Start: 2022-05-05 | End: 2022-05-05

## 2022-05-05 RX ADMIN — LIDOCAINE: 40 CREAM TOPICAL at 11:43

## 2022-05-05 NOTE — PROGRESS NOTES
Cast Application    Date/Time: 5/5/2022 12:05 PM  Performed by: Mick Shea RN  Authorized by: Lisa Hill MD   Universal Protocol:  Consent given by: patient  Patient identity confirmed: verbally with patient      Pre-procedure details:     Sensation:  Unchanged  Procedure details:     Laterality:  Bilateral    Location:  Leg    Leg:  L lower leg and R lower legCast type:  Unna boot      Supplies:  2 layer wrap (hydrocolloid, unna, coban, tubifast yellow)  Post-procedure details:     Pain:  Improved    Sensation:  Unchanged    Patient tolerance of procedure: Tolerated well, no immediate complications  Comments:      UNNA BOOT wrap procedure     Before application, RAY and/or TBI determined to be adequate for healing and application of compression  Lower extremity washed prior to application of compression wrap  With the foot in dorsiflexed position, Unna boot was applied as per physician orders without complications or complaint of pain   The procedure was tolerated well  Toes warm & pink post application   Patient provided education & reinforced to observe toes for any discoloration, swelling or tingling and instructed when to report to the [facility] or to remove compression  Wound care as per providers orders, patient tolerated well

## 2022-05-05 NOTE — PATIENT INSTRUCTIONS
Orders Placed This Encounter   Procedures    Wound cleansing and dressings     B/L lower legs:      Wash your hands with soap and water  Remove old dressing, discard into plastic bag and place in trash  Cleanse the wound with mild soap and water prior to applying a clean dressing  PLEASE WASH LEGS WITH SOAP AND WATER PRIOR TO DRESSING CHANGES   Do not use tissue or cotton balls  Do not scrub the wound  Pat dry using gauze  Shower yes with protection, purchase cast cover  Or sponge bathe        RIGHT LOWER LEG  Apply Lac Hydrin to B/L lower legs  Apply skin prep to the periwound  Cover the wound with a hydrocolloid  Apply Unna boot and tubifast yellow  Change twice a week  (Monday for [facility] to change)      LEFT LOWER LEG:   Apply unna boot and tubifast yellow  Make sure Unna boots are high enough so they do not slide down  Change dressing 2x a week (home care nurse change Sunday or Mon and Wound center Thurs) and as needed if soiled or lose integrity       Follow up at the wound center in one week  Today's wound treatment note:  Cleansed and dressed as above       Standing Status:   Future     Standing Expiration Date:   5/5/2023

## 2022-05-05 NOTE — PROGRESS NOTES
Patient ID: Ysabel Duncan is a 49 y o  male Date of Birth 1965       Chief Complaint   Patient presents with    Follow Up Wound Care Visit     BLE wounds       Allergies:  Gluten meal - food allergy and Clindamycin    Diagnosis:      Diagnosis ICD-10-CM Associated Orders   1  Pressure injury of right leg, stage 3 ([facility])  L89 893 lidocaine (LMX) 4 % cream     Wound cleansing and dressings     Cast Application   2  Pressure injury of left leg, stage 3 ([facility])  L89 893 Wound cleansing and dressings     Cast Application           Assessment & Plan:   Pressure injury of the left calf is now closed   Pressure injury of the right calf is significantly improved   Continue with Unna boots bilaterally  Hydrocolloid to the right calf  Subjective:   2/17/22: This is a 44-year-old male who comes in [address] [place] [address] with ulcers on the right and left calfs  He states that he was hospitalized on November of 2021 with cellulitis and he had the ulcers at that time  Patient is morbidly obese and has history of lymphedema as well  He does not use any form of compression other than Ace wraps  He had purchased alginate on the Internet as well as bordered foam is  He notes that there is heavy drainage requiring dressings to be changed once or twice a day  He believes that these ulcers had started due to  sleeping in a recliner and the foot rest causing pressure on the calfs  He is unable to sleep in a bed  He notes he has increased pain at nighttime when in there is recliner with pressure on his calf  He is ambulatory with a walker but does not go out of the house other than to doctor visits  Patient also has a history of type 2 diabetes with good control with last A1c 6 5 in October of 2021  He states that he has lost approximately 120 lb in the past nine months with a special meal plan  He plans on continuing with weight loss  2/24/22: Followup stage III pressure injuries of right and left calfs    They purchased memory foam pillows which seems to help with both pain and offloading the areas when he is sleeping  Still has pain mainly at night  Left greater than right  3/3/22: Followup stage III pressure injuries of the right left calfs  Patient is upset about visiting nurse not having proper supplies  Then there was leakage through the [employer]  Still has pain at nighttime  No fever chills  3/10/22: Followup stage III pressure injuries of the right and left calfs  Patient states that the visiting nurse had a "observer" do the Unna boot on his right leg which slid down  Still has pain mainly in the left calf  They are trying various offloading techniques  3/17/22: Followup stage III pressure injuries of the right and left calfs  Patient states he still has pain but slightly less particularly on the left  Starting to have some itching  No fever or chills  Still has not found the proper offloading device  3/24/22: Followup stage III pressure injuries of both calfs continues to have some pain as in the past   No fever or chills  Trying to offload when in bed     3/31/22: Followup stage III pressure injuries of both lateral calf  Continues to have the same amount of pain  States he is trying offload  No fever chills  4/7/22:  Patient is a patient of Dr Stoney Shannon who presents for followup of bilateral LE ulcers  Has had antimicrobial endoform and hydrofera blue on the wounds and Unna boot for compression    4/14/22: Followup stage III pressure injuries of both lateral calfs  Unna boots  Has no pain on the left calf anymore  Much less on the right  No fever chills  4/21/22: Followup stage III pressure injuries of both lateral calfs  Tolerating Unna boots although they tend to slide down  No pain at this time  4/28/22: Followup stage III pressure injuries of both lateral calfs  No complaints at this time  No pain  5/5/22:   Followup stage III pressure injuries of both lateral calf  No complaints  No pain          The following portions of the patient's history were reviewed and updated as appropriate:   Patient Active Problem List   Diagnosis    Type 2 diabetes mellitus with hyperglycemia, with long-term current use of insulin ([facility] [address] )    Hyperlipidemia    Psoriasis    Venous insufficiency    Gout    Essential hypertension    Hyponatremia    Gluten intolerance    Diabetic neuropathy (HCC)    Insomnia    Scrotal swelling    Erectile dysfunction    Bilateral leg edema    Elevated CO2 level    Abnormal thyroid function test    DAHIANA (obstructive sleep apnea)    Fungal infection    Morbid obesity with BMI of 70 and over, adult ([facility] [address] )    Cellulitis of right lower extremity    Migraine headache    Onychomycosis    Ulcer of left lower extremity, limited to breakdown of skin ([address] )    Pressure injury of right leg, stage 3 ([facility] [address] )     Past Medical History:   Diagnosis Date    Acute kidney injury 10/28/2021    Anemia 09/13/2019    Cellulitis     last assessed 12/10/15    Cellulitis of left lower extremity     Cough 10/20/2019    Diabetes mellitus ([facility] [address] )     Disease of thyroid gland     Edema     Elevated liver enzymes     Elevated serum creatinine 11/19/2021    Esophageal reflux     Gluten intolerance     Gout     last assessed 09/05/13    Hyperglycemia     Hypertension     IBS (irritable bowel syndrome)     Insomnia     Obesity     Osteoarthritis of knee     last assessed 02/10/14    Shortness of breath 5/3/2021    Venous insufficiency     last assessed 08/22/17    Villonodular synovitis of the hand, right     last assessed 11/14/2013     Past Surgical History:   Procedure Laterality Date    INCISION AND DRAINAGE OF WOUND Left 9/6/2019    Procedure: INCISION AND DRAINAGE (I&D) GROIN;  Surgeon: Nilson Carl DO;  [facility];  Service: General    SKIN BIOPSY      WOUND DEBRIDEMENT Left 9/7/2019    Procedure: EXCISIONAL DEBRIDEMENT; Surgeon: Daniela Stoddard DO;  [facility];  Service: General     Family History   Problem Relation Age of Onset    Cancer Mother         gastrc    Colon cancer Father     Heart failure Father       Social History     Socioeconomic History    Marital status: /Civil Union     Spouse name: None    Number of children: None    Years of education: None    Highest education level: None   Occupational History    None   Tobacco Use    Smoking status: Never Smoker    Smokeless tobacco: Never Used   Vaping Use    Vaping Use: Never used   Substance and Sexual Activity    Alcohol use: Not Currently     Alcohol/week: 0 0 standard drinks    Drug use: No    Sexual activity: Yes   Other Topics Concern    None   Social History Narrative    None     Social Determinants of Health     Financial Resource Strain: Not on file   Food Insecurity: Not on file   Transportation Needs: No Transportation Needs    Lack of Transportation (Medical): No    Lack of Transportation (Non-Medical):  No   Physical Activity: Not on file   Stress: Not on file   Social Connections: Not on file   Intimate Partner Violence: Not on file   Housing Stability: Not on file        Current Outpatient Medications:     acetaminophen (TYLENOL) 325 mg tablet, Take 2 tablets (650 mg total) by mouth every 4 (four) hours as needed for mild pain, headaches or fever, Disp: , Rfl: 0    albuterol (PROVENTIL HFA,VENTOLIN HFA) 90 mcg/act inhaler, TAKE 2 PUFFS BY MOUTH EVERY 6 HOURS AS NEEDED FOR WHEEZE, Disp: 8 5 g, Rfl: 0    BD Pen Needle Ludmila 2nd Gen 32G X 4 MM MISC, USE 4 TIMES A DAY, Disp: 200 each, Rfl: 3    Blood Glucose Monitoring Suppl (ONETOUCH VERIO) w/Device KIT, Use to test sugar daily (Patient not taking: Reported on 10/28/2021), Disp: 1 kit, Rfl: 0    Calcium Alginate (Maxorb II Alginate Dressing) MISC, Apply 1 each topically in the morning, Disp: 10 each, Rfl: 2    Continuous Blood Gluc  (FreeStyle [address] Systm) BROOKS, Use 1 Device as needed (use with sensors for bs checks), Disp: 1 Device, Rfl: 0    Continuous Blood Gluc Sensor (FreeStyle Elvin 14 Day Sensor) MISC, USE ONE SENSOR EVERY 2 WEEKS, Disp: 2 each, Rfl: 3    Control Gel Formula Dressing (DuoDERM CGF Dressing) [facility], Apply 1 application topically every 5 (five) days, Disp: 5 each, Rfl: 1    [employer] Diclofenac Sodium 1 %, APPLY 4 G TOPICALLY 4 (FOUR) TIMES A DAY AS NEEDED (JOINT PAIN), Disp: 50 g, Rfl: 0    furosemide (LASIX) 20 mg tablet, TAKE 2 TABLETS EVERY DAY IN THE EARLY MORNING AND 1 TABLET DAILY AFTER LUNCH , Disp: 270 tablet, Rfl: 2    gabapentin (NEURONTIN) 100 mg capsule, TAKE 1 CAPSULE BY MOUTH TWICE A DAY WITH 300MG CAPSULE, Disp: 180 capsule, Rfl: 2    gabapentin (NEURONTIN) 300 mg capsule, Take 1 capsule (300 mg total) by mouth 2 (two) times a day Take 1 tab with your 100mg tab twice daily, Disp: 180 capsule, Rfl: 2    Gauze Pads & Dressings 5"X5" PADS, Use in the morning, Disp: 20 each, Rfl: 2    insulin aspart (NovoLOG FlexPen) 100 UNIT/ML injection pen, INJECT 18 UNITS WITH MEALS+SCALE ( - 150-200 -2 UNITS, 201-250-4 UNITS, 251-300 -6 UNITS, 301-350-8 UNITS, > 350- 10 UNITS ), Disp: 45 mL, Rfl: 1    Lancets (ONETOUCH ULTRASOFT) lancets, Use to test sugar 2 times a day, Disp: 100 each, Rfl: 3    Lantus SoloStar 100 units/mL injection pen, INJECT 46 UNITS UNDER THE SKIN DAILY AT BEDTIME INJECT 48 UNITS UNDER THE SKIN EVERY BEDTIME, Disp: 15 mL, Rfl: 0    losartan (COZAAR) 25 mg tablet, TAKE 1 TABLET BY MOUTH EVERY DAY, Disp: 90 tablet, Rfl: 2    metFORMIN (GLUCOPHAGE) 1000 MG tablet, TAKE ONE TABLET BY MOUTH TWICE DAILY, Disp: 180 tablet, Rfl: 3    nystatin (MYCOSTATIN) cream, Apply topically 2 (two) times a day as needed (itching, redness), Disp: 30 g, Rfl: 1    nystatin (MYCOSTATIN) powder, Apply 1 application topically 2 (two) times a day To affected area, Disp: 120 g, Rfl: 3    omeprazole (PriLOSEC) 40 MG capsule, Take 1 capsule (40 mg total) by mouth 2 (two) times a day, Disp: 180 capsule, Rfl: 1    OneTouch Verio test strip, USE TO TEST SUGAR 2 TIMES A DAY, Disp: 100 strip, Rfl: 3    oxyCODONE (ROXICODONE) 5 immediate release tablet, Take 1-2 tabs by mouth every 6 hours as needed for moderate or severe pains respectively  , Disp: 15 tablet, Rfl: 0    psyllium (METAMUCIL) packet, Take 1 packet by mouth daily, Disp: , Rfl: 0    saccharomyces boulardii (FLORASTOR) 250 mg capsule, Take 1 capsule (250 mg total) by mouth 2 (two) times a day, Disp: 60 capsule, Rfl: 1    Skin Protectants, Misc  ([employer] Textile 10"x144") SHEE, Apply 1 each topically every 5 (five) days, Disp: 3 each, Rfl: 5    sodium hypochlorite (DAKIN'S QUARTER-STRENGTH), Irrigate with 1 application as directed daily, Disp: 473 mL, Rfl: 0  No current facility-administered medications for this visit  Review of Systems   Constitutional: Negative for appetite change, chills, fatigue, fever and unexpected weight change  HENT: Negative for congestion, hearing loss, postnasal drip and sinus pressure  Respiratory: Positive for shortness of breath (On exertion)  Negative for cough  Cardiovascular: Positive for leg swelling (Lymphedema)  Endocrine: Negative  Genitourinary: Positive for urgency  Musculoskeletal: Positive for gait problem Sheyla Counter)  Negative for back pain  Skin: Positive for wound (Both lower extremities)  Negative for rash  Allergic/Immunologic: Negative  Neurological: Positive for numbness  Hematological: Does not bruise/bleed easily  Psychiatric/Behavioral: Negative  Negative for dysphoric mood  The patient is not nervous/anxious  Objective:  /80   Pulse (!) 108   Temp 98 °F (36 7 °C)   Resp 22   Pain Score:   5     Physical Exam  Vitals and nursing note reviewed  Constitutional:       Appearance: Normal appearance  He is well-developed  He is morbidly obese  HENT:      Head: Normocephalic and atraumatic  Cardiovascular:      Rate and Rhythm: Normal rate  Pulmonary:      Effort: Pulmonary effort is normal    Skin:     General: Skin is warm and dry  Findings: Wound present  No erythema  Comments:  Left lateral calf is closed  Right lateral calf is improved with good granulation tissue and minimal fibrin  Neurological:      Mental Status: He is alert and oriented to person, place, and time  Psychiatric:         Attention and Perception: Attention normal          Mood and Affect: Mood and affect normal          Behavior: Behavior is cooperative  Cognition and Memory: Cognition normal                         Wound Instructions:  Orders Placed This Encounter   Procedures    Wound cleansing and dressings     B/L lower legs:      Wash your hands with soap and water  Remove old dressing, discard into plastic bag and place in trash  Cleanse the wound with mild soap and water prior to applying a clean dressing  PLEASE WASH LEGS WITH SOAP AND WATER PRIOR TO DRESSING CHANGES   Do not use tissue or cotton balls  Do not scrub the wound  Pat dry using gauze  Shower yes with protection, purchase cast cover  Or sponge bathe        RIGHT LOWER LEG  Apply Lac Hydrin to B/L lower legs  Apply skin prep to the periwound  Cover the wound with a hydrocolloid  Apply Unna boot and tubifast yellow  Change twice a week  (Monday for [facility] to change)      LEFT LOWER LEG:   Apply unna boot and tubifast yellow  Make sure Unna boots are high enough so they do not slide down  Change dressing 2x a week (home care nurse change Sunday or Mon and Wound center Thurs) and as needed if soiled or lose integrity       Follow up at the wound center in one week  Today's wound treatment note:  Cleansed and dressed as above       Standing Status:   Future     Standing Expiration Date:   1/4/3303    Cast Application     This order was created via procedure documentation       Angelic Wilhelm MD, CHT, CWS    Portions of the record may have been created with voice recognition software  Occasional wrong word or "sound alike" substitutions may have occurred due to the inherent limitations of voice recognition software  Read the chart carefully and recognize, using context, where substitutions have occurred

## 2022-05-06 ENCOUNTER — TELEPHONE (OUTPATIENT)
Dept: SLEEP CENTER | Facility: CLINIC | Age: 57
End: 2022-05-06

## 2022-05-06 DIAGNOSIS — E11.65 TYPE 2 DIABETES MELLITUS WITH HYPERGLYCEMIA, WITH LONG-TERM CURRENT USE OF INSULIN (HCC): ICD-10-CM

## 2022-05-06 DIAGNOSIS — Z79.4 TYPE 2 DIABETES MELLITUS WITH HYPERGLYCEMIA, WITH LONG-TERM CURRENT USE OF INSULIN (HCC): ICD-10-CM

## 2022-05-06 NOTE — TELEPHONE ENCOUNTER
Pt states sleep medicine , they are refusing to give him refill on his supply for cpap machine  until he is seen and they cant see him till September or late July in [place]  Can you hep him  And still is waiting for novalog  Script

## 2022-05-06 NOTE — TELEPHONE ENCOUNTER
Patient was called to change his appt on 9/14/2022  Patient self scheduled as a consult and he is an existing patient  Patient returned phone call and was offered the soonest appt with Dr Bertin Ontiveros for 7/20/22 at 11:40AM  Advised patient he would be added to the cancellation list and someone would call him once we had a sooner follow up available  Patient stated that that was unacceptable and that we are holding up his sleep  Advised patient that he hasn't been seen in over a year and the doctor would not write a script until he was seen in the office  Patient got aggressive and started getting louder on the phone and said that it is our fault that he is not getting any sleep due to not having any sooner appts  Again offered the patient the July appt with Dr Bertin Ontiveros and the pt did not want to schedule and hung up

## 2022-05-07 RX ORDER — INSULIN ASPART 100 [IU]/ML
INJECTION, SOLUTION INTRAVENOUS; SUBCUTANEOUS
Qty: 45 ML | Refills: 1 | Status: SHIPPED | OUTPATIENT
Start: 2022-05-07

## 2022-05-09 ENCOUNTER — TELEPHONE (OUTPATIENT)
Dept: INTERNAL MEDICINE CLINIC | Facility: CLINIC | Age: 57
End: 2022-05-09

## 2022-05-09 NOTE — TELEPHONE ENCOUNTER
----- Message from Nena Franco MD sent at 5/9/2022  8:47 AM EDT -----  Hello,    Patient was asking if we can provide supplies for his cpap  Ideally, he would really need to see and follow up with sleep since he has not been evaluated by them for over a year  He would need to see them so he can be evaluated and get their supplies from them  They might even need to do adjustments and changes  Please let him know  Thank you

## 2022-05-09 NOTE — TELEPHONE ENCOUNTER
So he is not able to get in with sleep medicine until September or late July? If he comes here will you be able to help him with those supplies? because he states ,he keeps away to protect himself

## 2022-05-10 ENCOUNTER — TELEPHONE (OUTPATIENT)
Dept: SLEEP CENTER | Facility: CLINIC | Age: 57
End: 2022-05-10

## 2022-05-10 ENCOUNTER — TELEPHONE (OUTPATIENT)
Dept: INTERNAL MEDICINE CLINIC | Facility: CLINIC | Age: 57
End: 2022-05-10

## 2022-05-10 ENCOUNTER — TELEPHONE (OUTPATIENT)
Dept: LAB | Facility: HOSPITAL | Age: 57
End: 2022-05-10

## 2022-05-10 NOTE — TELEPHONE ENCOUNTER
I called patient to tell him I got him a visit with sleep medicine on 6-3-2022, with advanced practitioner milo  He spent 45 min on phone with me explaining why he didn't come to office , our's  and sleep medicine  They told him they didn't have anything until September, but offered him an appointment in July which he states to far to go, he would call them back  Office documented otherwise  in the back round, wife was threatening to eduardo us for not giving him his supplies  He states that he is willing to go anywhere, he is currently going to [place] for wound care  He also states that once he makes apt he should get his supplies, I told him he has to show up for visit  He does this all the time , wife always yelling and screaming profanities at me  He always says he is sorry on her behalf and when they get everything they want then they calm down

## 2022-05-10 NOTE — TELEPHONE ENCOUNTER
Spoke with [facility] internal medicine  Per office patient is asking for resupply order for CPAP  Advised that patient last saw Dr Cyrus Velasquez on 5/3/2021 patient needs to schedule compliance appointment with Dr Cyrus Velasquez  Previously offered July appointment and per note patient refused  Did advise that Dr Cyrus Velasquez sees sleep patient's in the pulmonary office if that is more convenient for patient   He can call  to schedule appointment

## 2022-05-11 ENCOUNTER — TELEPHONE (OUTPATIENT)
Dept: INTERNAL MEDICINE CLINIC | Facility: CLINIC | Age: 57
End: 2022-05-11

## 2022-05-11 NOTE — TELEPHONE ENCOUNTER
Supply will be given by us enough until he sees pulmonary, no refills and only once  He needs to follow up with Pulmonary going forward  This will be an exception until he can [address] with them

## 2022-05-11 NOTE — TELEPHONE ENCOUNTER
I called patient to let him know and his wife treatened to eduardo us for not giving him his supplies  I did call sleep medicine and arrange a visit for 6-3-2022

## 2022-05-11 NOTE — TELEPHONE ENCOUNTER
----- Message from Megan Hernandez MD sent at 5/9/2022  8:47 AM EDT -----  Hello,    Patient was asking if we can provide supplies for his cpap  Ideally, he would really need to see and follow up with sleep since he has not been evaluated by them for over a year  He would need to see them so he can be evaluated and get their supplies from them  They might even need to do adjustments and changes  Please let him know  Thank you

## 2022-05-11 NOTE — TELEPHONE ENCOUNTER
He also states no one tells him to come to office for visits, all of us have told him on several occasions to come to office , but for one reason or other he doesn't

## 2022-05-12 ENCOUNTER — OFFICE VISIT (OUTPATIENT)
Dept: WOUND CARE | Facility: CLINIC | Age: 57
End: 2022-05-12
Payer: MEDICARE

## 2022-05-12 VITALS
SYSTOLIC BLOOD PRESSURE: 128 MMHG | DIASTOLIC BLOOD PRESSURE: 78 MMHG | TEMPERATURE: 98.6 F | HEART RATE: 80 BPM | RESPIRATION RATE: 18 BRPM

## 2022-05-12 DIAGNOSIS — E66.01 MORBID OBESITY WITH BMI OF 70 AND OVER, ADULT (HCC): ICD-10-CM

## 2022-05-12 DIAGNOSIS — L89.893 PRESSURE INJURY OF LEFT LEG, STAGE 3 (HCC): ICD-10-CM

## 2022-05-12 DIAGNOSIS — I89.0 LYMPHEDEMA OF BOTH LOWER EXTREMITIES: ICD-10-CM

## 2022-05-12 DIAGNOSIS — L89.893 PRESSURE INJURY OF RIGHT LEG, STAGE 3 (HCC): Primary | ICD-10-CM

## 2022-05-12 PROCEDURE — 29580 STRAPPING UNNA BOOT: CPT

## 2022-05-12 PROCEDURE — 99213 OFFICE O/P EST LOW 20 MIN: CPT | Performed by: FAMILY MEDICINE

## 2022-05-12 RX ORDER — LIDOCAINE 40 MG/G
CREAM TOPICAL ONCE
Status: COMPLETED | OUTPATIENT
Start: 2022-05-12 | End: 2022-05-12

## 2022-05-12 RX ADMIN — LIDOCAINE: 40 CREAM TOPICAL at 11:32

## 2022-05-12 NOTE — PATIENT INSTRUCTIONS
Orders Placed This Encounter   Procedures    Wound cleansing and dressings     Wash your hands with soap and water  Remove old dressing, discard into plastic bag and place in trash  Cleanse the wound with mild soap and water prior to applying a clean dressing  PLEASE WASH LEGS WITH SOAP AND WATER PRIOR TO DRESSING CHANGES   Do not use tissue or cotton balls  Do not scrub the wound  Pat dry using gauze  Shower yes with protection, purchase cast cover  Or sponge bathe  RIGHT LOWER LEG  Apply Lac Hydrin to B/L lower legs  Apply skin prep to the periwound  Cover the wound with allevyn classic  Apply Unna boot and tubifast yellow  Change twice a week  (Monday for [facility] to change)        LEFT LOWER LEG:   Measured for circaids today  Tubigrip size G applied today     Follow up at the wound center in one week  Today's wound treatment note:  Cleansed and dressed as above       Standing Status:   Future     Standing Expiration Date:   5/12/2023

## 2022-05-12 NOTE — TELEPHONE ENCOUNTER
Domo Lucas were you able to get this for patient , dr Watson Sterling said we need to know what type of machine and what supplies he needs  She thought you were calling him  Let me know otherwise I will call him?

## 2022-05-12 NOTE — PROGRESS NOTES
Cast Application    Date/Time: 5/12/2022 11:50 AM  Performed by: Amisha Marshall RN  Authorized by: Rory Haley MD   Universal Protocol:  Consent: Verbal consent obtained  Consent given by: patient  Patient understanding: patient states understanding of the procedure being performed  Patient identity confirmed: verbally with patient      Pre-procedure details:     Sensation:  Normal  Procedure details:     Laterality:  Right    Location:  Leg    Leg:  R lower legCast type:  Unna boot      Supplies used: econo paste, coban  Post-procedure details:     Pain:  Improved    Sensation:  Normal    Patient tolerance of procedure: Tolerated well, no immediate complications  Comments:      UNNA BOOT wrap procedure     Before application, RAY and/or TBI determined to be adequate for healing and application of compression  Lower extremity washed prior to application of compression wrap  With the foot in dorsiflexed position, unna boot was applied as per physician orders without complications or complaint of pain  The procedure was tolerated well  Toes warm & pink post application  Patient provided education & reinforced to observe toes for any discoloration, swelling or tingling and instructed when to report to the [address] [facility] or to remove compression  Wound care as per providers orders, patient tolerated well

## 2022-05-12 NOTE — PROGRESS NOTES
Patient ID: Michael Tomlin is a 49 y o  male Date of Birth 1965       Chief Complaint   Patient presents with    Follow Up Wound Care Visit     Pressure injury of right leg, stage 3 ([facility])       Allergies:  Gluten meal - food allergy and Clindamycin    Diagnosis:      Diagnosis ICD-10-CM Associated Orders   1  Pressure injury of right leg, stage 3 (HCC)  L89 893 lidocaine (LMX) 4 % cream     Wound cleansing and dressings     Cast Application   2  Pressure injury of left leg, stage 3 (HCC)  L89 893 Wound cleansing and dressings   3  Lymphedema of both lower extremities  I89 0 Ambulatory Referral to PT/OT Lymphedema Therapy   4  Morbid obesity with BMI of 70 and over, adult [facility])  E66 01 Ambulatory Referral to PT/OT Lymphedema Therapy    [id number]            Assessment & Plan:   Left lower extremity pressure injury means closed   Right lower extremity pressure injury stage III, with increased epithelialization centrally but overall measurements unchanged  Because of drainage, will use bordered foam instead of hydrocolloid  Continue with [employer]   Patient was referred to lymphedema clinic for consultation   Measurements taken for Circ Aids   Morbid obesity  Subjective:   2/17/22: This is a 82-year-old male who comes in [address] [place] [address] with ulcers on the right and left calfs  He states that he was hospitalized on November of 2021 with cellulitis and he had the ulcers at that time  Patient is morbidly obese and has history of lymphedema as well  He does not use any form of compression other than Ace wraps  He had purchased alginate on the Internet as well as bordered foam is  He notes that there is heavy drainage requiring dressings to be changed once or twice a day  He believes that these ulcers had started due to  sleeping in a recliner and the foot rest causing pressure on the calfs  He is unable to sleep in a bed    He notes he has increased pain at nighttime when in there is recliner with pressure on his calf  He is ambulatory with a walker but does not go out of the house other than to doctor visits  Patient also has a history of type 2 diabetes with good control with last A1c 6 5 in October of 2021  He states that he has lost approximately 120 lb in the past nine months with a special meal plan  He plans on continuing with weight loss  2/24/22: Followup stage III pressure injuries of right and left calfs  They purchased memory foam pillows which seems to help with both pain and offloading the areas when he is sleeping  Still has pain mainly at night  Left greater than right  3/3/22: Followup stage III pressure injuries of the right left calfs  Patient is upset about visiting nurse not having proper supplies  Then there was leakage through the [employer]  Still has pain at nighttime  No fever chills  3/10/22: Followup stage III pressure injuries of the right and left calfs  Patient states that the visiting nurse had a "observer" do the Unna boot on his right leg which slid down  Still has pain mainly in the left calf  They are trying various offloading techniques  3/17/22: Followup stage III pressure injuries of the right and left calfs  Patient states he still has pain but slightly less particularly on the left  Starting to have some itching  No fever or chills  Still has not found the proper offloading device  3/24/22: Followup stage III pressure injuries of both calfs continues to have some pain as in the past   No fever or chills  Trying to offload when in bed     3/31/22: Followup stage III pressure injuries of both lateral calf  Continues to have the same amount of pain  States he is trying offload  No fever chills  4/7/22:  Patient is a patient of Dr Stacie Mlils who presents for followup of bilateral LE ulcers  Has had antimicrobial endoform and hydrofera blue on the wounds and Unna boot for compression    4/14/22:   Followup stage III pressure injuries of both lateral calfs  Unna boots  Has no pain on the left calf anymore  Much less on the right  No fever chills  4/21/22: Followup stage III pressure injuries of both lateral calfs  Tolerating Unna boots although they tend to slide down  No pain at this time  4/28/22: Followup stage III pressure injuries of both lateral calfs  No complaints at this time  No pain  5/5/22: Followup stage III pressure injuries of both lateral calf  No complaints  No pain  5/12/22: Followup stage III pressure injuries of both lateral calfs  At last visit, the left calf was closed  Hydrocolloid on the right calf was leaking  No other complaints          The following portions of the patient's history were reviewed and updated as appropriate:   Patient Active Problem List   Diagnosis    Type 2 diabetes mellitus with hyperglycemia, with long-term current use of insulin ([address] )    Hyperlipidemia    Psoriasis    Venous insufficiency    Gout    Essential hypertension    Hyponatremia    Gluten intolerance    Diabetic neuropathy (HCC)    Insomnia    Scrotal swelling    Erectile dysfunction    Bilateral leg edema    Elevated CO2 level    Abnormal thyroid function test    DAHIANA (obstructive sleep apnea)    Fungal infection    Morbid obesity with BMI of 70 and over, adult ([address] )    Cellulitis of right lower extremity    Migraine headache    Onychomycosis    Ulcer of left lower extremity, limited to breakdown of skin ([address] )    Pressure injury of right leg, stage 3 ([address] )     Past Medical History:   Diagnosis Date    Acute kidney injury 10/28/2021    Anemia 09/13/2019    Cellulitis     last assessed 12/10/15    Cellulitis of left lower extremity     Cough 10/20/2019    Diabetes mellitus ([address] )     Disease of thyroid gland     Edema     Elevated liver enzymes     Elevated serum creatinine 11/19/2021    Esophageal reflux     Gluten intolerance     Gout     last assessed 09/05/13    Hyperglycemia     Hypertension     IBS (irritable bowel syndrome)     Insomnia     Obesity     Osteoarthritis of knee     last assessed 02/10/14    Shortness of breath 5/3/2021    Venous insufficiency     last assessed 08/22/17    Villonodular synovitis of the hand, right     last assessed 11/14/2013     Past Surgical History:   Procedure Laterality Date    INCISION AND DRAINAGE OF WOUND Left 9/6/2019    Procedure: INCISION AND DRAINAGE (I&D) GROIN;  Surgeon: Miguel A Barton DO;  [facility];  Service: General    SKIN BIOPSY      WOUND DEBRIDEMENT Left 9/7/2019    Procedure: EXCISIONAL DEBRIDEMENT;  Surgeon: Miguel A Barton DO;  [facility];  Service: General     Family History   Problem Relation Age of Onset    Cancer Mother         gastrc    Colon cancer Father     Heart failure Father       Social History     Socioeconomic History    Marital status: /Civil Union     Spouse name: Not on file    Number of children: Not on file    Years of education: Not on file    Highest education level: Not on file   Occupational History    Not on file   Tobacco Use    Smoking status: Never Smoker    Smokeless tobacco: Never Used   Vaping Use    Vaping Use: Never used   Substance and Sexual Activity    Alcohol use: Not Currently     Alcohol/week: 0 0 standard drinks    Drug use: No    Sexual activity: Yes   Other Topics Concern    Not on file   Social History Narrative    Not on file     Social Determinants of Health     Financial Resource Strain: Not on file   Food Insecurity: Not on file   Transportation Needs: No Transportation Needs    Lack of Transportation (Medical): No    Lack of Transportation (Non-Medical):  No   Physical Activity: Not on file   Stress: Not on file   Social Connections: Not on file   Intimate Partner Violence: Not on file   Housing Stability: Not on file        Current Outpatient Medications:     acetaminophen (TYLENOL) 325 mg tablet, Take 2 tablets (650 mg total) by mouth every 4 (four) hours as needed for mild pain, headaches or fever, Disp: , Rfl: 0    albuterol (PROVENTIL HFA,VENTOLIN HFA) 90 mcg/act inhaler, TAKE 2 PUFFS BY MOUTH EVERY 6 HOURS AS NEEDED FOR WHEEZE, Disp: 8 5 g, Rfl: 0    BD Pen Needle Ludmila 2nd Gen 32G X 4 MM MISC, USE 4 TIMES A DAY, Disp: 200 each, Rfl: 3    Blood Glucose Monitoring Suppl (ONETOUCH VERIO) w/Device KIT, Use to test sugar daily (Patient not taking: Reported on 10/28/2021), Disp: 1 kit, Rfl: 0    Calcium Alginate (Maxorb II Alginate Dressing) MISC, Apply 1 each topically in the morning, Disp: 10 each, Rfl: 2    Continuous Blood Gluc  (FreeStyle Connell 2 [place] Sy[employer]m) BROOKS, Use 1 Device as needed (use with sensors for bs checks), Disp: 1 Device, Rfl: 0    Continuous Blood Gluc Sensor (BolocoStyle Elvin 14 Day Sensor) MISC, USE ONE SENSOR EVERY 2 WEEKS, Disp: 2 each, Rfl: 3    Control Gel Formula Dressing (DuoDERM CGF Dressing) MISC, Apply 1 application topically every 5 (five) days, Disp: 5 each, Rfl: 1    [employer]S Diclofenac Sodium 1 %, APPLY 4 G TOPICALLY 4 (FOUR) TIMES A DAY AS NEEDED (JOINT PAIN), Disp: 50 g, Rfl: 0    furosemide (LASIX) 20 mg tablet, TAKE 2 TABLETS EVERY DAY IN THE EARLY MORNING AND 1 TABLET DAILY AFTER LUNCH , Disp: 270 tablet, Rfl: 2    gabapentin (NEURONTIN) 100 mg capsule, TAKE 1 CAPSULE BY MOUTH TWICE A DAY WITH 300MG CAPSULE, Disp: 180 capsule, Rfl: 2    gabapentin (NEURONTIN) 300 mg capsule, Take 1 capsule (300 mg total) by mouth 2 (two) times a day Take 1 tab with your 100mg tab twice daily, Disp: 180 capsule, Rfl: 2    Gauze Pads & Dressings 5"X5" PADS, Use in the morning, Disp: 20 each, Rfl: 2    insulin aspart (NovoLOG FlexPen) 100 UNIT/ML injection pen, INJECT 18 UNITS WITH MEALS+SCALE ( - 150-200 -2 UNITS, 201-250-4 UNITS, 251-300 -6 UNITS, 301-350-8 UNITS, > 350- 10 UNITS ), Disp: 45 mL, Rfl: 1    Lancets (ONETOUCH ULTRASOFT) lancets, Use to test sugar 2 times a day, Disp: 100 each, Rfl: 3    Lantus SoloStar 100 units/mL injection pen, INJECT 46 UNITS UNDER THE SKIN DAILY AT BEDTIME INJECT 48 UNITS UNDER THE SKIN EVERY BEDTIME, Disp: 15 mL, Rfl: 0    losartan (COZAAR) 25 mg tablet, TAKE 1 TABLET BY MOUTH EVERY DAY, Disp: 90 tablet, Rfl: 2    metFORMIN (GLUCOPHAGE) 1000 MG tablet, TAKE ONE TABLET BY MOUTH TWICE DAILY, Disp: 180 tablet, Rfl: 3    nystatin (MYCOSTATIN) cream, Apply topically 2 (two) times a day as needed (itching, redness), Disp: 30 g, Rfl: 1    nystatin (MYCOSTATIN) powder, Apply 1 application topically 2 (two) times a day To affected area, Disp: 120 g, Rfl: 3    omeprazole (PriLOSEC) 40 MG capsule, Take 1 capsule (40 mg total) by mouth 2 (two) times a day, Disp: 180 capsule, Rfl: 1    OneTouch Verio test strip, USE TO TEST SUGAR 2 TIMES A DAY, Disp: 100 strip, Rfl: 3    oxyCODONE (ROXICODONE) 5 immediate release tablet, Take 1-2 tabs by mouth every 6 hours as needed for moderate or severe pains respectively  , Disp: 15 tablet, Rfl: 0    psyllium (METAMUCIL) packet, Take 1 packet by mouth daily, Disp: , Rfl: 0    saccharomyces boulardii (FLORASTOR) 250 mg capsule, Take 1 capsule (250 mg total) by mouth 2 (two) times a day, Disp: 60 capsule, Rfl: 1    Skin Protectants, Misc  ([employer] Textile 10"x144") SHEE, Apply 1 each topically every 5 (five) days, Disp: 3 each, Rfl: 5    sodium hypochlorite (DAKIN'S QUARTER-STRENGTH), Irrigate with 1 application as directed daily, Disp: 473 mL, Rfl: 0  No current facility-administered medications for this visit  Review of Systems   Constitutional: Negative for appetite change, chills, fatigue, fever and unexpected weight change  HENT: Negative for congestion, hearing loss, postnasal drip and sinus pressure  Respiratory: Positive for shortness of breath (On exertion)  Negative for cough  Cardiovascular: Positive for leg swelling (Lymphedema)  Endocrine: Negative  Genitourinary: Positive for urgency  Musculoskeletal: Positive for gait problem [facility])  Negative for back pain  Skin: Positive for wound (Right lateral calf)  Negative for rash  Allergic/Immunologic: Negative  Neurological: Positive for numbness  Hematological: Does not bruise/bleed easily  Psychiatric/Behavioral: Negative  Negative for dysphoric mood  The patient is not nervous/anxious  Objective:  /78   Pulse 80   Temp 98 6 °F (37 °C)   Resp 18   Pain Score:   5     Physical Exam  Vitals and nursing note reviewed  Constitutional:       Appearance: Normal appearance  He is well-developed  He is morbidly obese  HENT:      Head: Normocephalic and atraumatic  Cardiovascular:      Rate and Rhythm: Normal rate  Pulmonary:      Effort: Pulmonary effort is normal    Skin:     General: Skin is warm and dry  Findings: Wound present  No erythema  Comments:  Left lateral calf remains closed  Right lateral calf is improved with good granulation tissue and minimal fibrin  Overall size is unchanged but there is increased epithelialization centrally  Neurological:      Mental Status: He is alert and oriented to person, place, and time  Psychiatric:         Attention and Perception: Attention normal          Mood and Affect: Mood and affect normal          Behavior: Behavior is cooperative  Cognition and Memory: Cognition normal          Wound 02/17/22 Pressure Injury Leg Right;Lateral;Lower (Active)   Wound Image   05/12/22 1103   Wound Description Pink;Epithelialization; Beefy red 05/12/22 1110   Pressure Injury Stage 3 03/03/22 1555   Chelsie-wound Assessment Edema; Hyperpigmented;Scar Tissue 05/12/22 1110   Wound Length (cm) 3 cm 05/12/22 1110   Wound Width (cm) 1 6 cm 05/12/22 1110   Wound Depth (cm) 0 1 cm 05/12/22 1110   Wound Surface Area (cm^2) 4 8 cm^2 05/12/22 1110   Wound Volume (cm^3) 0 48 cm^3 05/12/22 1110   Calculated Wound Volume (cm^3) 0 48 cm^3 05/12/22 1110   Change in Wound Size % 50 05/12/22 1110   Drainage Amount Moderate 05/12/22 1110   Drainage Description Serosanguineous 05/12/22 1110   Non-staged Wound Description Full thickness 05/05/22 1119   Treatments Irrigation with NSS 05/05/22 1119   Patient Tolerance Tolerated well 05/12/22 1110   Dressing Status Removed 05/12/22 1110                                      Wound Instructions:  Orders Placed This Encounter   Procedures    Wound cleansing and dressings     Wash your hands with soap and water  Remove old dressing, discard into plastic bag and place in trash  Cleanse the wound with mild soap and water prior to applying a clean dressing  PLEASE WASH LEGS WITH SOAP AND WATER PRIOR TO DRESSING CHANGES   Do not use tissue or cotton balls  Do not scrub the wound  Pat dry using gauze  Shower yes with protection, purchase cast cover  Or sponge bathe        RIGHT LOWER LEG  Apply Lac Hydrin to B/L lower legs  Apply skin prep to the periwound  Cover the wound with allevyn classic  Apply Unna boot and tubifast yellow  Change twice a week  (Monday for [facility] to change)        LEFT LOWER LEG:   Measured for circaids today  Tubigrip size G applied today     Follow up at the wound center in one week  Today's wound treatment note:  Cleansed and dressed as above  Standing Status:   Future     Standing Expiration Date:   3/33/9995    Cast Application     This order was created via procedure documentation    Ambulatory Referral to PT/OT Lymphedema Therapy     Standing Status:   Future     Standing Expiration Date:   5/12/2023     Referral Priority:   Routine     Referral Type:   Physical Therapy     Referral Reason:   Specialty Services Required     Number of Visits Requested:   1     Expiration Date:   5/12/2023         Concepcion Florez MD, CHT, CWS    Portions of the record may have been created with voice recognition software   Occasional wrong word or "sound alike" substitutions may have occurred due to the inherent limitations of voice recognition software  Read the chart carefully and recognize, using context, where substitutions have occurred

## 2022-05-12 NOTE — TELEPHONE ENCOUNTER
I called Malcolm Raygoza to let follow up and I will need to contact Leatha tomorrow to get the order for signature  Malcolm Raygoza said it was not hurry and he appreciates us helping him out   Thank you

## 2022-05-16 ENCOUNTER — TELEPHONE (OUTPATIENT)
Dept: WOUND CARE | Facility: CLINIC | Age: 57
End: 2022-05-16

## 2022-05-16 NOTE — TELEPHONE ENCOUNTER
Dee, visiting nurse called to say patient's LLE wound "has re-opened"  She is requesting direction regarding dressing  Per Dr Danielle Cameron, calcium alginate to wound if it is draining, unna boot over alginate  Dee informed and understands

## 2022-05-19 ENCOUNTER — OFFICE VISIT (OUTPATIENT)
Dept: WOUND CARE | Facility: CLINIC | Age: 57
End: 2022-05-19
Payer: MEDICARE

## 2022-05-19 VITALS
HEART RATE: 112 BPM | SYSTOLIC BLOOD PRESSURE: 118 MMHG | DIASTOLIC BLOOD PRESSURE: 70 MMHG | RESPIRATION RATE: 24 BRPM | TEMPERATURE: 97.8 F

## 2022-05-19 DIAGNOSIS — L89.892 PRESSURE INJURY OF LEFT LEG, STAGE 2 (HCC): ICD-10-CM

## 2022-05-19 DIAGNOSIS — L89.893 PRESSURE INJURY OF RIGHT LEG, STAGE 3 (HCC): ICD-10-CM

## 2022-05-19 DIAGNOSIS — I89.0 LYMPHEDEMA OF BOTH LOWER EXTREMITIES: Primary | ICD-10-CM

## 2022-05-19 PROCEDURE — 99213 OFFICE O/P EST LOW 20 MIN: CPT | Performed by: FAMILY MEDICINE

## 2022-05-19 PROCEDURE — 29580 STRAPPING UNNA BOOT: CPT

## 2022-05-19 PROCEDURE — 97597 DBRDMT OPN WND 1ST 20 CM/<: CPT | Performed by: FAMILY MEDICINE

## 2022-05-19 RX ORDER — LIDOCAINE 40 MG/G
CREAM TOPICAL ONCE
Status: COMPLETED | OUTPATIENT
Start: 2022-05-19 | End: 2022-05-19

## 2022-05-19 RX ADMIN — LIDOCAINE: 40 CREAM TOPICAL at 12:23

## 2022-05-19 NOTE — PATIENT INSTRUCTIONS
Orders Placed This Encounter   Procedures    Wound cleansing and dressings     Wound cleansing and dressings      Wash your hands with soap and water  Remove old dressing, discard into plastic bag and place in trash  Cleanse the wound with mild soap and water prior to applying a clean dressing  PLEASE WASH LEGS WITH SOAP AND WATER PRIOR TO DRESSING CHANGES   Do not use tissue or cotton balls  Do not scrub the wound  Pat dry using gauze  Shower yes with protection, purchase cast cover  Or sponge bathe  RIGHT LOWER LEG  Apply Lac Hydrin to B/L lower legs  Apply skin prep to the periwound  Cover the wound with allevyn classic  Apply Unna boot and tubifast yellow  Change twice a week  (Monday for [facility] to change)        LEFT LOWER LEG:   Apply Lac Hydrin to B/L lower legs  Apply skin prep to the periwound  Cover the wound with alginate  Apply Unna boot and tubifast yellow  Change twice a week  (Monday for [facility] to change)    Dressings to be changed twice per week  Once in wound center and and once by [facility]  Follow up at the wound center in one week  Today's wound treatment note:  Cleansed and dressed as above       Standing Status:   Future     Standing Expiration Date:   5/19/2023

## 2022-05-19 NOTE — PROGRESS NOTES
Patient ID: Gretta Epley is a 49 y o  male Date of Birth 1965       Chief Complaint   Patient presents with    Follow Up Wound Care Visit     Wounds BLLE       Allergies:  Gluten meal - food allergy and Clindamycin    Diagnosis:      Diagnosis ICD-10-CM Associated Orders   1  Lymphedema of both lower extremities  I89 0 lidocaine (LMX) 4 % cream     Wound cleansing and dressings     Cast Application     Cast Application   2  Pressure injury of left leg, stage 2 ([facility])  L89 892 lidocaine (LMX) 4 % cream     Wound cleansing and dressings     Cast Application     Cast Application   3  Pressure injury of right leg, stage 3 ([facility])  S61 671 Debridement           Assessment & Plan:   New open ulcer of the left lateral calf  Previous ulcer had completely healed  Stage II pressure injury   Improved pressure injury of the right lateral calf  Selective debridement   Unna boots to bilateral lower extremity   Lymphedema  Circ Aids have been ordered  Subjective:   2/17/22: This is a 53-year-old male who comes in [address] [place] [address] with ulcers on the right and left calfs  He states that he was hospitalized on November of 2021 with cellulitis and he had the ulcers at that time  Patient is morbidly obese and has history of lymphedema as well  He does not use any form of compression other than Ace wraps  He had purchased alginate on the Internet as well as bordered foam is  He notes that there is heavy drainage requiring dressings to be changed once or twice a day  He believes that these ulcers had started due to  sleeping in a recliner and the foot rest causing pressure on the calfs  He is unable to sleep in a bed  He notes he has increased pain at nighttime when in there is recliner with pressure on his calf  He is ambulatory with a walker but does not go out of the house other than to doctor visits  Patient also has a history of type 2 diabetes with good control with last A1c 6 5 in October of 2021  He states that he has lost approximately 120 lb in the past nine months with a special meal plan  He plans on continuing with weight loss  2/24/22: Followup stage III pressure injuries of right and left calfs  They purchased memory foam pillows which seems to help with both pain and offloading the areas when he is sleeping  Still has pain mainly at night  Left greater than right  3/3/22: Followup stage III pressure injuries of the right left calfs  Patient is upset about visiting nurse not having proper supplies  [facility]  Still has pain at nighttime  No fever chills  3/10/22: Followup stage III pressure injuries of the right and left calfs  Patient states that the visiting nurse had a "observer" do the Unna boot on his right leg which slid down  Still has pain mainly in the left calf  They are trying various offloading techniques  3/17/22: Followup stage III pressure injuries of the right and left calfs  Patient states he still has pain but slightly less particularly on the left  Starting to have some itching  No fever or chills  Still has not found the proper offloading device  3/24/22: Followup stage III pressure injuries of both calfs continues to have some pain as in the past   No fever or chills  Trying to offload when in bed     3/31/22: Followup stage III pressure injuries of both lateral calf  Continues to have the same amount of pain  States he is trying offload  No fever chills  4/7/22:  Patient is a patient of Dr Ronan Bean who presents for followup of bilateral LE ulcers  Has had antimicrobial endoform and hydrofera blue on the wounds and Unna boot for compression    4/14/22: Followup stage III pressure injuries of both lateral calfs  Unna boots  Has no pain on the left calf anymore  Much less on the right  No fever chills  4/21/22: Followup stage III pressure injuries of both lateral calfs    Tolerating Unna boots although they tend to slide down  No pain at this time  4/28/22: Followup stage III pressure injuries of both lateral calfs  No complaints at this time  No pain  5/5/22: Followup stage III pressure injuries of both lateral calf  No complaints  No pain  5/12/22: Followup stage III pressure injuries of both lateral calfs  At last visit, the left calf was closed  Hydrocolloid on the right calf was leaking  No other complaints  5/19/22: Followup stage III pressure injury of the right lateral calf  Left calf was closed at last visit  Received a telephone call from [facility] stating that the left calf has a new ulcer adjacent to the healed area  Tubigrip was use on the left because of previously healed ulcer  Bilateral lower extremity lymphedema  [facility]NA placed new Unna boot on the left  No other complaints          The following portions of the patient's history were reviewed and updated as appropriate:   Patient Active Problem List   Diagnosis    Type 2 diabetes mellitus with hyperglycemia, with long-term current use of insulin ([address] )    Hyperlipidemia    Psoriasis    Venous insufficiency    Gout    Essential hypertension    Hyponatremia    Gluten intolerance    Diabetic neuropathy (HCC)    Insomnia    Scrotal swelling    Erectile dysfunction    Bilateral leg edema    Elevated CO2 level    Abnormal thyroid function test    DAHIANA (obstructive sleep apnea)    Fungal infection    Morbid obesity with BMI of 70 and over, adult ([address] )    Cellulitis of right lower extremity    Migraine headache    Onychomycosis    Ulcer of left lower extremity, limited to breakdown of skin ([address] )    Pressure injury of right leg, stage 3 ([address] )     Past Medical History:   Diagnosis Date    Acute kidney injury 10/28/2021    Anemia 09/13/2019    Cellulitis     last assessed 12/10/15    Cellulitis of left lower extremity     Cough 10/20/2019    Diabetes mellitus ([address] )     Disease of thyroid gland     Edema     Elevated liver enzymes     Elevated serum creatinine 11/19/2021    Esophageal reflux     Gluten intolerance     Gout     last assessed 09/05/13    Hyperglycemia     Hypertension     IBS (irritable bowel syndrome)     Insomnia     Obesity     Osteoarthritis of knee     last assessed 02/10/14    Shortness of breath 5/3/2021    Venous insufficiency     last assessed 08/22/17    Villonodular synovitis of the hand, right     last assessed 11/14/2013     Past Surgical History:   Procedure Laterality Date    INCISION AND DRAINAGE OF WOUND Left 9/6/2019    Procedure: INCISION AND DRAINAGE (I&D) GROIN;  Surgeon: Bobo Sanchez DO;  [facility];  Service: General    SKIN BIOPSY      WOUND DEBRIDEMENT Left 9/7/2019    Procedure: EXCISIONAL DEBRIDEMENT;  Surgeon: Bobo Sanchez DO;  [facility];  Service: General     Family History   Problem Relation Age of Onset    Cancer Mother         gastrc    Colon cancer Father     Heart failure Father       Social History     Socioeconomic History    Marital status: /Civil Union     Spouse name: None    Number of children: None    Years of education: None    Highest education level: None   Occupational History    None   Tobacco Use    Smoking status: Never Smoker    Smokeless tobacco: Never Used   Vaping Use    Vaping Use: Never used   Substance and Sexual Activity    Alcohol use: Not Currently     Alcohol/week: 0 0 standard drinks    Drug use: No    Sexual activity: Yes   Other Topics Concern    None   Social History Narrative    None     Social Determinants of Health     Financial Resource Strain: Not on file   Food Insecurity: Not on file   Transportation Needs: No Transportation Needs    Lack of Transportation (Medical): No    Lack of Transportation (Non-Medical):  No   Physical Activity: Not on file   Stress: Not on file   Social Connections: Not on file   Intimate Partner Violence: Not on file   Housing Stability: Not on file        Current Outpatient Medications:     acetaminophen (TYLENOL) 325 mg tablet, Take 2 tablets (650 mg total) by mouth every 4 (four) hours as needed for mild pain, headaches or fever, Disp: , Rfl: 0    albuterol (PROVENTIL HFA,VENTOLIN HFA) 90 mcg/act inhaler, TAKE 2 PUFFS BY MOUTH EVERY 6 HOURS AS NEEDED FOR WHEEZE, Disp: 8 5 g, Rfl: 0    BD Pen Needle Ludmila 2nd Gen 32G X 4 MM MISC, USE 4 TIMES A DAY, Disp: 200 each, Rfl: 3    Blood Glucose Monitoring Suppl (ONETOUCH VERIO) w/Device KIT, Use to test sugar daily (Patient not taking: Reported on 10/28/2021), Disp: 1 kit, Rfl: 0    Calcium Alginate (Maxorb II Alginate Dressing) MISC, Apply 1 each topically in the morning, Disp: 10 each, Rfl: 2    Continuous Blood Gluc  (FreeStyle [address] [place] [employer]) BROOKS, Use 1 Device as needed (use with sensors for bs checks), Disp: 1 Device, Rfl: 0    Continuous Blood Gluc Sensor (FreeStyle Elvin 14 Day Sensor) MISC, USE ONE SENSOR EVERY 2 WEEKS, Disp: 2 each, Rfl: 3    Control Gel Formula Dressing (DuoDERM CGF Dressing) MIS, Apply 1 application topically every 5 (five) days, Disp: 5 each, Rfl: 1    [employer] Diclofenac Sodium 1 %, APPLY 4 G TOPICALLY 4 (FOUR) TIMES A DAY AS NEEDED (JOINT PAIN), Disp: 50 g, Rfl: 0    furosemide (LASIX) 20 mg tablet, TAKE 2 TABLETS EVERY DAY IN THE EARLY MORNING AND 1 TABLET DAILY AFTER LUNCH , Disp: 270 tablet, Rfl: 2    gabapentin (NEURONTIN) 100 mg capsule, TAKE 1 CAPSULE BY MOUTH TWICE A DAY WITH 300MG CAPSULE, Disp: 180 capsule, Rfl: 2    gabapentin (NEURONTIN) 300 mg capsule, Take 1 capsule (300 mg total) by mouth 2 (two) times a day Take 1 tab with your 100mg tab twice daily, Disp: 180 capsule, Rfl: 2    Gauze Pads & Dressings 5"X5" PADS, Use in the morning, Disp: 20 each, Rfl: 2    insulin aspart (NovoLOG FlexPen) 100 UNIT/ML injection pen, INJECT 18 UNITS WITH MEALS+SCALE ( - 150-200 -2 UNITS, 201-250-4 UNITS, 251-300 -6 UNITS, 301-350-8 UNITS, > 350- 10 UNITS ), Disp: 45 mL, Rfl: 1    Lancets (ONETOUCH ULTRASOFT) lancets, Use to test sugar 2 times a day, Disp: 100 each, Rfl: 3    Lantus SoloStar 100 units/mL injection pen, INJECT 46 UNITS UNDER THE SKIN DAILY AT BEDTIME INJECT 48 UNITS UNDER THE SKIN EVERY BEDTIME, Disp: 15 mL, Rfl: 0    losartan (COZAAR) 25 mg tablet, TAKE 1 TABLET BY MOUTH EVERY DAY, Disp: 90 tablet, Rfl: 2    metFORMIN (GLUCOPHAGE) 1000 MG tablet, TAKE ONE TABLET BY MOUTH TWICE DAILY, Disp: 180 tablet, Rfl: 3    nystatin (MYCOSTATIN) cream, Apply topically 2 (two) times a day as needed (itching, redness), Disp: 30 g, Rfl: 1    nystatin (MYCOSTATIN) powder, Apply 1 application topically 2 (two) times a day To affected area, Disp: 120 g, Rfl: 3    omeprazole (PriLOSEC) 40 MG capsule, TAKE 1 CAPSULE BY MOUTH TWICE A DAY, Disp: 180 capsule, Rfl: 1    OneTouch Verio test strip, USE TO TEST SUGAR 2 TIMES A DAY, Disp: 100 strip, Rfl: 3    oxyCODONE (ROXICODONE) 5 immediate release tablet, Take 1-2 tabs by mouth every 6 hours as needed for moderate or severe pains respectively  , Disp: 15 tablet, Rfl: 0    psyllium (METAMUCIL) packet, Take 1 packet by mouth daily, Disp: , Rfl: 0    saccharomyces boulardii (FLORASTOR) 250 mg capsule, Take 1 capsule (250 mg total) by mouth 2 (two) times a day, Disp: 60 capsule, Rfl: 1    Skin Protectants, Misc  ([employer] Textile 10"x144") SHEE, Apply 1 each topically every 5 (five) days, Disp: 3 each, Rfl: 5    sodium hypochlorite (DAKIN'S QUARTER-STRENGTH), Irrigate with 1 application as directed daily, Disp: 473 mL, Rfl: 0    Review of Systems   Constitutional: Negative for appetite change, chills, fatigue, fever and unexpected weight change  HENT: Negative for congestion, hearing loss, postnasal drip and sinus pressure  Respiratory: Positive for shortness of breath (On exertion)  Negative for cough  Cardiovascular: Positive for leg swelling (Lymphedema)  Endocrine: Negative      Genitourinary: Positive for urgency  Musculoskeletal: Positive for gait problem Bill Hard)  Negative for back pain  Skin: Positive for wound (Right lateral calf and new left lateral calf)  Negative for rash  Allergic/Immunologic: Negative  Neurological: Positive for numbness  Hematological: Does not bruise/bleed easily  Psychiatric/Behavioral: Negative  Negative for dysphoric mood  The patient is not nervous/anxious  Objective:  /70   Pulse (!) 112   Temp 97 8 °F (36 6 °C)   Resp (!) 24   Pain Score:   5     Physical Exam  Vitals and nursing note reviewed  Constitutional:       Appearance: Normal appearance  He is well-developed  He is morbidly obese  HENT:      Head: Normocephalic and atraumatic  Cardiovascular:      Rate and Rhythm: Normal rate  Pulmonary:      Effort: Pulmonary effort is normal    Skin:     General: Skin is warm and dry  Findings: Wound present  No erythema  Comments: Left lateral calf with small stage II ulceration  Right lateral calf is improved with good granulation tissue and fibrin  Overall size is smaller  Neurological:      Mental Status: He is alert and oriented to person, place, and time  Psychiatric:         Attention and Perception: Attention normal          Mood and Affect: Mood and affect normal          Behavior: Behavior is cooperative  Cognition and Memory: Cognition normal          Wound 02/17/22 Pressure Injury Leg Right;Lateral;Lower (Active)   Wound Image   05/19/22 1126   Wound Description Pink;Epithelialization;Yellow 05/19/22 1127   Pressure Injury Stage 3 03/03/22 1555   Chelsie-wound Assessment Edema; Hyperpigmented;Scar Tissue 05/19/22 1127   Wound Length (cm) 2 8 cm 05/19/22 1127   Wound Width (cm) 1 5 cm 05/19/22 1127   Wound Depth (cm) 0 1 cm 05/19/22 1127   Wound Surface Area (cm^2) 4 2 cm^2 05/19/22 1127   Wound Volume (cm^3) 0 42 cm^3 05/19/22 1127   Calculated Wound Volume (cm^3) 0 42 cm^3 05/19/22 1127   Change in Wound Size % 56 25 05/19/22 1127   Drainage Amount Small 05/19/22 1127   Drainage Description Serosanguineous 05/19/22 1127   Non-staged Wound Description Full thickness 05/19/22 1127   Treatments Irrigation with NSS;Cleansed 05/19/22 1127   Patient Tolerance Tolerated well 05/12/22 1110   Dressing Status Removed 05/12/22 1110       Wound 05/19/22 Venous Ulcer Pretibial Left;Lateral (Active)   Wound Image   05/19/22 1135   Wound Description Pink;Yellow 05/19/22 1142   Chelsie-wound Assessment Hyperpigmented;Edema 05/19/22 1142   Wound Length (cm) 0 5 cm 05/19/22 1142   Wound Width (cm) 0 5 cm 05/19/22 1142   Wound Depth (cm) 0 1 cm 05/19/22 1142   Wound Surface Area (cm^2) 0 25 cm^2 05/19/22 1142   Wound Volume (cm^3) 0 025 cm^3 05/19/22 1142   Calculated Wound Volume (cm^3) 0 03 cm^3 05/19/22 1142   Drainage Amount Small 05/19/22 1142   Drainage Description Sanguineous 05/19/22 1142   Non-staged Wound Description Full thickness 05/19/22 1142   Treatments Irrigation with NSS;Cleansed 05/19/22 1142   Patient Tolerance Tolerated poorly 05/19/22 1142                            Debridement   Wound 02/17/22 Pressure Injury Leg Right;Lateral;Lower    Universal Protocol:  Consent: Verbal consent obtained  Written consent obtained  Consent given by: patient  Time out: Immediately prior to procedure a "time out" was called to verify the correct patient, procedure, equipment, support staff and site/side marked as required    Patient understanding: patient states understanding of the procedure being performed  Patient identity confirmed: verbally with patient      Performed by: physician  Debridement type: selective  Pain control: lidocaine 4%  Post-debridement measurements  Length (cm): 2 8  Width (cm): 1 5  Depth (cm): 0 1  Percent debrided: 100%  Surface Area (cm^2): 4 2  Area debrided (cm^2): 4 2  Volume (cm^3): 0 42  Devitalized tissue debrided: fibrin  Instrument(s) utilized: curette  Bleeding: small  Hemostasis obtained with: pressure  Procedural pain (0-10): 0  Post-procedural pain: 0   Response to treatment: procedure was tolerated well                 Wound Instructions:  Orders Placed This Encounter   Procedures    Wound cleansing and dressings     Wound cleansing and dressings      Wash your hands with soap and water  Remove old dressing, discard into plastic bag and place in trash  Cleanse the wound with mild soap and water prior to applying a clean dressing  PLEASE WASH LEGS WITH SOAP AND WATER PRIOR TO DRESSING CHANGES   Do not use tissue or cotton balls  Do not scrub the wound  Pat dry using gauze  Shower yes with protection, purchase cast cover  Or sponge bathe        RIGHT LOWER LEG  Apply Lac Hydrin to B/L lower legs  Apply skin prep to the periwound  Cover the wound with allevyn classic  Apply Unna boot and tubifast yellow  Change twice a week  (Monday for [facility] to change)        LEFT LOWER LEG:   Apply Lac Hydrin to B/L lower legs  Apply skin prep to the periwound  Cover the wound with alginate  Apply Unna boot and tubifast yellow  Change twice a week  (Monday for [facility] to change)    Dressings to be changed twice per week  Once in wound center and and once by [facility]  Follow up at the wound center in one week  Today's wound treatment note:  Cleansed and dressed as above  Standing Status:   Future     Standing Expiration Date:   0/66/2274    Cast Application     This order was created via procedure documentation    Debridement     This order was created via procedure documentation    Cast Application     This order was created via procedure documentation       Melissa Andrade MD, CHT, CWS    Portions of the record may have been created with voice recognition software  Occasional wrong word or "sound alike" substitutions may have occurred due to the inherent limitations of voice recognition software  Read the chart carefully and recognize, using context, where substitutions have occurred

## 2022-05-19 NOTE — PROGRESS NOTES
Cast Application    Date/Time: 5/19/2022 12:22 PM  Performed by: Justin Fothergill, RN  Authorized by: Teresa Garcia MD   Universal Protocol:  Consent given by: patient  Timeout called at: 5/19/2022 12:22 PM   Patient understanding: patient states understanding of the procedure being performed  Patient consent: the patient's understanding of the procedure matches consent given  Procedure consent: procedure consent matches procedure scheduled  Patient identity confirmed: verbally with patient      Pre-procedure details:     Sensation:  Normal  Procedure details:     Laterality:  Bilateral    Location:  Leg    Leg:  L lower leg and R lower legCast type:  Unna boot      Supplies:  2 layer wrap  Post-procedure details:     Pain:  Improved    Sensation:  Normal    Patient tolerance of procedure: Tolerated well, no immediate complications  Comments:      UNNA BOOT wrap procedure     Before application, RAY and/or TBI determined to be adequate for healing and application of compression  Lower extremity washed prior to application of compression wrap  With the foot in dorsiflexed position, Unna boot was applied as per physician orders without complications or complaint of pain   The procedure was tolerated well  Toes warm & pink post application   Patient provided education & reinforced to observe toes for any discoloration, swelling or tingling and instructed when to report to the [address] [facility],[address] or to remove compression  Wound care as per providers orders, patient tolerated well

## 2022-05-20 ENCOUNTER — TELEPHONE (OUTPATIENT)
Dept: PULMONOLOGY | Facility: CLINIC | Age: 57
End: 2022-05-20

## 2022-05-20 ENCOUNTER — LAB (OUTPATIENT)
Dept: LAB | Facility: HOSPITAL | Age: 57
End: 2022-05-20
Payer: MEDICARE

## 2022-05-20 DIAGNOSIS — Z79.4 TYPE 2 DIABETES MELLITUS WITH HYPERGLYCEMIA, WITH LONG-TERM CURRENT USE OF INSULIN (HCC): ICD-10-CM

## 2022-05-20 DIAGNOSIS — E11.65 TYPE 2 DIABETES MELLITUS WITH HYPERGLYCEMIA, WITH LONG-TERM CURRENT USE OF INSULIN (HCC): ICD-10-CM

## 2022-05-20 DIAGNOSIS — L03.115 CELLULITIS OF RIGHT LOWER EXTREMITY: ICD-10-CM

## 2022-05-20 LAB
ANION GAP SERPL CALCULATED.3IONS-SCNC: 7 MMOL/L (ref 4–13)
BUN SERPL-MCNC: 27 MG/DL (ref 5–25)
CALCIUM SERPL-MCNC: 10.4 MG/DL (ref 8.3–10.1)
CHLORIDE SERPL-SCNC: 105 MMOL/L (ref 100–108)
CO2 SERPL-SCNC: 27 MMOL/L (ref 21–32)
CREAT SERPL-MCNC: 1.26 MG/DL (ref 0.6–1.3)
GFR SERPL CREATININE-BSD FRML MDRD: 64 ML/MIN/1.73SQ M
GLUCOSE SERPL-MCNC: 128 MG/DL (ref 65–140)
POTASSIUM SERPL-SCNC: 4.6 MMOL/L (ref 3.5–5.3)
SODIUM SERPL-SCNC: 139 MMOL/L (ref 136–145)

## 2022-05-20 PROCEDURE — 36415 COLL VENOUS BLD VENIPUNCTURE: CPT

## 2022-05-20 PROCEDURE — 80048 BASIC METABOLIC PNL TOTAL CA: CPT

## 2022-05-20 NOTE — TELEPHONE ENCOUNTER
LM for Pt to call back to confirm his appointment change since Breezy will not be in the office on 6/3/22    Appointment was change to 6/7/22 at 2:30 at the [facility] with [facility]

## 2022-05-23 NOTE — PROGRESS NOTES
Cast Application    Date/Time: 5/19/2022 11:30 AM  Performed by: Salazar Rainey RN  Authorized by: Ximena Del Rio MD   Universal Protocol:  Consent: Verbal consent obtained  Consent given by: patient  Timeout called at: 5/19/2022 11:30 AM   Patient understanding: patient states understanding of the procedure being performed  Patient consent: the patient's understanding of the procedure matches consent given  Patient identity confirmed: verbally with patient      Pre-procedure details:     Sensation:  Normal  Procedure details:     Laterality:  Left    Location:  Leg    Leg:  L lower legCast type:  Unna boot    Post-procedure details:     Pain:  Improved    Sensation:  Normal    Patient tolerance of procedure: Tolerated well, no immediate complications  Comments:      UNNA BOOT wrap procedure     Before application, RAY and/or TBI determined to be adequate for healing and application of compression  Lower extremity washed prior to application of compression wrap  With the foot in dorsiflexed position, Unna boot was applied as per physician orders without complications or complaint of pain  The procedure was tolerated well  Toes warm & pink post application  Patient provided education & reinforced to observe toes for any discoloration, swelling or tingling and instructed when to report to the [address] [facility],[address] or to remove compression  Wound care as per providers orders, patient tolerated well

## 2022-05-25 DIAGNOSIS — E11.65 TYPE 2 DIABETES MELLITUS WITH HYPERGLYCEMIA, WITH LONG-TERM CURRENT USE OF INSULIN (HCC): Primary | ICD-10-CM

## 2022-05-25 DIAGNOSIS — E78.00 PURE HYPERCHOLESTEROLEMIA: ICD-10-CM

## 2022-05-25 DIAGNOSIS — D63.8 ANEMIA IN OTHER CHRONIC DISEASES CLASSIFIED ELSEWHERE: ICD-10-CM

## 2022-05-25 DIAGNOSIS — I10 ESSENTIAL HYPERTENSION: ICD-10-CM

## 2022-05-25 DIAGNOSIS — R94.6 ABNORMAL THYROID FUNCTION TEST: ICD-10-CM

## 2022-05-25 DIAGNOSIS — Z79.4 TYPE 2 DIABETES MELLITUS WITH HYPERGLYCEMIA, WITH LONG-TERM CURRENT USE OF INSULIN (HCC): Primary | ICD-10-CM

## 2022-05-25 DIAGNOSIS — R79.89 LOW TESTOSTERONE: ICD-10-CM

## 2022-05-25 NOTE — RESULT ENCOUNTER NOTE
I called patient reviewed blood work, elevated Ca, will repeat in 2-3 weeks and RTO for f/u  Patient agreeable

## 2022-05-26 ENCOUNTER — OFFICE VISIT (OUTPATIENT)
Dept: WOUND CARE | Facility: CLINIC | Age: 57
End: 2022-05-26
Payer: MEDICARE

## 2022-05-26 VITALS
SYSTOLIC BLOOD PRESSURE: 136 MMHG | DIASTOLIC BLOOD PRESSURE: 80 MMHG | TEMPERATURE: 97.6 F | RESPIRATION RATE: 18 BRPM | HEART RATE: 88 BPM

## 2022-05-26 DIAGNOSIS — I89.0 LYMPHEDEMA OF BOTH LOWER EXTREMITIES: Primary | ICD-10-CM

## 2022-05-26 DIAGNOSIS — L89.893 PRESSURE INJURY OF RIGHT LEG, STAGE 3 (HCC): ICD-10-CM

## 2022-05-26 DIAGNOSIS — L89.892 PRESSURE INJURY OF LEFT LEG, STAGE 2 (HCC): ICD-10-CM

## 2022-05-26 DIAGNOSIS — E66.01 MORBID OBESITY WITH BMI OF 70 AND OVER, ADULT (HCC): ICD-10-CM

## 2022-05-26 PROCEDURE — 99213 OFFICE O/P EST LOW 20 MIN: CPT | Performed by: FAMILY MEDICINE

## 2022-05-26 PROCEDURE — 29580 STRAPPING UNNA BOOT: CPT

## 2022-05-26 RX ORDER — LIDOCAINE 40 MG/G
CREAM TOPICAL ONCE
Status: COMPLETED | OUTPATIENT
Start: 2022-05-26 | End: 2022-05-26

## 2022-05-26 RX ADMIN — LIDOCAINE: 40 CREAM TOPICAL at 16:25

## 2022-05-26 NOTE — PATIENT INSTRUCTIONS
Orders Placed This Encounter   Procedures    Wound cleansing and dressings     Wash your hands with soap and water  Remove old dressing, discard into plastic bag and place in trash  Cleanse the wound with mild soap and water prior to applying a clean dressing  PLEASE WASH LEGS WITH SOAP AND WATER PRIOR TO DRESSING CHANGES   Do not use tissue or cotton balls  Do not scrub the wound  Pat dry using gauze  Shower yes with protection, purchase cast cover  Or sponge bathe  RIGHT LOWER LEG  Apply Lac Hydrin to B/L lower legs  Apply skin prep to the periwound  Cover the wound with polymem  Apply Unna boot and tubifast yellow  Change dressing in a week  LEFT LOWER LEG:   Apply Lac Hydrin to B/L lower legs  Apply skin prep to the periwound  Cover the wound with polymem  Apply Unna boot and tubifast yellow  Change dressing in a week     Dressings to be changed in 1 week  Hold [facility] for 1 week until next evaluation at the wound center        Follow up at the wound center in one week  Today's wound treatment note:  Cleansed and dressed as above       Standing Status:   Future     Standing Expiration Date:   5/26/2023

## 2022-05-26 NOTE — PROGRESS NOTES
Patient ID: Esvin Luis is a 49 y o  male Date of Birth 1965       Chief Complaint   Patient presents with    Follow Up Wound Care Visit     Lymphedema of both lower extremities       Allergies:  Gluten meal - food allergy and Clindamycin    Diagnosis:      Diagnosis ICD-10-CM Associated Orders   1  Lymphedema of both lower extremities  I89 0 lidocaine (LMX) 4 % cream     Wound cleansing and dressings   2  Pressure injury of left leg, stage 2 ([facility] [address] )  L89 892    3  Pressure injury of right leg, stage 3 ([facility])  L89 893    4  Morbid obesity with BMI of 70 and over, adult ([facility] )  E66 01     Z68 45            Assessment & Plan:   Pressure injuries of the right left lateral calfs  Slight deterioration of the left lateral calf and stable right calf  Ulcers are clean   Polymem foam is to both areas  (had development of small blisters at the edges of the non ordered Allevyn classic foam)   Bilateral Unna boots  Will keep on for one week  Subjective:   2/17/22: This is a 61-year-old male who comes in [address] [place] [address] with ulcers on the right and left calfs  He states that he was hospitalized on November of 2021 with cellulitis and he had the ulcers at that time  Patient is morbidly obese and has history of lymphedema as well  He does not use any form of compression other than Ace wraps  He had purchased alginate on the Internet as well as bordered foam is  He notes that there is heavy drainage requiring dressings to be changed once or twice a day  He believes that these ulcers had started due to  sleeping in a recliner and the foot rest causing pressure on the calfs  He is unable to sleep in a bed  He notes he has increased pain at nighttime when in there is recliner with pressure on his calf  He is ambulatory with a walker but does not go out of the house other than to doctor visits  Patient also has a history of type 2 diabetes with good control with last A1c 6 5 in October of 2021   He states that he has lost approximately 120 lb in the past nine months with a special meal plan  He plans on continuing with weight loss  2/24/22: Followup stage III pressure injuries of right and left calfs  They purchased memory foam pillows which seems to help with both pain and offloading the areas when he is sleeping  Still has pain mainly at night  Left greater than right  3/3/22: Followup stage III pressure injuries of the right left calfs  Patient is upset about visiting nurse not having proper supplies  Then there was leakage through the [facility] [employer] [facility] [employer]  Still has pain at nighttime  No fever chills  3/10/22: Followup stage III pressure injuries of the right and left calfs  Patient states that the visiting nurse had a "observer" do the Unna boot on his right leg which slid down  Still has pain mainly in the left calf  They are trying various offloading techniques  3/17/22: Followup stage III pressure injuries of the right and left calfs  Patient states he still has pain but slightly less particularly on the left  Starting to have some itching  No fever or chills  Still has not found the proper offloading device  3/24/22: Followup stage III pressure injuries of both calfs continues to have some pain as in the past   No fever or chills  Trying to offload when in bed     3/31/22: Followup stage III pressure injuries of both lateral calf  Continues to have the same amount of pain  States he is trying offload  No fever chills  4/7/22:  Patient is a patient of Dr Steffanie Miller who presents for followup of bilateral LE ulcers  Has had antimicrobial endoform and hydrofera blue on the wounds and Unna boot for compression    4/14/22: Followup stage III pressure injuries of both lateral calfs  Unna boots  Has no pain on the left calf anymore  Much less on the right  No fever chills  4/21/22: Followup stage III pressure injuries of both lateral calfs    Tolerating Unna boots although they tend to slide down  No pain at this time  4/28/22: Followup stage III pressure injuries of both lateral calfs  No complaints at this time  No pain  5/5/22: Followup stage III pressure injuries of both lateral calf  No complaints  No pain  5/12/22: Followup stage III pressure injuries of both lateral calfs  At last visit, the left calf was closed  Hydrocolloid on the right calf was leaking  No other complaints  5/19/22: Followup stage III pressure injury of the right lateral calf  Left calf was closed at last visit  Received a telephone call from [facility] stating that the left calf has a new ulcer adjacent to the healed area  Tubigrip was use on the left because of previously healed ulcer  Bilateral lower extremity lymphedema  [facility] placed new Unna boot on the left  No other complaints  5/26/22: Followup stage to and three pressure injuries of the right left calfs  At last visit, the left calf reopened  No new complaints today  Tolerating Unna boot          The following portions of the patient's history were reviewed and updated as appropriate:   Patient Active Problem List   Diagnosis    Type 2 diabetes mellitus with hyperglycemia, with long-term current use of insulin ([address] )    Hyperlipidemia    Psoriasis    Venous insufficiency    Gout    Essential hypertension    Hyponatremia    Gluten intolerance    Diabetic neuropathy (HCC)    Insomnia    Scrotal swelling    Erectile dysfunction    Bilateral leg edema    Elevated CO2 level    Abnormal thyroid function test    DAHIANA (obstructive sleep apnea)    Fungal infection    Morbid obesity with BMI of 70 and over, adult ([address] )    Cellulitis of right lower extremity    Migraine headache    Onychomycosis    Ulcer of left lower extremity, limited to breakdown of skin ([address] )    Pressure injury of right leg, stage 3 ([address] )     Past Medical History:   Diagnosis Date    Acute kidney injury 10/28/2021    Anemia 09/13/2019    Cellulitis last assessed 12/10/15    Cellulitis of left lower extremity     Cough 10/20/2019    Diabetes mellitus ([facility] [address] )     Disease of thyroid gland     Edema     Elevated liver enzymes     Elevated serum creatinine 11/19/2021    Esophageal reflux     Gluten intolerance     Gout     last assessed 09/05/13    Hyperglycemia     Hypertension     IBS (irritable bowel syndrome)     Insomnia     Obesity     Osteoarthritis of knee     last assessed 02/10/14    Shortness of breath 5/3/2021    Venous insufficiency     last assessed 08/22/17    Villonodular synovitis of the hand, right     last assessed 11/14/2013     Past Surgical History:   Procedure Laterality Date    INCISION AND DRAINAGE OF WOUND Left 9/6/2019    Procedure: INCISION AND DRAINAGE (I&D) GROIN;  Surgeon: Kevin Del Rio DO;  [facility];  Service: General    SKIN BIOPSY      WOUND DEBRIDEMENT Left 9/7/2019    Procedure: EXCISIONAL DEBRIDEMENT;  Surgeon: Kevin Del Rio DO;  [facility];  Service: General     Family History   Problem Relation Age of Onset    Cancer Mother         gastrc    Colon cancer Father     Heart failure Father       Social History     Socioeconomic History    Marital status: /Civil Union     Spouse name: Not on file    Number of children: Not on file    Years of education: Not on file    Highest education level: Not on file   Occupational History    Not on file   Tobacco Use    Smoking status: Never Smoker    Smokeless tobacco: Never Used   Vaping Use    Vaping Use: Never used   Substance and Sexual Activity    Alcohol use: Not Currently     Alcohol/week: 0 0 standard drinks    Drug use: No    Sexual activity: Yes   Other Topics Concern    Not on file   Social History Narrative    Not on file     Social Determinants of Health     Financial Resource Strain: Not on file   Food Insecurity: Not on file   Transportation Needs: No Transportation Needs    Lack of Transportation (Medical): No    Lack of Transportation (Non-Medical):  No   Physical Activity: Not on file   Stress: Not on file   Social Connections: Not on file   Intimate Partner Violence: Not on file   Housing Stability: Not on file        Current Outpatient Medications:     acetaminophen (TYLENOL) 325 mg tablet, Take 2 tablets (650 mg total) by mouth every 4 (four) hours as needed for mild pain, headaches or fever, Disp: , Rfl: 0    albuterol (PROVENTIL HFA,VENTOLIN HFA) 90 mcg/act inhaler, TAKE 2 PUFFS BY MOUTH EVERY 6 HOURS AS NEEDED FOR WHEEZE, Disp: 8 5 g, Rfl: 0    BD Pen Needle Ludmila 2nd Gen 32G X 4 MM MISC, USE 4 TIMES A DAY, Disp: 200 each, Rfl: 3    Blood Glucose Monitoring Suppl (ONETOUCH VERIO) w/Device KIT, Use to test sugar daily (Patient not taking: Reported on 10/28/2021), Disp: 1 kit, Rfl: 0    Calcium Alginate (Maxorb II Alginate Dressing) MISC, Apply 1 each topically in the morning, Disp: 10 each, Rfl: 2    Continuous Blood Gluc  (FreeStyle Connell 2 [place] Systm) BROOKS, Use 1 Device as needed (use with sensors for bs checks), Disp: 1 Device, Rfl: 0    Continuous Blood Gluc Sensor (FreeStyle Elvin 14 Day Sensor) MISC, USE ONE SENSOR EVERY 2 WEEKS, Disp: 2 each, Rfl: 3    Control Gel Formula Dressing (DuoDERM CGF Dressing) MIS, Apply 1 application topically every 5 (five) days, Disp: 5 each, Rfl: 1    [employer] Diclofenac Sodium 1 %, APPLY 4 G TOPICALLY 4 (FOUR) TIMES A DAY AS NEEDED (JOINT PAIN), Disp: 50 g, Rfl: 0    furosemide (LASIX) 20 mg tablet, TAKE 2 TABLETS EVERY DAY IN THE EARLY MORNING AND 1 TABLET DAILY AFTER LUNCH , Disp: 270 tablet, Rfl: 2    gabapentin (NEURONTIN) 100 mg capsule, TAKE 1 CAPSULE BY MOUTH TWICE A DAY WITH 300MG CAPSULE, Disp: 180 capsule, Rfl: 2    gabapentin (NEURONTIN) 300 mg capsule, Take 1 capsule (300 mg total) by mouth 2 (two) times a day Take 1 tab with your 100mg tab twice daily, Disp: 180 capsule, Rfl: 2    Gauze Pads & Dressings 5"X5" PADS, Use in the morning, Disp: 20 each, Rfl: 2    insulin aspart (NovoLOG FlexPen) 100 UNIT/ML injection pen, INJECT 18 UNITS WITH MEALS+SCALE ( - 150-200 -2 UNITS, 201-250-4 UNITS, 251-300 -6 UNITS, 301-350-8 UNITS, > 350- 10 UNITS ), Disp: 45 mL, Rfl: 1    Lancets (ONETOUCH ULTRASOFT) lancets, Use to test sugar 2 times a day, Disp: 100 each, Rfl: 3    Lantus SoloStar 100 units/mL injection pen, INJECT 46 UNITS UNDER THE SKIN DAILY AT BEDTIME INJECT 48 UNITS UNDER THE SKIN EVERY BEDTIME, Disp: 15 mL, Rfl: 0    losartan (COZAAR) 25 mg tablet, TAKE 1 TABLET BY MOUTH EVERY DAY, Disp: 90 tablet, Rfl: 2    metFORMIN (GLUCOPHAGE) 1000 MG tablet, TAKE ONE TABLET BY MOUTH TWICE DAILY, Disp: 180 tablet, Rfl: 3    nystatin (MYCOSTATIN) cream, Apply topically 2 (two) times a day as needed (itching, redness), Disp: 30 g, Rfl: 1    nystatin (MYCOSTATIN) powder, Apply 1 application topically 2 (two) times a day To affected area, Disp: 120 g, Rfl: 3    omeprazole (PriLOSEC) 40 MG capsule, TAKE 1 CAPSULE BY MOUTH TWICE A DAY, Disp: 180 capsule, Rfl: 1    OneTouch Verio test strip, USE TO TEST SUGAR 2 TIMES A DAY, Disp: 100 strip, Rfl: 3    oxyCODONE (ROXICODONE) 5 immediate release tablet, Take 1-2 tabs by mouth every 6 hours as needed for moderate or severe pains respectively  , Disp: 15 tablet, Rfl: 0    psyllium (METAMUCIL) packet, Take 1 packet by mouth daily, Disp: , Rfl: 0    saccharomyces boulardii (FLORASTOR) 250 mg capsule, Take 1 capsule (250 mg total) by mouth 2 (two) times a day, Disp: 60 capsule, Rfl: 1    Skin Protectants, Misc  ([employer] Textile 10"x144") SHEE, Apply 1 each topically every 5 (five) days, Disp: 3 each, Rfl: 5    sodium hypochlorite (DAKIN'S QUARTER-STRENGTH), Irrigate with 1 application as directed daily, Disp: 473 mL, Rfl: 0  No current facility-administered medications for this visit  Review of Systems   Constitutional: Negative for appetite change, chills, fatigue, fever and unexpected weight change     HENT: Negative for congestion, hearing loss, postnasal drip and sinus pressure  Respiratory: Positive for shortness of breath (On exertion)  Negative for cough  Cardiovascular: Positive for leg swelling (Lymphedema)  Endocrine: Negative  Genitourinary: Positive for urgency  Musculoskeletal: Positive for gait problem Ruthy Blight)  Negative for back pain  Skin: Positive for wound (Right lateral calf and left lateral calf)  Negative for rash  Allergic/Immunologic: Negative  Neurological: Positive for numbness  Hematological: Does not bruise/bleed easily  Psychiatric/Behavioral: Negative  Negative for dysphoric mood  The patient is not nervous/anxious  Objective:  /80   Pulse 88   Temp 97 6 °F (36 4 °C)   Resp 18   Pain Score:   4     Physical Exam  Vitals and nursing note reviewed  Constitutional:       Appearance: Normal appearance  He is well-developed  He is morbidly obese  HENT:      Head: Normocephalic and atraumatic  Cardiovascular:      Rate and Rhythm: Normal rate  Pulmonary:      Effort: Pulmonary effort is normal    Skin:     General: Skin is warm and dry  Findings: Wound present  No erythema  Comments: Left lateral calf with small stage II ulceration  Good granulation tissue  Right lateral calf ulcer unchanged granulation tissue and minimal fibrin  The right lateral calf, there small blisters forming at the edge of the Allevyn classic foam    Neurological:      Mental Status: He is alert and oriented to person, place, and time  Psychiatric:         Attention and Perception: Attention normal          Mood and Affect: Mood and affect normal          Behavior: Behavior is cooperative  Cognition and Memory: Cognition normal                  Wound 02/17/22 Pressure Injury Leg Right;Lateral;Lower (Active)   Wound Description Epithelialization;Pink 05/26/22 1550   Chelsie-wound Assessment Scar Tissue;Edema; Hyperpigmented 05/26/22 1550   Wound Length (cm) 1 cm 05/26/22 1550   Wound Width (cm) 1 2 cm 05/26/22 1550   Wound Depth (cm) 0 1 cm 05/26/22 1550   Wound Surface Area (cm^2) 1 2 cm^2 05/26/22 1550   Wound Volume (cm^3) 0 12 cm^3 05/26/22 1550   Calculated Wound Volume (cm^3) 0 12 cm^3 05/26/22 1550   Change in Wound Size % 87 5 05/26/22 1550   Drainage Amount Small 05/26/22 1550   Drainage Description Serosanguineous 05/26/22 1550   Patient Tolerance Tolerated well 05/26/22 1550   Dressing Status Removed 05/26/22 1550       Wound 05/19/22 Venous Ulcer Pretibial Left;Lateral (Active)   Wound Description Beefy red;Pink 05/26/22 1551   Chelsie-wound Assessment Intact;Edema; Hyperpigmented 05/26/22 1551   Wound Length (cm) 1 cm 05/26/22 1551   Wound Width (cm) 1 cm 05/26/22 1551   Wound Depth (cm) 0 1 cm 05/26/22 1551   Wound Surface Area (cm^2) 1 cm^2 05/26/22 1551   Wound Volume (cm^3) 0 1 cm^3 05/26/22 1551   Calculated Wound Volume (cm^3) 0 1 cm^3 05/26/22 1551   Change in Wound Size % -233 33 05/26/22 1551   Drainage Amount Small 05/26/22 1551   Drainage Description Serosanguineous 05/26/22 1551   Patient Tolerance Tolerated well 05/26/22 1551   Dressing Status Removed 05/26/22 1551               Wound Instructions:  Orders Placed This Encounter   Procedures    Wound cleansing and dressings     Wash your hands with soap and water  Remove old dressing, discard into plastic bag and place in trash  Cleanse the wound with mild soap and water prior to applying a clean dressing  PLEASE WASH LEGS WITH SOAP AND WATER PRIOR TO DRESSING CHANGES   Do not use tissue or cotton balls  Do not scrub the wound  Pat dry using gauze  Shower yes with protection, purchase cast cover  Or sponge bathe        RIGHT LOWER LEG  Apply Lac Hydrin to B/L lower legs  Apply skin prep to the periwound  Cover the wound with polymem  Apply Unna boot and tubifast yellow  Change dressing in a week          LEFT LOWER LEG:   Apply Lac Hydrin to B/L lower legs     Apply skin prep to the periwound  Cover the wound with polymem  Apply Unna boot and tubifast yellow  Change dressing in a week     Dressings to be changed in 1 week  Hold [facility] for 1 week until next evaluation at the wound center        Follow up at the wound center in one week  Today's wound treatment note:  Cleansed and dressed as above  Standing Status:   Future     Standing Expiration Date:   5/26/2023       Concepcion Florez MD, CHT, CWS    Portions of the record may have been created with voice recognition software  Occasional wrong word or "sound alike" substitutions may have occurred due to the inherent limitations of voice recognition software  Read the chart carefully and recognize, using context, where substitutions have occurred

## 2022-05-27 NOTE — PROGRESS NOTES
Cast Application    Date/Time: 5/27/2022 3:02 PM  Performed by: Eliza Vickers LPN  Authorized by: Jacob Mccarthy MD   Universal Protocol:  Consent: Verbal consent obtained  Consent given by: patient  Patient identity confirmed: verbally with patient      Pre-procedure details:     Sensation:  Normal  Procedure details:     Laterality:  Bilateral    Location:  Leg    Leg:  L lower leg and R lower legCast type:  Unna boot      Supplies used: hydrofera blue, ecopaste, coban, tubi fast yellow  Post-procedure details:     Pain:  Unchanged    Sensation:  Normal    Patient tolerance of procedure: Tolerated well, no immediate complications  Comments:      UNNA BOOT and Multiplayer wrap procedure     Before application, RAY and/or TBI determined to be adequate for healing and application of compression  Lower extremity washed prior to application of compression wrap  With the foot in dorsiflexed position, Bilateral unna boots were applied as per physician orders without complications or complaint of pain  The procedure was tolerated well  Toes warm & pink post application    Patient provided education & reinforced to observe toes for any discoloration, swelling or tingling and instructed when to report to the [facility] or to remove compression

## 2022-06-02 ENCOUNTER — OFFICE VISIT (OUTPATIENT)
Dept: WOUND CARE | Facility: CLINIC | Age: 57
End: 2022-06-02
Payer: MEDICARE

## 2022-06-02 VITALS
HEART RATE: 72 BPM | RESPIRATION RATE: 18 BRPM | DIASTOLIC BLOOD PRESSURE: 70 MMHG | SYSTOLIC BLOOD PRESSURE: 128 MMHG | TEMPERATURE: 97.8 F

## 2022-06-02 DIAGNOSIS — L89.892 PRESSURE INJURY OF LEFT LEG, STAGE 2 (HCC): Primary | ICD-10-CM

## 2022-06-02 DIAGNOSIS — L89.893 PRESSURE INJURY OF RIGHT LEG, STAGE 3 (HCC): ICD-10-CM

## 2022-06-02 PROCEDURE — 11042 DBRDMT SUBQ TIS 1ST 20SQCM/<: CPT | Performed by: STUDENT IN AN ORGANIZED HEALTH CARE EDUCATION/TRAINING PROGRAM

## 2022-06-02 PROCEDURE — 11042 DBRDMT SUBQ TIS 1ST 20SQCM/<: CPT | Performed by: FAMILY MEDICINE

## 2022-06-02 RX ORDER — LIDOCAINE 40 MG/G
CREAM TOPICAL ONCE
Status: COMPLETED | OUTPATIENT
Start: 2022-06-02 | End: 2022-06-02

## 2022-06-02 RX ADMIN — LIDOCAINE: 40 CREAM TOPICAL at 16:43

## 2022-06-02 NOTE — PROGRESS NOTES
Patient ID: Kell Dubois is a 56 y o  male Date of Birth 1965       Chief Complaint   Patient presents with    Follow Up Wound Care Visit     Lymphedema of both lower extremities       Allergies:  Gluten meal - food allergy and Clindamycin    Diagnosis:   Diagnosis ICD-10-CM Associated Orders   1  Pressure injury of left leg, stage 2 ([facility])  L89 892 lidocaine (LMX) 4 % cream     Debridement     Wound cleansing and dressings   2  Pressure injury of right leg, stage 3 ([facility])  L89 893 lidocaine (LMX) 4 % cream     Wound cleansing and dressings        Assessment  & Plan:     Stage 3 pressure injury of L leg    o Surgical debridement   o Alginate changed twice weekly  o Unna boot for compression   Stage 3 pressure injury of R leg    o Alginate    o Attempt CircAid to R leg instead of Unna boot this week   Continue to offload as much as possible   Followup in one week  Subjective:   2/17/22: This is a 82-year-old male who comes in [address] [place] [address] with ulcers on the right and left calfs  He states that he was hospitalized on November of 2021 with cellulitis and he had the ulcers at that time  Patient is morbidly obese and has history of lymphedema as well  He does not use any form of compression other than Ace wraps  He had purchased alginate on the Internet as well as bordered foam is  He notes that there is heavy drainage requiring dressings to be changed once or twice a day  He believes that these ulcers had started due to  sleeping in a recliner and the foot rest causing pressure on the calfs  He is unable to sleep in a bed  He notes he has increased pain at nighttime when in there is recliner with pressure on his calf  He is ambulatory with a walker but does not go out of the house other than to doctor visits  Patient also has a history of type 2 diabetes with good control with last A1c 6 5 in October of 2021   He states that he has lost approximately 120 lb in the past nine months with a special meal plan  He plans on continuing with weight loss  2/24/22: Followup stage III pressure injuries of right and left calfs  They purchased memory foam pillows which seems to help with both pain and offloading the areas when he is sleeping  Still has pain mainly at night  Left greater than right  3/3/22: Followup stage III pressure injuries of the right left calfs  Patient is upset about visiting nurse not having proper supplies  Then there was leakage through the [facility] [employer]  Still has pain at nighttime  No fever chills  3/10/22: Followup stage III pressure injuries of the right and left calfs  Patient states that the visiting nurse had a "observer" do the Unna boot on his right leg which slid down  Still has pain mainly in the left calf  They are trying various offloading techniques  3/17/22: Followup stage III pressure injuries of the right and left calfs  Patient states he still has pain but slightly less particularly on the left  Starting to have some itching  No fever or chills  Still has not found the proper offloading device  3/24/22: Followup stage III pressure injuries of both calfs continues to have some pain as in the past   No fever or chills  Trying to offload when in bed     3/31/22: Followup stage III pressure injuries of both lateral calf  Continues to have the same amount of pain  States he is trying offload  No fever chills  4/7/22:  Patient is a patient of Dr Yun Montana who presents for followup of bilateral LE ulcers  Has had antimicrobial endoform and hydrofera blue on the wounds and Unna boot for compression    4/14/22: Followup stage III pressure injuries of both lateral calfs  Unna boots  Has no pain on the left calf anymore  Much less on the right  No fever chills  4/21/22: Followup stage III pressure injuries of both lateral calfs  Tolerating Unna boots although they tend to slide down  No pain at this time      4/28/22: Followup stage III pressure injuries of both lateral calfs  No complaints at this time  No pain  5/5/22: Followup stage III pressure injuries of both lateral calf  No complaints  No pain  5/12/22: Followup stage III pressure injuries of both lateral calfs  At last visit, the left calf was closed  Hydrocolloid on the right calf was leaking  No other complaints  5/19/22: Followup stage III pressure injury of the right lateral calf  Left calf was closed at last visit  Received a telephone call from [facility] stating that the left calf has a new ulcer adjacent to the healed area  Tubigrip was use on the left because of previously healed ulcer  Bilateral lower extremity lymphedema  [facility]NA placed new Unna boot on the left  No other complaints  5/26/22: Followup stage III pressure injuries of the right and left calfs  At last visit, the left calf reopened  No new complaints today  Tolerating Unna boot  06/02/22: Followup stage III pressure injuries of the R and L calfs  Polymem with Unna boot placed last visit  Denies fevers, chills, no new complaints             The following portions of the patient's history were reviewed and updated as appropriate:   Patient Active Problem List   Diagnosis    Type 2 diabetes mellitus with hyperglycemia, with long-term current use of insulin ([address] )    Hyperlipidemia    Psoriasis    Venous insufficiency    Gout    Essential hypertension    Hyponatremia    Gluten intolerance    Diabetic neuropathy (HCC)    Insomnia    Scrotal swelling    Erectile dysfunction    Bilateral leg edema    Elevated CO2 level    Abnormal thyroid function test    DAHIANA (obstructive sleep apnea)    Fungal infection    Morbid obesity with BMI of 70 and over, adult ([address] )    Cellulitis of right lower extremity    Migraine headache    Onychomycosis    Ulcer of left lower extremity, limited to breakdown of skin ([address] )    Pressure injury of right leg, stage 3 (HCC)     Past Medical History:   Diagnosis Date    Acute kidney injury 10/28/2021    Anemia 09/13/2019    Cellulitis     last assessed 12/10/15    Cellulitis of left lower extremity     Cough 10/20/2019    Diabetes mellitus ([address] )     Disease of thyroid gland     Edema     Elevated liver enzymes     Elevated serum creatinine 11/19/2021    Esophageal reflux     Gluten intolerance     Gout     last assessed 09/05/13    Hyperglycemia     Hypertension     IBS (irritable bowel syndrome)     Insomnia     Obesity     Osteoarthritis of knee     last assessed 02/10/14    Shortness of breath 5/3/2021    Venous insufficiency     last assessed 08/22/17    Villonodular synovitis of the hand, right     last assessed 11/14/2013     Past Surgical History:   Procedure Laterality Date    INCISION AND DRAINAGE OF WOUND Left 9/6/2019    Procedure: INCISION AND DRAINAGE (I&D) GROIN;  Surgeon: Michael Cronin DO;  [facility];  Service: General    SKIN BIOPSY      WOUND DEBRIDEMENT Left 9/7/2019    Procedure: EXCISIONAL DEBRIDEMENT;  Surgeon: Michael Cronin DO;  [facility];  Service: General     Family History   Problem Relation Age of Onset    Cancer Mother         gastrc    Colon cancer Father     Heart failure Father      Social History     Socioeconomic History    Marital status: /Civil Union     Spouse name: Not on file    Number of children: Not on file    Years of education: Not on file    Highest education level: Not on file   Occupational History    Not on file   Tobacco Use    Smoking status: Never Smoker    Smokeless tobacco: Never Used   Vaping Use    Vaping Use: Never used   Substance and Sexual Activity    Alcohol use: Not Currently     Alcohol/week: 0 0 standard drinks    Drug use: No    Sexual activity: Yes   Other Topics Concern    Not on file   Social History Narrative    Not on file     Social Determinants of Health     Financial Resource Strain: Not on file   Food Insecurity: Not on file   Transportation Needs: No Transportation Needs    Lack of Transportation (Medical): No    Lack of Transportation (Non-Medical):  No   Physical Activity: Not on file   Stress: Not on file   Social Connections: Not on file   Intimate Partner Violence: Not on file   Housing Stability: Not on file       Current Outpatient Medications:     acetaminophen (TYLENOL) 325 mg tablet, Take 2 tablets (650 mg total) by mouth every 4 (four) hours as needed for mild pain, headaches or fever, Disp: , Rfl: 0    albuterol (PROVENTIL HFA,VENTOLIN HFA) 90 mcg/act inhaler, TAKE 2 PUFFS BY MOUTH EVERY 6 HOURS AS NEEDED FOR WHEEZE, Disp: 8 5 g, Rfl: 0    BD Pen Needle Ludmila 2nd Gen 32G X 4 MM MISC, USE 4 TIMES A DAY, Disp: 200 each, Rfl: 3    Blood Glucose Monitoring Suppl (ONETOUCH VERIO) w/Device KIT, Use to test sugar daily (Patient not taking: Reported on 10/28/2021), Disp: 1 kit, Rfl: 0    Calcium Alginate (Maxorb II Alginate Dressing) MISC, Apply 1 each topically in the morning, Disp: 10 each, Rfl: 2    Continuous Blood Gluc  (FreeStyle Connell 2 [place] [employer]m) BROOKS, Use 1 Device as needed (use with sensors for bs checks), Disp: 1 Device, Rfl: 0    Continuous Blood Gluc Sensor (FreeStyle Elvin 14 Day Sensor) MISC, USE ONE SENSOR EVERY 2 WEEKS, Disp: 2 each, Rfl: 3    Control Gel Formula Dressing (DuoDERM CGF Dressing) [facility], Apply 1 application topically every 5 (five) days, Disp: 5 each, Rfl: 1    [employer] Diclofenac Sodium 1 %, APPLY 4 G TOPICALLY 4 (FOUR) TIMES A DAY AS NEEDED (JOINT PAIN), Disp: 50 g, Rfl: 0    furosemide (LASIX) 20 mg tablet, TAKE 2 TABLETS EVERY DAY IN THE EARLY MORNING AND 1 TABLET DAILY AFTER LUNCH , Disp: 270 tablet, Rfl: 2    gabapentin (NEURONTIN) 100 mg capsule, TAKE 1 CAPSULE BY MOUTH TWICE A DAY WITH 300MG CAPSULE, Disp: 180 capsule, Rfl: 2    gabapentin (NEURONTIN) 300 mg capsule, Take 1 capsule (300 mg total) by mouth 2 (two) times a day Take 1 tab with your 100mg tab twice daily, Disp: 180 capsule, Rfl: 2    Gauze Pads & Dressings 5"X5" PADS, Use in the morning, Disp: 20 each, Rfl: 2    insulin aspart (NovoLOG FlexPen) 100 UNIT/ML injection pen, INJECT 18 UNITS WITH MEALS+SCALE ( - 150-200 -2 UNITS, 201-250-4 UNITS, 251-300 -6 UNITS, 301-350-8 UNITS, > 350- 10 UNITS ), Disp: 45 mL, Rfl: 1    Lancets (ONETOUCH ULTRASOFT) lancets, Use to test sugar 2 times a day, Disp: 100 each, Rfl: 3    Lantus SoloStar 100 units/mL injection pen, INJECT 46 UNITS UNDER THE SKIN DAILY AT BEDTIME INJECT 48 UNITS UNDER THE SKIN EVERY BEDTIME, Disp: 15 mL, Rfl: 0    losartan (COZAAR) 25 mg tablet, TAKE 1 TABLET BY MOUTH EVERY DAY, Disp: 90 tablet, Rfl: 2    metFORMIN (GLUCOPHAGE) 1000 MG tablet, TAKE ONE TABLET BY MOUTH TWICE DAILY, Disp: 180 tablet, Rfl: 3    nystatin (MYCOSTATIN) cream, Apply topically 2 (two) times a day as needed (itching, redness), Disp: 30 g, Rfl: 1    nystatin (MYCOSTATIN) powder, Apply 1 application topically 2 (two) times a day To affected area, Disp: 120 g, Rfl: 3    omeprazole (PriLOSEC) 40 MG capsule, TAKE 1 CAPSULE BY MOUTH TWICE A DAY, Disp: 180 capsule, Rfl: 1    OneTouch Verio test strip, USE TO TEST SUGAR 2 TIMES A DAY, Disp: 100 strip, Rfl: 3    oxyCODONE (ROXICODONE) 5 immediate release tablet, Take 1-2 tabs by mouth every 6 hours as needed for moderate or severe pains respectively  , Disp: 15 tablet, Rfl: 0    psyllium (METAMUCIL) packet, Take 1 packet by mouth daily, Disp: , Rfl: 0    saccharomyces boulardii (FLORASTOR) 250 mg capsule, Take 1 capsule (250 mg total) by mouth 2 (two) times a day, Disp: 60 capsule, Rfl: 1    Skin Protectants, Misc  ([employer] Textile 10"x144") SHEE, Apply 1 each topically every 5 (five) days, Disp: 3 each, Rfl: 5    sodium hypochlorite (DAKIN'S QUARTER-STRENGTH), Irrigate with 1 application as directed daily, Disp: 473 mL, Rfl: 0  No current facility-administered medications for this visit      Review of Systems Constitutional: Negative for appetite change, chills, fatigue, fever and unexpected weight change  HENT: Negative for congestion, hearing loss, postnasal drip and sinus pressure  Respiratory: Positive for shortness of breath (On exertion)  Negative for cough  Cardiovascular: Positive for leg swelling (Lymphedema)  Endocrine: Negative  Musculoskeletal: Positive for gait problem [place] [facility])  Negative for back pain  Skin: Positive for wound (Right lateral calf and left lateral calf)  Negative for rash  Allergic/Immunologic: Negative  Hematological: Does not bruise/bleed easily  Psychiatric/Behavioral: Negative  Negative for dysphoric mood  The patient is not nervous/anxious  Objective:  /70   Pulse 72   Temp 97 8 °F (36 6 °C)   Resp 18   Pain Score:   5     Physical Exam  Vitals and nursing note reviewed  Constitutional:       Appearance: Normal appearance  He is well-developed  He is morbidly obese  HENT:      Head: Normocephalic and atraumatic  Cardiovascular:      Rate and Rhythm: Normal rate  Pulmonary:      Effort: Pulmonary effort is normal    Skin:     General: Skin is warm and dry  Findings: Wound present  No erythema  Comments: Left lateral calf size remains approximately the same as previous visit  Slough and fibrin in wound bed  Periwound maceration present  No surrounding erythema or lymphangitic streaking  No malodor  Right lateral calf ulcer smaller in size this visit  Fibrogranular base  + mild periwound maceration  No surrounding erythema or lymphangitic streaking  No malodor  Neurological:      Mental Status: He is alert and oriented to person, place, and time  Psychiatric:         Attention and Perception: Attention normal          Mood and Affect: Mood and affect normal          Behavior: Behavior is cooperative           Cognition and Memory: Cognition normal          Wound 02/17/22 Pressure Injury Leg Right;Lateral;Lower (Active)   Wound Image   06/02/22 1623   Wound Description Pink;Epithelialization 06/02/22 1623   Pressure Injury Stage 3 03/03/22 1555   Chelsie-wound Assessment Hyperpigmented;Edema;Scar Tissue 06/02/22 1623   Wound Length (cm) 1 cm 06/02/22 1623   Wound Width (cm) 0 8 cm 06/02/22 1623   Wound Depth (cm) 0 1 cm 06/02/22 1623   Wound Surface Area (cm^2) 0 8 cm^2 06/02/22 1623   Wound Volume (cm^3) 0 08 cm^3 06/02/22 1623   Calculated Wound Volume (cm^3) 0 08 cm^3 06/02/22 1623   Change in Wound Size % 91 67 06/02/22 1623   Drainage Amount Moderate 06/02/22 1623   Drainage Description Serosanguineous 06/02/22 1623   Non-staged Wound Description Full thickness 06/02/22 1623   Treatments Irrigation with NSS;Cleansed 05/19/22 1127   Patient Tolerance Tolerated well 06/02/22 1623   Dressing Status Removed 06/02/22 1623       Wound 05/19/22 Venous Ulcer Pretibial Left;Lateral (Active)   Wound Image       06/02/22 1641   Wound Description Slough;Pink 06/02/22 1621   Chelsie-wound Assessment Hyperpigmented;Edema; Intact 06/02/22 1621   Wound Length (cm) 1 1 cm 06/02/22 1621   Wound Width (cm) 1 cm 06/02/22 1621   Wound Depth (cm) 0 1 cm 06/02/22 1621   Wound Surface Area (cm^2) 1 1 cm^2 06/02/22 1621   Wound Volume (cm^3) 0 11 cm^3 06/02/22 1621   Calculated Wound Volume (cm^3) 0 11 cm^3 06/02/22 1621   Change in Wound Size % -266 67 06/02/22 1621   Drainage Amount Moderate 06/02/22 1621   Drainage Description Serosanguineous 06/02/22 1621   Non-staged Wound Description Full thickness 06/02/22 1621   Treatments Irrigation with NSS;Cleansed 05/19/22 1142   Patient Tolerance Tolerated well 06/02/22 1621   Dressing Status Removed 06/02/22 1621                       Debridement   Wound 05/19/22 Venous Ulcer Pretibial Left;Lateral    Universal Protocol:  Procedure performed by: (Assisting provider Freddie Brooks PA-C)  Consent: Verbal consent obtained    Consent given by: patient  Patient understanding: patient states understanding of the procedure being performed  Patient identity confirmed: verbally with patient      Performed by: PA and physician  Debridement type: surgical  Level of debridement: subcutaneous tissue  Pain control: lidocaine 4%  Post-debridement measurements  Length (cm): 1 1  Width (cm): 1  Depth (cm): 0 2  Percent debrided: 100%  Surface Area (cm^2): 1 1  Area debrided (cm^2): 1 1  Volume (cm^3): 0 22  Tissue and other material debrided: dermis and subcutaneous tissue  Devitalized tissue debrided: fibrin and slough  Instrument(s) utilized: curette  Bleeding: small  Hemostasis obtained with: pressure  Procedural pain (0-10): 0  Post-procedural pain: 0   Response to treatment: procedure was tolerated well                         Wound Instructions:  Orders Placed This Encounter   Procedures    Debridement     This order was created via procedure documentation    Wound cleansing and dressings     Wash your hands with soap and water  Remove old dressing, discard into plastic bag and place in trash  Cleanse the wound with mild soap and water prior to applying a clean dressing  PLEASE WASH LEGS WITH SOAP AND WATER PRIOR TO DRESSING CHANGES   Do not use tissue or cotton balls  Do not scrub the wound  Pat dry using gauze  Shower yes with protection, purchase cast cover  Or sponge bathe        RIGHT LOWER LEG  Apply Lac Hydrin to B/L lower legs  Apply skin prep to the periwound  Cover the wound with alginate  Apply circaid compression  Change dressing 2x/week          LEFT LOWER LEG:   Apply Lac Hydrin to B/L lower legs  Apply skin prep to the periwound  Cover the wound with alginate  Apply Unna boot and tubifast yellow  Change dressing 2x/week     Resume [facility] for 1 week until next evaluation at the wound center        Follow up at the wound center in one week  Today's wound treatment note:  Cleansed and dressed as above       Standing Status:   Future     Standing Expiration Date:   6/2/2023 Gabriella Head MD      - I, Brook Gomez PA-C, have acted as a scribe with the above documentation    -Tavia Madrigal MD, CWS have seen and evaluated the above patient and have reviewed and agree with the above documentation  Portions of the record may have been created with voice recognition software  Occasional wrong word or "sound alike" substitutions may have occurred due to the inherent limitations of voice recognition software  Read the chart carefully and recognize, using context, where substitutions have occurred

## 2022-06-02 NOTE — PATIENT INSTRUCTIONS
Orders Placed This Encounter   Procedures    Debridement     This order was created via procedure documentation    Wound cleansing and dressings     Wash your hands with soap and water  Remove old dressing, discard into plastic bag and place in trash  Cleanse the wound with mild soap and water prior to applying a clean dressing  PLEASE WASH LEGS WITH SOAP AND WATER PRIOR TO DRESSING CHANGES   Do not use tissue or cotton balls  Do not scrub the wound  Pat dry using gauze  Shower yes with protection, purchase cast cover  Or sponge bathe  RIGHT LOWER LEG  Apply Lac Hydrin to B/L lower legs  Apply skin prep to the periwound  Cover the wound with alginate  Apply circaid compression  Change dressing 2x/week  LEFT LOWER LEG:   Apply Lac Hydrin to B/L lower legs  Apply skin prep to the periwound  Cover the wound with alginate  Apply Unna boot and tubifast yellow  Change dressing 2x/week     Resume [facility] for 1 week until next evaluation at the wound center        Follow up at the wound center in one week  Today's wound treatment note:  Cleansed and dressed as above       Standing Status:   Future     Standing Expiration Date:   6/2/2023

## 2022-06-07 ENCOUNTER — OFFICE VISIT (OUTPATIENT)
Dept: PULMONOLOGY | Facility: CLINIC | Age: 57
End: 2022-06-07
Payer: MEDICARE

## 2022-06-07 ENCOUNTER — TELEPHONE (OUTPATIENT)
Dept: LAB | Facility: HOSPITAL | Age: 57
End: 2022-06-07

## 2022-06-07 ENCOUNTER — DOCUMENTATION (OUTPATIENT)
Dept: PULMONOLOGY | Facility: CLINIC | Age: 57
End: 2022-06-07

## 2022-06-07 VITALS
SYSTOLIC BLOOD PRESSURE: 134 MMHG | TEMPERATURE: 98.8 F | DIASTOLIC BLOOD PRESSURE: 80 MMHG | HEART RATE: 110 BPM | RESPIRATION RATE: 20 BRPM | OXYGEN SATURATION: 96 %

## 2022-06-07 DIAGNOSIS — R06.02 SHORTNESS OF BREATH: ICD-10-CM

## 2022-06-07 DIAGNOSIS — E66.01 MORBID OBESITY WITH BMI OF 70 AND OVER, ADULT (HCC): ICD-10-CM

## 2022-06-07 DIAGNOSIS — G47.33 OSA (OBSTRUCTIVE SLEEP APNEA): Primary | ICD-10-CM

## 2022-06-07 PROCEDURE — 99214 OFFICE O/P EST MOD 30 MIN: CPT | Performed by: NURSE PRACTITIONER

## 2022-06-07 NOTE — ASSESSMENT & PLAN NOTE
Working towards weight loss  Calorie reduction and increased activity    He reports weight loss and increased endurance

## 2022-06-07 NOTE — PROGRESS NOTES
Pulmonary Follow Up Note   Roz Cho 56 y o  male MRN: [id number]  6/7/2022      Assessment:    DAHIANA (obstructive sleep apnea)  Currently on CPAP-machine is on recall but he has contacted the company    He feels his cpap has made significant improvement in overall health    Compliance reviewed with [facility] 1 8, 100% compliance average use time 6hrs 30 mins    Supply reorder placed    Follow up with Dr Goran Cornell in 3 months    Morbid obesity with BMI of 70 and over, adult [facility])  Working towards weight loss  Calorie reduction and increased activity    He reports weight loss and increased endurance    Shortness of breath  Suspect this is secondary to obesity and OHV  He is able to play the saxophone without difficulty and denies any SOB at rest   Suspect dyspnea is all related to obesity but will obtain PFTs for completeness     He will continue with ROD PRN but reports very infrequent use and minimal benefit      Plan:    Diagnoses and all orders for this visit:    DAHIANA (obstructive sleep apnea)  -     PAP DME Resupply/Reorder    Morbid [facility] with BMI of 70 and over, [facility]    Shortness of breath  -     Complete PFT with post bronchodilator; Future        Return in about 3 months (around 9/7/2022), or if symptoms worsen or fail to improve  History of Present Illness   HPI:  Roz Cho is a 56 y o  male who presented for follow up  Was evaluated by Dr Goran Cornell about 1 year prior, lost to follow up due to prolonged hospitalization secondary to cellulitis  He has been using his CPAP and feels this has significantly improved his sleep and energy  Does report MURDOCK but denies at rest   He is able to play the saxophone without dyspnea  Denies: chest pain, cough or sputum production  Uses ROD PRN with intermittent benefit          Notes weight loss and is working towards continued weight loss and slowly increasing daily activity    Review of Systems   Constitutional: Negative for activity change and appetite change  HENT: Negative for congestion  Eyes: Negative for discharge and itching  Respiratory: Negative for apnea and chest tightness  Cardiovascular: Negative for chest pain and leg swelling  Gastrointestinal: Negative for abdominal distention and abdominal pain  Endocrine: Negative for cold intolerance and heat intolerance  Genitourinary: Negative for difficulty urinating  Musculoskeletal: Negative for arthralgias and back pain  Skin: Negative for color change, pallor, rash and wound  Allergic/Immunologic: Negative for environmental allergies  Neurological: Negative for dizziness  Hematological: Negative for adenopathy  Does not bruise/bleed easily  Psychiatric/Behavioral: Negative for agitation  All other systems reviewed and are negative        Historical Information   Past Medical History:   Diagnosis Date    Acute kidney injury 10/28/2021    Anemia 09/13/2019    Cellulitis     last assessed 12/10/15    Cellulitis of left lower extremity     Cough 10/20/2019    [facility]     Disease of thyroid gland     Edema     Elevated liver enzymes     Elevated serum creatinine 11/19/2021    Esophageal reflux     Gluten intolerance     Gout     last assessed 09/05/13    Hyperglycemia     Hypertension     IBS (irritable bowel syndrome)     Insomnia     Obesity     Osteoarthritis of knee     last assessed 02/10/14    Shortness of breath 5/3/2021    Venous insufficiency     last assessed 08/22/17    Villonodular synovitis of the hand, right     last assessed 11/14/2013     Past Surgical History:   Procedure Laterality Date    INCISION AND DRAINAGE OF WOUND Left 9/6/2019    Procedure: INCISION AND DRAINAGE (I&D) GROIN;  Surgeon: Allison Cornejo DO;  [facility];  Service: General    SKIN BIOPSY      WOUND DEBRIDEMENT Left 9/7/2019    Procedure: EXCISIONAL DEBRIDEMENT;  Surgeon: Allison Cornejo DO;  Location[facility];  Service: General Family History   Problem Relation Age of Onset    Cancer Mother         gastrc    Colon cancer Father     Heart failure Father        Social History     Tobacco Use   Smoking Status Never Smoker   Smokeless Tobacco Never Used         Meds/Allergies     Current Outpatient Medications:     acetaminophen (TYLENOL) 325 mg tablet, Take 2 tablets (650 mg total) by mouth every 4 (four) hours as needed for mild pain, headaches or fever, Disp: , Rfl: 0    albuterol (PROVENTIL HFA,VENTOLIN HFA) 90 mcg/act inhaler, TAKE 2 PUFFS BY MOUTH EVERY 6 HOURS AS NEEDED FOR WHEEZE, Disp: 8 5 g, Rfl: 0    BD Pen Needle Ludmila 2nd Gen 32G X 4 MM MISC, USE 4 TIMES A DAY, Disp: 200 each, Rfl: 3    Calcium Alginate (Maxorb II Alginate Dressing) MISC, Apply 1 each topically in the morning, Disp: 10 each, Rfl: 2    Continuous Blood Gluc  (FreeStyle Elvin 2 Keller Systm) BROOKS, Use 1 Device as needed (use with sensors for bs checks), Disp: 1 Device, Rfl: 0    Continuous Blood Gluc Sensor (FreeStyle Elvin 14 Day Sensor) MISC, USE ONE SENSOR EVERY 2 WEEKS, Disp: 2 each, Rfl: 3    Control Gel Formula Dressing (DuoDERM CGF Dressing) [facility], Apply 1 application topically every 5 (five) days, Disp: 5 each, Rfl: 1    CVS Diclofenac Sodium 1 %, APPLY 4 G TOPICALLY 4 (FOUR) TIMES A DAY AS NEEDED (JOINT PAIN), Disp: 50 g, Rfl: 0    furosemide (LASIX) 20 mg tablet, TAKE 2 TABLETS EVERY DAY IN THE EARLY MORNING AND 1 TABLET DAILY AFTER LUNCH , Disp: 270 tablet, Rfl: 2    gabapentin (NEURONTIN) 100 mg capsule, TAKE 1 CAPSULE BY MOUTH TWICE A DAY WITH 300MG CAPSULE, Disp: 180 capsule, Rfl: 2    gabapentin (NEURONTIN) 300 mg capsule, Take 1 capsule (300 mg total) by mouth 2 (two) times a day Take 1 tab with your 100mg tab twice daily, Disp: 180 capsule, Rfl: 2    Gauze Pads & Dressings 5"X5" PADS, Use in the morning, Disp: 20 each, Rfl: 2    insulin aspart (NovoLOG FlexPen) 100 UNIT/ML injection pen, INJECT 18 UNITS WITH MEALS+SCALE ( - 150-200 -2 UNITS, 201-250-4 UNITS, 251-300 -6 UNITS, 301-350-8 UNITS, > 350- 10 UNITS ), Disp: 45 mL, Rfl: 1    Lancets (ONETOUCH ULTRASOFT) lancets, Use to test sugar 2 times a day, Disp: 100 each, Rfl: 3    Lantus SoloStar 100 units/mL injection pen, INJECT 46 UNITS UNDER THE SKIN DAILY AT BEDTIME INJECT 48 UNITS UNDER THE SKIN EVERY BEDTIME, Disp: 15 mL, Rfl: 0    losartan (COZAAR) 25 mg tablet, TAKE 1 TABLET BY MOUTH EVERY DAY, Disp: 90 tablet, Rfl: 2    metFORMIN (GLUCOPHAGE) 1000 MG tablet, TAKE ONE TABLET BY MOUTH TWICE DAILY, Disp: 180 tablet, Rfl: 3    nystatin (MYCOSTATIN) cream, Apply topically 2 (two) times a day as needed (itching, redness), Disp: 30 g, Rfl: 1    nystatin (MYCOSTATIN) powder, Apply 1 application topically 2 (two) times a day To affected area, Disp: 120 g, Rfl: 3    omeprazole (PriLOSEC) 40 MG capsule, TAKE 1 CAPSULE BY MOUTH TWICE A DAY, Disp: 180 capsule, Rfl: 1    OneTouch Verio test strip, USE TO TEST SUGAR 2 TIMES A DAY, Disp: 100 strip, Rfl: 3    oxyCODONE (ROXICODONE) 5 immediate release tablet, Take 1-2 tabs by mouth every 6 hours as needed for moderate or severe pains respectively  , Disp: 15 tablet, Rfl: 0    psyllium (METAMUCIL) packet, Take 1 packet by mouth daily, Disp: , Rfl: 0    saccharomyces boulardii (FLORASTOR) 250 mg capsule, Take 1 capsule (250 mg total) by mouth 2 (two) times a day, Disp: 60 capsule, Rfl: 1    Skin Protectants, Misc   ([employer] Textile 10"x144") SHEE, Apply 1 each topically every 5 (five) days, Disp: 3 each, Rfl: 5    sodium hypochlorite (DAKIN'S QUARTER-STRENGTH), Irrigate with 1 application as directed daily, Disp: 473 mL, Rfl: 0    Blood Glucose Monitoring Suppl (Paul Price) w/Device KIT, Use to test sugar daily (Patient not taking: No sig reported), Disp: 1 kit, Rfl: 0  Allergies   Allergen Reactions    Gluten Meal - Food Allergy     Clindamycin Diarrhea       Vitals: Blood pressure 134/80, pulse (!) 110, temperature 98 8 °F (37 1 °C), temperature source Tympanic, resp  rate 20, SpO2 96 %  There is no height or weight on file to calculate BMI  Oxygen Therapy  SpO2: 96 %    Physical Exam  Physical Exam  Vitals reviewed  Constitutional:       Appearance: Normal appearance  He is obese  HENT:      Head: Normocephalic and atraumatic  Nose: Nose normal       Mouth/Throat:      Mouth: Mucous membranes are moist       Pharynx: Oropharynx is clear  Eyes:      Extraocular Movements: Extraocular movements intact  Pupils: Pupils are equal, round, and reactive to light  Cardiovascular:      Rate and Rhythm: Normal rate and regular rhythm  Pulses: Normal pulses  Pulmonary:      Effort: Pulmonary effort is normal       Breath sounds: Normal breath sounds  Abdominal:      General: Abdomen is flat  Musculoskeletal:         General: Normal range of motion  Cervical back: Normal range of motion  Skin:     General: Skin is warm and dry  Neurological:      General: No focal deficit present  Mental Status: He is alert and oriented to person, place, and time  Mental status is at baseline  Psychiatric:         Mood and Affect: Mood normal          Behavior: Behavior normal          Thought Content: Thought content normal          Judgment: Judgment normal          Labs: I have personally reviewed pertinent lab results    Lab Results   Component Value Date    WBC 7 63 11/24/2021    HGB 12 0 11/24/2021    HCT 37 6 11/24/2021    MCV 93 11/24/2021     11/24/2021     Lab Results   Component Value Date    GLUCOSE 144 (H) 09/07/2019    CALCIUM 10 4 (H) 05/20/2022     (L) 12/03/2015    K 4 6 05/20/2022    CO2 27 05/20/2022     05/20/2022    BUN 27 (H) 05/20/2022    CREATININE 1 26 05/20/2022     No results found for: IGE  Lab Results   Component Value Date    ALT 44 11/19/2021    AST 38 11/19/2021    ALKPHOS 102 11/19/2021    BILITOT 0 88 12/03/2015       Imaging and other studies: no recent imaging

## 2022-06-07 NOTE — ASSESSMENT & PLAN NOTE
Currently on CPAP-machine is on recall but he has contacted the company    He feels his cpap has made significant improvement in overall health    Compliance reviewed with [facility] 1 8, 100% compliance average use time 6hrs 30 mins    Supply reorder placed    Follow up with Dr Cyrus Velasquez in 3 months

## 2022-06-07 NOTE — ASSESSMENT & PLAN NOTE
Suspect this is secondary to obesity and OHV    He is able to play the saxophone without difficulty and denies any SOB at rest   Suspect dyspnea is all related to obesity but will obtain PFTs for completeness     He will continue with ROD PRN but reports very infrequent use and minimal benefit

## 2022-06-08 ENCOUNTER — TELEPHONE (OUTPATIENT)
Dept: LAB | Facility: HOSPITAL | Age: 57
End: 2022-06-08

## 2022-06-09 ENCOUNTER — OFFICE VISIT (OUTPATIENT)
Dept: WOUND CARE | Facility: CLINIC | Age: 57
End: 2022-06-09
Payer: MEDICARE

## 2022-06-09 VITALS
RESPIRATION RATE: 20 BRPM | HEART RATE: 76 BPM | SYSTOLIC BLOOD PRESSURE: 140 MMHG | TEMPERATURE: 98.7 F | DIASTOLIC BLOOD PRESSURE: 90 MMHG

## 2022-06-09 DIAGNOSIS — L89.892 PRESSURE INJURY OF LEFT LEG, STAGE 2 (HCC): Primary | ICD-10-CM

## 2022-06-09 DIAGNOSIS — L89.893 PRESSURE INJURY OF RIGHT LEG, STAGE 3 (HCC): ICD-10-CM

## 2022-06-09 PROCEDURE — 29580 STRAPPING UNNA BOOT: CPT

## 2022-06-09 PROCEDURE — 99213 OFFICE O/P EST LOW 20 MIN: CPT | Performed by: FAMILY MEDICINE

## 2022-06-09 RX ORDER — LIDOCAINE 40 MG/G
CREAM TOPICAL ONCE
Status: COMPLETED | OUTPATIENT
Start: 2022-06-09 | End: 2022-06-09

## 2022-06-09 RX ADMIN — LIDOCAINE: 40 CREAM TOPICAL at 15:51

## 2022-06-09 NOTE — PATIENT INSTRUCTIONS
Orders Placed This Encounter   Procedures    Wound cleansing and dressings     Wash your hands with soap and water  Remove old dressing, discard into plastic bag and place in trash  Cleanse the wound with mild soap and water prior to applying a clean dressing  PLEASE WASH LEGS WITH SOAP AND WATER PRIOR TO DRESSING CHANGES   Do not use tissue or cotton balls  Do not scrub the wound  Pat dry using gauze  Shower yes with protection, purchase cast cover  Or sponge bathe  RIGHT LOWER LEG  STOP alginate  Apply Lac Hydrin to B/L lower legs  Apply skin prep to the periwound  Cover the wound with allevyn gentle   Apply circaid compression  Change dressing 2x/week  LEFT LOWER LEG:  STOP  alginate  Apply Lac Hydrin to B/L lower legs  Apply skin prep to the periwound  Cover the wound with allevyn gentle   Apply Unna boot and tubifast yellow  Change dressing 2x/week    Roll towels or blankets and place under ankles for offloading wounds/heals     Continue VNA skilled for 1 week until next evaluation at the wound center        Follow up at the wound center in one week  Today's wound treatment note:  Cleansed and dressed as above       Standing Status:   Future     Standing Expiration Date:   6/9/2023

## 2022-06-09 NOTE — PROGRESS NOTES
Cast Application    Date/Time: 6/9/2022 3:54 PM  Performed by: Serene Rapp RN  Authorized by: Erick Joyce MD   Universal Protocol:  Consent: Verbal consent obtained  Consent given by: patient  Patient understanding: patient states understanding of the procedure being performed  Patient identity confirmed: verbally with patient      Pre-procedure details:     Sensation:  Normal  Procedure details:     Laterality:  Left    Location:  Leg    Leg:  L lower legCast type:  Unna boot      Supplies used: 1 econo paste, 1 coban, 1 tubifast yellow  Post-procedure details:     Pain:  Improved    Sensation:  Normal    Patient tolerance of procedure: Tolerated well, no immediate complications  Comments:      UNNA BOOT wrap procedure     Before application, RAY and/or TBI determined to be adequate for healing and application of compression  Lower extremity washed prior to application of compression wrap  With the foot in dorsiflexed position, Unna boot was applied as per physician orders without complications or complaint of pain  The procedure was tolerated well  Toes warm & pink post application  Patient provided education & reinforced to observe toes for any discoloration, swelling or tingling and instructed when to report to the [address] [facility],[address] or to remove compression  Wound care as per providers orders, patient tolerated well

## 2022-06-09 NOTE — PROGRESS NOTES
Patient ID: Doak Peabody is a 56 y o  male Date of Birth 1965       Chief Complaint   Patient presents with    Follow Up Wound Care Visit     Pressure injury of left leg , stage 2 ([facility])       Allergies:  Gluten meal - food allergy and Clindamycin    Diagnosis:      Diagnosis ICD-10-CM Associated Orders   1  Pressure injury of left leg, stage 2 ([facility])  L89 892 lidocaine (LMX) 4 % cream     Wound cleansing and dressings     Cast Application   2  Pressure injury of right leg, stage 3 ([facility])  L89 893 lidocaine (LMX) 4 % cream     Wound cleansing and dressings           Assessment & Plan:   Both pressure injuries of the legs have deteriorated since last week  He has been wearing circ aid on the right and Unna boot on the left  Because of not offloading as much as he used to, I believe that this is part of the reason it is deteriorating  We discussed using a rolled up blanket or towel under the ankles to try to offload which seemed to work here in the center   No debridements today  Foam to both ulcers and Unna boot on the left, circ aid on the right  Subjective:   2/17/22: This is a 65-year-old male who comes in [address] [place] [address] with ulcers on the right and left calfs  He states that he was hospitalized on November of 2021 with cellulitis and he had the ulcers at that time  Patient is morbidly obese and has history of lymphedema as well  He does not use any form of compression other than Ace wraps  He had purchased alginate on the Internet as well as bordered foam is  He notes that there is heavy drainage requiring dressings to be changed once or twice a day  He believes that these ulcers had started due to  sleeping in a recliner and the foot rest causing pressure on the calfs  He is unable to sleep in a bed  He notes he has increased pain at nighttime when in there is recliner with pressure on his calf    He is ambulatory with a walker but does not go out of the house other than to doctor visits  Patient also has a history of type 2 diabetes with good control with last A1c 6 5 in October of 2021  He states that he has lost approximately 120 lb in the past nine months with a special meal plan  He plans on continuing with weight loss  2/24/22: Followup stage III pressure injuries of right and left calfs  They purchased memory foam pillows which seems to help with both pain and offloading the areas when he is sleeping  Still has pain mainly at night  Left greater than right  3/3/22: Followup stage III pressure injuries of the right left calfs  Patient is upset about visiting nurse not having proper supplies  Then there was leakage through the [employer]  Still has pain at nighttime  No fever chills  3/10/22: Followup stage III pressure injuries of the right and left calfs  Patient states that the visiting nurse had a "observer" do the Unna boot on his right leg which slid down  Still has pain mainly in the left calf  They are trying various offloading techniques  3/17/22: Followup stage III pressure injuries of the right and left calfs  Patient states he still has pain but slightly less particularly on the left  Starting to have some itching  No fever or chills  Still has not found the proper offloading device  3/24/22: Followup stage III pressure injuries of both calfs continues to have some pain as in the past   No fever or chills  Trying to offload when in bed     3/31/22: Followup stage III pressure injuries of both lateral calf  Continues to have the same amount of pain  States he is trying offload  No fever chills  4/7/22:  Patient is a patient of Dr Ronan Bean who presents for followup of bilateral LE ulcers  Has had antimicrobial endoform and hydrofera blue on the wounds and Unna boot for compression    4/14/22: Followup stage III pressure injuries of both lateral calfs  Unna boots  Has no pain on the left calf anymore  Much less on the right    No fever chills  4/21/22: Followup stage III pressure injuries of both lateral calfs  Tolerating Unna boots although they tend to slide down  No pain at this time  4/28/22: Followup stage III pressure injuries of both lateral calfs  No complaints at this time  No pain  5/5/22: Followup stage III pressure injuries of both lateral calf  No complaints  No pain  5/12/22: Followup stage III pressure injuries of both lateral calfs  At last visit, the left calf was closed  Hydrocolloid on the right calf was leaking  No other complaints  5/19/22: Followup stage III pressure injury of the right lateral calf  Left calf was closed at last visit  Received a telephone call from [facility] stating that the left calf has a new ulcer adjacent to the healed area  Tubigrip was use on the left because of previously healed ulcer  Bilateral lower extremity lymphedema  VNA placed new Unna boot on the left  No other complaints  5/26/22: Followup stage III pressure injuries of the right and left calfs  At last visit, the left calf reopened  No new complaints today  Tolerating Unna boot  06/02/22: Followup stage III pressure injuries of the R and L calfs  Polymem with Unna boot placed last visit  Denies fevers, chills, no new complaints  6/9/22: Followup stage III pressure injuries of the right and left calfs  This past week he has worn Unna boot on the left with alginate and circ aid on the right  No complaints          The following portions of the patient's history were reviewed and updated as appropriate:   Patient Active Problem List   Diagnosis    Type 2 diabetes mellitus with hyperglycemia, with long-term current use of insulin ([address] )    Hyperlipidemia    Psoriasis    Venous insufficiency    Gout    Essential hypertension    Hyponatremia    Gluten intolerance    Diabetic neuropathy (HCC)    Insomnia    Scrotal swelling    Erectile dysfunction    Bilateral leg edema    Elevated CO2 level    Abnormal thyroid function test    DAHIANA (obstructive sleep apnea)    Shortness of breath    Fungal infection    Morbid obesity with BMI of 70 and over, adult ([facility] )    Cellulitis of right lower extremity    Migraine headache    Onychomycosis    Ulcer of left lower extremity, limited to breakdown of skin ([facility] )    Pressure injury of right leg, stage 3 (HCC)     Past Medical History:   Diagnosis Date    Acute kidney injury 10/28/2021    Anemia 09/13/2019    Cellulitis     last assessed 12/10/15    Cellulitis of left lower extremity     Cough 10/20/2019    Diabetes mellitus ([facility] )     Disease of thyroid gland     Edema     Elevated liver enzymes     Elevated serum creatinine 11/19/2021    Esophageal reflux     Gluten intolerance     Gout     last assessed 09/05/13    Hyperglycemia     Hypertension     IBS (irritable bowel syndrome)     Insomnia     Obesity     Osteoarthritis of knee     last assessed 02/10/14    Shortness of breath 5/3/2021    Venous insufficiency     last assessed 08/22/17    Villonodular synovitis of the hand, right     last assessed 11/14/2013     Past Surgical History:   Procedure Laterality Date    INCISION AND DRAINAGE OF WOUND Left 9/6/2019    Procedure: INCISION AND DRAINAGE (I&D) GROIN;  Surgeon: Kevin Del Rio DO;  [facility];  Service: General    SKIN BIOPSY      WOUND DEBRIDEMENT Left 9/7/2019    Procedure: EXCISIONAL DEBRIDEMENT;  Surgeon: Kevin Del Rio DO;  [facility];  Service: General     Family History   Problem Relation Age of Onset    Cancer Mother         gastrc    Colon cancer Father     Heart failure Father       Social History     Socioeconomic History    Marital status: /Civil Union     Spouse name: Not on file    Number of children: Not on file    Years of education: Not on file    Highest education level: Not on file   Occupational History    Not on file   Tobacco Use    Smoking status: Never Smoker    Smokeless tobacco: Never Used   Vaping Use    Vaping Use: Never used   Substance and Sexual Activity    Alcohol use: Not Currently     Alcohol/week: 0 0 standard drinks    Drug use: No    Sexual activity: Yes   Other Topics Concern    Not on file   Social History Narrative    Not on file     Social Determinants of Health     Financial Resource Strain: Not on file   Food Insecurity: Not on file   Transportation Needs: No Transportation Needs    Lack of Transportation (Medical): No    Lack of Transportation (Non-Medical):  No   Physical Activity: Not on file   Stress: Not on file   Social Connections: Not on file   Intimate Partner Violence: Not on file   Housing Stability: Not on file        Current Outpatient Medications:     acetaminophen (TYLENOL) 325 mg tablet, Take 2 tablets (650 mg total) by mouth every 4 (four) hours as needed for mild pain, headaches or fever, Disp: , Rfl: 0    albuterol (PROVENTIL HFA,VENTOLIN HFA) 90 mcg/act inhaler, TAKE 2 PUFFS BY MOUTH EVERY 6 HOURS AS NEEDED FOR WHEEZE, Disp: 8 5 g, Rfl: 0    BD Pen Needle Ludmila 2nd Gen 32G X 4 MM MISC, USE 4 TIMES A DAY, Disp: 200 each, Rfl: 3    Blood Glucose Monitoring Suppl (ONETOUCH VERIO) w/Device KIT, Use to test sugar daily (Patient not taking: No sig reported), Disp: 1 kit, Rfl: 0    Calcium Alginate (Maxorb II Alginate Dressing) MISC, Apply 1 each topically in the morning, Disp: 10 each, Rfl: 2    Continuous Blood Gluc  (FreeStyle Connell 2 [place] Systm) BROOKS, Use 1 Device as needed (use with sensors for bs checks), Disp: 1 Device, Rfl: 0    Continuous Blood Gluc Sensor (FreeStyle Elvin 14 Day Sensor) MISC, USE ONE SENSOR EVERY 2 WEEKS, Disp: 2 each, Rfl: 3    Control Gel Formula Dressing (DuoDERM CGF Dressing) MISC, Apply 1 application topically every 5 (five) days, Disp: 5 each, Rfl: 1    [employer] Diclofenac Sodium 1 %, APPLY 4 G TOPICALLY 4 (FOUR) TIMES A DAY AS NEEDED (JOINT PAIN), Disp: 50 g, Rfl: 0    furosemide (LASIX) 20 mg tablet, TAKE 2 TABLETS EVERY DAY IN THE EARLY MORNING AND 1 TABLET DAILY AFTER LUNCH , Disp: 270 tablet, Rfl: 2    gabapentin (NEURONTIN) 100 mg capsule, TAKE 1 CAPSULE BY MOUTH TWICE A DAY WITH 300MG CAPSULE, Disp: 180 capsule, Rfl: 2    gabapentin (NEURONTIN) 300 mg capsule, Take 1 capsule (300 mg total) by mouth 2 (two) times a day Take 1 tab with your 100mg tab twice daily, Disp: 180 capsule, Rfl: 2    Gauze Pads & Dressings 5"X5" PADS, Use in the morning, Disp: 20 each, Rfl: 2    insulin aspart (NovoLOG FlexPen) 100 UNIT/ML injection pen, INJECT 18 UNITS WITH MEALS+SCALE ( - 150-200 -2 UNITS, 201-250-4 UNITS, 251-300 -6 UNITS, 301-350-8 UNITS, > 350- 10 UNITS ), Disp: 45 mL, Rfl: 1    Lancets (ONETOUCH ULTRASOFT) lancets, Use to test sugar 2 times a day, Disp: 100 each, Rfl: 3    Lantus SoloStar 100 units/mL injection pen, INJECT 46 UNITS UNDER THE SKIN DAILY AT BEDTIME INJECT 48 UNITS UNDER THE SKIN EVERY BEDTIME, Disp: 15 mL, Rfl: 0    losartan (COZAAR) 25 mg tablet, TAKE 1 TABLET BY MOUTH EVERY DAY, Disp: 90 tablet, Rfl: 2    metFORMIN (GLUCOPHAGE) 1000 MG tablet, TAKE ONE TABLET BY MOUTH TWICE DAILY, Disp: 180 tablet, Rfl: 3    nystatin (MYCOSTATIN) cream, Apply topically 2 (two) times a day as needed (itching, redness), Disp: 30 g, Rfl: 1    nystatin (MYCOSTATIN) powder, Apply 1 application topically 2 (two) times a day To affected area, Disp: 120 g, Rfl: 3    omeprazole (PriLOSEC) 40 MG capsule, TAKE 1 CAPSULE BY MOUTH TWICE A DAY, Disp: 180 capsule, Rfl: 1    OneTouch Verio test strip, USE TO TEST SUGAR 2 TIMES A DAY, Disp: 100 strip, Rfl: 3    oxyCODONE (ROXICODONE) 5 immediate release tablet, Take 1-2 tabs by mouth every 6 hours as needed for moderate or severe pains respectively  , Disp: 15 tablet, Rfl: 0    psyllium (METAMUCIL) packet, Take 1 packet by mouth daily, Disp: , Rfl: 0    saccharomyces boulardii (FLORASTOR) 250 mg capsule, Take 1 capsule (250 mg total) by mouth 2 (two) times a day, Disp: 60 capsule, Rfl: 1    Skin Protectants, Misc  ([employer] Textile 10"x144") SHEE, Apply 1 each topically every 5 (five) days, Disp: 3 each, Rfl: 5    sodium hypochlorite (DAKIN'S QUARTER-STRENGTH), Irrigate with 1 application as directed daily, Disp: 473 mL, Rfl: 0  No current facility-administered medications for this visit  Review of Systems   Constitutional: Negative for appetite change, chills, fatigue, fever and unexpected weight change  HENT: Negative for congestion, hearing loss, postnasal drip and sinus pressure  Respiratory: Positive for shortness of breath (On exertion)  Negative for cough  Cardiovascular: Positive for leg swelling (Lymphedema)  Endocrine: Negative  Musculoskeletal: Positive for gait problem Les Decent)  Negative for back pain  Skin: Positive for wound (Right lateral calf and left lateral calf and new laceration of the left calf  )  Negative for rash  Allergic/Immunologic: Negative  Hematological: Does not bruise/bleed easily  Psychiatric/Behavioral: Negative  Negative for dysphoric mood  The patient is not nervous/anxious  Objective:  /90   Pulse 76   Temp 98 7 °F (37 1 °C)   Resp 20   Pain Score:   6     Physical Exam  Vitals and nursing note reviewed  Constitutional:       Appearance: Normal appearance  He is well-developed  He is morbidly obese  HENT:      Head: Normocephalic and atraumatic  Cardiovascular:      Rate and Rhythm: Normal rate  Pulmonary:      Effort: Pulmonary effort is normal    Skin:     General: Skin is warm and dry  Findings: Wound present  No erythema  Comments: Left calf pressure injury is slightly larger with very friable and fragile skin surrounding  Base is mostly granular  The right lateral calf also is slightly larger with bridging of skin  Mostly clean, granular base  Neurological:      Mental Status: He is alert and oriented to person, place, and time     Psychiatric: Attention and Perception: Attention normal          Mood and Affect: Mood and affect normal          Behavior: Behavior is cooperative  Cognition and Memory: Cognition normal                                   Wound Instructions:  Orders Placed This Encounter   Procedures    Wound cleansing and dressings     Wash your hands with soap and water  Remove old dressing, discard into plastic bag and place in trash  Cleanse the wound with mild soap and water prior to applying a clean dressing  PLEASE WASH LEGS WITH SOAP AND WATER PRIOR TO DRESSING CHANGES   Do not use tissue or cotton balls  Do not scrub the wound  Pat dry using gauze  Shower yes with protection, purchase cast cover  Or sponge bathe       RIGHT LOWER LEG  STOP alginate  Apply Lac Hydrin to B/L lower legs  Apply skin prep to the periwound  Cover the wound with allevyn gentle   Apply circaid compression  Change dressing 2x/week          LEFT LOWER LEG:  STOP  alginate  Apply Lac Hydrin to B/L lower legs  Apply skin prep to the periwound  Cover the wound with allevyn gentle   Apply Unna boot and tubifast yellow  Change dressing 2x/week    Roll towels or blankets and place under ankles for offloading wounds/heals     Continue VNA skilled for 1 week until next evaluation at the wound center        Follow up at the wound center in one week  Today's wound treatment note:  Cleansed and dressed as above  Standing Status:   Future     Standing Expiration Date:   9/9/2672    Cast Application     This order was created via procedure documentation       George Harrell MD, CHT, CWS    Portions of the record may have been created with voice recognition software  Occasional wrong word or "sound alike" substitutions may have occurred due to the inherent limitations of voice recognition software  Read the chart carefully and recognize, using context, where substitutions have occurred

## 2022-06-11 ENCOUNTER — HOME HEALTH ADMISSION (OUTPATIENT)
Dept: HOME HEALTH SERVICES | Facility: HOME HEALTHCARE | Age: 57
End: 2022-06-11
Payer: MEDICARE

## 2022-06-13 ENCOUNTER — TELEPHONE (OUTPATIENT)
Dept: LAB | Facility: HOSPITAL | Age: 57
End: 2022-06-13

## 2022-06-16 ENCOUNTER — OFFICE VISIT (OUTPATIENT)
Dept: WOUND CARE | Facility: CLINIC | Age: 57
End: 2022-06-16
Payer: MEDICARE

## 2022-06-16 ENCOUNTER — TELEPHONE (OUTPATIENT)
Dept: PULMONOLOGY | Facility: CLINIC | Age: 57
End: 2022-06-16

## 2022-06-16 VITALS
DIASTOLIC BLOOD PRESSURE: 66 MMHG | SYSTOLIC BLOOD PRESSURE: 120 MMHG | TEMPERATURE: 97.9 F | HEART RATE: 60 BPM | RESPIRATION RATE: 22 BRPM

## 2022-06-16 DIAGNOSIS — L89.893 PRESSURE INJURY OF RIGHT LEG, STAGE 3 (HCC): ICD-10-CM

## 2022-06-16 DIAGNOSIS — L89.892 PRESSURE INJURY OF LEFT LEG, STAGE 2 (HCC): Primary | ICD-10-CM

## 2022-06-16 PROCEDURE — 11042 DBRDMT SUBQ TIS 1ST 20SQCM/<: CPT | Performed by: FAMILY MEDICINE

## 2022-06-16 RX ORDER — LIDOCAINE 40 MG/G
CREAM TOPICAL ONCE
Status: COMPLETED | OUTPATIENT
Start: 2022-06-16 | End: 2022-06-16

## 2022-06-16 RX ADMIN — LIDOCAINE: 40 CREAM TOPICAL at 17:19

## 2022-06-16 NOTE — PROGRESS NOTES
Patient ID: Brooke Hernadez is a 56 y o  male Date of Birth 1965       Chief Complaint   Patient presents with    Follow Up Wound Care Visit     Bilateral lower extremities  Allergies:  Gluten meal - food allergy and Clindamycin    Diagnosis:      Diagnosis ICD-10-CM Associated Orders   1  Pressure injury of left leg, stage 2 ([facility])  L89 892 lidocaine (LMX) 4 % cream     Wound cleansing and dressings     Debridement   2  Pressure injury of right leg, stage 3 ([facility])  L89 893 lidocaine (LMX) 4 % cream     Wound cleansing and dressings     Debridement           Assessment & Plan:   Improving pressure injuries of both lower extremities  Overall size has improved but there is still some slough and fibrin present   Surgical debridement of both ulcers   See wound care orders  Patient is to use his Circ Aids bilaterally  Followup in two weeks  Subjective:   2/17/22: This is a 45-year-old male who comes in [address] [place] [address] with ulcers on the right and left calfs  He states that he was hospitalized on November of 2021 with cellulitis and he had the ulcers at that time  Patient is morbidly obese and has history of lymphedema as well  He does not use any form of compression other than Ace wraps  He had purchased alginate on the Internet as well as bordered foam is  He notes that there is heavy drainage requiring dressings to be changed once or twice a day  He believes that these ulcers had started due to  sleeping in a recliner and the foot rest causing pressure on the calfs  He is unable to sleep in a bed  He notes he has increased pain at nighttime when in there is recliner with pressure on his calf  He is ambulatory with a walker but does not go out of the house other than to doctor visits  Patient also has a history of type 2 diabetes with good control with last A1c 6 5 in October of 2021  He states that he has lost approximately 120 lb in the past nine months with a special meal plan    He plans on continuing with weight loss  2/24/22: Followup stage III pressure injuries of right and left calfs  They purchased memory foam pillows which seems to help with both pain and offloading the areas when he is sleeping  Still has pain mainly at night  Left greater than right  3/3/22: Followup stage III pressure injuries of the right left calfs  Patient is upset about visiting nurse not having proper supplies  Then there was leakage through the [employer]  Still has pain at nighttime  No fever chills  3/10/22: Followup stage III pressure injuries of the right and left calfs  Patient states that the visiting nurse had a "observer" do the Unna boot on his right leg which slid down  Still has pain mainly in the left calf  They are trying various offloading techniques  3/17/22: Followup stage III pressure injuries of the right and left calfs  Patient states he still has pain but slightly less particularly on the left  Starting to have some itching  No fever or chills  Still has not found the proper offloading device  3/24/22: Followup stage III pressure injuries of both calfs continues to have some pain as in the past   No fever or chills  Trying to offload when in bed     3/31/22: Followup stage III pressure injuries of both lateral calf  Continues to have the same amount of pain  States he is trying offload  No fever chills  4/7/22:  Patient is a patient of Dr Ruth Dalton who presents for followup of bilateral LE ulcers  Has had antimicrobial endoform and hydrofera blue on the wounds and Unna boot for compression    4/14/22: Followup stage III pressure injuries of both lateral calfs  Unna boots  Has no pain on the left calf anymore  Much less on the right  No fever chills  4/21/22: Followup stage III pressure injuries of both lateral calfs  Tolerating Unna boots although they tend to slide down  No pain at this time  4/28/22:  Followup stage III pressure injuries of both lateral calfs  No complaints at this time  No pain  5/5/22: Followup stage III pressure injuries of both lateral calf  No complaints  No pain  5/12/22: Followup stage III pressure injuries of both lateral calfs  At last visit, the left calf was closed  Hydrocolloid on the right calf was leaking  No other complaints  5/19/22: Followup stage III pressure injury of the right lateral calf  Left calf was closed at last visit  Received a telephone call from GRANT stating that the left calf has a new ulcer adjacent to the healed area  Tubigrip was use on the left because of previously healed ulcer  Bilateral lower extremity lymphedema  VNA placed new Unna boot on the left  No other complaints  5/26/22: Followup stage III pressure injuries of the right and left calfs  At last visit, the left calf reopened  No new complaints today  Tolerating Unna boot  06/02/22: Followup stage III pressure injuries of the R and L calfs  Polymem with Unna boot placed last visit  Denies fevers, chills, no new complaints  6/9/22: Followup stage III pressure injuries of the right and left calfs  This past week he has worn Unna boot on the left with alginate and circ aid on the right  No complaints  6/16/22: Followup stage III pressure injuries of the right left calfs  Doing well with circ aid on the right and Unna boot on the left  No significant pain  No other complaints          The following portions of the patient's history were reviewed and updated as appropriate:   Patient Active Problem List   Diagnosis    Type 2 diabetes mellitus with hyperglycemia, with long-term current use of insulin ([address] )    Hyperlipidemia    Psoriasis    Venous insufficiency    Gout    Essential hypertension    Hyponatremia    Gluten intolerance    Diabetic neuropathy (HCC)    Insomnia    Scrotal swelling    Erectile dysfunction    Bilateral leg edema    Elevated CO2 level    Abnormal thyroid function test    DAHIANA (obstructive sleep apnea)    Shortness of breath    Fungal infection    Morbid obesity with BMI of 70 and over, adult ([facility] )    Cellulitis of right lower extremity    Migraine headache    Onychomycosis    Ulcer of left lower extremity, limited to breakdown of skin ([facility] )    Pressure injury of right leg, stage 3 (HCC)     Past Medical History:   Diagnosis Date    Acute kidney injury 10/28/2021    Anemia 09/13/2019    Cellulitis     last assessed 12/10/15    Cellulitis of left lower extremity     Cough 10/20/2019    Diabetes mellitus ([facility] )     Disease of thyroid gland     Edema     Elevated liver enzymes     Elevated serum creatinine 11/19/2021    Esophageal reflux     Gluten intolerance     Gout     last assessed 09/05/13    Hyperglycemia     Hypertension     IBS (irritable bowel syndrome)     Insomnia     Obesity     Osteoarthritis of knee     last assessed 02/10/14    Shortness of breath 5/3/2021    Venous insufficiency     last assessed 08/22/17    Villonodular synovitis of the hand, right     last assessed 11/14/2013     Past Surgical History:   Procedure Laterality Date    INCISION AND DRAINAGE OF WOUND Left 9/6/2019    Procedure: INCISION AND DRAINAGE (I&D) GROIN;  Surgeon: Kevin Del Rio DO;  [facility];  Service: General    SKIN BIOPSY      WOUND DEBRIDEMENT Left 9/7/2019    Procedure: EXCISIONAL DEBRIDEMENT;  Surgeon: Kevin Del Rio DO;  [facility];  Service: General     Family History   Problem Relation Age of Onset    Cancer Mother         gastrc    Colon cancer Father     Heart failure Father       Social History     Socioeconomic History    Marital status: /Civil Union     Spouse name: None    Number of children: None    Years of education: None    Highest education level: None   Occupational History    None   Tobacco Use    Smoking status: Never Smoker    Smokeless tobacco: Never Used   Vaping Use    Vaping Use: Never used   Substance and Sexual Activity    Alcohol use: Not Currently     Alcohol/week: 0 0 standard drinks    Drug use: No    Sexual activity: Yes   Other Topics Concern    None   Social History Narrative    None     Social Determinants of Health     Financial Resource Strain: Not on file   Food Insecurity: Not on file   Transportation Needs: No Transportation Needs    Lack of Transportation (Medical): No    Lack of Transportation (Non-Medical):  No   Physical Activity: Not on file   Stress: Not on file   Social Connections: Not on file   Intimate Partner Violence: Not on file   Housing Stability: Not on file        Current Outpatient Medications:     acetaminophen (TYLENOL) 325 mg tablet, Take 2 tablets (650 mg total) by mouth every 4 (four) hours as needed for mild pain, headaches or fever, Disp: , Rfl: 0    albuterol (PROVENTIL HFA,VENTOLIN HFA) 90 mcg/act inhaler, TAKE 2 PUFFS BY MOUTH EVERY 6 HOURS AS NEEDED FOR WHEEZE, Disp: 8 5 g, Rfl: 0    BD Pen Needle Ludmila 2nd Gen 32G X 4 MM MISC, USE 4 TIMES A DAY, Disp: 200 each, Rfl: 3    Blood Glucose Monitoring Suppl (ONETOUCH VERIO) w/Device KIT, Use to test sugar daily (Patient not taking: No sig reported), Disp: 1 kit, Rfl: 0    Calcium Alginate (Maxorb II Alginate Dressing) MISC, Apply 1 each topically in the morning, Disp: 10 each, Rfl: 2    Continuous Blood Gluc  (FreeStyle Connell 2 [place] Systm) BROOKS, Use 1 Device as needed (use with sensors for bs checks), Disp: 1 Device, Rfl: 0    Continuous Blood Gluc Sensor (FreeStyle Elvin 14 Day Sensor) MISC, USE ONE SENSOR EVERY 2 WEEKS, Disp: 2 each, Rfl: 3    Control Gel Formula Dressing (DuoDERM CGF Dressing) MIS, Apply 1 application topically every 5 (five) days, Disp: 5 each, Rfl: 1    [employer] Diclofenac Sodium 1 %, APPLY 4 G TOPICALLY 4 (FOUR) TIMES A DAY AS NEEDED (JOINT PAIN), Disp: 50 g, Rfl: 0    furosemide (LASIX) 20 mg tablet, TAKE 2 TABLETS EVERY DAY IN THE EARLY MORNING AND 1 TABLET DAILY AFTER LUNCH , Disp: 270 tablet, Rfl: 2    gabapentin (NEURONTIN) 100 mg capsule, TAKE 1 CAPSULE BY MOUTH TWICE A DAY WITH 300MG CAPSULE, Disp: 180 capsule, Rfl: 2    gabapentin (NEURONTIN) 300 mg capsule, Take 1 capsule (300 mg total) by mouth 2 (two) times a day Take 1 tab with your 100mg tab twice daily, Disp: 180 capsule, Rfl: 2    Gauze Pads & Dressings 5"X5" PADS, Use in the morning, Disp: 20 each, Rfl: 2    insulin aspart (NovoLOG FlexPen) 100 UNIT/ML injection pen, INJECT 18 UNITS WITH MEALS+SCALE ( - 150-200 -2 UNITS, 201-250-4 UNITS, 251-300 -6 UNITS, 301-350-8 UNITS, > 350- 10 UNITS ), Disp: 45 mL, Rfl: 1    Lancets (ONETOUCH ULTRASOFT) lancets, Use to test sugar 2 times a day, Disp: 100 each, Rfl: 3    Lantus SoloStar 100 units/mL injection pen, INJECT 46 UNITS UNDER THE SKIN DAILY AT BEDTIME INJECT 48 UNITS UNDER THE SKIN EVERY BEDTIME, Disp: 15 mL, Rfl: 0    losartan (COZAAR) 25 mg tablet, TAKE 1 TABLET BY MOUTH EVERY DAY, Disp: 90 tablet, Rfl: 2    metFORMIN (GLUCOPHAGE) 1000 MG tablet, Take 1 tablet (1,000 mg total) by mouth 2 (two) times a day, Disp: 180 tablet, Rfl: 0    nystatin (MYCOSTATIN) cream, Apply topically 2 (two) times a day as needed (itching, redness), Disp: 30 g, Rfl: 1    nystatin (MYCOSTATIN) powder, Apply 1 application topically 2 (two) times a day To affected area, Disp: 120 g, Rfl: 3    omeprazole (PriLOSEC) 40 MG capsule, TAKE 1 CAPSULE BY MOUTH TWICE A DAY, Disp: 180 capsule, Rfl: 1    OneTouch Verio test strip, USE TO TEST SUGAR 2 TIMES A DAY, Disp: 100 strip, Rfl: 3    oxyCODONE (ROXICODONE) 5 immediate release tablet, Take 1-2 tabs by mouth every 6 hours as needed for moderate or severe pains respectively  , Disp: 15 tablet, Rfl: 0    psyllium (METAMUCIL) packet, Take 1 packet by mouth daily, Disp: , Rfl: 0    saccharomyces boulardii (FLORASTOR) 250 mg capsule, Take 1 capsule (250 mg total) by mouth 2 (two) times a day, Disp: 60 capsule, Rfl: 1    Skin Protectants, Misc  (Utility Scale [employer] Textile 10"x144") SHEE, Apply 1 each topically every 5 (five) days, Disp: 3 each, Rfl: 5    sodium hypochlorite (DAKIN'S QUARTER-STRENGTH), Irrigate with 1 application as directed daily, Disp: 473 mL, Rfl: 0  No current facility-administered medications for this visit  Review of Systems   Constitutional: Negative for appetite change, chills, fatigue, fever and unexpected weight change  HENT: Negative for congestion, hearing loss, postnasal drip and sinus pressure  Respiratory: Positive for shortness of breath (On exertion)  Negative for cough  Cardiovascular: Positive for leg swelling (Lymphedema)  Endocrine: Negative  Musculoskeletal: Positive for gait problem Margana rosa Santos)  Negative for back pain  Skin: Positive for wound (Right lateral calf and left lateral calf and new laceration of the left calf  )  Negative for rash  Allergic/Immunologic: Negative  Hematological: Does not bruise/bleed easily  Psychiatric/Behavioral: Negative  Negative for dysphoric mood  The patient is not nervous/anxious  Objective:  /66   Pulse 60   Temp 97 9 °F (36 6 °C)   Resp 22   Pain Score:   3     Physical Exam  Vitals and nursing note reviewed  Constitutional:       Appearance: Normal appearance  He is well-developed  He is morbidly obese  HENT:      Head: Normocephalic and atraumatic  Cardiovascular:      Rate and Rhythm: Normal rate  Pulmonary:      Effort: Pulmonary effort is normal    Skin:     General: Skin is warm and dry  Findings: Wound present  No erythema  Comments: Left calf pressure injury is slightly smaller  Base with some fibrin and slough  The right lateral calf also is smaller  Also with some fibrin and slough  Neurological:      Mental Status: He is alert and oriented to person, place, and time     Psychiatric:         Attention and Perception: Attention normal          Mood and Affect: Mood and affect normal  Behavior: Behavior is cooperative  Cognition and Memory: Cognition normal                   Wound 02/17/22 Pressure Injury Leg Right;Lateral;Lower (Active)   Wound Image Images linked 06/16/22 1717   Wound Description Slough;Pink;Yellow;Granulation tissue 06/16/22 1704   Chelsie-wound Assessment Edema;Dry;Scaly;Scar Tissue 06/16/22 1704   Wound Length (cm) 0 7 cm 06/16/22 1704   Wound Width (cm) 1 cm 06/16/22 1704   Wound Depth (cm) 0 1 cm 06/16/22 1704   Wound Surface Area (cm^2) 0 7 cm^2 06/16/22 1704   Wound Volume (cm^3) 0 07 cm^3 06/16/22 1704   Calculated Wound Volume (cm^3) 0 07 cm^3 06/16/22 1704   Change in Wound Size % 92 71 06/16/22 1704   Drainage Amount Moderate 06/16/22 1704   Drainage Description Serosanguineous 06/16/22 1704   Patient Tolerance Tolerated well 06/16/22 1704   Dressing Status Removed 06/16/22 1704       Wound 05/19/22 Venous Ulcer Pretibial Left;Lateral (Active)   Wound Image Images linked 06/16/22 1715   Wound Description Pink; Beefy red;Slough 06/16/22 1705   Chelsie-wound Assessment Edema;Dry;Scaly; Hyperpigmented 06/16/22 1705   Wound Length (cm) 2 3 cm 06/16/22 1705   Wound Width (cm) 3 1 cm 06/16/22 1705   Wound Depth (cm) 0 1 cm 06/16/22 1705   Wound Surface Area (cm^2) 7 13 cm^2 06/16/22 1705   Wound Volume (cm^3) 0 713 cm^3 06/16/22 1705   Calculated Wound Volume (cm^3) 0 71 cm^3 06/16/22 1705   Change in Wound Size % -2266 67 06/16/22 1705   Drainage Amount Moderate 06/16/22 1705   Drainage Description Green;Yellow 06/16/22 1705   Patient Tolerance Tolerated well 06/16/22 1705   Dressing Status Removed 06/16/22 1705       Debridement   Wound 02/17/22 Pressure Injury Leg Right;Lateral;Lower    Universal Protocol:  Consent: Verbal consent obtained  Written consent obtained  Consent given by: patient  Time out: Immediately prior to procedure a "time out" was called to verify the correct patient, procedure, equipment, support staff and site/side marked as required    Patient understanding: patient states understanding of the procedure being performed  Patient identity confirmed: verbally with patient      Performed by: physician  Debridement type: surgical  Level of debridement: subcutaneous tissue  Pain control: lidocaine 4%  Post-debridement measurements  Length (cm): 0 7  Width (cm): 1  Depth (cm): 0 2  Percent debrided: 100%  Surface Area (cm^2): 0 7  Area debrided (cm^2): 0 7  Volume (cm^3): 0 14  Tissue and other material debrided: dermis and subcutaneous tissue  Devitalized tissue debrided: fibrin and slough  Instrument(s) utilized: curette  Bleeding: small  Hemostasis obtained with: pressure  Procedural pain (0-10): 0  Post-procedural pain: 0   Response to treatment: procedure was tolerated well    Debridement   Wound 05/19/22 Venous Ulcer Pretibial Left;Lateral    Universal Protocol:  Consent: Verbal consent obtained  Written consent obtained  Consent given by: patient  Time out: Immediately prior to procedure a "time out" was called to verify the correct patient, procedure, equipment, support staff and site/side marked as required    Patient understanding: patient states understanding of the procedure being performed  Patient identity confirmed: verbally with patient      Performed by: physician  Debridement type: surgical  Level of debridement: subcutaneous tissue  Pain control: lidocaine 4%  Post-debridement measurements  Length (cm): 2 3  Width (cm): 3 1  Depth (cm): 0 2  Percent debrided: 100%  Surface Area (cm^2): 7 13  Area debrided (cm^2): 7 13  Volume (cm^3): 1 43  Tissue and other material debrided: dermis and subcutaneous tissue  Devitalized tissue debrided: fibrin and slough  Instrument(s) utilized: curette  Bleeding: medium  Hemostasis obtained with: pressure  Procedural pain (0-10): 0  Post-procedural pain: 0   Response to treatment: procedure was tolerated well                         Wound Instructions:  Orders Placed This Encounter   Procedures    Wound cleansing and dressings     Wash your hands with soap and water  Remove old dressing, discard into plastic bag and place in trash  Cleanse the wound with mild soap and water prior to applying a clean dressing  PLEASE WASH LEGS WITH SOAP AND WATER PRIOR TO DRESSING CHANGES   Do not use tissue or cotton balls  Do not scrub the wound  Pat dry using gauze  Shower yes with protection, purchase cast cover  Or sponge bathe       RIGHT and LEFT LOWER LEG  STOP allevyn gentle  Apply Lac Hydrin to B/L lower legs  Apply skin prep to the periwound  Cover the wound with hydrofera blue   Apply circaid compression  Change dressing 2x/week        Roll towels or blankets and place under ankles for offloading wounds/heals     Continue VNA skilled for 2 weeks until next evaluation at the wound center     Follow up at the wound center in 2 weeks  Today's wound treatment note:  Dakin's soak to the left lower extremity and dressed as ordered above  Cleansed with mild and soap to the right lower extremity and water and dressed as ordered above  Standing Status:   Future     Standing Expiration Date:   6/16/2023    Debridement     This order was created via procedure documentation    Debridement     This order was created via procedure documentation       July Hubbard MD, CHT, CWS    Portions of the record may have been created with voice recognition software  Occasional wrong word or "sound alike" substitutions may have occurred due to the inherent limitations of voice recognition software  Read the chart carefully and recognize, using context, where substitutions have occurred

## 2022-06-16 NOTE — PATIENT INSTRUCTIONS
Orders Placed This Encounter   Procedures    Wound cleansing and dressings     Wash your hands with soap and water  Remove old dressing, discard into plastic bag and place in trash  Cleanse the wound with mild soap and water prior to applying a clean dressing  PLEASE WASH LEGS WITH SOAP AND WATER PRIOR TO DRESSING CHANGES   Do not use tissue or cotton balls  Do not scrub the wound  Pat dry using gauze  Shower yes with protection, purchase cast cover  Or sponge bathe  RIGHT and LEFT LOWER LEG  STOP allevyn gentle  Apply Lac Hydrin to B/L lower legs  Apply skin prep to the periwound  Cover the wound with hydrofera blue   Apply circaid compression  Change dressing 2x/week  Roll towels or blankets and place under ankles for offloading wounds/heals     Continue VNA skilled for 2 weeks until next evaluation at the wound center     Follow up at the wound center in 2 weeks  Today's wound treatment note:  Dakin's soak to the left lower extremity and dressed as ordered above  Cleansed with mild and soap to the right lower extremity and water and dressed as ordered above       Standing Status:   Future     Standing Expiration Date:   6/16/2023

## 2022-06-16 NOTE — TELEPHONE ENCOUNTER
----- Message from Lilly Cummingsvard sent at 6/13/2022 10:23 AM EDT -----  Regarding: FW: CPAP Prescription Issue    ----- Message -----  From: ANN Bowers  Sent: 6/13/2022  10:12 AM EDT  To: Geoffrey Cage MA  Subject: FW: CPAP Prescription Issue                      I placed an order for supplies on the 7th, do I need to do something specific or different in this case    ----- Message -----  From: Geoffrey Cage MA  Sent: 6/13/2022   9:37 AM EDT  To: ANN Bowers Gerlene El  Subject: FW: CPAP Prescription Issue                        ----- Message -----  From: Aram Amaya  Sent: 6/12/2022   9:15 PM EDT  To: [facility]  Subject: CPAP Prescription Issue                          Hi, I am still having the same issue with my CPAP equipment prescription renewal   [employer] is still giving me issues  please advise? thanks in advance! Please see attachment      Sincerely, Deatrice Free

## 2022-06-20 ENCOUNTER — HOME CARE VISIT (OUTPATIENT)
Dept: HOME HEALTH SERVICES | Facility: HOME HEALTHCARE | Age: 57
End: 2022-06-20
Payer: MEDICARE

## 2022-06-20 PROCEDURE — G0299 HHS/HOSPICE OF RN EA 15 MIN: HCPCS

## 2022-06-20 PROCEDURE — 400014 VN F/U

## 2022-06-21 DIAGNOSIS — Z79.4 TYPE 2 DIABETES MELLITUS WITH HYPERGLYCEMIA, WITH LONG-TERM CURRENT USE OF INSULIN (HCC): ICD-10-CM

## 2022-06-21 DIAGNOSIS — E11.65 TYPE 2 DIABETES MELLITUS WITH HYPERGLYCEMIA, WITH LONG-TERM CURRENT USE OF INSULIN (HCC): ICD-10-CM

## 2022-06-22 ENCOUNTER — HOME CARE VISIT (OUTPATIENT)
Dept: HOME HEALTH SERVICES | Facility: HOME HEALTHCARE | Age: 57
End: 2022-06-22
Payer: MEDICARE

## 2022-06-22 DIAGNOSIS — E11.65 TYPE 2 DIABETES MELLITUS WITH HYPERGLYCEMIA, WITH LONG-TERM CURRENT USE OF INSULIN (HCC): ICD-10-CM

## 2022-06-22 DIAGNOSIS — Z79.4 TYPE 2 DIABETES MELLITUS WITH HYPERGLYCEMIA, WITH LONG-TERM CURRENT USE OF INSULIN (HCC): ICD-10-CM

## 2022-06-22 RX ORDER — INSULIN GLARGINE 100 [IU]/ML
46 INJECTION, SOLUTION SUBCUTANEOUS
Qty: 6 ML | Refills: 2 | Status: SHIPPED | OUTPATIENT
Start: 2022-06-22 | End: 2022-06-28 | Stop reason: SDUPTHER

## 2022-06-22 NOTE — TELEPHONE ENCOUNTER
Luis Mary has requested a refill of lantus solostar 100 units/ mL injection pen  [facility]  Would a refill be appropriate?

## 2022-06-24 ENCOUNTER — APPOINTMENT (OUTPATIENT)
Dept: LAB | Facility: HOSPITAL | Age: 57
End: 2022-06-24
Payer: MEDICARE

## 2022-06-24 ENCOUNTER — HOME CARE VISIT (OUTPATIENT)
Dept: HOME HEALTH SERVICES | Facility: HOME HEALTHCARE | Age: 57
End: 2022-06-24
Payer: MEDICARE

## 2022-06-24 VITALS
RESPIRATION RATE: 20 BRPM | SYSTOLIC BLOOD PRESSURE: 122 MMHG | HEART RATE: 78 BPM | TEMPERATURE: 97.4 F | OXYGEN SATURATION: 94 % | DIASTOLIC BLOOD PRESSURE: 80 MMHG

## 2022-06-24 DIAGNOSIS — R94.6 ABNORMAL THYROID FUNCTION TEST: ICD-10-CM

## 2022-06-24 DIAGNOSIS — E11.65 TYPE 2 DIABETES MELLITUS WITH HYPERGLYCEMIA, WITH LONG-TERM CURRENT USE OF INSULIN (HCC): ICD-10-CM

## 2022-06-24 DIAGNOSIS — R79.89 LOW TESTOSTERONE: ICD-10-CM

## 2022-06-24 DIAGNOSIS — Z79.4 TYPE 2 DIABETES MELLITUS WITH HYPERGLYCEMIA, WITH LONG-TERM CURRENT USE OF INSULIN (HCC): ICD-10-CM

## 2022-06-24 DIAGNOSIS — E78.00 PURE HYPERCHOLESTEROLEMIA: ICD-10-CM

## 2022-06-24 DIAGNOSIS — I10 ESSENTIAL HYPERTENSION: ICD-10-CM

## 2022-06-24 DIAGNOSIS — D63.8 ANEMIA IN OTHER CHRONIC DISEASES CLASSIFIED ELSEWHERE: ICD-10-CM

## 2022-06-24 LAB
ALBUMIN SERPL BCP-MCNC: 2.8 G/DL (ref 3.5–5)
ALP SERPL-CCNC: 58 U/L (ref 46–116)
ALT SERPL W P-5'-P-CCNC: 64 U/L (ref 12–78)
ANION GAP SERPL CALCULATED.3IONS-SCNC: 7 MMOL/L (ref 4–13)
AST SERPL W P-5'-P-CCNC: 34 U/L (ref 5–45)
BASOPHILS # BLD AUTO: 0.08 THOUSANDS/ΜL (ref 0–0.1)
BASOPHILS NFR BLD AUTO: 1 % (ref 0–1)
BILIRUB SERPL-MCNC: 0.43 MG/DL (ref 0.2–1)
BUN SERPL-MCNC: 26 MG/DL (ref 5–25)
CALCIUM ALBUM COR SERPL-MCNC: 10.9 MG/DL (ref 8.3–10.1)
CALCIUM SERPL-MCNC: 9.9 MG/DL (ref 8.3–10.1)
CHLORIDE SERPL-SCNC: 104 MMOL/L (ref 100–108)
CHOLEST SERPL-MCNC: 240 MG/DL
CO2 SERPL-SCNC: 26 MMOL/L (ref 21–32)
CREAT SERPL-MCNC: 1.12 MG/DL (ref 0.6–1.3)
EOSINOPHIL # BLD AUTO: 0.3 THOUSAND/ΜL (ref 0–0.61)
EOSINOPHIL NFR BLD AUTO: 4 % (ref 0–6)
ERYTHROCYTE [DISTWIDTH] IN BLOOD BY AUTOMATED COUNT: 13.8 % (ref 11.6–15.1)
EST. AVERAGE GLUCOSE BLD GHB EST-MCNC: 143 MG/DL
GFR SERPL CREATININE-BSD FRML MDRD: 73 ML/MIN/1.73SQ M
GLUCOSE SERPL-MCNC: 104 MG/DL (ref 65–140)
HBA1C MFR BLD: 6.6 %
HCT VFR BLD AUTO: 45.1 % (ref 36.5–49.3)
HDLC SERPL-MCNC: 37 MG/DL
HGB BLD-MCNC: 14.7 G/DL (ref 12–17)
IMM GRANULOCYTES # BLD AUTO: 0.02 THOUSAND/UL (ref 0–0.2)
IMM GRANULOCYTES NFR BLD AUTO: 0 % (ref 0–2)
LDLC SERPL CALC-MCNC: 154 MG/DL (ref 0–100)
LYMPHOCYTES # BLD AUTO: 3.01 THOUSANDS/ΜL (ref 0.6–4.47)
LYMPHOCYTES NFR BLD AUTO: 37 % (ref 14–44)
MCH RBC QN AUTO: 29.9 PG (ref 26.8–34.3)
MCHC RBC AUTO-ENTMCNC: 32.6 G/DL (ref 31.4–37.4)
MCV RBC AUTO: 92 FL (ref 82–98)
MONOCYTES # BLD AUTO: 0.68 THOUSAND/ΜL (ref 0.17–1.22)
MONOCYTES NFR BLD AUTO: 8 % (ref 4–12)
NEUTROPHILS # BLD AUTO: 4.15 THOUSANDS/ΜL (ref 1.85–7.62)
NEUTS SEG NFR BLD AUTO: 50 % (ref 43–75)
NRBC BLD AUTO-RTO: 0 /100 WBCS
PLATELET # BLD AUTO: 201 THOUSANDS/UL (ref 149–390)
PMV BLD AUTO: 9.1 FL (ref 8.9–12.7)
POTASSIUM SERPL-SCNC: 4.7 MMOL/L (ref 3.5–5.3)
PROT SERPL-MCNC: 7.9 G/DL (ref 6.4–8.2)
RBC # BLD AUTO: 4.92 MILLION/UL (ref 3.88–5.62)
SODIUM SERPL-SCNC: 137 MMOL/L (ref 136–145)
T4 FREE SERPL-MCNC: 1.11 NG/DL (ref 0.76–1.46)
TRIGL SERPL-MCNC: 244 MG/DL
TSH SERPL DL<=0.05 MIU/L-ACNC: 3.45 UIU/ML (ref 0.45–4.5)
WBC # BLD AUTO: 8.24 THOUSAND/UL (ref 4.31–10.16)

## 2022-06-24 PROCEDURE — 84402 ASSAY OF FREE TESTOSTERONE: CPT

## 2022-06-24 PROCEDURE — 80061 LIPID PANEL: CPT

## 2022-06-24 PROCEDURE — 84403 ASSAY OF TOTAL TESTOSTERONE: CPT

## 2022-06-24 PROCEDURE — 84443 ASSAY THYROID STIM HORMONE: CPT

## 2022-06-24 PROCEDURE — 85025 COMPLETE CBC W/AUTO DIFF WBC: CPT

## 2022-06-24 PROCEDURE — G0299 HHS/HOSPICE OF RN EA 15 MIN: HCPCS

## 2022-06-24 PROCEDURE — 83036 HEMOGLOBIN GLYCOSYLATED A1C: CPT

## 2022-06-24 PROCEDURE — 84439 ASSAY OF FREE THYROXINE: CPT

## 2022-06-24 PROCEDURE — 36415 COLL VENOUS BLD VENIPUNCTURE: CPT

## 2022-06-24 PROCEDURE — 80053 COMPREHEN METABOLIC PANEL: CPT

## 2022-06-27 ENCOUNTER — HOME CARE VISIT (OUTPATIENT)
Dept: HOME HEALTH SERVICES | Facility: HOME HEALTHCARE | Age: 57
End: 2022-06-27
Payer: MEDICARE

## 2022-06-27 LAB
TESTOST FREE SERPL-MCNC: 4 PG/ML (ref 7.2–24)
TESTOST SERPL-MCNC: 394 NG/DL (ref 264–916)

## 2022-06-27 PROCEDURE — G0299 HHS/HOSPICE OF RN EA 15 MIN: HCPCS

## 2022-06-28 ENCOUNTER — OFFICE VISIT (OUTPATIENT)
Dept: INTERNAL MEDICINE CLINIC | Facility: CLINIC | Age: 57
End: 2022-06-28
Payer: MEDICARE

## 2022-06-28 VITALS
HEIGHT: 64 IN | WEIGHT: 315 LBS | BODY MASS INDEX: 53.78 KG/M2 | OXYGEN SATURATION: 98 % | SYSTOLIC BLOOD PRESSURE: 136 MMHG | TEMPERATURE: 97.6 F | DIASTOLIC BLOOD PRESSURE: 80 MMHG | HEART RATE: 96 BPM

## 2022-06-28 VITALS — SYSTOLIC BLOOD PRESSURE: 118 MMHG | TEMPERATURE: 97.3 F | DIASTOLIC BLOOD PRESSURE: 72 MMHG | RESPIRATION RATE: 18 BRPM

## 2022-06-28 DIAGNOSIS — E78.00 PURE HYPERCHOLESTEROLEMIA: ICD-10-CM

## 2022-06-28 DIAGNOSIS — R60.0 BILATERAL LEG EDEMA: ICD-10-CM

## 2022-06-28 DIAGNOSIS — E83.52 HYPERCALCEMIA: ICD-10-CM

## 2022-06-28 DIAGNOSIS — I10 ESSENTIAL HYPERTENSION: ICD-10-CM

## 2022-06-28 DIAGNOSIS — E11.65 TYPE 2 DIABETES MELLITUS WITH HYPERGLYCEMIA, WITH LONG-TERM CURRENT USE OF INSULIN (HCC): Primary | ICD-10-CM

## 2022-06-28 DIAGNOSIS — E11.42 DIABETIC POLYNEUROPATHY ASSOCIATED WITH TYPE 2 DIABETES MELLITUS (HCC): ICD-10-CM

## 2022-06-28 DIAGNOSIS — Z12.11 SCREENING FOR COLON CANCER: ICD-10-CM

## 2022-06-28 DIAGNOSIS — E03.9 HYPOTHYROIDISM, UNSPECIFIED TYPE: ICD-10-CM

## 2022-06-28 DIAGNOSIS — G47.33 OSA (OBSTRUCTIVE SLEEP APNEA): ICD-10-CM

## 2022-06-28 DIAGNOSIS — R79.89 LOW TESTOSTERONE IN MALE: ICD-10-CM

## 2022-06-28 DIAGNOSIS — Z79.4 TYPE 2 DIABETES MELLITUS WITH HYPERGLYCEMIA, WITH LONG-TERM CURRENT USE OF INSULIN (HCC): Primary | ICD-10-CM

## 2022-06-28 DIAGNOSIS — E66.01 MORBID OBESITY WITH BMI OF 70 AND OVER, ADULT (HCC): ICD-10-CM

## 2022-06-28 PROBLEM — R06.02 SHORTNESS OF BREATH: Status: RESOLVED | Noted: 2021-05-03 | Resolved: 2022-06-28

## 2022-06-28 PROBLEM — E88.81 METABOLIC SYNDROME: Status: ACTIVE | Noted: 2022-06-28

## 2022-06-28 PROBLEM — E87.1 HYPONATREMIA: Status: RESOLVED | Noted: 2019-09-06 | Resolved: 2022-06-28

## 2022-06-28 PROBLEM — E88.810 METABOLIC SYNDROME: Status: ACTIVE | Noted: 2022-06-28

## 2022-06-28 PROBLEM — B49 FUNGAL INFECTION: Status: RESOLVED | Noted: 2021-08-16 | Resolved: 2022-06-28

## 2022-06-28 PROBLEM — L03.115 CELLULITIS OF RIGHT LOWER EXTREMITY: Status: RESOLVED | Noted: 2021-11-19 | Resolved: 2022-06-28

## 2022-06-28 PROBLEM — N50.89 SCROTAL SWELLING: Status: RESOLVED | Noted: 2020-05-19 | Resolved: 2022-06-28

## 2022-06-28 PROCEDURE — 99214 OFFICE O/P EST MOD 30 MIN: CPT | Performed by: INTERNAL MEDICINE

## 2022-06-28 RX ORDER — INSULIN GLARGINE 100 [IU]/ML
46 INJECTION, SOLUTION SUBCUTANEOUS
Refills: 2 | OUTPATIENT
Start: 2022-06-28

## 2022-06-28 RX ORDER — FLASH GLUCOSE SENSOR
KIT MISCELLANEOUS
Qty: 2 EACH | Refills: 3 | Status: SHIPPED | OUTPATIENT
Start: 2022-06-28

## 2022-06-28 RX ORDER — INSULIN GLARGINE 100 [IU]/ML
44 INJECTION, SOLUTION SUBCUTANEOUS
Qty: 18 ML | Refills: 2 | Status: SHIPPED | OUTPATIENT
Start: 2022-06-28

## 2022-06-28 RX ORDER — EZETIMIBE 10 MG/1
10 TABLET ORAL DAILY
Qty: 30 TABLET | Refills: 2 | Status: SHIPPED | OUTPATIENT
Start: 2022-06-28 | End: 2022-07-21

## 2022-06-28 RX ORDER — LEVOTHYROXINE SODIUM 0.03 MG/1
25 TABLET ORAL
Qty: 90 TABLET | Refills: 1 | Status: SHIPPED | OUTPATIENT
Start: 2022-06-28

## 2022-06-28 NOTE — ASSESSMENT & PLAN NOTE
Persistent elevated total cholesterol and LDL, with increased cardiovascular risk  He refused use of statin in the past because of side effects, he is agreeable to try Zetia  Will repeat blood work in 3 months

## 2022-06-28 NOTE — TELEPHONE ENCOUNTER
Duplicate Request for lantus solostar 100 units/ mL injection pen  Reordered on 06/28/22 by Dr Justina Momin   Will refuse duplicate request

## 2022-06-28 NOTE — ASSESSMENT & PLAN NOTE
Lab Results   Component Value Date    HGBA1C 6 6 (H) 06/24/2022   Patient was instructed in regards to lifestyle modification, with low carbohydrate diet and exercise at least 3 times a week  Patient should measured blood sugars 3 times a week, 3-4 times a day-fasting, before lunch, dinner and at bedtime, and bring log on next visit  Hemoglobin A1c goal is less than 7%  Repeat A1c every 3-6 months  Yearly ophthalmological evaluation and diabetic foot exam recommended  Yearly protein to creatinine ratio measurement  Continue Lantus 44 units at night and Humalog 14 units with meals, continue metformin 1000 mg twice a day  I have referred the patient to weight Management to discuss options for weight loss, he is reluctant about bariatric surgery but would like to discuss diet and and medication  Endocrinology referral for management of other endocrinology conditions as well

## 2022-06-28 NOTE — ASSESSMENT & PLAN NOTE
He has normal testosterone level with low free testosterone, associated ED  Will refer to endocrinology for further workup if needed, he  Will likely need evaluation by urology as well

## 2022-06-28 NOTE — ASSESSMENT & PLAN NOTE
Diabetes and hypertension well controlled, referral to weight management, in will start Zetia for lipid control

## 2022-06-28 NOTE — PROGRESS NOTES
Assessment/Plan:    Type 2 diabetes mellitus with hyperglycemia, with long-term current use of insulin ([facility])    Lab Results   Component Value Date    HGBA1C 6 6 (H) 06/24/2022   Patient was instructed in regards to lifestyle modification, with low carbohydrate diet and exercise at least 3 times a week  Patient should measured blood sugars 3 times a week, 3-4 times a day-fasting, before lunch, dinner and at bedtime, and bring log on next visit  Hemoglobin A1c goal is less than 7%  Repeat A1c every 3-6 months  Yearly ophthalmological evaluation and diabetic foot exam recommended  Yearly protein to creatinine ratio measurement  Continue Lantus 44 units at night and Humalog 14 units with meals, continue metformin 1000 mg twice a day  I have referred the patient to weight Management to discuss options for weight loss, he is reluctant about bariatric surgery but would like to discuss diet and and medication  Endocrinology referral for management of other endocrinology conditions as well  Metabolic syndrome    Diabetes and hypertension well controlled, referral to weight management, in will start Zetia for lipid control  Hypothyroidism   He was taking medication inconsistently and his prior blood work showed elevated TSH, he is currently taking levothyroxine 25 mcg daily and TSH and free T4 are now normalized  Continue the same  A refill was given today  DAHIANA (obstructive sleep apnea)    Follows with Pulmonary  Essential hypertension   Controlled on losartan 25 mg and Lasix 40 mg daily  Continue the same  Hyperlipidemia   Persistent elevated total cholesterol and LDL, with increased cardiovascular risk  He refused use of statin in the past because of side effects, he is agreeable to try Zetia  Will repeat blood work in 3 months  Bilateral leg edema    Currently on lasix 40 mg once daily in the morning  Continue the same      Morbid obesity with BMI of 70 and over, adult ([facility] [address] ) Weight loss management referral     Low testosterone in male   He has normal testosterone level with low free testosterone, associated ED  Will refer to endocrinology for further workup if needed, he  Will likely need evaluation by urology as well  Hypercalcemia   Noted on prior to blood works  Corrected Ca is 11 5  Will order basic labs  He will be seen by endocrinology and further workup will be determine by them  Diagnoses and all orders for this visit:    Type 2 diabetes mellitus with hyperglycemia, with long-term current use of insulin ([facility] [address] )  -     Ambulatory Referral to Endocrinology; Future  -     Continuous Blood Gluc Sensor (FreeStyle Elvin 14 Day Sensor) MISC; Use as directed 3 times a day  -     insulin glargine (Lantus SoloStar) 100 units/mL injection pen; Inject 44 Units under the skin daily at bedtime  -     Ambulatory Referral to Podiatry; Future    Diabetic polyneuropathy associated with type 2 diabetes mellitus ([facility] [address] )  -     Ambulatory Referral to Podiatry; Future    DAHIANA (obstructive sleep apnea)    Essential hypertension    Pure hypercholesterolemia  -     ezetimibe (ZETIA) 10 mg tablet; Take 1 tablet (10 mg total) by mouth daily  -     Lipid Panel with Direct LDL reflex; Future    Bilateral leg edema    Morbid obesity with BMI of 70 and over, adult [facility])  -     Ambulatory Referral to Weight Management; Future    Hypercalcemia  -     Ambulatory Referral to Endocrinology; Future  -     PTH, intact; Future  -     Vitamin D 25 hydroxy; Future  -     Phosphorus; Future  -     Calcium, ionized; Future    Low testosterone in male    Hypothyroidism, unspecified type  -     Ambulatory Referral to Endocrinology; Future  -     levothyroxine (Euthyrox) 25 mcg tablet; Take 1 tablet (25 mcg total) by mouth daily in the early morning    Screening for colon cancer  -     Ambulatory referral for colonoscopy; Future          Subjective:      Patient ID: Esvin Luis is a 56 y o  male      Patient presents now as a follow-up visit  Patient currently being followed by wound care for venous stasis ulcer on bilateral legs, he has been receiving wound care weekly, with improvement of his ulcers  He has mild b/l leg pain associated with diabetic neuropathy managed with gabapentin  Patient states that he is doing exercises at home and regular basis, as well watching his diet, he states that he is about 1200 calories a day  He has a meal plan  He sugars have been averaging below 130 however today, he usually checks at least 3 times a day before meals  He is currently 14 units with meals along with 44 units of Lantus at bedtime  He decreased his insulin dose by himself because of better control of his sugar  His current hemoglobin A1c 6 6%  He was seen by endocrinologist in the past, but he  Would like to switch to our Endo clinic  He has   Occasional shortness of breath when he walks long distances, but feels like it is improving with his improved physical condition  he states his ophthalmologist about 2 months ago  He would like to re-establish care with a podiatrist as well  The following portions of the patient's history were reviewed and updated as appropriate: allergies, current medications, past family history, past medical history, past social history, past surgical history and problem list     Review of Systems   Constitutional: Negative for appetite change, fatigue and fever  HENT: Negative for congestion and sore throat  Eyes: Negative for visual disturbance  Respiratory: Positive for shortness of breath (occasional, on exertion)  Negative for cough, chest tightness and wheezing  Cardiovascular: Positive for leg swelling  Negative for chest pain and palpitations  Gastrointestinal: Negative for abdominal pain, nausea and vomiting  Genitourinary: Negative for difficulty urinating, frequency and hematuria     Musculoskeletal: Negative for arthralgias and joint swelling  Skin: Positive for wound  Negative for rash  Neurological: Negative for dizziness and headaches  Psychiatric/Behavioral: Positive for sleep disturbance (since has not been able to have his cpap supplies)  Negative for confusion and self-injury  Objective:      /80 (BP Location: Left arm, Patient Position: Sitting, Cuff Size: Standard)   Pulse 96   Temp 97 6 °F (36 4 °C) (Tympanic)   Ht 5' 4" (1 626 m)   Wt (!) 208 kg (458 lb 3 2 oz)   SpO2 98%   BMI 78 65 kg/m²          Physical Exam  Vitals and nursing note reviewed  Constitutional:       General: He is not in acute distress  Appearance: He is well-developed  He is obese  HENT:      Head: Normocephalic and atraumatic  Eyes:      Conjunctiva/sclera: Conjunctivae normal       Pupils: Pupils are equal, round, and reactive to light  Neck:      Thyroid: No thyromegaly  Cardiovascular:      Rate and Rhythm: Normal rate and regular rhythm  Heart sounds: Normal heart sounds  Pulmonary:      Effort: No respiratory distress  Breath sounds: Normal breath sounds  No wheezing  Abdominal:      General: Bowel sounds are normal  There is no distension  Palpations: Abdomen is soft  Tenderness: There is no abdominal tenderness  Musculoskeletal:         General: Normal range of motion  Cervical back: Normal range of motion and neck supple  Right lower leg: Edema present  Left lower leg: Edema present  Skin:     General: Skin is warm and dry  Capillary Refill: Capillary refill takes less than 2 seconds  Findings: Lesion (posterior legs, covered with dressing, wraps and compressions stockings) present  Neurological:      Mental Status: He is alert and oriented to person, place, and time  Sensory: No sensory deficit  Motor: No weakness or abnormal muscle tone  Psychiatric:         Mood and Affect: Mood normal          Thought Content:  Thought content normal  Judgment: Judgment normal        BMI Counseling: Body mass index is 78 65 kg/m²  The BMI is above normal  Nutrition recommendations include reducing portion sizes and 3-5 servings of fruits/vegetables daily  Exercise recommendations include exercising 3-5 times per week

## 2022-06-28 NOTE — ASSESSMENT & PLAN NOTE
Noted on prior to blood works  Corrected Ca is 11 5  Will order basic labs  He will be seen by endocrinology and further workup will be determine by them

## 2022-06-28 NOTE — ASSESSMENT & PLAN NOTE
He was taking medication inconsistently and his prior blood work showed elevated TSH, he is currently taking levothyroxine 25 mcg daily and TSH and free T4 are now normalized  Continue the same  A refill was given today

## 2022-06-30 ENCOUNTER — OFFICE VISIT (OUTPATIENT)
Dept: WOUND CARE | Facility: CLINIC | Age: 57
End: 2022-06-30
Payer: MEDICARE

## 2022-06-30 VITALS
SYSTOLIC BLOOD PRESSURE: 124 MMHG | TEMPERATURE: 98.1 F | HEART RATE: 82 BPM | RESPIRATION RATE: 20 BRPM | DIASTOLIC BLOOD PRESSURE: 84 MMHG

## 2022-06-30 DIAGNOSIS — I89.0 LYMPHEDEMA OF BOTH LOWER EXTREMITIES: ICD-10-CM

## 2022-06-30 DIAGNOSIS — E66.01 MORBID OBESITY WITH BMI OF 70 AND OVER, ADULT (HCC): ICD-10-CM

## 2022-06-30 DIAGNOSIS — L89.893 PRESSURE INJURY OF LEFT LEG, STAGE 3 (HCC): Primary | ICD-10-CM

## 2022-06-30 DIAGNOSIS — L89.893 PRESSURE INJURY OF RIGHT LEG, STAGE 3 (HCC): ICD-10-CM

## 2022-06-30 PROCEDURE — 97597 DBRDMT OPN WND 1ST 20 CM/<: CPT | Performed by: FAMILY MEDICINE

## 2022-06-30 RX ORDER — LIDOCAINE 40 MG/G
CREAM TOPICAL ONCE
Status: COMPLETED | OUTPATIENT
Start: 2022-06-30 | End: 2022-06-30

## 2022-06-30 RX ADMIN — LIDOCAINE: 40 CREAM TOPICAL at 16:10

## 2022-06-30 NOTE — PROGRESS NOTES
Patient ID: Bryan Santana is a 56 y o  male Date of Birth 1965       Chief Complaint   Patient presents with    Follow Up Wound Care Visit     BLE wounds       Allergies:  Gluten meal - food allergy and Clindamycin    Diagnosis:      Diagnosis ICD-10-CM Associated Orders   1  Pressure injury of left leg, stage 3 ([facility])  L89 893 lidocaine (LMX) 4 % cream     Debridement     Wound cleansing and dressings   2  Pressure injury of right leg, stage 3 ([facility])  L89 893    3  Lymphedema of both lower extremities  I89 0    4  Morbid obesity with BMI of 70 and over, adult ([facility] [address] )  E66 01     Z68 45            Assessment & Plan:   Right leg pressure injury almost closed  o Continue with Hydrofera and circ aid   Left leg pressure injury stage III has enlarged slightly  Most likely secondary to too small of a piece of Hydrofera which resulted in surrounding maceration  o Hydrofera and Circ Aids  o Follow up in two weeks   Patient's wife requests discontinue of VNA because she feels competent to change dressings since we are no longer using Unna boots  Subjective:   2/17/22: This is a 72-year-old male who comes in [address] [place] [address] with ulcers on the right and left calfs  He states that he was hospitalized on November of 2021 with cellulitis and he had the ulcers at that time  Patient is morbidly obese and has history of lymphedema as well  He does not use any form of compression other than Ace wraps  He had purchased alginate on the Internet as well as bordered foam is  He notes that there is heavy drainage requiring dressings to be changed once or twice a day  He believes that these ulcers had started due to  sleeping in a recliner and the foot rest causing pressure on the calfs  He is unable to sleep in a bed  He notes he has increased pain at nighttime when in there is recliner with pressure on his calf    He is ambulatory with a walker but does not go out of the house other than to doctor visits  Patient also has a history of type 2 diabetes with good control with last A1c 6 5 in October of 2021  He states that he has lost approximately 120 lb in the past nine months with a special meal plan  He plans on continuing with weight loss  2/24/22: Followup stage III pressure injuries of right and left calfs  They purchased memory foam pillows which seems to help with both pain and offloading the areas when he is sleeping  Still has pain mainly at night  Left greater than right  3/3/22: Followup stage III pressure injuries of the right left calfs  Patient is upset about visiting nurse not having proper supplies  Then there was leakage through the ebindle  Still has pain at nighttime  No fever chills  3/10/22: Followup stage III pressure injuries of the right and left calfs  Patient states that the visiting nurse had a "observer" do the Unna boot on his right leg which slid down  Still has pain mainly in the left calf  They are trying various offloading techniques  3/17/22: Followup stage III pressure injuries of the right and left calfs  Patient states he still has pain but slightly less particularly on the left  Starting to have some itching  No fever or chills  Still has not found the proper offloading device  3/24/22: Followup stage III pressure injuries of both calfs continues to have some pain as in the past   No fever or chills  Trying to offload when in bed     3/31/22: Followup stage III pressure injuries of both lateral calf  Continues to have the same amount of pain  States he is trying offload  No fever chills  4/7/22:  Patient is a patient of Dr Dionisio Ordonez who presents for followup of bilateral LE ulcers  Has had antimicrobial endoform and hydrofera blue on the wounds and Unna boot for compression    4/14/22: Followup stage III pressure injuries of both lateral calfs  Unna boots  Has no pain on the left calf anymore  Much less on the right    No fever chills  4/21/22: Followup stage III pressure injuries of both lateral calfs  Tolerating Unna boots although they tend to slide down  No pain at this time  4/28/22: Followup stage III pressure injuries of both lateral calfs  No complaints at this time  No pain  5/5/22: Followup stage III pressure injuries of both lateral calf  No complaints  No pain  5/12/22: Followup stage III pressure injuries of both lateral calfs  At last visit, the left calf was closed  Hydrocolloid on the right calf was leaking  No other complaints  5/19/22: Followup stage III pressure injury of the right lateral calf  Left calf was closed at last visit  Received a telephone call from [facility] stating that the left calf has a new ulcer adjacent to the healed area  Tubigrip was use on the left because of previously healed ulcer  Bilateral lower extremity lymphedema  VNA placed new Unna boot on the left  No other complaints  5/26/22: Followup stage III pressure injuries of the right and left calfs  At last visit, the left calf reopened  No new complaints today  Tolerating Unna boot  06/02/22: Followup stage III pressure injuries of the R and L calfs  Polymem with Unna boot placed last visit  Denies fevers, chills, no new complaints  6/9/22: Followup stage III pressure injuries of the right and left calfs  This past week he has worn Unna boot on the left with alginate and circ aid on the right  No complaints  6/16/22: Followup stage III pressure injuries of the right left calfs  Doing well with circ aid on the right and Unna boot on the left  No significant pain  No other complaints  6/30/22: Followup stage III pressure injuries of the right and left calfs  Wearing Circ Aids on both legs  Seems to be doing well  No specific complaints  Denies pain  No fever or chills          The following portions of the patient's history were reviewed and updated as appropriate:   Patient Active Problem List Diagnosis    Type 2 diabetes mellitus with hyperglycemia, with long-term current use of insulin (HCC)    Hyperlipidemia    Psoriasis    Venous insufficiency    Gout    Essential hypertension    Gluten intolerance    Diabetic neuropathy (HCC)    Insomnia    Erectile dysfunction    Bilateral leg edema    Elevated CO2 level    Abnormal thyroid function test    DAHIANA (obstructive sleep apnea)    [facility] 70 [facility]    Migraine headache    Onychomycosis    Ulcer of left lower extremity, [facility] to breakdown of [facility]    Pressure injury of right leg, stage 3 ([facility]    Metabolic syndrome    Low testosterone in male    Hypercalcemia    Hypothyroidism     Past Medical History:   Diagnosis Date    Acute kidney injury 10/28/2021    Anemia 09/13/2019    Cellulitis     last assessed 12/10/15    Cellulitis of left lower extremity     Cellulitis of right lower extremity 11/19/2021    Cough 10/20/2019    Diabetes mellitus ([facility] )     Disease of thyroid gland     Edema     Elevated liver enzymes     Elevated serum creatinine 11/19/2021    Esophageal reflux     Fungal infection 8/16/2021    Gluten intolerance     Gout     last assessed 09/05/13    Hyperglycemia     Hypertension     Hyponatremia 9/6/2019    IBS (irritable bowel syndrome)     Insomnia     Obesity     Osteoarthritis of knee     last assessed 02/10/14    Scrotal swelling 5/19/2020    Shortness of breath 5/3/2021    Venous insufficiency     last assessed 08/22/17    Villonodular synovitis of the hand, right     last assessed 11/14/2013     Past Surgical History:   Procedure Laterality Date    INCISION AND DRAINAGE OF WOUND Left 9/6/2019    Procedure: INCISION AND DRAINAGE (I&D) GROIN;  Surgeon: Jose Gaviria DO;  [facility];  Service: General    SKIN BIOPSY      WOUND DEBRIDEMENT Left 9/7/2019    Procedure: EXCISIONAL DEBRIDEMENT;  Surgeon: Jose Gaviria DO;  [facility];  Service: General     Family History   Problem Relation Age of Onset    Cancer Mother         gastrc    Colon cancer Father     Heart failure Father       Social History     Socioeconomic History    Marital status: /Civil Union     Spouse name: Not on file    Number of children: Not on file    Years of education: Not on file    Highest education level: Not on file   Occupational History    Not on file   Tobacco Use    Smoking status: Never Smoker    Smokeless tobacco: Never Used   Vaping Use    Vaping Use: Never used   Substance and Sexual Activity    Alcohol use: Not Currently     Alcohol/week: 0 0 standard drinks    Drug use: No    Sexual activity: Yes   Other Topics Concern    Not on file   Social History Narrative    Not on file     Social Determinants of Health     Financial Resource Strain: Not on file   Food Insecurity: Not on file   Transportation Needs: No Transportation Needs    Lack of Transportation (Medical): No    Lack of Transportation (Non-Medical):  No   Physical Activity: Not on file   Stress: Not on file   Social Connections: Not on file   Intimate Partner Violence: Not on file   Housing Stability: Not on file        Current Outpatient Medications:     acetaminophen (TYLENOL) 325 mg tablet, Take 2 tablets (650 mg total) by mouth every 4 (four) hours as needed for mild pain, headaches or fever, Disp: , Rfl: 0    albuterol (PROVENTIL HFA,VENTOLIN HFA) 90 mcg/act inhaler, TAKE 2 PUFFS BY MOUTH EVERY 6 HOURS AS NEEDED FOR WHEEZE, Disp: 8 5 g, Rfl: 0    BD Pen Needle Ludmila 2nd Gen 32G X 4 MM MISC, USE 4 TIMES A DAY, Disp: 200 each, Rfl: 3    Blood Glucose Monitoring Suppl (ONETOUCH VERIO) w/Device KIT, Use to test sugar daily (Patient not taking: No sig reported), Disp: 1 kit, Rfl: 0    Calcium Alginate (Maxorb II Alginate Dressing) MISC, Apply 1 each topically in the morning, Disp: 10 each, Rfl: 2    Continuous Blood Gluc  (FreeStyle Connell 2 Harwood Systm) BROOKS, Use 1 Device as needed (use with sensors for bs checks), Disp: 1 Device, Rfl: 0    Continuous Blood Gluc Sensor (FreeStyle Elvin 14 Day Sensor) MISC, Use as directed 3 times a day, Disp: 2 each, Rfl: 3    Control Gel Formula Dressing (DuoDERM CGF Dressing) MIS, Apply 1 application topically every 5 (five) days, Disp: 5 each, Rfl: 1    [employer] Diclofenac Sodium 1 %, APPLY 4 G TOPICALLY 4 (FOUR) TIMES A DAY AS NEEDED (JOINT PAIN), Disp: 50 g, Rfl: 0    ezetimibe (ZETIA) 10 mg tablet, Take 1 tablet (10 mg total) by mouth daily, Disp: 30 tablet, Rfl: 2    furosemide (LASIX) 20 mg tablet, TAKE 2 TABLETS EVERY DAY IN THE EARLY MORNING AND 1 TABLET DAILY AFTER LUNCH , Disp: 270 tablet, Rfl: 2    gabapentin (NEURONTIN) 100 mg capsule, TAKE 1 CAPSULE BY MOUTH TWICE A DAY WITH 300MG CAPSULE, Disp: 180 capsule, Rfl: 2    gabapentin (NEURONTIN) 300 mg capsule, Take 1 capsule (300 mg total) by mouth 2 (two) times a day Take 1 tab with your 100mg tab twice daily, Disp: 180 capsule, Rfl: 2    Gauze Pads & Dressings 5"X5" PADS, Use in the morning, Disp: 20 each, Rfl: 2    insulin aspart (NovoLOG FlexPen) 100 UNIT/ML injection pen, INJECT 18 UNITS WITH MEALS+SCALE ( - 150-200 -2 UNITS, 201-250-4 UNITS, 251-300 -6 UNITS, 301-350-8 UNITS, > 350- 10 UNITS ), Disp: 45 mL, Rfl: 1    insulin glargine (Lantus SoloStar) 100 units/mL injection pen, Inject 44 Units under the skin daily at bedtime, Disp: 18 mL, Rfl: 2    Lancets (ONETOUCH ULTRASOFT) lancets, Use to test sugar 2 times a day, Disp: 100 each, Rfl: 3    levothyroxine (Euthyrox) 25 mcg tablet, Take 1 tablet (25 mcg total) by mouth daily in the early morning, Disp: 90 tablet, Rfl: 1    losartan (COZAAR) 25 mg tablet, TAKE 1 TABLET BY MOUTH EVERY DAY, Disp: 90 tablet, Rfl: 2    metFORMIN (GLUCOPHAGE) 1000 MG tablet, Take 1 tablet (1,000 mg total) by mouth 2 (two) times a day, Disp: 180 tablet, Rfl: 0    nystatin (MYCOSTATIN) cream, Apply topically 2 (two) times a day as needed (itching, redness), Disp: 30 g, Rfl: 1    nystatin (MYCOSTATIN) powder, Apply 1 application topically 2 (two) times a day To affected area, Disp: 120 g, Rfl: 3    omeprazole (PriLOSEC) 40 MG capsule, TAKE 1 CAPSULE BY MOUTH TWICE A DAY, Disp: 180 capsule, Rfl: 1    OneTouch Verio test strip, USE TO TEST SUGAR 2 TIMES A DAY, Disp: 100 strip, Rfl: 3    oxyCODONE (ROXICODONE) 5 immediate release tablet, Take 1-2 tabs by mouth every 6 hours as needed for moderate or severe pains respectively  , Disp: 15 tablet, Rfl: 0    psyllium (METAMUCIL) packet, Take 1 packet by mouth daily, Disp: , Rfl: 0    saccharomyces boulardii (FLORASTOR) 250 mg capsule, Take 1 capsule (250 mg total) by mouth 2 (two) times a day, Disp: 60 capsule, Rfl: 1    Skin Protectants, Misc  ([employer] Textile 10"x144") SHEE, Apply 1 each topically every 5 (five) days, Disp: 3 each, Rfl: 5    sodium hypochlorite (DAKIN'S QUARTER-STRENGTH), Irrigate with 1 application as directed daily, Disp: 473 mL, Rfl: 0  No current facility-administered medications for this visit  Review of Systems   Constitutional: Negative for appetite change, chills, fatigue, fever and unexpected weight change  HENT: Negative for congestion, hearing loss, postnasal drip and sinus pressure  Respiratory: Positive for shortness of breath (On exertion)  Negative for cough  Cardiovascular: Positive for leg swelling (Lymphedema)  Endocrine: Negative  Musculoskeletal: Positive for [facility] problem [facility]  Negative for back pain  Skin: Positive for wound (Right lateral calf and left lateral calf and new laceration of the left calf  )  Negative for rash  Allergic/Immunologic: Negative  Hematological: Does not bruise/bleed easily  Psychiatric/Behavioral: Negative  Negative for dysphoric mood  The patient is not nervous/anxious          Objective:  /84   Pulse 82   Temp 98 1 °F (36 7 °C)   Resp 20   Pain Score:   5     Physical Exam  Vitals and nursing note reviewed  Constitutional:       Appearance: Normal appearance  He is well-developed  He is morbidly obese  HENT:      Head: Normocephalic and atraumatic  Cardiovascular:      Rate and Rhythm: Normal rate  Pulmonary:      Effort: Pulmonary effort is normal    Skin:     General: Skin is warm and dry  Findings: Wound present  No erythema  Comments: Left calf pressure injury is larger  But base with some fibrin  The right lateral calf also is much smaller  Mostly granular with scant serous drainage  Neurological:      Mental Status: He is alert and oriented to person, place, and time  Psychiatric:         Attention and Perception: Attention normal          Mood and Affect: Mood and affect normal          Behavior: Behavior is cooperative  Cognition and Memory: Cognition normal                  Wound 02/17/22 Pressure Injury Leg Right;Lateral;Lower (Active)   Wound Image Images linked 06/30/22 1525   Wound Description Slough;Pink;Yellow;Granulation tissue; Epithelialization 06/30/22 1524   Chelsie-wound Assessment Edema;Dry;Scaly;Scar Tissue 06/30/22 1524   Wound Length (cm) 0 65 cm 06/30/22 1524   Wound Width (cm) 0 8 cm 06/30/22 1524   Wound Depth (cm) 0 1 cm 06/30/22 1524   Wound Surface Area (cm^2) 0 52 cm^2 06/30/22 1524   Wound Volume (cm^3) 0 052 cm^3 06/30/22 1524   Calculated Wound Volume (cm^3) 0 05 cm^3 06/30/22 1524   Change in Wound Size % 94 79 06/30/22 1524   Drainage Amount Moderate 06/30/22 1524   Drainage Description Serosanguineous 06/30/22 1524   Non-staged Wound Description Full thickness 06/30/22 1524   Dressing Status Removed (on arrival) 06/30/22 1524       Wound 05/19/22 Venous Ulcer Pretibial Left;Lateral (Active)   Wound Image Images linked 06/30/22 1556   Wound Description Pink; Beefy red;Slough 06/30/22 1527   Chelsie-wound Assessment Edema;Dry;Scaly; Hyperpigmented 06/30/22 1527   Wound Length (cm) 1 5 cm 06/30/22 1527   Wound Width (cm) 4 cm 06/30/22 1527   Wound Depth (cm) 0 1 cm 06/30/22 1527   Wound Surface Area (cm^2) 6 cm^2 06/30/22 1527   Wound Volume (cm^3) 0 6 cm^3 06/30/22 1527   Calculated Wound Volume (cm^3) 0 6 cm^3 06/30/22 1527   Change in Wound Size % -1900 06/30/22 1527   Drainage Amount Moderate 06/30/22 1527   Drainage Description Serosanguineous 06/30/22 1527   Non-staged Wound Description Full thickness 06/30/22 1527   Dressing Status Removed (upon arrival) 06/30/22 1527       Debridement   Wound 05/19/22 Venous Ulcer Pretibial Left;Lateral    Universal Protocol:  Consent: Verbal consent obtained  Written consent obtained  Consent given by: patient  Time out: Immediately prior to procedure a "time out" was called to verify the correct patient, procedure, equipment, support staff and site/side marked as required  Patient understanding: patient states understanding of the procedure being performed  Patient identity confirmed: verbally with patient      Performed by: physician  Debridement type: selective  Pain control: lidocaine 4%  Post-debridement measurements  Length (cm): 1 5  Width (cm): 4  Depth (cm): 0 1  Percent debrided: 100%  Surface Area (cm^2): 6  Area debrided (cm^2): 6  Volume (cm^3): 0 6  Devitalized tissue debrided: fibrin  Instrument(s) utilized: curette  Bleeding: small  Hemostasis obtained with: pressure  Procedural pain (0-10): 0  Post-procedural pain: 0   Response to treatment: procedure was tolerated well                 Wound Instructions:  Orders Placed This Encounter   Procedures    Debridement     This order was created via procedure documentation    Wound cleansing and dressings     Wash your hands with soap and water  Remove old dressing, discard into plastic bag and place in trash  Cleanse the wound with mild soap and water prior to applying a clean dressing  PLEASE WASH LEGS WITH SOAP AND WATER PRIOR TO DRESSING CHANGES   Do not use tissue or cotton balls  Do not scrub the wound  Pat dry using gauze  Shower yes with protection, purchase cast cover  Or sponge bathe       RIGHT and LEFT LOWER LEG    Apply Lac Hydrin to B/L lower legs  Apply skin prep to the periwound  Cover the wound with hydrofera blue-       Apply circaid compression  Change dressing 2x/week        Roll towels or blankets and place under ankles for offloading wounds/heals     Stop VNA      Follow up at the wound center in 2 weeks  Standing Status:   Future     Standing Expiration Date:   6/30/2023       Melissa Andrade MD, CHT, CWS    Portions of the record may have been created with voice recognition software  Occasional wrong word or "sound alike" substitutions may have occurred due to the inherent limitations of voice recognition software  Read the chart carefully and recognize, using context, where substitutions have occurred

## 2022-06-30 NOTE — PATIENT INSTRUCTIONS
Orders Placed This Encounter   Procedures    Debridement     This order was created via procedure documentation    Wound cleansing and dressings     Wash your hands with soap and water  Remove old dressing, discard into plastic bag and place in trash  Cleanse the wound with mild soap and water prior to applying a clean dressing  PLEASE WASH LEGS WITH SOAP AND WATER PRIOR TO DRESSING CHANGES   Do not use tissue or cotton balls  Do not scrub the wound  Pat dry using gauze  Shower yes with protection, purchase cast cover  Or sponge bathe  RIGHT and LEFT LOWER LEG    Apply Lac Hydrin to B/L lower legs  Apply skin prep to the periwound  Cover the wound with hydrofera blue-       Apply circaid compression  Change dressing 2x/week  Roll towels or blankets and place under ankles for offloading wounds/heals     Stop VNA      Follow up at the wound center in 2 weeks       Standing Status:   Future     Standing Expiration Date:   6/30/2023

## 2022-06-30 NOTE — LETTER
[address] [facility] [address]  Phone#  [phone]  Fax#  [phone]    Patient:  Gretta Epley  YOB: 1965  Phone:  [phone]  Date of Visit:  6/30/2022    Orders Placed This Encounter   Procedures    Debridement     This order was created via procedure documentation    Wound cleansing and dressings     Wash your hands with soap and water  Remove old dressing, discard into plastic bag and place in trash  Cleanse the wound with mild soap and water prior to applying a clean dressing  PLEASE WASH LEGS WITH SOAP AND WATER PRIOR TO DRESSING CHANGES   Do not use tissue or cotton balls  Do not scrub the wound  Pat dry using gauze  Shower yes with protection, purchase cast cover  Or sponge bathe       RIGHT and LEFT LOWER LEG    Apply Lac Hydrin to B/L lower legs  Apply skin prep to the periwound  Cover the wound with hydrofera blue-       Apply circaid compression  Change dressing 2x/week        Roll towels or blankets and place under ankles for offloading wounds/heals     Stop VNA      Follow up at the wound center in 2 weeks       Standing Status:   Future     Standing Expiration Date:   6/30/2023         Electronically signed by Dawit Marroquin MD

## 2022-07-01 ENCOUNTER — HOME CARE VISIT (OUTPATIENT)
Dept: HOME HEALTH SERVICES | Facility: HOME HEALTHCARE | Age: 57
End: 2022-07-01
Payer: MEDICARE

## 2022-07-11 ENCOUNTER — DOCUMENTATION (OUTPATIENT)
Dept: PULMONOLOGY | Facility: CLINIC | Age: 57
End: 2022-07-11

## 2022-07-12 DIAGNOSIS — E11.49 OTHER DIABETIC NEUROLOGICAL COMPLICATION ASSOCIATED WITH TYPE 2 DIABETES MELLITUS (HCC): ICD-10-CM

## 2022-07-12 RX ORDER — GABAPENTIN 300 MG/1
300 CAPSULE ORAL 2 TIMES DAILY
Qty: 180 CAPSULE | Refills: 2 | Status: SHIPPED | OUTPATIENT
Start: 2022-07-12

## 2022-07-12 NOTE — TELEPHONE ENCOUNTER
Tanisha Garland has requested a refill of gabapentin (NEURONTIN) 300 mg capsule  Pt meets the requirements placed by [facility]  Will refill medication

## 2022-07-14 ENCOUNTER — OFFICE VISIT (OUTPATIENT)
Dept: WOUND CARE | Facility: CLINIC | Age: 57
End: 2022-07-14
Payer: MEDICARE

## 2022-07-14 VITALS
RESPIRATION RATE: 24 BRPM | DIASTOLIC BLOOD PRESSURE: 62 MMHG | SYSTOLIC BLOOD PRESSURE: 118 MMHG | TEMPERATURE: 97.1 F | HEART RATE: 76 BPM

## 2022-07-14 DIAGNOSIS — L89.893 PRESSURE INJURY OF LEFT LEG, STAGE 3 (HCC): ICD-10-CM

## 2022-07-14 DIAGNOSIS — Z79.4 TYPE 2 DIABETES MELLITUS WITH HYPERGLYCEMIA, WITH LONG-TERM CURRENT USE OF INSULIN (HCC): ICD-10-CM

## 2022-07-14 DIAGNOSIS — E11.65 TYPE 2 DIABETES MELLITUS WITH HYPERGLYCEMIA, WITH LONG-TERM CURRENT USE OF INSULIN (HCC): ICD-10-CM

## 2022-07-14 DIAGNOSIS — E66.01 MORBID OBESITY WITH BMI OF 70 AND OVER, ADULT (HCC): ICD-10-CM

## 2022-07-14 DIAGNOSIS — L89.893 PRESSURE INJURY OF RIGHT LEG, STAGE 3 (HCC): Primary | ICD-10-CM

## 2022-07-14 PROCEDURE — 11042 DBRDMT SUBQ TIS 1ST 20SQCM/<: CPT | Performed by: FAMILY MEDICINE

## 2022-07-14 PROCEDURE — NC001 PR NO CHARGE: Performed by: STUDENT IN AN ORGANIZED HEALTH CARE EDUCATION/TRAINING PROGRAM

## 2022-07-14 PROCEDURE — 97598 DBRDMT OPN WND ADDL 20CM/<: CPT | Performed by: FAMILY MEDICINE

## 2022-07-14 PROCEDURE — 97598 DBRDMT OPN WND ADDL 20CM/<: CPT | Performed by: STUDENT IN AN ORGANIZED HEALTH CARE EDUCATION/TRAINING PROGRAM

## 2022-07-14 PROCEDURE — 97597 DBRDMT OPN WND 1ST 20 CM/<: CPT | Performed by: FAMILY MEDICINE

## 2022-07-14 PROCEDURE — 97597 DBRDMT OPN WND 1ST 20 CM/<: CPT | Performed by: STUDENT IN AN ORGANIZED HEALTH CARE EDUCATION/TRAINING PROGRAM

## 2022-07-14 RX ORDER — LIDOCAINE 40 MG/G
CREAM TOPICAL ONCE
Status: COMPLETED | OUTPATIENT
Start: 2022-07-14 | End: 2022-07-14

## 2022-07-14 RX ADMIN — LIDOCAINE: 40 CREAM TOPICAL at 10:09

## 2022-07-14 NOTE — PROGRESS NOTES
Patient ID: Alton Morales is a 56 y o  male Date of Birth 1965       Chief Complaint   Patient presents with    Follow Up Wound Care Visit     Bilateral lower extremity wounds       Allergies:  Gluten meal - food allergy and Clindamycin    Diagnosis:   Diagnosis ICD-10-CM Associated Orders   1  Pressure injury of right leg, stage 3 ([facility])  L89 893 lidocaine (LMX) 4 % cream     Wound cleansing and dressings     Debridement   2  Pressure injury of left leg, stage 3 ([facility])  L89 893 Debridement   3  Type 2 diabetes mellitus with hyperglycemia, with long-term current use of insulin ([facility])  E11 65     Z79 4    4  Morbid obesity with BMI of 70 and over, adult ([facility] )  E66 01     Z68 45         Assessment  & Plan:     F/u stage 3 pressure injury of R leg  Approximately the same in size as previous visit  No signs of acute infection  o Selective debridement performed  o Hydrofera blue to wound bed    o CircAid for compression   F/u stage 3 pressure injury of L leg  Significant increase in size of L leg in terms of swelling as well as size of wound  Biofilm and slough present in wound bed    o Surgical debridement performed  o Acticoat 7 to wound bed with alginate and foam    o Unna boot for compression  o Continue to elevate legs when resting   Attempt to offload lateral legs   Instructed to monitor for any changes including redness or swelling surrounding the wound, increased drainage or pain as well as fevers or chills   F/u in one week  Instructed to call if any questions or concerns arise in meantime  Subjective:   2/17/22: This is a 61-year-old male who comes in [address] [place] [address] with ulcers on the right and left calfs  He states that he was hospitalized on November of 2021 with cellulitis and he had the ulcers at that time  Patient is morbidly obese and has history of lymphedema as well  He does not use any form of compression other than Ace wraps    He had purchased alginate on the Internet as well as bordered foam is  He notes that there is heavy drainage requiring dressings to be changed once or twice a day  He believes that these ulcers had started due to  sleeping in a recliner and the foot rest causing pressure on the calfs  He is unable to sleep in a bed  He notes he has increased pain at nighttime when in there is recliner with pressure on his calf  He is ambulatory with a walker but does not go out of the house other than to doctor visits  Patient also has a history of type 2 diabetes with good control with last A1c 6 5 in October of 2021  He states that he has lost approximately 120 lb in the past nine months with a special meal plan  He plans on continuing with weight loss  2/24/22: Followup stage III pressure injuries of right and left calfs  They purchased memory foam pillows which seems to help with both pain and offloading the areas when he is sleeping  Still has pain mainly at night  Left greater than right  3/3/22: Followup stage III pressure injuries of the right left calfs  Patient is upset about visiting nurse not having proper supplies  Then there was leakage through the [employer]  Still has pain at nighttime  No fever chills  3/10/22: Followup stage III pressure injuries of the right and left calfs  Patient states that the visiting nurse had a "observer" do the Unna boot on his right leg which slid down  Still has pain mainly in the left calf  They are trying various offloading techniques  3/17/22: Followup stage III pressure injuries of the right and left calfs  Patient states he still has pain but slightly less particularly on the left  Starting to have some itching  No fever or chills  Still has not found the proper offloading device  3/24/22: Followup stage III pressure injuries of both calfs continues to have some pain as in the past   No fever or chills  Trying to offload when in bed     3/31/22:  Followup stage III pressure injuries of both lateral calf  Continues to have the same amount of pain  States he is trying offload  No fever chills  4/7/22:  Patient is a patient of Dr Heather Mccray who presents for followup of bilateral LE ulcers  Has had antimicrobial endoform and hydrofera blue on the wounds and Unna boot for compression    4/14/22: Followup stage III pressure injuries of both lateral calfs  Unna boots  Has no pain on the left calf anymore  Much less on the right  No fever chills  4/21/22: Followup stage III pressure injuries of both lateral calfs  Tolerating Unna boots although they tend to slide down  No pain at this time  4/28/22: Followup stage III pressure injuries of both lateral calfs  No complaints at this time  No pain  5/5/22: Followup stage III pressure injuries of both lateral calf  No complaints  No pain  5/12/22: Followup stage III pressure injuries of both lateral calfs  At last visit, the left calf was closed  Hydrocolloid on the right calf was leaking  No other complaints  5/19/22: Followup stage III pressure injury of the right lateral calf  Left calf was closed at last visit  Received a telephone call from [facility] stating that the left calf has a new ulcer adjacent to the healed area  Tubigrip was use on the left because of previously healed ulcer  Bilateral lower extremity lymphedema  VNA placed new Unna boot on the left  No other complaints  5/26/22: Followup stage III pressure injuries of the right and left calfs  At last visit, the left calf reopened  No new complaints today  Tolerating Unna boot  06/02/22: Followup stage III pressure injuries of the R and L calfs  Polymem with Unna boot placed last visit  Denies fevers, chills, no new complaints  6/9/22: Followup stage III pressure injuries of the right and left calfs  This past week he has worn Unna boot on the left with alginate and circ aid on the right  No complaints  6/16/22:   Followup stage III pressure injuries of the right left calfs  Doing well with circ aid on the right and Unna boot on the left  No significant pain  No other complaints  6/30/22: Followup stage III pressure injuries of the right and left calfs  Wearing Circ Aids on both legs  Seems to be doing well  No specific complaints  Denies pain  No fever or chills  07/14/22: Followup stage III pressure injuries of R and L calves  Has been wearing CircAids with Hydrofera to wound beds  Pt missed dressing change this past week d/t wife being in hospital  Hydrofera was left in place for about one week  No fevers, chills, pain           The following portions of the patient's history were reviewed and updated as appropriate:   Patient Active Problem List   Diagnosis    Type 2 diabetes mellitus with hyperglycemia, with long-term current use of insulin ([address] )    Hyperlipidemia    Psoriasis    Venous insufficiency    Gout    Essential hypertension    Gluten intolerance    Diabetic neuropathy ([address] )    Insomnia    Erectile dysfunction    Bilateral leg edema    Elevated CO2 level    Abnormal thyroid function test    DAHIANA (obstructive sleep apnea)    Morbid obesity with BMI of 70 and over, adult ([address] )    Migraine headache    Onychomycosis    Ulcer of left lower extremity, limited to breakdown of skin ([address] )    Pressure injury of right leg, stage 3 (HCC)    Metabolic syndrome    Low testosterone in male    Hypercalcemia    Hypothyroidism     Past Medical History:   Diagnosis Date    Acute kidney injury 10/28/2021    Anemia 09/13/2019    Cellulitis     last assessed 12/10/15    Cellulitis of left lower extremity     Cellulitis of right lower extremity 11/19/2021    Cough 10/20/2019    Diabetes mellitus ([address] )     Disease of thyroid gland     Edema     Elevated liver enzymes     Elevated serum creatinine 11/19/2021    Esophageal reflux     Fungal infection 8/16/2021    Gluten intolerance     Gout     last assessed 09/05/13    Hyperglycemia     Hypertension     Hyponatremia 9/6/2019    IBS (irritable bowel syndrome)     Insomnia     Obesity     Osteoarthritis of knee     last assessed 02/10/14    Scrotal swelling 5/19/2020    Shortness of breath 5/3/2021    Venous insufficiency     last assessed 08/22/17    Villonodular synovitis of the hand, right     last assessed 11/14/2013     Past Surgical History:   Procedure Laterality Date    INCISION AND DRAINAGE OF WOUND Left 9/6/2019    Procedure: INCISION AND DRAINAGE (I&D) GROIN;  Surgeon: Sg Garcia DO;  [facility]: [facility];  Service: General    SKIN BIOPSY      WOUND DEBRIDEMENT Left 9/7/2019    Procedure: EXCISIONAL DEBRIDEMENT;  Surgeon: Sg Garcia DO;  [facility];  Service: General     Family History   Problem Relation Age of Onset    Cancer Mother         gastrc    Colon cancer Father     Heart failure Father      Social History     Socioeconomic History    Marital status: /Civil Union     Spouse name: None    Number of children: None    Years of education: None    Highest education level: None   Occupational History    None   Tobacco Use    Smoking status: Never Smoker    Smokeless tobacco: Never Used   Vaping Use    Vaping Use: Never used   Substance and Sexual Activity    Alcohol use: Not Currently     Alcohol/week: 0 0 standard drinks    Drug use: No    Sexual activity: Yes   Other Topics Concern    None   Social History Narrative    None     Social Determinants of Health     Financial Resource Strain: Not on file   Food Insecurity: Not on file   Transportation Needs: No Transportation Needs    Lack of Transportation (Medical): No    Lack of Transportation (Non-Medical):  No   Physical Activity: Not on file   Stress: Not on file   Social Connections: Not on file   Intimate Partner Violence: Not on file   Housing Stability: Not on file       Current Outpatient Medications:     acetaminophen (TYLENOL) 325 mg tablet, Take 2 tablets (650 mg total) by mouth every 4 (four) hours as needed for mild pain, headaches or fever, Disp: , Rfl: 0    albuterol (PROVENTIL HFA,VENTOLIN HFA) 90 mcg/act inhaler, TAKE 2 PUFFS BY MOUTH EVERY 6 HOURS AS NEEDED FOR WHEEZE, Disp: 8 5 g, Rfl: 0    BD Pen Needle Ludmila 2nd Gen 32G X 4 MM MISC, USE 4 TIMES A DAY, Disp: 200 each, Rfl: 3    Blood Glucose Monitoring Suppl (ONETOUCH VERIO) w/Device KIT, Use to test sugar daily (Patient not taking: No sig reported), Disp: 1 kit, Rfl: 0    Calcium Alginate (Maxorb II Alginate Dressing) MISC, Apply 1 each topically in the morning, Disp: 10 each, Rfl: 2    ciprofloxacin (CIPRO) 500 mg tablet, Take 1 tablet (500 mg total) by mouth every 12 (twelve) hours for 10 days, Disp: 20 tablet, Rfl: 0    Continuous Blood Gluc  (FreeStyle Connell 2 [place] Systm) BROOKS, Use 1 Device as needed (use with sensors for bs checks), Disp: 1 Device, Rfl: 0    Continuous Blood Gluc Sensor (FreeStyle Elvin 14 Day Sensor) MISC, Use as directed 3 times a day, Disp: 2 each, Rfl: 3    Control Gel Formula Dressing (DuoDERM CGF Dressing) MISC, Apply 1 application topically every 5 (five) days, Disp: 5 each, Rfl: 1    [employer] Diclofenac Sodium 1 %, APPLY 4 G TOPICALLY 4 (FOUR) TIMES A DAY AS NEEDED (JOINT PAIN), Disp: 50 g, Rfl: 0    ezetimibe (ZETIA) 10 mg tablet, TAKE 1 TABLET BY MOUTH EVERY DAY, Disp: 90 tablet, Rfl: 1    furosemide (LASIX) 20 mg tablet, TAKE 2 TABLETS EVERY DAY IN THE EARLY MORNING AND 1 TABLET DAILY AFTER LUNCH , Disp: 270 tablet, Rfl: 2    gabapentin (NEURONTIN) 100 mg capsule, TAKE 1 CAPSULE BY MOUTH TWICE A DAY WITH 300MG CAPSULE, Disp: 180 capsule, Rfl: 2    gabapentin (NEURONTIN) 300 mg capsule, TAKE 1 CAPSULE (300 MG TOTAL) BY MOUTH 2 (TWO) TIMES A DAY TAKE 1 TAB WITH YOUR 100MG TAB TWICE DAILY, Disp: 180 capsule, Rfl: 2    Gauze Pads & Dressings 5"X5" PADS, Use in the morning, Disp: 20 each, Rfl: 2    insulin aspart (NovoLOG FlexPen) 100 UNIT/ML injection pen, INJECT 18 UNITS WITH MEALS+SCALE ( - 150-200 -2 UNITS, 201-250-4 UNITS, 251-300 -6 UNITS, 301-350-8 UNITS, > 350- 10 UNITS ), Disp: 45 mL, Rfl: 1    insulin glargine (Lantus SoloStar) 100 units/mL injection pen, Inject 44 Units under the skin daily at bedtime, Disp: 18 mL, Rfl: 2    Lancets (ONETOUCH ULTRASOFT) lancets, Use to test sugar 2 times a day, Disp: 100 each, Rfl: 3    levothyroxine (Euthyrox) 25 mcg tablet, Take 1 tablet (25 mcg total) by mouth daily in the early morning, Disp: 90 tablet, Rfl: 1    losartan (COZAAR) 25 mg tablet, TAKE 1 TABLET BY MOUTH EVERY DAY, Disp: 90 tablet, Rfl: 2    metFORMIN (GLUCOPHAGE) 1000 MG tablet, Take 1 tablet (1,000 mg total) by mouth 2 (two) times a day, Disp: 180 tablet, Rfl: 0    nystatin (MYCOSTATIN) cream, Apply topically 2 (two) times a day as needed (itching, redness), Disp: 30 g, Rfl: 1    nystatin (MYCOSTATIN) powder, Apply 1 application topically 2 (two) times a day To affected area, Disp: 120 g, Rfl: 3    omeprazole (PriLOSEC) 40 MG capsule, TAKE 1 CAPSULE BY MOUTH TWICE A DAY, Disp: 180 capsule, Rfl: 1    OneTouch Verio test strip, USE TO TEST SUGAR 2 TIMES A DAY, Disp: 100 strip, Rfl: 3    oxyCODONE (ROXICODONE) 5 immediate release tablet, Take 1-2 tabs by mouth every 6 hours as needed for moderate or severe pains respectively  , Disp: 15 tablet, Rfl: 0    psyllium (METAMUCIL) packet, Take 1 packet by mouth daily, Disp: , Rfl: 0    saccharomyces boulardii (FLORASTOR) 250 mg capsule, Take 1 capsule (250 mg total) by mouth 2 (two) times a day, Disp: 60 capsule, Rfl: 1    Skin Protectants, Misc   ([facility] [employer] 10"x144") SHEE, Apply 1 each topically every 5 (five) days, Disp: 3 each, Rfl: 5    sodium hypochlorite (DAKIN'S QUARTER-STRENGTH), Irrigate with 1 application as directed daily, Disp: 473 mL, Rfl: 0    Review of Systems   Constitutional: Negative for appetite change, chills, fatigue, fever and unexpected weight change  HENT: Negative for congestion, hearing loss, postnasal drip and sinus pressure  Respiratory: Positive for shortness of breath (On exertion)  Negative for cough  Cardiovascular: Positive for leg swelling (Lymphedema)  Endocrine: Negative  Musculoskeletal: Positive for gait problem [facility])  Negative for back pain  Skin: Positive for wound (Right lateral calf and left lateral calf and new laceration of the left calf  )  Negative for rash  Allergic/Immunologic: Negative  Hematological: Does not bruise/bleed easily  Psychiatric/Behavioral: Negative  Negative for dysphoric mood  The patient is not nervous/anxious  Objective:  /62   Pulse 76   Temp (!) 97 1 °F (36 2 °C)   Resp (!) 24   Pain Score:   5     Physical Exam  Vitals and nursing note reviewed  Constitutional:       General: He is not in acute distress  Appearance: Normal appearance  He is well-developed  He is morbidly obese  He is not toxic-appearing  HENT:      Head: Normocephalic and atraumatic  Cardiovascular:      Rate and Rhythm: Normal rate  Pulmonary:      Effort: Pulmonary effort is normal  No respiratory distress  Breath sounds: No stridor  Musculoskeletal:      Right lower leg: Edema present  Left lower leg: Edema present  Skin:     General: Skin is warm and dry  Findings: Wound present  No erythema  Comments: Left calf pressure injury is much larger since previous visit  The base has a mix of granulation tissue and slough/biofilm present  Surrounding erythema is present and appears to be dermatitis from exudate  No lymphangitic streaking  No malodor  The right lateral calf wound is approximately same size as previous visit  Base is fibrogranular  No periwound maceration or surrounding erythema/edema  Neurological:      Mental Status: He is alert and oriented to person, place, and time        Gait: Gait abnormal    Psychiatric:         Attention and Perception: Attention normal          Mood and Affect: Mood and affect normal          Behavior: Behavior is cooperative  Cognition and Memory: Cognition normal            Wound 02/17/22 Pressure Injury Leg Right;Lateral;Lower (Active)   Wound Image   07/14/22 0946   Wound Description Slough;Pink;Yellow;Granulation tissue; Epithelialization 07/14/22 0948   Pressure Injury Stage 3 03/03/22 1555   Chelsie-wound Assessment Edema;Dry;Scaly;Scar Tissue 07/14/22 0948   Wound Length (cm) 0 6 cm 07/14/22 0948   Wound Width (cm) 0 9 cm 07/14/22 0948   Wound Depth (cm) 0 1 cm 07/14/22 0948   Wound Surface Area (cm^2) 0 54 cm^2 07/14/22 0948   Wound Volume (cm^3) 0 054 cm^3 07/14/22 0948   Calculated Wound Volume (cm^3) 0 05 cm^3 07/14/22 0948   Change in Wound Size % 94 79 07/14/22 0948   Drainage Amount Moderate 07/14/22 0948   Drainage Description Serosanguineous 07/14/22 0948   Non-staged Wound Description Full thickness 07/14/22 0948   Treatments Cleansed 07/14/22 0948   Dressing Changed Changed 07/14/22 0948   Patient Tolerance Tolerated well 07/14/22 0948   Dressing Status Removed 07/14/22 0948       Wound 05/19/22 Venous Ulcer Pretibial Left;Lateral (Active)   Wound Image       07/14/22 1016   Wound Description Pink; Beefy red;Slough 06/30/22 1527   Chelsie-wound Assessment Edema;Dry;Scaly; Hyperpigmented 06/30/22 1527   Wound Length (cm) 8 cm 07/14/22 0950   Wound Width (cm) 4 cm 07/14/22 0950   Wound Depth (cm) 0 2 cm 07/14/22 0950   Wound Surface Area (cm^2) 32 cm^2 07/14/22 0950   Wound Volume (cm^3) 6 4 cm^3 07/14/22 0950   Calculated Wound Volume (cm^3) 6 4 cm^3 07/14/22 0950   Change in Wound Size % -[id number] 07/14/22 0950   Drainage Amount Moderate 07/14/22 0950   Drainage Description Serosanguineous 07/14/22 0950   Non-staged Wound Description Full thickness 07/14/22 0950   Treatments Irrigation with NSS 07/14/22 0950   Dressing Changed Changed 07/14/22 0950   Patient Tolerance Tolerated well 07/14/22 0950 Dressing Status Removed 07/14/22 0950             Debridement   Wound 02/17/22 Pressure Injury Leg Right;Lateral;Lower    Universal Protocol:  Procedure performed by: (Assisting provider Darius Jung PA-C)  Consent: Verbal consent obtained  Consent given by: patient  Time out: Immediately prior to procedure a "time out" was called to verify the correct patient, procedure, equipment, support staff and site/side marked as required  Patient understanding: patient states understanding of the procedure being performed  Patient identity confirmed: verbally with patient      Performed by: PA  Debridement type: selective  Pain control: lidocaine 4%  Post-debridement measurements  Length (cm): 0 6  Width (cm): 0 9  Depth (cm): 0 1  Percent debrided: 100%  Surface Area (cm^2): 0 54  Area debrided (cm^2): 0 54  Volume (cm^3): 0 05  Tissue and other material debrided: dermis  Devitalized tissue debrided: fibrin  Instrument(s) utilized: curette  Bleeding: small  Hemostasis obtained with: pressure  Procedural pain (0-10): 0  Post-procedural pain: 0   Response to treatment: procedure was tolerated well                     Wound Instructions:  Orders Placed This Encounter   Procedures    Wound cleansing and dressings     Wound cleansing and dressings       Wash your hands with soap and water  Remove old dressing, discard into plastic bag and place in trash  Cleanse the wound with mild soap and water prior to applying a clean dressing  PLEASE WASH LEGS WITH SOAP AND WATER PRIOR TO DRESSING CHANGES   Do not use tissue or cotton balls  Do not scrub the wound  Pat dry using gauze  Shower yes with protection, purchase cast cover  Or sponge bathe       RIGHT leg wound; Apply Lac Hydrin to B/L lower legs  Apply skin prep to the periwound  Cover the wound with hydrofera blue    Apply circaid compression to right leg wound  Do not change dressing    Roll towels or blankets and place under ankles for offloading wounds/heals   Left lower leg wound:  Apply acticoat 7 and calicum alginate to leg wound  Cover with 4x4's nonbordered foam   Secure with rolled gauze and paper tape  Apply unna boot  Do not change dressing or get wet  Stop VNA      Follow up in wound center in one week    Treatment today in wound center:  Wounds cleansed with normal saline  Redressed as above  Standing Status:   Future     Standing Expiration Date:   7/14/2023    Debridement     This order was created via procedure documentation    Debridement     This order was created via procedure documentation       Elvie Craig MD      - I, Ena Perez PA-C, have acted as a scribe with the above documentation    -Baron Fariha MD, CWS have seen and evaluated the above patient and have reviewed and agree with the above documentation  Portions of the record may have been created with voice recognition software  Occasional wrong word or "sound alike" substitutions may have occurred due to the inherent limitations of voice recognition software  Read the chart carefully and recognize, using context, where substitutions have occurred

## 2022-07-14 NOTE — PROGRESS NOTES
Debridement   Wound 05/19/22 Venous Ulcer Pretibial Left;Lateral    Universal Protocol:  Procedure performed by: (Assisting provider Isaac Ann PA-C)  Consent: Verbal consent obtained  Consent given by: patient  Time out: Immediately prior to procedure a "time out" was called to verify the correct patient, procedure, equipment, support staff and site/side marked as required    Patient understanding: patient states understanding of the procedure being performed  Patient identity confirmed: verbally with patient      Performed by: PA  Debridement type: selective  Pain control: lidocaine 4%  Post-debridement measurements  Length (cm): 8  Width (cm): 4  Depth (cm): 0 3  Percent debrided: 80%  Surface Area (cm^2): 32  Area debrided (cm^2): 25 6  Volume (cm^3): 9 6  Tissue and other material debrided: dermis  Devitalized tissue debrided: biofilm and fibrin  Instrument(s) utilized: curette  Bleeding: small  Hemostasis obtained with: pressure  Procedural pain (0-10): 0  Post-procedural pain: 0   Response to treatment: procedure was tolerated well

## 2022-07-14 NOTE — PATIENT INSTRUCTIONS
Orders Placed This Encounter   Procedures    Wound cleansing and dressings     Wound cleansing and dressings       Wash your hands with soap and water  Remove old dressing, discard into plastic bag and place in trash  Cleanse the wound with mild soap and water prior to applying a clean dressing  PLEASE WASH LEGS WITH SOAP AND WATER PRIOR TO DRESSING CHANGES   Do not use tissue or cotton balls  Do not scrub the wound  Pat dry using gauze  Shower yes with protection, purchase cast cover  Or sponge bathe  RIGHT leg wound; Apply Lac Hydrin to B/L lower legs  Apply skin prep to the periwound  Cover the wound with hydrofera blue    Apply circaid compression to right leg wound  Do not change dressing  Roll towels or blankets and place under ankles for offloading wounds/heals   Left lower leg wound:  Apply acticoat 7 and calicum alginate to leg wound  Cover with 4x4's nonbordered foam   Secure with rolled gauze and paper tape  Apply unna boot  Do not change dressing or get wet  Stop VNA      Follow up in wound center in one week    Treatment today in wound center:  Wounds cleansed with normal saline  Redressed as above       Standing Status:   Future     Standing Expiration Date:   7/14/2023

## 2022-07-21 ENCOUNTER — OFFICE VISIT (OUTPATIENT)
Dept: WOUND CARE | Facility: CLINIC | Age: 57
End: 2022-07-21
Payer: MEDICARE

## 2022-07-21 VITALS
SYSTOLIC BLOOD PRESSURE: 122 MMHG | HEART RATE: 92 BPM | RESPIRATION RATE: 18 BRPM | DIASTOLIC BLOOD PRESSURE: 84 MMHG | TEMPERATURE: 97.5 F

## 2022-07-21 DIAGNOSIS — L89.893 PRESSURE INJURY OF LEFT LEG, STAGE 3 (HCC): Primary | ICD-10-CM

## 2022-07-21 DIAGNOSIS — L89.893 PRESSURE INJURY OF RIGHT LEG, STAGE 3 (HCC): ICD-10-CM

## 2022-07-21 DIAGNOSIS — T14.8XXA WOUND INFECTION: ICD-10-CM

## 2022-07-21 DIAGNOSIS — E78.00 PURE HYPERCHOLESTEROLEMIA: ICD-10-CM

## 2022-07-21 DIAGNOSIS — L08.9 WOUND INFECTION: ICD-10-CM

## 2022-07-21 PROCEDURE — 87077 CULTURE AEROBIC IDENTIFY: CPT | Performed by: FAMILY MEDICINE

## 2022-07-21 PROCEDURE — 11042 DBRDMT SUBQ TIS 1ST 20SQCM/<: CPT | Performed by: STUDENT IN AN ORGANIZED HEALTH CARE EDUCATION/TRAINING PROGRAM

## 2022-07-21 PROCEDURE — 11042 DBRDMT SUBQ TIS 1ST 20SQCM/<: CPT | Performed by: FAMILY MEDICINE

## 2022-07-21 PROCEDURE — 87205 SMEAR GRAM STAIN: CPT | Performed by: FAMILY MEDICINE

## 2022-07-21 PROCEDURE — 87070 CULTURE OTHR SPECIMN AEROBIC: CPT | Performed by: FAMILY MEDICINE

## 2022-07-21 PROCEDURE — 11045 DBRDMT SUBQ TISS EACH ADDL: CPT | Performed by: FAMILY MEDICINE

## 2022-07-21 PROCEDURE — 99214 OFFICE O/P EST MOD 30 MIN: CPT | Performed by: FAMILY MEDICINE

## 2022-07-21 PROCEDURE — 87186 SC STD MICRODIL/AGAR DIL: CPT | Performed by: FAMILY MEDICINE

## 2022-07-21 PROCEDURE — 11045 DBRDMT SUBQ TISS EACH ADDL: CPT | Performed by: STUDENT IN AN ORGANIZED HEALTH CARE EDUCATION/TRAINING PROGRAM

## 2022-07-21 RX ORDER — EZETIMIBE 10 MG/1
TABLET ORAL
Qty: 90 TABLET | Refills: 1 | Status: SHIPPED | OUTPATIENT
Start: 2022-07-21 | End: 2022-07-25 | Stop reason: SDUPTHER

## 2022-07-21 RX ORDER — CIPROFLOXACIN 500 MG/1
500 TABLET, FILM COATED ORAL EVERY 12 HOURS SCHEDULED
Qty: 20 TABLET | Refills: 0 | Status: SHIPPED | OUTPATIENT
Start: 2022-07-21 | End: 2022-07-31

## 2022-07-21 RX ORDER — LIDOCAINE 40 MG/G
CREAM TOPICAL ONCE
Status: COMPLETED | OUTPATIENT
Start: 2022-07-21 | End: 2022-07-21

## 2022-07-21 RX ADMIN — LIDOCAINE: 40 CREAM TOPICAL at 10:43

## 2022-07-21 NOTE — TELEPHONE ENCOUNTER
Tanisha Garland has requested a 90 day prescription of ezetimibe (ZETIA) 10 mg tablet  Would a 90 day supply be appropriate?

## 2022-07-21 NOTE — PATIENT INSTRUCTIONS
Orders Placed This Encounter   Procedures    Wound cleansing and dressings     Wash your hands with soap and water  Remove old dressing, discard into plastic bag and place in trash  Cleanse the wound with mild soap and water prior to applying a clean dressing  PLEASE WASH LEGS WITH SOAP AND WATER PRIOR TO DRESSING CHANGES   Do not use tissue or cotton balls  Do not scrub the wound  Pat dry using gauze  Shower yes with protection, purchase cast cover  Or sponge bathe  RIGHT and Left leg wounds; Apply Lac Hydrin to B/L lower legs  Apply skin prep to the periwound  Cover the wound with hydrofera blue    Apply circaid compression to right leg wound  Change dressings 3 times per week  Roll towels or blankets and place under ankles for offloading wounds/heals    Start visiting nurses skilled on Tuesdays and Saturdays starting 7/23/22 for wound care dressing changes   Follow up in wound center in one week    Prescription sent to pharmacy  Culture done today on the left leg     Treatment today in wound center:  Wounds cleansed with normal saline, dakin's soak for 5 minutes and redressed as above       Standing Status:   Future     Standing Expiration Date:   7/21/2023    Wound culture and Gram stain     Standing Status:   Future     Standing Expiration Date:   7/21/2023     Order Specific Question:   Release to patient through [employer] [facility] [employer]     Answer:   Immediate    Referral to [address] [place] [address]     Standing Status:   Future     Standing Expiration Date:   7/21/2023     Referral Priority:   Routine     Referral Type:   Home Health     Referral Reason:   Specialty Services Required     Requested Specialty:   [address]     Number of Visits Requested:   1     Expiration Date:   7/21/2023

## 2022-07-21 NOTE — PROGRESS NOTES
Patient ID: Spencer Molina is a 56 y o  male Date of Birth 1965       Chief Complaint   Patient presents with    Follow Up Wound Care Visit     Pressure injury of right leg, stage 3       Allergies:  Gluten meal - food allergy and Clindamycin    Diagnosis:   Diagnosis ICD-10-CM Associated Orders   1  Pressure injury of left leg, stage 3 ([facility])  L89 893 lidocaine (LMX) 4 % cream     Wound cleansing and dressings     Referral to [facility]     Wound culture and Gram stain     Wound culture and Gram stain     Debridement   2  Pressure injury of right leg, stage 3 ([facility])  L89 893 lidocaine (LMX) 4 % cream     Wound cleansing and dressings     Referral to [facility]     Debridement   3  Wound infection  T14  8XXA ciprofloxacin (CIPRO) 500 mg tablet    L08 9         Assessment  & Plan:     F/u bilateral stage 3 pressure injuries to lower extremities  The R leg wound remains approximately the same while the L wound has significantly increased in size with biofilm and slough in wound bed and new green drainage with increased pain  o R pressure injury:   - Surgical debridement performed  - Hydrofera to wound bed  - CircAid for compression  o L pressure injury  Infection present    - Started on Ciprofloxacin  Culture taken  Will change abx if needed  Dakin soak today  - Hydrofera to wound bed  Change three times weekly  o Attempting to re-establish home health again  o Use CircAids for compression  o Attempt to offload the lateral legs from pressure    o Instructed to monitor for any changes including redness or swelling surrounding the wound, increased drainage or pain as well as fevers or chills     o F/u in one week  Instructed to call if any questions or concerns arise in meantime  Subjective:   2/17/22: This is a 21-year-old male who comes in [address] [place] [address] with ulcers on the right and left calfs    He states that he was hospitalized on November of 2021 with cellulitis and he had the ulcers at that time  Patient is morbidly obese and has history of lymphedema as well  He does not use any form of compression other than Ace wraps  He had purchased alginate on the Internet as well as bordered foam is  He notes that there is heavy drainage requiring dressings to be changed once or twice a day  He believes that these ulcers had started due to  sleeping in a recliner and the foot rest causing pressure on the calfs  He is unable to sleep in a bed  He notes he has increased pain at nighttime when in there is recliner with pressure on his calf  He is ambulatory with a walker but does not go out of the house other than to doctor visits  Patient also has a history of type 2 diabetes with good control with last A1c 6 5 in October of 2021  He states that he has lost approximately 120 lb in the past nine months with a special meal plan  He plans on continuing with weight loss  2/24/22: Followup stage III pressure injuries of right and left calfs  They purchased memory foam pillows which seems to help with both pain and offloading the areas when he is sleeping  Still has pain mainly at night  Left greater than right  3/3/22: Followup stage III pressure injuries of the right left calfs  Patient is upset about visiting nurse not having proper supplies  Then there was leakage through the [employer]  Still has pain at nighttime  No fever chills  3/10/22: Followup stage III pressure injuries of the right and left calfs  Patient states that the visiting nurse had a "observer" do the Unna boot on his right leg which slid down  Still has pain mainly in the left calf  They are trying various offloading techniques  3/17/22: Followup stage III pressure injuries of the right and left calfs  Patient states he still has pain but slightly less particularly on the left  Starting to have some itching  No fever or chills    Still has not found the proper offloading device  3/24/22: Followup stage III pressure injuries of both calfs continues to have some pain as in the past   No fever or chills  Trying to offload when in bed     3/31/22: Followup stage III pressure injuries of both lateral calf  Continues to have the same amount of pain  States he is trying offload  No fever chills  4/7/22:  Patient is a patient of Dr Carlee Ellis who presents for followup of bilateral LE ulcers  Has had antimicrobial endoform and hydrofera blue on the wounds and Unna boot for compression    4/14/22: Followup stage III pressure injuries of both lateral calfs  Unna boots  Has no pain on the left calf anymore  Much less on the right  No fever chills  4/21/22: Followup stage III pressure injuries of both lateral calfs  Tolerating Unna boots although they tend to slide down  No pain at this time  4/28/22: Followup stage III pressure injuries of both lateral calfs  No complaints at this time  No pain  5/5/22: Followup stage III pressure injuries of both lateral calf  No complaints  No pain  5/12/22: Followup stage III pressure injuries of both lateral calfs  At last visit, the left calf was closed  Hydrocolloid on the right calf was leaking  No other complaints  5/19/22: Followup stage III pressure injury of the right lateral calf  Left calf was closed at last visit  Received a telephone call from GRANT stating that the left calf has a new ulcer adjacent to the healed area  Tubigrip was use on the left because of previously healed ulcer  Bilateral lower extremity lymphedema  VNA placed new Unna boot on the left  No other complaints  5/26/22: Followup stage III pressure injuries of the right and left calfs  At last visit, the left calf reopened  No new complaints today  Tolerating Unna boot  06/02/22: Followup stage III pressure injuries of the R and L calfs  Polymem with Unna boot placed last visit  Denies fevers, chills, no new complaints  6/9/22: Followup stage III pressure injuries of the right and left calfs  This past week he has worn Unna boot on the left with alginate and circ aid on the right  No complaints  6/16/22: Followup stage III pressure injuries of the right left calfs  Doing well with circ aid on the right and Unna boot on the left  No significant pain  No other complaints  6/30/22: Followup stage III pressure injuries of the right and left calfs  Wearing Circ Aids on both legs  Seems to be doing well  No specific complaints  Denies pain  No fever or chills  07/14/22: Followup stage III pressure injuries of R and L calves  Has been wearing CircAids with Hydrofera to wound beds  Pt missed dressing change this past week d/t wife being in hospital  Hydrofera was left in place for about one week  No fevers, chills, pain      07/21/22: Followup stage 3 pressure injuries of R and L calves  Has been wearing CircAid with Hydrofera to R leg  Acticoat with alginate and unna boot was being used to L leg, however the Unna boot slid down therefore it was removed and hydrofera was being used on wound and CircAId used for compression  He notes there has been increased pain to the wound and that he noticed green/black drainage from the wound  Wife was helping him with dressing changes previously, however she is currently in hospital and therefore has not been able to help him with dressing changes past two weeks  Denies fevers or chills           The following portions of the patient's history were reviewed and updated as appropriate:   Patient Active Problem List   Diagnosis    Type 2 diabetes mellitus with hyperglycemia, with long-term current use of insulin ([address] )    Hyperlipidemia    Psoriasis    Venous insufficiency    Gout    Essential hypertension    Gluten intolerance    Diabetic neuropathy (HCC)    Insomnia    Erectile dysfunction    Bilateral leg edema    Elevated CO2 level    Abnormal thyroid function test    DAHIANA (obstructive sleep apnea)    Morbid obesity with BMI of 70 and over, adult ([facility] )    Migraine headache    Onychomycosis    Ulcer of left lower extremity, limited to breakdown of skin ([facility] )    Pressure injury of right leg, stage 3 (HCC)    Metabolic syndrome    Low testosterone in male    Hypercalcemia    Hypothyroidism     Past Medical History:   Diagnosis Date    Acute kidney injury 10/28/2021    Anemia 09/13/2019    Cellulitis     last assessed 12/10/15    Cellulitis of left lower extremity     Cellulitis of right lower extremity 11/19/2021    Cough 10/20/2019    Diabetes mellitus ([facility] )     Disease of thyroid gland     Edema     Elevated liver enzymes     Elevated serum creatinine 11/19/2021    Esophageal reflux     Fungal infection 8/16/2021    Gluten intolerance     Gout     last assessed 09/05/13    Hyperglycemia     Hypertension     Hyponatremia 9/6/2019    IBS (irritable bowel syndrome)     Insomnia     Obesity     Osteoarthritis of knee     last assessed 02/10/14    Scrotal swelling 5/19/2020    Shortness of breath 5/3/2021    Venous insufficiency     last assessed 08/22/17    Villonodular synovitis of the hand, right     last assessed 11/14/2013     Past Surgical History:   Procedure Laterality Date    INCISION AND DRAINAGE OF WOUND Left 9/6/2019    Procedure: INCISION AND DRAINAGE (I&D) GROIN;  Surgeon: Germaine Baron DO;  [facility];  Service: General    SKIN BIOPSY      WOUND DEBRIDEMENT Left 9/7/2019    Procedure: EXCISIONAL DEBRIDEMENT;  Surgeon: Germaine Baron DO;  [facility];  Service: General     Family History   Problem Relation Age of Onset    Cancer Mother         gastrc    Colon cancer Father     Heart failure Father      Social History     Socioeconomic History    Marital status: /Civil Union     Spouse name: Not on file    Number of children: Not on file    Years of education: Not on file   [employer] education level: Not on file   Occupational History    Not on file   Tobacco Use    Smoking status: Never Smoker    Smokeless tobacco: Never Used   Vaping Use    Vaping Use: Never used   Substance and Sexual Activity    Alcohol use: Not Currently     Alcohol/week: 0 0 standard drinks    Drug use: No    Sexual activity: Yes   Other Topics Concern    Not on file   Social History Narrative    Not on file     Social Determinants of Health     Financial Resource Strain: Not on file   Food Insecurity: Not on file   Transportation Needs: No Transportation Needs    Lack of Transportation (Medical): No    Lack of Transportation (Non-Medical):  No   Physical Activity: Not on file   Stress: Not on file   Social Connections: Not on file   Intimate Partner Violence: Not on file   Housing Stability: Not on file       Current Outpatient Medications:     ciprofloxacin (CIPRO) 500 mg tablet, Take 1 tablet (500 mg total) by mouth every 12 (twelve) hours for 10 days, Disp: 20 tablet, Rfl: 0    acetaminophen (TYLENOL) 325 mg tablet, Take 2 tablets (650 mg total) by mouth every 4 (four) hours as needed for mild pain, headaches or fever, Disp: , Rfl: 0    albuterol (PROVENTIL HFA,VENTOLIN HFA) 90 mcg/act inhaler, TAKE 2 PUFFS BY MOUTH EVERY 6 HOURS AS NEEDED FOR WHEEZE, Disp: 8 5 g, Rfl: 0    BD Pen Needle Ludmila 2nd Gen 32G X 4 MM MISC, USE 4 TIMES A DAY, Disp: 200 each, Rfl: 3    Blood Glucose Monitoring Suppl (ONETOUCH VERIO) w/Device KIT, Use to test sugar daily (Patient not taking: No sig reported), Disp: 1 kit, Rfl: 0    Calcium Alginate (Maxorb II Alginate Dressing) MISC, Apply 1 each topically in the morning, Disp: 10 each, Rfl: 2    Continuous Blood Gluc  (FreeStyle Elvin 2 Speed Systm) BROOKS, Use 1 Device as needed (use with sensors for bs checks), Disp: 1 Device, Rfl: 0    Continuous Blood Gluc Sensor (FreeStyle Elvin 14 Day Sensor) MISC, Use as directed 3 times a day, Disp: 2 each, Rfl: 3    Control Gel Formula Dressing (DuoDERM CGF Dressing) [facility], Apply 1 application topically every 5 (five) days, Disp: 5 each, Rfl: 1    [employer] Diclofenac Sodium 1 %, APPLY 4 G TOPICALLY 4 (FOUR) TIMES A DAY AS NEEDED (JOINT PAIN), Disp: 50 g, Rfl: 0    ezetimibe (ZETIA) 10 mg tablet, TAKE 1 TABLET BY MOUTH EVERY DAY, Disp: 90 tablet, Rfl: 1    furosemide (LASIX) 20 mg tablet, TAKE 2 TABLETS EVERY DAY IN THE EARLY MORNING AND 1 TABLET DAILY AFTER LUNCH , Disp: 270 tablet, Rfl: 2    gabapentin (NEURONTIN) 100 mg capsule, TAKE 1 CAPSULE BY MOUTH TWICE A DAY WITH 300MG CAPSULE, Disp: 180 capsule, Rfl: 2    gabapentin (NEURONTIN) 300 mg capsule, TAKE 1 CAPSULE (300 MG TOTAL) BY MOUTH 2 (TWO) TIMES A DAY TAKE 1 TAB WITH YOUR 100MG TAB TWICE DAILY, Disp: 180 capsule, Rfl: 2    Gauze Pads & Dressings 5"X5" PADS, Use in the morning, Disp: 20 each, Rfl: 2    insulin aspart (NovoLOG FlexPen) 100 UNIT/ML injection pen, INJECT 18 UNITS WITH MEALS+SCALE ( - 150-200 -2 UNITS, 201-250-4 UNITS, 251-300 -6 UNITS, 301-350-8 UNITS, > 350- 10 UNITS ), Disp: 45 mL, Rfl: 1    insulin glargine (Lantus SoloStar) 100 units/mL injection pen, Inject 44 Units under the skin daily at bedtime, Disp: 18 mL, Rfl: 2    Lancets (ONETOUCH ULTRASOFT) lancets, Use to test sugar 2 times a day, Disp: 100 each, Rfl: 3    levothyroxine (Euthyrox) 25 mcg tablet, Take 1 tablet (25 mcg total) by mouth daily in the early morning, Disp: 90 tablet, Rfl: 1    losartan (COZAAR) 25 mg tablet, TAKE 1 TABLET BY MOUTH EVERY DAY, Disp: 90 tablet, Rfl: 2    metFORMIN (GLUCOPHAGE) 1000 MG tablet, Take 1 tablet (1,000 mg total) by mouth 2 (two) times a day, Disp: 180 tablet, Rfl: 0    nystatin (MYCOSTATIN) cream, Apply topically 2 (two) times a day as needed (itching, redness), Disp: 30 g, Rfl: 1    nystatin (MYCOSTATIN) powder, Apply 1 application topically 2 (two) times a day To affected area, Disp: 120 g, Rfl: 3    omeprazole (PriLOSEC) 40 MG capsule, TAKE 1 CAPSULE BY MOUTH TWICE A DAY, Disp: 180 capsule, Rfl: 1    OneTouch Verio test strip, USE TO TEST SUGAR 2 TIMES A DAY, Disp: 100 strip, Rfl: 3    oxyCODONE (ROXICODONE) 5 immediate release tablet, Take 1-2 tabs by mouth every 6 hours as needed for moderate or severe pains respectively  , Disp: 15 tablet, Rfl: 0    psyllium (METAMUCIL) packet, Take 1 packet by mouth daily, Disp: , Rfl: 0    saccharomyces boulardii (FLORASTOR) 250 mg capsule, Take 1 capsule (250 mg total) by mouth 2 (two) times a day, Disp: 60 capsule, Rfl: 1    Skin Protectants, Misc  ([employer] 10"x144") SHEE, Apply 1 each topically every 5 (five) days, Disp: 3 each, Rfl: 5    sodium hypochlorite (DAKIN'S QUARTER-STRENGTH), Irrigate with 1 application as directed daily, Disp: 473 mL, Rfl: 0  No current facility-administered medications for this visit  Review of Systems   Constitutional: Negative for appetite change, chills, fatigue, fever and unexpected weight change  HENT: Negative for congestion, hearing loss, postnasal drip and sinus pressure  Respiratory: Positive for shortness of breath (On exertion)  Negative for cough  Cardiovascular: Positive for leg swelling (Lymphedema)  Endocrine: Negative  Musculoskeletal: Positive for gait problem Andrés Imus)  Negative for back pain  Skin: Positive for wound (Bilateral calfs)  Negative for rash  Allergic/Immunologic: Negative  Hematological: Does not bruise/bleed easily  Psychiatric/Behavioral: Negative  Negative for dysphoric mood  The patient is not nervous/anxious  Objective:  /84   Pulse 92   Temp 97 5 °F (36 4 °C)   Resp 18   Pain Score:   5     Physical Exam  Vitals and nursing note reviewed  Constitutional:       General: He is not in acute distress  Appearance: Normal appearance  He is well-developed  He is morbidly obese  He is not ill-appearing  HENT:      Head: Normocephalic and atraumatic  Cardiovascular:      Rate and Rhythm: Normal rate  Pulmonary:      Effort: Pulmonary effort is normal  No respiratory distress  Musculoskeletal:      Right lower leg: Edema present  Left lower leg: Edema present  Skin:     General: Skin is warm and dry  Findings: Wound present  No erythema  Comments: Left calf pressure injury is much larger since previous visit  The base has a mix of granulation tissue and slough/biofilm present  No lymphangitic streaking  No malodor  Green tinged drainage  The right lateral calf wound is approximately same size as previous visit  Base with slough  No periwound maceration or surrounding erythema/edema  Neurological:      Mental Status: He is alert and oriented to person, place, and time  Gait: Gait abnormal    Psychiatric:         Attention and Perception: Attention normal          Mood and Affect: Mood and affect normal          Behavior: Behavior is cooperative  Cognition and Memory: Cognition normal             Wound 02/17/22 Pressure Injury Leg Right;Lateral;Lower (Active)   Wound Image       07/21/22 1046   Wound Description Pink; Beefy red;Slough 07/21/22 0955   Pressure Injury Stage 3 03/03/22 1555   Chelsie-wound Assessment Edema;Dry;Scaly;Scar Tissue 07/21/22 0955   Wound Length (cm) 0 8 cm 07/21/22 0955   Wound Width (cm) 0 9 cm 07/21/22 0955   Wound Depth (cm) 0 1 cm 07/21/22 0955   Wound Surface Area (cm^2) 0 72 cm^2 07/21/22 0955   Wound Volume (cm^3) 0 072 cm^3 07/21/22 0955   Calculated Wound Volume (cm^3) 0 07 cm^3 07/21/22 0955   Change in Wound Size % 92 71 07/21/22 0955   Drainage Amount Moderate 07/21/22 0955   Drainage Description Serosanguineous 07/21/22 0955   Non-staged Wound Description Full thickness 07/14/22 0948   Treatments Cleansed 07/14/22 0948   Dressing Changed Changed 07/14/22 0948   Patient Tolerance Tolerated well 07/21/22 0955   Dressing Status Removed 07/21/22 0955       Wound 05/19/22 Venous Ulcer Pretibial Left;Lateral (Active)   Wound Image 07/21/22 1045   Wound Description Beefy red;Epithelialization;Granulation tissue;Pink 07/21/22 0955   Chelsie-wound Assessment Dry;Edema;Scaly;Scar Tissue; Maceration 07/21/22 0955   Wound Length (cm) 11 5 cm 07/21/22 0955   Wound Width (cm) 11 8 cm 07/21/22 0955   Wound Depth (cm) 0 1 cm 07/21/22 0955   Wound Surface Area (cm^2) 135 7 cm^2 07/21/22 0955   Wound Volume (cm^3) 13 57 cm^3 07/21/22 0955   Calculated Wound Volume (cm^3) 13 57 cm^3 07/21/22 0955   Change in Wound Size % -[id number] [phone] 07/21/22 0955   Drainage Amount Large 07/21/22 0955   Drainage Description Green;Yellow 07/21/22 0955   Non-staged Wound Description Full thickness 07/14/22 0950   Treatments Irrigation with NSS 07/14/22 0950   Dressing Changed Changed 07/14/22 0950   Patient Tolerance Tolerated well 07/21/22 0955   Dressing Status Removed 07/21/22 0955                          Debridement   Wound 02/17/22 Pressure Injury Leg Right;Lateral;Lower    Universal Protocol:  Procedure performed by: (Assisting provider Hina lui PA-C)  Consent: Verbal consent obtained  Consent given by: patient  Time out: Immediately prior to procedure a "time out" was called to verify the correct patient, procedure, equipment, support staff and site/side marked as required    Patient understanding: patient states understanding of the procedure being performed  Patient identity confirmed: verbally with patient      Performed by: PA  Debridement type: surgical  Level of debridement: subcutaneous tissue  Pain control: lidocaine 4%  Post-debridement measurements  Length (cm): 0 8  Width (cm): 0 9  Depth (cm): 0 2  Percent debrided: 100%  Surface Area (cm^2): 0 72  Area debrided (cm^2): 0 72  Volume (cm^3): 0 14  Tissue and other material debrided: dermis and subcutaneous tissue  Devitalized tissue debrided: slough  Instrument(s) utilized: curette  Bleeding: small  Hemostasis obtained with: pressure  Procedural pain (0-10): 0  Post-procedural pain: 0   Response to treatment: procedure was tolerated well    Debridement   Wound 05/19/22 Venous Ulcer Pretibial Left;Lateral    Universal Protocol:  Procedure performed by: (Assisting provider Jesus Manuel Ibarra PA-C)  Consent: Verbal consent obtained  Consent given by: patient  Time out: Immediately prior to procedure a "time out" was called to verify the correct patient, procedure, equipment, support staff and site/side marked as required  Patient understanding: patient states understanding of the procedure being performed  Patient identity confirmed: verbally with patient      Performed by: PA  Debridement type: surgical  Level of debridement: subcutaneous tissue  Pain control: lidocaine 4%  Post-debridement measurements  Length (cm): 11 5  Width (cm): 11 8  Depth (cm): 0 2  Percent debrided: 50%  Surface Area (cm^2): 135 7  Area debrided (cm^2): 67 85  Volume (cm^3): 27 14  Tissue and other material debrided: dermis and subcutaneous tissue  Devitalized tissue debrided: biofilm and slough  Instrument(s) utilized: blade  Bleeding: small  Hemostasis obtained with: pressure  Procedural pain (0-10): 0  Post-procedural pain: 0   Response to treatment: procedure was tolerated well                             Wound Instructions:  Orders Placed This Encounter   Procedures    Wound cleansing and dressings     Wash your hands with soap and water  Remove old dressing, discard into plastic bag and place in trash  Cleanse the wound with mild soap and water prior to applying a clean dressing  PLEASE WASH LEGS WITH SOAP AND WATER PRIOR TO DRESSING CHANGES   Do not use tissue or cotton balls  Do not scrub the wound  Pat dry using gauze  Shower yes with protection, purchase cast cover  Or sponge bathe       RIGHT and Left leg wounds; Apply Lac Hydrin to B/L lower legs  Apply skin prep to the periwound  Cover the wound with hydrofera blue    Apply circaid compression to right leg wound  Change dressings 3 times per week      Roll towels or blankets and place under ankles for offloading wounds/heals    Start visiting nurses skilled on Tuesdays and Saturdays starting 7/23/22 for wound care dressing changes   Follow up in wound center in one week    Prescription sent to pharmacy  Culture done today on the left leg     Treatment today in wound center:  Wounds cleansed with normal saline, dakin's soak for 5 minutes and redressed as above  Standing Status:   Future     Standing Expiration Date:   7/21/2023    Debridement     This order was created via procedure documentation    Debridement     This order was created via procedure documentation    Wound culture and Gram stain     Standing Status:   Future     Number of Occurrences:   1     Standing Expiration Date:   7/21/2023     Order Specific Question:   Release to patient through [employer]     Answer:   Immediate    Referral to [address] [place] [address]     Standing Status:   Future     Standing Expiration Date:   7/21/2023     Referral Priority:   Routine     Referral Type:   Home Health     Referral Reason:   Specialty Services Required     Requested Specialty:   [address]     Number of Visits Requested:   1     Expiration Date:   7/21/2023       Jennifer Fernando MD      - I, La Nena Mejia PA-C, have acted as a scribe with the above documentation    -Kacie Mcgee MD, CWS have seen and evaluated the above patient and have reviewed and agree with the above documentation  Portions of the record may have been created with voice recognition software  Occasional wrong word or "sound alike" substitutions may have occurred due to the inherent limitations of voice recognition software  Read the chart carefully and recognize, using context, where substitutions have occurred

## 2022-07-22 ENCOUNTER — HOME HEALTH ADMISSION (OUTPATIENT)
Dept: HOME HEALTH SERVICES | Facility: HOME HEALTHCARE | Age: 57
End: 2022-07-22
Payer: MEDICARE

## 2022-07-23 ENCOUNTER — HOME CARE VISIT (OUTPATIENT)
Dept: HOME HEALTH SERVICES | Facility: HOME HEALTHCARE | Age: 57
End: 2022-07-23
Payer: MEDICARE

## 2022-07-23 VITALS
OXYGEN SATURATION: 99 % | RESPIRATION RATE: 18 BRPM | SYSTOLIC BLOOD PRESSURE: 132 MMHG | DIASTOLIC BLOOD PRESSURE: 70 MMHG | HEART RATE: 86 BPM | TEMPERATURE: 97.6 F

## 2022-07-23 PROCEDURE — G0299 HHS/HOSPICE OF RN EA 15 MIN: HCPCS

## 2022-07-23 PROCEDURE — 400013 VN SOC

## 2022-07-23 NOTE — CASE COMMUNICATION
[facility] has admitted your patient to [address] [place] [address] service with the following disciplines:      SN  This report is informational only, no responses is needed  Primary focus of home health care Integumentary   Patient stated goals of care    wounds to heal and return to previous activity   Anticipated visit pattern and next visit date 1w1 2w6 2 prn   See medication list - meds in home differ from AVS  Significant clinical findings    Potential barriers to goal achievement  Veronique Stuart difficulty wound location for pt to perform independently  Other pertinent information    Thank you for allowing us to participate in the care of your patient

## 2022-07-24 NOTE — CASE COMMUNICATION
Ship to   Home   [facility]    Wound 1 LLE St3 pressure ulcer   Wound 2 RLE St3 pressure ulcer        Cleansers  [place] [employer] [id number]   1    Gauze 4x4 N/S 200 4x4s per unit  [id number]    1   Gauze Ciara stretch roll 4inch n/s 12 rolls per unit  [id number]    1  ABD 5x9 [id number]    12  Transpore white  2in [id number]    2

## 2022-07-25 ENCOUNTER — TELEPHONE (OUTPATIENT)
Dept: PULMONOLOGY | Facility: CLINIC | Age: 57
End: 2022-07-25

## 2022-07-25 LAB
BACTERIA WND AEROBE CULT: ABNORMAL
GRAM STN SPEC: ABNORMAL
GRAM STN SPEC: ABNORMAL

## 2022-07-25 NOTE — TELEPHONE ENCOUNTER
----- Message from Peter Goode sent at 7/25/2022  2:01 PM EDT -----  Regarding: FW: CPAP Prescription Issue    ----- Message -----  From: Johnna Mike  Sent: 7/25/2022   1:58 PM EDT  To: [facility]  Subject: CPAP Prescription Issue                          Still nothing?

## 2022-07-26 ENCOUNTER — HOME CARE VISIT (OUTPATIENT)
Dept: HOME HEALTH SERVICES | Facility: HOME HEALTHCARE | Age: 57
End: 2022-07-26
Payer: MEDICARE

## 2022-07-26 ENCOUNTER — TELEPHONE (OUTPATIENT)
Dept: PULMONOLOGY | Facility: HOSPITAL | Age: 57
End: 2022-07-26

## 2022-07-26 ENCOUNTER — DOCUMENTATION (OUTPATIENT)
Dept: PULMONOLOGY | Facility: CLINIC | Age: 57
End: 2022-07-26

## 2022-07-26 ENCOUNTER — TELEPHONE (OUTPATIENT)
Dept: SLEEP CENTER | Facility: CLINIC | Age: 57
End: 2022-07-26

## 2022-07-26 VITALS
DIASTOLIC BLOOD PRESSURE: 88 MMHG | HEART RATE: 96 BPM | SYSTOLIC BLOOD PRESSURE: 136 MMHG | TEMPERATURE: 98.4 F | RESPIRATION RATE: 18 BRPM | OXYGEN SATURATION: 98 %

## 2022-07-26 DIAGNOSIS — G47.33 OSA (OBSTRUCTIVE SLEEP APNEA): Primary | ICD-10-CM

## 2022-07-26 PROCEDURE — G0299 HHS/HOSPICE OF RN EA 15 MIN: HCPCS

## 2022-07-26 NOTE — TELEPHONE ENCOUNTER
Can you please call [employer] and have them fax the form over to [phone] and email it to me   Thank you

## 2022-07-26 NOTE — TELEPHONE ENCOUNTER
----- Message from Bogdan Eye, [address]  sent at 7/25/2022  3:26 PM EDT -----  Regarding: FW: CPAP Prescription Issue  Any update on what needs to be done    ----- Message -----  From: Shira Medina MA  Sent: 7/25/2022   2:49 PM EDT  To: ANN Snow Vanice Olp  Subject: FW: CPAP Prescription Issue                        ----- Message -----  From: Peggy Ayoub  Sent: 7/25/2022   2:41 PM EDT  To: [facility]  Subject: CPAP Prescription Issue                          Well, I just spoke to them and they said you faxed them something with Jessica Lorenzo name crossed out and another doctor added but nothing was initialed so they cant use it for insurance  Can you please just take care of this its been literally months  Thank you so much

## 2022-07-28 ENCOUNTER — HOME CARE VISIT (OUTPATIENT)
Dept: HOME HEALTH SERVICES | Facility: HOME HEALTHCARE | Age: 57
End: 2022-07-28
Payer: MEDICARE

## 2022-07-28 ENCOUNTER — OFFICE VISIT (OUTPATIENT)
Dept: WOUND CARE | Facility: CLINIC | Age: 57
End: 2022-07-28
Payer: MEDICARE

## 2022-07-28 ENCOUNTER — TELEPHONE (OUTPATIENT)
Dept: LAB | Facility: HOSPITAL | Age: 57
End: 2022-07-28

## 2022-07-28 VITALS
SYSTOLIC BLOOD PRESSURE: 134 MMHG | HEART RATE: 96 BPM | RESPIRATION RATE: 22 BRPM | TEMPERATURE: 98 F | DIASTOLIC BLOOD PRESSURE: 78 MMHG

## 2022-07-28 DIAGNOSIS — L89.893 PRESSURE INJURY OF RIGHT LEG, STAGE 3 (HCC): ICD-10-CM

## 2022-07-28 DIAGNOSIS — E66.01 MORBID OBESITY WITH BMI OF 70 AND OVER, ADULT (HCC): ICD-10-CM

## 2022-07-28 DIAGNOSIS — L89.893 PRESSURE INJURY OF LEFT LEG, STAGE 3 (HCC): Primary | ICD-10-CM

## 2022-07-28 PROCEDURE — 97598 DBRDMT OPN WND ADDL 20CM/<: CPT | Performed by: FAMILY MEDICINE

## 2022-07-28 PROCEDURE — 11042 DBRDMT SUBQ TIS 1ST 20SQCM/<: CPT | Performed by: FAMILY MEDICINE

## 2022-07-28 PROCEDURE — 97597 DBRDMT OPN WND 1ST 20 CM/<: CPT | Performed by: FAMILY MEDICINE

## 2022-07-28 PROCEDURE — 97597 DBRDMT OPN WND 1ST 20 CM/<: CPT | Performed by: STUDENT IN AN ORGANIZED HEALTH CARE EDUCATION/TRAINING PROGRAM

## 2022-07-28 RX ORDER — LIDOCAINE 40 MG/G
CREAM TOPICAL ONCE
Status: COMPLETED | OUTPATIENT
Start: 2022-07-28 | End: 2022-07-28

## 2022-07-28 RX ADMIN — LIDOCAINE: 40 CREAM TOPICAL at 09:06

## 2022-07-28 NOTE — PROGRESS NOTES
Cast Application    Date/Time: 7/28/2022 9:07 AM  Performed by: Jazz Cano RN  Authorized by: Judith Brar MD   Universal Protocol:  Consent: Verbal consent obtained  Consent given by: patient  Patient understanding: patient states understanding of the procedure being performed  Patient identity confirmed: verbally with patient      Pre-procedure details:     Sensation:  Normal  Procedure details:     Laterality:  Left    Location:  Leg    Leg:  L lower legCast type:  Unna boot      Supplies used: 1 econo paste, 1 coban, 1 tubifast   Post-procedure details:     Pain:  Improved    Sensation:  Normal    Patient tolerance of procedure: Tolerated well, no immediate complications  Comments:      UNNA BOOT wrap procedure     Before application, RAY and/or TBI determined to be adequate for healing and application of compression  Lower extremity washed prior to application of compression wrap  With the foot in dorsiflexed position, Unna boot was applied as per physician orders without complications or complaint of pain  The procedure was tolerated well  Toes warm & pink post application  Patient provided education & reinforced to observe toes for any discoloration, swelling or tingling and instructed when to report to the [address] [facility],[address] or to remove compression  Wound care as per providers orders, patient tolerated well

## 2022-07-28 NOTE — PROGRESS NOTES
Debridement   Wound 02/17/22 Pressure Injury Leg Right;Lateral;Lower    Universal Protocol:  Procedure performed by: (Assisting provider Stephany Singh PA-C)  Consent: Verbal consent obtained  Consent given by: patient  Time out: Immediately prior to procedure a "time out" was called to verify the correct patient, procedure, equipment, support staff and site/side marked as required    Patient understanding: patient states understanding of the procedure being performed  Patient identity confirmed: verbally with patient      Performed by: PA  Debridement type: surgical  Level of debridement: subcutaneous tissue  Pain control: lidocaine 4%  Post-debridement measurements  Length (cm): 1  Width (cm): 0 9  Depth (cm): 0 3  Percent debrided: 100%  Surface Area (cm^2): 0 9  Area debrided (cm^2): 0 9  Volume (cm^3): 0 27  Tissue and other material debrided: dermis and subcutaneous tissue  Devitalized tissue debrided: slough  Instrument(s) utilized: curette  Bleeding: small  Hemostasis obtained with: pressure  Procedural pain (0-10): 0  Post-procedural pain: 0   Response to treatment: procedure was tolerated well

## 2022-07-28 NOTE — PROGRESS NOTES
Patient ID: Boni Jacobs is a 56 y o  male Date of Birth 1965       Chief Complaint   Patient presents with    Follow Up Wound Care Visit     Pressure injury of left leg, stage 3 (HCC)       Allergies:  Gluten meal - food allergy and Clindamycin    Diagnosis:   Diagnosis ICD-10-CM Associated Orders   1  Pressure injury of left leg, stage 3 (HCC)  L89 893 lidocaine (LMX) 4 % cream     Wound cleansing and dressings     Cast Application     Debridement   2  Pressure injury of right leg, stage 3 (HCC)  L89 893 lidocaine (LMX) 4 % cream     Wound cleansing and dressings     Debridement   3  Morbid obesity with BMI of 70 and over, adult ([facility] )  E66 01     Z68 45         Assessment  & Plan:    · F/u stage 3 pressure injury of L and R leg  · R wound has reduced in size  There is slough in wound bed with no signs of acute infection  · Surgical debridement  · Hydrofera blue  · Return to [facility] for compression  Change twice weekly  · F/u stage 3 pressure injury of L leg  There is significant amount of reduction in proximal wound  New epithelization is present  Biofilm is present on wound bed  · Selective debridement  · Hydrofera blue to wound bed  · Unna boot for compression  Change twice weekly  · Continue with course of Ciprofloxacin until finished  · Elevate legs when possible  Attempt to keep weight off of lateral portion of legs, may use pillows to float lower legs  · Instructed to monitor for any changes including redness or swelling surrounding the wound, increased drainage or pain as well as fevers or chills  · F/u in one week  Instructed to call if any questions or concerns arise in meantime  Subjective:   2/17/22: This is a 51-year-old male who comes in [address] [place] [address] with ulcers on the right and left calfs  He states that he was hospitalized on November of 2021 with cellulitis and he had the ulcers at that time    Patient is morbidly obese and has history of lymphedema as well   He does not use any form of compression other than Ace wraps  He had purchased alginate on the Internet as well as bordered foam is  He notes that there is heavy drainage requiring dressings to be changed once or twice a day  He believes that these ulcers had started due to  sleeping in a recliner and the foot rest causing pressure on the calfs  He is unable to sleep in a bed  He notes he has increased pain at nighttime when in there is recliner with pressure on his calf  He is ambulatory with a walker but does not go out of the house other than to doctor visits  Patient also has a history of type 2 diabetes with good control with last A1c 6 5 in October of 2021  He states that he has lost approximately 120 lb in the past nine months with a special meal plan  He plans on continuing with weight loss  2/24/22: Followup stage III pressure injuries of right and left calfs  They purchased memory foam pillows which seems to help with both pain and offloading the areas when he is sleeping  Still has pain mainly at night  Left greater than right  3/3/22: Followup stage III pressure injuries of the right left calfs  Patient is upset about visiting nurse not having proper supplies  Then there was leakage through the Clarimedix  Still has pain at nighttime  No fever chills  3/10/22: Followup stage III pressure injuries of the right and left calfs  Patient states that the visiting nurse had a "observer" do the Unna boot on his right leg which slid down  Still has pain mainly in the left calf  They are trying various offloading techniques  3/17/22: Followup stage III pressure injuries of the right and left calfs  Patient states he still has pain but slightly less particularly on the left  Starting to have some itching  No fever or chills  Still has not found the proper offloading device  3/24/22:   Followup stage III pressure injuries of both calfs continues to have some pain as in the past   No fever or chills  Trying to offload when in bed     3/31/22: Followup stage III pressure injuries of both lateral calf  Continues to have the same amount of pain  States he is trying offload  No fever chills  4/7/22:  Patient is a patient of Dr Samantha Little who presents for followup of bilateral LE ulcers  Has had antimicrobial endoform and hydrofera blue on the wounds and Unna boot for compression    4/14/22: Followup stage III pressure injuries of both lateral calfs  Unna boots  Has no pain on the left calf anymore  Much less on the right  No fever chills  4/21/22: Followup stage III pressure injuries of both lateral calfs  Tolerating Unna boots although they tend to slide down  No pain at this time  4/28/22: Followup stage III pressure injuries of both lateral calfs  No complaints at this time  No pain  5/5/22: Followup stage III pressure injuries of both lateral calf  No complaints  No pain  5/12/22: Followup stage III pressure injuries of both lateral calfs  At last visit, the left calf was closed  Hydrocolloid on the right calf was leaking  No other complaints  5/19/22: Followup stage III pressure injury of the right lateral calf  Left calf was closed at last visit  Received a telephone call from [facility] stating that the left calf has a new ulcer adjacent to the healed area  Tubigrip was use on the left because of previously healed ulcer  Bilateral lower extremity lymphedema  VNA placed new Unna boot on the left  No other complaints  5/26/22: Followup stage III pressure injuries of the right and left calfs  At last visit, the left calf reopened  No new complaints today  Tolerating Unna boot  06/02/22: Followup stage III pressure injuries of the R and L calfs  Polymem with Unna boot placed last visit  Denies fevers, chills, no new complaints  6/9/22: Followup stage III pressure injuries of the right and left calfs    This past week he has worn Unna boot on the left with alginate and circ aid on the right  No complaints  6/16/22: Followup stage III pressure injuries of the right left calfs  Doing well with circ aid on the right and Unna boot on the left  No significant pain  No other complaints  6/30/22: Followup stage III pressure injuries of the right and left calfs  Wearing Circ Aids on both legs  Seems to be doing well  No specific complaints  Denies pain  No fever or chills  07/14/22: Followup stage III pressure injuries of R and L calves  Has been wearing CircAids with Hydrofera to wound beds  Pt missed dressing change this past week d/t wife being in hospital  Hydrofera was left in place for about one week  No fevers, chills, pain      07/21/22: Followup stage 3 pressure injuries of R and L calves  Has been wearing CircAid with Hydrofera to R leg  Acticoat with alginate and unna boot was being used to L leg, however the Unna boot slid down therefore it was removed and hydrofera was being used on wound and CircAId used for compression  He notes there has been increased pain to the wound and that he noticed green/black drainage from the wound  Wife was helping him with dressing changes previously, however she is currently in hospital and therefore has not been able to help him with dressing changes past two weeks  Denies fevers or chills  07/28/22: Followup stage 3 pressure injuries of R and L calves  Has been using Hydrofera blue to wound beds and using CircAids for compression  Pt was placed on Ciprofloxacin at previous visit; culture grew Pseudomonas susceptible to Cipro, therefore pt was continued on the antibiotic and still has about three days left of the course  No fevers or chills  Offers no complaints today           The following portions of the patient's history were reviewed and updated as appropriate:   Patient Active Problem List   Diagnosis    Type 2 diabetes mellitus with hyperglycemia, with long-term current use of insulin (HCC)    Hyperlipidemia    Psoriasis    Venous insufficiency    Gout    Essential hypertension    Gluten intolerance    Diabetic neuropathy (HCC)    Insomnia    Erectile dysfunction    Bilateral leg edema    Elevated CO2 level    Abnormal thyroid function test    DAHIANA (obstructive sleep apnea)    Morbid obesity with BMI of 70 and over, adult ([facility] )    Migraine headache    Onychomycosis    Ulcer of left lower extremity, limited to breakdown of skin ([facility] )    Pressure injury of right leg, stage 3 (HCC)    Metabolic syndrome    Low testosterone in male    Hypercalcemia    Hypothyroidism     Past Medical History:   Diagnosis Date    Acute kidney injury 10/28/2021    Anemia 09/13/2019    Cellulitis     last assessed 12/10/15    Cellulitis of left lower extremity     Cellulitis of right lower extremity 11/19/2021    Cough 10/20/2019    Diabetes mellitus ([facility] )     Disease of thyroid gland     Edema     Elevated liver enzymes     Elevated serum creatinine 11/19/2021    Esophageal reflux     Fungal infection 8/16/2021    Gluten intolerance     Gout     last assessed 09/05/13    Hyperglycemia     Hypertension     Hyponatremia 9/6/2019    IBS (irritable bowel syndrome)     Insomnia     Obesity     Osteoarthritis of knee     last assessed 02/10/14    Scrotal swelling 5/19/2020    Shortness of breath 5/3/2021    Venous insufficiency     last assessed 08/22/17    Villonodular synovitis of the hand, right     last assessed 11/14/2013     Past Surgical History:   Procedure Laterality Date    INCISION AND DRAINAGE OF WOUND Left 9/6/2019    Procedure: INCISION AND DRAINAGE (I&D) GROIN;  Surgeon: Yandel Stewart DO;  [facility];  Service: General    SKIN BIOPSY      WOUND DEBRIDEMENT Left 9/7/2019    Procedure: EXCISIONAL DEBRIDEMENT;  Surgeon: Yandel Stewart DO;  [facility];  Service: General     Family History   Problem Relation Age of Onset    Cancer Mother         gastrc    Colon cancer Father     Heart failure Father      Social History     Socioeconomic History    Marital status: /Civil Union     Spouse name: Not on file    Number of children: Not on file    Years of education: Not on file    Highest education level: Not on file   Occupational History    Not on file   Tobacco Use    Smoking status: Never Smoker    Smokeless tobacco: Never Used   Vaping Use    Vaping Use: Never used   Substance and Sexual Activity    Alcohol use: Not Currently     Alcohol/week: 0 0 standard drinks    Drug use: No    Sexual activity: Yes   Other Topics Concern    Not on file   Social History Narrative    Not on file     Social Determinants of Health     Financial Resource Strain: Not on file   Food Insecurity: Not on file   Transportation Needs: No Transportation Needs    Lack of Transportation (Medical): No    Lack of Transportation (Non-Medical):  No   Physical Activity: Not on file   Stress: Not on file   Social Connections: Not on file   Intimate Partner Violence: Not on file   Housing Stability: Not on file       Current Outpatient Medications:     acetaminophen (TYLENOL) 325 mg tablet, Take 2 tablets (650 mg total) by mouth every 4 (four) hours as needed for mild pain, headaches or fever, Disp: , Rfl: 0    albuterol (PROVENTIL HFA,VENTOLIN HFA) 90 mcg/act inhaler, TAKE 2 PUFFS BY MOUTH EVERY 6 HOURS AS NEEDED FOR WHEEZE, Disp: 8 5 g, Rfl: 0    BD Pen Needle Ludmila 2nd Gen 32G X 4 MM MISC, USE 4 TIMES A DAY, Disp: 200 each, Rfl: 3    Blood Glucose Monitoring Suppl (ONETOUCH VERIO) w/Device KIT, Use to test sugar daily (Patient not taking: No sig reported), Disp: 1 kit, Rfl: 0    Calcium Alginate (Maxorb II Alginate Dressing) MISC, Apply 1 each topically in the morning, Disp: 10 each, Rfl: 2    ciprofloxacin (CIPRO) 500 mg tablet, Take 1 tablet (500 mg total) by mouth every 12 (twelve) hours for 10 days, Disp: 20 tablet, Rfl: 0    Continuous Blood Gluc  (FreeStyle Connell 2 [place] Systm) BROOKS, Use 1 Device as needed (use with sensors for bs checks), Disp: 1 Device, Rfl: 0    Continuous Blood Gluc Sensor (FreeStyle Elvin 14 Day Sensor) MISC, Use as directed 3 times a day, Disp: 2 each, Rfl: 3    Control Gel Formula Dressing (DuoDERM CGF Dressing) [facility], Apply 1 application topically every 5 (five) days, Disp: 5 each, Rfl: 1    [employer] Diclofenac Sodium 1 %, APPLY 4 G TOPICALLY 4 (FOUR) TIMES A DAY, Disp: 100 g, Rfl: 0    ezetimibe (ZETIA) 10 mg tablet, Take 1 tablet (10 mg total) by mouth daily, Disp: 90 tablet, Rfl: 1    furosemide (LASIX) 20 mg tablet, TAKE 2 TABLETS EVERY DAY IN THE EARLY MORNING AND 1 TABLET DAILY AFTER LUNCH , Disp: 270 tablet, Rfl: 2    gabapentin (NEURONTIN) 100 mg capsule, TAKE 1 CAPSULE BY MOUTH TWICE A DAY WITH 300MG CAPSULE, Disp: 180 capsule, Rfl: 2    gabapentin (NEURONTIN) 300 mg capsule, TAKE 1 CAPSULE (300 MG TOTAL) BY MOUTH 2 (TWO) TIMES A DAY TAKE 1 TAB WITH YOUR 100MG TAB TWICE DAILY, Disp: 180 capsule, Rfl: 2    Gauze Pads & Dressings 5"X5" PADS, Use in the morning, Disp: 20 each, Rfl: 2    insulin aspart (NovoLOG FlexPen) 100 UNIT/ML injection pen, INJECT 18 UNITS WITH MEALS+SCALE ( - 150-200 -2 UNITS, 201-250-4 UNITS, 251-300 -6 UNITS, 301-350-8 UNITS, > 350- 10 UNITS ), Disp: 45 mL, Rfl: 1    insulin glargine (Lantus SoloStar) 100 units/mL injection pen, Inject 44 Units under the skin daily at bedtime, Disp: 18 mL, Rfl: 2    Lancets (ONETOUCH ULTRASOFT) lancets, Use to test sugar 2 times a day, Disp: 100 each, Rfl: 3    levothyroxine (Euthyrox) 25 mcg tablet, Take 1 tablet (25 mcg total) by mouth daily in the early morning, Disp: 90 tablet, Rfl: 1    losartan (COZAAR) 25 mg tablet, TAKE 1 TABLET BY MOUTH EVERY DAY, Disp: 90 tablet, Rfl: 2    metFORMIN (GLUCOPHAGE) 1000 MG tablet, Take 1 tablet (1,000 mg total) by mouth 2 (two) times a day, Disp: 180 tablet, Rfl: 0    nystatin (MYCOSTATIN) cream, Apply topically 2 (two) times a day as needed (itching, redness), Disp: 30 g, Rfl: 1    nystatin (MYCOSTATIN) powder, Apply 1 application topically 2 (two) times a day To affected area, Disp: 120 g, Rfl: 3    omeprazole (PriLOSEC) 40 MG capsule, TAKE 1 CAPSULE BY MOUTH TWICE A DAY, Disp: 180 capsule, Rfl: 1    OneTouch Verio test strip, USE TO TEST SUGAR 2 TIMES A DAY, Disp: 100 strip, Rfl: 3    oxyCODONE (ROXICODONE) 5 immediate release tablet, Take 1-2 tabs by mouth every 6 hours as needed for moderate or severe pains respectively  , Disp: 15 tablet, Rfl: 0    psyllium (METAMUCIL) packet, Take 1 packet by mouth daily, Disp: , Rfl: 0    saccharomyces boulardii (FLORASTOR) 250 mg capsule, Take 1 capsule (250 mg total) by mouth 2 (two) times a day, Disp: 60 capsule, Rfl: 1    Skin Protectants, Misc  ([employer] Textile 10"x144") SHEE, Apply 1 each topically every 5 (five) days, Disp: 3 each, Rfl: 5    sodium hypochlorite (DAKIN'S QUARTER-STRENGTH), Irrigate with 1 application as directed daily, Disp: 473 mL, Rfl: 0  No current facility-administered medications for this visit  Review of Systems   Constitutional: Negative for appetite change, chills, fatigue, fever and unexpected weight change  HENT: Negative for congestion, hearing loss, postnasal drip and sinus pressure  Respiratory: Positive for shortness of breath (On exertion)  Negative for cough  Cardiovascular: Positive for leg swelling (Lymphedema)  Endocrine: Negative  Musculoskeletal: Positive for gait problem Kristy )  Negative for back pain  Skin: Positive for wound (Bilateral calfs)  Negative for rash  Allergic/Immunologic: Negative  Hematological: Does not bruise/bleed easily  Psychiatric/Behavioral: Negative  Negative for dysphoric mood  The patient is not nervous/anxious            Objective:  /78   Pulse 96   Temp 98 °F (36 7 °C)   Resp 22   Pain Score: 0-No pain     Physical Exam  Vitals and nursing note reviewed  Constitutional:       General: He is not in acute distress  Appearance: Normal appearance  He is well-developed  He is morbidly obese  He is not ill-appearing  HENT:      Head: Normocephalic and atraumatic  Cardiovascular:      Rate and Rhythm: Normal rate  Pulmonary:      Effort: Pulmonary effort is normal  No respiratory distress  Musculoskeletal:      Right lower leg: Edema present  Left lower leg: Edema present  Skin:     General: Skin is warm and dry  Findings: Wound present  No erythema  Comments: Left calf pressure injury is measuring the same, however the proximal wound is much smaller in comparison to last visit  There is significant new epithelization present  The remaining open wound has biofilm formation  No surrounding erythema or edema  No lymphangitic streaking  The R lateral calf wound has decreased in size from previous visit  There is slough present in wound bed  No surrounding erythema, edema or lymphangitic streaking  Neurological:      Mental Status: He is alert and oriented to person, place, and time  Gait: Gait abnormal    Psychiatric:         Attention and Perception: Attention normal          Mood and Affect: Mood and affect normal          Behavior: Behavior is cooperative           Cognition and Memory: Cognition normal            Wound 02/17/22 Pressure Injury Leg Right;Lateral;Lower (Active)   Wound Image       07/28/22 0906   Wound Description Pink;Slough 07/28/22 0849   Pressure Injury Stage 3 03/03/22 1555   Chelsie-wound Assessment Edema;Dry;Scaly;Scar Tissue 07/28/22 0849   Wound Length (cm) 1 cm 07/28/22 0849   Wound Width (cm) 0 9 cm 07/28/22 0849   Wound Depth (cm) 0 2 cm 07/28/22 0849   Wound Surface Area (cm^2) 0 9 cm^2 07/28/22 0849   Wound Volume (cm^3) 0 18 cm^3 07/28/22 0849   Calculated Wound Volume (cm^3) 0 18 cm^3 07/28/22 0849   Change in Wound Size % 81 25 07/28/22 0849   Drainage Amount Moderate 07/28/22 0849 Drainage Description Serosanguineous 07/28/22 0849   Non-staged Wound Description Full thickness 07/14/22 0948   Treatments Cleansed 07/14/22 0948   Dressing Changed Changed 07/14/22 0948   Patient Tolerance Tolerated well 07/28/22 0849   Dressing Status Removed 07/28/22 0849       Wound 05/19/22 Venous Ulcer Pretibial Left;Lateral (Active)   Wound Image   07/28/22 0847   Wound Description Epithelialization;Granulation tissue;Pink;Beefy red;Slough 07/28/22 0850   Chelsie-wound Assessment Edema;Scar Tissue; Maceration;Dry;Scaly; Hyperpigmented; Excoriated 07/28/22 0850   Wound Length (cm) 9 3 cm 07/28/22 0850   Wound Width (cm) 6 7 cm 07/28/22 0850   Wound Depth (cm) 0 2 cm 07/28/22 0850   Wound Surface Area (cm^2) 62 31 cm^2 07/28/22 0850   Wound Volume (cm^3) 12 462 cm^3 07/28/22 0850   Calculated Wound Volume (cm^3) 12 46 cm^3 07/28/22 0850   Change in Wound Size % -[id number] 57 07/28/22 0850   Drainage Amount Moderate 07/28/22 0850   Drainage Description Serosanguineous 07/28/22 0850   Non-staged Wound Description Full thickness 07/14/22 0950   Treatments Irrigation with NSS 07/14/22 0950   Dressing Changed Changed 07/14/22 0950   Patient Tolerance Tolerated well 07/28/22 0850   Dressing Status Removed 07/28/22 0850             Debridement   Wound 05/19/22 Pressure Injury Leg Left;Lateral    Universal Protocol:  Procedure performed by: (Assisting provider Earl De Leon PA-C)  Consent: Verbal consent obtained  Consent given by: patient  Time out: Immediately prior to procedure a "time out" was called to verify the correct patient, procedure, equipment, support staff and site/side marked as required    Patient understanding: patient states understanding of the procedure being performed  Patient identity confirmed: verbally with patient      Performed by: PA  Debridement type: selective  Pain control: lidocaine 4%  Post-debridement measurements  Length (cm): 9 3  Width (cm): 6 7  Depth (cm): 0 2  Percent debrided: 70%  Surface Area (cm^2): 62 31  Area debrided (cm^2): 43 62  Volume (cm^3): 12 46  Devitalized tissue debrided: biofilm  Instrument(s) utilized: curette  Bleeding: small  Hemostasis obtained with: pressure  Procedural pain (0-10): 0  Post-procedural pain: 0   Response to treatment: procedure was tolerated well           Results from last 6 Months   Lab Units 07/21/22  1104   WOUND CULTURE  2+ Growth of Pseudomonas aeruginosa*  2+ Growth of Proteus mirabilis*  2+ Growth of            Wound Instructions:  Orders Placed This Encounter   Procedures    Wound cleansing and dressings     Wash your hands with soap and water  Remove old dressing, discard into plastic bag and place in trash  Cleanse the wound with mild soap and water prior to applying a clean dressing  PLEASE WASH LEGS WITH SOAP AND WATER PRIOR TO DRESSING CHANGES   Do not use tissue or cotton balls  Do not scrub the wound  Pat dry using gauze  Shower yes with protection, purchase cast cover  Or sponge bathe       RIGHT and Left leg wounds; Apply Lac Hydrin to B/L lower legs  Apply skin prep to the periwound  Cover the wound with hydrofera blue (writing side of the dressing facing out)   Apply unna boot to both legs wound  Change dressings 2 times per week      Roll towels or blankets and place under ankles for offloading wounds/heals     Continue visiting nurses skilled on Mondays for wound care dressing changes   Follow up in wound center in one week     Treatment today in wound center:  Wounds cleansed with normal saline, dakin's soak for 5 minutes and redressed as above       Standing Status:   Future     Standing Expiration Date:   9/27/8640    Cast Application     This order was created via procedure documentation    Debridement     This order was created via procedure documentation    Debridement     This order was created via procedure documentation       MD Shantelle Holland I, Ena Perez PA-C, have acted as a scribe with the above documentation    -Melisa Melgoza MD, CWS have seen and evaluated the above patient and have reviewed and agree with the above documentation  Portions of the record may have been created with voice recognition software  Occasional wrong word or "sound alike" substitutions may have occurred due to the inherent limitations of voice recognition software  Read the chart carefully and recognize, using context, where substitutions have occurred

## 2022-07-28 NOTE — PATIENT INSTRUCTIONS
Orders Placed This Encounter   Procedures    Wound cleansing and dressings     Wash your hands with soap and water  Remove old dressing, discard into plastic bag and place in trash  Cleanse the wound with mild soap and water prior to applying a clean dressing  PLEASE WASH LEGS WITH SOAP AND WATER PRIOR TO DRESSING CHANGES   Do not use tissue or cotton balls  Do not scrub the wound  Pat dry using gauze  Shower yes with protection, purchase cast cover  Or sponge bathe  RIGHT and Left leg wounds; Apply Lac Hydrin to B/L lower legs  Apply skin prep to the periwound  Cover the wound with hydrofera blue (writing side of the dressing facing out)   Apply unna boot to both legs wound  Change dressings 2 times per week  Roll towels or blankets and place under ankles for offloading wounds/heals     Continue visiting nurses skilled on Mondays for wound care dressing changes   Follow up in wound center in one week     Treatment today in wound center:  Wounds cleansed with normal saline, dakin's soak for 5 minutes and redressed as above       Standing Status:   Future     Standing Expiration Date:   7/28/2023

## 2022-07-29 ENCOUNTER — APPOINTMENT (OUTPATIENT)
Dept: LAB | Facility: HOSPITAL | Age: 57
End: 2022-07-29
Payer: MEDICARE

## 2022-07-29 DIAGNOSIS — E78.00 PURE HYPERCHOLESTEROLEMIA: ICD-10-CM

## 2022-07-29 DIAGNOSIS — E83.52 HYPERCALCEMIA: ICD-10-CM

## 2022-07-29 LAB
25(OH)D3 SERPL-MCNC: 56.6 NG/ML (ref 30–100)
CA-I BLD-SCNC: 1.24 MMOL/L (ref 1.12–1.32)
CHOLEST SERPL-MCNC: 155 MG/DL
HDLC SERPL-MCNC: 42 MG/DL
LDLC SERPL CALC-MCNC: 77 MG/DL (ref 0–100)
PHOSPHATE SERPL-MCNC: 3.3 MG/DL (ref 2.7–4.5)
PTH-INTACT SERPL-MCNC: 44.4 PG/ML (ref 18.4–80.1)
TRIGL SERPL-MCNC: 181 MG/DL

## 2022-07-29 PROCEDURE — 80061 LIPID PANEL: CPT

## 2022-07-29 PROCEDURE — 82330 ASSAY OF CALCIUM: CPT

## 2022-07-29 PROCEDURE — 36415 COLL VENOUS BLD VENIPUNCTURE: CPT

## 2022-07-29 PROCEDURE — 84100 ASSAY OF PHOSPHORUS: CPT

## 2022-07-29 PROCEDURE — 83970 ASSAY OF PARATHORMONE: CPT

## 2022-07-29 PROCEDURE — 82306 VITAMIN D 25 HYDROXY: CPT

## 2022-08-01 ENCOUNTER — HOME CARE VISIT (OUTPATIENT)
Dept: HOME HEALTH SERVICES | Facility: HOME HEALTHCARE | Age: 57
End: 2022-08-01
Payer: MEDICARE

## 2022-08-01 ENCOUNTER — TELEPHONE (OUTPATIENT)
Dept: INTERNAL MEDICINE CLINIC | Facility: CLINIC | Age: 57
End: 2022-08-01

## 2022-08-01 VITALS
SYSTOLIC BLOOD PRESSURE: 138 MMHG | RESPIRATION RATE: 18 BRPM | HEART RATE: 76 BPM | OXYGEN SATURATION: 98 % | DIASTOLIC BLOOD PRESSURE: 78 MMHG | TEMPERATURE: 96.3 F

## 2022-08-01 PROCEDURE — G0299 HHS/HOSPICE OF RN EA 15 MIN: HCPCS

## 2022-08-02 PROCEDURE — 10330064

## 2022-08-02 NOTE — CASE COMMUNICATION
Ship to [facility]  Home   Insurance [employer]     Wound 1 Right calf      Full x        Wound 2 Left calf      Full X         All items are ordered by the each unless otherwise noted    PLEASE DO NOT ORDER BY THE BOX  Request specific quantity needed  For Private Insurances order should be for a 2 week period    Cleansers  Saline [place]  NOT STOCKED  [id number]    1    Miscellaneous Wound Products  C[place]an 4 in  [phone]

## 2022-08-02 NOTE — HOME HEALTH
TC to [facility] center to report that [facility] [place] boots are not staying in place on pts legs, left VM

## 2022-08-04 ENCOUNTER — HOME CARE VISIT (OUTPATIENT)
Dept: HOME HEALTH SERVICES | Facility: HOME HEALTHCARE | Age: 57
End: 2022-08-04
Payer: MEDICARE

## 2022-08-04 ENCOUNTER — OFFICE VISIT (OUTPATIENT)
Dept: WOUND CARE | Facility: CLINIC | Age: 57
End: 2022-08-04
Payer: MEDICARE

## 2022-08-04 VITALS
TEMPERATURE: 98.3 F | DIASTOLIC BLOOD PRESSURE: 78 MMHG | RESPIRATION RATE: 22 BRPM | SYSTOLIC BLOOD PRESSURE: 122 MMHG | HEART RATE: 88 BPM

## 2022-08-04 DIAGNOSIS — L89.893 PRESSURE INJURY OF LEFT LEG, STAGE 3 (HCC): ICD-10-CM

## 2022-08-04 DIAGNOSIS — E66.01 MORBID OBESITY WITH BMI OF 70 AND OVER, ADULT (HCC): ICD-10-CM

## 2022-08-04 DIAGNOSIS — L89.893 PRESSURE INJURY OF RIGHT LEG, STAGE 3 (HCC): Primary | ICD-10-CM

## 2022-08-04 PROCEDURE — 97598 DBRDMT OPN WND ADDL 20CM/<: CPT | Performed by: FAMILY MEDICINE

## 2022-08-04 PROCEDURE — 97597 DBRDMT OPN WND 1ST 20 CM/<: CPT | Performed by: FAMILY MEDICINE

## 2022-08-04 PROCEDURE — 97597 DBRDMT OPN WND 1ST 20 CM/<: CPT | Performed by: STUDENT IN AN ORGANIZED HEALTH CARE EDUCATION/TRAINING PROGRAM

## 2022-08-04 PROCEDURE — 97598 DBRDMT OPN WND ADDL 20CM/<: CPT | Performed by: STUDENT IN AN ORGANIZED HEALTH CARE EDUCATION/TRAINING PROGRAM

## 2022-08-04 RX ORDER — LIDOCAINE 40 MG/G
CREAM TOPICAL ONCE
Status: COMPLETED | OUTPATIENT
Start: 2022-08-04 | End: 2022-08-04

## 2022-08-04 RX ADMIN — LIDOCAINE: 40 CREAM TOPICAL at 14:23

## 2022-08-04 NOTE — PROGRESS NOTES
Patient ID: Maria D Garcia is a 56 y o  male Date of Birth 1965       Chief Complaint   Patient presents with    Follow Up Wound Care Visit     Pressure injury of left leg, stage 3        Allergies:  Gluten meal - food allergy and Clindamycin    Diagnosis:   Diagnosis ICD-10-CM Associated Orders   1  Pressure injury of right leg, stage 3 ([facility])  L89 893 lidocaine (LMX) 4 % cream     Wound cleansing and dressings     Debridement   2  Pressure injury of left leg, stage 3 ([facility])  L89 893 lidocaine (LMX) 4 % cream     Wound cleansing and dressings     Debridement   3  Morbid obesity with BMI of 70 and over, adult ([facility] )  E66 01     Z68 45         Assessment  & Plan:     F/u bilateral pressure injuries of R and L calf   L calf wound is about the same in size  Wound bed is fibrogranular  There is erythema surrounding the wound with itching- may have fungal component   o Selective debridement performed  o Dakin soak today d/t small amount of green drainage remaining  Course of antibiotics has been completed  o Nystatin with barrier cream to inferior aspect of wound where itching is present  o Continue with Hydrofera blue and Unna boot for compression  Change twice weekly  o Recommended Prevalon boot with integrated wedge to attempt to offload the calf by keeping the foot for from externally rotating   R calf pressure injury  Slight reduction in size  Fibrogranular base with small amount of slough  o Selective debridement performed  o Hydrofera blue to wound bed  Unna boot for compression  Change twice weekly  o Continue to offload with boot   F/u in one week  Instructed to call if any questions or concerns arise in meantime  Subjective:   2/17/22: This is a 68-year-old male who comes in [address] [place] [address] with ulcers on the right and left calfs  He states that he was hospitalized on November of 2021 with cellulitis and he had the ulcers at that time    Patient is morbidly obese and has history of lymphedema as well  He does not use any form of compression other than Ace wraps  He had purchased alginate on the Internet as well as bordered foam is  He notes that there is heavy drainage requiring dressings to be changed once or twice a day  He believes that these ulcers had started due to  sleeping in a recliner and the foot rest causing pressure on the calfs  He is unable to sleep in a bed  He notes he has increased pain at nighttime when in there is recliner with pressure on his calf  He is ambulatory with a walker but does not go out of the house other than to doctor visits  Patient also has a history of type 2 diabetes with good control with last A1c 6 5 in October of 2021  He states that he has lost approximately 120 lb in the past nine months with a special meal plan  He plans on continuing with weight loss  2/24/22: Followup stage III pressure injuries of right and left calfs  They purchased memory foam pillows which seems to help with both pain and offloading the areas when he is sleeping  Still has pain mainly at night  Left greater than right  3/3/22: Followup stage III pressure injuries of the right left calfs  Patient is upset about visiting nurse not having proper supplies  Then there was leakage through the [employer]  Still has pain at nighttime  No fever chills  3/10/22: Followup stage III pressure injuries of the right and left calfs  Patient states that the visiting nurse had a "observer" do the Unna boot on his right leg which slid down  Still has pain mainly in the left calf  They are trying various offloading techniques  3/17/22: Followup stage III pressure injuries of the right and left calfs  Patient states he still has pain but slightly less particularly on the left  Starting to have some itching  No fever or chills  Still has not found the proper offloading device  3/24/22:   Followup stage III pressure injuries of both calfs continues to have some pain as in the past   No fever or chills  Trying to offload when in bed     3/31/22: Followup stage III pressure injuries of both lateral calf  Continues to have the same amount of pain  States he is trying offload  No fever chills  4/7/22:  Patient is a patient of Dr Alexus Mohan who presents for followup of bilateral LE ulcers  Has had antimicrobial endoform and hydrofera blue on the wounds and Unna boot for compression    4/14/22: Followup stage III pressure injuries of both lateral calfs  Unna boots  Has no pain on the left calf anymore  Much less on the right  No fever chills  4/21/22: Followup stage III pressure injuries of both lateral calfs  Tolerating Unna boots although they tend to slide down  No pain at this time  4/28/22: Followup stage III pressure injuries of both lateral calfs  No complaints at this time  No pain  5/5/22: Followup stage III pressure injuries of both lateral calf  No complaints  No pain  5/12/22: Followup stage III pressure injuries of both lateral calfs  At last visit, the left calf was closed  Hydrocolloid on the right calf was leaking  No other complaints  5/19/22: Followup stage III pressure injury of the right lateral calf  Left calf was closed at last visit  Received a telephone call from [facility] stating that the left calf has a new ulcer adjacent to the healed area  Tubigrip was use on the left because of previously healed ulcer  Bilateral lower extremity lymphedema  VNA placed new Unna boot on the left  No other complaints  5/26/22: Followup stage III pressure injuries of the right and left calfs  At last visit, the left calf reopened  No new complaints today  Tolerating Unna boot  06/02/22: Followup stage III pressure injuries of the R and L calfs  Polymem with Unna boot placed last visit  Denies fevers, chills, no new complaints  6/9/22: Followup stage III pressure injuries of the right and left calfs    This past week he has worn Unna boot on the left with alginate and circ aid on the right  No complaints  6/16/22: Followup stage III pressure injuries of the right left calfs  Doing well with circ aid on the right and Unna boot on the left  No significant pain  No other complaints  6/30/22: Followup stage III pressure injuries of the right and left calfs  Wearing Circ Aids on both legs  Seems to be doing well  No specific complaints  Denies pain  No fever or chills  07/14/22: Followup stage III pressure injuries of R and L calves  Has been wearing CircAids with Hydrofera to wound beds  Pt missed dressing change this past week d/t wife being in hospital  Hydrofera was left in place for about one week  No fevers, chills, pain      07/21/22: Followup stage 3 pressure injuries of R and L calves  Has been wearing CircAid with Hydrofera to R leg  Acticoat with alginate and unna boot was being used to L leg, however the Unna boot slid down therefore it was removed and hydrofera was being used on wound and CircAId used for compression  He notes there has been increased pain to the wound and that he noticed green/black drainage from the wound  Wife was helping him with dressing changes previously, however she is currently in hospital and therefore has not been able to help him with dressing changes past two weeks  Denies fevers or chills  07/28/22: Followup stage 3 pressure injuries of R and L calves  Has been using Hydrofera blue to wound beds and using CircAids for compression  Pt was placed on Ciprofloxacin at previous visit; culture grew Pseudomonas susceptible to Cipro, therefore pt was continued on the antibiotic and still has about three days left of the course  No fevers or chills  Offers no complaints today  08/04/22: Followup stage 3 pressure injuries of R and L calves  Has been using Hydrofera blue to bilateral wounds as well as bilateral Unna boots for compression   He has completed his course of Ciprofloxacin; notes diarrhea towards the end of the course but it is improving  Notes his L leg wound is beginning to itch  Sleeps in recliner and has been attempting to keep pressure off of his legs with boots which he states are helping  Has been eating yogurt in diet  No fevers or chills           The following portions of the patient's history were reviewed and updated as appropriate:   Patient Active Problem List   Diagnosis    Type 2 diabetes mellitus with hyperglycemia, with long-term current use of insulin ([facility] )    Hyperlipidemia    Psoriasis    Venous insufficiency    Gout    Essential hypertension    Gluten intolerance    Diabetic neuropathy ([address] )    Insomnia    Erectile dysfunction    Bilateral leg edema    Elevated CO2 level    Abnormal thyroid function test    DAHIANA (obstructive sleep apnea)    Morbid obesity with BMI of 70 and over, adult ([facility] )    Migraine headache    Onychomycosis    Ulcer of left lower extremity, limited to breakdown of skin ([address] )    Pressure injury of right leg, stage 3 (HCC)    Metabolic syndrome    Low testosterone in male    Hypercalcemia    Hypothyroidism     Past Medical History:   Diagnosis Date    Acute kidney injury 10/28/2021    Anemia 09/13/2019    Cellulitis     last assessed 12/10/15    Cellulitis of left lower extremity     Cellulitis of right lower extremity 11/19/2021    Cough 10/20/2019    Diabetes mellitus ([address] )     Disease of thyroid gland     Edema     Elevated liver enzymes     Elevated serum creatinine 11/19/2021    Esophageal reflux     Fungal infection 8/16/2021    Gluten intolerance     Gout     last assessed 09/05/13    Hyperglycemia     Hypertension     Hyponatremia 9/6/2019    IBS (irritable bowel syndrome)     Insomnia     Obesity     Osteoarthritis of knee     last assessed 02/10/14    Scrotal swelling 5/19/2020    Shortness of breath 5/3/2021    Venous insufficiency     last assessed 08/22/17    Villonodular synovitis of the hand, right     last assessed 11/14/2013     Past Surgical History:   Procedure Laterality Date    INCISION AND DRAINAGE OF WOUND Left 9/6/2019    Procedure: INCISION AND DRAINAGE (I&D) GROIN;  Surgeon: Artis Pickett DO;  [facility];  Service: General    SKIN BIOPSY      WOUND DEBRIDEMENT Left 9/7/2019    Procedure: EXCISIONAL DEBRIDEMENT;  Surgeon: Artis Pickett DO;  [facility];  Service: General     Family History   Problem Relation Age of Onset    Cancer Mother         gastrc    Colon cancer Father     Heart failure Father      Social History     Socioeconomic History    Marital status: /Civil Union     Spouse name: Not on file    Number of children: Not on file    Years of education: Not on file    Highest education level: Not on file   Occupational History    Not on file   Tobacco Use    Smoking status: Never Smoker    Smokeless tobacco: Never Used   Vaping Use    Vaping Use: Never used   Substance and Sexual Activity    Alcohol use: Not Currently     Alcohol/week: 0 0 standard drinks    Drug use: No    Sexual activity: Yes   Other Topics Concern    Not on file   Social History Narrative    Not on file     Social Determinants of Health     Financial Resource Strain: Not on file   Food Insecurity: Not on file   Transportation Needs: No Transportation Needs    Lack of Transportation (Medical): No    Lack of Transportation (Non-Medical):  No   Physical Activity: Not on file   Stress: Not on file   Social Connections: Not on file   Intimate Partner Violence: Not on file   Housing Stability: Not on file       Current Outpatient Medications:     acetaminophen (TYLENOL) 325 mg tablet, Take 2 tablets (650 mg total) by mouth every 4 (four) hours as needed for mild pain, headaches or fever, Disp: , Rfl: 0    albuterol (PROVENTIL HFA,VENTOLIN HFA) 90 mcg/act inhaler, TAKE 2 PUFFS BY MOUTH EVERY 6 HOURS AS NEEDED FOR WHEEZE, Disp: 8 5 g, Rfl: 0    BD Pen Needle Ludmila 2nd Gen 32G X 4 MM MISC, USE 4 TIMES A DAY, Disp: 200 each, Rfl: 3    Blood Glucose Monitoring Suppl (ONETOUCH VERIO) w/Device KIT, Use to test sugar daily (Patient not taking: No sig reported), Disp: 1 kit, Rfl: 0    Calcium Alginate (Maxorb II Alginate Dressing) MISC, Apply 1 each topically in the morning, Disp: 10 each, Rfl: 2    Continuous Blood Gluc  (FreeStyle Connell 2 Gonzales Systm) BROOKS, Use 1 Device as needed (use with sensors for bs checks), Disp: 1 Device, Rfl: 0    Continuous Blood Gluc Sensor (FreeStyle Elvin 14 Day Sensor) MISC, Use as directed 3 times a day, Disp: 2 each, Rfl: 3    Control Gel Formula Dressing (DuoDERM CGF Dressing) MIS, Apply 1 application topically every 5 (five) days, Disp: 5 each, Rfl: 1    [employer] Diclofenac Sodium 1 %, APPLY 4 G TOPICALLY 4 (FOUR) TIMES A DAY, Disp: 100 g, Rfl: 0    ezetimibe (ZETIA) 10 mg tablet, Take 1 tablet (10 mg total) by mouth daily, Disp: 90 tablet, Rfl: 1    furosemide (LASIX) 20 mg tablet, TAKE 2 TABLETS EVERY DAY IN THE EARLY MORNING AND 1 TABLET DAILY AFTER LUNCH , Disp: 270 tablet, Rfl: 2    gabapentin (NEURONTIN) 100 mg capsule, TAKE 1 CAPSULE BY MOUTH TWICE A DAY WITH 300MG CAPSULE, Disp: 180 capsule, Rfl: 2    gabapentin (NEURONTIN) 300 mg capsule, TAKE 1 CAPSULE (300 MG TOTAL) BY MOUTH 2 (TWO) TIMES A DAY TAKE 1 TAB WITH YOUR 100MG TAB TWICE DAILY, Disp: 180 capsule, Rfl: 2    Gauze Pads & Dressings 5"X5" PADS, Use in the morning, Disp: 20 each, Rfl: 2    insulin aspart (NovoLOG FlexPen) 100 UNIT/ML injection pen, INJECT 18 UNITS WITH MEALS+SCALE ( - 150-200 -2 UNITS, 201-250-4 UNITS, 251-300 -6 UNITS, 301-350-8 UNITS, > 350- 10 UNITS ), Disp: 45 mL, Rfl: 1    insulin glargine (Lantus SoloStar) 100 units/mL injection pen, Inject 44 Units under the skin daily at bedtime, Disp: 18 mL, Rfl: 2    Lancets (ONETOUCH ULTRASOFT) lancets, Use to test sugar 2 times a day, Disp: 100 each, Rfl: 3   levothyroxine (Euthyrox) 25 mcg tablet, Take 1 tablet (25 mcg total) by mouth daily in the early morning, Disp: 90 tablet, Rfl: 1    losartan (COZAAR) 25 mg tablet, TAKE 1 TABLET BY MOUTH EVERY DAY, Disp: 90 tablet, Rfl: 2    metFORMIN (GLUCOPHAGE) 1000 MG tablet, Take 1 tablet (1,000 mg total) by mouth 2 (two) times a day, Disp: 180 tablet, Rfl: 0    nystatin (MYCOSTATIN) cream, Apply topically 2 (two) times a day as needed (itching, redness), Disp: 30 g, Rfl: 1    nystatin (MYCOSTATIN) powder, Apply 1 application topically 2 (two) times a day To affected area, Disp: 120 g, Rfl: 3    omeprazole (PriLOSEC) 40 MG capsule, TAKE 1 CAPSULE BY MOUTH TWICE A DAY, Disp: 180 capsule, Rfl: 1    OneTouch Verio test strip, USE TO TEST SUGAR 2 TIMES A DAY, Disp: 100 strip, Rfl: 3    oxyCODONE (ROXICODONE) 5 immediate release tablet, Take 1-2 tabs by mouth every 6 hours as needed for moderate or severe pains respectively  , Disp: 15 tablet, Rfl: 0    psyllium (METAMUCIL) packet, Take 1 packet by mouth daily, Disp: , Rfl: 0    saccharomyces boulardii (FLORASTOR) 250 mg capsule, Take 1 capsule (250 mg total) by mouth 2 (two) times a day, Disp: 60 capsule, Rfl: 1    Skin Protectants, Misc  ([facility] [employer] [employer] 10"x144") SHEE, Apply 1 each topically every 5 (five) days, Disp: 3 each, Rfl: 5    sodium hypochlorite (DAKIN'S QUARTER-STRENGTH), Irrigate with 1 application as directed daily, Disp: 473 mL, Rfl: 0  No current facility-administered medications for this visit  Review of Systems   Constitutional: Negative for appetite change, chills, fatigue, fever and unexpected weight change  HENT: Negative for congestion, hearing loss, postnasal drip and sinus pressure  Respiratory: Positive for shortness of breath (On exertion)  Negative for cough  Cardiovascular: Positive for leg swelling (Lymphedema)  Endocrine: Negative  Musculoskeletal: Positive for gait problem Naresh Hameed)  Negative for back pain     Skin: Positive for wound (Bilateral calfs)  Negative for rash  Allergic/Immunologic: Negative  Hematological: Does not bruise/bleed easily  Psychiatric/Behavioral: Negative  Negative for dysphoric mood  The patient is not nervous/anxious  Objective:  /78   Pulse 88   Temp 98 3 °F (36 8 °C)   Resp 22   Pain Score:   5     Physical Exam  Vitals and nursing note reviewed  Constitutional:       General: He is not in acute distress  Appearance: Normal appearance  He is well-developed  He is morbidly obese  He is not ill-appearing  HENT:      Head: Normocephalic and atraumatic  Cardiovascular:      Rate and Rhythm: Normal rate  Pulmonary:      Effort: Pulmonary effort is normal  No respiratory distress  Musculoskeletal:      Right lower leg: Edema present  Left lower leg: Edema present  Skin:     General: Skin is warm and dry  Findings: Wound present  No erythema  Comments: Left calf pressure injury with fibrin and granulation tissue in wound bed  There is mild erythema surrounding inferior aspect of wound  No lymphangitic streaking  Green tinge to drainage  Still having significant amount of drainage from wound on the dressing  The R lateral calf wound has decreased in size from previous visit  There is fibrin and small amount of slough in wound bed  No surrounding erythema, edema or lymphangitic streaking  Neurological:      Mental Status: He is alert and oriented to person, place, and time  Gait: Gait abnormal    Psychiatric:         Attention and Perception: Attention normal          Mood and Affect: Mood and affect normal          Behavior: Behavior is cooperative  Cognition and Memory: Cognition normal         No photo available today d/t equipment malfunction         Wound 02/17/22 Pressure Injury Leg Right;Lateral;Lower (Active)        Wound Description Epithelialization;Granulation tissue;Slough 08/04/22 1346   Pressure Injury Stage 3 03/03/22 1555   Chelsie-wound Assessment Edema; Maceration;Scar Tissue 08/04/22 1346   Wound Length (cm) 1 cm 08/04/22 1346   Wound Width (cm) 2 cm 08/04/22 1346   Wound Depth (cm) 0 2 cm 08/04/22 1346   Wound Surface Area (cm^2) 2 cm^2 08/04/22 1346   Wound Volume (cm^3) 0 4 cm^3 08/04/22 1346   Calculated Wound Volume (cm^3) 0 4 cm^3 08/04/22 1346   Change in Wound Size % 58 33 08/04/22 1346   Drainage Amount Small 08/04/22 1346   Drainage Description Serosanguineous 08/04/22 1346   Non-staged Wound Description Full thickness 07/14/22 0948   Treatments Cleansed 07/14/22 0948   Dressing Changed Changed 07/14/22 0948   Patient Tolerance Tolerated well 08/04/22 1346   Dressing Status Removed 08/04/22 1346       Wound 05/19/22 Pressure Injury Leg Left;Lateral (Active)        Wound Description Epithelialization;Granulation tissue; Beefy red;Pink;Slough 08/04/22 1350   Chelsie-wound Assessment Edema; Maceration;Scar Tissue; Hyperpigmented 08/04/22 1350   Wound Length (cm) 8 8 cm 08/04/22 1350   Wound Width (cm) 7 4 cm 08/04/22 1350   Wound Depth (cm) 0 2 cm 08/04/22 1350   Wound Surface Area (cm^2) 65 12 cm^2 08/04/22 1350   Wound Volume (cm^3) 13  024 cm^3 08/04/22 1350   Calculated Wound Volume (cm^3) 13 02 cm^3 08/04/22 1350   Change in Wound Size % -[id number] 08/04/22 1350   Drainage Amount Large 08/04/22 1350   Drainage Description Serosanguineous 08/04/22 1350   Non-staged Wound Description Full thickness 07/14/22 0950   Treatments Irrigation with NSS 07/14/22 0950   Dressing Changed Changed 07/14/22 0950   Patient Tolerance Tolerated well 08/04/22 1350   Dressing Status Removed 08/04/22 1350                     Debridement   Wound 05/19/22 Pressure Injury Leg Left;Lateral    Universal Protocol:  Procedure performed by: (Assisting provider Jesus Manuel Ibarra PA-C)  Consent: Verbal consent obtained    Consent given by: patient  Time out: Immediately prior to procedure a "time out" was called to verify the correct patient, procedure, equipment, support staff and site/side marked as required  Patient understanding: patient states understanding of the procedure being performed  Patient identity confirmed: verbally with patient      Performed by: PA  Debridement type: selective  Pain control: lidocaine 4%  Post-debridement measurements  Length (cm): 8 8  Width (cm): 7 4  Depth (cm): 0 2  Percent debrided: 100%  Surface Area (cm^2): 65 12  Area debrided (cm^2): 65 12  Volume (cm^3): 13 02  Devitalized tissue debrided: fibrin  Instrument(s) utilized: curette  Bleeding: small  Hemostasis obtained with: pressure  Procedural pain (0-10): 0  Post-procedural pain: 0   Response to treatment: procedure was tolerated well    Debridement   Wound 02/17/22 Pressure Injury Leg Right;Lateral;Lower    Universal Protocol:  Procedure performed by: (Assisting provider Bridgette Osgood, PA-C)  Consent: Verbal consent obtained  Consent given by: patient  Time out: Immediately prior to procedure a "time out" was called to verify the correct patient, procedure, equipment, support staff and site/side marked as required  Patient understanding: patient states understanding of the procedure being performed  Patient identity confirmed: verbally with patient      Performed by: PA  Debridement type: selective  Pain control: lidocaine 4%  Post-debridement measurements  Length (cm): 1  Width (cm): 2  Depth (cm): 0 2  Percent debrided: 100%  Surface Area (cm^2): 2  Area debrided (cm^2): 2  Volume (cm^3): 0 4  Devitalized tissue debrided: fibrin and slough  Instrument(s) utilized: curette  Bleeding: small  Hemostasis obtained with: pressure  Procedural pain (0-10): 0  Post-procedural pain: 0   Response to treatment: procedure was tolerated well  Debridement Comments: Cormary jo Harvinder was very easily removed from wound bed without significant bleeding or creating depth to wound            Results from last 6 Months   Lab Units 07/21/22  1104   WOUND CULTURE  2+ Growth of Pseudomonas aeruginosa*  2+ Growth of Proteus mirabilis*  2+ Growth of            Wound Instructions:  Orders Placed This Encounter   Procedures    Wound cleansing and dressings     Wash your hands with soap and water  Remove old dressing, discard into plastic bag and place in trash  Cleanse the wound with mild soap and water prior to applying a clean dressing  PLEASE WASH LEGS WITH SOAP AND WATER PRIOR TO DRESSING CHANGES   Do not use tissue or cotton balls  Do not scrub the wound  Pat dry using gauze  Shower yes with protection, purchase cast cover  Or sponge bathe       Right Leg Wound:   Apply skin prep to the periwound  Cover the wound with hydrofera blue (writing side of the dressing facing out)   Apply unna boot   Change dressings 2 times per week  Left leg wound:  Apply nystatin powder and barrier cream to the itchy red area distal of the wound  Cover the wound with hydrofera blue (writing side of the dressing facing out)   Apply unna boot   Change dressings 2 times per week      Roll towels or blankets and place under ankles for offloading wounds/heals     Continue visiting nurses skilled on Mondays for wound care dressing changes   Follow up in wound center in one week     Treatment today in wound center:  Left Leg Wound cleansed with soap and water, dakin's soak for 5 minutes then rinsed with normal saline solution, 50/50 mixture of Clotrimazole and Zinc Oxide applied to surrounding skin and redressed as ordered above     Right Leg cleansed with soap and water and redressed as ordered above     Standing Status:   Future     Standing Expiration Date:   8/4/2023    Debridement     This order was created via procedure documentation    Debridement     This order was created via procedure documentation       Prisca Baker MD      - I, Blaise Aguirre PA-C, have acted as a scribe with the above documentation    -Kita Bang MD, CWS have seen and evaluated the above patient and have reviewed and agree with the above documentation  Portions of the record may have been created with voice recognition software  Occasional wrong word or "sound alike" substitutions may have occurred due to the inherent limitations of voice recognition software  Read the chart carefully and recognize, using context, where substitutions have occurred

## 2022-08-04 NOTE — PATIENT INSTRUCTIONS
Orders Placed This Encounter   Procedures    Wound cleansing and dressings     Wash your hands with soap and water  Remove old dressing, discard into plastic bag and place in trash  Cleanse the wound with mild soap and water prior to applying a clean dressing  PLEASE WASH LEGS WITH SOAP AND WATER PRIOR TO DRESSING CHANGES   Do not use tissue or cotton balls  Do not scrub the wound  Pat dry using gauze  Shower yes with protection, purchase cast cover  Or sponge bathe  Right Leg Wound:   Apply skin prep to the periwound  Cover the wound with hydrofera blue (writing side of the dressing facing out)   Apply unna boot   Change dressings 2 times per week  Left leg wound:  Apply nystatin powder and barrier cream to the itchy red area distal of the wound  Cover the wound with hydrofera blue (writing side of the dressing facing out)   Apply unna boot   Change dressings 2 times per week  Roll towels or blankets and place under ankles for offloading wounds/heals     Continue visiting nurses skilled on Mondays for wound care dressing changes   Follow up in wound center in one week     Treatment today in wound center:  Left Leg Wound cleansed with soap and water, dakin's soak for 5 minutes then rinsed with normal saline solution, 50/50 mixture of Clotrimazole and Zinc Oxide applied to surrounding skin and redressed as ordered above     Right Leg cleansed with soap and water and redressed as ordered above     Standing Status:   Future     Standing Expiration Date:   8/4/2023

## 2022-08-05 ENCOUNTER — CONSULT (OUTPATIENT)
Dept: INTERNAL MEDICINE CLINIC | Facility: CLINIC | Age: 57
End: 2022-08-05
Payer: MEDICARE

## 2022-08-05 VITALS
HEIGHT: 64 IN | TEMPERATURE: 99 F | OXYGEN SATURATION: 98 % | HEART RATE: 124 BPM | BODY MASS INDEX: 78.65 KG/M2 | DIASTOLIC BLOOD PRESSURE: 96 MMHG | SYSTOLIC BLOOD PRESSURE: 142 MMHG

## 2022-08-05 DIAGNOSIS — E11.65 TYPE 2 DIABETES MELLITUS WITH HYPERGLYCEMIA, WITH LONG-TERM CURRENT USE OF INSULIN (HCC): ICD-10-CM

## 2022-08-05 DIAGNOSIS — R79.89 LOW TESTOSTERONE IN MALE: Primary | ICD-10-CM

## 2022-08-05 DIAGNOSIS — E83.52 HYPERCALCEMIA: ICD-10-CM

## 2022-08-05 DIAGNOSIS — E03.9 HYPOTHYROIDISM, UNSPECIFIED TYPE: ICD-10-CM

## 2022-08-05 DIAGNOSIS — Z79.4 TYPE 2 DIABETES MELLITUS WITH HYPERGLYCEMIA, WITH LONG-TERM CURRENT USE OF INSULIN (HCC): ICD-10-CM

## 2022-08-05 DIAGNOSIS — E55.9 VITAMIN D DEFICIENCY: ICD-10-CM

## 2022-08-05 PROCEDURE — 99214 OFFICE O/P EST MOD 30 MIN: CPT | Performed by: INTERNAL MEDICINE

## 2022-08-05 NOTE — PROGRESS NOTES
Assessment and Plan:    Type 2 diabetes mellitus with hyperglycemia, with long-term current use of insulin (HCC)    Lab Results   Component Value Date    HGBA1C 6 6 (H) 06/24/2022     Currently well controlled on regimen of Novolog 14 units TID with sliding scale, lantus 44 units HS and metformin 1000 mg BID  Patient reports recent 60 lb weight loss  Reports regular physical activity and adherence to diabetic diet  He will be following up with bariatrics in the near future for further management of morbid obesity  Patient also reporting peripheral neuropathy since being hospitalized for sepsis  Multiple diabetic ulcers are present on his bilateral lower extremities  Denies vision changes  Reporting intermittent erectile dysfunction since starting gabapentin which is being tapered down per PCP  Will continue diabetes regimen at current dose  Will check A1C on routine follow up in three months  Hypothyroidism  Lab Results   Component Value Date    [id number] 3 450 06/24/2022     Patient is maintained on 25 mcg levothyroxine  Asymptomatic  Last thyroid panel normalized  Will continue current management  Low testosterone in male  Last testosterone level was 4 0  patient reporting ED since hospitalization and initiation of gabapentin for peripheral neuropathy  Otherwise asymptomatic  Will check prolactin level to rule out central dysregulation  Will hold off on referral for exogenous testosterone for now  Hypercalcemia  Lab Results   Component Value Date    CALCIUM 9 9 06/24/2022    PHOS 3 3 07/29/2022     Patient with long standing elevated corrected calcium level which has since begun to normalize  Patient denies symptoms of hypercalcemia  No history of renal stones or pathologic bone fracture  PTH level within reference range  Phos normal  Patient with notably elevated vitamin D level   Reports he takes a daily multivitamin and additional  5000 of vit D daily for the perceived anticarcinogenic benefit  Advised patient to discontinue Vitamin D for now  Will recheck ionized calcium and vitamin D level  For routine follow up in 3 weeks  Subjective:     Patient ID: Nathalie Mancilla is a 56 y o  male  Patient with history of morbid obesity, type 2 DM, hypothyroidism, elevated calcium and low testosterone presents for routine follow-up  Patient has no other complaints at this time  Review of Systems   Constitutional: Negative for chills and fever  HENT: Negative for ear pain and sore throat  Eyes: Negative for pain and visual disturbance  Respiratory: Negative for cough and shortness of breath  Cardiovascular: Negative for chest pain and palpitations  Gastrointestinal: Negative for abdominal pain and vomiting  Genitourinary: Negative for dysuria and hematuria  Musculoskeletal: Negative for arthralgias and back pain  Skin: Negative for color change and rash  Neurological: Negative for seizures and syncope  Hematological: Negative  Psychiatric/Behavioral: Negative  All other systems reviewed and are negative         Patient Active Problem List   Diagnosis    Type 2 diabetes mellitus with hyperglycemia, with long-term current use of insulin (HCC)    Hyperlipidemia    Psoriasis    Venous insufficiency    Gout    Essential hypertension    Gluten intolerance    Diabetic neuropathy (HCC)    Insomnia    Erectile dysfunction    Bilateral leg edema    Elevated CO2 level    Abnormal thyroid function test    DAHIANA (obstructive sleep apnea)    Morbid obesity with BMI of 70 and over, adult ([facility] [address] )    Migraine headache    Onychomycosis    Ulcer of left lower extremity, limited to breakdown of skin (HCC)    Pressure injury of right leg, stage 3 (HCC)    Metabolic syndrome    Low testosterone in male    Hypercalcemia    Hypothyroidism        Current Outpatient Medications   Medication Sig Dispense Refill    acetaminophen (TYLENOL) 325 mg tablet Take 2 tablets (650 mg total) by mouth every 4 (four) hours as needed for mild pain, headaches or fever  0    albuterol (PROVENTIL HFA,VENTOLIN HFA) 90 mcg/act inhaler TAKE 2 PUFFS BY MOUTH EVERY 6 HOURS AS NEEDED FOR WHEEZE 8 5 g 0    BD Pen Needle Ludmila 2nd Gen 32G X 4 MM MISC USE 4 TIMES A  each 3    Calcium Alginate (Maxorb II Alginate Dressing) MISC Apply 1 each topically in the morning 10 each 2    Continuous Blood Gluc  (FreeStyle Connell 2 [place] Systm) BROOKS Use 1 Device as needed (use with sensors for bs checks) 1 Device 0    Continuous Blood Gluc Sensor (FreeStyle Elvin 14 Day Sensor) MISC Use as directed 3 times a day 2 each 3    Control Gel Formula Dressing (DuoDERM CGF Dressing) MISC Apply 1 application topically every 5 (five) days 5 each 1    [employer] Diclofenac Sodium 1 % APPLY 4 G TOPICALLY 4 (FOUR) TIMES A  g 0    ezetimibe (ZETIA) 10 mg tablet Take 1 tablet (10 mg total) by mouth daily 90 tablet 1    furosemide (LASIX) 20 mg tablet TAKE 2 TABLETS EVERY DAY IN THE EARLY MORNING AND 1 TABLET DAILY AFTER LUNCH  270 tablet 2    gabapentin (NEURONTIN) 100 mg capsule TAKE 1 CAPSULE BY MOUTH TWICE A DAY WITH 300MG CAPSULE 180 capsule 2    gabapentin (NEURONTIN) 300 mg capsule TAKE 1 CAPSULE (300 MG TOTAL) BY MOUTH 2 (TWO) TIMES A DAY TAKE 1 TAB WITH YOUR 100MG TAB TWICE DAILY 180 capsule 2    Gauze Pads & Dressings 5"X5" PADS Use in the morning 20 each 2    insulin aspart (NovoLOG FlexPen) 100 UNIT/ML injection pen INJECT 18 UNITS WITH MEALS+SCALE ( - 150-200 -2 UNITS, 201-250-4 UNITS, 251-300 -6 UNITS, 301-350-8 UNITS, > 350- 10 UNITS ) 45 mL 1    insulin glargine (Lantus SoloStar) 100 units/mL injection pen Inject 44 Units under the skin daily at bedtime 18 mL 2    Lancets (ONETOUCH ULTRASOFT) lancets Use to test sugar 2 times a day 100 each 3    levothyroxine (Euthyrox) 25 mcg tablet Take 1 tablet (25 mcg total) by mouth daily in the early morning 90 tablet 1    losartan (COZAAR) 25 mg tablet TAKE 1 TABLET BY MOUTH EVERY DAY 90 tablet 2    metFORMIN (GLUCOPHAGE) 1000 MG tablet Take 1 tablet (1,000 mg total) by mouth 2 (two) times a day 180 tablet 0    nystatin (MYCOSTATIN) cream Apply topically 2 (two) times a day as needed (itching, redness) 30 g 1    nystatin (MYCOSTATIN) powder Apply 1 application topically 2 (two) times a day To affected area 120 g 3    omeprazole (PriLOSEC) 40 MG capsule TAKE 1 CAPSULE BY MOUTH TWICE A  capsule 1    OneTouch Verio test strip USE TO TEST SUGAR 2 TIMES A  strip 3    oxyCODONE (ROXICODONE) 5 immediate release tablet Take 1-2 tabs by mouth every 6 hours as needed for moderate or severe pains respectively  15 tablet 0    psyllium (METAMUCIL) packet Take 1 packet by mouth daily  0    saccharomyces boulardii (FLORASTOR) 250 mg capsule Take 1 capsule (250 mg total) by mouth 2 (two) times a day 60 capsule 1    Skin Protectants, Misc  ([employer] Textile 36"W550") SHEE Apply 1 each topically every 5 (five) days 3 each 5    sodium hypochlorite (DAKIN'S QUARTER-STRENGTH) Irrigate with 1 application as directed daily 473 mL 0    Blood Glucose Monitoring Suppl (Camron Echavarria) w/Device KIT Use to test sugar daily (Patient not taking: Reported on 8/5/2022) 1 kit 0     No current facility-administered medications for this visit  Allergies   Allergen Reactions    Gluten Meal - Food Allergy     Clindamycin Diarrhea        The following portions of the patient's history were reviewed and updated as appropriate: allergies, current medications, past family history, past medical history, past social history, past surgical history and problem list           Objective:    /96 (BP Location: Left arm, Patient Position: Sitting, Cuff Size: Standard)   Pulse (!) 124   Temp 99 °F (37 2 °C) (Tympanic)   Ht 5' 4" (1 626 m)   SpO2 98%   BMI 78 65 kg/m²      Body mass index is 78 65 kg/m²       Physical Exam  Vitals and nursing note reviewed  Constitutional:       Appearance: He is well-developed  He is obese  He is not ill-appearing, toxic-appearing or diaphoretic  HENT:      Head: Normocephalic and atraumatic  Eyes:      Conjunctiva/sclera: Conjunctivae normal    Cardiovascular:      Rate and Rhythm: Normal rate and regular rhythm  Heart sounds: No murmur heard  Pulmonary:      Effort: Pulmonary effort is normal  No respiratory distress  Breath sounds: Normal breath sounds  Abdominal:      Palpations: Abdomen is soft  Tenderness: There is no abdominal tenderness  Musculoskeletal:      Cervical back: Neck supple  Right lower leg: Edema present  Left lower leg: Edema present  Skin:     General: Skin is warm and dry  Findings: Lesion present  Neurological:      General: No focal deficit present  Mental Status: He is alert and oriented to person, place, and time     Psychiatric:         Mood and Affect: Mood normal          Behavior: Behavior normal

## 2022-08-05 NOTE — ASSESSMENT & PLAN NOTE
Lab Results   Component Value Date    HGBA1C 6 6 (H) 06/24/2022     Currently well controlled on regimen of Novolog 14 units TID with sliding scale, lantus 44 units HS and metformin 1000 mg BID  Patient reports recent 60 lb weight loss  Reports regular physical activity and adherence to diabetic diet  He will be following up with bariatrics in the near future for further management of morbid obesity  Patient also reporting peripheral neuropathy since being hospitalized for sepsis  Multiple diabetic ulcers are present on his bilateral lower extremities  Denies vision changes  Reporting intermittent erectile dysfunction since starting gabapentin which is being tapered down per PCP  Will continue diabetes regimen at current dose  Will check A1C on routine follow up in three months

## 2022-08-05 NOTE — ASSESSMENT & PLAN NOTE
Lab Results   Component Value Date    CALCIUM 9 9 06/24/2022    PHOS 3 3 07/29/2022     Patient with long standing elevated corrected calcium level which has since begun to normalize  Patient denies symptoms of hypercalcemia  No history of renal stones or pathologic bone fracture  PTH level within reference range  Phos normal  Patient with notably elevated vitamin D level  Reports he takes a daily multivitamin and additional  5000 of vit D daily for the perceived anticarcinogenic benefit  Advised patient to discontinue Vitamin D for now  Will recheck ionized calcium and vitamin D level  For routine follow up in 3 weeks

## 2022-08-05 NOTE — ASSESSMENT & PLAN NOTE
Lab Results   Component Value Date    [id number] 3 450 06/24/2022     Patient is maintained on 25 mcg levothyroxine  Asymptomatic  Last thyroid panel normalized  Will continue current management

## 2022-08-05 NOTE — ASSESSMENT & PLAN NOTE
Last testosterone level was 4 0  patient reporting ED since hospitalization and initiation of gabapentin for peripheral neuropathy  Otherwise asymptomatic  Will check prolactin level to rule out central dysregulation  Will hold off on referral for exogenous testosterone for now

## 2022-08-08 ENCOUNTER — HOME CARE VISIT (OUTPATIENT)
Dept: HOME HEALTH SERVICES | Facility: HOME HEALTHCARE | Age: 57
End: 2022-08-08
Payer: MEDICARE

## 2022-08-08 VITALS
SYSTOLIC BLOOD PRESSURE: 128 MMHG | RESPIRATION RATE: 16 BRPM | HEART RATE: 98 BPM | DIASTOLIC BLOOD PRESSURE: 80 MMHG | TEMPERATURE: 97.3 F | OXYGEN SATURATION: 98 %

## 2022-08-08 PROCEDURE — G0299 HHS/HOSPICE OF RN EA 15 MIN: HCPCS

## 2022-08-10 ENCOUNTER — DOCUMENTATION (OUTPATIENT)
Dept: PULMONOLOGY | Facility: CLINIC | Age: 57
End: 2022-08-10

## 2022-08-10 ENCOUNTER — TELEPHONE (OUTPATIENT)
Dept: PULMONOLOGY | Facility: CLINIC | Age: 57
End: 2022-08-10

## 2022-08-10 DIAGNOSIS — I10 ESSENTIAL HYPERTENSION: ICD-10-CM

## 2022-08-10 LAB

## 2022-08-10 RX ORDER — LOSARTAN POTASSIUM 25 MG/1
25 TABLET ORAL DAILY
Qty: 90 TABLET | Refills: 0 | Status: SHIPPED | OUTPATIENT
Start: 2022-08-10

## 2022-08-10 NOTE — TELEPHONE ENCOUNTER
Called [facility], they transferred me to someone who couldn't help me, told me to keep calling until someone can help me, I emailed Cecille , waiting for response

## 2022-08-10 NOTE — TELEPHONE ENCOUNTER
----- Message from Jarret Douglass, [address] sent at 8/9/2022 12:27 PM EDT -----  Regarding: FW: CPAP Prescription Issue    ----- Message -----  From: Nakul Henderson  Sent: 8/9/2022  12:23 PM EDT  To: [facility]  Subject: CPAP Prescription Issue                          I just called [facility] and they claim no prescription has been sent for the order they prepared on June 17  At this point I dont care who is lying or making a mistake but can I please get my CPAP supplies? Its literally been months

## 2022-08-11 ENCOUNTER — OFFICE VISIT (OUTPATIENT)
Dept: WOUND CARE | Facility: CLINIC | Age: 57
End: 2022-08-11
Payer: MEDICARE

## 2022-08-11 ENCOUNTER — HOME CARE VISIT (OUTPATIENT)
Dept: HOME HEALTH SERVICES | Facility: HOME HEALTHCARE | Age: 57
End: 2022-08-11
Payer: MEDICARE

## 2022-08-11 VITALS
DIASTOLIC BLOOD PRESSURE: 98 MMHG | HEART RATE: 120 BPM | TEMPERATURE: 98.7 F | SYSTOLIC BLOOD PRESSURE: 144 MMHG | RESPIRATION RATE: 24 BRPM

## 2022-08-11 DIAGNOSIS — E66.01 MORBID OBESITY WITH BMI OF 70 AND OVER, ADULT (HCC): ICD-10-CM

## 2022-08-11 DIAGNOSIS — L89.893 PRESSURE INJURY OF LEFT LEG, STAGE 3 (HCC): ICD-10-CM

## 2022-08-11 DIAGNOSIS — L89.893 PRESSURE INJURY OF RIGHT LEG, STAGE 3 (HCC): Primary | ICD-10-CM

## 2022-08-11 PROCEDURE — 97597 DBRDMT OPN WND 1ST 20 CM/<: CPT | Performed by: STUDENT IN AN ORGANIZED HEALTH CARE EDUCATION/TRAINING PROGRAM

## 2022-08-11 PROCEDURE — NC001 PR NO CHARGE: Performed by: STUDENT IN AN ORGANIZED HEALTH CARE EDUCATION/TRAINING PROGRAM

## 2022-08-11 PROCEDURE — 97598 DBRDMT OPN WND ADDL 20CM/<: CPT | Performed by: STUDENT IN AN ORGANIZED HEALTH CARE EDUCATION/TRAINING PROGRAM

## 2022-08-11 RX ORDER — LIDOCAINE 40 MG/G
CREAM TOPICAL ONCE
Status: COMPLETED | OUTPATIENT
Start: 2022-08-11 | End: 2022-08-11

## 2022-08-11 RX ADMIN — LIDOCAINE: 40 CREAM TOPICAL at 13:25

## 2022-08-11 NOTE — CASE COMMUNICATION
Ship to  Pt  Home   Insurance   [facility]   All items are ordered by the each unless otherwise noted    PLEASE DO NOT ORDER BY THE BOX  Request specific quantity needed  For Private Insurances order should be for a 2 week period    Coflex lite                                                          4

## 2022-08-11 NOTE — PROGRESS NOTES
Patient ID: Gurvinder Joyce is a 56 y o  male Date of Birth 1965       Chief Complaint   Patient presents with    Follow Up Wound Care Visit     Pressure injury of right leg, stage 3       Allergies:  Gluten meal - food allergy and Clindamycin    Diagnosis:   Diagnosis ICD-10-CM Associated Orders   1  Pressure injury of right leg, stage 3 ([facility])  L89 893 lidocaine (LMX) 4 % cream     Wound cleansing and dressings     Debridement   2  Pressure injury of left leg, stage 3 ([facility])  L89 893 lidocaine (LMX) 4 % cream     Wound cleansing and dressings     Debridement   3  Morbid obesity with BMI of 70 and over, adult ([facility] )  E66 01     Z68 45         Assessment  & Plan:     F/u pressure injury of R leg reduced in size from previous visit  Base is fibrogranular  o Selective debridement  o Hydrofera blue  o Coflex lite for compression  2+ DP pulses   F/u pressure injury of the L leg  Reduced in size from previous visit  Base with biofilm and granulation tissue  o Dakin soak today    o Selective debridement  o Hydrofera blue  o Coflex lite for compression  2+ DP pulses  o Offload with Prevalon boot, suggested placing under surface of wedge in attempt to keep leg from sliding to side   Instructed to monitor for any changes including redness or swelling surrounding the wound, increased drainage or pain as well as fevers or chills   F/u in two weeks, home nurses will come twice weekly until his next visit in wound center  Instructed to call if any questions or concerns arise in meantime  Subjective:   2/17/22: This is a 30-year-old male who comes in [address] [place] [address] with ulcers on the right and left calfs  He states that he was hospitalized on November of 2021 with cellulitis and he had the ulcers at that time  Patient is morbidly obese and has history of lymphedema as well  He does not use any form of compression other than Ace wraps    He had purchased alginate on the Internet as well as bordered foam is  He notes that there is heavy drainage requiring dressings to be changed once or twice a day  He believes that these ulcers had started due to  sleeping in a recliner and the foot rest causing pressure on the calfs  He is unable to sleep in a bed  He notes he has increased pain at nighttime when in there is recliner with pressure on his calf  He is ambulatory with a walker but does not go out of the house other than to doctor visits  Patient also has a history of type 2 diabetes with good control with last A1c 6 5 in October of 2021  He states that he has lost approximately 120 lb in the past nine months with a special meal plan  He plans on continuing with weight loss  2/24/22: Followup stage III pressure injuries of right and left calfs  They purchased memory foam pillows which seems to help with both pain and offloading the areas when he is sleeping  Still has pain mainly at night  Left greater than right  3/3/22: Followup stage III pressure injuries of the right left calfs  Patient is upset about visiting nurse not having proper supplies  Then [facility]  Still has pain at nighttime  No fever chills  3/10/22: Followup stage III pressure injuries of the right and left calfs  Patient states that the visiting nurse had a "observer" do the Unna boot on his right leg which slid down  Still has pain mainly in the left calf  They are trying various offloading techniques  3/17/22: Followup stage III pressure injuries of the right and left calfs  Patient states he still has pain but slightly less particularly on the left  Starting to have some itching  No fever or chills  Still has not found the proper offloading device  3/24/22: Followup stage III pressure injuries of both calfs continues to have some pain as in the past   No fever or chills  Trying to offload when in bed     3/31/22:  Followup stage III pressure injuries of both lateral calf   Continues to have the same amount of pain  States he is trying offload  No fever chills  4/7/22:  Patient is a patient of Dr Kris Velasquez who presents for followup of bilateral LE ulcers  Has had antimicrobial endoform and hydrofera blue on the wounds and Unna boot for compression    4/14/22: Followup stage III pressure injuries of both lateral calfs  Unna boots  Has no pain on the left calf anymore  Much less on the right  No fever chills  4/21/22: Followup stage III pressure injuries of both lateral calfs  Tolerating Unna boots although they tend to slide down  No pain at this time  4/28/22: Followup stage III pressure injuries of both lateral calfs  No complaints at this time  No pain  5/5/22: Followup stage III pressure injuries of both lateral calf  No complaints  No pain  5/12/22: Followup stage III pressure injuries of both lateral calfs  At last visit, the left calf was closed  Hydrocolloid on the right calf was leaking  No other complaints  5/19/22: Followup stage III pressure injury of the right lateral calf  Left calf was closed at last visit  Received a telephone call from GRANT stating that the left calf has a new ulcer adjacent to the healed area  Tubigrip was use on the left because of previously healed ulcer  Bilateral lower extremity lymphedema  VNA placed new Unna boot on the left  No other complaints  5/26/22: Followup stage III pressure injuries of the right and left calfs  At last visit, the left calf reopened  No new complaints today  Tolerating Unna boot  06/02/22: Followup stage III pressure injuries of the R and L calfs  Polymem with Unna boot placed last visit  Denies fevers, chills, no new complaints  6/9/22: Followup stage III pressure injuries of the right and left calfs  This past week he has worn Unna boot on the left with alginate and circ aid on the right  No complaints  6/16/22:   Followup stage III pressure injuries of the right left calfs  Doing well with circ aid on the right and Unna boot on the left  No significant pain  No other complaints  6/30/22: Followup stage III pressure injuries of the right and left calfs  Wearing Circ Aids on both legs  Seems to be doing well  No specific complaints  Denies pain  No fever or chills  07/14/22: Followup stage III pressure injuries of R and L calves  Has been wearing CircAids with Hydrofera to wound beds  Pt missed dressing change this past week d/t wife being in hospital  Hydrofera was left in place for about one week  No fevers, chills, pain      07/21/22: Followup stage 3 pressure injuries of R and L calves  Has been wearing CircAid with Hydrofera to R leg  Acticoat with alginate and unna boot was being used to L leg, however the Unna boot slid down therefore it was removed and hydrofera was being used on wound and CircAId used for compression  He notes there has been increased pain to the wound and that he noticed green/black drainage from the wound  Wife was helping him with dressing changes previously, however she is currently in hospital and therefore has not been able to help him with dressing changes past two weeks  Denies fevers or chills  07/28/22: Followup stage 3 pressure injuries of R and L calves  Has been using Hydrofera blue to wound beds and using CircAids for compression  Pt was placed on Ciprofloxacin at previous visit; culture grew Pseudomonas susceptible to Cipro, therefore pt was continued on the antibiotic and still has about three days left of the course  No fevers or chills  Offers no complaints today  08/04/22: Followup stage 3 pressure injuries of R and L calves  Has been using Hydrofera blue to bilateral wounds as well as bilateral Unna boots for compression  He has completed his course of Ciprofloxacin; notes diarrhea towards the end of the course but it is improving  Notes his L leg wound is beginning to itch   Sleeps in recliner and has been attempting to keep pressure off of his legs with boots which he states are helping  Has been eating yogurt in diet  No fevers or chills  08/11/22: Followup stage 3 pressure injuries of the R and L calves  Has been using Hydrofera blue to bilateral wounds and Unna boots  States that the [employer] fell down after the office visit last Thursday  He got a Prevlon boot with integrated wedge to attempt to offload the area, however this was unable to keep his leg up; the smooth bottom of the wedge did not have enough traction to keep his leg in place  No fevers or chills            The following portions of the patient's history were reviewed and updated as appropriate:   Patient Active Problem List   Diagnosis    Type 2 diabetes mellitus with hyperglycemia, with long-term current use of insulin ([address] )    Hyperlipidemia    Psoriasis    Venous insufficiency    Gout    Essential hypertension    Gluten intolerance    Diabetic neuropathy ([address] )    Insomnia    Erectile dysfunction    Bilateral leg edema    Elevated CO2 level    Abnormal thyroid function test    DAHIANA (obstructive sleep apnea)    Morbid obesity with BMI of 70 and over, adult ([address] )    Migraine headache    Onychomycosis    Ulcer of left lower extremity, limited to breakdown of skin ([address] )    Pressure injury of right leg, stage 3 (HCC)    Metabolic syndrome    Low testosterone in male    Hypercalcemia    Hypothyroidism     Past Medical History:   Diagnosis Date    Acute kidney injury 10/28/2021    Anemia 09/13/2019    Cellulitis     last assessed 12/10/15    Cellulitis of left lower extremity     Cellulitis of right lower extremity 11/19/2021    Cough 10/20/2019    Diabetes mellitus ([address] )     Disease of thyroid gland     Edema     Elevated liver enzymes     Elevated serum creatinine 11/19/2021    Esophageal reflux     Fungal infection 8/16/2021    Gluten intolerance     Gout     last assessed 09/05/13    Hyperglycemia     Hypertension     Hyponatremia 9/6/2019    IBS (irritable bowel syndrome)     Insomnia     Obesity     Osteoarthritis of knee     last assessed 02/10/14    Scrotal swelling 5/19/2020    Shortness of breath 5/3/2021    Venous insufficiency     last assessed 08/22/17    Villonodular synovitis of the hand, right     last assessed 11/14/2013     Past Surgical History:   Procedure Laterality Date    INCISION AND DRAINAGE OF WOUND Left 9/6/2019    Procedure: INCISION AND DRAINAGE (I&D) GROIN;  Surgeon: Patricia Miller DO;  [facility];  Service: General    SKIN BIOPSY      WOUND DEBRIDEMENT Left 9/7/2019    Procedure: EXCISIONAL DEBRIDEMENT;  Surgeon: Patricia Miller DO;  [facility];  Service: General     Family History   Problem Relation Age of Onset    Cancer Mother         gastrc    Colon cancer Father     Heart failure Father      Social History     Socioeconomic History    Marital status: /Civil Union     Spouse name: Not on file    Number of children: Not on file    Years of education: Not on file    Highest education level: Not on file   Occupational History    Not on file   Tobacco Use    Smoking status: Never Smoker    Smokeless tobacco: Never Used   Vaping Use    Vaping Use: Never used   Substance and Sexual Activity    Alcohol use: Not Currently     Alcohol/week: 0 0 standard drinks    Drug use: No    Sexual activity: Yes   Other Topics Concern    Not on file   Social History Narrative    Not on file     Social Determinants of Health     Financial Resource Strain: Not on file   Food Insecurity: Not on file   Transportation Needs: No Transportation Needs    Lack of Transportation (Medical): No    Lack of Transportation (Non-Medical):  No   Physical Activity: Not on file   Stress: Not on file   Social Connections: Not on file   Intimate Partner Violence: Not on file   Housing Stability: Not on file       Current Outpatient Medications:     acetaminophen (TYLENOL) 325 mg tablet, Take 2 tablets (650 mg total) by mouth every 4 (four) hours as needed for mild pain, headaches or fever, Disp: , Rfl: 0    albuterol (PROVENTIL HFA,VENTOLIN HFA) 90 mcg/act inhaler, TAKE 2 PUFFS BY MOUTH EVERY 6 HOURS AS NEEDED FOR WHEEZE, Disp: 8 5 g, Rfl: 0    BD Pen Needle Ludmila 2nd Gen 32G X 4 MM MISC, USE 4 TIMES A DAY, Disp: 200 each, Rfl: 3    Blood Glucose Monitoring Suppl (ONETOUCH VERIO) w/Device KIT, Use to test sugar daily (Patient not taking: Reported on 8/5/2022), Disp: 1 kit, Rfl: 0    Calcium Alginate (Maxorb II Alginate Dressing) MISC, Apply 1 each topically in the morning, Disp: 10 each, Rfl: 2    Continuous Blood Gluc  (FreeStyle Connell 2 Erick Systm) BROOKS, Use 1 Device as needed (use with sensors for bs checks), Disp: 1 Device, Rfl: 0    Continuous Blood Gluc Sensor (HobleeStyle Elvin 14 Day Sensor) MISC, Use as directed 3 times a day, Disp: 2 each, Rfl: 3    Control Gel Formula Dressing (DuoDERM CGF Dressing) MISC, Apply 1 application topically every 5 (five) days, Disp: 5 each, Rfl: 1    [employer] Diclofenac Sodium 1 %, APPLY 4 G TOPICALLY 4 (FOUR) TIMES A DAY, Disp: 100 g, Rfl: 0    ezetimibe (ZETIA) 10 mg tablet, Take 1 tablet (10 mg total) by mouth daily, Disp: 90 tablet, Rfl: 1    furosemide (LASIX) 20 mg tablet, TAKE 2 TABLETS EVERY DAY IN THE EARLY MORNING AND 1 TABLET DAILY AFTER LUNCH , Disp: 270 tablet, Rfl: 2    gabapentin (NEURONTIN) 100 mg capsule, TAKE 1 CAPSULE BY MOUTH TWICE A DAY WITH 300MG CAPSULE, Disp: 180 capsule, Rfl: 2    gabapentin (NEURONTIN) 300 mg capsule, TAKE 1 CAPSULE (300 MG TOTAL) BY MOUTH 2 (TWO) TIMES A DAY TAKE 1 TAB WITH YOUR 100MG TAB TWICE DAILY, Disp: 180 capsule, Rfl: 2    Gauze Pads & Dressings 5"X5" PADS, Use in the morning, Disp: 20 each, Rfl: 2    insulin aspart (NovoLOG FlexPen) 100 UNIT/ML injection pen, INJECT 18 UNITS WITH MEALS+SCALE ( - 150-200 -2 UNITS, 201-250-4 UNITS, 251-300 -6 UNITS, 301-350-8 UNITS, > 350- 10 UNITS ), Disp: 45 mL, Rfl: 1    insulin glargine (Lantus SoloStar) 100 units/mL injection pen, Inject 44 Units under the skin daily at bedtime, Disp: 18 mL, Rfl: 2    Lancets (ONETOUCH ULTRASOFT) lancets, Use to test sugar 2 times a day, Disp: 100 each, Rfl: 3    levothyroxine (Euthyrox) 25 mcg tablet, Take 1 tablet (25 mcg total) by mouth daily in the early morning, Disp: 90 tablet, Rfl: 1    losartan (COZAAR) 25 mg tablet, Take 1 tablet (25 mg total) by mouth daily, Disp: 90 tablet, Rfl: 0    metFORMIN (GLUCOPHAGE) 1000 MG tablet, Take 1 tablet (1,000 mg total) by mouth 2 (two) times a day, Disp: 180 tablet, Rfl: 0    nystatin (MYCOSTATIN) cream, Apply topically 2 (two) times a day as needed (itching, redness), Disp: 30 g, Rfl: 1    nystatin (MYCOSTATIN) powder, Apply 1 application topically 2 (two) times a day To affected area, Disp: 120 g, Rfl: 3    omeprazole (PriLOSEC) 40 MG capsule, TAKE 1 CAPSULE BY MOUTH TWICE A DAY, Disp: 180 capsule, Rfl: 1    OneTouch Verio test strip, USE TO TEST SUGAR 2 TIMES A DAY, Disp: 100 strip, Rfl: 3    oxyCODONE (ROXICODONE) 5 immediate release tablet, Take 1-2 tabs by mouth every 6 hours as needed for moderate or severe pains respectively  , Disp: 15 tablet, Rfl: 0    psyllium (METAMUCIL) packet, Take 1 packet by mouth daily, Disp: , Rfl: 0    saccharomyces boulardii (FLORASTOR) 250 mg capsule, Take 1 capsule (250 mg total) by mouth 2 (two) times a day, Disp: 60 capsule, Rfl: 1    Skin Protectants, Misc  ([employer] Textile 10"x144") SHEE, Apply 1 each topically every 5 (five) days, Disp: 3 each, Rfl: 5    sodium hypochlorite (DAKIN'S QUARTER-STRENGTH), Irrigate with 1 application as directed daily, Disp: 473 mL, Rfl: 0  No current facility-administered medications for this visit  Review of Systems   Constitutional: Negative for appetite change, chills, fatigue, fever and unexpected weight change     HENT: Negative for congestion, hearing loss, postnasal drip and sinus pressure  Respiratory: Positive for shortness of breath (On exertion)  Negative for cough  Cardiovascular: Positive for leg swelling (Lymphedema)  Endocrine: Negative  Musculoskeletal: Positive for gait problem Eliceo Mons)  Negative for back pain  Skin: Positive for wound (Bilateral calfs)  Negative for rash  Allergic/Immunologic: Negative  Hematological: Does not bruise/bleed easily  Psychiatric/Behavioral: Negative  Negative for dysphoric mood  The patient is not nervous/anxious  Objective:  /98   Pulse (!) 120   Temp 98 7 °F (37 1 °C)   Resp (!) 24   Pain Score:   4     Physical Exam  Vitals and nursing note reviewed  Constitutional:       General: He is not in acute distress  Appearance: He is morbidly obese  He is not ill-appearing  HENT:      Head: Normocephalic and atraumatic  Cardiovascular:      Rate and Rhythm: Tachycardia present  Comments: Tachycardia related to exertion  Pulmonary:      Effort: Pulmonary effort is normal  No respiratory distress  Musculoskeletal:      Right lower leg: Edema present  Left lower leg: Edema present  Skin:     General: Skin is warm and dry  Findings: Wound present  No erythema  Comments: Left calf pressure injury with biofilm and granulation tissue  Smaller in size  Fungal dermatitis present near inferior periwound much improved  No surrounding erythema, edema or lymphangitic streaking  The R lateral calf wound has decreased in size from previous visit  Wound base is fibrogranular  No signs acute infection  See full wound assessment  Neurological:      Mental Status: He is alert and oriented to person, place, and time  Gait: Gait abnormal    Psychiatric:         Attention and Perception: Attention normal          Mood and Affect: Mood and affect normal          Behavior: Behavior is cooperative           Cognition and Memory: Cognition normal            Wound 02/17/22 Pressure Injury Leg Right;Lateral;Lower (Active)   Wound Image   08/11/22 1303   Wound Description Pink;Granulation tissue; Epithelialization 08/11/22 1305   Pressure Injury Stage 3 03/03/22 1555   Chelsie-wound Assessment Edema;Scar Tissue 08/11/22 1305   Wound Length (cm) 0 7 cm 08/11/22 1305   Wound Width (cm) 1 4 cm 08/11/22 1305   Wound Depth (cm) 0 1 cm 08/11/22 1305   Wound Surface Area (cm^2) 0 98 cm^2 08/11/22 1305   Wound Volume (cm^3) 0 098 cm^3 08/11/22 1305   Calculated Wound Volume (cm^3) 0 1 cm^3 08/11/22 1305   Change in Wound Size % 89 58 08/11/22 1305   Drainage Amount Small 08/11/22 1305   Drainage Description Serosanguineous 08/11/22 1305   Non-staged Wound Description Full thickness 07/14/22 0948   Treatments Cleansed 07/14/22 0948   Dressing Changed Changed 07/14/22 0948   Patient Tolerance Tolerated well 08/11/22 1305   Dressing Status Removed 08/11/22 1305       Wound 05/19/22 Pressure Injury Leg Left;Lateral (Active)   Wound Image   08/11/22 1303   Wound Description Epithelialization;Granulation tissue; Beefy red;Pink;Slough 08/11/22 1305   Chelsie-wound Assessment Edema;Scar Tissue; Hyperpigmented 08/11/22 1305   Wound Length (cm) 8 5 cm 08/11/22 1305   Wound Width (cm) 4 6 cm 08/11/22 1305   Wound Depth (cm) 0 2 cm 08/11/22 1305   Wound Surface Area (cm^2) 39 1 cm^2 08/11/22 1305   Wound Volume (cm^3) 7 82 cm^3 08/11/22 1305   Calculated Wound Volume (cm^3) 7 82 cm^3 08/11/22 1305   Change in Wound Size % -[id number] 55 08/11/22 1305   Drainage Amount Large 08/11/22 1305   Drainage Description Serosanguineous 08/11/22 1305   Non-staged Wound Description Full thickness 07/14/22 0950   Treatments Irrigation with NSS 07/14/22 0950   Dressing Changed Changed 07/14/22 0950   Patient Tolerance Tolerated well 08/11/22 1305   Dressing Status Removed 08/11/22 1305                Debridement   Wound 05/19/22 Pressure Injury Leg Left;Lateral    Universal Protocol:  Consent: Verbal consent obtained    Consent given by: patient  Time out: Immediately prior to procedure a "time out" was called to verify the correct patient, procedure, equipment, support staff and site/side marked as required  Patient understanding: patient states understanding of the procedure being performed  Patient identity confirmed: verbally with patient      Performed by: PA  Debridement type: selective  Pain control: lidocaine 4%  Post-debridement measurements  Length (cm): 8 5  Width (cm): 4 6  Depth (cm): 0 2  Percent debrided: 100%  Surface Area (cm^2): 39 1  Area debrided (cm^2): 39 1  Volume (cm^3): 7 82  Devitalized tissue debrided: biofilm  Instrument(s) utilized: curette  Bleeding: small  Hemostasis obtained with: pressure  Procedural pain (0-10): 0  Post-procedural pain: 0   Response to treatment: procedure was tolerated well    Debridement   Wound 02/17/22 Pressure Injury Leg Right;Lateral;Lower    Universal Protocol:  Consent: Verbal consent obtained  Consent given by: patient  Time out: Immediately prior to procedure a "time out" was called to verify the correct patient, procedure, equipment, support staff and site/side marked as required    Patient understanding: patient states understanding of the procedure being performed  Patient identity confirmed: verbally with patient      Performed by: PA  Debridement type: selective  Pain control: lidocaine 4%  Post-debridement measurements  Length (cm): 0 7  Width (cm): 1 4  Depth (cm): 0 1  Percent debrided: 90%  Surface Area (cm^2): 0 98  Area debrided (cm^2): 0 88  Volume (cm^3): 0 1  Devitalized tissue debrided: fibrin  Instrument(s) utilized: curette  Bleeding: small  Hemostasis obtained with: pressure  Procedural pain (0-10): 0  Post-procedural pain: 0   Response to treatment: procedure was tolerated well           Results from last 6 Months   Lab Units 07/21/22  1104   WOUND CULTURE  2+ Growth of Pseudomonas aeruginosa*  2+ Growth of Proteus mirabilis*  2+ Growth of            Wound Instructions:  Orders Placed This Encounter   Procedures    Wound cleansing and dressings     Wash your hands with soap and water  Remove old dressing, discard into plastic bag and place in trash  Cleanse the wound with mild soap and water prior to applying a clean dressing  PLEASE WASH LEGS WITH SOAP AND WATER PRIOR TO DRESSING CHANGES   Do not use tissue or cotton balls  Do not scrub the wound  Pat dry using gauze  Shower yes with protection, purchase cast cover  Or sponge bathe       Right Leg Wound:   Apply skin prep to the periwound  Cover the wound with hydrofera blue (writing side of the dressing facing out)   Apply coflex lite  Change dressings 2 times per week      Left leg wound:  Apply nystatin powder and barrier cream to the itchy red area distal of the wound  Cover the wound with hydrofera blue (writing side of the dressing facing out)   Apply coflex lite  Change dressings 2 times per week  If coflex lite slides down, apply circaids until visiting nurses can reapply coflex     Roll towels or blankets and place under ankles for offloading wounds/heals  Apply dycem to offloading boots      Continue visiting nurses skilled twice a week until 8/25/22 for wound care dressing changes   Follow up in wound center in one week     Treatment today in wound center:  Left Leg Wound cleansed with soap and water, dakin's soak for 5 minutes then rinsed with normal saline solution, and redressed as ordered above  Right Leg cleansed with soap and water and redressed as ordered above     Standing Status:   Future     Standing Expiration Date:   8/11/2023    Debridement     This order was created via procedure documentation    Debridement     This order was created via procedure documentation       Cally Rossi, PA-C        Portions of the record may have been created with voice recognition software   Occasional wrong word or "sound alike" substitutions may have occurred due to the inherent limitations of voice recognition software  Read the chart carefully and recognize, using context, where substitutions have occurred

## 2022-08-11 NOTE — PATIENT INSTRUCTIONS
Orders Placed This Encounter   Procedures    Wound cleansing and dressings     Wash your hands with soap and water  Remove old dressing, discard into plastic bag and place in trash  Cleanse the wound with mild soap and water prior to applying a clean dressing  PLEASE WASH LEGS WITH SOAP AND WATER PRIOR TO DRESSING CHANGES   Do not use tissue or cotton balls  Do not scrub the wound  Pat dry using gauze  Shower yes with protection, purchase cast cover  Or sponge bathe  Right Leg Wound:   Apply skin prep to the periwound  Cover the wound with hydrofera blue (writing side of the dressing facing out)   Apply coflex lite  Change dressings 2 times per week  Left leg wound:  Apply nystatin powder and barrier cream to the itchy red area distal of the wound  Cover the wound with hydrofera blue (writing side of the dressing facing out)   Apply coflex lite  Change dressings 2 times per week  If coflex lite slides down, apply circaids until visiting nurses can reapply coflex     Roll towels or blankets and place under ankles for offloading wounds/heals  Apply dycem to offloading boots      Continue visiting nurses skilled twice a week until 8/25/22 for wound care dressing changes   Follow up in wound center in one week     Treatment today in wound center:  Left Leg Wound cleansed with soap and water, dakin's soak for 5 minutes then rinsed with normal saline solution, and redressed as ordered above     Right Leg cleansed with soap and water and redressed as ordered above     Standing Status:   Future     Standing Expiration Date:   8/11/2023

## 2022-08-15 ENCOUNTER — HOME CARE VISIT (OUTPATIENT)
Dept: HOME HEALTH SERVICES | Facility: HOME HEALTHCARE | Age: 57
End: 2022-08-15
Payer: MEDICARE

## 2022-08-15 VITALS
HEART RATE: 91 BPM | RESPIRATION RATE: 22 BRPM | SYSTOLIC BLOOD PRESSURE: 152 MMHG | OXYGEN SATURATION: 98 % | DIASTOLIC BLOOD PRESSURE: 90 MMHG

## 2022-08-15 PROCEDURE — G0299 HHS/HOSPICE OF RN EA 15 MIN: HCPCS

## 2022-08-18 ENCOUNTER — HOME CARE VISIT (OUTPATIENT)
Dept: HOME HEALTH SERVICES | Facility: HOME HEALTHCARE | Age: 57
End: 2022-08-18
Payer: MEDICARE

## 2022-08-18 VITALS
DIASTOLIC BLOOD PRESSURE: 72 MMHG | TEMPERATURE: 97.1 F | RESPIRATION RATE: 18 BRPM | SYSTOLIC BLOOD PRESSURE: 138 MMHG | OXYGEN SATURATION: 97 % | HEART RATE: 76 BPM

## 2022-08-18 PROCEDURE — G0299 HHS/HOSPICE OF RN EA 15 MIN: HCPCS

## 2022-08-22 ENCOUNTER — HOME CARE VISIT (OUTPATIENT)
Dept: HOME HEALTH SERVICES | Facility: HOME HEALTHCARE | Age: 57
End: 2022-08-22
Payer: MEDICARE

## 2022-08-22 PROCEDURE — G0299 HHS/HOSPICE OF RN EA 15 MIN: HCPCS

## 2022-08-22 PROCEDURE — 400013 VN SOC

## 2022-08-24 VITALS
SYSTOLIC BLOOD PRESSURE: 138 MMHG | DIASTOLIC BLOOD PRESSURE: 72 MMHG | TEMPERATURE: 97.5 F | RESPIRATION RATE: 16 BRPM | OXYGEN SATURATION: 96 % | HEART RATE: 78 BPM

## 2022-08-25 ENCOUNTER — OFFICE VISIT (OUTPATIENT)
Dept: WOUND CARE | Facility: CLINIC | Age: 57
End: 2022-08-25
Payer: MEDICARE

## 2022-08-25 VITALS
RESPIRATION RATE: 20 BRPM | SYSTOLIC BLOOD PRESSURE: 152 MMHG | DIASTOLIC BLOOD PRESSURE: 92 MMHG | TEMPERATURE: 97.9 F | HEART RATE: 92 BPM

## 2022-08-25 DIAGNOSIS — L89.893 PRESSURE INJURY OF LEFT LEG, STAGE 3 (HCC): ICD-10-CM

## 2022-08-25 DIAGNOSIS — E66.01 MORBID OBESITY WITH BMI OF 70 AND OVER, ADULT (HCC): ICD-10-CM

## 2022-08-25 DIAGNOSIS — L89.893 PRESSURE INJURY OF RIGHT LEG, STAGE 3 (HCC): Primary | ICD-10-CM

## 2022-08-25 PROCEDURE — 99213 OFFICE O/P EST LOW 20 MIN: CPT | Performed by: FAMILY MEDICINE

## 2022-08-25 RX ORDER — LIDOCAINE 40 MG/G
CREAM TOPICAL ONCE
Status: COMPLETED | OUTPATIENT
Start: 2022-08-25 | End: 2022-08-25

## 2022-08-25 RX ADMIN — LIDOCAINE: 40 CREAM TOPICAL at 15:19

## 2022-08-25 NOTE — PROGRESS NOTES
Patient ID: Jeffry Mccloud is a 56 y o  male Date of Birth 1965       Chief Complaint   Patient presents with    Follow Up Wound Care Visit     BLE wounds       Allergies:  Gluten meal - food allergy and Clindamycin    Diagnosis:      Diagnosis ICD-10-CM Associated Orders   1  Pressure injury of right leg, stage 3 ([facility])  L89 893 Wound cleansing and dressings   2  Pressure injury of left leg, stage 3 (HCC)  L89 893 lidocaine (LMX) 4 % cream     Wound cleansing and dressings   3  Morbid obesity with BMI of 70 and over, adult ([facility] )  E66 01     Z68 45            Assessment & Plan:   Improving stage III pressure injury of the right calf  Only small open area remains   Slightly improved pressure injury of the left lateral calf  Stage III  Good granulation tissue   Continue same wound care  No compression wrap but rather use Circ Aids   Patient is now sleeping in bed any states pressure should be relieved  Subjective:   2/17/22: This is a 53-year-old male who comes in [address] [place] [address] with ulcers on the right and left calfs  He states that he was hospitalized on November of 2021 with cellulitis and he had the ulcers at that time  Patient is morbidly obese and has history of lymphedema as well  He does not use any form of compression other than Ace wraps  He had purchased alginate on the Internet as well as bordered foam is  He notes that there is heavy drainage requiring dressings to be changed once or twice a day  He believes that these ulcers had started due to  sleeping in a recliner and the foot rest causing pressure on the calfs  He is unable to sleep in a bed  He notes he has increased pain at nighttime when in there is recliner with pressure on his calf  He is ambulatory with a walker but does not go out of the house other than to doctor visits  Patient also has a history of type 2 diabetes with good control with last A1c 6 5 in October of 2021   He states that he has lost approximately 120 lb in the past nine months with a special meal plan  He plans on continuing with weight loss  2/24/22: Followup stage III pressure injuries of right and left calfs  They purchased memory foam pillows which seems to help with both pain and offloading the areas when he is sleeping  Still has pain mainly at night  Left greater than right  3/3/22: Followup stage III pressure injuries of the right left calfs  Patient is upset about visiting nurse not having proper supplies  Then there was leakage through the [facility] [employer]  Still has pain at nighttime  No fever chills  3/10/22: Followup stage III pressure injuries of the right and left calfs  Patient states that the visiting nurse had a "observer" do the Unna boot on his right leg which slid down  Still has pain mainly in the left calf  They are trying various offloading techniques  3/17/22: Followup stage III pressure injuries of the right and left calfs  Patient states he still has pain but slightly less particularly on the left  Starting to have some itching  No fever or chills  Still has not found the proper offloading device  3/24/22: Followup stage III pressure injuries of both calfs continues to have some pain as in the past   No fever or chills  Trying to offload when in bed     3/31/22: Followup stage III pressure injuries of both lateral calf  Continues to have the same amount of pain  States he is trying offload  No fever chills  4/7/22:  Patient is a patient of Dr Alexus Mohan who presents for followup of bilateral LE ulcers  Has had antimicrobial endoform and hydrofera blue on the wounds and Unna boot for compression    4/14/22: Followup stage III pressure injuries of both lateral calfs  Unna boots  Has no pain on the left calf anymore  Much less on the right  No fever chills  4/21/22: Followup stage III pressure injuries of both lateral calfs  Tolerating Unna boots although they tend to slide down  No pain at this time  4/28/22: Followup stage III pressure injuries of both lateral calfs  No complaints at this time  No pain  5/5/22: Followup stage III pressure injuries of both lateral calf  No complaints  No pain  5/12/22: Followup stage III pressure injuries of both lateral calfs  At last visit, the left calf was closed  Hydrocolloid on the right calf was leaking  No other complaints  5/19/22: Followup stage III pressure injury of the right lateral calf  Left calf was closed at last visit  Received a telephone call from GRANT stating that the left calf has a new ulcer adjacent to the healed area  Tubigrip was use on the left because of previously healed ulcer  Bilateral lower extremity lymphedema  VNA placed new Unna boot on the left  No other complaints  5/26/22: Followup stage III pressure injuries of the right and left calfs  At last visit, the left calf reopened  No new complaints today  Tolerating Unna boot  06/02/22: Followup stage III pressure injuries of the R and L calfs  Polymem with Unna boot placed last visit  Denies fevers, chills, no new complaints  6/9/22: Followup stage III pressure injuries of the right and left calfs  This past week he has worn Unna boot on the left with alginate and circ aid on the right  No complaints  6/16/22: Followup stage III pressure injuries of the right left calfs  Doing well with circ aid on the right and Unna boot on the left  No significant pain  No other complaints  6/30/22: Followup stage III pressure injuries of the right and left calfs  Wearing Circ Aids on both legs  Seems to be doing well  No specific complaints  Denies pain  No fever or chills  07/14/22: Followup stage III pressure injuries of R and L calves  Has been wearing CircAids with Hydrofera to wound beds  Pt missed dressing change this past week d/t wife being in hospital  Hydrofera was left in place for about one week   No fevers, chills, pain      07/21/22: Followup stage 3 pressure injuries of R and L calves  Has been wearing CircAid with Hydrofera to R leg  Acticoat with alginate and unna boot was being used to L leg, however the Unna boot slid down therefore it was removed and hydrofera was being used on wound and CircAId used for compression  He notes there has been increased pain to the wound and that he noticed green/black drainage from the wound  Wife was helping him with dressing changes previously, however she is currently in hospital and therefore has not been able to help him with dressing changes past two weeks  Denies fevers or chills  07/28/22: Followup stage 3 pressure injuries of R and L calves  Has been using Hydrofera blue to wound beds and using CircAids for compression  Pt was placed on Ciprofloxacin at previous visit; culture grew Pseudomonas susceptible to Cipro, therefore pt was continued on the antibiotic and still has about three days left of the course  No fevers or chills  Offers no complaints today  08/04/22: Followup stage 3 pressure injuries of R and L calves  Has been using Hydrofera blue to bilateral wounds as well as bilateral Unna boots for compression  He has completed his course of Ciprofloxacin; notes diarrhea towards the end of the course but it is improving  Notes his L leg wound is beginning to itch  Sleeps in recliner and has been attempting to keep pressure off of his legs with boots which he states are helping  Has been eating yogurt in diet  No fevers or chills  08/11/22: Followup stage 3 pressure injuries of the R and L calves  Has been using Hydrofera blue to bilateral wounds and Unna boots  States that the [facility] [facility] after the office visit last Thursday  He got a Prevlon boot with integrated wedge to attempt to offload the area, however this was unable to keep his leg up; the smooth bottom of the wedge did not have enough traction to keep his leg in place  No fevers or chills  8/25/22: Followup stage III pressure injuries of the right left calfs  Hydrofera is being used  Compression wraps continue to slide down in the patient refuses to have them placed again  He would like to use the Circ Aids that he has now  He does have VNA  For the last four nights he has been sleeping in bed on his back with his C-Pap  He has not been able to do this in the past   In the past he has been sleeping in a chair and supposedly this is when his legs are externally rotated causing the pressure injuries        The following portions of the patient's history were reviewed and updated as appropriate:   Patient Active Problem List   Diagnosis    Type 2 diabetes mellitus with hyperglycemia, with long-term current use of insulin ([address] )    Hyperlipidemia    Psoriasis    Venous insufficiency    Gout    Essential hypertension    Gluten intolerance    Diabetic neuropathy ([address] )    Insomnia    Erectile dysfunction    Bilateral leg edema    Elevated CO2 level    Abnormal thyroid function test    DAHIANA (obstructive sleep apnea)    Morbid obesity with BMI of 70 and over, adult ([address] )    Migraine headache    Onychomycosis    Ulcer of left lower extremity, limited to breakdown of skin ([address] )    Pressure injury of right leg, stage 3 (HCC)    Metabolic syndrome    Low testosterone in male    Hypercalcemia    Hypothyroidism     Past Medical History:   Diagnosis Date    Acute kidney injury 10/28/2021    Anemia 09/13/2019    Cellulitis     last assessed 12/10/15    Cellulitis of left lower extremity     Cellulitis of right lower extremity 11/19/2021    Cough 10/20/2019    Diabetes mellitus ([address] )     Disease of thyroid gland     Edema     Elevated liver enzymes     Elevated serum creatinine 11/19/2021    Esophageal reflux     Fungal infection 8/16/2021    Gluten intolerance     Gout     last assessed 09/05/13    Hyperglycemia     Hypertension     Hyponatremia 9/6/2019    IBS (irritable bowel syndrome)     Insomnia     Obesity     Osteoarthritis of knee     last assessed 02/10/14    Scrotal swelling 5/19/2020    Shortness of breath 5/3/2021    Venous insufficiency     last assessed 08/22/17    Villonodular synovitis of the hand, right     last assessed 11/14/2013     Past Surgical History:   Procedure Laterality Date    INCISION AND DRAINAGE OF WOUND Left 9/6/2019    Procedure: INCISION AND DRAINAGE (I&D) GROIN;  Surgeon: Eleazar Vaz DO;  [facility];  Service: General    SKIN BIOPSY      WOUND DEBRIDEMENT Left 9/7/2019    Procedure: EXCISIONAL DEBRIDEMENT;  Surgeon: Eleazar Vaz DO;  [facility];  Service: General     Family History   Problem Relation Age of Onset    Cancer Mother         gastrc    Colon cancer Father     Heart failure Father       Social History     Socioeconomic History    Marital status: /Civil Union     Spouse name: None    Number of children: None    Years of education: None    Highest education level: None   Occupational History    None   Tobacco Use    Smoking status: Never Smoker    Smokeless tobacco: Never Used   Vaping Use    Vaping Use: Never used   Substance and Sexual Activity    Alcohol use: Not Currently     Alcohol/week: 0 0 standard drinks    Drug use: No    Sexual activity: Yes   Other Topics Concern    None   Social History Narrative    None     Social Determinants of Health     Financial Resource Strain: Not on file   Food Insecurity: Not on file   Transportation Needs: No Transportation Needs    Lack of Transportation (Medical): No    Lack of Transportation (Non-Medical):  No   Physical Activity: Not on file   Stress: Not on file   Social Connections: Not on file   Intimate Partner Violence: Not on file   Housing Stability: Not on file        Current Outpatient Medications:     acetaminophen (TYLENOL) 325 mg tablet, Take 2 tablets (650 mg total) by mouth every 4 (four) hours as needed for mild pain, headaches or fever, Disp: , Rfl: 0    albuterol (PROVENTIL HFA,VENTOLIN HFA) 90 mcg/act inhaler, TAKE 2 PUFFS BY MOUTH EVERY 6 HOURS AS NEEDED FOR WHEEZE, Disp: 8 5 g, Rfl: 0    BD Pen Needle Ludmila 2nd Gen 32G X 4 MM MISC, USE 4 TIMES A DAY, Disp: 200 each, Rfl: 3    Blood Glucose Monitoring Suppl (ONETOUCH VERIO) w/Device KIT, Use to test sugar daily (Patient not taking: Reported on 8/5/2022), Disp: 1 kit, Rfl: 0    Calcium Alginate (Maxorb II Alginate Dressing) MISC, Apply 1 each topically in the morning, Disp: 10 each, Rfl: 2    Continuous Blood Gluc  (FreeStyle Connell 2 Cost Systm) BROOKS, Use 1 Device as needed (use with sensors for bs checks), Disp: 1 Device, Rfl: 0    Continuous Blood Gluc Sensor (FreeStyle Elvin 14 Day Sensor) MISC, Use as directed 3 times a day, Disp: 2 each, Rfl: 3    Control Gel Formula Dressing (DuoDERM CGF Dressing) MISC, Apply 1 application topically every 5 (five) days, Disp: 5 each, Rfl: 1    [employer]S Diclofenac Sodium 1 %, APPLY 4 G TOPICALLY 4 (FOUR) TIMES A DAY, Disp: 100 g, Rfl: 0    ezetimibe (ZETIA) 10 mg tablet, Take 1 tablet (10 mg total) by mouth daily, Disp: 90 tablet, Rfl: 1    furosemide (LASIX) 20 mg tablet, TAKE 2 TABLETS EVERY DAY IN THE EARLY MORNING AND 1 TABLET DAILY AFTER LUNCH , Disp: 270 tablet, Rfl: 2    gabapentin (NEURONTIN) 100 mg capsule, TAKE 1 CAPSULE BY MOUTH TWICE A DAY WITH 300MG CAPSULE, Disp: 180 capsule, Rfl: 2    gabapentin (NEURONTIN) 300 mg capsule, TAKE 1 CAPSULE (300 MG TOTAL) BY MOUTH 2 (TWO) TIMES A DAY TAKE 1 TAB WITH YOUR 100MG TAB TWICE DAILY, Disp: 180 capsule, Rfl: 2    Gauze Pads & Dressings 5"X5" PADS, Use in the morning, Disp: 20 each, Rfl: 2    insulin aspart (NovoLOG FlexPen) 100 UNIT/ML injection pen, INJECT 18 UNITS WITH MEALS+SCALE ( - 150-200 -2 UNITS, 201-250-4 UNITS, 251-300 -6 UNITS, 301-350-8 UNITS, > 350- 10 UNITS ), Disp: 45 mL, Rfl: 1    insulin glargine (Lantus SoloStar) 100 units/mL injection pen, Inject 44 Units under the skin daily at bedtime, Disp: 18 mL, Rfl: 2    Lancets (ONETOUCH ULTRASOFT) lancets, Use to test sugar 2 times a day, Disp: 100 each, Rfl: 3    levothyroxine (Euthyrox) 25 mcg tablet, Take 1 tablet (25 mcg total) by mouth daily in the early morning, Disp: 90 tablet, Rfl: 1    losartan (COZAAR) 25 mg tablet, Take 1 tablet (25 mg total) by mouth daily, Disp: 90 tablet, Rfl: 0    metFORMIN (GLUCOPHAGE) 1000 MG tablet, Take 1 tablet (1,000 mg total) by mouth 2 (two) times a day, Disp: 180 tablet, Rfl: 0    nystatin (MYCOSTATIN) cream, Apply topically 2 (two) times a day as needed (itching, redness), Disp: 30 g, Rfl: 1    nystatin (MYCOSTATIN) powder, Apply 1 application topically 2 (two) times a day To affected area, Disp: 120 g, Rfl: 3    omeprazole (PriLOSEC) 40 MG capsule, TAKE 1 CAPSULE BY MOUTH TWICE A DAY, Disp: 180 capsule, Rfl: 1    OneTouch Verio test strip, USE TO TEST SUGAR 2 TIMES A DAY, Disp: 100 strip, Rfl: 3    oxyCODONE (ROXICODONE) 5 immediate release tablet, Take 1-2 tabs by mouth every 6 hours as needed for moderate or severe pains respectively  , Disp: 15 tablet, Rfl: 0    psyllium (METAMUCIL) packet, Take 1 packet by mouth daily, Disp: , Rfl: 0    saccharomyces boulardii (FLORASTOR) 250 mg capsule, Take 1 capsule (250 mg total) by mouth 2 (two) times a day, Disp: 60 capsule, Rfl: 1    Skin Protectants, Misc  ([employer] Textile 10"x144") SHEE, Apply 1 each topically every 5 (five) days, Disp: 3 each, Rfl: 5    sodium hypochlorite (DAKIN'S QUARTER-STRENGTH), Irrigate with 1 application as directed daily, Disp: 473 mL, Rfl: 0  No current facility-administered medications for this visit  Review of Systems   Constitutional: Negative for appetite change, chills, fatigue, fever and unexpected weight change  HENT: Negative for congestion, hearing loss, postnasal drip and sinus pressure  Respiratory: Positive for shortness of breath (On exertion)   Negative for cough  Cardiovascular: Positive for leg swelling (Lymphedema)  Endocrine: Negative  Musculoskeletal: Positive for gait problem Viji Ramon)  Negative for back pain  Skin: Positive for wound (Bilateral calfs)  Negative for rash  Allergic/Immunologic: Negative  Hematological: Does not bruise/bleed easily  Psychiatric/Behavioral: Negative  Negative for dysphoric mood  The patient is not nervous/anxious  Objective:  /92   Pulse 92   Temp 97 9 °F (36 6 °C)   Resp 20   Pain Score:   4     Physical Exam  Vitals and nursing note reviewed  Constitutional:       General: He is not in acute distress  Appearance: He is morbidly obese  He is not ill-appearing  HENT:      Head: Normocephalic and atraumatic  Cardiovascular:      Rate and Rhythm: Tachycardia present  Comments: Tachycardia related to exertion  Pulmonary:      Effort: Pulmonary effort is normal  No respiratory distress  Musculoskeletal:      Right lower leg: Edema present  Left lower leg: Edema present  Skin:     General: Skin is warm and dry  Findings: Wound present  No erythema  Comments: Left calf pressure injury with  granulation tissue  Smaller in size  No surrounding erythema, edema or lymphangitic streaking  The R lateral calf wound has decreased in size from previous visit  Almost closed  Wound base is granular  No signs acute infection  See full wound assessment  Neurological:      Mental Status: He is alert and oriented to person, place, and time  Gait: Gait abnormal    Psychiatric:         Attention and Perception: Attention normal          Mood and Affect: Mood and affect normal          Behavior: Behavior is cooperative           Cognition and Memory: Cognition normal                          Results from last 6 Months   Lab Units 07/21/22  1104   WOUND CULTURE  2+ Growth of Pseudomonas aeruginosa*  2+ Growth of Proteus mirabilis*  2+ Growth of        Wound Instructions:  Orders Placed This Encounter   Procedures    Wound cleansing and dressings         Wash your hands with soap and water  Remove old dressing, discard into plastic bag and place in trash  Cleanse the wound with mild soap and water prior to applying a clean dressing  PLEASE WASH LEGS WITH SOAP AND WATER PRIOR TO DRESSING CHANGES   Do not use tissue or cotton balls  Do not scrub the wound  Pat dry using gauze  Shower yes with protection, purchase cast cover  Or sponge bathe       Right Leg Wound:   Cover the wound with hydrofera blue (writing side of the dressing facing out)   Secure with rolled gauze  Apply patient's circaid wraps  Change dressings 2 times per week      Left leg wound:  Cover the wound with hydrofera blue (writing side of the dressing facing out)   Secure with rolled gauze and tape  Apply patient's circaid wraps  Change dressings 2 times per week         Roll towels or blankets and place under ankles for offloading wounds/heals  Apply dycem to offloading boots      Continue visiting nurses skilled twice a week, except on the day the patient goes to the wound center  Follow up in wound center in 2 weeks        Treatment today in wound center: Cleansed with NSS, and dressed as above  Standing Status:   Future     Standing Expiration Date:   8/25/2023             Barbra Byers MD, CHT, CWS    Portions of the record may have been created with voice recognition software  Occasional wrong word or "sound alike" substitutions may have occurred due to the inherent limitations of voice recognition software  Read the chart carefully and recognize, using context, where substitutions have occurred

## 2022-08-25 NOTE — PATIENT INSTRUCTIONS
Orders Placed This Encounter   Procedures    Wound cleansing and dressings         Wash your hands with soap and water  Remove old dressing, discard into plastic bag and place in trash  Cleanse the wound with mild soap and water prior to applying a clean dressing  PLEASE WASH LEGS WITH SOAP AND WATER PRIOR TO DRESSING CHANGES   Do not use tissue or cotton balls  Do not scrub the wound  Pat dry using gauze  Shower yes with protection, purchase cast cover  Or sponge bathe  Right Leg Wound:   Cover the wound with hydrofera blue (writing side of the dressing facing out)   Secure with rolled gauze  Apply patient's circaid wraps  Change dressings 2 times per week  Left leg wound:  Cover the wound with hydrofera blue (writing side of the dressing facing out)   Secure with rolled gauze and tape  Apply patient's circaid wraps  Change dressings 2 times per week  Roll towels or blankets and place under ankles for offloading wounds/heals  Apply dycem to offloading boots      Continue visiting nurses skilled twice a week, except on the day the patient goes to the wound center  Follow up in wound center in 2 weeks  Treatment today in wound center: Cleansed with NSS, and dressed as above       Standing Status:   Future     Standing Expiration Date:   8/25/2023 [de-identified] : 52F returns for a post-op visit s/p excision of thyroglossal duct cyst on 1/22/19.  She feels a strange sensation in her throat and may be coming down with something, mentions there is strep throat at her child's school.  She is concerned about the scar and edema.

## 2022-08-29 ENCOUNTER — HOME CARE VISIT (OUTPATIENT)
Dept: HOME HEALTH SERVICES | Facility: HOME HEALTHCARE | Age: 57
End: 2022-08-29
Payer: MEDICARE

## 2022-08-29 PROCEDURE — G0299 HHS/HOSPICE OF RN EA 15 MIN: HCPCS

## 2022-08-31 VITALS
RESPIRATION RATE: 18 BRPM | TEMPERATURE: 97.5 F | HEART RATE: 90 BPM | OXYGEN SATURATION: 97 % | SYSTOLIC BLOOD PRESSURE: 128 MMHG | DIASTOLIC BLOOD PRESSURE: 88 MMHG

## 2022-09-01 ENCOUNTER — HOME CARE VISIT (OUTPATIENT)
Dept: HOME HEALTH SERVICES | Facility: HOME HEALTHCARE | Age: 57
End: 2022-09-01
Payer: MEDICARE

## 2022-09-01 PROCEDURE — G0299 HHS/HOSPICE OF RN EA 15 MIN: HCPCS

## 2022-09-02 VITALS
HEART RATE: 88 BPM | OXYGEN SATURATION: 96 % | TEMPERATURE: 97.3 F | RESPIRATION RATE: 16 BRPM | SYSTOLIC BLOOD PRESSURE: 122 MMHG | DIASTOLIC BLOOD PRESSURE: 82 MMHG

## 2022-09-04 ENCOUNTER — PATIENT MESSAGE (OUTPATIENT)
Dept: INTERNAL MEDICINE CLINIC | Facility: CLINIC | Age: 57
End: 2022-09-04

## 2022-09-04 DIAGNOSIS — L03.116 CELLULITIS OF LEFT LOWER EXTREMITY: Primary | ICD-10-CM

## 2022-09-05 ENCOUNTER — HOME CARE VISIT (OUTPATIENT)
Dept: HOME HEALTH SERVICES | Facility: HOME HEALTHCARE | Age: 57
End: 2022-09-05
Payer: MEDICARE

## 2022-09-05 NOTE — CASE COMMUNICATION
Called pt to set up appt for 9/5  Pt returned call later and left a message stating he was sick and did not want SN visit

## 2022-09-06 ENCOUNTER — HOME CARE VISIT (OUTPATIENT)
Dept: HOME HEALTH SERVICES | Facility: HOME HEALTHCARE | Age: 57
End: 2022-09-06
Payer: MEDICARE

## 2022-09-06 ENCOUNTER — APPOINTMENT (EMERGENCY)
Dept: CT IMAGING | Facility: HOSPITAL | Age: 57
DRG: 720 | End: 2022-09-06
Payer: MEDICARE

## 2022-09-06 ENCOUNTER — HOSPITAL ENCOUNTER (INPATIENT)
Facility: HOSPITAL | Age: 57
LOS: 4 days | Discharge: HOME HEALTH | DRG: 720 | End: 2022-09-12
Attending: INTERNAL MEDICINE | Admitting: INTERNAL MEDICINE
Payer: MEDICARE

## 2022-09-06 DIAGNOSIS — G89.29 CHRONIC PAIN: ICD-10-CM

## 2022-09-06 DIAGNOSIS — E11.65 UNCONTROLLED DIABETES MELLITUS WITH HYPERGLYCEMIA, WITH LONG-TERM CURRENT USE OF INSULIN (HCC): ICD-10-CM

## 2022-09-06 DIAGNOSIS — L03.119 RECURRENT CELLULITIS OF LOWER EXTREMITY: ICD-10-CM

## 2022-09-06 DIAGNOSIS — M79.606 LEG PAIN: ICD-10-CM

## 2022-09-06 DIAGNOSIS — L03.116 CELLULITIS OF LEFT LOWER EXTREMITY: ICD-10-CM

## 2022-09-06 DIAGNOSIS — R06.02 SOB (SHORTNESS OF BREATH): ICD-10-CM

## 2022-09-06 DIAGNOSIS — E87.1 HYPONATREMIA: ICD-10-CM

## 2022-09-06 DIAGNOSIS — Z79.4 UNCONTROLLED DIABETES MELLITUS WITH HYPERGLYCEMIA, WITH LONG-TERM CURRENT USE OF INSULIN (HCC): ICD-10-CM

## 2022-09-06 DIAGNOSIS — L03.116 CELLULITIS OF LEFT LEG: Primary | ICD-10-CM

## 2022-09-06 DIAGNOSIS — G89.29 OTHER CHRONIC PAIN: ICD-10-CM

## 2022-09-06 DIAGNOSIS — L89.893 PRESSURE INJURY OF LEFT LEG, STAGE 3 (HCC): ICD-10-CM

## 2022-09-06 LAB
ALBUMIN SERPL BCP-MCNC: 3 G/DL (ref 3.5–5)
ALP SERPL-CCNC: 74 U/L (ref 34–104)
ALT SERPL W P-5'-P-CCNC: 46 U/L (ref 7–52)
ANION GAP SERPL CALCULATED.3IONS-SCNC: 10 MMOL/L (ref 4–13)
AST SERPL W P-5'-P-CCNC: 39 U/L (ref 13–39)
BASOPHILS # BLD AUTO: 0.08 THOUSANDS/ΜL (ref 0–0.1)
BASOPHILS NFR BLD AUTO: 1 % (ref 0–1)
BILIRUB SERPL-MCNC: 1.43 MG/DL (ref 0.2–1)
BUN SERPL-MCNC: 29 MG/DL (ref 5–25)
CALCIUM ALBUM COR SERPL-MCNC: 10 MG/DL (ref 8.3–10.1)
CALCIUM SERPL-MCNC: 9.2 MG/DL (ref 8.4–10.2)
CHLORIDE SERPL-SCNC: 101 MMOL/L (ref 96–108)
CO2 SERPL-SCNC: 23 MMOL/L (ref 21–32)
CREAT SERPL-MCNC: 1.28 MG/DL (ref 0.6–1.3)
EOSINOPHIL # BLD AUTO: 0.44 THOUSAND/ΜL (ref 0–0.61)
EOSINOPHIL NFR BLD AUTO: 4 % (ref 0–6)
ERYTHROCYTE [DISTWIDTH] IN BLOOD BY AUTOMATED COUNT: 14.5 % (ref 11.6–15.1)
GFR SERPL CREATININE-BSD FRML MDRD: 62 ML/MIN/1.73SQ M
GLUCOSE SERPL-MCNC: 184 MG/DL (ref 65–140)
GLUCOSE SERPL-MCNC: 248 MG/DL (ref 65–140)
HCT VFR BLD AUTO: 36.8 % (ref 36.5–49.3)
HGB BLD-MCNC: 12.6 G/DL (ref 12–17)
IMM GRANULOCYTES # BLD AUTO: 0.23 THOUSAND/UL (ref 0–0.2)
IMM GRANULOCYTES NFR BLD AUTO: 2 % (ref 0–2)
LACTATE SERPL-SCNC: 1.8 MMOL/L (ref 0.5–2)
LYMPHOCYTES # BLD AUTO: 1.53 THOUSANDS/ΜL (ref 0.6–4.47)
LYMPHOCYTES NFR BLD AUTO: 13 % (ref 14–44)
MCH RBC QN AUTO: 30.2 PG (ref 26.8–34.3)
MCHC RBC AUTO-ENTMCNC: 34.2 G/DL (ref 31.4–37.4)
MCV RBC AUTO: 88 FL (ref 82–98)
MONOCYTES # BLD AUTO: 1.24 THOUSAND/ΜL (ref 0.17–1.22)
MONOCYTES NFR BLD AUTO: 10 % (ref 4–12)
NEUTROPHILS # BLD AUTO: 8.67 THOUSANDS/ΜL (ref 1.85–7.62)
NEUTS SEG NFR BLD AUTO: 70 % (ref 43–75)
NRBC BLD AUTO-RTO: 0 /100 WBCS
PLATELET # BLD AUTO: 155 THOUSANDS/UL (ref 149–390)
PLATELET # BLD AUTO: 155 THOUSANDS/UL (ref 149–390)
PMV BLD AUTO: 8.9 FL (ref 8.9–12.7)
PMV BLD AUTO: 8.9 FL (ref 8.9–12.7)
POTASSIUM SERPL-SCNC: 3.7 MMOL/L (ref 3.5–5.3)
PROCALCITONIN SERPL-MCNC: 2.75 NG/ML
PROT SERPL-MCNC: 6.7 G/DL (ref 6.4–8.4)
RBC # BLD AUTO: 4.17 MILLION/UL (ref 3.88–5.62)
SODIUM SERPL-SCNC: 134 MMOL/L (ref 135–147)
WBC # BLD AUTO: 12.19 THOUSAND/UL (ref 4.31–10.16)

## 2022-09-06 PROCEDURE — 85025 COMPLETE CBC W/AUTO DIFF WBC: CPT | Performed by: PHYSICIAN ASSISTANT

## 2022-09-06 PROCEDURE — 36415 COLL VENOUS BLD VENIPUNCTURE: CPT | Performed by: PHYSICIAN ASSISTANT

## 2022-09-06 PROCEDURE — 99285 EMERGENCY DEPT VISIT HI MDM: CPT | Performed by: PHYSICIAN ASSISTANT

## 2022-09-06 PROCEDURE — 96375 TX/PRO/DX INJ NEW DRUG ADDON: CPT

## 2022-09-06 PROCEDURE — 83605 ASSAY OF LACTIC ACID: CPT

## 2022-09-06 PROCEDURE — 84145 PROCALCITONIN (PCT): CPT | Performed by: PHYSICIAN ASSISTANT

## 2022-09-06 PROCEDURE — G1004 CDSM NDSC: HCPCS

## 2022-09-06 PROCEDURE — 73700 CT LOWER EXTREMITY W/O DYE: CPT

## 2022-09-06 PROCEDURE — 96365 THER/PROPH/DIAG IV INF INIT: CPT

## 2022-09-06 PROCEDURE — 80053 COMPREHEN METABOLIC PANEL: CPT | Performed by: PHYSICIAN ASSISTANT

## 2022-09-06 PROCEDURE — 99285 EMERGENCY DEPT VISIT HI MDM: CPT

## 2022-09-06 PROCEDURE — 85049 AUTOMATED PLATELET COUNT: CPT

## 2022-09-06 PROCEDURE — 87040 BLOOD CULTURE FOR BACTERIA: CPT | Performed by: PHYSICIAN ASSISTANT

## 2022-09-06 PROCEDURE — 82948 REAGENT STRIP/BLOOD GLUCOSE: CPT

## 2022-09-06 PROCEDURE — 99220 PR INITIAL OBSERVATION CARE/DAY 70 MINUTES: CPT

## 2022-09-06 RX ORDER — ACETAMINOPHEN 325 MG/1
975 TABLET ORAL ONCE
Status: COMPLETED | OUTPATIENT
Start: 2022-09-06 | End: 2022-09-06

## 2022-09-06 RX ORDER — INSULIN LISPRO 100 [IU]/ML
18 INJECTION, SOLUTION INTRAVENOUS; SUBCUTANEOUS
Refills: 1 | Status: DISCONTINUED | OUTPATIENT
Start: 2022-09-07 | End: 2022-09-12 | Stop reason: HOSPADM

## 2022-09-06 RX ORDER — CEFAZOLIN SODIUM 2 G/50ML
2000 SOLUTION INTRAVENOUS EVERY 8 HOURS
Status: DISCONTINUED | OUTPATIENT
Start: 2022-09-07 | End: 2022-09-07

## 2022-09-06 RX ORDER — HYDROMORPHONE HCL IN WATER/PF 6 MG/30 ML
0.2 PATIENT CONTROLLED ANALGESIA SYRINGE INTRAVENOUS EVERY 4 HOURS PRN
Status: DISCONTINUED | OUTPATIENT
Start: 2022-09-06 | End: 2022-09-07

## 2022-09-06 RX ORDER — GABAPENTIN 400 MG/1
400 CAPSULE ORAL 2 TIMES DAILY
Status: DISCONTINUED | OUTPATIENT
Start: 2022-09-06 | End: 2022-09-12 | Stop reason: HOSPADM

## 2022-09-06 RX ORDER — CEFAZOLIN SODIUM 2 G/50ML
2000 SOLUTION INTRAVENOUS ONCE
Status: COMPLETED | OUTPATIENT
Start: 2022-09-06 | End: 2022-09-06

## 2022-09-06 RX ORDER — PANTOPRAZOLE SODIUM 40 MG/1
40 TABLET, DELAYED RELEASE ORAL
Refills: 1 | Status: DISCONTINUED | OUTPATIENT
Start: 2022-09-07 | End: 2022-09-12 | Stop reason: HOSPADM

## 2022-09-06 RX ORDER — LEVOTHYROXINE SODIUM 0.03 MG/1
25 TABLET ORAL
Status: DISCONTINUED | OUTPATIENT
Start: 2022-09-07 | End: 2022-09-12 | Stop reason: HOSPADM

## 2022-09-06 RX ORDER — INSULIN GLARGINE 100 [IU]/ML
44 INJECTION, SOLUTION SUBCUTANEOUS
Refills: 2 | Status: DISCONTINUED | OUTPATIENT
Start: 2022-09-06 | End: 2022-09-12 | Stop reason: HOSPADM

## 2022-09-06 RX ORDER — FUROSEMIDE 20 MG/1
20 TABLET ORAL
Status: DISCONTINUED | OUTPATIENT
Start: 2022-09-07 | End: 2022-09-12 | Stop reason: HOSPADM

## 2022-09-06 RX ORDER — OXYCODONE HYDROCHLORIDE 5 MG/1
2.5 TABLET ORAL EVERY 4 HOURS PRN
Status: DISCONTINUED | OUTPATIENT
Start: 2022-09-06 | End: 2022-09-07

## 2022-09-06 RX ORDER — INSULIN LISPRO 100 [IU]/ML
2-12 INJECTION, SOLUTION INTRAVENOUS; SUBCUTANEOUS
Status: DISCONTINUED | OUTPATIENT
Start: 2022-09-07 | End: 2022-09-12 | Stop reason: HOSPADM

## 2022-09-06 RX ORDER — SENNOSIDES 8.6 MG
1 TABLET ORAL DAILY
Status: DISCONTINUED | OUTPATIENT
Start: 2022-09-07 | End: 2022-09-12 | Stop reason: HOSPADM

## 2022-09-06 RX ORDER — KETOROLAC TROMETHAMINE 30 MG/ML
30 INJECTION, SOLUTION INTRAMUSCULAR; INTRAVENOUS ONCE
Status: COMPLETED | OUTPATIENT
Start: 2022-09-06 | End: 2022-09-06

## 2022-09-06 RX ORDER — NYSTATIN 100000 [USP'U]/G
1 POWDER TOPICAL 2 TIMES DAILY
Status: DISCONTINUED | OUTPATIENT
Start: 2022-09-06 | End: 2022-09-12 | Stop reason: HOSPADM

## 2022-09-06 RX ORDER — MAGNESIUM HYDROXIDE/ALUMINUM HYDROXICE/SIMETHICONE 120; 1200; 1200 MG/30ML; MG/30ML; MG/30ML
30 SUSPENSION ORAL EVERY 6 HOURS PRN
Status: DISCONTINUED | OUTPATIENT
Start: 2022-09-06 | End: 2022-09-12 | Stop reason: HOSPADM

## 2022-09-06 RX ORDER — ACETAMINOPHEN 325 MG/1
650 TABLET ORAL EVERY 6 HOURS PRN
Status: DISCONTINUED | OUTPATIENT
Start: 2022-09-06 | End: 2022-09-08

## 2022-09-06 RX ORDER — CEPHALEXIN 500 MG/1
1000 CAPSULE ORAL EVERY 6 HOURS SCHEDULED
Qty: 16 CAPSULE | Refills: 0 | Status: SHIPPED | OUTPATIENT
Start: 2022-09-06 | End: 2022-09-12

## 2022-09-06 RX ORDER — OXYCODONE HYDROCHLORIDE 5 MG/1
5 TABLET ORAL ONCE
Status: COMPLETED | OUTPATIENT
Start: 2022-09-06 | End: 2022-09-06

## 2022-09-06 RX ORDER — EZETIMIBE 10 MG/1
10 TABLET ORAL DAILY
Status: DISCONTINUED | OUTPATIENT
Start: 2022-09-07 | End: 2022-09-12 | Stop reason: HOSPADM

## 2022-09-06 RX ORDER — SACCHAROMYCES BOULARDII 250 MG
250 CAPSULE ORAL 2 TIMES DAILY
Status: DISCONTINUED | OUTPATIENT
Start: 2022-09-06 | End: 2022-09-12 | Stop reason: HOSPADM

## 2022-09-06 RX ORDER — NYSTATIN 100000 U/G
CREAM TOPICAL 2 TIMES DAILY PRN
Status: DISCONTINUED | OUTPATIENT
Start: 2022-09-06 | End: 2022-09-12 | Stop reason: HOSPADM

## 2022-09-06 RX ORDER — OXYCODONE HYDROCHLORIDE 5 MG/1
5 TABLET ORAL EVERY 4 HOURS PRN
Status: DISCONTINUED | OUTPATIENT
Start: 2022-09-06 | End: 2022-09-07

## 2022-09-06 RX ORDER — SODIUM CHLORIDE, SODIUM GLUCONATE, SODIUM ACETATE, POTASSIUM CHLORIDE, MAGNESIUM CHLORIDE, SODIUM PHOSPHATE, DIBASIC, AND POTASSIUM PHOSPHATE .53; .5; .37; .037; .03; .012; .00082 G/100ML; G/100ML; G/100ML; G/100ML; G/100ML; G/100ML; G/100ML
1000 INJECTION, SOLUTION INTRAVENOUS ONCE
Status: COMPLETED | OUTPATIENT
Start: 2022-09-06 | End: 2022-09-07

## 2022-09-06 RX ORDER — DOCUSATE SODIUM 100 MG/1
100 CAPSULE, LIQUID FILLED ORAL 2 TIMES DAILY
Status: DISCONTINUED | OUTPATIENT
Start: 2022-09-06 | End: 2022-09-12 | Stop reason: HOSPADM

## 2022-09-06 RX ORDER — ONDANSETRON 2 MG/ML
4 INJECTION INTRAMUSCULAR; INTRAVENOUS EVERY 6 HOURS PRN
Status: DISCONTINUED | OUTPATIENT
Start: 2022-09-06 | End: 2022-09-12 | Stop reason: HOSPADM

## 2022-09-06 RX ORDER — INSULIN LISPRO 100 [IU]/ML
2-12 INJECTION, SOLUTION INTRAVENOUS; SUBCUTANEOUS
Status: DISCONTINUED | OUTPATIENT
Start: 2022-09-06 | End: 2022-09-12 | Stop reason: HOSPADM

## 2022-09-06 RX ORDER — FUROSEMIDE 40 MG/1
40 TABLET ORAL DAILY
Status: DISCONTINUED | OUTPATIENT
Start: 2022-09-07 | End: 2022-09-12 | Stop reason: HOSPADM

## 2022-09-06 RX ORDER — ENOXAPARIN SODIUM 100 MG/ML
60 INJECTION SUBCUTANEOUS 2 TIMES DAILY
Status: DISCONTINUED | OUTPATIENT
Start: 2022-09-06 | End: 2022-09-12 | Stop reason: HOSPADM

## 2022-09-06 RX ORDER — LOSARTAN POTASSIUM 25 MG/1
25 TABLET ORAL DAILY
Status: DISCONTINUED | OUTPATIENT
Start: 2022-09-07 | End: 2022-09-12 | Stop reason: HOSPADM

## 2022-09-06 RX ORDER — ALBUTEROL SULFATE 90 UG/1
1 AEROSOL, METERED RESPIRATORY (INHALATION) EVERY 6 HOURS PRN
Status: DISCONTINUED | OUTPATIENT
Start: 2022-09-06 | End: 2022-09-12 | Stop reason: HOSPADM

## 2022-09-06 RX ADMIN — KETOROLAC TROMETHAMINE 30 MG: 30 INJECTION, SOLUTION INTRAMUSCULAR at 19:12

## 2022-09-06 RX ADMIN — OXYCODONE HYDROCHLORIDE 5 MG: 5 TABLET ORAL at 23:03

## 2022-09-06 RX ADMIN — NYSTATIN 1 APPLICATION: 100000 POWDER TOPICAL at 21:19

## 2022-09-06 RX ADMIN — INSULIN GLARGINE 44 UNITS: 100 INJECTION, SOLUTION SUBCUTANEOUS at 22:38

## 2022-09-06 RX ADMIN — ACETAMINOPHEN 650 MG: 325 TABLET, FILM COATED ORAL at 21:18

## 2022-09-06 RX ADMIN — Medication 250 MG: at 21:19

## 2022-09-06 RX ADMIN — INSULIN LISPRO 2 UNITS: 100 INJECTION, SOLUTION INTRAVENOUS; SUBCUTANEOUS at 22:39

## 2022-09-06 RX ADMIN — DOCUSATE SODIUM 100 MG: 100 CAPSULE, LIQUID FILLED ORAL at 21:19

## 2022-09-06 RX ADMIN — GABAPENTIN 400 MG: 400 CAPSULE ORAL at 21:18

## 2022-09-06 RX ADMIN — OXYCODONE HYDROCHLORIDE 5 MG: 5 TABLET ORAL at 19:27

## 2022-09-06 RX ADMIN — ENOXAPARIN SODIUM 60 MG: 60 INJECTION SUBCUTANEOUS at 21:19

## 2022-09-06 RX ADMIN — SODIUM CHLORIDE, SODIUM GLUCONATE, SODIUM ACETATE, POTASSIUM CHLORIDE, MAGNESIUM CHLORIDE, SODIUM PHOSPHATE, DIBASIC, AND POTASSIUM PHOSPHATE 1000 ML: .53; .5; .37; .037; .03; .012; .00082 INJECTION, SOLUTION INTRAVENOUS at 22:40

## 2022-09-06 RX ADMIN — ACETAMINOPHEN 975 MG: 325 TABLET ORAL at 17:47

## 2022-09-06 RX ADMIN — CEFAZOLIN SODIUM 2000 MG: 2 SOLUTION INTRAVENOUS at 16:49

## 2022-09-06 NOTE — ED PROVIDER NOTES
History  Chief Complaint   Patient presents with    Edema     Redness edema of LLE since Friday  Pt states "I have cellulitis"  started on 1gm Cefalexin Friday evening  Pt has abx in the home     Past Medical History: Anemia, Cellulitis, B/L LE, Diabetes mellitus,  Edema, Esophageal reflux, Gluten intolerance, Gout, Hyperglycemia, HTN, IBS, Insomnia, Obesity, Osteoarthritis of knee, Venous insufficiency,   Past Surgical History: Left WOUND DEBRIDEMENT     Pt with chronic pressure ulcers, has been seeing wound care, states over the past 5 days has had gradual onset of worsening pain, redness, warmth, swelling to left upper inner that pt states is "cellulitis"  Associated with + fever per patient, states took temporal temp was Tmax 99 5, dizziness, weakness  Pt had some Keflex at home and has been taking Keflex 1000mg few times a day on his own  NO cp, + chronic SOB, + skin changes, + chronic wounds and lymphedema  Prior to Admission Medications   Prescriptions Last Dose Informant Patient Reported? Taking?    BD Pen Needle Ludmila 2nd Gen 32G X 4 MM MISC  Self No No   Sig: USE 4 TIMES A DAY   Blood Glucose Monitoring Suppl (Ronaldo Penny) w/Device KIT   No No   Sig: Use to test sugar daily   Patient not taking: Reported on 8/5/2022   [facility] Diclofenac Sodium 1 %  Self No No   Sig: APPLY 4 G TOPICALLY 4 (FOUR) TIMES A DAY   Calcium Alginate (Maxorb II Alginate Dressing) MISC  Self No No   Sig: Apply 1 each topically in the morning   Continuous Blood Gluc  (FreeStyle Elvin 2 [employer] Systm) BROOKS  Self No No   Sig: Use 1 Device as needed (use with sensors for bs checks)   Continuous Blood Gluc Sensor (FreeStyle Elvin 14 Day Sensor) MISC  Self No No   Sig: Use as directed 3 times a day   Control Gel Formula Dressing (DuoDERM CGF Dressing) [facility]  Self No No   Sig: Apply 1 application topically every 5 (five) days   Gauze Pads & Dressings 5"X5" PADS  Self No No   Sig: Use in the morning   Lancets [facility] ULTRASOFT) lancets  Self No No   Sig: Use to test sugar 2 times a day   OneTouch Verio test strip  Self No No   Sig: USE TO TEST SUGAR 2 TIMES A DAY   Skin Protectants, Misc   ([employer] Textile 41"Q717") SHEE  Self No No   Sig: Apply 1 each topically every 5 (five) days   acetaminophen (TYLENOL) 325 mg tablet  Self No No   Sig: Take 2 tablets (650 mg total) by mouth every 4 (four) hours as needed for mild pain, headaches or fever   albuterol (PROVENTIL HFA,VENTOLIN HFA) 90 mcg/act inhaler  Self No No   Sig: TAKE 2 PUFFS BY MOUTH EVERY 6 HOURS AS NEEDED FOR WHEEZE   cephalexin (KEFLEX) 500 mg capsule   No No   Sig: Take 2 capsules (1,000 mg total) by mouth every 6 (six) hours for 2 days   ezetimibe (ZETIA) 10 mg tablet  Self No No   Sig: Take 1 tablet (10 mg total) by mouth daily   furosemide (LASIX) 20 mg tablet  Self No No   Sig: TAKE 2 TABLETS EVERY DAY IN THE EARLY MORNING AND 1 TABLET DAILY AFTER LUNCH    gabapentin (NEURONTIN) 100 mg capsule  Self No No   Sig: TAKE 1 CAPSULE BY MOUTH TWICE A DAY WITH 300MG CAPSULE   gabapentin (NEURONTIN) 300 mg capsule  Self No No   Sig: TAKE 1 CAPSULE (300 MG TOTAL) BY MOUTH 2 (TWO) TIMES A DAY TAKE 1 TAB WITH YOUR 100MG TAB TWICE DAILY   insulin aspart (NovoLOG FlexPen) 100 UNIT/ML injection pen  Self No No   Sig: INJECT 18 UNITS WITH MEALS+SCALE ( - 150-200 -2 UNITS, 201-250-4 UNITS, 251-300 -6 UNITS, 301-350-8 UNITS, > 350- 10 UNITS )   insulin glargine (Lantus SoloStar) 100 units/mL injection pen  Self No No   Sig: Inject 44 Units under the skin daily at bedtime   levothyroxine (Euthyrox) 25 mcg tablet  Self No No   Sig: Take 1 tablet (25 mcg total) by mouth daily in the early morning   losartan (COZAAR) 25 mg tablet   No No   Sig: Take 1 tablet (25 mg total) by mouth daily   metFORMIN (GLUCOPHAGE) 1000 MG tablet  Self No No   Sig: Take 1 tablet (1,000 mg total) by mouth 2 (two) times a day   nystatin (MYCOSTATIN) cream  Self No No   Sig: Apply topically 2 (two) times a day as needed (itching, redness)   nystatin (MYCOSTATIN) powder  Self No No   Sig: Apply 1 application topically 2 (two) times a day To affected area   omeprazole (PriLOSEC) 40 MG capsule  Self No No   Sig: TAKE 1 CAPSULE BY MOUTH TWICE A DAY   oxyCODONE (ROXICODONE) 5 immediate release tablet  Self No No   Sig: Take 1-2 tabs by mouth every 6 hours as needed for moderate or severe pains respectively     psyllium (METAMUCIL) packet  Self No No   Sig: Take 1 packet by mouth daily   saccharomyces boulardii (FLORASTOR) 250 mg capsule  Self No No   Sig: Take 1 capsule (250 mg total) by mouth 2 (two) times a day   sodium hypochlorite (DAKIN'S QUARTER-STRENGTH)  Self No No   Sig: Irrigate with 1 application as directed daily      Facility-Administered Medications: None       Past Medical History:   Diagnosis Date    Acute kidney injury 10/28/2021    Anemia 09/13/2019    Cellulitis     last assessed 12/10/15    Cellulitis of left lower extremity     Cellulitis of right lower extremity 11/19/2021    Cough 10/20/2019    Diabetes mellitus ([facility] [address] )     Disease of thyroid gland     Edema     Elevated liver enzymes     Elevated serum creatinine 11/19/2021    Esophageal reflux     Fungal infection 8/16/2021    Gluten intolerance     Gout     last assessed 09/05/13    Hyperglycemia     Hypertension     Hyponatremia 9/6/2019    IBS (irritable bowel syndrome)     Insomnia     Obesity     Osteoarthritis of knee     last assessed 02/10/14    Scrotal swelling 5/19/2020    Shortness of breath 5/3/2021    Venous insufficiency     last assessed 08/22/17    Villonodular synovitis of the hand, right     last assessed 11/14/2013       Past Surgical History:   Procedure Laterality Date    INCISION AND DRAINAGE OF WOUND Left 9/6/2019    Procedure: INCISION AND DRAINAGE (I&D) GROIN;  Surgeon: Mitzy Borrego DO;  [facility];  Service: General    SKIN BIOPSY      WOUND DEBRIDEMENT Left 9/7/2019    Procedure: EXCISIONAL DEBRIDEMENT;  Surgeon: Angelica Cochran DO;  [facility];  Service: General       Family History   Problem Relation Age of Onset    Cancer Mother         gastrc    Colon cancer Father     Heart failure Father      I have reviewed and agree with the history as documented  E-Cigarette/Vaping    E-Cigarette Use Never User      E-Cigarette/Vaping Substances    Nicotine No     THC Yes     CBD Yes     Flavoring No     Other No     Unknown No      Social History     Tobacco Use    Smoking status: Never Smoker    Smokeless tobacco: Never Used   Vaping Use    Vaping Use: Never used   Substance Use Topics    Alcohol use: Not Currently     Alcohol/week: 0 0 standard drinks    Drug use: Yes     Types: Marijuana     Comment: medical       Review of Systems   Constitutional: Positive for fatigue and fever  Negative for chills  HENT: Negative for hearing loss and sore throat  Respiratory: Negative for cough and shortness of breath  Cardiovascular: Positive for leg swelling  Negative for chest pain  Gastrointestinal: Negative for diarrhea and vomiting  Musculoskeletal: Positive for myalgias  Negative for arthralgias  Skin: Positive for color change  Negative for pallor  Neurological: Positive for dizziness and weakness  Psychiatric/Behavioral: Negative for behavioral problems  All other systems reviewed and are negative  Physical Exam  Physical Exam  Vitals and nursing note reviewed  Constitutional:       General: He is not in acute distress  Appearance: He is well-developed  He is obese  HENT:      Head: Normocephalic and atraumatic  Right Ear: External ear normal       Left Ear: External ear normal       Nose: Nose normal       Mouth/Throat:      Mouth: Mucous membranes are moist       Pharynx: Oropharynx is clear  Eyes:      Conjunctiva/sclera: Conjunctivae normal    Cardiovascular:      Rate and Rhythm: Regular rhythm  Tachycardia present     Pulmonary: Effort: Pulmonary effort is normal  No respiratory distress  Musculoskeletal:         General: Swelling and tenderness present  Cervical back: Normal range of motion  Comments: Decreased ROM due to body habitus   Skin:     General: Skin is warm and dry  Capillary Refill: Capillary refill takes less than 2 seconds  Findings: Erythema present  Comments: + large area of swelling, erythema, slight warmth noted to left inner thigh, no obvious abscess, no area of fluctuance, mild diffuse tenderness,   Neurological:      Mental Status: He is alert and oriented to person, place, and time     Psychiatric:         Behavior: Behavior normal          Vital Signs  ED Triage Vitals   Temperature Pulse Respirations Blood Pressure SpO2   09/06/22 1609 09/06/22 1648 09/06/22 1609 09/06/22 1648 09/06/22 1610   98 3 °F (36 8 °C) (!) 120 20 136/86 99 %      Temp Source Heart Rate Source Patient Position - Orthostatic VS BP Location FiO2 (%)   09/06/22 1609 09/06/22 1648 -- -- --   Oral Monitor         Pain Score       --                  Vitals:    09/06/22 1648 09/06/22 1915   BP: 136/86    Pulse: (!) 120 100         Visual Acuity      ED Medications  Medications   ceFAZolin (ANCEF) IVPB (premix in dextrose) 2,000 mg 50 mL (0 mg Intravenous Stopped 9/6/22 1749)   acetaminophen (TYLENOL) tablet 975 mg (975 mg Oral Given 9/6/22 1747)   ketorolac (TORADOL) injection 30 mg (30 mg Intravenous Given 9/6/22 1912)   oxyCODONE (ROXICODONE) IR tablet 5 mg (5 mg Oral Given 9/6/22 1927)       Diagnostic Studies  Results Reviewed     Procedure Component Value Units Date/Time    Procalcitonin [[id number]]  (Abnormal) Collected: 09/06/22 1641    Lab Status: Final result Specimen: Blood from Arm, Left Updated: 09/06/22 1714     Procalcitonin 2 75 ng/ml     Comprehensive metabolic panel [[id number]]  (Abnormal) Collected: 09/06/22 1639    Lab Status: Final result Specimen: Blood from Arm, Left Updated: 09/06/22 1708 Sodium 134 mmol/L      Potassium 3 7 mmol/L      Chloride 101 mmol/L      CO2 23 mmol/L      ANION GAP 10 mmol/L      BUN 29 mg/dL      Creatinine 1 28 mg/dL      Glucose 248 mg/dL      Calcium 9 2 mg/dL      Corrected Calcium 10 0 mg/dL      AST 39 U/L      ALT 46 U/L      Alkaline Phosphatase 74 U/L      Total Protein 6 7 g/dL      Albumin 3 0 g/dL      Total Bilirubin 1 43 mg/dL      eGFR 62 ml/min/1 73sq m     Narrative:      National Kidney Disease Foundation guidelines for Chronic Kidney Disease (CKD):     Stage 1 with normal or high GFR (GFR > 90 mL/min/1 73 square meters)    Stage 2 Mild CKD (GFR = 60-89 mL/min/1 73 square meters)    Stage 3A Moderate CKD (GFR = 45-59 mL/min/1 73 square meters)    Stage 3B Moderate CKD (GFR = 30-44 mL/min/1 73 square meters)    Stage 4 Severe CKD (GFR = 15-29 mL/min/1 73 square meters)    Stage 5 End Stage CKD (GFR <15 mL/min/1 73 square meters)  Note: GFR calculation is accurate only with a steady state creatinine    CBC and differential [[id number]]  (Abnormal) Collected: 09/06/22 1639    Lab Status: Final result Specimen: Blood from Arm, Left Updated: 09/06/22 1647     WBC 12 19 Thousand/uL      RBC 4 17 Million/uL      Hemoglobin 12 6 g/dL      Hematocrit 36 8 %      MCV 88 fL      MCH 30 2 pg      MCHC 34 2 g/dL      RDW 14 5 %      MPV 8 9 fL      Platelets 383 Thousands/uL      nRBC 0 /100 WBCs      Neutrophils Relative 70 %      Immat GRANS % 2 %      Lymphocytes Relative 13 %      Monocytes Relative 10 %      Eosinophils Relative 4 %      Basophils Relative 1 %      Neutrophils Absolute 8 67 Thousands/µL      Immature Grans Absolute 0 23 Thousand/uL      Lymphocytes Absolute 1 53 Thousands/µL      Monocytes Absolute 1 24 Thousand/µL      Eosinophils Absolute 0 44 Thousand/µL      Basophils Absolute 0 08 Thousands/µL     Blood culture #1 [[id number]] Collected: 09/06/22 1639    Lab Status:  In process Specimen: Blood from Arm, Left Updated: 09/06/22 1645    Blood culture #2 [[id number]] Collected: 09/06/22 1641    Lab Status: In process Specimen: Blood from Arm, Right Updated: 09/06/22 1645                 CT lower extremity wo contrast left    (Results Pending)              Procedures  Procedures         ED Course                               SBIRT 22yo+    Flowsheet Row Most Recent Value   SBIRT (25 yo +)    In order to provide better care to our patients, we are screening all of our patients for alcohol and drug use  Would it be okay to ask you these screening questions? No Filed at: 09/06/2022 1610                    MDM  Number of Diagnoses or Management Options  Cellulitis of left leg  Uncontrolled diabetes mellitus with hyperglycemia, with long-term current use of insulin ([facility])  Diagnosis management comments: Left thigh cellulitis, IV antibx given, wbc 12 19, procal 2 75, glu 248, pt uncomfortable sitting on stretcher, put in recliner, wants to use his own CPAP, will admit  Pt admitted, at time of admission CT not read, had called for read, but films send to [facility],  Contacted them again for results, still pending 1853    On coming provider made aware, will FU on CT read and alert hospitalist as needed       Amount and/or Complexity of Data Reviewed  Clinical lab tests: reviewed and ordered  Tests in the radiology section of CPT®: reviewed and ordered  Review and summarize past medical records: yes  Discuss the patient with other providers: yes        Disposition  Final diagnoses:   Cellulitis of left leg   Uncontrolled diabetes mellitus with hyperglycemia, with long-term current use of insulin ([facility] [address] )     Time reflects when diagnosis was documented in both MDM as applicable and the Disposition within this note     Time User Action Codes Description Comment    9/6/2022  7:20 PM Scarlett Stevenson Add [P51 [id number]] Cellulitis of left leg     9/6/2022  7:21 PM Scarlett Stevenson Add [E11 [id number],  Z79 [id number]] Uncontrolled diabetes mellitus with hyperglycemia, with long-term current use of insulin [facility])       ED Disposition     ED Disposition   Admit    Condition   Stable    Date/Time   Tue Sep 6, 2022  7:19 PM    Comment   Case was discussed with  JOSE Garber, and the patient's admission status was agreed to be Admission Status: observation status to the service of Dr Lucita Vickers   Follow-up Information    None         Patient's Medications   Discharge Prescriptions    No medications on file       No discharge procedures on file      PDMP Review       Value Time User    PDMP Reviewed  Yes 11/26/2021  1:49 PM Radha Marroquin MD          ED Provider  Electronically Signed by           Erick Thomas PA-C  09/06/22 1955

## 2022-09-06 NOTE — ED NOTES
Patient transported to CT  Pt assist x5 onto CT table  After CT pt transferred to recliner to assist in comfort measures and reduce pain  Pt tolerated fairly        Shalonda Nieves, RN  09/06/22 [address], RN  09/06/22 [id number]

## 2022-09-07 ENCOUNTER — HOME CARE VISIT (OUTPATIENT)
Dept: HOME HEALTH SERVICES | Facility: HOME HEALTHCARE | Age: 57
End: 2022-09-07
Payer: MEDICARE

## 2022-09-07 ENCOUNTER — APPOINTMENT (OUTPATIENT)
Dept: RADIOLOGY | Facility: HOSPITAL | Age: 57
DRG: 720 | End: 2022-09-07
Payer: MEDICARE

## 2022-09-07 ENCOUNTER — APPOINTMENT (OUTPATIENT)
Dept: VASCULAR ULTRASOUND | Facility: HOSPITAL | Age: 57
DRG: 720 | End: 2022-09-07
Payer: MEDICARE

## 2022-09-07 PROBLEM — K21.9 GERD (GASTROESOPHAGEAL REFLUX DISEASE): Status: ACTIVE | Noted: 2022-09-07

## 2022-09-07 LAB
ALBUMIN SERPL BCP-MCNC: 3.1 G/DL (ref 3.5–5)
ALP SERPL-CCNC: 89 U/L (ref 34–104)
ALT SERPL W P-5'-P-CCNC: 40 U/L (ref 7–52)
ANION GAP SERPL CALCULATED.3IONS-SCNC: 10 MMOL/L (ref 4–13)
AST SERPL W P-5'-P-CCNC: 35 U/L (ref 13–39)
BASOPHILS # BLD MANUAL: 0 THOUSAND/UL (ref 0–0.1)
BASOPHILS NFR MAR MANUAL: 0 % (ref 0–1)
BILIRUB SERPL-MCNC: 1.19 MG/DL (ref 0.2–1)
BUN SERPL-MCNC: 25 MG/DL (ref 5–25)
CALCIUM ALBUM COR SERPL-MCNC: 9.7 MG/DL (ref 8.3–10.1)
CALCIUM SERPL-MCNC: 9 MG/DL (ref 8.4–10.2)
CHLORIDE SERPL-SCNC: 99 MMOL/L (ref 96–108)
CO2 SERPL-SCNC: 22 MMOL/L (ref 21–32)
CREAT SERPL-MCNC: 1.15 MG/DL (ref 0.6–1.3)
DOHLE BOD BLD QL SMEAR: PRESENT
EOSINOPHIL # BLD MANUAL: 0.5 THOUSAND/UL (ref 0–0.4)
EOSINOPHIL NFR BLD MANUAL: 4 % (ref 0–6)
ERYTHROCYTE [DISTWIDTH] IN BLOOD BY AUTOMATED COUNT: 14.9 % (ref 11.6–15.1)
GFR SERPL CREATININE-BSD FRML MDRD: 71 ML/MIN/1.73SQ M
GLUCOSE SERPL-MCNC: 217 MG/DL (ref 65–140)
GLUCOSE SERPL-MCNC: 234 MG/DL (ref 65–140)
GLUCOSE SERPL-MCNC: 241 MG/DL (ref 65–140)
GLUCOSE SERPL-MCNC: 287 MG/DL (ref 65–140)
GLUCOSE SERPL-MCNC: 300 MG/DL (ref 65–140)
HCT VFR BLD AUTO: 37.9 % (ref 36.5–49.3)
HGB BLD-MCNC: 12.6 G/DL (ref 12–17)
LACTATE SERPL-SCNC: 1.1 MMOL/L (ref 0.5–2)
LYMPHOCYTES # BLD AUTO: 0.5 THOUSAND/UL (ref 0.6–4.47)
LYMPHOCYTES # BLD AUTO: 4 % (ref 14–44)
MAGNESIUM SERPL-MCNC: 1.9 MG/DL (ref 1.9–2.7)
MCH RBC QN AUTO: 30.3 PG (ref 26.8–34.3)
MCHC RBC AUTO-ENTMCNC: 33.2 G/DL (ref 31.4–37.4)
MCV RBC AUTO: 91 FL (ref 82–98)
MONOCYTES # BLD AUTO: 1.39 THOUSAND/UL (ref 0–1.22)
MONOCYTES NFR BLD: 11 % (ref 4–12)
MYELOCYTES NFR BLD MANUAL: 1 % (ref 0–1)
NEUTROPHILS # BLD MANUAL: 9.71 THOUSAND/UL (ref 1.85–7.62)
NEUTS BAND NFR BLD MANUAL: 15 % (ref 0–8)
NEUTS SEG NFR BLD AUTO: 62 % (ref 43–75)
PLATELET # BLD AUTO: 178 THOUSANDS/UL (ref 149–390)
PLATELET BLD QL SMEAR: ADEQUATE
PMV BLD AUTO: 9.5 FL (ref 8.9–12.7)
POTASSIUM SERPL-SCNC: 4.3 MMOL/L (ref 3.5–5.3)
PROCALCITONIN SERPL-MCNC: 1.91 NG/ML
PROT SERPL-MCNC: 6.8 G/DL (ref 6.4–8.4)
RBC # BLD AUTO: 4.16 MILLION/UL (ref 3.88–5.62)
RBC MORPH BLD: NORMAL
SODIUM SERPL-SCNC: 131 MMOL/L (ref 135–147)
TOXIC GRANULES BLD QL SMEAR: PRESENT
VARIANT LYMPHS # BLD AUTO: 3 %
WBC # BLD AUTO: 12.61 THOUSAND/UL (ref 4.31–10.16)

## 2022-09-07 PROCEDURE — 71045 X-RAY EXAM CHEST 1 VIEW: CPT

## 2022-09-07 PROCEDURE — 84145 PROCALCITONIN (PCT): CPT

## 2022-09-07 PROCEDURE — 93971 EXTREMITY STUDY: CPT | Performed by: SURGERY

## 2022-09-07 PROCEDURE — 80053 COMPREHEN METABOLIC PANEL: CPT

## 2022-09-07 PROCEDURE — 85007 BL SMEAR W/DIFF WBC COUNT: CPT

## 2022-09-07 PROCEDURE — 85027 COMPLETE CBC AUTOMATED: CPT

## 2022-09-07 PROCEDURE — 83735 ASSAY OF MAGNESIUM: CPT

## 2022-09-07 PROCEDURE — 82948 REAGENT STRIP/BLOOD GLUCOSE: CPT

## 2022-09-07 PROCEDURE — 93971 EXTREMITY STUDY: CPT

## 2022-09-07 PROCEDURE — 83605 ASSAY OF LACTIC ACID: CPT

## 2022-09-07 PROCEDURE — 87081 CULTURE SCREEN ONLY: CPT | Performed by: INTERNAL MEDICINE

## 2022-09-07 PROCEDURE — 99255 IP/OBS CONSLTJ NEW/EST HI 80: CPT | Performed by: INTERNAL MEDICINE

## 2022-09-07 PROCEDURE — 99225 PR SBSQ OBSERVATION CARE/DAY 25 MINUTES: CPT | Performed by: INTERNAL MEDICINE

## 2022-09-07 RX ORDER — OXYCODONE HYDROCHLORIDE 5 MG/1
5 TABLET ORAL EVERY 4 HOURS PRN
Status: DISCONTINUED | OUTPATIENT
Start: 2022-09-07 | End: 2022-09-12 | Stop reason: HOSPADM

## 2022-09-07 RX ORDER — CEFEPIME HYDROCHLORIDE 2 G/50ML
2000 INJECTION, SOLUTION INTRAVENOUS EVERY 12 HOURS
Status: DISCONTINUED | OUTPATIENT
Start: 2022-09-07 | End: 2022-09-07

## 2022-09-07 RX ORDER — HYDROMORPHONE HCL/PF 1 MG/ML
0.5 SYRINGE (ML) INJECTION
Status: DISCONTINUED | OUTPATIENT
Start: 2022-09-07 | End: 2022-09-12 | Stop reason: HOSPADM

## 2022-09-07 RX ORDER — GUAIFENESIN 600 MG/1
600 TABLET, EXTENDED RELEASE ORAL EVERY 12 HOURS SCHEDULED
Status: DISCONTINUED | OUTPATIENT
Start: 2022-09-07 | End: 2022-09-12 | Stop reason: HOSPADM

## 2022-09-07 RX ORDER — OXYCODONE HYDROCHLORIDE 10 MG/1
10 TABLET ORAL EVERY 4 HOURS PRN
Status: DISCONTINUED | OUTPATIENT
Start: 2022-09-07 | End: 2022-09-12 | Stop reason: HOSPADM

## 2022-09-07 RX ORDER — BENZONATATE 100 MG/1
100 CAPSULE ORAL 3 TIMES DAILY PRN
Status: DISCONTINUED | OUTPATIENT
Start: 2022-09-07 | End: 2022-09-12 | Stop reason: HOSPADM

## 2022-09-07 RX ORDER — CEFEPIME HYDROCHLORIDE 2 G/50ML
2000 INJECTION, SOLUTION INTRAVENOUS EVERY 8 HOURS
Status: DISCONTINUED | OUTPATIENT
Start: 2022-09-07 | End: 2022-09-12 | Stop reason: HOSPADM

## 2022-09-07 RX ADMIN — ENOXAPARIN SODIUM 60 MG: 60 INJECTION SUBCUTANEOUS at 17:08

## 2022-09-07 RX ADMIN — LOSARTAN POTASSIUM 25 MG: 25 TABLET, FILM COATED ORAL at 11:03

## 2022-09-07 RX ADMIN — HYDROMORPHONE HYDROCHLORIDE 0.2 MG: 0.2 INJECTION, SOLUTION INTRAMUSCULAR; INTRAVENOUS; SUBCUTANEOUS at 06:27

## 2022-09-07 RX ADMIN — FUROSEMIDE 20 MG: 20 TABLET ORAL at 17:08

## 2022-09-07 RX ADMIN — OXYCODONE HYDROCHLORIDE 10 MG: 10 TABLET ORAL at 20:22

## 2022-09-07 RX ADMIN — CEFAZOLIN SODIUM 2000 MG: 2 SOLUTION INTRAVENOUS at 00:15

## 2022-09-07 RX ADMIN — ACETAMINOPHEN 650 MG: 325 TABLET, FILM COATED ORAL at 21:10

## 2022-09-07 RX ADMIN — BENZONATATE 100 MG: 100 CAPSULE ORAL at 06:17

## 2022-09-07 RX ADMIN — ENOXAPARIN SODIUM 60 MG: 60 INJECTION SUBCUTANEOUS at 11:04

## 2022-09-07 RX ADMIN — GABAPENTIN 400 MG: 400 CAPSULE ORAL at 11:03

## 2022-09-07 RX ADMIN — INSULIN LISPRO 4 UNITS: 100 INJECTION, SOLUTION INTRAVENOUS; SUBCUTANEOUS at 21:10

## 2022-09-07 RX ADMIN — CEFEPIME HYDROCHLORIDE 2000 MG: 2 INJECTION, SOLUTION INTRAVENOUS at 20:19

## 2022-09-07 RX ADMIN — PANTOPRAZOLE SODIUM 40 MG: 40 TABLET, DELAYED RELEASE ORAL at 11:03

## 2022-09-07 RX ADMIN — INSULIN LISPRO 18 UNITS: 100 INJECTION, SOLUTION INTRAVENOUS; SUBCUTANEOUS at 16:05

## 2022-09-07 RX ADMIN — PANTOPRAZOLE SODIUM 40 MG: 40 TABLET, DELAYED RELEASE ORAL at 17:08

## 2022-09-07 RX ADMIN — HYDROMORPHONE HYDROCHLORIDE 0.5 MG: 1 INJECTION, SOLUTION INTRAMUSCULAR; INTRAVENOUS; SUBCUTANEOUS at 11:51

## 2022-09-07 RX ADMIN — EZETIMIBE 10 MG: 10 TABLET ORAL at 11:03

## 2022-09-07 RX ADMIN — OXYCODONE HYDROCHLORIDE 5 MG: 5 TABLET ORAL at 05:21

## 2022-09-07 RX ADMIN — CEFEPIME HYDROCHLORIDE 2000 MG: 2 INJECTION, SOLUTION INTRAVENOUS at 11:04

## 2022-09-07 RX ADMIN — INSULIN LISPRO 4 UNITS: 100 INJECTION, SOLUTION INTRAVENOUS; SUBCUTANEOUS at 16:05

## 2022-09-07 RX ADMIN — SENNOSIDES 8.6 MG: 8.6 TABLET, FILM COATED ORAL at 11:03

## 2022-09-07 RX ADMIN — NYSTATIN 1 APPLICATION: 100000 POWDER TOPICAL at 17:09

## 2022-09-07 RX ADMIN — Medication 250 MG: at 17:08

## 2022-09-07 RX ADMIN — FUROSEMIDE 40 MG: 40 TABLET ORAL at 11:03

## 2022-09-07 RX ADMIN — GUAIFENESIN 600 MG: 600 TABLET ORAL at 11:02

## 2022-09-07 RX ADMIN — GABAPENTIN 400 MG: 400 CAPSULE ORAL at 17:08

## 2022-09-07 RX ADMIN — HYDROMORPHONE HYDROCHLORIDE 0.5 MG: 1 INJECTION, SOLUTION INTRAMUSCULAR; INTRAVENOUS; SUBCUTANEOUS at 16:17

## 2022-09-07 RX ADMIN — DOCUSATE SODIUM 100 MG: 100 CAPSULE, LIQUID FILLED ORAL at 11:04

## 2022-09-07 RX ADMIN — INSULIN GLARGINE 44 UNITS: 100 INJECTION, SOLUTION SUBCUTANEOUS at 21:10

## 2022-09-07 RX ADMIN — INSULIN LISPRO 6 UNITS: 100 INJECTION, SOLUTION INTRAVENOUS; SUBCUTANEOUS at 11:06

## 2022-09-07 RX ADMIN — INSULIN LISPRO 4 UNITS: 100 INJECTION, SOLUTION INTRAVENOUS; SUBCUTANEOUS at 12:01

## 2022-09-07 RX ADMIN — LEVOTHYROXINE SODIUM 25 MCG: 25 TABLET ORAL at 05:19

## 2022-09-07 RX ADMIN — INSULIN LISPRO 18 UNITS: 100 INJECTION, SOLUTION INTRAVENOUS; SUBCUTANEOUS at 12:02

## 2022-09-07 RX ADMIN — DOCUSATE SODIUM 100 MG: 100 CAPSULE, LIQUID FILLED ORAL at 17:08

## 2022-09-07 RX ADMIN — GUAIFENESIN 600 MG: 600 TABLET ORAL at 20:19

## 2022-09-07 RX ADMIN — INSULIN LISPRO 18 UNITS: 100 INJECTION, SOLUTION INTRAVENOUS; SUBCUTANEOUS at 11:05

## 2022-09-07 RX ADMIN — Medication 250 MG: at 11:03

## 2022-09-07 RX ADMIN — NYSTATIN 1 APPLICATION: 100000 POWDER TOPICAL at 11:04

## 2022-09-07 NOTE — ASSESSMENT & PLAN NOTE
Lab Results   Component Value Date    CREATININE 1 28 09/06/2022    CREATININE 1 12 06/24/2022    CREATININE 1 26 05/20/2022    EGFR 62 09/06/2022    EGFR 73 06/24/2022    EGFR 64 05/20/2022     · Baseline Cr appears to be 1 1-1 3  · Currently at baseline  · Elevated Cr appears chronic, will not hold Lasix or Losartan  · Likely undiagnosed CKD   · Monitor renal function daily while in hospital

## 2022-09-07 NOTE — ASSESSMENT & PLAN NOTE
Presented w/ leukocytosis/tachycardia and elevated procalcitonin - lactic acid normal  Procalcitonin improved from 2 75 -> 1 91 today  In the setting of left thigh cellulitis (see above)  Appreciate infectious disease input  Monitor vitals and maintain hemodynamics

## 2022-09-07 NOTE — ASSESSMENT & PLAN NOTE
In the setting of acute medical issue and hyperglycemia  Optimize blood sugar control  Trial fluid restriction  Monitor serum sodium

## 2022-09-07 NOTE — NURSING NOTE
Patient had to urinate and had roommate try to help him with urinal   Urine did not go into urinal but all over his sheet and the floor  Patient refused to stand or be moved to be cleaned up  He only allowed us to place fresh cary on top of soiled sheet and clean underneath chair

## 2022-09-07 NOTE — ASSESSMENT & PLAN NOTE
· Presented to ED with redness and swelling of left thigh since Friday 9/2  · Reports recurrent cellulitis due to lymphedema and chronic fungal infections  · Started on Cefalexin 1 gm Q 6 Friday per his PCP  · Presented to ED after pain worsened and intermittent low grade fevers  · CT lower extremity - no evidence of abscess, mild soft tissue induration of left medial thigh   · WBC 12k and procal 2 75 in ED  · S/P Ancef 2 g  · Continue Q8  · Previous admission 11/2019 required ID consultation for recurrent cellulitis with antibiotic use and fungal infections  · Consult ID for assistance in treatment  · PRN pain control  · Monitor WBC and fever curve

## 2022-09-07 NOTE — ASSESSMENT & PLAN NOTE
· Reports chronic fungal infection to groin  · Continue home Nystatin powder and cream  · Wound consulted  · ID consulted

## 2022-09-07 NOTE — ASSESSMENT & PLAN NOTE
· POA  · SIRS: , WBC 12K  · Source: right thigh cellulitis  · Lactic: pending  · Procal: 2 75  · Cultures: BCx x 2 pending  · ABX: Ancef 2 g  · Continue Ancef 2 g Q8 for cellulitis  · Patient on diuretics  · Will give modified sepsis fluids  · Monitor for fluid overload  · Trend WBC and fever curve

## 2022-09-07 NOTE — CONSULTS
Consultation - Infectious Disease   Piedad Wilson 56 y o  male MRN: [id number]  Unit/Bed#: -01 Encounter: [id number]      IMPRESSION & RECOMMENDATIONS:   Impression/Recommendations:  1  Sepsis, POA  Tachycardia, leukocytosis  Secondary to #2  Consider bacteremia  Admission blood cultures are pending  Hemodynamics remain stable     -antibiotic plan as below  -follow temperatures and hemodynamics  -recheck CBC in a m   -follow up pending blood cultures  -supportive care    2  Left thigh cellulitis  With history of recurrent LE cellulitis  No underlying drainable collection on CT  Refractory to outpatient Keflex  No visible fluctuance or purulence on the thigh area  Patient does have wound on lower left leg with recent outpatient culture from 07/21/2022 which grew Pseudomonas, Proteus  Patient received 1 dose of cefazolin which was changed to cefepime     -continue IV cefepime for now  Increase to 2 g q 8 hours  -follow up pending blood cultures  -frequent leg elevation/aggressive edema control  -serial leg exams    3  Chronic lower extremity lymphedema with venous stasis ulcerations  Risk factor for recurrent cellulitis  Follows with outpatient wound care  -wound care evaluation appreciated  -frequent leg elevation/aggressive edema control  -outpatient lymphedema clinic and wound care follow-up    4  DM2, with hyperglycemia and long-term insulin use  Risk factor for infection  Glycemic control as per Internal Medicine Service  5  Chronic candida intertrigo of the groin  Risk factor for cellulitis  -continue topical nystatin  -moisture control    6  Severe obesity  With BMI of about 86  Strongly recommend weight loss measures/dietary changes  Antibiotics:  Cefazolin 1    I discussed above plan with Dr Hector Muñoz from Internal Medicine Service  I personally reviewed prior hospitalization an outpatient medical records  Thank you for this consultation    We will follow along with you       HISTORY OF PRESENT ILLNESS:  Reason for Consult:  Cellulitis    HPI: Otoniel Saleh is a 56 y o  male with severe obesity, DM2, chronic lower extremity lymphedema with venous stasis ulcerations, prior hospitalizations secondary to cellulitis who presented on 09/06 to ER due to concern for left thigh cellulitis as evidenced by worsening redness, warmth  Patient started Keflex 1 g q 6 hours at home 4 days prior, however, symptoms worsened with development of low-grade fever, weakness and decreased oral intake in addition to severe thigh pain  WBC count was 12 with tachycardia on presentation  No high fevers  CT showed medial left thigh subcutaneous edema and skin thickening consistent with cellulitis without drainable collection  Patient received 1 dose of cefazolin which was changed to cefepime today due to concern for persistent cellulitis and outpatient wound culture from 07/21/2022 showing Pseudomonas and Proteus  Patient follows regularly with wound care and on 07/21, the wound on his lower left leg was is draining greenish/black drainage which was sent for culture that ultimately grew Pseudomonas and Proteus  Patient was given outpatient course of ciprofloxacin  Patient was evaluated by wound care service today but refused a full wound evaluation and dressing change  REVIEW OF SYSTEMS:  A complete system-based review of systems is otherwise negative      PAST MEDICAL HISTORY:  Past Medical History:   Diagnosis Date    Acute kidney injury 10/28/2021    Anemia 09/13/2019    Cellulitis     last assessed 12/10/15    Cellulitis of left lower extremity     Cellulitis of right lower extremity 11/19/2021    Cough 10/20/2019    Diabetes mellitus ([facility] [address] )     Disease of thyroid gland     Edema     Elevated liver enzymes     Elevated serum creatinine 11/19/2021    Esophageal reflux     Fungal infection 8/16/2021    Gluten intolerance     Gout     last assessed 09/05/13    Hyperglycemia  Hypertension     Hyponatremia 2019    IBS (irritable bowel syndrome)     Insomnia     Obesity     Osteoarthritis of knee     last assessed 02/10/14    Scrotal swelling 2020    Shortness of breath 5/3/2021    Venous insufficiency     last assessed 17    Villonodular synovitis of the hand, right     last assessed 2013     Past Surgical History:   Procedure Laterality Date    INCISION AND DRAINAGE OF WOUND Left 2019    Procedure: INCISION AND DRAINAGE (I&D) GROIN;  Surgeon: Rula Hinton DO;  [facility];  Service: General    SKIN BIOPSY      WOUND DEBRIDEMENT Left 2019    Procedure: EXCISIONAL DEBRIDEMENT;  Surgeon: Rula Hinton DO;  [facility];  Service: General       FAMILY HISTORY:  Non-contributory    SOCIAL HISTORY:  Social History     Substance and Sexual Activity   Alcohol Use Not Currently    Alcohol/week: 0 0 standard drinks     Social History     Substance and Sexual Activity   Drug Use Yes    Types: Marijuana    Comment: medical     Social History     Tobacco Use   Smoking Status Never Smoker   Smokeless Tobacco Never Used       ALLERGIES:  Allergies   Allergen Reactions    Gluten Meal - Food Allergy     Clindamycin Diarrhea       MEDICATIONS:  All current active medications have been reviewed  PHYSICAL EXAM:  Vitals:  Temp:  [97 9 °F (36 6 °C)-99 4 °F (37 4 °C)] 98 9 °F (37 2 °C)  HR:  [100-120] 114  Resp:  [20-22] 22  BP: (125-136)/(67-87) 135/87  SpO2:  [95 %-99 %] 96 %  Temp (24hrs), Av 6 °F (37 °C), Min:97 9 °F (36 6 °C), Max:99 4 °F (37 4 °C)  Current: Temperature: 98 9 °F (37 2 °C)     Physical Exam:  General:  Awake, alert, nontoxic  Eyes:  Conjunctive clear with no hemorrhages or effusions  Oropharynx:  No visible ulcerations  Neck:  Trachea midline  Lungs:  Nonlabored respirations  Abdomen:  Soft, non-tender, non-distended, severe obesity  Extremities:  Severe bilateral lower extremity lymphedema    Left thigh erythema, warmth, induration and tenderness  No visible open wounds, fluctuance, or purulence on the thigh area  Lower leg dressing is intact  Skin:  No diffuse rash  Neurological:  Moves all four extremities spontaneously  Psych:  Normal mood and affect    LABS, IMAGING, & OTHER STUDIES:  Lab Results:  I have personally reviewed pertinent labs  Results from last 7 days   Lab Units 09/07/22  0536 09/06/22  1639   POTASSIUM mmol/L 4 3 3 7   CHLORIDE mmol/L 99 101   CO2 mmol/L 22 23   BUN mg/dL 25 29*   CREATININE mg/dL 1 15 1 28   EGFR ml/min/1 73sq m 71 62   CALCIUM mg/dL 9 0 9 2   AST U/L 35 39   ALT U/L 40 46   ALK PHOS U/L 89 74     Results from last 7 days   Lab Units 09/07/22  0511 09/06/22  1639   WBC Thousand/uL 12 61* 12 19*   HEMOGLOBIN g/dL 12 6 12 6   PLATELETS Thousands/uL 178 155  155     Results from last 7 days   Lab Units 09/06/22  1641 09/06/22  1639   BLOOD CULTURE  Received in Microbiology Lab  Culture in Progress  Received in Microbiology Lab  Culture in Progress  Imaging Studies:   I have personally reviewed pertinent imaging study reports and images in PACS  CT left lower extremity shows medial left thigh subcutaneous edema and skin thickening consistent with nonspecific cellulitis without drainable collection  Left inguinal adenopathy  EKG, Pathology, and Other Studies:   I have personally reviewed pertinent reports

## 2022-09-07 NOTE — PROGRESS NOTES
[facility]  Progress Note - Ayden Martinez 1965, 56 y o  male MRN: [id number]  Unit/Bed#: -01 Encounter: [id number]  Primary Care Provider: Radha Alejandra MD   Date and time admitted to hospital: 9/6/2022  3:58 PM      * Cellulitis of left lower extremity  Assessment & Plan  Presented w/ redness and swelling of the left thigh since Friday 9/2  Reports recurrent cellulitis due to lymphedema and chronic fungal infections  Started on Keflex by PCP outpatient w/o significant improvement and actually worsening of symptoms -> presented to the ED with worsening pain and subjective low-grade fevers at home  CT of left lower extremity notable for soft tissue cellulitis w/o evidence of active drainage/abscess  Due to recurrence of symptoms, will check left lower extremity venous doppler to assess for underlying DVT  Met sepsis criteria on admission (see below)  Initially on IV Ancef -> will switch to more broader spectrum IV Cefepime (wound culture in July 2022 grew Pseudomonas in addition to Proteus)  PRN pain control  Will appreciate wound care input  Will appreciate Infectious Disease input    Sepsis on admission  Assessment & Plan  Presented w/ leukocytosis/tachycardia and elevated procalcitonin - lactic acid normal  Procalcitonin improved from 2 75 -> 1 91 today  In the setting of left thigh cellulitis (see above)  Appreciate infectious disease input  Monitor vitals and maintain hemodynamics    Morbid obesity  Assessment & Plan  BMI of 85 82  Lifestyle/diet modifications    Hypothyroidism  Assessment & Plan  Continue Synthroid    Essential hypertension  Assessment & Plan  Continue Cozaar/Lasix    Insulin-dependent diabetes mellitus with neuropathy  Assessment & Plan  Lab Results   Component Value Date    HGBA1C 6 6 (H) 06/24/2022     Hold oral hypoglycemics while hospitalized  Continue basal/prandial insulin w/ additional SSI coverage per Accu-Cheks   Carbohydrate restricted diet  Continue Gabapentin for neuropathy    Hyperlipidemia  Assessment & Plan  Continue Zetia    GERD (gastroesophageal reflux disease)  Assessment & Plan  Continue PPI regimen  Encourage weight loss    Hyponatremia  Assessment & Plan  In the setting of acute medical issue and hyperglycemia  Optimize blood sugar control  Trial fluid restriction  Monitor serum sodium    Fungal infection  Assessment & Plan  Reports chronic fungal infection of the groin - continue Nystatin  Daily hygiene counseling  Wound care evaluation    DAHIANA (obstructive sleep apnea)  Assessment & Plan  CPAP QHS  Encourage weight loss    Pressure injury of left leg  Assessment & Plan  Follows closely with wound care outpatient  Recommendations appreciated    Pressure injury of right leg  Assessment & Plan  Follow-up closely with wound care outpatient  Recommendations appreciated      DVT Prophylaxis:  Lovenox       Patient Centered Rounds:  I have performed bedside rounds and discussed plan of care with nursing today  Discussions with Specialists or Other Care Team Provider:  see above assessments if applicable    Education and Discussions with Family / Patient:  Patient, along with family member, at bedside today    Time Spent for Care:  32 minutes  More than 50% of total time spent on counseling and coordination of care as described above  Current Length of Stay: 0 day(s)  Current Patient Status: Observation   Certification Statement:  Patient will continue to require additional hospital stay due to assessments as noted above  Code Status: Level 1 - Full Code        Subjective:     Seen examined earlier today  Still complains of waxing/waning pain and weakness/fatigue  Also notes occasional dry cough          Objective:     Vitals:   Temp (24hrs), Av 6 °F (37 °C), Min:97 9 °F (36 6 °C), Max:99 4 °F (37 4 °C)    Temp:  [97 9 °F (36 6 °C)-99 4 °F (37 4 °C)] 98 3 °F (36 8 °C)  HR:  [100-120] 116  Resp:  [20-22] 20  BP: (124-136)/(63-87) 124/63  SpO2:  [92 %-99 %] 92 %  Body mass index is 85 82 kg/m²  Input and Output Summary (last 24 hours): Intake/Output Summary (Last 24 hours) at 9/7/2022 1544  Last data filed at 9/7/2022 0945  Gross per 24 hour   Intake 525 ml   Output --   Net 525 ml       Physical Exam:     GENERAL:  Obese - waxing/waning distress from pain  HEAD:  Normocephalic - atraumatic   EYES: PERRL - EOMI   MOUTH:  Mucosa moist  NECK:  Supple - full range of motion  CARDIAC:  Occasionally tachycardic - S1/S2 positive  PULMONARY:  Diminished breath sounds and respiratory effort due to body habitus   ABDOMEN:  Soft - nontender/nondistended - active bowel sounds  MUSCULOSKELETAL:  Motor strength/range of motion quite deconditioned - bilateral distal LE lymphedema - erythematous left thigh tender to palpation without active drainage  NEUROLOGIC:  Alert/oriented at baseline  PSYCHIATRIC:  Mood/affect stable      Additional Data:     Labs & Recent Cultures:    Results from last 7 days   Lab Units 09/07/22  0511 09/06/22  1639   WBC Thousand/uL 12 61* 12 19*   HEMOGLOBIN g/dL 12 6 12 6   HEMATOCRIT % 37 9 36 8   PLATELETS Thousands/uL 178 155  155   BANDS PCT % 15*  --    NEUTROS PCT %  --  70   LYMPHS PCT %  --  13*   LYMPHO PCT % 4*  --    MONOS PCT %  --  10   MONO PCT % 11  --    EOS PCT % 4 4     Results from last 7 days   Lab Units 09/07/22  0536   POTASSIUM mmol/L 4 3   CHLORIDE mmol/L 99   CO2 mmol/L 22   BUN mg/dL 25   CREATININE mg/dL 1 15   CALCIUM mg/dL 9 0   ALK PHOS U/L 89   ALT U/L 40   AST U/L 35         Results from last 7 days   Lab Units 09/07/22  1105 09/07/22  0742 09/06/22  2046   POC GLUCOSE mg/dl 234* 300* 184*         Results from last 7 days   Lab Units 09/07/22  0536 09/06/22  2236 09/06/22  1641   LACTIC ACID mmol/L  --  1 8  --    PROCALCITONIN ng/ml 1 91*  --  2 75*         Results from last 7 days   Lab Units 09/06/22  1641 09/06/22  1639   BLOOD CULTURE  Received in Microbiology Lab   Culture in Progress  Received in Microbiology Lab  Culture in Progress           Lines/Drains:  Invasive Devices  Report    Peripheral Intravenous Line  Duration           Peripheral IV 09/06/22 Left Arm <1 day                  Last 24 Hours Medication List:   Current Facility-Administered Medications   Medication Dose Route Frequency Provider Last Rate    acetaminophen  650 mg Oral Q6H PRN ANN Brannon      albuterol  1 puff Inhalation Q6H PRN ANN Brannon      aluminum-magnesium hydroxide-simethicone  30 mL Oral Q6H PRN Orest [place], [address]      benzonatate  100 mg Oral TID PRN ANN Brannon      cefepime  2,000 mg Intravenous Q8H Shelbie DO Ceferino      docusate sodium  100 mg Oral BID Orest [place], CRNP      enoxaparin  60 mg Subcutaneous BID Orest [place], [address]      ezetimibe  10 mg Oral Daily Orest [place], [address]      furosemide  20 mg Oral Daily With [employer] [facility], CRNP      furosemide  40 mg Oral Daily Orest [place], [address]      gabapentin  400 mg Oral BID Orest [place], [address]      guaiFENesin  600 mg Oral Q12H [address], [address]      HYDROmorphone  0 5 mg Intravenous Q3H PRN Sakina Hairston MD      insulin glargine  44 Units Subcutaneous HS Orest [place], CRNP      insulin lispro  18 Units Subcutaneous TID With Meals Orest [place], CRNP      insulin lispro  2-12 Units Subcutaneous TID AC Daja Estefania Masters, ANN      insulin lispro  2-12 Units Subcutaneous HS Orest [place], [address]      levothyroxine  25 mcg Oral Early Morning Orest [place], [address]      losartan  25 mg Oral Daily Orest [place], [address]      naloxone  0 04 mg Intravenous Q1MIN PRN ANN Brannon      nystatin   Topical BID PRN ANN Brannon      nystatin  1 application Topical BID ANN Brannon      ondansetron  4 mg Intravenous Q6H PRN ANN Harp      oxyCODONE  10 mg Oral Q4H PRN Sophy LineMD sowmya      oxyCODONE  5 mg Oral Q4H PRN Norrine Liner, MD      pantoprazole  40 mg Oral BID AC [place]ef[place], [address]      saccharomyces boulardii  250 mg Oral BID [place], [address]      senna  1 tablet Oral Daily Ricky Lathe, [address]                      ** Please Note: This note is constructed using a voice recognition dictation system  An occasional wrong word/phrase or sound-a-like substitution may have been picked up by dictation device due to the inherent limitations of voice recognition software  Read the chart carefully and recognize, using reasonable context, where substitutions may have occurred  **

## 2022-09-07 NOTE — WOUND OSTOMY CARE
Progress Note - Wound   Emilio Mancini 56 y o  male MRN: [id number]  Unit/Bed#: -01 Encounter: [id number]      Assessment:  Wound care consulted for assessment of b/l lower extremity wounds  Admitted with cellulitis and is on IV ABT  History of - obesity, DM, DAHIANA, HTN, hypothyroidism, and venous insuffiencey  Patient seen in [address] [place] [address] in recliner chair, alert and oriented x 4  Patient self reports he is continent  Wife present as bedside  Patient reports his wife renders his wound care for him  Patient adamantly declined wound care assessment today  Patient states that he is in pain and the dressings have been taken down twice already  States, "I am still in pain and the wounds are the least of my concerns, they are fine!"  Patient reports his wife applied a new dressing with hydrofera blue yesterday and it is not due to be changed at this time  Patient follows with Dr Gatito Junior at the [facility] wound care center and current plan is hydrofera blue to the wounds, and circaids for compression  He changes the dressings two times per week  Discussed with patient that hydrofera blue is not on formulary, offered silver alginate as an alternative  Patient states he will use his own supplies from home if he remains admitted, as he desires to continue with his current plan of care  Patient and wife verbalize understanding of the importance of continued follow-up with wound care as an outpatient and current plan of care while admitted  Primary RN made aware of assessment refusal      Will provide orders for dressing changes as per patients current wound care regimen  Please re-consult wound care if patient is agreeable to formal assessment while inpatient, otherwise he may follow-up with wound care as an outpatient  Discussed assessment refusal and wound care plan with [facility] attending, Dr Debi Byrnes  Plan:   1  Apply hydraguard to b/l heels, buttocks and sacrum BID and PRN for prevention and protection  2   Apply skin nourishing cream the entire skin daily for moisture  3  Turn and reposition patient every  2 hours   4  Elevate heels off of bed with pillows to offload pressure   5  Apply EHOB waffle cushion to chair when OOB, if able  6  Cleanse b/l lower extremity wounds with NSS and pat dry  Apply hydrofera blue (patient may use from home supply) or silver alginate to the wounds  Cover with ABD pads and secure the dressings with kerlex wrap  Change every other day and as needed for soilage/dislodgement  7  Follow-up with the [facility] wound care center as an outpatient - please call [phone] for an appointment  (Ambulatory referral placed)  8  If patient is agreeable to assessment of wounds -- please place new wound care nurse consult       Objective:    Vitals: Blood pressure 135/87, pulse (!) 114, temperature 98 9 °F (37 2 °C), temperature source Oral, resp  rate 22, height 5' 4" (1 626 m), weight (!) 227 kg (499 lb 15 9 oz), SpO2 96 %  ,Body mass index is 85 82 kg/m²  Wound care will sign off at this time, please re-consult if needed    Theresa Ontiveros RN BSN [facility]

## 2022-09-07 NOTE — ASSESSMENT & PLAN NOTE
Lab Results   Component Value Date    HGBA1C 6 6 (H) 06/24/2022       Recent Labs     09/06/22 2046   POCGLU 184*       Blood Sugar Average: Last 72 hrs:  (P) 184     · Hold home Metformin while inpatient  · Continue home Lantus 44 units HS and Humalog 18 units AC  · Start SSI AC/HS with Accu-checks  · Hypoglycemia protocol

## 2022-09-07 NOTE — UTILIZATION REVIEW
Initial Clinical Review    Admission: Date/Time/Statement:   Admission Orders (From admission, onward)     Ordered        09/06/22 1923  Place in Observation  Once                      Orders Placed This Encounter   Procedures    Place in Observation     Standing Status:   Standing     Number of Occurrences:   1     Order Specific Question:   Level of Care     Answer:   Med Surg [16]     ED Arrival Information     Expected   -    Arrival   9/6/2022 15:45    Acuity   Urgent            Means of arrival   Wheelchair    Escorted by   Friend    Service   Hospitalist    Admission type   Urgent            Arrival complaint   leg swelling pain            Chief Complaint   Patient presents with    Edema     Redness edema of LLE since Friday  Pt states "I have cellulitis"  started on 1gm Cefalexin Friday evening  Pt has abx in the home       Initial Presentation: 56 y o  male W/PMHX: DAHIANA, HTN, Pressure injury RT  Leg Stage 3 follows with wound care outpatient, chronic lymphedema,  hypothyroid, fungal infection to groin, T2DM, to ED from home, admitted as observation, due to cellulitis of LLE, Sepsis  Presented with concerns for cellulitis, recurrent h/o cellulitis, noticed redness and warmth of his left leg on Friday night and started 1 GM Keflex Q 6 H per his PCP, the pain, redness, and warmth contained to worsen  Presented to the ED after he began having intermittent low grade fevers, fatigue, and decreased PO intake    He denies any fever, chills, chest pain, shortness of breath, abdominal pain, or other consitutional symptoms, Exam: obese 499 lbs, chronically ill appearing, tachycardia, MM dry,  3+ Edema B/L lower extremities, decreased ROM d/t body habitus, erythema left inner thigh, B/L wounds on LEs, ED work up reveals: CT Lower extremity: no evidence of abscess, mild soft tissue induration of left medial thigh   Leukocytosis 12 K, procal 2 75, hyponatremia,   Elevated CR, Plan hold lasix and losartan d/t elevated CR, c/w IV ABX, Modified IVF hydration d/t tendency for  Fluid overload, ID consult, Prn pain control, trend serial labs with repletion as needed, Trend temp curve, lactic acid pending, ACCU CK  With ssi coverage, wound care consult, C/w home meds for chronic illness, BiPAP HS for DAHIANA, DVT PPX with foot pumps  Date: 9/7/22 :   ID consult: Sepsis POA: tachy, leukocytosis, 2/2 to left thigh cellulitis:  Consider bacteremia, BC pending  Hemodynamics stable, trend temp curve, trend serial labs with repletion as needed, c/w IV cefepime pending cultures, frequent leg elevation for edema control, f/u outpt lymphedema clinic  Chronic candid intertrigo of groin risk factor for cellulitis, c/w topical nystatin and moisture control  ID will continue to follow pt  Wound care nursing:  consulted for assessment of b/l lower extremity wounds, Admitted with cellulitis and is on IV ABT     Patient adamantly declined wound care assessment today  Patient states that he is in pain and the dressings have been taken down twice already  States, "I am still in pain and the wounds are the least of my concerns, they are fine!"  Patient reports his wife applied a new dressing with hydrofera blue yesterday and it is not due to be changed at this time  Patient follows with Dr Gatito Junior at the [facility] wound care center and current plan is hydrofera blue to the wounds, and circaids for compression  He changes the dressings two times per week  Discussed with patient that hydrofera blue is not on formulary, offered silver alginate as an alternative  Patient states he will use his own supplies from home if he remains admitted, as he desires to continue with his current plan of care  Patient and wife verbalize understanding of the importance of continued follow-up with wound care as an outpatient and current plan of care while admitted  Plan:   1   Apply hydraguard to b/l heels, buttocks and sacrum BID and PRN for prevention and protection  2  Apply skin nourishing cream the entire skin daily for moisture  3  Turn and reposition patient every  2 hours   4  Elevate heels off of bed with pillows to offload pressure   5  Apply EHOB waffle cushion to chair when OOB, if able  6  Cleanse b/l lower extremity wounds with NSS and pat dry  Apply hydrofera blue (patient may use from home supply) or silver alginate to the wounds  Cover with ABD pads and secure the dressings with kerlex wrap  Change every other day and as needed for soilage/dislodgement  7  Follow-up with the [facility] [place] wound care center as an outpatient - please call [phone] for an appointment  (Ambulatory referral placed)     8  If patient is agreeable to assessment of wounds -- please place new wound care nurse consult     ED Triage Vitals   Temperature Pulse Respirations Blood Pressure SpO2   09/06/22 1609 09/06/22 1648 09/06/22 1609 09/06/22 1648 09/06/22 1610   98 3 °F (36 8 °C) (!) 120 20 136/86 99 %      Temp Source Heart Rate Source Patient Position - Orthostatic VS BP Location FiO2 (%)   09/06/22 1609 09/06/22 1648 09/06/22 2040 09/06/22 2040 --   Oral Monitor Sitting Right arm       Pain Score       09/06/22 2118       10 - Worst Possible Pain          Wt Readings from Last 1 Encounters:   09/06/22 (!) 227 kg (499 lb 15 9 oz)     Additional Vital Signs:   Date/Time Temp Pulse Resp BP MAP (mmHg) SpO2 O2 Device O2 Interface Device Patient Position - Orthostatic VS   09/07/22 0738 98 9 °F (37 2 °C) 114 Abnormal  22 135/87 103 96 % CPAP -- Sitting   09/07/22 0031 97 9 °F (36 6 °C) 104 20 130/67 90 95 % None (Room air) -- Lying   09/06/22 2231 -- -- -- -- -- -- -- --  --   O2 Interface Device: home cpap at 09/06/22 2231 09/06/22 2040 99 4 °F (37 4 °C) 108 Abnormal  20 125/78 -- 95 % -- -- Sitting   09/06/22 1915 -- 100 21 -- -- 97 % -- -- --   09/06/22 1648 -- 120 Abnormal  -- 136/86 -- -- -- -- --   09/06/22 1610 -- -- -- -- -- 99 % None (Room air) -- --       Pertinent Labs/Diagnostic Test Results:   CT lower extremity wo contrast left   Final Result by Earl Smalls MD (09/07 1756)   Medial left thigh subcutaneous edema and skin thickening consistent with nonspecific cellulitis without drainable collection in this unenhanced study  Left inguinal adenopathy could be reactive  Fat-containing midline pelvic hernia  Concordant preliminary results were provided by virtual radiologic at [address] hours  Workstation performed: [id number]         VAS lower limb venous duplex study, unilateral/limited    (Results Pending)   XR chest portable    (Results Pending)         Results from last 7 days   Lab Units 09/07/22  0511 09/06/22  1639   WBC Thousand/uL 12 61* 12 19*   HEMOGLOBIN g/dL 12 6 12 6   HEMATOCRIT % 37 9 36 8   PLATELETS Thousands/uL 178 155  155   NEUTROS ABS Thousands/µL  --  8 67*   BANDS PCT % 15*  --          Results from last 7 days   Lab Units 09/07/22  0536 09/06/22  1639   SODIUM mmol/L 131* 134*   POTASSIUM mmol/L 4 3 3 7   CHLORIDE mmol/L 99 101   CO2 mmol/L 22 23   ANION GAP mmol/L 10 10   BUN mg/dL 25 29*   CREATININE mg/dL 1 15 1 28   EGFR ml/min/1 73sq m 71 62   CALCIUM mg/dL 9 0 9 2   MAGNESIUM mg/dL 1 9  --      Results from last 7 days   Lab Units 09/07/22  0536 09/06/22  1639   AST U/L 35 39   ALT U/L 40 46   ALK PHOS U/L 89 74   TOTAL PROTEIN g/dL 6 8 6 7   ALBUMIN g/dL 3 1* 3 0*   TOTAL BILIRUBIN mg/dL 1 19* 1 43*     Results from last 7 days   Lab Units 09/07/22  1105 09/07/22  0742 09/06/22  2046   POC GLUCOSE mg/dl 234* 300* 184*     Results from last 7 days   Lab Units 09/07/22  0536 09/06/22  1639   GLUCOSE RANDOM mg/dL 287* 248*     Results from last 7 days   Lab Units 09/07/22  0536 09/06/22  1641   PROCALCITONIN ng/ml 1 91* 2 75*     Results from last 7 days   Lab Units 09/06/22  2236   LACTIC ACID mmol/L 1 8       Results from last 7 days   Lab Units 09/06/22  1641 09/06/22  1639   BLOOD CULTURE  Received in Microbiology Lab   Culture in Progress  Received in Microbiology Lab  Culture in Progress                 ED Treatment:   Medication Administration from 09/06/2022 1543 to 09/06/2022 2024       Date/Time Order Dose Route Action     09/06/2022 1649 ceFAZolin (ANCEF) IVPB (premix in dextrose) 2,000 mg 50 mL 2,000 mg Intravenous New Bag     09/06/2022 1747 acetaminophen (TYLENOL) tablet 975 mg 975 mg Oral Given     09/06/2022 1912 ketorolac (TORADOL) injection 30 mg 30 mg Intravenous Given     09/06/2022 1927 oxyCODONE (ROXICODONE) IR tablet 5 mg 5 mg Oral Given        Past Medical History:   Diagnosis Date    Acute kidney injury 10/28/2021    Anemia 09/13/2019    Cellulitis     last assessed 12/10/15    Cellulitis of left lower extremity     Cellulitis of right lower extremity 11/19/2021    Cough 10/20/2019    Diabetes mellitus ([facility] [address] )     Disease of thyroid gland     Edema     Elevated liver enzymes     Elevated serum creatinine 11/19/2021    Esophageal reflux     Fungal infection 8/16/2021    Gluten intolerance     Gout     last assessed 09/05/13    Hyperglycemia     Hypertension     Hyponatremia 9/6/2019    IBS (irritable bowel syndrome)     Insomnia     Obesity     Osteoarthritis of knee     last assessed 02/10/14    Scrotal swelling 5/19/2020    Shortness of breath 5/3/2021    Venous insufficiency     last assessed 08/22/17    Villonodular synovitis of the hand, right     last assessed 11/14/2013     Present on Admission:   Cellulitis of left lower extremity   Pressure injury of right leg, stage 3 (HCC)   Pressure injury of left leg, stage 3 (HCC)   DAHIANA (obstructive sleep apnea)   Hypothyroidism   Essential hypertension   Fungal infection   Elevated serum creatinine      Admitting Diagnosis: Edema [R60 9]  Cellulitis of left leg [L03 116]  Uncontrolled diabetes mellitus with hyperglycemia, with long-term current use of insulin (HCC) [E11 65, Z79 4]  Age/Sex: 56 y o  male  Admission Orders:skin care 2 x D, wound care every other day, elevate heels off bed, Q 2 H turns, foot pumps, I/O, Up and oob, Bariatric bed  Scheduled Medications:  cefepime, 2,000 mg, Intravenous, Q12H  docusate sodium, 100 mg, Oral, BID  enoxaparin, 60 mg, Subcutaneous, BID  ezetimibe, 10 mg, Oral, Daily  furosemide, 20 mg, Oral, Daily With Dinner  furosemide, 40 mg, Oral, Daily  gabapentin, 400 mg, Oral, BID  guaiFENesin, 600 mg, Oral, Q12H AUNDREA  insulin glargine, 44 Units, Subcutaneous, HS  insulin lispro, 18 Units, Subcutaneous, TID With Meals  insulin lispro, 2-12 Units, Subcutaneous, TID AC  insulin lispro, 2-12 Units, Subcutaneous, HS  levothyroxine, 25 mcg, Oral, Early Morning  losartan, 25 mg, Oral, Daily  nystatin, 1 application, Topical, BID  pantoprazole, 40 mg, Oral, BID AC  saccharomyces boulardii, 250 mg, Oral, BID  senna, 1 tablet, Oral, Daily      Continuous IV Infusions:none     PRN Meds:  acetaminophen, 650 mg, Oral, Q6H PRN  albuterol, 1 puff, Inhalation, Q6H PRN  aluminum-magnesium hydroxide-simethicone, 30 mL, Oral, Q6H PRN  benzonatate, 100 mg, Oral, TID PRN  HYDROmorphone, 0 5 mg, Intravenous, Q3H PRN 9/7 x1   naloxone, 0 04 mg, Intravenous, Q1MIN PRN  nystatin, , Topical, BID PRN  ondansetron, 4 mg, Intravenous, Q6H PRN  oxyCODONE, 10 mg, Oral, Q4H PRN  oxyCODONE, 5 mg, Oral, Q4H PRN        IP CONSULT TO INFECTIOUS DISEASES    Network Utilization Review Department  ATTENTION: Please call with any questions or concerns to [phone] and carefully listen to the prompts so that you are directed to the right person  All voicemails are confidential   Johanna Kian all requests for admission clinical reviews, approved or denied determinations and any other requests to dedicated fax number below belonging to the campus where the patient is receiving treatment   List of dedicated fax numbers for the Facilities:  [address] DENIALS (Administrative/Medical Necessity) [phone]   [address] (Maternity/NICU/Pediatrics) 261 [facility] 40 125 [facility]  [phone]   Svitlana Mccarthy 50 [address] [id number] [address] [address]ya Sudheer [address] 148 [address] [facility] [address] [facility] [phone]

## 2022-09-07 NOTE — ASSESSMENT & PLAN NOTE
Presented w/ redness and swelling of the left thigh since Friday 9/2  Reports recurrent cellulitis due to lymphedema and chronic fungal infections  Started on Keflex by PCP outpatient w/o significant improvement and actually worsening of symptoms -> presented to the ED with worsening pain and subjective low-grade fevers at home  CT of left lower extremity notable for soft tissue cellulitis w/o evidence of active drainage/abscess  Due to recurrence of symptoms, will check left lower extremity venous doppler to assess for underlying DVT  Met sepsis criteria on admission (see below)  Initially on IV Ancef -> will switch to more broader spectrum IV Cefepime (wound culture in July 2022 grew Pseudomonas in addition to Proteus)  PRN pain control  Will appreciate wound care input  Will appreciate Infectious Disease input

## 2022-09-07 NOTE — ASSESSMENT & PLAN NOTE
Reports chronic fungal infection of the groin - continue Nystatin  Daily hygiene counseling  Wound care evaluation

## 2022-09-07 NOTE — PLAN OF CARE
Problem: Potential for Falls  Goal: Patient will remain free of falls  Description: INTERVENTIONS:  - Educate patient/family on patient safety including physical limitations  - Instruct patient to call for assistance with activity   - Consult OT/PT to assist with strengthening/mobility   - Keep Call bell within reach  - Keep bed low and locked with side rails adjusted as appropriate  - Keep care items and personal belongings within reach  - Initiate and maintain comfort rounds  - Make Fall Risk Sign visible to staff  - Offer Toileting every 3 Hours, in advance of need  - Initiate/Maintain bed alarm  - Obtain necessary fall risk management equipment:   - Apply yellow socks and bracelet for high fall risk patients  - Consider moving patient to room near nurses station  Outcome: Progressing     Problem: Nutrition/Hydration-ADULT  Goal: Nutrient/Hydration intake appropriate for improving, restoring or maintaining nutritional needs  Description: Monitor and assess patient's nutrition/hydration status for malnutrition  Collaborate with interdisciplinary team and initiate plan and interventions as ordered  Monitor patient's weight and dietary intake as ordered or per policy  Utilize nutrition screening tool and intervene as necessary  Determine patient's food preferences and provide high-protein, high-caloric foods as appropriate       INTERVENTIONS:  - Monitor oral intake, urinary output, labs, and treatment plans  - Assess nutrition and hydration status and recommend course of action  - Evaluate amount of meals eaten  - Assist patient with eating if necessary   - Allow adequate time for meals  - Recommend/ encourage appropriate diets, oral nutritional supplements, and vitamin/mineral supplements  - Order, calculate, and assess calorie counts as needed  - Recommend, monitor, and adjust tube feedings and TPN/PPN based on assessed needs  - Assess need for intravenous fluids  - Provide specific nutrition/hydration education as appropriate  - Include patient/family/caregiver in decisions related to nutrition  Outcome: Progressing     Problem: PAIN - ADULT  Goal: Verbalizes/displays adequate comfort level or baseline comfort level  Description: Interventions:  - Encourage patient to monitor pain and request assistance  - Assess pain using appropriate pain scale  - Administer analgesics based on type and severity of pain and evaluate response  - Implement non-pharmacological measures as appropriate and evaluate response  - Consider cultural and social influences on pain and pain management  - Notify physician/advanced practitioner if interventions unsuccessful or patient reports new pain  Outcome: Progressing     Problem: INFECTION - ADULT  Goal: Absence or prevention of progression during hospitalization  Description: INTERVENTIONS:  - Assess and monitor for signs and symptoms of infection  - Monitor lab/diagnostic results  - Monitor all insertion sites, i e  indwelling lines, tubes, and drains  - Monitor endotracheal if appropriate and nasal secretions for changes in amount and color  - [place] appropriate cooling/warming therapies per order  - Administer medications as ordered  - Instruct and encourage patient and family to use good hand hygiene technique  - Identify and instruct in appropriate isolation precautions for identified infection/condition  Outcome: Progressing     Problem: SAFETY ADULT  Goal: Patient will remain free of falls  Description: INTERVENTIONS:  - Educate patient/family on patient safety including physical limitations  - Instruct patient to call for assistance with activity   - Consult OT/PT to assist with strengthening/mobility   - Keep Call bell within reach  - Keep bed low and locked with side rails adjusted as appropriate  - Keep care items and personal belongings within reach  - Initiate and maintain comfort rounds  - Make Fall Risk Sign visible to staff  - Offer Toileting every 3 Hours, in advance of need  - Initiate/Maintain bed alarm  - Obtain necessary fall risk management equipment:   - Apply yellow socks and bracelet for high fall risk patients  - Consider moving patient to room near nurses station  Outcome: Progressing     Problem: DISCHARGE PLANNING  Goal: Discharge to home or other facility with appropriate resources  Description: INTERVENTIONS:  - Identify barriers to discharge w/patient and caregiver  - Arrange for needed discharge resources and transportation as appropriate  - Identify discharge learning needs (meds, wound care, etc )  - Arrange for interpretive services to assist at discharge as needed  - Refer to Case Management Department for coordinating discharge planning if the patient needs post-hospital services based on physician/advanced practitioner order or complex needs related to functional status, cognitive ability, or social support system  Outcome: Progressing     Problem: Knowledge Deficit  Goal: Patient/family/caregiver demonstrates understanding of disease process, treatment plan, medications, and discharge instructions  Description: Complete learning assessment and assess knowledge base    Interventions:  - Provide teaching at level of understanding  - Provide teaching via preferred learning methods  Outcome: Progressing     Problem: Prexisting or High Potential for Compromised Skin Integrity  Goal: Skin integrity is maintained or improved  Description: INTERVENTIONS:  - Identify patients at risk for skin breakdown  - Assess and monitor skin integrity  - Assess and monitor nutrition and hydration status  - Monitor labs   - Assess for incontinence   - Turn and reposition patient  - Assist with mobility/ambulation  - Relieve pressure over bony prominences  - Avoid friction and shearing  - Provide appropriate hygiene as needed including keeping skin clean and dry  - Evaluate need for skin moisturizer/barrier cream  - Collaborate with interdisciplinary team   - Patient/family teaching  - Consider wound care consult   Outcome: Progressing

## 2022-09-07 NOTE — PLAN OF CARE
Problem: Potential for Falls  Goal: Patient will remain free of falls  Description: INTERVENTIONS:  - Educate patient/family on patient safety including physical limitations  - Instruct patient to call for assistance with activity   - Consult OT/PT to assist with strengthening/mobility   - Keep Call bell within reach  - Keep bed low and locked with side rails adjusted as appropriate  - Keep care items and personal belongings within reach  - Initiate and maintain comfort rounds  - Make Fall Risk Sign visible to staff  - Offer Toileting every 2 Hours, in advance of need  - Initiate/Maintain bed alarm  - Obtain necessary fall risk management equipment:   - Apply yellow socks and bracelet for high fall risk patients  - Consider moving patient to room near nurses station  Outcome: Progressing     Problem: Nutrition/Hydration-ADULT  Goal: Nutrient/Hydration intake appropriate for improving, restoring or maintaining nutritional needs  Description: Monitor and assess patient's nutrition/hydration status for malnutrition  Collaborate with interdisciplinary team and initiate plan and interventions as ordered  Monitor patient's weight and dietary intake as ordered or per policy  Utilize nutrition screening tool and intervene as necessary  Determine patient's food preferences and provide high-protein, high-caloric foods as appropriate       INTERVENTIONS:  - Monitor oral intake, urinary output, labs, and treatment plans  - Assess nutrition and hydration status and recommend course of action  - Evaluate amount of meals eaten  - Assist patient with eating if necessary   - Allow adequate time for meals  - Recommend/ encourage appropriate diets, oral nutritional supplements, and vitamin/mineral supplements  - Order, calculate, and assess calorie counts as needed  - Recommend, monitor, and adjust tube feedings and TPN/PPN based on assessed needs  - Assess need for intravenous fluids  - Provide specific nutrition/hydration education as appropriate  - Include patient/family/caregiver in decisions related to nutrition  Outcome: Progressing     Problem: PAIN - ADULT  Goal: Verbalizes/displays adequate comfort level or baseline comfort level  Description: Interventions:  - Encourage patient to monitor pain and request assistance  - Assess pain using appropriate pain scale  - Administer analgesics based on type and severity of pain and evaluate response  - Implement non-pharmacological measures as appropriate and evaluate response  - Consider cultural and social influences on pain and pain management  - Notify physician/advanced practitioner if interventions unsuccessful or patient reports new pain  Outcome: Progressing     Problem: INFECTION - ADULT  Goal: Absence or prevention of progression during hospitalization  Description: INTERVENTIONS:  - Assess and monitor for signs and symptoms of infection  - Monitor lab/diagnostic results  - Monitor all insertion sites, i e  indwelling lines, tubes, and drains  - Monitor endotracheal if appropriate and nasal secretions for changes in amount and color  - [place] appropriate cooling/warming therapies per order  - Administer medications as ordered  - Instruct and encourage patient and family to use good hand hygiene technique  - Identify and instruct in appropriate isolation precautions for identified infection/condition  Outcome: Progressing  Goal: Absence of fever/infection during neutropenic period  Description: INTERVENTIONS:  - Monitor WBC    Outcome: Progressing     Problem: SAFETY ADULT  Goal: Patient will remain free of falls  Description: INTERVENTIONS:  - Educate patient/family on patient safety including physical limitations  - Instruct patient to call for assistance with activity   - Consult OT/PT to assist with strengthening/mobility   - Keep Call bell within reach  - Keep bed low and locked with side rails adjusted as appropriate  - Keep care items and personal belongings within reach  - Initiate and maintain comfort rounds  - Make Fall Risk Sign visible to staff  - Offer Toileting every 2 Hours, in advance of need  - Initiate/Maintain bed alarm  - Obtain necessary fall risk management equipment:   - Apply yellow socks and bracelet for high fall risk patients  - Consider moving patient to room near nurses station  Outcome: Progressing  Goal: Maintain or return to baseline ADL function  Description: INTERVENTIONS:  -  Assess patient's ability to carry out ADLs; assess patient's baseline for ADL function and identify physical deficits which impact ability to perform ADLs (bathing, care of mouth/teeth, toileting, grooming, dressing, etc )  - Assess/evaluate cause of self-care deficits   - Assess range of motion  - Assess patient's mobility; develop plan if impaired  - Assess patient's need for assistive devices and provide as appropriate  - Encourage maximum independence but intervene and supervise when necessary  - Involve family in performance of ADLs  - Assess for home care needs following discharge   - Consider OT consult to assist with ADL evaluation and planning for discharge  - Provide patient education as appropriate  Outcome: Progressing  Goal: Maintains/Returns to pre admission functional level  Description: INTERVENTIONS:  - Perform BMAT or MOVE assessment daily    - Set and communicate daily mobility goal to care team and patient/family/caregiver  - Collaborate with rehabilitation services on mobility goals if consulted  - Perform Range of Motion 3 times a day  - Reposition patient every 2 hours    - Dangle patient 3 times a day  - Stand patient 3 times a day  - Ambulate patient 3 times a day  - Out of bed to chair 3 times a day   - Out of bed for meals 3 times a day  - Out of bed for toileting  - Record patient progress and toleration of activity level   Outcome: Progressing     Problem: DISCHARGE PLANNING  Goal: Discharge to home or other facility with appropriate resources  Description: INTERVENTIONS:  - Identify barriers to discharge w/patient and caregiver  - Arrange for needed discharge resources and transportation as appropriate  - Identify discharge learning needs (meds, wound care, etc )  - Arrange for interpretive services to assist at discharge as needed  - Refer to Case Management Department for coordinating discharge planning if the patient needs post-hospital services based on physician/advanced practitioner order or complex needs related to functional status, cognitive ability, or social support system  Outcome: Progressing     Problem: Knowledge Deficit  Goal: Patient/family/caregiver demonstrates understanding of disease process, treatment plan, medications, and discharge instructions  Description: Complete learning assessment and assess knowledge base    Interventions:  - Provide teaching at level of understanding  - Provide teaching via preferred learning methods  Outcome: Progressing

## 2022-09-07 NOTE — ASSESSMENT & PLAN NOTE
Lab Results   Component Value Date    HGBA1C 6 6 (H) 06/24/2022     Hold oral hypoglycemics while hospitalized  Continue basal/prandial insulin w/ additional SSI coverage per Accu-Cheks   Carbohydrate restricted diet  Continue Gabapentin for neuropathy

## 2022-09-07 NOTE — H&P
[facility]  H&P- Matt Pedroza 1965, 56 y o  male MRN: [id number]  Unit/Bed#: -01 Encounter: [id number]  Primary Care Provider: Anastasia Walters MD   Date and time admitted to hospital: 9/6/2022  3:58 PM    * Cellulitis of left lower extremity  Assessment & Plan  · Presented to ED with redness and swelling of left thigh since Friday 9/2  · Reports recurrent cellulitis due to lymphedema and chronic fungal infections  · Started on Cefalexin 1 gm Q 6 Friday per his PCP  · Presented to ED after pain worsened and intermittent low grade fevers  · CT lower extremity - no evidence of abscess, mild soft tissue induration of left medial thigh   · WBC 12k and procal 2 75 in ED  · S/P Ancef 2 g  · Continue Q8  · Previous admission 11/2019 required ID consultation for recurrent cellulitis with antibiotic use and fungal infections  · Consult ID for assistance in treatment  · PRN pain control  · Monitor WBC and fever curve     Sepsis ([address] )  Assessment & Plan  · POA  · SIRS: , WBC 12K  · Source: right thigh cellulitis  · Lactic: pending  · Procal: 2 75  · Cultures: BCx x 2 pending  · ABX: Ancef 2 g  · Continue Ancef 2 g Q8 for cellulitis  · Patient on diuretics  · Will give modified sepsis fluids  · Monitor for fluid overload  · Trend WBC and fever curve    Elevated serum creatinine  Assessment & Plan  Lab Results   Component Value Date    CREATININE 1 28 09/06/2022    CREATININE 1 12 06/24/2022    CREATININE 1 26 05/20/2022    EGFR 62 09/06/2022    EGFR 73 06/24/2022    EGFR 64 05/20/2022     · Baseline Cr appears to be 1 1-1 3  · Currently at baseline  · Elevated Cr appears chronic, will not hold Lasix or Losartan  · Likely undiagnosed CKD   · Monitor renal function daily while in hospital     Type 2 diabetes mellitus with hyperglycemia, with long-term current use of insulin [facility])  Assessment & Plan  Lab Results   Component Value Date    HGBA1C 6 6 (H) 06/24/2022       Recent Labs 09/06/22 2046   POCGLU 184*       Blood Sugar Average: Last 72 hrs:  (P) 184     · Hold home Metformin while inpatient  · Continue home Lantus 44 units HS and Humalog 18 units AC  · Start SSI AC/HS with Accu-checks  · Hypoglycemia protocol     Fungal infection  Assessment & Plan  · Reports chronic fungal infection to groin  · Continue home Nystatin powder and cream  · Wound consulted  · ID consulted    Pressure injury of left leg, stage 3 (HCC)  Assessment & Plan  · Follows closely with wound outpatient  · Consult for assistance inpatient    Hypothyroidism  Assessment & Plan  · Continue Levothyroxine     Pressure injury of right leg, stage 3 ([address] )  Assessment & Plan  · Follows closely with wound outpatient  · Consult for assistance inpatient     DAHIANA (obstructive sleep apnea)  Assessment & Plan  · Continue CPAP HS    Essential hypertension  Assessment & Plan  · BP stable  · Continue home Losartan and Furosemide     VTE Pharmacologic Prophylaxis: VTE Score: 7 High Risk (Score >/= 5) - Pharmacological DVT Prophylaxis Ordered: enoxaparin (Lovenox)  Sequential Compression Devices Ordered  Code Status: Level 1 - Full Code   Discussion with family: Updated  (daughter) at bedside  Anticipated Length of Stay: Patient will be admitted on an observation basis with an anticipated length of stay of less than 2 midnights secondary to cellulitis requiring IV antibiotics  Total Time for Visit, including Counseling / Coordination of Care: 60 minutes Greater than 50% of this total time spent on direct patient counseling and coordination of care  Chief Complaint: Edema     History of Present Illness:  Tanesha La is a 56 y o  male with a PMH of recurrent cellulitis, recurrent fungal infections, chronic lymphedema, T2DM, HTN, DAHIANA on CPAP who presents with concern for cellulitis  Per patient he has recurrent cellulitis issues    He noticed redness and warmth on his left thing on Friday night and started 1 gm Keflex every 6 hours per his PCP  The redness, pain, and warmth continued to worsen  He presented to the ED after he began having intermittent low grade fevers, fatigue, and decreased PO intake  He denies any fever, chills, chest pain, shortness of breath, abdominal pain, or other consitutional symptoms  In the ED, he was noted to have an elevated white blood cell count and an elevated procalcitonin  He was started on 2 g Ancef for his suspected cellulitis  A CT was completed that showed no evidence of abscess  He was admitted for further treatment of his cellulitis  Review of Systems:  Review of Systems   Constitutional: Positive for appetite change, fatigue and fever  Negative for chills and diaphoresis  HENT: Negative for congestion, sinus pain, sore throat and trouble swallowing  Respiratory: Negative for chest tightness and shortness of breath  Cardiovascular: Positive for leg swelling  Negative for chest pain  Gastrointestinal: Negative for abdominal distention, abdominal pain, nausea and vomiting  Genitourinary: Negative for difficulty urinating, flank pain, frequency and urgency  Musculoskeletal: Negative for arthralgias and myalgias  Skin: Positive for color change and wound  Neurological: Positive for headaches  Negative for dizziness, syncope and light-headedness         Past Medical and Surgical History:   Past Medical History:   Diagnosis Date    Acute kidney injury 10/28/2021    Anemia 09/13/2019    Cellulitis     last assessed 12/10/15    Cellulitis of left lower extremity     Cellulitis of right lower extremity 11/19/2021    Cough 10/20/2019    Diabetes mellitus ([facility] )     Disease of thyroid gland     Edema     Elevated liver enzymes     Elevated serum creatinine 11/19/2021    Esophageal reflux     Fungal infection 8/16/2021    Gluten intolerance     Gout     last assessed 09/05/13    Hyperglycemia     Hypertension     Hyponatremia 9/6/2019    IBS (irritable bowel syndrome)     Insomnia     Obesity     Osteoarthritis of knee     last assessed 02/10/14    Scrotal swelling 5/19/2020    Shortness of breath 5/3/2021    Venous insufficiency     last assessed 08/22/17    Villonodular synovitis of the hand, right     last assessed 11/14/2013       Past Surgical History:   Procedure Laterality Date    INCISION AND DRAINAGE OF WOUND Left 9/6/2019    Procedure: INCISION AND DRAINAGE (I&D) GROIN;  Surgeon: Luis Moss DO;  [facility];  Service: General    SKIN BIOPSY      WOUND DEBRIDEMENT Left 9/7/2019    Procedure: EXCISIONAL DEBRIDEMENT;  Surgeon: Luis Moss DO;  [facility];  Service: General       Meds/Allergies:  Prior to Admission medications    Medication Sig Start Date End Date Taking?  Authorizing Provider   acetaminophen (TYLENOL) 325 mg tablet Take 2 tablets (650 mg total) by mouth every 4 (four) hours as needed for mild pain, headaches or fever 11/1/21   Martha King DO   albuterol (PROVENTIL HFA,VENTOLIN HFA) 90 mcg/act inhaler TAKE 2 PUFFS BY MOUTH EVERY 6 HOURS AS NEEDED FOR WHEEZE 3/7/22   Shona Ibarra PA-C   BD Pen Needle Ludmila 2nd Gen 32G X 4 MM MISC USE 4 TIMES A DAY 4/6/22   Marcial Urban MD   Blood Glucose Monitoring Suppl (ONETOUCH VERIO) w/Device KIT Use to test sugar daily  Patient not taking: Reported on 8/5/2022 7/21/20   Mecca Bains MD   Calcium Alginate (Maxorb II Alginate Dressing) MISC Apply 1 each topically in the morning 2/16/22   Cristal Phillips MD   cephalexin ) 500 mg capsule Take 2 capsules (1,000 mg total) by mouth every 6 (six) hours for 2 days 9/6/22 9/8/22  Cristal Phillips MD   Continuous Blood Gluc  (FreeStyle Cates Roes 2 [employer] [place] [employer]) BROOKS Use 1 Device as needed (use with sensors for bs checks) 12/28/20   Marcial Urban MD   Continuous Blood Gluc Sensor (FreeStyle Elvin 14 Day Sensor) MISC Use as directed 3 times a day 6/28/22   Cristal Phillips MD   Control Gel Formula Dressing (DuoDERM CGF Dressing) MISC Apply 1 application topically every 5 (five) days 4/16/21   MD URIAH Mendes Diclofenac Sodium 1 % APPLY 4 G TOPICALLY 4 (FOUR) TIMES A DAY 7/25/22   Brii Lion MD   ezetimibe (ZETIA) 10 mg tablet Take 1 tablet (10 mg total) by mouth daily 7/25/22   Brii Lion MD   furosemide (LASIX) 20 mg tablet TAKE 2 TABLETS EVERY DAY IN THE EARLY MORNING AND 1 TABLET DAILY AFTER LUNCH  5/3/22   Brii Lion MD   gabapentin (NEURONTIN) 100 mg capsule TAKE 1 CAPSULE BY MOUTH TWICE A DAY WITH 300MG CAPSULE 4/26/22   Brittany Deleon MD   gabapentin (NEURONTIN) 300 mg capsule TAKE 1 CAPSULE (300 MG TOTAL) BY MOUTH 2 (TWO) TIMES A DAY TAKE 1 TAB WITH YOUR 100MG TAB TWICE DAILY 7/12/22   Brii Lion MD   Gauze Pads & Dressings 5"X5" PADS Use in the morning 2/16/22   Brii Lion MD   insulin aspart (NovoLOG FlexPen) 100 UNIT/ML injection pen INJECT 18 UNITS WITH MEALS+SCALE ( - 150-200 -2 UNITS, 201-250-4 UNITS, 251-300 -6 UNITS, 301-350-8 UNITS, > 350- 10 UNITS ) 5/7/22   Brii Lion MD   insulin glargine (Lantus SoloStar) 100 units/mL injection pen Inject 44 Units under the skin daily at bedtime 6/28/22   Brii Lion MD   Lancets [facility] ULTRASOFT) lancets Use to test sugar 2 times a day 7/21/20   Chey Noland MD   levothyroxine (Euthyrox) 25 mcg tablet Take 1 tablet (25 mcg total) by mouth daily in the early morning 6/28/22   Brii Lion MD   losartan (COZAAR) 25 mg tablet Take 1 tablet (25 mg total) by mouth daily 8/10/22   Brii Lion MD   metFORMIN (GLUCOPHAGE) 1000 MG tablet Take 1 tablet (1,000 mg total) by mouth 2 (two) times a day 6/16/22   Brii iLon MD   nystatin (MYCOSTATIN) cream Apply topically 2 (two) times a day as needed (itching, redness) 4/21/22   Luis Miguel Bundy MD   nystatin (MYCOSTATIN) powder Apply 1 application topically 2 (two) times a day To affected area 4/21/22   Heather Augustin MD   omeprazole (PriLOSEC) 40 MG capsule TAKE 1 CAPSULE BY MOUTH TWICE A DAY 5/23/22   Milad Nails MD   OneTouch Verio test strip USE TO TEST SUGAR 2 TIMES A DAY 3/7/22   Helena Samuels MD   oxyCODONE (ROXICODONE) 5 immediate release tablet Take 1-2 tabs by mouth every 6 hours as needed for moderate or severe pains respectively  11/26/21   Jude Marroquin MD   psyllium (METAMUCIL) packet Take 1 packet by mouth daily 10/23/19   Socorro Briceño, PA-C   saccharomyces boulardii (FLORASTOR) 250 mg capsule Take 1 capsule (250 mg total) by mouth 2 (two) times a day 4/6/21   Aguilar Bertrand MD   Skin Protectants, Misc  [employer] 68"[id number]") SHEE Apply 1 each topically every 5 (five) days 12/1/21   Aguialr Bertrand MD   sodium hypochlorite (DAKIN'S QUARTER-STRENGTH) Irrigate with 1 application as directed daily 11/2/21   Tierra Patel DO     I have reviewed home medications using recent Epic encounter  Allergies:    Allergies   Allergen Reactions    Gluten Meal - Food Allergy     Clindamycin Diarrhea       Social History:  Marital Status: /Civil Union   Occupation:   Patient Pre-hospital Living Situation: Home  Patient Pre-hospital Level of Mobility: non-ambulatory/bed bound  Patient Pre-hospital Diet Restrictions: gluten free  Substance Use History:   Social History     Substance and Sexual Activity   Alcohol Use Not Currently    Alcohol/week: 0 0 standard drinks     Social History     Tobacco Use   Smoking Status Never Smoker   Smokeless Tobacco Never Used     Social History     Substance and Sexual Activity   Drug Use Yes    Types: Marijuana    Comment: medical       Family History:  Family History   Problem Relation Age of Onset    Cancer Mother         gastrc    Colon cancer Father     Heart failure Father        Physical Exam:     Vitals:   Blood Pressure: 125/78 (09/06/22 2040)  Pulse: (!) 108 (09/06/22 2040)  Temperature: 99 4 °F (37 4 °C) (09/06/22 2040)  Temp Source: Tympanic (09/06/22 2040)  Respirations: 20 (09/06/22 2040)  Height: 5' 4" (162 6 cm) (09/06/22 2040)  Weight - Scale: (!) 227 kg (499 lb 15 9 oz) (09/06/22 2040)  SpO2: 95 % (09/06/22 2040)    Physical Exam  Constitutional:       General: He is not in acute distress  Appearance: He is obese  He is ill-appearing (chronically)  He is not diaphoretic  HENT:      Head: Normocephalic  Nose: Nose normal       Mouth/Throat:      Mouth: Mucous membranes are dry  Pharynx: Oropharynx is clear  Eyes:      Extraocular Movements: Extraocular movements intact  Conjunctiva/sclera: Conjunctivae normal       Pupils: Pupils are equal, round, and reactive to light  Cardiovascular:      Rate and Rhythm: Regular rhythm  Tachycardia present  Pulses: Normal pulses  Heart sounds: Normal heart sounds  No murmur heard  Pulmonary:      Effort: Pulmonary effort is normal       Breath sounds: Normal breath sounds  Abdominal:      General: Bowel sounds are normal       Palpations: Abdomen is soft  Tenderness: There is no abdominal tenderness  Musculoskeletal:      Cervical back: Neck supple  Right lower leg: 3+ Edema present  Left lower leg: 3+ Edema present  Comments: Decreased ROM due to body habitus   Skin:     General: Skin is warm and dry  Capillary Refill: Capillary refill takes less than 2 seconds  Coloration: Skin is not pale  Findings: Erythema (left inner thigh) present  Comments: B/L wounds on LEs   Neurological:      General: No focal deficit present  Mental Status: He is alert and oriented to person, place, and time     Psychiatric:         Mood and Affect: Mood normal          Behavior: Behavior normal           Additional Data:     Lab Results:  Results from last 7 days   Lab Units 09/06/22  1639   WBC Thousand/uL 12 19*   HEMOGLOBIN g/dL 12 6   HEMATOCRIT % 36 8   PLATELETS Thousands/uL 155  155 NEUTROS PCT % 70   LYMPHS PCT % 13*   MONOS PCT % 10   EOS PCT % 4     Results from last 7 days   Lab Units 09/06/22  1639   SODIUM mmol/L 134*   POTASSIUM mmol/L 3 7   CHLORIDE mmol/L 101   CO2 mmol/L 23   BUN mg/dL 29*   CREATININE mg/dL 1 28   ANION GAP mmol/L 10   CALCIUM mg/dL 9 2   ALBUMIN g/dL 3 0*   TOTAL BILIRUBIN mg/dL 1 43*   ALK PHOS U/L 74   ALT U/L 46   AST U/L 39   GLUCOSE RANDOM mg/dL 248*         Results from last 7 days   Lab Units 09/06/22  2046   POC GLUCOSE mg/dl 184*         Results from last 7 days   Lab Units 09/06/22  1641   PROCALCITONIN ng/ml 2 75*       Imaging: Reviewed radiology reports from this admission including: CT lower extremity  Results via Vrad  CT lower extremity wo contrast left    (Results Pending)       EKG and Other Studies Reviewed on Admission:   · EKG: No EKG obtained  ** Please Note: This note has been constructed using a voice recognition system   **

## 2022-09-07 NOTE — DISCHARGE INSTR - OTHER ORDERS
1  Apply hydraguard to b/l heels, buttocks and sacrum BID and PRN for prevention and protection  2  Apply skin nourishing cream the entire skin daily for moisture  3  Turn and reposition patient every  2 hours   4  Elevate heels off of bed with pillows to offload pressure   5  Apply EHOB waffle cushion to chair when OOB, if able  6  Cleanse b/l lower extremity wounds with NSS and pat dry  Apply hydrofera blue (patient may use from home supply) or silver alginate to the wounds  Cover with ABD pads and secure the dressings with kerlex wrap  Change every other day and as needed for soilage/dislodgement  7  Follow-up with the [facility] as an outpatient - please call [phone] for an appointment  (Ambulatory referral placed)

## 2022-09-08 ENCOUNTER — APPOINTMENT (INPATIENT)
Dept: CT IMAGING | Facility: HOSPITAL | Age: 57
DRG: 720 | End: 2022-09-08
Payer: MEDICARE

## 2022-09-08 LAB
ANION GAP SERPL CALCULATED.3IONS-SCNC: 9 MMOL/L (ref 4–13)
BASOPHILS # BLD MANUAL: 0.15 THOUSAND/UL (ref 0–0.1)
BASOPHILS NFR MAR MANUAL: 1 % (ref 0–1)
BUN SERPL-MCNC: 20 MG/DL (ref 5–25)
CALCIUM SERPL-MCNC: 9.3 MG/DL (ref 8.4–10.2)
CHLORIDE SERPL-SCNC: 97 MMOL/L (ref 96–108)
CO2 SERPL-SCNC: 26 MMOL/L (ref 21–32)
CREAT SERPL-MCNC: 1.06 MG/DL (ref 0.6–1.3)
DOHLE BOD BLD QL SMEAR: PRESENT
EOSINOPHIL # BLD MANUAL: 0.29 THOUSAND/UL (ref 0–0.4)
EOSINOPHIL NFR BLD MANUAL: 2 % (ref 0–6)
ERYTHROCYTE [DISTWIDTH] IN BLOOD BY AUTOMATED COUNT: 15 % (ref 11.6–15.1)
GFR SERPL CREATININE-BSD FRML MDRD: 78 ML/MIN/1.73SQ M
GLUCOSE SERPL-MCNC: 159 MG/DL (ref 65–140)
GLUCOSE SERPL-MCNC: 171 MG/DL (ref 65–140)
GLUCOSE SERPL-MCNC: 173 MG/DL (ref 65–140)
GLUCOSE SERPL-MCNC: 180 MG/DL (ref 65–140)
GLUCOSE SERPL-MCNC: 188 MG/DL (ref 65–140)
HCT VFR BLD AUTO: 38.7 % (ref 36.5–49.3)
HGB BLD-MCNC: 12.8 G/DL (ref 12–17)
HYPERCHROMIA BLD QL SMEAR: PRESENT
LYMPHOCYTES # BLD AUTO: 15 % (ref 14–44)
LYMPHOCYTES # BLD AUTO: 2.18 THOUSAND/UL (ref 0.6–4.47)
MCH RBC QN AUTO: 30.3 PG (ref 26.8–34.3)
MCHC RBC AUTO-ENTMCNC: 33.1 G/DL (ref 31.4–37.4)
MCV RBC AUTO: 92 FL (ref 82–98)
MONOCYTES # BLD AUTO: 1.6 THOUSAND/UL (ref 0–1.22)
MONOCYTES NFR BLD: 11 % (ref 4–12)
NEUTROPHILS # BLD MANUAL: 10.32 THOUSAND/UL (ref 1.85–7.62)
NEUTS BAND NFR BLD MANUAL: 31 % (ref 0–8)
NEUTS SEG NFR BLD AUTO: 40 % (ref 43–75)
PLATELET # BLD AUTO: 204 THOUSANDS/UL (ref 149–390)
PLATELET BLD QL SMEAR: ADEQUATE
PMV BLD AUTO: 9 FL (ref 8.9–12.7)
POLYCHROMASIA BLD QL SMEAR: PRESENT
POTASSIUM SERPL-SCNC: 4.2 MMOL/L (ref 3.5–5.3)
PROCALCITONIN SERPL-MCNC: 1.43 NG/ML
RBC # BLD AUTO: 4.22 MILLION/UL (ref 3.88–5.62)
RBC MORPH BLD: PRESENT
SODIUM SERPL-SCNC: 132 MMOL/L (ref 135–147)
WBC # BLD AUTO: 14.54 THOUSAND/UL (ref 4.31–10.16)

## 2022-09-08 PROCEDURE — 85007 BL SMEAR W/DIFF WBC COUNT: CPT | Performed by: INTERNAL MEDICINE

## 2022-09-08 PROCEDURE — 82948 REAGENT STRIP/BLOOD GLUCOSE: CPT

## 2022-09-08 PROCEDURE — 80048 BASIC METABOLIC PNL TOTAL CA: CPT | Performed by: INTERNAL MEDICINE

## 2022-09-08 PROCEDURE — 84145 PROCALCITONIN (PCT): CPT | Performed by: INTERNAL MEDICINE

## 2022-09-08 PROCEDURE — 71275 CT ANGIOGRAPHY CHEST: CPT

## 2022-09-08 PROCEDURE — 85027 COMPLETE CBC AUTOMATED: CPT | Performed by: INTERNAL MEDICINE

## 2022-09-08 PROCEDURE — 99233 SBSQ HOSP IP/OBS HIGH 50: CPT | Performed by: INTERNAL MEDICINE

## 2022-09-08 PROCEDURE — G1004 CDSM NDSC: HCPCS

## 2022-09-08 PROCEDURE — 99232 SBSQ HOSP IP/OBS MODERATE 35: CPT

## 2022-09-08 RX ORDER — BUTALBITAL, ACETAMINOPHEN AND CAFFEINE 50; 325; 40 MG/1; MG/1; MG/1
1 TABLET ORAL EVERY 4 HOURS PRN
Status: DISCONTINUED | OUTPATIENT
Start: 2022-09-08 | End: 2022-09-12 | Stop reason: HOSPADM

## 2022-09-08 RX ORDER — ACETAMINOPHEN 325 MG/1
650 TABLET ORAL EVERY 6 HOURS PRN
Status: DISCONTINUED | OUTPATIENT
Start: 2022-09-08 | End: 2022-09-12 | Stop reason: HOSPADM

## 2022-09-08 RX ORDER — MAGNESIUM SULFATE HEPTAHYDRATE 40 MG/ML
2 INJECTION, SOLUTION INTRAVENOUS ONCE
Status: COMPLETED | OUTPATIENT
Start: 2022-09-08 | End: 2022-09-08

## 2022-09-08 RX ADMIN — GUAIFENESIN 600 MG: 600 TABLET ORAL at 09:15

## 2022-09-08 RX ADMIN — INSULIN LISPRO 2 UNITS: 100 INJECTION, SOLUTION INTRAVENOUS; SUBCUTANEOUS at 09:16

## 2022-09-08 RX ADMIN — ENOXAPARIN SODIUM 60 MG: 60 INJECTION SUBCUTANEOUS at 18:25

## 2022-09-08 RX ADMIN — OXYCODONE HYDROCHLORIDE 10 MG: 10 TABLET ORAL at 22:31

## 2022-09-08 RX ADMIN — INSULIN GLARGINE 44 UNITS: 100 INJECTION, SOLUTION SUBCUTANEOUS at 22:05

## 2022-09-08 RX ADMIN — OXYCODONE HYDROCHLORIDE 10 MG: 10 TABLET ORAL at 18:32

## 2022-09-08 RX ADMIN — INSULIN LISPRO 18 UNITS: 100 INJECTION, SOLUTION INTRAVENOUS; SUBCUTANEOUS at 09:36

## 2022-09-08 RX ADMIN — CEFEPIME HYDROCHLORIDE 2000 MG: 2 INJECTION, SOLUTION INTRAVENOUS at 03:47

## 2022-09-08 RX ADMIN — DOCUSATE SODIUM 100 MG: 100 CAPSULE, LIQUID FILLED ORAL at 09:15

## 2022-09-08 RX ADMIN — ENOXAPARIN SODIUM 60 MG: 60 INJECTION SUBCUTANEOUS at 09:15

## 2022-09-08 RX ADMIN — CEFEPIME HYDROCHLORIDE 2000 MG: 2 INJECTION, SOLUTION INTRAVENOUS at 18:31

## 2022-09-08 RX ADMIN — EZETIMIBE 10 MG: 10 TABLET ORAL at 09:15

## 2022-09-08 RX ADMIN — GABAPENTIN 400 MG: 400 CAPSULE ORAL at 18:32

## 2022-09-08 RX ADMIN — MAGNESIUM SULFATE HEPTAHYDRATE 2 G: 40 INJECTION, SOLUTION INTRAVENOUS at 17:06

## 2022-09-08 RX ADMIN — CEFEPIME HYDROCHLORIDE 2000 MG: 2 INJECTION, SOLUTION INTRAVENOUS at 12:28

## 2022-09-08 RX ADMIN — LOSARTAN POTASSIUM 25 MG: 25 TABLET, FILM COATED ORAL at 09:15

## 2022-09-08 RX ADMIN — FUROSEMIDE 20 MG: 20 TABLET ORAL at 18:25

## 2022-09-08 RX ADMIN — GABAPENTIN 400 MG: 400 CAPSULE ORAL at 09:15

## 2022-09-08 RX ADMIN — NYSTATIN 1 APPLICATION: 100000 POWDER TOPICAL at 09:39

## 2022-09-08 RX ADMIN — INSULIN LISPRO 18 UNITS: 100 INJECTION, SOLUTION INTRAVENOUS; SUBCUTANEOUS at 12:29

## 2022-09-08 RX ADMIN — LEVOTHYROXINE SODIUM 25 MCG: 25 TABLET ORAL at 06:13

## 2022-09-08 RX ADMIN — INSULIN LISPRO 2 UNITS: 100 INJECTION, SOLUTION INTRAVENOUS; SUBCUTANEOUS at 22:05

## 2022-09-08 RX ADMIN — PANTOPRAZOLE SODIUM 40 MG: 40 TABLET, DELAYED RELEASE ORAL at 18:32

## 2022-09-08 RX ADMIN — BUTALBITAL, ACETAMINOPHEN AND CAFFEINE 1 TABLET: 50; 325; 40 TABLET ORAL at 17:06

## 2022-09-08 RX ADMIN — GUAIFENESIN 600 MG: 600 TABLET ORAL at 22:06

## 2022-09-08 RX ADMIN — Medication 250 MG: at 18:25

## 2022-09-08 RX ADMIN — OXYCODONE HYDROCHLORIDE 10 MG: 10 TABLET ORAL at 06:15

## 2022-09-08 RX ADMIN — ACETAMINOPHEN 650 MG: 325 TABLET, FILM COATED ORAL at 18:25

## 2022-09-08 RX ADMIN — OXYCODONE HYDROCHLORIDE 10 MG: 10 TABLET ORAL at 12:28

## 2022-09-08 RX ADMIN — PANTOPRAZOLE SODIUM 40 MG: 40 TABLET, DELAYED RELEASE ORAL at 09:15

## 2022-09-08 RX ADMIN — Medication 250 MG: at 09:15

## 2022-09-08 RX ADMIN — INSULIN LISPRO 2 UNITS: 100 INJECTION, SOLUTION INTRAVENOUS; SUBCUTANEOUS at 12:30

## 2022-09-08 RX ADMIN — IOHEXOL 100 ML: 350 INJECTION, SOLUTION INTRAVENOUS at 21:20

## 2022-09-08 RX ADMIN — ACETAMINOPHEN 650 MG: 325 TABLET, FILM COATED ORAL at 09:34

## 2022-09-08 RX ADMIN — FUROSEMIDE 40 MG: 40 TABLET ORAL at 09:15

## 2022-09-08 RX ADMIN — SENNOSIDES 8.6 MG: 8.6 TABLET, FILM COATED ORAL at 09:15

## 2022-09-08 NOTE — NURSING NOTE
[age] pt refuses help from the staff with his hygiene care  His wife and friend are very involved with his care  They helped him with his urinal, AM and PM care

## 2022-09-08 NOTE — ASSESSMENT & PLAN NOTE
Patient complaining of worsening shortness of breath  · Patient now febrile, tachycardic with tachypnea  · Worsening infection vs R/o PE  · D-dimer likely to be elevated in the setting of sepsis  · Will order CTA Chest  · Initiate supplemental oxygen to maintain O2 sats >92%   · Continuous pulse Ox  · Continue to monitor respiratory status

## 2022-09-08 NOTE — CASE MANAGEMENT
Case Management Assessment & Discharge Planning Note    Patient name Maria D [facility]  Location /-50 MRN [id number]  : 1965 Date 2022       Current Admission Date: 2022  Current Admission Diagnosis:Cellulitis of left lower extremity   Patient Active Problem List    Diagnosis Date Noted    GERD (gastroesophageal reflux disease) 2022    Pressure injury of left leg     Metabolic syndrome     Low testosterone in male 2022    Hypercalcemia 2022    Hypothyroidism 2022    Pressure injury of right leg 2022    Ulcer of left lower extremity, limited to breakdown of skin ([address] ) 2022    Onychomycosis 2021    Migraine headache 2021    Elevated serum creatinine 2021    Morbid obesity 2021    Fungal infection 2021    DAHIANA (obstructive sleep apnea)     Elevated CO2 level 2021    Abnormal thyroid function test 2021    Bilateral leg edema 2020    Erectile dysfunction 2020    Cellulitis of left lower extremity     Insomnia 2019    Sepsis on admission 10/22/2019    Diabetic neuropathy ([address] ) 10/08/2019    Gluten intolerance 2019    Hyponatremia 2019    Gout 2018    Essential hypertension 2018    Venous insufficiency 12/10/2015    Hyperlipidemia 10/09/2013    Insulin-dependent diabetes mellitus with neuropathy 2013    Psoriasis 2013      LOS (days): 0  Geometric Mean LOS (GMLOS) (days):   Days to GMLOS:     OBJECTIVE:              Current admission status: Observation       Preferred Pharmacy:   [facility] [address]  [address] [place] [address]  Phone: [phone] Fax: [phone]    Primary Care Provider: Cristal Phillips MD    Primary Insurance: Jackie WILHELM  Secondary Insurance:     ASSESSMENT:  [address] [place] [address] Representative - Spouse   Primary Phone: [phone] (Mobile)  Home Phone: [phone]                              Patient Information  Admitted from[de-identified] Home  Mental Status: Alert  During Assessment patient was accompanied by: Spouse  Assessment information provided by[de-identified] Patient, Spouse  Primary Caregiver: Spouse  Caregiver's Name[de-identified] Shirley Felix (spouse)  Caregiver's Relationship to Patient[de-identified] Family Member  Caregiver's Telephone Number[de-identified] [phone] (M)  Support Systems: Spouse/significant other, Friend, Family members  [place] of Residence: [address] [place] [address] do you live in?: [place] entry access options   Select all that apply : Stairs  Number of steps to enter home : 1  Type of Current Residence: [address]  Upon entering residence, is there a bedroom on the main floor (no further steps)?: Yes  Upon entering residence, is there a bathroom on the main floor (no further steps)?: Yes  In the last 12 months, was there a time when you were not able to pay the mortgage or rent on time?: No  In the last 12 months, was there a time when you did not have a steady place to sleep or slept in a shelter (including now)?: No  Homeless/housing insecurity resource given?: No  Living Arrangements: Lives w/ Spouse/significant other, Lives w/ Friend    Activities of Daily Living Prior to Admission  Functional Status: Independent  Completes ADLs independently?: No  Level of ADL dependence: Assistance  Ambulates independently?: Yes  Does patient use assisted devices?: Yes  Assisted Devices (DME) used: Walker, CPAP (utilizes RW when out of the home)  Does patient currently own DME?: Yes  What DME does the patient currently own?: EmilyANTONINO Villanueva  Does patient have a history of Outpatient Therapy (PT/OT)?: Yes  Does the patient have a history of Short-Term Rehab?: No  Does patient have a history of HHC?: No  Does patient currently have Marcel[place] ?: No         Patient Information Continued  Within the past 12 months, you worried that your food would run out before you got the money to buy more : Never true  Within the past 12 months, the food you bought just didn't last and you didn't have money to get more : Never true  Food insecurity resource given?: No  Does patient receive dialysis treatments?: No         Means of Transportation  Means of Transport to Appts[de-identified] Friends (roommate drives)  In the past 12 months, has lack of transportation kept you from medical appointments or from getting medications?: No  In the past 12 months, has lack of transportation kept you from meetings, work, or from getting things needed for daily living?: No  Was application for public transport provided?: No        DISCHARGE DETAILS:    Discharge planning discussed with[de-identified] patient and pt's spouse  Freedom of Choice: Yes  Comments - Freedom of Choice: pt relayed no hx of STR/HHC but has had hx of OPPT  CM contacted family/caregiver?: Yes             Contacts  Patient Contacts: Wife-Cristel  Relationship to Patient[de-identified] Family  Contact Method: In Person  Reason/Outcome: Continuity of Care, Emergency Contact, Discharge Planning              Other Referral/Resources/Interventions Provided:  Referral Comments: CM met with pt and pt's spouse at bedside to discusss assessment and dcp  no current CM d/c needs identified at this time  CM remains available for any further CM d/c needs      Would you like to participate in our [address] service program?  : No - Declined ([facility])          Transport at Discharge : Family

## 2022-09-08 NOTE — PLAN OF CARE
Problem: Potential for Falls  Goal: Patient will remain free of falls  Description: INTERVENTIONS:  - Educate patient/family on patient safety including physical limitations  - Instruct patient to call for assistance with activity   - Consult OT/PT to assist with strengthening/mobility   - Keep Call bell within reach  - Keep bed low and locked with side rails adjusted as appropriate  - Keep care items and personal belongings within reach  - Initiate and maintain comfort rounds  - Make Fall Risk Sign visible to staff  - Offer Toileting every 2 Hours, in advance of need  - Initiate/Maintain bed alarm  - Obtain necessary fall risk management equipment:   - Apply yellow socks and bracelet for high fall risk patients  - Consider moving patient to room near nurses station  Outcome: Progressing     Problem: Nutrition/Hydration-ADULT  Goal: Nutrient/Hydration intake appropriate for improving, restoring or maintaining nutritional needs  Description: Monitor and assess patient's nutrition/hydration status for malnutrition  Collaborate with interdisciplinary team and initiate plan and interventions as ordered  Monitor patient's weight and dietary intake as ordered or per policy  Utilize nutrition screening tool and intervene as necessary  Determine patient's food preferences and provide high-protein, high-caloric foods as appropriate       INTERVENTIONS:  - Monitor oral intake, urinary output, labs, and treatment plans  - Assess nutrition and hydration status and recommend course of action  - Evaluate amount of meals eaten  - Assist patient with eating if necessary   - Allow adequate time for meals  - Recommend/ encourage appropriate diets, oral nutritional supplements, and vitamin/mineral supplements  - Order, calculate, and assess calorie counts as needed  - Recommend, monitor, and adjust tube feedings and TPN/PPN based on assessed needs  - Assess need for intravenous fluids  - Provide specific nutrition/hydration education as appropriate  - Include patient/family/caregiver in decisions related to nutrition  Outcome: Progressing     Problem: PAIN - ADULT  Goal: Verbalizes/displays adequate comfort level or baseline comfort level  Description: Interventions:  - Encourage patient to monitor pain and request assistance  - Assess pain using appropriate pain scale  - Administer analgesics based on type and severity of pain and evaluate response  - Implement non-pharmacological measures as appropriate and evaluate response  - Consider cultural and social influences on pain and pain management  - Notify physician/advanced practitioner if interventions unsuccessful or patient reports new pain  Outcome: Progressing     Problem: INFECTION - ADULT  Goal: Absence or prevention of progression during hospitalization  Description: INTERVENTIONS:  - Assess and monitor for signs and symptoms of infection  - Monitor lab/diagnostic results  - Monitor all insertion sites, i e  indwelling lines, tubes, and drains  - Monitor endotracheal if appropriate and nasal secretions for changes in amount and color  - Romeo appropriate cooling/warming therapies per order  - Administer medications as ordered  - Instruct and encourage patient and family to use good hand hygiene technique  - Identify and instruct in appropriate isolation precautions for identified infection/condition  Outcome: Progressing  Goal: Absence of fever/infection during neutropenic period  Description: INTERVENTIONS:  - Monitor WBC    Outcome: Progressing     Problem: SAFETY ADULT  Goal: Patient will remain free of falls  Description: INTERVENTIONS:  - Educate patient/family on patient safety including physical limitations  - Instruct patient to call for assistance with activity   - Consult OT/PT to assist with strengthening/mobility   - Keep Call bell within reach  - Keep bed low and locked with side rails adjusted as appropriate  - Keep care items and personal belongings within reach  - Initiate and maintain comfort rounds  - Make Fall Risk Sign visible to staff  - Offer Toileting every 2 Hours, in advance of need  - Initiate/Maintain bed alarm  - Obtain necessary fall risk management equipment:   - Apply yellow socks and bracelet for high fall risk patients  - Consider moving patient to room near nurses station  Outcome: Progressing  Goal: Maintain or return to baseline ADL function  Description: INTERVENTIONS:  -  Assess patient's ability to carry out ADLs; assess patient's baseline for ADL function and identify physical deficits which impact ability to perform ADLs (bathing, care of mouth/teeth, toileting, grooming, dressing, etc )  - Assess/evaluate cause of self-care deficits   - Assess range of motion  - Assess patient's mobility; develop plan if impaired  - Assess patient's need for assistive devices and provide as appropriate  - Encourage maximum independence but intervene and supervise when necessary  - Involve family in performance of ADLs  - Assess for home care needs following discharge   - Consider OT consult to assist with ADL evaluation and planning for discharge  - Provide patient education as appropriate  Outcome: Progressing  Goal: Maintains/Returns to pre admission functional level  Description: INTERVENTIONS:  - Perform BMAT or MOVE assessment daily    - Set and communicate daily mobility goal to care team and patient/family/caregiver  - Collaborate with rehabilitation services on mobility goals if consulted  - Perform Range of Motion 3 times a day  - Reposition patient every 2 hours    - Dangle patient 3 times a day  - Stand patient 3 times a day  - Ambulate patient 3 times a day  - Out of bed to chair 3 times a day   - Out of bed for meals 3 times a day  - Out of bed for toileting  - Record patient progress and toleration of activity level   Outcome: Progressing     Problem: DISCHARGE PLANNING  Goal: Discharge to home or other facility with appropriate resources  Description: INTERVENTIONS:  - Identify barriers to discharge w/patient and caregiver  - Arrange for needed discharge resources and transportation as appropriate  - Identify discharge learning needs (meds, wound care, etc )  - Arrange for interpretive services to assist at discharge as needed  - Refer to Case Management Department for coordinating discharge planning if the patient needs post-hospital services based on physician/advanced practitioner order or complex needs related to functional status, cognitive ability, or social support system  Outcome: Progressing     Problem: Knowledge Deficit  Goal: Patient/family/caregiver demonstrates understanding of disease process, treatment plan, medications, and discharge instructions  Description: Complete learning assessment and assess knowledge base    Interventions:  - Provide teaching at level of understanding  - Provide teaching via preferred learning methods  Outcome: Progressing     Problem: Prexisting or High Potential for Compromised Skin Integrity  Goal: Skin integrity is maintained or improved  Description: INTERVENTIONS:  - Identify patients at risk for skin breakdown  - Assess and monitor skin integrity  - Assess and monitor nutrition and hydration status  - Monitor labs   - Assess for incontinence   - Turn and reposition patient  - Assist with mobility/ambulation  - Relieve pressure over bony prominences  - Avoid friction and shearing  - Provide appropriate hygiene as needed including keeping skin clean and dry  - Evaluate need for skin moisturizer/barrier cream  - Collaborate with interdisciplinary team   - Patient/family teaching  - Consider wound care consult   Outcome: Progressing     Problem: MOBILITY - ADULT  Goal: Maintain or return to baseline ADL function  Description: INTERVENTIONS:  -  Assess patient's ability to carry out ADLs; assess patient's baseline for ADL function and identify physical deficits which impact ability to perform ADLs (bathing, care of mouth/teeth, toileting, grooming, dressing, etc )  - Assess/evaluate cause of self-care deficits   - Assess range of motion  - Assess patient's mobility; develop plan if impaired  - Assess patient's need for assistive devices and provide as appropriate  - Encourage maximum independence but intervene and supervise when necessary  - Involve family in performance of ADLs  - Assess for home care needs following discharge   - Consider OT consult to assist with ADL evaluation and planning for discharge  - Provide patient education as appropriate  Outcome: Progressing  Goal: Maintains/Returns to pre admission functional level  Description: INTERVENTIONS:  - Perform BMAT or MOVE assessment daily    - Set and communicate daily mobility goal to care team and patient/family/caregiver  - Collaborate with rehabilitation services on mobility goals if consulted  - Perform Range of Motion 3 times a day  - Reposition patient every 2 hours    - Dangle patient 3 times a day  - Stand patient 3 times a day  - Ambulate patient 3 times a day  - Out of bed to chair 3 times a day   - Out of bed for meals 3 times a day  - Out of bed for toileting  - Record patient progress and toleration of activity level   Outcome: Progressing

## 2022-09-08 NOTE — ASSESSMENT & PLAN NOTE
Lab Results   Component Value Date    HGBA1C 6 6 (H) 06/24/2022     · Hold oral hypoglycemics while hospitalized  · Continue basal/prandial insulin   · Continue with Accu-Cheks and SSI AC/HS  · Carbohydrate restricted diet  · Continue Gabapentin for neuropathy

## 2022-09-08 NOTE — PROGRESS NOTES
Jy[facility] 47  Progress Note - Christine Barahona 1965, 56 y o  male MRN: [id number]  Unit/Bed#: -Luke Encounter: [id number]  Primary Care Provider: Deepa Silva MD   Date and time admitted to hospital: 9/6/2022  3:58 PM    * Cellulitis of left lower extremity  Assessment & Plan  Presented w/ redness and swelling of the left thigh since Friday 9/2  Reports recurrent cellulitis due to lymphedema and chronic fungal infections  Started on Keflex by PCP outpatient w/o significant improvement and actually worsening of symptoms  Presented to the ED with worsening pain and subjective low-grade fevers at home  · CT of left lower extremity notable for soft tissue cellulitis w/o evidence of active drainage/abscess  · B/L LE Venous Duplex: No evidence of acute or chronic DVT's  · Met sepsis criteria on admission (see below)  · C/w Antibiotic therapy   Initially on IV Ancef, Transitioned to IV Cefepime per ID (D1)  · Continue supportive Care  · Pain Management  · Will appreciate wound care input  · Will appreciate Infectious Disease input    Sepsis on admission  Assessment & Plan  · Presented w/ leukocytosis/tachycardia and elevated procalcitonin - lactic acid normal  · Procalcitonin improved from 2 75 -> 1 91-> 1 43 today  · BC x2: NGTD (24 hrs)  · In the setting of left thigh cellulitis (see above)  · C/w IV Cefepime (D1)  · ID Consulted  · Monitor vitals and maintain hemodynamics  · Continue Supportive care  · Frequent leg elevation/agressive edema control  · Serial leg exams    SOB (shortness of breath)  Assessment & Plan  Patient complaining of worsening shortness of breath  · Patient now febrile, tachycardic with tachypnea  · Worsening infection vs R/o PE  · D-dimer likely to be elevated in the setting of sepsis  · Will order CTA Chest  · Initiate supplemental oxygen to maintain O2 sats >92%   · Continuous pulse Ox  · Continue to monitor respiratory status      Hyponatremia  Assessment & Plan  · In the setting of acute infection and hyperglycemia  · Optimize blood sugar control  · C/w fluid restriction  · Continue to monitor NA while admitted    Insulin-dependent diabetes mellitus with neuropathy  Assessment & Plan  Lab Results   Component Value Date    HGBA1C 6 6 (H) 06/24/2022     · Hold oral hypoglycemics while hospitalized  · Continue basal/prandial insulin   · Continue with Accu-Cheks and SSI AC/HS  · Carbohydrate restricted diet  · Continue Gabapentin for neuropathy    Fungal infection  Assessment & Plan  · Reports chronic fungal infection of the groin   · Continue Home Medication:  · Nystatin powder/Cream  · Daily hygiene counseling  · Wound care evaluation    GERD (gastroesophageal reflux disease)  Assessment & Plan  · Continue PPI regimen  · Encourage weight loss/ lifestyle modifications    Pressure injury of left leg  Assessment & Plan  · Follows closely with wound care outpatient  · Wound Care consulted    Hypothyroidism  Assessment & Plan  · Continue Home Medication:  · Synthroid    Pressure injury of right leg  Assessment & Plan  · Follows closely with wound care outpatient  · Wound Care consulted    Morbid obesity  Assessment & Plan  · BMI of 85 82  · Encouraged Lifestyle/diet modifications    DAHIANA (obstructive sleep apnea)  Assessment & Plan  · CPAP QHS  · Encourage weight loss    Essential hypertension  Assessment & Plan  · BP reviewed and remains stable  · Continue Home medication:  · Cozaar/Lasix    Hyperlipidemia  Assessment & Plan  · Continue Home Medication:  · Zetia      VTE Pharmacologic Prophylaxis: VTE Score: 7 High Risk (Score >/= 5) - Pharmacological DVT Prophylaxis Ordered: enoxaparin (Lovenox)  Sequential Compression Devices Ordered  Patient Centered Rounds: I performed bedside rounds with nursing staff today    Discussions with Specialists or Other Care Team Provider: ID, Case Management    Education and Discussions with Family / Patient: Patient declined call to   Time Spent for Care: 30 minutes  More than 50% of total time spent on counseling and coordination of care as described above  Current Length of Stay: 0 day(s)  Current Patient Status: Inpatient   Certification Statement: The patient will continue to require additional inpatient hospital stay due to worsening sepsis requring furhter IV antibiotics, ID consultation and further imaigng   Discharge Plan: Anticipate discharge in 48-72 hrs to discharge location to be determined pending rehab evaluations  Code Status: Level 1 - Full Code    Subjective:   Patient stated he is not feeling great today  Patient complaining of shortness of breath since admission  Patient denies any current chest pain, abdominal pain, nausea or vomiting  Objective:     Vitals:   Temp (24hrs), Av 4 °F (38 °C), Min:99 6 °F (37 6 °C), Max:101 1 °F (38 4 °C)    Temp:  [99 6 °F (37 6 °C)-101 1 °F (38 4 °C)] 101 1 °F (38 4 °C)  HR:  [109-117] 110  Resp:  [18-20] 20  BP: (126-148)/(65-85) 126/76  SpO2:  [90 %-94 %] 94 %  Body mass index is 85 82 kg/m²  Input and Output Summary (last 24 hours): Intake/Output Summary (Last 24 hours) at 2022 1724  Last data filed at 2022 0601  Gross per 24 hour   Intake 356 ml   Output --   Net 356 ml       Physical Exam:   Physical Exam  Vitals and nursing note reviewed  Constitutional:       General: He is not in acute distress  Appearance: He is obese  HENT:      Head: Normocephalic  Nose: Nose normal  No congestion  Mouth/Throat:      Mouth: Mucous membranes are moist       Pharynx: Oropharynx is clear  Cardiovascular:      Rate and Rhythm: Regular rhythm  Tachycardia present  Pulses: Normal pulses  Heart sounds: No murmur heard  Pulmonary:      Effort: Pulmonary effort is normal  No respiratory distress  Breath sounds: Decreased breath sounds present  Abdominal:      General: Bowel sounds are normal  There is no distension  Palpations: Abdomen is soft  Tenderness: There is no abdominal tenderness  Musculoskeletal:         General: Normal range of motion  Cervical back: Normal range of motion  Right lower leg: Edema present  Left lower leg: Edema present  Skin:     Capillary Refill: Capillary refill takes less than 2 seconds  Findings: Erythema (Left Thigh/Groin) present  Neurological:      Mental Status: He is alert and oriented to person, place, and time  Mental status is at baseline  Motor: Weakness (Generalized) present  Psychiatric:         Mood and Affect: Mood normal          Speech: Speech normal          Behavior: Behavior is cooperative  Additional Data:     Labs:  Results from last 7 days   Lab Units 09/08/22  0542 09/07/22  0511 09/06/22  1639   WBC Thousand/uL 14 54*   < > 12 19*   HEMOGLOBIN g/dL 12 8   < > 12 6   HEMATOCRIT % 38 7   < > 36 8   PLATELETS Thousands/uL 204   < > 155  155   BANDS PCT % 31*   < >  --    NEUTROS PCT %  --   --  70   LYMPHS PCT %  --   --  13*   LYMPHO PCT % 15   < >  --    MONOS PCT %  --   --  10   MONO PCT % 11   < >  --    EOS PCT % 2   < > 4    < > = values in this interval not displayed       Results from last 7 days   Lab Units 09/08/22  0629 09/07/22  0536   SODIUM mmol/L 132* 131*   POTASSIUM mmol/L 4 2 4 3   CHLORIDE mmol/L 97 99   CO2 mmol/L 26 22   BUN mg/dL 20 25   CREATININE mg/dL 1 06 1 15   ANION GAP mmol/L 9 10   CALCIUM mg/dL 9 3 9 0   ALBUMIN g/dL  --  3 1*   TOTAL BILIRUBIN mg/dL  --  1 19*   ALK PHOS U/L  --  89   ALT U/L  --  40   AST U/L  --  35   GLUCOSE RANDOM mg/dL 173* 287*         Results from last 7 days   Lab Units 09/08/22  1557 09/08/22  1143 09/08/22  0716 09/07/22  2033 09/07/22  1559 09/07/22  1105 09/07/22  0742 09/06/22  2046   POC GLUCOSE mg/dl 180* 188* 171* 217* 241* 234* 300* 184*         Results from last 7 days   Lab Units 09/08/22  0629 09/07/22  2131 09/07/22  0536 09/06/22  2236 09/06/22  1641   LACTIC ACID mmol/L  --  1 1  --  1 8  --    PROCALCITONIN ng/ml 1 43*  --  1 91*  --  2 75*       Lines/Drains:  Invasive Devices  Report    Peripheral Intravenous Line  Duration           Peripheral IV 09/08/22 Dorsal (posterior); Right Hand <1 day                      Imaging: No pertinent imaging reviewed  Recent Cultures (last 7 days):   Results from last 7 days   Lab Units 09/06/22  1641 09/06/22  1639   BLOOD CULTURE  No Growth at 24 hrs  No Growth at 24 hrs         Last 24 Hours Medication List:   Current Facility-Administered Medications   Medication Dose Route Frequency Provider Last Rate    acetaminophen  650 mg Oral Q6H PRN ANN Gomez      albuterol  1 puff Inhalation Q6H PRN ANN Harp      aluminum-magnesium hydroxide-simethicone  30 mL Oral Q6H PRN ANN Harp      benzonatate  100 mg Oral TID PRN ANN Bernal      butalbital-acetaminophen-caffeine  1 tablet Oral Q4H PRN ANN Gomez      cefepime  2,000 mg Intravenous Q8H Shelbie Aldea, DO 2,000 mg (09/08/22 1228)    docusate sodium  100 mg Oral BID ANN Harp      enoxaparin  60 mg Subcutaneous BID ANN Harp      ezetimibe  10 mg Oral Daily Zamzam Harp [address]      furosemide  20 mg Oral Daily With [employer], ANN      furosemide  40 mg Oral Daily Anibal Masters, [address]      gabapentin  400 mg Oral BID Anibal Masters, Zam[address]      guaiFENesin  600 mg Oral Q12H [address], [address]      HYDROmorphone  0 5 mg Intravenous Q3H PRN Cj Burns MD      insulin glargine  44 Units Subcutaneous HS Anibal Masters, [address]      insulin lispro  18 Units Subcutaneous TID With Meals ANN Harp      insulin lispro  2-12 Units Subcutaneous TID AC Daja Masters, [address]      insulin lispro  2-12 Units Subcutaneous HS Anibal Masters, [address]      levothyroxine  25 mcg Oral Early Morning ANN Harp      losartan  25 mg Oral Daily [place], [place]      magnesium sulfate  2 g Intravenous Once ANN Mccormack      naloxone  0 04 mg Intravenous Q1MIN PRN [place], [place]      nystatin   Topical BID PRN [place], [place]      nystatin  1 application Topical BID [place], [place]      ondansetron  4 mg Intravenous Q6H PRN [place], [place]      oxyCODONE  10 mg Oral Q4H PRN Laya Chew MD      oxyCODONE  5 mg Oral Q4H PRN Laya Chew MD      pantoprazole  40 mg Oral BID AC [place], [place]      saccharomyces boulardii  250 mg Oral BID [facility], [place]      senna  1 tablet Oral Daily [facility], [place]          Today, Patient Was Seen By: ANN Mccormack    **Please Note: This note may have been constructed using a voice recognition system  **

## 2022-09-08 NOTE — ASSESSMENT & PLAN NOTE
Presented w/ redness and swelling of the left thigh since Friday 9/2  Reports recurrent cellulitis due to lymphedema and chronic fungal infections  Started on Keflex by PCP outpatient w/o significant improvement and actually worsening of symptoms  Presented to the ED with worsening pain and subjective low-grade fevers at home  · CT of left lower extremity notable for soft tissue cellulitis w/o evidence of active drainage/abscess  · B/L LE Venous Duplex: No evidence of acute or chronic DVT's  · Met sepsis criteria on admission (see below)  · C/w Antibiotic therapy   Initially on IV Ancef, Transitioned to IV Cefepime per ID (D1)  · Continue supportive Care  · Pain Management  · Will appreciate wound care input  · Will appreciate Infectious Disease input

## 2022-09-08 NOTE — NURSING NOTE
At 0714 Temperature 100 6 and Heart Rate 109  Attending Dr Jan Carranza notified and Bobo Sun notified   Tylenol given as ordered

## 2022-09-08 NOTE — ASSESSMENT & PLAN NOTE
· Presented w/ leukocytosis/tachycardia and elevated procalcitonin - lactic acid normal  · Procalcitonin improved from 2 75 -> 1 91-> 1 43 today  · BC x2: NGTD (24 hrs)  · In the setting of left thigh cellulitis (see above)  · C/w IV Cefepime (D1)  · ID Consulted  · Monitor vitals and maintain hemodynamics  · Continue Supportive care  · Frequent leg elevation/agressive edema control  · Serial leg exams

## 2022-09-08 NOTE — PROGRESS NOTES
Progress Note - Infectious Disease   Jorja Hamman 56 y o  male MRN: [id number]  Unit/Bed#: -01 Encounter: [id number]      Impression/Plan:  1  Sepsis, POA  Tachycardia, leukocytosis  Secondary to #2  Consider bacteremia  Admission blood cultures are negative to date  Hemodynamics remain stable  24 T max 101 F  WBC 14K  -continue antibiotic plan as below  -follow-up cultures and adjust antibiotics accordingly  -monitor temperature and hemodynamics  -serial exam  -recheck CBC and BMP in a m   -supportive care     2  Left thigh cellulitis  With history of recurrent LE cellulitis  No underlying drainable collection on CT  Refractory to outpatient Keflex  No visible fluctuance or purulence on the thigh area  Patient does have wound on lower left leg with recent outpatient culture from 07/21/2022 which grew Pseudomonas, Proteus  Patient received 1 dose of cefazolin which was changed to cefepime   -continues IV cefepime 2 g q 8 hours for now  -follow up pending blood cultures  -frequent leg elevation/aggressive edema control  -serial leg exams     3  Chronic lower extremity lymphedema with venous stasis ulcerations  Risk factor for recurrent cellulitis  Follows with outpatient wound care  -wound care evaluation appreciated  -frequent leg elevation/aggressive edema control  -outpatient lymphedema clinic and wound care follow-up     4  DM2, with hyperglycemia and long-term insulin use  Risk factor for infection  Glycemic control as per Internal Medicine Service      5  Chronic candida intertrigo of the groin  Risk factor for cellulitis  -continue topical nystatin  -moisture control     6  Severe obesity  With BMI of about 86  Strongly recommend weight loss measures/dietary changes      Antibiotics:  Cefepime 2     I discussed above plan with patient, RN and Dr Filippo Gill from Internal Medicine Service      Subjective:  Patient has temp to 101 F, no chills, sweats; no nausea, vomiting, diarrhea; no cough, shortness of breath; less left leg pain  Reports migraine  No appetite  No noted rash  Appears to be tolerating IV Cefepime  Objective:  Vitals:  Temp:  [98 3 °F (36 8 °C)-101 °F (38 3 °C)] 100 5 °F (38 1 °C)  HR:  [109-117] 109  Resp:  [18-20] 20  BP: (124-148)/(63-85) 141/76  SpO2:  [90 %-94 %] 90 %  Temp (24hrs), Av °F (37 8 °C), Min:98 3 °F (36 8 °C), Max:101 °F (38 3 °C)  Current: Temperature: 100 5 °F (38 1 °C)    Physical Exam:   General Appearance:  56year old chronically debilitated male, alert, cooperative, sitting at bedside, winded from transfer from [facility], no acute distress  Throat: Oropharynx moist without lesions  Lungs:   Clear to auscultation bilaterally; no wheezes, rhonchi or rales; respirations unlabored   Heart:  RRR; no murmur   Abdomen:   Soft, non-tender, protuberant pannus, positive bowel sounds  Extremities: No clubbing, cyanosis, lightly warm Left thigh edema, erythema less angry, no palpable fluctuance       : No oneill, no SPT   Skin: No new rashes or lesions  Left arm IV site nontender  No draining wounds noted  Labs, Imaging, & Other studies:   All pertinent labs and imaging studies were personally reviewed  Results from last 7 days   Lab Units 22  0542 22  0511 22  1639   WBC Thousand/uL 14 54* 12 61* 12 19*   HEMOGLOBIN g/dL 12 8 12 6 12 6   PLATELETS Thousands/uL 204 178 155  155     Results from last 7 days   Lab Units 22  0629 22  0536 22  1639   SODIUM mmol/L 132* 131* 134*   POTASSIUM mmol/L 4 2 4 3 3 7   CHLORIDE mmol/L 97 99 101   CO2 mmol/L 26 22 23   BUN mg/dL 20 25 29*   CREATININE mg/dL 1 06 1 15 1 28   EGFR ml/min/1 73sq m 78 71 62   CALCIUM mg/dL 9 3 9 0 9 2   AST U/L  --  35 39   ALT U/L  --  40 46   ALK PHOS U/L  --  89 74     Results from last 7 days   Lab Units 22  1641 22  1639   BLOOD CULTURE  No Growth at 24 hrs  No Growth at 24 hrs       Results from last 7 days   Lab Units 09/08/22  0629 09/07/22  0536 09/06/22  1641   PROCALCITONIN ng/ml 1 43* 1 91* 2 75*                   9/8/22 CXR: lungs clear

## 2022-09-08 NOTE — UTILIZATION REVIEW
Continued Stay Review  Admission: Date/Time/Statement:   9/6/22 1923 observation AND CHANGED 9/8/22 1704 INPATIENT RE: PATIENT HAS BEEN IN OBSERVATION 45 HOURS AND NEEDS CONTINUED ANTIBIOTICS FOR LEFT THIGH CELLULITIS AS IS NOW FEBRILE  Start   Ordered   09/08/22 1705  Inpatient Admission  Once        Transfer Service: Hospitalist       Question Answer Comment   Level of Care Med Surg    Estimated length of stay More than 2 Midnights    Certification I certify that inpatient services are medically necessary for this patient for a duration of greater than two midnights  See H&P and MD Progress Notes for additional information about the patient's course of treatment  09/08/22 1704       Date: 9/8/22                        Current Patient Class: INPATIENT  Current Level of Care: med surg    HPI:57 y o  male initially admitted on 9/6/22 to observation [address] ON 9/8/22  due to Cellulitis of Left lower extremity  Presented due to redness and swelling of left thigh starting 9/2/22, PCP started Keflex but did not improve as developed pain and fevers  Has chronic fungal infection of groin  Morbidly Obese with BMI of 85 82  Has stage 3 pressure ulcers of right and left legs, follows with wound care  Wbc 12,  Procalcitonin 2 75  Started on Ancef  ID consulted  Home Metformin on hold, to continue Lantus and Humalog, add SSI with accuchecks  9/7/22 Observation:  Has ongoing pain with weakness and fatigue  Dry cough  On exam: Obese  occasionally tachycardic  Diminished breath sounds and respiratory effort due to body habitus  Motor strength/range of motion quite deconditioned - bilateral distal LE lymphedema - erythematous left thigh tender to palpation without active drainage  Wbc 12 61  Procalcitonin 1 91    glucose 300, 234  Plan is Ancef switched to Cefepime  Wound care and ID involved  Continue basal/prandial insulin w/ additional SSI coverage per Accu-Cheks   Hold oral diabetic medication  Fluid restriction for hyponatremia  Nystatin for fungal infection of groin  Refused wound care assessment, insists to continue home measures as prescribed by wound center  9/7/22 per ID - Patient with sepsis, left thigh cellulitis which was not responsive to outpatient Keflex  Has chronic lower extremity lymphedema with venous stasis ulcerations  Plan is continued Cefepime and increased to 2 grams, follow cultures  Need frequent leg elevation and aggressive edema control, outpatient lymphedema clinic  Nystatin for chronic candida intertrigo of groin  BMI of 86 needs weight loss and dietary changes  Assessment/Plan:   9/8/22 CHANGED TO INPATIENT :   Patient febrile to 101 and has worsening shortness of breath  Has less leg pain  Complaints of a migraine  Dyspneic on exertion  On exam of left thigh:  Warmth, edema, erythema less angry  Wbc 15 42  Procalcitonin 1 43  Continue IV cefepime  Frequent leg elevation  Serial leg exams  Start oxygen to keep sats > 92%  9/9/22  Has continued tenderness and swelling of left thigh, feels as slow improvement  On exam:  Obese  Lungs diffuse decreased breath sounds  Swelling LLE > RLE  Erythema on left thigh and left lower leg  Increased warmth left thigh and LLE, severe tenderness to palpation of left thigh  Wbc 12 93  To continue antibiotics  Blood cultures       Vital Signs:   09/09/22 0000 100 6 °F (38 1 °C) Abnormal  112 Abnormal  20 121/66 79 91 % None (Room air) Lying   09/08/22 2315 -- -- -- -- -- 95 % CPAP --   09/08/22 1509 101 1 °F (38 4 °C) Abnormal  110 Abnormal  20 126/76 91 94 % CPAP Lying   09/08/22 0929 100 5 °F (38 1 °C) -- -- -- -- -- --      09/08/22 0714 100 6 °F (38 1 °C) Abnormal  109 Abnormal  20 141/76 94 90 % CPAP -- Sitting   09/07/22 2300 99 6 °F (37 6 °C) 109 Abnormal  18 -- -- -- -- -- --   09/07/22 2137 99 7 °F (37 6 °C) 116 Abnormal  18 130/65 -- 90 % -- -- Lying   09/07/22 2035 101 °F (38 3 °C) Abnormal  117 Abnormal  18 148/85 -- 94 % -- -- Sitting   09/07/22 1510 98 3 °F (36 8 °C) 116 Abnormal  20 124/63 92 92 % None (Room air) -- Sitting   09/07/22 0738 98 9 °F (37 2 °C) 114 Abnormal  22 135/87 103 96 % CPAP -- Sitting   09/07/22 0031 97 9 °F (36 6 °C) 104 20 130/67 90 95 % None (Room air) -- Lying   09/06/22 2231 -- -- -- -- -- -- -- --  --   O2 Interface Device: home cpap at 09/06/22 2231 09/06/22 2040 99 4 °F (37 4 °C) 108 Abnormal  20 125/78 -- 95 % --         Pertinent Labs/Diagnostic Results:   CTA chest pe study   Final Result by Ayan Armenta MD (09/08 2151)      No pulmonary embolism or aortic dissection  No effusion, airspace disease, or pneumothorax  Questionable pericholecystic fluid  Recommend clinical correlation  Workstation performed: [id number]         VAS lower limb venous duplex study, unilateral/limited   Final Result by Asael Pal DO (09/07 9064)      XR chest portable   Final Result by Trevor Laguna MD (09/08 4132)      No acute cardiopulmonary disease  Workstation performed: [id number]         CT lower extremity wo contrast left   Final Result by Briana Chambers MD (09/07 4126)   Medial left thigh subcutaneous edema and skin thickening consistent with nonspecific cellulitis without drainable collection in this unenhanced study  Left inguinal adenopathy could be reactive  Fat-containing midline pelvic hernia  Concordant preliminary results were provided by virtual radiologic at [address] [facility]  Workstation performed: [id number]             9/7/22 venous duplex - RIGHT LOWER LIMB LIMITED:  Evaluation shows no gross evidence of thrombus in the common femoral vein  Doppler evaluation shows a normal response to augmentation maneuvers  LEFT LOWER LIMB:  No gross evidence of acute or chronic deep vein thrombosis  No gross evidence of superficial thrombophlebitis noted    Doppler evaluation shows a normal response to augmentation maneuvers  Posterior tibial and anterior tibial arterial Doppler waveforms are biphasic  Note: Study was technically challenging due to body habitus and patient's  inability to tolerate probe pressure  Results from last 7 days   Lab Units 09/08/22  0542 09/07/22  0511 09/06/22  1639   WBC Thousand/uL 14 54* 12 61* 12 19*   HEMOGLOBIN g/dL 12 8 12 6 12 6   HEMATOCRIT % 38 7 37 9 36 8   PLATELETS Thousands/uL 204 178 155  155   NEUTROS ABS Thousands/µL  --   --  8 67*   BANDS PCT % 31* 15*  --      Results from last 7 days   Lab Units 09/08/22  0629 09/07/22  0536 09/06/22  1639   SODIUM mmol/L 132* 131* 134*   POTASSIUM mmol/L 4 2 4 3 3 7   CHLORIDE mmol/L 97 99 101   CO2 mmol/L 26 22 23   ANION GAP mmol/L 9 10 10   BUN mg/dL 20 25 29*   CREATININE mg/dL 1 06 1 15 1 28   EGFR ml/min/1 73sq m 78 71 62   CALCIUM mg/dL 9 3 9 0 9 2   MAGNESIUM mg/dL  --  1 9  --      Results from last 7 days   Lab Units 09/07/22  0536 09/06/22  1639   AST U/L 35 39   ALT U/L 40 46   ALK PHOS U/L 89 74   TOTAL PROTEIN g/dL 6 8 6 7   ALBUMIN g/dL 3 1* 3 0*   TOTAL BILIRUBIN mg/dL 1 19* 1 43*     Results from last 7 days   Lab Units 09/08/22  2045 09/08/22  1557 09/08/22  1143 09/08/22  0716 09/07/22  2033 09/07/22  1559 09/07/22  1105 09/07/22  0742 09/06/22  2046   POC GLUCOSE mg/dl 159* 180* 188* 171* 217* 241* 234* 300* 184*     Results from last 7 days   Lab Units 09/08/22  0629 09/07/22  0536 09/06/22  1639   GLUCOSE RANDOM mg/dL 173* 287* 248*     Results from last 7 days   Lab Units 09/08/22  0629 09/07/22  0536 09/06/22  1641   PROCALCITONIN ng/ml 1 43* 1 91* 2 75*     Results from last 7 days   Lab Units 09/07/22  2131 09/06/22  2236   LACTIC ACID mmol/L 1 1 1 8     Results from last 7 days   Lab Units 09/06/22  1641 09/06/22  1639   BLOOD CULTURE  No Growth at 48 hrs  No Growth at 48 hrs           Medications:   Scheduled Medications:  cefepime, 2,000 mg, Intravenous, Q8H  docusate sodium, 100 mg, Oral, BID  enoxaparin, 60 mg, Subcutaneous, BID  ezetimibe, 10 mg, Oral, Daily  furosemide, 20 mg, Oral, Daily With Dinner  furosemide, 40 mg, Oral, Daily  gabapentin, 400 mg, Oral, BID  guaiFENesin, 600 mg, Oral, Q12H AUNDREA  insulin glargine, 44 Units, Subcutaneous, HS  insulin lispro, 18 Units, Subcutaneous, TID With Meals  insulin lispro, 2-12 Units, Subcutaneous, TID AC  insulin lispro, 2-12 Units, Subcutaneous, HS  levothyroxine, 25 mcg, Oral, Early Morning  losartan, 25 mg, Oral, Daily  nystatin, 1 application, Topical, BID  pantoprazole, 40 mg, Oral, BID AC  saccharomyces boulardii, 250 mg, Oral, BID  senna, 1 tablet, Oral, Daily    ceFAZolin (ANCEF) IVPB (premix in dextrose) 2,000 mg 50 mL  Dose: 2,000 mg  Freq: Every 8 hours Route: IV  Last Dose: Stopped (09/07/22 1613)  Start: 09/07/22 0000 End: 09/07/22 0841    Continuous IV Infusions: none      PRN Meds:  acetaminophen, 650 mg, Oral, Q6H PRN - used x 1 9/6, x 1 9/7/22   albuterol, 1 puff, Inhalation, Q6H PRN  aluminum-magnesium hydroxide-simethicone, 30 mL, Oral, Q6H PRN  benzonatate, 100 mg, Oral, TID PRN - used x 1 9/7/22   HYDROmorphone, 0 5 mg, Intravenous, Q3H PRN - used x 2 9/7/22   naloxone, 0 04 mg, Intravenous, Q1MIN PRN  nystatin, , Topical, BID PRN  ondansetron, 4 mg, Intravenous, Q6H PRN  oxyCODONE, 10 mg, Oral, Q4H PRN - used x 1 9/7, x 1 9/8  oxyCODONE, 5 mg, Oral, Q4H PRN - used x 1 9/6, x 1 9/7     HYDROmorphone HCl (DILAUDID) injection 0 2 mg - used x 1 9/7/22   Dose: 0 2 mg  Freq: Every 4 hours PRN Route: IV  PRN Reason: breakthrough pain  Start: 09/06/22 6139 End: 09/07/22 1057    Skin care: Apply hydraguard to b/l heels, buttocks and sacrum BID and PRN for prevention and protection   2  Apply skin nourishing cream the entire skin daily for moisture   3  Turn and reposition patient every  2 hours   4  Elevate heels off of bed with pillows to offload pressure   5  Apply EHOB waffle cushion to chair when OOB, if able   6   Cleanse b/l lower extremity wounds with NSS and pat dry  Apply hydrofera blue (patient may use from home supply) or silver alginate to the wounds  Cover with ABD pads and secure the dressings with kerlex wrap  Change every other day and as needed for soilage/dislodgement    Discharge Plan: to be determined     Network Utilization Review Department  ATTENTION: Please call with any questions or concerns to [phone] and carefully listen to the prompts so that you are directed to the right person  All voicemails are confidential   Rosa Roman all requests for admission clinical reviews, approved or denied determinations and any other requests to dedicated fax number below belonging to the campus where the patient is receiving treatment   List of dedicated fax numbers for the Facilities:  [address] DENIALS (Administrative/Medical Necessity) [phone]   [address] (Maternity/NICU/Pediatrics) [phone]   [address]  [address] [facility] [place] [address] [id number] [place] [address] [address] [facility] [place] [address] [phone]

## 2022-09-08 NOTE — ASSESSMENT & PLAN NOTE
· Reports chronic fungal infection of the groin   · Continue Home Medication:  · Nystatin powder/Cream  · Daily hygiene counseling  · Wound care evaluation

## 2022-09-08 NOTE — NURSING NOTE
0200 Upon admission, pt met the sepsis protocol and it was initiated due to elevated WBC 12 61 and heart rate greater than 100 plus leg infection (cellulities)  At the beginning of shift pt has increased HR greater than 90 and fever 101  Pt is on IV antibiotics cefepime q8 hr and blood cultures pending  PA is aware and pt is being monitored closely

## 2022-09-08 NOTE — ASSESSMENT & PLAN NOTE
· In the setting of acute infection and hyperglycemia  · Optimize blood sugar control  · C/w fluid restriction  · Continue to monitor NA while admitted

## 2022-09-09 LAB
ANION GAP SERPL CALCULATED.3IONS-SCNC: 9 MMOL/L (ref 4–13)
BUN SERPL-MCNC: 20 MG/DL (ref 5–25)
CALCIUM SERPL-MCNC: 9 MG/DL (ref 8.4–10.2)
CHLORIDE SERPL-SCNC: 97 MMOL/L (ref 96–108)
CO2 SERPL-SCNC: 27 MMOL/L (ref 21–32)
CREAT SERPL-MCNC: 1.03 MG/DL (ref 0.6–1.3)
ERYTHROCYTE [DISTWIDTH] IN BLOOD BY AUTOMATED COUNT: 15 % (ref 11.6–15.1)
GFR SERPL CREATININE-BSD FRML MDRD: 81 ML/MIN/1.73SQ M
GLUCOSE SERPL-MCNC: 145 MG/DL (ref 65–140)
GLUCOSE SERPL-MCNC: 170 MG/DL (ref 65–140)
GLUCOSE SERPL-MCNC: 181 MG/DL (ref 65–140)
GLUCOSE SERPL-MCNC: 186 MG/DL (ref 65–140)
GLUCOSE SERPL-MCNC: 204 MG/DL (ref 65–140)
HCT VFR BLD AUTO: 37.9 % (ref 36.5–49.3)
HGB BLD-MCNC: 12.4 G/DL (ref 12–17)
MCH RBC QN AUTO: 29.9 PG (ref 26.8–34.3)
MCHC RBC AUTO-ENTMCNC: 32.7 G/DL (ref 31.4–37.4)
MCV RBC AUTO: 91 FL (ref 82–98)
MRSA NOSE QL CULT: NORMAL
PLATELET # BLD AUTO: 216 THOUSANDS/UL (ref 149–390)
PMV BLD AUTO: 9.3 FL (ref 8.9–12.7)
POTASSIUM SERPL-SCNC: 4.2 MMOL/L (ref 3.5–5.3)
RBC # BLD AUTO: 4.15 MILLION/UL (ref 3.88–5.62)
SODIUM SERPL-SCNC: 133 MMOL/L (ref 135–147)
WBC # BLD AUTO: 12.93 THOUSAND/UL (ref 4.31–10.16)

## 2022-09-09 PROCEDURE — 99232 SBSQ HOSP IP/OBS MODERATE 35: CPT | Performed by: PHYSICIAN ASSISTANT

## 2022-09-09 PROCEDURE — 99233 SBSQ HOSP IP/OBS HIGH 50: CPT | Performed by: INTERNAL MEDICINE

## 2022-09-09 PROCEDURE — 82948 REAGENT STRIP/BLOOD GLUCOSE: CPT

## 2022-09-09 PROCEDURE — 85027 COMPLETE CBC AUTOMATED: CPT | Performed by: INTERNAL MEDICINE

## 2022-09-09 PROCEDURE — 80048 BASIC METABOLIC PNL TOTAL CA: CPT | Performed by: INTERNAL MEDICINE

## 2022-09-09 RX ADMIN — FUROSEMIDE 20 MG: 20 TABLET ORAL at 17:05

## 2022-09-09 RX ADMIN — NYSTATIN 1 APPLICATION: 100000 POWDER TOPICAL at 17:06

## 2022-09-09 RX ADMIN — OXYCODONE HYDROCHLORIDE 10 MG: 10 TABLET ORAL at 08:06

## 2022-09-09 RX ADMIN — OXYCODONE HYDROCHLORIDE 10 MG: 10 TABLET ORAL at 17:06

## 2022-09-09 RX ADMIN — GABAPENTIN 400 MG: 400 CAPSULE ORAL at 08:05

## 2022-09-09 RX ADMIN — NYSTATIN 1 APPLICATION: 100000 POWDER TOPICAL at 08:14

## 2022-09-09 RX ADMIN — DOCUSATE SODIUM 100 MG: 100 CAPSULE, LIQUID FILLED ORAL at 17:04

## 2022-09-09 RX ADMIN — ACETAMINOPHEN 650 MG: 325 TABLET, FILM COATED ORAL at 00:50

## 2022-09-09 RX ADMIN — INSULIN LISPRO 18 UNITS: 100 INJECTION, SOLUTION INTRAVENOUS; SUBCUTANEOUS at 08:12

## 2022-09-09 RX ADMIN — Medication 250 MG: at 08:06

## 2022-09-09 RX ADMIN — ACETAMINOPHEN 650 MG: 325 TABLET, FILM COATED ORAL at 20:38

## 2022-09-09 RX ADMIN — PANTOPRAZOLE SODIUM 40 MG: 40 TABLET, DELAYED RELEASE ORAL at 17:08

## 2022-09-09 RX ADMIN — INSULIN LISPRO 2 UNITS: 100 INJECTION, SOLUTION INTRAVENOUS; SUBCUTANEOUS at 08:11

## 2022-09-09 RX ADMIN — LEVOTHYROXINE SODIUM 25 MCG: 25 TABLET ORAL at 05:54

## 2022-09-09 RX ADMIN — INSULIN LISPRO 2 UNITS: 100 INJECTION, SOLUTION INTRAVENOUS; SUBCUTANEOUS at 21:34

## 2022-09-09 RX ADMIN — INSULIN GLARGINE 44 UNITS: 100 INJECTION, SOLUTION SUBCUTANEOUS at 21:34

## 2022-09-09 RX ADMIN — FUROSEMIDE 40 MG: 40 TABLET ORAL at 08:05

## 2022-09-09 RX ADMIN — CEFEPIME HYDROCHLORIDE 2000 MG: 2 INJECTION, SOLUTION INTRAVENOUS at 04:33

## 2022-09-09 RX ADMIN — OXYCODONE HYDROCHLORIDE 10 MG: 10 TABLET ORAL at 12:07

## 2022-09-09 RX ADMIN — ENOXAPARIN SODIUM 60 MG: 60 INJECTION SUBCUTANEOUS at 08:06

## 2022-09-09 RX ADMIN — ENOXAPARIN SODIUM 60 MG: 60 INJECTION SUBCUTANEOUS at 17:05

## 2022-09-09 RX ADMIN — PANTOPRAZOLE SODIUM 40 MG: 40 TABLET, DELAYED RELEASE ORAL at 08:05

## 2022-09-09 RX ADMIN — OXYCODONE HYDROCHLORIDE 10 MG: 10 TABLET ORAL at 21:32

## 2022-09-09 RX ADMIN — GUAIFENESIN 600 MG: 600 TABLET ORAL at 08:05

## 2022-09-09 RX ADMIN — CEFEPIME HYDROCHLORIDE 2000 MG: 2 INJECTION, SOLUTION INTRAVENOUS at 19:48

## 2022-09-09 RX ADMIN — GABAPENTIN 400 MG: 400 CAPSULE ORAL at 17:04

## 2022-09-09 RX ADMIN — LOSARTAN POTASSIUM 25 MG: 25 TABLET, FILM COATED ORAL at 08:05

## 2022-09-09 RX ADMIN — EZETIMIBE 10 MG: 10 TABLET ORAL at 08:06

## 2022-09-09 RX ADMIN — OXYCODONE HYDROCHLORIDE 10 MG: 10 TABLET ORAL at 03:29

## 2022-09-09 RX ADMIN — GUAIFENESIN 600 MG: 600 TABLET ORAL at 20:39

## 2022-09-09 RX ADMIN — CEFEPIME HYDROCHLORIDE 2000 MG: 2 INJECTION, SOLUTION INTRAVENOUS at 11:51

## 2022-09-09 RX ADMIN — Medication 250 MG: at 17:04

## 2022-09-09 NOTE — PLAN OF CARE
Problem: Potential for Falls  Goal: Patient will remain free of falls  Description: INTERVENTIONS:  - Educate patient/family on patient safety including physical limitations  - Instruct patient to call for assistance with activity   - Consult OT/PT to assist with strengthening/mobility   - Keep Call bell within reach  - Keep bed low and locked with side rails adjusted as appropriate  - Keep care items and personal belongings within reach  - Initiate and maintain comfort rounds  - Make Fall Risk Sign visible to staff  - Offer Toileting every 2  Hours, in advance of need  - Initiate/Maintain  bed and  chair alarm  - Apply yellow socks and bracelet for high fall risk patients  - Consider moving patient to room near nurses station  Outcome: Progressing     Problem: Nutrition/Hydration-ADULT  Goal: Nutrient/Hydration intake appropriate for improving, restoring or maintaining nutritional needs  Description: Monitor and assess patient's nutrition/hydration status for malnutrition  Collaborate with interdisciplinary team and initiate plan and interventions as ordered  Monitor patient's weight and dietary intake as ordered or per policy  Utilize nutrition screening tool and intervene as necessary  Determine patient's food preferences and provide high-protein, high-caloric foods as appropriate       INTERVENTIONS:  - Monitor oral intake, urinary output, labs, and treatment plans  - Assess nutrition and hydration status and recommend course of action  - Evaluate amount of meals eaten  - Assist patient with eating if necessary   - Allow adequate time for meals  - Recommend/ encourage appropriate diets, oral nutritional supplements, and vitamin/mineral supplements  - Order, calculate, and assess calorie counts as needed  - Assess need for intravenous fluids  - Provide specific nutrition/hydration education as appropriate  - Include patient/family/caregiver in decisions related to nutrition  Outcome: Progressing     Problem: PAIN - ADULT  Goal: Verbalizes/displays adequate comfort level or baseline comfort level  Description: Interventions:  - Encourage patient to monitor pain and request assistance  - Assess pain using appropriate pain scale  - Administer analgesics based on type and severity of pain and evaluate response  - Implement non-pharmacological measures as appropriate and evaluate response  - Consider cultural and social influences on pain and pain management  - Notify physician/advanced practitioner if interventions unsuccessful or patient reports new pain  Outcome: Progressing     Problem: INFECTION - ADULT  Goal: Absence or prevention of progression during hospitalization  Description: INTERVENTIONS:  - Assess and monitor for signs and symptoms of infection  - Monitor lab/diagnostic results  - Administer medications as ordered  - Instruct and encourage patient and family to use good hand hygiene technique  - Identify and instruct in appropriate isolation precautions for identified infection/condition  Outcome: Progressing  Goal: Absence of fever/infection during neutropenic period  Description: INTERVENTIONS:  - Monitor WBC    Outcome: Progressing     Problem: SAFETY ADULT  Goal: Patient will remain free of falls  Description: INTERVENTIONS:  - Educate patient/family on patient safety including physical limitations  - Instruct patient to call for assistance with activity   - Consult OT/PT to assist with strengthening/mobility   - Keep Call bell within reach  - Keep bed low and locked with side rails adjusted as appropriate  - Keep care items and personal belongings within reach  - Initiate and maintain comfort rounds  - Make Fall Risk Sign visible to staff  - Offer Toileting every  2  Hours, in advance of need  - Initiate/Maintain bed and chair alarm  - Apply yellow socks and bracelet for high fall risk patients  - Consider moving patient to room near nurses station  Outcome: Progressing  Goal: Maintain or return to baseline ADL function  Description: INTERVENTIONS:  -  Assess patient's ability to carry out ADLs; assess patient's baseline for ADL function and identify physical deficits which impact ability to perform ADLs (bathing, care of mouth/teeth, toileting, grooming, dressing, etc )  - Assess/evaluate cause of self-care deficits   - Assess range of motion  - Assess patient's mobility; develop plan if impaired  - Assess patient's need for assistive devices and provide as appropriate  - Encourage maximum independence but intervene and supervise when necessary  - Involve family in performance of ADLs  - Assess for home care needs following discharge   - Consider OT consult to assist with ADL evaluation and planning for discharge  - Provide patient education as appropriate  Outcome: Progressing  Goal: Maintains/Returns to pre admission functional level  Description: INTERVENTIONS:  - Perform BMAT or MOVE assessment daily    - Set and communicate daily mobility goal to care team and patient/family/caregiver  - Collaborate with rehabilitation services on mobility goals if consulted  - Perform Range of Motion 3  times a day    - Dangle patient 3  times a day  - Stand patient  3  times a day  - Ambulate patient 3  times a day  - Out of bed to chair 3  times a day   - Out of bed for meals  3  times a day  - Out of bed for toileting  - Record patient progress and toleration of activity level   Outcome: Progressing     Problem: DISCHARGE PLANNING  Goal: Discharge to home or other facility with appropriate resources  Description: INTERVENTIONS:  - Identify barriers to discharge w/patient and caregiver  - Arrange for needed discharge resources and transportation as appropriate  - Identify discharge learning needs (meds, wound care, etc )  - Arrange for interpretive services to assist at discharge as needed  - Refer to Case Management Department for coordinating discharge planning if the patient needs post-hospital services based on physician/advanced practitioner order or complex needs related to functional status, cognitive ability, or social support system  Outcome: Progressing     Problem: Knowledge Deficit  Goal: Patient/family/caregiver demonstrates understanding of disease process, treatment plan, medications, and discharge instructions  Description: Complete learning assessment and assess knowledge base    Interventions:  - Provide teaching at level of understanding  - Provide teaching via preferred learning methods  Outcome: Progressing     Problem: Prexisting or High Potential for Compromised Skin Integrity  Goal: Skin integrity is maintained or improved  Description: INTERVENTIONS:  - Identify patients at risk for skin breakdown  - Assess and monitor skin integrity  - Assess and monitor nutrition and hydration status  - Monitor labs   - Assess for incontinence   - Turn and reposition patient  - Assist with mobility/ambulation  - Relieve pressure over bony prominences  - Avoid friction and shearing  - Provide appropriate hygiene as needed including keeping skin clean and dry  - Evaluate need for skin moisturizer/barrier cream  - Collaborate with interdisciplinary team   - Patient/family teaching  Outcome: Progressing     Problem: MOBILITY - ADULT  Goal: Maintain or return to baseline ADL function  Description: INTERVENTIONS:  -  Assess patient's ability to carry out ADLs; assess patient's baseline for ADL function and identify physical deficits which impact ability to perform ADLs (bathing, care of mouth/teeth, toileting, grooming, dressing, etc )  - Assess/evaluate cause of self-care deficits   - Assess range of motion  - Assess patient's mobility; develop plan if impaired  - Assess patient's need for assistive devices and provide as appropriate  - Encourage maximum independence but intervene and supervise when necessary  - Involve family in performance of ADLs  - Assess for home care needs following discharge   - Consider OT consult to assist with ADL evaluation and planning for discharge  - Provide patient education as appropriate  Outcome: Progressing  Goal: Maintains/Returns to pre admission functional level  Description: INTERVENTIONS:  - Perform BMAT or MOVE assessment daily    - Set and communicate daily mobility goal to care team and patient/family/caregiver  - Collaborate with rehabilitation services on mobility goals if consulted  - Perform Range of Motion 3  times a day  - Dangle patient 3  times a day  - Stand patient  3 times a day  - Ambulate patient 3 times a day  - Out of bed to chair  3 times a day   - Out of bed for meals 3  times a day  - Out of bed for toileting  - Record patient progress and toleration of activity level   Outcome: Progressing     Problem: MOBILITY - ADULT  Goal: Maintain or return to baseline ADL function  Description: INTERVENTIONS:  -  Assess patient's ability to carry out ADLs; assess patient's baseline for ADL function and identify physical deficits which impact ability to perform ADLs (bathing, care of mouth/teeth, toileting, grooming, dressing, etc )  - Assess/evaluate cause of self-care deficits   - Assess range of motion  - Assess patient's mobility; develop plan if impaired  - Assess patient's need for assistive devices and provide as appropriate  - Encourage maximum independence but intervene and supervise when necessary  - Involve family in performance of ADLs  - Assess for home care needs following discharge   - Consider OT consult to assist with ADL evaluation and planning for discharge  - Provide patient education as appropriate  Outcome: Progressing  Goal: Maintains/Returns to pre admission functional level  Description: INTERVENTIONS:  - Perform BMAT or MOVE assessment daily    - Set and communicate daily mobility goal to care team and patient/family/caregiver  - Collaborate with rehabilitation services on mobility goals if consulted  - Perform Range of Motion  3 times a day    - Dangle patient 3  times a day  - Stand patient 3  times a day  - Ambulate patient  3  times a day  - Out of bed to chair  3 times a day   - Out of bed for meals  3 times a day  - Out of bed for toileting  - Record patient progress and toleration of activity level   Outcome: Progressing

## 2022-09-09 NOTE — PROGRESS NOTES
Pt HR is 104 with Temp 104 meets sepsis criteria Provider made aware as per order not going to initiate any sepsis protocol

## 2022-09-09 NOTE — PLAN OF CARE
Problem: Potential for Falls  Goal: Patient will remain free of falls  Description: INTERVENTIONS:  - Educate patient/family on patient safety including physical limitations  - Instruct patient to call for assistance with activity   - Consult OT/PT to assist with strengthening/mobility   - Keep Call bell within reach  - Keep bed low and locked with side rails adjusted as appropriate  - Keep care items and personal belongings within reach  - Initiate and maintain comfort rounds  - Make Fall Risk Sign visible to staff  - Apply yellow socks and bracelet for high fall risk patients  - Consider moving patient to room near nurses station  Outcome: Progressing     Problem: PAIN - ADULT  Goal: Verbalizes/displays adequate comfort level or baseline comfort level  Description: Interventions:  - Encourage patient to monitor pain and request assistance  - Assess pain using appropriate pain scale  - Administer analgesics based on type and severity of pain and evaluate response  - Implement non-pharmacological measures as appropriate and evaluate response  - Consider cultural and social influences on pain and pain management  - Notify physician/advanced practitioner if interventions unsuccessful or patient reports new pain  Outcome: Progressing     Problem: INFECTION - ADULT  Goal: Absence or prevention of progression during hospitalization  Description: INTERVENTIONS:  - Assess and monitor for signs and symptoms of infection  - Monitor lab/diagnostic results  - Monitor all insertion sites, i e  indwelling lines, tubes, and drains  - Monitor endotracheal if appropriate and nasal secretions for changes in amount and color  - [place] appropriate cooling/warming therapies per order  - Administer medications as ordered  - Instruct and encourage patient and family to use good hand hygiene technique  - Identify and instruct in appropriate isolation precautions for identified infection/condition  Outcome: Progressing     Problem: SAFETY ADULT  Goal: Patient will remain free of falls  Description: INTERVENTIONS:  - Educate patient/family on patient safety including physical limitations  - Instruct patient to call for assistance with activity   - Consult OT/PT to assist with strengthening/mobility   - Keep Call bell within reach  - Keep bed low and locked with side rails adjusted as appropriate  - Keep care items and personal belongings within reach  - Initiate and maintain comfort rounds  - Make Fall Risk Sign visible to staff  - Apply yellow socks and bracelet for high fall risk patients  - Consider moving patient to room near nurses station  Outcome: Progressing

## 2022-09-09 NOTE — PROGRESS NOTES
Pt refused his ADL care ,dressing change, lunch and insulin   Provider made aware  Education given but pt is not convinced   Will continue to monitor

## 2022-09-09 NOTE — PHYSICAL THERAPY NOTE
Physical Therapy Cancellation Note       09/09/22 1226   PT Last Visit   PT Visit Date 09/09/22   Note Type   Note type Cancelled Session   Cancel Reasons Refusal   Additional Comments PT consult received and chart reviewed  Per OT, pt refusing all therapy evaluation & services  States he does not want or need any therapy services  Per RN staffing, pt also refusing medications and ADL care  Will cancel PT evaluation on this date, and follow up as able/as pt more agreeable and cooperative  RN Harshpreet aware       Sharon Talbot, PT, DPT   Available via [address]  NPI # [id number]  [place] License - [id number]  7/4/0616

## 2022-09-09 NOTE — ASSESSMENT & PLAN NOTE
· Patient complaining of worsening SOB  Concern for PE, as patient now febrile, tachycardic with tachypnea  · Suspect D-dimer would likely be elevated in the setting of sepsis  · CTA chest: No PE or aortic dissection, no effusion, airspace disease or pneumothorax  · Suspect to be in setting of sepsis vs hypoventilation obesity syndrome with known DAHIANA hx  · Improving, patient remains stable on room air    · Initiate supplemental oxygen to maintain O2 sats >92%   · Continue to monitor respiratory status

## 2022-09-09 NOTE — OCCUPATIONAL THERAPY NOTE
Occupational Therapy Cancellation Note     Patient Name: Richy Wilkerson  QXYHD'B Date: 9/9/2022 09/09/22 1224   OT Last Visit   OT Visit Date 09/09/22   Note Type   Note type Cancelled Session   Cancel Reasons Refusal         OT order received and chart review performed  MOHAN Ward verbalized pt appropriate to see  Visited pt + spouse at bedside, pt refusing participation in therapy stating "once the cellulitis is better I will be fine"  Pt educated on importance of OOB mobility and therapy's role in safe DCP, verbalized understanding  OT will follow and evaluate as agreeable

## 2022-09-09 NOTE — PROGRESS NOTES
Progress Note - Infectious Disease   Delfin Escalante 56 y o  male MRN: [id number]  Unit/Bed#: -01 Encounter: [id number]      Impression/Plan:  1  Sepsis, POA   Tachycardia, leukocytosis   Secondary to #2   Consider bacteremia   Admission blood cultures are negative to date   Hemodynamics remain stable  24 T max 101 F  WBC 12 9K  -continue antibiotics as below  -follow-up blood cultures   -monitor temperature and hemodynamics  -serial exam  -recheck CBC and BMP in a m   -supportive care     2  Left thigh cellulitis   With history of recurrent LE cellulitis   No underlying drainable collection on CT   Refractory to outpatient Keflex  No visible fluctuance or purulence on the thigh area   Patient does have wound on lower left leg with recent outpatient culture from 07/21/2022 which grew Pseudomonas, Proteus  Patient received 1 dose of cefazolin which was changed to cefepime   -continue IV cefepime 2g IV q 8 hours over weekend  -follow up pending blood cultures  -frequent leg elevation/aggressive edema control  -serial leg exams     3  Chronic lower extremity lymphedema with venous stasis ulcerations   Risk factor for recurrent cellulitis   Follows with outpatient wound care  -wound care evaluation appreciated  -frequent leg elevation/aggressive edema control  -outpatient lymphedema clinic and wound care follow-up     4  DM2, with hyperglycemia and long-term insulin use   Risk factor for infection   Glycemic control as per Internal Medicine Service      5  Chronic candida intertrigo of the groin   Risk factor for cellulitis  -continue topical nystatin  -moisture control     6  Severe obesity   With BMI of about 86  Strongly recommend weight loss measures/dietary changes      Antibiotics:  Cefepime 3     I discussed above plan with patient, RN and Kirit Guillen from Internal Medicine Service  We will see patient again 9/12/22   Please call ID on call physician in meantime if questions      Subjective:  Patient has temp to 101 F last 24 hours, no chills, sweats; no nausea, vomiting, diarrhea; no cough, shortness of breath; burning, tight left thigh leg pain  Reports migraine  Appetite improving  Ate pizza last night  No noted rash  Appears to be tolerating IV Cefepime  Objective:  Vitals:  Temp:  [99 8 °F (37 7 °C)-101 1 °F (38 4 °C)] 99 8 °F (37 7 °C)  HR:  [101-112] 101  Resp:  [20] 20  BP: (121-130)/(66-77) 130/77  SpO2:  [91 %-95 %] 95 %  Temp (24hrs), Av 5 °F (38 1 °C), Min:99 8 °F (37 7 °C), Max:101 1 °F (38 4 °C)  Current: Temperature: 99 8 °F (37 7 °C)    Physical Exam:   General Appearance:  53-year-old chronically debilitated male, resting propped fairly comfortably in bed, alert, interactive, no acute distress  Throat: Oropharynx moist without lesions  Lungs:   Decreased breath sounds fairly clear to auscultation bilaterally; no wheezes, rhonchi or rales; respirations unlabored with conversation   Heart:  RRR; no murmur   Abdomen:   Soft, non-tender, protuberant pannus, positive bowel sounds  Extremities: No clubbing, cyanosis, + ace wrap compressions to bilateral lower legs  Bilateral thigh edema L > R  Left thigh warm erythema without fluctuance on palpation         : No oneill, no SPT   Skin: No new rashes or lesions  IV site nontender   Actively scratching scalp with dried blood on head of bed linens       Labs, Imaging, & Other studies:   All pertinent labs and imaging studies were personally reviewed  Results from last 7 days   Lab Units 22  0431 22  0542 22  0511   WBC Thousand/uL 12 93* 14 54* 12 61*   HEMOGLOBIN g/dL 12 4 12 8 12 6   PLATELETS Thousands/uL 216 204 178     Results from last 7 days   Lab Units 22  0431 22  0629 22  0536 22  1639   SODIUM mmol/L 133* 132* 131* 134*   POTASSIUM mmol/L 4 2 4 2 4 3 3 7   CHLORIDE mmol/L 97 97 99 101   CO2 mmol/L 27 26 22 23   BUN mg/dL 20 20 25 29*   CREATININE mg/dL 1 03 1 06 1 15 1 28   EGFR ml/min/1 73sq m 81 78 71 62   CALCIUM mg/dL 9 0 9 3 9 0 9 2   AST U/L  --   --  35 39   ALT U/L  --   --  40 46   ALK PHOS U/L  --   --  89 74     Results from last 7 days   Lab Units 09/07/22  1817 09/06/22  1641 09/06/22  1639   BLOOD CULTURE   --  No Growth at 48 hrs  No Growth at 48 hrs     MRSA CULTURE ONLY  No Methicillin Resistant Staphlyococcus aureus (MRSA) isolated  --   --      Results from last 7 days   Lab Units 09/08/22  0629 09/07/22  0536 09/06/22  1641   PROCALCITONIN ng/ml 1 43* 1 91* 2 75*

## 2022-09-09 NOTE — ASSESSMENT & PLAN NOTE
Presented w/ redness and swelling of the left thigh since Friday 9/2  Reports recurrent cellulitis due to lymphedema and chronic fungal infections  Started on Keflex by PCP outpatient w/o significant improvement and actually worsening of symptoms  Presented to the ED with worsening pain and subjective low-grade fevers at home  · CT of left lower extremity notable for soft tissue cellulitis w/o evidence of active drainage/abscess  · B/L LE Venous Duplex: No evidence of acute or chronic DVT's  · C/w Antibiotic therapy  Initially on IV Ancef, Transitioned to IV Cefepime per ID (D2)  · Patient still with significant erythema, increased warmth and tenderness palpation of left thigh  Would recommend further IV antibiotic treatment pending clinical improvement    · Continue supportive care, Pain Management  · Will appreciate wound care input  · Will appreciate Infectious Disease input

## 2022-09-09 NOTE — PROGRESS NOTES
[facility]  68   Progress Note - Dominick Min 1965, 56 y o  male MRN: [id number]  Unit/Bed#: -01 Encounter: [id number]  Primary Care Provider: Sabine Soria MD   Date and time admitted to hospital: 9/6/2022  3:58 PM    * Cellulitis of left lower extremity  Assessment & Plan  Presented w/ redness and swelling of the left thigh since Friday 9/2  Reports recurrent cellulitis due to lymphedema and chronic fungal infections  Started on Keflex by PCP outpatient w/o significant improvement and actually worsening of symptoms  Presented to the ED with worsening pain and subjective low-grade fevers at home  · CT of left lower extremity notable for soft tissue cellulitis w/o evidence of active drainage/abscess  · B/L LE Venous Duplex: No evidence of acute or chronic DVT's  · C/w Antibiotic therapy  Initially on IV Ancef, Transitioned to IV Cefepime per ID (D2)  · Patient still with significant erythema, increased warmth and tenderness palpation of left thigh  Would recommend further IV antibiotic treatment pending clinical improvement  · Continue supportive care, Pain Management  · Will appreciate wound care input  · Will appreciate Infectious Disease input    Sepsis on admission  Assessment & Plan  · POA as evidenced by leukocytosis/tachycardia  Patient febrile since 09/07  Improving, fevers improving  WBCs trending down  Likely in setting of left thigh cellulitis  · Lactic acid: normal x2    · Procalcitonin: 2 75->1 91->1 43  · BC x2: NGTD (48 hrs)  · Initially on IV Ancef, Transitioned to IV Cefepime  · C/w IV cefepime (D2)  · ID Consulted:  · C/w IV cefepime 2 g q 8 hours for now  · Follow-up culture results and adjust accordingly  · Continue wound care, follow-up with of edema clinic outpatient for pressure injuries of B/L LE  · Monitor vitals and maintain hemodynamics  · Continue Supportive care  · Frequent leg elevation/agressive edema control    SOB (shortness of breath)  Assessment & Plan  · Patient complaining of worsening SOB  Concern for PE, as patient now febrile, tachycardic with tachypnea  · Suspect D-dimer would likely be elevated in the setting of sepsis  · CTA chest: No PE or aortic dissection, no effusion, airspace disease or pneumothorax  · Suspect to be in setting of sepsis vs hypoventilation obesity syndrome with known DAHIANA hx  · Improving, patient remains stable on room air    · Initiate supplemental oxygen to maintain O2 sats >92%   · Continue to monitor respiratory status    GERD (gastroesophageal reflux disease)  Assessment & Plan  · Continue PPI regimen  · Encourage weight loss/ lifestyle modifications    Hypothyroidism  Assessment & Plan  · Continue Home Medication: Synthroid    Morbid obesity  Assessment & Plan  · BMI of 85 82  · Encouraged Lifestyle/diet modifications    Fungal infection  Assessment & Plan  · Reports chronic fungal infection of the groin   · Continue Home Medication: Nystatin powder/Cream  · Daily hygiene counseling  · Wound care evaluation    DAHIANA (obstructive sleep apnea)  Assessment & Plan  · CPAP QHS  · Encourage weight loss    Hyponatremia  Assessment & Plan  · Likely pseudohyponatremia in setting of hyperglycemia  · Corrected Na 135 today, stable  · Optimize blood sugar control  · C/w fluid restriction  · Continue to monitor NA while admitted    Essential hypertension  Assessment & Plan  · BP reviewed and remains stable  · Continue Home medication: Cozaar/Lasix    Hyperlipidemia  Assessment & Plan  · Continue Home Medication: Zetia    Insulin-dependent diabetes mellitus with neuropathy  Assessment & Plan  Lab Results   Component Value Date    HGBA1C 6 6 (H) 06/24/2022     · Hold oral hypoglycemics while hospitalized  · Continue basal/prandial insulin   · Continue with Accu-Cheks and SSI AC/HS  · Carbohydrate restricted diet  · Continue Gabapentin for neuropathy      VTE Pharmacologic Prophylaxis: VTE Score: 7 High Risk (Score >/= 5) - Pharmacological DVT Prophylaxis Ordered: enoxaparin (Lovenox)  Sequential Compression Devices Ordered  Patient Centered Rounds: I performed bedside rounds with nursing staff today  Discussions with Specialists or Other Care Team Provider:  Case management    Education and Discussions with Family / Patient: Updated  (roommate) at bedside  Time Spent for Care: 30 minutes  More than 50% of total time spent on counseling and coordination of care as described above  Current Length of Stay: 1 day(s)  Current Patient Status: Inpatient   Certification Statement: The patient will continue to require additional inpatient hospital stay due to IV antibiotics  Discharge Plan: Anticipate discharge in 48-72 hrs to home  Code Status: Level 1 - Full Code    Subjective:   Patient is seen at bedside this a m , reports that he still has significant tenderness and swelling of left thigh, feels like it is slowly improving  Denies nausea/vomiting, diarrhea, fever, chills, sweats  Objective:     Vitals:   Temp (24hrs), Av 5 °F (38 1 °C), Min:99 8 °F (37 7 °C), Max:101 1 °F (38 4 °C)    Temp:  [99 8 °F (37 7 °C)-101 1 °F (38 4 °C)] 99 8 °F (37 7 °C)  HR:  [101-112] 101  Resp:  [20] 20  BP: (121-130)/(66-77) 130/77  SpO2:  [91 %-95 %] 95 %  Body mass index is 85 82 kg/m²  Input and Output Summary (last 24 hours): Intake/Output Summary (Last 24 hours) at 2022 1306  Last data filed at 2022 1207  Gross per 24 hour   Intake 480 ml   Output 200 ml   Net 280 ml       Physical Exam:   Physical Exam  Constitutional:       General: He is not in acute distress  Appearance: He is obese  He is not ill-appearing, toxic-appearing or diaphoretic  Cardiovascular:      Rate and Rhythm: Normal rate and regular rhythm  Pulses: Normal pulses  Heart sounds: Normal heart sounds  Pulmonary:      Effort: Pulmonary effort is normal  No respiratory distress        Comments: Diffusely decreased breath sounds across lung fields  Abdominal:      General: Bowel sounds are normal  There is no distension  Palpations: Abdomen is soft  Tenderness: There is no abdominal tenderness  Musculoskeletal:         General: Swelling and tenderness present  Comments: Swelling on LLE compared to RLE, nonpitting   Skin:     General: Skin is warm  Findings: Erythema present  Comments: Erythema present on left thigh and left lower leg  Increased warmth on left thigh and left lower leg, severe tenderness to palpation of left thigh  Neurological:      General: No focal deficit present  Mental Status: He is alert  Psychiatric:         Mood and Affect: Mood normal          Behavior: Behavior normal          Additional Data:     Labs:  Results from last 7 days   Lab Units 09/09/22  0431 09/08/22  0542 09/07/22  0511 09/06/22  1639   WBC Thousand/uL 12 93* 14 54*   < > 12 19*   HEMOGLOBIN g/dL 12 4 12 8   < > 12 6   HEMATOCRIT % 37 9 38 7   < > 36 8   PLATELETS Thousands/uL 216 204   < > 155  155   BANDS PCT %  --  31*   < >  --    NEUTROS PCT %  --   --   --  70   LYMPHS PCT %  --   --   --  13*   LYMPHO PCT %  --  15   < >  --    MONOS PCT %  --   --   --  10   MONO PCT %  --  11   < >  --    EOS PCT %  --  2   < > 4    < > = values in this interval not displayed  Results from last 7 days   Lab Units 09/09/22  0431 09/08/22  0629 09/07/22  0536   SODIUM mmol/L 133*   < > 131*   POTASSIUM mmol/L 4 2   < > 4 3   CHLORIDE mmol/L 97   < > 99   CO2 mmol/L 27   < > 22   BUN mg/dL 20   < > 25   CREATININE mg/dL 1 03   < > 1 15   ANION GAP mmol/L 9   < > 10   CALCIUM mg/dL 9 0   < > 9 0   ALBUMIN g/dL  --   --  3 1*   TOTAL BILIRUBIN mg/dL  --   --  1 19*   ALK PHOS U/L  --   --  89   ALT U/L  --   --  40   AST U/L  --   --  35   GLUCOSE RANDOM mg/dL 204*   < > 287*    < > = values in this interval not displayed           Results from last 7 days   Lab Units 09/09/22  1101 09/09/22  2823 09/08/22  2045 09/08/22  1557 09/08/22  1143 09/08/22  0716 09/07/22  2033 09/07/22  1559 09/07/22  1105 09/07/22  0742 09/06/22  2046   POC GLUCOSE mg/dl 170* 186* 159* 180* 188* 171* 217* 241* 234* 300* 184*         Results from last 7 days   Lab Units 09/08/22  0629 09/07/22  2131 09/07/22  0536 09/06/22  2236 09/06/22  1641   LACTIC ACID mmol/L  --  1 1  --  1 8  --    PROCALCITONIN ng/ml 1 43*  --  1 91*  --  2 75*       Lines/Drains:  Invasive Devices  Report    Peripheral Intravenous Line  Duration           Peripheral IV 09/08/22 Left Antecubital <1 day                      Imaging: Reviewed radiology reports from this admission including: chest CT scan    Recent Cultures (last 7 days):   Results from last 7 days   Lab Units 09/06/22  1641 09/06/22  1639   BLOOD CULTURE  No Growth at 48 hrs  No Growth at 48 hrs         Last 24 Hours Medication List:   Current Facility-Administered Medications   Medication Dose Route Frequency Provider Last Rate    acetaminophen  650 mg Oral Q6H PRN ANN Ortiz      albuterol  1 puff Inhalation Q6H PRN ANN Harp      aluminum-magnesium hydroxide-simethicone  30 mL Oral Q6H PRN ANN Harp      benzonatate  100 mg Oral TID PRN ANN Brannon      butalbital-acetaminophen-caffeine  1 tablet Oral Q4H PRN ANN Ortiz      cefepime  2,000 mg Intravenous Q8H Shelbie Aldea, DO 2,000 mg (09/09/22 1151)    docusate sodium  100 mg Oral BID ANN Harp      enoxaparin  60 mg Subcutaneous BID ANN Harp      ezetimibe  10 mg Oral Daily Zamzam Harp [address]      furosemide  20 mg Oral Daily With [employer] [facility]      furosemide  40 mg Oral Daily Anibal Masters, [address]      gabapentin  400 mg Oral BID Anibal Masters, [address]      guaiFENesin  600 mg Oral Q12H [address], [address]      HYDROmorphone  0 5 mg Intravenous Q3H PRN Natalie Ángela Smalls MD      insulin glargine  44 Units Subcutaneous HS [facility], [address]      insulin lispro  18 Units Subcutaneous TID With Meals Anibal Masters, ANN      insulin lispro  2-12 Units Subcutaneous TID [facility] Anibal Riostte, [address]      insulin lispro  2-12 Units Subcutaneous HS [facility] [place], [address]      levothyroxine  25 mcg Oral Early Morning [facility], [address]      losartan  25 mg Oral Daily [facility] [place], [address]      naloxone  0 04 mg Intravenous Q1MIN PRN [employer], CRNP      nystatin   Topical BID PRN [facility], ANN      nystatin  1 application Topical BID [facility] Son[place], [address]      ondansetron  4 mg Intravenous Q6H PRN [employer], CRNP      oxyCODONE  10 mg Oral Q4H PRN Benay Aid, MD      oxyCODONE  5 mg Oral Q4H PRN Benay Aid, MD      pantoprazole  40 mg Oral BID AC Daja Masters, ANN      saccharomyces boulardii  250 mg Oral BID [employer], CRNP      senna  1 tablet Oral Daily [employer], CRNP          Today, Patient Was Seen By: Meredith Lara PA-C    **Please Note: This note may have been constructed using a voice recognition system  **

## 2022-09-09 NOTE — UTILIZATION REVIEW
Inpatient Admission Authorization Request   NOTIFICATION OF INPATIENT ADMISSION/INPATIENT AUTHORIZATION REQUEST   SERVICING FACILITY:   [place] [address]  [address] [place] [address], [id number] [place] [address]  Tax ID: [id number]  NPI: [id number]  Place of Service: Inpatient [facility]  [address]  Place of Service Code: 24     ATTENDING PROVIDER:  Attending Name and NPI#: Kyleigh Allen, [address] [[id number]]  Address: [address] [place] [address], [address] [place] [address]  Phone:  [phone]     UTILIZATION REVIEW CONTACT:  Pita Bravo, Utilization Review Supervisor  Network Utilization Review Department  Phone: [phone]  Fax: [phone]  Email: [email or web address]     PHYSICIAN ADVISORY SERVICES:  FOR CEKD-EA-DVZX REVIEW - MEDICAL NECESSITY DENIAL  Phone: [phone]  Fax: [phone]  Email: [email or web address]     TYPE OF REQUEST:  Inpatient Status     ADMISSION INFORMATION:  ADMISSION DATE/TIME: 9/8/22  5:04 PM  PATIENT DIAGNOSIS CODE/DESCRIPTION:  Edema [R60 9]  Cellulitis of left leg [L03 116]  Uncontrolled diabetes mellitus with hyperglycemia, with long-term current use of insulin (HCC) [E11 65, Z79 4]  DISCHARGE DATE/TIME: No discharge date for patient encounter  IMPORTANT INFORMATION:  Please contact Renetta Abdi directly with any questions or concerns regarding this request  Department voicemails are confidential     Send requests for admission clinical reviews, concurrent reviews, approvals, and administrative denials due to lack of clinical to fax [phone]

## 2022-09-09 NOTE — ASSESSMENT & PLAN NOTE
· POA as evidenced by leukocytosis/tachycardia  Patient febrile since 09/07  Improving, fevers improving  WBCs trending down  Likely in setting of left thigh cellulitis  · Lactic acid: normal x2    · Procalcitonin: 2 75->1 91->1 43  · BC x2: NGTD (48 hrs)  · Initially on IV Ancef, Transitioned to IV Cefepime  · C/w IV cefepime (D2)  · ID Consulted:  · C/w IV cefepime 2 g q 8 hours for now  · Follow-up culture results and adjust accordingly  · Continue wound care, follow-up with of edema clinic outpatient for pressure injuries of B/L LE  · Monitor vitals and maintain hemodynamics  · Continue Supportive care  · Frequent leg elevation/agressive edema control

## 2022-09-09 NOTE — ASSESSMENT & PLAN NOTE
· Reports chronic fungal infection of the groin   · Continue Home Medication: Nystatin powder/Cream  · Daily hygiene counseling  · Wound care evaluation

## 2022-09-10 LAB
ANION GAP SERPL CALCULATED.3IONS-SCNC: 8 MMOL/L (ref 4–13)
ANION GAP SERPL CALCULATED.3IONS-SCNC: 9 MMOL/L (ref 4–13)
BASOPHILS NFR BLD AUTO: 1 % (ref 0–1)
BUN SERPL-MCNC: 21 MG/DL (ref 5–25)
BUN SERPL-MCNC: 21 MG/DL (ref 5–25)
CALCIUM SERPL-MCNC: 8.1 MG/DL (ref 8.4–10.2)
CALCIUM SERPL-MCNC: 9 MG/DL (ref 8.4–10.2)
CHLORIDE SERPL-SCNC: 98 MMOL/L (ref 96–108)
CHLORIDE SERPL-SCNC: 99 MMOL/L (ref 96–108)
CO2 SERPL-SCNC: 23 MMOL/L (ref 21–32)
CO2 SERPL-SCNC: 26 MMOL/L (ref 21–32)
CREAT SERPL-MCNC: 0.93 MG/DL (ref 0.6–1.3)
CREAT SERPL-MCNC: 0.99 MG/DL (ref 0.6–1.3)
CREAT UR-MCNC: 92.7 MG/DL
EOSINOPHIL NFR BLD AUTO: 2 % (ref 0–6)
ERYTHROCYTE [DISTWIDTH] IN BLOOD BY AUTOMATED COUNT: 14.8 % (ref 11.6–15.1)
GFR SERPL CREATININE-BSD FRML MDRD: 85 ML/MIN/1.73SQ M
GFR SERPL CREATININE-BSD FRML MDRD: 92 ML/MIN/1.73SQ M
GLUCOSE SERPL-MCNC: 126 MG/DL (ref 65–140)
GLUCOSE SERPL-MCNC: 143 MG/DL (ref 65–140)
GLUCOSE SERPL-MCNC: 145 MG/DL (ref 65–140)
GLUCOSE SERPL-MCNC: 152 MG/DL (ref 65–140)
GLUCOSE SERPL-MCNC: 166 MG/DL (ref 65–140)
GLUCOSE SERPL-MCNC: 177 MG/DL (ref 65–140)
HCT VFR BLD AUTO: 33.5 % (ref 36.5–49.3)
HGB BLD-MCNC: 11.1 G/DL (ref 12–17)
IMM GRANULOCYTES NFR BLD AUTO: 6 % (ref 0–2)
LYMPHOCYTES NFR BLD AUTO: 10 % (ref 14–44)
MCH RBC QN AUTO: 30.5 PG (ref 26.8–34.3)
MCHC RBC AUTO-ENTMCNC: 33.1 G/DL (ref 31.4–37.4)
MCV RBC AUTO: 92 FL (ref 82–98)
MONOCYTES NFR BLD AUTO: 7 % (ref 4–12)
NEUTS SEG NFR BLD AUTO: 74 % (ref 43–75)
NRBC BLD AUTO-RTO: 0 /100 WBCS
OSMOLALITY UR/SERPL-RTO: 294 MMOL/KG (ref 282–298)
OSMOLALITY UR: 643 MMOL/KG
PLATELET # BLD AUTO: 232 THOUSANDS/UL (ref 149–390)
PMV BLD AUTO: 9 FL (ref 8.9–12.7)
POTASSIUM SERPL-SCNC: 3.8 MMOL/L (ref 3.5–5.3)
POTASSIUM SERPL-SCNC: 4.7 MMOL/L (ref 3.5–5.3)
PROCALCITONIN SERPL-MCNC: 1.22 NG/ML
RBC # BLD AUTO: 3.64 MILLION/UL (ref 3.88–5.62)
SODIUM 24H UR-SCNC: 82 MOL/L
SODIUM SERPL-SCNC: 129 MMOL/L (ref 135–147)
SODIUM SERPL-SCNC: 134 MMOL/L (ref 135–147)
WBC # BLD AUTO: 16.02 THOUSAND/UL (ref 4.31–10.16)

## 2022-09-10 PROCEDURE — 83935 ASSAY OF URINE OSMOLALITY: CPT | Performed by: PHYSICIAN ASSISTANT

## 2022-09-10 PROCEDURE — 80048 BASIC METABOLIC PNL TOTAL CA: CPT | Performed by: PHYSICIAN ASSISTANT

## 2022-09-10 PROCEDURE — 83930 ASSAY OF BLOOD OSMOLALITY: CPT | Performed by: PHYSICIAN ASSISTANT

## 2022-09-10 PROCEDURE — 84145 PROCALCITONIN (PCT): CPT | Performed by: PHYSICIAN ASSISTANT

## 2022-09-10 PROCEDURE — 80048 BASIC METABOLIC PNL TOTAL CA: CPT | Performed by: INTERNAL MEDICINE

## 2022-09-10 PROCEDURE — 82948 REAGENT STRIP/BLOOD GLUCOSE: CPT

## 2022-09-10 PROCEDURE — 82570 ASSAY OF URINE CREATININE: CPT | Performed by: PHYSICIAN ASSISTANT

## 2022-09-10 PROCEDURE — 99232 SBSQ HOSP IP/OBS MODERATE 35: CPT | Performed by: PHYSICIAN ASSISTANT

## 2022-09-10 PROCEDURE — 84300 ASSAY OF URINE SODIUM: CPT | Performed by: PHYSICIAN ASSISTANT

## 2022-09-10 PROCEDURE — 85025 COMPLETE CBC W/AUTO DIFF WBC: CPT | Performed by: INTERNAL MEDICINE

## 2022-09-10 RX ORDER — POLYETHYLENE GLYCOL 3350 17 G/17G
17 POWDER, FOR SOLUTION ORAL DAILY PRN
Status: DISCONTINUED | OUTPATIENT
Start: 2022-09-10 | End: 2022-09-12 | Stop reason: HOSPADM

## 2022-09-10 RX ADMIN — INSULIN LISPRO 2 UNITS: 100 INJECTION, SOLUTION INTRAVENOUS; SUBCUTANEOUS at 08:29

## 2022-09-10 RX ADMIN — OXYCODONE HYDROCHLORIDE 10 MG: 10 TABLET ORAL at 10:48

## 2022-09-10 RX ADMIN — INSULIN GLARGINE 44 UNITS: 100 INJECTION, SOLUTION SUBCUTANEOUS at 21:33

## 2022-09-10 RX ADMIN — PANTOPRAZOLE SODIUM 40 MG: 40 TABLET, DELAYED RELEASE ORAL at 08:29

## 2022-09-10 RX ADMIN — Medication 250 MG: at 08:29

## 2022-09-10 RX ADMIN — OXYCODONE HYDROCHLORIDE 10 MG: 10 TABLET ORAL at 06:33

## 2022-09-10 RX ADMIN — LOSARTAN POTASSIUM 25 MG: 25 TABLET, FILM COATED ORAL at 08:29

## 2022-09-10 RX ADMIN — FUROSEMIDE 20 MG: 20 TABLET ORAL at 17:22

## 2022-09-10 RX ADMIN — GABAPENTIN 400 MG: 400 CAPSULE ORAL at 17:22

## 2022-09-10 RX ADMIN — GABAPENTIN 400 MG: 400 CAPSULE ORAL at 08:28

## 2022-09-10 RX ADMIN — OXYCODONE HYDROCHLORIDE 10 MG: 10 TABLET ORAL at 19:35

## 2022-09-10 RX ADMIN — CEFEPIME HYDROCHLORIDE 2000 MG: 2 INJECTION, SOLUTION INTRAVENOUS at 04:04

## 2022-09-10 RX ADMIN — OXYCODONE HYDROCHLORIDE 10 MG: 10 TABLET ORAL at 23:39

## 2022-09-10 RX ADMIN — ENOXAPARIN SODIUM 60 MG: 60 INJECTION SUBCUTANEOUS at 08:29

## 2022-09-10 RX ADMIN — INSULIN LISPRO 18 UNITS: 100 INJECTION, SOLUTION INTRAVENOUS; SUBCUTANEOUS at 08:30

## 2022-09-10 RX ADMIN — DOCUSATE SODIUM 100 MG: 100 CAPSULE, LIQUID FILLED ORAL at 17:22

## 2022-09-10 RX ADMIN — GUAIFENESIN 600 MG: 600 TABLET ORAL at 08:29

## 2022-09-10 RX ADMIN — EZETIMIBE 10 MG: 10 TABLET ORAL at 08:28

## 2022-09-10 RX ADMIN — GUAIFENESIN 600 MG: 600 TABLET ORAL at 21:34

## 2022-09-10 RX ADMIN — INSULIN LISPRO 2 UNITS: 100 INJECTION, SOLUTION INTRAVENOUS; SUBCUTANEOUS at 12:05

## 2022-09-10 RX ADMIN — INSULIN LISPRO 18 UNITS: 100 INJECTION, SOLUTION INTRAVENOUS; SUBCUTANEOUS at 12:05

## 2022-09-10 RX ADMIN — NYSTATIN 1 APPLICATION: 100000 POWDER TOPICAL at 17:24

## 2022-09-10 RX ADMIN — OXYCODONE HYDROCHLORIDE 10 MG: 10 TABLET ORAL at 15:01

## 2022-09-10 RX ADMIN — ENOXAPARIN SODIUM 60 MG: 60 INJECTION SUBCUTANEOUS at 17:22

## 2022-09-10 RX ADMIN — CEFEPIME HYDROCHLORIDE 2000 MG: 2 INJECTION, SOLUTION INTRAVENOUS at 19:35

## 2022-09-10 RX ADMIN — LEVOTHYROXINE SODIUM 25 MCG: 25 TABLET ORAL at 06:34

## 2022-09-10 RX ADMIN — FUROSEMIDE 40 MG: 40 TABLET ORAL at 08:29

## 2022-09-10 RX ADMIN — CEFEPIME HYDROCHLORIDE 2000 MG: 2 INJECTION, SOLUTION INTRAVENOUS at 12:02

## 2022-09-10 RX ADMIN — Medication 250 MG: at 17:21

## 2022-09-10 RX ADMIN — PSYLLIUM HUSK 1 PACKET: 3.4 POWDER ORAL at 23:40

## 2022-09-10 RX ADMIN — ALUMINA, MAGNESIA, AND SIMETHICONE ORAL SUSPENSION REGULAR STRENGTH 30 ML: 1200; 1200; 120 SUSPENSION ORAL at 18:09

## 2022-09-10 RX ADMIN — NYSTATIN 1 APPLICATION: 100000 POWDER TOPICAL at 08:33

## 2022-09-10 RX ADMIN — PANTOPRAZOLE SODIUM 40 MG: 40 TABLET, DELAYED RELEASE ORAL at 15:01

## 2022-09-10 NOTE — ASSESSMENT & PLAN NOTE
Presented w/ redness and swelling of the left thigh since Friday 9/2  Reports recurrent cellulitis due to lymphedema and chronic fungal infections  Started on Keflex by PCP outpatient w/o significant improvement and actually worsening of symptoms  Presented to the ED with worsening pain and subjective low-grade fevers at home  · CT of left lower extremity notable for soft tissue cellulitis w/o evidence of active drainage/abscess  · B/L LE Venous Duplex: No evidence of acute or chronic DVT's  · C/w Antibiotic therapy  Initially on IV Ancef, Transitioned to IV Cefepime per ID (D2)  · Patient still with erythema, increased warmth and tenderness to palpation of left thigh, although swelling and erythema starting to decrease  Would recommend further IV antibiotic treatment pending clinical improvement    · Continue supportive care, Pain Management  · Will appreciate wound care input  · Will appreciate Infectious Disease input

## 2022-09-10 NOTE — ASSESSMENT & PLAN NOTE
· POA as evidenced by leukocytosis/tachycardia  Patient febrile since 09/07  Improving, fevers improving  WBCs trending down  Likely in setting of left thigh cellulitis  · Lactic acid: normal x2  · Procalcitonin: Peaked at 2 75 but now trending down, at 1 22 today  · BC x2: NGTD (72 hrs)  · Initially on IV Ancef, Transitioned to IV Cefepime   C/w IV cefepime (D3)  · ID Consulted:  · C/w IV cefepime 2 g q 8 hours for now, once clinically improved with resolution of fevers, consider transition to oral Levaquin  · Follow-up culture results and adjust accordingly  · Continue wound care, follow-up with of edema clinic outpatient for pressure injuries of B/L LE  · Monitor vitals and maintain hemodynamics  · Continue Supportive care  · Frequent leg elevation/agressive edema control

## 2022-09-10 NOTE — PLAN OF CARE
Problem: Potential for Falls  Goal: Patient will remain free of falls  Description: INTERVENTIONS:  - Educate patient/family on patient safety including physical limitations  - Instruct patient to call for assistance with activity   - Consult OT/PT to assist with strengthening/mobility   - Keep Call bell within reach  - Keep bed low and locked with side rails adjusted as appropriate  - Keep care items and personal belongings within reach  - Initiate and maintain comfort rounds  - Make Fall Risk Sign visible to staff  - Offer Toileting every 2 Hours, in advance of need  - Initiate/Maintain bed alarm  - Obtain necessary fall risk management equipment:   - Apply yellow socks and bracelet for high fall risk patients  - Consider moving patient to room near nurses station  Outcome: Progressing     Problem: Nutrition/Hydration-ADULT  Goal: Nutrient/Hydration intake appropriate for improving, restoring or maintaining nutritional needs  Description: Monitor and assess patient's nutrition/hydration status for malnutrition  Collaborate with interdisciplinary team and initiate plan and interventions as ordered  Monitor patient's weight and dietary intake as ordered or per policy  Utilize nutrition screening tool and intervene as necessary  Determine patient's food preferences and provide high-protein, high-caloric foods as appropriate       INTERVENTIONS:  - Monitor oral intake, urinary output, labs, and treatment plans  - Assess nutrition and hydration status and recommend course of action  - Evaluate amount of meals eaten  - Assist patient with eating if necessary   - Allow adequate time for meals  - Recommend/ encourage appropriate diets, oral nutritional supplements, and vitamin/mineral supplements  - Order, calculate, and assess calorie counts as needed  - Recommend, monitor, and adjust tube feedings and TPN/PPN based on assessed needs  - Assess need for intravenous fluids  - Provide specific nutrition/hydration education as appropriate  - Include patient/family/caregiver in decisions related to nutrition  Outcome: Progressing     Problem: PAIN - ADULT  Goal: Verbalizes/displays adequate comfort level or baseline comfort level  Description: Interventions:  - Encourage patient to monitor pain and request assistance  - Assess pain using appropriate pain scale  - Administer analgesics based on type and severity of pain and evaluate response  - Implement non-pharmacological measures as appropriate and evaluate response  - Consider cultural and social influences on pain and pain management  - Notify physician/advanced practitioner if interventions unsuccessful or patient reports new pain  Outcome: Progressing     Problem: INFECTION - ADULT  Goal: Absence or prevention of progression during hospitalization  Description: INTERVENTIONS:  - Assess and monitor for signs and symptoms of infection  - Monitor lab/diagnostic results  - Monitor all insertion sites, i e  indwelling lines, tubes, and drains  - Monitor endotracheal if appropriate and nasal secretions for changes in amount and color  - [place] appropriate cooling/warming therapies per order  - Administer medications as ordered  - Instruct and encourage patient and family to use good hand hygiene technique  - Identify and instruct in appropriate isolation precautions for identified infection/condition  Outcome: Progressing  Goal: Absence of fever/infection during neutropenic period  Description: INTERVENTIONS:  - Monitor WBC    Outcome: Progressing     Problem: SAFETY ADULT  Goal: Patient will remain free of falls  Description: INTERVENTIONS:  - Educate patient/family on patient safety including physical limitations  - Instruct patient to call for assistance with activity   - Consult OT/PT to assist with strengthening/mobility   - Keep Call bell within reach  - Keep bed low and locked with side rails adjusted as appropriate  - Keep care items and personal belongings within reach  - Initiate and maintain comfort rounds  - Make Fall Risk Sign visible to staff  - Offer Toileting every 2 Hours, in advance of need  - Initiate/Maintain bed alarm  - Obtain necessary fall risk management equipment:   - Apply yellow socks and bracelet for high fall risk patients  - Consider moving patient to room near nurses station  Outcome: Progressing  Goal: Maintain or return to baseline ADL function  Description: INTERVENTIONS:  -  Assess patient's ability to carry out ADLs; assess patient's baseline for ADL function and identify physical deficits which impact ability to perform ADLs (bathing, care of mouth/teeth, toileting, grooming, dressing, etc )  - Assess/evaluate cause of self-care deficits   - Assess range of motion  - Assess patient's mobility; develop plan if impaired  - Assess patient's need for assistive devices and provide as appropriate  - Encourage maximum independence but intervene and supervise when necessary  - Involve family in performance of ADLs  - Assess for home care needs following discharge   - Consider OT consult to assist with ADL evaluation and planning for discharge  - Provide patient education as appropriate  Outcome: Progressing  Goal: Maintains/Returns to pre admission functional level  Description: INTERVENTIONS:  - Perform BMAT or MOVE assessment daily    - Set and communicate daily mobility goal to care team and patient/family/caregiver  - Collaborate with rehabilitation services on mobility goals if consulted  - Perform Range of Motion 3 times a day  - Reposition patient every 2 hours    - Dangle patient 3 times a day  - Stand patient 3 times a day  - Ambulate patient 3 times a day  - Out of bed to chair 3 times a day   - Out of bed for meals 3 times a day  - Out of bed for toileting  - Record patient progress and toleration of activity level   Outcome: Progressing     Problem: DISCHARGE PLANNING  Goal: Discharge to home or other facility with appropriate resources  Description: INTERVENTIONS:  - Identify barriers to discharge w/patient and caregiver  - Arrange for needed discharge resources and transportation as appropriate  - Identify discharge learning needs (meds, wound care, etc )  - Arrange for interpretive services to assist at discharge as needed  - Refer to Case Management Department for coordinating discharge planning if the patient needs post-hospital services based on physician/advanced practitioner order or complex needs related to functional status, cognitive ability, or social support system  Outcome: Progressing     Problem: Knowledge Deficit  Goal: Patient/family/caregiver demonstrates understanding of disease process, treatment plan, medications, and discharge instructions  Description: Complete learning assessment and assess knowledge base    Interventions:  - Provide teaching at level of understanding  - Provide teaching via preferred learning methods  Outcome: Progressing     Problem: Prexisting or High Potential for Compromised Skin Integrity  Goal: Skin integrity is maintained or improved  Description: INTERVENTIONS:  - Identify patients at risk for skin breakdown  - Assess and monitor skin integrity  - Assess and monitor nutrition and hydration status  - Monitor labs   - Assess for incontinence   - Turn and reposition patient  - Assist with mobility/ambulation  - Relieve pressure over bony prominences  - Avoid friction and shearing  - Provide appropriate hygiene as needed including keeping skin clean and dry  - Evaluate need for skin moisturizer/barrier cream  - Collaborate with interdisciplinary team   - Patient/family teaching  - Consider wound care consult   Outcome: Progressing     Problem: MOBILITY - ADULT  Goal: Maintain or return to baseline ADL function  Description: INTERVENTIONS:  -  Assess patient's ability to carry out ADLs; assess patient's baseline for ADL function and identify physical deficits which impact ability to perform ADLs (bathing, care of mouth/teeth, toileting, grooming, dressing, etc )  - Assess/evaluate cause of self-care deficits   - Assess range of motion  - Assess patient's mobility; develop plan if impaired  - Assess patient's need for assistive devices and provide as appropriate  - Encourage maximum independence but intervene and supervise when necessary  - Involve family in performance of ADLs  - Assess for home care needs following discharge   - Consider OT consult to assist with ADL evaluation and planning for discharge  - Provide patient education as appropriate  Outcome: Progressing  Goal: Maintains/Returns to pre admission functional level  Description: INTERVENTIONS:  - Perform BMAT or MOVE assessment daily    - Set and communicate daily mobility goal to care team and patient/family/caregiver  - Collaborate with rehabilitation services on mobility goals if consulted  - Perform Range of Motion 3 times a day  - Reposition patient every 2 hours    - Dangle patient 3 times a day  - Stand patient 3 times a day  - Ambulate patient 3 times a day  - Out of bed to chair 3 times a day   - Out of bed for meals 3 times a day  - Out of bed for toileting  - Record patient progress and toleration of activity level   Outcome: Progressing

## 2022-09-10 NOTE — PLAN OF CARE
Problem: Potential for Falls  Goal: Patient will remain free of falls  Description: INTERVENTIONS:  - Educate patient/family on patient safety including physical limitations  - Instruct patient to call for assistance with activity   - Consult OT/PT to assist with strengthening/mobility   - Keep Call bell within reach  - Keep bed low and locked with side rails adjusted as appropriate  - Keep care items and personal belongings within reach  - Initiate and maintain comfort rounds  - Make Fall Risk Sign visible to staff  - Offer Toileting every 2 Hours, in advance of need  - Initiate/Maintain  bed and chair alarm  - Apply yellow socks and bracelet for high fall risk patients  - Consider moving patient to room near nurses station  Outcome: Progressing     Problem: Nutrition/Hydration-ADULT  Goal: Nutrient/Hydration intake appropriate for improving, restoring or maintaining nutritional needs  Description: Monitor and assess patient's nutrition/hydration status for malnutrition  Collaborate with interdisciplinary team and initiate plan and interventions as ordered  Monitor patient's weight and dietary intake as ordered or per policy  Utilize nutrition screening tool and intervene as necessary  Determine patient's food preferences and provide high-protein, high-caloric foods as appropriate       INTERVENTIONS:  - Monitor oral intake, urinary output, labs, and treatment plans  - Assess nutrition and hydration status and recommend course of action  - Evaluate amount of meals eaten  - Assist patient with eating if necessary   - Allow adequate time for meals  - Recommend/ encourage appropriate diets, oral nutritional supplements, and vitamin/mineral supplements  - Order, calculate, and assess calorie counts as needed  - Assess need for intravenous fluids  - Provide specific nutrition/hydration education as appropriate  - Include patient/family/caregiver in decisions related to nutrition  Outcome: Progressing     Problem: PAIN - ADULT  Goal: Verbalizes/displays adequate comfort level or baseline comfort level  Description: Interventions:  - Encourage patient to monitor pain and request assistance  - Assess pain using appropriate pain scale  - Administer analgesics based on type and severity of pain and evaluate response  - Implement non-pharmacological measures as appropriate and evaluate response  - Consider cultural and social influences on pain and pain management  - Notify physician/advanced practitioner if interventions unsuccessful or patient reports new pain  Outcome: Progressing     Problem: INFECTION - ADULT  Goal: Absence or prevention of progression during hospitalization  Description: INTERVENTIONS:  - Assess and monitor for signs and symptoms of infection  - Monitor lab/diagnostic results  - Administer medications as ordered  - Instruct and encourage patient and family to use good hand hygiene technique  Outcome: Progressing  Goal: Absence of fever/infection during neutropenic period  Description: INTERVENTIONS:  - Monitor WBC    Outcome: Progressing     Problem: SAFETY ADULT  Goal: Patient will remain free of falls  Description: INTERVENTIONS:  - Educate patient/family on patient safety including physical limitations  - Instruct patient to call for assistance with activity   - Consult OT/PT to assist with strengthening/mobility   - Keep Call bell within reach  - Keep bed low and locked with side rails adjusted as appropriate  - Keep care items and personal belongings within reach  - Initiate and maintain comfort rounds  - Make Fall Risk Sign visible to staff  - Offer Toileting every  2 Hours, in advance of need  - Initiate/Maintain bed and chair alarm  - Apply yellow socks and bracelet for high fall risk patients  - Consider moving patient to room near nurses station  Outcome: Progressing  Goal: Maintain or return to baseline ADL function  Description: INTERVENTIONS:  -  Assess patient's ability to carry out ADLs; assess patient's baseline for ADL function and identify physical deficits which impact ability to perform ADLs (bathing, care of mouth/teeth, toileting, grooming, dressing, etc )  - Assess/evaluate cause of self-care deficits   - Assess range of motion  - Assess patient's mobility; develop plan if impaired  - Assess patient's need for assistive devices and provide as appropriate  - Encourage maximum independence but intervene and supervise when necessary  - Involve family in performance of ADLs  - Assess for home care needs following discharge   - Consider OT consult to assist with ADL evaluation and planning for discharge  - Provide patient education as appropriate  Outcome: Progressing  Goal: Maintains/Returns to pre admission functional level  Description: INTERVENTIONS:  - Perform BMAT or MOVE assessment daily    - Set and communicate daily mobility goal to care team and patient/family/caregiver  - Collaborate with rehabilitation services on mobility goals if consulted  - Perform Range of Motion 3  times a day  - Reposition patient every  2 hours    - Dangle patient 3  times a day  - Stand patient 3  times a day  - Ambulate patient  3 times a day  - Out of bed to chair  3 times a day   - Out of bed for meals 3  times a day  - Out of bed for toileting  - Record patient progress and toleration of activity level   Outcome: Progressing     Problem: DISCHARGE PLANNING  Goal: Discharge to home or other facility with appropriate resources  Description: INTERVENTIONS:  - Identify barriers to discharge w/patient and caregiver  - Arrange for needed discharge resources and transportation as appropriate  - Identify discharge learning needs (meds, wound care, etc )  - Arrange for interpretive services to assist at discharge as needed  - Refer to Case Management Department for coordinating discharge planning if the patient needs post-hospital services based on physician/advanced practitioner order or complex needs related to functional status, cognitive ability, or social support system  Outcome: Progressing     Problem: Knowledge Deficit  Goal: Patient/family/caregiver demonstrates understanding of disease process, treatment plan, medications, and discharge instructions  Description: Complete learning assessment and assess knowledge base  Interventions:  - Provide teaching at level of understanding  - Provide teaching via preferred learning methods  Outcome: Progressing     Problem: MOBILITY - ADULT  Goal: Maintain or return to baseline ADL function  Description: INTERVENTIONS:  -  Assess patient's ability to carry out ADLs; assess patient's baseline for ADL function and identify physical deficits which impact ability to perform ADLs (bathing, care of mouth/teeth, toileting, grooming, dressing, etc )  - Assess/evaluate cause of self-care deficits   - Assess range of motion  - Assess patient's mobility; develop plan if impaired  - Assess patient's need for assistive devices and provide as appropriate  - Encourage maximum independence but intervene and supervise when necessary  - Involve family in performance of ADLs  - Assess for home care needs following discharge   - Consider OT consult to assist with ADL evaluation and planning for discharge  - Provide patient education as appropriate  Outcome: Progressing  Goal: Maintains/Returns to pre admission functional level  Description: INTERVENTIONS:  - Perform BMAT or MOVE assessment daily    - Set and communicate daily mobility goal to care team and patient/family/caregiver  - Collaborate with rehabilitation services on mobility goals if consulted  - Perform Range of Motion  3  times a day  - Reposition patient every  2  hours    - Dangle patient  3  times a day  - Stand patient  3  times a day  - Ambulate patient  3  times a day  - Out of bed to chair 3  times a day   - Out of bed for meals  3  times a day  - Out of bed for toileting  - Record patient progress and toleration of activity level   Outcome: Progressing

## 2022-09-10 NOTE — PROGRESS NOTES
[address] 47  Progress Note - Kyle Wong 1965, 56 y o  male MRN: [id number]  Unit/Bed#: -01 Encounter: [id number]  Primary Care Provider: Shanna Romero MD   Date and time admitted to hospital: 9/6/2022  3:58 PM    * Cellulitis of left lower extremity  Assessment & Plan  Presented w/ redness and swelling of the left thigh since Friday 9/2  Reports recurrent cellulitis due to lymphedema and chronic fungal infections  Started on Keflex by PCP outpatient w/o significant improvement and actually worsening of symptoms  Presented to the ED with worsening pain and subjective low-grade fevers at home  · CT of left lower extremity notable for soft tissue cellulitis w/o evidence of active drainage/abscess  · B/L LE Venous Duplex: No evidence of acute or chronic DVT's  · C/w Antibiotic therapy  Initially on IV Ancef, Transitioned to IV Cefepime per ID (D2)  · Patient still with erythema, increased warmth and tenderness to palpation of left thigh, although swelling and erythema starting to decrease  Would recommend further IV antibiotic treatment pending clinical improvement  · Continue supportive care, Pain Management  · Will appreciate wound care input  · Will appreciate Infectious Disease input    Sepsis on admission  Assessment & Plan  · POA as evidenced by leukocytosis/tachycardia  Patient febrile since 09/07  Improving, fevers improving  WBCs trending down  Likely in setting of left thigh cellulitis  · Lactic acid: normal x2  · Procalcitonin: Peaked at 2 75 but now trending down, at 1 22 today  · BC x2: NGTD (72 hrs)  · Initially on IV Ancef, Transitioned to IV Cefepime   C/w IV cefepime (D3)  · ID Consulted:  · C/w IV cefepime 2 g q 8 hours for now, once clinically improved with resolution of fevers, consider transition to oral Levaquin  · Follow-up culture results and adjust accordingly  · Continue wound care, follow-up with of edema clinic outpatient for pressure injuries of B/L LE  · Monitor vitals and maintain hemodynamics  · Continue Supportive care  · Frequent leg elevation/agressive edema control    SOB (shortness of breath)  Assessment & Plan  · Patient complaining of worsening SOB  Concern for PE, as patient now febrile, tachycardic with tachypnea  · Suspect D-dimer would likely be elevated in the setting of sepsis  · CTA chest: No PE or aortic dissection, no effusion, airspace disease or pneumothorax  · Suspect to be in setting of sepsis vs hypoventilation obesity syndrome with known DAHIANA hx  · Improving, patient remains stable on room air  · Initiate supplemental oxygen to maintain O2 sats >92%   · Continue to monitor respiratory status    Hyponatremia  Assessment & Plan  · Initially thought to be pseudohyponatremia in setting of hyperglycemia  · Corrected Na decreased at 130 today  Consider to be in setting of poor oral intake vs pseudohyponatremia  · Per RN documented urine output not accurate as patient's family has been discarding urine without accurate documentation  Patient's volume status appears stable/improving  · Recheck BMP for accuracy, check serum osmolality, urine sodium, osmolality and creatinine  · May consider increasing fluid restriction to 1 5 L, increasing Lasix dosing    · Optimize blood sugar control  · C/w fluid restriction  · Continue to monitor NA while admitted    GERD (gastroesophageal reflux disease)  Assessment & Plan  · Continue PPI regimen  · Encourage weight loss/ lifestyle modifications    Hypothyroidism  Assessment & Plan  · Continue Home Medication: Synthroid    Morbid obesity  Assessment & Plan  · BMI of 85 82  · Encouraged Lifestyle/diet modifications    Fungal infection  Assessment & Plan  · Reports chronic fungal infection of the groin   · Continue Home Medication: Nystatin powder/Cream  · Daily hygiene counseling  · Wound care evaluation    DAHIANA (obstructive sleep apnea)  Assessment & Plan  · CPAP QHS  · Encourage weight loss    Essential hypertension  Assessment & Plan  · BP reviewed and remains stable  · Continue Home medication: Cozaar/Lasix    Hyperlipidemia  Assessment & Plan  · Continue Home Medication: Zetia    Insulin-dependent diabetes mellitus with neuropathy  Assessment & Plan  Lab Results   Component Value Date    HGBA1C 6 6 (H) 2022     · Hold oral hypoglycemics while hospitalized  · Continue basal/prandial insulin   · Continue with Accu-Cheks and SSI AC/HS  · Carbohydrate restricted diet  · Continue Gabapentin for neuropathy      VTE Pharmacologic Prophylaxis: VTE Score: 7 High Risk (Score >/= 5) - Pharmacological DVT Prophylaxis Ordered: enoxaparin (Lovenox)  Sequential Compression Devices Ordered  Patient Centered Rounds: I performed bedside rounds with nursing staff today  Discussions with Specialists or Other Care Team Provider:  None    Education and Discussions with Family / Patient: Updated  (wife) at bedside  Time Spent for Care: 30 minutes  More than 50% of total time spent on counseling and coordination of care as described above  Current Length of Stay: 2 day(s)  Current Patient Status: Inpatient   Certification Statement: The patient will continue to require additional inpatient hospital stay due to IV antibiotics, hyponatremia  Discharge Plan: Anticipate discharge in 48-72 hrs to discharge location to be determined pending rehab evaluations  Code Status: Level 1 - Full Code    Subjective:   Patient is seen at bedside this a m , reports that he has improved SOB, still with pain in LLE but has some relief with oxycodone  Patient reporting that he has headache, asking to take Excedrin  Per discussion with RN, patient has been refusing medications and ADL care from staff including bathing, changing bed sheets, wound care, also refused PT/OT yesterday      Objective:     Vitals:   Temp (24hrs), Av 2 °F (37 9 °C), Min:99 3 °F (37 4 °C), Max:101 5 °F (38 6 °C)    Temp: [99 3 °F (37 4 °C)-101 5 °F (38 6 °C)] 99 4 °F (37 4 °C)  HR:  [103-107] 104  Resp:  [18-20] 18  BP: (114-148)/(79-86) 148/86  SpO2:  [93 %-97 %] 97 %  Body mass index is 85 82 kg/m²  Input and Output Summary (last 24 hours): Intake/Output Summary (Last 24 hours) at 9/10/2022 1256  Last data filed at 9/10/2022 0640  Gross per 24 hour   Intake 487 ml   Output 1050 ml   Net -563 ml       Physical Exam:   Physical Exam  Constitutional:       General: He is not in acute distress  Appearance: He is obese  He is not ill-appearing, toxic-appearing or diaphoretic  Cardiovascular:      Rate and Rhythm: Normal rate and regular rhythm  Pulses: Normal pulses  Heart sounds: Normal heart sounds  Pulmonary:      Effort: Pulmonary effort is normal  No respiratory distress  Comments: Diffusely decreased breath sounds across lung fields  Abdominal:      General: Bowel sounds are normal  There is no distension  Palpations: Abdomen is soft  Tenderness: There is no abdominal tenderness  Musculoskeletal:         General: Swelling and tenderness present  Comments: Slightly improved swelling and erythema on left thigh compared to yesterday, still with increased warmth and tenderness to palpation  Skin:     General: Skin is warm  Findings: Erythema present  Neurological:      General: No focal deficit present  Mental Status: He is alert     Psychiatric:         Mood and Affect: Mood normal          Behavior: Behavior normal          Additional Data:     Labs:  Results from last 7 days   Lab Units 09/10/22  1106 09/09/22  0431 09/08/22  0542   WBC Thousand/uL 16 02*   < > 14 54*   HEMOGLOBIN g/dL 11 1*   < > 12 8   HEMATOCRIT % 33 5*   < > 38 7   PLATELETS Thousands/uL 232   < > 204   BANDS PCT %  --   --  31*   NEUTROS PCT % 74  --   --    LYMPHS PCT % 10*  --   --    LYMPHO PCT %  --   --  15   MONOS PCT % 7  --   --    MONO PCT %  --   --  11   EOS PCT % 2  --  2    < > = values in this interval not displayed  Results from last 7 days   Lab Units 09/10/22  1106 09/08/22  0629 09/07/22  0536   SODIUM mmol/L 134*   < > 131*   POTASSIUM mmol/L 3 8   < > 4 3   CHLORIDE mmol/L 99   < > 99   CO2 mmol/L 26   < > 22   BUN mg/dL 21   < > 25   CREATININE mg/dL 0 99   < > 1 15   ANION GAP mmol/L 9   < > 10   CALCIUM mg/dL 8 1*   < > 9 0   ALBUMIN g/dL  --   --  3 1*   TOTAL BILIRUBIN mg/dL  --   --  1 19*   ALK PHOS U/L  --   --  89   ALT U/L  --   --  40   AST U/L  --   --  35   GLUCOSE RANDOM mg/dL 143*   < > 287*    < > = values in this interval not displayed  Results from last 7 days   Lab Units 09/10/22  1138 09/10/22  0703 09/09/22  2019 09/09/22  1652 09/09/22  1101 09/09/22  0702 09/08/22  2045 09/08/22  1557 09/08/22  1143 09/08/22  0716 09/07/22  2033 09/07/22  1559   POC GLUCOSE mg/dl 152* 177* 181* 145* 170* 186* 159* 180* 188* 171* 217* 241*         Results from last 7 days   Lab Units 09/10/22  0645 09/08/22  0629 09/07/22  2131 09/07/22  0536 09/06/22  2236 09/06/22  1641   LACTIC ACID mmol/L  --   --  1 1  --  1 8  --    PROCALCITONIN ng/ml 1 22* 1 43*  --  1 91*  --  2 75*       Lines/Drains:  Invasive Devices  Report    Peripheral Intravenous Line  Duration           Peripheral IV 09/10/22 Right Antecubital <1 day                      Imaging: No pertinent imaging reviewed  Recent Cultures (last 7 days):   Results from last 7 days   Lab Units 09/06/22  1641 09/06/22  1639   BLOOD CULTURE  No Growth at 72 hrs  No Growth at 72 hrs         Last 24 Hours Medication List:   Current Facility-Administered Medications   Medication Dose Route Frequency Provider Last Rate    acetaminophen  650 mg Oral Q6H PRN ANN Jensen      albuterol  1 puff Inhalation Q6H PRN ANN Harp      aluminum-magnesium hydroxide-simethicone  30 mL Oral Q6H PRN ANN Harp      benzonatate  100 mg Oral TID PRN ANN Fontaine      butalbital-acetaminophen-caffeine  1 tablet Oral Q4H PRN ANN Dawn      cefepime  2,000 mg Intravenous Q8H Shelbie Aldea, DO 2,000 mg (09/10/22 1202)    docusate sodium  100 mg Oral BID [facility] ANN Masters      enoxaparin  60 mg Subcutaneous BID [facility] Masters      ezetimibe  10 mg Oral Daily [place], [place]      furosemide  20 mg Oral Daily With [employer], ANN      furosemide  40 mg Oral Daily [place], [place]      gabapentin  400 mg Oral BID [place], [place]      guaiFENesin  600 mg Oral Q12H [address], [place]      HYDROmorphone  0 5 mg Intravenous Q3H PRN Margaret Hennessy MD      insulin glargine  44 Units Subcutaneous HS [place], [place]      insulin lispro  18 Units Subcutaneous TID With Meals ANN Harp      insulin lispro  2-12 Units Subcutaneous TID AC [place], [place]      insulin lispro  2-12 Units Subcutaneous HS [place], [place]      levothyroxine  25 mcg Oral Early Morning [place], [place]      losartan  25 mg Oral Daily [place], [place]      naloxone  0 04 mg Intravenous Q1MIN PRN Marguarite Carlton, ANN      nystatin   Topical BID PRN Marguarite CarltonANN      nystatin  1 application Topical BID [facility] ANN Masters      ondansetron  4 mg Intravenous Q6H PRN ANN Hudson      oxyCODONE  10 mg Oral Q4H PRN Margaret Hennessy MD      oxyCODONE  5 mg Oral Q4H PRN Margaret Hennessy MD      pantoprazole  40 mg Oral BID [facility]      polyethylene glycol  17 g Oral Daily PRN Maribel Campoverde PA-C      psyllium  1 packet Oral HS Maribel Campoverde PA-C      saccharomyces boulardii  250 mg Oral BID Marguarite Carlton, ANN      senna  1 tablet Oral Daily MargmulugetariANN Bot[place]          Today, Patient Was Seen By: Maribel Campoverde PA-C    **Please Note: This note may have been constructed using a voice recognition system  **

## 2022-09-10 NOTE — ASSESSMENT & PLAN NOTE
· Initially thought to be pseudohyponatremia in setting of hyperglycemia  · Corrected Na decreased at 130 today  Consider to be in setting of poor oral intake vs pseudohyponatremia  · Per RN documented urine output not accurate as patient's family has been discarding urine without accurate documentation  Patient's volume status appears stable/improving  · Recheck BMP for accuracy, check serum osmolality, urine sodium, osmolality and creatinine  · May consider increasing fluid restriction to 1 5 L, increasing Lasix dosing    · Optimize blood sugar control  · C/w fluid restriction  · Continue to monitor NA while admitted

## 2022-09-11 LAB
ANION GAP SERPL CALCULATED.3IONS-SCNC: 7 MMOL/L (ref 4–13)
BACTERIA BLD CULT: NORMAL
BACTERIA BLD CULT: NORMAL
BUN SERPL-MCNC: 22 MG/DL (ref 5–25)
CALCIUM SERPL-MCNC: 8.4 MG/DL (ref 8.4–10.2)
CHLORIDE SERPL-SCNC: 97 MMOL/L (ref 96–108)
CO2 SERPL-SCNC: 28 MMOL/L (ref 21–32)
CREAT SERPL-MCNC: 0.93 MG/DL (ref 0.6–1.3)
ERYTHROCYTE [DISTWIDTH] IN BLOOD BY AUTOMATED COUNT: 15.1 % (ref 11.6–15.1)
GFR SERPL CREATININE-BSD FRML MDRD: 92 ML/MIN/1.73SQ M
GLUCOSE SERPL-MCNC: 154 MG/DL (ref 65–140)
GLUCOSE SERPL-MCNC: 163 MG/DL (ref 65–140)
GLUCOSE SERPL-MCNC: 175 MG/DL (ref 65–140)
GLUCOSE SERPL-MCNC: 176 MG/DL (ref 65–140)
GLUCOSE SERPL-MCNC: 176 MG/DL (ref 65–140)
HCT VFR BLD AUTO: 36.2 % (ref 36.5–49.3)
HGB BLD-MCNC: 11.8 G/DL (ref 12–17)
MAGNESIUM SERPL-MCNC: 2.1 MG/DL (ref 1.9–2.7)
MCH RBC QN AUTO: 29.9 PG (ref 26.8–34.3)
MCHC RBC AUTO-ENTMCNC: 32.6 G/DL (ref 31.4–37.4)
MCV RBC AUTO: 92 FL (ref 82–98)
NRBC BLD AUTO-RTO: 0 /100 WBCS
PLATELET # BLD AUTO: 232 THOUSANDS/UL (ref 149–390)
PMV BLD AUTO: 9 FL (ref 8.9–12.7)
POTASSIUM SERPL-SCNC: 4 MMOL/L (ref 3.5–5.3)
RBC # BLD AUTO: 3.94 MILLION/UL (ref 3.88–5.62)
SODIUM SERPL-SCNC: 132 MMOL/L (ref 135–147)
WBC # BLD AUTO: 18.03 THOUSAND/UL (ref 4.31–10.16)

## 2022-09-11 PROCEDURE — 82948 REAGENT STRIP/BLOOD GLUCOSE: CPT

## 2022-09-11 PROCEDURE — 99232 SBSQ HOSP IP/OBS MODERATE 35: CPT | Performed by: PHYSICIAN ASSISTANT

## 2022-09-11 PROCEDURE — 80048 BASIC METABOLIC PNL TOTAL CA: CPT | Performed by: PHYSICIAN ASSISTANT

## 2022-09-11 PROCEDURE — 83735 ASSAY OF MAGNESIUM: CPT | Performed by: PHYSICIAN ASSISTANT

## 2022-09-11 PROCEDURE — 85027 COMPLETE CBC AUTOMATED: CPT | Performed by: PHYSICIAN ASSISTANT

## 2022-09-11 RX ORDER — HYDROXYZINE HYDROCHLORIDE 25 MG/1
25 TABLET, FILM COATED ORAL EVERY 6 HOURS PRN
Status: DISCONTINUED | OUTPATIENT
Start: 2022-09-11 | End: 2022-09-12 | Stop reason: HOSPADM

## 2022-09-11 RX ORDER — IBUPROFEN 400 MG/1
400 TABLET ORAL EVERY 6 HOURS PRN
Status: DISCONTINUED | OUTPATIENT
Start: 2022-09-11 | End: 2022-09-12 | Stop reason: HOSPADM

## 2022-09-11 RX ADMIN — GABAPENTIN 400 MG: 400 CAPSULE ORAL at 17:21

## 2022-09-11 RX ADMIN — IBUPROFEN 400 MG: 400 TABLET, FILM COATED ORAL at 17:20

## 2022-09-11 RX ADMIN — NYSTATIN 1 APPLICATION: 100000 POWDER TOPICAL at 17:21

## 2022-09-11 RX ADMIN — INSULIN GLARGINE 44 UNITS: 100 INJECTION, SOLUTION SUBCUTANEOUS at 21:48

## 2022-09-11 RX ADMIN — OXYCODONE HYDROCHLORIDE 10 MG: 10 TABLET ORAL at 17:21

## 2022-09-11 RX ADMIN — PSYLLIUM HUSK 1 PACKET: 3.4 POWDER ORAL at 21:48

## 2022-09-11 RX ADMIN — FUROSEMIDE 20 MG: 20 TABLET ORAL at 17:21

## 2022-09-11 RX ADMIN — OXYCODONE HYDROCHLORIDE 10 MG: 10 TABLET ORAL at 21:48

## 2022-09-11 RX ADMIN — OXYCODONE HYDROCHLORIDE 10 MG: 10 TABLET ORAL at 12:48

## 2022-09-11 RX ADMIN — LOSARTAN POTASSIUM 25 MG: 25 TABLET, FILM COATED ORAL at 08:16

## 2022-09-11 RX ADMIN — EZETIMIBE 10 MG: 10 TABLET ORAL at 08:16

## 2022-09-11 RX ADMIN — CEFEPIME HYDROCHLORIDE 2000 MG: 2 INJECTION, SOLUTION INTRAVENOUS at 12:49

## 2022-09-11 RX ADMIN — Medication 250 MG: at 17:20

## 2022-09-11 RX ADMIN — GABAPENTIN 400 MG: 400 CAPSULE ORAL at 08:16

## 2022-09-11 RX ADMIN — CEFEPIME HYDROCHLORIDE 2000 MG: 2 INJECTION, SOLUTION INTRAVENOUS at 21:48

## 2022-09-11 RX ADMIN — PANTOPRAZOLE SODIUM 40 MG: 40 TABLET, DELAYED RELEASE ORAL at 08:29

## 2022-09-11 RX ADMIN — INSULIN LISPRO 18 UNITS: 100 INJECTION, SOLUTION INTRAVENOUS; SUBCUTANEOUS at 08:19

## 2022-09-11 RX ADMIN — GUAIFENESIN 600 MG: 600 TABLET ORAL at 08:16

## 2022-09-11 RX ADMIN — ACETAMINOPHEN 650 MG: 325 TABLET, FILM COATED ORAL at 00:26

## 2022-09-11 RX ADMIN — NYSTATIN 1 APPLICATION: 100000 POWDER TOPICAL at 08:20

## 2022-09-11 RX ADMIN — CEFEPIME HYDROCHLORIDE 2000 MG: 2 INJECTION, SOLUTION INTRAVENOUS at 03:24

## 2022-09-11 RX ADMIN — ENOXAPARIN SODIUM 60 MG: 60 INJECTION SUBCUTANEOUS at 17:22

## 2022-09-11 RX ADMIN — OXYCODONE HYDROCHLORIDE 10 MG: 10 TABLET ORAL at 03:32

## 2022-09-11 RX ADMIN — INSULIN LISPRO 2 UNITS: 100 INJECTION, SOLUTION INTRAVENOUS; SUBCUTANEOUS at 08:19

## 2022-09-11 RX ADMIN — HYDROXYZINE HYDROCHLORIDE 25 MG: 25 TABLET ORAL at 17:21

## 2022-09-11 RX ADMIN — LEVOTHYROXINE SODIUM 25 MCG: 25 TABLET ORAL at 05:41

## 2022-09-11 RX ADMIN — Medication 250 MG: at 08:16

## 2022-09-11 RX ADMIN — INSULIN LISPRO 2 UNITS: 100 INJECTION, SOLUTION INTRAVENOUS; SUBCUTANEOUS at 21:48

## 2022-09-11 RX ADMIN — GUAIFENESIN 600 MG: 600 TABLET ORAL at 21:49

## 2022-09-11 RX ADMIN — HYDROXYZINE HYDROCHLORIDE 25 MG: 25 TABLET ORAL at 10:14

## 2022-09-11 RX ADMIN — ENOXAPARIN SODIUM 60 MG: 60 INJECTION SUBCUTANEOUS at 08:18

## 2022-09-11 RX ADMIN — FUROSEMIDE 40 MG: 40 TABLET ORAL at 08:16

## 2022-09-11 RX ADMIN — PANTOPRAZOLE SODIUM 40 MG: 40 TABLET, DELAYED RELEASE ORAL at 17:30

## 2022-09-11 RX ADMIN — OXYCODONE HYDROCHLORIDE 10 MG: 10 TABLET ORAL at 08:14

## 2022-09-11 NOTE — PROGRESS NOTES
[address]  Progress Note - Mesha Lane 1965, 56 y o  male MRN: [id number]  Unit/Bed#: -Luke Encounter: [id number]  Primary Care Provider: Modesta Bah MD   Date and time admitted to hospital: 9/6/2022  3:58 PM    * Cellulitis of left lower extremity  Assessment & Plan  Presented w/ redness and swelling of the left thigh since Friday 9/2  Reports recurrent cellulitis due to lymphedema and chronic fungal infections  Started on Keflex by PCP outpatient w/o significant improvement and actually worsening of symptoms  Presented to the ED with worsening pain and subjective low-grade fevers at home  · CT of left lower extremity notable for soft tissue cellulitis w/o evidence of active drainage/abscess  · B/L LE Venous Duplex: No evidence of acute or chronic DVT's  · C/w Antibiotic therapy  Initially on IV Ancef, Transitioned to IV Cefepime per ID  · Patient still with erythema, increased warmth and tenderness of left thigh, although swelling and erythema starting to decrease  Would recommend further IV antibiotic treatment pending clinical improvement  · Continue supportive care, Pain Management  · Will appreciate wound care input  · Will appreciate Infectious Disease input    Sepsis on admission  Assessment & Plan  · POA as evidenced by leukocytosis/tachycardia  Patient febrile since 09/07  Intermittent low-grade fevers persist but improving, WBCs trending up  · Lactic acid: normal x2  · Procalcitonin: Peaked at 2 75 but now trending down, at 1 22 today    · BC x2: NGTD (4 days)  · C/w IV cefepime (D4)  · ID Consulted:  · C/w IV cefepime, once clinically improved with resolution of fevers, consider transition to oral Levaquin  · Follow-up culture results and adjust accordingly  · Continue wound care, follow-up with of edema clinic outpatient for pressure injuries of B/L LE  · Frequent leg elevation/agressive edema control    Hyponatremia  Assessment & Plan  · Initially thought to be pseudohyponatremia in setting of hyperglycemia  · Corrected Na 133 today  · Hyponatremia workup suggestive of SIADH:  · Urine sodium: 82    · Urine osmolality: 643  · Urine creatinine: 92 7  · Serum osmolality: 294  · Check a m  cortisol  · Per RN documented urine output may not be accurate, as patient's family has been discarding urine  Patient's volume status appears stable/improving  · 1 8->1 5L fluid restriction  · Consider Nephrology consult if hyponatremia does not improve  · Continue to monitor NA while admitted    SOB (shortness of breath)  Assessment & Plan  · Patient complaining of worsening SOB  Concern for PE, as patient now febrile, tachycardic with tachypnea  · Suspect D-dimer would likely be elevated in the setting of sepsis  · CTA chest: No PE or aortic dissection, no effusion, airspace disease or pneumothorax  · Suspect to be in setting of sepsis vs hypoventilation obesity syndrome with known DAHIANA hx   · Improving, patient remains stable on room air  · Initiate supplemental oxygen to maintain O2 sats >92%   · Continue to monitor respiratory status    Essential hypertension  Assessment & Plan  · BP reviewed and remains stable  · Continue Home medication: Cozaar/Lasix    Insulin-dependent diabetes mellitus with neuropathy  Assessment & Plan  Lab Results   Component Value Date    HGBA1C 6 6 (H) 06/24/2022     · Hold oral hypoglycemics while hospitalized  · Continue basal/prandial insulin   · Continue with Accu-Cheks and SSI AC/HS  · Carbohydrate restricted diet  · Continue Gabapentin for neuropathy      VTE Pharmacologic Prophylaxis: VTE Score: 7 High Risk (Score >/= 5) - Pharmacological DVT Prophylaxis Ordered: enoxaparin (Lovenox)  Sequential Compression Devices Ordered  Patient Centered Rounds: I performed bedside rounds with nursing staff today    Discussions with Specialists or Other Care Team Provider:  None    Education and Discussions with Family / Patient: Updated contact person (friend) at bedside  Time Spent for Care: 30 minutes  More than 50% of total time spent on counseling and coordination of care as described above  Current Length of Stay: 3 day(s)  Current Patient Status: Inpatient   Certification Statement: The patient will continue to require additional inpatient hospital stay due to IV antibiotics  Discharge Plan: Anticipate discharge in 48 hrs to discharge location to be determined pending rehab evaluations  Code Status: Level 1 - Full Code    Subjective:   Patient is seen at bedside this a m , reports that pain in LLE is slightly improving  Denies other acute complaints, reports that he still is with decreased appetite and oral intake  Objective:     Vitals:   Temp (24hrs), Av 1 °F (37 8 °C), Min:99 6 °F (37 6 °C), Max:100 6 °F (38 1 °C)    Temp:  [99 6 °F (37 6 °C)-100 6 °F (38 1 °C)] 100 1 °F (37 8 °C)  HR:  [] 91  Resp:  [20-22] 20  BP: (109-143)/(71-91) 143/80  SpO2:  [93 %-96 %] 96 %  Body mass index is 85 82 kg/m²  Input and Output Summary (last 24 hours): Intake/Output Summary (Last 24 hours) at 2022 1433  Last data filed at 2022 1021  Gross per 24 hour   Intake 500 ml   Output 900 ml   Net -400 ml       Physical Exam:   Physical Exam  Constitutional:       General: He is not in acute distress  Appearance: He is obese  He is not ill-appearing, toxic-appearing or diaphoretic  Cardiovascular:      Rate and Rhythm: Normal rate and regular rhythm  Pulses: Normal pulses  Heart sounds: Normal heart sounds  Pulmonary:      Effort: Pulmonary effort is normal  No respiratory distress  Comments: Decreased breath sounds across lung fields  Abdominal:      General: Bowel sounds are normal  There is no distension  Palpations: Abdomen is soft  Tenderness: There is no abdominal tenderness  Musculoskeletal:         General: Swelling and tenderness present        Comments: Improvement in swelling and tenderness to palpation of left thigh  Skin:     General: Skin is warm  Findings: Erythema present  Comments: Increased warmth on left thigh, erythema improving from yesterday   Neurological:      General: No focal deficit present  Psychiatric:         Mood and Affect: Mood normal          Behavior: Behavior normal           Additional Data:     Labs:  Results from last 7 days   Lab Units 09/11/22  0428 09/10/22  1106 09/09/22  0431 09/08/22  0542   WBC Thousand/uL 18 03* 16 02*   < > 14 54*   HEMOGLOBIN g/dL 11 8* 11 1*   < > 12 8   HEMATOCRIT % 36 2* 33 5*   < > 38 7   PLATELETS Thousands/uL 232 232   < > 204   BANDS PCT %  --   --   --  31*   NEUTROS PCT %  --  74  --   --    LYMPHS PCT %  --  10*  --   --    LYMPHO PCT %  --   --   --  15   MONOS PCT %  --  7  --   --    MONO PCT %  --   --   --  11   EOS PCT %  --  2  --  2    < > = values in this interval not displayed  Results from last 7 days   Lab Units 09/11/22 0428 09/08/22  0629 09/07/22  0536   SODIUM mmol/L 132*   < > 131*   POTASSIUM mmol/L 4 0   < > 4 3   CHLORIDE mmol/L 97   < > 99   CO2 mmol/L 28   < > 22   BUN mg/dL 22   < > 25   CREATININE mg/dL 0 93   < > 1 15   ANION GAP mmol/L 7   < > 10   CALCIUM mg/dL 8 4   < > 9 0   ALBUMIN g/dL  --   --  3 1*   TOTAL BILIRUBIN mg/dL  --   --  1 19*   ALK PHOS U/L  --   --  89   ALT U/L  --   --  40   AST U/L  --   --  35   GLUCOSE RANDOM mg/dL 176*   < > 287*    < > = values in this interval not displayed           Results from last 7 days   Lab Units 09/11/22  1115 09/11/22  0709 09/10/22  2133 09/10/22  1548 09/10/22  1138 09/10/22  0703 09/09/22  2019 09/09/22  1652 09/09/22  1101 09/09/22  0702 09/08/22  2045 09/08/22  1557   POC GLUCOSE mg/dl 163* 176* 145* 126 152* 177* 181* 145* 170* 186* 159* 180*         Results from last 7 days   Lab Units 09/10/22  0645 09/08/22  0629 09/07/22  2131 09/07/22  0536 09/06/22  2236 09/06/22  1641   LACTIC ACID mmol/L  --   --  1 1  --  1 8  -- PROCALCITONIN ng/ml 1 22* 1 43*  --  1 91*  --  2 75*       Lines/Drains:  Invasive Devices  Report    Peripheral Intravenous Line  Duration           Peripheral IV 09/10/22 Right Antecubital 1 day                    Imaging: No pertinent imaging reviewed  Recent Cultures (last 7 days):   Results from last 7 days   Lab Units 09/06/22  1641 09/06/22  1639   BLOOD CULTURE  No Growth After 4 Days  No Growth After 4 Days         Last 24 Hours Medication List:   Current Facility-Administered Medications   Medication Dose Route Frequency Provider Last Rate    acetaminophen  650 mg Oral Q6H PRN Graydon Downy, CRNP      albuterol  1 puff Inhalation Q6H PRN Garner Livers, CRNP      aluminum-magnesium hydroxide-simethicone  30 mL Oral Q6H PRN Justo Rings Dumont, CRNP      benzonatate  100 mg Oral TID PRN Garner Livers, CRNP      butalbital-acetaminophen-caffeine  1 tablet Oral Q4H PRN Graydon Downy, CRNP      cefepime  2,000 mg Intravenous Q8H Shelbie Aldea, DO 2,000 mg (09/11/22 1249)    docusate sodium  100 mg Oral BID Justo Rings Dumont, CRNP      enoxaparin  60 mg Subcutaneous BID Justo Rings Dumont, CRNP      ezetimibe  10 mg Oral Daily Justo Rings Dumont, [address]      furosemide  20 mg Oral Daily With [facility], CRNP      furosemide  40 mg Oral Daily Justo Rings Dumont, [address]      gabapentin  400 mg Oral BID Justo Rings Dumont, [address]      guaiFENesin  600 mg Oral Q12H [address],  [address]      HYDROmorphone  0 5 mg Intravenous Q3H PRN Ligia Alonzo MD      hydrOXYzine HCL  25 mg Oral Q6H PRN Martinez Vivas PA-C      insulin glargine  44 Units Subcutaneous HS Justo Rings Dumont, [address]      insulin lispro  18 Units Subcutaneous TID With Meals Justojorge Masters, ANN      insulin lispro  2-12 Units Subcutaneous TID AC Daja Mac Dumont, [address]      insulin lispro  2-12 Units Subcutaneous HS Garner Livers, [address] levothyroxine  25 mcg Oral Early Morning Anibal Masters, ANN      losartan  [facility] mg Oral Daily [facility], [address]      naloxone  0 04 mg Intravenous Q1MIN PRN [facility], [address]      nystatin   Topical BID PRN [facility], [address]      nystatin  1 application Topical BID [facility], [address]      ondansetron  4 mg Intravenous Q6H PRN [place], [address]      oxyCODONE  10 mg Oral Q4H PRN Sheri Menard MD      oxyCODONE  5 mg Oral Q4H PRN Sheri Menard MD      pantoprazole  40 mg Oral BID AC [facility] [place], [address]      polyethylene glycol  17 g Oral Daily PRN Alba Malone PA-C      psyllium  1 packet Oral HS Alba Malone PA-C      saccharomyces boulardii  250 mg Oral BID [facility], ANN      senna  1 tablet Oral Daily [place], [address]          Today, Patient Was Seen By: Alba Malone PA-C    **Please Note: This note may have been constructed using a voice recognition system  **

## 2022-09-11 NOTE — ASSESSMENT & PLAN NOTE
· Initially thought to be pseudohyponatremia in setting of hyperglycemia  · Corrected Na 133 today  · Hyponatremia workup suggestive of SIADH:  · Urine sodium: 82    · Urine osmolality: 643  · Urine creatinine: 92 7  · Serum osmolality: 294  · Check a m  cortisol  · Per RN documented urine output may not be accurate, as patient's family has been discarding urine  Patient's volume status appears stable/improving    · 1 8->1 5L fluid restriction  · Consider Nephrology consult if hyponatremia does not improve  · Continue to monitor NA while admitted

## 2022-09-11 NOTE — PLAN OF CARE
Problem: Potential for Falls  Goal: Patient will remain free of falls  Description: INTERVENTIONS:  - Educate patient/family on patient safety including physical limitations  - Instruct patient to call for assistance with activity   - Consult OT/PT to assist with strengthening/mobility   - Keep Call bell within reach  - Keep bed low and locked with side rails adjusted as appropriate  - Keep care items and personal belongings within reach  - Initiate and maintain comfort rounds  - Make Fall Risk Sign visible to staff  - Offer Toileting every 2 Hours, in advance of need  - Initiate/Maintain alarm  - Obtain necessary fall risk management equipment:   - Apply yellow socks and bracelet for high fall risk patients  - Consider moving patient to room near nurses station  Outcome: Progressing     Problem: Nutrition/Hydration-ADULT  Goal: Nutrient/Hydration intake appropriate for improving, restoring or maintaining nutritional needs  Description: Monitor and assess patient's nutrition/hydration status for malnutrition  Collaborate with interdisciplinary team and initiate plan and interventions as ordered  Monitor patient's weight and dietary intake as ordered or per policy  Utilize nutrition screening tool and intervene as necessary  Determine patient's food preferences and provide high-protein, high-caloric foods as appropriate       INTERVENTIONS:  - Monitor oral intake, urinary output, labs, and treatment plans  - Assess nutrition and hydration status and recommend course of action  - Evaluate amount of meals eaten  - Assist patient with eating if necessary   - Allow adequate time for meals  - Recommend/ encourage appropriate diets, oral nutritional supplements, and vitamin/mineral supplements  - Order, calculate, and assess calorie counts as needed  - Recommend, monitor, and adjust tube feedings and TPN/PPN based on assessed needs  - Assess need for intravenous fluids  - Provide specific nutrition/hydration education as appropriate  - Include patient/family/caregiver in decisions related to nutrition  Outcome: Progressing     Problem: PAIN - ADULT  Goal: Verbalizes/displays adequate comfort level or baseline comfort level  Description: Interventions:  - Encourage patient to monitor pain and request assistance  - Assess pain using appropriate pain scale  - Administer analgesics based on type and severity of pain and evaluate response  - Implement non-pharmacological measures as appropriate and evaluate response  - Consider cultural and social influences on pain and pain management  - Notify physician/advanced practitioner if interventions unsuccessful or patient reports new pain  Outcome: Progressing     Problem: INFECTION - ADULT  Goal: Absence or prevention of progression during hospitalization  Description: INTERVENTIONS:  - Assess and monitor for signs and symptoms of infection  - Monitor lab/diagnostic results  - Monitor all insertion sites, i e  indwelling lines, tubes, and drains  - Monitor endotracheal if appropriate and nasal secretions for changes in amount and color  - [place] appropriate cooling/warming therapies per order  - Administer medications as ordered  - Instruct and encourage patient and family to use good hand hygiene technique  - Identify and instruct in appropriate isolation precautions for identified infection/condition  Outcome: Progressing     Problem: SAFETY ADULT  Goal: Patient will remain free of falls  Description: INTERVENTIONS:  - Educate patient/family on patient safety including physical limitations  - Instruct patient to call for assistance with activity   - Consult OT/PT to assist with strengthening/mobility   - Keep Call bell within reach  - Keep bed low and locked with side rails adjusted as appropriate  - Keep care items and personal belongings within reach  - Initiate and maintain comfort rounds  - Make Fall Risk Sign visible to staff  - Offer Toileting every 2 Hours, in advance of need  - Initiate/Maintain alarm  - Obtain necessary fall risk management equipment:   - Apply yellow socks and bracelet for high fall risk patients  - Consider moving patient to room near nurses station  Outcome: Progressing  Goal: Maintain or return to baseline ADL function  Description: INTERVENTIONS:  -  Assess patient's ability to carry out ADLs; assess patient's baseline for ADL function and identify physical deficits which impact ability to perform ADLs (bathing, care of mouth/teeth, toileting, grooming, dressing, etc )  - Assess/evaluate cause of self-care deficits   - Assess range of motion  - Assess patient's mobility; develop plan if impaired  - Assess patient's need for assistive devices and provide as appropriate  - Encourage maximum independence but intervene and supervise when necessary  - Involve family in performance of ADLs  - Assess for home care needs following discharge   - Consider OT consult to assist with ADL evaluation and planning for discharge  - Provide patient education as appropriate  Outcome: Progressing  Goal: Maintains/Returns to pre admission functional level  Description: INTERVENTIONS:  - Perform BMAT or MOVE assessment daily    - Set and communicate daily mobility goal to care team and patient/family/caregiver  - Collaborate with rehabilitation services on mobility goals if consulted  - Perform Range of Motion 4 times a day  - Reposition patient every 2 hours    - Out of bed for toileting  - Record patient progress and toleration of activity level   Outcome: Progressing     Problem: DISCHARGE PLANNING  Goal: Discharge to home or other facility with appropriate resources  Description: INTERVENTIONS:  - Identify barriers to discharge w/patient and caregiver  - Arrange for needed discharge resources and transportation as appropriate  - Identify discharge learning needs (meds, wound care, etc )  - Arrange for interpretive services to assist at discharge as needed  - Refer to Case Management Department for coordinating discharge planning if the patient needs post-hospital services based on physician/advanced practitioner order or complex needs related to functional status, cognitive ability, or social support system  Outcome: Progressing     Problem: Knowledge Deficit  Goal: Patient/family/caregiver demonstrates understanding of disease process, treatment plan, medications, and discharge instructions  Description: Complete learning assessment and assess knowledge base    Interventions:  - Provide teaching at level of understanding  - Provide teaching via preferred learning methods  Outcome: Progressing     Problem: Prexisting or High Potential for Compromised Skin Integrity  Goal: Skin integrity is maintained or improved  Description: INTERVENTIONS:  - Identify patients at risk for skin breakdown  - Assess and monitor skin integrity  - Assess and monitor nutrition and hydration status  - Monitor labs   - Assess for incontinence   - Turn and reposition patient  - Assist with mobility/ambulation  - Relieve pressure over bony prominences  - Avoid friction and shearing  - Provide appropriate hygiene as needed including keeping skin clean and dry  - Evaluate need for skin moisturizer/barrier cream  - Collaborate with interdisciplinary team   - Patient/family teaching  - Consider wound care consult   Outcome: Progressing     Problem: MOBILITY - ADULT  Goal: Maintain or return to baseline ADL function  Description: INTERVENTIONS:  -  Assess patient's ability to carry out ADLs; assess patient's baseline for ADL function and identify physical deficits which impact ability to perform ADLs (bathing, care of mouth/teeth, toileting, grooming, dressing, etc )  - Assess/evaluate cause of self-care deficits   - Assess range of motion  - Assess patient's mobility; develop plan if impaired  - Assess patient's need for assistive devices and provide as appropriate  - Encourage maximum independence but intervene and supervise when necessary  - Involve family in performance of ADLs  - Assess for home care needs following discharge   - Consider OT consult to assist with ADL evaluation and planning for discharge  - Provide patient education as appropriate  Outcome: Progressing  Goal: Maintains/Returns to pre admission functional level  Description: INTERVENTIONS:  - Perform BMAT or MOVE assessment daily    - Set and communicate daily mobility goal to care team and patient/family/caregiver  - Collaborate with rehabilitation services on mobility goals if consulted  - Perform Range of Motion 4 times a day  - Reposition patient every 2 hours    - Dangle patient 3 times a day  - Stand patient 3 times a day  - Ambulate patient 3 times a day  - Out of bed to chair 3 times a day   - Out of bed for meals 3 times a day  - Out of bed for toileting  - Record patient progress and toleration of activity level   Outcome: Progressing

## 2022-09-11 NOTE — ASSESSMENT & PLAN NOTE
· POA as evidenced by leukocytosis/tachycardia  Patient febrile since 09/07  Intermittent low-grade fevers persist but improving, WBCs trending up  · Lactic acid: normal x2  · Procalcitonin: Peaked at 2 75 but now trending down, at 1 22 today    · BC x2: NGTD (4 days)  · C/w IV cefepime (D4)  · ID Consulted:  · C/w IV cefepime, once clinically improved with resolution of fevers, consider transition to oral Levaquin  · Follow-up culture results and adjust accordingly  · Continue wound care, follow-up with of edema clinic outpatient for pressure injuries of B/L LE  · Frequent leg elevation/agressive edema control

## 2022-09-12 ENCOUNTER — TELEPHONE (OUTPATIENT)
Dept: LAB | Facility: HOSPITAL | Age: 57
End: 2022-09-12

## 2022-09-12 VITALS
DIASTOLIC BLOOD PRESSURE: 91 MMHG | TEMPERATURE: 99 F | BODY MASS INDEX: 53.78 KG/M2 | HEART RATE: 95 BPM | RESPIRATION RATE: 20 BRPM | OXYGEN SATURATION: 94 % | HEIGHT: 64 IN | SYSTOLIC BLOOD PRESSURE: 129 MMHG | WEIGHT: 315 LBS

## 2022-09-12 PROBLEM — G89.29 CHRONIC PAIN: Status: ACTIVE | Noted: 2022-09-12

## 2022-09-12 LAB
ANION GAP SERPL CALCULATED.3IONS-SCNC: 7 MMOL/L (ref 4–13)
BASOPHILS # BLD MANUAL: 0.16 THOUSAND/UL (ref 0–0.1)
BASOPHILS NFR MAR MANUAL: 1 % (ref 0–1)
BUN SERPL-MCNC: 22 MG/DL (ref 5–25)
CALCIUM SERPL-MCNC: 8.3 MG/DL (ref 8.4–10.2)
CHLORIDE SERPL-SCNC: 98 MMOL/L (ref 96–108)
CO2 SERPL-SCNC: 27 MMOL/L (ref 21–32)
CORTIS AM PEAK SERPL-MCNC: 19.9 UG/DL (ref 4.2–22.4)
CREAT SERPL-MCNC: 0.89 MG/DL (ref 0.6–1.3)
EOSINOPHIL # BLD MANUAL: 0.65 THOUSAND/UL (ref 0–0.4)
EOSINOPHIL NFR BLD MANUAL: 4 % (ref 0–6)
ERYTHROCYTE [DISTWIDTH] IN BLOOD BY AUTOMATED COUNT: 14.9 % (ref 11.6–15.1)
GFR SERPL CREATININE-BSD FRML MDRD: 96 ML/MIN/1.73SQ M
GLUCOSE SERPL-MCNC: 154 MG/DL (ref 65–140)
GLUCOSE SERPL-MCNC: 206 MG/DL (ref 65–140)
GLUCOSE SERPL-MCNC: 210 MG/DL (ref 65–140)
HCT VFR BLD AUTO: 36.7 % (ref 36.5–49.3)
HGB BLD-MCNC: 12.2 G/DL (ref 12–17)
LYMPHOCYTES # BLD AUTO: 14 % (ref 14–44)
LYMPHOCYTES # BLD AUTO: 2.26 THOUSAND/UL (ref 0.6–4.47)
MAGNESIUM SERPL-MCNC: 2.1 MG/DL (ref 1.9–2.7)
MCH RBC QN AUTO: 30.3 PG (ref 26.8–34.3)
MCHC RBC AUTO-ENTMCNC: 33.2 G/DL (ref 31.4–37.4)
MCV RBC AUTO: 91 FL (ref 82–98)
METAMYELOCYTES NFR BLD MANUAL: 1 % (ref 0–1)
MONOCYTES # BLD AUTO: 0.65 THOUSAND/UL (ref 0–1.22)
MONOCYTES NFR BLD: 4 % (ref 4–12)
NEUTROPHILS # BLD MANUAL: 12.28 THOUSAND/UL (ref 1.85–7.62)
NEUTS BAND NFR BLD MANUAL: 5 % (ref 0–8)
NEUTS SEG NFR BLD AUTO: 71 % (ref 43–75)
PLATELET # BLD AUTO: 267 THOUSANDS/UL (ref 149–390)
PLATELET BLD QL SMEAR: ADEQUATE
PMV BLD AUTO: 9.4 FL (ref 8.9–12.7)
POTASSIUM SERPL-SCNC: 4.1 MMOL/L (ref 3.5–5.3)
PROCALCITONIN SERPL-MCNC: 0.98 NG/ML
RBC # BLD AUTO: 4.03 MILLION/UL (ref 3.88–5.62)
RBC MORPH BLD: NORMAL
SODIUM SERPL-SCNC: 132 MMOL/L (ref 135–147)
WBC # BLD AUTO: 16.16 THOUSAND/UL (ref 4.31–10.16)

## 2022-09-12 PROCEDURE — 84145 PROCALCITONIN (PCT): CPT | Performed by: PHYSICIAN ASSISTANT

## 2022-09-12 PROCEDURE — 82533 TOTAL CORTISOL: CPT | Performed by: PHYSICIAN ASSISTANT

## 2022-09-12 PROCEDURE — 82948 REAGENT STRIP/BLOOD GLUCOSE: CPT

## 2022-09-12 PROCEDURE — 85007 BL SMEAR W/DIFF WBC COUNT: CPT | Performed by: PHYSICIAN ASSISTANT

## 2022-09-12 PROCEDURE — 85027 COMPLETE CBC AUTOMATED: CPT | Performed by: PHYSICIAN ASSISTANT

## 2022-09-12 PROCEDURE — 99233 SBSQ HOSP IP/OBS HIGH 50: CPT | Performed by: INTERNAL MEDICINE

## 2022-09-12 PROCEDURE — 83735 ASSAY OF MAGNESIUM: CPT | Performed by: PHYSICIAN ASSISTANT

## 2022-09-12 PROCEDURE — 80048 BASIC METABOLIC PNL TOTAL CA: CPT | Performed by: PHYSICIAN ASSISTANT

## 2022-09-12 PROCEDURE — 99239 HOSP IP/OBS DSCHRG MGMT >30: CPT | Performed by: PHYSICIAN ASSISTANT

## 2022-09-12 RX ORDER — OXYCODONE HYDROCHLORIDE 5 MG/1
TABLET ORAL
Qty: 15 TABLET | Refills: 0 | Status: ON HOLD | OUTPATIENT
Start: 2022-09-12 | End: 2022-09-27 | Stop reason: SDUPTHER

## 2022-09-12 RX ORDER — BENZONATATE 100 MG/1
100 CAPSULE ORAL 3 TIMES DAILY PRN
Qty: 20 CAPSULE | Refills: 0 | Status: SHIPPED | OUTPATIENT
Start: 2022-09-12

## 2022-09-12 RX ORDER — LEVOFLOXACIN 750 MG/1
750 TABLET ORAL EVERY 24 HOURS
Qty: 4 TABLET | Refills: 0 | Status: SHIPPED | OUTPATIENT
Start: 2022-09-12 | End: 2022-09-16

## 2022-09-12 RX ADMIN — INSULIN LISPRO 18 UNITS: 100 INJECTION, SOLUTION INTRAVENOUS; SUBCUTANEOUS at 09:07

## 2022-09-12 RX ADMIN — FUROSEMIDE 40 MG: 40 TABLET ORAL at 09:04

## 2022-09-12 RX ADMIN — HYDROXYZINE HYDROCHLORIDE 25 MG: 25 TABLET ORAL at 09:03

## 2022-09-12 RX ADMIN — Medication 250 MG: at 09:04

## 2022-09-12 RX ADMIN — OXYCODONE HYDROCHLORIDE 10 MG: 10 TABLET ORAL at 04:38

## 2022-09-12 RX ADMIN — EZETIMIBE 10 MG: 10 TABLET ORAL at 09:04

## 2022-09-12 RX ADMIN — PANTOPRAZOLE SODIUM 40 MG: 40 TABLET, DELAYED RELEASE ORAL at 09:04

## 2022-09-12 RX ADMIN — INSULIN LISPRO 4 UNITS: 100 INJECTION, SOLUTION INTRAVENOUS; SUBCUTANEOUS at 09:06

## 2022-09-12 RX ADMIN — OXYCODONE HYDROCHLORIDE 10 MG: 10 TABLET ORAL at 09:04

## 2022-09-12 RX ADMIN — CEFEPIME HYDROCHLORIDE 2000 MG: 2 INJECTION, SOLUTION INTRAVENOUS at 04:49

## 2022-09-12 RX ADMIN — LOSARTAN POTASSIUM 25 MG: 25 TABLET, FILM COATED ORAL at 09:04

## 2022-09-12 RX ADMIN — LEVOTHYROXINE SODIUM 25 MCG: 25 TABLET ORAL at 05:00

## 2022-09-12 RX ADMIN — NYSTATIN 1 APPLICATION: 100000 POWDER TOPICAL at 09:14

## 2022-09-12 RX ADMIN — GUAIFENESIN 600 MG: 600 TABLET ORAL at 09:04

## 2022-09-12 RX ADMIN — HYDROXYZINE HYDROCHLORIDE 25 MG: 25 TABLET ORAL at 00:11

## 2022-09-12 RX ADMIN — GABAPENTIN 400 MG: 400 CAPSULE ORAL at 09:04

## 2022-09-12 RX ADMIN — ENOXAPARIN SODIUM 60 MG: 60 INJECTION SUBCUTANEOUS at 09:05

## 2022-09-12 NOTE — ASSESSMENT & PLAN NOTE
· POA as evidenced by leukocytosis/tachycardia  Patient febrile since 09/07  Fevers resolved, WBCs trended down  · Lactic acid: normal x2    · Procalcitonin: Peaked at 2 75 but now trending down  · BC x2: NGTD (4 days)  · D6 IV cefepime  · ID Consulted:  · Transition to Levaquin  mg Q 24 hours to complete total 10 day antibiotic course through till 9/16  · Follow-up culture results and adjust accordingly  · Continue wound care, follow-up with of edema clinic outpatient for pressure injuries of B/L LE  · Frequent leg elevation/agressive edema control

## 2022-09-12 NOTE — OCCUPATIONAL THERAPY NOTE
Occupational Therapy Cancellation Note     Patient Name: Maria D Garcia MCUEDValarieG Date: 9/12/2022 09/12/22 1126   OT Last Visit   OT Visit Date 09/12/22   Note Type   Note type Cancelled Session   Cancel Reasons Refusal   Additional Comments OT evaluation 2nd attempt, pt continuing ti refuse participation in therapy, states "I need this cellulitis to heal first, then I'll be fine"  Pt + caregiver education for importance of OOB mobility and importance of safe DCP   Pt continuing to refuse, requesting rest  Agreeable to therapy f/u next day   Va Lion, OT

## 2022-09-12 NOTE — DISCHARGE INSTR - AVS FIRST PAGE
You were treated and evaluated for left thigh cellulitis  Imaging of your leg did not show any signs of abscess or blood clots  We treated you with IV antibiotics during admission, infection has improved and you can now transition to oral antibiotics  Infectious disease was following, recommend close outpatient follow-up with wound care  You can continue with 4 more days with oral Levaquin for antibiotics  Discharged with short course of oxycodone as needed for severe pain only, if you have mild or moderate pain recommend using Tylenol and/or ibuprofen  You were also evaluated for hyponatremia or low sodium levels  As discussed, this is most likely due to hormonal imbalance from SIADH or syndrome of inappropriate antidiuretic hormone secretion  Recommend obtaining repeat blood work (BMP) in 1 week to monitor your electrolytes  Will give referral to follow-up with Nephrology for further evaluation, we obtained labs inpatient that are still in process  Recommend continuing with 1 5 L fluid restriction diet to maintain stable sodium levels

## 2022-09-12 NOTE — ASSESSMENT & PLAN NOTE
Presented w/ redness and swelling of the left thigh since Friday 9/2  Reports recurrent cellulitis due to lymphedema and chronic fungal infections  Started on Keflex by PCP outpatient w/o significant improvement and actually worsening of symptoms  Presented to the ED with worsening pain and subjective low-grade fevers at home  · CT of left lower extremity notable for soft tissue cellulitis w/o evidence of active drainage/abscess  · B/L LE Venous Duplex: No evidence of acute or chronic DVT's  · C/w Antibiotic therapy  Initially on IV Ancef, Transitioned to IV Cefepime per ID  · Patient with improving erythema and tenderness of left thigh, clinically stable to transition to oral antibiotics    · Continue supportive care, Pain Management  · Will appreciate wound care input  · Will appreciate Infectious Disease input

## 2022-09-12 NOTE — PLAN OF CARE
Problem: PAIN - ADULT  Goal: Verbalizes/displays adequate comfort level or baseline comfort level  Description: Interventions:  - Encourage patient to monitor pain and request assistance  - Assess pain using appropriate pain scale  - Administer analgesics based on type and severity of pain and evaluate response  - Implement non-pharmacological measures as appropriate and evaluate response  - Consider cultural and social influences on pain and pain management  - Notify physician/advanced practitioner if interventions unsuccessful or patient reports new pain  Outcome: Progressing     Problem: INFECTION - ADULT  Goal: Absence or prevention of progression during hospitalization  Description: INTERVENTIONS:  - Assess and monitor for signs and symptoms of infection  - Monitor lab/diagnostic results  - Monitor all insertion sites, i e  indwelling lines, tubes, and drains  - Monitor endotracheal if appropriate and nasal secretions for changes in amount and color  - [place] appropriate cooling/warming therapies per order  - Administer medications as ordered  - Instruct and encourage patient and family to use good hand hygiene technique  - Identify and instruct in appropriate isolation precautions for identified infection/condition  Outcome: Progressing  Goal: Absence of fever/infection during neutropenic period  Description: INTERVENTIONS:  - Monitor WBC    Outcome: Progressing     Problem: Prexisting or High Potential for Compromised Skin Integrity  Goal: Skin integrity is maintained or improved  Description: INTERVENTIONS:  - Identify patients at risk for skin breakdown  - Assess and monitor skin integrity  - Assess and monitor nutrition and hydration status  - Monitor labs   - Assess for incontinence   - Turn and reposition patient  - Assist with mobility/ambulation  - Relieve pressure over bony prominences  - Avoid friction and shearing  - Provide appropriate hygiene as needed including keeping skin clean and dry  - Evaluate need for skin moisturizer/barrier cream  - Collaborate with interdisciplinary team   - Patient/family teaching  - Consider wound care consult   Outcome: Progressing

## 2022-09-12 NOTE — CASE MANAGEMENT
Case Management Discharge Planning Note    Patient name Ayden Martinez  Location /-01 MRN [id number]  : 1965 Date 2022       Current Admission Date: 2022  Current Admission Diagnosis:Cellulitis of left lower extremity   Patient Active Problem List    Diagnosis Date Noted    Chronic pain 2022    GERD (gastroesophageal reflux disease) 2022    Pressure injury of left leg     Metabolic syndrome     Low testosterone in male 2022    Hypercalcemia 2022    Hypothyroidism 2022    Pressure injury of right leg 2022    Ulcer of left lower extremity, limited to breakdown of skin ([facility] [address] ) 2022    Onychomycosis 2021    Migraine headache 2021    Elevated serum creatinine 2021    Morbid obesity 2021    Fungal infection 2021    SOB (shortness of breath) 2021    DAHIANA (obstructive sleep apnea)     Elevated CO2 level 2021    Abnormal thyroid function test 2021    Bilateral leg edema 2020    Erectile dysfunction 2020    Cellulitis of left lower extremity     Insomnia 2019    Sepsis on admission 10/22/2019    Diabetic neuropathy ([facility] [address] ) 10/08/2019    Gluten intolerance 2019    Hyponatremia 2019    Gout 2018    Essential hypertension 2018    Venous insufficiency 12/10/2015    Hyperlipidemia 10/09/2013    Insulin-dependent diabetes mellitus with neuropathy 2013    Psoriasis 2013      LOS (days): 4  Geometric Mean LOS (GMLOS) (days): 4 80  Days to GMLOS:0 9     OBJECTIVE:  Risk of Unplanned Readmission Score: 13 48         Current admission status: Inpatient   Preferred Pharmacy:   [facility] [address] [id number]  Phone: [phone] Fax: [phone]    Primary Care Provider: Radha Alejandra MD    Primary Insurance: Kim Rose MA [facility]  Secondary Insurance:     DISCHARGE DETAILS:    Discharge planning discussed with[de-identified] patient and pt's spouse  Freedom of Choice: Yes  Comments - Freedom of Choice: pt choice to resume with [facility] for SN of woundcare needs - [facility] in place for [facility] upon d/c  CM contacted family/caregiver?: Yes             Contacts  Patient Contacts: Kenny Fox  Relationship to Patient[de-identified] Family  Contact Method: In Person  Reason/Outcome: Continuity of Care, Emergency Contact, Discharge [address]         Is the patient interested in [facility] at discharge?: Yes  Via Annia Dominguez requested[de-identified] [address] Name[de-identified] [facility] Provider[de-identified] PCP  Home Health Services Needed[de-identified] Wound/Ostomy Care  Homebound Criteria Met[de-identified] Requires the Assistance of Another Person for Safe Ambulation or to Leave the Home, Uses an Assist Device (i e  cane, walker, etc)  Supporting Clincal Findings[de-identified] Limited Endurance, Fatigues Easliy in [employer]         Other Referral/Resources/Interventions Provided:  Interventions: [facility]  Referral Comments: [facility] referral for SN sent to [facility] via ross, [facility] confirmed for [facility] of woundcare

## 2022-09-12 NOTE — PLAN OF CARE
Problem: Potential for Falls  Goal: Patient will remain free of falls  Description: INTERVENTIONS:  - Educate patient/family on patient safety including physical limitations  - Instruct patient to call for assistance with activity   - Consult OT/PT to assist with strengthening/mobility   - Keep Call bell within reach  - Keep bed low and locked with side rails adjusted as appropriate  - Keep care items and personal belongings within reach  - Initiate and maintain comfort rounds  - Make Fall Risk Sign visible to staff  - Offer Toileting every 2 Hours, in advance of need  - Initiate/Maintain alarm  - Obtain necessary fall risk management equipment:   - Apply yellow socks and bracelet for high fall risk patients  - Consider moving patient to room near nurses station  Outcome: Progressing     Problem: PAIN - ADULT  Goal: Verbalizes/displays adequate comfort level or baseline comfort level  Description: Interventions:  - Encourage patient to monitor pain and request assistance  - Assess pain using appropriate pain scale  - Administer analgesics based on type and severity of pain and evaluate response  - Implement non-pharmacological measures as appropriate and evaluate response  - Consider cultural and social influences on pain and pain management  - Notify physician/advanced practitioner if interventions unsuccessful or patient reports new pain  Outcome: Progressing     Problem: INFECTION - ADULT  Goal: Absence or prevention of progression during hospitalization  Description: INTERVENTIONS:  - Assess and monitor for signs and symptoms of infection  - Monitor lab/diagnostic results  - Monitor all insertion sites, i e  indwelling lines, tubes, and drains  - Monitor endotracheal if appropriate and nasal secretions for changes in amount and color  - [place] appropriate cooling/warming therapies per order  - Administer medications as ordered  - Instruct and encourage patient and family to use good hand hygiene technique  - Identify and instruct in appropriate isolation precautions for identified infection/condition  Outcome: Progressing     Problem: SAFETY ADULT  Goal: Patient will remain free of falls  Description: INTERVENTIONS:  - Educate patient/family on patient safety including physical limitations  - Instruct patient to call for assistance with activity   - Consult OT/PT to assist with strengthening/mobility   - Keep Call bell within reach  - Keep bed low and locked with side rails adjusted as appropriate  - Keep care items and personal belongings within reach  - Initiate and maintain comfort rounds  - Make Fall Risk Sign visible to staff  - Offer Toileting every 2 Hours, in advance of need  - Initiate/Maintain alarm  - Obtain necessary fall risk management equipment:   - Apply yellow socks and bracelet for high fall risk patients  - Consider moving patient to room near nurses station  Outcome: Progressing  Goal: Maintain or return to baseline ADL function  Description: INTERVENTIONS:  -  Assess patient's ability to carry out ADLs; assess patient's baseline for ADL function and identify physical deficits which impact ability to perform ADLs (bathing, care of mouth/teeth, toileting, grooming, dressing, etc )  - Assess/evaluate cause of self-care deficits   - Assess range of motion  - Assess patient's mobility; develop plan if impaired  - Assess patient's need for assistive devices and provide as appropriate  - Encourage maximum independence but intervene and supervise when necessary  - Involve family in performance of ADLs  - Assess for home care needs following discharge   - Consider OT consult to assist with ADL evaluation and planning for discharge  - Provide patient education as appropriate  Outcome: Progressing  Goal: Maintains/Returns to pre admission functional level  Description: INTERVENTIONS:  - Perform BMAT or MOVE assessment daily    - Set and communicate daily mobility goal to care team and patient/family/caregiver  - Collaborate with rehabilitation services on mobility goals if consulted  - Perform Range of Motion 4 times a day  - Reposition patient every 2 hours    - Dangle patient 3 times a day  - Stand patient 3 times a day  - Ambulate patient 3 times a day  - Out of bed to chair 3 times a day   - Out of bed for meals 3 times a day  - Out of bed for toileting  - Record patient progress and toleration of activity level   Outcome: Progressing     Problem: Prexisting or High Potential for Compromised Skin Integrity  Goal: Skin integrity is maintained or improved  Description: INTERVENTIONS:  - Identify patients at risk for skin breakdown  - Assess and monitor skin integrity  - Assess and monitor nutrition and hydration status  - Monitor labs   - Assess for incontinence   - Turn and reposition patient  - Assist with mobility/ambulation  - Relieve pressure over bony prominences  - Avoid friction and shearing  - Provide appropriate hygiene as needed including keeping skin clean and dry  - Evaluate need for skin moisturizer/barrier cream  - Collaborate with interdisciplinary team   - Patient/family teaching  - Consider wound care consult   Outcome: Progressing     Problem: MOBILITY - ADULT  Goal: Maintain or return to baseline ADL function  Description: INTERVENTIONS:  -  Assess patient's ability to carry out ADLs; assess patient's baseline for ADL function and identify physical deficits which impact ability to perform ADLs (bathing, care of mouth/teeth, toileting, grooming, dressing, etc )  - Assess/evaluate cause of self-care deficits   - Assess range of motion  - Assess patient's mobility; develop plan if impaired  - Assess patient's need for assistive devices and provide as appropriate  - Encourage maximum independence but intervene and supervise when necessary  - Involve family in performance of ADLs  - Assess for home care needs following discharge   - Consider OT consult to assist with ADL evaluation and planning for discharge  - Provide patient education as appropriate  Outcome: Progressing  Goal: Maintains/Returns to pre admission functional level  Description: INTERVENTIONS:  - Perform BMAT or MOVE assessment daily    - Set and communicate daily mobility goal to care team and patient/family/caregiver     - Collaborate with rehabilitation services on mobility goals if consulted  - Out of bed for toileting  - Record patient progress and toleration of activity level   Outcome: Progressing

## 2022-09-12 NOTE — DISCHARGE SUMMARY
[facility] [address]  Discharge- [place] De[place] 1965, 56 y o  male MRN: [id number]  Unit/Bed#: -uLke Encounter: [id number]  Primary Care Provider: Rodrigo Khan MD   Date and time admitted to hospital: 9/6/2022  3:58 PM    * Cellulitis of left lower extremity  Assessment & Plan  Presented w/ redness and swelling of the left thigh since Friday 9/2  Reports recurrent cellulitis due to lymphedema and chronic fungal infections  Started on Keflex by PCP outpatient w/o significant improvement and actually worsening of symptoms  Presented to the ED with worsening pain and subjective low-grade fevers at home  · CT of left lower extremity notable for soft tissue cellulitis w/o evidence of active drainage/abscess  · B/L LE Venous Duplex: No evidence of acute or chronic DVT's  · C/w Antibiotic therapy  Initially on IV Ancef, Transitioned to IV Cefepime per ID  · Patient with improving erythema and tenderness of left thigh, clinically stable to transition to oral antibiotics  · Continue supportive care, Pain Management  · Will appreciate wound care input  · Will appreciate Infectious Disease input    Sepsis on admission  Assessment & Plan  · POA as evidenced by leukocytosis/tachycardia  Patient febrile since 09/07  Fevers resolved, WBCs trended down  · Lactic acid: normal x2  · Procalcitonin: Peaked at 2 75 but now trending down  · BC x2: NGTD (4 days)  · D6 IV cefepime  · ID Consulted:  · Transition to Levaquin  mg Q 24 hours to complete total 10 day antibiotic course through till 9/16  · Follow-up culture results and adjust accordingly  · Continue wound care, follow-up with of edema clinic outpatient for pressure injuries of B/L LE  · Frequent leg elevation/agressive edema control    Hyponatremia  Assessment & Plan  · Initially thought to be pseudohyponatremia in setting of hyperglycemia  · Corrected Na 135 today, stable   Hyponatremia workup suggestive of SIADH:  · Urine sodium: 82 · Urine osmolality: 643  · Urine creatinine: 92 7  · Serum osmolality: 294  · AM cortisol pending  · Per RN documented urine output may not be accurate, as patient's family has been discarding urine  Patient's volume status appears stable/improving  · C/w 1 5L fluid restriction  · Will give referral to follow-up with Nephrology outpatient  · Obtain BMP in 1 week to monitor electrolytes  · Continue to monitor NA while admitted    GERD (gastroesophageal reflux disease)  Assessment & Plan  · Continue PPI regimen  · Encourage weight loss/ lifestyle modifications    Hypothyroidism  Assessment & Plan  · Continue Home Medication: Synthroid    Morbid obesity  Assessment & Plan  · BMI of 85 82  · Encouraged Lifestyle/diet modifications    Fungal infection  Assessment & Plan  · Reports chronic fungal infection of the groin   · Continue Home Medication: Nystatin powder/Cream  · Daily hygiene counseling  · Wound care evaluation    SOB (shortness of breath)  Assessment & Plan  · Patient complaining of worsening SOB  Concern for PE, as patient now febrile, tachycardic with tachypnea  · Suspect D-dimer would likely be elevated in the setting of sepsis  · CTA chest: No PE or aortic dissection, no effusion, airspace disease or pneumothorax  · Suspect to be in setting of sepsis vs hypoventilation obesity syndrome with known DAHIANA hx   · Improving, patient remains stable on room air    · Initiate supplemental oxygen to maintain O2 sats >92%   · Continue to monitor respiratory status    DAHIANA (obstructive sleep apnea)  Assessment & Plan  · CPAP QHS  · Encourage weight loss    Essential hypertension  Assessment & Plan  · BP reviewed and remains stable  · Continue Home medication: Cozaar/Lasix    Hyperlipidemia  Assessment & Plan  · Continue Home Medication: Zetia    Insulin-dependent diabetes mellitus with neuropathy  Assessment & Plan  Lab Results   Component Value Date    HGBA1C 6 6 (H) 06/24/2022     · Hold oral hypoglycemics while hospitalized  · Continue basal/prandial insulin   · Continue with Accu-Cheks and SSI AC/HS  · Carbohydrate restricted diet  · Continue Gabapentin for neuropathy    Medical Problems             Resolved Problems  Date Reviewed: 9/12/2022   None               Discharging Physician / Practitioner: Pop Yoo PA-C  PCP: Deepa Silva MD  Admission Date:   Admission Orders (From admission, onward)     Ordered        09/08/22 1704  Inpatient Admission  Once            09/06/22 1923  Place in Observation  Once                      Discharge Date: 09/12/22    Consultations During Hospital Stay:  · Infectious disease  · PT/OT  · Wound care    Procedures Performed:   · None    Significant Findings / Test Results:   CTA chest pe study   Final Result by Kailyn Nguyễn MD (09/08 2151)      No pulmonary embolism or aortic dissection  No effusion, airspace disease, or pneumothorax  Questionable pericholecystic fluid  Recommend clinical correlation  Workstation performed: [id number]         VAS lower limb venous duplex study, unilateral/limited   Final Result by Jessica Mota DO (09/07 8021)      XR chest portable   Final Result by Marisol Saucedo MD (09/08 9187)      No acute cardiopulmonary disease  Workstation performed: [id number]         CT lower extremity wo contrast left   Final Result by Arnold Kinsey MD (09/07 2832)   Medial left thigh subcutaneous edema and skin thickening consistent with nonspecific cellulitis without drainable collection in this unenhanced study  Left inguinal adenopathy could be reactive  Fat-containing midline pelvic hernia  Concordant preliminary results were provided by virtual radiologic at [address] hours  Workstation performed: [id number]             Incidental Findings:   · None     Test Results Pending at Discharge (will require follow up):    · None     Outpatient Tests Requested:  · Follow-up with wound care outpatient  · Obtain BMP in 1 week to monitor electrolytes  · Given referral to follow up with Nephrology outpatient    Complications:  None    Reason for Admission:  Cellulitis of left lower extremity    Hospital Course:   Johnna Mike is a 56 y o  male patient, PMH recurrent cellulitis, chronic lower extremity lymphedema, morbid obesity, GERD, hypothyroidism, fungal infection, DAHIANA, HTN, DM, who originally presented to the hospital on 9/6/2022 due to cellulitis of left thigh  Patient presented with increased redness and swelling of left thigh, failed outpatient treatment with Keflex  On arrival to ED, patient met sepsis criteria, started on IV Ancef, CT left lower extremity did not show evidence of abscess  Infectious disease and wound care consulted  During admission, patient was transitioned to IV cefepime given prior wound cultures, had received 6 days of treatment with significant improvement in cellulitis  Patient was febrile throughout admission but had resolved, leukocytosis improved  Blood cultures remained negative at 5 days  Patient will transition to oral Levaquin to complete total of 10 day antibiotic course on discharge  Also discharged patient with short course of oxycodone as needed for severe pain, recommend close outpatient follow-up with wound care, given referral for pain management  Patient had declined PT/OT evaluation, stated he would not want to go to short-term rehab, would like to continue with already established HHS  Additionally, patient had hyponatremia during admission, initially thought to be pseudohyponatremia with hyperglycemia, although lab work suggestive of SIADH  Hyponatremia improved with 1 5 L fluid restriction, continue on discharge and obtain BMP in 1 week to monitor electrolytes  Will give referral to follow-up with Nephrology outpatient  Please see above list of diagnoses and related plan for additional information       Condition at Discharge: stable    Discharge Day Visit / Exam:   Subjective:  Patient is seen at bedside this a m , reports improvement in pain in left thigh, denies any other acute complaints  Discussed discharge plan with patient, stating that his only concern was pain management, informed patient that I will send him with short course of oxycodone as needed if severe pain persists  Vitals: Blood Pressure: 129/91 (09/12/22 0721)  Pulse: 95 (09/12/22 0721)  Temperature: 99 °F (37 2 °C) (09/12/22 0721)  Temp Source: Oral (09/12/22 0721)  Respirations: 20 (09/12/22 0721)  Height: 5' 4" (162 6 cm) (09/06/22 2040)  Weight - Scale: (!) 227 kg (499 lb 15 9 oz) (09/06/22 2040)  SpO2: 94 % (09/12/22 0721)  Exam:   Physical Exam  Constitutional:       General: He is not in acute distress  Appearance: He is obese  He is not ill-appearing, toxic-appearing or diaphoretic  Cardiovascular:      Rate and Rhythm: Normal rate and regular rhythm  Pulses: Normal pulses  Heart sounds: Normal heart sounds  Pulmonary:      Effort: Pulmonary effort is normal  No respiratory distress  Breath sounds: Normal breath sounds  Abdominal:      General: Bowel sounds are normal  There is no distension  Palpations: Abdomen is soft  Tenderness: There is no abdominal tenderness  Musculoskeletal:         General: No swelling or tenderness  Skin:     General: Skin is warm  Findings: Erythema present  Comments: Erythema on left thigh improving, still with mild edema and increase warmth to palpation, tenderness palpation much improved  Neurological:      General: No focal deficit present  Mental Status: He is alert  Psychiatric:         Mood and Affect: Mood normal          Behavior: Behavior normal          Discussion with Family: Updated  (friend) at bedside  Discharge instructions/Information to patient and family:   See after visit summary for information provided to patient and family        Provisions for Follow-Up Care:  See after visit summary for information related to follow-up care and any pertinent home health orders  Disposition:   Home with [facility] (Reminder: Complete face to face encounter)    Planned Readmission: None     Discharge Statement:  I spent 45 minutes discharging the patient  This time was spent on the day of discharge  I had direct contact with the patient on the day of discharge  Greater than 50% of the total time was spent examining patient, answering all patient questions, arranging and discussing plan of care with patient as well as directly providing post-discharge instructions  Additional time then spent on discharge activities  Discharge Medications:  See after visit summary for reconciled discharge medications provided to patient and/or family        **Please Note: This note may have been constructed using a voice recognition system**

## 2022-09-12 NOTE — PROGRESS NOTES
Progress Note - Infectious Disease   Tanesha La 56 y o  male MRN: [id number]  Unit/Bed#: -01 Encounter: [id number]      Impression/Plan:  1  Sepsis, POA   Tachycardia, leukocytosis   Secondary to #2   Consider bacteremia   Admission blood cultures are negative to date   Hemodynamics remain stable  Fever downtrending, WBC moderate  -continue antibiotics as below  -monitor temperature and hemodynamics  -serial exam  -supportive care     2  Left thigh cellulitis   With history of recurrent LE cellulitis   No underlying drainable collection on CT   Refractory to outpatient Keflex  No visible fluctuance or purulence on the thigh area   Patient does have wound on lower left leg with recent outpatient culture from 07/21/2022 which grew Pseudomonas, Proteus  Patient received 1 dose of cefazolin which was changed to cefepime   -continue IV cefepime 2g IV q 8 hours while inhouse  -upon discharge, can transition to Levaquin 750 mg PO q 24 hours to complete total 10 day antibiotic course through 9/16/22  -aggressive skin wicking at groin area and nystatin application BID  -frequent leg elevation/aggressive edema control  -serial leg exams     3  Chronic lower extremity lymphedema with venous stasis ulcerations   Risk factor for recurrent cellulitis   Follows with outpatient wound care  -wound care evaluation appreciated  -frequent leg elevation/aggressive edema control  -outpatient lymphedema clinic and wound care follow-up     4  DM2, with hyperglycemia and long-term insulin use   Risk factor for infection   Glycemic control as per Internal Medicine Service      5  Chronic candida intertrigo of the groin   Risk factor for cellulitis  -continue topical nystatin  -moisture control  -discussed in detail with staff      6  Severe obesity   With BMI of about 86   Strongly recommend weight loss measures/dietary changes      Antibiotics:  Cefepime 6     I discussed above plan with patient, roommate at bedside, RN EVE Chacon from Internal Medicine Service  I spent 35 minutes on the unit floor of which 20 minutes was in counseling/coordination of care as outlined in my note including coordination of outpatient antibiotics, skin care, wound care, and all of questions/concerns discussed      Subjective:  Patient's fever appears to be downtrending, no chills, sweats; no nausea, vomiting, diarrhea; no cough, shortness of breath; + left thigh leg pain  Migraine more prominent than leg concerns at present for patient  Appetite not great  No noted rash  Appears to be tolerating IV Cefepime    Per RN, patient has been refusing leg/groin skin/wound care    Objective:  Vitals:  Temp:  [97 5 °F (36 4 °C)-99 2 °F (37 3 °C)] 99 °F (37 2 °C)  HR:  [] 95  Resp:  [18-20] 20  BP: (123-153)/(72-99) 129/91  SpO2:  [94 %-97 %] 94 %  Temp (24hrs), Av 6 °F (37 °C), Min:97 5 °F (36 4 °C), Max:99 2 °F (37 3 °C)  Current: Temperature: 99 °F (37 2 °C)    Physical Exam:   General Appearance:  71-year-old chronically male, resting propped fairly comfortably in bed, on BiPAP in dark room with shades drawn, alert, interactive, nontoxic, no acute distress  Throat: Oropharynx moist without lesions  Lungs:   Decreased breath sounds fairly clear to auscultation bilaterally; no wheezes, rhonchi or rales; respirations unlabored with conversation   Heart:  RRR; no murmur   Abdomen:   Soft, non-tender, protuberant pannus, positive bowel sounds  Extremities: No clubbing, cyanosis, + Left thigh lymphedema greater right, erythema of Left thigh slowly fading, no fluctuance palpable   : No oneill, + moist maceration between groin/scrotal folds, bedpad soaked with urination, no SPT   Skin: No new rashes or lesions  IV site nontender  Dried blood stain on bed linen around left posterior calf location   Lower leg ace wrap compressions in place without active drainage evidence       Labs, Imaging, & Other studies:   All pertinent labs and imaging studies were personally reviewed  Results from last 7 days   Lab Units 09/12/22  0456 09/11/22  0428 09/10/22  1106   WBC Thousand/uL 16 16* 18 03* 16 02*   HEMOGLOBIN g/dL 12 2 11 8* 11 1*   PLATELETS Thousands/uL 267 232 232     Results from last 7 days   Lab Units 09/12/22  0456 09/11/22  0428 09/10/22  1106 09/08/22  0629 09/07/22  0536 09/06/22  1639   SODIUM mmol/L 132* 132* 134*   < > 131* 134*   POTASSIUM mmol/L 4 1 4 0 3 8   < > 4 3 3 7   CHLORIDE mmol/L 98 97 99   < > 99 101   CO2 mmol/L 27 28 26   < > 22 23   BUN mg/dL 22 22 21   < > 25 29*   CREATININE mg/dL 0 89 0 93 0 99   < > 1 15 1 28   EGFR ml/min/1 73sq m 96 92 85   < > 71 62   CALCIUM mg/dL 8 3* 8 4 8 1*   < > 9 0 9 2   AST U/L  --   --   --   --  35 39   ALT U/L  --   --   --   --  40 46   ALK PHOS U/L  --   --   --   --  89 74    < > = values in this interval not displayed  Results from last 7 days   Lab Units 09/07/22  1817 09/06/22  1641 09/06/22  1639   BLOOD CULTURE   --  No Growth After 5 Days  No Growth After 5 Days     MRSA CULTURE ONLY  No Methicillin Resistant Staphlyococcus aureus (MRSA) isolated  --   --      Results from last 7 days   Lab Units 09/12/22  0457 09/10/22  0645 09/08/22  0629 09/07/22  0536 09/06/22  1641   PROCALCITONIN ng/ml 0 98* 1 22* 1 43* 1 91* 2 75*                   10/21 QTc 408

## 2022-09-12 NOTE — ASSESSMENT & PLAN NOTE
· Initially thought to be pseudohyponatremia in setting of hyperglycemia  · Corrected Na 135 today, stable  Hyponatremia workup suggestive of SIADH:  · Urine sodium: 82    · Urine osmolality: 643  · Urine creatinine: 92 7  · Serum osmolality: 294  · AM cortisol pending  · Per RN documented urine output may not be accurate, as patient's family has been discarding urine  Patient's volume status appears stable/improving    · C/w 1 5L fluid restriction  · Will give referral to follow-up with Nephrology outpatient  · Obtain BMP in 1 week to monitor electrolytes  · Continue to monitor NA while admitted

## 2022-09-12 NOTE — PHYSICAL THERAPY NOTE
Physical Therapy Cancellation Note       09/12/22 1125   PT Last Visit   PT Visit Date 09/12/22   Note Type   Note type Cancelled Session   Cancel Reasons Refusal   Additional Comments PT evaluation second attempt  Pt continues to refuse participation in PT evaluation, stating "my leg needs to help up first, I just need rest " Despite therapist education and encouragement, pt continues to decline  Agreeable to therapist f/u next day       Yue Torre, PT, DPT   Available via [employer]  NP # [id number]  [place] License - [id number]  9/54/[id number]

## 2022-09-13 ENCOUNTER — TELEPHONE (OUTPATIENT)
Dept: NEPHROLOGY | Facility: CLINIC | Age: 57
End: 2022-09-13

## 2022-09-13 ENCOUNTER — HOME CARE VISIT (OUTPATIENT)
Dept: HOME HEALTH SERVICES | Facility: HOME HEALTHCARE | Age: 57
End: 2022-09-13
Payer: MEDICARE

## 2022-09-13 NOTE — UTILIZATION REVIEW
Notification of Discharge   This is a Notification of Discharge from our facility [address]  Please be advised that this patient has been discharge from our facility  Below you will find the admission and discharge date and time including the patients disposition  UTILIZATION REVIEW CONTACT:  [address]  Utilization   Network Utilization Review Department  Phone: [phone] x carefully listen to the prompts  All voicemails are confidential   Email: [email or web address]     PHYSICIAN ADVISORY SERVICES:  FOR [id number] REVIEW - MEDICAL NECESSITY DENIAL  Phone: [phone]  Fax: [phone]  Email: [email or web address]     PRESENTATION DATE: 9/6/2022  3:58 PM  OBERVATION ADMISSION DATE:   INPATIENT ADMISSION DATE: 9/8/22  5:04 PM   DISCHARGE DATE: 9/12/2022  3:36 PM  DISPOSITION: Home with [place] with [address] INFORMATION:  Send all requests for admission clinical reviews, approved or denied determinations and any other requests to dedicated fax number below belonging to the campus where the patient is receiving treatment   List of dedicated fax numbers:  [address] [place] [address] DENIALS (Administrative/Medical Necessity) [phone]   [address]  (Maternity/NICU/Pediatrics) [phone]   Yoly Koehler [phone]   [address] [facility] [phone]   [address] [place] [address] [phone]   [address] [place] [address] [address] [place] [address],[address] [place] [address] [phone]   [facility]  [phone]   [address] [facility], [facility]  [address] [facility] [address] [facility] [phone]

## 2022-09-16 ENCOUNTER — HOME CARE VISIT (OUTPATIENT)
Dept: HOME HEALTH SERVICES | Facility: HOME HEALTHCARE | Age: 57
End: 2022-09-16
Payer: MEDICARE

## 2022-09-16 ENCOUNTER — APPOINTMENT (OUTPATIENT)
Dept: LAB | Facility: HOSPITAL | Age: 57
End: 2022-09-16
Payer: MEDICARE

## 2022-09-16 VITALS
SYSTOLIC BLOOD PRESSURE: 138 MMHG | HEART RATE: 98 BPM | DIASTOLIC BLOOD PRESSURE: 78 MMHG | RESPIRATION RATE: 16 BRPM | OXYGEN SATURATION: 98 % | TEMPERATURE: 97.3 F

## 2022-09-16 DIAGNOSIS — E11.65 TYPE 2 DIABETES MELLITUS WITH HYPERGLYCEMIA, WITH LONG-TERM CURRENT USE OF INSULIN (HCC): ICD-10-CM

## 2022-09-16 DIAGNOSIS — R79.89 LOW TESTOSTERONE IN MALE: ICD-10-CM

## 2022-09-16 DIAGNOSIS — Z79.4 TYPE 2 DIABETES MELLITUS WITH HYPERGLYCEMIA, WITH LONG-TERM CURRENT USE OF INSULIN (HCC): ICD-10-CM

## 2022-09-16 DIAGNOSIS — E87.1 HYPONATREMIA: ICD-10-CM

## 2022-09-16 DIAGNOSIS — E83.52 HYPERCALCEMIA: ICD-10-CM

## 2022-09-16 DIAGNOSIS — E55.9 VITAMIN D DEFICIENCY: ICD-10-CM

## 2022-09-16 LAB
25(OH)D3 SERPL-MCNC: 47.7 NG/ML (ref 30–100)
ANION GAP SERPL CALCULATED.3IONS-SCNC: 5 MMOL/L (ref 4–13)
BUN SERPL-MCNC: 29 MG/DL (ref 5–25)
CA-I BLD-SCNC: 1.16 MMOL/L (ref 1.12–1.32)
CALCIUM SERPL-MCNC: 8.9 MG/DL (ref 8.3–10.1)
CHLORIDE SERPL-SCNC: 105 MMOL/L (ref 96–108)
CO2 SERPL-SCNC: 27 MMOL/L (ref 21–32)
CREAT SERPL-MCNC: 1.32 MG/DL (ref 0.6–1.3)
EST. AVERAGE GLUCOSE BLD GHB EST-MCNC: 151 MG/DL
GFR SERPL CREATININE-BSD FRML MDRD: 60 ML/MIN/1.73SQ M
GLUCOSE SERPL-MCNC: 166 MG/DL (ref 65–140)
HBA1C MFR BLD: 6.9 %
POTASSIUM SERPL-SCNC: 4.7 MMOL/L (ref 3.5–5.3)
PROLACTIN SERPL-MCNC: 18.1 NG/ML (ref 2.5–17.4)
SODIUM SERPL-SCNC: 137 MMOL/L (ref 135–147)

## 2022-09-16 PROCEDURE — 82330 ASSAY OF CALCIUM: CPT

## 2022-09-16 PROCEDURE — 80048 BASIC METABOLIC PNL TOTAL CA: CPT

## 2022-09-16 PROCEDURE — 36415 COLL VENOUS BLD VENIPUNCTURE: CPT

## 2022-09-16 PROCEDURE — 83036 HEMOGLOBIN GLYCOSYLATED A1C: CPT

## 2022-09-16 PROCEDURE — G0299 HHS/HOSPICE OF RN EA 15 MIN: HCPCS

## 2022-09-16 PROCEDURE — 84146 ASSAY OF PROLACTIN: CPT

## 2022-09-16 PROCEDURE — 82306 VITAMIN D 25 HYDROXY: CPT

## 2022-09-16 RX ORDER — BLOOD SUGAR DIAGNOSTIC
STRIP MISCELLANEOUS
Qty: 100 STRIP | Refills: 3 | Status: SHIPPED | OUTPATIENT
Start: 2022-09-16

## 2022-09-20 ENCOUNTER — HOME CARE VISIT (OUTPATIENT)
Dept: HOME HEALTH SERVICES | Facility: HOME HEALTHCARE | Age: 57
End: 2022-09-20
Payer: MEDICARE

## 2022-09-20 VITALS
HEART RATE: 96 BPM | SYSTOLIC BLOOD PRESSURE: 152 MMHG | RESPIRATION RATE: 18 BRPM | DIASTOLIC BLOOD PRESSURE: 84 MMHG | TEMPERATURE: 96.5 F | OXYGEN SATURATION: 100 %

## 2022-09-20 PROCEDURE — G0300 HHS/HOSPICE OF LPN EA 15 MIN: HCPCS

## 2022-09-20 NOTE — CASE COMMUNICATION
Ship to [facility]   Home  Insurance [employer]     Wound 1 x      Full x  Wound 2 x      Full x         Saline Spray  NOT STOCKED  [id number]  Gauze 4x4 ST [id number]  Gauze Ciara stretch roll 4inch n/s 12 rolls per unit  [id number] 1  ABD 5x9 [id number] 8  Transpore white  2in [id number] 1  Hydrofera blue ready ___8

## 2022-09-21 ENCOUNTER — HOME CARE VISIT (OUTPATIENT)
Dept: HOME HEALTH SERVICES | Facility: HOME HEALTHCARE | Age: 57
End: 2022-09-21
Payer: MEDICARE

## 2022-09-21 PROCEDURE — 400016 VN ROC

## 2022-09-23 ENCOUNTER — HOME CARE VISIT (OUTPATIENT)
Dept: HOME HEALTH SERVICES | Facility: HOME HEALTHCARE | Age: 57
End: 2022-09-23
Payer: MEDICARE

## 2022-09-23 PROCEDURE — G0299 HHS/HOSPICE OF RN EA 15 MIN: HCPCS

## 2022-09-23 PROCEDURE — 400016 VN ROC

## 2022-09-24 ENCOUNTER — HOSPITAL ENCOUNTER (INPATIENT)
Facility: HOSPITAL | Age: 57
LOS: 3 days | Discharge: HOME HEALTH | End: 2022-09-27
Attending: EMERGENCY MEDICINE | Admitting: INTERNAL MEDICINE
Payer: MEDICARE

## 2022-09-24 ENCOUNTER — HOME CARE VISIT (OUTPATIENT)
Dept: HOME HEALTH SERVICES | Facility: HOME HEALTHCARE | Age: 57
End: 2022-09-24
Payer: MEDICARE

## 2022-09-24 ENCOUNTER — APPOINTMENT (OUTPATIENT)
Dept: RADIOLOGY | Facility: HOSPITAL | Age: 57
End: 2022-09-24
Payer: MEDICARE

## 2022-09-24 DIAGNOSIS — A41.9 SEVERE SEPSIS (HCC): ICD-10-CM

## 2022-09-24 DIAGNOSIS — L03.116 CELLULITIS OF LEFT THIGH: Primary | ICD-10-CM

## 2022-09-24 DIAGNOSIS — R65.20 SEVERE SEPSIS (HCC): ICD-10-CM

## 2022-09-24 DIAGNOSIS — L03.116 CELLULITIS OF LEFT LOWER EXTREMITY: ICD-10-CM

## 2022-09-24 DIAGNOSIS — M79.606 LEG PAIN: ICD-10-CM

## 2022-09-24 LAB
ALBUMIN SERPL BCP-MCNC: 3.5 G/DL (ref 3.5–5)
ALP SERPL-CCNC: 76 U/L (ref 34–104)
ALT SERPL W P-5'-P-CCNC: 17 U/L (ref 7–52)
ANION GAP SERPL CALCULATED.3IONS-SCNC: 9 MMOL/L (ref 4–13)
APTT PPP: 27 SECONDS (ref 23–37)
AST SERPL W P-5'-P-CCNC: 27 U/L (ref 13–39)
BASOPHILS # BLD AUTO: 0.11 THOUSANDS/ΜL (ref 0–0.1)
BASOPHILS NFR BLD AUTO: 1 % (ref 0–1)
BILIRUB SERPL-MCNC: 0.68 MG/DL (ref 0.2–1)
BILIRUB UR QL STRIP: NEGATIVE
BUN SERPL-MCNC: 13 MG/DL (ref 5–25)
CALCIUM SERPL-MCNC: 9.7 MG/DL (ref 8.4–10.2)
CHLORIDE SERPL-SCNC: 97 MMOL/L (ref 96–108)
CLARITY UR: CLEAR
CO2 SERPL-SCNC: 30 MMOL/L (ref 21–32)
COLOR UR: NORMAL
CREAT SERPL-MCNC: 1.09 MG/DL (ref 0.6–1.3)
EOSINOPHIL # BLD AUTO: 0.21 THOUSAND/ΜL (ref 0–0.61)
EOSINOPHIL NFR BLD AUTO: 2 % (ref 0–6)
ERYTHROCYTE [DISTWIDTH] IN BLOOD BY AUTOMATED COUNT: 15.8 % (ref 11.6–15.1)
GFR SERPL CREATININE-BSD FRML MDRD: 76 ML/MIN/1.73SQ M
GLUCOSE SERPL-MCNC: 199 MG/DL (ref 65–140)
GLUCOSE UR STRIP-MCNC: NEGATIVE MG/DL
HCT VFR BLD AUTO: 43.8 % (ref 36.5–49.3)
HGB BLD-MCNC: 13.7 G/DL (ref 12–17)
HGB UR QL STRIP.AUTO: NEGATIVE
IMM GRANULOCYTES # BLD AUTO: 0.07 THOUSAND/UL (ref 0–0.2)
IMM GRANULOCYTES NFR BLD AUTO: 1 % (ref 0–2)
INR PPP: 1.05 (ref 0.84–1.19)
KETONES UR STRIP-MCNC: NEGATIVE MG/DL
LACTATE SERPL-SCNC: 3.4 MMOL/L (ref 0.5–2)
LEUKOCYTE ESTERASE UR QL STRIP: NEGATIVE
LYMPHOCYTES # BLD AUTO: 0.84 THOUSANDS/ΜL (ref 0.6–4.47)
LYMPHOCYTES NFR BLD AUTO: 7 % (ref 14–44)
MCH RBC QN AUTO: 29.6 PG (ref 26.8–34.3)
MCHC RBC AUTO-ENTMCNC: 31.3 G/DL (ref 31.4–37.4)
MCV RBC AUTO: 95 FL (ref 82–98)
MONOCYTES # BLD AUTO: 0.69 THOUSAND/ΜL (ref 0.17–1.22)
MONOCYTES NFR BLD AUTO: 6 % (ref 4–12)
NEUTROPHILS # BLD AUTO: 9.69 THOUSANDS/ΜL (ref 1.85–7.62)
NEUTS SEG NFR BLD AUTO: 83 % (ref 43–75)
NITRITE UR QL STRIP: NEGATIVE
NRBC BLD AUTO-RTO: 0 /100 WBCS
PH UR STRIP.AUTO: 5.5 [PH]
PLATELET # BLD AUTO: 296 THOUSANDS/UL (ref 149–390)
PMV BLD AUTO: 8.3 FL (ref 8.9–12.7)
POTASSIUM SERPL-SCNC: 4.4 MMOL/L (ref 3.5–5.3)
PROCALCITONIN SERPL-MCNC: 0.28 NG/ML
PROT SERPL-MCNC: 8.6 G/DL (ref 6.4–8.4)
PROT UR STRIP-MCNC: NEGATIVE MG/DL
PROTHROMBIN TIME: 13.9 SECONDS (ref 11.6–14.5)
RBC # BLD AUTO: 4.63 MILLION/UL (ref 3.88–5.62)
SARS-COV-2 RNA RESP QL NAA+PROBE: NEGATIVE
SODIUM SERPL-SCNC: 136 MMOL/L (ref 135–147)
SP GR UR STRIP.AUTO: 1.01 (ref 1–1.03)
UROBILINOGEN UR STRIP-ACNC: <2 MG/DL
WBC # BLD AUTO: 11.61 THOUSAND/UL (ref 4.31–10.16)

## 2022-09-24 PROCEDURE — 96365 THER/PROPH/DIAG IV INF INIT: CPT

## 2022-09-24 PROCEDURE — 85025 COMPLETE CBC W/AUTO DIFF WBC: CPT

## 2022-09-24 PROCEDURE — 99285 EMERGENCY DEPT VISIT HI MDM: CPT | Performed by: EMERGENCY MEDICINE

## 2022-09-24 PROCEDURE — 99285 EMERGENCY DEPT VISIT HI MDM: CPT

## 2022-09-24 PROCEDURE — 85610 PROTHROMBIN TIME: CPT

## 2022-09-24 PROCEDURE — 87040 BLOOD CULTURE FOR BACTERIA: CPT

## 2022-09-24 PROCEDURE — 71045 X-RAY EXAM CHEST 1 VIEW: CPT

## 2022-09-24 PROCEDURE — 93005 ELECTROCARDIOGRAM TRACING: CPT

## 2022-09-24 PROCEDURE — U0003 INFECTIOUS AGENT DETECTION BY NUCLEIC ACID (DNA OR RNA); SEVERE ACUTE RESPIRATORY SYNDROME CORONAVIRUS 2 (SARS-COV-2) (CORONAVIRUS DISEASE [COVID-19]), AMPLIFIED PROBE TECHNIQUE, MAKING USE OF HIGH THROUGHPUT TECHNOLOGIES AS DESCRIBED BY CMS-2020-01-R: HCPCS

## 2022-09-24 PROCEDURE — 80053 COMPREHEN METABOLIC PANEL: CPT

## 2022-09-24 PROCEDURE — 85730 THROMBOPLASTIN TIME PARTIAL: CPT

## 2022-09-24 PROCEDURE — 36415 COLL VENOUS BLD VENIPUNCTURE: CPT

## 2022-09-24 PROCEDURE — 83605 ASSAY OF LACTIC ACID: CPT

## 2022-09-24 PROCEDURE — U0005 INFEC AGEN DETEC AMPLI PROBE: HCPCS

## 2022-09-24 PROCEDURE — 84145 PROCALCITONIN (PCT): CPT

## 2022-09-24 PROCEDURE — 81003 URINALYSIS AUTO W/O SCOPE: CPT

## 2022-09-24 PROCEDURE — 96360 HYDRATION IV INFUSION INIT: CPT

## 2022-09-24 RX ORDER — ACETAMINOPHEN 325 MG/1
650 TABLET ORAL ONCE
Status: COMPLETED | OUTPATIENT
Start: 2022-09-24 | End: 2022-09-24

## 2022-09-24 RX ADMIN — ACETAMINOPHEN 650 MG: 325 TABLET ORAL at 23:30

## 2022-09-24 RX ADMIN — SODIUM CHLORIDE, SODIUM LACTATE, POTASSIUM CHLORIDE, AND CALCIUM CHLORIDE 1000 ML: .6; .31; .03; .02 INJECTION, SOLUTION INTRAVENOUS at 23:30

## 2022-09-24 RX ADMIN — PIPERACILLIN AND TAZOBACTAM 4.5 G: 36; 4.5 INJECTION, POWDER, FOR SOLUTION INTRAVENOUS at 22:28

## 2022-09-24 RX ADMIN — SODIUM CHLORIDE 1000 ML: 0.9 INJECTION, SOLUTION INTRAVENOUS at 22:19

## 2022-09-25 PROBLEM — E11.65 TYPE 2 DIABETES MELLITUS WITH HYPERGLYCEMIA (HCC): Status: ACTIVE | Noted: 2022-09-25

## 2022-09-25 PROBLEM — R51.9 HEADACHE: Status: ACTIVE | Noted: 2022-09-25

## 2022-09-25 LAB
ANION GAP SERPL CALCULATED.3IONS-SCNC: 8 MMOL/L (ref 4–13)
BASOPHILS # BLD AUTO: 0.06 THOUSANDS/ΜL (ref 0–0.1)
BASOPHILS NFR BLD AUTO: 1 % (ref 0–1)
BUN SERPL-MCNC: 13 MG/DL (ref 5–25)
CALCIUM SERPL-MCNC: 8.4 MG/DL (ref 8.4–10.2)
CHLORIDE SERPL-SCNC: 100 MMOL/L (ref 96–108)
CO2 SERPL-SCNC: 26 MMOL/L (ref 21–32)
CREAT SERPL-MCNC: 1.01 MG/DL (ref 0.6–1.3)
EOSINOPHIL # BLD AUTO: 0.09 THOUSAND/ΜL (ref 0–0.61)
EOSINOPHIL NFR BLD AUTO: 1 % (ref 0–6)
ERYTHROCYTE [DISTWIDTH] IN BLOOD BY AUTOMATED COUNT: 15.9 % (ref 11.6–15.1)
GFR SERPL CREATININE-BSD FRML MDRD: 83 ML/MIN/1.73SQ M
GLUCOSE SERPL-MCNC: 120 MG/DL (ref 65–140)
GLUCOSE SERPL-MCNC: 160 MG/DL (ref 65–140)
GLUCOSE SERPL-MCNC: 169 MG/DL (ref 65–140)
GLUCOSE SERPL-MCNC: 227 MG/DL (ref 65–140)
GLUCOSE SERPL-MCNC: 94 MG/DL (ref 65–140)
HCT VFR BLD AUTO: 35.3 % (ref 36.5–49.3)
HGB BLD-MCNC: 11.3 G/DL (ref 12–17)
IMM GRANULOCYTES # BLD AUTO: 0.04 THOUSAND/UL (ref 0–0.2)
IMM GRANULOCYTES NFR BLD AUTO: 0 % (ref 0–2)
LACTATE SERPL-SCNC: 1.4 MMOL/L (ref 0.5–2)
LACTATE SERPL-SCNC: 2.2 MMOL/L (ref 0.5–2)
LYMPHOCYTES # BLD AUTO: 0.82 THOUSANDS/ΜL (ref 0.6–4.47)
LYMPHOCYTES NFR BLD AUTO: 8 % (ref 14–44)
MCH RBC QN AUTO: 30.1 PG (ref 26.8–34.3)
MCHC RBC AUTO-ENTMCNC: 32 G/DL (ref 31.4–37.4)
MCV RBC AUTO: 94 FL (ref 82–98)
MONOCYTES # BLD AUTO: 0.62 THOUSAND/ΜL (ref 0.17–1.22)
MONOCYTES NFR BLD AUTO: 6 % (ref 4–12)
NEUTROPHILS # BLD AUTO: 8.13 THOUSANDS/ΜL (ref 1.85–7.62)
NEUTS SEG NFR BLD AUTO: 84 % (ref 43–75)
NRBC BLD AUTO-RTO: 0 /100 WBCS
PLATELET # BLD AUTO: 210 THOUSANDS/UL (ref 149–390)
PMV BLD AUTO: 8.3 FL (ref 8.9–12.7)
POTASSIUM SERPL-SCNC: 4.1 MMOL/L (ref 3.5–5.3)
PROCALCITONIN SERPL-MCNC: 0.48 NG/ML
RBC # BLD AUTO: 3.76 MILLION/UL (ref 3.88–5.62)
SODIUM SERPL-SCNC: 134 MMOL/L (ref 135–147)
WBC # BLD AUTO: 9.76 THOUSAND/UL (ref 4.31–10.16)

## 2022-09-25 PROCEDURE — 82948 REAGENT STRIP/BLOOD GLUCOSE: CPT

## 2022-09-25 PROCEDURE — 80048 BASIC METABOLIC PNL TOTAL CA: CPT | Performed by: NURSE PRACTITIONER

## 2022-09-25 PROCEDURE — 99223 1ST HOSP IP/OBS HIGH 75: CPT | Performed by: INTERNAL MEDICINE

## 2022-09-25 PROCEDURE — 85025 COMPLETE CBC W/AUTO DIFF WBC: CPT | Performed by: NURSE PRACTITIONER

## 2022-09-25 PROCEDURE — 99255 IP/OBS CONSLTJ NEW/EST HI 80: CPT | Performed by: INTERNAL MEDICINE

## 2022-09-25 PROCEDURE — 84145 PROCALCITONIN (PCT): CPT | Performed by: NURSE PRACTITIONER

## 2022-09-25 PROCEDURE — 83605 ASSAY OF LACTIC ACID: CPT | Performed by: NURSE PRACTITIONER

## 2022-09-25 RX ORDER — OXYCODONE HYDROCHLORIDE 5 MG/1
2.5 TABLET ORAL EVERY 4 HOURS PRN
Status: DISCONTINUED | OUTPATIENT
Start: 2022-09-25 | End: 2022-09-27 | Stop reason: HOSPADM

## 2022-09-25 RX ORDER — LEVOTHYROXINE SODIUM 0.03 MG/1
25 TABLET ORAL
Status: DISCONTINUED | OUTPATIENT
Start: 2022-09-25 | End: 2022-09-27 | Stop reason: HOSPADM

## 2022-09-25 RX ORDER — DIPHENHYDRAMINE HYDROCHLORIDE 50 MG/ML
25 INJECTION INTRAMUSCULAR; INTRAVENOUS EVERY 8 HOURS PRN
Status: DISCONTINUED | OUTPATIENT
Start: 2022-09-25 | End: 2022-09-27 | Stop reason: HOSPADM

## 2022-09-25 RX ORDER — NYSTATIN 100000 U/G
CREAM TOPICAL 2 TIMES DAILY PRN
Status: DISCONTINUED | OUTPATIENT
Start: 2022-09-25 | End: 2022-09-27

## 2022-09-25 RX ORDER — INSULIN LISPRO 100 [IU]/ML
4-20 INJECTION, SOLUTION INTRAVENOUS; SUBCUTANEOUS
Status: DISCONTINUED | OUTPATIENT
Start: 2022-09-25 | End: 2022-09-27 | Stop reason: HOSPADM

## 2022-09-25 RX ORDER — PANTOPRAZOLE SODIUM 40 MG/1
40 TABLET, DELAYED RELEASE ORAL
Refills: 1 | Status: DISCONTINUED | OUTPATIENT
Start: 2022-09-25 | End: 2022-09-27 | Stop reason: HOSPADM

## 2022-09-25 RX ORDER — INSULIN GLARGINE 100 [IU]/ML
44 INJECTION, SOLUTION SUBCUTANEOUS
Refills: 2 | Status: DISCONTINUED | OUTPATIENT
Start: 2022-09-25 | End: 2022-09-27 | Stop reason: HOSPADM

## 2022-09-25 RX ORDER — BUTALBITAL, ACETAMINOPHEN AND CAFFEINE 50; 325; 40 MG/1; MG/1; MG/1
1 TABLET ORAL EVERY 4 HOURS PRN
Status: DISCONTINUED | OUTPATIENT
Start: 2022-09-25 | End: 2022-09-27 | Stop reason: HOSPADM

## 2022-09-25 RX ORDER — OXYCODONE HYDROCHLORIDE 5 MG/1
5 TABLET ORAL EVERY 4 HOURS PRN
Status: DISCONTINUED | OUTPATIENT
Start: 2022-09-25 | End: 2022-09-27 | Stop reason: HOSPADM

## 2022-09-25 RX ORDER — SACCHAROMYCES BOULARDII 250 MG
250 CAPSULE ORAL 2 TIMES DAILY
Status: DISCONTINUED | OUTPATIENT
Start: 2022-09-25 | End: 2022-09-27 | Stop reason: HOSPADM

## 2022-09-25 RX ORDER — FUROSEMIDE 20 MG/1
20 TABLET ORAL
Status: DISCONTINUED | OUTPATIENT
Start: 2022-09-25 | End: 2022-09-27 | Stop reason: HOSPADM

## 2022-09-25 RX ORDER — METOCLOPRAMIDE HYDROCHLORIDE 5 MG/ML
10 INJECTION INTRAMUSCULAR; INTRAVENOUS EVERY 8 HOURS SCHEDULED
Status: DISCONTINUED | OUTPATIENT
Start: 2022-09-25 | End: 2022-09-27 | Stop reason: HOSPADM

## 2022-09-25 RX ORDER — INSULIN LISPRO 100 [IU]/ML
2-12 INJECTION, SOLUTION INTRAVENOUS; SUBCUTANEOUS
Status: DISCONTINUED | OUTPATIENT
Start: 2022-09-25 | End: 2022-09-27 | Stop reason: HOSPADM

## 2022-09-25 RX ORDER — HEPARIN SODIUM 5000 [USP'U]/ML
7500 INJECTION, SOLUTION INTRAVENOUS; SUBCUTANEOUS EVERY 8 HOURS SCHEDULED
Status: DISCONTINUED | OUTPATIENT
Start: 2022-09-25 | End: 2022-09-27 | Stop reason: HOSPADM

## 2022-09-25 RX ORDER — GABAPENTIN 400 MG/1
400 CAPSULE ORAL 2 TIMES DAILY
Status: DISCONTINUED | OUTPATIENT
Start: 2022-09-25 | End: 2022-09-27 | Stop reason: HOSPADM

## 2022-09-25 RX ORDER — FUROSEMIDE 40 MG/1
40 TABLET ORAL DAILY
Status: DISCONTINUED | OUTPATIENT
Start: 2022-09-25 | End: 2022-09-27 | Stop reason: HOSPADM

## 2022-09-25 RX ORDER — EZETIMIBE 10 MG/1
10 TABLET ORAL DAILY
Status: DISCONTINUED | OUTPATIENT
Start: 2022-09-25 | End: 2022-09-27 | Stop reason: HOSPADM

## 2022-09-25 RX ORDER — ACETAMINOPHEN 325 MG/1
650 TABLET ORAL EVERY 6 HOURS PRN
Status: DISCONTINUED | OUTPATIENT
Start: 2022-09-25 | End: 2022-09-25

## 2022-09-25 RX ORDER — BENZONATATE 100 MG/1
100 CAPSULE ORAL 3 TIMES DAILY PRN
Status: DISCONTINUED | OUTPATIENT
Start: 2022-09-25 | End: 2022-09-27 | Stop reason: HOSPADM

## 2022-09-25 RX ORDER — KETOROLAC TROMETHAMINE 30 MG/ML
15 INJECTION, SOLUTION INTRAMUSCULAR; INTRAVENOUS EVERY 8 HOURS
Status: DISCONTINUED | OUTPATIENT
Start: 2022-09-25 | End: 2022-09-27 | Stop reason: HOSPADM

## 2022-09-25 RX ORDER — LOSARTAN POTASSIUM 25 MG/1
25 TABLET ORAL DAILY
Status: DISCONTINUED | OUTPATIENT
Start: 2022-09-25 | End: 2022-09-27 | Stop reason: HOSPADM

## 2022-09-25 RX ORDER — CEFEPIME HYDROCHLORIDE 2 G/50ML
2000 INJECTION, SOLUTION INTRAVENOUS EVERY 8 HOURS
Status: DISCONTINUED | OUTPATIENT
Start: 2022-09-25 | End: 2022-09-27 | Stop reason: HOSPADM

## 2022-09-25 RX ORDER — CEFEPIME HYDROCHLORIDE 2 G/50ML
2000 INJECTION, SOLUTION INTRAVENOUS EVERY 12 HOURS
Status: DISCONTINUED | OUTPATIENT
Start: 2022-09-25 | End: 2022-09-25

## 2022-09-25 RX ORDER — ALBUTEROL SULFATE 90 UG/1
2 AEROSOL, METERED RESPIRATORY (INHALATION) EVERY 6 HOURS PRN
Status: DISCONTINUED | OUTPATIENT
Start: 2022-09-25 | End: 2022-09-27 | Stop reason: HOSPADM

## 2022-09-25 RX ORDER — ACETAMINOPHEN 325 MG/1
650 TABLET ORAL EVERY 4 HOURS PRN
Status: DISCONTINUED | OUTPATIENT
Start: 2022-09-25 | End: 2022-09-25

## 2022-09-25 RX ORDER — IBUPROFEN 400 MG/1
400 TABLET ORAL EVERY 6 HOURS PRN
Status: DISCONTINUED | OUTPATIENT
Start: 2022-09-25 | End: 2022-09-25

## 2022-09-25 RX ORDER — INSULIN LISPRO 100 [IU]/ML
18 INJECTION, SOLUTION INTRAVENOUS; SUBCUTANEOUS
Refills: 1 | Status: DISCONTINUED | OUTPATIENT
Start: 2022-09-25 | End: 2022-09-27 | Stop reason: HOSPADM

## 2022-09-25 RX ORDER — CEFAZOLIN SODIUM 2 G/50ML
2000 SOLUTION INTRAVENOUS EVERY 8 HOURS
Status: DISCONTINUED | OUTPATIENT
Start: 2022-09-25 | End: 2022-09-25

## 2022-09-25 RX ADMIN — FUROSEMIDE 40 MG: 40 TABLET ORAL at 09:06

## 2022-09-25 RX ADMIN — CEFEPIME HYDROCHLORIDE 2000 MG: 2 INJECTION, SOLUTION INTRAVENOUS at 17:51

## 2022-09-25 RX ADMIN — VANCOMYCIN HYDROCHLORIDE 1500 MG: 5 INJECTION, POWDER, LYOPHILIZED, FOR SOLUTION INTRAVENOUS at 02:08

## 2022-09-25 RX ADMIN — ACETAMINOPHEN 650 MG: 325 TABLET ORAL at 02:08

## 2022-09-25 RX ADMIN — HEPARIN SODIUM 7500 UNITS: 5000 INJECTION INTRAVENOUS; SUBCUTANEOUS at 13:24

## 2022-09-25 RX ADMIN — INSULIN LISPRO 18 UNITS: 100 INJECTION, SOLUTION INTRAVENOUS; SUBCUTANEOUS at 09:13

## 2022-09-25 RX ADMIN — PSYLLIUM HUSK 1 PACKET: 3.4 POWDER ORAL at 22:33

## 2022-09-25 RX ADMIN — OXYCODONE HYDROCHLORIDE 5 MG: 5 TABLET ORAL at 03:20

## 2022-09-25 RX ADMIN — OXYCODONE HYDROCHLORIDE 5 MG: 5 TABLET ORAL at 22:33

## 2022-09-25 RX ADMIN — KETOROLAC TROMETHAMINE 15 MG: 30 INJECTION, SOLUTION INTRAMUSCULAR at 09:07

## 2022-09-25 RX ADMIN — PANTOPRAZOLE SODIUM 40 MG: 40 TABLET, DELAYED RELEASE ORAL at 05:55

## 2022-09-25 RX ADMIN — VANCOMYCIN HYDROCHLORIDE 1500 MG: 5 INJECTION, POWDER, LYOPHILIZED, FOR SOLUTION INTRAVENOUS at 04:00

## 2022-09-25 RX ADMIN — DIPHENHYDRAMINE HYDROCHLORIDE 25 MG: 50 INJECTION, SOLUTION INTRAMUSCULAR; INTRAVENOUS at 03:42

## 2022-09-25 RX ADMIN — CEFEPIME HYDROCHLORIDE 2000 MG: 2 INJECTION, SOLUTION INTRAVENOUS at 07:54

## 2022-09-25 RX ADMIN — LOSARTAN POTASSIUM 25 MG: 25 TABLET, FILM COATED ORAL at 09:06

## 2022-09-25 RX ADMIN — OXYCODONE HYDROCHLORIDE 5 MG: 5 TABLET ORAL at 12:18

## 2022-09-25 RX ADMIN — METOCLOPRAMIDE 10 MG: 5 INJECTION, SOLUTION INTRAMUSCULAR; INTRAVENOUS at 13:24

## 2022-09-25 RX ADMIN — INSULIN LISPRO 2 UNITS: 100 INJECTION, SOLUTION INTRAVENOUS; SUBCUTANEOUS at 03:00

## 2022-09-25 RX ADMIN — GABAPENTIN 400 MG: 400 CAPSULE ORAL at 09:06

## 2022-09-25 RX ADMIN — Medication 250 MG: at 17:51

## 2022-09-25 RX ADMIN — KETOROLAC TROMETHAMINE 15 MG: 30 INJECTION, SOLUTION INTRAMUSCULAR at 17:52

## 2022-09-25 RX ADMIN — OXYCODONE HYDROCHLORIDE 5 MG: 5 TABLET ORAL at 08:02

## 2022-09-25 RX ADMIN — HEPARIN SODIUM 7500 UNITS: 5000 INJECTION INTRAVENOUS; SUBCUTANEOUS at 05:54

## 2022-09-25 RX ADMIN — METOCLOPRAMIDE 10 MG: 5 INJECTION, SOLUTION INTRAMUSCULAR; INTRAVENOUS at 05:55

## 2022-09-25 RX ADMIN — OXYCODONE HYDROCHLORIDE 5 MG: 5 TABLET ORAL at 18:02

## 2022-09-25 RX ADMIN — BENZONATATE 100 MG: 100 CAPSULE ORAL at 02:08

## 2022-09-25 RX ADMIN — LEVOTHYROXINE SODIUM 25 MCG: 25 TABLET ORAL at 05:55

## 2022-09-25 RX ADMIN — INSULIN GLARGINE 44 UNITS: 100 INJECTION, SOLUTION SUBCUTANEOUS at 21:31

## 2022-09-25 RX ADMIN — INSULIN LISPRO 18 UNITS: 100 INJECTION, SOLUTION INTRAVENOUS; SUBCUTANEOUS at 17:52

## 2022-09-25 RX ADMIN — Medication 250 MG: at 09:06

## 2022-09-25 RX ADMIN — KETOROLAC TROMETHAMINE 15 MG: 30 INJECTION, SOLUTION INTRAMUSCULAR at 02:55

## 2022-09-25 RX ADMIN — FUROSEMIDE 20 MG: 20 TABLET ORAL at 13:24

## 2022-09-25 RX ADMIN — BENZONATATE 100 MG: 100 CAPSULE ORAL at 13:24

## 2022-09-25 RX ADMIN — GUAIFENESIN 200 MG: 200 SOLUTION ORAL at 18:02

## 2022-09-25 RX ADMIN — GABAPENTIN 400 MG: 400 CAPSULE ORAL at 17:51

## 2022-09-25 RX ADMIN — INSULIN LISPRO 2 UNITS: 100 INJECTION, SOLUTION INTRAVENOUS; SUBCUTANEOUS at 21:31

## 2022-09-25 RX ADMIN — BENZONATATE 100 MG: 100 CAPSULE ORAL at 19:58

## 2022-09-25 RX ADMIN — INSULIN GLARGINE 44 UNITS: 100 INJECTION, SOLUTION SUBCUTANEOUS at 02:55

## 2022-09-25 RX ADMIN — HEPARIN SODIUM 7500 UNITS: 5000 INJECTION INTRAVENOUS; SUBCUTANEOUS at 21:30

## 2022-09-25 RX ADMIN — METOCLOPRAMIDE 10 MG: 5 INJECTION, SOLUTION INTRAMUSCULAR; INTRAVENOUS at 21:30

## 2022-09-25 RX ADMIN — EZETIMIBE 10 MG: 10 TABLET ORAL at 09:06

## 2022-09-25 RX ADMIN — INSULIN LISPRO 4 UNITS: 100 INJECTION, SOLUTION INTRAVENOUS; SUBCUTANEOUS at 09:13

## 2022-09-25 RX ADMIN — INSULIN LISPRO 18 UNITS: 100 INJECTION, SOLUTION INTRAVENOUS; SUBCUTANEOUS at 12:26

## 2022-09-25 NOTE — PROGRESS NOTES
[facility]  Progress Note - Emilio Mancini 1965, 56 y o  male MRN: [id number]  Unit/Bed#: W -47 Encounter: [id number]  Primary Care Provider: Inga Noonan MD   Date and time admitted to hospital: 9/24/2022  8:56 PM    * Cellulitis of left thigh  Assessment & Plan  · Patient recently admitted at [facility] from 09/06-09/12 for the same complaint of left thigh cellulitis  Patient reports recurrent cellulitis due to lymphedema and chronic fungal infections  Patient initially received IV Ancef and was then switched to IV Cefepime per ID  Patient was then discharged home on PO Levaquin which was completed on 09/16 for a total 10 day course of antibiotics  · Patient returns today with complaints of worsening redness and swelling of left thigh and chills over the past day  · Received IV Zosyn and IV Vancomycin in ED  · Prior wound cultures grew Gram-negative lorena including Pseudomonas and Proteus    · Continue IV Cefepime  Type 2 diabetes mellitus with hyperglycemia [facility])  Assessment & Plan  Lab Results   Component Value Date    HGBA1C 6 9 (H) 09/16/2022       Recent Labs     09/25/22  0737 09/25/22  1111   POCGLU 160* 120       Blood Sugar Average: Last 72 hrs:  · (P) 140Continue PTA insulin regimen of 44 units of Lantus HS and 14 units of Lispro tid with meals  · Accu-checks and SSI  · Hypoglycemia protocol  GERD (gastroesophageal reflux disease)  Assessment & Plan  · Continue PPI  Hypothyroidism  Assessment & Plan  · Continue levothyroxine  Morbid obesity  Assessment & Plan  · Encouraged lifestyle modifications  DAHIANA (obstructive sleep apnea)  Assessment & Plan  · CPAP HS      Essential hypertension  Assessment & Plan   Blood pressure initially hypertensive on arrival with highest reading of 196/104; BP improved spontaneously suspect in the setting of pain on arrival   o Last recorded pressure: 136/84   Continue Losartan and Lasix    Monitor blood pressure  Hyperlipidemia  Assessment & Plan  · Continue Zetia  VTE Pharmacologic Prophylaxis: VTE Score: 6 Moderate Risk (Score 3-4) - Pharmacological DVT Prophylaxis Ordered: heparin  Patient Centered Rounds: I performed bedside rounds with nursing staff today  Discussions with Specialists or Other Care Team Provider:none    Education and Discussions with Family / Patient: Updated  (wife) at bedside  Current Length of Stay: 1 day(s)  Current Patient Status: Inpatient   Discharge Plan: Anticipate discharge in 48-72 hrs to home  Code Status: Level 1 - Full Code    Subjective:    No acute events overnight  Patient states that his leg pain, headache and cough are improving  Patient is tolerating his diet denies any nausea and vomiting denies any chest pain    Objective:     Vitals:   Temp (24hrs), Av 5 °F (37 5 °C), Min:98 2 °F (36 8 °C), Max:101 °F (38 3 °C)    Temp:  [98 2 °F (36 8 °C)-101 °F (38 3 °C)] 99 2 °F (37 3 °C)  HR:  [103-135] 103  Resp:  [16-25] 19  BP: (130-196)/() 135/84  SpO2:  [93 %-100 %] 93 %  Body mass index is 80 04 kg/m²  Input and Output Summary (last 24 hours): Intake/Output Summary (Last 24 hours) at 2022 1328  Last data filed at 2022 2323  Gross per 24 hour   Intake 1183 33 ml   Output --   Net 1183 33 ml       Physical Exam:   Physical Exam  Vitals and nursing note reviewed  Constitutional:       Appearance: He is well-developed  HENT:      Head: Normocephalic and atraumatic  Eyes:      Conjunctiva/sclera: Conjunctivae normal    Cardiovascular:      Rate and Rhythm: Normal rate and regular rhythm  Heart sounds: No murmur heard  Pulmonary:      Effort: Pulmonary effort is normal  No respiratory distress  Breath sounds: Normal breath sounds  Abdominal:      Palpations: Abdomen is soft  Tenderness: There is no abdominal tenderness     Musculoskeletal:         General: Swelling and tenderness present  Cervical back: Neck supple  Left lower leg: Edema present  Skin:     General: Skin is warm and dry  Neurological:      Mental Status: He is alert  Additional Data:     Labs:  Results from last 7 days   Lab Units 09/25/22  0430   WBC Thousand/uL 9 76   HEMOGLOBIN g/dL 11 3*   HEMATOCRIT % 35 3*   PLATELETS Thousands/uL 210   NEUTROS PCT % 84*   LYMPHS PCT % 8*   MONOS PCT % 6   EOS PCT % 1     Results from last 7 days   Lab Units 09/25/22  0430 09/24/22  2216   SODIUM mmol/L 134* 136   POTASSIUM mmol/L 4 1 4 4   CHLORIDE mmol/L 100 97   CO2 mmol/L 26 30   BUN mg/dL 13 13   CREATININE mg/dL 1 01 1 09   ANION GAP mmol/L 8 9   CALCIUM mg/dL 8 4 9 7   ALBUMIN g/dL  --  3 5   TOTAL BILIRUBIN mg/dL  --  0 68   ALK PHOS U/L  --  76   ALT U/L  --  17   AST U/L  --  27   GLUCOSE RANDOM mg/dL 227* 199*     Results from last 7 days   Lab Units 09/24/22  2216   INR  1 05     Results from last 7 days   Lab Units 09/25/22  1111 09/25/22  0737   POC GLUCOSE mg/dl 120 160*         Results from last 7 days   Lab Units 09/25/22  0924 09/25/22  0430 09/25/22  0415 09/24/22  2216   LACTIC ACID mmol/L 1 4  --  2 2* 3 4*   PROCALCITONIN ng/ml  --  0 48*  --  0 28*       Lines/Drains:  Invasive Devices  Report    Peripheral Intravenous Line  Duration           Peripheral IV 09/24/22 Left Antecubital <1 day    Peripheral IV 09/24/22 Left Forearm <1 day                      Imaging: No pertinent imaging reviewed  Recent Cultures (last 7 days):   Results from last 7 days   Lab Units 09/24/22  2222 09/24/22  2216   BLOOD CULTURE  Received in Microbiology Lab  Culture in Progress  Received in Microbiology Lab  Culture in Progress         Last 24 Hours Medication List:   Current Facility-Administered Medications   Medication Dose Route Frequency Provider Last Rate    acetaminophen  650 mg Oral Q4H PRN Shermon Claude, CRNP      albuterol  2 puff Inhalation Q6H PRN Shermon Claude, CRNP      benzonatate  100 mg Oral TID PRN Ortiz Commander, CRNP      cefepime  2,000 mg Intravenous Q12H Ortiz Commander, CRNP 2,000 mg (09/25/22 0754)    diphenhydrAMINE  25 mg Intravenous Q8H PRN Ortiz Commander, CRNP      ezetimibe  10 mg Oral Daily Ortiz Commander, CRNP      furosemide  20 mg Oral After [employer], CRNP      furosemide  40 mg Oral Daily Ortiz Commander, CRNP      gabapentin  400 mg Oral BID Ortiz Commander, CRNP      heparin (porcine)  7,500 Units Subcutaneous Q8H [facility], CRNP      insulin glargine  44 Units Subcutaneous HS Ortiz Commander, CRNP      insulin lispro  18 Units Subcutaneous TID With Meals Ortiz Commander, CRNP      insulin lispro  2-12 Units Subcutaneous HS Ortiz Commander, CRNP      insulin lispro  4-20 Units Subcutaneous TID AC Taylor Paola, CRNP      ketorolac  15 mg Intravenous Q8H Ortiz Commander, CRNP      levothyroxine  25 mcg Oral Early Morning Ortiz Commander, CRNP      losartan  25 mg Oral Daily Ortiz Commander, CRNP      metoclopramide  10 mg Intravenous Q8H [facility], CRNP      nystatin   Topical BID PRN Ortiz Commander, CRNP      oxyCODONE  2 5 mg Oral Q4H PRN Ortiz Commander, CRNP      oxyCODONE  5 mg Oral Q4H PRN Ortiz Commander, CRNP      pantoprazole  40 mg Oral Early Morning Ortiz Commander, CRNP      psyllium  1 packet Oral Daily Ortiz Commander, CRNP      saccharomyces boulardii  250 mg Oral BID Ortiz Commander, ANN          Today, Patient Was Seen By: Criss Hauser MD    **Please Note: This note may have been constructed using a voice recognition system  **

## 2022-09-25 NOTE — ASSESSMENT & PLAN NOTE
· Patient reports 7/10 headache  · Headache cocktail including IV Toradol, IV Benadryl and IV Reglan

## 2022-09-25 NOTE — CONSULTS
Consultation - Infectious Disease   Princess Valerio 56 y o  male MRN: [id number]  Unit/Bed#: W -01 Encounter: [id number]      IMPRESSION & RECOMMENDATIONS:     1  Concern for recurrent left thigh cellulitis; Hx of left leg wound cx that grew Pseudomonas and Proteus 7/21/22:  - He presents with acute onset left thigh pain and fever at home over last 1-2 days in setting of recent completed therapy for left thigh cellulitis  He noted increased erythema and swelling as well as tenderness of the area  Patient is non-toxic, afebrile today and without leukocytosis  He reports subjective improvement in his left inner thigh pain  - For now, ok to continue Cefepime given prior Hx of Pseudomonas wound infection  I will increase dose to 2g q8h as this will give Pseudomonal coverage  This will also cover Strep which I suspect is the major culprit  - No evidence of necrotizing infection (mininmal pain on exam, no crepitus, afebrile today, WBC normalized)  - follow up blood cultures  - if still improving tomorrow, consider switch to PO soon  - his lymphedema will predispose to recurrent infection; would benefit from outpatient lymphedema clinic and aggressive skin care    2  Chronic LE Lymphedema and venous stasis:    - recommendations as above    3  T2DM  - he is insulin dependent  - management per primary    4  Chronic Candida intertrigo of groin  - noted  - can use topical nystatin  - moisture control    5   Morbid Obesity: BMI of 80    I have discussed the above management plan in detail with the primary service    I have performed an extensive review of the medical records in Epic including review of the notes, radiographs, and laboratory results     HISTORY OF PRESENT ILLNESS:  Reason for Consult: Recurrent cellulitis    HPI: Princess Valerio is a 56y o  year old male PMHx morbid obesity, T2DM, chronic LE lymphedema and venous stasis ulcers, HTN, GERD, DAHIANA, who presented with complaints of left thigh pain and swelling  On chart review he recently presented to [facility] on 9/6 for complaints of left thigh erythema and warmth that did not improve despite outpatient Keflex  CT at this time showed left thigh subq edema, no collections  Of note, he follows with wound care as outpatient and on 7/21 had a wound of left leg that was draining greenish/black fluid with culture that grew Pseudomonas and Proteus  He was treated with IV Cefepime at [facility] given this history with improvement of his cellulitis and was discharged on PO Levofloxacin to complete total 10 days of antibiotics  Thus far here he has been afebrile and had WBC of 11 6 on admission that has already normalized  He is on Cefepime currently  He tells me that about 1-2 days ago he had acute onset of worsening left thigh redness, tenderness and swelling with associated fever and subjective chills  He denies abd pain, nausea, emesis, diarrhea, dysuria or pain elsewhere  He says has mild cough but this happens from time to time  REVIEW OF SYSTEMS:  A complete review of systems is negative other than that noted in the HPI      PAST MEDICAL HISTORY:  Past Medical History:   Diagnosis Date    Acute kidney injury 10/28/2021    Anemia 09/13/2019    Cellulitis     last assessed 12/10/15    Cellulitis of left lower extremity     Cellulitis of right lower extremity 11/19/2021    Cough 10/20/2019    Diabetes mellitus ([address] )     Disease of thyroid gland     Edema     Elevated liver enzymes     Elevated serum creatinine 11/19/2021    Esophageal reflux     Fungal infection 8/16/2021    Gluten intolerance     Gout     last assessed 09/05/13    Hyperglycemia     Hypertension     Hyponatremia 9/6/2019    IBS (irritable bowel syndrome)     Insomnia     Obesity     Osteoarthritis of knee     last assessed 02/10/14    Scrotal swelling 5/19/2020    Shortness of breath 5/3/2021    Venous insufficiency     last assessed 08/22/17    Villonodular synovitis of the hand, right     last assessed 2013     Past Surgical History:   Procedure Laterality Date    INCISION AND DRAINAGE OF WOUND Left 2019    Procedure: INCISION AND DRAINAGE (I&D) GROIN;  Surgeon: Alfonza Nyhan, DO;  [facility]: [facility];  Service: General    SKIN BIOPSY      WOUND DEBRIDEMENT Left 2019    Procedure: EXCISIONAL DEBRIDEMENT;  Surgeon: Alfonza Nyhan, DO;  [facility];  Service: General       FAMILY HISTORY:  Non-contributory    SOCIAL HISTORY:  Social History   Social History     Substance and Sexual Activity   Alcohol Use Not Currently    Alcohol/week: 0 0 standard drinks     Social History     Substance and Sexual Activity   Drug Use Yes    Types: Marijuana    Comment: medical     Social History     Tobacco Use   Smoking Status Never Smoker   Smokeless Tobacco Never Used       ALLERGIES:  Allergies   Allergen Reactions    Gluten Meal - Food Allergy     Clindamycin Diarrhea       MEDICATIONS:  All current active medications have been reviewed      PHYSICAL EXAM:  Temp:  [98 2 °F (36 8 °C)-101 °F (38 3 °C)] 99 2 °F (37 3 °C)  HR:  [103-135] 103  Resp:  [16-25] 19  BP: (130-196)/() 135/84  SpO2:  [93 %-100 %] 93 %  Temp (24hrs), Av 5 °F (37 5 °C), Min:98 2 °F (36 8 °C), Max:101 °F (38 3 °C)  Current: Temperature: 99 2 °F (37 3 °C)    Intake/Output Summary (Last 24 hours) at 2022 1419  Last data filed at 2022 2323  Gross per 24 hour   Intake 1183 33 ml   Output --   Net 1183 33 ml       General Appearance:  Appearing well, nontoxic, and in no distress   Head:  Normocephalic, without obvious abnormality, atraumatic   Eyes:  Conjunctiva pink and sclera anicteric, both eyes   Nose: Nares normal, mucosa normal, no drainage   Throat: Oropharynx moist without lesions   Neck: Supple, symmetrical, no adenopathy, no tenderness/mass/nodules   Back:   Symmetric, no curvature, ROM normal, no CVA tenderness   Lungs:   Clear to auscultation bilaterally, respirations unlabored   Chest Wall:  No tenderness or deformity   Heart:  RRR; no murmur, rub or gallop   Abdomen:   Soft, non-tender, non-distended, positive bowel sounds    Extremities: Left thigh with erythema, warmth and tenderness  No crepitus  Lymphedema of both legs present  No open wounds apprecaited   Skin: Bilateral lower extremity venous stasis changes   Lymph nodes: Cervical, supraclavicular nodes normal   Neurologic: Alert and oriented        LABS, IMAGING, & OTHER STUDIES:  Lab Results:  I have personally reviewed pertinent labs  Results from last 7 days   Lab Units 09/25/22  0430 09/24/22  2216   WBC Thousand/uL 9 76 11 61*   HEMOGLOBIN g/dL 11 3* 13 7   PLATELETS Thousands/uL 210 296     Results from last 7 days   Lab Units 09/25/22  0430 09/24/22  2216   SODIUM mmol/L 134* 136   POTASSIUM mmol/L 4 1 4 4   CHLORIDE mmol/L 100 97   CO2 mmol/L 26 30   BUN mg/dL 13 13   CREATININE mg/dL 1 01 1 09   EGFR ml/min/1 73sq m 83 76   CALCIUM mg/dL 8 4 9 7   AST U/L  --  27   ALT U/L  --  17   ALK PHOS U/L  --  76     Results from last 7 days   Lab Units 09/24/22  2222 09/24/22  2216   BLOOD CULTURE  Received in Microbiology Lab  Culture in Progress  Received in Microbiology Lab  Culture in Progress  Results from last 7 days   Lab Units 09/25/22  0430 09/24/22  2216   PROCALCITONIN ng/ml 0 48* 0 28*       Imaging Studies:   I have personally reviewed pertinent imaging study reports and images in PACS  CXR 9/24:  Mild cardiomegaly      The lungs are clear  No pneumothorax or pleural effusion      Osseous structures appear within normal limits for patient age      IMPRESSION:     No acute cardiopulmonary disease  Other Studies:   I have personally reviewed pertinent reports        Thanh Márquez MD  [facility]

## 2022-09-25 NOTE — ASSESSMENT & PLAN NOTE
Lab Results   Component Value Date    HGBA1C 6 9 (H) 09/16/2022       No results for input(s): POCGLU in the last 72 hours  Blood Sugar Average: Last 72 hrs:  · Continue PTA insulin regimen of 44 units of Lantus HS and 14 units of Lispro tid with meals  · Accu-checks and SSI  · Hypoglycemia protocol

## 2022-09-25 NOTE — ED ATTENDING ATTESTATION
9/24/2022  I, Judson Rehman DO, saw and evaluated the patient  I have discussed the patient with the resident/non-physician practitioner and agree with the resident's/non-physician practitioner's findings, Plan of Care, and MDM as documented in the resident's/non-physician practitioner's note, except where noted  All available labs and Radiology studies were reviewed  I was present for key portions of any procedure(s) performed by the resident/non-physician practitioner and I was immediately available to provide assistance  At this point I agree with the current assessment done in the Emergency Department  I have conducted an independent evaluation of this patient a history and physical is as follows:        [de-identified] year old male presents with recurrent cellulitis to his left upper thigh  Describes pain over the area as dull constant nonradiating, worse with palpation or attempted movement better when holding still  , recent admission to [facility] for similar events  Has had multiple episodes of cellulitis in this area in the past no testicular pain, no peroneal pain  , defecating and urinating without difficulty  , says is consistent with previous episodes of cellulitis  Has had fevers over the past few days  , tactile, has not take anything  History obtained from patient and his friend who also helps him out at home    Review of Systems   Constitutional: Negative for activity change, chills, diaphoresis positive for fever  HENT: Negative for congestion, sinus pressure and sore throat  Eyes: Negative for pain and visual disturbance  Respiratory: Negative for cough, chest tightness, shortness of breath, wheezing and stridor  Cardiovascular: Negative for chest pain and palpitations  Gastrointestinal: Negative for abdominal distention, abdominal pain, constipation, diarrhea, nausea and vomiting  Genitourinary: Negative for dysuria and frequency     Musculoskeletal: Negative for neck pain and neck stiffness  Skin: Negative for rash  Positive erythema  Neurological: Negative for dizziness, speech difficulty, light-headedness, numbness and headaches  Physical Exam  Vitals reviewed  Constitutional:       General: He is not in acute distress  Appearance: He is well-developed  He is obese  He is not diaphoretic  HENT:      Head: Normocephalic and atraumatic  Right Ear: External ear normal       Left Ear: External ear normal       Nose: Nose normal    Eyes:      General:         Right eye: No discharge  Left eye: No discharge  Pupils: Pupils are equal, round, and reactive to light  Neck:      Trachea: No tracheal deviation  Cardiovascular:      Rate and Rhythm: Normal rate and regular rhythm  Heart sounds: Normal heart sounds  No murmur heard  Pulmonary:      Effort: Pulmonary effort is normal  No respiratory distress  Breath sounds: Normal breath sounds  No stridor  Abdominal:      General: There is no distension  Palpations: Abdomen is soft  Tenderness: There is no abdominal tenderness  There is no guarding or rebound  Genitourinary:     Comments: Normal testicles, nontender perineum, nontender inner thigh region  Musculoskeletal:         General: Normal range of motion  Cervical back: Normal range of motion and neck supple  Skin:     General: Skin is warm and dry  Coloration: Skin is not pale  Findings: Erythema (Large area of erythema right upper thigh , warm, tender, consistent with cellulitis) present  Neurological:      General: No focal deficit present  Mental Status: He is alert and oriented to person, place, and time                Labs Reviewed - No data to display      XR chest portable   ED Interpretation   Cardiomegaly, haziness at the lung bases                  ED Course         Critical Care Time  Procedures           MDM  Number of Diagnoses or Management Options  Diagnosis management comments: Patient with sepsis secondary to upper leg cellulitis, treated with Zosyn and vancomycin due to recent admission  , admitted to Internal Medicine Service for continued antibiotic therapy         Amount and/or Complexity of Data Reviewed  Clinical lab tests: ordered and reviewed  Decide to obtain previous medical records or to obtain history from someone other than the patient: yes  Obtain history from someone other than the patient: yes  Review and summarize past medical records: yes  Discuss the patient with other providers: yes

## 2022-09-25 NOTE — ASSESSMENT & PLAN NOTE
 Blood pressure initially hypertensive on arrival with highest reading of 196/104; BP improved spontaneously suspect in the setting of pain on arrival   o Last recorded pressure: 136/84   Continue Losartan and Lasix   Monitor blood pressure

## 2022-09-25 NOTE — ED PROVIDER NOTES
History  Chief Complaint   Patient presents with    Cellulitis     Pt reports was at [facility] for cellulitis and believes they d/carmen him too soon, reports fevers again and worsening leg pain where cellulitis was (left thigh); finished prescribed PO abx without relief     Mely Gordon is a 56year old male presenting for evaluation of suspected left lower extremity cellulitis with associated chills  Patient was recently hospitalized several weeks ago for cellulitis of the left lower extremity, he was discharged with a course of antibiotics, unfortunately he believes that his cellulitis has returned  He endorses redness in the area, worsening pain  He has had chills over the last day, no recorded fevers at home  He also endorses a harsh sounding cough, no chest pain or shortness of breath  History provided by:  Patient   used: No        Prior to Admission Medications   Prescriptions Last Dose Informant Patient Reported? Taking?    BD Pen Needle Ludmila 2nd Gen 32G X 4 MM MISC  Self No No   Sig: USE 4 TIMES A DAY   Blood Glucose Monitoring Suppl (Sheyla Mittal) w/Device KIT   No No   Sig: Use to test sugar daily   Patient not taking: Reported on 8/5/2022   [facility] Diclofenac Sodium 1 %  Self No No   Sig: APPLY 4 G TOPICALLY 4 (FOUR) TIMES A DAY   Calcium Alginate (Maxorb II Alginate Dressing) MISC  Self No No   Sig: Apply 1 each topically in the morning   Continuous Blood Gluc  (FreeStyle Elvin 2 [place] Systm) BROOKS  Self No No   Sig: Use 1 Device as needed (use with sensors for bs checks)   Continuous Blood Gluc Sensor (FreeStyle Elvin 14 Day Sensor) MISC  Self No No   Sig: Use as directed 3 times a day   Control Gel Formula Dressing (DuoDERM CGF Dressing) [facility]  Self No No   Sig: Apply 1 application topically every 5 (five) days   Gauze Pads & Dressings 5"X5" PADS  Self No No   Sig: Use in the morning   Lancets (ONETOUCH ULTRASOFT) lancets  Self No No   Sig: Use to test sugar 2 times a day   OneTouch Verio test strip   No No   Sig: USE TO TEST SUGAR 2 TIMES A DAY   Skin Protectants, Misc   ([employer] Textile 23"S777") SHEE  Self No No   Sig: Apply 1 each topically every 5 (five) days   acetaminophen (TYLENOL) 325 mg tablet  Self No No   Sig: Take 2 tablets (650 mg total) by mouth every 4 (four) hours as needed for mild pain, headaches or fever   albuterol (PROVENTIL HFA,VENTOLIN HFA) 90 mcg/act inhaler  Self No No   Sig: TAKE 2 PUFFS BY MOUTH EVERY 6 HOURS AS NEEDED FOR WHEEZE   benzonatate (TESSALON PERLES) 100 mg capsule   No No   Sig: Take 1 capsule (100 mg total) by mouth 3 (three) times a day as needed for cough   ezetimibe (ZETIA) 10 mg tablet  Self No No   Sig: Take 1 tablet (10 mg total) by mouth daily   furosemide (LASIX) 20 mg tablet  Self No No   Sig: TAKE 2 TABLETS EVERY DAY IN THE EARLY MORNING AND 1 TABLET DAILY AFTER LUNCH    gabapentin (NEURONTIN) 100 mg capsule  Self No No   Sig: TAKE 1 CAPSULE BY MOUTH TWICE A DAY WITH 300MG CAPSULE   gabapentin (NEURONTIN) 300 mg capsule  Self No No   Sig: TAKE 1 CAPSULE (300 MG TOTAL) BY MOUTH 2 (TWO) TIMES A DAY TAKE 1 TAB WITH YOUR 100MG TAB TWICE DAILY   insulin aspart (NovoLOG FlexPen) 100 UNIT/ML injection pen  Self No No   Sig: INJECT 18 UNITS WITH MEALS+SCALE ( - 150-200 -2 UNITS, 201-250-4 UNITS, 251-300 -6 UNITS, 301-350-8 UNITS, > 350- 10 UNITS )   insulin glargine (Lantus SoloStar) 100 units/mL injection pen  Self No No   Sig: Inject 44 Units under the skin daily at bedtime   levothyroxine (Euthyrox) 25 mcg tablet  Self No No   Sig: Take 1 tablet (25 mcg total) by mouth daily in the early morning   losartan (COZAAR) 25 mg tablet   No No   Sig: Take 1 tablet (25 mg total) by mouth daily   metFORMIN (GLUCOPHAGE) 1000 MG tablet  Self No No   Sig: Take 1 tablet (1,000 mg total) by mouth 2 (two) times a day   nystatin (MYCOSTATIN) cream  Self No No   Sig: Apply topically 2 (two) times a day as needed (itching, redness)   nystatin (MYCOSTATIN) powder  Self No No   Sig: Apply 1 application topically 2 (two) times a day To affected area   omeprazole (PriLOSEC) 40 MG capsule  Self No No   Sig: TAKE 1 CAPSULE BY MOUTH TWICE A DAY   oxyCODONE (ROXICODONE) 5 immediate release tablet   No No   Sig: Take 1-2 tabs by mouth every 6 hours as needed for severe pain as needed     psyllium (METAMUCIL) packet  Self No No   Sig: Take 1 packet by mouth daily   saccharomyces boulardii (FLORASTOR) 250 mg capsule  Self No No   Sig: Take 1 capsule (250 mg total) by mouth 2 (two) times a day   sodium hypochlorite (DAKIN'S QUARTER-STRENGTH)  Self No No   Sig: Irrigate with 1 application as directed daily      Facility-Administered Medications: None       Past Medical History:   Diagnosis Date    Acute kidney injury 10/28/2021    Anemia 09/13/2019    Cellulitis     last assessed 12/10/15    Cellulitis of left lower extremity     Cellulitis of right lower extremity 11/19/2021    Cough 10/20/2019    Diabetes mellitus ([facility] [address] )     Disease of thyroid gland     Edema     Elevated liver enzymes     Elevated serum creatinine 11/19/2021    Esophageal reflux     Fungal infection 8/16/2021    Gluten intolerance     Gout     last assessed 09/05/13    Hyperglycemia     Hypertension     Hyponatremia 9/6/2019    IBS (irritable bowel syndrome)     Insomnia     Obesity     Osteoarthritis of knee     last assessed 02/10/14    Scrotal swelling 5/19/2020    Shortness of breath 5/3/2021    Venous insufficiency     last assessed 08/22/17    Villonodular synovitis of the hand, right     last assessed 11/14/2013       Past Surgical History:   Procedure Laterality Date    INCISION AND DRAINAGE OF WOUND Left 9/6/2019    Procedure: INCISION AND DRAINAGE (I&D) GROIN;  Surgeon: Luis Moss DO;  [facility];  Service: General    SKIN BIOPSY      WOUND DEBRIDEMENT Left 9/7/2019    Procedure: EXCISIONAL DEBRIDEMENT;  Surgeon: Luis Moss DO;  [facility];  Service: General       Family History   Problem Relation Age of Onset    Cancer Mother         gastrc    Colon cancer Father     Heart failure Father      I have reviewed and agree with the history as documented  E-Cigarette/Vaping    E-Cigarette Use Never User      E-Cigarette/Vaping Substances    Nicotine No     THC Yes     CBD Yes     Flavoring No     Other No     Unknown No      Social History     Tobacco Use    Smoking status: Never Smoker    Smokeless tobacco: Never Used   Vaping Use    Vaping Use: Never used   Substance Use Topics    Alcohol use: Not Currently     Alcohol/week: 0 0 standard drinks    Drug use: Yes     Types: Marijuana     Comment: medical        Review of Systems   Constitutional: Positive for chills  Negative for fever  HENT: Negative for ear pain and sore throat  Eyes: Negative for pain and visual disturbance  Respiratory: Positive for cough  Negative for shortness of breath  Cardiovascular: Negative for chest pain and palpitations  Gastrointestinal: Negative for abdominal pain and vomiting  Genitourinary: Negative for dysuria and hematuria  Musculoskeletal: Negative for arthralgias and back pain  Skin: Positive for rash  Negative for color change  Neurological: Negative for seizures and syncope  All other systems reviewed and are negative        Physical Exam  ED Triage Vitals   Temperature Pulse Respirations Blood Pressure SpO2   09/24/22 2101 09/24/22 2058 09/24/22 2058 09/24/22 2058 09/24/22 2058   99 7 °F (37 6 °C) (!) 129 20 (!) 145/114 100 %      Temp Source Heart Rate Source Patient Position - Orthostatic VS BP Location FiO2 (%)   09/24/22 2058 09/24/22 2058 09/24/22 2058 09/24/22 2058 --   Oral Monitor Sitting Right arm       Pain Score       --                    Orthostatic Vital Signs  Vitals:    09/24/22 2058 09/24/22 2229 09/24/22 2315   BP: (!) 145/114 (!) 196/104 148/77   Pulse: (!) 129 (!) 133 (!) 130   Patient Position - Orthostatic VS: Sitting Sitting Lying       Physical Exam  Vitals and nursing note reviewed  Constitutional:       Appearance: He is obese  HENT:      Head: Normocephalic and atraumatic  Right Ear: External ear normal       Left Ear: External ear normal       Mouth/Throat:      Mouth: Mucous membranes are moist       Pharynx: Oropharynx is clear  Eyes:      General: No scleral icterus  Conjunctiva/sclera: Conjunctivae normal    Cardiovascular:      Rate and Rhythm: Normal rate and regular rhythm  Pulses: Normal pulses  Heart sounds: Normal heart sounds  Pulmonary:      Effort: Pulmonary effort is normal  No respiratory distress  Breath sounds: Normal breath sounds  No wheezing, rhonchi or rales  Abdominal:      General: Abdomen is flat  There is no distension  Palpations: Abdomen is soft  Tenderness: There is no abdominal tenderness  Genitourinary:     Comments: Patient did not have any tenderness to palpation of the perineum, scrotum, penis, no sign of Anna's gangrene  Musculoskeletal:         General: No tenderness or signs of injury  Cervical back: Neck supple  No rigidity  Skin:     General: Skin is warm  Coloration: Skin is not jaundiced  Findings: Erythema and rash present  Comments: Patient has a large area of erythema overlying his left medial thigh consistent with cellulitis, I do not appreciate crepitus in the area, he does have tenderness to palpation of the region  Neurological:      General: No focal deficit present  Mental Status: He is alert  Mental status is at baseline     Psychiatric:         Mood and Affect: Mood normal          Behavior: Behavior normal          ED Medications  Medications   vancomycin (VANCOCIN) 1500 mg in sodium chloride 0 9% 250 mL IVPB (has no administration in time range)     Followed by   vancomycin (VANCOCIN) 1500 mg in sodium chloride 0 9% 250 mL IVPB (has no administration in time range) acetaminophen (TYLENOL) tablet 650 mg (has no administration in time range)   lactated ringers bolus 1,000 mL (has no administration in time range)   piperacillin-tazobactam (ZOSYN) 4 5 g in sodium chloride 0 9 % 100 mL IVPB (0 g Intravenous Stopped 9/24/22 2323)   sodium chloride 0 9 % bolus 1,000 mL (1,000 mL Intravenous New Bag 9/24/22 2219)       Diagnostic Studies  Results Reviewed     Procedure Component Value Units Date/Time    COVID only [[id number]]  (Normal) Collected: 09/24/22 2216    Lab Status: Final result Specimen: Nares from Nose Updated: 09/24/22 2326     SARS-CoV-2 Negative    Narrative:      FOR PEDIATRIC PATIENTS - copy/paste COVID Guidelines URL to browser: https://pyco/  [employer]    SARS-CoV-2 assay is a Nucleic Acid Amplification assay intended for the  qualitative detection of nucleic acid from SARS-CoV-2 in nasopharyngeal  swabs  Results are for the presumptive identification of SARS-CoV-2 RNA  Positive results are indicative of infection with SARS-CoV-2, the virus  causing COVID-19, but do not rule out bacterial infection or co-infection  with other viruses  Laboratories within the [place] and its  territories are required to report all positive results to the appropriate  public health authorities  Negative results do not preclude SARS-CoV-2  infection and should not be used as the sole basis for treatment or other  patient management decisions  Negative results must be combined with  clinical observations, patient history, and epidemiological information  This test has not been FDA cleared or approved  This test has been authorized by FDA under an Emergency Use Authorization  (EUA)   This test is only authorized for the duration of time the  declaration that circumstances exist justifying the authorization of the  emergency use of an in vitro diagnostic tests for detection of SARS-CoV-2  virus and/or diagnosis of COVID-19 infection under section 564(b)(1) of  the Act, 21 U  S C  051MKF-1(W)(6), unless the authorization is terminated  or revoked sooner  The test has been validated but independent review by FDA  and CLIA is pending  Test performed using [employer] GeneXpert: This RT-PCR assay targets N2,  a region unique to SARS-CoV-2  A conserved region in the E-gene was chosen  for pan-Sarbecovirus detection which includes SARS-CoV-2  According to CMS-2020-01-R, this platform meets the definition of high-throughput technology  Procalcitonin [[id number]]  (Abnormal) Collected: 09/24/22 2216    Lab Status: Final result Specimen: Blood from Arm, Left Updated: 09/24/22 2303     Procalcitonin 0 28 ng/ml     Lactic acid [[id number]]  (Abnormal) Collected: 09/24/22 2216    Lab Status: Final result Specimen: Blood from Arm, Left Updated: 09/24/22 2255     LACTIC ACID 3 4 mmol/L     Narrative:      Result may be elevated if tourniquet was used during collection      Lactic acid 2 Hours [[id number]]     Lab Status: No result Specimen: Blood     Comprehensive metabolic panel [[id number]]  (Abnormal) Collected: 09/24/22 2216    Lab Status: Final result Specimen: Blood from Arm, Left Updated: 09/24/22 2252     Sodium 136 mmol/L      Potassium 4 4 mmol/L      Chloride 97 mmol/L      CO2 30 mmol/L      ANION GAP 9 mmol/L      BUN 13 mg/dL      Creatinine 1 09 mg/dL      Glucose 199 mg/dL      Calcium 9 7 mg/dL      AST 27 U/L      ALT 17 U/L      Alkaline Phosphatase 76 U/L      Total Protein 8 6 g/dL      Albumin 3 5 g/dL      Total Bilirubin 0 68 mg/dL      eGFR 76 ml/min/1 73sq m     Narrative:      [place] guidelines for Chronic Kidney Disease (CKD):     Stage 1 with normal or high GFR (GFR > 90 mL/min/1 73 square meters)    Stage 2 Mild CKD (GFR = 60-89 mL/min/1 73 square meters)    Stage 3A Moderate CKD (GFR = 45-59 mL/min/1 73 square meters)    Stage 3B Moderate CKD (GFR = 30-44 mL/min/1 73 square meters)   Stage 4 Severe CKD (GFR = 15-29 mL/min/1 73 square meters)    Stage 5 End Stage CKD (GFR <15 mL/min/1 73 square meters)  Note: GFR calculation is accurate only with a steady state creatinine    Protime-INR [[id number]]  (Normal) Collected: 09/24/22 2216    Lab Status: Final result Specimen: Blood from Arm, Left Updated: 09/24/22 2248     Protime 13 9 seconds      INR 1 05    APTT [[id number]]  (Normal) Collected: 09/24/22 2216    Lab Status: Final result Specimen: Blood from Arm, Left Updated: 09/24/22 2248     PTT 27 seconds     UA w Reflex to Microscopic w Reflex to Culture [[id number]] Collected: 09/24/22 2232    Lab Status: Final result Specimen: Urine Updated: 09/24/22 2241     Color, UA Light Yellow     Clarity, UA Clear     Specific Gravity, UA 1 014     pH, UA 5 5     Leukocytes, UA Negative     Nitrite, UA Negative     Protein, UA Negative mg/dl      Glucose, UA Negative mg/dl      Ketones, UA Negative mg/dl      Urobilinogen, UA <2 0 mg/dl      Bilirubin, UA Negative     Occult Blood, UA Negative    CBC and differential [[id number]]  (Abnormal) Collected: 09/24/22 2216    Lab Status: Final result Specimen: Blood from Arm, Left Updated: 09/24/22 2235     WBC 11 61 Thousand/uL      RBC 4 63 Million/uL      Hemoglobin 13 7 g/dL      Hematocrit 43 8 %      MCV 95 fL      MCH 29 6 pg      MCHC 31 3 g/dL      RDW 15 8 %      MPV 8 3 fL      Platelets 869 Thousands/uL      nRBC 0 /100 WBCs      Neutrophils Relative 83 %      Immat GRANS % 1 %      Lymphocytes Relative 7 %      Monocytes Relative 6 %      Eosinophils Relative 2 %      Basophils Relative 1 %      Neutrophils Absolute 9 69 Thousands/µL      Immature Grans Absolute 0 07 Thousand/uL      Lymphocytes Absolute 0 84 Thousands/µL      Monocytes Absolute 0 69 Thousand/µL      Eosinophils Absolute 0 21 Thousand/µL      Basophils Absolute 0 11 Thousands/µL     Blood culture #1 [[id number]] Collected: 09/24/22 2222    Lab Status:  In process Specimen: Blood from Arm, Left Updated: 09/24/22 2233    Blood culture #2 [[id number]] Collected: 09/24/22 2216    Lab Status: In process Specimen: Blood from Arm, Left Updated: 09/24/22 2233                 XR chest portable   ED Interpretation by Christophe Mckinley DO (09/24 2204)   Cardiomegaly, haziness at the lung bases            Procedures  ECG 12 Lead Documentation Only    Date/Time: 9/24/2022 10:51 PM  Performed by: Christophe Mckinley DO  Authorized by: Christophe Mckinley DO     Indications / Diagnosis:  Tachycardia  ECG reviewed by me, the ED Provider: yes    Patient location:  ED  Interpretation:     Interpretation: normal    Rate:     ECG rate:  134    ECG rate assessment: tachycardic    Rhythm:     Rhythm: sinus rhythm    Ectopy:     Ectopy: none    QRS:     QRS intervals:  Normal  Conduction:     Conduction: normal    ST segments:     ST segments:  Normal  T waves:     T waves: normal    Comments:      Sinus tachycardia          ED Course                          Initial Sepsis Screening     Row Name 09/24/22 2244                Is the patient's history suggestive of a new or worsening infection? Yes (Proceed)  -CR        Suspected source of infection soft tissue  -CR        Are two or more of the following signs & symptoms of infection both present and new to the patient? Yes (Proceed)  -CR        Indicate SIRS criteria Tachycardia > 90 bpm;Tachypnea > 20 resp per min  -CR        If the answer is yes to both questions, suspicion of sepsis is present --        If severe sepsis is present AND tissue hypoperfusion perists in the hour after fluid resuscitation or lactate > 4, the patient meets criteria for SEPTIC SHOCK --        Are any of the following organ dysfunction criteria present within 6 hours of suspected infection and SIRS criteria that are NOT considered to be chronic conditions?  Yes  -CR        Organ dysfunction Lactate > 2 0 mmol/L  -CR        Date of presentation of severe sepsis 09/24/22 -CR        Time of presentation of severe sepsis 2300  -CR        Tissue hypoperfusion persists in the hour after crystalloid fluid administration, evidenced, by either: --        Was hypotension present within one hour of the conclusion of crystalloid fluid administration? --        Date of presentation of septic shock --        Time of presentation of septic shock --              User Key  (r) = Recorded By, (t) = Taken By, (c) = Cosigned By    [address] Name Provider Type    CR Alf Lauren DO Resident                          MDM  Number of Diagnoses or Management Options  Cellulitis of left thigh: new and requires workup  Severe sepsis [facility]): new and requires workup  Risk of Complications, Morbidity, and/or Mortality  General comments: Beka Carlos is a 56year old male presenting for evaluation of suspected left lower extremity cellulitis with associated chills  He was recently hospitalized several weeks ago for the same complaint, he was discharged with a course of antibiotics, but he believes the course was not long enough  On arrival, the patient was tachycardic and tachypneic  On exam the patient did have erythema of the left inner thigh, he was tender to palpation in this area, the overlying skin was warm, I did not appreciate any crepitus,  exam was normal, no sign of Anna's gangrene  No other signs of rash or erythema on the body  Had concerns for sepsis secondary to cellulitis  Sepsis workup was initiated, patient was found to have severe sepsis with elevated lactic acid of 3 4  Patient was given sepsis IV fluid, also given vancomycin and Zosyn for treatment of the cellulitis  Patient did develop a fever here in the emergency department was also given Tylenol  Patient to be admitted for further treatment of his severe sepsis secondary to the cellulitis      Patient Progress  Patient progress: stable      Disposition  Final diagnoses:   Cellulitis of left thigh   Severe sepsis [facility])     Time reflects when diagnosis was documented in both MDM as applicable and the Disposition within this note     Time User Action Codes Description Comment    9/24/2022 10:15 PM [address], [place] Add [L03 116] Cellulitis of left leg     9/24/2022 10:57 PM Alf Hernandez Add [L03 116] Cellulitis of left thigh     9/24/2022 10:57 PM Alf Hernandez Modify [L03 116] Cellulitis of left thigh     9/24/2022 10:57 PM Alf Hernandez Remove [L03 116] Cellulitis of left leg     9/24/2022 10:57 PM Alf eHrnandez Add [A41 9,  R65 20] Severe sepsis [facility])       ED Disposition     ED Disposition   Admit    Condition   Stable    Date/Time   Sat Sep 24, 2022 11:21 PM    Comment   Case was discussed with Aaron Jefferson and the patient's admission status was agreed to be Admission Status: inpatient status to the service of Dr Nathen Monroe  Follow-up Information    None         Patient's Medications   Discharge Prescriptions    No medications on file     No discharge procedures on file  PDMP Review       Value Time User    PDMP Reviewed  Yes 9/12/2022 12:24 PM Keith Alcala PA-C           ED Provider  Attending physically available and evaluated Tmi Shepard I managed the patient along with the ED Attending      Electronically Signed by         Iveth Sanchez DO  09/24/22 1791

## 2022-09-25 NOTE — ASSESSMENT & PLAN NOTE
 SIRS criteria: Tachycardia, hyperthermia, tachypnea   Suspected source: Cellulitis   Lactic acid: 3 4   End organ damage:    IV Fluids: Received 1 L of LR in ED   IV antibiotics: IV Zosyn and IV Vancomycin in ED  Continue IV Ancef   Follow up on culture results   Monitor vital signs, laboratory studies

## 2022-09-25 NOTE — ASSESSMENT & PLAN NOTE
· Patient recently admitted at [place] [address] from 09/06-09/12 for the same complaint of left thigh cellulitis  Patient reports recurrent cellulitis due to lymphedema and chronic fungal infections  Patient initially received IV Ancef and was then switched to IV Cefepime per ID  Patient was then discharged home on PO Levaquin which was completed on 09/16 for a total 10 day course of antibiotics  · Patient returns today with complaints of worsening redness and swelling of left thigh and chills over the past day  · Received IV Zosyn and IV Vancomycin in ED  · Prior wound cultures grew Gram-negative lorena including Pseudomonas and Proteus    · Continue IV Cefepime

## 2022-09-25 NOTE — SEPSIS NOTE
Sepsis Note   Spencer Molina 56 y o  male MRN: [id number]  Unit/Bed#: ED-39 Encounter: [id number]       qSOFA     [address] Name 09/24/22 2229 09/24/22 2058             Altered mental status GCS < 15 -- --       Respiratory Rate > / =98 1 0       Systolic BP < / =429 0 0       Q Sofa Score 1 0                  Initial Sepsis Screening     Row Name 09/24/22 2244                Is the patient's history suggestive of a new or worsening infection? Yes (Proceed)  -CR        Suspected source of infection soft tissue  -CR        Are two or more of the following signs & symptoms of infection both present and new to the patient? Yes (Proceed)  -CR        Indicate SIRS criteria Tachycardia > 90 bpm;Tachypnea > 20 resp per min  -CR        If the answer is yes to both questions, suspicion of sepsis is present --        If severe sepsis is present AND tissue hypoperfusion perists in the hour after fluid resuscitation or lactate > 4, the patient meets criteria for SEPTIC SHOCK --        Are any of the following organ dysfunction criteria present within 6 hours of suspected infection and SIRS criteria that are NOT considered to be chronic conditions?  Yes  -CR        Organ dysfunction Lactate > 2 0 mmol/L  -CR        Date of presentation of severe sepsis 09/24/22  -CR        Time of presentation of severe sepsis 2300  -CR        Tissue hypoperfusion persists in the hour after crystalloid fluid administration, evidenced, by either: --        Was hypotension present within one hour of the conclusion of crystalloid fluid administration? --        Date of presentation of septic shock --        Time of presentation of septic shock --              User Key  (r) = Recorded By, (t) = Taken By, (c) = Cosigned By    [address] Name Provider Type    CR DO Mingo Nicole

## 2022-09-25 NOTE — H&P
[address] [facility],[address] 1965, 56 y o  male MRN: [id number]  Unit/Bed#: W -59 Encounter: [id number]  Primary Care Provider: Angela Logan MD   Date and time admitted to hospital: 9/24/2022  8:56 PM    * Severe sepsis [facility])  Assessment & Plan   SIRS criteria: Tachycardia, hyperthermia, tachypnea   Suspected source: Cellulitis   Lactic acid: 3 4   End organ damage:    IV Fluids: Received 1 L of LR in ED   IV antibiotics: IV Zosyn and IV Vancomycin in ED  Continue IV Ancef   Follow up on culture results   Monitor vital signs, laboratory studies  Cellulitis of left thigh  Assessment & Plan  · Patient recently admitted at [address] from 09/06-09/12 for the same complaint of left thigh cellulitis  Patient reports recurrent cellulitis due to lymphedema and chronic fungal infections  Patient initially received IV Ancef and was then switched to IV Cefepime per ID  Patient was then discharged home on PO Levaquin which was completed on 09/16 for a total 10 day course of antibiotics  · Patient returns today with complaints of worsening redness and swelling of left thigh and chills over the past day  · Received IV Zosyn and IV Vancomycin in ED  · Continue IV Cefepime  · ID Consult  · Wound care consult  Headache  Assessment & Plan  · Patient reports 7/10 headache  · Headache cocktail including IV Toradol, IV Benadryl and IV Reglan  Type 2 diabetes mellitus with hyperglycemia (HCC)  Assessment & Plan  Lab Results   Component Value Date    HGBA1C 6 9 (H) 09/16/2022       No results for input(s): POCGLU in the last 72 hours  Blood Sugar Average: Last 72 hrs:  · Continue PTA insulin regimen of 44 units of Lantus HS and 14 units of Lispro tid with meals  · Accu-checks and SSI  · Hypoglycemia protocol  DAHIANA (obstructive sleep apnea)  Assessment & Plan  · CPAP HS      Morbid obesity  Assessment & Plan  · Encouraged lifestyle modifications  GERD (gastroesophageal reflux disease)  Assessment & Plan  · Continue PPI  Hypothyroidism  Assessment & Plan  · Continue levothyroxine  Essential hypertension  Assessment & Plan   Blood pressure initially hypertensive on arrival with highest reading of 196/104; BP improved spontaneously suspect in the setting of pain on arrival   o Last recorded pressure: 136/84   Continue Losartan and Lasix   Monitor blood pressure  Hyperlipidemia  Assessment & Plan  · Continue Zetia  VTE Pharmacologic Prophylaxis: VTE Score: 6 High Risk (Score >/= 5) - Pharmacological DVT Prophylaxis Ordered: heparin  Sequential Compression Devices Ordered  Code Status: Level 1 - Full Code per patient  Discussion with family: Updated  (wife and friend) at bedside  Anticipated Length of Stay: Patient will be admitted on an inpatient basis with an anticipated length of stay of greater than 2 midnights secondary to severe sepsis d/t left thigh cellulitis  Total Time for Visit, including Counseling / Coordination of Care: 70 minutes Greater than 50% of this total time spent on direct patient counseling and coordination of care  Chief Complaint: Left thigh redness and swelling    History of Present Illness:  Boni Jacobs is a 56 y o  male with a PMH of morbid obesity, IDDM2, HTN, GERD, HLD, hypothyroidism and DAHIANA who presents with worsening redness and swelling of left thigh for one day  Of note, patient recently admitted at [address] from 09/06-09/12 for the same complaint of left thigh cellulitis  Patient reports recurrent cellulitis due to lymphedema and chronic fungal infections  Patient initially received IV Ancef and was then switched to IV Cefepime per ID  Patient was then discharged home on PO Levaquin which was completed on 09/16 for a total 10 day course of antibiotics      Upon arrival to ED, patient severe sepsis criteria due to tachycardia, hypothermia, tachypnea and elevated lactic acid with suspected source being left thigh cellulitis  Patient will be admitted for IV antibiotics, id consult and wound care consult  Review of Systems:  Review of Systems   Constitutional: Positive for chills  Respiratory: Positive for cough  Negative for shortness of breath  Cardiovascular: Negative for chest pain  Gastrointestinal: Positive for nausea  Negative for vomiting  Musculoskeletal: Positive for myalgias  Skin: Positive for color change and wound  Neurological: Positive for headaches  All other systems reviewed and are negative        Past Medical and Surgical History:   Past Medical History:   Diagnosis Date    Acute kidney injury 10/28/2021    Anemia 09/13/2019    Cellulitis     last assessed 12/10/15    Cellulitis of left lower extremity     Cellulitis of right lower extremity 11/19/2021    Cough 10/20/2019    Diabetes mellitus ([address] )     Disease of thyroid gland     Edema     Elevated liver enzymes     Elevated serum creatinine 11/19/2021    Esophageal reflux     Fungal infection 8/16/2021    Gluten intolerance     Gout     last assessed 09/05/13    Hyperglycemia     Hypertension     Hyponatremia 9/6/2019    IBS (irritable bowel syndrome)     Insomnia     Obesity     Osteoarthritis of knee     last assessed 02/10/14    Scrotal swelling 5/19/2020    Shortness of breath 5/3/2021    Venous insufficiency     last assessed 08/22/17    Villonodular synovitis of the hand, right     last assessed 11/14/2013       Past Surgical History:   Procedure Laterality Date    INCISION AND DRAINAGE OF WOUND Left 9/6/2019    Procedure: INCISION AND DRAINAGE (I&D) GROIN;  Surgeon: Artis Pickett DO;  [facility];  Service: General    SKIN BIOPSY      WOUND DEBRIDEMENT Left 9/7/2019    Procedure: EXCISIONAL DEBRIDEMENT;  Surgeon: Artis Pickett DO;  [facility];  Service: General       Meds/Allergies:  Prior to Admission medications Medication Sig Start Date End Date Taking?  Authorizing Provider   acetaminophen (TYLENOL) 325 mg tablet Take 2 tablets (650 mg total) by mouth every 4 (four) hours as needed for mild pain, headaches or fever 11/1/21   Glenis Beverage, DO   albuterol (PROVENTIL HFA,VENTOLIN HFA) 90 mcg/act inhaler TAKE 2 PUFFS BY MOUTH EVERY 6 HOURS AS NEEDED FOR WHEEZE 3/7/22   Bárbara Quarles PA-C   BD Pen Needle Ludmila 2nd Gen 32G X 4 MM MISC USE 4 TIMES A DAY 4/6/22   Vernon Tinoco MD   benzonatate (TESSALON PERLES) 100 mg capsule Take 1 capsule (100 mg total) by mouth 3 (three) times a day as needed for cough 9/12/22   Pop Yoo PA-C   Blood Glucose Monitoring Suppl (Jennifer Enrique) w/Device KIT Use to test sugar daily  Patient not taking: Reported on 8/5/2022 7/21/20   Micha Mayorga MD   Calcium Alginate (Maxorb II Alginate Dressing) MISC Apply 1 each topically in the morning 2/16/22   Deepa Silva MD   Continuous Blood Gluc  (Stealth10 2 [employer]) BROOKS Use 1 Device as needed (use with sensors for bs checks) 12/28/20   Vernon Tinoco MD   Continuous Blood Gluc Sensor (FreeStyle Elvin 14 Day Sensor) MISC Use as directed 3 times a day 6/28/22   Deepa Silva MD   Control Gel Formula Dressing (DuoDERM CGF Dressing) MISC Apply 1 application topically every 5 (five) days 4/16/21   Deepa Silva MD   [employer] Diclofenac Sodium 1 % APPLY 4 G TOPICALLY 4 (FOUR) TIMES A DAY 7/25/22   Deepa Silva MD   ezetimibe (ZETIA) 10 mg tablet Take 1 tablet (10 mg total) by mouth daily 7/25/22   Deepa Silva MD   furosemide (LASIX) 20 mg tablet TAKE 2 TABLETS EVERY DAY IN THE EARLY MORNING AND 1 TABLET DAILY AFTER LUNCH  5/3/22   Deepa Silva MD   gabapentin (NEURONTIN) 100 mg capsule TAKE 1 CAPSULE BY MOUTH TWICE A DAY WITH 300MG CAPSULE 4/26/22   Roldan Urban MD   gabapentin (NEURONTIN) 300 mg capsule TAKE 1 CAPSULE (300 MG TOTAL) BY MOUTH 2 (TWO) TIMES A DAY TAKE 1 TAB WITH YOUR 100MG TAB TWICE DAILY 7/12/22   Mi Head MD   Gauze Pads & Dressings 5"X5" PADS Use in the morning 2/16/22   Mi Head MD   insulin aspart (NovoLOG FlexPen) 100 UNIT/ML injection pen INJECT 18 UNITS WITH MEALS+SCALE ( - 150-200 -2 UNITS, 201-250-4 UNITS, 251-300 -6 UNITS, 301-350-8 UNITS, > 350- 10 UNITS ) 5/7/22   Mi Head MD   insulin glargine (Lantus SoloStar) 100 units/mL injection pen Inject 44 Units under the skin daily at bedtime 6/28/22   Mi Head MD   Lancets [facility] ULTRASOFT) lancets Use to test sugar 2 times a day 7/21/20   Manfred Hernandez MD   levothyroxine (Euthyrox) 25 mcg tablet Take 1 tablet (25 mcg total) by mouth daily in the early morning 6/28/22   Mi Head MD   losartan (COZAAR) 25 mg tablet Take 1 tablet (25 mg total) by mouth daily 8/10/22   Mi Head MD   metFORMIN (GLUCOPHAGE) 1000 MG tablet Take 1 tablet (1,000 mg total) by mouth 2 (two) times a day 6/16/22   Mi Head MD   nystatin (MYCOSTATIN) cream Apply topically 2 (two) times a day as needed (itching, redness) 4/21/22   Brandyn Rehman MD   nystatin (MYCOSTATIN) powder Apply 1 application topically 2 (two) times a day To affected area 4/21/22   Brandyn Rehman MD   omeprazole (PriLOSEC) 40 MG capsule TAKE 1 CAPSULE BY MOUTH TWICE A DAY 5/23/22   Chiquita Eduardo MD   OneTouch Verio test strip USE TO TEST SUGAR 2 TIMES A DAY 9/16/22   Rafa Sahni MD   oxyCODONE (ROXICODONE) 5 immediate release tablet Take 1-2 tabs by mouth every 6 hours as needed for severe pain as needed  9/12/22   Delbert Romero PA-C   psyllium (METAMUCIL) packet Take 1 packet by mouth daily 10/23/19   [place] Lyme, PA-C   saccharomyces boulardii (FLORASTOR) 250 mg capsule Take 1 capsule (250 mg total) by mouth 2 (two) times a day 4/6/21   Mi Head MD   Skin Protectants, Misc   ([employer] [id number]") SHEE Apply 1 each topically every 5 (five) days 12/1/21   Mirna Ramírez MD   sodium hypochlorite (DAKIN'S QUARTER-STRENGTH) Irrigate with 1 application as directed daily 11/2/21   Kade Poon DO     I have reviewed home medications with patient personally  Allergies: Allergies   Allergen Reactions    Gluten Meal - Food Allergy     Clindamycin Diarrhea       Social History:  Marital Status: /Civil Union   Occupation: Unknown  Patient Pre-hospital Living Situation: Home, With spouse  Patient Pre-hospital Level of Mobility: unable to be assessed at time of evaluation  Patient Pre-hospital Diet Restrictions: Diabetic  Substance Use History:   Social History     Substance and Sexual Activity   Alcohol Use Not Currently    Alcohol/week: 0 0 standard drinks     Social History     Tobacco Use   Smoking Status Never Smoker   Smokeless Tobacco Never Used     Social History     Substance and Sexual Activity   Drug Use Yes    Types: Marijuana    Comment: medical       Family History:  Family History   Problem Relation Age of Onset    Cancer Mother         gastrc    Colon cancer Father     Heart failure Father        Physical Exam:     Vitals:   Blood Pressure: 136/84 (09/25/22 0144)  Pulse: (!) 125 (09/25/22 0144)  Temperature: 98 2 °F (36 8 °C) (09/25/22 0144)  Temp Source: Oral (09/24/22 2301)  Respirations: 16 (09/25/22 0144)  SpO2: 96 % (09/25/22 0144)    Physical Exam  Vitals and nursing note reviewed  Constitutional:       General: He is not in acute distress  Appearance: He is obese  He is not ill-appearing or diaphoretic  HENT:      Head: Normocephalic  Nose: Nose normal       Mouth/Throat:      Pharynx: Oropharynx is clear  Eyes:      General: No scleral icterus  Conjunctiva/sclera: Conjunctivae normal    Cardiovascular:      Rate and Rhythm: Regular rhythm  Tachycardia present  Pulses:           Posterior tibial pulses are 1+ on the right side and 1+ on the left side  Heart sounds: Normal heart sounds  Pulmonary:      Effort: Pulmonary effort is normal  No respiratory distress  Breath sounds: Normal breath sounds  No wheezing, rhonchi or rales  Chest:      Chest wall: No tenderness  Abdominal:      General: Bowel sounds are normal  There is no distension  Palpations: Abdomen is soft  Tenderness: There is no abdominal tenderness  Musculoskeletal:         General: Swelling (Left thigh) present  Cervical back: Normal range of motion  Right lower le+ Pitting Edema present  Left lower le+ Pitting Edema present  Skin:     General: Skin is warm  Capillary Refill: Capillary refill takes 2 to 3 seconds  Findings: Erythema (Left thigh) present  Neurological:      Mental Status: He is alert and oriented to person, place, and time  Psychiatric:         Speech: Speech normal           Additional Data:     Lab Results:  Results from last 7 days   Lab Units 22  2216   WBC Thousand/uL 11 61*   HEMOGLOBIN g/dL 13 7   HEMATOCRIT % 43 8   PLATELETS Thousands/uL 296   NEUTROS PCT % 83*   LYMPHS PCT % 7*   MONOS PCT % 6   EOS PCT % 2     Results from last 7 days   Lab Units 22  2216   SODIUM mmol/L 136   POTASSIUM mmol/L 4 4   CHLORIDE mmol/L 97   CO2 mmol/L 30   BUN mg/dL 13   CREATININE mg/dL 1 09   ANION GAP mmol/L 9   CALCIUM mg/dL 9 7   ALBUMIN g/dL 3 5   TOTAL BILIRUBIN mg/dL 0 68   ALK PHOS U/L 76   ALT U/L 17   AST U/L 27   GLUCOSE RANDOM mg/dL 199*     Results from last 7 days   Lab Units 22  2216   INR  1 05             Results from last 7 days   Lab Units 22  2216   LACTIC ACID mmol/L 3 4*   PROCALCITONIN ng/ml 0 28*       Imaging: Personally reviewed the following imaging: chest xray  XR chest portable   ED Interpretation by Faizan Mckeon DO ([id number])   Cardiomegaly, haziness at the lung bases          EKG and Other Studies Reviewed on Admission:   · EKG: Sinus Tachycardia        ** Please Note: This note has been constructed using a voice recognition system   **

## 2022-09-25 NOTE — ASSESSMENT & PLAN NOTE
· Patient recently admitted at [address] from 09/06-09/12 for the same complaint of left thigh cellulitis  Patient reports recurrent cellulitis due to lymphedema and chronic fungal infections  Patient initially received IV Ancef and was then switched to IV Cefepime per ID  Patient was then discharged home on PO Levaquin which was completed on 09/16 for a total 10 day course of antibiotics  · Patient returns today with complaints of worsening redness and swelling of left thigh and chills over the past day  · Received IV Zosyn and IV Vancomycin in ED  · Continue IV Cefepime  · ID Consult  · Wound care consult

## 2022-09-26 VITALS
RESPIRATION RATE: 18 BRPM | HEART RATE: 98 BPM | TEMPERATURE: 97.1 F | OXYGEN SATURATION: 98 % | DIASTOLIC BLOOD PRESSURE: 88 MMHG | SYSTOLIC BLOOD PRESSURE: 148 MMHG

## 2022-09-26 LAB
ANION GAP SERPL CALCULATED.3IONS-SCNC: 4 MMOL/L (ref 4–13)
BASOPHILS # BLD AUTO: 0.06 THOUSANDS/ΜL (ref 0–0.1)
BASOPHILS NFR BLD AUTO: 1 % (ref 0–1)
BUN SERPL-MCNC: 20 MG/DL (ref 5–25)
CALCIUM SERPL-MCNC: 8.5 MG/DL (ref 8.4–10.2)
CHLORIDE SERPL-SCNC: 102 MMOL/L (ref 96–108)
CO2 SERPL-SCNC: 30 MMOL/L (ref 21–32)
CREAT SERPL-MCNC: 1.09 MG/DL (ref 0.6–1.3)
EOSINOPHIL # BLD AUTO: 0.71 THOUSAND/ΜL (ref 0–0.61)
EOSINOPHIL NFR BLD AUTO: 10 % (ref 0–6)
ERYTHROCYTE [DISTWIDTH] IN BLOOD BY AUTOMATED COUNT: 15.8 % (ref 11.6–15.1)
GFR SERPL CREATININE-BSD FRML MDRD: 76 ML/MIN/1.73SQ M
GLUCOSE SERPL-MCNC: 127 MG/DL (ref 65–140)
GLUCOSE SERPL-MCNC: 147 MG/DL (ref 65–140)
GLUCOSE SERPL-MCNC: 167 MG/DL (ref 65–140)
GLUCOSE SERPL-MCNC: 175 MG/DL (ref 65–140)
GLUCOSE SERPL-MCNC: 195 MG/DL (ref 65–140)
GLUCOSE SERPL-MCNC: 206 MG/DL (ref 65–140)
HCT VFR BLD AUTO: 35.6 % (ref 36.5–49.3)
HGB BLD-MCNC: 11.3 G/DL (ref 12–17)
IMM GRANULOCYTES # BLD AUTO: 0.03 THOUSAND/UL (ref 0–0.2)
IMM GRANULOCYTES NFR BLD AUTO: 0 % (ref 0–2)
LYMPHOCYTES # BLD AUTO: 1.59 THOUSANDS/ΜL (ref 0.6–4.47)
LYMPHOCYTES NFR BLD AUTO: 22 % (ref 14–44)
MCH RBC QN AUTO: 29.7 PG (ref 26.8–34.3)
MCHC RBC AUTO-ENTMCNC: 31.7 G/DL (ref 31.4–37.4)
MCV RBC AUTO: 93 FL (ref 82–98)
MONOCYTES # BLD AUTO: 0.75 THOUSAND/ΜL (ref 0.17–1.22)
MONOCYTES NFR BLD AUTO: 11 % (ref 4–12)
NEUTROPHILS # BLD AUTO: 3.95 THOUSANDS/ΜL (ref 1.85–7.62)
NEUTS SEG NFR BLD AUTO: 56 % (ref 43–75)
NRBC BLD AUTO-RTO: 0 /100 WBCS
PLATELET # BLD AUTO: 201 THOUSANDS/UL (ref 149–390)
PMV BLD AUTO: 8.1 FL (ref 8.9–12.7)
POTASSIUM SERPL-SCNC: 3.9 MMOL/L (ref 3.5–5.3)
RBC # BLD AUTO: 3.81 MILLION/UL (ref 3.88–5.62)
SODIUM SERPL-SCNC: 136 MMOL/L (ref 135–147)
WBC # BLD AUTO: 7.09 THOUSAND/UL (ref 4.31–10.16)

## 2022-09-26 PROCEDURE — 82948 REAGENT STRIP/BLOOD GLUCOSE: CPT

## 2022-09-26 PROCEDURE — 99232 SBSQ HOSP IP/OBS MODERATE 35: CPT | Performed by: INTERNAL MEDICINE

## 2022-09-26 PROCEDURE — 99233 SBSQ HOSP IP/OBS HIGH 50: CPT | Performed by: INTERNAL MEDICINE

## 2022-09-26 PROCEDURE — 80048 BASIC METABOLIC PNL TOTAL CA: CPT

## 2022-09-26 PROCEDURE — 85025 COMPLETE CBC W/AUTO DIFF WBC: CPT

## 2022-09-26 RX ADMIN — BENZONATATE 100 MG: 100 CAPSULE ORAL at 08:44

## 2022-09-26 RX ADMIN — GUAIFENESIN 200 MG: 200 SOLUTION ORAL at 01:12

## 2022-09-26 RX ADMIN — BENZONATATE 100 MG: 100 CAPSULE ORAL at 20:53

## 2022-09-26 RX ADMIN — PSYLLIUM HUSK 1 PACKET: 3.4 POWDER ORAL at 23:13

## 2022-09-26 RX ADMIN — HEPARIN SODIUM 7500 UNITS: 5000 INJECTION INTRAVENOUS; SUBCUTANEOUS at 22:58

## 2022-09-26 RX ADMIN — GABAPENTIN 400 MG: 400 CAPSULE ORAL at 08:28

## 2022-09-26 RX ADMIN — INSULIN LISPRO 18 UNITS: 100 INJECTION, SOLUTION INTRAVENOUS; SUBCUTANEOUS at 13:06

## 2022-09-26 RX ADMIN — CEFEPIME HYDROCHLORIDE 2000 MG: 2 INJECTION, SOLUTION INTRAVENOUS at 08:29

## 2022-09-26 RX ADMIN — INSULIN LISPRO 14 UNITS: 100 INJECTION, SOLUTION INTRAVENOUS; SUBCUTANEOUS at 08:29

## 2022-09-26 RX ADMIN — INSULIN LISPRO 4 UNITS: 100 INJECTION, SOLUTION INTRAVENOUS; SUBCUTANEOUS at 23:00

## 2022-09-26 RX ADMIN — OXYCODONE HYDROCHLORIDE 5 MG: 5 TABLET ORAL at 19:25

## 2022-09-26 RX ADMIN — FUROSEMIDE 20 MG: 20 TABLET ORAL at 14:33

## 2022-09-26 RX ADMIN — CEFEPIME HYDROCHLORIDE 2000 MG: 2 INJECTION, SOLUTION INTRAVENOUS at 17:40

## 2022-09-26 RX ADMIN — HEPARIN SODIUM 7500 UNITS: 5000 INJECTION INTRAVENOUS; SUBCUTANEOUS at 14:33

## 2022-09-26 RX ADMIN — Medication 250 MG: at 08:28

## 2022-09-26 RX ADMIN — Medication 250 MG: at 17:40

## 2022-09-26 RX ADMIN — PANTOPRAZOLE SODIUM 40 MG: 40 TABLET, DELAYED RELEASE ORAL at 05:59

## 2022-09-26 RX ADMIN — OXYCODONE HYDROCHLORIDE 5 MG: 5 TABLET ORAL at 08:44

## 2022-09-26 RX ADMIN — OXYCODONE HYDROCHLORIDE 5 MG: 5 TABLET ORAL at 14:33

## 2022-09-26 RX ADMIN — OXYCODONE HYDROCHLORIDE 5 MG: 5 TABLET ORAL at 02:12

## 2022-09-26 RX ADMIN — LEVOTHYROXINE SODIUM 25 MCG: 25 TABLET ORAL at 05:59

## 2022-09-26 RX ADMIN — EZETIMIBE 10 MG: 10 TABLET ORAL at 08:32

## 2022-09-26 RX ADMIN — FUROSEMIDE 40 MG: 40 TABLET ORAL at 08:28

## 2022-09-26 RX ADMIN — HEPARIN SODIUM 7500 UNITS: 5000 INJECTION INTRAVENOUS; SUBCUTANEOUS at 06:00

## 2022-09-26 RX ADMIN — GUAIFENESIN 200 MG: 200 SOLUTION ORAL at 12:24

## 2022-09-26 RX ADMIN — NYSTATIN: 100000 CREAM TOPICAL at 10:36

## 2022-09-26 RX ADMIN — LOSARTAN POTASSIUM 25 MG: 25 TABLET, FILM COATED ORAL at 08:28

## 2022-09-26 RX ADMIN — INSULIN GLARGINE 44 UNITS: 100 INJECTION, SOLUTION SUBCUTANEOUS at 22:58

## 2022-09-26 RX ADMIN — METOCLOPRAMIDE 10 MG: 5 INJECTION, SOLUTION INTRAMUSCULAR; INTRAVENOUS at 14:33

## 2022-09-26 RX ADMIN — KETOROLAC TROMETHAMINE 15 MG: 30 INJECTION, SOLUTION INTRAMUSCULAR at 01:12

## 2022-09-26 RX ADMIN — GABAPENTIN 400 MG: 400 CAPSULE ORAL at 17:40

## 2022-09-26 RX ADMIN — METOCLOPRAMIDE 10 MG: 5 INJECTION, SOLUTION INTRAMUSCULAR; INTRAVENOUS at 22:58

## 2022-09-26 RX ADMIN — METOCLOPRAMIDE 10 MG: 5 INJECTION, SOLUTION INTRAMUSCULAR; INTRAVENOUS at 06:00

## 2022-09-26 RX ADMIN — INSULIN LISPRO 4 UNITS: 100 INJECTION, SOLUTION INTRAVENOUS; SUBCUTANEOUS at 21:30

## 2022-09-26 RX ADMIN — CEFEPIME HYDROCHLORIDE 2000 MG: 2 INJECTION, SOLUTION INTRAVENOUS at 02:12

## 2022-09-26 RX ADMIN — INSULIN LISPRO 18 UNITS: 100 INJECTION, SOLUTION INTRAVENOUS; SUBCUTANEOUS at 21:32

## 2022-09-26 NOTE — PROGRESS NOTES
[facility]  Progress Note - Emilio Mancini 1965, 56 y o  male MRN: [id number]  Unit/Bed#: W -74 Encounter: [id number]  Primary Care Provider: Inga Noonan MD   Date and time admitted to hospital: 9/24/2022  8:56 PM    * Cellulitis of left thigh  Assessment & Plan  · Patient recently admitted at [place] [address] from 09/06-09/12 for the same complaint of left thigh cellulitis  Patient reports recurrent cellulitis due to lymphedema and chronic fungal infections  Patient initially received IV Ancef and was then switched to IV Cefepime per ID  Patient was then discharged home on PO Levaquin which was completed on 09/16 for a total 10 day course of antibiotics  · Patient returns today with complaints of worsening redness and swelling of left thigh and chills over the past day  · Received IV Zosyn and IV Vancomycin in ED  · Prior wound cultures grew Gram-negative lorena including Pseudomonas and Proteus        · Patient improving this morning  Denies any systemic symptoms of infection and pain/tenderness in leg is well controled  · Continue IV Cefepime day 2      Headache  Assessment & Plan  · Patient reports 7/10 headache  · Headache cocktail including IV Toradol, IV Benadryl and IV Reglan  · Headache now resolved as of this morning  Type 2 diabetes mellitus with hyperglycemia [facility])  Assessment & Plan  Lab Results   Component Value Date    HGBA1C 6 9 (H) 09/16/2022       Recent Labs     09/25/22  1111 09/25/22  1628 09/25/22  2042 09/26/22  0726   POCGLU 120 94 169* 127       Blood Sugar Average: Last 72 hrs:  · (P) 134Continue PTA insulin regimen of 44 units of Lantus HS and 14 units of Lispro tid with meals  · Accu-checks and SSI  · Hypoglycemia protocol  GERD (gastroesophageal reflux disease)  Assessment & Plan  · Continue PPI  Hypothyroidism  Assessment & Plan  · Continue levothyroxine      Morbid obesity  Assessment & Plan  · Encouraged lifestyle modifications  DAHIANA (obstructive sleep apnea)  Assessment & Plan  · CPAP HS  Severe sepsis (HCC)  Assessment & Plan   SIRS criteria: Tachycardia, hyperthermia, tachypnea   Suspected source: Cellulitis   Lactic acid: 3 4   End organ damage:    IV Fluids: Received 1 L of LR in ED   IV antibiotics: IV Zosyn and IV Vancomycin in ED  Transitioned to IV Cefepime based on prior growth  Plan:  - IV Cefepime   - Follow up Blood Cultures  - Monitor vitals      Essential hypertension  Assessment & Plan   Blood pressure initially hypertensive on arrival with highest reading of 196/104; BP improved spontaneously suspect in the setting of pain on arrival   o Last recorded pressure: 148/82   Continue Losartan and Lasix   Monitor blood pressure  Hyperlipidemia  Assessment & Plan  · Continue Zetia  VTE Pharmacologic Prophylaxis:   VTE Score: 6 High Risk (Score >/= 5) - Pharmacological DVT Prophylaxis Ordered: Heparin  Sequential Compression Devices Ordered  Patient Centered Rounds: I have performed bedside rounds with nursing staff today  Discussions with Specialists or Other Care Team Provider: None    Education and Discussions with Family / Patient: Updated  (wife) at bedside  Current Length of Stay: 2 day(s)    Current Patient Status: Inpatient     Discharge Plan / Estimated Discharge Date: Anticipate discharge in 48-72 hrs to home with home services  Code Status: Level 1 - Full Code      Subjective:   Nakia Méndez was resting comfortably in bed this morning when we spoke  He was in good spirits  No acute events overnight  He says that he is in very minimal pain, with just 2/10 tenderness in the left thigh area  Denies any fevers, chills, shortness of breath, chest pain, nausea, vomiting  Says he was able to tolerate bipap overnight  States that he has been able to walk to bathroom with the help of nurses, but otherwise has not been able to get out of bed   States that at baseline he is able to walk without walker short distances and with walker long distances  Objective:     Vitals:   Temp (24hrs), Av 4 °F (36 9 °C), Min:97 9 °F (36 6 °C), Max:99 3 °F (37 4 °C)    Temp:  [97 9 °F (36 6 °C)-99 3 °F (37 4 °C)] 98 1 °F (36 7 °C)  HR:  [] 106  Resp:  [19] 19  BP: (129-156)/() 129/89  SpO2:  [92 %-95 %] 94 %  Body mass index is 78 82 kg/m²  Input and Output Summary (last 24 hours): Intake/Output Summary (Last 24 hours) at 2022 1801  Last data filed at 2022 1735  Gross per 24 hour   Intake --   Output 1530 ml   Net -1530 ml       Physical Exam:     Physical Exam  Vitals and nursing note reviewed  Constitutional:       Appearance: He is well-developed  HENT:      Head: Normocephalic and atraumatic  Eyes:      Conjunctiva/sclera: Conjunctivae normal    Cardiovascular:      Rate and Rhythm: Normal rate and regular rhythm  Heart sounds: No murmur heard  Pulmonary:      Effort: Pulmonary effort is normal  No respiratory distress  Breath sounds: Normal breath sounds  Abdominal:      Palpations: Abdomen is soft  Tenderness: There is no abdominal tenderness  Musculoskeletal:         General: Swelling and tenderness present  Cervical back: Neck supple  Left lower leg: Edema present  Skin:     General: Skin is warm and dry  Neurological:      Mental Status: He is alert            Additional Data:     Labs:  Results from last 7 days   Lab Units 22  0552   WBC Thousand/uL 7 09   HEMOGLOBIN g/dL 11 3*   HEMATOCRIT % 35 6*   PLATELETS Thousands/uL 201   NEUTROS PCT % 56   LYMPHS PCT % 22   MONOS PCT % 11   EOS PCT % 10*     Results from last 7 days   Lab Units 22  0552 22  0430 22  2216   SODIUM mmol/L 136   < > 136   POTASSIUM mmol/L 3 9   < > 4 4   CHLORIDE mmol/L 102   < > 97   CO2 mmol/L 30   < > 30   BUN mg/dL 20   < > 13   CREATININE mg/dL 1 09   < > 1 09   ANION GAP mmol/L 4   < > 9 CALCIUM mg/dL 8 5   < > 9 7   ALBUMIN g/dL  --   --  3 5   TOTAL BILIRUBIN mg/dL  --   --  0 68   ALK PHOS U/L  --   --  76   ALT U/L  --   --  17   AST U/L  --   --  27   GLUCOSE RANDOM mg/dL 147*   < > 199*    < > = values in this interval not displayed  Results from last 7 days   Lab Units 09/24/22  2216   INR  1 05     Results from last 7 days   Lab Units 09/26/22  1617 09/26/22  1124 09/26/22  0726 09/25/22  2042 09/25/22  1628 09/25/22  1111 09/25/22  0737   POC GLUCOSE mg/dl 195* 175* 127 169* 94 120 160*         Results from last 7 days   Lab Units 09/25/22  0924 09/25/22  0430 09/25/22  0415 09/24/22  2216   LACTIC ACID mmol/L 1 4  --  2 2* 3 4*   PROCALCITONIN ng/ml  --  0 48*  --  0 28*       Imaging: Reviewed radiology reports from this admission including: chest xray    Recent Cultures (last 7 days):     Results from last 7 days   Lab Units 09/24/22  2222 09/24/22  2216   BLOOD CULTURE  No Growth at 24 hrs  No Growth at 24 hrs         Lines/Drains:  Invasive Devices  Report    Peripheral Intravenous Line  Duration           Peripheral IV 09/26/22 Distal;Right;Ventral (anterior) Forearm <1 day                Telemetry:        Last 24 Hours Medication List:   Current Facility-Administered Medications   Medication Dose Route Frequency Provider Last Rate    albuterol  2 puff Inhalation Q6H PRN HALEY YouNP      benzonatate  100 mg Oral TID PRN Jaswant Walls, HALEYNP      butalbital-acetaminophen-caffeine  1 tablet Oral Q4H PRN Gustavo Hill MD      cefepime  2,000 mg Intravenous Q8H Mitzi Arita MD 2,000 mg (09/26/22 1740)    diphenhydrAMINE  25 mg Intravenous Q8H PRN Jaswant Walls, ANN      ezetimibe  10 mg Oral Daily Donzell Altes, CRNP      furosemide  20 mg Oral After jaeyos, CRNP      furosemide  40 mg Oral Daily Donzell Altes, CRNP      gabapentin  400 mg Oral BID Donzell Kavita, CRNP      guaiFENesin  200 mg Oral Q4H PRN Sulma Deeds Yo Camacho MD      heparin (porcine)  7,500 Units Subcutaneous [facility] [place], [address]      insulin glargine  44 Units Subcutaneous HS [place], CRNP      insulin lispro  18 Units Subcutaneous TID With Meals [place], CRNP      insulin lispro  2-12 Units Subcutaneous HS [place], CRNP      insulin lispro  4-20 Units Subcutaneous TID DRISS Agrawal, CRGREGORY      ketorolac  15 mg Intravenous Q8H [place], CRNP      levothyroxine  25 mcg Oral Early Morning [place], CRNP      losartan  25 mg Oral Daily [place], CRNP      metoclopramide  10 mg Intravenous Q8H [address], CRNP      nystatin   Topical BID PRN [place], CRNP      oxyCODONE  2 5 mg Oral Q4H PRN [place], CRNP      oxyCODONE  5 mg Oral Q4H PRN [place], CRNP      pantoprazole  40 mg Oral Early Morning [place], CRNP      psyllium  1 packet Oral Daily [place], CRNP      saccharomyces boulardii  250 mg Oral BID [place], CRNP          Today, Patient Was Seen By: Sai Amezcua MD    ** Please Note: This note has been constructed using a voice recognition system   **

## 2022-09-26 NOTE — UTILIZATION REVIEW
Initial Clinical Review    Admission: Date/Time/Statement:   Admission Orders (From admission, onward)     Ordered        09/24/22 2322  INPATIENT ADMISSION  Once                      Orders Placed This Encounter   Procedures    INPATIENT ADMISSION     Standing Status:   Standing     Number of Occurrences:   1     Order Specific Question:   Level of Care     Answer:   Med Surg [16]     Order Specific Question:   Estimated length of stay     Answer:   More than 2 Midnights     Order Specific Question:   Certification     Answer:   I certify that inpatient services are medically necessary for this patient for a duration of greater than two midnights  See H&P and MD Progress Notes for additional information about the patient's course of treatment  ED Arrival Information     Expected   -    Arrival   9/24/2022 20:47    Acuity   Emergent            Means of arrival   Wheelchair    Escorted by   Friend    Service   Hospitalist    Admission type   Emergency            Arrival complaint   leg pain              Chief Complaint   Patient presents with    Cellulitis     Pt reports was at [facility] for cellulitis and believes they d/carmen him too soon, reports fevers again and worsening leg pain where cellulitis was (left thigh); finished prescribed PO abx without relief       Initial Presentation: 56 y o  male , presented to the ED @ [facility], from home via wheelchair with friend  Admitted as Inpatient due to  Severe Sepsis  Date: 09/24/2022  Presents with worsening redness and swelling of left thigh for one day  reports recurrent cellulitis due to lymphedema and chronic fungal infections  Patient initially received IV Ancef and was then switched to IV Cefepime per ID  Patient was then discharged home on PO Levaquin which was completed on 09/16 for a total 10 day course of antibiotics    Upon arrival to ED, patient severe sepsis criteria due to tachycardia, hypothermia, tachypnea and elevated lactic acid with suspected source being left thigh cellulitis  Patient will be admitted for IV antibiotics, ID consult and wound care consult  IV flds  Follow up on culture results  Day 2: 09/25/2022  Continue IV abx  Follow up on cultures  09/25/2022  Consult ID:  1  Concern for recurrent left thigh cellulitis; Hx of left leg wound cx that grew Pseudomonas and Proteus 7/21/22:  - He presents with acute onset left thigh pain and fever at home over last 1-2 days in setting of recent completed therapy for left thigh cellulitis  He noted increased erythema and swelling as well as tenderness of the area  Patient is non-toxic, afebrile today and without leukocytosis  He reports subjective improvement in his left inner thigh pain     - For now, ok to continue Cefepime given prior Hx of Pseudomonas wound infection  I will increase dose to 2g q8h as this will give Pseudomonal coverage  This will also cover Strep which I suspect is the major culprit  - No evidence of necrotizing infection (mininmal pain on exam, no crepitus, afebrile today, WBC normalized)  - follow up blood cultures  - if still improving tomorrow, consider switch to PO soon  - his lymphedema will predispose to recurrent infection; would benefit from outpatient lymphedema clinic and aggressive skin care   2  Chronic LE Lymphedema and venous stasis:  - recommendations as above   3  T2DM  - he is insulin dependent   - management per primary   4  Chronic Candida intertrigo of groin:  - noted  - can use topical nystatin  - moisture control      5   Morbid Obesity: BMI of 80    ED Triage Vitals   Temperature Pulse Respirations Blood Pressure SpO2   09/24/22 2101 09/24/22 2058 09/24/22 2058 09/24/22 2058 09/24/22 2058   99 7 °F (37 6 °C) (!) 129 20 (!) 145/114 100 %      Temp Source Heart Rate Source Patient Position - Orthostatic VS BP Location FiO2 (%)   09/24/22 2058 09/24/22 2058 09/24/22 2058 09/24/22 2058 --   Oral Monitor Sitting Right arm       Pain Score       22 0137       7          Wt Readings from Last 1 Encounters:   22 (!) 208 kg (459 lb 3 5 oz)     Additional Vital Signs:   Date/Time Temp Pulse Resp BP MAP (mmHg) SpO2 Calculated FIO2 (%) - Nasal Cannula Nasal Cannula O2 Flow Rate (L/min) O2 Device Patient Position - Orthostatic VS   22 0830 -- -- -- -- -- 93 % -- -- None (Room air) --   22 0734 -- -- -- 148/82 -- -- -- -- -- --   22 07:31:03 97 9 °F (36 6 °C) 97 -- 156/101 Abnormal  119 95 % -- -- -- --   22 23:34:40 99 3 °F (37 4 °C) 107 Abnormal  -- -- -- 92 % -- -- -- --   22 -- -- -- 140/72 -- -- -- -- -- Lying   22 98 4 °F (36 9 °C) 113 Abnormal  19 138/98 111 92 % 28 2 L/min Nasal cannula Lying   22 16:05:36 98 3 °F (36 8 °C) 112 Abnormal  18 125/97 106 91 % -- -- -- --   22 0900 -- -- -- -- -- -- 28 2 L/min Nasal cannula --   22 07:21:30 99 2 °F (37 3 °C) 103 19 135/84 101 93 % -- -- -- --   22 01:44:28 98 2 °F (36 8 °C) 125 Abnormal  16 136/84 101 96 % -- -- -- --   220 -- 135 Abnormal  25 Abnormal  130/61 88 100 % -- -- None (Room air) Sitting   22 -- 130 Abnormal  22 148/77 104 100 % 28 2 L/min Nasal cannula Lying   22 101 °F (38 3 °C) Abnormal  -- -- -- -- -- -- -- -- --   22 -- -- -- -- -- -- -- -- Nasal cannula --   22 -- 133 Abnormal  24 Abnormal  196/104 Abnormal  137 98 % 28 2 L/min Nasal cannula Sitting     Date and Time Eye Opening Best Verbal Response Best Motor Response Roosevelt Coma Scale Score   22 0830 4 5 6 15   22 2300 4 5 6 15   22 2004 4 5 6 15   22 0900 4 5 6 15   22 2300 4 5 6 15       2022 @ 2251  EC, Sinus Tachycardia    Pertinent Labs/Diagnostic Test Results:   XR chest portable   ED Interpretation by Merced Aleman DO ([id number])   Cardiomegaly, haziness at the lung bases      Final Result by Supa Ferrara MD ([id number]) No acute cardiopulmonary disease          Results from last 7 days   Lab Units 09/24/22  2216   SARS-COV-2  Negative     Results from last 7 days   Lab Units 09/26/22  0552 09/25/22  0430 09/24/22  2216   WBC Thousand/uL 7 09 9 76 11 61*   HEMOGLOBIN g/dL 11 3* 11 3* 13 7   HEMATOCRIT % 35 6* 35 3* 43 8   PLATELETS Thousands/uL 201 210 296   NEUTROS ABS Thousands/µL 3 95 8 13* 9 69*     Results from last 7 days   Lab Units 09/26/22  0552 09/25/22  0430 09/24/22  2216   SODIUM mmol/L 136 134* 136   POTASSIUM mmol/L 3 9 4 1 4 4   CHLORIDE mmol/L 102 100 97   CO2 mmol/L 30 26 30   ANION GAP mmol/L 4 8 9   BUN mg/dL 20 13 13   CREATININE mg/dL 1 09 1 01 1 09   EGFR ml/min/1 73sq m 76 83 76   CALCIUM mg/dL 8 5 8 4 9 7     Results from last 7 days   Lab Units 09/24/22  2216   AST U/L 27   ALT U/L 17   ALK PHOS U/L 76   TOTAL PROTEIN g/dL 8 6*   ALBUMIN g/dL 3 5   TOTAL BILIRUBIN mg/dL 0 68     Results from last 7 days   Lab Units 09/26/22  1124 09/26/22  0726 09/25/22  2042 09/25/22  1628 09/25/22  1111 09/25/22  0737   POC GLUCOSE mg/dl 175* 127 169* 94 120 160*     Results from last 7 days   Lab Units 09/26/22  0552 09/25/22  0430 09/24/22  2216   GLUCOSE RANDOM mg/dL 147* 227* 199*     Results from last 7 days   Lab Units 09/24/22  2216   PROTIME seconds 13 9   INR  1 05   PTT seconds 27     Results from last 7 days   Lab Units 09/25/22  0430 09/24/22  2216   PROCALCITONIN ng/ml 0 48* 0 28*     Results from last 7 days   Lab Units 09/25/22  0924 09/25/22  0415 09/24/22  2216   LACTIC ACID mmol/L 1 4 2 2* 3 4*     Results from last 7 days   Lab Units 09/24/22  2232   CLARITY UA  Clear   COLOR UA  Light Yellow   SPEC GRAV UA  1 014   PH UA  5 5   GLUCOSE UA mg/dl Negative   KETONES UA mg/dl Negative   BLOOD UA  Negative   PROTEIN UA mg/dl Negative   NITRITE UA  Negative   BILIRUBIN UA  Negative   UROBILINOGEN UA (BE) mg/dl <2 0   LEUKOCYTES UA  Negative     Results from last 7 days   Lab Units 09/24/22  4835 09/24/22 2216   BLOOD CULTURE  No Growth at 24 hrs  No Growth at 24 hrs       ED Treatment:   Medication Administration from 09/24/2022 2041 to 09/25/2022 0044       Date/Time Order Dose Route Action     09/24/2022 2228 piperacillin-tazobactam (ZOSYN) 4 5 g in sodium chloride 0 9 % 100 mL IVPB 4 5 g Intravenous New Bag     09/24/2022 2219 sodium chloride 0 9 % bolus 1,000 mL 1,000 mL Intravenous New Bag     09/24/2022 2330 acetaminophen (TYLENOL) tablet 650 mg 650 mg Oral Given     09/24/2022 2330 lactated ringers bolus 1,000 mL 1,000 mL Intravenous New Bag        Past Medical History:   Diagnosis Date    Acute kidney injury 10/28/2021    Anemia 09/13/2019    Cellulitis     last assessed 12/10/15    Cellulitis of left lower extremity     Cellulitis of right lower extremity 11/19/2021    Cough 10/20/2019    Diabetes mellitus ([facility] [address] )     Disease of thyroid gland     Edema     Elevated liver enzymes     Elevated serum creatinine 11/19/2021    Esophageal reflux     Fungal infection 8/16/2021    Gluten intolerance     Gout     last assessed 09/05/13    Hyperglycemia     Hypertension     Hyponatremia 9/6/2019    IBS (irritable bowel syndrome)     Insomnia     Obesity     Osteoarthritis of knee     last assessed 02/10/14    Scrotal swelling 5/19/2020    Shortness of breath 5/3/2021    Venous insufficiency     last assessed 08/22/17    Villonodular synovitis of the hand, right     last assessed 11/14/2013     Present on Admission:   Essential hypertension   GERD (gastroesophageal reflux disease)   Hypothyroidism   Hyperlipidemia   Cellulitis of left thigh   Severe sepsis (HCC)   DAHIANA (obstructive sleep apnea)   Morbid obesity      Admitting Diagnosis: Cellulitis [L03 90]  Cellulitis of left thigh [L03 116]  Severe sepsis ([address] ) [A41 9, R65 20]  Age/Sex: 56 y o  male  Admission Orders:  Diet  Terrence/CHO Controlled  Up & OOB as tolerated  Daily weight / I&O  Contact & Airborne Isolation      Scheduled Medications:  cefepime, 2,000 mg, Intravenous, Q8H  ezetimibe, 10 mg, Oral, Daily  furosemide, 20 mg, Oral, After Lunch  furosemide, 40 mg, Oral, Daily  gabapentin, 400 mg, Oral, BID  heparin (porcine), 7,500 Units, Subcutaneous, Q8H AUNDREA  insulin glargine, 44 Units, Subcutaneous, HS  insulin lispro, 18 Units, Subcutaneous, TID With Meals  insulin lispro, 2-12 Units, Subcutaneous, HS  insulin lispro, 4-20 Units, Subcutaneous, TID AC  ketorolac, 15 mg, Intravenous, Q8H  levothyroxine, 25 mcg, Oral, Early Morning  losartan, 25 mg, Oral, Daily  metoclopramide, 10 mg, Intravenous, Q8H AUNDREA  pantoprazole, 40 mg, Oral, Early Morning  psyllium, 1 packet, Oral, Daily  saccharomyces boulardii, 250 mg, Oral, BID      Continuous IV Infusions:     PRN Meds:  albuterol, 2 puff, Inhalation, Q6H PRN  benzonatate, 100 mg, Oral, TID PRN  butalbital-acetaminophen-caffeine, 1 tablet, Oral, Q4H PRN  diphenhydrAMINE, 25 mg, Intravenous, Q8H PRN  guaiFENesin, 200 mg, Oral, Q4H PRN  nystatin, , Topical, BID PRN  oxyCODONE, 2 5 mg, Oral, Q4H PRN  oxyCODONE, 5 mg, Oral, Q4H PRN        IP CONSULT TO INFECTIOUS DISEASES    Network Utilization Review Department  ATTENTION: Please call with any questions or concerns to [phone] and carefully listen to the prompts so that you are directed to the right person  All voicemails are confidential   Cordell Kinsey all requests for admission clinical reviews, approved or denied determinations and any other requests to dedicated fax number below belonging to the campus where the patient is receiving treatment   List of dedicated fax numbers for the Facilities:  [address] DENIALS (Administrative/Medical Necessity) [phone]   [address] [place] [address] (Maternity/NICU/Pediatrics) 261 [facility],[address] [place] [address] Farrah Martin [address] [place] [address] [address] [id number] [place] [address] Nadiya Bernal [address] [place], [place] [address] [phone]

## 2022-09-26 NOTE — PROGRESS NOTES
Progress Note - Infectious Disease   Kanchan Collins 56 y o  male MRN: [id number]  Unit/Bed#: W -01 Encounter: [id number]         IMPRESSION & RECOMMENDATIONS:   1  Severe sepsis, POA  Tachycardia, fever, tachypnea, elevated lactic acid  Secondary to #2  Negative blood cultures  Fever is are now much improved  WBC count has normalized  -antibiotic plan as below  -follow up blood cultures  -follow temperatures and hemodynamics  -follow CBC  -supportive care    2  Recurrent left thigh cellulitis  With prior left leg wound culture from 2022 that grew Pseudomonas, Proteus  Thigh pain/erythema/fever developed after recent completion of Levaquin at home, likely due to severe lymphedema  No evidence of abscess or deeper infection on exam   Cellulitis is improving on cefepime    -continue high-dose IV cefepime for least another 24 hours  -if continues to improve, anticipate transition to oral Levaquin on discharge, likely for 10 day course      3  Chronic LE Lymphedema and venous stasis:     -frequent leg elevation/aggressive edema control  -outpatient wound care follow-up  -recommend outpatient lymphedema clinic follow-up     4  T2DM, with long-term insulin use  Risk factor for infection  - management per primary     5  Chronic Candida intertrigo of groin  - can use topical nystatin  - moisture control     6  Morbid Obesity: BMI of 80     I discussed above plan with patient and family at bedside, and with Internal Medicine attending  Subjective:  Patient states he is feeling much better with less pain of the thigh  Denies any more fevers  No drainage from the site  Is tolerating oral intake  No new focal complaints      Objective:  Vitals:  Temp:  [97 9 °F (36 6 °C)-99 3 °F (37 4 °C)] 97 9 °F (36 6 °C)  HR:  [] 97  Resp:  [18-19] 19  BP: (125-156)/() 148/82  SpO2:  [91 %-95 %] 93 %  Temp (24hrs), Av 5 °F (36 9 °C), Min:97 9 °F (36 6 °C), Max:99 3 °F (37 4 °C)  Current: Temperature: 97 9 °F (36 6 °C)    Physical Exam:   General:  No acute distress, nontoxic  HEENT:  Atraumatic normocephalic  Neck:  Trachea midline  Psychiatric:  Awake and alert  Pulmonary:  Normal respiratory excursion without accessory muscle use  Abdomen:  Soft, nontender, morbidly obese  Extremities:  Severe bilateral lower extremity lymphedema, left thigh faint erythema with mild tenderness  No palpable fluctuance or drainage  No areas of necrosis  Skin:  No diffuse rash  Neuro: Moves all extremities spontaneously    Lab Results:  I have personally reviewed pertinent labs  Results from last 7 days   Lab Units 09/26/22  0552 09/25/22  0430 09/24/22 2216   POTASSIUM mmol/L 3 9 4 1 4 4   CHLORIDE mmol/L 102 100 97   CO2 mmol/L 30 26 30   BUN mg/dL 20 13 13   CREATININE mg/dL 1 09 1 01 1 09   EGFR ml/min/1 73sq m 76 83 76   CALCIUM mg/dL 8 5 8 4 9 7   AST U/L  --   --  27   ALT U/L  --   --  17   ALK PHOS U/L  --   --  76     Results from last 7 days   Lab Units 09/26/22  0552 09/25/22  0430 09/24/22  2216   WBC Thousand/uL 7 09 9 76 11 61*   HEMOGLOBIN g/dL 11 3* 11 3* 13 7   PLATELETS Thousands/uL 201 210 296     Results from last 7 days   Lab Units 09/24/22  2222 09/24/22  2216   BLOOD CULTURE  No Growth at 24 hrs  No Growth at 24 hrs  Imaging Studies:   I have personally reviewed pertinent imaging study reports and images in PACS  EKG, Pathology, and Other Studies:   I have personally reviewed pertinent reports

## 2022-09-26 NOTE — PLAN OF CARE
Problem: Nutrition/Hydration-ADULT  Goal: Nutrient/Hydration intake appropriate for improving, restoring or maintaining nutritional needs  Description: Monitor and assess patient's nutrition/hydration status for malnutrition  Collaborate with interdisciplinary team and initiate plan and interventions as ordered  Monitor patient's weight and dietary intake as ordered or per policy  Utilize nutrition screening tool and intervene as necessary  Determine patient's food preferences and provide high-protein, high-caloric foods as appropriate       INTERVENTIONS:  - Monitor oral intake, urinary output, labs, and treatment plans  - Assess nutrition and hydration status and recommend course of action  - Evaluate amount of meals eaten  - Assist patient with eating if necessary   - Allow adequate time for meals  - Recommend/ encourage appropriate diets, oral nutritional supplements, and vitamin/mineral supplements  - Order, calculate, and assess calorie counts as needed  - Recommend, monitor, and adjust tube feedings and TPN/PPN based on assessed needs  - Assess need for intravenous fluids  - Provide specific nutrition/hydration education as appropriate  - Include patient/family/caregiver in decisions related to nutrition  Outcome: Progressing     Problem: Potential for Falls  Goal: Patient will remain free of falls  Description: INTERVENTIONS:  - Educate patient/family on patient safety including physical limitations  - Instruct patient to call for assistance with activity   - Consult OT/PT to assist with strengthening/mobility   - Keep Call bell within reach  - Keep bed low and locked with side rails adjusted as appropriate  - Keep care items and personal belongings within reach  - Initiate and maintain comfort rounds  - Make Fall Risk Sign visible to staff  - Offer Toileting every 2 Hours, in advance of need  - Initiate/Maintain alarm  - Obtain necessary fall risk management equipment  - Apply yellow socks and bracelet for high fall risk patients  - Consider moving patient to room near nurses station  Outcome: Progressing     Problem: Prexisting or High Potential for Compromised Skin Integrity  Goal: Skin integrity is maintained or improved  Description: INTERVENTIONS:  - Identify patients at risk for skin breakdown  - Assess and monitor skin integrity  - Assess and monitor nutrition and hydration status  - Monitor labs   - Assess for incontinence   - Turn and reposition patient  - Assist with mobility/ambulation  - Relieve pressure over bony prominences  - Avoid friction and shearing  - Provide appropriate hygiene as needed including keeping skin clean and dry  - Evaluate need for skin moisturizer/barrier cream  - Collaborate with interdisciplinary team   - Patient/family teaching  - Consider wound care consult   Outcome: Progressing     Problem: MOBILITY - ADULT  Goal: Maintain or return to baseline ADL function  Description: INTERVENTIONS:  -  Assess patient's ability to carry out ADLs; assess patient's baseline for ADL function and identify physical deficits which impact ability to perform ADLs (bathing, care of mouth/teeth, toileting, grooming, dressing, etc )  - Assess/evaluate cause of self-care deficits   - Assess range of motion  - Assess patient's mobility; develop plan if impaired  - Assess patient's need for assistive devices and provide as appropriate  - Encourage maximum independence but intervene and supervise when necessary  - Involve family in performance of ADLs  - Assess for home care needs following discharge   - Consider OT consult to assist with ADL evaluation and planning for discharge  - Provide patient education as appropriate  Outcome: Progressing  Goal: Maintains/Returns to pre admission functional level  Description: INTERVENTIONS:  - Perform BMAT or MOVE assessment daily    - Set and communicate daily mobility goal to care team and patient/family/caregiver     - Collaborate with rehabilitation services on mobility goals if consulted  - Perform Range of Motion 3 times a day  - Reposition patient every 2 hours    - Dangle patient 3 times a day  - Stand patient 3 times a day  - Ambulate patient 3 times a day  - Out of bed to chair 3 times a day   - Out of bed for meals 3 times a day  - Out of bed for toileting  - Record patient progress and toleration of activity level   Outcome: Progressing

## 2022-09-26 NOTE — ASSESSMENT & PLAN NOTE
 Blood pressure initially hypertensive on arrival with highest reading of 196/104; BP improved spontaneously suspect in the setting of pain on arrival   o Last recorded pressure: 148/82   Continue Losartan and Lasix   Monitor blood pressure

## 2022-09-26 NOTE — ASSESSMENT & PLAN NOTE
· Patient recently admitted at [place] [address] from 09/06-09/12 for the same complaint of left thigh cellulitis  Patient reports recurrent cellulitis due to lymphedema and chronic fungal infections  Patient initially received IV Ancef and was then switched to IV Cefepime per ID  Patient was then discharged home on PO Levaquin which was completed on 09/16 for a total 10 day course of antibiotics  · Patient returns today with complaints of worsening redness and swelling of left thigh and chills over the past day  · Received IV Zosyn and IV Vancomycin in ED  · Prior wound cultures grew Gram-negative lorena including Pseudomonas and Proteus        · Patient improving this morning  Denies any systemic symptoms of infection and pain/tenderness in leg is well controled     · Continue IV Cefepime day 2

## 2022-09-26 NOTE — ASSESSMENT & PLAN NOTE
 SIRS criteria: Tachycardia, hyperthermia, tachypnea   Suspected source: Cellulitis   Lactic acid: 3 4   End organ damage:    IV Fluids: Received 1 L of LR in ED   IV antibiotics: IV Zosyn and IV Vancomycin in ED  Transitioned to IV Cefepime based on prior growth         Plan:  - IV Cefepime   - Follow up Blood Cultures  - Monitor vitals

## 2022-09-26 NOTE — ASSESSMENT & PLAN NOTE
· Patient reports 7/10 headache  · Headache cocktail including IV Toradol, IV Benadryl and IV Reglan  · Headache now resolved as of this morning

## 2022-09-26 NOTE — PLAN OF CARE
Problem: Nutrition/Hydration-ADULT  Goal: Nutrient/Hydration intake appropriate for improving, restoring or maintaining nutritional needs  Description: Monitor and assess patient's nutrition/hydration status for malnutrition  Collaborate with interdisciplinary team and initiate plan and interventions as ordered  Monitor patient's weight and dietary intake as ordered or per policy  Utilize nutrition screening tool and intervene as necessary  Determine patient's food preferences and provide high-protein, high-caloric foods as appropriate       INTERVENTIONS:  - Monitor oral intake, urinary output, labs, and treatment plans  - Assess nutrition and hydration status and recommend course of action  - Evaluate amount of meals eaten  - Assist patient with eating if necessary   - Allow adequate time for meals  - Recommend/ encourage appropriate diets, oral nutritional supplements, and vitamin/mineral supplements  - Order, calculate, and assess calorie counts as needed  - Recommend, monitor, and adjust tube feedings and TPN/PPN based on assessed needs  - Assess need for intravenous fluids  - Provide specific nutrition/hydration education as appropriate  - Include patient/family/caregiver in decisions related to nutrition  9/25/2022 2006 by Rodrigo Carcamo RN  Outcome: Progressing  9/25/2022 2006 by Rdorigo Carcamo RN  Outcome: Progressing

## 2022-09-26 NOTE — UTILIZATION REVIEW
Inpatient Admission Authorization Request   NOTIFICATION OF INPATIENT ADMISSION/INPATIENT AUTHORIZATION REQUEST   SERVICING FACILITY:   [address]  Tax ID: [id number]  NPI: [id number]  [facility]  [address]  Place of Service Code: 24     ATTENDING PROVIDER:  Attending Name and NPI#: Nola De Leon [[id number]]  Address: [address]  Phone: [phone]     UTILIZATION REVIEW CONTACT:  Brynn Burnham Utilization   Network Utilization Review Department  Phone: [phone]  Fax: [phone]  Email: [email or web address]     PHYSICIAN ADVISORY SERVICES:  FOR UNGT-IS-WEBT REVIEW - MEDICAL NECESSITY DENIAL  Phone: [phone]  Fax: [phone]  Email: [email or web address]     TYPE OF REQUEST:  Inpatient Status     ADMISSION INFORMATION:  ADMISSION DATE/TIME: 9/24/22 11:22 PM  PATIENT DIAGNOSIS CODE/DESCRIPTION:  Cellulitis [L03 90]  Cellulitis of left thigh [L03 116]  Severe sepsis ([address] ) [A41 9, R65 20]  DISCHARGE DATE/TIME: No discharge date for patient encounter  IMPORTANT INFORMATION:  Please contact Brynn Burnham directly with any questions or concerns regarding this request  Department voicemails are confidential     Send requests for admission clinical reviews, concurrent reviews, approvals, and administrative denials due to lack of clinical to fax [phone]

## 2022-09-26 NOTE — ASSESSMENT & PLAN NOTE
Lab Results   Component Value Date    HGBA1C 6 9 (H) 09/16/2022       Recent Labs     09/25/22  1111 09/25/22  1628 09/25/22 2042 09/26/22  0726   POCGLU 120 94 169* 127       Blood Sugar Average: Last 72 hrs:  · (P) 134Continue PTA insulin regimen of 44 units of Lantus HS and 14 units of Lispro tid with meals  · Accu-checks and SSI  · Hypoglycemia protocol

## 2022-09-27 ENCOUNTER — HOME CARE VISIT (OUTPATIENT)
Dept: HOME HEALTH SERVICES | Facility: HOME HEALTHCARE | Age: 57
End: 2022-09-27
Payer: MEDICARE

## 2022-09-27 VITALS
OXYGEN SATURATION: 92 % | TEMPERATURE: 98.3 F | DIASTOLIC BLOOD PRESSURE: 83 MMHG | RESPIRATION RATE: 18 BRPM | SYSTOLIC BLOOD PRESSURE: 132 MMHG | WEIGHT: 315 LBS | HEART RATE: 99 BPM | BODY MASS INDEX: 79.17 KG/M2

## 2022-09-27 LAB
ANION GAP SERPL CALCULATED.3IONS-SCNC: 4 MMOL/L (ref 4–13)
ATRIAL RATE: 134 BPM
BASOPHILS # BLD AUTO: 0.08 THOUSANDS/ΜL (ref 0–0.1)
BASOPHILS NFR BLD AUTO: 1 % (ref 0–1)
BUN SERPL-MCNC: 20 MG/DL (ref 5–25)
CALCIUM SERPL-MCNC: 8.7 MG/DL (ref 8.4–10.2)
CHLORIDE SERPL-SCNC: 102 MMOL/L (ref 96–108)
CO2 SERPL-SCNC: 31 MMOL/L (ref 21–32)
CREAT SERPL-MCNC: 0.93 MG/DL (ref 0.6–1.3)
EOSINOPHIL # BLD AUTO: 0.91 THOUSAND/ΜL (ref 0–0.61)
EOSINOPHIL NFR BLD AUTO: 12 % (ref 0–6)
ERYTHROCYTE [DISTWIDTH] IN BLOOD BY AUTOMATED COUNT: 15.6 % (ref 11.6–15.1)
GFR SERPL CREATININE-BSD FRML MDRD: 92 ML/MIN/1.73SQ M
GLUCOSE SERPL-MCNC: 110 MG/DL (ref 65–140)
GLUCOSE SERPL-MCNC: 129 MG/DL (ref 65–140)
GLUCOSE SERPL-MCNC: 139 MG/DL (ref 65–140)
GLUCOSE SERPL-MCNC: 192 MG/DL (ref 65–140)
HCT VFR BLD AUTO: 36.8 % (ref 36.5–49.3)
HGB BLD-MCNC: 11.6 G/DL (ref 12–17)
IMM GRANULOCYTES # BLD AUTO: 0.05 THOUSAND/UL (ref 0–0.2)
IMM GRANULOCYTES NFR BLD AUTO: 1 % (ref 0–2)
LYMPHOCYTES # BLD AUTO: 1.55 THOUSANDS/ΜL (ref 0.6–4.47)
LYMPHOCYTES NFR BLD AUTO: 20 % (ref 14–44)
MCH RBC QN AUTO: 29.2 PG (ref 26.8–34.3)
MCHC RBC AUTO-ENTMCNC: 31.5 G/DL (ref 31.4–37.4)
MCV RBC AUTO: 93 FL (ref 82–98)
MONOCYTES # BLD AUTO: 0.81 THOUSAND/ΜL (ref 0.17–1.22)
MONOCYTES NFR BLD AUTO: 11 % (ref 4–12)
NEUTROPHILS # BLD AUTO: 4.21 THOUSANDS/ΜL (ref 1.85–7.62)
NEUTS SEG NFR BLD AUTO: 55 % (ref 43–75)
NRBC BLD AUTO-RTO: 0 /100 WBCS
P AXIS: 65 DEGREES
PLATELET # BLD AUTO: 217 THOUSANDS/UL (ref 149–390)
PMV BLD AUTO: 8.3 FL (ref 8.9–12.7)
POTASSIUM SERPL-SCNC: 4.1 MMOL/L (ref 3.5–5.3)
PR INTERVAL: 146 MS
QRS AXIS: 69 DEGREES
QRSD INTERVAL: 78 MS
QT INTERVAL: 276 MS
QTC INTERVAL: 412 MS
RBC # BLD AUTO: 3.97 MILLION/UL (ref 3.88–5.62)
SODIUM SERPL-SCNC: 137 MMOL/L (ref 135–147)
T WAVE AXIS: 40 DEGREES
VENTRICULAR RATE: 134 BPM
WBC # BLD AUTO: 7.61 THOUSAND/UL (ref 4.31–10.16)

## 2022-09-27 PROCEDURE — 80048 BASIC METABOLIC PNL TOTAL CA: CPT

## 2022-09-27 PROCEDURE — 99239 HOSP IP/OBS DSCHRG MGMT >30: CPT | Performed by: INTERNAL MEDICINE

## 2022-09-27 PROCEDURE — 93010 ELECTROCARDIOGRAM REPORT: CPT | Performed by: INTERNAL MEDICINE

## 2022-09-27 PROCEDURE — 85025 COMPLETE CBC W/AUTO DIFF WBC: CPT

## 2022-09-27 PROCEDURE — 99232 SBSQ HOSP IP/OBS MODERATE 35: CPT | Performed by: INTERNAL MEDICINE

## 2022-09-27 PROCEDURE — 82948 REAGENT STRIP/BLOOD GLUCOSE: CPT

## 2022-09-27 RX ORDER — LEVOFLOXACIN 750 MG/1
750 TABLET ORAL EVERY 24 HOURS
Qty: 10 TABLET | Refills: 0 | Status: SHIPPED | OUTPATIENT
Start: 2022-09-27 | End: 2022-10-07

## 2022-09-27 RX ORDER — OXYCODONE HYDROCHLORIDE 5 MG/1
TABLET ORAL
Qty: 20 TABLET | Refills: 0 | Status: SHIPPED | OUTPATIENT
Start: 2022-09-27

## 2022-09-27 RX ORDER — LEVOFLOXACIN 750 MG/1
750 TABLET ORAL EVERY 24 HOURS
Qty: 4 TABLET | Refills: 0 | Status: SHIPPED | OUTPATIENT
Start: 2022-09-27 | End: 2022-10-01

## 2022-09-27 RX ADMIN — EZETIMIBE 10 MG: 10 TABLET ORAL at 08:26

## 2022-09-27 RX ADMIN — BENZONATATE 100 MG: 100 CAPSULE ORAL at 14:38

## 2022-09-27 RX ADMIN — CEFEPIME HYDROCHLORIDE 2000 MG: 2 INJECTION, SOLUTION INTRAVENOUS at 08:34

## 2022-09-27 RX ADMIN — INSULIN LISPRO 18 UNITS: 100 INJECTION, SOLUTION INTRAVENOUS; SUBCUTANEOUS at 08:29

## 2022-09-27 RX ADMIN — OXYCODONE HYDROCHLORIDE 5 MG: 5 TABLET ORAL at 01:03

## 2022-09-27 RX ADMIN — LOSARTAN POTASSIUM 25 MG: 25 TABLET, FILM COATED ORAL at 08:26

## 2022-09-27 RX ADMIN — OXYCODONE HYDROCHLORIDE 5 MG: 5 TABLET ORAL at 12:46

## 2022-09-27 RX ADMIN — LEVOTHYROXINE SODIUM 25 MCG: 25 TABLET ORAL at 07:24

## 2022-09-27 RX ADMIN — GABAPENTIN 400 MG: 400 CAPSULE ORAL at 08:26

## 2022-09-27 RX ADMIN — GUAIFENESIN 200 MG: 200 SOLUTION ORAL at 01:03

## 2022-09-27 RX ADMIN — CEFEPIME HYDROCHLORIDE 2000 MG: 2 INJECTION, SOLUTION INTRAVENOUS at 01:03

## 2022-09-27 RX ADMIN — PANTOPRAZOLE SODIUM 40 MG: 40 TABLET, DELAYED RELEASE ORAL at 07:24

## 2022-09-27 RX ADMIN — HEPARIN SODIUM 7500 UNITS: 5000 INJECTION INTRAVENOUS; SUBCUTANEOUS at 14:36

## 2022-09-27 RX ADMIN — METOCLOPRAMIDE 10 MG: 5 INJECTION, SOLUTION INTRAMUSCULAR; INTRAVENOUS at 07:24

## 2022-09-27 RX ADMIN — INSULIN LISPRO 18 UNITS: 100 INJECTION, SOLUTION INTRAVENOUS; SUBCUTANEOUS at 12:46

## 2022-09-27 RX ADMIN — FUROSEMIDE 20 MG: 20 TABLET ORAL at 14:36

## 2022-09-27 RX ADMIN — OXYCODONE HYDROCHLORIDE 5 MG: 5 TABLET ORAL at 08:46

## 2022-09-27 RX ADMIN — BENZONATATE 100 MG: 100 CAPSULE ORAL at 08:46

## 2022-09-27 RX ADMIN — HEPARIN SODIUM 7500 UNITS: 5000 INJECTION INTRAVENOUS; SUBCUTANEOUS at 07:24

## 2022-09-27 RX ADMIN — Medication 250 MG: at 08:25

## 2022-09-27 RX ADMIN — FUROSEMIDE 40 MG: 40 TABLET ORAL at 08:26

## 2022-09-27 RX ADMIN — KETOROLAC TROMETHAMINE 15 MG: 30 INJECTION, SOLUTION INTRAMUSCULAR at 02:01

## 2022-09-27 NOTE — ASSESSMENT & PLAN NOTE
 Blood pressure initially hypertensive on arrival with highest reading of 196/104; BP improved spontaneously suspect in the setting of pain on arrival   o Blood pressure under control today   Continue Losartan and Lasix outpatient

## 2022-09-27 NOTE — CASE MANAGEMENT
Case Management Assessment & Discharge Planning Note    Patient name Nathalie Mancilla  Location W /W -49 MRN [id number]  : 1965 Date 2022       Current Admission Date: 2022  Current Admission Diagnosis:Cellulitis of left thigh   Patient Active Problem List    Diagnosis Date Noted    Type 2 diabetes mellitus with hyperglycemia ([facility] ) 2022    Headache 2022    Chronic pain 2022    GERD (gastroesophageal reflux disease) 2022    Pressure injury of left leg     Metabolic syndrome     Low testosterone in male 2022    Hypercalcemia 2022    Hypothyroidism 2022    Pressure injury of right leg 2022    Ulcer of left lower extremity, limited to breakdown of skin ([facility] ) 2022    Onychomycosis 2021    Migraine headache 2021    Elevated serum creatinine 2021    Morbid obesity 2021    Fungal infection 2021    SOB (shortness of breath) 2021    DAHIANA (obstructive sleep apnea)     Elevated CO2 level 2021    Abnormal thyroid function test 2021    Bilateral leg edema 2020    Erectile dysfunction 2020    Cellulitis of left thigh     Insomnia 2019    Severe sepsis ([facility] ) 10/22/2019    Diabetic neuropathy ([facility] ) 10/08/2019    Gluten intolerance 2019    Hyponatremia 2019    Gout 2018    Essential hypertension 2018    Venous insufficiency 12/10/2015    Hyperlipidemia 10/09/2013    Insulin-dependent diabetes mellitus with neuropathy 2013    Psoriasis 2013      LOS (days): 3  Geometric Mean LOS (GMLOS) (days):   Days to GMLOS:     OBJECTIVE:  PATIENT READMITTED TO HOSPITAL  Risk of Unplanned Readmission Score: 15 91         Current admission status: Inpatient       Preferred Pharmacy:   [facility] [place] [place] - [id number] [place] [address]  Phone: [phone] Fax: [phone]    Primary Care Provider: Obed Abdi MD    Primary Insurance: [employer] [place] [facility]  Secondary Insurance:     ASSESSMENT:  [address] [facility], [facility] Representative - Spouse   Primary Phone: [phone] (Mobile)  Home Phone: [phone]                              Patient Information  Admitted from[de-identified] Home  Mental Status: Alert  During Assessment patient was accompanied by: Friend  Assessment information provided by[de-identified] Patient  Primary Caregiver: Self  Support Systems: Spouse/significant other  [place] of Residence: [address] [place] [address] do you live in?: [address]: Lives w/ Spouse/significant other                                 DISCHARGE DETAILS:    Discharge planning discussed with[de-identified] Pt  Freedom of Choice: Yes  Comments - Freedom of Choice: Pt wishes to resume care with [facility] visiting nurse agency for his home health care  Requested [email or web address]         Is the patient interested in [place] [address] at discharge?: Yes  [address] requested[de-identified] Occupational Therapy, Physical Therapy, Other (comment), [address] Name[de-identified] [facility] Provider[de-identified] PCP  Home Health Services Needed[de-identified] Wound/Ostomy Care, Strengthening/Theraputic Exercises to Improve Function, Evaluate Functional Status and Safety, Gait/ADL Training, Diabetes Management  Homebound Criteria Met[de-identified] Uses an Assist Device (i e  cane, walker, etc), Requires the Assistance of Another Person for Safe Ambulation or to Leave the Home  Supporting Clincal Findings[de-identified] Limited Endurance, Fatigues Easliy in [employer], Dyspnea with Exertion         Other Referral/Resources/Interventions Provided:  Interventions: C  Referral Comments: CM placed a referral for KJ with [facility] via [address]              Discharge Destination Plan[de-identified] Home with [place] at Discharge : Family

## 2022-09-27 NOTE — ASSESSMENT & PLAN NOTE
· Patient recently admitted at [address] from 09/06-09/12 for the same complaint of left thigh cellulitis  Patient reports recurrent cellulitis due to lymphedema and chronic fungal infections  Patient initially received IV Ancef and was then switched to IV Cefepime per ID  Patient was then discharged home on PO Levaquin which was completed on 09/16 for a total 10 day course of antibiotics  · Patient returns with complaints of worsening redness and swelling of left thigh and chills  · Received IV Zosyn and IV Vancomycin in ED  · Prior wound cultures grew Gram-negative lorena including Pseudomonas and Proteus  · Patient started on IV Cefepime based on prior susceptibilities       · Blood cultures negative at 48 hours  · Patient improving this morning  Denies any systemic symptoms of infection and pain/tenderness in leg is well controled  · Continue IV Cefepime day 3 today, then transition to 750 mg Levaquin once daily, ending 10/1 for a total course of 7 days

## 2022-09-27 NOTE — ASSESSMENT & PLAN NOTE
2/12/2019      RE: Gilmar Turk  [address] [facility] [address]       Dear Colleague,    Thank you for the opportunity to participate in the care of your patient, Gilmar Turk, at the [facility]. Please see a copy of my visit note below.    I am delighted to see Gilmar Turk in consultation for cardiovascular assessment prior to anticipated liver transplant. He is here with his wife, his daughter who is his main caretaker, and her .    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 He has no known prior coronary artery disease. Has atrial fibrillation incidentally diagnosed over 2 years ago, treated with rate control and anticoagulation. He had seen cardiology at [facility]. He also has Alzheimer's dementia, and history is mainly from wife and daughter.    He denies any shortness of breath or chest pressure. No dizziness, syncope, palpitations. Chronic lower extremity edema much improved with weekly paracentesis. No bleeding in urine or stool, no hematemesis. Does have known esophageal varices in process of banding.    The following portions of the patient's history were reviewed and updated as appropriate: allergies, current medications, past family history, past medical history, past social history, past surgical history, and the problem list.    Past Medical History:  1. Liver cirrhosis due to VOGEL  2. Atrial fibrillation, chronic; no cardioversions or antiarrhythmic drugs  3. Hypertension. Losartan is listed on medications - they are not sure if he's taking  4. Diabetes with osteomyelitis; not currently on hypoglycemics  5. Alzheimer's dementia     Medications (personally reviewed by me with family):  Apixaban 5 mg bid  Atorvastatin 10 every day ? If he's taking at home; family thinks it might have been stopped due to hypotension  Metoprolol  · Continue PPI outpatient 12.5 bid  Lactulose  Lexapro    Allergies:  No Known Allergies    Family History:   Family History   Problem Relation Age of Onset     Diabetes Type 2  Mother      Bronchitis Mother      Heart Failure Mother      Kidney failure Father      Diabetes Type 2  Father        Psychosocial history:  reports that he has quit smoking. His smoking use included cigars, cigarillos or filtered cigars. he has never used smokeless tobacco. He reports that he does not drink alcohol or use drugs.    Review of systems:   Cardiovascular: No palpitations, chest pain, shortness of breath at rest, dyspnea with exertion, orthopnea, paroxysmal nocturia dyspnea, nocturia, dizziness, syncope.    In addition,   Constitutional: No change in weight, sleep or appetite.  Normal energy.  No fever or chills  Eyes: Negative for vision changes or eye problems  ENT: No problems with ears, nose or throat.  No difficulty swallowing.  Resp: No coughing, wheezing or shortness of breath  GI: No nausea, vomiting,  heartburn, abdominal pain, diarrhea, constipation or change in bowel habits  : No urinary frequency or dysuria, bladder or kidney problems  Musculoskeletal: No significant muscle or joint pains  Neurologic: No headaches, numbness, tingling, weakness, problems with balance or coordination  Psychiatric: No problems with anxiety, depression or mental health  Heme/immune/allergy: No history of bleeding or clotting problems or anemia.  No allergies or immune system problems  Integumentary: No rashes,worrisome lesions or skin problems      Physical examination  Vitals: 110/70, HR 72  BMI= 24 (80kg)    Constitutional: In general, the patient is a pleasant male in no apparent distress  Eyes: PERRLA.  EOMI.  Sclerae white, not injected.  ENT/mouth: Normiocephalic and atraumatic.  Nares clear.  Pharynx without erythema or exudate.  Dentition intact.  No adenopathy.  No thyromegaly. Carotids +2/2 bilaterally without bruits.  No jugular venous distension.  "  Card/Vasc: The PMI is in the 5th ICS in the midclavicular line. There is no heave. Irregularly irregular. Normal S1, S2. No murmur, rub, click, or gallop. Pulses are normal bilaterally throughout. 1+ peripheral edema.  Respiratory: Clear to asculation.  No ronchi, wheezes, rales.  No dullness to percussion.   GI: Abdomen is non tender but distended.  No bruits.   Integument: No significant bruises or rashes  Neurological: The neurological examination reveal a patient who is not very verbal, appears to have diffculty answering simple questions such as \"do you have any pain\" - looks to his family for answers..    Psych: Normal  Heme/Lymph/Immun: no significant adenopathy    I have reviewed the following labs/imaging:  Labs 2/11/19: cholesterol 95, HDL 43, LDL 41, TG 56, K 4.1, cr 1.81, hgb 11, plt 100K, TSH 5.96, T4 0.85, INR 1.9, ALT 50, AST 63, albumin 2.7    I have reviewed records from [facility]. Relevant findings are:  Echo 11/20/2018: EF 65%, no valve abnl, LA 4.7 cm  Holter 4/2018: Afib, average rate 60-90 bpm    I have personally and independently reviewed the following:  EKG 2/11/19: afib, 88 bpm, RBBB, PVCs    Assessment :  1. Cardiovascular assessment. No prior CAD history. Nuclear stress test scheduled for today. He probably doesn't need atorvastatin with his liver failure  2. Atrial fibrillation, chronic, unknown duration. [id number] score is 3 for age, hypertension, and diabetes. 68yo, 80 kg, cr 1.8, he is on appropriate apixaban dose of 5 bid. Ventricular rate is controlled.  3. Liver cirrhosis.  4. CKD. Creatinine 1.8.    Plan:  1. I will f/u on nuclear stress test  2. He probably doesn't need to continue atorvastatin  3. With his relatively low BP he likely doesn't need losartan  4. Cautioned him and family on bleeding risk with apixaban - if he has any bleeding complications will need to stop  5. PATI - per family, lasix was held, and spironolactone never started due to renal function      I " spent a total of 30  minutes face to face with  Gilmar Turk during today's office visit. Over 50% of this time was spent counseling the patient and/or coordinating care regarding future management plans.    The patient is to return pending stress test. The patient understood the treatment plan as outlined above.  There were no barriers to learning.    Ebony Plaza MD

## 2022-09-27 NOTE — PLAN OF CARE
Problem: Nutrition/Hydration-ADULT  Goal: Nutrient/Hydration intake appropriate for improving, restoring or maintaining nutritional needs  Description: Monitor and assess patient's nutrition/hydration status for malnutrition  Collaborate with interdisciplinary team and initiate plan and interventions as ordered  Monitor patient's weight and dietary intake as ordered or per policy  Utilize nutrition screening tool and intervene as necessary  Determine patient's food preferences and provide high-protein, high-caloric foods as appropriate       INTERVENTIONS:  - Monitor oral intake, urinary output, labs, and treatment plans  - Assess nutrition and hydration status and recommend course of action  - Evaluate amount of meals eaten  - Assist patient with eating if necessary   - Allow adequate time for meals  - Recommend/ encourage appropriate diets, oral nutritional supplements, and vitamin/mineral supplements  - Order, calculate, and assess calorie counts as needed  - Recommend, monitor, and adjust tube feedings and TPN/PPN based on assessed needs  - Assess need for intravenous fluids  - Provide specific nutrition/hydration education as appropriate  - Include patient/family/caregiver in decisions related to nutrition  Outcome: Progressing     Problem: Prexisting or High Potential for Compromised Skin Integrity  Goal: Skin integrity is maintained or improved  Description: INTERVENTIONS:  - Identify patients at risk for skin breakdown  - Assess and monitor skin integrity  - Assess and monitor nutrition and hydration status  - Monitor labs   - Assess for incontinence   - Turn and reposition patient  - Assist with mobility/ambulation  - Relieve pressure over bony prominences  - Avoid friction and shearing  - Provide appropriate hygiene as needed including keeping skin clean and dry  - Evaluate need for skin moisturizer/barrier cream  - Collaborate with interdisciplinary team   - Patient/family teaching  - Consider wound care consult   Outcome: Progressing     Problem: MOBILITY - ADULT  Goal: Maintain or return to baseline ADL function  Description: INTERVENTIONS:  -  Assess patient's ability to carry out ADLs; assess patient's baseline for ADL function and identify physical deficits which impact ability to perform ADLs (bathing, care of mouth/teeth, toileting, grooming, dressing, etc )  - Assess/evaluate cause of self-care deficits   - Assess range of motion  - Assess patient's mobility; develop plan if impaired  - Assess patient's need for assistive devices and provide as appropriate  - Encourage maximum independence but intervene and supervise when necessary  - Involve family in performance of ADLs  - Assess for home care needs following discharge   - Consider OT consult to assist with ADL evaluation and planning for discharge  - Provide patient education as appropriate  Outcome: Progressing

## 2022-09-27 NOTE — DISCHARGE INSTR - OTHER ORDERS
Discharge wound/skin care instructions:  1  Recommend discontinuing Nystatin cream as this will add more moisture under the abdominal pannus/groin skin folds  Recommend instead starting the use of Interdry fabric wicking sheets that are impregnated with silver  Interdry sheets will wick the moisture out from the skin folds  The silver in the wicking sheets will topically treat the cutaneous fungal infection  The Interdry sheets need to protrude from the skin fold 2" to allow for proper wicking  The sheets may stay in for up to 5 days  Replace sooner if heavily soiled  May temporarily remove for bathing/ambulation  Do not use Nystatin cream at the same time Interdry fabric wicking sheets are being used  The Nystatin cream will prevent the Interdry from working effectively  2  Recommend follow up at outpatient lymphedema clinic

## 2022-09-27 NOTE — DISCHARGE INSTR - AVS FIRST PAGE
Dear Gurvinder Joyce,     It was our pleasure to care for you here at [facility]  It is our hope that we were always able to exceed the expected standards for your care during your stay  You were hospitalized due to Cellulitis  You were cared for on the fourth floor by Anne Rincon MD under the service of Raina White MD with the [facility] who covers for your primary care physician (PCP), Augustin Anglin MD, while you were hospitalized  If you have any questions or concerns related to this hospitalization, you may contact us at [phone]  For follow up as well as any medication refills, we recommend that you follow up with your primary care physician  A registered nurse will reach out to you by phone within a few days after your discharge to answer any additional questions that you may have after going home  However, at this time we provide for you here, the most important instructions / recommendations at discharge:     Notable Medication Adjustments -   Levaquin 750 mg has been added  Please take one tablet with breakfast daily for four days  October 1st is the last day  Levaquin 750 mg has been added  This is an extra script  If your cellulitis reoccurs, you can take one pill a day for 10 days  Testing Required after Discharge -   none  Important follow up information -   Please follow up with your PCP    Other Instructions -   Please continue to take your medications as prescribed  Please review this entire after visit summary as additional general instructions including medication list, appointments, activity, diet, any pertinent wound care, and other additional recommendations from your care team that may be provided for you        Sincerely,     Anne Rincon MD

## 2022-09-27 NOTE — ASSESSMENT & PLAN NOTE
Lab Results   Component Value Date    HGBA1C 6 9 (H) 09/16/2022       Recent Labs     09/26/22  2119 09/26/22  2257 09/27/22  0751 09/27/22  1125   POCGLU 167* 206* 110 139       Blood Sugar Average: Last 72 hrs:  · (P) 723 [id number] PTA insulin regimen of 44 units of Lantus HS and 14 units of Lispro tid with meals    · Patient will continue home insulin and monitoring outpatient

## 2022-09-27 NOTE — ASSESSMENT & PLAN NOTE
 SIRS criteria: Tachycardia, hyperthermia, tachypnea   Suspected source: Cellulitis   Lactic acid: 3 4   End organ damage:    IV Fluids: Received 1 L of LR in ED   IV antibiotics: IV Zosyn and IV Vancomycin in ED  Transitioned to IV Cefepime based on prior growth         Plan:  - IV Cefepime today, then oral Levaquin at discharge for total of 7 days antibiotics  - Blood cultures negative  - No signs of systemic infection

## 2022-09-27 NOTE — CONSULTS
Consult Note - Wound   Demi Otis 56 y o  male MRN: [id number]  Unit/Bed#: W -05 Encounter: [id number]      Assessment:   Patient supermorbidly obese  Weighs 461 lbs  Abdominal pannus hangs to his knees when in sitting position  Cutaneous fungal infection in groins/under abdominal pannus and in other skin folds  Lymphedema on bilateral lower extremities  Currently, Nystatin cream ordered for groins/abdominmal pannus  Wound/Skin Care Plan:   1  Recommend discontinuing Nystatin cream as this will add more moisture under the abdominal pannus/groin skin folds  Recommend instead starting the use of Interdry fabric wicking sheets that are impregnated with silver  Interdry sheets will wick the moisture out from the skin folds  The silver in the wicking sheets will topically treat the cutaneous fungal infection  The Interdry sheets need to protrude from the skin fold 2" to allow for proper wicking  The sheets may stay in for up to 5 days  Replace sooner if heavily soiled  May temporarily remove for bathing/ambulation  Do not use Nystatin cream at the same time Interdry fabric wicking sheets are being used  The Nystatin cream will prevent the Interdry from working effectively  2  Recommend follow up at outpatient lymphedema clinic

## 2022-09-27 NOTE — PROGRESS NOTES
Progress Note - Infectious Disease   Kylie Deedee 56 y o  male MRN: [id number]  Unit/Bed#: W -01 Encounter: [id number]      IMPRESSION & RECOMMENDATIONS:   1  Severe sepsis, POA  Tachycardia, fever, tachypnea, elevated lactic acid  Secondary to #2  Negative blood cultures  Fever is are now much improved  WBC count has normalized      -antibiotic plan as below  -follow temperatures and hemodynamics     2  Recurrent left thigh cellulitis  With prior left leg wound culture from 07/21/2022 that grew Pseudomonas, Proteus  Thigh pain/erythema/fever developed after recent completion of Levaquin at home, likely due to severe lymphedema  No evidence of abscess or deeper infection on exam   Cellulitis is much improved it after initiation of cefepime and appears minimal on my exam today      -continue high-dose IV cefepime in-hospital  -on discharge, transition to oral Levaquin 750 mg Q 24 hours to complete 7 day course, through 10/1      3  Chronic LE Lymphedema and venous stasis:     -frequent leg elevation/aggressive edema control  -outpatient wound care follow-up  -recommend outpatient lymphedema clinic follow-up     4  T2DM, with long-term insulin use  Risk factor for infection  - management per primary     5  Chronic Candida intertrigo of groin  - can use topical nystatin  - moisture control     6  Morbid Obesity: BMI of 80     Antibiotic 3  Cefepime    I discussed above plan with patient and family at bedside and answered all of their questions in detail, and with Internal Medicine service  I spent 30 minutes in consultation more than half of which was in counseling/coordination of care as outlined in my note  Subjective:  Patient is feeling much better today with much less pain, redness and swelling in his thigh  Denies fevers or chills  Tolerating oral intake      Objective:  Vitals:  Temp:  [98 1 °F (36 7 °C)-98 8 °F (37 1 °C)] 98 3 °F (36 8 °C)  HR:  [] 99  Resp:  [18] 18  BP: (129-139)/(82-89) 132/83  SpO2:  [92 %-94 %] 92 %  Temp (24hrs), Av 4 °F (36 9 °C), Min:98 1 °F (36 7 °C), Max:98 8 °F (37 1 °C)  Current: Temperature: 98 3 °F (36 8 °C)    Physical Exam:   General:  No acute distress, nontoxic  HEENT:  Atraumatic normocephalic  Psychiatric:  Awake and alert  Pulmonary:  Normal respiratory excursion without accessory muscle use  Abdomen:  Soft, nontender, morbidly obese  Extremities:  Bilateral lymphedema  Much improved left thigh erythema, tender  No fluctuance or expressible drainage  Skin:  No new rashes  Neuro: Moves all extremities spontaneously    Lab Results:  I have personally reviewed pertinent labs  Results from last 7 days   Lab Units 22  0622  0552 22  0430 22  2216   POTASSIUM mmol/L 4 1 3 9 4 1 4 4   CHLORIDE mmol/L 102 102 100 97   CO2 mmol/L 31 30 26 30   BUN mg/dL 20 20 13 13   CREATININE mg/dL 0 93 1 09 1 01 1 09   EGFR ml/min/1 73sq m 92 76 83 76   CALCIUM mg/dL 8 7 8 5 8 4 9 7   AST U/L  --   --   --  27   ALT U/L  --   --   --  17   ALK PHOS U/L  --   --   --  76     Results from last 7 days   Lab Units 2222 22  0552 22  0430   WBC Thousand/uL 7 61 7 09 9 76   HEMOGLOBIN g/dL 11 6* 11 3* 11 3*   PLATELETS Thousands/uL 217 201 210     Results from last 7 days   Lab Units 222 22  2216   BLOOD CULTURE  No Growth at 48 hrs  No Growth at 48 hrs  Imaging Studies:   I have personally reviewed pertinent imaging study reports and images in PACS  EKG, Pathology, and Other Studies:   I have personally reviewed pertinent reports

## 2022-09-27 NOTE — DISCHARGE SUMMARY
[facility] 1965, 56 y o  male MRN: [id number]  Unit/Bed#: W -87 Encounter: [id number]  Primary Care Provider: Lorir Regalado MD   Date and time admitted to hospital: 9/24/2022  8:56 PM    * Cellulitis of left thigh  Assessment & Plan  · Patient recently admitted at [address] from 09/06-09/12 for the same complaint of left thigh cellulitis  Patient reports recurrent cellulitis due to lymphedema and chronic fungal infections  Patient initially received IV Ancef and was then switched to IV Cefepime per ID  Patient was then discharged home on PO Levaquin which was completed on 09/16 for a total 10 day course of antibiotics  · Patient returns with complaints of worsening redness and swelling of left thigh and chills  · Received IV Zosyn and IV Vancomycin in ED  · Prior wound cultures grew Gram-negative lorena including Pseudomonas and Proteus  · Patient started on IV Cefepime based on prior susceptibilities       · Blood cultures negative at 48 hours  · Patient improving this morning  Denies any systemic symptoms of infection and pain/tenderness in leg is well controled  · Continue IV Cefepime day 3 today, then transition to 750 mg Levaquin once daily, ending 10/1 for a total course of 7 days  Headache  Assessment & Plan  · Patient reports 7/10 headache  · Headache cocktail including IV Toradol, IV Benadryl and IV Reglan  · Headache now resolved as of this morning  Type 2 diabetes mellitus with hyperglycemia [facility])  Assessment & Plan  Lab Results   Component Value Date    HGBA1C 6 9 (H) 09/16/2022       Recent Labs     09/26/22  2119 09/26/22  2257 09/27/22  0751 09/27/22  1125   POCGLU 167* 206* 110 139       Blood Sugar Average: Last 72 hrs:  · (P) 519 [id number] PTA insulin regimen of 44 units of Lantus HS and 14 units of Lispro tid with meals    · Patient will continue home insulin and monitoring outpatient    GERD (gastroesophageal reflux disease)  Assessment & Plan  · Continue PPI outpatient    Hypothyroidism  Assessment & Plan  · Continue levothyroxine  Morbid obesity  Assessment & Plan  · Encouraged lifestyle modifications  DAHIANA (obstructive sleep apnea)  Assessment & Plan  · CPAP HS  Severe sepsis (HCC)  Assessment & Plan   SIRS criteria: Tachycardia, hyperthermia, tachypnea   Suspected source: Cellulitis   Lactic acid: 3 4   End organ damage:    IV Fluids: Received 1 L of LR in ED   IV antibiotics: IV Zosyn and IV Vancomycin in ED  Transitioned to IV Cefepime based on prior growth  Plan:  - IV Cefepime today, then oral Levaquin at discharge for total of 7 days antibiotics  - Blood cultures negative  - No signs of systemic infection        Essential hypertension  Assessment & Plan   Blood pressure initially hypertensive on arrival with highest reading of 196/104; BP improved spontaneously suspect in the setting of pain on arrival   o Blood pressure under control today   Continue Losartan and Lasix outpatient    Hyperlipidemia  Assessment & Plan  · Continue Zetia outpatient for HLD      Discharging Resident Physician: Sima Killian  Attending: Daniela Rapp MD  PCP: Shanna Romero MD  Admission Date: 9/24/2022  Discharge Date: 09/27/22    Disposition:     Home    Reason for Admission: Cellulitis of left thigh    Consultations During Hospital Stay:  · Infectious Disease    Procedures Performed:     · None    Significant Findings / Test Results:     XR chest portable    Result Date: 9/25/2022  Impression: No acute cardiopulmonary disease  · No Chest XR results available for this patient  ·     Incidental Findings:   · None    Test Results Pending at Discharge (will require follow up):    · None     Outpatient Tests Requested:  · None    Complications:  None    Hospital Course:     Kyle Wong is a 56 y o  male patient with past medical history of morbid obesity, hypertension, type 2 diabetes, hypothyroidism and recurrent lower extremity cellulitis who originally presented to the hospital on 9/24/2022 due to concern for worsening cellulitis of left thigh  Patient was recently discharged from [address] [place] [address] on 9/12 for similar complaints and completed 10 day antibiotics  Patient had symptom resolution, but on 9/24 had worsening redness, swelling, pain in left thigh and chills  In the ED the patient was afebrile to 129 and hypertensive to 145/114  He had an elevated lactic acid, elevated procalcitonin, and elevated WBC count meeting sepsis criteria  He was started on IV Zosyn and Vancomycin in the ED  Prior cultures of his cellulitis have grown gram negative rods of Pseudomonas and Proteus, so the patient was transitioned to IV Cefepime  Patient made significant clinical improvement during his stay  At the time of discharge swelling and erythema was significantly improved and patient states that pain and tenderness at the site are resolved  Per ID recommendations, patient received three days of IV Cefepime, and then will be transitioned to oral Levaquin 750 mg once daily for a total antibiotic course of 7 days ending 10/1/22  Condition at Discharge: stable     Discharge Day Visit / Exam:     Subjective:  Isabel Dias was resting comfortably in the chair when we spoke this morning  No acute events overnight  Denies any pain or tenderness in the leg and we both agreed that the swelling and redness had significantly improved  He denied any systemic signs of infection such as nausea, vomiting, fevers, chills  No shortness of breath of chest pain  States that his headache comes and goes, currently at a 5/10  He is eager to go home  All other review of systems negative       Vitals: Blood Pressure: 132/83 (09/27/22 0751)  Pulse: 99 (09/27/22 0751)  Temperature: 98 3 °F (36 8 °C) (09/27/22 0751)  Temp Source: Oral (09/25/22 1949)  Respirations: 18 (09/27/22 0751)  Weight - Scale: Leward Pounds 209 kg (461 lb 3 2 oz) (09/27/22 0600)  SpO2: 92 % (09/27/22 0751)  Exam:   Physical Exam  Vitals and nursing note reviewed  Constitutional:       Appearance: He is obese  HENT:      Head: Normocephalic and atraumatic  Right Ear: External ear normal       Left Ear: External ear normal       Nose: Nose normal       Mouth/Throat:      Mouth: Mucous membranes are moist       Pharynx: Oropharynx is clear  Eyes:      Conjunctiva/sclera: Conjunctivae normal    Cardiovascular:      Rate and Rhythm: Normal rate and regular rhythm  Pulses: Normal pulses  Heart sounds: Normal heart sounds  No murmur heard  No friction rub  No gallop  Pulmonary:      Effort: Pulmonary effort is normal  No respiratory distress  Breath sounds: Normal breath sounds  Abdominal:      General: There is no distension  Palpations: Abdomen is soft  Tenderness: There is no abdominal tenderness  Musculoskeletal:         General: Swelling and tenderness (Left Thigh) present  Cervical back: Neck supple  Skin:     General: Skin is warm  Findings: Erythema (Left thigh) present  Neurological:      General: No focal deficit present  Mental Status: He is alert  Psychiatric:         Mood and Affect: Mood normal          Behavior: Behavior normal          Thought Content: Thought content normal          Judgment: Judgment normal        Discussion with Family: Wife updated at bedside    Discharge instructions/Information to patient and family:   See after visit summary for information provided to patient and family  Provisions for Follow-Up Care:  See after visit summary for information related to follow-up care and any pertinent home health orders  Planned Readmission: None     Discharge Medications:  See after visit summary for reconciled discharge medications provided to patient and family        ** Please Note: This note has been constructed using a voice recognition system **

## 2022-09-28 ENCOUNTER — TRANSCRIBE ORDERS (OUTPATIENT)
Dept: HOME HEALTH SERVICES | Facility: HOME HEALTHCARE | Age: 57
End: 2022-09-28

## 2022-09-28 DIAGNOSIS — L03.116 CELLULITIS OF LEFT LOWER EXTREMITY: ICD-10-CM

## 2022-09-28 DIAGNOSIS — E11.65 TYPE 2 DIABETES MELLITUS WITH HYPERGLYCEMIA, WITH LONG-TERM CURRENT USE OF INSULIN (HCC): ICD-10-CM

## 2022-09-28 DIAGNOSIS — Z79.4 TYPE 2 DIABETES MELLITUS WITH HYPERGLYCEMIA, WITH LONG-TERM CURRENT USE OF INSULIN (HCC): ICD-10-CM

## 2022-09-28 DIAGNOSIS — B49 FUNGAL INFECTION: ICD-10-CM

## 2022-09-28 DIAGNOSIS — L03.116 CELLULITIS OF LEFT THIGH: Primary | ICD-10-CM

## 2022-09-28 NOTE — UTILIZATION REVIEW
Notification of Discharge   This is a Notification of Discharge from our facility [address]  Please be advised that this patient has been discharge from our facility  Below you will find the admission and discharge date and time including the patients disposition  UTILIZATION REVIEW CONTACT:  Jack Peralta MA  Utilization   Network Utilization Review Department  Phone: [phone] x carefully listen to the prompts  All voicemails are confidential   Email: [email or web address]     PHYSICIAN ADVISORY SERVICES:  FOR VMZE-BF-AMMA REVIEW - MEDICAL NECESSITY DENIAL  Phone: [phone]  Fax: [phone]  Email: [email or web address]     PRESENTATION DATE: 9/24/2022  8:56 PM  OBERVATION ADMISSION DATE:   INPATIENT ADMISSION DATE: 9/24/22 11:22 PM   DISCHARGE DATE: 9/27/2022  6:06 PM  DISPOSITION: Home/Self Care Home/Self Care      IMPORTANT INFORMATION:  Send all requests for admission clinical reviews, approved or denied determinations and any other requests to dedicated fax number below belonging to the campus where the patient is receiving treatment   List of dedicated fax numbers:  [address] DENIALS (Administrative/Medical Necessity) [phone]   1000  [facility] (Maternity/NICU/Pediatrics) [phone]   Vicente Challenger [phone]   [address] [phone]   [address] [place] [address] [phone]   [address] [phone]   [facility]  [phone]   2205 [facility]  2401 [facility] 1000 [facility] [phone]

## 2022-09-29 RX ORDER — INSULIN GLARGINE 100 [IU]/ML
44 INJECTION, SOLUTION SUBCUTANEOUS
Qty: 18 ML | Refills: 2 | Status: SHIPPED | OUTPATIENT
Start: 2022-09-29

## 2022-09-29 RX ORDER — NYSTATIN 100000 [USP'U]/G
1 POWDER TOPICAL 2 TIMES DAILY
Qty: 120 G | Refills: 3 | Status: SHIPPED | OUTPATIENT
Start: 2022-09-29

## 2022-09-29 NOTE — TELEPHONE ENCOUNTER
Ann-Marie Mason has requested a refill of lantus solostar 100 units/ mL SOPN  Pt meets the requirements placed by [facility]  Will refill medication

## 2022-09-30 ENCOUNTER — HOME CARE VISIT (OUTPATIENT)
Dept: HOME HEALTH SERVICES | Facility: HOME HEALTHCARE | Age: 57
End: 2022-09-30
Payer: MEDICARE

## 2022-09-30 LAB
BACTERIA BLD CULT: NORMAL
BACTERIA BLD CULT: NORMAL

## 2022-10-03 ENCOUNTER — HOME CARE VISIT (OUTPATIENT)
Dept: HOME HEALTH SERVICES | Facility: HOME HEALTHCARE | Age: 57
End: 2022-10-03
Payer: MEDICARE

## 2022-10-03 PROCEDURE — G0299 HHS/HOSPICE OF RN EA 15 MIN: HCPCS

## 2022-10-04 VITALS
DIASTOLIC BLOOD PRESSURE: 78 MMHG | OXYGEN SATURATION: 97 % | TEMPERATURE: 97.5 F | RESPIRATION RATE: 16 BRPM | HEART RATE: 113 BPM | SYSTOLIC BLOOD PRESSURE: 132 MMHG

## 2022-10-06 ENCOUNTER — HOME CARE VISIT (OUTPATIENT)
Dept: HOME HEALTH SERVICES | Facility: HOME HEALTHCARE | Age: 57
End: 2022-10-06
Payer: MEDICARE

## 2022-10-06 PROCEDURE — G0299 HHS/HOSPICE OF RN EA 15 MIN: HCPCS

## 2022-10-07 VITALS
RESPIRATION RATE: 16 BRPM | HEART RATE: 104 BPM | TEMPERATURE: 97 F | DIASTOLIC BLOOD PRESSURE: 76 MMHG | SYSTOLIC BLOOD PRESSURE: 118 MMHG | OXYGEN SATURATION: 95 %

## 2022-10-10 ENCOUNTER — HOME CARE VISIT (OUTPATIENT)
Dept: HOME HEALTH SERVICES | Facility: HOME HEALTHCARE | Age: 57
End: 2022-10-10
Payer: MEDICARE

## 2022-10-10 VITALS
HEART RATE: 99 BPM | RESPIRATION RATE: 18 BRPM | DIASTOLIC BLOOD PRESSURE: 80 MMHG | SYSTOLIC BLOOD PRESSURE: 118 MMHG | TEMPERATURE: 97.1 F | OXYGEN SATURATION: 97 %

## 2022-10-10 PROCEDURE — G0299 HHS/HOSPICE OF RN EA 15 MIN: HCPCS

## 2022-10-13 ENCOUNTER — HOME CARE VISIT (OUTPATIENT)
Dept: HOME HEALTH SERVICES | Facility: HOME HEALTHCARE | Age: 57
End: 2022-10-13
Payer: MEDICARE

## 2022-10-13 VITALS
DIASTOLIC BLOOD PRESSURE: 80 MMHG | HEART RATE: 100 BPM | OXYGEN SATURATION: 98 % | SYSTOLIC BLOOD PRESSURE: 140 MMHG | TEMPERATURE: 97.7 F | RESPIRATION RATE: 20 BRPM

## 2022-10-13 DIAGNOSIS — R79.89 ELEVATED TSH: ICD-10-CM

## 2022-10-13 DIAGNOSIS — E66.01 OBESITY, MORBID, BMI 50 OR HIGHER (HCC): ICD-10-CM

## 2022-10-13 DIAGNOSIS — Z79.4 TYPE 2 DIABETES MELLITUS WITH HYPERGLYCEMIA, WITH LONG-TERM CURRENT USE OF INSULIN (HCC): ICD-10-CM

## 2022-10-13 DIAGNOSIS — E78.00 PURE HYPERCHOLESTEROLEMIA: ICD-10-CM

## 2022-10-13 DIAGNOSIS — I10 HYPERTENSION GOAL BP (BLOOD PRESSURE) < 140/90: ICD-10-CM

## 2022-10-13 DIAGNOSIS — E11.65 TYPE 2 DIABETES MELLITUS WITH HYPERGLYCEMIA, WITH LONG-TERM CURRENT USE OF INSULIN (HCC): ICD-10-CM

## 2022-10-13 PROCEDURE — G0299 HHS/HOSPICE OF RN EA 15 MIN: HCPCS

## 2022-10-13 RX ORDER — PEN NEEDLE, DIABETIC 32GX 5/32"
NEEDLE, DISPOSABLE MISCELLANEOUS
Qty: 200 EACH | Refills: 3 | Status: SHIPPED | OUTPATIENT
Start: 2022-10-13

## 2022-10-17 ENCOUNTER — HOME CARE VISIT (OUTPATIENT)
Dept: HOME HEALTH SERVICES | Facility: HOME HEALTHCARE | Age: 57
End: 2022-10-17
Payer: MEDICARE

## 2022-10-17 VITALS
TEMPERATURE: 97.2 F | SYSTOLIC BLOOD PRESSURE: 132 MMHG | DIASTOLIC BLOOD PRESSURE: 82 MMHG | HEART RATE: 111 BPM | RESPIRATION RATE: 18 BRPM | OXYGEN SATURATION: 96 %

## 2022-10-17 PROCEDURE — G0299 HHS/HOSPICE OF RN EA 15 MIN: HCPCS

## 2022-10-20 ENCOUNTER — HOME CARE VISIT (OUTPATIENT)
Dept: HOME HEALTH SERVICES | Facility: HOME HEALTHCARE | Age: 57
End: 2022-10-20
Payer: MEDICARE

## 2022-10-20 PROCEDURE — G0299 HHS/HOSPICE OF RN EA 15 MIN: HCPCS

## 2022-10-21 VITALS
SYSTOLIC BLOOD PRESSURE: 132 MMHG | RESPIRATION RATE: 18 BRPM | DIASTOLIC BLOOD PRESSURE: 62 MMHG | HEART RATE: 102 BPM | TEMPERATURE: 98.7 F | OXYGEN SATURATION: 99 %

## 2022-10-24 ENCOUNTER — TELEPHONE (OUTPATIENT)
Dept: HOME HEALTH SERVICES | Facility: HOME HEALTHCARE | Age: 57
End: 2022-10-24

## 2022-10-24 ENCOUNTER — HOME CARE VISIT (OUTPATIENT)
Dept: HOME HEALTH SERVICES | Facility: HOME HEALTHCARE | Age: 57
End: 2022-10-24
Payer: MEDICARE

## 2022-10-24 ENCOUNTER — TELEPHONE (OUTPATIENT)
Dept: FAMILY MEDICINE CLINIC | Facility: CLINIC | Age: 57
End: 2022-10-24

## 2022-10-24 PROCEDURE — 400016 VN ROC

## 2022-10-24 PROCEDURE — G0299 HHS/HOSPICE OF RN EA 15 MIN: HCPCS

## 2022-10-24 NOTE — TELEPHONE ENCOUNTER
I did a follow up with the [facility] who is currently seeing the patient in his home and I would like to confirme if this patient after seeing pulmonary has received and is using his CPAP

## 2022-10-24 NOTE — TELEPHONE ENCOUNTER
Received call from [facility] asking whether there was we knew if patient had received CPAP machine  Riana Poor out for a visit and he has machine but has not recieved quarterly supplies   Reached out to Aleks and she would reach out to Pulmonology to find out what was going on

## 2022-10-25 VITALS
OXYGEN SATURATION: 98 % | TEMPERATURE: 97.3 F | RESPIRATION RATE: 18 BRPM | DIASTOLIC BLOOD PRESSURE: 82 MMHG | SYSTOLIC BLOOD PRESSURE: 132 MMHG | HEART RATE: 97 BPM

## 2022-10-28 DIAGNOSIS — K21.9 GASTROESOPHAGEAL REFLUX DISEASE: ICD-10-CM

## 2022-10-31 ENCOUNTER — HOME CARE VISIT (OUTPATIENT)
Dept: HOME HEALTH SERVICES | Facility: HOME HEALTHCARE | Age: 57
End: 2022-10-31

## 2022-10-31 RX ORDER — OMEPRAZOLE 40 MG/1
CAPSULE, DELAYED RELEASE ORAL
Qty: 180 CAPSULE | Refills: 1 | Status: SHIPPED | OUTPATIENT
Start: 2022-10-31

## 2022-10-31 NOTE — TELEPHONE ENCOUNTER
Kimberly Gordon has requested a refill of omeprazole (PriLOSEC) 40 mg capsule  Pt meets the requirements placed by [facility]  Will refill medication

## 2022-11-01 VITALS
TEMPERATURE: 97.1 F | OXYGEN SATURATION: 98 % | RESPIRATION RATE: 16 BRPM | DIASTOLIC BLOOD PRESSURE: 70 MMHG | SYSTOLIC BLOOD PRESSURE: 126 MMHG | HEART RATE: 90 BPM

## 2022-11-03 DIAGNOSIS — Z79.4 TYPE 2 DIABETES MELLITUS WITH HYPERGLYCEMIA, WITH LONG-TERM CURRENT USE OF INSULIN (HCC): ICD-10-CM

## 2022-11-03 DIAGNOSIS — E11.65 TYPE 2 DIABETES MELLITUS WITH HYPERGLYCEMIA, WITH LONG-TERM CURRENT USE OF INSULIN (HCC): ICD-10-CM

## 2022-11-04 ENCOUNTER — HOSPITAL ENCOUNTER (OUTPATIENT)
Dept: PULMONOLOGY | Facility: HOSPITAL | Age: 57
End: 2022-11-04

## 2022-11-04 DIAGNOSIS — R06.02 SHORTNESS OF BREATH: ICD-10-CM

## 2022-11-04 RX ORDER — ALBUTEROL SULFATE 2.5 MG/3ML
2.5 SOLUTION RESPIRATORY (INHALATION) ONCE
Status: COMPLETED | OUTPATIENT
Start: 2022-11-04 | End: 2022-11-04

## 2022-11-04 RX ORDER — FLASH GLUCOSE SENSOR
KIT MISCELLANEOUS
Qty: 2 EACH | Refills: 0 | Status: SHIPPED | OUTPATIENT
Start: 2022-11-04 | End: 2022-11-04 | Stop reason: SDUPTHER

## 2022-11-04 RX ADMIN — ALBUTEROL SULFATE 2.5 MG: 2.5 SOLUTION RESPIRATORY (INHALATION) at 14:30

## 2022-11-04 NOTE — TELEPHONE ENCOUNTER
Victorino Colin has requested a refill of continuous blood glu sensor (FreeStyle Elvin 14 Day Sensor) MISC  Pt meets the requirements placed by [facility]  Will refill medication

## 2022-11-11 ENCOUNTER — OFFICE VISIT (OUTPATIENT)
Dept: INTERNAL MEDICINE CLINIC | Facility: CLINIC | Age: 57
End: 2022-11-11

## 2022-11-11 VITALS
HEART RATE: 110 BPM | OXYGEN SATURATION: 98 % | TEMPERATURE: 98.1 F | DIASTOLIC BLOOD PRESSURE: 70 MMHG | SYSTOLIC BLOOD PRESSURE: 112 MMHG | RESPIRATION RATE: 20 BRPM

## 2022-11-11 DIAGNOSIS — Z79.4 TYPE 2 DIABETES MELLITUS WITH HYPERGLYCEMIA, WITH LONG-TERM CURRENT USE OF INSULIN (HCC): Primary | ICD-10-CM

## 2022-11-11 DIAGNOSIS — Z79.4 CURRENT USE OF INSULIN (HCC): ICD-10-CM

## 2022-11-11 DIAGNOSIS — E11.65 TYPE 2 DIABETES MELLITUS WITH HYPERGLYCEMIA, WITH LONG-TERM CURRENT USE OF INSULIN (HCC): Primary | ICD-10-CM

## 2022-11-11 LAB
DME PARACHUTE DELIVERY DATE REQUESTED: NORMAL
DME PARACHUTE DELIVERY NOTE: NORMAL
DME PARACHUTE ITEM DESCRIPTION: NORMAL
DME PARACHUTE ORDER STATUS: NORMAL
DME PARACHUTE SUPPLIER NAME: NORMAL
DME PARACHUTE SUPPLIER PHONE: NORMAL

## 2022-11-11 RX ORDER — INSULIN GLARGINE 100 [IU]/ML
INJECTION, SOLUTION SUBCUTANEOUS
Qty: 18 ML | Refills: 2
Start: 2022-11-11

## 2022-11-11 RX ORDER — INSULIN ASPART 100 [IU]/ML
INJECTION, SOLUTION INTRAVENOUS; SUBCUTANEOUS
Qty: 45 ML | Refills: 1 | Status: SHIPPED | OUTPATIENT
Start: 2022-11-11

## 2022-11-11 RX ORDER — FLASH GLUCOSE SENSOR
KIT MISCELLANEOUS
Qty: 2 EACH | Refills: 5 | Status: SHIPPED | OUTPATIENT
Start: 2022-11-11

## 2022-11-11 NOTE — PROGRESS NOTES
Cpap supply order processed via parachute to Jose Watsoncarlos in [facility]

## 2022-11-11 NOTE — PROGRESS NOTES
Assessment/Plan:    1  Type 2 diabetes mellitus with hyperglycemia, with long-term current use of insulin ([facility])  Continuous Blood Gluc Sensor (FreeStyle Elvin 14 Day Sensor) MISC    Insulin Glargine Solostar (Lantus SoloStar) 100 UNIT/ML SOPN    insulin aspart (NovoLOG FlexPen) 100 UNIT/ML injection pen    metFORMIN (GLUCOPHAGE) 1000 MG tablet    HEMOGLOBIN A1C W/ EAG ESTIMATION    Basic metabolic panel   2  Current use of insulin ([facility])  Continuous Blood Gluc Sensor (FreeStyle Elvin 14 Day Sensor) MISC    HEMOGLOBIN A1C W/ EAG ESTIMATION    Basic metabolic panel        A&P Notes:    1  Type 2 diabetes    · Patient has a Elvin sensor  · Takes 40 units of Lantus at night and 14 units of Novolog with meals plus sliding scale  · Was not able to get sensor numbers in the last week and a half because of issues to get the sensor  · Had a hospitalization due to wound infection and sepsis  · Sensor readings were on the higher side because of the hospitalization and increased glucose levels during that, so sensor for the last 90 days showed average glucose levels of 170s, but typically patient's glucose levels have been well controlled on the current regimen    Plan:  · Resent order for glucose sensor  · Refilled medications   · Ordered HgbA1c and BMP repeat labs  · Continue current regimen of Lantus 40 units qHS, and 14 units of Novolog TID with meals plus sliding scale    Subjective: Patient came in the office for 3 months follow up with diabetes  Patient had a recent hospitalization for wound infection and sepsis and his glucose levels have been higher than usual  Last HgbA1c was 6 9  Patient takes Lantus 40 units at night and 14 units TID with meals plus sliding scale insulin  Denies any other changes recently  Will continue current management  Patient had an issues with sensor, script was sent in but he was still not able to get it   We sent the script again to his pharmacy and patient will let us know if there are any issues  Patient ID: Gretta Epley is a 56 y o  male      Past Medical History:   Diagnosis Date   • Acute kidney injury 10/28/2021   • Anemia 09/13/2019   • Cellulitis     last assessed 12/10/15   • Cellulitis of left lower extremity    • Cellulitis of right lower extremity 11/19/2021   • Cough 10/20/2019   • Diabetes mellitus ([address] )    • Disease of thyroid gland    • Edema    • Elevated liver enzymes    • Elevated serum creatinine 11/19/2021   • Esophageal reflux    • Fungal infection 8/16/2021   • Gluten intolerance    • Gout     last assessed 09/05/13   • Hyperglycemia    • Hypertension    • Hyponatremia 9/6/2019   • IBS (irritable bowel syndrome)    • Insomnia    • Obesity    • Osteoarthritis of knee     last assessed 02/10/14   • Scrotal swelling 5/19/2020   • Shortness of breath 5/3/2021   • Venous insufficiency     last assessed 08/22/17   • Villonodular synovitis of the hand, right     last assessed 11/14/2013        Family History   Problem Relation Age of Onset   • Cancer Mother         gastrc   • Colon cancer Father    • Heart failure Father         Social History     Socioeconomic History   • Marital status: /Civil Union     Spouse name: Not on file   • Number of children: Not on file   • Years of education: Not on file   • Highest education level: Not on file   Occupational History   • Not on file   Tobacco Use   • Smoking status: Never Smoker   • Smokeless tobacco: Never Used   Vaping Use   • Vaping Use: Never used   Substance and Sexual Activity   • Alcohol use: Not Currently     Alcohol/week: 0 0 standard drinks   • Drug use: Yes     Types: Marijuana     Comment: medical   • Sexual activity: Yes   Other Topics Concern   • Not on file   Social History Narrative   • Not on file     Social Determinants of Health     Financial Resource Strain: Not on file   Food Insecurity: No Food Insecurity   • Worried About Running Out of Food in the Last Year: Never true   • Ran Out of Food in the Last Year: Never true   Transportation Needs: No Transportation Needs   • Lack of Transportation (Medical): No   • Lack of Transportation (Non-Medical):  No   Physical Activity: Not on file   Stress: Not on file   Social Connections: Not on file   Intimate Partner Violence: Not on file   Housing Stability: Unknown   • Unable to Pay for Housing in the Last Year: No   • Number of Places Lived in the Last Year: Not on file   • Unstable Housing in the Last Year: No        Allergies   Allergen Reactions   • Gluten Meal - Food Allergy    • Clindamycin Diarrhea        Current Outpatient Medications   Medication Sig Dispense Refill   • acetaminophen (TYLENOL) 325 mg tablet Take 2 tablets (650 mg total) by mouth every 4 (four) hours as needed for mild pain, headaches or fever  0   • albuterol (PROVENTIL HFA,VENTOLIN HFA) 90 mcg/act inhaler TAKE 2 PUFFS BY MOUTH EVERY 6 HOURS AS NEEDED FOR WHEEZE 8 5 g 0   • benzonatate (TESSALON PERLES) 100 mg capsule Take 1 capsule (100 mg total) by mouth 3 (three) times a day as needed for cough 20 capsule 0   • Continuous Blood Gluc Sensor (FreeStyle Elvin 14 Day Sensor) MISC Scan 4x daily 2 each 5   • ezetimibe (ZETIA) 10 mg tablet Take 1 tablet (10 mg total) by mouth daily 90 tablet 1   • furosemide (LASIX) 20 mg tablet TAKE 2 TABLETS EVERY DAY IN THE EARLY MORNING AND 1 TABLET DAILY AFTER LUNCH  270 tablet 2   • gabapentin (NEURONTIN) 100 mg capsule TAKE 1 CAPSULE BY MOUTH TWICE A DAY WITH 300MG CAPSULE 180 capsule 2   • gabapentin (NEURONTIN) 300 mg capsule TAKE 1 CAPSULE (300 MG TOTAL) BY MOUTH 2 (TWO) TIMES A DAY TAKE 1 TAB WITH YOUR 100MG TAB TWICE DAILY 180 capsule 2   • insulin aspart (NovoLOG FlexPen) 100 UNIT/ML injection pen INJECT 14-22 UNITS WITH MEALS+SCALE 45 mL 1   • Insulin Glargine Solostar (Lantus SoloStar) 100 UNIT/ML SOPN Take 40units once daily 18 mL 2   • levothyroxine (Euthyrox) 25 mcg tablet Take 1 tablet (25 mcg total) by mouth daily in the early morning 90 tablet 1 • losartan (COZAAR) 25 mg tablet Take 1 tablet (25 mg total) by mouth daily 90 tablet 0   • metFORMIN (GLUCOPHAGE) 1000 MG tablet Take 1 tablet (1,000 mg total) by mouth 2 (two) times a day 180 tablet 1   • nystatin (MYCOSTATIN) powder APPLY 1 APPLICATION TOPICALLY 2 (TWO) TIMES A DAY TO AFFECTED AREA 120 g 3   • omeprazole (PriLOSEC) 40 MG capsule TAKE 1 CAPSULE BY MOUTH TWICE A  capsule 1   • saccharomyces boulardii (FLORASTOR) 250 mg capsule Take 1 capsule (250 mg total) by mouth 2 (two) times a day 60 capsule 1   • BD Pen Needle Ludmila 2nd Gen 32G X 4 MM MISC USE AS DIRECTED 4 TIMES A  each 3   • Blood Glucose Monitoring Suppl (ONETOUCH VERIO) w/Device KIT Use to test sugar daily (Patient not taking: Reported on 8/5/2022) 1 kit 0   • Calcium Alginate (Maxorb II Alginate Dressing) MISC Apply 1 each topically in the morning 10 each 2   • Continuous Blood Gluc  (FreeStyle Connell 2 [place] Syst) BROOKS Use 1 Device as needed (use with sensors for bs checks) 1 Device 0   • Control Gel Formula Dressing (DuoDERM CGF Dressing) MISC Apply 1 application topically every 5 (five) days 5 each 1   • [employer] Diclofenac Sodium 1 % APPLY 4 G TOPICALLY 4 (FOUR) TIMES A  g 0   • Gauze Pads & Dressings 5"X5" PADS Use in the morning 20 each 2   • Lancets (ONETOUCH ULTRASOFT) lancets Use to test sugar 2 times a day 100 each 3   • nystatin (MYCOSTATIN) cream Apply topically 2 (two) times a day as needed (itching, redness) (Patient not taking: Reported on 11/11/2022) 30 g 1   • OneTouch Verio test strip USE TO TEST SUGAR 2 TIMES A  strip 3   • oxyCODONE (ROXICODONE) 5 immediate release tablet Take 1tab by mouth every 6 hours as needed for severe pain as needed  (Patient not taking: Reported on 11/11/2022) 20 tablet 0   • psyllium (METAMUCIL) packet Take 1 packet by mouth daily  0   • Skin Protectants, Misc   ([employer] Textile 29"O677") SHEE Apply 1 each topically every 5 (five) days 3 each 5   • sodium hypochlorite (DAKIN'S QUARTER-STRENGTH) Irrigate with 1 application as directed daily (Patient not taking: Reported on 11/11/2022) 473 mL 0     No current facility-administered medications for this visit  Review of Systems   Constitutional: Negative for chills and fever  HENT: Negative for ear pain and sore throat  Eyes: Negative for pain and visual disturbance  Respiratory: Positive for shortness of breath (with activity, is following up)  Negative for cough  Cardiovascular: Negative for chest pain and palpitations  Gastrointestinal: Negative for abdominal pain and vomiting  Genitourinary: Negative for dysuria and hematuria  Musculoskeletal: Negative for arthralgias and back pain  Skin: Negative for color change and rash  Neurological: Negative for seizures and syncope  All other systems reviewed and are negative  Objective:      /70 (BP Location: Left arm, Patient Position: Sitting, Cuff Size: Large)   Pulse (!) 110   Temp 98 1 °F (36 7 °C)   Resp 20   SpO2 98%      Wt Readings from Last 3 Encounters:   09/27/22 (!) 209 kg (461 lb 3 2 oz)   09/06/22 (!) 227 kg (499 lb 15 9 oz)   06/28/22 (!) 208 kg (458 lb 3 2 oz)     Temp Readings from Last 3 Encounters:   11/11/22 98 1 °F (36 7 °C)   10/31/22 (!) 97 1 °F (36 2 °C)   10/24/22 (!) 97 3 °F (36 3 °C)     BP Readings from Last 3 Encounters:   11/11/22 112/70   10/31/22 126/70   10/24/22 132/82     Pulse Readings from Last 3 Encounters:   11/11/22 (!) 110   10/31/22 90   10/24/22 97       Physical Exam  Vitals and nursing note reviewed  Constitutional:       Appearance: He is well-developed  He is obese  HENT:      Head: Normocephalic and atraumatic  Eyes:      Conjunctiva/sclera: Conjunctivae normal    Cardiovascular:      Rate and Rhythm: Normal rate and regular rhythm  Heart sounds: No murmur heard  Pulmonary:      Effort: Pulmonary effort is normal  No respiratory distress        Breath sounds: Normal breath sounds  Abdominal:      Palpations: Abdomen is soft  Tenderness: There is no abdominal tenderness  Musculoskeletal:         General: Swelling present  Cervical back: Neck supple  Right lower leg: Edema present  Left lower leg: Edema present  Skin:     General: Skin is warm and dry  Neurological:      Mental Status: He is alert  Nutrition Assessment and Intervention:     Reviewed food recall journal      Physical Activity Assessment and Intervention:    Activity journal reviewed      Therapeutic Lifestyle Change Visit:     One-on-one comprehensive counseling, coaching, and health behavior change visit completed           I have reviewed pertinent labs:   Most Recent Labs:   Lab Results   Component Value Date    WBC 7 61 09/27/2022    RBC 3 97 09/27/2022    HGB 11 6 (L) 09/27/2022    HCT 36 8 09/27/2022     09/27/2022    RDW 15 6 (H) 09/27/2022    NEUTROABS 4 21 09/27/2022    SODIUM 137 09/27/2022    K 4 1 09/27/2022     09/27/2022    CO2 31 09/27/2022    BUN 20 09/27/2022    CREATININE 0 93 09/27/2022    GLUC 129 09/27/2022    GLUF 131 (H) 01/18/2020    CALCIUM 8 7 09/27/2022    AST 27 09/24/2022    ALT 17 09/24/2022    ALKPHOS 76 09/24/2022    TP 8 6 (H) 09/24/2022    ALB 3 5 09/24/2022    TBILI 0 68 09/24/2022    CHOLESTEROL 155 07/29/2022    HDL 42 07/29/2022    TRIG 181 (H) 07/29/2022    LDLCALC 77 07/29/2022    NONHDLC 168 11/11/2021    YYR4LTVKBHNT 3 450 06/24/2022    FREET4 1 11 06/24/2022    HGBA1C 6 9 (H) 09/16/2022     09/16/2022

## 2022-11-18 DIAGNOSIS — R60.0 BILATERAL LEG EDEMA: ICD-10-CM

## 2022-11-18 DIAGNOSIS — I10 ESSENTIAL HYPERTENSION: ICD-10-CM

## 2022-11-18 RX ORDER — FUROSEMIDE 20 MG/1
TABLET ORAL
Qty: 180 TABLET | Refills: 1 | Status: SHIPPED | OUTPATIENT
Start: 2022-11-18

## 2022-11-18 NOTE — TELEPHONE ENCOUNTER
Wilbur Darden has requested a refill of furosemide (LASIX) 20 mg tablet  Pt meets the requirements placed by [facility]  Will refill medication

## 2022-11-24 PROBLEM — A41.9 SEVERE SEPSIS (HCC): Status: RESOLVED | Noted: 2019-10-22 | Resolved: 2022-11-24

## 2022-11-24 PROBLEM — R65.20 SEVERE SEPSIS (HCC): Status: RESOLVED | Noted: 2019-10-22 | Resolved: 2022-11-24

## 2022-12-01 ENCOUNTER — DOCUMENTATION (OUTPATIENT)
Dept: PULMONOLOGY | Facility: CLINIC | Age: 57
End: 2022-12-01

## 2022-12-01 ENCOUNTER — OFFICE VISIT (OUTPATIENT)
Dept: PULMONOLOGY | Facility: CLINIC | Age: 57
End: 2022-12-01

## 2022-12-01 VITALS
DIASTOLIC BLOOD PRESSURE: 90 MMHG | TEMPERATURE: 97.8 F | WEIGHT: 315 LBS | OXYGEN SATURATION: 99 % | RESPIRATION RATE: 22 BRPM | HEART RATE: 107 BPM | SYSTOLIC BLOOD PRESSURE: 140 MMHG | BODY MASS INDEX: 53.78 KG/M2 | HEIGHT: 64 IN

## 2022-12-01 DIAGNOSIS — E66.2 OBESITY HYPOVENTILATION SYNDROME (HCC): ICD-10-CM

## 2022-12-01 DIAGNOSIS — E66.9 RESTRICTIVE LUNG DISEASE SECONDARY TO OBESITY: ICD-10-CM

## 2022-12-01 DIAGNOSIS — J98.4 RESTRICTIVE LUNG DISEASE SECONDARY TO OBESITY: ICD-10-CM

## 2022-12-01 DIAGNOSIS — G47.33 OSA (OBSTRUCTIVE SLEEP APNEA): Primary | ICD-10-CM

## 2022-12-01 LAB

## 2022-12-01 NOTE — LETTER
December 1, 2022     Graciela Walters MD  [address]    Patient: Ysabel Duncan   YOB: 1965   Date of Visit: 12/1/2022       Dear Dr Suresh Moran: Thank you for referring Otis Garcia to me for evaluation  Below are my notes for this consultation  If you have questions, please do not hesitate to call me  I look forward to following your patient along with you  Sincerely,        Zach Santiago DO        CC: No Recipients  Zach Santiago DO  12/1/2022  2:29 PM  Sign when Signing Visit  Progress Note - Sleep Medicine  Ysabel Duncan 56 y o  male MRN: [id number]     Assessment/Plan  56 y o  M with PMHx of morbid obesity, Chronic lower extremity lymphedema, GERD, HTN, hyperlipidemia, DM type 2 who comes in DAHIANA/OHS follow up  1   Severe DAHIANA (AHI - 129 6) currently on autoPAP 12-20 with excellent compliance and clinical response  Residual AHI - 3 5       -  Continue autoCPAP at current pressures      -  Follow overnight pulse ox to ensure oxygenation is adequately controlled on CPAP      -  I also discussed in depth the risk of leaving sleep apnea untreated including hypertension, heart failure, arrhythmia, MI and stroke  2   Elevated CO2 on BMP/obesity hypoventilation -  PFT are consistent with restriction due to obesity       -  We will check ABG to ensure CO2 is adequately controlled       -   We will check overnight pulse ox on CPAP to ensure oxygenation is adequately managed      -   We discussed weight loss efforts  He is being evaluated by bariatric surgery     ______________________________________________________________________    HPI:    Ysabel Duncan presents today for follow-up of severe DAHIANA  He was last seen in June and has been doing well on CPAP  He was diagnosed with severe DAHIANA and is on autoPAP  He feels refreshed when he wakes up  He feels that his CPAP has been very effective    He has been trying to lose weight as well  He is due to see bariatric surgery for possible weight loss surgery  Review of Systems:  Review of Systems   Constitutional: Positive for fatigue  Negative for appetite change and unexpected weight change  HENT: Negative  Eyes: Negative  Respiratory: Positive for apnea and shortness of breath  Negative for wheezing  Cardiovascular: Negative  Gastrointestinal: Negative  Endocrine: Negative  Genitourinary: Negative  Musculoskeletal: Negative  Allergic/Immunologic: Negative  Neurological: Negative  Psychiatric/Behavioral: Negative  Social history updates:  Social History     Tobacco Use   Smoking Status Never   Smokeless Tobacco Never     Social History     Socioeconomic History   • Marital status: /Civil Union     Spouse name: Not on file   • Number of children: Not on file   • Years of education: Not on file   • Highest education level: Not on file   Occupational History   • Not on file   Tobacco Use   • Smoking status: Never   • Smokeless tobacco: Never   Vaping Use   • Vaping Use: Never used   Substance and Sexual Activity   • Alcohol use: Not Currently     Alcohol/week: 0 0 standard drinks   • Drug use: Yes     Types: Marijuana     Comment: medical   • Sexual activity: Yes   Other Topics Concern   • Not on file   Social History Narrative   • Not on file     Social Determinants of Health     Financial Resource Strain: Not on file   Food Insecurity: No Food Insecurity   • Worried About Running Out of Food in the Last Year: Never true   • Ran Out of Food in the Last Year: Never true   Transportation Needs: No Transportation Needs   • Lack of Transportation (Medical): No   • Lack of Transportation (Non-Medical):  No   Physical Activity: Not on file   Stress: Not on file   Social Connections: Not on file   Intimate Partner Violence: Not on file   Housing Stability: Unknown   • Unable to Pay for Housing in the Last Year: No   • Number of Places Lived in the Last Year: Not on file   • Unstable Housing in the Last Year: No       PhysicalExamination:  Vitals:   /90 (BP Location: Left arm, Patient Position: Sitting, Cuff Size: Large)   Pulse (!) 107   Temp 97 8 °F (36 6 °C) (Tympanic)   Resp 22   Ht 5' 4" (1 626 m)   Wt (!) 200 kg (440 lb)   SpO2 99%   BMI 75 53 kg/m²   General: Pleasant, obese, disheveled, Awake alert and oriented x 3, conversant without conversational dyspnea, NAD, normal affect  HEENT:  PERRL, Sclera noninjected, nonicteric OU, Nares patent,  no craniofacial abnormalities, Mucous membranes, moist, no oral lesions, normal dentition  NECK: Trachea midline, no accessory muscle use, no stridor, no cervical or supraclavicular adenopathy, JVP not elevated  CARDIAC: Reg, single s1/S2, no m/r/g  PULM: CTA bilaterally no wheezing, rhonchi or rales  ABD: Normoactive bowel sounds, soft nontender, nondistended, no rebound, no rigidity, no guarding  EXT: No cyanosis, no clubbing, bilateral lower extremity edema, legs wrapped in bandages  NEURO: no focal neurologic deficits, AAOx3, moving all extremities appropriately      Diagnostic Data:  Labs: I personally reviewed the most recent laboratory data pertinent to today's visit  I have personally reviewed pertinent lab results    Lab Results   Component Value Date    WBC 7 61 09/27/2022    HGB 11 6 (L) 09/27/2022    HCT 36 8 09/27/2022    MCV 93 09/27/2022     09/27/2022     Lab Results   Component Value Date    GLUCOSE 144 (H) 09/07/2019    CALCIUM 8 7 09/27/2022     (L) 12/03/2015    K 4 1 09/27/2022    CO2 31 09/27/2022     09/27/2022    BUN 20 09/27/2022    CREATININE 0 93 09/27/2022     No results found for: IGE  Lab Results   Component Value Date    ALT 17 09/24/2022    AST 27 09/24/2022    ALKPHOS 76 09/24/2022    BILITOT 0 88 12/03/2015     Lab Results   Component Value Date    IRON 27 (L) 12/01/2019    TIBC 362 12/01/2019    FERRITIN 98 12/01/2019     Lab Results Component Value Date    QVPCHERG66 686 12/03/2015     PFT  Results:  FEV1/FVC Ratio:  86 %   Forced Vital Capacity:  2 72 L   (68 % predicted)  FEV1:  2 35 L (77 % predicted)  After administration of bronchodilator FEV1:  2 14 (-9%)  Lung volumes by body plethysmography:   Total Lung Capacity 72 % predicted   Residual volume 90 % predicted    RV/TLC ratio 125 %  DLCO for patients hemoglobin level:  83 % (94 % when corrected for Hb)  Interpretation:  • Spirometry is normal  • Lung volumes are normal  • Diffusing capacity is normal  • Flow volume loop suggests small airways disease     Sleep studies:  Diagnostic: AHI - 129 6 and hypoxia, lowest sat 74%    Compliance Data:  9/30/22 - 11/30/22                                  Type of CPAP:  autoPAP 12-20, mean pressure 12 4, max 16                                   Percent usage: 98%                                   Average time used: 6 hrs 50 mins                                  Time in large leak: 15secs                                   Residual AHI: 3 2                                         [place], DO

## 2022-12-01 NOTE — PROGRESS NOTES
Progress Note - Sleep Medicine  Janine Bernal 56 y o  male MRN: [id number]     Assessment/Plan  56 y o  M with PMHx of morbid obesity, Chronic lower extremity lymphedema, GERD, HTN, hyperlipidemia, DM type 2 who comes in DAHIANA/OHS follow up  1   Severe DAHIANA (AHI - 129 6) currently on autoPAP 12-20 with excellent compliance and clinical response  Residual AHI - 3 5       -  Continue autoCPAP at current pressures      -  Follow overnight pulse ox to ensure oxygenation is adequately controlled on CPAP      -  I also discussed in depth the risk of leaving sleep apnea untreated including hypertension, heart failure, arrhythmia, MI and stroke  2   Elevated CO2 on BMP/obesity hypoventilation -  PFT are consistent with restriction due to obesity       -  We will check ABG to ensure CO2 is adequately controlled       -   We will check overnight pulse ox on CPAP to ensure oxygenation is adequately managed      -   We discussed weight loss efforts  He is being evaluated by bariatric surgery     ______________________________________________________________________    HPI:    Janine Bernal presents today for follow-up of severe DAHIANA  He was last seen in June and has been doing well on CPAP  He was diagnosed with severe DAHIANA and is on autoPAP  He feels refreshed when he wakes up  He feels that his CPAP has been very effective  He has been trying to lose weight as well  He is due to see bariatric surgery for possible weight loss surgery  Review of Systems:  Review of Systems   Constitutional: Positive for fatigue  Negative for appetite change and unexpected weight change  HENT: Negative  Eyes: Negative  Respiratory: Positive for apnea and shortness of breath  Negative for wheezing  Cardiovascular: Negative  Gastrointestinal: Negative  Endocrine: Negative  Genitourinary: Negative  Musculoskeletal: Negative  Allergic/Immunologic: Negative  Neurological: Negative  Psychiatric/Behavioral: Negative  Social history updates:  Social History     Tobacco Use   Smoking Status Never   Smokeless Tobacco Never     Social History     Socioeconomic History   • Marital status: /Civil Union     Spouse name: Not on file   • Number of children: Not on file   • Years of education: Not on file   • Highest education level: Not on file   Occupational History   • Not on file   Tobacco Use   • Smoking status: Never   • Smokeless tobacco: Never   Vaping Use   • Vaping Use: Never used   Substance and Sexual Activity   • Alcohol use: Not Currently     Alcohol/week: 0 0 standard drinks   • Drug use: Yes     Types: Marijuana     Comment: medical   • Sexual activity: Yes   Other Topics Concern   • Not on file   Social History Narrative   • Not on file     Social Determinants of Health     Financial Resource Strain: Not on file   Food Insecurity: No Food Insecurity   • Worried About Running Out of Food in the Last Year: Never true   • Ran Out of Food in the Last Year: Never true   Transportation Needs: No Transportation Needs   • Lack of Transportation (Medical): No   • Lack of Transportation (Non-Medical):  No   Physical Activity: Not on file   Stress: Not on file   Social Connections: Not on file   Intimate Partner Violence: Not on file   Housing Stability: Unknown   • Unable to Pay for Housing in the Last Year: No   • Number of Places Lived in the Last Year: Not on file   • Unstable Housing in the Last Year: No       PhysicalExamination:  Vitals:   /90 (BP Location: Left arm, Patient Position: Sitting, Cuff Size: Large)   Pulse (!) 107   Temp 97 8 °F (36 6 °C) (Tympanic)   Resp 22   Ht 5' 4" (1 626 m)   Wt (!) 200 kg (440 lb)   SpO2 99%   BMI 75 53 kg/m²   General: Pleasant, obese, disheveled, Awake alert and oriented x 3, conversant without conversational dyspnea, NAD, normal affect  HEENT:  PERRL, Sclera noninjected, nonicteric OU, Nares patent,  no craniofacial abnormalities, Mucous membranes, moist, no oral lesions, normal dentition  NECK: Trachea midline, no accessory muscle use, no stridor, no cervical or supraclavicular adenopathy, JVP not elevated  CARDIAC: Reg, single s1/S2, no m/r/g  PULM: CTA bilaterally no wheezing, rhonchi or rales  ABD: Normoactive bowel sounds, soft nontender, nondistended, no rebound, no rigidity, no guarding  EXT: No cyanosis, no clubbing, bilateral lower extremity edema, legs wrapped in bandages  NEURO: no focal neurologic deficits, AAOx3, moving all extremities appropriately      Diagnostic Data:  Labs: I personally reviewed the most recent laboratory data pertinent to today's visit  I have personally reviewed pertinent lab results    Lab Results   Component Value Date    WBC 7 61 09/27/2022    HGB 11 6 (L) 09/27/2022    HCT 36 8 09/27/2022    MCV 93 09/27/2022     09/27/2022     Lab Results   Component Value Date    GLUCOSE 144 (H) 09/07/2019    CALCIUM 8 7 09/27/2022     (L) 12/03/2015    K 4 1 09/27/2022    CO2 31 09/27/2022     09/27/2022    BUN 20 09/27/2022    CREATININE 0 93 09/27/2022     No results found for: IGE  Lab Results   Component Value Date    ALT 17 09/24/2022    AST 27 09/24/2022    ALKPHOS 76 09/24/2022    BILITOT 0 88 12/03/2015     Lab Results   Component Value Date    IRON 27 (L) 12/01/2019    TIBC 362 12/01/2019    FERRITIN 98 12/01/2019     Lab Results   Component Value Date    [id number] 686 12/03/2015     PFT  Results:  FEV1/FVC Ratio:  86 %   Forced Vital Capacity:  2 72 L   (68 % predicted)  FEV1:  2 35 L (77 % predicted)  After administration of bronchodilator FEV1:  2 14 (-9%)  Lung volumes by body plethysmography:   Total Lung Capacity 72 % predicted   Residual volume 90 % predicted    RV/TLC ratio 125 %  DLCO for patients hemoglobin level:  83 % (94 % when corrected for Hb)  Interpretation:  • Spirometry is normal  • Lung volumes are normal  • Diffusing capacity is normal  • Flow volume loop suggests small airways disease     Sleep studies:  Diagnostic: AHI - 129 6 and hypoxia, lowest sat 74%    Compliance Data:  9/30/22 - 11/30/22                                  Type of CPAP:  autoPAP 12-20, mean pressure 12 4, max 16                                   Percent usage: 98%                                   Average time used: 6 hrs 50 mins                                  Time in large leak: 15secs                                   Residual AHI: 3 2                                         Latanya Kwon, DO

## 2022-12-01 NOTE — PROGRESS NOTES
Pulse oximetry overnight and PAP  resupply RX sent to [facility] via [email or web address]

## 2022-12-16 DIAGNOSIS — E03.9 HYPOTHYROIDISM, UNSPECIFIED TYPE: ICD-10-CM

## 2022-12-19 RX ORDER — LEVOTHYROXINE SODIUM 0.03 MG/1
25 TABLET ORAL
Qty: 90 TABLET | Refills: 1 | Status: SHIPPED | OUTPATIENT
Start: 2022-12-19

## 2022-12-19 NOTE — ASSESSMENT & PLAN NOTE
Patient has history of recurrent cellulitis  Presents today with fever, tachycardia, SOB, dizziness, diaphoresis and cellulitis of the upper LLE  Received 7-day course of keflex as outpatient two days ago but is refractory to treatment  Meets SIRS criteria  Given Cefepime and Vancomycin in ED with 1 L bolus NS     - CXR showed cardiomegaly and mild vascular congestion   - EKG showed sinus tachycardia at 132 bpm   - Lactic acid 1 6  - PT elevated at 15 2   PTT wnl   - Procalcitonin and blood cultures pending  - Ordered Cefazolin 2g IV Q8   - Monitor BMP and CBC  - Consulted infectious disease Post-Op Assessment Note    CV Status:  Stable    Pain management: adequate     Mental Status:  Alert and awake   Hydration Status:  Euvolemic   PONV Controlled:  Controlled   Airway Patency:  Patent      Post Op Vitals Reviewed: Yes            No notable events documented      BP      Temp      Pulse     Resp      SpO2      VSS no complications

## 2022-12-19 NOTE — TELEPHONE ENCOUNTER
Lisandro Obrien has requested a refill of levothyroxine 25 mcg tablet  Pt meets the requirements placed by [facility]  Will refill medication

## 2022-12-21 LAB
DME PARACHUTE DELIVERY DATE REQUESTED: NORMAL
DME PARACHUTE ITEM DESCRIPTION: NORMAL
DME PARACHUTE ORDER STATUS: NORMAL
DME PARACHUTE SUPPLIER NAME: NORMAL
DME PARACHUTE SUPPLIER PHONE: NORMAL

## 2023-01-12 ENCOUNTER — HOSPITAL ENCOUNTER (OUTPATIENT)
Dept: PULMONOLOGY | Facility: HOSPITAL | Age: 58
End: 2023-01-12
Attending: INTERNAL MEDICINE

## 2023-01-12 DIAGNOSIS — G47.33 OSA (OBSTRUCTIVE SLEEP APNEA): ICD-10-CM

## 2023-01-12 LAB
ARTERIAL PATENCY WRIST A: ABNORMAL
ARTERIAL PATENCY WRIST A: ABNORMAL
BASE EXCESS BLDA CALC-SCNC: -2 MMOL/L (ref -2–3)
BASE EXCESS BLDA CALC-SCNC: 0 MMOL/L (ref -2–3)
CA-I BLD-SCNC: 1.3 MMOL/L (ref 1.12–1.32)
CA-I BLD-SCNC: 1.31 MMOL/L (ref 1.12–1.32)
FIO2 GAS DIL.REBREATH: 21 L
FIO2 GAS DIL.REBREATH: 21 L
GLUCOSE SERPL-MCNC: 156 MG/DL (ref 65–140)
GLUCOSE SERPL-MCNC: 166 MG/DL (ref 65–140)
HCO3 BLDA-SCNC: 26 MMOL/L (ref 22–28)
HCO3 BLDA-SCNC: 26.1 MMOL/L (ref 22–28)
HCT VFR BLD CALC: 42 % (ref 36.5–49.3)
HCT VFR BLD CALC: 44 % (ref 36.5–49.3)
HGB BLDA-MCNC: 14.3 G/DL (ref 12–17)
HGB BLDA-MCNC: 15 G/DL (ref 12–17)
PCO2 BLD: 27 MMOL/L (ref 21–32)
PCO2 BLD: 28 MMOL/L (ref 21–32)
PCO2 BLD: 46.4 MM HG (ref 36–44)
PCO2 BLD: 55 MM HG (ref 36–44)
PH BLD: 7.28 [PH] (ref 7.35–7.45)
PH BLD: 7.36 [PH] (ref 7.35–7.45)
PO2 BLD: 30 MM HG (ref 75–129)
PO2 BLD: 82 MM HG (ref 75–129)
POTASSIUM BLD-SCNC: 3.7 MMOL/L (ref 3.5–5.3)
POTASSIUM BLD-SCNC: 3.8 MMOL/L (ref 3.5–5.3)
SAMPLE SITE: 0
SAMPLE SITE: 0
SAO2 % BLD FROM PO2: 50 % (ref 60–85)
SAO2 % BLD FROM PO2: 95 % (ref 60–85)
SODIUM BLD-SCNC: 136 MMOL/L (ref 136–145)
SODIUM BLD-SCNC: 138 MMOL/L (ref 136–145)
SPECIMEN SOURCE: ABNORMAL
SPECIMEN SOURCE: ABNORMAL

## 2023-01-12 NOTE — RESPIRATORY THERAPY NOTE
ABG drawn from RR artery post positive allens  Modified test  ABG ran on istat  Bandage held until bleeding stopped

## 2023-01-19 DIAGNOSIS — Z79.4 TYPE 2 DIABETES MELLITUS WITH HYPERGLYCEMIA, WITH LONG-TERM CURRENT USE OF INSULIN (HCC): ICD-10-CM

## 2023-01-19 DIAGNOSIS — B49 FUNGAL INFECTION: ICD-10-CM

## 2023-01-19 DIAGNOSIS — E78.00 PURE HYPERCHOLESTEROLEMIA: ICD-10-CM

## 2023-01-19 DIAGNOSIS — L03.116 CELLULITIS OF LEFT LOWER EXTREMITY: ICD-10-CM

## 2023-01-19 DIAGNOSIS — E11.65 TYPE 2 DIABETES MELLITUS WITH HYPERGLYCEMIA, WITH LONG-TERM CURRENT USE OF INSULIN (HCC): ICD-10-CM

## 2023-01-19 RX ORDER — NYSTATIN 100000 [USP'U]/G
1 POWDER TOPICAL 2 TIMES DAILY
Qty: 120 G | Refills: 3 | Status: SHIPPED | OUTPATIENT
Start: 2023-01-19

## 2023-01-20 RX ORDER — EZETIMIBE 10 MG/1
TABLET ORAL
Qty: 90 TABLET | Refills: 1 | Status: SHIPPED | OUTPATIENT
Start: 2023-01-20

## 2023-01-20 RX ORDER — INSULIN GLARGINE 100 [IU]/ML
INJECTION, SOLUTION SUBCUTANEOUS
Qty: 18 ML | Refills: 2 | Status: SHIPPED | OUTPATIENT
Start: 2023-01-20

## 2023-02-24 DIAGNOSIS — I10 ESSENTIAL HYPERTENSION: ICD-10-CM

## 2023-02-27 RX ORDER — LOSARTAN POTASSIUM 25 MG/1
25 TABLET ORAL DAILY
Qty: 90 TABLET | Refills: 0 | Status: SHIPPED | OUTPATIENT
Start: 2023-02-27

## 2023-03-30 ENCOUNTER — TELEPHONE (OUTPATIENT)
Dept: INTERNAL MEDICINE CLINIC | Facility: CLINIC | Age: 58
End: 2023-03-30

## 2023-03-30 NOTE — TELEPHONE ENCOUNTER
Patient called and is in need of cirade ,(compression stocking kit), he is no longer seeing dr Juany Mello anymore, he has been discharged from him  Can we order this for him

## 2023-04-05 DIAGNOSIS — K21.9 GASTROESOPHAGEAL REFLUX DISEASE: ICD-10-CM

## 2023-04-05 RX ORDER — OMEPRAZOLE 40 MG/1
CAPSULE, DELAYED RELEASE ORAL
Qty: 180 CAPSULE | Refills: 1 | Status: SHIPPED | OUTPATIENT
Start: 2023-04-05

## 2023-04-12 NOTE — TELEPHONE ENCOUNTER
No I have never heard of this and would need to call to juve for specific information on how to order and what to order

## 2023-04-19 ENCOUNTER — TELEPHONE (OUTPATIENT)
Dept: INTERNAL MEDICINE CLINIC | Facility: CLINIC | Age: 58
End: 2023-04-19

## 2023-04-19 NOTE — TELEPHONE ENCOUNTER
Hi, this is Margy Dow, patient 1965 calling about a prescription that was made for my sarcade leg compression and stockings  I received a message that the prescription filled, but I don't know where to fill it  So I wrote back on the portal but I didn't get a response from whoever that was who confirmed that the prescription was made  I just need to know where to call to fill the prescription  So please give me a call back or have me send have them send me a message on the portal  Thank you so much again Margy Dow patient  Thank you  I faxed to [employer] to [phone] , called patient to let him know

## 2023-04-26 ENCOUNTER — TELEPHONE (OUTPATIENT)
Dept: INTERNAL MEDICINE CLINIC | Facility: CLINIC | Age: 58
End: 2023-04-26

## 2023-04-26 DIAGNOSIS — Z79.4 TYPE 2 DIABETES MELLITUS WITH HYPERGLYCEMIA, WITH LONG-TERM CURRENT USE OF INSULIN (HCC): ICD-10-CM

## 2023-04-26 DIAGNOSIS — E11.65 TYPE 2 DIABETES MELLITUS WITH HYPERGLYCEMIA, WITH LONG-TERM CURRENT USE OF INSULIN (HCC): ICD-10-CM

## 2023-04-26 RX ORDER — INSULIN ASPART 100 [IU]/ML
INJECTION, SOLUTION INTRAVENOUS; SUBCUTANEOUS
Qty: 30 ML | Refills: 3 | Status: SHIPPED | OUTPATIENT
Start: 2023-04-26

## 2023-04-26 NOTE — TELEPHONE ENCOUNTER
I have sent the order for the circail juxtalite lymph edema stockings for this pt   I sent them to the [facility] at fax number [phone] the rep is Yessi Whittington and the phone number to follow up if needed is [phone]

## 2023-04-27 DIAGNOSIS — E11.65 TYPE 2 DIABETES MELLITUS WITH HYPERGLYCEMIA, WITH LONG-TERM CURRENT USE OF INSULIN (HCC): ICD-10-CM

## 2023-04-27 DIAGNOSIS — E66.01 OBESITY, MORBID, BMI 50 OR HIGHER (HCC): ICD-10-CM

## 2023-04-27 DIAGNOSIS — R79.89 ELEVATED TSH: ICD-10-CM

## 2023-04-27 DIAGNOSIS — E78.00 PURE HYPERCHOLESTEROLEMIA: ICD-10-CM

## 2023-04-27 DIAGNOSIS — I10 HYPERTENSION GOAL BP (BLOOD PRESSURE) < 140/90: ICD-10-CM

## 2023-04-27 DIAGNOSIS — Z79.4 TYPE 2 DIABETES MELLITUS WITH HYPERGLYCEMIA, WITH LONG-TERM CURRENT USE OF INSULIN (HCC): ICD-10-CM

## 2023-04-27 RX ORDER — PEN NEEDLE, DIABETIC 32GX 5/32"
NEEDLE, DISPOSABLE MISCELLANEOUS
Qty: 200 EACH | Refills: 3 | OUTPATIENT
Start: 2023-04-27

## 2023-05-22 ENCOUNTER — CONSULT (OUTPATIENT)
Dept: BARIATRICS | Facility: CLINIC | Age: 58
End: 2023-05-22

## 2023-05-22 VITALS
HEIGHT: 64 IN | WEIGHT: 315 LBS | RESPIRATION RATE: 20 BRPM | DIASTOLIC BLOOD PRESSURE: 100 MMHG | BODY MASS INDEX: 53.78 KG/M2 | HEART RATE: 110 BPM | SYSTOLIC BLOOD PRESSURE: 150 MMHG

## 2023-05-22 DIAGNOSIS — E11.42 TYPE 2 DIABETES MELLITUS WITH DIABETIC POLYNEUROPATHY, WITH LONG-TERM CURRENT USE OF INSULIN (HCC): Primary | ICD-10-CM

## 2023-05-22 DIAGNOSIS — G47.33 OSA (OBSTRUCTIVE SLEEP APNEA): ICD-10-CM

## 2023-05-22 DIAGNOSIS — Z79.4 TYPE 2 DIABETES MELLITUS WITH DIABETIC POLYNEUROPATHY, WITH LONG-TERM CURRENT USE OF INSULIN (HCC): Primary | ICD-10-CM

## 2023-05-22 DIAGNOSIS — E66.01 MORBID OBESITY WITH BMI OF 70 AND OVER, ADULT (HCC): ICD-10-CM

## 2023-05-22 DIAGNOSIS — E11.65 TYPE 2 DIABETES MELLITUS WITH HYPERGLYCEMIA (HCC): ICD-10-CM

## 2023-05-22 DIAGNOSIS — E78.00 PURE HYPERCHOLESTEROLEMIA: ICD-10-CM

## 2023-05-22 NOTE — PROGRESS NOTES
Assessment/Plan:  Eusebia Garrido was seen today for consult  Diagnoses and all orders for this visit:    Insulin-dependent diabetes mellitus with neuropathy    Morbid obesity with BMI of 70 and over, adult [facility])  -     Ambulatory Referral to Weight Management    DAHIANA (obstructive sleep apnea)    Type 2 diabetes mellitus with hyperglycemia ([address] )    Pure hypercholesterolemia      Patient denies personal history of pancreatitis  Patient also denies personal and family history of medullary thyroid cancer and multiple endocrine neoplasia type 2 (MEN 2 tumor)  1    I will message your PCP and endocrinologist about starting you on Mounjaro, while decreasing your insulin  2   Food log (2000 calorie coal)  Body STAT package  3   Gradually increase activity as tolerate  4   Follow-up in 3-4 month  Total time spent reviewing chart, interviewing patient, examining patient, discussing plan, answering all questions, and documentin min        ______________________________________________________________________        Subjective:   Chief Complaint   Patient presents with   • Consult     MWM consult; waist 20in; goal wt; pt has sleep apnea       HPI: Sepideh Contreras  is a 56 y o  male with history of DM2, HTN, DAHIANA, and excess weight, here to pursue weight loss management  Previous notes and records have been reviewed  Recent hemoglobin A1c 6 9%  Patient denies any low or high blood sugars at home  He reports that his blood sugar has been well controlled  He is due for a follow-up with his PCP and endocrinologist   He does not believe that a GLP-1 receptor agonist was discussed in the past     Patient 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), bouquet with sepsis and ARDS with prolonged hospital course fall   He reports gaining 100 pounds since then but has been able to lose approximately 40 pounds on his own  He has been following a calorie restriction    He is trying to become more active but is limited by knee pain and back pain  Requires a walker to ambulate  Currently he takes metformin 1000 mg twice a day, Lantus 40 units once a day, NovoLog 14 units plus correction scale       HPI  Wt Readings from Last 20 Encounters:   05/22/23 (!) 202 kg (445 lb 3 2 oz)   12/01/22 (!) 200 kg (440 lb)   09/27/22 (!) 209 kg (461 lb 3 2 oz)   09/06/22 (!) 227 kg (499 lb 15 9 oz)   06/28/22 (!) 208 kg (458 lb 3 2 oz)   02/24/22 (!) 208 kg (458 lb 4 8 oz)   10/31/21 (!) 220 kg (485 lb 3 2 oz)   10/28/21 (!) 200 kg (440 lb 3 2 oz)   05/03/21 (!) 228 kg (503 lb)   12/02/19 (!) 171 kg (377 lb 3 2 oz)   11/12/19 (!) 162 kg (357 lb)   10/20/19 (!) 160 kg (353 lb 9 9 oz)   10/14/19 (!) 156 kg (343 lb)   10/08/19 (!) 178 kg (393 lb)   09/16/19 (!) 178 kg (393 lb)   09/20/18 (!) 166 kg (365 lb 8 oz)   08/22/17 (!) 163 kg (359 lb 15 9 oz)   12/10/15 (!) 177 kg (391 lb 3 oz)   02/10/14 (!) 165 kg (362 lb 15 9 oz)   12/26/13 (!) 162 kg (356 lb 3 9 oz)     Hunger/Cravings: No  Dining out: No/rare  Hydration: Adequate  Alcohol: No  Smoking: No  Exercise: Limited    Past Medical History:   Diagnosis Date   • Acute kidney injury 10/28/2021   • Anemia 09/13/2019   • Cellulitis     last assessed 12/10/15   • Cellulitis of left lower extremity    • Cellulitis of right lower extremity 11/19/2021   • Cough 10/20/2019   • Diabetes mellitus ([address] )    • Disease of thyroid gland    • Edema    • Elevated liver enzymes    • Elevated serum creatinine 11/19/2021   • Esophageal reflux    • Fungal infection 8/16/2021   • Gluten intolerance    • Gout     last assessed 09/05/13   • Hyperglycemia    • Hypertension    • Hyponatremia 9/6/2019   • IBS (irritable bowel syndrome)    • Insomnia    • Obesity    • Osteoarthritis of knee     last assessed 02/10/14   • Scrotal swelling 5/19/2020   • Shortness of breath 5/3/2021   • Venous insufficiency     last assessed 08/22/17   • Villonodular synovitis of the hand, right     last assessed "11/14/2013     Patient denies personal and family history of  pancreatitis, thyroid cancer, MEN-2 tumors  Denies any hx of glaucoma, seizures, kidney stones, gallstones  Denies Hx of CAD, PAD, palpitations, arrhythmia  Denies uncontrolled anxiety or depression, suicidal behavior or thinking , insomnia or sleep disturbance  Past Surgical History:   Procedure Laterality Date   • INCISION AND DRAINAGE OF WOUND Left 9/6/2019    Procedure: INCISION AND DRAINAGE (I&D) GROIN;  Surgeon: Duane Regan, DO;  Location: [facility];  Service: General   • SKIN BIOPSY     • WOUND DEBRIDEMENT Left 9/7/2019    Procedure: EXCISIONAL DEBRIDEMENT;  Surgeon: Duane Regan, DO;  Location[facility];  Service: General     The following portions of the patient's history were reviewed and updated as appropriate: allergies, current medications, past family history, past medical history, past social history, past surgical history, and problem list     Review Of Systems:  Review of Systems   Constitutional: Negative for activity change, appetite change, chills, fatigue and fever  HENT: Negative for trouble swallowing  Respiratory: Negative for cough and shortness of breath  Cardiovascular: Negative for chest pain, palpitations and leg swelling  Gastrointestinal: Negative for abdominal pain, constipation, diarrhea, nausea and vomiting  Endocrine: Negative for cold intolerance and heat intolerance  Genitourinary: Negative for difficulty urinating and dysuria  Musculoskeletal: Negative for arthralgias, back pain, gait problem and myalgias  Skin: Negative for pallor and rash  Neurological: Negative for headaches  Psychiatric/Behavioral: Negative for dysphoric mood and suicidal ideas  The patient is not nervous/anxious  Objective:  /100   Pulse (!) 110   Resp 20   Ht 5' 4\" (1 626 m)   Wt (!) 202 kg (445 lb 3 2 oz)   BMI 76 42 kg/m²   Physical Exam  Vitals and nursing note reviewed     Constitutional:  " General: He is not in acute distress  Appearance: Normal appearance  He is not ill-appearing or diaphoretic  Eyes:      General: No scleral icterus  Cardiovascular:      Rate and Rhythm: Normal rate and regular rhythm  Pulses: Normal pulses  Heart sounds: Normal heart sounds  No murmur heard  Pulmonary:      Effort: Pulmonary effort is normal  No respiratory distress  Breath sounds: Normal breath sounds  No stridor  No wheezing or rhonchi  Abdominal:      General: Bowel sounds are normal  There is no distension  Palpations: Abdomen is soft  There is no mass  Tenderness: There is no abdominal tenderness  Musculoskeletal:      Cervical back: Neck supple  Right lower leg: Edema present  Left lower leg: Edema present  Comments: Walker to ambulate   Lymphadenopathy:      Cervical: No cervical adenopathy  Skin:     Capillary Refill: Capillary refill takes less than 2 seconds  Findings: No lesion or rash  Neurological:      Mental Status: He is alert and oriented to person, place, and time  Gait: Gait normal    Psychiatric:         Mood and Affect: Mood normal          Behavior: Behavior normal        Labs and Imaging  Recent labs and imaging have been personally reviewed    Lab Results   Component Value Date    WBC 7 61 09/27/2022    HGB 14 3 01/12/2023    HCT 42 01/12/2023    MCV 93 09/27/2022     09/27/2022     Lab Results   Component Value Date     (L) 12/03/2015    SODIUM 137 09/27/2022    K 4 1 09/27/2022     09/27/2022    CO2 27 01/12/2023    ANIONGAP 9 12/03/2015    AGAP 4 09/27/2022    BUN 20 09/27/2022    CREATININE 0 93 09/27/2022    GLUC 129 09/27/2022    GLUF 131 (H) 01/18/2020    CALCIUM 8 7 09/27/2022    AST 27 09/24/2022    ALT 17 09/24/2022    ALKPHOS 76 09/24/2022    PROT 7 1 12/03/2015    TP 8 6 (H) 09/24/2022    BILITOT 0 88 12/03/2015    TBILI 0 68 09/24/2022    EGFR 92 09/27/2022     Lab Results   Component Value Date    HGBA1C 6 9 (H) 09/16/2022     Lab Results   Component Value Date    MKM5SWYLHUXK 3 450 06/24/2022     Lab Results   Component Value Date    CHOLESTEROL 155 07/29/2022     Lab Results   Component Value Date    HDL 42 07/29/2022     Lab Results   Component Value Date    TRIG 181 (H) 07/29/2022     Lab Results   Component Value Date    LDLCALC 77 07/29/2022       ---------------------------------------  Dr Phillip Mathew and Dr Nhan Akers,    I met with Juana Santos today at the [facility]  He is following a caloric restriction  He denies any low blood sugars  He reports that he is doing well with metformin 1000 mg twice daily, Lantus 40 units daily and Novalog 14 units with meals (+ extra for correction)  We discussed the various antiobesity medications that are available for weight loss, both FDA approved, and off label  He is most interested in starting [facility] which does appear to be on his insurance company's formulary  He is agreeable to meet with Dr Phillip Mathew to help him start on this while reducing his insulin in the process  He does not have a family history of medullary thyroid cancer, MEN 2 syndrome, or pancreatitis  Tentatively he will be following up with me in 3 to 4 months  Ideally I would like to see him on the highest dose of Mounjaro that he will tolerate with the least amount of insulin  He would like to see how much weight he can lose with this change and by following up with the weight management center  In the future he may meet with a bariatric surgeon to discuss surgical options      Jo-Ann Fox

## 2023-05-22 NOTE — PATIENT INSTRUCTIONS
I will message your PCP and endocrinologist about starting you on Mounjaro, while decreasing your insulin  2   Food log  Body STAT package  3   Gradually increase activity as tolerate  4   Follow-up in 3-4 month

## 2023-05-26 DIAGNOSIS — E11.65 TYPE 2 DIABETES MELLITUS WITH HYPERGLYCEMIA, WITH LONG-TERM CURRENT USE OF INSULIN (HCC): ICD-10-CM

## 2023-05-26 DIAGNOSIS — Z79.4 CURRENT USE OF INSULIN (HCC): ICD-10-CM

## 2023-05-26 DIAGNOSIS — Z79.4 TYPE 2 DIABETES MELLITUS WITH HYPERGLYCEMIA, WITH LONG-TERM CURRENT USE OF INSULIN (HCC): ICD-10-CM

## 2023-05-26 RX ORDER — FLASH GLUCOSE SENSOR
KIT MISCELLANEOUS
Qty: 2 EACH | Refills: 5 | Status: SHIPPED | OUTPATIENT
Start: 2023-05-26

## 2023-06-13 ENCOUNTER — TELEPHONE (OUTPATIENT)
Dept: PULMONOLOGY | Facility: CLINIC | Age: 58
End: 2023-06-13

## 2023-06-13 NOTE — TELEPHONE ENCOUNTER
Patient called to r/s his appointment with Dr Jessi Mata at [facility]  He does not want to go to another location and there is no availability until August  Patient wants to know if this will affect him getting any cpap supplies since he was supposed to come in for a compliance report on Thursday  Please advise

## 2023-06-16 ENCOUNTER — OFFICE VISIT (OUTPATIENT)
Dept: INTERNAL MEDICINE CLINIC | Facility: CLINIC | Age: 58
End: 2023-06-16
Payer: MEDICARE

## 2023-06-16 VITALS
TEMPERATURE: 98.7 F | RESPIRATION RATE: 20 BRPM | OXYGEN SATURATION: 98 % | SYSTOLIC BLOOD PRESSURE: 120 MMHG | DIASTOLIC BLOOD PRESSURE: 86 MMHG | HEART RATE: 105 BPM

## 2023-06-16 DIAGNOSIS — E11.42 DIABETIC POLYNEUROPATHY ASSOCIATED WITH TYPE 2 DIABETES MELLITUS (HCC): ICD-10-CM

## 2023-06-16 DIAGNOSIS — Z79.4 TYPE 2 DIABETES MELLITUS WITH HYPERGLYCEMIA, WITH LONG-TERM CURRENT USE OF INSULIN (HCC): Primary | ICD-10-CM

## 2023-06-16 DIAGNOSIS — Z79.4 CURRENT USE OF INSULIN (HCC): ICD-10-CM

## 2023-06-16 DIAGNOSIS — E11.65 TYPE 2 DIABETES MELLITUS WITH HYPERGLYCEMIA, WITH LONG-TERM CURRENT USE OF INSULIN (HCC): Primary | ICD-10-CM

## 2023-06-16 PROCEDURE — 99214 OFFICE O/P EST MOD 30 MIN: CPT | Performed by: INTERNAL MEDICINE

## 2023-06-16 RX ORDER — BLOOD-GLUCOSE SENSOR
EACH MISCELLANEOUS
Qty: 2 EACH | Refills: 5 | Status: SHIPPED | OUTPATIENT
Start: 2023-06-16

## 2023-06-16 NOTE — ASSESSMENT & PLAN NOTE
Continue CGM  Continue diet and exercise  Continue calorie restriction  Start a new medicine Mounjaro  Decrease insulin NovoLog 5 units with meals  Recheck A1c  Follow-up 3 months

## 2023-06-16 NOTE — PROGRESS NOTES
Assessment/Plan:    Type 2 diabetes mellitus with hyperglycemia (HCC)    Lab Results   Component Value Date    HGBA1C 6 9 (H) 09/16/2022     Current home meds: Metformin 1000 mg twice daily, NovoLog 10 to 14 units with meals  Current blood glucose monitor: Freestyle elvin  75 to 78% is within the target blood glucose range per records  Plans:  Start a new medicine Mounjaro (Tirzepatide) subcutaneous 0 5 ml every 7 days  Decrease insulin NovoLog to 5 units with meals  Ambulatory referral to podiatrist   Afshan Ramon labs hemoglobin A1c, BMP, renal function, urine albumin creatinine ratio  Follow-up 3 months  Diabetic neuropathy ([facility] [address] )    Lab Results   Component Value Date    HGBA1C 6 9 (H) 09/16/2022     Denied new symptoms  Current home med gabapentin 300 mg daily  Continue home med gabapentin  Current use of insulin (HCC)  Continue CGM  Continue diet and exercise  Continue calorie restriction  Start a new medicine Mounjaro  Decrease insulin NovoLog 5 units with meals  Recheck A1c  Follow-up 3 months  Diagnoses and all orders for this visit:    Type 2 diabetes mellitus with hyperglycemia, with long-term current use of insulin ([facility] [address] )  -     tirzepatide 2 5 MG/0 5ML; Inject 0 5 mL (2 5 mg total) under the skin every 7 days  -     Albumin / creatinine urine ratio; Future  -     Basic metabolic panel; Future  -     Hemoglobin A1C; Future  -     Ambulatory Referral to Podiatry; Future  -     Continuous Blood Gluc Sensor (FreeStyle Elvin 3 Sensor) MISC; Replace every 14 days    Current use of insulin ([facility])  -     tirzepatide 2 5 MG/0 5ML; Inject 0 5 mL (2 5 mg total) under the skin every 7 days  -     Albumin / creatinine urine ratio; Future  -     Basic metabolic panel; Future  -     Hemoglobin A1C; Future  -     Ambulatory Referral to Podiatry;  Future  -     Continuous Blood Gluc Sensor (FreeStyle Elvin 3 Sensor) MISC; Replace every 14 days    Diabetic polyneuropathy associated with type 2 diabetes mellitus ([facility] )  -     Ambulatory Referral to Podiatry; Future          Subjective:      Patient ID: Celina Guillermo is a 66 y o  male  Precious Marmolejo is a 16-year-old male with past medical history of type 2 diabetes, hypertension, obstructive sleep apnea, morbid obesity, hyperlipidemia presents to the office today for follow-up visit  Last visit with endocrinologist was in November 2022  He has been following with pulmonologist for DAHIANA and he is seeing the bariatric consult in last month  Last hemoglobin A1c in September 2022 was 6 9%  Patient was recommended to discuss about the new medication Mounjaro for weight management and diabetes per bariatric surgeon  Today, patient reports that he had left leg cellulitis for a week and he has been taking Levaquin for 10 days today is the last day of antibiotics  Denied fever, chills, nausea, vomiting, leg pain  Denied chest pain, shortness of breath, palpitation  He reports that he has been compliant with his home meds metformin 1000 mg twice a day and NovoLog 10 to 14 units with meals  He has been doing calorie restriction and regular exercise  Last ophthalmology visit was 1 year ago  Last podiatry check was in November 2021  He has been using CGM rebeca and the blood glucose records with doing well with average blood glucose 155 in last month and average blood glucose 158 for last 90 days  The following portions of the patient's history were reviewed and updated as appropriate: allergies, past social history, past surgical history and problem list     Review of Systems   Constitutional: Negative for chills, fatigue, fever and unexpected weight change  HENT: Negative for ear pain, postnasal drip, rhinorrhea and sore throat  Eyes: Negative for pain and visual disturbance  Respiratory: Negative for cough and shortness of breath  Cardiovascular: Positive for leg swelling  Negative for chest pain and palpitations          Left leg swelling due to cellulitis   Gastrointestinal: Negative for abdominal pain, diarrhea, nausea and vomiting  Genitourinary: Negative for dysuria and hematuria  Musculoskeletal: Negative for arthralgias and back pain  Skin: Positive for wound  Negative for color change and rash  Allergic/Immunologic: Negative  Neurological: Negative for dizziness, seizures, syncope and numbness  Hematological: Negative for adenopathy  Does not bruise/bleed easily  Psychiatric/Behavioral: Negative for confusion  All other systems reviewed and are negative  Objective:      /86 (BP Location: Right arm, Patient Position: Sitting, Cuff Size: Large)   Pulse 105   Temp 98 7 °F (37 1 °C)   Resp 20   SpO2 98%          Physical Exam  Constitutional:       Appearance: He is obese  Comments: Morbid obese with BMI 76   Eyes:      Extraocular Movements: Extraocular movements intact  Pupils: Pupils are equal, round, and reactive to light  Cardiovascular:      Rate and Rhythm: Normal rate  Heart sounds: Normal heart sounds  No murmur heard  No gallop  Pulmonary:      Breath sounds: Normal breath sounds  No wheezing or rales  Abdominal:      General: Bowel sounds are normal  There is no distension  Musculoskeletal:         General: Swelling present  Right lower leg: No edema  Left lower leg: Edema present  Skin:     General: Skin is warm and dry  Capillary Refill: Capillary refill takes less than 2 seconds  Coloration: Skin is not pale  Findings: Erythema present  Neurological:      Mental Status: He is oriented to person, place, and time  Nutrition Assessment and Intervention:     Reviewed food recall journal      Physical Activity Assessment and Intervention:    Activity journal reviewed    Activity journal completion recommended      Other interventions: Patient said that he does regular exercise      Emotional and Mental Well-being, Sleep, Connectedness Assessment and Intervention:    Sleep/stress assessment performed    Depression and anxiety screening performed and reviewed      Other interventions: Patient needs to wear CPAP for DAHIANA  Tobacco and Toxic Substance Assessment and Intervention:     Tobacco use screening performed    Alcohol and drug use screening performed      Other interventions: Denied cigarette smoking and alcohol drinking      Therapeutic Lifestyle Change Visit:     One-on-one comprehensive counseling, coaching, and health behavior change visit completed

## 2023-06-16 NOTE — ASSESSMENT & PLAN NOTE
Lab Results   Component Value Date    HGBA1C 6 9 (H) 09/16/2022     Current home meds: Metformin 1000 mg twice daily, NovoLog 10 to 14 units with meals  Current blood glucose monitor: Freestyle rebeca  75 to 78% is within the target blood glucose range per records  Plans:  Start a new medicine Mounjaro (Tirzepatide) subcutaneous 0 5 ml every 7 days  Decrease insulin NovoLog to 5 units with meals  Ambulatory referral to podiatrist   Sophia Narayan labs hemoglobin A1c, BMP, renal function, urine albumin creatinine ratio  Follow-up 3 months

## 2023-06-16 NOTE — ASSESSMENT & PLAN NOTE
Lab Results   Component Value Date    HGBA1C 6 9 (H) 09/16/2022     Denied new symptoms  Current home med gabapentin 300 mg daily  Continue home med gabapentin

## 2023-06-17 DIAGNOSIS — E03.9 HYPOTHYROIDISM, UNSPECIFIED TYPE: ICD-10-CM

## 2023-06-17 DIAGNOSIS — R60.0 BILATERAL LEG EDEMA: ICD-10-CM

## 2023-06-17 DIAGNOSIS — I10 ESSENTIAL HYPERTENSION: ICD-10-CM

## 2023-06-19 RX ORDER — LEVOTHYROXINE SODIUM 0.03 MG/1
25 TABLET ORAL
Qty: 90 TABLET | Refills: 1 | Status: SHIPPED | OUTPATIENT
Start: 2023-06-19

## 2023-06-20 RX ORDER — FUROSEMIDE 20 MG/1
TABLET ORAL
Qty: 270 TABLET | Refills: 2 | Status: SHIPPED | OUTPATIENT
Start: 2023-06-20

## 2023-06-21 ENCOUNTER — OFFICE VISIT (OUTPATIENT)
Dept: INTERNAL MEDICINE CLINIC | Facility: CLINIC | Age: 58
End: 2023-06-21
Payer: MEDICARE

## 2023-06-21 VITALS
HEIGHT: 62 IN | BODY MASS INDEX: 57.97 KG/M2 | OXYGEN SATURATION: 100 % | SYSTOLIC BLOOD PRESSURE: 127 MMHG | WEIGHT: 315 LBS | HEART RATE: 112 BPM | TEMPERATURE: 98.5 F | DIASTOLIC BLOOD PRESSURE: 61 MMHG

## 2023-06-21 DIAGNOSIS — L03.116 CELLULITIS OF LEFT LEG: ICD-10-CM

## 2023-06-21 DIAGNOSIS — E78.00 PURE HYPERCHOLESTEROLEMIA: ICD-10-CM

## 2023-06-21 DIAGNOSIS — I10 ESSENTIAL HYPERTENSION: ICD-10-CM

## 2023-06-21 DIAGNOSIS — Z12.5 SCREENING FOR PROSTATE CANCER: ICD-10-CM

## 2023-06-21 DIAGNOSIS — E11.42 TYPE 2 DIABETES MELLITUS WITH DIABETIC POLYNEUROPATHY, WITH LONG-TERM CURRENT USE OF INSULIN (HCC): ICD-10-CM

## 2023-06-21 DIAGNOSIS — E11.49 OTHER DIABETIC NEUROLOGICAL COMPLICATION ASSOCIATED WITH TYPE 2 DIABETES MELLITUS (HCC): ICD-10-CM

## 2023-06-21 DIAGNOSIS — E03.9 ACQUIRED HYPOTHYROIDISM: ICD-10-CM

## 2023-06-21 DIAGNOSIS — R60.0 BILATERAL LEG EDEMA: ICD-10-CM

## 2023-06-21 DIAGNOSIS — Z79.4 TYPE 2 DIABETES MELLITUS WITH DIABETIC POLYNEUROPATHY, WITH LONG-TERM CURRENT USE OF INSULIN (HCC): ICD-10-CM

## 2023-06-21 DIAGNOSIS — Z00.00 ANNUAL PHYSICAL EXAM: Primary | ICD-10-CM

## 2023-06-21 PROCEDURE — 99213 OFFICE O/P EST LOW 20 MIN: CPT | Performed by: INTERNAL MEDICINE

## 2023-06-21 PROCEDURE — 99396 PREV VISIT EST AGE 40-64: CPT | Performed by: INTERNAL MEDICINE

## 2023-06-21 RX ORDER — GABAPENTIN 100 MG/1
100 CAPSULE ORAL 2 TIMES DAILY
Qty: 180 CAPSULE | Refills: 1 | Status: SHIPPED | OUTPATIENT
Start: 2023-06-21

## 2023-06-21 RX ORDER — LEVOFLOXACIN 500 MG/1
500 TABLET, FILM COATED ORAL EVERY 24 HOURS
Qty: 10 TABLET | Refills: 0 | Status: SHIPPED | OUTPATIENT
Start: 2023-06-21 | End: 2023-07-01

## 2023-06-21 NOTE — PROGRESS NOTES
ADULT ANNUAL [address] [place],  [address] INTERNAL MEDICINE DANIEL    NAME: Aparna Gustafson  AGE: 66 y o  SEX: male  : 1965     DATE: 2023     Assessment and Plan:     Problem List Items Addressed This Visit        Endocrine    Insulin-dependent diabetes mellitus with neuropathy       Lab Results   Component Value Date    HGBA1C 6 9 (H) 2022   Follows with endocrinology  He is going to start [facility] from [place] for weight loss as well  As an junction for weight loss as well  Diabetic neuropathy (HCC)       Lab Results   Component Value Date    HGBA1C 6 9 (H) 2022   Continue gabapentin 400mg BID  Relevant Medications    gabapentin (NEURONTIN) 100 mg capsule    Hypothyroidism     Check TSH and free T4, continue levothyroxine 25mcg daily  Relevant Orders    TSH, 3rd generation    T4, free    CBC and Platelet       Cardiovascular and Mediastinum    Essential hypertension     Well-controlled  Relevant Orders    CBC and Platelet       Other    Hyperlipidemia    Relevant Orders    Lipid Panel with Direct LDL reflex    Bilateral leg edema   Other Visit Diagnoses     Annual physical exam    -  Primary    Screening for prostate cancer        Relevant Orders    PSA, Total Screen    Cellulitis of left leg        Relevant Medications    levofloxacin (LEVAQUIN) 500 mg tablet          Immunizations and preventive care screenings were discussed with patient today  Appropriate education was printed on patient's after visit summary  Discussed risks and benefits of prostate cancer screening  We discussed the controversial history of PSA screening for prostate cancer in the [place] as well as the risk of over detection and over treatment of prostate cancer by way of PSA screening    The patient understands that PSA blood testing is an imperfect way to screen for prostate cancer and that elevated PSA levels in the blood may also be caused by infection, inflammation, prostatic trauma or manipulation, urological procedures, or by benign prostatic enlargement  The role of the digital rectal examination in prostate cancer screening was also discussed and I discussed with him that there is large interobserver variability in the findings of digital rectal examination  Counseling:  Alcohol/drug use: discussed moderation in alcohol intake, the recommendations for healthy alcohol use, and avoidance of illicit drug use  Dental Health: discussed importance of regular tooth brushing, flossing, and dental visits  Injury prevention: discussed safety/seat belts, safety helmets, smoke detectors, carbon dioxide detectors, and smoking near bedding or upholstery  Sexual health: discussed sexually transmitted diseases, partner selection, use of condoms, avoidance of unintended pregnancy, and contraceptive alternatives  · Exercise: the importance of regular exercise/physical activity was discussed  Recommend exercise 3-5 times per week for at least 30 minutes  Return in about 6 months (around 12/21/2023) for Recheck  Chief Complaint:     Chief Complaint   Patient presents with   • Physical Exam      History of Present Illness:     Adult Annual Physical   Patient here for a comprehensive physical exam  The patient reports no problems  Patient states that he is doing very well, he lost 60 pounds since we last seen  He still waiting for approval of [employer] to help with weight loss as well  He states that his been trying to do exercises in the regular basis, and watch what he eats  He states that he did have had a recurrence of his left leg cellulitis about 2 weeks ago, he had levofloxacin at home which he took for 10 days and cellulitis resolved  He is now only taking furosemide 40 mg in the morning and he is doing well with this  He is planning to see podiatrist and lymphedema clinic      Diet and Physical Activity  · Diet/Nutrition: diabetic diet  · Exercise: strength training exercises  Depression Screening  PHQ-2/9 Depression Screening         General Health  · Sleep: sleeps well  · Hearing: normal - bilateral   · Vision: wears glasses  · Dental: regular dental visits   Health  · Symptoms include: erectile dysfunction     Review of Systems:     Review of Systems   Constitutional: Negative for appetite change and fatigue  HENT: Negative for congestion  Eyes: Negative for visual disturbance  Respiratory: Negative for cough, chest tightness, shortness of breath and wheezing  Cardiovascular: Positive for leg swelling  Negative for chest pain and palpitations  Gastrointestinal: Negative for abdominal pain, nausea and vomiting  Genitourinary: Negative for difficulty urinating and frequency  Musculoskeletal: Negative for arthralgias and joint swelling  Skin: Negative for rash  Neurological: Negative for dizziness and headaches  Psychiatric/Behavioral: Negative for confusion and sleep disturbance        Past Medical History:     Past Medical History:   Diagnosis Date   • Acute kidney injury 10/28/2021   • Anemia 09/13/2019   • Cellulitis     last assessed 12/10/15   • Cellulitis of left lower extremity    • Cellulitis of right lower extremity 11/19/2021   • Cough 10/20/2019   • [facility] )    • Disease of thyroid gland    • Edema    • Elevated liver enzymes    • Elevated serum creatinine 11/19/2021   • Esophageal reflux    • Fungal infection 8/16/2021   • Gluten intolerance    • Gout     last assessed 09/05/13   • Hyperglycemia    • Hypertension    • Hyponatremia 9/6/2019   • IBS (irritable bowel syndrome)    • Insomnia    • Obesity    • Osteoarthritis of knee     last assessed 02/10/14   • Scrotal swelling 5/19/2020   • Shortness of breath 5/3/2021   • Venous insufficiency     last assessed 08/22/17   • Villonodular synovitis of the hand, right     last assessed 11/14/2013      Past Surgical History:     Past Surgical History:   Procedure Laterality Date   • INCISION AND DRAINAGE OF WOUND Left 9/6/2019    Procedure: INCISION AND DRAINAGE (I&D) GROIN;  Surgeon: Yuliya Rodrigues DO;  [facility];  Service: General   • SKIN BIOPSY     • WOUND DEBRIDEMENT Left 9/7/2019    Procedure: EXCISIONAL DEBRIDEMENT;  Surgeon: Yuliya Rodrigues DO;  [facility];  Service: General      Family History:     Family History   Problem Relation Age of Onset   • Cancer Mother         gastrc   • Colon cancer Father    • Heart failure Father       Social History:     Social History     Socioeconomic History   • Marital status: /Civil Union     Spouse name: None   • Number of children: None   • Years of education: None   • Highest education level: None   Occupational History   • None   Tobacco Use   • Smoking status: Never   • Smokeless tobacco: Never   Vaping Use   • Vaping Use: Never used   Substance and Sexual Activity   • Alcohol use: Not Currently     Alcohol/week: 0 0 standard drinks of alcohol   • Drug use: Yes     Types: Marijuana     Comment: medical   • Sexual activity: Yes   Other Topics Concern   • None   Social History Narrative   • None     Social Determinants of Health     Financial Resource Strain: Not on file   Food Insecurity: No Food Insecurity (9/8/2022)    Hunger Vital Sign    • Worried About Running Out of Food in the Last Year: Never true    • Ran Out of Food in the Last Year: Never true   Transportation Needs: No Transportation Needs (9/8/2022)    PRAPARE - Transportation    • Lack of Transportation (Medical): No    • Lack of Transportation (Non-Medical):  No   Physical Activity: Not on file   Stress: Not on file   Social Connections: Not on file   Intimate Partner Violence: Not on file   Housing Stability: Unknown (9/8/2022)    Housing Stability Vital Sign    • Unable to Pay for Housing in the Last Year: No    • Number of Places Lived in the Last Year: Not on file    • Unstable Housing in the Last Year: No      Current Medications:     Current Outpatient Medications   Medication Sig Dispense Refill   • albuterol (PROVENTIL HFA,VENTOLIN HFA) 90 mcg/act inhaler TAKE 2 PUFFS BY MOUTH EVERY 6 HOURS AS NEEDED FOR WHEEZE 8 5 g 0   • BD Pen Needle Ludmila 2nd Gen 32G X 4 MM MISC USE AS DIRECTED 4 TIMES A  each 3   • Blood Glucose Monitoring Suppl (ONETOUCH VERIO) w/Device KIT Use to test sugar daily 1 kit 0   • Continuous Blood Gluc  (FreeStyle Connell 2 [place] Systm) BROOKS Use 1 Device as needed (use with sensors for bs checks) 1 Device 0   • Continuous Blood Gluc Sensor (FreeStyle Elvin 14 Day Sensor) MISC Scan 4x daily 2 each 5   • Continuous Blood Gluc Sensor (FreeStyle Elvin 3 Sensor) MISC Replace every 14 days 2 each 5   • ezetimibe (ZETIA) 10 mg tablet TAKE 1 TABLET BY MOUTH EVERY DAY 90 tablet 1   • furosemide (LASIX) 20 mg tablet TAKE 2 TABLETS EVERY DAY IN THE EARLY MORNING AND 1 TABLET DAILY AFTER LUNCH  270 tablet 2   • gabapentin (NEURONTIN) 100 mg capsule Take 1 capsule (100 mg total) by mouth 2 (two) times a day With 300mg capsule for a total of 400mg twice a day 180 capsule 1   • gabapentin (NEURONTIN) 300 mg capsule TAKE 1 CAPSULE (300 MG TOTAL) BY MOUTH 2 (TWO) TIMES A DAY TAKE 1 TAB WITH YOUR 100MG TAB TWICE DAILY 180 capsule 2   • insulin aspart (NovoLOG FlexPen) 100 UNIT/ML injection pen INJECT 14-22 UNITS WITH MEALS+SCALE 30 mL 3   • Lancets (ONETOUCH ULTRASOFT) lancets Use to test sugar 2 times a day 100 each 3   • levofloxacin (LEVAQUIN) 500 mg tablet Take 1 tablet (500 mg total) by mouth every 24 hours for 10 days 10 tablet 0   • levothyroxine 25 mcg tablet TAKE 1 TABLET (25 MCG TOTAL) BY MOUTH DAILY IN THE EARLY MORNING 90 tablet 1   • losartan (COZAAR) 25 mg tablet TAKE 1 TABLET (25 MG TOTAL) BY MOUTH DAILY   30 tablet 0   • metFORMIN (GLUCOPHAGE) 1000 MG tablet TAKE 1 TABLET BY MOUTH TWICE A  tablet 1   • nystatin (MYCOSTATIN) powder APPLY 1 APPLICATION "TOPICALLY 2 (TWO) TIMES A DAY TO AFFECTED AREA 120 g 3   • omeprazole (PriLOSEC) 40 MG capsule TAKE 1 CAPSULE BY MOUTH TWICE A  capsule 1   • OneTouch Verio test strip USE TO TEST SUGAR 2 TIMES A  strip 3   • psyllium (METAMUCIL) packet Take 1 packet by mouth daily  0   • saccharomyces boulardii (FLORASTOR) 250 mg capsule Take 1 capsule (250 mg total) by mouth 2 (two) times a day 60 capsule 1   • tirzepatide 2 5 MG/0 5ML Inject 0 5 mL (2 5 mg total) under the skin every 7 days 2 mL 1   • Insulin Glargine Solostar (Lantus SoloStar) 100 UNIT/ML SOPN INJECT 44 UNITS UNDER THE SKIN DAILY AT BEDTIME (Patient taking differently: INJECT 40 UNITS UNDER THE SKIN DAILY AT BEDTIME) 15 mL 2     No current facility-administered medications for this visit  Allergies: Allergies   Allergen Reactions   • Gluten Meal - Food Allergy    • Clindamycin Diarrhea      Physical Exam:     /61 (BP Location: Right arm, Patient Position: Sitting, Cuff Size: Large)   Pulse (!) 112   Temp 98 5 °F (36 9 °C) (Tympanic)   Ht 5' 2\" (1 575 m)   Wt (!) 201 kg (443 lb 6 4 oz)   SpO2 100%   BMI 81 10 kg/m²     Physical Exam  Vitals and nursing note reviewed  Constitutional:       General: He is not in acute distress  Appearance: Normal appearance  He is obese  HENT:      Head: Normocephalic and atraumatic  Right Ear: Tympanic membrane normal       Nose: Nose normal  No congestion  Mouth/Throat:      Mouth: Mucous membranes are moist       Pharynx: Oropharynx is clear  No oropharyngeal exudate  Eyes:      Extraocular Movements: Extraocular movements intact  Pupils: Pupils are equal, round, and reactive to light  Cardiovascular:      Rate and Rhythm: Regular rhythm  Tachycardia present  Pulses: Normal pulses  Heart sounds: No murmur heard  Pulmonary:      Effort: Pulmonary effort is normal       Breath sounds: Normal breath sounds  No wheezing or rhonchi     Abdominal:      General: " Abdomen is flat  Bowel sounds are normal       Palpations: Abdomen is soft  Tenderness: There is no abdominal tenderness  Musculoskeletal:         General: No swelling  Normal range of motion  Cervical back: Normal range of motion and neck supple  Right lower leg: Edema present  Left lower leg: Edema present  Comments: Lymphedema present on tights left more than right no visible open wound on areas examined  Skin:     General: Skin is warm and dry  Capillary Refill: Capillary refill takes less than 2 seconds  Neurological:      Mental Status: He is alert and oriented to person, place, and time  Sensory: No sensory deficit  Motor: No weakness  Psychiatric:         Mood and Affect: Mood normal          Behavior: Behavior normal          Thought Content:  Thought content normal          Judgment: Judgment normal           Sinai Beckford MD  [facility] INTERNAL MEDICINE [facility]

## 2023-06-21 NOTE — ASSESSMENT & PLAN NOTE
Lab Results   Component Value Date    HGBA1C 6 9 (H) 09/16/2022   Follows with endocrinology  He is going to start [facility] from [place] for weight loss as well  As an junction for weight loss as well

## 2023-06-29 DIAGNOSIS — E11.65 TYPE 2 DIABETES MELLITUS WITH HYPERGLYCEMIA, WITH LONG-TERM CURRENT USE OF INSULIN (HCC): ICD-10-CM

## 2023-06-29 DIAGNOSIS — Z79.4 CURRENT USE OF INSULIN (HCC): ICD-10-CM

## 2023-06-29 DIAGNOSIS — Z79.4 TYPE 2 DIABETES MELLITUS WITH HYPERGLYCEMIA, WITH LONG-TERM CURRENT USE OF INSULIN (HCC): ICD-10-CM

## 2023-06-29 DIAGNOSIS — E78.00 PURE HYPERCHOLESTEROLEMIA: ICD-10-CM

## 2023-06-29 NOTE — TELEPHONE ENCOUNTER
Tommie, my name is DARWIN and I'm a  from [employer] on behalf of a mutual patient  First name is Afua Hewitt, last name is ace ROLONs  YOB: 1965 for a prior authorization for [facility] prescribed by [facility] to be submitted to [facility]  The prior authorization for the medication can be faxed and that fax number is [phone]  We do need a prior authorization of processed medication as it is a non preferred medication and unable to be filled at the pharmacy at this time  Thank you and have a great day  I dont see this in his list, are we still needing him on this medicaiton? ??

## 2023-06-30 RX ORDER — EZETIMIBE 10 MG/1
TABLET ORAL
Qty: 90 TABLET | Refills: 1 | Status: SHIPPED | OUTPATIENT
Start: 2023-06-30

## 2023-06-30 RX ORDER — INSULIN GLARGINE 100 [IU]/ML
INJECTION, SOLUTION SUBCUTANEOUS
Qty: 15 ML | Refills: 2 | Status: SHIPPED | OUTPATIENT
Start: 2023-06-30

## 2023-07-11 ENCOUNTER — OFFICE VISIT (OUTPATIENT)
Dept: PODIATRY | Facility: CLINIC | Age: 58
End: 2023-07-11
Payer: MEDICARE

## 2023-07-11 VITALS
DIASTOLIC BLOOD PRESSURE: 97 MMHG | HEART RATE: 138 BPM | BODY MASS INDEX: 81.1 KG/M2 | SYSTOLIC BLOOD PRESSURE: 149 MMHG | HEIGHT: 62 IN | OXYGEN SATURATION: 99 %

## 2023-07-11 DIAGNOSIS — Z79.4 TYPE 2 DIABETES MELLITUS WITH HYPERGLYCEMIA, WITH LONG-TERM CURRENT USE OF INSULIN (HCC): ICD-10-CM

## 2023-07-11 DIAGNOSIS — Z79.4 CURRENT USE OF INSULIN (HCC): ICD-10-CM

## 2023-07-11 DIAGNOSIS — E11.65 TYPE 2 DIABETES MELLITUS WITH HYPERGLYCEMIA, WITH LONG-TERM CURRENT USE OF INSULIN (HCC): ICD-10-CM

## 2023-07-11 DIAGNOSIS — B35.1 ONYCHOMYCOSIS: Primary | ICD-10-CM

## 2023-07-11 DIAGNOSIS — E11.42 DIABETIC POLYNEUROPATHY ASSOCIATED WITH TYPE 2 DIABETES MELLITUS (HCC): ICD-10-CM

## 2023-07-11 PROCEDURE — 11721 DEBRIDE NAIL 6 OR MORE: CPT | Performed by: PODIATRIST

## 2023-07-11 PROCEDURE — 99242 OFF/OP CONSLTJ NEW/EST SF 20: CPT | Performed by: PODIATRIST

## 2023-07-11 NOTE — PROGRESS NOTES
Assessment/Plan:    The patient's clinical examination today is significant for thickened and dystrophic pedal nail plates with brittleness and discoloration and subungual debris consistent with onychomycosis x10. The interdigital spaces are clear without maceration. Pedal pulses are nonpalpable bilaterally secondary to bilateral lower extremity edema. Vibratory sensation is absent on the right and diminished on the left. Epicritic sensation is intact bilaterally. The patient's pedal nail plates are sharply debrided with a sterile nail clipper to patient tolerance x10. The nail was then mechanically reduced in thickness and girth utilizing a rotary bur. He is currently on gabapentin for peripheral neuropathy. He is currently scheduled for his initial consultation with [facility] lymphedema clinic next week. He does note a prior history of ulcerations to his left lower extremity that required consultation with the wound care center. At present, there are no open lesions to his bilateral lower extremities. Recommend follow-up in 2 to 3 months for routine diabetic foot care. Diagnoses and all orders for this visit:    Onychomycosis    Type 2 diabetes mellitus with hyperglycemia, with long-term current use of insulin ([address])  -     Ambulatory Referral to Podiatry    Current use of insulin ([address])  -     Ambulatory Referral to Podiatry    Diabetic polyneuropathy associated with type 2 diabetes mellitus ([address])  -     Ambulatory Referral to Podiatry          Subjective:      Pat bilateral lower extremity edema. Ient ID: Rosa Arriaza is a 66 y.o. male. The patient presents today for his initial consultation with [facility] podiatry with a chief complaint of painful elongated pedal nail plates bilaterally. The patient does have a history of diabetes with peripheral neuropathy. He is currently on gabapentin for his nerve pain.   Bonifacio Hugo also suffers from bilateral lower extremity lymphedema and is scheduled for his initial consultation with lymphedema clinic next week. There are no other acute pedal issues.       The following portions of the patient's history were reviewed and updated as appropriate: allergies, current medications, past family history, past medical history, past social history, past surgical history and problem list.      PAST MEDICAL HISTORY:  Past Medical History:   Diagnosis Date   • Acute kidney injury 10/28/2021   • Anemia 09/13/2019   • Cellulitis     last assessed 12/10/15   • Cellulitis of left lower extremity    • Cellulitis of right lower extremity 11/19/2021   • Cough 10/20/2019   • Diabetes mellitus ([address])    • Disease of thyroid gland    • Edema    • Elevated liver enzymes    • Elevated serum creatinine 11/19/2021   • Esophageal reflux    • Fungal infection 8/16/2021   • Gluten intolerance    • Gout     last assessed 09/05/13   • Hyperglycemia    • Hypertension    • Hyponatremia 9/6/2019   • IBS (irritable bowel syndrome)    • Insomnia    • Obesity    • Osteoarthritis of knee     last assessed 02/10/14   • Scrotal swelling 5/19/2020   • Shortness of breath 5/3/2021   • Venous insufficiency     last assessed 08/22/17   • Villonodular synovitis of the hand, right     last assessed 11/14/2013       PAST SURGICAL HISTORY:  Past Surgical History:   Procedure Laterality Date   • INCISION AND DRAINAGE OF WOUND Left 9/6/2019    Procedure: INCISION AND DRAINAGE (I&D) GROIN;  Surgeon: Uma Hicks DO;  Location: [facility];  Service: General   • SKIN BIOPSY     • WOUND DEBRIDEMENT Left 9/7/2019    Procedure: EXCISIONAL DEBRIDEMENT;  Surgeon: Uma Hicks DO;  Location[facility];  Service: General        ALLERGIES:  Gluten meal - food allergy and Clindamycin    MEDICATIONS:  Current Outpatient Medications   Medication Sig Dispense Refill   • albuterol (PROVENTIL HFA,VENTOLIN HFA) 90 mcg/act inhaler TAKE 2 PUFFS BY MOUTH EVERY 6 HOURS AS NEEDED FOR WHEEZE 8.5 g 0   • BD Pen Needle Ludmila 2nd Gen 32G X 4 MM MISC USE AS DIRECTED 4 TIMES A  each 3   • Blood Glucose Monitoring Suppl (Cuauhtemoc Veronica) w/Device KIT Use to test sugar daily 1 kit 0   • Continuous Blood Gluc  (FreeStyle [place] 2 Burke Systm) BROOKS Use 1 Device as needed (use with sensors for bs checks) 1 Device 0   • Continuous Blood Gluc Sensor (FreeStyle Elvin 14 Day Sensor) MISC Scan 4x daily 2 each 5   • Continuous Blood Gluc Sensor (FreeStyle Elvin 3 Sensor) MISC Replace every 14 days 2 each 5   • ezetimibe (ZETIA) 10 mg tablet TAKE 1 TABLET BY MOUTH EVERY DAY 90 tablet 1   • furosemide (LASIX) 20 mg tablet TAKE 2 TABLETS EVERY DAY IN THE EARLY MORNING AND 1 TABLET DAILY AFTER LUNCH. 270 tablet 2   • gabapentin (NEURONTIN) 100 mg capsule Take 1 capsule (100 mg total) by mouth 2 (two) times a day With 300mg capsule for a total of 400mg twice a day 180 capsule 1   • gabapentin (NEURONTIN) 300 mg capsule TAKE 1 CAPSULE (300 MG TOTAL) BY MOUTH 2 (TWO) TIMES A DAY TAKE 1 TAB WITH YOUR 100MG TAB TWICE DAILY 180 capsule 2   • insulin aspart (NovoLOG FlexPen) 100 UNIT/ML injection pen INJECT 14-22 UNITS WITH MEALS+SCALE 30 mL 3   • Insulin Glargine Solostar (Lantus SoloStar) 100 UNIT/ML SOPN INJECT 40 UNITS UNDER THE SKIN DAILY AT BEDTIME 15 mL 2   • Lancets (ONETOUCH ULTRASOFT) lancets Use to test sugar 2 times a day 100 each 3   • levothyroxine 25 mcg tablet TAKE 1 TABLET (25 MCG TOTAL) BY MOUTH DAILY IN THE EARLY MORNING 90 tablet 1   • losartan (COZAAR) 25 mg tablet TAKE 1 TABLET (25 MG TOTAL) BY MOUTH DAILY.  90 tablet 1   • metFORMIN (GLUCOPHAGE) 1000 MG tablet TAKE 1 TABLET BY MOUTH TWICE A  tablet 1   • nystatin (MYCOSTATIN) powder APPLY 1 APPLICATION TOPICALLY 2 (TWO) TIMES A DAY TO AFFECTED AREA 120 g 3   • omeprazole (PriLOSEC) 40 MG capsule TAKE 1 CAPSULE BY MOUTH TWICE A  capsule 1   • OneTouch Verio test strip USE TO TEST SUGAR 2 TIMES A  strip 3   • psyllium (METAMUCIL) packet Take 1 packet by mouth daily  0   • saccharomyces boulardii (FLORASTOR) 250 mg capsule Take 1 capsule (250 mg total) by mouth 2 (two) times a day 60 capsule 1   • tirzepatide 2.5 MG/0.5ML Inject 0.5 mL (2.5 mg total) under the skin every 7 days 2 mL 1     No current facility-administered medications for this visit. SOCIAL HISTORY:  Social History     Socioeconomic History   • Marital status: /Civil Union     Spouse name: None   • Number of children: None   • Years of education: None   • Highest education level: None   Occupational History   • None   Tobacco Use   • Smoking status: Never   • Smokeless tobacco: Never   Vaping Use   • Vaping Use: Never used   Substance and Sexual Activity   • Alcohol use: Not Currently     Alcohol/week: 0.0 standard drinks of alcohol   • Drug use: Yes     Types: Marijuana     Comment: medical   • Sexual activity: Yes   Other Topics Concern   • None   Social History Narrative   • None     Social Determinants of Health     Financial Resource Strain: Not on file   Food Insecurity: No Food Insecurity (9/8/2022)    Hunger Vital Sign    • Worried About Running Out of Food in the Last Year: Never true    • Ran Out of Food in the Last Year: Never true   Transportation Needs: No Transportation Needs (9/8/2022)    PRAPARE - Transportation    • Lack of Transportation (Medical): No    • Lack of Transportation (Non-Medical): No   Physical Activity: Not on file   Stress: Not on file   Social Connections: Not on file   Intimate Partner Violence: Not on file   Housing Stability: Unknown (9/8/2022)    Housing Stability Vital Sign    • Unable to Pay for Housing in the Last Year: No    • Number of Places Lived in the Last Year: Not on file    • Unstable Housing in the Last Year: No        Review of Systems   Constitutional: Negative. HENT: Negative. Eyes: Negative. Respiratory: Negative. Cardiovascular: Negative. Endocrine: Negative. Musculoskeletal: Negative. Neurological: Negative. Hematological: Negative. Psychiatric/Behavioral: Negative. Objective:      /97 (BP Location: Left arm, Patient Position: Sitting, Cuff Size: Standard)   Pulse (!) 138   Ht 5' 2" (1.575 m)   SpO2 99%   BMI 81.10 kg/m²          Physical Exam  Vitals reviewed. Constitutional:       Appearance: Normal appearance. HENT:      Head: Normocephalic and atraumatic. Nose: Nose normal.   Eyes:      Conjunctiva/sclera: Conjunctivae normal.      Pupils: Pupils are equal, round, and reactive to light. Cardiovascular:      Pulses: no weak pulses          Dorsalis pedis pulses are 0 on the right side and 0 on the left side. Posterior tibial pulses are 0 on the right side and 0 on the left side. Pulmonary:      Effort: Pulmonary effort is normal.   Feet:      Right foot:      Skin integrity: No ulcer, skin breakdown, erythema, warmth, callus or dry skin. Toenail Condition: Right toenails are abnormally thick and long. Fungal disease present. Left foot:      Skin integrity: No ulcer, skin breakdown, erythema, warmth, callus or dry skin. Toenail Condition: Left toenails are abnormally thick and long. Fungal disease present. Comments: The patient's clinical examination today is significant for thickened and dystrophic pedal nail plates with brittleness and discoloration and subungual debris consistent with onychomycosis x10. The interdigital spaces are clear without maceration. Pedal pulses are nonpalpable bilaterally secondary to bilateral lower extremity edema. Vibratory sensation is absent on the right and diminished on the left. Epicritic sensation is intact bilaterally. Skin:     General: Skin is warm. Capillary Refill: Capillary refill takes less than 2 seconds. Neurological:      General: No focal deficit present. Mental Status: He is alert and oriented to person, place, and time.    Psychiatric:         Mood and Affect: Mood normal.         Behavior: Behavior normal.         Thought Content: Thought content normal.           Diabetic Foot Exam    Patient's shoes and socks removed. Right Foot/Ankle   Right Foot Inspection  Skin Exam: skin normal and skin intact. No dry skin, no warmth, no callus, no erythema, no maceration, no abnormal color, no pre-ulcer, no ulcer and no callus. Toe Exam: ROM and strength within normal limits and swelling. Sensory   Vibration: absent  Proprioception: intact  Monofilament testing: intact    Vascular  Capillary refills: < 3 seconds  The right DP pulse is 0. The right PT pulse is 0. Left Foot/Ankle  Left Foot Inspection  Skin Exam: skin normal and skin intact. No dry skin, no warmth, no erythema, no maceration, normal color, no pre-ulcer, no ulcer and no callus. Toe Exam: ROM and strength within normal limits and swelling. Sensory   Vibration: diminished  Proprioception: intact  Monofilament testing: intact    Vascular  Capillary refills: < 3 seconds  The left DP pulse is 0. The left PT pulse is 0.      Assign Risk Category  No deformity present  Loss of protective sensation  No weak pulses  Risk: 1

## 2023-07-14 DIAGNOSIS — Z79.4 TYPE 2 DIABETES MELLITUS WITH HYPERGLYCEMIA, WITH LONG-TERM CURRENT USE OF INSULIN (HCC): ICD-10-CM

## 2023-07-14 DIAGNOSIS — E11.65 TYPE 2 DIABETES MELLITUS WITH HYPERGLYCEMIA, WITH LONG-TERM CURRENT USE OF INSULIN (HCC): ICD-10-CM

## 2023-07-14 RX ORDER — INSULIN ASPART 100 [IU]/ML
INJECTION, SOLUTION INTRAVENOUS; SUBCUTANEOUS
Qty: 30 ML | Refills: 3 | Status: SHIPPED | OUTPATIENT
Start: 2023-07-14

## 2023-07-17 ENCOUNTER — PATIENT MESSAGE (OUTPATIENT)
Dept: ENDOCRINOLOGY | Facility: CLINIC | Age: 58
End: 2023-07-17

## 2023-07-17 ENCOUNTER — TELEPHONE (OUTPATIENT)
Dept: INTERNAL MEDICINE CLINIC | Facility: CLINIC | Age: 58
End: 2023-07-17

## 2023-07-17 DIAGNOSIS — E11.65 TYPE 2 DIABETES MELLITUS WITH HYPERGLYCEMIA, WITH LONG-TERM CURRENT USE OF INSULIN (HCC): Primary | ICD-10-CM

## 2023-07-17 DIAGNOSIS — Z79.4 TYPE 2 DIABETES MELLITUS WITH HYPERGLYCEMIA, WITH LONG-TERM CURRENT USE OF INSULIN (HCC): Primary | ICD-10-CM

## 2023-07-17 NOTE — TELEPHONE ENCOUNTER
We receive a care gar therapy recommendation for Harjinder Palumbo from [facility] if you would like to start patient on a statin.

## 2023-07-18 ENCOUNTER — EVALUATION (OUTPATIENT)
Dept: OCCUPATIONAL THERAPY | Age: 58
End: 2023-07-18
Payer: MEDICARE

## 2023-07-18 DIAGNOSIS — R60.0 BILATERAL LEG EDEMA: ICD-10-CM

## 2023-07-18 PROCEDURE — 97167 OT EVAL HIGH COMPLEX 60 MIN: CPT | Performed by: OCCUPATIONAL THERAPIST

## 2023-07-18 NOTE — LETTER
2023    Gustavo Stark, [address] [place] [address]    Patient: David Olguin   YOB: 1965   Date of Visit: 2023     Encounter Diagnosis     ICD-10-CM    1. Bilateral leg edema  R60.0 Ambulatory Referral to PT/OT Lymphedema Therapy          Dear Dr. Chelsi Olson:    Thank you for your recent referral of David Olguin. Please review the attached evaluation summary from Ben's recent visit. Please verify that you agree with the plan of care by signing the attached order. If you have any questions or concerns, please do not hesitate to call. I sincerely appreciate the opportunity to share in the care of one of your patients and hope to have another opportunity to work with you in the near future. Sincerely,    Marlyn Goltz, OT      Referring Provider:     I certify that I have read the below Plan of Care and certify the need for these services furnished under this plan of treatment while under my care. [id number] [facility] Fredy Polk MD  [address] [place] [address]  [facility] [place]        OT Evaluation     Today's date: 2023  Patient name: David Olguin  : 1965  MRN: [id number]  Referring provider: Tierra Larkin*  Dx:   Encounter Diagnosis     ICD-10-CM    1. 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 Pt currently with small . 3x.4 open area to LLE lateral distal calf noted; pt and pt wife made aware. Pt with long h/o and multiple wounds and infections to BLE. Pt has circaid wrap for calf; but no compression for knee and thigh.  Pt L knee with severe size/weight lobule affecting pt ability to walk and perf ADLs; Mild to LLE, starting to form. Pt edu on importance of ROM HEP for lymphatic flow, self MLD, proper skin care, diet, and elevation to assist with reducing edema. Plan to initiate short stretch bandage wrapping to LLE 2* worse leg and lobule, with continued edu on elevation, compression pump, and 30-40mmgh compression to LE. Impairments: abnormal or restricted ROM, activity intolerance, impaired physical strength, lacks appropriate home exercise program and pain with function    Symptom irritability: moderateUnderstanding of Dx/Px/POC: fair   Prognosis: fair    Goals  STGs:  1. Pt will demo dec of 5% to LLE  2. Pt caregiver will be independent with short stretch bandage wrapping  3. Pt will tolerate short stretch bandage wrapping for 24hrs  4. Pt will be independent with ROM HEP  LTgs:  1. Pt caregiver to be independent with MLD and compression garment don/doff  2. Pt will demo dec 10% edema reduction to LLE  3. Pt will tolerate pneumatic compression pump x 1 hr  4. Pt will report ease in function by improved LLIS. Plan  Patient would benefit from: skilled occupational therapy  Planned modality interventions: low level laser therapy  Planned therapy interventions: kinesiology taping, manual therapy, massage, compression, therapeutic exercise, functional ROM exercises and home exercise program  Frequency: 1-2x per week. Duration in weeks: 8  Plan of Care beginning date: 9/12/2023  Treatment plan discussed with: patient, caregiver and family        Subjective Evaluation    History of Present Illness  Onset date: 5+years. Mechanism of injury: Pt is a 65 y/o male with complex medical history- Severe stage 4 lymphedema, severe obesity, DM2, cellulitis, wounds, neuropathy. Pt reporting ever since Covid 2020, he gained 100 pounds from quarantine and his BLE had begun to progressively get worse and now new B/L lobules at knee, L>R, after most recent cellulitis dx and tx.  Knee lobules are impacting pt QOL, functional mobility, and pain. Recurrent probem    Quality of life: poor    Patient Goals  Patient goals for therapy: decreased pain, decreased edema and independence with ADLs/IADLs  Patient goal: to walk easier  Pain  Current pain ratin  At best pain ratin  At worst pain ratin  Quality: throbbing, tight, cramping and dull ache  Relieving factors: rest and support  Aggravating factors: walking  Progression: worsening    Social Support  Steps to enter house: yes  Stairs in house: no   Lives with: spouse    Employment status: not working  Treatments  Current treatment: occupational therapy        Objective     General Comments: Ankle/Foot Comments                        RLE                   LLE  MTPs            28                       28.5  [place]        29                        30   Lat [place]          34                       39  +4                 36.5                     39.5  +8                 41                         41  +12               43                         45  +16                44.5                     50  +20               49.5                      63  +24                63                        60  +28                66.5                      63.5  +32                 65     +36                  68  +40 (k)           80+lobule    +4 pitting edema  LLE lobule at medial knee staring at 32cm, difficulty measuring. . Whole lobule approx 70 cm width and Knee with lobule included 105.5cm  . 3x.4 would on LLE lateral distal calf; pt and pt wife made aware.               PMH:    has a past medical history of Acute kidney injury, Anemia, Cellulitis, Cellulitis of left lower extremity, Cellulitis of right lower extremity, Cough, Diabetes mellitus ([address]), Disease of thyroid gland, Edema, Elevated liver enzymes, Elevated serum creatinine, Esophageal reflux, Fungal infection, Gluten intolerance, Gout, Hyperglycemia, Hypertension, Hyponatremia, IBS (irritable bowel syndrome), Insomnia, Obesity, Osteoarthritis of knee, Scrotal swelling, Shortness of breath, Venous insufficiency, and Villonodular synovitis of the hand, right.           Precautions: Universal, recurrent wounds and infections      Manuals             MLD ABD/Axilla             MLD thigh             MLD leg to foot                          Neuro Re-Ed             Compression garment wrapping             Wound care                                                                              Ther Ex             BLE ROM HEP                                                                                                        Ther Activity                                       Gait Training                                       Modalities

## 2023-07-19 NOTE — PROGRESS NOTES
OT Evaluation     Today's date: 2023  Patient name: Kenia Mireles  : 1965  MRN: [id number]  Referring provider: Gustavo Fuller*  Dx:   Encounter Diagnosis     ICD-10-CM    1. 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 Pt currently with small . 3x.4 open area to LLE lateral distal calf noted; pt and pt wife made aware. Pt with long h/o and multiple wounds and infections to BLE. Pt has circaid wrap for calf; but no compression for knee and thigh. Pt L knee with severe size/weight lobule affecting pt ability to walk and perf ADLs; Mild to LLE, starting to form. Pt edu on importance of ROM HEP for lymphatic flow, self MLD, proper skin care, diet, and elevation to assist with reducing edema. Plan to initiate short stretch bandage wrapping to LLE 2* worse leg and lobule, with continued edu on elevation, compression pump, and 30-40mmgh compression to LE. Impairments: abnormal or restricted ROM, activity intolerance, impaired physical strength, lacks appropriate home exercise program and pain with function    Symptom irritability: moderateUnderstanding of Dx/Px/POC: fair   Prognosis: fair    Goals  STGs:  1. Pt will demo dec of 5% to LLE  2. Pt caregiver will be independent with short stretch bandage wrapping  3. Pt will tolerate short stretch bandage wrapping for 24hrs  4. Pt will be independent with ROM HEP  LTgs:  1. Pt caregiver to be independent with MLD and compression garment don/doff  2. Pt will demo dec 10% edema reduction to LLE  3. Pt will tolerate pneumatic compression pump x 1 hr  4. Pt will report ease in function by improved LLIS.      Plan  Patient would benefit from: skilled occupational therapy  Planned modality interventions: low level laser therapy  Planned therapy interventions: kinesiology taping, manual therapy, massage, compression, therapeutic exercise, functional ROM exercises and home exercise program  Frequency: 1-2x per week. Duration in weeks: 8  Plan of Care beginning date: 2023  Treatment plan discussed with: patient, caregiver and family        Subjective Evaluation    History of Present Illness  Onset date: 5+years. Mechanism of injury: Pt is a 67 y/o male with complex medical history- Severe stage 4 lymphedema, severe obesity, DM2, cellulitis, wounds, neuropathy. Pt reporting ever since Covid 2020, he gained 100 pounds from quarantine and his BLE had begun to progressively get worse and now new B/L lobules at knee, L>R, after most recent cellulitis dx and tx. Knee lobules are impacting pt QOL, functional mobility, and pain. Recurrent probem    Quality of life: poor    Patient Goals  Patient goals for therapy: decreased pain, decreased edema and independence with ADLs/IADLs  Patient goal: to walk easier  Pain  Current pain ratin  At best pain ratin  At worst pain ratin  Quality: throbbing, tight, cramping and dull ache  Relieving factors: rest and support  Aggravating factors: walking  Progression: worsening    Social Support  Steps to enter house: yes  Stairs in house: no   Lives with: spouse    Employment status: not working  Treatments  Current treatment: occupational therapy        Objective     General Comments:       Ankle/Foot Comments                        RLE                   LLE  MTPs            28                       28.5  [place]        29                        30   [place]          34                       39  +4                 36.5                     39.5  +8                 41                         41  +12               43                         45  +16                44.5                     50  +20               49.5 63  +24                63                        60  +28                66.5                      63.5  +32                 65     +36                  68  +40 (k)           80+lobule    +4 pitting edema  LLE lobule at medial knee staring at 32cm, difficulty measuring. . Whole lobule approx 70 cm width and Knee with lobule included 105.5cm  . 3x.4 would on LLE lateral distal calf; pt and pt wife made aware. PMH:    has a past medical history of Acute kidney injury, Anemia, Cellulitis, Cellulitis of left lower extremity, Cellulitis of right lower extremity, Cough, Diabetes mellitus ([address]), Disease of thyroid gland, Edema, Elevated liver enzymes, Elevated serum creatinine, Esophageal reflux, Fungal infection, Gluten intolerance, Gout, Hyperglycemia, Hypertension, Hyponatremia, IBS (irritable bowel syndrome), Insomnia, Obesity, Osteoarthritis of knee, Scrotal swelling, Shortness of breath, Venous insufficiency, and Villonodular synovitis of the hand, right.            Precautions: Universal, recurrent wounds and infections      Manuals             MLD ABD/Axilla             MLD thigh             MLD leg to foot                          Neuro Re-Ed             Compression garment wrapping             Wound care                                                                              Ther Ex             BLE ROM HEP                                                                                                        Ther Activity                                       Gait Training                                       Modalities

## 2023-07-26 ENCOUNTER — OFFICE VISIT (OUTPATIENT)
Dept: OCCUPATIONAL THERAPY | Age: 58
End: 2023-07-26
Payer: MEDICARE

## 2023-07-26 DIAGNOSIS — R60.0 BILATERAL LEG EDEMA: Primary | ICD-10-CM

## 2023-07-26 PROCEDURE — 97140 MANUAL THERAPY 1/> REGIONS: CPT | Performed by: OCCUPATIONAL THERAPIST

## 2023-07-26 NOTE — PROGRESS NOTES
Daily Note     Today's date: 2023  Patient name: Hudson Morley  : 1965  MRN: [id number]  Referring provider: Gustavo Tom*  Dx:   Encounter Diagnosis     ICD-10-CM    1. Bilateral leg edema  R60.0                      Subjective: 'My leg is sensitive."      Objective: See treatment diary below      Assessment: Tolerated treatment fair. Therapist initiated therapy with review of HEP; therapist then per MLD to armpit abd-clearing pathway and then LLE. Therapist spent ++ inc time on lobule with pressure point and vibration to soften area. Pt c/o mild pain and sensitivity but able to tolerate the tx. Pt wound on left lateral calf, bigger today from last week. Pt wife treating wound care. Pt and pt wife re-educate of wound gets worse, red, hot, etc to get medical attention. Therapist used 3 short stretch bandages and K tubi  to wrap lobule. Pt reported F feeling of compression and felt lobule being supported. Encouraged to keep on as long as he can and wife educated on process. Patient would benefit from continued OT      Plan: Continue per plan of care.       Precautions: Universal, recurrent wounds and infections      Manuals             MLD ABD/Axilla 10'            MLD thigh 25'            MLD leg to foot 10'                         Neuro Re-Ed             Compression garment wrapping 20'            Wound care                                                                              Ther Ex             BLE ROM HEP reviewed                                                                                                       Ther Activity                                       Gait Training                                       Modalities

## 2023-08-01 ENCOUNTER — OFFICE VISIT (OUTPATIENT)
Dept: OCCUPATIONAL THERAPY | Age: 58
End: 2023-08-01
Payer: MEDICARE

## 2023-08-01 DIAGNOSIS — R60.0 BILATERAL LEG EDEMA: Primary | ICD-10-CM

## 2023-08-01 PROCEDURE — 97140 MANUAL THERAPY 1/> REGIONS: CPT | Performed by: OCCUPATIONAL THERAPIST

## 2023-08-01 RX ORDER — TIRZEPATIDE 2.5 MG/.5ML
INJECTION, SOLUTION SUBCUTANEOUS
Refills: 1 | OUTPATIENT
Start: 2023-08-01

## 2023-08-02 NOTE — PROGRESS NOTES
Daily Note     Today's date: 2023  Patient name: Savanah Valles  : 1965  MRN: [id number]  Referring provider: Gustavo Kincaid Che*  Dx:   Encounter Diagnosis     ICD-10-CM    1. Bilateral leg edema  R60.0                      Subjective: 'The wraps did not stay on very long."      Objective: See treatment diary below      Assessment: Tolerated treatment fair. Pt reported wraps came off by the time he got home. Pt reports use vibration to dec hardness of lobule, and noted softer in some areas. Therapist used negative pressure/cupping which noted with softness of skin after. Pt wife reported they forgot they had another appt in the afternoon and afraid they were going to be late. Therapist utilized size K tubi  on lobule, with foam for extra compression and another layer of tubi  to keep in place to trial. Patient would benefit from continued OT      Plan: Continue per plan of care.       Precautions: Universal, recurrent wounds and infections      Manuals 8/            MLD ABD/Axilla 5'            MLD thigh 15'            MLD leg to foot                          Neuro Re-Ed             Compression garment wrapping 10'            Wound care                                                                              Ther Ex             BLE ROM HEP reviewed                                                                                                       Ther Activity                                       Gait Training                                       Modalities

## 2023-08-03 ENCOUNTER — APPOINTMENT (OUTPATIENT)
Dept: OCCUPATIONAL THERAPY | Age: 58
End: 2023-08-03
Payer: MEDICARE

## 2023-08-07 LAB — HBA1C MFR BLD HPLC: 7.1 %

## 2023-08-08 ENCOUNTER — APPOINTMENT (OUTPATIENT)
Dept: OCCUPATIONAL THERAPY | Age: 58
End: 2023-08-08
Payer: MEDICARE

## 2023-08-09 ENCOUNTER — TELEPHONE (OUTPATIENT)
Dept: ENDOCRINOLOGY | Facility: CLINIC | Age: 58
End: 2023-08-09

## 2023-08-09 NOTE — TELEPHONE ENCOUNTER
Orvan Allegra (YSG: [id number])  Ozempic (0.25 or 0.5 MG/DOSE) 2MG/3ML pen-injectors       Form  [facility] and [place] Prior Authorization Request Form

## 2023-08-10 ENCOUNTER — APPOINTMENT (OUTPATIENT)
Dept: OCCUPATIONAL THERAPY | Age: 58
End: 2023-08-10
Payer: MEDICARE

## 2023-08-10 ENCOUNTER — TELEPHONE (OUTPATIENT)
Dept: ENDOCRINOLOGY | Facility: CLINIC | Age: 58
End: 2023-08-10

## 2023-08-14 ENCOUNTER — TELEPHONE (OUTPATIENT)
Dept: OTHER | Facility: HOSPITAL | Age: 58
End: 2023-08-14

## 2023-08-14 DIAGNOSIS — L97.921 ULCER OF LEFT LOWER EXTREMITY, LIMITED TO BREAKDOWN OF SKIN (HCC): Primary | ICD-10-CM

## 2023-08-14 NOTE — TELEPHONE ENCOUNTER
Please tell the pharmacy that Bogdanlatrell Khan was [place] as [facility] [place]. They should no longer contact us about this.  Thank you

## 2023-08-17 DIAGNOSIS — E11.65 TYPE 2 DIABETES MELLITUS WITH HYPERGLYCEMIA, WITH LONG-TERM CURRENT USE OF INSULIN (HCC): Primary | ICD-10-CM

## 2023-08-17 DIAGNOSIS — Z79.4 TYPE 2 DIABETES MELLITUS WITH HYPERGLYCEMIA, WITH LONG-TERM CURRENT USE OF INSULIN (HCC): Primary | ICD-10-CM

## 2023-08-17 DIAGNOSIS — E11.65 TYPE 2 DIABETES MELLITUS WITH HYPERGLYCEMIA, WITH LONG-TERM CURRENT USE OF INSULIN (HCC): ICD-10-CM

## 2023-08-17 DIAGNOSIS — Z79.4 TYPE 2 DIABETES MELLITUS WITH HYPERGLYCEMIA, WITH LONG-TERM CURRENT USE OF INSULIN (HCC): ICD-10-CM

## 2023-08-17 RX ORDER — DULAGLUTIDE 0.75 MG/.5ML
INJECTION, SOLUTION SUBCUTANEOUS
Qty: 2 ML | Refills: 4 | Status: SHIPPED | OUTPATIENT
Start: 2023-08-17

## 2023-08-17 NOTE — TELEPHONE ENCOUNTER
Pharmacy comment: Alternative Requested:NOT COVERED. I called the pharmacy to find out why the denial for Ozempic stated that pt had to try Trulicity or Victoza, but now they are saying that Trulicity is not covered. Pharmacist stated that insurance will only pay for Victoza.

## 2023-08-23 ENCOUNTER — OFFICE VISIT (OUTPATIENT)
Dept: WOUND CARE | Facility: CLINIC | Age: 58
End: 2023-08-23
Payer: MEDICARE

## 2023-08-23 VITALS
HEART RATE: 90 BPM | RESPIRATION RATE: 20 BRPM | DIASTOLIC BLOOD PRESSURE: 98 MMHG | SYSTOLIC BLOOD PRESSURE: 152 MMHG | TEMPERATURE: 98.2 F

## 2023-08-23 DIAGNOSIS — L89.892: Primary | ICD-10-CM

## 2023-08-23 DIAGNOSIS — E66.01 MORBID OBESITY WITH BMI OF 70 AND OVER, ADULT (HCC): ICD-10-CM

## 2023-08-23 DIAGNOSIS — I89.0 LYMPHEDEMA OF BOTH LOWER EXTREMITIES: ICD-10-CM

## 2023-08-23 DIAGNOSIS — Z79.4 TYPE 2 DIABETES MELLITUS WITH HYPERGLYCEMIA, WITH LONG-TERM CURRENT USE OF INSULIN (HCC): ICD-10-CM

## 2023-08-23 DIAGNOSIS — E11.65 TYPE 2 DIABETES MELLITUS WITH HYPERGLYCEMIA, WITH LONG-TERM CURRENT USE OF INSULIN (HCC): ICD-10-CM

## 2023-08-23 PROCEDURE — 97597 DBRDMT OPN WND 1ST 20 CM/<: CPT | Performed by: FAMILY MEDICINE

## 2023-08-23 PROCEDURE — 99213 OFFICE O/P EST LOW 20 MIN: CPT | Performed by: FAMILY MEDICINE

## 2023-08-23 PROCEDURE — 99214 OFFICE O/P EST MOD 30 MIN: CPT | Performed by: FAMILY MEDICINE

## 2023-08-23 RX ORDER — LIDOCAINE 40 MG/G
CREAM TOPICAL ONCE
Status: COMPLETED | OUTPATIENT
Start: 2023-08-23 | End: 2023-08-23

## 2023-08-23 RX ADMIN — LIDOCAINE: 40 CREAM TOPICAL at 10:09

## 2023-08-23 NOTE — PATIENT INSTRUCTIONS
Orders Placed This Encounter   Procedures    Wound cleansing and dressings     Wash your hands with soap and water. Remove old dressing, discard into plastic bag and place in trash. Cleanse the wound with mild soap(Dove) and water prior to applying a clean dressing. Do not use tissue or cotton balls. Do not scrub the wound. Pat dry using gauze. Shower yes. Only on the days you change the dressing. Left lower leg wound:   Cover with silicon bordered foam.  Change dressing three times a week. Continue wearing circaids on both lower legs. Avoid putting pressure on your left lower leg. Follow up at the wound center in one week. Treatment at the wound center today: Cleansed with NSS, and dressed as above.      Standing Status:   Future     Standing Expiration Date:   8/23/2024
99

## 2023-08-23 NOTE — PROGRESS NOTES
Patient ID: Guanaco Skaggs is a 66 y.o. male Date of Birth 1965       Chief Complaint   Patient presents with   • New Patient Visit     LLE wound, one month, hydrofera blue       Allergies:  Gluten meal - food allergy and Clindamycin    Diagnosis:      Diagnosis ICD-10-CM Associated Orders   1. Pressure injury of left calf, stage 2 ([facility])  L89.892 lidocaine (LMX) 4 % cream     Wound cleansing and dressings     Debridement      2. Lymphedema of both lower extremities  I89.0 Wound cleansing and dressings      3. Type 2 diabetes mellitus with hyperglycemia, with long-term current use of insulin ([facility])  E11.65 Wound cleansing and dressings    Z79.4       4. Morbid obesity with BMI of 70 and over, adult ([address])  E66.01 Wound cleansing and dressings    Z68.45               Assessment & Plan:  Stage II pressure injury of the left lateral calf. He realized he developed this pressure injury while sleeping in a recliner. This started as his other pressure injuries started in the past.  He now is back into a bed where there is no pressure on this area. Selective debridement. Bordered foam to be changed 3 times a week. CircAid bilaterally. Follow-up in 1 week. Morbid obesity. He is going to the weight loss clinic. Lymphedema with lipedema. He is going to the lymphedema clinic. They have ordered pumps for him. Type 2 diabetes. Last A1c was done on 9/16/2022 and it was 6.9. A1C results reviewed with the patient today. Subjective:   2/17/22: This is a 80-year-old male who comes in [address] with ulcers on the right and left calfs. He states that he was hospitalized on November of 2021 with cellulitis and he had the ulcers at that time. Patient is morbidly obese and has history of lymphedema as well. He does not use any form of compression other than Ace wraps. He had purchased alginate on the Internet as well as bordered foam is.   He notes that there is heavy drainage requiring dressings to be changed once or twice a day. He believes that these ulcers had started due to  sleeping in a recliner and the foot rest causing pressure on the calfs. He is unable to sleep in a bed. He notes he has increased pain at nighttime when in there is recliner with pressure on his calf. He is ambulatory with a walker but does not go out of the house other than to doctor visits. Patient also has a history of type 2 diabetes with good control with last A1c 6.5 in October of 2021. He states that he has lost approximately 120 lb in the past nine months with a special meal plan. He plans on continuing with weight loss. 08/11/22: Followup stage 3 pressure injuries of the R and L calves. Has been using Hydrofera blue to bilateral wounds and Unna boots. States that the Dashbid fell down after the office visit last Thursday. He got a Prevlon boot with integrated wedge to attempt to offload the area, however this was unable to keep his leg up; the smooth bottom of the wedge did not have enough traction to keep his leg in place. No fevers or chills. 8/25/22: Followup stage III pressure injuries of the right left calfs. Hydrofera is being used. Compression wraps continue to slide down in the patient refuses to have them placed again. He would like to use the Circ Aids that he has now. He does have VNA. For the last four nights he has been sleeping in bed on his back with his C-Pap  He has not been able to do this in the past.  In the past he has been sleeping in a chair and supposedly this is when his legs are externally rotated causing the pressure injuries. 8/23/2023: Patient returns to the wound center with new wound of the left lateral calf. Since his last visit a year ago, he states that he is now going to lymphedema clinic. He is getting manual drainage, using his CircAid's and also has had pumps ordered.   About 2 weeks ago, the patient states that the therapist noted a wound on the left lateral calf.  He states that he was sleeping in a recliner and got another pressure injury as he had in the past.  Therefore, he purchased a new bed that is adjustable. He believes that since being in the bed the last week or 2 that the wound has improved. No fever or chills. Patient is seeing an endocrinologist for his diabetes, going to lymphedema clinic and also going to weight loss clinic.           The following portions of the patient's history were reviewed and updated as appropriate:   Patient Active Problem List   Diagnosis   • Insulin-dependent diabetes mellitus with neuropathy   • Hyperlipidemia   • Psoriasis   • Venous insufficiency   • Gout   • Essential hypertension   • Hyponatremia   • Gluten intolerance   • Diabetic neuropathy (HCC)   • Insomnia   • Cellulitis of left thigh   • Erectile dysfunction   • Bilateral leg edema   • Elevated CO2 level   • Abnormal thyroid function test   • DAHIANA (obstructive sleep apnea)   • SOB (shortness of breath)   • Fungal infection   • Morbid obesity   • Elevated serum creatinine   • Migraine headache   • Onychomycosis   • Ulcer of left lower extremity, limited to breakdown of skin (HCC)   • Pressure injury of right leg   • Metabolic syndrome   • Low testosterone in male   • Hypercalcemia   • Hypothyroidism   • Pressure injury of left leg   • GERD (gastroesophageal reflux disease)   • Chronic pain   • Type 2 diabetes mellitus with hyperglycemia ([address])   • Headache   • Current use of insulin ([address])     Past Medical History:   Diagnosis Date   • Acute kidney injury 10/28/2021   • Anemia 09/13/2019   • Cellulitis     last assessed 12/10/15   • Cellulitis of left lower extremity    • Cellulitis of right lower extremity 11/19/2021   • Cough 10/20/2019   • Diabetes mellitus ([address])    • Disease of thyroid gland    • Edema    • Elevated liver enzymes    • Elevated serum creatinine 11/19/2021   • Esophageal reflux    • Fungal infection 8/16/2021   • Gluten intolerance    • Gout     last assessed 09/05/13   • Hyperglycemia    • Hypertension    • Hyponatremia 9/6/2019   • IBS (irritable bowel syndrome)    • Insomnia    • Obesity    • Osteoarthritis of knee     last assessed 02/10/14   • Scrotal swelling 5/19/2020   • Shortness of breath 5/3/2021   • Venous insufficiency     last assessed 08/22/17   • Villonodular synovitis of the hand, right     last assessed 11/14/2013     Past Surgical History:   Procedure Laterality Date   • INCISION AND DRAINAGE OF WOUND Left 9/6/2019    Procedure: INCISION AND DRAINAGE (I&D) GROIN;  Surgeon: Lesia Mcneill DO;  Location: [facility];  Service: General   • SKIN BIOPSY     • WOUND DEBRIDEMENT Left 9/7/2019    Procedure: EXCISIONAL DEBRIDEMENT;  Surgeon: Lesia Mcneill DO;  Location: [facility];  Service: General     Family History   Problem Relation Age of Onset   • Cancer Mother         gastrc   • Colon cancer Father    • Heart failure Father       Social History     Socioeconomic History   • Marital status: /Civil Union     Spouse name: None   • Number of children: None   • Years of education: None   • Highest education level: None   Occupational History   • None   Tobacco Use   • Smoking status: Never   • Smokeless tobacco: Never   Vaping Use   • Vaping Use: Never used   Substance and Sexual Activity   • Alcohol use: Not Currently     Alcohol/week: 0.0 standard drinks of alcohol   • Drug use: Yes     Types: Marijuana     Comment: medical   • Sexual activity: Yes   Other Topics Concern   • None   Social History Narrative   • None     Social Determinants of Health     Financial Resource Strain: Not on file   Food Insecurity: No Food Insecurity (9/8/2022)    Hunger Vital Sign    • Worried About Running Out of Food in the Last Year: Never true    • Ran Out of Food in the Last Year: Never true   Transportation Needs: No Transportation Needs (9/8/2022)    PRAPARE - Transportation    • Lack of Transportation (Medical):  No    • Lack of Transportation (Non-Medical):  No   Physical Activity: Not on file   Stress: Not on file   Social Connections: Not on file   Intimate Partner Violence: Not on file   Housing Stability: Unknown (9/8/2022)    Housing Stability Vital Sign    • Unable to Pay for Housing in the Last Year: No    • Number of Places Lived in the Last Year: Not on file    • Unstable Housing in the Last Year: No        Current Outpatient Medications:   •  albuterol (PROVENTIL HFA,VENTOLIN HFA) 90 mcg/act inhaler, TAKE 2 PUFFS BY MOUTH EVERY 6 HOURS AS NEEDED FOR WHEEZE, Disp: 8.5 g, Rfl: 0  •  BD Pen Needle Ludmila 2nd Gen 32G X 4 MM MISC, USE AS DIRECTED 4 TIMES A DAY, Disp: 200 each, Rfl: 3  •  Blood Glucose Monitoring Suppl (ONETOUCH VERIO) w/Device KIT, Use to test sugar daily, Disp: 1 kit, Rfl: 0  •  Continuous Blood Gluc  (FreeStyle Olyphant 2 Orem Systm) BROOKS, Use 1 Device as needed (use with sensors for bs checks), Disp: 1 Device, Rfl: 0  •  Continuous Blood Gluc Sensor (FreeStyle Elvin 14 Day Sensor) MISC, Scan 4x daily, Disp: 2 each, Rfl: 5  •  Continuous Blood Gluc Sensor (FreeStyle Elvin 3 Sensor) MISC, Replace every 14 days, Disp: 2 each, Rfl: 5  •  ezetimibe (ZETIA) 10 mg tablet, TAKE 1 TABLET BY MOUTH EVERY DAY, Disp: 90 tablet, Rfl: 1  •  furosemide (LASIX) 20 mg tablet, TAKE 2 TABLETS EVERY DAY IN THE EARLY MORNING AND 1 TABLET DAILY AFTER LUNCH., Disp: 270 tablet, Rfl: 2  •  gabapentin (NEURONTIN) 100 mg capsule, Take 1 capsule (100 mg total) by mouth 2 (two) times a day With 300mg capsule for a total of 400mg twice a day, Disp: 180 capsule, Rfl: 1  •  gabapentin (NEURONTIN) 300 mg capsule, TAKE 1 CAPSULE (300 MG TOTAL) BY MOUTH 2 (TWO) TIMES A DAY TAKE 1 TAB WITH YOUR 100MG TAB TWICE DAILY, Disp: 180 capsule, Rfl: 2  •  insulin aspart (NovoLOG FlexPen) 100 UNIT/ML injection pen, INJECT 14-22 UNITS WITH MEALS+SCALE, Disp: 30 mL, Rfl: 3  •  Insulin Glargine Solostar (Lantus SoloStar) 100 UNIT/ML SOPN, INJECT 40 UNITS UNDER THE SKIN DAILY AT BEDTIME, Disp: 15 mL, Rfl: 2  •  Lancets (ONETOUCH ULTRASOFT) lancets, Use to test sugar 2 times a day, Disp: 100 each, Rfl: 3  •  levothyroxine 25 mcg tablet, TAKE 1 TABLET (25 MCG TOTAL) BY MOUTH DAILY IN THE EARLY MORNING, Disp: 90 tablet, Rfl: 1  •  losartan (COZAAR) 25 mg tablet, TAKE 1 TABLET (25 MG TOTAL) BY MOUTH DAILY. , Disp: 90 tablet, Rfl: 1  •  metFORMIN (GLUCOPHAGE) 1000 MG tablet, TAKE 1 TABLET BY MOUTH TWICE A DAY, Disp: 180 tablet, Rfl: 1  •  nystatin (MYCOSTATIN) powder, APPLY 1 APPLICATION TOPICALLY 2 (TWO) TIMES A DAY TO AFFECTED AREA, Disp: 120 g, Rfl: 3  •  omeprazole (PriLOSEC) 40 MG capsule, TAKE 1 CAPSULE BY MOUTH TWICE A DAY, Disp: 180 capsule, Rfl: 1  •  OneTouch Verio test strip, USE TO TEST SUGAR 2 TIMES A DAY, Disp: 100 strip, Rfl: 3  •  psyllium (METAMUCIL) packet, Take 1 packet by mouth daily, Disp: , Rfl: 0  •  saccharomyces boulardii (FLORASTOR) 250 mg capsule, Take 1 capsule (250 mg total) by mouth 2 (two) times a day, Disp: 60 capsule, Rfl: 1  •  Trulicity 5.81 EF/3.1GJ injection, INJECT 0.5 ML (0.75 MG TOTAL) UNDER THE SKIN EVERY 7 DAYS, Disp: 2 mL, Rfl: 4  No current facility-administered medications for this visit. Review of Systems   Constitutional: Negative for appetite change, chills, fatigue, fever and unexpected weight change. HENT: Negative for congestion, hearing loss, postnasal drip and sinus pressure. Respiratory: Positive for shortness of breath (On exertion). Negative for cough. Cardiovascular: Positive for leg swelling (Lymphedema). Endocrine: Negative. Musculoskeletal: Positive for gait problem Brena Spells). Negative for back pain. Skin: Positive for wound (Left calf). Negative for rash. Allergic/Immunologic: Negative. Hematological: Does not bruise/bleed easily. Psychiatric/Behavioral: Negative. Negative for dysphoric mood. The patient is not nervous/anxious.         Objective:  /98   Pulse 90   Temp 98.2 °F (36.8 °C)   Resp 20 Pain Score:   6     Physical Exam  Vitals and nursing note reviewed. Constitutional:       General: He is not in acute distress. Appearance: He is morbidly obese. He is not ill-appearing. HENT:      Head: Normocephalic and atraumatic. Cardiovascular:      Rate and Rhythm: Normal rate. Comments: Bilateral lymphedema/lipedema  Pulmonary:      Effort: Pulmonary effort is normal. No respiratory distress. Musculoskeletal:      Right lower leg: 3+ Edema present. Left lower leg: 3+ Edema present. Skin:     General: Skin is warm and dry. Findings: Wound present. No erythema. Comments: Pressure injury to the left lateral calf. Stage II. Some fibrin in the base. No signs of infection. No malodor. No surrounding erythema. Neurological:      Mental Status: He is alert and oriented to person, place, and time. Gait: Gait abnormal.   Psychiatric:         Attention and Perception: Attention normal.         Mood and Affect: Mood and affect normal.         Behavior: Behavior is cooperative. Cognition and Memory: Cognition normal.         Wound 08/23/23 Pressure Injury Leg Left;Lateral (Active)   Wound Image       08/23/23 0950   Wound Description Slough;Granulation tissue; Epithelialization 08/23/23 0947   Pressure Injury Stage 2 08/23/23 0947   Chelsie-wound Assessment Hyperpigmented;Edema;Dry;Scar Tissue 08/23/23 0947   Wound Length (cm) 1 cm 08/23/23 0947   Wound Width (cm) 5.2 cm 08/23/23 0947   Wound Depth (cm) 0.2 cm 08/23/23 0947   Wound Surface Area (cm^2) 5.2 cm^2 08/23/23 0947   Wound Volume (cm^3) 1.04 cm^3 08/23/23 0947   Calculated Wound Volume (cm^3) 1.04 cm^3 08/23/23 0947   Drainage Amount Moderate 08/23/23 0947   Drainage Description Yellow 08/23/23 0947   Treatments Irrigation with NSS 08/23/23 0947                            Debridement   Wound 08/23/23 Pressure Injury Leg Left;Lateral    Universal Protocol:  Consent: Verbal consent obtained.  Written consent obtained. Consent given by: patient  Time out: Immediately prior to procedure a "time out" was called to verify the correct patient, procedure, equipment, support staff and site/side marked as required. Patient understanding: patient states understanding of the procedure being performed  Patient identity confirmed: verbally with patient      Performed by: physician  Debridement type: selective  Pain control: lidocaine 4%  Post-debridement measurements  Length (cm): 1  Width (cm): 5.2  Depth (cm): 0.1  Percent debrided: 100%  Surface Area (cm^2): 5.2  Area debrided (cm^2): 5.2  Volume (cm^3): 0.52    Devitalized tissue debrided: fibrin  Instrument(s) utilized: curette  Bleeding: small  Hemostasis obtained with: pressure  Procedural pain (0-10): 0  Post-procedural pain: 0   Response to treatment: procedure was tolerated well                 Lab Results   Component Value Date    HGBA1C 6.9 (H) 09/16/2022       Wound Instructions:  Orders Placed This Encounter   Procedures   • Wound cleansing and dressings     Wash your hands with soap and water. Remove old dressing, discard into plastic bag and place in trash. Cleanse the wound with mild soap(Dove) and water prior to applying a clean dressing. Do not use tissue or cotton balls. Do not scrub the wound. Pat dry using gauze. Shower yes. Only on the days you change the dressing. Left lower leg wound:   Cover with silicon bordered foam.  Change dressing three times a week. Continue wearing circaids on both lower legs. Avoid putting pressure on your left lower leg. Follow up at the wound center in one week. Treatment at the wound center today: Cleansed with NSS, and dressed as above.      Standing Status:   Future     Standing Expiration Date:   8/23/2024   • Debridement     This order was created via procedure documentation             Ghislaine Pedersen MD, CHT, CWS    Portions of the record may have been created with voice recognition software. Occasional wrong word or "sound alike" substitutions may have occurred due to the inherent limitations of voice recognition software. Read the chart carefully and recognize, using context, where substitutions have occurred.

## 2023-08-30 ENCOUNTER — OFFICE VISIT (OUTPATIENT)
Dept: WOUND CARE | Facility: CLINIC | Age: 58
End: 2023-08-30
Payer: MEDICARE

## 2023-08-30 VITALS
RESPIRATION RATE: 20 BRPM | SYSTOLIC BLOOD PRESSURE: 140 MMHG | TEMPERATURE: 98.5 F | DIASTOLIC BLOOD PRESSURE: 88 MMHG | HEART RATE: 80 BPM

## 2023-08-30 DIAGNOSIS — E11.65 TYPE 2 DIABETES MELLITUS WITH HYPERGLYCEMIA, WITH LONG-TERM CURRENT USE OF INSULIN (HCC): ICD-10-CM

## 2023-08-30 DIAGNOSIS — L89.892: ICD-10-CM

## 2023-08-30 DIAGNOSIS — E66.01 MORBID OBESITY WITH BMI OF 70 AND OVER, ADULT (HCC): ICD-10-CM

## 2023-08-30 DIAGNOSIS — L03.116 CELLULITIS OF LEFT LOWER EXTREMITY: ICD-10-CM

## 2023-08-30 DIAGNOSIS — I89.0 LYMPHEDEMA OF BOTH LOWER EXTREMITIES: Primary | ICD-10-CM

## 2023-08-30 DIAGNOSIS — Z79.4 TYPE 2 DIABETES MELLITUS WITH HYPERGLYCEMIA, WITH LONG-TERM CURRENT USE OF INSULIN (HCC): ICD-10-CM

## 2023-08-30 DIAGNOSIS — B49 FUNGAL INFECTION: ICD-10-CM

## 2023-08-30 PROCEDURE — 99213 OFFICE O/P EST LOW 20 MIN: CPT | Performed by: FAMILY MEDICINE

## 2023-08-30 RX ORDER — LIDOCAINE 40 MG/G
CREAM TOPICAL ONCE
Status: COMPLETED | OUTPATIENT
Start: 2023-08-30 | End: 2023-08-30

## 2023-08-30 RX ADMIN — LIDOCAINE: 40 CREAM TOPICAL at 11:58

## 2023-08-30 NOTE — PROGRESS NOTES
Patient ID: Jun Ordonez is a 66 y.o. male Date of Birth 1965       Chief Complaint   Patient presents with   • Follow Up Wound Care Visit     LLE wound       Allergies:  Gluten meal - food allergy and Clindamycin    Diagnosis:      Diagnosis ICD-10-CM Associated Orders   1. Lymphedema of both lower extremities  I89.0 Wound cleansing and dressings      2. Pressure injury of left calf, stage 2 ([facility])  L89.892 lidocaine (LMX) 4 % cream     Wound cleansing and dressings      3. Type 2 diabetes mellitus with hyperglycemia, with long-term current use of insulin ([facility])  E11.65 Wound cleansing and dressings    Z79.4       4. Morbid obesity with BMI of 70 and over, adult ([address])  E66.01 Wound cleansing and dressings    Z68.45               Assessment & Plan:  Slowly improving stage II pressure injury of the left calf. Continue with bordered foam.  Offload is much as possible. Continue using CircAid. He is not sleeping in bed almost all the time. Follow-up in 1 week. Subjective:   2/17/22: This is a 30-year-old male who comes in [address] with ulcers on the right and left calfs. He states that he was hospitalized on November of 2021 with cellulitis and he had the ulcers at that time. Patient is morbidly obese and has history of lymphedema as well. He does not use any form of compression other than Ace wraps. He had purchased alginate on the Internet as well as bordered foam is. He notes that there is heavy drainage requiring dressings to be changed once or twice a day. He believes that these ulcers had started due to  sleeping in a recliner and the foot rest causing pressure on the calfs. He is unable to sleep in a bed. He notes he has increased pain at nighttime when in there is recliner with pressure on his calf. He is ambulatory with a walker but does not go out of the house other than to doctor visits.   Patient also has a history of type 2 diabetes with good control with last A1c 6.5 in October of 2021. He states that he has lost approximately 120 lb in the past nine months with a special meal plan. He plans on continuing with weight loss. 08/11/22: Followup stage 3 pressure injuries of the R and L calves. Has been using Hydrofera blue to bilateral wounds and Unna boots. States that the [employer] fell down after the office visit last Thursday. He got a Prevlon boot with integrated wedge to attempt to offload the area, however this was unable to keep his leg up; the smooth bottom of the wedge did not have enough traction to keep his leg in place. No fevers or chills. 8/25/22: Followup stage III pressure injuries of the right left calfs. Hydrofera is being used. Compression wraps continue to slide down in the patient refuses to have them placed again. He would like to use the Circ Aids that he has now. He does have VNA. For the last four nights he has been sleeping in bed on his back with his C-Pap  He has not been able to do this in the past.  In the past he has been sleeping in a chair and supposedly this is when his legs are externally rotated causing the pressure injuries. 8/23/2023: Patient returns to the wound center with new wound of the left lateral calf. Since his last visit a year ago, he states that he is now going to lymphedema clinic. He is getting manual drainage, using his CircAid's and also has had pumps ordered. About 2 weeks ago, the patient states that the therapist noted a wound on the left lateral calf. He states that he was sleeping in a recliner and got another pressure injury as he had in the past.  Therefore, he purchased a new bed that is adjustable. He believes that since being in the bed the last week or 2 that the wound has improved. No fever or chills. Patient is seeing an endocrinologist for his diabetes, going to lymphedema clinic and also going to weight loss clinic. 8/30/2023: Follow-up stage II pressure injury of the left calf.   Using bordered foam, offloading by sleeping in bed and using his CircAid. No other complaints.           The following portions of the patient's history were reviewed and updated as appropriate:   Patient Active Problem List   Diagnosis   • Insulin-dependent diabetes mellitus with neuropathy   • Hyperlipidemia   • Psoriasis   • Venous insufficiency   • Gout   • Essential hypertension   • Hyponatremia   • Gluten intolerance   • Diabetic neuropathy (HCC)   • Insomnia   • Cellulitis of left thigh   • Erectile dysfunction   • Bilateral leg edema   • Elevated CO2 level   • Abnormal thyroid function test   • DAHIANA (obstructive sleep apnea)   • SOB (shortness of breath)   • Fungal infection   • Morbid obesity   • Elevated serum creatinine   • Migraine headache   • Onychomycosis   • Ulcer of left lower extremity, limited to breakdown of skin (HCC)   • Pressure injury of right leg   • Metabolic syndrome   • Low testosterone in male   • Hypercalcemia   • Hypothyroidism   • Pressure injury of left leg   • GERD (gastroesophageal reflux disease)   • Chronic pain   • Type 2 diabetes mellitus with hyperglycemia ([address])   • Headache   • Current use of insulin ([address])     Past Medical History:   Diagnosis Date   • Acute kidney injury 10/28/2021   • Anemia 09/13/2019   • Cellulitis     last assessed 12/10/15   • Cellulitis of left lower extremity    • Cellulitis of right lower extremity 11/19/2021   • Cough 10/20/2019   • Diabetes mellitus ([address])    • Disease of thyroid gland    • Edema    • Elevated liver enzymes    • Elevated serum creatinine 11/19/2021   • Esophageal reflux    • Fungal infection 8/16/2021   • Gluten intolerance    • Gout     last assessed 09/05/13   • Hyperglycemia    • Hypertension    • Hyponatremia 9/6/2019   • IBS (irritable bowel syndrome)    • Insomnia    • Obesity    • Osteoarthritis of knee     last assessed 02/10/14   • Scrotal swelling 5/19/2020   • Shortness of breath 5/3/2021   • Venous insufficiency     last assessed 08/22/17   • Villonodular synovitis of the hand, right     last assessed 11/14/2013     Past Surgical History:   Procedure Laterality Date   • INCISION AND DRAINAGE OF WOUND Left 9/6/2019    Procedure: INCISION AND DRAINAGE (I&D) GROIN;  Surgeon: Luh Vizcaino DO;  Location: [facility];  Service: General   • SKIN BIOPSY     • WOUND DEBRIDEMENT Left 9/7/2019    Procedure: EXCISIONAL DEBRIDEMENT;  Surgeon: Luh Vizcaino DO;  Location: [facility];  Service: General     Family History   Problem Relation Age of Onset   • Cancer Mother         gastrc   • Colon cancer Father    • Heart failure Father       Social History     Socioeconomic History   • Marital status: /Civil Union     Spouse name: None   • Number of children: None   • Years of education: None   • Highest education level: None   Occupational History   • None   Tobacco Use   • Smoking status: Never   • Smokeless tobacco: Never   Vaping Use   • Vaping Use: Never used   Substance and Sexual Activity   • Alcohol use: Not Currently     Alcohol/week: 0.0 standard drinks of alcohol   • Drug use: Yes     Types: Marijuana     Comment: medical   • Sexual activity: Yes   Other Topics Concern   • None   Social History Narrative   • None     Social Determinants of Health     Financial Resource Strain: Not on file   Food Insecurity: No Food Insecurity (9/8/2022)    Hunger Vital Sign    • Worried About Running Out of Food in the Last Year: Never true    • Ran Out of Food in the Last Year: Never true   Transportation Needs: No Transportation Needs (9/8/2022)    PRAPARE - Transportation    • Lack of Transportation (Medical): No    • Lack of Transportation (Non-Medical):  No   Physical Activity: Not on file   Stress: Not on file   Social Connections: Not on file   Intimate Partner Violence: Not on file   Housing Stability: Unknown (9/8/2022)    Housing Stability Vital Sign    • Unable to Pay for Housing in the Last Year: No    • Number of Places Lived in the Last Year: Not on file    • Unstable Housing in the Last Year: No        Current Outpatient Medications:   •  albuterol (PROVENTIL HFA,VENTOLIN HFA) 90 mcg/act inhaler, TAKE 2 PUFFS BY MOUTH EVERY 6 HOURS AS NEEDED FOR WHEEZE, Disp: 8.5 g, Rfl: 0  •  BD Pen Needle Ludmila 2nd Gen 32G X 4 MM MISC, USE AS DIRECTED 4 TIMES A DAY, Disp: 200 each, Rfl: 3  •  Blood Glucose Monitoring Suppl (ONETOUCH VERIO) w/Device KIT, Use to test sugar daily, Disp: 1 kit, Rfl: 0  •  Continuous Blood Gluc  (FreeStyle [place] 2 Moran Systm) BROOKS, Use 1 Device as needed (use with sensors for bs checks), Disp: 1 Device, Rfl: 0  •  Continuous Blood Gluc Sensor (FreeStyle Elvin 14 Day Sensor) MISC, Scan 4x daily, Disp: 2 each, Rfl: 5  •  Continuous Blood Gluc Sensor (FreeStyle Elvin 3 Sensor) MISC, Replace every 14 days, Disp: 2 each, Rfl: 5  •  ezetimibe (ZETIA) 10 mg tablet, TAKE 1 TABLET BY MOUTH EVERY DAY, Disp: 90 tablet, Rfl: 1  •  furosemide (LASIX) 20 mg tablet, TAKE 2 TABLETS EVERY DAY IN THE EARLY MORNING AND 1 TABLET DAILY AFTER LUNCH., Disp: 270 tablet, Rfl: 2  •  gabapentin (NEURONTIN) 100 mg capsule, Take 1 capsule (100 mg total) by mouth 2 (two) times a day With 300mg capsule for a total of 400mg twice a day, Disp: 180 capsule, Rfl: 1  •  gabapentin (NEURONTIN) 300 mg capsule, TAKE 1 CAPSULE (300 MG TOTAL) BY MOUTH 2 (TWO) TIMES A DAY TAKE 1 TAB WITH YOUR 100MG TAB TWICE DAILY, Disp: 180 capsule, Rfl: 2  •  insulin aspart (NovoLOG FlexPen) 100 UNIT/ML injection pen, INJECT 14-22 UNITS WITH MEALS+SCALE, Disp: 30 mL, Rfl: 3  •  Insulin Glargine Solostar (Lantus SoloStar) 100 UNIT/ML SOPN, INJECT 40 UNITS UNDER THE SKIN DAILY AT BEDTIME, Disp: 15 mL, Rfl: 2  •  Lancets (ONETOUCH ULTRASOFT) lancets, Use to test sugar 2 times a day, Disp: 100 each, Rfl: 3  •  levothyroxine 25 mcg tablet, TAKE 1 TABLET (25 MCG TOTAL) BY MOUTH DAILY IN THE EARLY MORNING, Disp: 90 tablet, Rfl: 1  •  losartan (COZAAR) 25 mg tablet, TAKE 1 TABLET (25 MG TOTAL) BY MOUTH DAILY. , Disp: 90 tablet, Rfl: 1  •  metFORMIN (GLUCOPHAGE) 1000 MG tablet, TAKE 1 TABLET BY MOUTH TWICE A DAY, Disp: 180 tablet, Rfl: 1  •  nystatin (MYCOSTATIN) powder, APPLY 1 APPLICATION TOPICALLY 2 (TWO) TIMES A DAY TO AFFECTED AREA, Disp: 120 g, Rfl: 3  •  omeprazole (PriLOSEC) 40 MG capsule, TAKE 1 CAPSULE BY MOUTH TWICE A DAY, Disp: 180 capsule, Rfl: 1  •  OneTouch Verio test strip, USE TO TEST SUGAR 2 TIMES A DAY, Disp: 100 strip, Rfl: 3  •  psyllium (METAMUCIL) packet, Take 1 packet by mouth daily, Disp: , Rfl: 0  •  saccharomyces boulardii (FLORASTOR) 250 mg capsule, Take 1 capsule (250 mg total) by mouth 2 (two) times a day, Disp: 60 capsule, Rfl: 1  •  Trulicity 6.60 BE/0.7CL injection, INJECT 0.5 ML (0.75 MG TOTAL) UNDER THE SKIN EVERY 7 DAYS, Disp: 2 mL, Rfl: 4  No current facility-administered medications for this visit. Review of Systems   Constitutional: Negative for appetite change, chills, fatigue, fever and unexpected weight change. HENT: Negative for congestion, hearing loss, postnasal drip and sinus pressure. Respiratory: Positive for shortness of breath (On exertion). Negative for cough. Cardiovascular: Positive for leg swelling (Lymphedema). Endocrine: Negative. Musculoskeletal: Positive for gait problem Cleave Ana). Negative for back pain. Skin: Positive for wound (Left calf). Negative for rash. Allergic/Immunologic: Negative. Hematological: Does not bruise/bleed easily. Psychiatric/Behavioral: Negative. Negative for dysphoric mood. The patient is not nervous/anxious. Objective:  /88   Pulse 80   Temp 98.5 °F (36.9 °C)   Resp 20   Pain Score:   4     Physical Exam  Vitals and nursing note reviewed. Constitutional:       General: He is not in acute distress. Appearance: He is morbidly obese. He is not ill-appearing. HENT:      Head: Normocephalic and atraumatic. Cardiovascular:      Rate and Rhythm: Normal rate. Comments: Bilateral lymphedema/lipedema  Pulmonary:      Effort: Pulmonary effort is normal. No respiratory distress. Musculoskeletal:      Right lower leg: 3+ Edema present. Left lower leg: 3+ Edema present. Skin:     General: Skin is warm and dry. Findings: Wound present. No erythema. Comments: Pressure injury to the left lateral calf. Stage II. Some fibrin in the base. No signs of infection. No malodor. No surrounding erythema. Neurological:      Mental Status: He is alert and oriented to person, place, and time. Gait: Gait abnormal.   Psychiatric:         Attention and Perception: Attention normal.         Mood and Affect: Mood and affect normal.         Behavior: Behavior is cooperative. Cognition and Memory: Cognition normal.             Wound 08/23/23 Pressure Injury Leg Left;Lateral (Active)   Wound Image Images linked 08/30/23 1141   Wound Description Pink;Yellow;Epithelialization 08/30/23 1140   Pressure Injury Stage Stage 2 08/30/23 1140   Chelsie-wound Assessment Hyperpigmented;Edema; Maceration 08/30/23 1140   Wound Length (cm) 0.7 cm 08/30/23 1140   Wound Width (cm) 5.2 cm 08/30/23 1140   Wound Depth (cm) 0.1 cm 08/30/23 1140   Wound Surface Area (cm^2) 3.64 cm^2 08/30/23 1140   Wound Volume (cm^3) 0.364 cm^3 08/30/23 1140   Calculated Wound Volume (cm^3) 0.36 cm^3 08/30/23 1140   Change in Wound Size % 65.38 08/30/23 1140   Drainage Amount Small 08/30/23 1140   Drainage Description Yellow 08/30/23 1140   Treatments Irrigation with NSS 08/30/23 1140                               Lab Results   Component Value Date    HGBA1C 6.9 (H) 09/16/2022       Wound Instructions:  Orders Placed This Encounter   Procedures   • Wound cleansing and dressings         Wash your hands with soap and water. Remove old dressing, discard into plastic bag and place in trash. Cleanse the wound with mild soap(Dove) and water prior to applying a clean dressing.  Do not use tissue or cotton balls. Do not scrub the wound. Pat dry using gauze. Shower yes. Only on the days you change the dressing.         Left lower leg wound:   Cover with silicon bordered foam.  Change dressing three times a week.     Continue wearing circaids on both lower legs.         Avoid putting pressure on your left lower leg.         Follow up at the wound center in one week.        Treatment at the wound center today: Cleansed with NSS, and dressed as above. Standing Status:   Future     Standing Expiration Date:   8/30/2024             Mouna Singh MD, CHT, CWS    Portions of the record may have been created with voice recognition software. Occasional wrong word or "sound alike" substitutions may have occurred due to the inherent limitations of voice recognition software. Read the chart carefully and recognize, using context, where substitutions have occurred.

## 2023-08-30 NOTE — PATIENT INSTRUCTIONS
Orders Placed This Encounter   Procedures    Wound cleansing and dressings         Wash your hands with soap and water. Remove old dressing, discard into plastic bag and place in trash. Cleanse the wound with mild soap(Dove) and water prior to applying a clean dressing. Do not use tissue or cotton balls. Do not scrub the wound. Pat dry using gauze. Shower yes. Only on the days you change the dressing. Left lower leg wound:   Cover with silicon bordered foam.  Change dressing three times a week. Continue wearing circaids on both lower legs. Avoid putting pressure on your left lower leg. Follow up at the wound center in one week. Treatment at the wound center today: Cleansed with NSS, and dressed as above.      Standing Status:   Future     Standing Expiration Date:   8/30/2024

## 2023-09-01 RX ORDER — NYSTATIN 100000 [USP'U]/G
POWDER TOPICAL 2 TIMES DAILY
Qty: 120 G | Refills: 3 | Status: SHIPPED | OUTPATIENT
Start: 2023-09-01

## 2023-09-07 ENCOUNTER — OFFICE VISIT (OUTPATIENT)
Dept: WOUND CARE | Facility: CLINIC | Age: 58
End: 2023-09-07
Payer: MEDICARE

## 2023-09-07 VITALS
SYSTOLIC BLOOD PRESSURE: 128 MMHG | TEMPERATURE: 98.2 F | HEART RATE: 88 BPM | DIASTOLIC BLOOD PRESSURE: 72 MMHG | RESPIRATION RATE: 20 BRPM

## 2023-09-07 DIAGNOSIS — L89.893 PRESSURE INJURY OF LEFT LEG, STAGE 3 (HCC): Primary | ICD-10-CM

## 2023-09-07 PROCEDURE — 97597 DBRDMT OPN WND 1ST 20 CM/<: CPT | Performed by: FAMILY MEDICINE

## 2023-09-07 RX ORDER — LIDOCAINE 40 MG/G
CREAM TOPICAL ONCE
Status: COMPLETED | OUTPATIENT
Start: 2023-09-07 | End: 2023-09-07

## 2023-09-07 RX ADMIN — LIDOCAINE: 40 CREAM TOPICAL at 15:10

## 2023-09-07 NOTE — PATIENT INSTRUCTIONS
Orders Placed This Encounter   Procedures    Wound cleansing and dressings     Wash your hands with soap and water. Remove old dressing, discard into plastic bag and place in trash. Cleanse the wound with mild soap(Dove) and water prior to applying a clean dressing. Do not use tissue or cotton balls. Do not scrub the wound. Pat dry using gauze. Shower yes. Only on the days you change the dressing. Left lower leg wound:   Endoform AM to open areas. Cover with silicone bordered foam.   Change dressing three times a week. Continue wearing circaids on both lower legs. Avoid putting pressure on your left lower leg. Follow up at the wound center in two weeks. Treatment at the wound center today: Cleansed with NSS, and dressed as above.      Standing Status:   Future     Standing Expiration Date:   9/7/2024

## 2023-09-07 NOTE — PROGRESS NOTES
Patient ID: Yang Lopez is a 66 y.o. male Date of Birth 1965       Chief Complaint   Patient presents with   • Follow Up Wound Care Visit     Left lower leg ulcer. Circaid in place. Allergies:  Gluten meal - food allergy and Clindamycin    Diagnosis:      Diagnosis ICD-10-CM Associated Orders   1. Pressure injury of left leg, stage 3 ([facility])  L89.893 lidocaine (LMX) 4 % cream     Wound cleansing and dressings     Debridement              Assessment & Plan:  Stage III pressure injury of the left calf. Slightly improved. Selective debridement. Endoform AM and silicone bordered foam.  Change 3 times a week. Offload is much as possible. He is sleeping now in bed but his leg externally rotates causing pressure on his lateral calf. Subjective:   2/17/22: This is a 21-year-old male who comes in [address] with ulcers on the right and left calfs. He states that he was hospitalized on November of 2021 with cellulitis and he had the ulcers at that time. Patient is morbidly obese and has history of lymphedema as well. He does not use any form of compression other than Ace wraps. He had purchased alginate on the Internet as well as bordered foam is. He notes that there is heavy drainage requiring dressings to be changed once or twice a day. He believes that these ulcers had started due to  sleeping in a recliner and the foot rest causing pressure on the calfs. He is unable to sleep in a bed. He notes he has increased pain at nighttime when in there is recliner with pressure on his calf. He is ambulatory with a walker but does not go out of the house other than to doctor visits. Patient also has a history of type 2 diabetes with good control with last A1c 6.5 in October of 2021. He states that he has lost approximately 120 lb in the past nine months with a special meal plan. He plans on continuing with weight loss. 08/11/22:  Followup stage 3 pressure injuries of the R and L calves. Has been using Hydrofera blue to bilateral wounds and Unna boots. States that the [employer] fell down after the office visit last Thursday. He got a Prevlon boot with integrated wedge to attempt to offload the area, however this was unable to keep his leg up; the smooth bottom of the wedge did not have enough traction to keep his leg in place. No fevers or chills. 8/25/22: Followup stage III pressure injuries of the right left calfs. Hydrofera is being used. Compression wraps continue to slide down in the patient refuses to have them placed again. He would like to use the Circ Aids that he has now. He does have VNA. For the last four nights he has been sleeping in bed on his back with his C-Pap  He has not been able to do this in the past.  In the past he has been sleeping in a chair and supposedly this is when his legs are externally rotated causing the pressure injuries. 8/23/2023: Patient returns to the wound center with new wound of the left lateral calf. Since his last visit a year ago, he states that he is now going to lymphedema clinic. He is getting manual drainage, using his CircAid's and also has had pumps ordered. About 2 weeks ago, the patient states that the therapist noted a wound on the left lateral calf. He states that he was sleeping in a recliner and got another pressure injury as he had in the past.  Therefore, he purchased a new bed that is adjustable. He believes that since being in the bed the last week or 2 that the wound has improved. No fever or chills. Patient is seeing an endocrinologist for his diabetes, going to lymphedema clinic and also going to weight loss clinic. 8/30/2023: Follow-up stage III pressure injury of the left calf. Using bordered foam, offloading by sleeping in bed and using his CircAid. No other complaints. 9/7/2023: Follow-up stage III pressure injury of the left calf.   Continue sleeping in bed and trying to offload is much as possible. No new complaints. Bordered foam being used.         The following portions of the patient's history were reviewed and updated as appropriate:   Patient Active Problem List   Diagnosis   • Insulin-dependent diabetes mellitus with neuropathy   • Hyperlipidemia   • Psoriasis   • Venous insufficiency   • Gout   • Essential hypertension   • Hyponatremia   • Gluten intolerance   • Diabetic neuropathy (HCC)   • Insomnia   • Cellulitis of left thigh   • Erectile dysfunction   • Bilateral leg edema   • Elevated CO2 level   • Abnormal thyroid function test   • DAHIANA (obstructive sleep apnea)   • SOB (shortness of breath)   • Fungal infection   • Morbid obesity   • Elevated serum creatinine   • Migraine headache   • Onychomycosis   • Ulcer of left lower extremity, limited to breakdown of skin (HCC)   • Pressure injury of right leg   • Metabolic syndrome   • Low testosterone in male   • Hypercalcemia   • Hypothyroidism   • Pressure injury of left leg   • GERD (gastroesophageal reflux disease)   • Chronic pain   • Type 2 diabetes mellitus with hyperglycemia ([address])   • Headache   • Current use of insulin ([address])     Past Medical History:   Diagnosis Date   • Acute kidney injury 10/28/2021   • Anemia 09/13/2019   • Cellulitis     last assessed 12/10/15   • Cellulitis of left lower extremity    • Cellulitis of right lower extremity 11/19/2021   • Cough 10/20/2019   • Diabetes mellitus ([address])    • Disease of thyroid gland    • Edema    • Elevated liver enzymes    • Elevated serum creatinine 11/19/2021   • Esophageal reflux    • Fungal infection 8/16/2021   • Gluten intolerance    • Gout     last assessed 09/05/13   • Hyperglycemia    • Hypertension    • Hyponatremia 9/6/2019   • IBS (irritable bowel syndrome)    • Insomnia    • Obesity    • Osteoarthritis of knee     last assessed 02/10/14   • Scrotal swelling 5/19/2020   • Shortness of breath 5/3/2021   • Venous insufficiency     last assessed 08/22/17   • Villonodular synovitis of the hand, right     last assessed 11/14/2013     Past Surgical History:   Procedure Laterality Date   • INCISION AND DRAINAGE OF WOUND Left 9/6/2019    Procedure: INCISION AND DRAINAGE (I&D) GROIN;  Surgeon: Rebel Lopez DO;  Location: [facility];  Service: General   • SKIN BIOPSY     • WOUND DEBRIDEMENT Left 9/7/2019    Procedure: EXCISIONAL DEBRIDEMENT;  Surgeon: Rebel Lopez DO;  [facility];  Service: General     Family History   Problem Relation Age of Onset   • Cancer Mother         gastrc   • Colon cancer Father    • Heart failure Father       Social History     Socioeconomic History   • Marital status: /Civil Union     Spouse name: Not on file   • Number of children: Not on file   • Years of education: Not on file   • Highest education level: Not on file   Occupational History   • Not on file   Tobacco Use   • Smoking status: Never   • Smokeless tobacco: Never   Vaping Use   • Vaping Use: Never used   Substance and Sexual Activity   • Alcohol use: Not Currently     Alcohol/week: 0.0 standard drinks of alcohol   • Drug use: Yes     Types: Marijuana     Comment: medical   • Sexual activity: Yes   Other Topics Concern   • Not on file   Social History Narrative   • Not on file     Social Determinants of Health     Financial Resource Strain: Not on file   Food Insecurity: No Food Insecurity (9/8/2022)    Hunger Vital Sign    • Worried About Running Out of Food in the Last Year: Never true    • Ran Out of Food in the Last Year: Never true   Transportation Needs: No Transportation Needs (9/8/2022)    PRAPARE - Transportation    • Lack of Transportation (Medical): No    • Lack of Transportation (Non-Medical):  No   Physical Activity: Not on file   Stress: Not on file   Social Connections: Not on file   Intimate Partner Violence: Not on file   Housing Stability: Unknown (9/8/2022)    Housing Stability Vital Sign    • Unable to Pay for Housing in the Last Year: No    • Number of Places Lived in the Last Year: Not on file    • Unstable Housing in the Last Year: No        Current Outpatient Medications:   •  albuterol (PROVENTIL HFA,VENTOLIN HFA) 90 mcg/act inhaler, TAKE 2 PUFFS BY MOUTH EVERY 6 HOURS AS NEEDED FOR WHEEZE, Disp: 8.5 g, Rfl: 0  •  BD Pen Needle Ludmila 2nd Gen 32G X 4 MM MISC, USE AS DIRECTED 4 TIMES A DAY, Disp: 200 each, Rfl: 3  •  Blood Glucose Monitoring Suppl (ONETOUCH VERIO) w/Device KIT, Use to test sugar daily, Disp: 1 kit, Rfl: 0  •  Continuous Blood Gluc  (FreeStyle [place] 2 [place] Systm) BROOKS, Use 1 Device as needed (use with sensors for bs checks), Disp: 1 Device, Rfl: 0  •  Continuous Blood Gluc Sensor (FreeStyle Elvin 14 Day Sensor) MISC, Scan 4x daily, Disp: 2 each, Rfl: 5  •  Continuous Blood Gluc Sensor (FreeStyle Elvin 3 Sensor) MISC, Replace every 14 days, Disp: 2 each, Rfl: 5  •  ezetimibe (ZETIA) 10 mg tablet, TAKE 1 TABLET BY MOUTH EVERY DAY, Disp: 90 tablet, Rfl: 1  •  furosemide (LASIX) 20 mg tablet, TAKE 2 TABLETS EVERY DAY IN THE EARLY MORNING AND 1 TABLET DAILY AFTER LUNCH., Disp: 270 tablet, Rfl: 2  •  gabapentin (NEURONTIN) 100 mg capsule, Take 1 capsule (100 mg total) by mouth 2 (two) times a day With 300mg capsule for a total of 400mg twice a day, Disp: 180 capsule, Rfl: 1  •  gabapentin (NEURONTIN) 300 mg capsule, TAKE 1 CAPSULE (300 MG TOTAL) BY MOUTH 2 (TWO) TIMES A DAY TAKE 1 TAB WITH YOUR 100MG TAB TWICE DAILY, Disp: 180 capsule, Rfl: 2  •  insulin aspart (NovoLOG FlexPen) 100 UNIT/ML injection pen, INJECT 14-22 UNITS WITH MEALS+SCALE, Disp: 30 mL, Rfl: 3  •  Insulin Glargine Solostar (Lantus SoloStar) 100 UNIT/ML SOPN, INJECT 40 UNITS UNDER THE SKIN DAILY AT BEDTIME, Disp: 15 mL, Rfl: 2  •  Lancets (ONETOUCH ULTRASOFT) lancets, Use to test sugar 2 times a day, Disp: 100 each, Rfl: 3  •  levothyroxine 25 mcg tablet, TAKE 1 TABLET (25 MCG TOTAL) BY MOUTH DAILY IN THE EARLY MORNING, Disp: 90 tablet, Rfl: 1  •  losartan (COZAAR) 25 mg tablet, TAKE 1 TABLET (25 MG TOTAL) BY MOUTH DAILY. , Disp: 90 tablet, Rfl: 1  •  metFORMIN (GLUCOPHAGE) 1000 MG tablet, TAKE 1 TABLET BY MOUTH TWICE A DAY, Disp: 180 tablet, Rfl: 1  •  nystatin (MYCOSTATIN) powder, APPLY 1 APPLICATION TOPICALLY 2 (TWO) TIMES A DAY TO AFFECTED AREA, Disp: 120 g, Rfl: 3  •  omeprazole (PriLOSEC) 40 MG capsule, TAKE 1 CAPSULE BY MOUTH TWICE A DAY, Disp: 180 capsule, Rfl: 1  •  OneTouch Verio test strip, USE TO TEST SUGAR 2 TIMES A DAY, Disp: 100 strip, Rfl: 3  •  psyllium (METAMUCIL) packet, Take 1 packet by mouth daily, Disp: , Rfl: 0  •  saccharomyces boulardii (FLORASTOR) 250 mg capsule, Take 1 capsule (250 mg total) by mouth 2 (two) times a day, Disp: 60 capsule, Rfl: 1  •  Trulicity 5.70 QM/8.3EQ injection, INJECT 0.5 ML (0.75 MG TOTAL) UNDER THE SKIN EVERY 7 DAYS, Disp: 2 mL, Rfl: 4  No current facility-administered medications for this visit. Review of Systems   Constitutional: Negative for appetite change, chills, fatigue, fever and unexpected weight change. HENT: Negative for congestion, hearing loss, postnasal drip and sinus pressure. Respiratory: Positive for shortness of breath (On exertion). Negative for cough. Cardiovascular: Positive for leg swelling (Lymphedema). Endocrine: Negative. Musculoskeletal: Positive for gait problem Dalia Keedwina). Negative for back pain. Skin: Positive for wound (Left calf). Negative for rash. Allergic/Immunologic: Negative. Hematological: Does not bruise/bleed easily. Psychiatric/Behavioral: Negative. Negative for dysphoric mood. The patient is not nervous/anxious. Objective:  /72   Pulse 88   Temp 98.2 °F (36.8 °C)   Resp 20   Pain Score:   3     Physical Exam  Vitals and nursing note reviewed. Constitutional:       General: He is not in acute distress. Appearance: He is morbidly obese. He is not ill-appearing. HENT:      Head: Normocephalic and atraumatic.    Cardiovascular:      Rate and Rhythm: Normal rate. Comments: Bilateral lymphedema/lipedema  Pulmonary:      Effort: Pulmonary effort is normal. No respiratory distress. Musculoskeletal:      Right lower leg: 3+ Edema present. Left lower leg: 3+ Edema present. Skin:     General: Skin is warm and dry. Findings: Wound present. No erythema. Comments: Pressure injury to the left lateral calf. Stage III. Some fibrin and slough in the base. No signs of infection. No malodor. No surrounding erythema. Neurological:      Mental Status: He is alert and oriented to person, place, and time. Gait: Gait abnormal.   Psychiatric:         Attention and Perception: Attention normal.         Mood and Affect: Mood and affect normal.         Behavior: Behavior is cooperative. Cognition and Memory: Cognition normal.             Wound 08/23/23 Pressure Injury Leg Left;Lateral (Active)   Wound Image Images linked 09/07/23 [id number]   Wound Description Pink;Yellow;Epithelialization 09/07/23 1500   Chelsie-wound Assessment Hyperpigmented;Edema 09/07/23 1500   Wound Length (cm) 0.7 cm 09/07/23 1500   Wound Width (cm) 5.3 cm 09/07/23 1500   Wound Depth (cm) 0.1 cm 09/07/23 1500   Wound Surface Area (cm^2) 3.71 cm^2 09/07/23 1500   Wound Volume (cm^3) 0.371 cm^3 09/07/23 1500   Calculated Wound Volume (cm^3) 0.37 cm^3 09/07/23 1500   Change in Wound Size % 64.42 09/07/23 1500   Drainage Amount Small 09/07/23 1500   Drainage Description Yellow 09/07/23 1500   Non-staged Wound Description Full thickness 09/07/23 1500       Debridement   Wound 08/23/23 Pressure Injury Leg Left;Lateral    Universal Protocol:  Consent: Verbal consent obtained. Written consent obtained. Consent given by: patient  Time out: Immediately prior to procedure a "time out" was called to verify the correct patient, procedure, equipment, support staff and site/side marked as required.   Patient understanding: patient states understanding of the procedure being performed  Patient identity confirmed: verbally with patient      Performed by: physician  Debridement type: selective  Pain control: lidocaine 4%  Post-debridement measurements  Length (cm): 0.7  Width (cm): 5.3  Depth (cm): 0.1  Percent debrided: 100%  Surface Area (cm^2): 3.71  Area debrided (cm^2): 3.71  Volume (cm^3): 0.37  Devitalized tissue debrided: fibrin and slough  Instrument(s) utilized: curette  Bleeding: small  Hemostasis obtained with: pressure  Procedural pain (0-10): 0  Post-procedural pain: 0   Response to treatment: procedure was tolerated well                 Lab Results   Component Value Date    HGBA1C 6.9 (H) 09/16/2022       Wound Instructions:  Orders Placed This Encounter   Procedures   • Wound cleansing and dressings     Wash your hands with soap and water. [place] old dressing, discard into plastic bag and place in trash.  Cleanse the wound with mild soap(Dove) and water prior to applying a clean dressing. Do not use tissue or cotton balls. Do not scrub the wound. Pat dry using gauze. Shower yes. Only on the days you change the dressing.           Left lower leg wound:   Endoform AM to open areas. Cover with silicone bordered foam.   Change dressing three times a week.       Continue wearing circaids on both lower legs.        Avoid putting pressure on your left lower leg.       Follow up at the wound center in two weeks.       Treatment at the wound center today: Cleansed with NSS, and dressed as above. Standing Status:   Future     Standing Expiration Date:   9/7/2024   • Debridement     This order was created via procedure documentation             Sohail Albert MD, CHT, CWS    Portions of the record may have been created with voice recognition software. Occasional wrong word or "sound alike" substitutions may have occurred due to the inherent limitations of voice recognition software.  Read the chart carefully and recognize, using context, where substitutions have occurred.

## 2023-09-08 ENCOUNTER — TELEPHONE (OUTPATIENT)
Dept: LAB | Facility: HOSPITAL | Age: 58
End: 2023-09-08

## 2023-09-14 ENCOUNTER — APPOINTMENT (OUTPATIENT)
Dept: LAB | Facility: HOSPITAL | Age: 58
End: 2023-09-14
Payer: MEDICARE

## 2023-09-14 ENCOUNTER — TELEPHONE (OUTPATIENT)
Dept: LAB | Facility: HOSPITAL | Age: 58
End: 2023-09-14

## 2023-09-14 DIAGNOSIS — Z79.4 CURRENT USE OF INSULIN (HCC): ICD-10-CM

## 2023-09-14 DIAGNOSIS — Z12.5 SCREENING FOR PROSTATE CANCER: ICD-10-CM

## 2023-09-14 DIAGNOSIS — Z79.4 TYPE 2 DIABETES MELLITUS WITH HYPERGLYCEMIA, WITH LONG-TERM CURRENT USE OF INSULIN (HCC): ICD-10-CM

## 2023-09-14 DIAGNOSIS — E78.00 PURE HYPERCHOLESTEROLEMIA: ICD-10-CM

## 2023-09-14 DIAGNOSIS — E11.65 TYPE 2 DIABETES MELLITUS WITH HYPERGLYCEMIA, WITH LONG-TERM CURRENT USE OF INSULIN (HCC): ICD-10-CM

## 2023-09-14 DIAGNOSIS — E03.9 ACQUIRED HYPOTHYROIDISM: ICD-10-CM

## 2023-09-14 DIAGNOSIS — I10 ESSENTIAL HYPERTENSION: ICD-10-CM

## 2023-09-14 LAB
ANION GAP SERPL CALCULATED.3IONS-SCNC: 7 MMOL/L
BUN SERPL-MCNC: 20 MG/DL (ref 5–25)
CALCIUM SERPL-MCNC: 9.5 MG/DL (ref 8.4–10.2)
CHLORIDE SERPL-SCNC: 103 MMOL/L (ref 96–108)
CHOLEST SERPL-MCNC: 175 MG/DL
CO2 SERPL-SCNC: 28 MMOL/L (ref 21–32)
CREAT SERPL-MCNC: 1.14 MG/DL (ref 0.6–1.3)
ERYTHROCYTE [DISTWIDTH] IN BLOOD BY AUTOMATED COUNT: 14.1 % (ref 11.6–15.1)
EST. AVERAGE GLUCOSE BLD GHB EST-MCNC: 148 MG/DL
GFR SERPL CREATININE-BSD FRML MDRD: 71 ML/MIN/1.73SQ M
GLUCOSE P FAST SERPL-MCNC: 97 MG/DL (ref 65–99)
HBA1C MFR BLD: 6.8 %
HCT VFR BLD AUTO: 42.6 % (ref 36.5–49.3)
HDLC SERPL-MCNC: 34 MG/DL
HGB BLD-MCNC: 14.5 G/DL (ref 12–17)
LDLC SERPL CALC-MCNC: 106 MG/DL (ref 0–100)
MCH RBC QN AUTO: 30.5 PG (ref 26.8–34.3)
MCHC RBC AUTO-ENTMCNC: 34 G/DL (ref 31.4–37.4)
MCV RBC AUTO: 90 FL (ref 82–98)
PLATELET # BLD AUTO: 187 THOUSANDS/UL (ref 149–390)
PMV BLD AUTO: 9.4 FL (ref 8.9–12.7)
POTASSIUM SERPL-SCNC: 4.3 MMOL/L (ref 3.5–5.3)
PSA SERPL-MCNC: 0.53 NG/ML (ref 0–4)
RBC # BLD AUTO: 4.76 MILLION/UL (ref 3.88–5.62)
SODIUM SERPL-SCNC: 138 MMOL/L (ref 135–147)
T4 FREE SERPL-MCNC: 0.99 NG/DL (ref 0.61–1.12)
TRIGL SERPL-MCNC: 177 MG/DL
TSH SERPL DL<=0.05 MIU/L-ACNC: 4.36 UIU/ML (ref 0.45–4.5)
WBC # BLD AUTO: 7.07 THOUSAND/UL (ref 4.31–10.16)

## 2023-09-14 PROCEDURE — 83036 HEMOGLOBIN GLYCOSYLATED A1C: CPT

## 2023-09-14 PROCEDURE — 85027 COMPLETE CBC AUTOMATED: CPT

## 2023-09-14 PROCEDURE — G0103 PSA SCREENING: HCPCS

## 2023-09-14 PROCEDURE — 84439 ASSAY OF FREE THYROXINE: CPT

## 2023-09-14 PROCEDURE — 84443 ASSAY THYROID STIM HORMONE: CPT

## 2023-09-14 PROCEDURE — 80061 LIPID PANEL: CPT

## 2023-09-14 PROCEDURE — 36415 COLL VENOUS BLD VENIPUNCTURE: CPT

## 2023-09-14 PROCEDURE — 80048 BASIC METABOLIC PNL TOTAL CA: CPT

## 2023-09-21 ENCOUNTER — OFFICE VISIT (OUTPATIENT)
Dept: WOUND CARE | Facility: CLINIC | Age: 58
End: 2023-09-21
Payer: MEDICARE

## 2023-09-21 VITALS
TEMPERATURE: 97.8 F | RESPIRATION RATE: 16 BRPM | DIASTOLIC BLOOD PRESSURE: 88 MMHG | HEART RATE: 109 BPM | SYSTOLIC BLOOD PRESSURE: 136 MMHG

## 2023-09-21 DIAGNOSIS — Z79.4 TYPE 2 DIABETES MELLITUS WITH HYPERGLYCEMIA, WITH LONG-TERM CURRENT USE OF INSULIN (HCC): ICD-10-CM

## 2023-09-21 DIAGNOSIS — E11.65 TYPE 2 DIABETES MELLITUS WITH HYPERGLYCEMIA, WITH LONG-TERM CURRENT USE OF INSULIN (HCC): ICD-10-CM

## 2023-09-21 DIAGNOSIS — L89.893 PRESSURE INJURY OF LEFT LEG, STAGE 3 (HCC): Primary | ICD-10-CM

## 2023-09-21 DIAGNOSIS — I89.0 LYMPHEDEMA OF BOTH LOWER EXTREMITIES: ICD-10-CM

## 2023-09-21 PROCEDURE — 99213 OFFICE O/P EST LOW 20 MIN: CPT | Performed by: FAMILY MEDICINE

## 2023-09-21 NOTE — PATIENT INSTRUCTIONS
Orders Placed This Encounter   Procedures    Wound cleansing and dressings         Wash your hands with soap and water. Remove old dressing, discard into plastic bag and place in trash. Cleanse the wound with mild soap(Dove) and water prior to applying a clean dressing. Do not use tissue or cotton balls. Do not scrub the wound. Pat dry using gauze. Shower yes. Only on the days you change the dressing. Left lower leg wound:   Apply skin prep to skin around the wound. (Do not put on open areas)  Apply [place] dres to open areas on left lower leg. Cover with bordered gauze. Change dressing every other day. Continue wearing circaids on both lower legs. Avoid putting pressure on your left lower leg. Follow up at the wound center in two weeks. Treatment at the wound center today: Cleansed with NSS, and dressed as above.      Standing Status:   Future     Standing Expiration Date:   9/21/2024

## 2023-09-21 NOTE — PROGRESS NOTES
Patient ID: Laz Bates is a 66 y.o. male Date of Birth 1965       Chief Complaint   Patient presents with   • Follow Up Wound Care Visit     Follow up visit for wounds to left leg. Pt denies any issues or concerns since last visit. Allergies:  Gluten meal - food allergy and Clindamycin    Diagnosis:      Diagnosis ICD-10-CM Associated Orders   1. Pressure injury of left leg, stage 3 ([facility])  L89.893 Wound cleansing and dressings      2. Lymphedema of both lower extremities  I89.0 Wound cleansing and dressings      3. Type 2 diabetes mellitus with hyperglycemia, with long-term current use of insulin ([facility])  E11.65 Wound cleansing and dressings    Z79.4               Assessment & Plan:  Stage III pressure injury left calf continues to improve; small amount of drainage  D/c endoform and bordered foam; start Wound Dress and bordered gauze changing every 1 to 2 days  Encouraged him on his continued offloading efforts and sleeping in his bed rather than chair  Follow-up in wound center in 2 weeks or sooner if needed   A1C results reviewed with the patient today. Subjective:   2/17/22: This is a 82-year-old male who comes in [address] with ulcers on the right and left calfs. He states that he was hospitalized on November of 2021 with cellulitis and he had the ulcers at that time. Patient is morbidly obese and has history of lymphedema as well. He does not use any form of compression other than Ace wraps. He had purchased alginate on the Internet as well as bordered foam is. He notes that there is heavy drainage requiring dressings to be changed once or twice a day. He believes that these ulcers had started due to  sleeping in a recliner and the foot rest causing pressure on the calfs. He is unable to sleep in a bed. He notes he has increased pain at nighttime when in there is recliner with pressure on his calf.   He is ambulatory with a walker but does not go out of the house other than to doctor visits. Patient also has a history of type 2 diabetes with good control with last A1c 6.5 in October of 2021. He states that he has lost approximately 120 lb in the past nine months with a special meal plan. He plans on continuing with weight loss. 08/11/22: Followup stage 3 pressure injuries of the R and L calves. Has been using Hydrofera blue to bilateral wounds and Unna boots. States that the [employer] fell down after the office visit last Thursday. He got a Prevlon boot with integrated wedge to attempt to offload the area, however this was unable to keep his leg up; the smooth bottom of the wedge did not have enough traction to keep his leg in place. No fevers or chills. 8/25/22: Followup stage III pressure injuries of the right left calfs. Hydrofera is being used. Compression wraps continue to slide down in the patient refuses to have them placed again. He would like to use the Circ Aids that he has now. He does have VNA. For the last four nights he has been sleeping in bed on his back with his C-Pap  He has not been able to do this in the past.  In the past he has been sleeping in a chair and supposedly this is when his legs are externally rotated causing the pressure injuries. 8/23/2023: Patient returns to the wound center with new wound of the left lateral calf. Since his last visit a year ago, he states that he is now going to lymphedema clinic. He is getting manual drainage, using his CircAid's and also has had pumps ordered. About 2 weeks ago, the patient states that the therapist noted a wound on the left lateral calf. He states that he was sleeping in a recliner and got another pressure injury as he had in the past.  Therefore, he purchased a new bed that is adjustable. He believes that since being in the bed the last week or 2 that the wound has improved. No fever or chills.   Patient is seeing an endocrinologist for his diabetes, going to lymphedema clinic and also going to weight loss clinic. 8/30/2023: Follow-up stage III pressure injury of the left calf. Using bordered foam, offloading by sleeping in bed and using his CircAid. No other complaints. 9/7/2023: Follow-up stage III pressure injury of the left calf. Continue sleeping in bed and trying to offload is much as possible. No new complaints. Bordered foam being used. 9/21/2023: Esau France presents today along with his spouse for follow-up of stage III pressure injury of left calf. He has no new complaints today. Afebrile. They feel wound is doing better.       The following portions of the patient's history were reviewed and updated as appropriate:   Patient Active Problem List   Diagnosis   • Insulin-dependent diabetes mellitus with neuropathy   • Hyperlipidemia   • Psoriasis   • Venous insufficiency   • Gout   • Essential hypertension   • Hyponatremia   • Gluten intolerance   • Diabetic neuropathy (HCC)   • Insomnia   • Cellulitis of left thigh   • Erectile dysfunction   • Bilateral leg edema   • Elevated CO2 level   • Abnormal thyroid function test   • DAHIANA (obstructive sleep apnea)   • SOB (shortness of breath)   • Fungal infection   • Morbid obesity   • Elevated serum creatinine   • Migraine headache   • Onychomycosis   • Ulcer of left lower extremity, limited to breakdown of skin (HCC)   • Pressure injury of right leg   • Metabolic syndrome   • Low testosterone in male   • Hypercalcemia   • Hypothyroidism   • Pressure injury of left leg   • GERD (gastroesophageal reflux disease)   • Chronic pain   • Type 2 diabetes mellitus with hyperglycemia ([address])   • Headache   • Current use of insulin ([address])     Past Medical History:   Diagnosis Date   • Acute kidney injury 10/28/2021   • Anemia 09/13/2019   • Cellulitis     last assessed 12/10/15   • Cellulitis of left lower extremity    • Cellulitis of right lower extremity 11/19/2021   • Cough 10/20/2019   • Diabetes mellitus ([address])    • Disease of thyroid gland • Edema    • Elevated liver enzymes    • Elevated serum creatinine 11/19/2021   • Esophageal reflux    • Fungal infection 8/16/2021   • Gluten intolerance    • Gout     last assessed 09/05/13   • Hyperglycemia    • Hypertension    • Hyponatremia 9/6/2019   • IBS (irritable bowel syndrome)    • Insomnia    • Obesity    • Osteoarthritis of knee     last assessed 02/10/14   • Scrotal swelling 5/19/2020   • Shortness of breath 5/3/2021   • Venous insufficiency     last assessed 08/22/17   • Villonodular synovitis of the hand, right     last assessed 11/14/2013     Past Surgical History:   Procedure Laterality Date   • INCISION AND DRAINAGE OF WOUND Left 9/6/2019    Procedure: INCISION AND DRAINAGE (I&D) GROIN;  Surgeon: Jessi Rollins DO;  Location: [facility];  Service: General   • SKIN BIOPSY     • WOUND DEBRIDEMENT Left 9/7/2019    Procedure: EXCISIONAL DEBRIDEMENT;  Surgeon: Jessi Rollins DO;  Location: [facility];  Service: General     Family History   Problem Relation Age of Onset   • Cancer Mother         gastrc   • Colon cancer Father    • Heart failure Father       Social History     Socioeconomic History   • Marital status: /Civil Union     Spouse name: Not on file   • Number of children: Not on file   • Years of education: Not on file   • Highest education level: Not on file   Occupational History   • Not on file   Tobacco Use   • Smoking status: Never   • Smokeless tobacco: Never   Vaping Use   • Vaping Use: Never used   Substance and Sexual Activity   • Alcohol use: Not Currently     Alcohol/week: 0.0 standard drinks of alcohol   • Drug use: Yes     Types: Marijuana     Comment: medical   • Sexual activity: Yes   Other Topics Concern   • Not on file   Social History Narrative   • Not on file     Social Determinants of Health     Financial Resource Strain: Not on file   Food Insecurity: No Food Insecurity (9/8/2022)    Hunger Vital Sign    • Worried About Running Out of Food in the Last Year: Never true    • Ran Out of Food in the Last Year: Never true   Transportation Needs: No Transportation Needs (9/8/2022)    PRAPARE - Transportation    • Lack of Transportation (Medical): No    • Lack of Transportation (Non-Medical):  No   Physical Activity: Not on file   Stress: Not on file   Social Connections: Not on file   Intimate Partner Violence: Not on file   Housing Stability: Unknown (9/8/2022)    Housing Stability Vital Sign    • Unable to Pay for Housing in the Last Year: No    • Number of Places Lived in the Last Year: Not on file    • Unstable Housing in the Last Year: No        Current Outpatient Medications:   •  albuterol (PROVENTIL HFA,VENTOLIN HFA) 90 mcg/act inhaler, TAKE 2 PUFFS BY MOUTH EVERY 6 HOURS AS NEEDED FOR WHEEZE, Disp: 8.5 g, Rfl: 0  •  BD Pen Needle Ludmila 2nd Gen 32G X 4 MM MISC, USE AS DIRECTED 4 TIMES A DAY, Disp: 200 each, Rfl: 3  •  Blood Glucose Monitoring Suppl (ONETOUCH VERIO) w/Device KIT, Use to test sugar daily, Disp: 1 kit, Rfl: 0  •  Continuous Blood Gluc  (FreeStyle [place] 2 [place] Systm) BROOKS, Use 1 Device as needed (use with sensors for bs checks), Disp: 1 Device, Rfl: 0  •  Continuous Blood Gluc Sensor (FreeStyle Elvin 14 Day Sensor) MISC, Scan 4x daily, Disp: 2 each, Rfl: 5  •  Continuous Blood Gluc Sensor (FreeStyle Elvin 3 Sensor) MISC, Replace every 14 days, Disp: 2 each, Rfl: 5  •  ezetimibe (ZETIA) 10 mg tablet, TAKE 1 TABLET BY MOUTH EVERY DAY, Disp: 90 tablet, Rfl: 1  •  furosemide (LASIX) 20 mg tablet, TAKE 2 TABLETS EVERY DAY IN THE EARLY MORNING AND 1 TABLET DAILY AFTER LUNCH., Disp: 270 tablet, Rfl: 2  •  gabapentin (NEURONTIN) 100 mg capsule, Take 1 capsule (100 mg total) by mouth 2 (two) times a day With 300mg capsule for a total of 400mg twice a day, Disp: 180 capsule, Rfl: 1  •  gabapentin (NEURONTIN) 300 mg capsule, TAKE 1 CAPSULE (300 MG TOTAL) BY MOUTH 2 (TWO) TIMES A DAY TAKE 1 TAB WITH YOUR 100MG TAB TWICE DAILY, Disp: 180 capsule, Rfl: 2  • insulin aspart (NovoLOG FlexPen) 100 UNIT/ML injection pen, INJECT 14-22 UNITS WITH MEALS+SCALE, Disp: 30 mL, Rfl: 3  •  Insulin Glargine Solostar (Lantus SoloStar) 100 UNIT/ML SOPN, INJECT 40 UNITS UNDER THE SKIN DAILY AT BEDTIME, Disp: 15 mL, Rfl: 2  •  Lancets (ONETOUCH ULTRASOFT) lancets, Use to test sugar 2 times a day, Disp: 100 each, Rfl: 3  •  levothyroxine 25 mcg tablet, TAKE 1 TABLET (25 MCG TOTAL) BY MOUTH DAILY IN THE EARLY MORNING, Disp: 90 tablet, Rfl: 1  •  losartan (COZAAR) 25 mg tablet, TAKE 1 TABLET (25 MG TOTAL) BY MOUTH DAILY. , Disp: 90 tablet, Rfl: 1  •  metFORMIN (GLUCOPHAGE) 1000 MG tablet, TAKE 1 TABLET BY MOUTH TWICE A DAY, Disp: 180 tablet, Rfl: 1  •  nystatin (MYCOSTATIN) powder, APPLY 1 APPLICATION TOPICALLY 2 (TWO) TIMES A DAY TO AFFECTED AREA, Disp: 120 g, Rfl: 3  •  omeprazole (PriLOSEC) 40 MG capsule, TAKE 1 CAPSULE BY MOUTH TWICE A DAY, Disp: 180 capsule, Rfl: 1  •  OneTouch Verio test strip, USE TO TEST SUGAR 2 TIMES A DAY, Disp: 100 strip, Rfl: 3  •  psyllium (METAMUCIL) packet, Take 1 packet by mouth daily, Disp: , Rfl: 0  •  saccharomyces boulardii (FLORASTOR) 250 mg capsule, Take 1 capsule (250 mg total) by mouth 2 (two) times a day, Disp: 60 capsule, Rfl: 1  •  Trulicity 7.89 FY/4.4LL injection, INJECT 0.5 ML (0.75 MG TOTAL) UNDER THE SKIN EVERY 7 DAYS, Disp: 2 mL, Rfl: 4    Review of Systems   Constitutional: Negative for chills, fatigue and fever. Respiratory: Positive for shortness of breath (On exertion). Negative for cough. Cardiovascular: Positive for leg swelling (Lymphedema). Endocrine: Negative. Musculoskeletal: Positive for gait problem Bonfahad Merritt). Negative for back pain. Skin: Positive for wound (LLE calf). Negative for rash. Allergic/Immunologic: Negative. Hematological: Does not bruise/bleed easily. Psychiatric/Behavioral: Negative. Negative for dysphoric mood. The patient is not nervous/anxious.         Objective:  /88   Pulse (!) 109 Temp 97.8 °F (36.6 °C) (Tympanic)   Resp 16   Pain Score: 0-No pain     Physical Exam  Vitals and nursing note reviewed. Constitutional:       General: He is not in acute distress. Appearance: He is morbidly obese. He is not ill-appearing. Comments: Wife is present at bedside, patient in no acute distress   HENT:      Head: Normocephalic and atraumatic. Cardiovascular:      Comments: Bilateral lymphedema/lipedema improved from previous visits, right leg not visualized as CircAid in place  Pulmonary:      Effort: Pulmonary effort is normal. No respiratory distress. Musculoskeletal:      Left lower le+ Edema present. Skin:     General: Skin is warm and dry. Findings: Wound present. No erythema. Comments: Stage II pressure injury left lateral calf; wound with epithelization and scattered small open areas with granulation tissue throughout wound, improved from previous visit  No erythema or lymphangitic streaking, no malodor. Minimal drainage. Of note skin of lower extremity is very dry and flaky   Neurological:      Mental Status: He is alert and oriented to person, place, and time. Gait: Gait abnormal.   Psychiatric:         Attention and Perception: Attention normal.         Mood and Affect: Mood and affect normal.         Behavior: Behavior normal. Behavior is cooperative.          Cognition and Memory: Cognition normal.         Wound 23 Pressure Injury Leg Left;Lateral (Active)   Wound Image   23   Wound Description Pink;Yellow;Epithelialization;Granulation tissue 23   Pressure Injury Stage 2 23 1140   Chelsie-wound Assessment Hyperpigmented;Edema;Scar Tissue;Dry 23   Wound Length (cm) 0.3 cm 23   Wound Width (cm) 1.2 cm 23   Wound Depth (cm) 0.2 cm 23   Wound Surface Area (cm^2) 0.36 cm^2 23   Wound Volume (cm^3) 0.072 cm^3 23   Calculated Wound Volume (cm^3) 0.07 cm^3 09/21/23 0924   Change in Wound Size % 93.27 09/21/23 0924   Drainage Amount Small 09/21/23 0924   Drainage Description Serous; Tan 09/21/23 0924   Non-staged Wound Description Full thickness 09/07/23 1500   Treatments Irrigation with NSS 09/21/23 0924   Patient Tolerance Tolerated well 09/21/23 0924   Dressing Status Removed 09/21/23 0971              Lab Results   Component Value Date    HGBA1C 6.8 (H) 09/14/2023       Wound Instructions:  Orders Placed This Encounter   Procedures   • Wound cleansing and dressings         Wash your hands with soap and water. Jem Lantigua old dressing, discard into plastic bag and place in trash.  Cleanse the wound with mild soap(Dove) and water prior to applying a clean dressing. Do not use tissue or cotton balls. Do not scrub the wound. Pat dry using gauze. Shower yes. Only on the days you change the dressing.           Left lower leg wound:   Apply skin prep to skin around the wound. (Do not put on open areas)  Apply Woun dres to open areas on left lower leg. Cover with bordered gauze. Change dressing every other day.       Continue wearing circaids on both lower legs.        Avoid putting pressure on your left lower leg.       Follow up at the wound center in two weeks.       Treatment at the wound center today: Cleansed with NSS, and dressed as above. Standing Status:   Future     Standing Expiration Date:   9/21/2024       -I, Sarah Castellanos MD,  have acted as a scribe with the above documentation.   -Hannah Ware MD, CWS have seen and evaluated the above patient and have reviewed and agree with the above documentation. Prashant Moraes MD, CHT, CWS    Portions of the record may have been created with voice recognition software. Occasional wrong word or "sound alike" substitutions may have occurred due to the inherent limitations of voice recognition software. Read the chart carefully and recognize, using context, where substitutions have occurred.

## 2023-09-22 ENCOUNTER — OFFICE VISIT (OUTPATIENT)
Dept: INTERNAL MEDICINE CLINIC | Facility: CLINIC | Age: 58
End: 2023-09-22
Payer: MEDICARE

## 2023-09-22 VITALS
SYSTOLIC BLOOD PRESSURE: 150 MMHG | BODY MASS INDEX: 80.7 KG/M2 | DIASTOLIC BLOOD PRESSURE: 98 MMHG | TEMPERATURE: 98.3 F | WEIGHT: 315 LBS | RESPIRATION RATE: 22 BRPM | HEART RATE: 125 BPM | OXYGEN SATURATION: 93 %

## 2023-09-22 DIAGNOSIS — E11.65 TYPE 2 DIABETES MELLITUS WITH HYPERGLYCEMIA, WITH LONG-TERM CURRENT USE OF INSULIN (HCC): Primary | ICD-10-CM

## 2023-09-22 DIAGNOSIS — Z79.4 TYPE 2 DIABETES MELLITUS WITH HYPERGLYCEMIA, WITH LONG-TERM CURRENT USE OF INSULIN (HCC): Primary | ICD-10-CM

## 2023-09-22 PROCEDURE — 99214 OFFICE O/P EST MOD 30 MIN: CPT | Performed by: INTERNAL MEDICINE

## 2023-09-22 RX ORDER — DULAGLUTIDE 1.5 MG/.5ML
1.5 INJECTION, SOLUTION SUBCUTANEOUS
Qty: 2 ML | Refills: 2 | Status: SHIPPED | OUTPATIENT
Start: 2023-09-22

## 2023-09-22 NOTE — PATIENT INSTRUCTIONS
Please write to Dr. Avery Landeros in about 6 weeks to let her know how your blood sugar readings have been at home. Please write to Dr. Avery Landeros sooner if having side effects from the increased dose of Trulicity or having episodes of hypoglycemia/low blood sugar. Please decrease dose of rapid-acting insulin (aspart) to 10 Units with meals while starting on the increased dose of Trulicity.

## 2023-09-22 NOTE — PROGRESS NOTES
Assessment/Plan:    Type 2 diabetes mellitus with hyperglycemia (HCC)    Lab Results   Component Value Date    HGBA1C 6.8 (H) 09/14/2023     Pt on initial dose Trulicity 0.86 mg x3 weeks, denies SE; denies hypoglycemia, change in appetite, or increase in diarrhea or nausea. Reports that, since starting Trulicity, he has continued taking glargine 40 U at bedtime, has been taking a reduced dose of aspart decreased to 12 U from 14 U TID w meals; had been advised to reduce aspart to 10 U TID w meals, but had noted frequent hyperglycemia at this dose. Increase Trulicity to 1.5 mg injection weekly  Continue glargine 40 U QHS  Decrease aspart to 10 U TID w meals  Continue metformin 1000 mg BID  Continue to measure BG w Elvin 3 CGM and contact office for hypoglycemia or frequent hyperglycemia. Contact office in 6 weeks with BG trends. Repeat HgbA1c in 3 months  Return to clinic in 3 months for follow-up       Diagnoses and all orders for this visit:    Type 2 diabetes mellitus with hyperglycemia, with long-term current use of insulin (HCC)  -     dulaglutide (Trulicity) 1.5 TQ/1.2GB injection; Inject 0.5 mL (1.5 mg total) under the skin every 7 days  -     HEMOGLOBIN A1C W/ EAG ESTIMATION; Future          Subjective:      Patient ID: Mihir Beltrán is a 66 y.o. male. Mr. Jodi Young is seen in Endocrinology clinic this afternoon for follow-up of Type 2 DM, for which he currently takes glargine 40 U QHS, aspart 12 U TID w meals, metformin 2050 mg BID, and Trulicity 8.62 mg weekly for the last 3 weeks. He reports that glycemic control, appetite, and weight have been unchanged on the low initial dose of Trulicity, and that he has not experienced any side effects. Denies recent episodes of hypoglycemia, with last hypoglycemic episode estimated approximately 60 days ago. BG target is , and review of CGM data shows he has spent 72% of time at goal, with no hypoglycemic episodes, and highs of approximately 250. Unable to assess polyuria due to effect of furosemide. Pt reports decreased caloric intake, currently consuming 5340-5963 calories daily. Has also increased physical activity, with 12-exercise resistance training circuit twice weekly and increased walking around his living space. Pt reports feeling better overall with these lifestyle modifications. The following portions of the patient's history were reviewed and updated as appropriate: current medications, past medical history and problem list.    Review of Systems   Constitutional: Positive for activity change (increasing physical activity). Negative for appetite change, chills, diaphoresis, fatigue, fever and unexpected weight change. Eyes: Negative for visual disturbance. Respiratory: Negative for shortness of breath. Cardiovascular: Positive for leg swelling (chronic). Negative for chest pain. Gastrointestinal: Positive for diarrhea (intermittent, no acute change) and nausea (intermittent, no acute change). Negative for abdominal pain, constipation and vomiting. Endocrine: Negative for polyphagia. Genitourinary: Positive for frequency (on furosemide). Negative for decreased urine volume, difficulty urinating and dysuria. Musculoskeletal: Positive for arthralgias (increased knee pain with increased physical activity). Objective:      /98 (BP Location: Right arm, Patient Position: Sitting, Cuff Size: Extra-Large) Comment: 5 min after walking  Pulse (!) 125   Temp 98.3 °F (36.8 °C)   Resp 22   Wt (!) 200 kg (441 lb 3.2 oz)   SpO2 93%   BMI 80.70 kg/m²          Physical Exam  Constitutional:       General: He is not in acute distress. Appearance: He is obese. He is ill-appearing (chronic). He is not toxic-appearing or diaphoretic. HENT:      Head: Normocephalic and atraumatic. Cardiovascular:      Rate and Rhythm: Normal rate and regular rhythm. Pulses: Normal pulses. Heart sounds: Normal heart sounds.  No murmur heard. No friction rub. No gallop. Pulmonary:      Effort: Pulmonary effort is normal. No respiratory distress. Breath sounds: Normal breath sounds. No stridor. No wheezing, rhonchi or rales. Chest:      Chest wall: No tenderness. Abdominal:      General: Bowel sounds are normal. There is no distension. Palpations: Abdomen is soft. Tenderness: There is no abdominal tenderness. There is no guarding or rebound. Musculoskeletal:      Right lower leg: Edema present. Left lower leg: Edema present. Skin:     General: Skin is warm and dry. Neurological:      Mental Status: He is alert.    Psychiatric:         Mood and Affect: Mood normal.         Behavior: Behavior normal.

## 2023-09-22 NOTE — ASSESSMENT & PLAN NOTE
Lab Results   Component Value Date    HGBA1C 6.8 (H) 09/14/2023     Pt on initial dose Trulicity 6.60 mg x3 weeks, denies SE; denies hypoglycemia, change in appetite, or increase in diarrhea or nausea. Reports that, since starting Trulicity, he has continued taking glargine 40 U at bedtime, has been taking a reduced dose of aspart decreased to 12 U from 14 U TID w meals; had been advised to reduce aspart to 10 U TID w meals, but had noted frequent hyperglycemia at this dose. Increase Trulicity to 1.5 mg injection weekly  Continue glargine 40 U QHS  Decrease aspart to 10 U TID w meals  Continue metformin 1000 mg BID  Continue to measure BG w Elvin 3 CGM and contact office for hypoglycemia or frequent hyperglycemia. Contact office in 6 weeks with BG trends.   Repeat HgbA1c in 3 months  Return to clinic in 3 months for follow-up

## 2023-10-03 NOTE — PROGRESS NOTES
Patient ID: Jun Ordonez is a 66 y.o. male Date of Birth 1965       Chief Complaint   Patient presents with   • Follow Up Wound Care Visit     Follow up visit for wound to left leg. Pt denies any issues or concerns since last visit. Allergies:  Gluten meal - food allergy and Clindamycin    Diagnosis:      Diagnosis ICD-10-CM Associated Orders   1. Pressure injury of left leg, stage 3 ([facility])  L89.893 Wound cleansing and dressings              Assessment & Plan:  Stage 3 pressure injury L calf: Wound is HEALED! Keep the area covered and protected for about another week   Discussed the importance of long-term edema control with circaids and return to the lymphedema clinic   Counseled on importance of frequent elevation of leg and increase exercise/walking. Encouraged his continued efforts on offloading and sleeping in his bed rather than chair  Moisturize skin daily with skin nourishing cream.   Follow up as needed or call with questions or concerns    Subjective:   "" 2/17/22: This is a 79-year-old male who comes in [address] with ulcers on the right and left calfs. He states that he was hospitalized on November of 2021 with cellulitis and he had the ulcers at that time. Patient is morbidly obese and has history of lymphedema as well. He does not use any form of compression other than Ace wraps. He had purchased alginate on the Internet as well as bordered foam is. He notes that there is heavy drainage requiring dressings to be changed once or twice a day. He believes that these ulcers had started due to  sleeping in a recliner and the foot rest causing pressure on the calfs. He is unable to sleep in a bed. He notes he has increased pain at nighttime when in there is recliner with pressure on his calf. He is ambulatory with a walker but does not go out of the house other than to doctor visits.   Patient also has a history of type 2 diabetes with good control with last A1c 6.5 in October of 2021. He states that he has lost approximately 120 lb in the past nine months with a special meal plan. He plans on continuing with weight loss. 08/11/22: Followup stage 3 pressure injuries of the R and L calves. Has been using Hydrofera blue to bilateral wounds and Unna boots. States that the [email or web address] fell down after the office visit last Thursday. He got a Prevlon boot with integrated wedge to attempt to offload the area, however this was unable to keep his leg up; the smooth bottom of the wedge did not have enough traction to keep his leg in place. No fevers or chills. 8/25/22: Followup stage III pressure injuries of the right left calfs. Hydrofera is being used. Compression wraps continue to slide down in the patient refuses to have them placed again. He would like to use the Circ Aids that he has now. He does have VNA. For the last four nights he has been sleeping in bed on his back with his C-Pap  He has not been able to do this in the past.  In the past he has been sleeping in a chair and supposedly this is when his legs are externally rotated causing the pressure injuries. 8/23/2023: Patient returns to the wound center with new wound of the left lateral calf. Since his last visit a year ago, he states that he is now going to lymphedema clinic. He is getting manual drainage, using his CircAid's and also has had pumps ordered. About 2 weeks ago, the patient states that the therapist noted a wound on the left lateral calf. He states that he was sleeping in a recliner and got another pressure injury as he had in the past.  Therefore, he purchased a new bed that is adjustable. He believes that since being in the bed the last week or 2 that the wound has improved. No fever or chills. Patient is seeing an endocrinologist for his diabetes, going to lymphedema clinic and also going to weight loss clinic. 8/30/2023: Follow-up stage III pressure injury of the left calf.   Using bordered foam, offloading by sleeping in bed and using his CircAid. No other complaints. 9/7/2023: Follow-up stage III pressure injury of the left calf. Continue sleeping in bed and trying to offload is much as possible. No new complaints. Bordered foam being used. 9/21/2023: Wyatt Chandler presents today along with his spouse for follow-up of stage III pressure injury of left calf. He has no new complaints today. Afebrile. They feel wound is doing better. ""    *Above HPI hx/summary obtained per chart review of previous visits to wound center. He primarily sees Dr. Orellana Ax*    10/5/2023: Wyatt Chandler presents to wound center today along with his spouse for follow-up of stage III pressure injury left calf. He has no acute complaints today and feels his wound has been doing well. Continues to sleep in his bed. Denies fever. Denies chills.       The following portions of the patient's history were reviewed and updated as appropriate:   Patient Active Problem List   Diagnosis   • Insulin-dependent diabetes mellitus with neuropathy   • Hyperlipidemia   • Psoriasis   • Venous insufficiency   • Gout   • Essential hypertension   • Hyponatremia   • Gluten intolerance   • Diabetic neuropathy (HCC)   • Insomnia   • Cellulitis of left thigh   • Erectile dysfunction   • Bilateral leg edema   • Elevated CO2 level   • Abnormal thyroid function test   • DAHIANA (obstructive sleep apnea)   • SOB (shortness of breath)   • Fungal infection   • Morbid obesity   • Elevated serum creatinine   • Migraine headache   • Onychomycosis   • Ulcer of left lower extremity, limited to breakdown of skin ([facility])   • Pressure injury of right leg   • Metabolic syndrome   • Low testosterone in male   • Hypercalcemia   • Hypothyroidism   • Pressure injury of left leg   • GERD (gastroesophageal reflux disease)   • Chronic pain   • Type 2 diabetes mellitus with hyperglycemia (HCC)   • Headache   • Current use of insulin ([address])     Past Medical History:   Diagnosis Date   • Acute kidney injury 10/28/2021   • Anemia 09/13/2019   • Cellulitis     last assessed 12/10/15   • Cellulitis of left lower extremity    • Cellulitis of right lower extremity 11/19/2021   • Cough 10/20/2019   • Diabetes mellitus ([address])    • Disease of thyroid gland    • Edema    • Elevated liver enzymes    • Elevated serum creatinine 11/19/2021   • Esophageal reflux    • Fungal infection 8/16/2021   • Gluten intolerance    • Gout     last assessed 09/05/13   • Hyperglycemia    • Hypertension    • Hyponatremia 9/6/2019   • IBS (irritable bowel syndrome)    • Insomnia    • Obesity    • Osteoarthritis of knee     last assessed 02/10/14   • Scrotal swelling 5/19/2020   • Shortness of breath 5/3/2021   • Venous insufficiency     last assessed 08/22/17   • Villonodular synovitis of the hand, right     last assessed 11/14/2013     Past Surgical History:   Procedure Laterality Date   • INCISION AND DRAINAGE OF WOUND Left 9/6/2019    Procedure: INCISION AND DRAINAGE (I&D) GROIN;  Surgeon: Jessi Rollins DO;  Location: [facility];  Service: General   • SKIN BIOPSY     • WOUND DEBRIDEMENT Left 9/7/2019    Procedure: EXCISIONAL DEBRIDEMENT;  Surgeon: Jessi Rollins DO;  Location[facility];  Service: General     Family History   Problem Relation Age of Onset   • Cancer Mother         gastrc   • Colon cancer Father    • Heart failure Father       Social History     Socioeconomic History   • Marital status: /Civil Union     Spouse name: Not on file   • Number of children: Not on file   • Years of education: Not on file   • Highest education level: Not on file   Occupational History   • Not on file   Tobacco Use   • Smoking status: Never   • Smokeless tobacco: Never   Vaping Use   • Vaping Use: Never used   Substance and Sexual Activity   • Alcohol use: Not Currently     Alcohol/week: 0.0 standard drinks of alcohol   • Drug use: Yes     Types: Marijuana     Comment: medical   • Sexual activity: Yes   Other Topics Concern   • Not on file   Social History Narrative   • Not on file     Social Determinants of Health     Financial Resource Strain: Not on file   Food Insecurity: No Food Insecurity (9/8/2022)    Hunger Vital Sign    • Worried About Running Out of Food in the Last Year: Never true    • Ran Out of Food in the Last Year: Never true   Transportation Needs: No Transportation Needs (9/8/2022)    PRAPARE - Transportation    • Lack of Transportation (Medical): No    • Lack of Transportation (Non-Medical):  No   Physical Activity: Not on file   Stress: Not on file   Social Connections: Not on file   Intimate Partner Violence: Not on file   Housing Stability: Unknown (9/8/2022)    Housing Stability Vital Sign    • Unable to Pay for Housing in the Last Year: No    • Number of Places Lived in the Last Year: Not on file    • Unstable Housing in the Last Year: No        Current Outpatient Medications:   •  albuterol (PROVENTIL HFA,VENTOLIN HFA) 90 mcg/act inhaler, TAKE 2 PUFFS BY MOUTH EVERY 6 HOURS AS NEEDED FOR WHEEZE, Disp: 8.5 g, Rfl: 0  •  BD Pen Needle Ludmila 2nd Gen 32G X 4 MM MISC, USE AS DIRECTED 4 TIMES A DAY, Disp: 200 each, Rfl: 3  •  Blood Glucose Monitoring Suppl (ONETOUCH VERIO) w/Device KIT, Use to test sugar daily, Disp: 1 kit, Rfl: 0  •  Continuous Blood Gluc  (FreeStyle KEELY [facility] 2 [place] [employer]) BROOKS, Use 1 Device as needed (use with sensors for bs checks), Disp: 1 Device, Rfl: 0  •  Continuous Blood Gluc Sensor (FreeStyle Elvin 14 Day Sensor) MISC, Scan 4x daily, Disp: 2 each, Rfl: 5  •  Continuous Blood Gluc Sensor (FreeStyle Elvin 3 Sensor) MISC, Replace every 14 days, Disp: 2 each, Rfl: 5  •  dulaglutide (Trulicity) 1.5 EO/0.2IP injection, Inject 0.5 mL (1.5 mg total) under the skin every 7 days, Disp: 2 mL, Rfl: 2  •  ezetimibe (ZETIA) 10 mg tablet, TAKE 1 TABLET BY MOUTH EVERY DAY, Disp: 90 tablet, Rfl: 1  •  furosemide (LASIX) 20 mg tablet, TAKE 2 TABLETS EVERY DAY IN THE EARLY MORNING AND 1 TABLET DAILY AFTER LUNCH., Disp: 270 tablet, Rfl: 2  •  gabapentin (NEURONTIN) 100 mg capsule, Take 1 capsule (100 mg total) by mouth 2 (two) times a day With 300mg capsule for a total of 400mg twice a day, Disp: 180 capsule, Rfl: 1  •  gabapentin (NEURONTIN) 300 mg capsule, TAKE 1 CAPSULE (300 MG TOTAL) BY MOUTH 2 (TWO) TIMES A DAY TAKE 1 TAB WITH YOUR 100MG TAB TWICE DAILY, Disp: 180 capsule, Rfl: 2  •  insulin aspart (NovoLOG FlexPen) 100 UNIT/ML injection pen, INJECT 14-22 UNITS WITH MEALS+SCALE, Disp: 30 mL, Rfl: 3  •  Insulin Glargine Solostar (Lantus SoloStar) 100 UNIT/ML SOPN, INJECT 40 UNITS UNDER THE SKIN DAILY AT BEDTIME, Disp: 15 mL, Rfl: 2  •  Lancets (ONETOUCH ULTRASOFT) lancets, Use to test sugar 2 times a day, Disp: 100 each, Rfl: 3  •  levothyroxine 25 mcg tablet, TAKE 1 TABLET (25 MCG TOTAL) BY MOUTH DAILY IN THE EARLY MORNING, Disp: 90 tablet, Rfl: 1  •  losartan (COZAAR) 25 mg tablet, TAKE 1 TABLET (25 MG TOTAL) BY MOUTH DAILY. , Disp: 90 tablet, Rfl: 1  •  metFORMIN (GLUCOPHAGE) 1000 MG tablet, TAKE 1 TABLET BY MOUTH TWICE A DAY, Disp: 180 tablet, Rfl: 1  •  nystatin (MYCOSTATIN) powder, APPLY 1 APPLICATION TOPICALLY 2 (TWO) TIMES A DAY TO AFFECTED AREA, Disp: 120 g, Rfl: 3  •  omeprazole (PriLOSEC) 40 MG capsule, TAKE 1 CAPSULE BY MOUTH TWICE A DAY, Disp: 180 capsule, Rfl: 1  •  OneTouch Verio test strip, USE TO TEST SUGAR 2 TIMES A DAY, Disp: 100 strip, Rfl: 3  •  psyllium (METAMUCIL) packet, Take 1 packet by mouth daily, Disp: , Rfl: 0  •  saccharomyces boulardii (FLORASTOR) 250 mg capsule, Take 1 capsule (250 mg total) by mouth 2 (two) times a day, Disp: 60 capsule, Rfl: 1    Review of Systems   Constitutional: Negative for chills, fatigue and fever. Respiratory: Negative for cough. Cardiovascular: Positive for leg swelling (Lymphedema). Endocrine: Negative. Musculoskeletal: Positive for gait problem Velanastasia ). Negative for back pain. Skin: Positive for wound (LLE calf).  Negative for rash.   Allergic/Immunologic: Negative. Hematological: Does not bruise/bleed easily. Psychiatric/Behavioral: Negative. Negative for dysphoric mood. The patient is not nervous/anxious. Objective: There were no vitals taken for this visit. Physical Exam  Vitals and nursing note reviewed. Constitutional:       General: He is not in acute distress. Appearance: He is morbidly obese. He is not ill-appearing. Comments: Pt is pleasant, NAD wife is present at bedside   HENT:      Head: Normocephalic and atraumatic. Cardiovascular:      Comments: Bilateral lymphedema/lipedema improved from previous visits, right leg not visualized as CircAid in place  Pulmonary:      Effort: Pulmonary effort is normal. No respiratory distress. Musculoskeletal:      Left lower le+ Edema present. Skin:     General: Skin is warm and dry. Findings: No erythema or wound. Comments: Stage II pressure injury left lateral calf is now healed with newly epithelialized skin. No erythema or lymphangitic streaking, no malodor. Neurological:      Mental Status: He is alert and oriented to person, place, and time. Gait: Gait abnormal.   Psychiatric:         Attention and Perception: Attention normal.         Mood and Affect: Mood and affect normal.         Behavior: Behavior normal. Behavior is cooperative.          Cognition and Memory: Cognition normal.           Wound 23 Pressure Injury Leg Left;Lateral (Active)   Wound Image   10/05/23 0919   Wound Description Epithelialization 10/05/23 0922   Pressure Injury Stage 3 23   Chelsie-wound Assessment Dry;Scaly 10/05/23 0922   Wound Length (cm) 0 cm 10/05/23 0922   Wound Width (cm) 0 cm 10/05/23 0922   Wound Depth (cm) 0 cm 10/05/23 0922   Wound Surface Area (cm^2) 0 cm^2 10/05/23 0922   Wound Volume (cm^3) 0 cm^3 10/05/23 0922   Calculated Wound Volume (cm^3) 0 cm^3 10/05/23 0922   Change in Wound Size % 100 10/05/23 0922 Drainage Amount None 10/05/23 9112   Drainage Description Serous; Tan 09/21/23 0924   Non-staged Wound Description Full thickness 09/07/23 1500   Treatments Irrigation with NSS 09/21/23 0924   Patient Tolerance Tolerated well 10/05/23 0922   Dressing Status Removed 10/05/23 3787             Lab Results   Component Value Date    HGBA1C 6.8 (H) 09/14/2023       Wound Instructions:  Orders Placed This Encounter   Procedures   • Wound cleansing and dressings     Your wound is healed today!!!    Please keep your legs moisturized and wear your circ-aids every day. You can leave the bordered foam dressing on for a few days. This is for protection and is not necessary to maintain long term. You can resume your appointments with Lymphedema Clinic. If you have additional wound care needs please contact the [facility]. Standing Status:   Future     Standing Expiration Date:   10/5/2024     Jese Grace MD    Portions of the record may have been created with voice recognition software. Occasional wrong word or "sound alike" substitutions may have occurred due to the inherent limitations of voice recognition software. Read the chart carefully and recognize, using context, where substitutions have occurred.

## 2023-10-05 ENCOUNTER — OFFICE VISIT (OUTPATIENT)
Dept: WOUND CARE | Facility: CLINIC | Age: 58
End: 2023-10-05
Payer: MEDICARE

## 2023-10-05 DIAGNOSIS — L89.893 PRESSURE INJURY OF LEFT LEG, STAGE 3 (HCC): Primary | ICD-10-CM

## 2023-10-05 PROCEDURE — 99212 OFFICE O/P EST SF 10 MIN: CPT | Performed by: FAMILY MEDICINE

## 2023-10-05 NOTE — PATIENT INSTRUCTIONS
Orders Placed This Encounter   Procedures    Wound cleansing and dressings     Your wound is healed today!!!    Please keep your legs moisturized and wear your circ-aids every day. You can leave the bordered foam dressing on for a few days. This is for protection and is not necessary to maintain long term. You can resume your appointments with Lymphedema Clinic. If you have additional wound care needs please contact the [facility].      Standing Status:   Future     Standing Expiration Date:   10/5/2024

## 2023-10-10 ENCOUNTER — TELEPHONE (OUTPATIENT)
Dept: OTHER | Facility: HOSPITAL | Age: 58
End: 2023-10-10

## 2023-10-10 DIAGNOSIS — Z79.4 TYPE 2 DIABETES MELLITUS WITH HYPERGLYCEMIA, WITH LONG-TERM CURRENT USE OF INSULIN (HCC): ICD-10-CM

## 2023-10-10 DIAGNOSIS — E11.65 TYPE 2 DIABETES MELLITUS WITH HYPERGLYCEMIA, WITH LONG-TERM CURRENT USE OF INSULIN (HCC): ICD-10-CM

## 2023-10-14 DIAGNOSIS — Z79.4 TYPE 2 DIABETES MELLITUS WITH HYPERGLYCEMIA, WITH LONG-TERM CURRENT USE OF INSULIN (HCC): ICD-10-CM

## 2023-10-14 DIAGNOSIS — I10 HYPERTENSION GOAL BP (BLOOD PRESSURE) < 140/90: ICD-10-CM

## 2023-10-14 DIAGNOSIS — E11.65 TYPE 2 DIABETES MELLITUS WITH HYPERGLYCEMIA, WITH LONG-TERM CURRENT USE OF INSULIN (HCC): ICD-10-CM

## 2023-10-14 DIAGNOSIS — R79.89 ELEVATED TSH: ICD-10-CM

## 2023-10-14 DIAGNOSIS — E78.00 PURE HYPERCHOLESTEROLEMIA: ICD-10-CM

## 2023-10-14 DIAGNOSIS — E66.01 OBESITY, MORBID, BMI 50 OR HIGHER (HCC): ICD-10-CM

## 2023-10-16 RX ORDER — PEN NEEDLE, DIABETIC 32GX 5/32"
NEEDLE, DISPOSABLE MISCELLANEOUS 4 TIMES DAILY
Qty: 200 EACH | Refills: 0 | Status: SHIPPED | OUTPATIENT
Start: 2023-10-16

## 2023-10-17 ENCOUNTER — EVALUATION (OUTPATIENT)
Dept: PHYSICAL THERAPY | Age: 58
End: 2023-10-17
Payer: MEDICARE

## 2023-10-17 DIAGNOSIS — I89.0 LYMPHEDEMA: Primary | ICD-10-CM

## 2023-10-17 PROCEDURE — 97163 PT EVAL HIGH COMPLEX 45 MIN: CPT

## 2023-10-17 PROCEDURE — 97110 THERAPEUTIC EXERCISES: CPT

## 2023-10-17 NOTE — LETTER
2023    Gustavo Gautam, [address] [place] [address]    Patient: Estuardo Edgar   YOB: 1965   Date of Visit: 10/17/2023     Encounter Diagnosis     ICD-10-CM    1. Lymphedema  I89.0           Dear Dr. Delfin Pham:    Thank you for your recent referral of Estuardo Edgar. Please review the attached evaluation summary from Ben's recent visit. Please verify that you agree with the plan of care by signing the attached order. If you have any questions or concerns, please do not hesitate to call. I sincerely appreciate the opportunity to share in the care of one of your patients and hope to have another opportunity to work with you in the near future. Sincerely,    Hellen Burdick, PT      Referring Provider:      I certify that I have read the below Plan of Care and certify the need for these services furnished under this plan of treatment while under my care. Darshan Borja MD  [address] [place] [address]  [facility] [place]          PT Evaluation     Today's date: 10/17/2023  Patient name: Estuardo Edgar  : 1965  MRN: [id number]  Referring provider: Sunday Turner*  Dx:   Encounter Diagnosis     ICD-10-CM    1.  Lymphedema  I89.0         10/17/23 significant fibrosis left distal femur lobule, + 4 lymphedema left greater than right le would left lateral calf now healed Left le Right le reev left  Right     mtp 26cm 27cm      Lat malleolus 34 31      4cm prox to lat mal 34.5 30.5      8 39 33.5      12 42.5 37      16 45 37.5      20 49 48      24 50 55      28 53 58      32 90 62      36 95 80      40 98 82      44 101 83      48 100cm 84cm      52        56                Nail fungus present, excessive skin build up all toes,          Lymphedema life impact scale 57/90               Assessment  Assessment details: Estuardo Edgar is a 66y.o. year old male  referred to outpt Physical therapy with diagnosis of + 5years bilateral le lymphedema stage 4 with significant fibrosis and Pitting and non pitting edema ( elephantiasis )with  exacerbation of onset of symptoms noted on date s/p cellulitis 9/24/22 onset of left le cellulitis requiring surgery . Demetria Martin presents with decreased ankle and knee  range of motion,increased  le pain, increased le girth , + tissue fibrosis and decreased tolerance to functional activity. PT is warranted to address these deficits in efforts to reduce girth, maximize function, and return to prior level of activity. Treatment shall include complex decongestive physical therapy, manual lymphatic drainage massage and soft tissue mobilization, there exer to increase lymphatic circulation, home exer programming, lymphedema education and skin care management, compression wrapping versus juxta fit compression wraps  , rom exer, trial home compression pump usage with  left  or bilateral le elevation 30- 40 MMHG, fitting for compression garments for upper  thigh high (  4 refills )20-30 MMHG  Versus custom elvarex. ( Pt currently has ciraides for below the knees bilaterally which he is wearing with a . ) The pt is a candidate for a home compression pump pending possible custom fitting due to girth size with PT to contact [facility] to discuss case. Pt presents with venous insufficiency and chronic lymphedema , hyperpigmentation and hx of multiple open wounds. He was instruction in le lymphedema exer to promote lymphatic circulation at this time and is encouraged to elevate his le's where possible.    Impairments: abnormal gait, abnormal or restricted ROM, activity intolerance, impaired balance, impaired physical strength, lacks appropriate home exercise program, poor posture  and poor body mechanics  Other impairment: peripheral diabetic neuropathy, +4 elephantiasus left le bilateral le lymphedema and venous insufficiency  Functional limitations: decreased bed mobility, decreased ambulatory status uses rollator, SOB with activity and prolonged ambulation  Symptom irritability: highBarriers to therapy: Attendance 1 x week will require spouse to complete programming for elephantiasis ( such condition would normally be treated daily 5 x a week to achieve successful outcome. Decreased skin care excessive skin build up on toes family encouraged to soak le in buckets and slough off dead material post 15 min   Understanding of Dx/Px/POC: good   Prognosis: fair    Goals  STG 1. Independent in there exer program in two weeks           2. Reduction of girth by 2 cm in two weeks      LTG 1 Reduction of girth by 4 cm in 4 weeks. 2 The pt shall be independent in donning and doffing compression garments. 3. Fit for compression pump both le's consider sleeping bag pending fit of compression pump sleeve availabilty in 4 weeks   4 fit with juxta fit compression wraps for upper thigh with lobule component in 4- 6 weeks ( may require mediven rep )  5 spouse to be trained in mld massage and soft tissue mobilization as pt attending only 1 x  week . Plan  Plan details: Spouse to consider use of rolling pin to break up tissue fibrosis , pt also using vibrator per his report.    Patient would benefit from: lymphedema eval and PT eval  Referral necessary: Yes  Other planned modality interventions: prn  Planned therapy interventions: kinesiology taping, muscle pump exercises, patient education, neuromuscular re-education, compression, strengthening, stretching, therapeutic activities and therapeutic exercise  Other planned therapy interventions: pt encouraged to reduce time in supine position, periodic times in prone and right and left sidelying ,  Frequency: 1x week  Duration in weeks: 8  Plan of Care beginning date: 10/17/2023  Plan of Care expiration date: 12/15/2023  Treatment plan discussed with: family and patient    Subjective Evaluation    History of Present Illness  Onset date: + 5 year hx le lymphedema worsened s/p cellulitis left le requiring surgery and wound debridement. Mechanism of injury: Armando Aguayo is a 66year old male referred now to PT Lymphedema services with report of healed left lateral calf wound with secondary stage 4 elephantisus lymphedema left greater than right le involvement with +4 pitting edema , hyperpigmentation, and h/o multiple wounds and infections to both le's. He had briefly seen the OT at this facility for lymphedema care reporting ever since covid 2020 having gained 100lb from quarantine and recent cellulitis dx worsening his left le lymphedema with a significant lobule with fibrosis now present and therapy stopped due to pt requiring wound care. He reports his knee lobules are impacting his quality of life, functional mobility and cause him pain. He is involvement with [employer] weight management and is steadily now losing wt per his report. He has never owned a compression pump. Ot's attempt to compression wrap his upper thighs was unsuccessful per his report the compression bandages falling down within hours of treatment. He states he would like treatment 1 x weeks to address his lymphedema. 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     Treatments  Previous treatment: occupational therapy  Current treatment: physical therapy    Objective     Active Range of Motion   Left Hip   Flexion: 95 degrees   Abduction: WFL  Adduction: WFL    Right Hip   Flexion: 95 degrees   Abduction: WFL  Adduction: WFL  Left Knee   Flexion: 95 degrees   Extension: WFL    Right Knee   Flexion: 100 degrees   Extension: WFL  Left Ankle/Foot   Dorsiflexion (ke): 10 degrees   Plantar flexion: WFL    Right Ankle/Foot   Dorsiflexion (ke): 15 degrees   Plantar flexion: [facility]    Additional Active Range of Motion Details  Limited hip int and ext rot due to leg girth and hip restrictions  Significant adipose tissue bilaterally left lobule greater than right distal femur  20 degrees goal dorsiflexion     Strength/Myotome Testing     Left Hip   Planes of Motion   Flexion: 4-  Abduction: 4-  Adduction: 2+    Right Hip   Planes of Motion   Flexion: 4-  Abduction: 4-  Adduction: 2+    Left Knee   Flexion: 4-  Extension: 4+    Right Knee   Flexion: 4-  Extension: 4+    Left Ankle/Foot   Dorsiflexion: 4+  Plantar flexion: 3+    Right Ankle/Foot   Dorsiflexion: 4+  Plantar flexion: 3+    Additional Strength Details  Assess in prone hip ext mmt in future   Assess in prone mmt in near future    Ambulation     Ambulation: Level Surfaces   Ambulation with assistive device: independent    Additional Level Surfaces Ambulation Details  Ambulates with rolling walker 75 ft sob noted,     bed mobility min assist with le's due to le girth    PMH: GERD, insulin dependent DM with hyperglycemia, hypothyroidism, DAHIANA, venous insufficiency, essential HTN, migraine headache, psoriasis, onychomycosis, ulcer left le limited skin breakdown of skin now healed, gout, hyponatremia, insomnia, gluten intolerance, SOB , metabolic syndrome, hypercalcemia, chronic pain, cellulitis right le 11/19/21,  9/6/19 cellulits left le, IBS, OA of knee, + 5years lymphedema both le's left greater than right involvement Precautions: Allergies  Reviewed by Yony Carlson at 11:56 PM   Severity Reactions Comments   Gluten Meal - Food Allergy Not Specified     Clindamycin Low Diarrhea          Manuals 10/17/23             I eval            Mld massage and soft tissue mobilization             Custom fit with med[employer] rep for juxtafit compression wraps upper thighs and knee left le possible right le              Pt to bring in compression wraps previously issued by OT lymphedema therapist MELITON FLORES            Neuro Re-Ed             Le leg exer lymphedema exer packet 15 min  10 reps each            Nu step              Prone lying knee flexion              Prone hip extension             Sidelying hip abd                                       Ther Ex                                                                                                                     Ther Activity                                       Gait Training                                       Modalities             Trial left le compression pump 30-40 mmHg with left le elevation  May require custom fit

## 2023-10-18 NOTE — PROGRESS NOTES
PT Evaluation     Today's date: 10/17/2023  Patient name: Zora Uriarte  : 1965  MRN: [id number]  Referring provider: Gustavo Garcia [facility]*  Dx:   Encounter Diagnosis     ICD-10-CM    1. Lymphedema  I89.0         10/17/23 significant fibrosis left distal femur lobule, + 4 lymphedema left greater than right le would left lateral calf now healed Left le Right le reev left  Right     mtp 26cm 27cm      Lat malleolus 34 31      4cm prox to lat mal 34.5 30.5      8 39 33.5      12 42.5 37      16 45 37.5      20 49 48      24 50 55      28 53 58      32 90 62      36 95 80      40 98 82      44 101 83      48 100cm 84cm      52        56                Nail fungus present, excessive skin build up all toes,          Lymphedema life impact scale 57/90               Assessment  Assessment details: Zora Uriarte is a 66y.o. year old male  referred to outpt Physical therapy with diagnosis of + 5years bilateral le lymphedema stage 4 with significant fibrosis and Pitting and non pitting edema ( elephantiasis )with  exacerbation of onset of symptoms noted on date s/p cellulitis 22 onset of left le cellulitis requiring surgery . Zora Uriarte presents with decreased ankle and knee  range of motion,increased  le pain, increased le girth , + tissue fibrosis and decreased tolerance to functional activity. PT is warranted to address these deficits in efforts to reduce girth, maximize function, and return to prior level of activity.  Treatment shall include complex decongestive physical therapy, manual lymphatic drainage massage and soft tissue mobilization, there exer to increase lymphatic circulation, home exer programming, lymphedema education and skin care management, compression wrapping versus juxta fit compression wraps  , rom exer, trial home compression pump usage with  left  or bilateral le elevation 30- 40 MMHG, fitting for compression garments for upper  thigh high (  4 refills )20-30 MMHG  Versus custom elvarex. ( Pt currently has ciraides for below the knees bilaterally which he is wearing with a . ) The pt is a candidate for a home compression pump pending possible custom fitting due to girth size with PT to contact [facility] to discuss case. Pt presents with venous insufficiency and chronic lymphedema , hyperpigmentation and hx of multiple open wounds. He was instruction in le lymphedema exer to promote lymphatic circulation at this time and is encouraged to elevate his le's where possible. Impairments: abnormal gait, abnormal or restricted ROM, activity intolerance, impaired balance, impaired physical strength, lacks appropriate home exercise program, poor posture  and poor body mechanics  Other impairment: peripheral diabetic neuropathy, +4 elephantiasus left le bilateral le lymphedema and venous insufficiency  Functional limitations: decreased bed mobility, decreased ambulatory status uses rollator, SOB with activity and prolonged ambulation  Symptom irritability: highBarriers to therapy: Attendance 1 x week will require spouse to complete programming for elephantiasis ( such condition would normally be treated daily 5 x a week to achieve successful outcome. Decreased skin care excessive skin build up on toes family encouraged to soak le in buckets and slough off dead material post 15 min   Understanding of Dx/Px/POC: good   Prognosis: fair    Goals  STG 1. Independent in there exer program in two weeks           2. Reduction of girth by 2 cm in two weeks      LTG 1 Reduction of girth by 4 cm in 4 weeks. 2 The pt shall be independent in donning and doffing compression garments.   3. Fit for compression pump both le's consider sleeping bag pending fit of compression pump sleeve availabilty in 4 weeks   4 fit with juxta fit compression wraps for upper thigh with lobule component in 4- 6 weeks ( may require mediven rep )  5 spouse to be trained in mld massage and soft tissue mobilization as pt attending only 1 x  week . Plan  Plan details: Spouse to consider use of rolling pin to break up tissue fibrosis , pt also using vibrator per his report. Patient would benefit from: lymphedema eval and PT eval  Referral necessary: Yes  Other planned modality interventions: prn  Planned therapy interventions: kinesiology taping, muscle pump exercises, patient education, neuromuscular re-education, compression, strengthening, stretching, therapeutic activities and therapeutic exercise  Other planned therapy interventions: pt encouraged to reduce time in supine position, periodic times in prone and right and left sidelying ,  Frequency: 1x week  Duration in weeks: 8  Plan of Care beginning date: 10/17/2023  Plan of Care expiration date: 12/15/2023  Treatment plan discussed with: family and patient    Subjective Evaluation    History of Present Illness  Onset date: + 5 year hx le lymphedema worsened s/p cellulitis left le requiring surgery and wound debridement. Mechanism of injury: Jason Ramírez is a 66year old male referred now to PT Lymphedema services with report of healed left lateral calf wound with secondary stage 4 elephantisus lymphedema left greater than right le involvement with +4 pitting edema , hyperpigmentation, and h/o multiple wounds and infections to both le's. He had briefly seen the OT at this facility for lymphedema care reporting ever since covid 2020 having gained 100lb from quarantine and recent cellulitis dx worsening his left le lymphedema with a significant lobule with fibrosis now present and therapy stopped due to pt requiring wound care. He reports his knee lobules are impacting his quality of life, functional mobility and cause him pain. He is involvement with [employer] weight management and is steadily now losing wt per his report. He has never owned a compression pump.  Ot's attempt to compression wrap his upper thighs was unsuccessful per his report the compression bandages falling down within hours of treatment. He states he would like treatment 1 x weeks to address his lymphedema. 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     Treatments  Previous treatment: occupational therapy  Current treatment: physical therapy    Objective     Active Range of Motion   Left Hip   Flexion: 95 degrees   Abduction: WFL  Adduction: WFL    Right Hip   Flexion: 95 degrees   Abduction: WFL  Adduction: WFL  Left Knee   Flexion: 95 degrees   Extension: WFL    Right Knee   Flexion: 100 degrees   Extension: WFL  Left Ankle/Foot   Dorsiflexion (ke): 10 degrees   Plantar flexion: WFL    Right Ankle/Foot   Dorsiflexion (ke): 15 degrees   Plantar flexion: [facility]    Additional Active Range of Motion Details  Limited hip int and ext rot due to leg girth and hip restrictions  Significant adipose tissue bilaterally left lobule greater than right distal femur  20 degrees goal dorsiflexion     Strength/Myotome Testing     Left Hip   Planes of Motion   Flexion: 4-  Abduction: 4-  Adduction: 2+    Right Hip   Planes of Motion   Flexion: 4-  Abduction: 4-  Adduction: 2+    Left Knee   Flexion: 4-  Extension: 4+    Right Knee   Flexion: 4-  Extension: 4+    Left Ankle/Foot   Dorsiflexion: 4+  Plantar flexion: 3+    Right Ankle/Foot Dorsiflexion: 4+  Plantar flexion: 3+    Additional Strength Details  Assess in prone hip ext mmt in future   Assess in prone mmt in near future    Ambulation     Ambulation: Level Surfaces   Ambulation with assistive device: independent    Additional Level Surfaces Ambulation Details  Ambulates with rolling walker 75 ft sob noted,     bed mobility min assist with le's due to le girth    PMH: GERD, insulin dependent DM with hyperglycemia, hypothyroidism, DAHIANA, venous insufficiency, essential HTN, migraine headache, psoriasis, onychomycosis, ulcer left le limited skin breakdown of skin now healed, gout, hyponatremia, insomnia, gluten intolerance, SOB , metabolic syndrome, hypercalcemia, chronic pain, cellulitis right le 11/19/21,  9/6/19 cellulits left le, IBS, OA of knee, + 5years lymphedema both le's left greater than right involvement       Precautions: Allergies  Reviewed by Sun Lam at 11:56 PM   Severity Reactions Comments   Gluten Meal - Food Allergy Not Specified     Clindamycin Low Diarrhea          Manuals 10/17/23             I eval            Mld massage and soft tissue mobilization             [facility] juxtafit compression wraps upper thighs and knee left le possible right le              Pt to bring in compression wraps previously issued by OT lymphedema therapist MELITON FLORES            Neuro Re-Ed             Le leg exer lymphedema exer packet 15 min  10 reps each            Nu step              Prone lying knee flexion              Prone hip extension             Sidelying hip abd                                       Ther Ex                                                                                                                     Ther Activity                                       Gait Training                                       Modalities             Trial left le compression pump 30-40 mmHg with left le elevation  May require custom fit

## 2023-10-20 ENCOUNTER — TELEPHONE (OUTPATIENT)
Dept: PULMONOLOGY | Facility: CLINIC | Age: 58
End: 2023-10-20

## 2023-10-20 NOTE — TELEPHONE ENCOUNTER
Can you do me a favor-patient had a overnight pulse ox in January looks like he needs some oxygen with his CPAP continue follow-up with this and see if anyone ever provided him with oxygen
Pt LVM returning Stephanie's call. He stated he is Not using oxygen at night. Please advise.
Thank you, scheduled him with Dr. Williams Schmidt on 11/09/23 @ [facility]
Statement Selected

## 2023-10-20 NOTE — TELEPHONE ENCOUNTER
I called and left Gurpreet Ball a messge to please call our office and let us know if he is using oxygen at night with his Cpap, if he is using a Cpap, and to make him an appointment for a follow up for evaluation on his oxygen. Please assist in scheduling or transfer his call to me. Thank you.

## 2023-10-23 DIAGNOSIS — E11.65 TYPE 2 DIABETES MELLITUS WITH HYPERGLYCEMIA, WITH LONG-TERM CURRENT USE OF INSULIN (HCC): Primary | ICD-10-CM

## 2023-10-23 DIAGNOSIS — Z79.4 TYPE 2 DIABETES MELLITUS WITH HYPERGLYCEMIA, WITH LONG-TERM CURRENT USE OF INSULIN (HCC): Primary | ICD-10-CM

## 2023-10-23 DIAGNOSIS — Z79.4 TYPE 2 DIABETES MELLITUS WITH HYPERGLYCEMIA, WITH LONG-TERM CURRENT USE OF INSULIN (HCC): ICD-10-CM

## 2023-10-23 DIAGNOSIS — E11.65 TYPE 2 DIABETES MELLITUS WITH HYPERGLYCEMIA, WITH LONG-TERM CURRENT USE OF INSULIN (HCC): ICD-10-CM

## 2023-10-25 ENCOUNTER — TELEPHONE (OUTPATIENT)
Dept: INTERNAL MEDICINE CLINIC | Facility: CLINIC | Age: 58
End: 2023-10-25

## 2023-10-25 RX ORDER — DULAGLUTIDE 3 MG/.5ML
INJECTION, SOLUTION SUBCUTANEOUS
Qty: 4 ML | Refills: 5 | Status: SHIPPED | OUTPATIENT
Start: 2023-10-25

## 2023-10-25 NOTE — LETTER
To whom it may concern:      Patient Jason Ramírez  1965 is currently under my care. Patient has multiple co-morbidities and would benefit from dental treatment as indicated. Please reach out to my office if any further information is needed.       Vladimir Avina MD

## 2023-10-25 NOTE — TELEPHONE ENCOUNTER
Patient is calling to let us know that he will need a letter stating that he has diabetes and health conditions that exist and it would be beneficial for him to have a root canal completed. Patient going to [address] [place] [address] letter to be faxed to [phone].  Call to dental office if needed [phone]

## 2023-10-31 ENCOUNTER — APPOINTMENT (OUTPATIENT)
Dept: PHYSICAL THERAPY | Age: 58
End: 2023-10-31
Payer: MEDICARE

## 2023-11-02 ENCOUNTER — TELEPHONE (OUTPATIENT)
Dept: INTERNAL MEDICINE CLINIC | Facility: CLINIC | Age: 58
End: 2023-11-02

## 2023-11-02 DIAGNOSIS — G47.00 INSOMNIA, UNSPECIFIED TYPE: Primary | ICD-10-CM

## 2023-11-02 RX ORDER — TRAZODONE HYDROCHLORIDE 50 MG/1
50 TABLET ORAL
Qty: 90 TABLET | Refills: 0 | Status: SHIPPED | OUTPATIENT
Start: 2023-11-02

## 2023-11-02 NOTE — TELEPHONE ENCOUNTER
----- Message from Mimi Eisenberg sent at 11/2/2023 12:17 AM EDT -----  Regarding: Prescription Renewal   Contact: [phone]  Hi Dr. Dominik Mcknight,   I was wondering if it would be OK to get a refill on the trazodone 50 mg prescription I have from a while back? I am very occasionally use it to help me with my sleep. Even though the directions say 1.5 pills, (75 mg), I usually only take one, and it helps me a lot. I’m down to only two pills left so it would be really great if I could get a refill. As you know, I was diagnosed with severe sleep apnea, and the CPAP machine is working great but every now and then I do have trouble with sleep. As you can tell, I don’t take these pills very often as Ren Arzate only just come close to finishing the bottle which was originally prescribed 4/24/2021, but they do help me.      Thanks in advance,   Destini Sawant

## 2023-11-08 RX ORDER — ACETAMINOPHEN 325 MG/1
325 TABLET ORAL 3 TIMES DAILY PRN
COMMUNITY
Start: 2023-09-27

## 2023-11-08 RX ORDER — IBUPROFEN 600 MG/1
600 TABLET ORAL 3 TIMES DAILY PRN
COMMUNITY
Start: 2023-09-27

## 2023-11-08 RX ORDER — AMOXICILLIN 500 MG/1
500 CAPSULE ORAL 3 TIMES DAILY
COMMUNITY
Start: 2023-09-27

## 2023-11-09 ENCOUNTER — OFFICE VISIT (OUTPATIENT)
Dept: PULMONOLOGY | Facility: CLINIC | Age: 58
End: 2023-11-09
Payer: MEDICARE

## 2023-11-09 VITALS
HEART RATE: 88 BPM | DIASTOLIC BLOOD PRESSURE: 90 MMHG | HEIGHT: 64 IN | RESPIRATION RATE: 18 BRPM | TEMPERATURE: 98.5 F | OXYGEN SATURATION: 99 % | SYSTOLIC BLOOD PRESSURE: 150 MMHG | WEIGHT: 315 LBS | BODY MASS INDEX: 53.78 KG/M2

## 2023-11-09 DIAGNOSIS — E66.01 MORBID OBESITY (HCC): ICD-10-CM

## 2023-11-09 DIAGNOSIS — G47.33 OSA (OBSTRUCTIVE SLEEP APNEA): Primary | ICD-10-CM

## 2023-11-09 DIAGNOSIS — E66.2 OBESITY HYPOVENTILATION SYNDROME (HCC): Chronic | ICD-10-CM

## 2023-11-09 LAB

## 2023-11-09 PROCEDURE — 99214 OFFICE O/P EST MOD 30 MIN: CPT | Performed by: INTERNAL MEDICINE

## 2023-11-09 NOTE — PROGRESS NOTES
Progress Note - Sleep Medicine  Hunter Felipe 66 y.o. male MRN: [id number]       Impression & Plan:     1. DAHIANA (obstructive sleep apnea)  Assessment & Plan:  Excellent compliance  Improving Peak CPAP pressures  Nocturnal pulse ox from January reviewed. 15 minutes with SPO2 <89%. Seems related to REM  With weight loss will repeat overnight pulse ox to assess for resolution of events  If not resolved will need to consider repeating in-lab study to consider transition to BPAP or oxygen bleed in with CPAP vs increase CPAP pressures. Orders:  -     Pulse oximetry overnight  -     PAP DME Resupply/Reorder    2. Obesity hypoventilation syndrome (HCC)  Assessment & Plan:  Elevated serum CO2  Improving with treatment  To continue CPAP  Considering overnight O2 with above studies/plan       3. Morbid obesity  Assessment & Plan:  Reported weight loss, per review of recorded weights, lost 4 lbs since Decemeber 1789  Now on Trulicity  Continued attempts at weight loss and conservative management  Consideration for bariatric involvement if significant weight loss is not achieved          ______________________________________________________________________    HPI:    Hunter Felipe presents today for follow-up of severe DAHIANA/OHS. He has a PMHx of morbid obesity, chronic LE lymphedema, GERD, HTN, HLD, DM2 and severe DAHIANA/OHS (.6). He states he is doing well. Minimal complaints. States he has been attempting weight loss with achievement of 4 lbs weight loss since last visit. Reports less shortness of breath with activity and with rest.         Review of Systems:  Review of Systems   Constitutional:  Negative for chills and fever. HENT:  Negative for ear pain and sore throat. Eyes:  Negative for pain and visual disturbance. Respiratory:  Negative for cough and shortness of breath. Cardiovascular:  Negative for chest pain and palpitations. Gastrointestinal:  Negative for abdominal pain and vomiting. Genitourinary:  Negative for dysuria and hematuria. Musculoskeletal:  Negative for arthralgias and back pain. Skin:  Negative for color change and rash. Neurological:  Negative for seizures and syncope. All other systems reviewed and are negative. Social history updates:  Social History     Tobacco Use   Smoking Status Never   Smokeless Tobacco Never     Social History     Socioeconomic History   • Marital status: /Civil Union     Spouse name: Not on file   • Number of children: Not on file   • Years of education: Not on file   • Highest education level: Not on file   Occupational History   • Not on file   Tobacco Use   • Smoking status: Never   • Smokeless tobacco: Never   Vaping Use   • Vaping Use: Never used   Substance and Sexual Activity   • Alcohol use: Not Currently     Alcohol/week: 0.0 standard drinks of alcohol   • Drug use: Yes     Types: Marijuana     Comment: medical   • Sexual activity: Yes   Other Topics Concern   • Not on file   Social History Narrative   • Not on file     Social Determinants of Health     Financial Resource Strain: Not on file   Food Insecurity: No Food Insecurity (9/8/2022)    Hunger Vital Sign    • Worried About Running Out of Food in the Last Year: Never true    • Ran Out of Food in the Last Year: Never true   Transportation Needs: No Transportation Needs (9/8/2022)    PRAPARE - Transportation    • Lack of Transportation (Medical): No    • Lack of Transportation (Non-Medical):  No   Physical Activity: Not on file   Stress: Not on file   Social Connections: Not on file   Intimate Partner Violence: Not on file   Housing Stability: Unknown (9/8/2022)    Housing Stability Vital Sign    • Unable to Pay for Housing in the Last Year: No    • Number of Places Lived in the Last Year: Not on file    • Unstable Housing in the Last Year: No       PhysicalExamination:  Vitals:   /90 (BP Location: Left arm, Patient Position: Sitting, Cuff Size: Large)   Pulse 88 Temp 98.5 °F (36.9 °C)   Resp 18   Ht 5' 4" (1.626 m)   Wt (!) 196 kg (432 lb)   SpO2 99%   BMI 74.15 kg/m²     General: Pleasabt, Awake alert and oriented x 3, conversant without conversational dyspnea, NAD, normal affect  HEENT:  PERRL, Sclera noninjected, nonicteric OU, Nares patent,  no craniofacial abnormalities, Mucous membranes, moist, no oral lesions, normal dentition, Mallampati class 4  NECK: Trachea midline, no accessory muscle use, no stridor, no cervical or supraclavicular adenopathy, JVP not elevated  CARDIAC: Reg, single s1/S2, no m/r/g  PULM: CTA bilaterally no wheezing, rhonchi or rales, decreased breath sounds  ABD: Normoactive bowel sounds, soft nontender, nondistended, no rebound, no rigidity, no guarding  EXT: No cyanosis, no clubbing, no edema, normal capillary refill  NEURO: no focal neurologic deficits, AAOx3, moving all extremities appropriately    Diagnostic Data:  Labs:   I personally reviewed the most recent laboratory data pertinent to today's visit  Lab Results   Component Value Date     (L) 12/03/2015     02/06/2014     10/10/2013    K 4.3 09/14/2023    K 4.1 09/27/2022    K 3.9 09/26/2022    K 4.0 12/03/2015    K 4.0 02/06/2014    K 3.9 10/10/2013    CO2 28 09/14/2023    CO2 27 01/12/2023    CO2 28 01/12/2023    CO2 31 09/27/2022    CO2 30 09/26/2022    CO2 19 (L) 09/07/2019    BUN 20 09/14/2023    BUN 20 09/27/2022    BUN 20 09/26/2022    BUN 14 12/03/2015    BUN 20 02/06/2014    BUN 23 10/10/2013    CREATININE 1.14 09/14/2023    CREATININE 0.93 09/27/2022    CREATININE 1.09 09/26/2022    CREATININE 1.02 12/03/2015    CREATININE 0.76 02/06/2014    CREATININE 0.73 10/10/2013    GLUCOSE 166 (H) 01/12/2023    GLUCOSE 156 (H) 01/12/2023    GLUCOSE 144 (H) 09/07/2019    GLUCOSE 340 (H) 12/03/2015    GLUCOSE 128 02/06/2014    GLUCOSE 120 10/10/2013    CALCIUM 9.5 09/14/2023    CALCIUM 8.7 09/27/2022    CALCIUM 8.5 09/26/2022    CALCIUM 8.7 12/03/2015    CALCIUM 9.3 02/06/2014    CALCIUM 9.9 10/10/2013     Lab Results   Component Value Date    WBC 7.07 09/14/2023    WBC 7.61 09/27/2022    WBC 7.09 09/26/2022    WBC 6.71 12/03/2015    HGB 14.5 09/14/2023    HGB 14.3 01/12/2023    HGB 15.0 01/12/2023    HGB 11.6 (L) 09/27/2022    HGB 11.3 (L) 09/26/2022    HGB 16.7 12/03/2015    HCT 42.6 09/14/2023    HCT 42 01/12/2023    HCT 44 01/12/2023    HCT 36.8 09/27/2022    HCT 35.6 (L) 09/26/2022    HCT 48.1 12/03/2015    MCV 90 09/14/2023    MCV 93 09/27/2022    MCV 93 09/26/2022    MCV 90 12/03/2015     09/14/2023     09/27/2022     09/26/2022     12/03/2015       ABG:   Lab Results   Component Value Date    PHART 7.458 (H) 09/11/2019    [id number] 30.1 (L) 09/11/2019    PO2ART 58.5 (LL) 09/11/2019      Lab Results   Component Value Date    WBC 7.07 09/14/2023    HGB 14.5 09/14/2023    HCT 42.6 09/14/2023    MCV 90 09/14/2023     09/14/2023     Lab Results   Component Value Date    GLUCOSE 166 (H) 01/12/2023    CALCIUM 9.5 09/14/2023     (L) 12/03/2015    K 4.3 09/14/2023    CO2 28 09/14/2023     09/14/2023    BUN 20 09/14/2023    CREATININE 1.14 09/14/2023     Lab Results   Component Value Date    ALT 17 09/24/2022    AST 27 09/24/2022    ALKPHOS 76 09/24/2022    BILITOT 0.88 12/03/2015     Lab Results   Component Value Date    IRON 27 (L) 12/01/2019    TIBC 362 12/01/2019    FERRITIN 98 12/01/2019     Lab Results   Component Value Date    [id number] 544 12/03/2015         Sleep studies:  Diagnostic:3/19/2021  SUMMARY:         TOTAL INDEX  Apneas and Hypopneas 310 129.6  Central Apneas 0 0.0  Arousals 223 93.2  PLMs 0 0.0     Impression  Polysomnography demonstrates very severe obstructive sleep apnea. Treatment is recommended.     Compliance Data:    Type of CPAP:  AutoCPAP 12-20, mean 12.6, average peak: 14.5  Percent usage: 96.7%   Average time used: ~6 hours/night  Time in large leak: 0  Residual AHI: 2.9 PFT:  11/4/2022  FEV1/FVC Ratio:  86 %   Forced Vital Capacity:  2.72 L   (68 % predicted)  FEV1:  2.35 L (77 % predicted)  After administration of bronchodilator FEV1:  2.14 (-9%)     Lung volumes by body plethysmography:   Total Lung Capacity 72 % predicted   Residual volume 90 % predicted    RV/TLC ratio 125 %     DLCO for patients hemoglobin level:  83 % (94 % when corrected for Hb)     Interpretation:  Spirometry is normal  Lung volumes are normal  Diffusing capacity is normal  Flow volume loop suggests small airways disease    Froylan Justice DO  Pulmonary and Critical Care Medicine Fellow, PGY-VI  [address]

## 2023-11-09 NOTE — LETTER
November 10, 2023     Mazie Dance, MD  [address] [place] [address]    Patient: Lizbeth Chambers   YOB: 1965   Date of Visit: 11/9/2023       Dear Dr. Rickie James: Thank you for referring Ashlyn Augustin to me for evaluation. Below are my notes for this consultation. If you have questions, please do not hesitate to call me. I look forward to following your patient along with you. Sincerely,        Kayode Melvin DO        CC: No Recipients    Rosalina Heck DO  11/9/2023 10:02 AM  Attested  Progress Note - Sleep Medicine  Lizbeth Chambers 66 y.o. male MRN: [id number]       Impression & Plan:     1. DAHIANA (obstructive sleep apnea)  Assessment & Plan:  Excellent compliance  Improving Peak CPAP pressures  Nocturnal pulse ox from January reviewed. 15 minutes with SPO2 <89%. Seems related to REM  With weight loss will repeat overnight pulse ox to assess for resolution of events  If not resolved will need to consider repeating in-lab study to consider transition to BPAP or oxygen bleed in with CPAP vs increase CPAP pressures. Orders:  -     Pulse oximetry overnight  -     PAP DME Resupply/Reorder    2. Obesity hypoventilation syndrome (HCC)  Assessment & Plan:  Elevated serum CO2  Improving with treatment  To continue CPAP  Considering overnight O2 with above studies/plan       3. Morbid obesity  Assessment & Plan:  Reported weight loss, per review of recorded weights, lost 4 lbs since Decemeber 2429  Now on Trulicity  Continued attempts at weight loss and conservative management  Consideration for bariatric involvement if significant weight loss is not achieved          ______________________________________________________________________    HPI:    Lizbeth Chambers presents today for follow-up of severe DAHIANA/OHS. He has a PMHx of morbid obesity, chronic LE lymphedema, GERD, HTN, HLD, DM2 and severe DAHIANA/OHS (.6). He states he is doing well. Minimal complaints. States he has been attempting weight loss with achievement of 4 lbs weight loss since last visit. Reports less shortness of breath with activity and with rest.         Review of Systems:  Review of Systems   Constitutional:  Negative for chills and fever. HENT:  Negative for ear pain and sore throat. Eyes:  Negative for pain and visual disturbance. Respiratory:  Negative for cough and shortness of breath. Cardiovascular:  Negative for chest pain and palpitations. Gastrointestinal:  Negative for abdominal pain and vomiting. Genitourinary:  Negative for dysuria and hematuria. Musculoskeletal:  Negative for arthralgias and back pain. Skin:  Negative for color change and rash. Neurological:  Negative for seizures and syncope. All other systems reviewed and are negative.         Social history updates:  Social History     Tobacco Use   Smoking Status Never   Smokeless Tobacco Never     Social History     Socioeconomic History   • Marital status: /Civil Union     Spouse name: Not on file   • Number of children: Not on file   • Years of education: Not on file   • Highest education level: Not on file   Occupational History   • Not on file   Tobacco Use   • Smoking status: Never   • Smokeless tobacco: Never   Vaping Use   • Vaping Use: Never used   Substance and Sexual Activity   • Alcohol use: Not Currently     Alcohol/week: 0.0 standard drinks of alcohol   • Drug use: Yes     Types: Marijuana     Comment: medical   • Sexual activity: Yes   Other Topics Concern   • Not on file   Social History Narrative   • Not on file     Social Determinants of Health     Financial Resource Strain: Not on file   Food Insecurity: No Food Insecurity (9/8/2022)    Hunger Vital Sign    • Worried About Running Out of Food in the Last Year: Never true    • Ran Out of Food in the Last Year: Never true   Transportation Needs: No Transportation Needs (9/8/2022)    PRAPARE - Transportation    • Lack of Transportation (Medical): No    • Lack of Transportation (Non-Medical): No   Physical Activity: Not on file   Stress: Not on file   Social Connections: Not on file   Intimate Partner Violence: Not on file   Housing Stability: Unknown (9/8/2022)    Housing Stability Vital Sign    • Unable to Pay for Housing in the Last Year: No    • Number of Places Lived in the Last Year: Not on file    • Unstable Housing in the Last Year: No       PhysicalExamination:  Vitals:   /90 (BP Location: Left arm, Patient Position: Sitting, Cuff Size: Large)   Pulse 88   Temp 98.5 °F (36.9 °C)   Resp 18   Ht 5' 4" (1.626 m)   Wt (!) 196 kg (432 lb)   SpO2 99%   BMI 74.15 kg/m²     General: Pleasabt, Awake alert and oriented x 3, conversant without conversational dyspnea, NAD, normal affect  HEENT:  PERRL, Sclera noninjected, nonicteric OU, Nares patent,  no craniofacial abnormalities, Mucous membranes, moist, no oral lesions, normal dentition, Mallampati class 4  NECK: Trachea midline, no accessory muscle use, no stridor, no cervical or supraclavicular adenopathy, JVP not elevated  CARDIAC: Reg, single s1/S2, no m/r/g  PULM: CTA bilaterally no wheezing, rhonchi or rales, decreased breath sounds  ABD: Normoactive bowel sounds, soft nontender, nondistended, no rebound, no rigidity, no guarding  EXT: No cyanosis, no clubbing, no edema, normal capillary refill  NEURO: no focal neurologic deficits, AAOx3, moving all extremities appropriately    Diagnostic Data:  Labs:   I personally reviewed the most recent laboratory data pertinent to today's visit  Lab Results   Component Value Date     (L) 12/03/2015     02/06/2014     10/10/2013    K 4.3 09/14/2023    K 4.1 09/27/2022    K 3.9 09/26/2022    K 4.0 12/03/2015    K 4.0 02/06/2014    K 3.9 10/10/2013    CO2 28 09/14/2023    CO2 27 01/12/2023    CO2 28 01/12/2023    CO2 31 09/27/2022    CO2 30 09/26/2022    CO2 19 (L) 09/07/2019    BUN 20 09/14/2023    BUN 20 09/27/2022    BUN 20 09/26/2022    BUN 14 12/03/2015    BUN 20 02/06/2014    BUN 23 10/10/2013    CREATININE 1.14 09/14/2023    CREATININE 0.93 09/27/2022    CREATININE 1.09 09/26/2022    CREATININE 1.02 12/03/2015    CREATININE 0.76 02/06/2014    CREATININE 0.73 10/10/2013    GLUCOSE 166 (H) 01/12/2023    GLUCOSE 156 (H) 01/12/2023    GLUCOSE 144 (H) 09/07/2019    GLUCOSE 340 (H) 12/03/2015    GLUCOSE 128 02/06/2014    GLUCOSE 120 10/10/2013    CALCIUM 9.5 09/14/2023    CALCIUM 8.7 09/27/2022    CALCIUM 8.5 09/26/2022    CALCIUM 8.7 12/03/2015    CALCIUM 9.3 02/06/2014    CALCIUM 9.9 10/10/2013     Lab Results   Component Value Date    WBC 7.07 09/14/2023    WBC 7.61 09/27/2022    WBC 7.09 09/26/2022    WBC 6.71 12/03/2015    HGB 14.5 09/14/2023    HGB 14.3 01/12/2023    HGB 15.0 01/12/2023    HGB 11.6 (L) 09/27/2022    HGB 11.3 (L) 09/26/2022    HGB 16.7 12/03/2015    HCT 42.6 09/14/2023    HCT 42 01/12/2023    HCT 44 01/12/2023    HCT 36.8 09/27/2022    HCT 35.6 (L) 09/26/2022    HCT 48.1 12/03/2015    MCV 90 09/14/2023    MCV 93 09/27/2022    MCV 93 09/26/2022    MCV 90 12/03/2015     09/14/2023     09/27/2022     09/26/2022     12/03/2015       ABG:   Lab Results   Component Value Date    PHART 7.458 (H) 09/11/2019    [id number] 30.1 (L) 09/11/2019    PO2ART 58.5 (LL) 09/11/2019      Lab Results   Component Value Date    WBC 7.07 09/14/2023    HGB 14.5 09/14/2023    HCT 42.6 09/14/2023    MCV 90 09/14/2023     09/14/2023     Lab Results   Component Value Date    GLUCOSE 166 (H) 01/12/2023    CALCIUM 9.5 09/14/2023     (L) 12/03/2015    K 4.3 09/14/2023    CO2 28 09/14/2023     09/14/2023    BUN 20 09/14/2023    CREATININE 1.14 09/14/2023     Lab Results   Component Value Date    ALT 17 09/24/2022    AST 27 09/24/2022    ALKPHOS 76 09/24/2022    BILITOT 0.88 12/03/2015     Lab Results   Component Value Date    IRON 27 (L) 12/01/2019    TIBC 362 12/01/2019    FERRITIN 98 12/01/2019     Lab Results   Component Value Date    [id number] 303 12/03/2015         Sleep studies:  Diagnostic:3/19/2021  SUMMARY:         TOTAL INDEX  Apneas and Hypopneas 310 129.6  Central Apneas 0 0.0  Arousals 223 93.2  PLMs 0 0.0     Impression  Polysomnography demonstrates very severe obstructive sleep apnea. Treatment is recommended. Compliance Data:    Type of CPAP:  AutoCPAP 12-20, mean 12.6, average peak: 14.5  Percent usage: 96.7%   Average time used: ~6 hours/night  Time in large leak: 0  Residual AHI: 2.9                                       PFT:  11/4/2022  FEV1/FVC Ratio:  86 %   Forced Vital Capacity:  2.72 L   (68 % predicted)  FEV1:  2.35 L (77 % predicted)  After administration of bronchodilator FEV1:  2.14 (-9%)     Lung volumes by body plethysmography:   Total Lung Capacity 72 % predicted   Residual volume 90 % predicted    RV/TLC ratio 125 %     DLCO for patients hemoglobin level:  83 % (94 % when corrected for Hb)     Interpretation:  Spirometry is normal  Lung volumes are normal  Diffusing capacity is normal  Flow volume loop suggests small airways disease    Linda Perez DO  Pulmonary and Critical Care Medicine Fellow, PGY-VI  [place] signed by Vladimir Walton DO at 11/10/2023  6:24 AM:  I have personally seen and examined. I discussed the patient with the  fellow including, but not limited to, verifying findings; reviewing labs and x-rays;developing the plan of care with patient. I have reviewed the note and assessment performed by the fellow and agree with the fellow's documented findings and plan of care with the following additions. Please see my following comments for details and adjustments. Assessment/Plan  56 y.o. M with PMHx of morbid obesity, Chronic lower extremity lymphedema, GERDHTN, hyperlipidemia, DM type 2 who comes in DAHIANA/OHS follow up.   1.  Severe DAHIANA (AHI - 129.6) currently on autoPAP 12-20 with excellent compliance and clinical response. Residual AHI - 2.9       -  Continue autoCPAP at current pressures       -  Supplies ordered for his machine.        -  Follow overnight pulse ox to ensure oxygenation is adequately controlled on CPAP as below      -  He is aware of the risk of leaving sleep apnea untreated including hypertension, heart failure, arrhythmia, MI and stroke. 2.  Elevated CO2 on BMP/obesity hypoventilation -  PFT are consistent with restriction due to obesity. Previous pulse oximeter showed desaturations likely related to REM hypoventilation. He has since lost weight.        -   Repeat overnight pulse ox on CPAP to ensure oxygenation has improved      -   Continue weight loss efforts. Mr. Daisy Piedra returns to the office today and is doing well. He has been using his CPAP consistently and feels refreshed when he wakes up. He denies morning headache, dry mouth or aerophagia. He also has lost some weight since last visit and seems to be doing well with that as well. He denies morning headache or dry mouth.     /90 (BP Location: Left arm, Patient Position: Sitting, Cuff Size: Large)   Pulse 88   Temp 98.5 °F (36.9 °C)   Resp 18   Ht 5' 4" (1.626 m)   Wt (!) 196 kg (432 lb)   SpO2 99%   BMI 74.15 kg/m²   RA  General: Pleasant, Awake alert and oriented x 3, conversant without conversational dyspnea, NAD, normal affect  HEENT:  PERRL, Sclera noninjected, nonicteric OU, Nares patent,  no craniofacial abnormalities, Mucous membranes, moist, no oral lesions, normal dentition  NECK: Trachea midline, no accessory muscle use, no stridor, no cervical or supraclavicular adenopathy, JVP not elevated  CARDIAC: Reg, single s1/S2, no m/r/g  PULM: CTA bilaterally no wheezing, rhonchi or rales  ABD: Normoactive bowel sounds, soft nontender, nondistended, no rebound, no rigidity, no guarding  EXT: No cyanosis, no clubbing, no edema, normal capillary refill  NEURO: no focal neurologic deficits, AAOx3, moving all extremities appropriately    Lab Results   Component Value Date    WBC 7.07 09/14/2023    HGB 14.5 09/14/2023    HCT 42.6 09/14/2023    MCV 90 09/14/2023     09/14/2023     Lab Results   Component Value Date    SODIUM 138 09/14/2023    K 4.3 09/14/2023     09/14/2023    CO2 28 09/14/2023    BUN 20 09/14/2023    CREATININE 1.14 09/14/2023    GLUC 129 09/27/2022    CALCIUM 9.5 09/14/2023     Lab Results   Component Value Date    ALT 17 09/24/2022    AST 27 09/24/2022    ALKPHOS 76 09/24/2022    BILITOT 0.88 12/03/2015     PFT  Results:  FEV1/FVC Ratio:  86 %   Forced Vital Capacity:  2.72 L   (68 % predicted)  FEV1:  2.35 L (77 % predicted)  After administration of bronchodilator FEV1:  2.14 (-9%)  Lung volumes by body plethysmography:   Total Lung Capacity 72 % predicted   Residual volume 90 % predicted    RV/TLC ratio 125 %  DLCO for patients hemoglobin level:  83 % (94 % when corrected for Hb)  Interpretation:  Spirometry is normal  Lung volumes are normal  Diffusing capacity is normal  Flow volume loop suggests small airways disease     Sleep studies:  Diagnostic: AHI - 129.6 and hypoxia, lowest sat 74%    New Compliance Data:    Type of CPAP:  AutoCPAP 12-20, mean 12.6, average peak: 14.5  Percent usage: 96.7%   Average time used: ~6 hours/night  Time in large leak: 0  Residual AHI: 2.9    Compliance Data:  9/30/22 - 11/30/22                                  Type of CPAP:  autoPAP 12-20, mean pressure 12.4, max 16                                   Percent usage: 98%                                   Average time used: 6 hrs 50 mins                                  Time in large leak: 15secs                                   Residual AHI: 3.2

## 2023-11-09 NOTE — PROGRESS NOTES
ORDER PLACED THROUGH [facility] FOR CPAP SUPPLIES AND OVERNIGHT PULSE OXIMETRY. ALL SUBMITTED THROUGH [facility].

## 2023-11-09 NOTE — ASSESSMENT & PLAN NOTE
Reported weight loss, per review of recorded weights, lost 4 lbs since Decemeber 4887  Now on Trulicity  Continued attempts at weight loss and conservative management  Consideration for bariatric involvement if significant weight loss is not achieved

## 2023-11-09 NOTE — ASSESSMENT & PLAN NOTE
Excellent compliance  Improving Peak CPAP pressures  Nocturnal pulse ox from January reviewed. 15 minutes with SPO2 <89%. Seems related to REM  With weight loss will repeat overnight pulse ox to assess for resolution of events  If not resolved will need to consider repeating in-lab study to consider transition to BPAP or oxygen bleed in with CPAP vs increase CPAP pressures.

## 2023-11-09 NOTE — ASSESSMENT & PLAN NOTE
Elevated serum CO2  Improving with treatment  To continue CPAP  Considering overnight O2 with above studies/plan

## 2023-11-13 LAB

## 2023-11-24 DIAGNOSIS — Z79.4 TYPE 2 DIABETES MELLITUS WITH HYPERGLYCEMIA, WITH LONG-TERM CURRENT USE OF INSULIN (HCC): ICD-10-CM

## 2023-11-24 DIAGNOSIS — E11.65 TYPE 2 DIABETES MELLITUS WITH HYPERGLYCEMIA, WITH LONG-TERM CURRENT USE OF INSULIN (HCC): ICD-10-CM

## 2023-11-24 RX ORDER — INSULIN GLARGINE 100 [IU]/ML
INJECTION, SOLUTION SUBCUTANEOUS
Qty: 15 ML | Refills: 2 | Status: SHIPPED | OUTPATIENT
Start: 2023-11-24

## 2023-12-01 DIAGNOSIS — G47.00 INSOMNIA, UNSPECIFIED TYPE: ICD-10-CM

## 2023-12-01 DIAGNOSIS — E78.00 PURE HYPERCHOLESTEROLEMIA: ICD-10-CM

## 2023-12-01 DIAGNOSIS — I10 ESSENTIAL HYPERTENSION: ICD-10-CM

## 2023-12-01 DIAGNOSIS — E03.9 HYPOTHYROIDISM, UNSPECIFIED TYPE: ICD-10-CM

## 2023-12-01 RX ORDER — LEVOTHYROXINE SODIUM 0.03 MG/1
25 TABLET ORAL
Qty: 90 TABLET | Refills: 1 | Status: SHIPPED | OUTPATIENT
Start: 2023-12-01

## 2023-12-01 RX ORDER — EZETIMIBE 10 MG/1
TABLET ORAL
Qty: 30 TABLET | Refills: 5 | Status: SHIPPED | OUTPATIENT
Start: 2023-12-01

## 2023-12-01 RX ORDER — LOSARTAN POTASSIUM 25 MG/1
25 TABLET ORAL DAILY
Qty: 90 TABLET | Refills: 1 | Status: SHIPPED | OUTPATIENT
Start: 2023-12-01

## 2023-12-01 RX ORDER — TRAZODONE HYDROCHLORIDE 50 MG/1
50 TABLET ORAL
Qty: 90 TABLET | Refills: 0 | Status: SHIPPED | OUTPATIENT
Start: 2023-12-01

## 2023-12-05 DIAGNOSIS — Z79.4 TYPE 2 DIABETES MELLITUS WITH HYPERGLYCEMIA, WITH LONG-TERM CURRENT USE OF INSULIN (HCC): Primary | ICD-10-CM

## 2023-12-05 DIAGNOSIS — E11.65 TYPE 2 DIABETES MELLITUS WITH HYPERGLYCEMIA, WITH LONG-TERM CURRENT USE OF INSULIN (HCC): Primary | ICD-10-CM

## 2023-12-22 ENCOUNTER — TELEPHONE (OUTPATIENT)
Dept: INTERNAL MEDICINE CLINIC | Facility: CLINIC | Age: 58
End: 2023-12-22

## 2023-12-29 DIAGNOSIS — I10 HYPERTENSION GOAL BP (BLOOD PRESSURE) < 140/90: ICD-10-CM

## 2023-12-29 DIAGNOSIS — E66.01 OBESITY, MORBID, BMI 50 OR HIGHER (HCC): ICD-10-CM

## 2023-12-29 DIAGNOSIS — E11.65 TYPE 2 DIABETES MELLITUS WITH HYPERGLYCEMIA, WITH LONG-TERM CURRENT USE OF INSULIN (HCC): ICD-10-CM

## 2023-12-29 DIAGNOSIS — Z79.4 TYPE 2 DIABETES MELLITUS WITH HYPERGLYCEMIA, WITH LONG-TERM CURRENT USE OF INSULIN (HCC): ICD-10-CM

## 2023-12-29 DIAGNOSIS — R79.89 ELEVATED TSH: ICD-10-CM

## 2023-12-29 DIAGNOSIS — E78.00 PURE HYPERCHOLESTEROLEMIA: ICD-10-CM

## 2024-01-02 DIAGNOSIS — E66.01 OBESITY, MORBID, BMI 50 OR HIGHER (HCC): ICD-10-CM

## 2024-01-02 DIAGNOSIS — E11.65 TYPE 2 DIABETES MELLITUS WITH HYPERGLYCEMIA, WITH LONG-TERM CURRENT USE OF INSULIN (HCC): ICD-10-CM

## 2024-01-02 DIAGNOSIS — I10 HYPERTENSION GOAL BP (BLOOD PRESSURE) < 140/90: ICD-10-CM

## 2024-01-02 DIAGNOSIS — E78.00 PURE HYPERCHOLESTEROLEMIA: ICD-10-CM

## 2024-01-02 DIAGNOSIS — R79.89 ELEVATED TSH: ICD-10-CM

## 2024-01-02 DIAGNOSIS — Z79.4 TYPE 2 DIABETES MELLITUS WITH HYPERGLYCEMIA, WITH LONG-TERM CURRENT USE OF INSULIN (HCC): ICD-10-CM

## 2024-01-02 RX ORDER — PEN NEEDLE, DIABETIC 32GX 5/32"
NEEDLE, DISPOSABLE MISCELLANEOUS 4 TIMES DAILY
Qty: 200 EACH | Refills: 0 | Status: SHIPPED | OUTPATIENT
Start: 2024-01-02 | End: 2024-01-02 | Stop reason: SDUPTHER

## 2024-01-02 RX ORDER — PEN NEEDLE, DIABETIC 32GX 5/32"
NEEDLE, DISPOSABLE MISCELLANEOUS 4 TIMES DAILY
Qty: 200 EACH | Refills: 1 | Status: SHIPPED | OUTPATIENT
Start: 2024-01-02

## 2024-01-10 DIAGNOSIS — Z79.4 CURRENT USE OF INSULIN (HCC): ICD-10-CM

## 2024-01-10 DIAGNOSIS — Z79.4 TYPE 2 DIABETES MELLITUS WITH HYPERGLYCEMIA, WITH LONG-TERM CURRENT USE OF INSULIN (HCC): ICD-10-CM

## 2024-01-10 DIAGNOSIS — E11.65 TYPE 2 DIABETES MELLITUS WITH HYPERGLYCEMIA, WITH LONG-TERM CURRENT USE OF INSULIN (HCC): ICD-10-CM

## 2024-01-10 RX ORDER — BLOOD-GLUCOSE SENSOR
EACH MISCELLANEOUS
Qty: 2 EACH | Refills: 5 | Status: SHIPPED | OUTPATIENT
Start: 2024-01-10

## 2024-01-11 ENCOUNTER — APPOINTMENT (EMERGENCY)
Dept: CT IMAGING | Facility: HOSPITAL | Age: 59
End: 2024-01-11
Payer: MEDICARE

## 2024-01-11 ENCOUNTER — HOSPITAL ENCOUNTER (EMERGENCY)
Facility: HOSPITAL | Age: 59
Discharge: HOME | End: 2024-01-12
Attending: EMERGENCY MEDICINE
Payer: MEDICARE

## 2024-01-11 DIAGNOSIS — R00.2 RAPID PALPITATIONS: Primary | ICD-10-CM

## 2024-01-11 LAB
2HR DELTA HS TROPONIN: 15 NG/L
ALBUMIN SERPL BCP-MCNC: 4 G/DL (ref 3.5–5)
ALP SERPL-CCNC: 57 U/L (ref 34–104)
ALT SERPL W P-5'-P-CCNC: 32 U/L (ref 7–52)
ANION GAP SERPL CALCULATED.3IONS-SCNC: 11 MMOL/L
AST SERPL W P-5'-P-CCNC: 29 U/L (ref 13–39)
BASOPHILS # BLD AUTO: 0.07 THOUSANDS/ÂΜL (ref 0–0.1)
BASOPHILS NFR BLD AUTO: 1 % (ref 0–1)
BILIRUB SERPL-MCNC: 0.5 MG/DL (ref 0.2–1)
BUN SERPL-MCNC: 20 MG/DL (ref 5–25)
CALCIUM SERPL-MCNC: 10.1 MG/DL (ref 8.4–10.2)
CARDIAC TROPONIN I PNL SERPL HS: 11 NG/L
CARDIAC TROPONIN I PNL SERPL HS: 26 NG/L
CHLORIDE SERPL-SCNC: 99 MMOL/L (ref 96–108)
CO2 SERPL-SCNC: 26 MMOL/L (ref 21–32)
CREAT SERPL-MCNC: 1.27 MG/DL (ref 0.6–1.3)
D DIMER PPP FEU-MCNC: 0.53 UG/ML FEU
EOSINOPHIL # BLD AUTO: 0.12 THOUSAND/ÂΜL (ref 0–0.61)
EOSINOPHIL NFR BLD AUTO: 1 % (ref 0–6)
ERYTHROCYTE [DISTWIDTH] IN BLOOD BY AUTOMATED COUNT: 14.2 % (ref 11.6–15.1)
GFR SERPL CREATININE-BSD FRML MDRD: 62 ML/MIN/1.73SQ M
GLUCOSE SERPL-MCNC: 137 MG/DL (ref 65–140)
HCT VFR BLD AUTO: 46.5 % (ref 36.5–49.3)
HGB BLD-MCNC: 15.2 G/DL (ref 12–17)
IMM GRANULOCYTES # BLD AUTO: 0.02 THOUSAND/UL (ref 0–0.2)
IMM GRANULOCYTES NFR BLD AUTO: 0 % (ref 0–2)
LYMPHOCYTES # BLD AUTO: 2.42 THOUSANDS/ÂΜL (ref 0.6–4.47)
LYMPHOCYTES NFR BLD AUTO: 28 % (ref 14–44)
MCH RBC QN AUTO: 29.5 PG (ref 26.8–34.3)
MCHC RBC AUTO-ENTMCNC: 32.7 G/DL (ref 31.4–37.4)
MCV RBC AUTO: 90 FL (ref 82–98)
MONOCYTES # BLD AUTO: 0.81 THOUSAND/ÂΜL (ref 0.17–1.22)
MONOCYTES NFR BLD AUTO: 10 % (ref 4–12)
NEUTROPHILS # BLD AUTO: 5.12 THOUSANDS/ÂΜL (ref 1.85–7.62)
NEUTS SEG NFR BLD AUTO: 60 % (ref 43–75)
NRBC BLD AUTO-RTO: 0 /100 WBCS
PLATELET # BLD AUTO: 213 THOUSANDS/UL (ref 149–390)
PMV BLD AUTO: 8.6 FL (ref 8.9–12.7)
POTASSIUM SERPL-SCNC: 4.3 MMOL/L (ref 3.5–5.3)
PROT SERPL-MCNC: 7.8 G/DL (ref 6.4–8.4)
RBC # BLD AUTO: 5.15 MILLION/UL (ref 3.88–5.62)
SODIUM SERPL-SCNC: 136 MMOL/L (ref 135–147)
TSH SERPL DL<=0.05 MIU/L-ACNC: 2.08 UIU/ML (ref 0.45–4.5)
WBC # BLD AUTO: 8.56 THOUSAND/UL (ref 4.31–10.16)

## 2024-01-11 PROCEDURE — 71275 CT ANGIOGRAPHY CHEST: CPT

## 2024-01-11 PROCEDURE — 74177 CT ABD & PELVIS W/CONTRAST: CPT

## 2024-01-11 PROCEDURE — 84484 ASSAY OF TROPONIN QUANT: CPT | Performed by: EMERGENCY MEDICINE

## 2024-01-11 PROCEDURE — 36415 COLL VENOUS BLD VENIPUNCTURE: CPT

## 2024-01-11 PROCEDURE — 80053 COMPREHEN METABOLIC PANEL: CPT | Performed by: EMERGENCY MEDICINE

## 2024-01-11 PROCEDURE — 85379 FIBRIN DEGRADATION QUANT: CPT

## 2024-01-11 PROCEDURE — 93005 ELECTROCARDIOGRAM TRACING: CPT

## 2024-01-11 PROCEDURE — G1004 CDSM NDSC: HCPCS

## 2024-01-11 PROCEDURE — 96360 HYDRATION IV INFUSION INIT: CPT

## 2024-01-11 PROCEDURE — 99285 EMERGENCY DEPT VISIT HI MDM: CPT | Performed by: EMERGENCY MEDICINE

## 2024-01-11 PROCEDURE — 96361 HYDRATE IV INFUSION ADD-ON: CPT

## 2024-01-11 PROCEDURE — 85025 COMPLETE CBC W/AUTO DIFF WBC: CPT | Performed by: EMERGENCY MEDICINE

## 2024-01-11 PROCEDURE — 99285 EMERGENCY DEPT VISIT HI MDM: CPT

## 2024-01-11 PROCEDURE — 84443 ASSAY THYROID STIM HORMONE: CPT | Performed by: EMERGENCY MEDICINE

## 2024-01-11 RX ADMIN — SODIUM CHLORIDE 1000 ML: 0.9 INJECTION, SOLUTION INTRAVENOUS at 21:59

## 2024-01-11 RX ADMIN — IOHEXOL 100 ML: 350 INJECTION, SOLUTION INTRAVENOUS at 22:43

## 2024-01-12 VITALS
DIASTOLIC BLOOD PRESSURE: 78 MMHG | RESPIRATION RATE: 20 BRPM | OXYGEN SATURATION: 97 % | SYSTOLIC BLOOD PRESSURE: 120 MMHG | HEART RATE: 104 BPM | TEMPERATURE: 98.1 F

## 2024-01-12 DIAGNOSIS — E78.00 PURE HYPERCHOLESTEROLEMIA: ICD-10-CM

## 2024-01-12 LAB
4HR DELTA HS TROPONIN: 11 NG/L
ATRIAL RATE: 124 BPM
CARDIAC TROPONIN I PNL SERPL HS: 22 NG/L
P AXIS: 48 DEGREES
PR INTERVAL: 168 MS
QRS AXIS: 81 DEGREES
QRSD INTERVAL: 80 MS
QT INTERVAL: 292 MS
QTC INTERVAL: 419 MS
T WAVE AXIS: 37 DEGREES
VENTRICULAR RATE: 124 BPM

## 2024-01-12 RX ORDER — EZETIMIBE 10 MG/1
TABLET ORAL
Qty: 90 TABLET | Refills: 2 | Status: SHIPPED | OUTPATIENT
Start: 2024-01-12

## 2024-01-12 RX ORDER — ASPIRIN 325 MG
325 TABLET ORAL ONCE
Status: COMPLETED | OUTPATIENT
Start: 2024-01-12 | End: 2024-01-12

## 2024-01-12 RX ADMIN — ASPIRIN 325 MG ORAL TABLET 325 MG: 325 PILL ORAL at 00:47

## 2024-01-12 NOTE — ED ATTENDING ATTESTATION
1/11/2024  I, Zach Griffin MD, saw and evaluated the patient. I have discussed the patient with the resident/non-physician practitioner and agree with the resident's/non-physician practitioner's findings, Plan of Care, and MDM as documented in the resident's/non-physician practitioner's note, except where noted. All available labs and Radiology studies were reviewed.  I was present for key portions of any procedure(s) performed by the resident/non-physician practitioner and I was immediately available to provide assistance.       At this point I agree with the current assessment done in the Emergency Department.  I have conducted an independent evaluation of this patient a history and physical is as follows:    58-year-old male presented for evaluation after having an episode of rapid palpitations at home associated with some shortness of breath and near syncope.  He stated he was at rest when the symptoms occurred.  No associated chest pain.  He used a pulse oximeter at the time which his heart rate was greater than 200.  Heart rate and palpitations did improve spontaneously but he remains tachycardic on arrival here, stating he can still feel it.  Morbidly obese on exam.  Heart sounds distant, regular rhythm, tachycardic.  Area of cellulitis on the left lower abdominal wall improving.  Currently finishing 10-day course of Levaquin for this.  Plan EKG, labs, reevaluate.    ED Course  ED Course as of 01/12/24 0046   Thu Jan 11, 2024 2016 hs TnI 0hr: 11   2017 Hemoglobin: 15.2   2017 BUN: 20   2017 Creatinine: 1.27   2017 eGFR: 62  Baseline GFR 70-90   2210 Delta 2hr hsTnI: 15   2210 hs TnI 2hr: 26   2210 D-Dimer, Quant(!): 0.53  Plan CTA chest to r/o PE.   Fri Jan 12, 2024   0016 CT shows:    No acute pulmonary embolism or thoracic aortic aneurysm/dissection. No acute intrathoracic or pulmonary abnormalities.     Large fat-containing infraumbilical ventral abdominal hernia. Enlarged liver with macronodular  contour noted. No enhancing hepatic mass. 5.5 cm left upper pole renal cyst. No findings of bowel obstruction, mesenteric inflammation, appendicitis,   obstructive uropathy, free air, or free fluid noted in the abdomen/pelvis.     0043 Patient's 4-hour troponin beginning to decrease.  Patient reports feeling great at this time.  No further palpitations.  Heart rate has remained around 100.  Offered admission for continued monitoring.  He would prefer to follow-up outpatient with cardiology.  Discussed possibility of the rapid palpitations being related to sinus tachycardia versus SVT versus atrial fibrillation versus ventricular tachycardia.  He should return to emergency department if he has recurrent symptoms.  GFR decreased from baseline.  Was given 1 L of saline here and encouraged to consume extra fluids at home.     HEART score:    History 0=Slightly or non-suspicious   ECG 0=Normal   Age 1= > 45 - <65 years   Risk Factors 2= > 3 risk factors, or history of atherosclerotic disease   Troponin 1= Between 13-35 ng/L   Total 4                   Critical Care Time  Procedures

## 2024-01-12 NOTE — ED PROVIDER NOTES
History  Chief Complaint   Patient presents with    Rapid Heart Rate     Pt reports when dizziness/SOB upon exertion, states checked HR was over 200 at home, reports feeling ok while sitting, states on abx currently for cellulitis     (Ben Mathur) Ben Mathur is a 58 y.o. male who identifies as male    They presented to the emergency department on January 11, 2024. Patient presents with:  Rapid Heart Rate: Pt reports when dizziness/SOB upon exertion, states checked HR was over 200 at home, reports feeling ok while sitting, states on abx currently for cellulitis  .    The patient states that 3 hours ago while he was at home, he stood up to walk and suddenly felt lightheaded/dizzy, shortness of breath and feel as if he was about to pass out.  He reported his watch recorded a heart rate of 212.  She stated the episode was brief and resolved as soon as he sat back down.  Patient denies loss of consciousness, palpitation, chest pain, recent illness, nausea, vomiting, diarrhea, recent travel history or any other complaint at this time.      Allergies include:   -- Gluten Meal - Food Allergy    -- Clindamycin -- Diarrhea      Immunizations:    Immunization History  Administered            Date(s) Administered    COVID-19 PFIZER VACCINE 0.3 ML IM                          03/16/2021 04/07/2021 12/23/2021      Influenza, recombinant, quadrivalent,injectable, preservative free                          10/08/2019  10/28/2021      Pneumococcal Polysaccharide PPV23                          10/08/2019    Immunizations Reviewed.          Prior to Admission Medications   Prescriptions Last Dose Informant Patient Reported? Taking?   Blood Glucose Monitoring Suppl (ONETOUCH VERIO) w/Device KIT  Self No No   Sig: Use to test sugar daily   Patient not taking: Reported on 11/9/2023   Continuous Blood Gluc  (Advanced Personalized Diagnosticsyle Elvin 2 [employer] Sy[employer]m) BROOKS  Self No No   Sig: Use 1 Device as needed (use with sensors for bs  checks)   Patient not taking: Reported on 2023   Continuous Blood Gluc Sensor (FreeStyle Elvin 14 Day Sensor) MISC  Self No No   Sig: Scan 4x daily   Continuous Blood Gluc Sensor (FreeStyle Elvin 3 Sensor) MISC   No No   Sig: REPLACE EVERY 14 DAYS   Insulin Glargine Solostar (Lantus SoloStar) 100 UNIT/ML SOPN   No No   Sig: INJECT 40 UNITS UNDER THE SKIN DAILY AT BEDTIME   Insulin Pen Needle (BD Pen Needle Ludmila 2nd Gen) 32G X 4 MM MISC   No No   Sig: Inject under the skin 4 (four) times a day   Lancets (ONETOUCH ULTRASOFT) lancets  Self No No   Sig: Use to test sugar 2 times a day   OneTouch Verio test strip  Self No No   Sig: USE TO TEST SUGAR 2 TIMES A DAY   acetaminophen (TYLENOL) 325 mg tablet  Self Yes No   Si mg 3 (three) times a day as needed   albuterol (PROVENTIL HFA,VENTOLIN HFA) 90 mcg/act inhaler  Self No No   Sig: TAKE 2 PUFFS BY MOUTH EVERY 6 HOURS AS NEEDED FOR WHEEZE   amoxicillin (AMOXIL) 500 mg capsule  Self Yes No   Sig: Take 500 mg by mouth 3 (three) times a day   Patient not taking: Reported on 2023   dulaglutide (Trulicity) 1.5 MG/0.5ML injection   No No   Sig: Inject 0.5 mL (1.5 mg total) under the skin every 7 days   dulaglutide (Trulicity) 3 MG/0.5ML injection  Self No No   Sig: INJECT 3MG SUBCUT ONCE PER WEEK   ezetimibe (ZETIA) 10 mg tablet   No No   Sig: TAKE 1 TABLET BY MOUTH EVERY DAY   furosemide (LASIX) 20 mg tablet  Self No No   Sig: TAKE 2 TABLETS EVERY DAY IN THE EARLY MORNING AND 1 TABLET DAILY AFTER LUNCH.   gabapentin (NEURONTIN) 100 mg capsule  Self No No   Sig: Take 1 capsule (100 mg total) by mouth 2 (two) times a day With 300mg capsule for a total of 400mg twice a day   gabapentin (NEURONTIN) 300 mg capsule  Self No No   Sig: TAKE 1 CAPSULE (300 MG TOTAL) BY MOUTH 2 (TWO) TIMES A DAY TAKE 1 TAB WITH YOUR 100MG TAB TWICE DAILY   ibuprofen (MOTRIN) 600 mg tablet  Self Yes No   Sig: Take 600 mg by mouth 3 (three) times a day as needed   insulin aspart (NovoLOG  FlexPen) 100 UNIT/ML injection pen  Self No No   Sig: INJECT 14-22 UNITS WITH MEALS+SCALE   levothyroxine 25 mcg tablet   No No   Sig: TAKE 1 TABLET (25 MCG TOTAL) BY MOUTH DAILY IN THE EARLY MORNING   losartan (COZAAR) 25 mg tablet   No No   Sig: TAKE 1 TABLET (25 MG TOTAL) BY MOUTH DAILY.   metFORMIN (GLUCOPHAGE) 1000 MG tablet  Self No No   Sig: TAKE 1 TABLET BY MOUTH TWICE A DAY   nystatin (MYCOSTATIN) powder  Self No No   Sig: APPLY 1 APPLICATION TOPICALLY 2 (TWO) TIMES A DAY TO AFFECTED AREA   omeprazole (PriLOSEC) 40 MG capsule  Self No No   Sig: TAKE 1 CAPSULE BY MOUTH TWICE A DAY   psyllium (METAMUCIL) packet  Self No No   Sig: Take 1 packet by mouth daily   saccharomyces boulardii (FLORASTOR) 250 mg capsule  Self No No   Sig: Take 1 capsule (250 mg total) by mouth 2 (two) times a day   Patient not taking: Reported on 11/9/2023   traZODone (DESYREL) 50 mg tablet   No No   Sig: TAKE 1 TABLET (50 MG TOTAL) BY MOUTH DAILY AT BEDTIME AS NEEDED FOR SLEEP      Facility-Administered Medications: None       Past Medical History:   Diagnosis Date    Acute kidney injury 10/28/2021    Anemia 09/13/2019    Cellulitis     last assessed 12/10/15    Cellulitis of left lower extremity     Cellulitis of right lower extremity 11/19/2021    Cough 10/20/2019    Diabetes mellitus (HCC)     Disease of thyroid gland     Edema     Elevated liver enzymes     Elevated serum creatinine 11/19/2021    Esophageal reflux     Fungal infection 8/16/2021    Gluten intolerance     Gout     last assessed 09/05/13    Hyperglycemia     Hypertension     Hyponatremia 9/6/2019    IBS (irritable bowel syndrome)     Insomnia     Obesity     Osteoarthritis of knee     last assessed 02/10/14    Scrotal swelling 5/19/2020    Shortness of breath 5/3/2021    Venous insufficiency     last assessed 08/22/17    Villonodular synovitis of the hand, right     last assessed 11/14/2013       Past Surgical History:   Procedure Laterality Date    INCISION AND  DRAINAGE OF WOUND Left 9/6/2019    Procedure: INCISION AND DRAINAGE (I&D) GROIN;  Surgeon: Carlos Ruano DO;  Location: [facility];  Service: General    SKIN BIOPSY      WOUND DEBRIDEMENT Left 9/7/2019    Procedure: EXCISIONAL DEBRIDEMENT;  Surgeon: Carlos Ruano DO;  Location: [facility];  Service: General       Family History   Problem Relation Age of Onset    Cancer Mother         gastrc    Colon cancer Father     Heart failure Father      I have reviewed and agree with the history as documented.    E-Cigarette/Vaping    E-Cigarette Use Never User      E-Cigarette/Vaping Substances    Nicotine No     THC Yes     CBD Yes     Flavoring No     Other No     Unknown No      Social History     Tobacco Use    Smoking status: Never    Smokeless tobacco: Never   Vaping Use    Vaping status: Never Used   Substance Use Topics    Alcohol use: Not Currently     Alcohol/week: 0.0 standard drinks of alcohol    Drug use: Yes     Types: Marijuana     Comment: medical        Review of Systems   Constitutional:  Negative for chills and fever.   HENT:  Negative for ear pain and sore throat.    Eyes:  Negative for pain and visual disturbance.   Respiratory:  Positive for shortness of breath (Resolved). Negative for cough.    Cardiovascular:  Negative for chest pain and palpitations.   Gastrointestinal:  Negative for abdominal pain and vomiting.   Genitourinary:  Negative for dysuria and hematuria.   Musculoskeletal:  Negative for arthralgias and back pain.   Skin:  Negative for color change and rash.   Neurological:  Positive for dizziness and light-headedness. Negative for seizures and syncope.   All other systems reviewed and are negative.      Physical Exam  ED Triage Vitals   Temperature Pulse Respirations Blood Pressure SpO2   01/11/24 1823 01/11/24 1822 01/11/24 1822 01/11/24 1822 01/11/24 1822   98.1 °F (36.7 °C) (!) 129 20 146/87 97 %      Temp Source Heart Rate Source Patient Position - Orthostatic VS BP Location FiO2  (%)   01/11/24 1823 01/11/24 1822 01/11/24 1822 01/11/24 1822 --   Oral Monitor Sitting Right arm       Pain Score       01/11/24 1822       No Pain             Orthostatic Vital Signs  Vitals:    01/11/24 2200 01/11/24 2215 01/11/24 2345 01/12/24 0000   BP: 122/71 125/57 146/69 120/78   Pulse: 101 101 103 104   Patient Position - Orthostatic VS: Lying Lying Lying Lying       Physical Exam  Vitals and nursing note reviewed.   Constitutional:       General: He is not in acute distress.     Appearance: He is well-developed.   HENT:      Head: Normocephalic and atraumatic.   Eyes:      Conjunctiva/sclera: Conjunctivae normal.   Cardiovascular:      Rate and Rhythm: Regular rhythm. Tachycardia present.      Heart sounds: No murmur heard.  Pulmonary:      Effort: Pulmonary effort is normal. No respiratory distress.      Breath sounds: Normal breath sounds.   Abdominal:      Palpations: Abdomen is soft.      Tenderness: There is no abdominal tenderness.   Musculoskeletal:         General: No swelling.      Cervical back: Neck supple.   Skin:     General: Skin is warm and dry.      Capillary Refill: Capillary refill takes less than 2 seconds.   Neurological:      General: No focal deficit present.      Mental Status: He is alert and oriented to person, place, and time.   Psychiatric:         Mood and Affect: Mood normal.         ED Medications  Medications   aspirin tablet 325 mg (has no administration in time range)   sodium chloride 0.9 % bolus 1,000 mL (1,000 mL Intravenous New Bag 1/11/24 2159)   iohexol (OMNIPAQUE) 350 MG/ML injection (MULTI-DOSE) 100 mL (100 mL Intravenous Given 1/11/24 2243)       Diagnostic Studies  Results Reviewed       Procedure Component Value Units Date/Time    HS Troponin I 4hr [[id number]] Collected: 01/11/24 2337    Lab Status: In process Specimen: Blood from Line, Venous Updated: 01/11/24 2339    TSH, 3rd generation with Free T4 reflex [[id number]]  (Normal) Collected: 01/11/24 1826    Lab  Status: Final result Specimen: Blood from Arm, Right Updated: 01/11/24 2134     TSH 3RD GENERATON 2.076 uIU/mL     D-dimer, quantitative [[id number]]  (Abnormal) Collected: 01/11/24 2053    Lab Status: Final result Specimen: Blood from Arm, Left Updated: 01/11/24 2127     D-Dimer, Quant 0.53 ug/ml FEU     Narrative:      In the evaluation for possible pulmonary embolism, in the appropriate (Well's Score of 4 or less) patient, the age adjusted d-dimer cutoff for this patient can be calculated as:    Age x 0.01 (in ug/mL) for Age-adjusted D-dimer exclusion threshold for a patient over 50 years.    HS Troponin I 2hr [[id number]]  (Normal) Collected: 01/11/24 2053    Lab Status: Final result Specimen: Blood from Arm, Left Updated: 01/11/24 2126     hs TnI 2hr 26 ng/L      Delta 2hr hsTnI 15 ng/L     HS Troponin 0hr (reflex protocol) [[id number]]  (Normal) Collected: 01/11/24 1826    Lab Status: Final result Specimen: Blood from Arm, Right Updated: 01/11/24 1901     hs TnI 0hr 11 ng/L     Comprehensive metabolic panel [[id number]] Collected: 01/11/24 1826    Lab Status: Final result Specimen: Blood from Arm, Right Updated: 01/11/24 1855     Sodium 136 mmol/L      Potassium 4.3 mmol/L      Chloride 99 mmol/L      CO2 26 mmol/L      ANION GAP 11 mmol/L      BUN 20 mg/dL      Creatinine 1.27 mg/dL      Glucose 137 mg/dL      Calcium 10.1 mg/dL      AST 29 U/L      ALT 32 U/L      Alkaline Phosphatase 57 U/L      Total Protein 7.8 g/dL      Albumin 4.0 g/dL      Total Bilirubin 0.50 mg/dL      eGFR 62 ml/min/1.73sq m     Narrative:      National Kid[employer] Disease Foundation guidelines for Chronic Kidney Disease (CKD):     Stage 1 with normal or high GFR (GFR > 90 mL/min/1.73 square meters)    Stage 2 Mild CKD (GFR = 60-89 mL/min/1.73 square meters)    Stage 3A Moderate CKD (GFR = 45-59 mL/min/1.73 square meters)    Stage 3B Moderate CKD (GFR = 30-44 mL/min/1.73 square meters)    Stage 4 Severe CKD (GFR = 15-29 mL/min/1.73 square  meters)    Stage 5 End Stage CKD (GFR <15 mL/min/1.73 square meters)  Note: GFR calculation is accurate only with a steady state creatinine    CBC and differential [[id number]]  (Abnormal) Collected: 01/11/24 1826    Lab Status: Final result Specimen: Blood from Arm, Right Updated: 01/11/24 1837     WBC 8.56 Thousand/uL      RBC 5.15 Million/uL      Hemoglobin 15.2 g/dL      Hematocrit 46.5 %      MCV 90 fL      MCH 29.5 pg      MCHC 32.7 g/dL      RDW 14.2 %      MPV 8.6 fL      Platelets 213 Thousands/uL      nRBC 0 /100 WBCs      Neutrophils Relative 60 %      Immat GRANS % 0 %      Lymphocytes Relative 28 %      Monocytes Relative 10 %      Eosinophils Relative 1 %      Basophils Relative 1 %      Neutrophils Absolute 5.12 Thousands/µL      Immature Grans Absolute 0.02 Thousand/uL      Lymphocytes Absolute 2.42 Thousands/µL      Monocytes Absolute 0.81 Thousand/µL      Eosinophils Absolute 0.12 Thousand/µL      Basophils Absolute 0.07 Thousands/µL                    PE Study with CT Abdomen and Pelvis with contrast   Final Result by Cezar Narvaez MD (01/11 9938)      No acute pulmonary embolism or thoracic aortic aneurysm/dissection. No acute intrathoracic or pulmonary abnormalities.      Large fat-containing infraumbilical ventral abdominal hernia. Enlarged liver with macronodular contour noted. No enhancing hepatic mass. 5.5 cm left upper pole renal cyst. No findings of bowel obstruction, mesenteric inflammation, appendicitis,    obstructive uropathy, free air, or free fluid noted in the abdomen/pelvis.      Workstation performed: [id number]               Procedures  ECG 12 Lead Documentation Only    Date/Time: 1/11/2024 8:33 PM    Performed by: Lis Jaime MD  Authorized by: Lis Jaime MD    Indications / Diagnosis:  Tachycardia  ECG reviewed by me, the ED Provider: yes    Patient location:  ED  Previous ECG:     Previous ECG:  Compared to current    Comparison ECG info:   September 24, 2022    Similarity:  No change    Comparison to cardiac monitor: Yes    Interpretation:     Interpretation: abnormal    Rate:     ECG rate:  124 bpm    ECG rate assessment: tachycardic    Rhythm:     Rhythm: sinus rhythm and sinus tachycardia    Ectopy:     Ectopy: none    QRS:     QRS axis:  Normal    QRS intervals:  Normal  Conduction:     Conduction: normal    ST segments:     ST segments:  Normal  T waves:     T waves: normal    Comments:      Ventricular rate 124 bpm, CT interval 168 ms, QRS duration 80 ms,  ms,  ms sign no acute ST segment elevation or depression.  Sinus tachycardia.        ED Course  ED Course as of 01/12/24 0004   Thu Jan 11, 2024 2037 58-year-old male presents for evaluation of rapid heart rate   2038 CBC and differential(!)  Wnl, no white count, normal H&H.   2039 Comprehensive metabolic panel  Wnl, normal anion gap, no DEAN, normal T. bili   2039 hs TnI 0hr: 11   2211 hs TnI 2hr: 26   2254 D-Dimer, Quant(!): 0.53  Elevated.  CT PE study ordered.   2358 In the meantime patient signed out to the [facility]                             SBIRT 22yo+      Flowsheet Row Most Recent Value   Initial Alcohol Screen: US AUDIT-C     1. How often do you have a drink containing alcohol? 0 Filed at: 01/11/2024 2358   2. How many drinks containing alcohol do you have on a typical day you are drinking?  0 Filed at: 01/11/2024 2358   3a. Male UNDER 65: How often do you have five or more drinks on one occasion? 0 Filed at: 01/11/2024 2358   3b. FEMALE Any Age, or MALE 65+: How often do you have 4 or more drinks on one occassion? 0 Filed at: 01/11/2024 2358   Audit-C Score 0 Filed at: 01/11/2024 2358   BRITTNEE: How many times in the past year have you...    Used an illegal drug or used a prescription medication for non-medical reasons? Never Filed at: 01/11/2024 2358                  Medical Decision Making  Patient presents with:  Rapid Heart Rate: Pt reports when dizziness/SOB upon  exertion, states checked HR was over 200 at home, reports feeling ok while sitting, states on abx currently for cellulitis      Patient seen and examined noted to have palpitations.      Temp:  [98.1 °F (36.7 °C)] 98.1 °F (36.7 °C)  HR:  [101-129] 103  Resp:  [20] 20  BP: (117-146)/(57-87) 146/69    Differential diagnosis includes but is not limited to ACS, dehydration, electrolyte abnormality.      Due to patient's history and presentation the following laboratory evidence was collected:  Recent Results (from the past 12 hour(s))  -ECG 12 lead:   Collection Time: 01/11/24  6:20 PM       Result                      Value             Ref Range           Ventricular Rate            124               BPM                 Atrial Rate                 124               BPM                 WI Interval                 168               ms                  QRSD Interval               80                ms                  QT Interval                 292               ms                  QTC Interval                419               ms                  P Axis                      48                degrees             QRS Axis                    81                degrees             T Wave Axis                 37                degrees        -CBC and differential:   Collection Time: 01/11/24  6:26 PM       Result                      Value             Ref Range           WBC                         8.56              4.31 - 10.16*       RBC                         5.15              3.88 - 5.62 *       Hemoglobin                  15.2              12.0 - 17.0 *       Hematocrit                  46.5              36.5 - 49.3 %       MCV                         90                82 - 98 fL          MCH                         29.5              26.8 - 34.3 *       MCHC                        32.7              31.4 - 37.4 *       RDW                         14.2              11.6 - 15.1 %       MPV                         8.6 (L)            8.9 - 12.7 fL       Platelets                   213               149 - 390 Th*       nRBC                        0                 /100 WBCs           Neutrophils Relative        60                43 - 75 %           Immat GRANS %               0                 0 - 2 %             Lymphocytes Relative        28                14 - 44 %           Monocytes Relative          10                4 - 12 %            Eosinophils Relative        1                 0 - 6 %             Basophils Relative          1                 0 - 1 %             Neutrophils Absolute        5.12              1.85 - 7.62 *       Immature Grans Absolute     0.02              0.00 - 0.20 *       Lymphocytes Absolute        2.42              0.60 - 4.47 *       Monocytes Absolute          0.81              0.17 - 1.22 *       Eosinophils Absolute        0.12              0.00 - 0.61 *       Basophils Absolute          0.07              0.00 - 0.10 *  -Comprehensive metabolic panel:   Collection Time: 01/11/24  6:26 PM       Result                      Value             Ref Range           Sodium                      136               135 - 147 mm*       Potassium                   4.3               3.5 - 5.3 mm*       Chloride                    99                96 - 108 mmo*       CO2                         26                21 - 32 mmol*       ANION GAP                   11                mmol/L              BUN                         20                5 - 25 mg/dL        Creatinine                  1.27              0.60 - 1.30 *       Glucose                     137               65 - 140 mg/*       Calcium                     10.1              8.4 - 10.2 m*       AST                         29                13 - 39 U/L         ALT                         32                7 - 52 U/L          Alkaline Phosphatase        57                34 - 104 U/L        Total Protein               7.8               6.4 - 8.4 g/*        "Albumin                     4.0               3.5 - 5.0 g/*       Total Bilirubin             0.50              0.20 - 1.00 *       eGFR                        62                ml/min/1.73s*  -HS Troponin 0hr (reflex protocol):   Collection Time: 01/11/24  6:26 PM       Result                      Value             Ref Range           hs TnI 0hr                  11                \"Refer to AC*  -TSH, 3rd generation with Free T4 reflex:   Collection Time: 01/11/24  6:26 PM       Result                      Value             Ref Range           TSH 3RD GENERATON           2.076             0.450 - 4.50*  -HS Troponin I 2hr:   Collection Time: 01/11/24  8:53 PM       Result                      Value             Ref Range           hs TnI 2hr                  26                \"Refer to AC*       Delta 2hr hsTnI             15                <20 ng/L       -D-dimer, quantitative:   Collection Time: 01/11/24  8:53 PM       Result                      Value             Ref Range           D-Dimer, Quant              0.53 (H)          <0.50 ug/ml *    Due to patient's history and presentation the following imaging was collected:  PE Study with CT Abdomen and Pelvis with contrast    (Results Pending)  No results found.       EKG: interpreted by myself as above.    Patient was administered:      Medication Administration - last 24 hours from 01/10/2024 2358 to   01/11/2024 2358       Date/Time Order Dose Route Action Action by     01/11/2024 2159 EST sodium chloride 0.9 % bolus 1,000 mL 1,000 mL   Intravenous New Bag Diane Ayala, RN     01/11/2024 2243 EST iohexol (OMNIPAQUE) 350 MG/ML injection (MULTI-DOSE)   100 mL 100 mL Intravenous Given John Rodriguez Jr.      Awaiting CT scan results, patient signed out to Dr. Sepulveda.        Amount and/or Complexity of Data Reviewed  Labs: ordered. Decision-making details documented in ED Course.  Radiology: ordered.    Risk  Prescription drug " management.          Disposition  Final diagnoses:   None     ED Disposition       None          Follow-up Information    None         Patient's Medications   Discharge Prescriptions    No medications on file     No discharge procedures on file.    PDMP Review         Value Time User    PDMP Reviewed  Yes 9/12/2022 12:24 PM Graciela Taylor PA-C             ED Provider  Attending physically available and evaluated Ben Mathur. I managed the patient along with the ED Attending.    Electronically Signed by           Lis Jaime MD  01/12/24 0004

## 2024-01-15 ENCOUNTER — TELEPHONE (OUTPATIENT)
Dept: ADMINISTRATIVE | Facility: OTHER | Age: 59
End: 2024-01-15

## 2024-01-15 NOTE — TELEPHONE ENCOUNTER
01/15/24 3:54 PM  Patient called and made appt with  Cardiology Andrea    Thank you.  Miguelina Gatica MA  [facility]

## 2024-01-18 PROBLEM — R06.02 SOB (SHORTNESS OF BREATH): Status: RESOLVED | Noted: 2021-05-03 | Resolved: 2024-01-18

## 2024-01-18 PROBLEM — B49 FUNGAL INFECTION: Status: RESOLVED | Noted: 2021-08-16 | Resolved: 2024-01-18

## 2024-01-18 PROBLEM — R94.6 ABNORMAL THYROID FUNCTION TEST: Status: RESOLVED | Noted: 2021-01-14 | Resolved: 2024-01-18

## 2024-01-18 PROBLEM — R51.9 HEADACHE: Status: RESOLVED | Noted: 2022-09-25 | Resolved: 2024-01-18

## 2024-01-18 PROBLEM — E87.1 HYPONATREMIA: Status: RESOLVED | Noted: 2019-09-06 | Resolved: 2024-01-18

## 2024-01-18 PROBLEM — R79.89 ELEVATED SERUM CREATININE: Status: RESOLVED | Noted: 2021-11-19 | Resolved: 2024-01-18

## 2024-01-20 DIAGNOSIS — E11.65 TYPE 2 DIABETES MELLITUS WITH HYPERGLYCEMIA, WITH LONG-TERM CURRENT USE OF INSULIN (HCC): ICD-10-CM

## 2024-01-20 DIAGNOSIS — Z79.4 TYPE 2 DIABETES MELLITUS WITH HYPERGLYCEMIA, WITH LONG-TERM CURRENT USE OF INSULIN (HCC): ICD-10-CM

## 2024-01-22 ENCOUNTER — OFFICE VISIT (OUTPATIENT)
Dept: INTERNAL MEDICINE CLINIC | Facility: CLINIC | Age: 59
End: 2024-01-22
Payer: MEDICARE

## 2024-01-22 VITALS
HEIGHT: 64 IN | SYSTOLIC BLOOD PRESSURE: 128 MMHG | RESPIRATION RATE: 16 BRPM | OXYGEN SATURATION: 99 % | DIASTOLIC BLOOD PRESSURE: 80 MMHG | WEIGHT: 315 LBS | BODY MASS INDEX: 53.78 KG/M2

## 2024-01-22 DIAGNOSIS — E11.42 TYPE 2 DIABETES MELLITUS WITH DIABETIC POLYNEUROPATHY, WITH LONG-TERM CURRENT USE OF INSULIN (HCC): Primary | ICD-10-CM

## 2024-01-22 DIAGNOSIS — R60.0 BILATERAL LEG EDEMA: ICD-10-CM

## 2024-01-22 DIAGNOSIS — Z79.4 TYPE 2 DIABETES MELLITUS WITH DIABETIC POLYNEUROPATHY, WITH LONG-TERM CURRENT USE OF INSULIN (HCC): Primary | ICD-10-CM

## 2024-01-22 DIAGNOSIS — I10 ESSENTIAL HYPERTENSION: ICD-10-CM

## 2024-01-22 DIAGNOSIS — E78.5 HYPERLIPIDEMIA, UNSPECIFIED HYPERLIPIDEMIA TYPE: ICD-10-CM

## 2024-01-22 DIAGNOSIS — L03.116 CELLULITIS OF LEFT LEG: ICD-10-CM

## 2024-01-22 DIAGNOSIS — L97.921 ULCER OF LEFT LOWER EXTREMITY, LIMITED TO BREAKDOWN OF SKIN (HCC): ICD-10-CM

## 2024-01-22 DIAGNOSIS — R00.0 SINUS TACHYCARDIA: ICD-10-CM

## 2024-01-22 DIAGNOSIS — Z23 ENCOUNTER FOR IMMUNIZATION: ICD-10-CM

## 2024-01-22 PROBLEM — E83.52 HYPERCALCEMIA: Status: RESOLVED | Noted: 2022-06-28 | Resolved: 2024-01-22

## 2024-01-22 LAB
SL AMB  POCT GLUCOSE, UA: NORMAL
SL AMB LEUKOCYTE ESTERASE,UA: NORMAL
SL AMB POCT BILIRUBIN,UA: NORMAL
SL AMB POCT BLOOD,UA: NORMAL
SL AMB POCT CLARITY,UA: CLEAR
SL AMB POCT COLOR,UA: YELLOW
SL AMB POCT KETONES,UA: NORMAL
SL AMB POCT NITRITE,UA: NORMAL
SL AMB POCT PH,UA: 5
SL AMB POCT SPECIFIC GRAVITY,UA: 1000
SL AMB POCT URINE PROTEIN: NORMAL
SL AMB POCT UROBILINOGEN: NORMAL

## 2024-01-22 PROCEDURE — 90686 IIV4 VACC NO PRSV 0.5 ML IM: CPT

## 2024-01-22 PROCEDURE — 81002 URINALYSIS NONAUTO W/O SCOPE: CPT

## 2024-01-22 PROCEDURE — 99911: CPT | Performed by: INTERNAL MEDICINE

## 2024-01-22 PROCEDURE — 90471 IMMUNIZATION ADMIN: CPT

## 2024-01-22 PROCEDURE — 99214 OFFICE O/P EST MOD 30 MIN: CPT

## 2024-01-22 RX ORDER — AMOXICILLIN 500 MG/1
500 CAPSULE ORAL 3 TIMES DAILY
Qty: 21 CAPSULE | Refills: 0 | Status: SHIPPED | OUTPATIENT
Start: 2024-01-22 | End: 2024-01-22

## 2024-01-22 RX ORDER — INSULIN ASPART 100 [IU]/ML
INJECTION, SOLUTION INTRAVENOUS; SUBCUTANEOUS
Qty: 30 ML | Refills: 3 | Status: SHIPPED | OUTPATIENT
Start: 2024-01-22

## 2024-01-22 RX ORDER — LEVOFLOXACIN 500 MG/1
500 TABLET, FILM COATED ORAL EVERY 24 HOURS
Qty: 10 TABLET | Refills: 0 | Status: SHIPPED | OUTPATIENT
Start: 2024-01-22 | End: 2024-02-01

## 2024-01-22 NOTE — ASSESSMENT & PLAN NOTE
Recent ED visit complaining of palpitations an lightheadedness  Recent setting of abdominal wall cellulitis on ABX therapy   Workup in ED unremarkable   Sinus Tachycardia 129, + D Dimer  CTA PE study- negative   Follow up with Cardiology, missed appointment rescheduled for March 2024  Asymptomatic sx ED visit     Plan-   ECHO   Follow up with Cardiology

## 2024-01-22 NOTE — PROGRESS NOTES
Name: Ben Mathur      : 1965      MRN: [id number]  Encounter Provider: Vanessa Hayden MD  Encounter Date: 2024   Encounter department: [facility] INTERNAL MEDICINE [place]    Assessment & Plan     1. Insulin-dependent diabetes mellitus with neuropathy  -     Hemoglobin A1C; Future  -     POCT urine dip    2. Essential hypertension    3. Bilateral leg edema    4. Sinus tachycardia  Assessment & Plan:  Recent ED visit complaining of palpitations an lightheadedness  Recent setting of abdominal wall cellulitis on ABX therapy   Workup in ED unremarkable   Sinus Tachycardia 129, + D Dimer  CTA PE study- negative   Follow up with Cardiology, missed appointment rescheduled for 2024  Asymptomatic sx ED visit     Plan-   ECHO   Follow up with Cardiology        Orders:  -     Echo complete w/ contrast if indicated; Future; Expected date: 2024  -     Ambulatory Referral to Cardiology; Future    5. Ulcer of left lower extremity, limited to breakdown of skin (HCC)    6. Hyperlipidemia, unspecified hyperlipidemia type  -     Lipid Panel with Direct LDL reflex; Future    7. Cellulitis of left leg  -     levofloxacin (LEVAQUIN) 500 mg tablet; Take 1 tablet (500 mg total) by mouth every 24 hours for 10 days    8. Encounter for immunization  -     influenza vaccine, quadrivalent, 0.5 mL, preservative-free, for adult and pediatric patients 6 mos+ (AFLURIA, FLUARIX, FLULAVAL, FLUZONE)           Subjective      Mr. Mathur is a 59 yo M pt with a PMH of morbid obesity, ambulatory dysfunction, lymphedema with recurrent cellulitis, HTN, HLD, T2DM, DAHIANA, Hypothyroidism who presents to the office after an ED visit for rapid heart rate. Initial workup was negative however patient has not been able to follow up with Cardiology since discharge. Patient is also here today for insurance forms to be completed so he does not lose coverage. Patient reports he feels well after ED visit and has not had any new sx since. He  does have an upcoming appointment in March with Cardiology. Patient denies fevers, chills, CP, SOB, palpitations, abdominal pain, nvd, open ulcers/wounds, HA, dizziness, numbness, tingling, weakness, or any other sx at this time.       Review of Systems   Constitutional:  Negative for chills and fever.   HENT:  Negative for ear pain and sore throat.    Eyes:  Negative for pain and visual disturbance.   Respiratory:  Negative for cough and shortness of breath.    Cardiovascular:  Negative for chest pain, palpitations and leg swelling.   Gastrointestinal:  Negative for abdominal distention, abdominal pain, constipation, diarrhea, nausea and vomiting.   Genitourinary:  Negative for dysuria and hematuria.   Musculoskeletal:  Negative for arthralgias and back pain.   Skin:  Negative for color change and rash.   Neurological:  Negative for dizziness, seizures, syncope, weakness, light-headedness, numbness and headaches.   Psychiatric/Behavioral:  Negative for agitation and confusion.    All other systems reviewed and are negative.      Current Outpatient Medications on File Prior to Visit   Medication Sig    acetaminophen (TYLENOL) 325 mg tablet 325 mg 3 (three) times a day as needed    albuterol (PROVENTIL HFA,VENTOLIN HFA) 90 mcg/act inhaler TAKE 2 PUFFS BY MOUTH EVERY 6 HOURS AS NEEDED FOR WHEEZE    Blood Glucose Monitoring Suppl (ONETOUCH VERIO) w/Device KIT Use to test sugar daily    Continuous Blood Gluc  (FreeStyle Elvin 2 Palco Systm) BROOKS Use 1 Device as needed (use with sensors for bs checks)    Continuous Blood Gluc Sensor (FreeStyle Elvin 3 Sensor) MISC REPLACE EVERY 14 DAYS    dulaglutide (Trulicity) 1.5 MG/0.5ML injection Inject 0.5 mL (1.5 mg total) under the skin every 7 days    dulaglutide (Trulicity) 3 MG/0.5ML injection INJECT 3MG SUBCUT ONCE PER WEEK    ezetimibe (ZETIA) 10 mg tablet TAKE 1 TABLET BY MOUTH EVERY DAY    furosemide (LASIX) 20 mg tablet TAKE 2 TABLETS EVERY DAY IN THE EARLY  MORNING AND 1 TABLET DAILY AFTER LUNCH.    gabapentin (NEURONTIN) 100 mg capsule Take 1 capsule (100 mg total) by mouth 2 (two) times a day With 300mg capsule for a total of 400mg twice a day    gabapentin (NEURONTIN) 300 mg capsule TAKE 1 CAPSULE (300 MG TOTAL) BY MOUTH 2 (TWO) TIMES A DAY TAKE 1 TAB WITH YOUR 100MG TAB TWICE DAILY    insulin aspart (NovoLOG FlexPen) 100 UNIT/ML injection pen INJECT 14-22 UNITS WITH MEALS+SCALE    Insulin Glargine Solostar (Lantus SoloStar) 100 UNIT/ML SOPN INJECT 40 UNITS UNDER THE SKIN DAILY AT BEDTIME    Insulin Pen Needle (BD Pen Needle Ludmila 2nd Gen) 32G X 4 MM MISC Inject under the skin 4 (four) times a day    Lancets (ONETOUCH ULTRASOFT) lancets Use to test sugar 2 times a day    levothyroxine 25 mcg tablet TAKE 1 TABLET (25 MCG TOTAL) BY MOUTH DAILY IN THE EARLY MORNING    losartan (COZAAR) 25 mg tablet TAKE 1 TABLET (25 MG TOTAL) BY MOUTH DAILY.    metFORMIN (GLUCOPHAGE) 1000 MG tablet TAKE 1 TABLET BY MOUTH TWICE A DAY    nystatin (MYCOSTATIN) powder APPLY 1 APPLICATION TOPICALLY 2 (TWO) TIMES A DAY TO AFFECTED AREA    omeprazole (PriLOSEC) 40 MG capsule TAKE 1 CAPSULE BY MOUTH TWICE A DAY    OneTouch Verio test strip USE TO TEST SUGAR 2 TIMES A DAY    psyllium (METAMUCIL) packet Take 1 packet by mouth daily    traZODone (DESYREL) 50 mg tablet TAKE 1 TABLET (50 MG TOTAL) BY MOUTH DAILY AT BEDTIME AS NEEDED FOR SLEEP    Continuous Blood Gluc Sensor (FreeStyle Elvin 14 Day Sensor) MISC Scan 4x daily (Patient not taking: Reported on 1/22/2024)    ibuprofen (MOTRIN) 600 mg tablet Take 600 mg by mouth 3 (three) times a day as needed (Patient not taking: Reported on 1/22/2024)    saccharomyces boulardii (FLORASTOR) 250 mg capsule Take 1 capsule (250 mg total) by mouth 2 (two) times a day (Patient not taking: Reported on 11/9/2023)    [DISCONTINUED] amoxicillin (AMOXIL) 500 mg capsule Take 500 mg by mouth 3 (three) times a day (Patient not taking: Reported on 11/9/2023)     "[DISCONTINUED] insulin aspart (NovoLOG FlexPen) 100 UNIT/ML injection pen INJECT 14-22 UNITS WITH MEALS+SCALE       Objective     /80 (BP Location: Left arm, Patient Position: Sitting, Cuff Size: Extra-Large)   Resp 16   Ht 5' 4\" (1.626 m)   Wt (!) 195 kg (430 lb)   SpO2 99%   BMI 73.81 kg/m²     Physical Exam  Constitutional:       General: He is not in acute distress.     Appearance: Normal appearance. He is not toxic-appearing.      Comments: Morbidly obese    HENT:      Head: Normocephalic and atraumatic.      Mouth/Throat:      Mouth: Mucous membranes are moist.   Eyes:      Extraocular Movements: Extraocular movements intact.      Conjunctiva/sclera: Conjunctivae normal.      Pupils: Pupils are equal, round, and reactive to light.   Cardiovascular:      Rate and Rhythm: Normal rate and regular rhythm.      Pulses: Normal pulses.      Heart sounds: Normal heart sounds. No murmur heard.     No friction rub. No gallop.   Pulmonary:      Effort: Pulmonary effort is normal.      Breath sounds: Normal breath sounds. No wheezing or rhonchi.   Abdominal:      General: Bowel sounds are normal. There is no distension.      Palpations: Abdomen is soft.      Tenderness: There is no abdominal tenderness.      Comments: Healed cellulitic changes under pannus, no signs of erythema, pus, drainage, ulcerations    Musculoskeletal:      Cervical back: Normal range of motion.      Right lower leg: No edema.      Left lower leg: No edema.      Comments: Chronic lymphedema    Skin:     General: Skin is warm.   Neurological:      General: No focal deficit present.      Mental Status: He is alert and oriented to person, place, and time.      Motor: No weakness.   Psychiatric:         Mood and Affect: Mood normal.         Behavior: Behavior normal.         Thought Content: Thought content normal.       Vanessa Hayden MD    "

## 2024-02-02 ENCOUNTER — CONSULT (OUTPATIENT)
Dept: INTERNAL MEDICINE CLINIC | Facility: CLINIC | Age: 59
End: 2024-02-02
Payer: MEDICARE

## 2024-02-02 VITALS
RESPIRATION RATE: 16 BRPM | DIASTOLIC BLOOD PRESSURE: 88 MMHG | WEIGHT: 315 LBS | TEMPERATURE: 99.2 F | OXYGEN SATURATION: 97 % | SYSTOLIC BLOOD PRESSURE: 138 MMHG | BODY MASS INDEX: 53.78 KG/M2 | HEART RATE: 94 BPM | HEIGHT: 64 IN

## 2024-02-02 DIAGNOSIS — L03.116 CELLULITIS OF LEFT LOWER EXTREMITY: ICD-10-CM

## 2024-02-02 DIAGNOSIS — E78.00 PURE HYPERCHOLESTEROLEMIA: ICD-10-CM

## 2024-02-02 DIAGNOSIS — E11.65 TYPE 2 DIABETES MELLITUS WITH HYPERGLYCEMIA, WITH LONG-TERM CURRENT USE OF INSULIN (HCC): Primary | ICD-10-CM

## 2024-02-02 DIAGNOSIS — Z79.4 CURRENT USE OF INSULIN (HCC): ICD-10-CM

## 2024-02-02 DIAGNOSIS — E03.9 HYPOTHYROIDISM, UNSPECIFIED TYPE: ICD-10-CM

## 2024-02-02 DIAGNOSIS — B49 FUNGAL INFECTION: ICD-10-CM

## 2024-02-02 DIAGNOSIS — N18.1 TYPE 2 DIABETES MELLITUS WITH STAGE 1 CHRONIC KIDNEY DISEASE, WITH LONG-TERM CURRENT USE OF INSULIN (HCC): ICD-10-CM

## 2024-02-02 DIAGNOSIS — E11.22 TYPE 2 DIABETES MELLITUS WITH STAGE 1 CHRONIC KIDNEY DISEASE, WITH LONG-TERM CURRENT USE OF INSULIN (HCC): ICD-10-CM

## 2024-02-02 DIAGNOSIS — E66.01 MORBID OBESITY (HCC): ICD-10-CM

## 2024-02-02 DIAGNOSIS — Z79.4 TYPE 2 DIABETES MELLITUS WITH HYPERGLYCEMIA, WITH LONG-TERM CURRENT USE OF INSULIN (HCC): Primary | ICD-10-CM

## 2024-02-02 DIAGNOSIS — E11.49 OTHER DIABETIC NEUROLOGICAL COMPLICATION ASSOCIATED WITH TYPE 2 DIABETES MELLITUS (HCC): ICD-10-CM

## 2024-02-02 DIAGNOSIS — Z79.4 TYPE 2 DIABETES MELLITUS WITH STAGE 1 CHRONIC KIDNEY DISEASE, WITH LONG-TERM CURRENT USE OF INSULIN (HCC): ICD-10-CM

## 2024-02-02 PROCEDURE — 99214 OFFICE O/P EST MOD 30 MIN: CPT | Performed by: INTERNAL MEDICINE

## 2024-02-02 RX ORDER — BLOOD-GLUCOSE SENSOR
EACH MISCELLANEOUS
Qty: 2 EACH | Refills: 5 | Status: SHIPPED | OUTPATIENT
Start: 2024-02-02

## 2024-02-02 RX ORDER — GABAPENTIN 100 MG/1
100 CAPSULE ORAL 2 TIMES DAILY
Qty: 60 CAPSULE | Refills: 5 | Status: SHIPPED | OUTPATIENT
Start: 2024-02-02

## 2024-02-02 RX ORDER — NYSTATIN 100000 [USP'U]/G
POWDER TOPICAL 2 TIMES DAILY
Qty: 120 G | Refills: 3 | Status: SHIPPED | OUTPATIENT
Start: 2024-02-02

## 2024-02-02 RX ORDER — GABAPENTIN 300 MG/1
300 CAPSULE ORAL 2 TIMES DAILY
Qty: 180 CAPSULE | Refills: 2 | Status: SHIPPED | OUTPATIENT
Start: 2024-02-02

## 2024-02-02 NOTE — PROGRESS NOTES
Assessment/Plan:     Problem List Items Addressed This Visit          Endocrine    Type 2 diabetes mellitus, with long-term current use of insulin (HCC)       Lab Results   Component Value Date    HGBA1C 6.8 (H) 2023     Home regimen includes Metformin 1000 BID  Glargine 40U HS   Aspart 12U TID with breakfast, lunch, and dinner, requirements range between 10-14 depending on intake  Trulicity 3mg Qweekly for a few months   Meter- Freestyle Elvin 3; experienced some difficulties with abnormal reads, however was sent new devices with resolution  Phone  prior to appointment, this AM BG 99   Denies open wounds, foot ulcers, polyuria, polydipsia, worsened neuropathy  Recent DM eye exam last year  Follows Podiatry recently seen    Follow Dentist recently seen 3 months ago   DM foot exam due     Plan-   Will send time in range BG reading through [employer]  Will increase Trulicity to 4.5mg Qweekly; in hopes to decrease insulin requirements  Continue current insulin regimen   RTO 4 months               Relevant Medications    dulaglutide (Trulicity) 4.5 MG/0.5ML injection    Continuous Blood Gluc Sensor (FreeStyle Elvin 3 Sensor) MISC    Hypothyroidism     Denies any sx of hyper or hypothyroid  VSS in office   Home regimen includes Levothyroxine 25mcg QD   Recent TSH 2.076    Plan-  Continue current regimen, no adjustments at this time          Type 2 diabetes mellitus with hyperglycemia (HCC) - Primary    Relevant Medications    dulaglutide (Trulicity) 4.5 MG/0.5ML injection    Continuous Blood Gluc Sensor (FreeStyle Elvin 3 Sensor) MISC       Other    Hyperlipidemia    Morbid obesity    Current use of insulin (HCC)    Relevant Medications    dulaglutide (Trulicity) 4.5 MG/0.5ML injection    Continuous Blood Gluc Sensor (FreeStyle Elvin 3 Sensor) MISC         Subjective:      Patient ID: Ben Mathur is a 58 y.o. male.    Mr. Mathur is a 57 yo M with a PMH of morbid obesity, T2DM, Hypothyroidism, HTN,  CINDI who presents to the office for a diabetes follow up. Patient reports excellent compliance with home medications. He did endorse that he was experiencing some meter difficulties with his Freestyle Elvin 3, where it woke him from his sleep with a BG read in the 380s. Patient reported that his diet did not change to expect this high reading. He did note that he manually check his BG and it was 150. He did reach out to Freestyle Elvin and was able to receive new meters. Since he has received the new meters his BG have been controlled. Unfortunately, this morning prior to the visit today his phone had , but endorsed a morning BG of 99. Patient feels well and denies any complaints at the time of this visit. Patient denies HA, dizziness, confusion, polyuria, polydipsia, polyphagia, open wounds, foot ulcerations, urinary changes, CP, SOB, palpitations, neck pain, vision changes, fatigue, depression, abdominal pain, nvd, or any other sx at this time.         The following portions of the patient's history were reviewed and updated as appropriate: allergies, current medications, past family history, past medical history, past social history, past surgical history, and problem list.    Review of Systems   Constitutional:  Negative for activity change, appetite change, chills, diaphoresis, fatigue, fever and unexpected weight change.   HENT:  Negative for congestion, ear pain, sore throat, trouble swallowing and voice change.    Eyes:  Negative for pain and visual disturbance.   Respiratory:  Negative for cough, chest tightness, shortness of breath and wheezing.    Cardiovascular:  Negative for chest pain and palpitations.   Gastrointestinal:  Negative for abdominal distention, abdominal pain, diarrhea, nausea and vomiting.   Endocrine: Negative for cold intolerance, heat intolerance, polydipsia, polyphagia and polyuria.   Genitourinary:  Negative for decreased urine volume, dysuria, frequency, hematuria and urgency.  "  Musculoskeletal:  Negative for arthralgias, back pain and myalgias.   Skin:  Negative for color change and rash.   Neurological:  Negative for dizziness, seizures, syncope, weakness, light-headedness, numbness and headaches.   Psychiatric/Behavioral:  Negative for agitation, confusion and hallucinations. The patient is not hyperactive.    All other systems reviewed and are negative.        Objective:      /88 (BP Location: Right arm, Patient Position: Sitting, Cuff Size: Extra-Large)   Pulse 94   Temp 99.2 °F (37.3 °C) (Tympanic)   Resp 16   Ht 5' 4\" (1.626 m)   Wt (!) 195 kg (430 lb)   SpO2 97%   BMI 73.81 kg/m²          Physical Exam  Constitutional:       Comments: Morbid obesity    HENT:      Head: Normocephalic and atraumatic.      Mouth/Throat:      Mouth: Mucous membranes are moist.   Eyes:      General: No scleral icterus.     Extraocular Movements: Extraocular movements intact.      Conjunctiva/sclera: Conjunctivae normal.      Pupils: Pupils are equal, round, and reactive to light.   Neck:      Thyroid: No thyroid mass, thyromegaly or thyroid tenderness.      Trachea: Trachea and phonation normal.   Cardiovascular:      Rate and Rhythm: Normal rate and regular rhythm.      Pulses: Normal pulses.      Heart sounds: Normal heart sounds.   Pulmonary:      Effort: Pulmonary effort is normal.      Breath sounds: Normal breath sounds.      Comments: Decreased bs secondary to body habitus   Abdominal:      General: Bowel sounds are normal. There is no distension.      Palpations: Abdomen is soft.      Tenderness: There is no abdominal tenderness.   Musculoskeletal:         General: No tenderness. Normal range of motion.      Cervical back: Full passive range of motion without pain, normal range of motion and neck supple. No rigidity or tenderness.   Skin:     General: Skin is warm.      Coloration: Skin is not pale.      Comments: No open wounds    Neurological:      General: No focal deficit " present.      Mental Status: He is alert and oriented to person, place, and time. Mental status is at baseline.      Cranial Nerves: No cranial nerve deficit.      Motor: No weakness.      Coordination: Coordination normal.      Gait: Gait normal.   Psychiatric:         Mood and Affect: Mood normal.         Behavior: Behavior normal.         Thought Content: Thought content normal.         Vanessa Hayden

## 2024-02-02 NOTE — PATIENT INSTRUCTIONS

## 2024-02-02 NOTE — ASSESSMENT & PLAN NOTE
Lab Results   Component Value Date    HGBA1C 6.8 (H) 2023     Home regimen includes Metformin 1000 BID  Glargine 40U HS   Aspart 12U TID with breakfast, lunch, and dinner, requirements range between 10-14 depending on intake  Trulicity 3mg Qweekly for a few months   Meter- [employer] Elvin 3; experienced some difficulties with abnormal reads, however was sent new devices with resolution  Phone  prior to appointment, this AM BG 99   Denies open wounds, foot ulcers, polyuria, polydipsia, worsened neuropathy  Recent DM eye exam last year  Follows Podiatry recently seen    Follow Dentist recently seen 3 months ago   DM foot exam due     Plan-   Will send time in range BG reading through [employer]  Will increase Trulicity to 4.5mg Qweekly; in hopes to decrease insulin requirements  Continue current insulin regimen   RTO 4 months

## 2024-02-02 NOTE — PROGRESS NOTES
Assessment/Plan:    No problem-specific Assessment & Plan notes found for this encounter.         Problem List Items Addressed This Visit    None        Subjective:      Patient ID: Ben Mathur is a 58 y.o. male.    HPI    The following portions of the patient's history were reviewed and updated as appropriate: allergies, current medications, past family history, past medical history, past social history, past surgical history, and problem list    Review of Systems      Objective:      There were no vitals taken for this visit.         Physical Exam

## 2024-02-02 NOTE — ASSESSMENT & PLAN NOTE
Denies any sx of hyper or hypothyroid  VSS in office   Home regimen includes Levothyroxine 25mcg QD   Recent TSH 2.076    Plan-  Continue current regimen, no adjustments at this time

## 2024-02-07 ENCOUNTER — CONSULT (OUTPATIENT)
Dept: CARDIOLOGY CLINIC | Facility: CLINIC | Age: 59
End: 2024-02-07
Payer: MEDICARE

## 2024-02-07 VITALS — HEART RATE: 105 BPM | OXYGEN SATURATION: 99 % | WEIGHT: 315 LBS | HEIGHT: 64 IN | BODY MASS INDEX: 53.78 KG/M2

## 2024-02-07 DIAGNOSIS — R00.0 SINUS TACHYCARDIA: ICD-10-CM

## 2024-02-07 DIAGNOSIS — I10 ESSENTIAL HYPERTENSION: ICD-10-CM

## 2024-02-07 DIAGNOSIS — R00.2 RAPID PALPITATIONS: ICD-10-CM

## 2024-02-07 PROCEDURE — 99244 OFF/OP CNSLTJ NEW/EST MOD 40: CPT | Performed by: INTERNAL MEDICINE

## 2024-02-07 RX ORDER — LISINOPRIL 2.5 MG/1
2.5 TABLET ORAL DAILY
Qty: 90 TABLET | Refills: 3 | Status: SHIPPED | OUTPATIENT
Start: 2024-02-07

## 2024-02-07 NOTE — PROGRESS NOTES
"                                           Cardiology Consultation     Ben Mathur  [id number]  1965  [facility] [place]  [address] [address] [phone]      1. Rapid palpitations  Ambulatory Referral to Cardiology    AMB extended holter monitor      2. Sinus tachycardia  Ambulatory Referral to Cardiology    AMB extended holter monitor      3. Essential hypertension  lisinopril (ZESTRIL) 2.5 mg tablet          Discussion/Summary:    He has multiple ECGs which all show sinus tachycardia. Certainly, this could be explained by his deconditioning, morbid obesity. His blood pressure is low in the office with me today. This could be driving it a little bit, also.    He is on 40 mg of Lasix in the morning. He does not seem to drink a lot of fluid throughout the day.    I will check a 1 week Zio patch. I want to change his antihypertensive regimen. Stop losartan. I will put him on a very low-dose of lisinopril, instead to help with some renal protection with his diabetes. I discussed the potential use of a beta-blocker or calcium channel blocker but his blood pressure may preclude this.    If this is all sinus, then we may opt for conservative management. His heart rate sometimes going up into the 200s which she reports and is indicated on his watch is more suggestive of an arrhythmia, but the monitor should shed some light on this.    He had an echocardiogram previously which showed no structural heart disease.          History of Present Illness:    58-year-old man is referred to the office today for palpitations and episodes of lightheadedness.    He is morbidly obese. He had critical illness in 2019. He was intubated for a while. Tells me he is still \"gradually recovering \"from this.    Has hypertension, diabetes. Dyslipidemia. He has lymphedema. He is on Lasix 40 mg in the morning. He has been on losartan. I got a low blood pressure. He is taking " this likely partly because of diabetes, as well.    He used to be on a statin. Said he had some problems with it and is switched to Zetia which she has been on for several years.    Palpitations are intermittent. He wears a heart rate tracker which reports that his heart rate goes as high as the 190s to 200s. He recently was in the emergency room. His EKGs dating back years have shown sinus tachycardia.    Uses a walker. He has not actually lost consciousness.    Echocardiogram was done back in 2019 which was unremarkable with no structural heart disease. Repeat has been ordered but not necessarily performed.    Patient Active Problem List   Diagnosis    Type 2 diabetes mellitus, with long-term current use of insulin (HCC)    Hyperlipidemia    Psoriasis    Venous insufficiency    Gout    Essential hypertension    Gluten intolerance    Diabetic neuropathy (HCC)    Insomnia    Erectile dysfunction    Bilateral leg edema    Elevated CO2 level    DAHIANA (obstructive sleep apnea)    Morbid obesity    Migraine headache    Onychomycosis    Ulcer of left lower extremity, limited to breakdown of skin (HCC)    Pressure injury of right leg    Metabolic syndrome    Low testosterone in male    Hypothyroidism    Pressure injury of left leg    GERD (gastroesophageal reflux disease)    Chronic pain    Type 2 diabetes mellitus with hyperglycemia (HCC)    Current use of insulin (HCC)    Obesity hypoventilation syndrome (HCC)    Sinus tachycardia     Past Medical History:   Diagnosis Date    Acute kidney injury 10/28/2021    Anemia 09/13/2019    Cellulitis     last assessed 12/10/15    Cellulitis of left lower extremity     Cellulitis of right lower extremity 11/19/2021    Cough 10/20/2019    Diabetes mellitus (HCC)     Disease of thyroid gland     Edema     Elevated liver enzymes     Elevated serum creatinine 11/19/2021    Esophageal reflux     Fungal infection 08/16/2021    Gluten intolerance     Gout     last assessed 09/05/13     Hypercalcemia     Hyperglycemia     Hypertension     Hyponatremia 09/06/2019    IBS (irritable bowel syndrome)     Insomnia     Obesity     Osteoarthritis of knee     last assessed 02/10/14    Scrotal swelling 05/19/2020    Shortness of breath 05/03/2021    Venous insufficiency     last assessed 08/22/17    Villonodular synovitis of the hand, right     last assessed 11/14/2013     Social History     Tobacco Use    Smoking status: Never    Smokeless tobacco: Never   Vaping Use    Vaping status: Never Used   Substance Use Topics    Alcohol use: Not Currently     Alcohol/week: 0.0 standard drinks of alcohol    Drug use: Yes     Types: Marijuana     Comment: medical      Family History   Problem Relation Age of Onset    Cancer Mother         gastrc    Colon cancer Father     Heart failure Father      Past Surgical History:   Procedure Laterality Date    INCISION AND DRAINAGE OF WOUND Left 9/6/2019    Procedure: INCISION AND DRAINAGE (I&D) GROIN;  Surgeon: Carlos Ruano DO;  [facility];  Service: General    SKIN BIOPSY      WOUND DEBRIDEMENT Left 9/7/2019    Procedure: EXCISIONAL DEBRIDEMENT;  Surgeon: Carlos Ruano DO;  [facility];  Service: General       Current Outpatient Medications:     acetaminophen (TYLENOL) 325 mg tablet, 325 mg 3 (three) times a day as needed, Disp: , Rfl:     albuterol (PROVENTIL HFA,VENTOLIN HFA) 90 mcg/act inhaler, TAKE 2 PUFFS BY MOUTH EVERY 6 HOURS AS NEEDED FOR WHEEZE, Disp: 8.5 g, Rfl: 0    Continuous Blood Gluc Sensor (FreeStyle Elvin 3 Sensor) MISC, Replace every 14 days, Disp: 2 each, Rfl: 5    dulaglutide (Trulicity) 4.5 MG/0.5ML injection, Inject 0.5 mL (4.5 mg total) under the skin every 7 days, Disp: 2 mL, Rfl: 5    ezetimibe (ZETIA) 10 mg tablet, TAKE 1 TABLET BY MOUTH EVERY DAY, Disp: 90 tablet, Rfl: 2    furosemide (LASIX) 20 mg tablet, TAKE 2 TABLETS EVERY DAY IN THE EARLY MORNING AND 1 TABLET DAILY AFTER LUNCH., Disp: 270 tablet, Rfl: 2    gabapentin  (NEURONTIN) 100 mg capsule, TAKE 1 CAPSULE (100 MG TOTAL) BY MOUTH 2 (TWO) TIMES A DAY WITH 300MG CAPSULE FOR A TOTAL OF 400MG TWICE A DAY, Disp: 60 capsule, Rfl: 5    gabapentin (NEURONTIN) 300 mg capsule, TAKE 1 CAPSULE (300 MG TOTAL) BY MOUTH 2 (TWO) TIMES A DAY TAKE 1 TAB WITH YOUR 100MG TAB TWICE DAILY, Disp: 180 capsule, Rfl: 2    insulin aspart (NovoLOG FlexPen) 100 UNIT/ML injection pen, INJECT 14-22 UNITS WITH MEALS+SCALE, Disp: 30 mL, Rfl: 3    Insulin Glargine Solostar (Lantus SoloStar) 100 UNIT/ML SOPN, INJECT 40 UNITS UNDER THE SKIN DAILY AT BEDTIME, Disp: 15 mL, Rfl: 2    Insulin Pen Needle (BD Pen Needle Ludmila 2nd Gen) 32G X 4 MM MISC, Inject under the skin 4 (four) times a day, Disp: 200 each, Rfl: 1    Lancets (ONETOUCH ULTRASOFT) lancets, Use to test sugar 2 times a day, Disp: 100 each, Rfl: 3    levothyroxine 25 mcg tablet, TAKE 1 TABLET (25 MCG TOTAL) BY MOUTH DAILY IN THE EARLY MORNING, Disp: 90 tablet, Rfl: 1    lisinopril (ZESTRIL) 2.5 mg tablet, Take 1 tablet (2.5 mg total) by mouth daily, Disp: 90 tablet, Rfl: 3    metFORMIN (GLUCOPHAGE) 1000 MG tablet, TAKE 1 TABLET BY MOUTH TWICE A DAY, Disp: 180 tablet, Rfl: 1    nystatin powder, APPLY 1 APPLICATION TOPICALLY 2 (TWO) TIMES A DAY TO AFFECTED AREA, Disp: 120 g, Rfl: 3    omeprazole (PriLOSEC) 40 MG capsule, TAKE 1 CAPSULE BY MOUTH TWICE A DAY, Disp: 180 capsule, Rfl: 1    OneTouch Verio test strip, USE TO TEST SUGAR 2 TIMES A DAY, Disp: 100 strip, Rfl: 3    psyllium (METAMUCIL) packet, Take 1 packet by mouth daily, Disp: , Rfl: 0    traZODone (DESYREL) 50 mg tablet, TAKE 1 TABLET (50 MG TOTAL) BY MOUTH DAILY AT BEDTIME AS NEEDED FOR SLEEP, Disp: 90 tablet, Rfl: 0    Blood Glucose Monitoring Suppl (ONETOUCH VERIO) w/Device KIT, Use to test sugar daily (Patient not taking: Reported on 2/7/2024), Disp: 1 kit, Rfl: 0  Allergies   Allergen Reactions    Gluten Meal - Food Allergy     Clindamycin Diarrhea       Vitals:    02/07/24 1110   Pulse: 105  "  SpO2: 99%   Weight: (!) 197 kg (434 lb)   Height: 5' 4\" (1.626 m)     Vitals:    02/07/24 1110   Weight: (!) 197 kg (434 lb)      Height: 5' 4\" (162.6 cm)   Body mass index is 74.5 kg/m².    Physical Exam:  GENERAL: Alert, well appearing, and in no distress  HEENT:  PERRL, EOMI, no scleral icterus, no conjunctival pallor  NECK:  Supple, No elevated JVP, no thyromegaly, no carotid bruits  HEART:  Regular rate and rhythm, normal S1/S2, no S3/S4, no murmur or rub  LUNGS:  Clear to auscultation bilaterally  ABDOMEN:  Morbid obesity. Soft, non-tender, positive bowel sounds, no rebound or guarding  EXTREMITIES:  Edema. Wrapped LE  VASCULAR:  Normal pedal pulses   NEURO: No focal deficits,  SKIN: Normal without suspicious lesions on exposed skin      ROS:  Positive for edema, palpitations, shortness of breath, sleep apnea, erectile dysfunction, joint pains, difficulty with ambulation. Presyncope. Lightheadedness.  Except as noted in HPI, is otherwise reviewed in detail and a 12 point review of systems is negative.    Labs:  Lab Results   Component Value Date    SODIUM 136 01/11/2024    K 4.3 01/11/2024    CL 99 01/11/2024    CREATININE 1.27 01/11/2024    BUN 20 01/11/2024    CO2 26 01/11/2024    ALT 32 01/11/2024    AST 29 01/11/2024    INR 1.05 09/24/2022    GLUF 97 09/14/2023    HGBA1C 6.8 (H) 09/14/2023    WBC 8.56 01/11/2024    HGB 15.2 01/11/2024    HCT 46.5 01/11/2024     01/11/2024       Lab Results   Component Value Date    CHOL 231 12/03/2015    CHOL 206 02/06/2014    CHOL 198 10/10/2013     Lab Results   Component Value Date    HDL 34 (L) 09/14/2023    HDL 42 07/29/2022    HDL 37 (L) 06/24/2022     Lab Results   Component Value Date    LDLCALC 106 (H) 09/14/2023    LDLCALC 77 07/29/2022    LDLCALC 154 (H) 06/24/2022     Lab Results   Component Value Date    TRIG 177 (H) 09/14/2023    TRIG 181 (H) 07/29/2022    TRIG 244 (H) 06/24/2022       Testing:  Echo 9/2019:  LEFT VENTRICLE:  Systolic function was " normal. Ejection fraction was estimated to be 65 %.  There were no regional wall motion abnormalities.

## 2024-02-25 DIAGNOSIS — E66.01 OBESITY, MORBID, BMI 50 OR HIGHER (HCC): ICD-10-CM

## 2024-02-25 DIAGNOSIS — I10 HYPERTENSION GOAL BP (BLOOD PRESSURE) < 140/90: ICD-10-CM

## 2024-02-25 DIAGNOSIS — E78.00 PURE HYPERCHOLESTEROLEMIA: ICD-10-CM

## 2024-02-25 DIAGNOSIS — E11.65 TYPE 2 DIABETES MELLITUS WITH HYPERGLYCEMIA, WITH LONG-TERM CURRENT USE OF INSULIN (HCC): ICD-10-CM

## 2024-02-25 DIAGNOSIS — Z79.4 TYPE 2 DIABETES MELLITUS WITH HYPERGLYCEMIA, WITH LONG-TERM CURRENT USE OF INSULIN (HCC): ICD-10-CM

## 2024-02-25 DIAGNOSIS — R79.89 ELEVATED TSH: ICD-10-CM

## 2024-02-27 RX ORDER — PEN NEEDLE, DIABETIC 32GX 5/32"
NEEDLE, DISPOSABLE MISCELLANEOUS 4 TIMES DAILY
Qty: 200 EACH | Refills: 1 | Status: SHIPPED | OUTPATIENT
Start: 2024-02-27

## 2024-03-08 DIAGNOSIS — G47.00 INSOMNIA, UNSPECIFIED TYPE: ICD-10-CM

## 2024-03-08 RX ORDER — TRAZODONE HYDROCHLORIDE 50 MG/1
50 TABLET ORAL
Qty: 90 TABLET | Refills: 0 | Status: SHIPPED | OUTPATIENT
Start: 2024-03-08

## 2024-03-10 DIAGNOSIS — Z79.4 TYPE 2 DIABETES MELLITUS WITH HYPERGLYCEMIA, WITH LONG-TERM CURRENT USE OF INSULIN (HCC): ICD-10-CM

## 2024-03-10 DIAGNOSIS — E11.65 TYPE 2 DIABETES MELLITUS WITH HYPERGLYCEMIA, WITH LONG-TERM CURRENT USE OF INSULIN (HCC): ICD-10-CM

## 2024-03-11 RX ORDER — INSULIN GLARGINE 100 [IU]/ML
INJECTION, SOLUTION SUBCUTANEOUS
Qty: 15 ML | Refills: 2 | Status: SHIPPED | OUTPATIENT
Start: 2024-03-11

## 2024-03-12 DIAGNOSIS — K21.9 GASTROESOPHAGEAL REFLUX DISEASE: ICD-10-CM

## 2024-03-12 RX ORDER — OMEPRAZOLE 40 MG/1
CAPSULE, DELAYED RELEASE ORAL
Qty: 60 CAPSULE | Refills: 5 | Status: SHIPPED | OUTPATIENT
Start: 2024-03-12

## 2024-03-14 ENCOUNTER — PATIENT MESSAGE (OUTPATIENT)
Dept: INTERNAL MEDICINE CLINIC | Facility: CLINIC | Age: 59
End: 2024-03-14

## 2024-03-14 DIAGNOSIS — L03.116 CELLULITIS OF LEFT LOWER EXTREMITY: Primary | ICD-10-CM

## 2024-03-14 RX ORDER — LEVOFLOXACIN 500 MG/1
500 TABLET, FILM COATED ORAL EVERY 24 HOURS
Qty: 10 TABLET | Refills: 0 | Status: SHIPPED | OUTPATIENT
Start: 2024-03-14 | End: 2024-03-24

## 2024-03-15 ENCOUNTER — TELEPHONE (OUTPATIENT)
Dept: INTERNAL MEDICINE CLINIC | Facility: CLINIC | Age: 59
End: 2024-03-15

## 2024-03-15 NOTE — TELEPHONE ENCOUNTER
I left a message for Bentley Rios the rep that Ben has worked with in the past to order his Cricaid stockings. I have the order printed at my heather and I am awaiting a return call from Bentley so I can submit this order. Bentley's phone number is [phone]

## 2024-03-28 ENCOUNTER — TELEPHONE (OUTPATIENT)
Age: 59
End: 2024-03-28

## 2024-03-28 NOTE — TELEPHONE ENCOUNTER
Patient called stating he is unable to make the appointment he had with  on 4/4 and needed to reschedule.  The soonest opening for him in [place] is 8/8 which he is now scheduled for and placed on the wait list.  He stated he is doing pretty well but he is completing his LYLE test tonight for us to have the results soon.  He asked if the Doctor could advise him of those results if there is anything concerning and schedule him in the office sooner than August.  He also was unsure if this would have any effect on his compliance/insurance/supply orders if he needed to be seen within the 6 months. Please advise

## 2024-03-30 DIAGNOSIS — E11.65 TYPE 2 DIABETES MELLITUS WITH HYPERGLYCEMIA, WITH LONG-TERM CURRENT USE OF INSULIN (HCC): ICD-10-CM

## 2024-03-30 DIAGNOSIS — Z79.4 TYPE 2 DIABETES MELLITUS WITH HYPERGLYCEMIA, WITH LONG-TERM CURRENT USE OF INSULIN (HCC): ICD-10-CM

## 2024-04-01 NOTE — TELEPHONE ENCOUNTER
Arun Landeros, overnight pulse ox looked ok.  Just 7 mins of time below 89.  No major changes at this time

## 2024-04-25 ENCOUNTER — TELEPHONE (OUTPATIENT)
Dept: CARDIOLOGY CLINIC | Facility: CLINIC | Age: 59
End: 2024-04-25

## 2024-04-25 NOTE — TELEPHONE ENCOUNTER
----- Message from Go Guthrie RN sent at 4/24/2024  5:00 PM EDT -----  Regarding: FW: At-Home Heart Monitor  Contact: [phone]    ----- Message -----  From: Ben Mathur  Sent: 4/24/2024   4:53 PM EDT  To: Cardiology Pod Clinical  Subject: At-Home Heart Monitor                            I am writing to let you know, I never received the heart monitor in the mail. Just thought I should check in about whether you still wanted to send it.     Thanks,  Hernan POLLARD

## 2024-04-25 NOTE — TELEPHONE ENCOUNTER
Called pt and left message. I scheduled his Zio and let him know he should be getting it in the mail shortly. Also saw Dr. Echavarria ordered an ECHO for pt so gave order to Jennifer to have it scheduled. Left office callback number if he has any questions and in order for him to schedule his [place] follow up appointment in August.

## 2024-05-04 DIAGNOSIS — R60.0 BILATERAL LEG EDEMA: ICD-10-CM

## 2024-05-04 DIAGNOSIS — I10 ESSENTIAL HYPERTENSION: ICD-10-CM

## 2024-05-04 RX ORDER — FUROSEMIDE 20 MG/1
TABLET ORAL
Qty: 270 TABLET | Refills: 2 | Status: SHIPPED | OUTPATIENT
Start: 2024-05-04

## 2024-05-09 DIAGNOSIS — Z79.4 TYPE 2 DIABETES MELLITUS WITH HYPERGLYCEMIA, WITH LONG-TERM CURRENT USE OF INSULIN (HCC): ICD-10-CM

## 2024-05-09 DIAGNOSIS — E11.65 TYPE 2 DIABETES MELLITUS WITH HYPERGLYCEMIA, WITH LONG-TERM CURRENT USE OF INSULIN (HCC): ICD-10-CM

## 2024-05-09 RX ORDER — INSULIN GLARGINE 100 [IU]/ML
INJECTION, SOLUTION SUBCUTANEOUS
Qty: 45 ML | Refills: 1 | Status: SHIPPED | OUTPATIENT
Start: 2024-05-09

## 2024-05-09 NOTE — ASSESSMENT & PLAN NOTE
Small lesions on elbows and abdomen, he states he feel it's related to increase meat intake  He would like to try stop eating meat to see if would improve, he doesn't want topical treatment for now since they are small and don't bother him  15

## 2024-05-24 DIAGNOSIS — R00.2 RAPID PALPITATIONS: ICD-10-CM

## 2024-05-24 DIAGNOSIS — I47.10 PSVT (PAROXYSMAL SUPRAVENTRICULAR TACHYCARDIA): Primary | ICD-10-CM

## 2024-05-24 DIAGNOSIS — R00.0 SINUS TACHYCARDIA: ICD-10-CM

## 2024-05-24 RX ORDER — METOPROLOL SUCCINATE 25 MG/1
25 TABLET, EXTENDED RELEASE ORAL 2 TIMES DAILY
Qty: 180 TABLET | Refills: 3 | Status: SHIPPED | OUTPATIENT
Start: 2024-05-24

## 2024-05-28 DIAGNOSIS — Z79.4 TYPE 2 DIABETES MELLITUS WITH HYPERGLYCEMIA, WITH LONG-TERM CURRENT USE OF INSULIN (HCC): ICD-10-CM

## 2024-05-28 DIAGNOSIS — E11.65 TYPE 2 DIABETES MELLITUS WITH HYPERGLYCEMIA, WITH LONG-TERM CURRENT USE OF INSULIN (HCC): ICD-10-CM

## 2024-05-28 RX ORDER — INSULIN ASPART 100 [IU]/ML
INJECTION, SOLUTION INTRAVENOUS; SUBCUTANEOUS
Qty: 60 ML | Refills: 2 | Status: SHIPPED | OUTPATIENT
Start: 2024-05-28

## 2024-06-01 DIAGNOSIS — E03.9 HYPOTHYROIDISM, UNSPECIFIED TYPE: ICD-10-CM

## 2024-06-01 RX ORDER — LEVOTHYROXINE SODIUM 0.03 MG/1
25 TABLET ORAL
Qty: 90 TABLET | Refills: 1 | Status: SHIPPED | OUTPATIENT
Start: 2024-06-01

## 2024-06-04 NOTE — PROGRESS NOTES
06/04/24 0000   NIV Type   NIV Started/Stopped On   Equipment Type v60   Mode Bilevel   Mask Type Full face mask   Mask Size Large   Assessment   Pulse 72   Respirations 20   SpO2 96 %   Mask Compliance Good   Skin Protection for O2 Device Yes   Location Nose   Intervention(s) Skin Barrier   Breath Sounds   Respiratory Pattern Regular   Settings/Measurements   PIP Observed 17 cm H20   IPAP 15 cmH20   CPAP/EPAP 5 cmH2O   Vt (Measured) 959 mL   Rate Ordered 16   FiO2  50 %   Minute Volume (L/min) 18 Liters   Mask Leak (lpm) 53 lpm   Patient's Home Machine No   Alarm Settings   Alarms On Y   Low Pressure (cmH2O) 6 cmH2O   High Pressure (cmH2O) 40 cmH2O   Apnea (secs) 20 secs   RR Low (bpm) 6   RR High (bpm) 40 br/min        Virtual Brief Visit    Verification of patient location:    Patient is located in the following state in which I hold an active license [place]      Assessment/Plan:    Problem List Items Addressed This Visit        Other    Cellulitis of left lower extremity - Primary     Completed keflex, finishing fluconazole  Resolved, however one spot on inner thigh concerning patient  Not wanting to come to office, "still not well enough " did not want to video call  Plan:  · F/u next Monday to see if completely resolved  · dicussed preventative measures in detail                     Reason for visit is   Chief Complaint   Patient presents with    Virtual Brief Visit        Encounter provider Gene Jefferson MD    Provider located at [address] [place] [address] [address] [place] [address]  [phone]    Recent Visits  Date Type Provider Dept   09/16/21 Telemedicine Norberto Mcdowell MD  Internal [facility]   09/13/21 Telephone Sindy Walden  [facility]   Showing recent visits within past 7 days and meeting all other requirements  Today's Visits  Date Type Provider Dept   09/20/21 [facility]icine [facility]   Showing today's visits and meeting all other requirements  Future Appointments  No visits were found meeting these conditions  Showing future appointments within next 150 days and meeting all other requirements       After connecting through telephone, the patient was identified by name and date of birth  Yosvany Stephen was informed that this is a telemedicine visit and that the visit is being conducted through telephone  My office door was closed  No one else was in the room  He acknowledged consent and understanding of privacy and security of the platform  The patient has agreed to participate and understands he can discontinue the visit at any time  Patient is aware this is a billable service     It was my intent to perform this visit via video technology but the patient was not able to do a video connection so the visit was completed via audio telephone only  Subjective    Sandhya Godfrey is a 49 y o  male with recurring cellulitis, f/u for most recent episode of LLE cellulitis  States that after starting anti-fungal, symptoms have essentially resolved  Does have a hard and painful spot on medial aspect of left thigh, however this was present 4 weeks ago as well  no open wounds, fevers, chills  declined VNA services again  not mobile enough to come in person, but hoping to be able to come in  This is his 8th episode of cellulitis within 2 years         Past Medical History:   Diagnosis Date    Anemia 9/13/2019    Cellulitis     last assessed 12/10/15    Diabetes mellitus ([address] )     Disease of thyroid gland     Edema     Elevated liver enzymes     Esophageal reflux     Gluten intolerance     Gout     last assessed 09/05/13    Hyperglycemia     Hypertension     IBS (irritable bowel syndrome)     Insomnia     Obesity     Osteoarthritis of knee     last assessed 02/10/14    Prehypertension     last assessed 08/22/17    Renal disorder     Venous insufficiency     last assessed 08/22/17    Villonodular synovitis of the hand, right     last assessed 11/14/2013       Past Surgical History:   Procedure Laterality Date    INCISION AND DRAINAGE OF WOUND Left 9/6/2019    Procedure: INCISION AND DRAINAGE (I&D) GROIN;  Surgeon: Tara Mederos DO;  [facility];  Service: General    SKIN BIOPSY      WOUND DEBRIDEMENT Left 9/7/2019    Procedure: EXCISIONAL DEBRIDEMENT;  Surgeon: Tara Mederos DO;  [facility];  Service: General       Current Outpatient Medications   Medication Sig Dispense Refill    albuterol (ProAir HFA) 90 mcg/act inhaler Inhale 2 puffs every 6 (six) hours as needed for wheezing 8 5 g 6    Blood Glucose Monitoring Suppl (ONETOUCH VERIO) w/Device KIT Use to test sugar daily 1 kit 0    capsicum (ZOSTRIX) 0 075 % topical cream Apply topically 3 (three) times a day 28 3 g 0    cephalexin (KEFLEX) 500 mg capsule Take 1 capsule (500 mg total) by mouth every 6 (six) hours for 10 days 40 capsule 0    Continuous Blood Gluc  (FreeStyle Connell 2 Boncarbo Systm) BROOKS Use 1 Device as needed (use with sensors for bs checks) 1 Device 0    Continuous Blood Gluc Sensor (FreeStyle Elvin 14 Day Sensor) MISC USE ONE SENSOR EVERY 2 WEEKS 2 each 3    Control Gel Formula Dressing (DuoDERM CGF Dressing) MIS Apply 1 application topically every 5 (five) days 5 each 1    Diclofenac Sodium (VOLTAREN) 1 % Apply 4 g topically 4 (four) times a day 50 g 0    fluconazole (DIFLUCAN) 150 mg tablet Take 1 tablet (150 mg total) by mouth daily for 7 days 7 tablet 0    furosemide (LASIX) 20 mg tablet TAKE 2 TABLETS EVERY DAY IN THE EARLY MORNING AND 1 TABLET DAILY AFTER LUNCH  270 tablet 2    gabapentin (NEURONTIN) 100 mg capsule Take 1 capsule (100 mg total) by mouth 2 (two) times a day Take 1 tab with your 300mg tab twice daily 180 capsule 2    gabapentin (NEURONTIN) 300 mg capsule Take 1 capsule (300 mg total) by mouth 2 (two) times a day Take 1 tab with your 100mg tab twice daily 180 capsule 2    insulin aspart (NovoLOG FlexPen) 100 UNIT/ML injection pen INJECT 18 UNITS WITH MEALS+SCALE ( - 150-200 -2 UNITS, 201-250-4 UNITS, 251-300 -6 UNITS, 301-350-8 UNITS, > 350- 10 UNITS ) 45 mL 1    insulin glargine (Lantus SoloStar) 100 units/mL injection pen INJECT 45 UNITS UNDER THE SKIN DAILY AT BEDTIME 15 mL 1    insulin glargine (Lantus SoloStar) 100 units/mL injection pen Inject 48 units under the skin every bedtime 43 mL 0    insulin glargine (Toujeo Max SoloStar) 300 units/mL CONCETRATED U-300 injection pen (2-unit dial) Inject 45 units at bedtime 9 pen 1    Insulin Pen Needle (BD Pen Needle Ludmila 2nd Gen) 32G X 4 MM MISC Inject into the skin 4 (four) times a day 360 each 1    ketoconazole (NIZORAL) 2 % cream Apply topically daily for 7 days Can extend up to 14 days if needed  60 g 1    Lancets (ONETOUCH ULTRASOFT) lancets Use to test sugar 2 times a day 100 each 3    levothyroxine 25 mcg tablet TAKE ONE TABLET DAILY ON EMPTY STOMACH 1 HOUR BEFORE BREAKFAST 90 tablet 1    lidocaine (XYLOCAINE) 2 % topical gel Apply topically as needed for mild pain 30 mL 0    losartan (COZAAR) 25 mg tablet TAKE ONE TABLET BY MOUTH EVERY DAY 90 tablet 2    metFORMIN (GLUCOPHAGE) 1000 MG tablet TAKE ONE TABLET BY MOUTH TWICE DAILY 180 tablet 3    nystatin (MYCOSTATIN) powder Apply 1 application topically 2 (two) times a day To affected area 120 g 3    omeprazole (PriLOSEC) 40 MG capsule Take 1 capsule (40 mg total) by mouth 2 (two) times a day 180 capsule 1    OneTouch Verio test strip USE TO TEST SUGAR 2 TIMES A  strip 3    psyllium (METAMUCIL) packet Take 1 packet by mouth daily  0    saccharomyces boulardii (FLORASTOR) 250 mg capsule Take 1 capsule (250 mg total) by mouth 2 (two) times a day 60 capsule 1    sildenafil (VIAGRA) 25 MG tablet Take 1 tablet (25 mg total) by mouth daily as needed for erectile dysfunction 10 tablet 1     No current facility-administered medications for this visit  Allergies   Allergen Reactions    Gluten Meal - Food Allergy     Clindamycin Diarrhea       Review of Systems   Skin: Positive for color change (improving)  All other systems reviewed and are negative  There were no vitals filed for this visit  I spent 20 minutes directly with the patient during this [address] [facility] verbally agrees to participate in [employer]   Pt is aware that [employer] could be limited without vital signs or the ability to perform a full hands-on physical exam  Ben Mathur understands he or the provider may request at any time to terminate the video visit and request the patient to seek care or treatment in person

## 2024-06-24 DIAGNOSIS — S81.809A OPEN WOUND OF LOWER EXTREMITY, UNSPECIFIED LATERALITY, INITIAL ENCOUNTER: Primary | ICD-10-CM

## 2024-07-01 ENCOUNTER — OFFICE VISIT (OUTPATIENT)
Dept: WOUND CARE | Facility: CLINIC | Age: 59
End: 2024-07-01
Payer: MEDICARE

## 2024-07-01 VITALS
HEART RATE: 100 BPM | RESPIRATION RATE: 20 BRPM | DIASTOLIC BLOOD PRESSURE: 80 MMHG | SYSTOLIC BLOOD PRESSURE: 158 MMHG | TEMPERATURE: 98.4 F

## 2024-07-01 DIAGNOSIS — R79.89 ELEVATED TSH: ICD-10-CM

## 2024-07-01 DIAGNOSIS — E66.01 OBESITY, MORBID, BMI 50 OR HIGHER (HCC): ICD-10-CM

## 2024-07-01 DIAGNOSIS — I89.0 LYMPHEDEMA OF BOTH LOWER EXTREMITIES: ICD-10-CM

## 2024-07-01 DIAGNOSIS — E66.01 MORBID OBESITY WITH BMI OF 70 AND OVER, ADULT (HCC): ICD-10-CM

## 2024-07-01 DIAGNOSIS — Z79.4 TYPE 2 DIABETES MELLITUS WITH HYPERGLYCEMIA, WITH LONG-TERM CURRENT USE OF INSULIN (HCC): ICD-10-CM

## 2024-07-01 DIAGNOSIS — L97.912 NON-PRESSURE CHRONIC ULCER OF RIGHT LOWER LEG WITH FAT LAYER EXPOSED (HCC): ICD-10-CM

## 2024-07-01 DIAGNOSIS — Z79.4 TYPE 2 DIABETES MELLITUS WITH OTHER SKIN ULCER, WITH LONG-TERM CURRENT USE OF INSULIN (HCC): ICD-10-CM

## 2024-07-01 DIAGNOSIS — L03.116 CELLULITIS OF LEFT LOWER EXTREMITY: ICD-10-CM

## 2024-07-01 DIAGNOSIS — E11.622 TYPE 2 DIABETES MELLITUS WITH OTHER SKIN ULCER, WITH LONG-TERM CURRENT USE OF INSULIN (HCC): ICD-10-CM

## 2024-07-01 DIAGNOSIS — E11.65 TYPE 2 DIABETES MELLITUS WITH HYPERGLYCEMIA, WITH LONG-TERM CURRENT USE OF INSULIN (HCC): ICD-10-CM

## 2024-07-01 DIAGNOSIS — I87.311 CHRONIC VENOUS HYPERTENSION (IDIOPATHIC) WITH ULCER OF RIGHT LOWER EXTREMITY (CODE) (HCC): Primary | ICD-10-CM

## 2024-07-01 DIAGNOSIS — I10 HYPERTENSION GOAL BP (BLOOD PRESSURE) < 140/90: ICD-10-CM

## 2024-07-01 DIAGNOSIS — E78.00 PURE HYPERCHOLESTEROLEMIA: ICD-10-CM

## 2024-07-01 DIAGNOSIS — B49 FUNGAL INFECTION: ICD-10-CM

## 2024-07-01 PROCEDURE — 97597 DBRDMT OPN WND 1ST 20 CM/<: CPT | Performed by: NURSE PRACTITIONER

## 2024-07-01 PROCEDURE — 99214 OFFICE O/P EST MOD 30 MIN: CPT | Performed by: NURSE PRACTITIONER

## 2024-07-01 RX ORDER — PEN NEEDLE, DIABETIC 32GX 5/32"
NEEDLE, DISPOSABLE MISCELLANEOUS 4 TIMES DAILY
Qty: 200 EACH | Refills: 1 | Status: SHIPPED | OUTPATIENT
Start: 2024-07-01

## 2024-07-01 RX ORDER — LIDOCAINE 40 MG/G
CREAM TOPICAL ONCE
Status: COMPLETED | OUTPATIENT
Start: 2024-07-01 | End: 2024-07-01

## 2024-07-01 RX ORDER — NYSTATIN TOPICAL POWDER 100000 U/G
POWDER TOPICAL 2 TIMES DAILY
Qty: 120 G | Refills: 3 | Status: SHIPPED | OUTPATIENT
Start: 2024-07-01

## 2024-07-01 RX ORDER — LEVOFLOXACIN 500 MG/1
500 TABLET, FILM COATED ORAL EVERY 24 HOURS
Qty: 10 TABLET | Refills: 0 | Status: SHIPPED | OUTPATIENT
Start: 2024-07-01 | End: 2024-07-11

## 2024-07-01 RX ADMIN — LIDOCAINE: 40 CREAM TOPICAL at 13:55

## 2024-07-01 NOTE — PROGRESS NOTES
"Patient ID: Ben Mathur is a 59 y.o. male Date of Birth 1965     Chief Complaint  Chief Complaint   Patient presents with    New Patient Visit     RLE wound developed about one month ago. Patient is wearing Circaids, and using lymphedema pumps twice a day for one hour at a time. There was a bordered foam covering the wound today.        Allergies  Gluten meal - food allergy and Clindamycin    Assessment:     Diagnoses and all orders for this visit:    Chronic venous hypertension (idiopathic) with ulcer of right lower extremity (CODE) (Formerly [facility])  -     Wound cleansing and dressings Venous Ulcer Right;Lateral;Lower Leg; Future  -     lidocaine (LMX) 4 % cream  -     Wound Procedure Treatment Venous Ulcer Right;Lateral;Lower Leg    Non-pressure chronic ulcer of right lower leg with fat layer exposed (Formerly [facility])    Type 2 diabetes mellitus with other skin ulcer, with long-term current use of insulin (Formerly [facility])  -     Wound cleansing and dressings Venous Ulcer Right;Lateral;Lower Leg; Future    Lymphedema of both lower extremities    Morbid obesity with BMI of 70 and over, adult (Formerly [facility])    Other orders  -     Debridement              Debridement   Wound 07/01/24 Venous Ulcer Leg Right;Lateral;Lower    Universal Protocol:  Consent: Written consent obtained.  Consent given by: patient  Time out: Immediately prior to procedure a \"time out\" was called to verify the correct patient, procedure, equipment, support staff and site/side marked as required.  Timeout called at: 7/1/2024 2:51 PM.  Patient identity confirmed: verbally with patient    Debridement Details  Performed by: NP  Debridement type: selective  Pain control: lidocaine 4%      Post-debridement measurements  Length (cm): 1.5  Width (cm): 1  Depth (cm): 0.1  Percent debrided: 100%  Surface Area (cm^2): 1.5  Area Debrided (cm^2): 1.5  Volume (cm^3): 0.15    Devitalized tissue debrided: biofilm, fibrin and slough  Instrument(s) utilized: curette  Bleeding: small  Hemostasis " obtained with: pressure  Procedural pain (0-10): 0  Post-procedural pain: 0   Response to treatment: procedure was tolerated well        Plan:  1.  Initial visit.  Wound debrided.  Patient has a full-thickness venous ulcer of his right lower leg not show any signs symptoms of infection.  2.  Will have silver alginate applied to wound covered with silicone bordered foam dressing change 3 times a week.  3.  Will have patient continue to wear his CircAid wrap for compression therapy  4.  A1C results reviewed with the patient today.  Per patient's chart, his most recent A1c was 6.8 as of 9/14/2023.  His most recent glucose value was 137 on 1/11/2024.  Patient is continue to follow recommendations to maintain tight glycemic control  5.  Counseled patient to continue taking his diuretic medication as prescribed  6.  Counseled patient to continue elevating his legs frequently and to continue to use his lymphedema pumps twice daily  7.  Patient will follow-up in 1 week     Wound 07/01/24 Venous Ulcer Leg Right;Lateral;Lower (Active)   Wound Image Images linked 07/01/24 1318   Wound Description Pink;Yellow 07/01/24 1319   Chelsie-wound Assessment Intact;Hyperpigmented;Edema 07/01/24 1319   Wound Length (cm) 1.5 cm 07/01/24 1319   Wound Width (cm) 1 cm 07/01/24 1319   Wound Depth (cm) 0.1 cm 07/01/24 1319   Wound Surface Area (cm^2) 1.5 cm^2 07/01/24 1319   Wound Volume (cm^3) 0.15 cm^3 07/01/24 1319   Calculated Wound Volume (cm^3) 0.15 cm^3 07/01/24 1319   Drainage Amount Small 07/01/24 1319   Drainage Description Yellow;Serosanguineous 07/01/24 1319   Wound packed? No 07/01/24 1319       Wound 07/01/24 Venous Ulcer Leg Right;Lateral;Lower (Active)   Date First Assessed: 07/01/24   Present on Original Admission: Yes  Primary Wound Type: Venous Ulcer  Location: Leg  Wound Location Orientation: Right;Lateral;Lower       [REMOVED] Wound 02/17/22 Pressure Injury Leg Right;Lateral;Lower (Removed)   Resolved Date: 09/01/22  Date  First Assessed: 02/17/22   Primary Wound Type: Pressure Injury  Location: Leg  Wound Location Orientation: Right;Lateral;Lower       [REMOVED] Wound 05/19/22 Pressure Injury Leg Left;Lateral (Removed)   Resolved Date: 10/17/22  Date First Assessed/Time First Assessed: 05/19/22 1134   Pre-Existing Wound: Yes  Primary Wound Type: Pressure Injury  Location: (c) Leg  Wound Location Orientation: Left;Lateral  Wound Outcome: Healed       [REMOVED] Wound 09/06/22 Pressure Injury Pretibial Lateral;Right (Removed)   Resolved Date: 10/31/22  Date First Assessed/Time First Assessed: 09/06/22 2128   Primary Wound Type: Pressure Injury  Location: Pretibial  Wound Location Orientation: Lateral;Right       [REMOVED] Wound 08/23/23 Pressure Injury Leg Left;Lateral (Removed)   Resolved Date: 10/05/23  Date First Assessed: 08/23/23   Present on Original Admission: Yes  Primary Wound Type: Pressure Injury  Location: Leg  Wound Location Orientation: Left;Lateral  Wound Outcome: Healed       Subjective:      Marian Contreras returns to the wound center today for a new venous ulcer of his right lower leg.  He reports his wound has been present for approximately 1 month.  Patient reports he was recently treated for right lower extremity cellulitis for which she received antibiotics for 10 days which she completed as prescribed.  He reports his wound drains a moderate amount of drainage.  He has been managing his wound at home with bordered gauze dressings changing his dressing every other day.  He has been wearing his CircAid wrap for compression and he has also been using his lymphedema pumps twice a day for edema control.  He does report however that he needed to decrease the pressure on his lymphedema pumps due to pain associated with using the lymphedema pumps.  He reports that pain he was previously experiencing has resolved and is inquiring if he can increase the pressure of his lymphedema pumps again.  Patient also reports he takes  Lasix.  He reports his provider prescribed him 40 mg of Lasix to take daily, however he did decrease his dose to 20 mg a day for a period of time but now he reports he has resumed taking his original 40 mg of Lasix.  Patient has a history of type 2 diabetes.  Per patient's chart, his most recent A1c was 6.8 as of 9/14/2023.  He denies any pain, fevers, or chills.        The following portions of the patient's history were reviewed and updated as appropriate: He  has a past medical history of Acute kidney injury (10/28/2021), Anemia (09/13/2019), Cellulitis, Cellulitis of left lower extremity, Cellulitis of right lower extremity (11/19/2021), Cough (10/20/2019), Diabetes mellitus ([facility]), Disease of thyroid gland, Edema, Elevated liver enzymes, Elevated serum creatinine (11/19/2021), Esophageal reflux, Fungal infection (08/16/2021), Gluten intolerance, Gout, Hypercalcemia, Hyperglycemia, Hypertension, Hyponatremia (09/06/2019), IBS (irritable bowel syndrome), Insomnia, Obesity, Osteoarthritis of knee, Scrotal swelling (05/19/2020), Shortness of breath (05/03/2021), Venous insufficiency, and Villonodular synovitis of the hand, right.  He   Patient Active Problem List    Diagnosis Date Noted    Sinus tachycardia 01/22/2024    Obesity hypoventilation syndrome (HCC) 11/09/2023    Current use of insulin ([facility]) 06/16/2023    Type 2 diabetes mellitus with hyperglycemia ([facility]) 09/25/2022    Chronic pain 09/12/2022    GERD (gastroesophageal reflux disease) 09/07/2022    Pressure injury of left leg 09/06/2022    Metabolic syndrome 06/28/2022    Low testosterone in male 06/28/2022    Hypothyroidism 06/28/2022    Pressure injury of right leg 02/17/2022    Ulcer of left lower extremity, limited to breakdown of skin ([facility]) 02/14/2022    Onychomycosis 11/25/2021    Migraine headache 11/23/2021    Morbid obesity 11/17/2021    DAHIANA (obstructive sleep apnea)     Elevated CO2 level 01/14/2021    Bilateral leg edema 12/22/2020    Erectile  dysfunction 12/17/2020    Insomnia 11/28/2019    Diabetic neuropathy (HCC) 10/08/2019    Gluten intolerance 09/12/2019    Gout 09/20/2018    Essential hypertension 09/20/2018    Venous insufficiency 12/10/2015    Hyperlipidemia 10/09/2013    Type 2 diabetes mellitus, with long-term current use of insulin (HCC) 05/30/2013    Psoriasis 05/20/2013     He  has a past surgical history that includes Incision and drainage of wound (Left, 9/6/2019); Wound debridement (Left, 9/7/2019); and Skin biopsy.  His family history includes Cancer in his mother; Colon cancer in his father; Heart failure in his father.  He  reports that he has never smoked. He has never used smokeless tobacco. He reports that he does not currently use alcohol. He reports current drug use. Drug: Marijuana.  Current Outpatient Medications   Medication Sig Dispense Refill    acetaminophen (TYLENOL) 325 mg tablet 325 mg 3 (three) times a day as needed      albuterol (PROVENTIL HFA,VENTOLIN HFA) 90 mcg/act inhaler TAKE 2 PUFFS BY MOUTH EVERY 6 HOURS AS NEEDED FOR WHEEZE 8.5 g 0    BD Pen Needle Ludmila 2nd Gen 32G X 4 MM MISC INJECT UNDER THE SKIN 4 (FOUR) TIMES A  each 1    Blood Glucose Monitoring Suppl (ONETOUCH VERIO) w/Device KIT Use to test sugar daily (Patient not taking: Reported on 2/7/2024) 1 kit 0    Continuous Blood Gluc Sensor (FreeStyle Elvin 3 Sensor) MISC Replace every 14 days 2 each 5    dulaglutide (Trulicity) 4.5 MG/0.5ML injection Inject 0.5 mL (4.5 mg total) under the skin every 7 days 2 mL 5    ezetimibe (ZETIA) 10 mg tablet TAKE 1 TABLET BY MOUTH EVERY DAY 90 tablet 2    furosemide (LASIX) 20 mg tablet TAKE 2 TABLETS EVERY DAY IN THE EARLY MORNING AND 1 TABLET DAILY AFTER LUNCH. 270 tablet 2    gabapentin (NEURONTIN) 100 mg capsule TAKE 1 CAPSULE (100 MG TOTAL) BY MOUTH 2 (TWO) TIMES A DAY WITH 300MG CAPSULE FOR A TOTAL OF 400MG TWICE A DAY 60 capsule 5    gabapentin (NEURONTIN) 300 mg capsule TAKE 1 CAPSULE (300 MG TOTAL) BY  MOUTH 2 (TWO) TIMES A DAY TAKE 1 TAB WITH YOUR 100MG TAB TWICE DAILY 180 capsule 2    insulin aspart (NovoLOG FlexPen) 100 UNIT/ML injection pen INJECT 14-22 UNITS WITH MEALS+SCALE up to three times daily 60 mL 2    Insulin Glargine Solostar (Lantus SoloStar) 100 UNIT/ML SOPN INJECT 40 UNITS UNDER THE SKIN DAILY AT BEDTIME 45 mL 1    Klayesta powder APPLY 1 APPLICATION TOPICALLY 2 (TWO) TIMES A DAY TO AFFECTED AREA 120 g 3    Lancets (ONETOUCH ULTRASOFT) lancets Use to test sugar 2 times a day 100 each 3    levofloxacin (LEVAQUIN) 500 mg tablet Take 1 tablet (500 mg total) by mouth every 24 hours for 10 days 10 tablet 0    levothyroxine 25 mcg tablet TAKE 1 TABLET (25 MCG TOTAL) BY MOUTH DAILY IN THE EARLY MORNING 90 tablet 1    lisinopril (ZESTRIL) 2.5 mg tablet Take 1 tablet (2.5 mg total) by mouth daily 90 tablet 3    metFORMIN (GLUCOPHAGE) 1000 MG tablet TAKE 1 TABLET BY MOUTH TWICE A  tablet 1    metoprolol succinate (TOPROL-XL) 25 mg 24 hr tablet Take 1 tablet (25 mg total) by mouth 2 (two) times a day 180 tablet 3    omeprazole (PriLOSEC) 40 MG capsule TAKE 1 CAPSULE BY MOUTH TWICE A DAY 60 capsule 5    OneTouch Verio test strip USE TO TEST SUGAR 2 TIMES A  strip 3    psyllium (METAMUCIL) packet Take 1 packet by mouth daily  0    traZODone (DESYREL) 50 mg tablet TAKE 1 TABLET (50 MG TOTAL) BY MOUTH DAILY AT BEDTIME AS NEEDED FOR SLEEP 90 tablet 0     No current facility-administered medications for this visit.     He is allergic to gluten meal - food allergy and clindamycin..    Review of Systems   Constitutional: Negative.    HENT:  Negative for ear pain and hearing loss.    Eyes:  Negative for pain.   Respiratory:  Negative for chest tightness and shortness of breath.    Cardiovascular:  Positive for leg swelling. Negative for chest pain and palpitations.   Gastrointestinal:  Negative for diarrhea, nausea and vomiting.   Genitourinary:  Negative for dysuria.   Musculoskeletal:  Negative for  gait problem.   Skin:  Positive for wound.   Neurological:  Negative for tremors and weakness.   Psychiatric/Behavioral:  Negative for behavioral problems, confusion and suicidal ideas.          Objective:       Wound 24 Venous Ulcer Leg Right;Lateral;Lower (Active)   Wound Image Images linked 24 1318   Wound Description Pink;Yellow 24   Chelsie-wound Assessment Intact;Hyperpigmented;Edema 24 131   Wound Length (cm) 1.5 cm 24 131   Wound Width (cm) 1 cm 24 1319   Wound Depth (cm) 0.1 cm 24 131   Wound Surface Area (cm^2) 1.5 cm^2 24 1319   Wound Volume (cm^3) 0.15 cm^3 24   Calculated Wound Volume (cm^3) 0.15 cm^3 24 131   Drainage Amount Small 24   Drainage Description Yellow;Serosanguineous 24   Wound packed? No 24       /80   Pulse 100   Temp 98.4 °F (36.9 °C)   Resp 20     Physical Exam  Vitals and nursing note reviewed.   Constitutional:       General: He is not in acute distress.     Appearance: Normal appearance. He is obese.   HENT:      Head: Normocephalic and atraumatic.   Eyes:      General:         Right eye: No discharge.         Left eye: No discharge.   Pulmonary:      Effort: Pulmonary effort is normal. No respiratory distress.   Musculoskeletal:         General: Normal range of motion.      Cervical back: Normal range of motion and neck supple. No rigidity.      Right lower le+ Edema present.      Left lower le+ Edema present.   Skin:     General: Skin is warm and dry.      Findings: Wound present. No erythema.          Neurological:      General: No focal deficit present.      Mental Status: He is alert and oriented to person, place, and time. Mental status is at baseline.   Psychiatric:         Mood and Affect: Mood normal.         Behavior: Behavior normal.         Thought Content: Thought content normal.         Judgment: Judgment normal.               Wound  Instructions:  Orders Placed This Encounter   Procedures    Wound cleansing and dressings Venous Ulcer Right;Lateral;Lower Leg     Wash your hands with soap and water.  Remove old dressing, discard into plastic bag and place in trash.  Cleanse the wound with mild soap(Dove) and water prior to applying a clean dressing. Do not use tissue or cotton balls. Do not scrub the wound. Pat dry using gauze.  Shower yes. Only on the days you change the dressing. Otherwise do not get dressing wet.       Right lower leg wound:  Apply alginate AG to the wound bed.    Cover with silicon bordered foam.  Change dressing three times a week.       Continue wearing Circaids every day. Can remove for sleeping.      Elevate your legs as much as possible.     Continue using your lymphedema pumps twice a day, one hour each time.       Follow up at the wound center in two weeks.     Standing Status:   Future     Standing Expiration Date:   7/8/2024    Wound Procedure Treatment Venous Ulcer Right;Lateral;Lower Leg     This order was created via procedure documentation    Debridement     This order was created via procedure documentation        Diagnosis ICD-10-CM Associated Orders   1. Chronic venous hypertension (idiopathic) with ulcer of right lower extremity (CODE) (Formerly [facility])  I87.311 Wound cleansing and dressings Venous Ulcer Right;Lateral;Lower Leg     lidocaine (LMX) 4 % cream     Wound Procedure Treatment Venous Ulcer Right;Lateral;Lower Leg      2. Non-pressure chronic ulcer of right lower leg with fat layer exposed (Former[facility])  L97.912       3. Type 2 diabetes mellitus with other skin ulcer, with long-term current use of insulin (Formerly [facility])  E11.622 Wound cleansing and dressings Venous Ulcer Right;Lateral;Lower Leg    Z79.4       4. Lymphedema of both lower extremities  I89.0       5. Morbid obesity with BMI of 70 and over, adult (Formerly [facility])  E66.01     Z68.45

## 2024-07-01 NOTE — PATIENT INSTRUCTIONS
Orders Placed This Encounter   Procedures    Wound cleansing and dressings Venous Ulcer Right;Lateral;Lower Leg     Wash your hands with soap and water.  Remove old dressing, discard into plastic bag and place in trash.  Cleanse the wound with mild soap(Dove) and water prior to applying a clean dressing. Do not use tissue or cotton balls. Do not scrub the wound. Pat dry using gauze.  Shower yes. Only on the days you change the dressing. Otherwise do not get dressing wet.       Right lower leg wound:  Apply alginate AG to the wound bed.    Cover with silicon bordered foam.  Change dressing three times a week.       Continue wearing Circaids every day. Can remove for sleeping.      Elevate your legs as much as possible.     Continue using your lymphedema pumps twice a day, one hour each time.       Follow up at the wound center in two weeks.     Standing Status:   Future     Standing Expiration Date:   7/8/2024

## 2024-07-01 NOTE — PROGRESS NOTES
Wound Procedure Treatment Venous Ulcer Right;Lateral;Lower Leg    Performed by: Andrea Francis RN  Authorized by: ANN Vidales    Associated wounds:   Wound 07/01/24 Venous Ulcer Leg Right;Lateral;Lower  Wound cleansed with:  NSS  Applied primary dressing:  Calcium alginate and Silver  Applied secondary dressing:  Foam  Comments:  Circaids

## 2024-07-16 DIAGNOSIS — G47.00 INSOMNIA, UNSPECIFIED TYPE: ICD-10-CM

## 2024-07-16 RX ORDER — TRAZODONE HYDROCHLORIDE 50 MG/1
50 TABLET ORAL
Qty: 100 TABLET | Refills: 1 | Status: SHIPPED | OUTPATIENT
Start: 2024-07-16

## 2024-07-24 ENCOUNTER — OFFICE VISIT (OUTPATIENT)
Dept: WOUND CARE | Facility: CLINIC | Age: 59
End: 2024-07-24
Payer: MEDICARE

## 2024-07-24 VITALS
RESPIRATION RATE: 16 BRPM | TEMPERATURE: 97.9 F | HEART RATE: 72 BPM | SYSTOLIC BLOOD PRESSURE: 112 MMHG | DIASTOLIC BLOOD PRESSURE: 80 MMHG

## 2024-07-24 DIAGNOSIS — Z79.4 CURRENT USE OF INSULIN (HCC): ICD-10-CM

## 2024-07-24 DIAGNOSIS — E11.622 TYPE 2 DIABETES MELLITUS WITH OTHER SKIN ULCER, WITH LONG-TERM CURRENT USE OF INSULIN (HCC): ICD-10-CM

## 2024-07-24 DIAGNOSIS — E11.65 TYPE 2 DIABETES MELLITUS WITH HYPERGLYCEMIA, WITH LONG-TERM CURRENT USE OF INSULIN (HCC): ICD-10-CM

## 2024-07-24 DIAGNOSIS — L97.912 NON-PRESSURE CHRONIC ULCER OF RIGHT LOWER LEG WITH FAT LAYER EXPOSED (HCC): ICD-10-CM

## 2024-07-24 DIAGNOSIS — Z79.4 TYPE 2 DIABETES MELLITUS WITH OTHER SKIN ULCER, WITH LONG-TERM CURRENT USE OF INSULIN (HCC): ICD-10-CM

## 2024-07-24 DIAGNOSIS — I89.0 LYMPHEDEMA OF BOTH LOWER EXTREMITIES: ICD-10-CM

## 2024-07-24 DIAGNOSIS — Z79.4 TYPE 2 DIABETES MELLITUS WITH HYPERGLYCEMIA, WITH LONG-TERM CURRENT USE OF INSULIN (HCC): ICD-10-CM

## 2024-07-24 DIAGNOSIS — E66.01 MORBID OBESITY WITH BMI OF 70 AND OVER, ADULT (HCC): ICD-10-CM

## 2024-07-24 DIAGNOSIS — I87.311 CHRONIC VENOUS HYPERTENSION (IDIOPATHIC) WITH ULCER OF RIGHT LOWER EXTREMITY (CODE) (HCC): Primary | ICD-10-CM

## 2024-07-24 PROCEDURE — 97597 DBRDMT OPN WND 1ST 20 CM/<: CPT

## 2024-07-24 RX ORDER — LIDOCAINE 40 MG/G
CREAM TOPICAL ONCE
Status: COMPLETED | OUTPATIENT
Start: 2024-07-24 | End: 2024-07-24

## 2024-07-24 RX ORDER — BLOOD-GLUCOSE SENSOR
EACH MISCELLANEOUS
Qty: 2 EACH | Refills: 5 | Status: SHIPPED | OUTPATIENT
Start: 2024-07-24

## 2024-07-24 RX ADMIN — LIDOCAINE: 40 CREAM TOPICAL at 16:27

## 2024-07-24 NOTE — PATIENT INSTRUCTIONS
Orders Placed This Encounter   Procedures    Wound cleansing and dressings Venous Ulcer Right;Lateral;Lower Leg     Wash your hands with soap and water.  Remove old dressing, discard into plastic bag and place in trash.  Cleanse the wound with mild soap(Dove) and water prior to applying a clean dressing. Do not use tissue or cotton balls. Do not scrub the wound. Pat dry using gauze.  Shower yes. Only on the days you change the dressing. Otherwise do not get dressing wet.         Right lower leg wound:  Apply alginate AG to the wound bed.    Cover with silicone bordered foam.  Change dressing three times a week.         Continue wearing Circaids every day. Can remove for sleeping.        Elevate your legs as much as possible.      Continue using your lymphedema pumps twice a day, one hour each time.         Follow up at the wound center in two weeks.     Standing Status:   Future     Standing Expiration Date:   7/31/2024

## 2024-07-24 NOTE — PROGRESS NOTES
Patient ID: Ben Mathur is a 59 y.o. male Date of Birth 1965       Chief Complaint   Patient presents with    Follow Up Wound Care Visit     Follow up visit for wound to right leg.        Allergies:  Gluten meal - food allergy and Clindamycin    Diagnosis:      Diagnosis ICD-10-CM Associated Orders   1. Chronic venous hypertension (idiopathic) with ulcer of right lower extremity (CODE) ([facility])  I87.311 lidocaine (LMX) 4 % cream     Wound cleansing and dressings Venous Ulcer Right;Lateral;Lower Leg     Wound Procedure Treatment Venous Ulcer Right;Lateral;Lower Leg      2. Non-pressure chronic ulcer of right lower leg with fat layer exposed ([facility])  L97.912       3. Type 2 diabetes mellitus with other skin ulcer, with long-term current use of insulin ([facility])  E11.622     Z79.4       4. Lymphedema of both lower extremities  I89.0       5. Morbid obesity with BMI of 70 and over, adult ([facility])  E66.01     Z68.45               Assessment & Plan:  Right lower extremity wound is stable in size and appearance.  Will recommend to continue with current wound management of silver alginate and bordered foam dressing.  Wound is not showing any s/s of clinical infection.   Debrided as below.   Elevate lower extremities and avoid prolonged standing for edema management.   Recommend to continue with lymphedema pumps and CircAid's for compression therapy.   A1C results reviewed with the patient today.   Counseled patient on the importance of tight glycemic control and adequate protein intake (3-4 servings per day) to promote wound healing.  Follow-up in 2 weeks. Call sooner with questions or concerns or any signs of infection such as redness, swelling, increased/purulent drainage, fever or chills, and increased pain.  Patient verbalized understanding and agrees with plan of care.           Subjective:   7/24/24: Patient presents to wound care center for follow-up visit of right lower extremity venous ulcer.  Patient offers no wound  related complaints.  Patient states that he just received his shipment of wound care supplies.  Patient wears CircAid's for compression, states that he is tolerating it well.  Patient continues to use his lymphedema pumps. Denies increased pain or drainage related to the wound. Denies fever or chills.         The following portions of the patient's history were reviewed and updated as appropriate:   Patient Active Problem List   Diagnosis    Type 2 diabetes mellitus, with long-term current use of insulin ([facility])    Hyperlipidemia    Psoriasis    Venous insufficiency    Gout    Essential hypertension    Gluten intolerance    Diabetic neuropathy (HCC)    Insomnia    Erectile dysfunction    Bilateral leg edema    Elevated CO2 level    DAHIANA (obstructive sleep apnea)    Morbid obesity    Migraine headache    Onychomycosis    Ulcer of left lower extremity, limited to breakdown of skin (HCC)    Pressure injury of right leg    Metabolic syndrome    Low testosterone in male    Hypothyroidism    Pressure injury of left leg    GERD (gastroesophageal reflux disease)    Chronic pain    Type 2 diabetes mellitus with hyperglycemia (HCC)    Current use of insulin (HCC)    Obesity hypoventilation syndrome (HCC)    Sinus tachycardia     Past Medical History:   Diagnosis Date    Acute kidney injury 10/28/2021    Anemia 09/13/2019    Cellulitis     last assessed 12/10/15    Cellulitis of left lower extremity     Cellulitis of right lower extremity 11/19/2021    Cough 10/20/2019    Diabetes mellitus (HCC)     Disease of thyroid gland     Edema     Elevated liver enzymes     Elevated serum creatinine 11/19/2021    Esophageal reflux     Fungal infection 08/16/2021    Gluten intolerance     Gout     last assessed 09/05/13    Hypercalcemia     Hyperglycemia     Hypertension     Hyponatremia 09/06/2019    IBS (irritable bowel syndrome)     Insomnia     Obesity     Osteoarthritis of knee     last assessed 02/10/14    Scrotal swelling 05/19/2020     Shortness of breath 05/03/2021    Venous insufficiency     last assessed 08/22/17    Villonodular synovitis of the hand, right     last assessed 11/14/2013     Past Surgical History:   Procedure Laterality Date    INCISION AND DRAINAGE OF WOUND Left 9/6/2019    Procedure: INCISION AND DRAINAGE (I&D) GROIN;  Surgeon: Carlos Ruano DO;  Location: [facility];  Service: General    SKIN BIOPSY      WOUND DEBRIDEMENT Left 9/7/2019    Procedure: EXCISIONAL DEBRIDEMENT;  Surgeon: Carlos Ruano DO;  Location: [facility];  Service: General     Family History   Problem Relation Age of Onset    Cancer Mother         gastrc    Colon cancer Father     Heart failure Father       Social History     Socioeconomic History    Marital status: /Civil Union     Spouse name: Not on file    Number of children: Not on file    Years of education: Not on file    Highest education level: Not on file   Occupational History    Not on file   Tobacco Use    Smoking status: Never    Smokeless tobacco: Never   Vaping Use    Vaping status: Never Used   Substance and Sexual Activity    Alcohol use: Not Currently     Alcohol/week: 0.0 standard drinks of alcohol    Drug use: Yes     Types: Marijuana     Comment: medical    Sexual activity: Yes     Partners: Female   Other Topics Concern    Not on file   Social History Narrative    Not on file     Social Determinants of Health     Financial Resource Strain: Not on file   Food Insecurity: No Food Insecurity (9/8/2022)    Hunger Vital Sign     Worried About Running Out of Food in the Last Year: Never true     Ran Out of Food in the Last Year: Never true   Transportation Needs: No Transportation Needs (9/8/2022)    PRAPARE - Transportation     Lack of Transportation (Medical): No     Lack of Transportation (Non-Medical): No   Physical Activity: Not on file   Stress: Not on file   Social Connections: Not on file   Intimate Partner Violence: Not on file   Housing Stability: Unknown (9/8/2022)     Housing Stability Vital Sign     Unable to Pay for Housing in the Last Year: No     Number of Places Lived in the Last Year: Not on file     Unstable Housing in the Last Year: No        Current Outpatient Medications:     acetaminophen (TYLENOL) 325 mg tablet, 325 mg 3 (three) times a day as needed, Disp: , Rfl:     albuterol (PROVENTIL HFA,VENTOLIN HFA) 90 mcg/act inhaler, TAKE 2 PUFFS BY MOUTH EVERY 6 HOURS AS NEEDED FOR WHEEZE, Disp: 8.5 g, Rfl: 0    BD Pen Needle Ludmila 2nd Gen 32G X 4 MM MISC, INJECT UNDER THE SKIN 4 (FOUR) TIMES A DAY, Disp: 200 each, Rfl: 1    Blood Glucose Monitoring Suppl (ONETOUCH VERIO) w/Device KIT, Use to test sugar daily (Patient not taking: Reported on 2/7/2024), Disp: 1 kit, Rfl: 0    Continuous Glucose Sensor (FreeStyle Elvin 3 Sensor) MISC, REPLACE EVERY 14 DAYS, Disp: 2 each, Rfl: 5    dulaglutide (Trulicity) 4.5 MG/0.5ML injection, Inject 0.5 mL (4.5 mg total) under the skin every 7 days, Disp: 2 mL, Rfl: 5    ezetimibe (ZETIA) 10 mg tablet, TAKE 1 TABLET BY MOUTH EVERY DAY, Disp: 90 tablet, Rfl: 2    furosemide (LASIX) 20 mg tablet, TAKE 2 TABLETS EVERY DAY IN THE EARLY MORNING AND 1 TABLET DAILY AFTER LUNCH., Disp: 270 tablet, Rfl: 2    gabapentin (NEURONTIN) 100 mg capsule, TAKE 1 CAPSULE (100 MG TOTAL) BY MOUTH 2 (TWO) TIMES A DAY WITH 300MG CAPSULE FOR A TOTAL OF 400MG TWICE A DAY, Disp: 60 capsule, Rfl: 5    gabapentin (NEURONTIN) 300 mg capsule, TAKE 1 CAPSULE (300 MG TOTAL) BY MOUTH 2 (TWO) TIMES A DAY TAKE 1 TAB WITH YOUR 100MG TAB TWICE DAILY, Disp: 180 capsule, Rfl: 2    insulin aspart (NovoLOG FlexPen) 100 UNIT/ML injection pen, INJECT 14-22 UNITS WITH MEALS+SCALE up to three times daily, Disp: 60 mL, Rfl: 2    Insulin Glargine Solostar (Lantus SoloStar) 100 UNIT/ML SOPN, INJECT 40 UNITS UNDER THE SKIN DAILY AT BEDTIME, Disp: 45 mL, Rfl: 1    Klayesta powder, APPLY 1 APPLICATION TOPICALLY 2 (TWO) TIMES A DAY TO AFFECTED AREA, Disp: 120 g, Rfl: 3    Lancets (ONETOUCH  ULTRASOFT) lancets, Use to test sugar 2 times a day, Disp: 100 each, Rfl: 3    levothyroxine 25 mcg tablet, TAKE 1 TABLET (25 MCG TOTAL) BY MOUTH DAILY IN THE EARLY MORNING, Disp: 90 tablet, Rfl: 1    lisinopril (ZESTRIL) 2.5 mg tablet, Take 1 tablet (2.5 mg total) by mouth daily, Disp: 90 tablet, Rfl: 3    metFORMIN (GLUCOPHAGE) 1000 MG tablet, TAKE 1 TABLET BY MOUTH TWICE A DAY, Disp: 180 tablet, Rfl: 1    metoprolol succinate (TOPROL-XL) 25 mg 24 hr tablet, Take 1 tablet (25 mg total) by mouth 2 (two) times a day, Disp: 180 tablet, Rfl: 3    omeprazole (PriLOSEC) 40 MG capsule, TAKE 1 CAPSULE BY MOUTH TWICE A DAY, Disp: 60 capsule, Rfl: 5    OneTouch Verio test strip, USE TO TEST SUGAR 2 TIMES A DAY, Disp: 100 strip, Rfl: 3    psyllium (METAMUCIL) packet, Take 1 packet by mouth daily, Disp: , Rfl: 0    traZODone (DESYREL) 50 mg tablet, TAKE 1 TABLET (50 MG TOTAL) BY MOUTH DAILY AT BEDTIME AS NEEDED FOR SLEEP, Disp: 100 tablet, Rfl: 1  No current facility-administered medications for this visit.    Review of Systems   Constitutional:  Negative for chills and fever.   HENT:  Negative for congestion and sneezing.    Respiratory:  Negative for cough and shortness of breath.    Cardiovascular:  Positive for leg swelling.   Musculoskeletal:  Positive for gait problem.   Skin:  Positive for wound.   Psychiatric/Behavioral:  Negative for agitation.        Objective:  /80   Pulse 72   Temp 97.9 °F (36.6 °C) (Tympanic)   Resp 16   Pain Score: 0-No pain     Physical Exam  Constitutional:       General: He is awake. He is not in acute distress.     Appearance: He is morbidly obese. He is not ill-appearing, toxic-appearing or diaphoretic.   HENT:      Head: Normocephalic and atraumatic.      Right Ear: External ear normal.      Left Ear: External ear normal.   Eyes:      Conjunctiva/sclera: Conjunctivae normal.   Cardiovascular:      Pulses:           Dorsalis pedis pulses are 1+ on the right side.   Pulmonary:       "Effort: Pulmonary effort is normal. No respiratory distress.   Musculoskeletal:      Right lower leg: Edema present.   Skin:     General: Skin is warm and dry.      Findings: Wound present.      Comments: Refer to wound assessment.    Neurological:      Mental Status: He is alert.      Gait: Gait abnormal.   Psychiatric:         Mood and Affect: Mood normal.         Behavior: Behavior normal. Behavior is cooperative.         Wound 07/01/24 Venous Ulcer Leg Right;Lateral;Lower (Active)   Wound Image   07/24/24 1623   Wound Description Epithelialization;Yellow;Pink 07/24/24 1623   Chelsie-wound Assessment Maceration;Edema;Hyperpigmented 07/24/24 1623   Wound Length (cm) 1.5 cm 07/24/24 1623   Wound Width (cm) 1 cm 07/24/24 1623   Wound Depth (cm) 0.1 cm 07/24/24 1623   Wound Surface Area (cm^2) 1.5 cm^2 07/24/24 1623   Wound Volume (cm^3) 0.15 cm^3 07/24/24 1623   Calculated Wound Volume (cm^3) 0.15 cm^3 07/24/24 1623   Change in Wound Size % 0 07/24/24 1623   Drainage Amount Moderate 07/24/24 1623   Drainage Description Yellow;Serosanguineous 07/24/24 1623   Wound packed? No 07/01/24 1319   Dressing Status Removed 07/24/24 1623         Debridement   Wound 07/01/24 Venous Ulcer Leg Right;Lateral;Lower    Universal Protocol:  Consent: Verbal consent obtained.  Risks and benefits: risks, benefits and alternatives were discussed  Consent given by: patient  Time out: Immediately prior to procedure a \"time out\" was called to verify the correct patient, procedure, equipment, support staff and site/side marked as required.  Timeout called at: 7/24/2024 4:30 PM.  Patient understanding: patient states understanding of the procedure being performed  Patient identity confirmed: verbally with patient    Debridement Details  Performed by: NP  Debridement type: selective  Pain control: lidocaine 4%      Post-debridement measurements  Length (cm): 1.5  Width (cm): 1  Depth (cm): 0.1  Percent debrided: 50%  Surface Area (cm^2): " "1.5  Area Debrided (cm^2): 0.75  Volume (cm^3): 0.15    Devitalized tissue debrided: biofilm, exudate and slough  Instrument(s) utilized: curette  Bleeding: small  Hemostasis obtained with: pressure  Procedural pain (0-10): 0  Post-procedural pain: 0   Response to treatment: procedure was tolerated well               Lab Results   Component Value Date    HGBA1C 6.8 (H) 09/14/2023       Wound Instructions:  Orders Placed This Encounter   Procedures    Wound cleansing and dressings Venous Ulcer Right;Lateral;Lower Leg     Wash your hands with soap and water.  Remove old dressing, discard into plastic bag and place in trash.  Cleanse the wound with mild soap(Dove) and water prior to applying a clean dressing. Do not use tissue or cotton balls. Do not scrub the wound. Pat dry using gauze.  Shower yes. Only on the days you change the dressing. Otherwise do not get dressing wet.         Right lower leg wound:  Apply alginate AG to the wound bed.    Cover with silicone bordered foam.  Change dressing three times a week.         Continue wearing Circaids every day. Can remove for sleeping.        Elevate your legs as much as possible.      Continue using your lymphedema pumps twice a day, one hour each time.         Follow up at the wound center in two weeks.     Standing Status:   Future     Standing Expiration Date:   7/31/2024    Wound Procedure Treatment Venous Ulcer Right;Lateral;Lower Leg     This order was created via procedure documentation    Debridement     This order was created via procedure documentation           ANN Jenkins, STEPHENIE-CATHERINE, MICHAEL    Portions of the record may have been created with voice recognition software. Occasional wrong word or \"sound alike\" substitutions may have occurred due to the inherent limitations of voice recognition software. Read the chart carefully and recognize, using context, where substitutions have occurred.          Total time spent today:    Total time (face-to-face " and non-face-to-face) spent on today's visit was 19 minutes. This includes preparation for the visits (i.e. reviewing test results from date recent hospitalizations, ER/Urgent Care/primary care visits and recent consultant office visits), performance of a medically appropriate history and examination, and orders for medications or testing.

## 2024-07-24 NOTE — PROGRESS NOTES
Wound Procedure Treatment Venous Ulcer Right;Lateral;Lower Leg    Performed by: Marycruz Pompa RN  Authorized by: ANN Sutherland    Associated wounds:   Wound 07/01/24 Venous Ulcer Leg Right;Lateral;Lower  Wound cleansed with:  NSS  Applied primary dressing:  Calcium alginate and Silicone bordered foam

## 2024-08-02 DIAGNOSIS — I10 ESSENTIAL HYPERTENSION: ICD-10-CM

## 2024-08-02 DIAGNOSIS — K21.9 GASTROESOPHAGEAL REFLUX DISEASE: ICD-10-CM

## 2024-08-02 RX ORDER — LISINOPRIL 2.5 MG/1
2.5 TABLET ORAL DAILY
Qty: 90 TABLET | Refills: 1 | Status: SHIPPED | OUTPATIENT
Start: 2024-08-02

## 2024-08-02 RX ORDER — OMEPRAZOLE 40 MG/1
40 CAPSULE, DELAYED RELEASE ORAL 2 TIMES DAILY
Qty: 60 CAPSULE | Refills: 0 | Status: SHIPPED | OUTPATIENT
Start: 2024-08-02

## 2024-08-05 ENCOUNTER — OFFICE VISIT (OUTPATIENT)
Dept: WOUND CARE | Facility: CLINIC | Age: 59
End: 2024-08-05
Payer: MEDICARE

## 2024-08-05 VITALS
TEMPERATURE: 97 F | SYSTOLIC BLOOD PRESSURE: 142 MMHG | RESPIRATION RATE: 16 BRPM | HEART RATE: 84 BPM | DIASTOLIC BLOOD PRESSURE: 86 MMHG

## 2024-08-05 DIAGNOSIS — L97.912 NON-PRESSURE CHRONIC ULCER OF RIGHT LOWER LEG WITH FAT LAYER EXPOSED (HCC): ICD-10-CM

## 2024-08-05 DIAGNOSIS — Z79.4 TYPE 2 DIABETES MELLITUS WITH OTHER SKIN ULCER, WITH LONG-TERM CURRENT USE OF INSULIN (HCC): ICD-10-CM

## 2024-08-05 DIAGNOSIS — E11.622 TYPE 2 DIABETES MELLITUS WITH OTHER SKIN ULCER, WITH LONG-TERM CURRENT USE OF INSULIN (HCC): ICD-10-CM

## 2024-08-05 DIAGNOSIS — E66.01 MORBID OBESITY WITH BMI OF 70 AND OVER, ADULT (HCC): ICD-10-CM

## 2024-08-05 DIAGNOSIS — I87.311 CHRONIC VENOUS HYPERTENSION (IDIOPATHIC) WITH ULCER OF RIGHT LOWER EXTREMITY (CODE) (HCC): Primary | ICD-10-CM

## 2024-08-05 DIAGNOSIS — I89.0 LYMPHEDEMA OF BOTH LOWER EXTREMITIES: ICD-10-CM

## 2024-08-05 PROCEDURE — 97597 DBRDMT OPN WND 1ST 20 CM/<: CPT | Performed by: NURSE PRACTITIONER

## 2024-08-05 RX ORDER — LIDOCAINE 40 MG/G
CREAM TOPICAL ONCE
Status: COMPLETED | OUTPATIENT
Start: 2024-08-05 | End: 2024-08-05

## 2024-08-05 RX ADMIN — LIDOCAINE: 40 CREAM TOPICAL at 13:38

## 2024-08-05 NOTE — PATIENT INSTRUCTIONS
Orders Placed This Encounter   Procedures    Wound cleansing and dressings Venous Ulcer Right;Lateral;Lower Leg     Wash your hands with soap and water.  Remove old dressing, discard into plastic bag and place in trash.  Cleanse the wound with mild soap(Dove) and water prior to applying a clean dressing. Do not use tissue or cotton balls. Do not scrub the wound. Pat dry using gauze.  Shower yes. Only on the days you change the dressing. Otherwise do not get dressing wet.         Right lower leg wound:  Apply alginate AG to the wound bed.    Cover with silicone bordered foam.  Change dressing three times a week.         Continue wearing Circaids every day. Can remove for sleeping.        Elevate your legs as much as possible.      Continue using your lymphedema pumps twice a day, one hour each time.     Standing Status:   Future     Standing Expiration Date:   8/19/2024

## 2024-08-05 NOTE — PROGRESS NOTES
"Patient ID: Ben Mathur is a 59 y.o. male Date of Birth 1965     Chief Complaint  Chief Complaint   Patient presents with    Follow Up Wound Care Visit       Allergies  Gluten meal - food allergy and Clindamycin    Assessment:     Diagnoses and all orders for this visit:    Chronic venous hypertension (idiopathic) with ulcer of right lower extremity (CODE) ([facility])  -     Wound cleansing and dressings Venous Ulcer Right;Lateral;Lower Leg; Future  -     lidocaine (LMX) 4 % cream  -     Wound Procedure Treatment Venous Ulcer Right;Lateral;Lower Leg    Non-pressure chronic ulcer of right lower leg with fat layer exposed ([facility])  -     Wound cleansing and dressings Venous Ulcer Right;Lateral;Lower Leg; Future  -     lidocaine (LMX) 4 % cream  -     Wound Procedure Treatment Venous Ulcer Right;Lateral;Lower Leg    Type 2 diabetes mellitus with other skin ulcer, with long-term current use of insulin ([facility])    Lymphedema of both lower extremities    Morbid obesity with BMI of 70 and over, adult ([facility])    Other orders  -     Debridement              Debridement   Wound 07/01/24 Venous Ulcer Leg Right;Lateral;Lower    Universal Protocol:  Consent: Written consent obtained.  Consent given by: patient  Time out: Immediately prior to procedure a \"time out\" was called to verify the correct patient, procedure, equipment, support staff and site/side marked as required.  Timeout called at: 8/5/2024 1:59 PM.  Patient identity confirmed: verbally with patient    Debridement Details  Performed by: NP  Debridement type: selective  Pain control: lidocaine 4%      Post-debridement measurements  Length (cm): 1.5  Width (cm): 0.8  Depth (cm): 0.1  Percent debrided: 100%  Surface Area (cm^2): 1.2  Area Debrided (cm^2): 1.2  Volume (cm^3): 0.12    Devitalized tissue debrided: biofilm, fibrin and slough  Instrument(s) utilized: curette  Bleeding: small  Hemostasis obtained with: pressure  Procedural pain (0-10): 0  Post-procedural pain: 0 "   Response to treatment: procedure was tolerated well        Plan:  1.  F/U visit.  Wound debrided.  Wound measuring slightly smaller.  Continue current plan of care.  2.  A1C results reviewed with the patient today.   3.  Patient will follow-up in 2 weeks     Wound 07/01/24 Venous Ulcer Leg Right;Lateral;Lower (Active)   Wound Image Images linked 08/05/24 1335   Wound Description Epithelialization;Yellow;Pink;Slough (skin bridge 0.4 cm) 08/05/24 1335   Chelsie-wound Assessment Hyperpigmented 08/05/24 1335   Wound Length (cm) 1.5 cm (0.4 cm skin bridge) 08/05/24 1335   Wound Width (cm) 0.8 cm 08/05/24 1335   Wound Depth (cm) 0.1 cm 08/05/24 1335   Wound Surface Area (cm^2) 1.2 cm^2 08/05/24 1335   Wound Volume (cm^3) 0.12 cm^3 08/05/24 1335   Calculated Wound Volume (cm^3) 0.12 cm^3 08/05/24 1335   Change in Wound Size % 20 08/05/24 1335   Drainage Amount Small 08/05/24 1335   Drainage Description Serosanguineous 08/05/24 1335   Non-staged Wound Description Full thickness 08/05/24 1335       Wound 07/01/24 Venous Ulcer Leg Right;Lateral;Lower (Active)   Date First Assessed: 07/01/24   Present on Original Admission: Yes  Primary Wound Type: Venous Ulcer  Location: Leg  Wound Location Orientation: Right;Lateral;Lower       [REMOVED] Wound 02/17/22 Pressure Injury Leg Right;Lateral;Lower (Removed)   Resolved Date: 09/01/22  Date First Assessed: 02/17/22   Primary Wound Type: Pressure Injury  Location: Leg  Wound Location Orientation: Right;Lateral;Lower       [REMOVED] Wound 05/19/22 Pressure Injury Leg Left;Lateral (Removed)   Resolved Date: 10/17/22  Date First Assessed/Time First Assessed: 05/19/22 1134   Pre-Existing Wound: Yes  Primary Wound Type: Pressure Injury  Location: (c) Leg  Wound Location Orientation: Left;Lateral  Wound Outcome: Healed       [REMOVED] Wound 09/06/22 Pressure Injury Pretibial Lateral;Right (Removed)   Resolved Date: 10/31/22  Date First Assessed/Time First Assessed: 09/06/22 2128   Primary  Wound Type: Pressure Injury  Location: Pretibial  Wound Location Orientation: Lateral;Right       [REMOVED] Wound 08/23/23 Pressure Injury Leg Left;Lateral (Removed)   Resolved Date: 10/05/23  Date First Assessed: 08/23/23   Present on Original Admission: Yes  Primary Wound Type: Pressure Injury  Location: Leg  Wound Location Orientation: Left;Lateral  Wound Outcome: Healed       Subjective:      .    F/u visit for venous ulcer of right lower leg.  No new complaints.  He denies any pain, fevers, or chills.        The following portions of the patient's history were reviewed and updated as appropriate: He  has a past medical history of Acute kidney injury (10/28/2021), Anemia (09/13/2019), Cellulitis, Cellulitis of left lower extremity, Cellulitis of right lower extremity (11/19/2021), Cough (10/20/2019), Diabetes mellitus ([facility]), Disease of thyroid gland, Edema, Elevated liver enzymes, Elevated serum creatinine (11/19/2021), Esophageal reflux, Fungal infection (08/16/2021), Gluten intolerance, Gout, Hypercalcemia, Hyperglycemia, Hypertension, Hyponatremia (09/06/2019), IBS (irritable bowel syndrome), Insomnia, Obesity, Osteoarthritis of knee, Scrotal swelling (05/19/2020), Shortness of breath (05/03/2021), Venous insufficiency, and Villonodular synovitis of the hand, right.  He   Patient Active Problem List    Diagnosis Date Noted    Sinus tachycardia 01/22/2024    Obesity hypoventilation syndrome ([facility]) 11/09/2023    Current use of insulin ([facility]) 06/16/2023    Type 2 diabetes mellitus with hyperglycemia ([facility]) 09/25/2022    Chronic pain 09/12/2022    GERD (gastroesophageal reflux disease) 09/07/2022    Pressure injury of left leg 09/06/2022    Metabolic syndrome 06/28/2022    Low testosterone in male 06/28/2022    Hypothyroidism 06/28/2022    Pressure injury of right leg 02/17/2022    Ulcer of left lower extremity, limited to breakdown of skin ([facility]) 02/14/2022    Onychomycosis 11/25/2021    Migraine headache 11/23/2021     Morbid obesity 11/17/2021    DAHIANA (obstructive sleep apnea)     Elevated CO2 level 01/14/2021    Bilateral leg edema 12/22/2020    Erectile dysfunction 12/17/2020    Insomnia 11/28/2019    Diabetic neuropathy ([facility]) 10/08/2019    Gluten intolerance 09/12/2019    Gout 09/20/2018    Essential hypertension 09/20/2018    Venous insufficiency 12/10/2015    Hyperlipidemia 10/09/2013    Type 2 diabetes mellitus, with long-term current use of insulin ([facility]) 05/30/2013    Psoriasis 05/20/2013     He  has a past surgical history that includes Incision and drainage of wound (Left, 9/6/2019); Wound debridement (Left, 9/7/2019); and Skin biopsy.  His family history includes Cancer in his mother; Colon cancer in his father; Heart failure in his father.  He  reports that he has never smoked. He has never used smokeless tobacco. He reports that he does not currently use alcohol. He reports current drug use. Drug: Marijuana.  Current Outpatient Medications   Medication Sig Dispense Refill    acetaminophen (TYLENOL) 325 mg tablet 325 mg 3 (three) times a day as needed      albuterol (PROVENTIL HFA,VENTOLIN HFA) 90 mcg/act inhaler TAKE 2 PUFFS BY MOUTH EVERY 6 HOURS AS NEEDED FOR WHEEZE 8.5 g 0    BD Pen Needle Ludmila 2nd Gen 32G X 4 MM MISC INJECT UNDER THE SKIN 4 (FOUR) TIMES A  each 1    Blood Glucose Monitoring Suppl (ONETOUCH VERIO) w/Device KIT Use to test sugar daily (Patient not taking: Reported on 2/7/2024) 1 kit 0    Continuous Glucose Sensor (FreeStyle Elvin 3 Sensor) MISC REPLACE EVERY 14 DAYS 2 each 5    dulaglutide (Trulicity) 4.5 MG/0.5ML injection Inject 0.5 mL (4.5 mg total) under the skin every 7 days 2 mL 5    ezetimibe (ZETIA) 10 mg tablet TAKE 1 TABLET BY MOUTH EVERY DAY 90 tablet 2    furosemide (LASIX) 20 mg tablet TAKE 2 TABLETS EVERY DAY IN THE EARLY MORNING AND 1 TABLET DAILY AFTER LUNCH. 270 tablet 2    gabapentin (NEURONTIN) 100 mg capsule TAKE 1 CAPSULE (100 MG TOTAL) BY MOUTH 2 (TWO) TIMES A DAY WITH  300MG CAPSULE FOR A TOTAL OF 400MG TWICE A DAY 60 capsule 5    gabapentin (NEURONTIN) 300 mg capsule TAKE 1 CAPSULE (300 MG TOTAL) BY MOUTH 2 (TWO) TIMES A DAY TAKE 1 TAB WITH YOUR 100MG TAB TWICE DAILY 180 capsule 2    insulin aspart (NovoLOG FlexPen) 100 UNIT/ML injection pen INJECT 14-22 UNITS WITH MEALS+SCALE up to three times daily 60 mL 2    Insulin Glargine Solostar (Lantus SoloStar) 100 UNIT/ML SOPN INJECT 40 UNITS UNDER THE SKIN DAILY AT BEDTIME 45 mL 1    Klayesta powder APPLY 1 APPLICATION TOPICALLY 2 (TWO) TIMES A DAY TO AFFECTED AREA 120 g 3    Lancets (ONETOUCH ULTRASOFT) lancets Use to test sugar 2 times a day 100 each 3    levothyroxine 25 mcg tablet TAKE 1 TABLET (25 MCG TOTAL) BY MOUTH DAILY IN THE EARLY MORNING 90 tablet 1    lisinopril (ZESTRIL) 2.5 mg tablet Take 1 tablet (2.5 mg total) by mouth daily 90 tablet 1    metFORMIN (GLUCOPHAGE) 1000 MG tablet TAKE 1 TABLET BY MOUTH TWICE A  tablet 1    metoprolol succinate (TOPROL-XL) 25 mg 24 hr tablet Take 1 tablet (25 mg total) by mouth 2 (two) times a day 180 tablet 3    omeprazole (PriLOSEC) 40 MG capsule TAKE ONE CAPSULE BY MOUTH TWICE A DAY 60 capsule 0    OneTouch Verio test strip USE TO TEST SUGAR 2 TIMES A  strip 3    psyllium (METAMUCIL) packet Take 1 packet by mouth daily  0    traZODone (DESYREL) 50 mg tablet TAKE 1 TABLET (50 MG TOTAL) BY MOUTH DAILY AT BEDTIME AS NEEDED FOR SLEEP 100 tablet 1     No current facility-administered medications for this visit.     He is allergic to gluten meal - food allergy and clindamycin..    Review of Systems   Constitutional: Negative.    HENT:  Negative for ear pain and hearing loss.    Eyes:  Negative for pain.   Respiratory:  Negative for chest tightness and shortness of breath.    Cardiovascular:  Positive for leg swelling. Negative for chest pain and palpitations.   Gastrointestinal:  Negative for diarrhea, nausea and vomiting.   Genitourinary:  Negative for dysuria.    Musculoskeletal:  Positive for gait problem.   Skin:  Positive for wound.   Neurological:  Negative for tremors and weakness.   Psychiatric/Behavioral:  Negative for behavioral problems, confusion and suicidal ideas.          Objective:       Wound 07/01/24 Venous Ulcer Leg Right;Lateral;Lower (Active)   Wound Image Images linked 08/05/24 1335   Wound Description Epithelialization;Yellow;Pink;Slough (skin bridge 0.4 cm) 08/05/24 1335   Chelsie-wound Assessment Hyperpigmented 08/05/24 1335   Wound Length (cm) 1.5 cm (0.4 cm skin bridge) 08/05/24 1335   Wound Width (cm) 0.8 cm 08/05/24 1335   Wound Depth (cm) 0.1 cm 08/05/24 1335   Wound Surface Area (cm^2) 1.2 cm^2 08/05/24 1335   Wound Volume (cm^3) 0.12 cm^3 08/05/24 1335   Calculated Wound Volume (cm^3) 0.12 cm^3 08/05/24 1335   Change in Wound Size % 20 08/05/24 1335   Drainage Amount Small 08/05/24 1335   Drainage Description Serosanguineous 08/05/24 1335   Non-staged Wound Description Full thickness 08/05/24 1335       /86   Pulse 84   Temp (!) 97 °F (36.1 °C)   Resp 16         Wound Instructions:  Orders Placed This Encounter   Procedures    Wound cleansing and dressings Venous Ulcer Right;Lateral;Lower Leg     Wash your hands with soap and water.  Remove old dressing, discard into plastic bag and place in trash.  Cleanse the wound with mild soap(Dove) and water prior to applying a clean dressing. Do not use tissue or cotton balls. Do not scrub the wound. Pat dry using gauze.  Shower yes. Only on the days you change the dressing. Otherwise do not get dressing wet.         Right lower leg wound:  Apply alginate AG to the wound bed.    Cover with silicone bordered foam.  Change dressing three times a week.         Continue wearing Circaids every day. Can remove for sleeping.        Elevate your legs as much as possible.      Continue using your lymphedema pumps twice a day, one hour each time.     Standing Status:   Future     Standing Expiration Date:    8/19/2024    Wound Procedure Treatment Venous Ulcer Right;Lateral;Lower Leg     This order was created via procedure documentation    Debridement     This order was created via procedure documentation        Diagnosis ICD-10-CM Associated Orders   1. Chronic venous hypertension (idiopathic) with ulcer of right lower extremity (CODE) ([facility])  I87.311 Wound cleansing and dressings Venous Ulcer Right;Lateral;Lower Leg     lidocaine (LMX) 4 % cream     Wound Procedure Treatment Venous Ulcer Right;Lateral;Lower Leg      2. Non-pressure chronic ulcer of right lower leg with fat layer exposed ([facility])  L97.912 Wound cleansing and dressings Venous Ulcer Right;Lateral;Lower Leg     lidocaine (LMX) 4 % cream     Wound Procedure Treatment Venous Ulcer Right;Lateral;Lower Leg      3. Type 2 diabetes mellitus with other skin ulcer, with long-term current use of insulin ([facility])  E11.622     Z79.4       4. Lymphedema of both lower extremities  I89.0       5. Morbid obesity with BMI of 70 and over, adult ([facility])  E66.01     Z68.45

## 2024-08-05 NOTE — PROGRESS NOTES
Wound Procedure Treatment Venous Ulcer Right;Lateral;Lower Leg    Performed by: Ludivina Herrera RN  Authorized by: ANN Vidales    Associated wounds:   Wound 07/01/24 Venous Ulcer Leg Right;Lateral;Lower  Wound cleansed with:  NSS  Applied primary dressing:  Calcium alginate, Silver and Silicone bordered foam  Dressing secured with:  Compression wrap  Comments:  Circaid

## 2024-08-08 ENCOUNTER — OFFICE VISIT (OUTPATIENT)
Dept: PULMONOLOGY | Facility: CLINIC | Age: 59
End: 2024-08-08
Payer: MEDICARE

## 2024-08-08 VITALS
HEIGHT: 64 IN | SYSTOLIC BLOOD PRESSURE: 126 MMHG | TEMPERATURE: 97 F | HEART RATE: 105 BPM | OXYGEN SATURATION: 97 % | BODY MASS INDEX: 53.78 KG/M2 | DIASTOLIC BLOOD PRESSURE: 70 MMHG | WEIGHT: 315 LBS

## 2024-08-08 DIAGNOSIS — G47.33 OSA (OBSTRUCTIVE SLEEP APNEA): Primary | ICD-10-CM

## 2024-08-08 PROCEDURE — 99214 OFFICE O/P EST MOD 30 MIN: CPT | Performed by: INTERNAL MEDICINE

## 2024-08-08 NOTE — LETTER
August 8, 2024     Bernarda Piña MD  [address] [facility] [address]    Patient: Ben Mathur   YOB: 1965   Date of Visit: 8/8/2024       Dear Dr. Piña:    Thank you for referring Ben Mathur to me for evaluation. Below are my notes for this consultation.    If you have questions, please do not hesitate to call me. I look forward to following your patient along with you.         Sincerely,        Freeman Bolton DO        CC: No Recipients    Freeman Bolton DO  8/8/2024  3:16 PM  Sign when Signing Visit  Progress Note - Sleep Medicine  Ben Mathur 59 y.o. male MRN: [id number]       Impression & Plan:   59 y.o. M with PMHx of morbid obesity, Chronic lower extremity lymphedema, GERD, HTN, hyperlipidemia, DM type 2 who comes in DAHIANA/OHS follow up.  1.  Severe DAHIANA (AHI - 129.6) currently on autoPAP 12-20 still with excellent compliance and clinical response.  Residual AHI - 2.6 (DME - [employer], machine - refurbished dreamstation 1)       -  Continue autoCPAP at current pressures       -  Supplies ordered for his machine.  l      -  He is due for a new machine 4/2026.        -  He is aware of the risk of leaving sleep apnea untreated including hypertension, heart failure, arrhythmia, MI and stroke.     2.  Elevated CO2 on BMP/obesity hypoventilation -  PFT are consistent with restriction due to obesity. Previous pulse oximeter showed desaturations likely related to REM hypoventilation.  BMP CO2 has shown improvement while on PAP therapy.  Overnight pulse ox noted desaturation for only 7 minutes.   BMP shows improvement as well.      -   no additional testing.    3.  Morbid obesity - weight loss have been challenging.  He is on Trulicity with no success.  I will message his endocrinologist to discuss mounjaro.  Perhaps it can be approved based on DAHIANA diagnosis.      ______________________________________________________________________    HPI:    Ben  BRITTNEY Mathur presents today for follow-up for his DAHIANA.  He states that his sleep has been doing well. He feels refreshed when he wakes up.  He denies morning headache, dry mouth, aerophagia or mask intolerance.  He does feel more refreshed overall.  He has been getting his supplies and he does not have any issues.           Review of Systems:  Review of Systems   Constitutional:  Positive for fatigue. Negative for appetite change.   HENT: Negative.     Eyes: Negative.    Respiratory: Negative.     Cardiovascular: Negative.    Genitourinary: Negative.    Skin: Negative.    Neurological: Negative.    Hematological: Negative.          Social history updates:  Social History     Tobacco Use   Smoking Status Never   Smokeless Tobacco Never     Social History     Socioeconomic History   • Marital status: /Civil Union     Spouse name: Not on file   • Number of children: Not on file   • Years of education: Not on file   • Highest education level: Not on file   Occupational History   • Not on file   Tobacco Use   • Smoking status: Never   • Smokeless tobacco: Never   Vaping Use   • Vaping status: Never Used   Substance and Sexual Activity   • Alcohol use: Not Currently     Alcohol/week: 0.0 standard drinks of alcohol   • Drug use: Yes     Types: Marijuana     Comment: medical   • Sexual activity: Yes     Partners: Female   Other Topics Concern   • Not on file   Social History Narrative   • Not on file     Social Determinants of Health     Financial Resource Strain: Not on file   Food Insecurity: No Food Insecurity (9/8/2022)    Hunger Vital Sign    • Worried About Running Out of Food in the Last Year: Never true    • Ran Out of Food in the Last Year: Never true   Transportation Needs: No Transportation Needs (9/8/2022)    PRAPARE - Transportation    • Lack of Transportation (Medical): No    • Lack of Transportation (Non-Medical): No   Physical Activity: Not on file   Stress: Not on file   Social Connections: Not on file  "  Intimate Partner Violence: Not on file   Housing Stability: Unknown (9/8/2022)    Housing Stability Vital Sign    • Unable to Pay for Housing in the Last Year: No    • Number of Places Lived in the Last Year: Not on file    • Unstable Housing in the Last Year: No       PhysicalExamination:  Vitals:   /70   Pulse 105   Temp (!) 97 °F (36.1 °C) (Tympanic)   Ht 5' 4\" (1.626 m)   Wt (!) 199 kg (438 lb)   SpO2 97%   BMI 75.18 kg/m²   General: Pleasant, Awake alert and oriented x 3, conversant without conversational dyspnea, NAD, normal affect  HEENT:  PERRL, Sclera noninjected, nonicteric OU, Nares patent,  no craniofacial abnormalities, Mucous membranes, moist, no oral lesions, normal dentition, Mallampati class 3  NECK:  Trachea midline, no accessory muscle use, no stridor, no cervical or supraclavicular adenopathy, JVP not elevated  CARDIAC: Reg, single s1/S2, no m/r/g  PULM: CTA bilaterally no wheezing, rhonchi or rales  ABD: Normoactive bowel sounds, soft nontender, nondistended, no rebound, no rigidity, no guarding  EXT: No cyanosis, no clubbing, no edema, normal capillary refill  NEURO: no focal neurologic deficits, AAOx3, moving all extremities appropriately      Diagnostic Data:  Labs:  I personally reviewed the most recent laboratory data pertinent to today's visit  not applicable    I have personally reviewed pertinent lab results.  Lab Results   Component Value Date    WBC 8.56 01/11/2024    HGB 15.2 01/11/2024    HCT 46.5 01/11/2024    MCV 90 01/11/2024     01/11/2024     Lab Results   Component Value Date    GLUCOSE 166 (H) 01/12/2023    CALCIUM 10.1 01/11/2024     (L) 12/03/2015    K 4.3 01/11/2024    CO2 26 01/11/2024    CL 99 01/11/2024    BUN 20 01/11/2024    CREATININE 1.27 01/11/2024     No results found for: \"IGE\"  Lab Results   Component Value Date    ALT 32 01/11/2024    AST 29 01/11/2024    ALKPHOS 57 01/11/2024    BILITOT 0.88 12/03/2015     Lab Results   Component " Value Date    IRON 27 (L) 12/01/2019    TIBC 362 12/01/2019    FERRITIN 98 12/01/2019     Lab Results   Component Value Date    GHKNJKGM32 686 12/03/2015     PFT  Results:  FEV1/FVC Ratio:  86 %   Forced Vital Capacity:  2.72 L   (68 % predicted)  FEV1:  2.35 L (77 % predicted)  After administration of bronchodilator FEV1:  2.14 (-9%)  Lung volumes by body plethysmography:   Total Lung Capacity 72 % predicted   Residual volume 90 % predicted    RV/TLC ratio 125 %  DLCO for patients hemoglobin level:  83 % (94 % when corrected for Hb)  Interpretation:  Spirometry is normal  Lung volumes are normal  Diffusing capacity is normal  Flow volume loop suggests small airways disease     Sleep studies:  Diagnostic: AHI - 129.6 and hypoxia, lowest sat 74%     New Compliance Data: 5/8/24 - 8/5/24    Type of CPAP:  AutoCPAP 12-20, mean 13, average peak: 15  Percent usage: 100%   Average time used: 6 hours 28 minutes  Time in large leak: 0  Residual AHI: 2.6    Compliance Data:    Type of CPAP:  AutoCPAP 12-20, mean 12.6, average peak: 14.5  Percent usage: 96.7%   Average time used: ~6 hours/night  Time in large leak: 0  Residual AHI: 2.9     Compliance Data:  9/30/22 - 11/30/22                                  Type of CPAP:  autoPAP 12-20, mean pressure 12.4, max 16                                   Percent usage: 98%                                   Average time used: 6 hrs 50 mins                                  Time in large leak: 15secs                                   Residual AHI: 3.2      Freeman Bolton DO

## 2024-08-08 NOTE — PROGRESS NOTES
Progress Note - Sleep Medicine  Ben Mathur 59 y.o. male MRN: [id number]       Impression & Plan:   59 y.o. M with PMHx of morbid obesity, Chronic lower extremity lymphedema, GERD, HTN, hyperlipidemia, DM type 2 who comes in DAHIANA/[facility] follow up.  1.  Severe DAHIANA (AHI - 129.6) currently on autoPAP 12-20 still with excellent compliance and clinical response.  Residual AHI - 2.6 (DME - [facility], machine - refurbished dreamstation 1)       -  Continue autoCPAP at current pressures       -  Supplies ordered for his machine.  l      -  He is due for a new machine 4/2026.        -  He is aware of the risk of leaving sleep apnea untreated including hypertension, heart failure, arrhythmia, MI and stroke.     2.  Elevated CO2 on BMP/obesity hypoventilation -  PFT are consistent with restriction due to obesity. Previous pulse oximeter showed desaturations likely related to REM hypoventilation.  BMP CO2 has shown improvement while on PAP therapy.  Overnight pulse ox noted desaturation for only 7 minutes.   BMP shows improvement as well.      -   no additional testing.    3.  Morbid obesity - weight loss have been challenging.  He is on Trulicity with no success.  I will message his endocrinologist to discuss mounjaro.  Perhaps it can be approved based on DAHIANA diagnosis.      ______________________________________________________________________    HPI:    Ben Mathur presents today for follow-up for his DAHIANA.  He states that his sleep has been doing well. He feels refreshed when he wakes up.  He denies morning headache, dry mouth, aerophagia or mask intolerance.  He does feel more refreshed overall.  He has been getting his supplies and he does not have any issues.           Review of Systems:  Review of Systems   Constitutional:  Positive for fatigue. Negative for appetite change.   HENT: Negative.     Eyes: Negative.    Respiratory: Negative.     Cardiovascular: Negative.    Genitourinary: Negative.    Skin:  "Negative.    Neurological: Negative.    Hematological: Negative.          Social history updates:  Social History     Tobacco Use   Smoking Status Never   Smokeless Tobacco Never     Social History     Socioeconomic History    Marital status: /Civil Union     Spouse name: Not on file    Number of children: Not on file    Years of education: Not on file    Highest education level: Not on file   Occupational History    Not on file   Tobacco Use    Smoking status: Never    Smokeless tobacco: Never   Vaping Use    Vaping status: Never Used   Substance and Sexual Activity    Alcohol use: Not Currently     Alcohol/week: 0.0 standard drinks of alcohol    Drug use: Yes     Types: Marijuana     Comment: medical    Sexual activity: Yes     Partners: Female   Other Topics Concern    Not on file   Social History Narrative    Not on file     Social Determinants of Health     Financial Resource Strain: Not on file   Food Insecurity: No Food Insecurity (9/8/2022)    Hunger Vital Sign     Worried About Running Out of Food in the Last Year: Never true     Ran Out of Food in the Last Year: Never true   Transportation Needs: No Transportation Needs (9/8/2022)    PRAPARE - Transportation     Lack of Transportation (Medical): No     Lack of Transportation (Non-Medical): No   Physical Activity: Not on file   Stress: Not on file   Social Connections: Not on file   Intimate Partner Violence: Not on file   Housing Stability: Unknown (9/8/2022)    Housing Stability Vital Sign     Unable to Pay for Housing in the Last Year: No     Number of Places Lived in the Last Year: Not on file     Unstable Housing in the Last Year: No       PhysicalExamination:  Vitals:   /70   Pulse 105   Temp (!) 97 °F (36.1 °C) (Tympanic)   Ht 5' 4\" (1.626 m)   Wt (!) 199 kg (438 lb)   SpO2 97%   BMI 75.18 kg/m²   General: Pleasant, Awake alert and oriented x 3, conversant without conversational dyspnea, NAD, normal affect  HEENT:  PERRL, Sclera " "noninjected, nonicteric OU, Nares patent,  no craniofacial abnormalities, Mucous membranes, moist, no oral lesions, normal dentition, Mallampati class 3  NECK:  Trachea midline, no accessory muscle use, no stridor, no cervical or supraclavicular adenopathy, JVP not elevated  CARDIAC: Reg, single s1/S2, no m/r/g  PULM: CTA bilaterally no wheezing, rhonchi or rales  ABD: Normoactive bowel sounds, soft nontender, nondistended, no rebound, no rigidity, no guarding  EXT: No cyanosis, no clubbing, no edema, normal capillary refill  NEURO: no focal neurologic deficits, AAOx3, moving all extremities appropriately      Diagnostic Data:  Labs:  I personally reviewed the most recent laboratory data pertinent to today's visit  not applicable    I have personally reviewed pertinent lab results.  Lab Results   Component Value Date    WBC 8.56 01/11/2024    HGB 15.2 01/11/2024    HCT 46.5 01/11/2024    MCV 90 01/11/2024     01/11/2024     Lab Results   Component Value Date    GLUCOSE 166 (H) 01/12/2023    CALCIUM 10.1 01/11/2024     (L) 12/03/2015    K 4.3 01/11/2024    CO2 26 01/11/2024    CL 99 01/11/2024    BUN 20 01/11/2024    CREATININE 1.27 01/11/2024     No results found for: \"IGE\"  Lab Results   Component Value Date    ALT 32 01/11/2024    AST 29 01/11/2024    ALKPHOS 57 01/11/2024    BILITOT 0.88 12/03/2015     Lab Results   Component Value Date    IRON 27 (L) 12/01/2019    TIBC 362 12/01/2019    FERRITIN 98 12/01/2019     Lab Results   Component Value Date    MNDWYVBS52 686 12/03/2015     PFT  Results:  FEV1/FVC Ratio:  86 %   Forced Vital Capacity:  2.72 L   (68 % predicted)  FEV1:  2.35 L (77 % predicted)  After administration of bronchodilator FEV1:  2.14 (-9%)  Lung volumes by body plethysmography:   Total Lung Capacity 72 % predicted   Residual volume 90 % predicted    RV/TLC ratio 125 %  DLCO for patients hemoglobin level:  83 % (94 % when corrected for Hb)  Interpretation:  Spirometry is normal  Lung " volumes are normal  Diffusing capacity is normal  Flow volume loop suggests small airways disease     Sleep studies:  Diagnostic: AHI - 129.6 and hypoxia, lowest sat 74%     New Compliance Data: 5/8/24 - 8/5/24    Type of CPAP:  AutoCPAP 12-20, mean 13, average peak: 15  Percent usage: 100%   Average time used: 6 hours 28 minutes  Time in large leak: 0  Residual AHI: 2.6    Compliance Data:    Type of CPAP:  AutoCPAP 12-20, mean 12.6, average peak: 14.5  Percent usage: 96.7%   Average time used: ~6 hours/night  Time in large leak: 0  Residual AHI: 2.9     Compliance Data:  9/30/22 - 11/30/22                                  Type of CPAP:  autoPAP 12-20, mean pressure 12.4, max 16                                   Percent usage: 98%                                   Average time used: 6 hrs 50 mins                                  Time in large leak: 15secs                                   Residual AHI: 3.2      Freeman Bolton DO

## 2024-08-09 LAB

## 2024-08-19 ENCOUNTER — OFFICE VISIT (OUTPATIENT)
Dept: WOUND CARE | Facility: CLINIC | Age: 59
End: 2024-08-19
Payer: MEDICARE

## 2024-08-19 VITALS
TEMPERATURE: 97.7 F | RESPIRATION RATE: 20 BRPM | SYSTOLIC BLOOD PRESSURE: 128 MMHG | DIASTOLIC BLOOD PRESSURE: 74 MMHG | HEART RATE: 104 BPM

## 2024-08-19 DIAGNOSIS — L97.912 NON-PRESSURE CHRONIC ULCER OF RIGHT LOWER LEG WITH FAT LAYER EXPOSED (HCC): ICD-10-CM

## 2024-08-19 DIAGNOSIS — I89.0 LYMPHEDEMA OF BOTH LOWER EXTREMITIES: ICD-10-CM

## 2024-08-19 DIAGNOSIS — I87.311 CHRONIC VENOUS HYPERTENSION (IDIOPATHIC) WITH ULCER OF RIGHT LOWER EXTREMITY (CODE) (HCC): Primary | ICD-10-CM

## 2024-08-19 DIAGNOSIS — E66.01 MORBID OBESITY WITH BMI OF 70 AND OVER, ADULT (HCC): ICD-10-CM

## 2024-08-19 DIAGNOSIS — Z79.4 TYPE 2 DIABETES MELLITUS WITH OTHER SKIN ULCER, WITH LONG-TERM CURRENT USE OF INSULIN (HCC): ICD-10-CM

## 2024-08-19 DIAGNOSIS — E11.622 TYPE 2 DIABETES MELLITUS WITH OTHER SKIN ULCER, WITH LONG-TERM CURRENT USE OF INSULIN (HCC): ICD-10-CM

## 2024-08-19 PROCEDURE — 99213 OFFICE O/P EST LOW 20 MIN: CPT | Performed by: NURSE PRACTITIONER

## 2024-08-19 PROCEDURE — 99212 OFFICE O/P EST SF 10 MIN: CPT | Performed by: NURSE PRACTITIONER

## 2024-08-19 NOTE — PROGRESS NOTES
Patient ID: Ben Mathur is a 59 y.o. male Date of Birth 1965     Chief Complaint  Chief Complaint   Patient presents with    Follow Up Wound Care Visit     RLE wound       Allergies  Gluten meal - food allergy and Clindamycin    Assessment:     Diagnoses and all orders for this visit:    Chronic venous hypertension (idiopathic) with ulcer of right lower extremity (CODE) ([facility])  -     Wound cleansing and dressings Venous Ulcer Right;Lateral;Lower Leg; Future    Non-pressure chronic ulcer of right lower leg with fat layer exposed ([facility])    Type 2 diabetes mellitus with other skin ulcer, with long-term current use of insulin ([facility])  -     Wound cleansing and dressings Venous Ulcer Right;Lateral;Lower Leg; Future    Lymphedema of both lower extremities    Morbid obesity with BMI of 70 and over, adult ([facility])          Plan:  1.  F/U visit.  Wound epithelialized.  Counseled patient to moisturize skin daily with lotion and to continue to wear his CircAid wrap daily for compression.  2.  A1C results reviewed with the patient today.  Patient maintaining tight glycemic control  3. Patient is discharged from the wound center today.  He may follow-up as needed     Wound 07/01/24 Venous Ulcer Leg Right;Lateral;Lower (Active)   Wound Image Images linked 08/19/24 1351   Wound Description Epithelialization;Dry 08/19/24 1352   Chelsie-wound Assessment Hyperpigmented 08/19/24 1352   Wound Length (cm) 0 cm 08/19/24 1352   Wound Width (cm) 0 cm 08/19/24 1352   Wound Depth (cm) 0 cm 08/19/24 1352   Wound Surface Area (cm^2) 0 cm^2 08/19/24 1352   Wound Volume (cm^3) 0 cm^3 08/19/24 1352   Calculated Wound Volume (cm^3) 0 cm^3 08/19/24 1352   Change in Wound Size % 100 08/19/24 1352   Drainage Amount None 08/19/24 1352       Wound 07/01/24 Venous Ulcer Leg Right;Lateral;Lower (Active)   Date First Assessed: 07/01/24   Present on Original Admission: Yes  Primary Wound Type: Venous Ulcer  Location: Leg  Wound Location Orientation:  Right;Lateral;Lower  Wound Outcome: Healed       [REMOVED] Wound 02/17/22 Pressure Injury Leg Right;Lateral;Lower (Removed)   Resolved Date: 09/01/22  Date First Assessed: 02/17/22   Primary Wound Type: Pressure Injury  Location: Leg  Wound Location Orientation: Right;Lateral;Lower       [REMOVED] Wound 05/19/22 Pressure Injury Leg Left;Lateral (Removed)   Resolved Date: 10/17/22  Date First Assessed/Time First Assessed: 05/19/22 1134   Pre-Existing Wound: Yes  Primary Wound Type: Pressure Injury  Location: (c) Leg  Wound Location Orientation: Left;Lateral  Wound Outcome: Healed       [REMOVED] Wound 09/06/22 Pressure Injury Pretibial Lateral;Right (Removed)   Resolved Date: 10/31/22  Date First Assessed/Time First Assessed: 09/06/22 2128   Primary Wound Type: Pressure Injury  Location: Pretibial  Wound Location Orientation: Lateral;Right       [REMOVED] Wound 08/23/23 Pressure Injury Leg Left;Lateral (Removed)   Resolved Date: 10/05/23  Date First Assessed: 08/23/23   Present on Original Admission: Yes  Primary Wound Type: Pressure Injury  Location: Leg  Wound Location Orientation: Left;Lateral  Wound Outcome: Healed       Subjective:      .    F/u visit for venous ulcer of right lower leg.  No new complaints.  He denies any pain, fevers, or chills.        The following portions of the patient's history were reviewed and updated as appropriate: He  has a past medical history of Acute kidney injury (10/28/2021), Anemia (09/13/2019), Cellulitis, Cellulitis of left lower extremity, Cellulitis of right lower extremity (11/19/2021), Cough (10/20/2019), Diabetes mellitus (HCC), Disease of thyroid gland, Edema, Elevated liver enzymes, Elevated serum creatinine (11/19/2021), Esophageal reflux, Fungal infection (08/16/2021), Gluten intolerance, Gout, Hypercalcemia, Hyperglycemia, Hypertension, Hyponatremia (09/06/2019), IBS (irritable bowel syndrome), Insomnia, Obesity, Osteoarthritis of knee, Scrotal swelling (05/19/2020),  Shortness of breath (05/03/2021), Venous insufficiency, and Villonodular synovitis of the hand, right.  He   Patient Active Problem List    Diagnosis Date Noted    Sinus tachycardia 01/22/2024    Obesity hypoventilation syndrome ([facility]) 11/09/2023    Current use of insulin ([facility]) 06/16/2023    Type 2 diabetes mellitus with hyperglycemia ([facility]) 09/25/2022    Chronic pain 09/12/2022    GERD (gastroesophageal reflux disease) 09/07/2022    Pressure injury of left leg 09/06/2022    Metabolic syndrome 06/28/2022    Low testosterone in male 06/28/2022    Hypothyroidism 06/28/2022    Pressure injury of right leg 02/17/2022    Ulcer of left lower extremity, limited to breakdown of skin ([facility]) 02/14/2022    Onychomycosis 11/25/2021    Migraine headache 11/23/2021    Morbid obesity 11/17/2021    DAHIANA (obstructive sleep apnea)     Elevated CO2 level 01/14/2021    Bilateral leg edema 12/22/2020    Erectile dysfunction 12/17/2020    Insomnia 11/28/2019    Diabetic neuropathy ([facility]) 10/08/2019    Gluten intolerance 09/12/2019    Gout 09/20/2018    Essential hypertension 09/20/2018    Venous insufficiency 12/10/2015    Hyperlipidemia 10/09/2013    Type 2 diabetes mellitus, with long-term current use of insulin ([facility]) 05/30/2013    Psoriasis 05/20/2013     He  has a past surgical history that includes Incision and drainage of wound (Left, 9/6/2019); Wound debridement (Left, 9/7/2019); and Skin biopsy.  His family history includes Cancer in his mother; Colon cancer in his father; Heart failure in his father.  He  reports that he has never smoked. He has never used smokeless tobacco. He reports that he does not currently use alcohol. He reports current drug use. Drug: Marijuana.  Current Outpatient Medications   Medication Sig Dispense Refill    acetaminophen (TYLENOL) 325 mg tablet 325 mg 3 (three) times a day as needed      albuterol (PROVENTIL HFA,VENTOLIN HFA) 90 mcg/act inhaler TAKE 2 PUFFS BY MOUTH EVERY 6 HOURS AS NEEDED FOR WHEEZE 8.5  g 0    BD Pen Needle Ludmila 2nd Gen 32G X 4 MM MISC INJECT UNDER THE SKIN 4 (FOUR) TIMES A  each 1    Blood Glucose Monitoring Suppl (ONETOUCH VERIO) w/Device KIT Use to test sugar daily (Patient not taking: Reported on 2/7/2024) 1 kit 0    Continuous Glucose Sensor (FreeStyle Elvin 3 Sensor) MISC REPLACE EVERY 14 DAYS 2 each 5    dulaglutide (Trulicity) 4.5 MG/0.5ML injection Inject 0.5 mL (4.5 mg total) under the skin every 7 days 2 mL 5    ezetimibe (ZETIA) 10 mg tablet TAKE 1 TABLET BY MOUTH EVERY DAY 90 tablet 2    furosemide (LASIX) 20 mg tablet TAKE 2 TABLETS EVERY DAY IN THE EARLY MORNING AND 1 TABLET DAILY AFTER LUNCH. 270 tablet 2    gabapentin (NEURONTIN) 100 mg capsule TAKE 1 CAPSULE (100 MG TOTAL) BY MOUTH 2 (TWO) TIMES A DAY WITH 300MG CAPSULE FOR A TOTAL OF 400MG TWICE A DAY 60 capsule 5    gabapentin (NEURONTIN) 300 mg capsule TAKE 1 CAPSULE (300 MG TOTAL) BY MOUTH 2 (TWO) TIMES A DAY TAKE 1 TAB WITH YOUR 100MG TAB TWICE DAILY 180 capsule 2    insulin aspart (NovoLOG FlexPen) 100 UNIT/ML injection pen INJECT 14-22 UNITS WITH MEALS+SCALE up to three times daily 60 mL 2    Insulin Glargine Solostar (Lantus SoloStar) 100 UNIT/ML SOPN INJECT 40 UNITS UNDER THE SKIN DAILY AT BEDTIME 45 mL 1    Klayesta powder APPLY 1 APPLICATION TOPICALLY 2 (TWO) TIMES A DAY TO AFFECTED AREA 120 g 3    Lancets (ONETOUCH ULTRASOFT) lancets Use to test sugar 2 times a day 100 each 3    levothyroxine 25 mcg tablet TAKE 1 TABLET (25 MCG TOTAL) BY MOUTH DAILY IN THE EARLY MORNING 90 tablet 1    lisinopril (ZESTRIL) 2.5 mg tablet Take 1 tablet (2.5 mg total) by mouth daily 90 tablet 1    metFORMIN (GLUCOPHAGE) 1000 MG tablet TAKE 1 TABLET BY MOUTH TWICE A  tablet 1    metoprolol succinate (TOPROL-XL) 25 mg 24 hr tablet Take 1 tablet (25 mg total) by mouth 2 (two) times a day 180 tablet 3    omeprazole (PriLOSEC) 40 MG capsule TAKE ONE CAPSULE BY MOUTH TWICE A DAY 60 capsule 0    OneTouch Verio test strip USE TO TEST  SUGAR 2 TIMES A  strip 3    psyllium (METAMUCIL) packet Take 1 packet by mouth daily  0    traZODone (DESYREL) 50 mg tablet TAKE 1 TABLET (50 MG TOTAL) BY MOUTH DAILY AT BEDTIME AS NEEDED FOR SLEEP 100 tablet 1     No current facility-administered medications for this visit.     He is allergic to gluten meal - food allergy and clindamycin..    Review of Systems   Constitutional: Negative.    HENT:  Negative for ear pain and hearing loss.    Eyes:  Negative for pain.   Respiratory:  Negative for chest tightness and shortness of breath.    Cardiovascular:  Positive for leg swelling. Negative for chest pain and palpitations.   Gastrointestinal:  Negative for diarrhea, nausea and vomiting.   Genitourinary:  Negative for dysuria.   Musculoskeletal:  Positive for gait problem.   Skin:  Positive for wound.   Neurological:  Negative for tremors and weakness.   Psychiatric/Behavioral:  Negative for behavioral problems, confusion and suicidal ideas.          Objective:       Wound 07/01/24 Venous Ulcer Leg Right;Lateral;Lower (Active)   Wound Image Images linked 08/19/24 1351   Wound Description Epithelialization;Dry 08/19/24 1352   Chelsie-wound Assessment Hyperpigmented 08/19/24 1352   Wound Length (cm) 0 cm 08/19/24 1352   Wound Width (cm) 0 cm 08/19/24 1352   Wound Depth (cm) 0 cm 08/19/24 1352   Wound Surface Area (cm^2) 0 cm^2 08/19/24 1352   Wound Volume (cm^3) 0 cm^3 08/19/24 1352   Calculated Wound Volume (cm^3) 0 cm^3 08/19/24 1352   Change in Wound Size % 100 08/19/24 1352   Drainage Amount None 08/19/24 1352       /74   Pulse 104   Temp 97.7 °F (36.5 °C)   Resp 20     Physical Exam  Vitals and nursing note reviewed.   Constitutional:       General: He is not in acute distress.     Appearance: Normal appearance. He is obese.   HENT:      Head: Normocephalic and atraumatic.   Eyes:      General:         Right eye: No discharge.         Left eye: No discharge.   Pulmonary:      Effort: Pulmonary effort  is normal. No respiratory distress.   Musculoskeletal:         General: Normal range of motion.      Cervical back: Normal range of motion and neck supple. No rigidity.      Right lower leg: No edema.      Left lower leg: No edema.   Skin:     General: Skin is warm and dry.      Findings: No erythema or wound.          Neurological:      General: No focal deficit present.      Mental Status: He is alert and oriented to person, place, and time. Mental status is at baseline.   Psychiatric:         Mood and Affect: Mood normal.         Behavior: Behavior normal.         Thought Content: Thought content normal.         Judgment: Judgment normal.               Wound Instructions:  Orders Placed This Encounter   Procedures    Wound cleansing and dressings Venous Ulcer Right;Lateral;Lower Leg     Right lower leg wound is healed. No dressing needed.      Shower, yes. Do not scrub area. Pat dry.    Moisturize lower legs one to two times per day.       Continue wearing Circaid compression socks.      Contact the wound center if your wound reopens.     Standing Status:   Future     Standing Expiration Date:   8/26/2024        Diagnosis ICD-10-CM Associated Orders   1. Chronic venous hypertension (idiopathic) with ulcer of right lower extremity (CODE) ([facility])  I87.311 Wound cleansing and dressings Venous Ulcer Right;Lateral;Lower Leg      2. Non-pressure chronic ulcer of right lower leg with fat layer exposed ([facility])  L97.912       3. Type 2 diabetes mellitus with other skin ulcer, with long-term current use of insulin ([facility])  E11.622 Wound cleansing and dressings Venous Ulcer Right;Lateral;Lower Leg    Z79.4       4. Lymphedema of both lower extremities  I89.0       5. Morbid obesity with BMI of 70 and over, adult ([facility])  E66.01     Z68.45

## 2024-08-19 NOTE — PATIENT INSTRUCTIONS
Orders Placed This Encounter   Procedures    Wound cleansing and dressings Venous Ulcer Right;Lateral;Lower Leg     Right lower leg wound is healed. No dressing needed.      Shower, yes. Do not scrub area. Pat dry.    Moisturize lower legs one to two times per day.       Continue wearing Circaid compression socks.      Contact the wound center if your wound reopens.     Standing Status:   Future     Standing Expiration Date:   8/26/2024

## 2024-08-28 DIAGNOSIS — Z00.6 ENCOUNTER FOR EXAMINATION FOR NORMAL COMPARISON OR CONTROL IN CLINICAL RESEARCH PROGRAM: ICD-10-CM

## 2024-08-30 DIAGNOSIS — E11.65 TYPE 2 DIABETES MELLITUS WITH HYPERGLYCEMIA, WITH LONG-TERM CURRENT USE OF INSULIN (HCC): ICD-10-CM

## 2024-08-30 DIAGNOSIS — Z79.4 TYPE 2 DIABETES MELLITUS WITH HYPERGLYCEMIA, WITH LONG-TERM CURRENT USE OF INSULIN (HCC): ICD-10-CM

## 2024-08-30 DIAGNOSIS — Z79.4 CURRENT USE OF INSULIN (HCC): ICD-10-CM

## 2024-08-30 DIAGNOSIS — E78.00 PURE HYPERCHOLESTEROLEMIA: ICD-10-CM

## 2024-08-30 RX ORDER — INSULIN GLARGINE 100 [IU]/ML
INJECTION, SOLUTION SUBCUTANEOUS
Qty: 45 ML | Refills: 0 | Status: SHIPPED | OUTPATIENT
Start: 2024-08-30

## 2024-08-30 RX ORDER — EZETIMIBE 10 MG/1
10 TABLET ORAL DAILY
Qty: 90 TABLET | Refills: 1 | Status: SHIPPED | OUTPATIENT
Start: 2024-08-30

## 2024-09-01 RX ORDER — BLOOD-GLUCOSE SENSOR
EACH MISCELLANEOUS
Qty: 2 EACH | Refills: 5 | Status: SHIPPED | OUTPATIENT
Start: 2024-09-01

## 2024-09-10 ENCOUNTER — TELEPHONE (OUTPATIENT)
Dept: LAB | Facility: HOSPITAL | Age: 59
End: 2024-09-10

## 2024-09-20 ENCOUNTER — OFFICE VISIT (OUTPATIENT)
Dept: WOUND CARE | Facility: CLINIC | Age: 59
End: 2024-09-20
Payer: MEDICARE

## 2024-09-20 ENCOUNTER — APPOINTMENT (OUTPATIENT)
Dept: LAB | Facility: HOSPITAL | Age: 59
End: 2024-09-20
Payer: MEDICARE

## 2024-09-20 ENCOUNTER — APPOINTMENT (OUTPATIENT)
Dept: LAB | Facility: HOSPITAL | Age: 59
End: 2024-09-20
Attending: PATHOLOGY
Payer: MEDICARE

## 2024-09-20 VITALS
TEMPERATURE: 97.7 F | DIASTOLIC BLOOD PRESSURE: 72 MMHG | HEART RATE: 96 BPM | SYSTOLIC BLOOD PRESSURE: 106 MMHG | RESPIRATION RATE: 18 BRPM

## 2024-09-20 DIAGNOSIS — E11.65 TYPE 2 DIABETES MELLITUS WITH HYPERGLYCEMIA, WITH LONG-TERM CURRENT USE OF INSULIN (HCC): ICD-10-CM

## 2024-09-20 DIAGNOSIS — Z00.6 ENCOUNTER FOR EXAMINATION FOR NORMAL COMPARISON OR CONTROL IN CLINICAL RESEARCH PROGRAM: ICD-10-CM

## 2024-09-20 DIAGNOSIS — Z79.4 TYPE 2 DIABETES MELLITUS WITH HYPERGLYCEMIA, WITH LONG-TERM CURRENT USE OF INSULIN (HCC): ICD-10-CM

## 2024-09-20 DIAGNOSIS — E11.42 TYPE 2 DIABETES MELLITUS WITH DIABETIC POLYNEUROPATHY, WITH LONG-TERM CURRENT USE OF INSULIN (HCC): ICD-10-CM

## 2024-09-20 DIAGNOSIS — Z79.4 TYPE 2 DIABETES MELLITUS WITH OTHER SKIN ULCER, WITH LONG-TERM CURRENT USE OF INSULIN (HCC): ICD-10-CM

## 2024-09-20 DIAGNOSIS — E78.5 HYPERLIPIDEMIA, UNSPECIFIED HYPERLIPIDEMIA TYPE: ICD-10-CM

## 2024-09-20 DIAGNOSIS — I87.311 CHRONIC VENOUS HYPERTENSION (IDIOPATHIC) WITH ULCER OF RIGHT LOWER EXTREMITY (CODE) (HCC): Primary | ICD-10-CM

## 2024-09-20 DIAGNOSIS — Z79.4 TYPE 2 DIABETES MELLITUS WITH DIABETIC POLYNEUROPATHY, WITH LONG-TERM CURRENT USE OF INSULIN (HCC): ICD-10-CM

## 2024-09-20 DIAGNOSIS — E11.622 TYPE 2 DIABETES MELLITUS WITH OTHER SKIN ULCER, WITH LONG-TERM CURRENT USE OF INSULIN (HCC): ICD-10-CM

## 2024-09-20 LAB
CHOLEST SERPL-MCNC: 183 MG/DL
HDLC SERPL-MCNC: 43 MG/DL
LDLC SERPL CALC-MCNC: 102 MG/DL (ref 0–100)
TRIGL SERPL-MCNC: 192 MG/DL

## 2024-09-20 PROCEDURE — 11042 DBRDMT SUBQ TIS 1ST 20SQCM/<: CPT | Performed by: FAMILY MEDICINE

## 2024-09-20 PROCEDURE — 99214 OFFICE O/P EST MOD 30 MIN: CPT | Performed by: FAMILY MEDICINE

## 2024-09-20 PROCEDURE — 36415 COLL VENOUS BLD VENIPUNCTURE: CPT

## 2024-09-20 PROCEDURE — 83036 HEMOGLOBIN GLYCOSYLATED A1C: CPT

## 2024-09-20 PROCEDURE — 99213 OFFICE O/P EST LOW 20 MIN: CPT | Performed by: FAMILY MEDICINE

## 2024-09-20 PROCEDURE — 80061 LIPID PANEL: CPT

## 2024-09-20 RX ORDER — LIDOCAINE 40 MG/G
CREAM TOPICAL ONCE
Status: COMPLETED | OUTPATIENT
Start: 2024-09-20 | End: 2024-09-20

## 2024-09-20 RX ADMIN — LIDOCAINE: 40 CREAM TOPICAL at 16:12

## 2024-09-20 NOTE — PROGRESS NOTES
"Patient ID: Ben Mathur is a 59 y.o. male Date of Birth 1965       Chief Complaint   Patient presents with    New Patient Visit     Right lower leg wound. Patient was last seen 8/19/24 and wound was healed. He is wearing circaids and putting alginate and silicon bordered foam on the wound. The wound reopened about one week ago.        Allergies:  Gluten meal - food allergy and Clindamycin    Diagnosis:   No diagnosis found.        Assessment & Plan:  Venous stasis ulcer right lateral lower extremity complicated by pressure: Wound bed with fibrin and slough requiring surgical debridement.  No acute erythema, increased warmth, fluctuance, induration.  No purulence or malodor.  Surgical debridement  Apply silver alginate followed by bordered foam  Continue use of CircAid  Continue recommend utilizing lymphedema pumps twice daily  ER precautions reviewed with patient and spouse, they expressed understanding  Follow-up in 2 weeks or sooner if needed    Portions of the record may have been created with voice recognition software. Occasional wrong word or \"sound alike\" substitutions may have occurred due to the inherent limitations of voice recognition software. Read the chart carefully and recognize, using context, where substitutions have occurred.    Subjective:   Ben is a 59-year-old male with a past medical history including but not limited to venous insufficiency, type 2 diabetes, neuropathy, hypertension who presents to wound center today to reestablish care for reopening of right lateral ulceration.  He reports that opened about a week ago.  He states he does continue to use his lymphedema pumps however he recently reduced it to once a day.  Very compliant with his CircAid's.  He has not had fever or chills.        The following portions of the patient's history were reviewed and updated as appropriate:   Patient Active Problem List   Diagnosis    Type 2 diabetes mellitus, with long-term current use of " insulin (HCC)    Hyperlipidemia    Psoriasis    Venous insufficiency    Gout    Essential hypertension    Gluten intolerance    Diabetic neuropathy (HCC)    Insomnia    Erectile dysfunction    Bilateral leg edema    Elevated CO2 level    DAHIANA (obstructive sleep apnea)    Morbid obesity    Migraine headache    Onychomycosis    Ulcer of left lower extremity, limited to breakdown of skin (HCC)    Pressure injury of right leg    Metabolic syndrome    Low testosterone in male    Hypothyroidism    Pressure injury of left leg    GERD (gastroesophageal reflux disease)    Chronic pain    Type 2 diabetes mellitus with hyperglycemia (HCC)    Current use of insulin (HCC)    Obesity hypoventilation syndrome (HCC)    Sinus tachycardia     Past Medical History:   Diagnosis Date    Acute kidney injury 10/28/2021    Anemia 09/13/2019    Cellulitis     last assessed 12/10/15    Cellulitis of left lower extremity     Cellulitis of right lower extremity 11/19/2021    Cough 10/20/2019    Diabetes mellitus (HCC)     Disease of thyroid gland     Edema     Elevated liver enzymes     Elevated serum creatinine 11/19/2021    Esophageal reflux     Fungal infection 08/16/2021    Gluten intolerance     Gout     last assessed 09/05/13    Hypercalcemia     Hyperglycemia     Hypertension     Hyponatremia 09/06/2019    IBS (irritable bowel syndrome)     Insomnia     Obesity     Osteoarthritis of knee     last assessed 02/10/14    Scrotal swelling 05/19/2020    Shortness of breath 05/03/2021    Venous insufficiency     last assessed 08/22/17    Villonodular synovitis of the hand, right     last assessed 11/14/2013     Past Surgical History:   Procedure Laterality Date    INCISION AND DRAINAGE OF WOUND Left 9/6/2019    Procedure: INCISION AND DRAINAGE (I&D) GROIN;  Surgeon: Carlos Ruano DO;  Location: [facility];  Service: General    SKIN BIOPSY      WOUND DEBRIDEMENT Left 9/7/2019    Procedure: EXCISIONAL DEBRIDEMENT;  Surgeon: Carlos Ruano DO;   Location: [facility];  Service: General     Family History   Problem Relation Age of Onset    Cancer Mother         gastrc    Colon cancer Father     Heart failure Father       Social History     Socioeconomic History    Marital status: /Civil Union     Spouse name: None    Number of children: None    Years of education: None    Highest education level: None   Occupational History    None   Tobacco Use    Smoking status: Never    Smokeless tobacco: Never   Vaping Use    Vaping status: Never Used   Substance and Sexual Activity    Alcohol use: Not Currently     Alcohol/week: 0.0 standard drinks of alcohol    Drug use: Yes     Types: Marijuana     Comment: medical    Sexual activity: Yes     Partners: Female   Other Topics Concern    None   Social History Narrative    None     Social Determinants of Health     Financial Resource Strain: Not on file   Food Insecurity: No Food Insecurity (9/8/2022)    Hunger Vital Sign     Worried About Running Out of Food in the Last Year: Never true     Ran Out of Food in the Last Year: Never true   Transportation Needs: No Transportation Needs (9/8/2022)    PRAPARE - Transportation     Lack of Transportation (Medical): No     Lack of Transportation (Non-Medical): No   Physical Activity: Not on file   Stress: Not on file   Social Connections: Not on file   Intimate Partner Violence: Not on file   Housing Stability: Unknown (9/8/2022)    Housing Stability Vital Sign     Unable to Pay for Housing in the Last Year: No     Number of Places Lived in the Last Year: Not on file     Unstable Housing in the Last Year: No        Current Outpatient Medications:     acetaminophen (TYLENOL) 325 mg tablet, 325 mg 3 (three) times a day as needed, Disp: , Rfl:     albuterol (PROVENTIL HFA,VENTOLIN HFA) 90 mcg/act inhaler, TAKE 2 PUFFS BY MOUTH EVERY 6 HOURS AS NEEDED FOR WHEEZE, Disp: 8.5 g, Rfl: 0    BD Pen Needle Ludmila 2nd Gen 32G X 4 MM MISC, INJECT UNDER THE SKIN 4 (FOUR) TIMES A DAY, Disp:  200 each, Rfl: 1    Blood Glucose Monitoring Suppl (ONETOUCH VERIO) w/Device KIT, Use to test sugar daily (Patient not taking: Reported on 2/7/2024), Disp: 1 kit, Rfl: 0    Continuous Glucose Sensor (FreeStyle Elvin 3 Sensor) MISC, Replace every 14 days, Disp: 2 each, Rfl: 5    dulaglutide (Trulicity) 4.5 MG/0.5ML injection, Inject 0.5 mL (4.5 mg total) under the skin every 7 days, Disp: 2 mL, Rfl: 5    ezetimibe (ZETIA) 10 mg tablet, Take 1 tablet (10 mg total) by mouth daily, Disp: 90 tablet, Rfl: 1    furosemide (LASIX) 20 mg tablet, TAKE 2 TABLETS EVERY DAY IN THE EARLY MORNING AND 1 TABLET DAILY AFTER LUNCH., Disp: 270 tablet, Rfl: 2    gabapentin (NEURONTIN) 100 mg capsule, TAKE 1 CAPSULE (100 MG TOTAL) BY MOUTH 2 (TWO) TIMES A DAY WITH 300MG CAPSULE FOR A TOTAL OF 400MG TWICE A DAY, Disp: 60 capsule, Rfl: 5    gabapentin (NEURONTIN) 300 mg capsule, TAKE 1 CAPSULE (300 MG TOTAL) BY MOUTH 2 (TWO) TIMES A DAY TAKE 1 TAB WITH YOUR 100MG TAB TWICE DAILY, Disp: 180 capsule, Rfl: 2    insulin aspart (NovoLOG FlexPen) 100 UNIT/ML injection pen, INJECT 14-22 UNITS WITH MEALS+SCALE up to three times daily, Disp: 60 mL, Rfl: 2    Insulin Glargine Solostar (Lantus SoloStar) 100 UNIT/ML SOPN, INJECT 40 UNITS UNDER THE SKIN DAILY AT BEDTIME, Disp: 45 mL, Rfl: 0    Klayesta powder, APPLY 1 APPLICATION TOPICALLY 2 (TWO) TIMES A DAY TO AFFECTED AREA, Disp: 120 g, Rfl: 3    Lancets (ONETOUCH ULTRASOFT) lancets, Use to test sugar 2 times a day, Disp: 100 each, Rfl: 3    levothyroxine 25 mcg tablet, TAKE 1 TABLET (25 MCG TOTAL) BY MOUTH DAILY IN THE EARLY MORNING, Disp: 90 tablet, Rfl: 1    lisinopril (ZESTRIL) 2.5 mg tablet, Take 1 tablet (2.5 mg total) by mouth daily, Disp: 90 tablet, Rfl: 1    metFORMIN (GLUCOPHAGE) 1000 MG tablet, TAKE 1 TABLET BY MOUTH TWICE A DAY, Disp: 180 tablet, Rfl: 1    metoprolol succinate (TOPROL-XL) 25 mg 24 hr tablet, Take 1 tablet (25 mg total) by mouth 2 (two) times a day, Disp: 180 tablet, Rfl:  3    omeprazole (PriLOSEC) 40 MG capsule, TAKE ONE CAPSULE BY MOUTH TWICE A DAY, Disp: 60 capsule, Rfl: 0    OneTouch Verio test strip, USE TO TEST SUGAR 2 TIMES A DAY, Disp: 100 strip, Rfl: 3    psyllium (METAMUCIL) packet, Take 1 packet by mouth daily, Disp: , Rfl: 0    traZODone (DESYREL) 50 mg tablet, TAKE 1 TABLET (50 MG TOTAL) BY MOUTH DAILY AT BEDTIME AS NEEDED FOR SLEEP, Disp: 100 tablet, Rfl: 1    Review of Systems   Constitutional:  Negative for chills, fatigue and fever.   Cardiovascular:  Positive for leg swelling (Lymphedema).   Endocrine: Negative.    Musculoskeletal:  Positive for gait problem (Walker).   Skin:  Positive for wound (RLE).   Allergic/Immunologic: Negative.    Psychiatric/Behavioral:  The patient is not nervous/anxious.        Objective:  /72   Pulse 96   Temp 97.7 °F (36.5 °C)   Resp 18   Pain Score:   5     Physical Exam  Vitals and nursing note reviewed.   Constitutional:       General: He is not in acute distress.     Appearance: He is morbidly obese. He is not ill-appearing, toxic-appearing or diaphoretic.      Comments: No acute distress.  Significant other at bedside     HENT:      Head: Normocephalic and atraumatic.   Pulmonary:      Effort: Pulmonary effort is normal. No respiratory distress.   Musculoskeletal:      Right lower leg: Edema present.   Skin:     General: Skin is warm and dry.      Findings: Wound present. No erythema.             Comments: 1-    Neurological:      Mental Status: He is alert and oriented to person, place, and time.      Gait: Gait abnormal.   Psychiatric:         Attention and Perception: Attention normal.         Mood and Affect: Mood and affect normal.         Behavior: Behavior normal. Behavior is cooperative.         Cognition and Memory: Cognition normal.           Wound 09/20/24 Venous Ulcer Leg Right;Lateral (Active)   Wound Image   09/20/24 1544   Wound Description Pink;Slough 09/20/24 1539   Chelsie-wound Assessment  "Intact;Edema;Hyperpigmented;Maceration 09/20/24 1539   Wound Length (cm) 2.7 cm 09/20/24 1539   Wound Width (cm) 4.8 cm 09/20/24 1539   Wound Depth (cm) 0.2 cm 09/20/24 1539   Wound Surface Area (cm^2) 12.96 cm^2 09/20/24 1539   Wound Volume (cm^3) 2.592 cm^3 09/20/24 1539   Calculated Wound Volume (cm^3) 2.59 cm^3 09/20/24 1539   Drainage Amount Moderate 09/20/24 1539   Drainage Description Serosanguineous 09/20/24 1539   Non-staged Wound Description Full thickness 09/20/24 1539   Wound packed? No 09/20/24 1539       Debridement   Wound 09/20/24 Venous Ulcer Leg Right;Lateral    Universal Protocol:  Consent: Verbal consent obtained. Written consent obtained.  Risks and benefits: risks, benefits and alternatives were discussed  Consent given by: patient  Time out: Immediately prior to procedure a \"time out\" was called to verify the correct patient, procedure, equipment, support staff and site/side marked as required.  Patient understanding: patient states understanding of the procedure being performed  Patient identity confirmed: verbally with patient    Debridement Details  Performed by: physician  Debridement type: surgical  Level of debridement: subcutaneous tissue  Pain control: lidocaine 4%      Post-debridement measurements  Length (cm): 2.7  Width (cm): 4.8  Depth (cm): 0.3  Percent debrided: 80%  Surface Area (cm^2): 12.96  Area Debrided (cm^2): 10.37  Volume (cm^3): 3.89    Tissue and other material debrided: subcutaneous tissue  Devitalized tissue debrided: fibrin and slough  Instrument(s) utilized: curette  Bleeding: medium  Hemostasis obtained with: pressure  Procedural pain (0-10): 0  Post-procedural pain: 0   Response to treatment: procedure was tolerated well                   Lab Results   Component Value Date    HGBA1C 6.8 (H) 09/14/2023       Wound Instructions:  No orders of the defined types were placed in this encounter.      Karen Do MD  " patient    Debridement Details  Performed by: physician  Debridement type: surgical  Level of debridement: subcutaneous tissue  Pain control: lidocaine 4%      Post-debridement measurements  Length (cm): 2.7  Width (cm): 4.8  Depth (cm): 0.3  Percent debrided: 80%  Surface Area (cm^2): 12.96  Area Debrided (cm^2): 10.37  Volume (cm^3): 3.89    Tissue and other material debrided: subcutaneous tissue  Devitalized tissue debrided: fibrin and slough  Instrument(s) utilized: curette  Bleeding: medium  Hemostasis obtained with: pressure  Procedural pain (0-10): 0  Post-procedural pain: 0   Response to treatment: procedure was tolerated well                   Lab Results   Component Value Date    HGBA1C 6.5 (H) 09/20/2024       Wound Instructions:  Orders Placed This Encounter   Procedures    Wound cleansing and dressings Venous Ulcer Right;Lateral Leg     Wash your hands with soap and water.  Remove old dressing, discard into plastic bag and place in trash.  Cleanse the wound with mild soap and water prior to applying a clean dressing. Do not use tissue or cotton balls. Do not scrub the wound. Pat dry using gauze.  Shower yes.        Right lower leg wound:   Apply alginate AG to the wound bed.    Cover with silicon bordered foam.  Change dressing three times a week.       Wear Circaids on both lower legs. You can remove for sleeping.       Elevate your leg above the level of your heart whenever sitting.      Continue using lymphedema pumps for one hour twice a day.         Follow up at the wound center in two weeks.     Standing Status:   Future     Standing Expiration Date:   9/27/2024    Wound Procedure Treatment Venous Ulcer Right;Lateral Leg     This order was created via procedure documentation    Debridement Venous Ulcer Right;Lateral Leg     This order was created via procedure documentation       Karen Do MD

## 2024-09-20 NOTE — PROGRESS NOTES
Wound Procedure Treatment Venous Ulcer Right;Lateral Leg    Performed by: Andrea Francis RN  Authorized by: Karen Do MD    Associated wounds:   Wound 09/20/24 Venous Ulcer Leg Right;Lateral  Wound cleansed with:  NSS  Applied primary dressing:  Calcium alginate and Silver  Applied secondary dressing:  Foam  Comments:  Mepilex, circaids

## 2024-09-20 NOTE — PATIENT INSTRUCTIONS
Orders Placed This Encounter   Procedures    Wound cleansing and dressings Venous Ulcer Right;Lateral Leg     Wash your hands with soap and water.  Remove old dressing, discard into plastic bag and place in trash.  Cleanse the wound with mild soap and water prior to applying a clean dressing. Do not use tissue or cotton balls. Do not scrub the wound. Pat dry using gauze.  Shower yes.        Right lower leg wound:   Apply alginate AG to the wound bed.    Cover with silicon bordered foam.  Change dressing three times a week.       Wear Circaids on both lower legs. You can remove for sleeping.       Elevate your leg above the level of your heart whenever sitting.      Continue using lymphedema pumps for one hour twice a day.         Follow up at the wound center in two weeks.     Standing Status:   Future     Standing Expiration Date:   9/27/2024

## 2024-09-21 LAB
EST. AVERAGE GLUCOSE BLD GHB EST-MCNC: 140 MG/DL
HBA1C MFR BLD: 6.5 %

## 2024-09-24 DIAGNOSIS — Z79.4 TYPE 2 DIABETES MELLITUS WITH HYPERGLYCEMIA, WITH LONG-TERM CURRENT USE OF INSULIN (HCC): ICD-10-CM

## 2024-09-24 DIAGNOSIS — E11.65 TYPE 2 DIABETES MELLITUS WITH HYPERGLYCEMIA, WITH LONG-TERM CURRENT USE OF INSULIN (HCC): ICD-10-CM

## 2024-09-27 DIAGNOSIS — E11.49 OTHER DIABETIC NEUROLOGICAL COMPLICATION ASSOCIATED WITH TYPE 2 DIABETES MELLITUS (HCC): ICD-10-CM

## 2024-09-27 DIAGNOSIS — K21.9 GASTROESOPHAGEAL REFLUX DISEASE: ICD-10-CM

## 2024-09-27 RX ORDER — OMEPRAZOLE 40 MG/1
40 CAPSULE, DELAYED RELEASE ORAL 2 TIMES DAILY
Qty: 60 CAPSULE | Refills: 0 | Status: SHIPPED | OUTPATIENT
Start: 2024-09-27

## 2024-09-27 RX ORDER — GABAPENTIN 100 MG/1
100 CAPSULE ORAL 2 TIMES DAILY
Qty: 60 CAPSULE | Refills: 0 | Status: SHIPPED | OUTPATIENT
Start: 2024-09-27

## 2024-10-01 LAB
APOB+LDLR+PCSK9 GENE MUT ANL BLD/T: NOT DETECTED
BRCA1+BRCA2 DEL+DUP + FULL MUT ANL BLD/T: NOT DETECTED
MLH1+MSH2+MSH6+PMS2 GN DEL+DUP+FUL M: NOT DETECTED

## 2024-10-02 ENCOUNTER — OFFICE VISIT (OUTPATIENT)
Dept: PODIATRY | Facility: CLINIC | Age: 59
End: 2024-10-02
Payer: MEDICARE

## 2024-10-02 VITALS
SYSTOLIC BLOOD PRESSURE: 136 MMHG | DIASTOLIC BLOOD PRESSURE: 86 MMHG | HEIGHT: 64 IN | OXYGEN SATURATION: 99 % | HEART RATE: 106 BPM | BODY MASS INDEX: 75.18 KG/M2

## 2024-10-02 DIAGNOSIS — L84 CORNS: ICD-10-CM

## 2024-10-02 DIAGNOSIS — E11.42 DIABETIC POLYNEUROPATHY ASSOCIATED WITH TYPE 2 DIABETES MELLITUS (HCC): ICD-10-CM

## 2024-10-02 DIAGNOSIS — E11.65 TYPE 2 DIABETES MELLITUS WITH HYPERGLYCEMIA, WITH LONG-TERM CURRENT USE OF INSULIN (HCC): Primary | ICD-10-CM

## 2024-10-02 DIAGNOSIS — Z79.4 TYPE 2 DIABETES MELLITUS WITH HYPERGLYCEMIA, WITH LONG-TERM CURRENT USE OF INSULIN (HCC): Primary | ICD-10-CM

## 2024-10-02 DIAGNOSIS — B35.1 ONYCHOMYCOSIS: ICD-10-CM

## 2024-10-02 DIAGNOSIS — I89.0 LYMPHEDEMA: ICD-10-CM

## 2024-10-02 PROCEDURE — 11721 DEBRIDE NAIL 6 OR MORE: CPT | Performed by: PODIATRIST

## 2024-10-02 PROCEDURE — 99212 OFFICE O/P EST SF 10 MIN: CPT | Performed by: PODIATRIST

## 2024-10-02 PROCEDURE — 11056 PARNG/CUTG B9 HYPRKR LES 2-4: CPT | Performed by: PODIATRIST

## 2024-10-02 NOTE — PATIENT INSTRUCTIONS
For diabetic shoes:    [facility]   [address], [address]  [place] [address] [phone]

## 2024-10-02 NOTE — PROGRESS NOTES
Assessment/Plan:     The patient's clinical examination today is significant for brittle, thickened and dystrophic pedal nail plates with discoloration and subungual debris consistent with onychomycosis x10.  The interdigital spaces are clear without maceration.  Tender callus lesions noted to the plantar aspect of the lateral right forefoot and the plantar lateral left heel.  Pedal pulses are nonpalpable bilaterally secondary to bilateral lower extremity edema.  Vibratory sensation is absent on the right and diminished on the left.  Epicritic sensation is intact bilaterally.     The patient's pedal nail plates are sharply debrided with a sterile nail clipper to patient tolerance x10.  The nail was then mechanically reduced in thickness and girth utilizing a rotary bur.  The callus lesions were debrided with a rotary bur to normal thickness.  He is on gabapentin for peripheral neuropathy.  He is following with wound care for his stasis ulceration to his lateral right lower leg.     The patient notes difficulty finding pair shoes to fit secondary to his bilateral lower extremity lymphedema.  He does have a history of diabetes and peripheral neuropathy and should qualify for a pair of diabetic shoes.  A referral was placed for  clinic as well as a prescription.    Recommend follow-up in 6 months for routine diabetic foot care.     Diagnoses and all orders for this visit:    Type 2 diabetes mellitus with hyperglycemia, with long-term current use of insulin (HCC)  -     Diabetic Shoe    Onychomycosis  -     Diabetic Shoe    Diabetic polyneuropathy associated with type 2 diabetes mellitus (HCC)  -     Diabetic Shoe    Lymphedema  -     Diabetic Shoe    Corns          Subjective:     Patient ID: Ben Mathur is a 59 y.o. male.    The patient resents today with a chief complaint of painful elongated pedal nail plates and calluses on his feet.  He does have a history of diabetes and peripheral neuropathy for  which she is on gabapentin.  He is also following with wound care for a stasis ulceration to his lateral right lower leg.  He has a history of lymphedema for which he uses compression therapy.  He does note difficulty in finding shoes that fit.      PAST MEDICAL HISTORY:  Past Medical History:   Diagnosis Date    Acute kidney injury 10/28/2021    Anemia 09/13/2019    Cellulitis     last assessed 12/10/15    Cellulitis of left lower extremity     Cellulitis of right lower extremity 11/19/2021    Cough 10/20/2019    Diabetes mellitus (HCC)     Disease of thyroid gland     Edema     Elevated liver enzymes     Elevated serum creatinine 11/19/2021    Esophageal reflux     Fungal infection 08/16/2021    Gluten intolerance     Gout     last assessed 09/05/13    Hypercalcemia     Hyperglycemia     Hypertension     Hyponatremia 09/06/2019    IBS (irritable bowel syndrome)     Insomnia     Obesity     Osteoarthritis of knee     last assessed 02/10/14    Scrotal swelling 05/19/2020    Shortness of breath 05/03/2021    Venous insufficiency     last assessed 08/22/17    Villonodular synovitis of the hand, right     last assessed 11/14/2013       PAST SURGICAL HISTORY:  Past Surgical History:   Procedure Laterality Date    INCISION AND DRAINAGE OF WOUND Left 9/6/2019    Procedure: INCISION AND DRAINAGE (I&D) GROIN;  Surgeon: Carlos Ruano DO;  Location: [facility];  Service: General    SKIN BIOPSY      WOUND DEBRIDEMENT Left 9/7/2019    Procedure: EXCISIONAL DEBRIDEMENT;  Surgeon: Carlos Ruano DO;  Location: [facility];  Service: General        ALLERGIES:  Gluten meal - food allergy and Clindamycin    MEDICATIONS:  Current Outpatient Medications   Medication Sig Dispense Refill    acetaminophen (TYLENOL) 325 mg tablet 325 mg 3 (three) times a day as needed      albuterol (PROVENTIL HFA,VENTOLIN HFA) 90 mcg/act inhaler TAKE 2 PUFFS BY MOUTH EVERY 6 HOURS AS NEEDED FOR WHEEZE 8.5 g 0    BD Pen Needle Ludmila 2nd Gen 32G X 4 MM  MISC INJECT UNDER THE SKIN 4 (FOUR) TIMES A  each 1    Continuous Glucose Sensor (FreeStyle Elvin 3 Sensor) MISC Replace every 14 days 2 each 5    dulaglutide (Trulicity) 4.5 MG/0.5ML injection Inject 0.5 mL (4.5 mg total) under the skin every 7 days 2 mL 5    ezetimibe (ZETIA) 10 mg tablet Take 1 tablet (10 mg total) by mouth daily 90 tablet 1    furosemide (LASIX) 20 mg tablet TAKE 2 TABLETS EVERY DAY IN THE EARLY MORNING AND 1 TABLET DAILY AFTER LUNCH. 270 tablet 2    gabapentin (NEURONTIN) 100 mg capsule Take 1 capsule (100 mg total) by mouth 2 (two) times a day With 300mg capsule for a total of 400mg twice a day 60 capsule 0    gabapentin (NEURONTIN) 300 mg capsule TAKE 1 CAPSULE (300 MG TOTAL) BY MOUTH 2 (TWO) TIMES A DAY TAKE 1 TAB WITH YOUR 100MG TAB TWICE DAILY 180 capsule 2    insulin aspart (NovoLOG FlexPen) 100 UNIT/ML injection pen INJECT 14-22 UNITS WITH MEALS+SCALE up to three times daily 60 mL 2    Insulin Glargine Solostar (Lantus SoloStar) 100 UNIT/ML SOPN INJECT 40 UNITS UNDER THE SKIN DAILY AT BEDTIME 45 mL 0    Klayesta powder APPLY 1 APPLICATION TOPICALLY 2 (TWO) TIMES A DAY TO AFFECTED AREA 120 g 3    Lancets (ONETOUCH ULTRASOFT) lancets Use to test sugar 2 times a day 100 each 3    levothyroxine 25 mcg tablet TAKE 1 TABLET (25 MCG TOTAL) BY MOUTH DAILY IN THE EARLY MORNING 90 tablet 1    lisinopril (ZESTRIL) 2.5 mg tablet Take 1 tablet (2.5 mg total) by mouth daily 90 tablet 1    metFORMIN (GLUCOPHAGE) 1000 MG tablet Take 1 tablet (1,000 mg total) by mouth 2 (two) times a day 180 tablet 1    metoprolol succinate (TOPROL-XL) 25 mg 24 hr tablet Take 1 tablet (25 mg total) by mouth 2 (two) times a day 180 tablet 3    omeprazole (PriLOSEC) 40 MG capsule Take 1 capsule (40 mg total) by mouth 2 (two) times a day 60 capsule 0    OneTouch Verio test strip USE TO TEST SUGAR 2 TIMES A  strip 3    psyllium (METAMUCIL) packet Take 1 packet by mouth daily  0    traZODone (DESYREL) 50 mg  tablet TAKE 1 TABLET (50 MG TOTAL) BY MOUTH DAILY AT BEDTIME AS NEEDED FOR SLEEP 100 tablet 1    Blood Glucose Monitoring Suppl (ONETOUCH VERIO) w/Device KIT Use to test sugar daily (Patient not taking: Reported on 2/7/2024) 1 kit 0     No current facility-administered medications for this visit.       SOCIAL HISTORY:  Social History     Socioeconomic History    Marital status: /Civil Union     Spouse name: None    Number of children: None    Years of education: None    Highest education level: None   Occupational History    None   Tobacco Use    Smoking status: Never    Smokeless tobacco: Never   Vaping Use    Vaping status: Never Used   Substance and Sexual Activity    Alcohol use: Not Currently     Alcohol/week: 0.0 standard drinks of alcohol    Drug use: Yes     Types: Marijuana     Comment: medical    Sexual activity: Yes     Partners: Female   Other Topics Concern    None   Social History Narrative    None     Social Determinants of Health     Financial Resource Strain: Not on file   Food Insecurity: No Food Insecurity (9/8/2022)    Hunger Vital Sign     Worried About Running Out of Food in the Last Year: Never true     Ran Out of Food in the Last Year: Never true   Transportation Needs: No Transportation Needs (9/8/2022)    PRAPARE - Transportation     Lack of Transportation (Medical): No     Lack of Transportation (Non-Medical): No   Physical Activity: Not on file   Stress: Not on file   Social Connections: Not on file   Intimate Partner Violence: Not on file   Housing Stability: Unknown (9/8/2022)    Housing Stability Vital Sign     Unable to Pay for Housing in the Last Year: No     Number of Places Lived in the Last Year: Not on file     Unstable Housing in the Last Year: No        Review of Systems   Constitutional: Negative.    HENT: Negative.     Eyes: Negative.    Respiratory: Negative.     Cardiovascular: Negative.    Endocrine: Negative.    Musculoskeletal: Negative.    Neurological: Negative.     Hematological: Negative.    Psychiatric/Behavioral: Negative.           Objective:     Physical Exam  Vitals reviewed.   Constitutional:       Appearance: Normal appearance.   HENT:      Head: Normocephalic and atraumatic.      Nose: Nose normal.   Eyes:      Conjunctiva/sclera: Conjunctivae normal.      Pupils: Pupils are equal, round, and reactive to light.   Cardiovascular:      Pulses: Pulses are weak.           Dorsalis pedis pulses are 0 on the right side and 0 on the left side.        Posterior tibial pulses are 0 on the right side and 0 on the left side.   Pulmonary:      Effort: Pulmonary effort is normal.   Feet:      Right foot:      Skin integrity: Dry skin present. No skin breakdown, erythema, warmth or callus.      Toenail Condition: Right toenails are abnormally thick and long. Fungal disease present.     Left foot:      Skin integrity: Dry skin present. No ulcer, skin breakdown, erythema, warmth or callus.      Toenail Condition: Left toenails are abnormally thick and long. Fungal disease present.     Comments: The patient's clinical examination today is significant for brittle, thickened and dystrophic pedal nail plates with discoloration and subungual debris consistent with onychomycosis x10.  The interdigital spaces are clear without maceration.  Tender callus lesions noted to the plantar aspect of the lateral right forefoot and the plantar lateral left heel.  Pedal pulses are nonpalpable bilaterally secondary to bilateral lower extremity edema.  Vibratory sensation is absent on the right and diminished on the left.  Epicritic sensation is intact bilaterally.  Skin:     General: Skin is warm.      Capillary Refill: Capillary refill takes less than 2 seconds.   Neurological:      General: No focal deficit present.      Mental Status: He is alert and oriented to person, place, and time.   Psychiatric:         Mood and Affect: Mood normal.         Behavior: Behavior normal.         Thought  "Content: Thought content normal.         Lesion Destruction    Date/Time: 10/2/2024 4:00 PM    Performed by: Tavon Fernandez DPM  Authorized by: Tavon Fernandez DPM  [place] Protocol:  procedure performed by consultantConsent: Verbal consent obtained.  Risks and benefits: risks, benefits and alternatives were discussed  Consent given by: patient  Time out: Immediately prior to procedure a \"time out\" was called to verify the correct patient, procedure, equipment, support staff and site/side marked as required.  Timeout called at: 10/2/2024 4:00 PM.  Patient understanding: patient states understanding of the procedure being performed  Patient identity confirmed: verbally with patient and provided demographic data    Procedure Details - Lesion Destruction:     Number of Lesions:  2  Lesion 1:     Body area:  Lower extremity    Lower extremity location:  R foot    Malignancy: benign hyperkeratotic lesion      Destruction method: scissors used for extraction    Lesion 2:     Body area:  Lower extremity    Lower extremity location:  L foot    Malignancy: benign hyperkeratotic lesion      Destruction method: scissors used for extraction    Diabetic Foot Exam    Patient's shoes and socks removed.    Right Foot/Ankle   Right Foot Inspection  Skin Exam: skin normal, skin intact and dry skin. No warmth, no callus, no erythema, no maceration, no abnormal color, no pre-ulcer and no callus.     Toe Exam: swelling.     Sensory   Vibration: diminished  Monofilament testing: diminished    Vascular  Capillary refills: < 3 seconds  The right DP pulse is 0. The right PT pulse is 0.     Left Foot/Ankle  Left Foot Inspection  Skin Exam: skin normal, skin intact and dry skin. No warmth, no erythema, no maceration, normal color, no pre-ulcer, no ulcer and no callus.     Toe Exam: swelling.     Sensory   Vibration: diminished  Monofilament testing: diminished    Vascular  Capillary refills: < 3 seconds  The left DP pulse is 0. The " left PT pulse is 0.     Assign Risk Category  No deformity present  Loss of protective sensation  Weak pulses  Risk: 1

## 2024-10-03 ENCOUNTER — OFFICE VISIT (OUTPATIENT)
Dept: WOUND CARE | Facility: CLINIC | Age: 59
End: 2024-10-03
Payer: MEDICARE

## 2024-10-03 VITALS
RESPIRATION RATE: 20 BRPM | TEMPERATURE: 98.2 F | DIASTOLIC BLOOD PRESSURE: 80 MMHG | SYSTOLIC BLOOD PRESSURE: 150 MMHG | HEART RATE: 88 BPM

## 2024-10-03 DIAGNOSIS — E11.622 TYPE 2 DIABETES MELLITUS WITH OTHER SKIN ULCER, WITH LONG-TERM CURRENT USE OF INSULIN (HCC): ICD-10-CM

## 2024-10-03 DIAGNOSIS — Z79.4 TYPE 2 DIABETES MELLITUS WITH OTHER SKIN ULCER, WITH LONG-TERM CURRENT USE OF INSULIN (HCC): ICD-10-CM

## 2024-10-03 DIAGNOSIS — I87.311 CHRONIC VENOUS HYPERTENSION (IDIOPATHIC) WITH ULCER OF RIGHT LOWER EXTREMITY (CODE) (HCC): Primary | ICD-10-CM

## 2024-10-03 DIAGNOSIS — I89.0 LYMPHEDEMA OF BOTH LOWER EXTREMITIES: ICD-10-CM

## 2024-10-03 DIAGNOSIS — E66.01 MORBID OBESITY WITH BMI OF 70 AND OVER, ADULT (HCC): ICD-10-CM

## 2024-10-03 DIAGNOSIS — L97.912 NON-PRESSURE CHRONIC ULCER OF RIGHT LOWER LEG WITH FAT LAYER EXPOSED (HCC): ICD-10-CM

## 2024-10-03 PROCEDURE — 11042 DBRDMT SUBQ TIS 1ST 20SQCM/<: CPT

## 2024-10-03 RX ORDER — LIDOCAINE 40 MG/G
CREAM TOPICAL ONCE
Status: COMPLETED | OUTPATIENT
Start: 2024-10-03 | End: 2024-10-03

## 2024-10-03 RX ADMIN — LIDOCAINE: 40 CREAM TOPICAL at 14:02

## 2024-10-03 NOTE — PROGRESS NOTES
Patient ID: Ben Mathur is a 59 y.o. male Date of Birth 1965       Chief Complaint   Patient presents with    Follow Up Wound Care Visit       Allergies:  Gluten meal - food allergy and Clindamycin    Diagnosis:      Diagnosis ICD-10-CM Associated Orders   1. Chronic venous hypertension (idiopathic) with ulcer of right lower extremity (CODE) ([facility])  I87.311 lidocaine (LMX) 4 % cream     Wound Procedure Treatment Venous Ulcer Right;Lateral Leg      2. Type 2 diabetes mellitus with other skin ulcer, with long-term current use of insulin ([facility])  E11.622     Z79.4       3. Non-pressure chronic ulcer of right lower leg with fat layer exposed ([facility])  L97.912       4. Lymphedema of both lower extremities  I89.0       5. Morbid obesity with BMI of 70 and over, adult ([facility])  E66.01     Z68.45               Assessment & Plan:  Right lower extremity venous ulcer is measuring larger today, suspect due increased drainage and maceration of the skin, in the setting of fragile scar tissue from previous wound to this location. Will recommend to discontinue silver alginate and foam dressing to poly mem AG which will hold more drainage. Continue with ciracids for compression. Continue with lymphedema pumps.   See wounds orders below  Wound is not showing any s/s of clinical infection. There is no increased erythema, warmth, or lymphangitic streaking to suggest cellulitis.  Debrided as below.   Elevate lower extremities and avoid prolonged standing/keeping legs in dependent position for edema management.   Counseled patient on the importance of tight glycemic control and adequate protein intake (3-4 servings per day) to promote wound healing.   A1C results reviewed with the patient today.   Follow-up in 2 week(s). Call sooner with questions or concerns or any signs of infection such as redness, swelling, increased/purulent drainage, fever or chills, and increased pain. ER precautions reviewed.  Patient and wife both verbalized  understanding and agrees with plan of care.        Subjective:   10/3/24: Patient presents to wound care center for follow-up visit of right lower extremity wound, accompanied by his wife who provides the wound care. Has been using silver alginate and silicone bordered foam dressings to the wounds. Wife reports increased wound drainage, denies seeing purulent drainage or noticing malodor. Patient reports wound is painful, but this is stable. Patient is on oral antibiotics for oral infection currently. Patient has been wearing CircAid's and has been compliant with lymphedema pumps.  Patient reports that blood sugars have been stable, and has a good appetite. No fever or chills.         The following portions of the patient's history were reviewed and updated as appropriate:   Patient Active Problem List   Diagnosis    Type 2 diabetes mellitus, with long-term current use of insulin (HCC)    Hyperlipidemia    Psoriasis    Venous insufficiency    Gout    Essential hypertension    Gluten intolerance    Diabetic neuropathy (HCC)    Insomnia    Erectile dysfunction    Bilateral leg edema    Elevated CO2 level    DAHIANA (obstructive sleep apnea)    Morbid obesity    Migraine headache    Onychomycosis    Ulcer of left lower extremity, limited to breakdown of skin (HCC)    Pressure injury of right leg    Metabolic syndrome    Low testosterone in male    Hypothyroidism    Pressure injury of left leg    GERD (gastroesophageal reflux disease)    Chronic pain    Type 2 diabetes mellitus with hyperglycemia (HCC)    Current use of insulin (HCC)    Obesity hypoventilation syndrome (HCC)    Sinus tachycardia    Lymphedema    Diabetic polyneuropathy associated with type 2 diabetes mellitus (HCC)    Type 2 diabetes mellitus with hyperglycemia, with long-term current use of insulin (HCC)     Past Medical History:   Diagnosis Date    Acute kidney injury 10/28/2021    Anemia 09/13/2019    Cellulitis     last assessed 12/10/15    Cellulitis  of left lower extremity     Cellulitis of right lower extremity 11/19/2021    Cough 10/20/2019    Diabetes mellitus (HCC)     Disease of thyroid gland     Edema     Elevated liver enzymes     Elevated serum creatinine 11/19/2021    Esophageal reflux     Fungal infection 08/16/2021    Gluten intolerance     Gout     last assessed 09/05/13    Hypercalcemia     Hyperglycemia     Hypertension     Hyponatremia 09/06/2019    IBS (irritable bowel syndrome)     Insomnia     Obesity     Osteoarthritis of knee     last assessed 02/10/14    Scrotal swelling 05/19/2020    Shortness of breath 05/03/2021    Venous insufficiency     last assessed 08/22/17    Villonodular synovitis of the hand, right     last assessed 11/14/2013     Past Surgical History:   Procedure Laterality Date    INCISION AND DRAINAGE OF WOUND Left 9/6/2019    Procedure: INCISION AND DRAINAGE (I&D) GROIN;  Surgeon: Carlos Ruano DO;  Location: [facility];  Service: General    SKIN BIOPSY      WOUND DEBRIDEMENT Left 9/7/2019    Procedure: EXCISIONAL DEBRIDEMENT;  Surgeon: Carlos Ruano DO;  Location: [facility];  Service: General     Family History   Problem Relation Age of Onset    Cancer Mother         gastrc    Colon cancer Father     Heart failure Father       Social History     Socioeconomic History    Marital status: /Civil Union     Spouse name: None    Number of children: None    Years of education: None    Highest education level: None   Occupational History    None   Tobacco Use    Smoking status: Never    Smokeless tobacco: Never   Vaping Use    Vaping status: Never Used   Substance and Sexual Activity    Alcohol use: Not Currently     Alcohol/week: 0.0 standard drinks of alcohol    Drug use: Yes     Types: Marijuana     Comment: medical    Sexual activity: Yes     Partners: Female   Other Topics Concern    None   Social History Narrative    None     Social Determinants of Health     Financial Resource Strain: Not on file   Food  Insecurity: No Food Insecurity (9/8/2022)    Hunger Vital Sign     Worried About Running Out of Food in the Last Year: Never true     Ran Out of Food in the Last Year: Never true   Transportation Needs: No Transportation Needs (9/8/2022)    PRAPARE - Transportation     Lack of Transportation (Medical): No     Lack of Transportation (Non-Medical): No   Physical Activity: Not on file   Stress: Not on file   Social Connections: Not on file   Intimate Partner Violence: Not on file   Housing Stability: Unknown (9/8/2022)    Housing Stability Vital Sign     Unable to Pay for Housing in the Last Year: No     Number of Places Lived in the Last Year: Not on file     Unstable Housing in the Last Year: No        Current Outpatient Medications:     acetaminophen (TYLENOL) 325 mg tablet, 325 mg 3 (three) times a day as needed, Disp: , Rfl:     albuterol (PROVENTIL HFA,VENTOLIN HFA) 90 mcg/act inhaler, TAKE 2 PUFFS BY MOUTH EVERY 6 HOURS AS NEEDED FOR WHEEZE, Disp: 8.5 g, Rfl: 0    BD Pen Needle Ludmila 2nd Gen 32G X 4 MM MISC, INJECT UNDER THE SKIN 4 (FOUR) TIMES A DAY, Disp: 200 each, Rfl: 1    Blood Glucose Monitoring Suppl (ONETOUCH VERIO) w/Device KIT, Use to test sugar daily (Patient not taking: Reported on 2/7/2024), Disp: 1 kit, Rfl: 0    Continuous Glucose Sensor (FreeStyle Elvin 3 Sensor) MISC, Replace every 14 days, Disp: 2 each, Rfl: 5    dulaglutide (Trulicity) 4.5 MG/0.5ML injection, Inject 0.5 mL (4.5 mg total) under the skin every 7 days, Disp: 2 mL, Rfl: 5    ezetimibe (ZETIA) 10 mg tablet, Take 1 tablet (10 mg total) by mouth daily, Disp: 90 tablet, Rfl: 1    furosemide (LASIX) 20 mg tablet, TAKE 2 TABLETS EVERY DAY IN THE EARLY MORNING AND 1 TABLET DAILY AFTER LUNCH., Disp: 270 tablet, Rfl: 2    gabapentin (NEURONTIN) 100 mg capsule, Take 1 capsule (100 mg total) by mouth 2 (two) times a day With 300mg capsule for a total of 400mg twice a day, Disp: 60 capsule, Rfl: 0    gabapentin (NEURONTIN) 300 mg capsule, TAKE  1 CAPSULE (300 MG TOTAL) BY MOUTH 2 (TWO) TIMES A DAY TAKE 1 TAB WITH YOUR 100MG TAB TWICE DAILY, Disp: 180 capsule, Rfl: 2    insulin aspart (NovoLOG FlexPen) 100 UNIT/ML injection pen, INJECT 14-22 UNITS WITH MEALS+SCALE up to three times daily, Disp: 60 mL, Rfl: 2    Insulin Glargine Solostar (Lantus SoloStar) 100 UNIT/ML SOPN, INJECT 40 UNITS UNDER THE SKIN DAILY AT BEDTIME, Disp: 45 mL, Rfl: 0    Klayesta powder, APPLY 1 APPLICATION TOPICALLY 2 (TWO) TIMES A DAY TO AFFECTED AREA, Disp: 120 g, Rfl: 3    Lancets (ONETOUCH ULTRASOFT) lancets, Use to test sugar 2 times a day, Disp: 100 each, Rfl: 3    levothyroxine 25 mcg tablet, TAKE 1 TABLET (25 MCG TOTAL) BY MOUTH DAILY IN THE EARLY MORNING, Disp: 90 tablet, Rfl: 1    lisinopril (ZESTRIL) 2.5 mg tablet, Take 1 tablet (2.5 mg total) by mouth daily, Disp: 90 tablet, Rfl: 1    metFORMIN (GLUCOPHAGE) 1000 MG tablet, Take 1 tablet (1,000 mg total) by mouth 2 (two) times a day, Disp: 180 tablet, Rfl: 1    metoprolol succinate (TOPROL-XL) 25 mg 24 hr tablet, Take 1 tablet (25 mg total) by mouth 2 (two) times a day, Disp: 180 tablet, Rfl: 3    omeprazole (PriLOSEC) 40 MG capsule, Take 1 capsule (40 mg total) by mouth 2 (two) times a day, Disp: 60 capsule, Rfl: 0    OneTouch Verio test strip, USE TO TEST SUGAR 2 TIMES A DAY, Disp: 100 strip, Rfl: 3    psyllium (METAMUCIL) packet, Take 1 packet by mouth daily, Disp: , Rfl: 0    traZODone (DESYREL) 50 mg tablet, TAKE 1 TABLET (50 MG TOTAL) BY MOUTH DAILY AT BEDTIME AS NEEDED FOR SLEEP, Disp: 100 tablet, Rfl: 1  No current facility-administered medications for this visit.    Review of Systems   Constitutional:  Negative for chills and fever.   HENT:  Negative for congestion and sneezing.    Respiratory:  Negative for cough and shortness of breath.    Cardiovascular:  Positive for leg swelling.   Musculoskeletal:  Positive for gait problem.   Skin:  Positive for wound.        RLE   Psychiatric/Behavioral:  Negative for  "agitation.        Objective:  /80   Pulse 88   Temp 98.2 °F (36.8 °C)   Resp 20             Wound 09/20/24 Venous Ulcer Leg Right;Lateral (Active)   Wound Image   10/03/24 1411   Wound Description Pink;Slough;Granulation tissue 10/03/24 1354   Chelsie-wound Assessment Edema;Hyperpigmented;Maceration 10/03/24 1354   Wound Length (cm) 4 cm 10/03/24 1354   Wound Width (cm) 5 cm 10/03/24 1354   Wound Depth (cm) 0.2 cm 10/03/24 1354   Wound Surface Area (cm^2) 20 cm^2 10/03/24 1354   Wound Volume (cm^3) 4 cm^3 10/03/24 1354   Calculated Wound Volume (cm^3) 4 cm^3 10/03/24 1354   Change in Wound Size % -54.44 10/03/24 1354   Drainage Amount Large 10/03/24 1354   Drainage Description Serosanguineous 10/03/24 1354   Non-staged Wound Description Full thickness 10/03/24 1354   Wound packed? No 09/20/24 1539               Debridement   Wound 09/20/24 Venous Ulcer Leg Right;Lateral    Universal Protocol:  Consent: Verbal consent obtained.  Risks and benefits: risks, benefits and alternatives were discussed  Consent given by: patient  Time out: Immediately prior to procedure a \"time out\" was called to verify the correct patient, procedure, equipment, support staff and site/side marked as required.  Timeout called at: 10/3/2024 2:00 PM.  Patient understanding: patient states understanding of the procedure being performed  Patient identity confirmed: verbally with patient    Debridement Details  Performed by: NP  Debridement type: surgical  Level of debridement: subcutaneous tissue  Pain control: lidocaine 4%      Post-debridement measurements  Length (cm): 4  Width (cm): 5  Depth (cm): 0.3  Percent debrided: 50%  Surface Area (cm^2): 20  Area Debrided (cm^2): 10  Volume (cm^3): 6    Tissue and other material debrided: subcutaneous tissue  Devitalized tissue debrided: biofilm, exudate, fibrin and slough  Instrument(s) utilized: curette  Bleeding: medium  Hemostasis obtained with: pressure  Procedural pain (0-10): " "0  Post-procedural pain: 0   Response to treatment: procedure was tolerated well               Lab Results   Component Value Date    HGBA1C 6.5 (H) 09/20/2024       Wound Instructions:  Orders Placed This Encounter   Procedures    Wound Procedure Treatment Venous Ulcer Right;Lateral Leg     This order was created via procedure documentation           ANN Jenkins, STEPHENIE-C, CWON    Portions of the record may have been created with voice recognition software. Occasional wrong word or \"sound alike\" substitutions may have occurred due to the inherent limitations of voice recognition software. Read the chart carefully and recognize, using context, where substitutions have occurred.          Total time spent today:    Total time (face-to-face and non-face-to-face) spent on today's visit was 16 minutes. This includes preparation for the visits (i.e. reviewing test results from date recent hospitalizations, ER/Urgent Care/primary care visits and recent consultant office visits), performance of a medically appropriate history and examination, and orders for medications or testing.     "

## 2024-10-03 NOTE — PROGRESS NOTES
Wound Procedure Treatment Venous Ulcer Right;Lateral Leg    Performed by: Ludivina Herrera RN  Authorized by: ANN Sutherland    Associated wounds:   Wound 09/20/24 Venous Ulcer Leg Right;Lateral  Wound cleansed with:  NSS  Applied primary dressing:  Polymem foam and Silver  Applied secondary dressing:  ABD  Dressing secured with:  Ciara, Tape and Compression wrap  Comments:  Circaids from home

## 2024-10-04 ENCOUNTER — TELEPHONE (OUTPATIENT)
Dept: WOUND CARE | Facility: CLINIC | Age: 59
End: 2024-10-04

## 2024-10-04 NOTE — PATIENT INSTRUCTIONS
Orders Placed This Encounter   Procedures    Wound Procedure Treatment Venous Ulcer Right;Lateral Leg     This order was created via procedure documentation    Debridement Venous Ulcer Right;Lateral Leg     This order was created via procedure documentation    Wound cleansing and dressings Venous Ulcer Right;Lateral Leg     Right;Lateral Leg   Wash your hands with soap and water.  Remove old dressing, discard into plastic bag and place in trash.    Cleanse the wound with mild soap and water prior to applying a clean dressing. Do not use tissue or cotton balls. Do not scrub the wound. Pat dry using gauze.  Shower yes.      Right lower leg wound:   Apply Polymem Max Silver to the wound bed.    Cover with ABD pad  Secure with rolled gauze and tape  Change dressing three times a week.         Wear Circaids on both lower legs. You can remove for sleeping.    Elevate your leg above the level of your heart whenever sitting.   Continue using lymphedema pumps for one hour twice a day.    Follow up at the wound center in two weeks.     Standing Status:   Future     Standing Expiration Date:   10/17/2024

## 2024-10-04 NOTE — TELEPHONE ENCOUNTER
Telephone call to patient to notify that insurance will not cover additional FOAM dressings at this time.  Reviewed the wound care orders with the patient.  He verbalized understanding of same.  Exact product information provided to patient.  He will look to purchase these dressings elsewhere. Pt will reach out to us at wound center if additional issues are encountered.

## 2024-10-14 DIAGNOSIS — E11.65 TYPE 2 DIABETES MELLITUS WITH HYPERGLYCEMIA, WITH LONG-TERM CURRENT USE OF INSULIN (HCC): ICD-10-CM

## 2024-10-14 DIAGNOSIS — R79.89 ELEVATED TSH: ICD-10-CM

## 2024-10-14 DIAGNOSIS — E66.01 OBESITY, MORBID, BMI 50 OR HIGHER (HCC): ICD-10-CM

## 2024-10-14 DIAGNOSIS — G47.00 INSOMNIA, UNSPECIFIED TYPE: ICD-10-CM

## 2024-10-14 DIAGNOSIS — E78.00 PURE HYPERCHOLESTEROLEMIA: ICD-10-CM

## 2024-10-14 DIAGNOSIS — Z79.4 TYPE 2 DIABETES MELLITUS WITH HYPERGLYCEMIA, WITH LONG-TERM CURRENT USE OF INSULIN (HCC): ICD-10-CM

## 2024-10-14 DIAGNOSIS — I10 HYPERTENSION GOAL BP (BLOOD PRESSURE) < 140/90: ICD-10-CM

## 2024-10-15 RX ORDER — TRAZODONE HYDROCHLORIDE 50 MG/1
50 TABLET, FILM COATED ORAL
Qty: 100 TABLET | Refills: 0 | Status: SHIPPED | OUTPATIENT
Start: 2024-10-15

## 2024-10-17 ENCOUNTER — PATIENT MESSAGE (OUTPATIENT)
Dept: INTERNAL MEDICINE CLINIC | Facility: CLINIC | Age: 59
End: 2024-10-17

## 2024-10-17 ENCOUNTER — OFFICE VISIT (OUTPATIENT)
Dept: WOUND CARE | Facility: CLINIC | Age: 59
End: 2024-10-17
Payer: MEDICARE

## 2024-10-17 VITALS
SYSTOLIC BLOOD PRESSURE: 112 MMHG | RESPIRATION RATE: 18 BRPM | TEMPERATURE: 97.3 F | HEART RATE: 100 BPM | DIASTOLIC BLOOD PRESSURE: 80 MMHG

## 2024-10-17 DIAGNOSIS — I87.311 CHRONIC VENOUS HYPERTENSION (IDIOPATHIC) WITH ULCER OF RIGHT LOWER EXTREMITY (CODE) (HCC): Primary | ICD-10-CM

## 2024-10-17 DIAGNOSIS — Z79.4 TYPE 2 DIABETES MELLITUS WITH HYPERGLYCEMIA, WITH LONG-TERM CURRENT USE OF INSULIN (HCC): ICD-10-CM

## 2024-10-17 DIAGNOSIS — E78.00 PURE HYPERCHOLESTEROLEMIA: ICD-10-CM

## 2024-10-17 DIAGNOSIS — R79.89 ELEVATED TSH: ICD-10-CM

## 2024-10-17 DIAGNOSIS — E11.65 TYPE 2 DIABETES MELLITUS WITH HYPERGLYCEMIA, WITH LONG-TERM CURRENT USE OF INSULIN (HCC): ICD-10-CM

## 2024-10-17 DIAGNOSIS — E66.01 OBESITY, MORBID, BMI 50 OR HIGHER (HCC): ICD-10-CM

## 2024-10-17 DIAGNOSIS — I10 HYPERTENSION GOAL BP (BLOOD PRESSURE) < 140/90: ICD-10-CM

## 2024-10-17 PROCEDURE — 11042 DBRDMT SUBQ TIS 1ST 20SQCM/<: CPT | Performed by: FAMILY MEDICINE

## 2024-10-17 RX ORDER — LIDOCAINE 40 MG/G
CREAM TOPICAL ONCE
Status: COMPLETED | OUTPATIENT
Start: 2024-10-17 | End: 2024-10-17

## 2024-10-17 RX ADMIN — LIDOCAINE: 40 CREAM TOPICAL at 13:44

## 2024-10-17 NOTE — PROGRESS NOTES
"Patient ID: Ben Mathur is a 59 y.o. male Date of Birth 1965       Chief Complaint   Patient presents with    Follow Up Wound Care Visit     Pt reports more pain in the wounds.        Allergies:  Gluten meal - food allergy and Clindamycin    Diagnosis:      Diagnosis ICD-10-CM Associated Orders   1. Chronic venous hypertension (idiopathic) with ulcer of right lower extremity (CODE) ([facility])  I87.311 lidocaine (LMX) 4 % cream     Wound cleansing and dressings Venous Ulcer Right;Lateral Leg     Wound Procedure Treatment Venous Ulcer Right;Lateral Leg              Assessment & Plan:  VSU RLE: Wound bed with fibrin and slough requiring surgical debridement.  No acute erythema, increased warmth, fluctuance, induration.  No purulence or malodor.  Surgical debridement  Continue use of CircAid  Continue recommend utilizing lymphedema pumps twice daily  Reviewed offloading and repositioning of this area to avoid resting on it when his legs naturally roll outwards when he is resting/sleeping.  Recommend use of wedges or other cushion to help reposition.  ER precautions reviewed with patient and spouse, they expressed understanding  Follow-up in 2 weeks or sooner if needed    Portions of the record may have been created with voice recognition software. Occasional wrong word or \"sound alike\" substitutions may have occurred due to the inherent limitations of voice recognition software. Read the chart carefully and recognize, using context, where substitutions have occurred.    Subjective:   10/17/2024 Ben is a 59-year-old male with a past medical history including but not limited to venous insufficiency, type 2 diabetes, neuropathy, hypertension who presents to wound center today for follow-up of right lower extremity ulcer.  His wife is present at bedside. He reports he is now using lymphedema pumps twice a day.  Very compliant with his CircAid's.  Reporting some increased pain at his wound last evening requiring him " to take Tylenol and Motrin.  It was resolved when he woke this a.m.  Is unclear as to why he is having the increased pain.  He has not had fever or chills or any other new acute symptoms.        The following portions of the patient's history were reviewed and updated as appropriate:   Patient Active Problem List   Diagnosis    Type 2 diabetes mellitus, with long-term current use of insulin (HCC)    Hyperlipidemia    Psoriasis    Venous insufficiency    Gout    Essential hypertension    Gluten intolerance    Diabetic neuropathy (HCC)    Insomnia    Erectile dysfunction    Bilateral leg edema    Elevated CO2 level    DAHIANA (obstructive sleep apnea)    Morbid obesity    Migraine headache    Onychomycosis    Ulcer of left lower extremity, limited to breakdown of skin (HCC)    Pressure injury of right leg    Metabolic syndrome    Low testosterone in male    Hypothyroidism    Pressure injury of left leg    GERD (gastroesophageal reflux disease)    Chronic pain    Type 2 diabetes mellitus with hyperglycemia (HCC)    Current use of insulin (HCC)    Obesity hypoventilation syndrome (HCC)    Sinus tachycardia    Lymphedema    Diabetic polyneuropathy associated with type 2 diabetes mellitus (HCC)    Type 2 diabetes mellitus with hyperglycemia, with long-term current use of insulin (HCC)     Past Medical History:   Diagnosis Date    Acute kidney injury 10/28/2021    Anemia 09/13/2019    Cellulitis     last assessed 12/10/15    Cellulitis of left lower extremity     Cellulitis of right lower extremity 11/19/2021    Cough 10/20/2019    Diabetes mellitus (HCC)     Disease of thyroid gland     Edema     Elevated liver enzymes     Elevated serum creatinine 11/19/2021    Esophageal reflux     Fungal infection 08/16/2021    Gluten intolerance     Gout     last assessed 09/05/13    Hypercalcemia     Hyperglycemia     Hypertension     Hyponatremia 09/06/2019    IBS (irritable bowel syndrome)     Insomnia     Obesity     Osteoarthritis of  knee     last assessed 02/10/14    Scrotal swelling 05/19/2020    Shortness of breath 05/03/2021    Venous insufficiency     last assessed 08/22/17    Villonodular synovitis of the hand, right     last assessed 11/14/2013     Past Surgical History:   Procedure Laterality Date    INCISION AND DRAINAGE OF WOUND Left 9/6/2019    Procedure: INCISION AND DRAINAGE (I&D) GROIN;  Surgeon: Carlos Ruano DO;  Location: [facility];  Service: General    SKIN BIOPSY      WOUND DEBRIDEMENT Left 9/7/2019    Procedure: EXCISIONAL DEBRIDEMENT;  Surgeon: Carlos Ruano DO;  Location: [facility];  Service: General     Family History   Problem Relation Age of Onset    Cancer Mother         gastrc    Colon cancer Father     Heart failure Father       Social History     Socioeconomic History    Marital status: /Civil Union     Spouse name: None    Number of children: None    Years of education: None    Highest education level: None   Occupational History    None   Tobacco Use    Smoking status: Never    Smokeless tobacco: Never   Vaping Use    Vaping status: Never Used   Substance and Sexual Activity    Alcohol use: Not Currently     Alcohol/week: 0.0 standard drinks of alcohol    Drug use: Yes     Types: Marijuana     Comment: medical    Sexual activity: Yes     Partners: Female   Other Topics Concern    None   Social History Narrative    None     Social Determinants of Health     Financial Resource Strain: Not on file   Food Insecurity: No Food Insecurity (9/8/2022)    Hunger Vital Sign     Worried About Running Out of Food in the Last Year: Never true     Ran Out of Food in the Last Year: Never true   Transportation Needs: No Transportation Needs (9/8/2022)    PRAPARE - Transportation     Lack of Transportation (Medical): No     Lack of Transportation (Non-Medical): No   Physical Activity: Not on file   Stress: Not on file   Social Connections: Not on file   Intimate Partner Violence: Not on file   Housing Stability: Unknown  (9/8/2022)    Housing Stability Vital Sign     Unable to Pay for Housing in the Last Year: No     Number of Places Lived in the Last Year: Not on file     Unstable Housing in the Last Year: No        Current Outpatient Medications:     acetaminophen (TYLENOL) 325 mg tablet, 325 mg 3 (three) times a day as needed, Disp: , Rfl:     albuterol (PROVENTIL HFA,VENTOLIN HFA) 90 mcg/act inhaler, TAKE 2 PUFFS BY MOUTH EVERY 6 HOURS AS NEEDED FOR WHEEZE, Disp: 8.5 g, Rfl: 0    BD Pen Needle Ludmila 2nd Gen 32G X 4 MM MISC, INJECT UNDER THE SKIN 4 (FOUR) TIMES A DAY, Disp: 200 each, Rfl: 1    Blood Glucose Monitoring Suppl (ONETOUCH VERIO) w/Device KIT, Use to test sugar daily (Patient not taking: Reported on 2/7/2024), Disp: 1 kit, Rfl: 0    Continuous Glucose Sensor (FreeStyle Elvin 3 Sensor) MISC, Replace every 14 days, Disp: 2 each, Rfl: 5    dulaglutide (Trulicity) 4.5 MG/0.5ML injection, Inject 0.5 mL (4.5 mg total) under the skin every 7 days, Disp: 2 mL, Rfl: 5    ezetimibe (ZETIA) 10 mg tablet, Take 1 tablet (10 mg total) by mouth daily, Disp: 90 tablet, Rfl: 1    furosemide (LASIX) 20 mg tablet, TAKE 2 TABLETS EVERY DAY IN THE EARLY MORNING AND 1 TABLET DAILY AFTER LUNCH., Disp: 270 tablet, Rfl: 2    gabapentin (NEURONTIN) 100 mg capsule, Take 1 capsule (100 mg total) by mouth 2 (two) times a day With 300mg capsule for a total of 400mg twice a day, Disp: 60 capsule, Rfl: 0    gabapentin (NEURONTIN) 300 mg capsule, TAKE 1 CAPSULE (300 MG TOTAL) BY MOUTH 2 (TWO) TIMES A DAY TAKE 1 TAB WITH YOUR 100MG TAB TWICE DAILY, Disp: 180 capsule, Rfl: 2    insulin aspart (NovoLOG FlexPen) 100 UNIT/ML injection pen, INJECT 14-22 UNITS WITH MEALS+SCALE up to three times daily, Disp: 60 mL, Rfl: 2    Insulin Glargine Solostar (Lantus SoloStar) 100 UNIT/ML SOPN, INJECT 40 UNITS UNDER THE SKIN DAILY AT BEDTIME, Disp: 45 mL, Rfl: 0    Klayesta powder, APPLY 1 APPLICATION TOPICALLY 2 (TWO) TIMES A DAY TO AFFECTED AREA, Disp: 120 g, Rfl:  3    Lancets (ONETOUCH ULTRASOFT) lancets, Use to test sugar 2 times a day, Disp: 100 each, Rfl: 3    levothyroxine 25 mcg tablet, TAKE 1 TABLET (25 MCG TOTAL) BY MOUTH DAILY IN THE EARLY MORNING, Disp: 90 tablet, Rfl: 1    lisinopril (ZESTRIL) 2.5 mg tablet, Take 1 tablet (2.5 mg total) by mouth daily, Disp: 90 tablet, Rfl: 1    metFORMIN (GLUCOPHAGE) 1000 MG tablet, Take 1 tablet (1,000 mg total) by mouth 2 (two) times a day, Disp: 180 tablet, Rfl: 1    metoprolol succinate (TOPROL-XL) 25 mg 24 hr tablet, Take 1 tablet (25 mg total) by mouth 2 (two) times a day, Disp: 180 tablet, Rfl: 3    omeprazole (PriLOSEC) 40 MG capsule, Take 1 capsule (40 mg total) by mouth 2 (two) times a day, Disp: 60 capsule, Rfl: 0    OneTouch Verio test strip, USE TO TEST SUGAR 2 TIMES A DAY, Disp: 100 strip, Rfl: 3    psyllium (METAMUCIL) packet, Take 1 packet by mouth daily, Disp: , Rfl: 0    traZODone (DESYREL) 50 mg tablet, Take 1 tablet (50 mg total) by mouth daily at bedtime as needed for sleep, Disp: 100 tablet, Rfl: 0  No current facility-administered medications for this visit.    Review of Systems   Constitutional:  Negative for chills, fatigue and fever.   Cardiovascular:  Positive for leg swelling (Lymphedema).   Endocrine: Negative.    Musculoskeletal:  Positive for gait problem (Walker).   Skin:  Positive for wound (RLE).   Allergic/Immunologic: Negative.    Psychiatric/Behavioral:  The patient is not nervous/anxious.        Objective:  /80   Pulse 100   Temp (!) 97.3 °F (36.3 °C)   Resp 18         Physical Exam  Vitals and nursing note reviewed.   Constitutional:       General: He is not in acute distress.     Appearance: He is morbidly obese. He is not ill-appearing, toxic-appearing or diaphoretic.      Comments: No acute distress.  Significant other at bedside     HENT:      Head: Normocephalic and atraumatic.   Pulmonary:      Effort: Pulmonary effort is normal. No respiratory distress.   Musculoskeletal:       "Right lower leg: Edema present.   Skin:     General: Skin is warm and dry.      Findings: Wound present. No erythema.             Comments: 1- Wound bed with fibrin and slough requiring surgical debridement.  No acute erythema, increased warmth, fluctuance, induration.  No purulence or malodor.   Neurological:      Mental Status: He is alert and oriented to person, place, and time.      Gait: Gait abnormal.   Psychiatric:         Attention and Perception: Attention normal.         Mood and Affect: Mood and affect normal.         Behavior: Behavior normal.       Wound 09/20/24 Venous Ulcer Leg Right;Lateral (Active)   Wound Image   10/17/24 1339   Wound Description Granulation tissue;Brown;Yellow;Slough 10/17/24 1340   Chelsie-wound Assessment Edema;Hyperpigmented;Scar Tissue 10/17/24 1340   Wound Length (cm) 4 cm 10/17/24 1340   Wound Width (cm) 4.5 cm 10/17/24 1340   Wound Depth (cm) 0.2 cm 10/17/24 1340   Wound Surface Area (cm^2) 18 cm^2 10/17/24 1340   Wound Volume (cm^3) 3.6 cm^3 10/17/24 1340   Calculated Wound Volume (cm^3) 3.6 cm^3 10/17/24 1340   Change in Wound Size % -39 10/17/24 1340   Drainage Amount Moderate 10/17/24 1340   Drainage Description Serosanguineous 10/17/24 1340   Non-staged Wound Description Full thickness 10/17/24 1340   Wound packed? No 09/20/24 1539     Debridement   Wound 09/20/24 Venous Ulcer Leg Right;Lateral    Universal Protocol:  Consent: Verbal consent obtained. Written consent obtained.  Risks and benefits: risks, benefits and alternatives were discussed  Consent given by: patient  Time out: Immediately prior to procedure a \"time out\" was called to verify the correct patient, procedure, equipment, support staff and site/side marked as required.  Patient understanding: patient states understanding of the procedure being performed  Patient identity confirmed: verbally with patient    Debridement Details  Performed by: physician  Debridement type: surgical  Level of debridement: " subcutaneous tissue  Pain control: lidocaine 4%      Post-debridement measurements  Length (cm): 4  Width (cm): 4.5  Depth (cm): 0.3  Percent debrided: 85%  Surface Area (cm^2): 18  Area Debrided (cm^2): 15.3  Volume (cm^3): 5.4    Tissue and other material debrided: subcutaneous tissue  Devitalized tissue debrided: fibrin and slough  Instrument(s) utilized: curette  Bleeding: medium  Hemostasis obtained with: pressure  Procedural pain (0-10): 0  Post-procedural pain: 0   Response to treatment: procedure was tolerated well                   Lab Results   Component Value Date    HGBA1C 6.5 (H) 09/20/2024       Wound Instructions:  Orders Placed This Encounter   Procedures    Wound cleansing and dressings Venous Ulcer Right;Lateral Leg     Right;Lateral Leg        Wash your hands with soap and water.  Remove old dressing, discard into plastic bag and place in trash.    Cleanse the wound with mild soap and water prior to applying a clean dressing. Do not use tissue or cotton balls. Do not scrub the wound. Pat dry using gauze.  Shower yes.      Right lower leg wound:   Apply Polymem Max Silver to the wound bed.    Cover with ABD pad  Secure with rolled gauze and tape  Change dressing three times a week.         Wear Circaids on both lower legs. You can remove for sleeping.    Elevate your leg above the level of your heart whenever sitting.   Continue using lymphedema pumps for one hour twice a day.    Follow up at the wound center in two weeks.     Standing Status:   Future     Standing Expiration Date:   10/31/2024    Wound Procedure Treatment Venous Ulcer Right;Lateral Leg     This order was created via procedure documentation    Debridement     This order was created via procedure documentation     Karen Do MD

## 2024-10-17 NOTE — PROGRESS NOTES
Wound Procedure Treatment Venous Ulcer Right;Lateral Leg    Performed by: Ludivina Herrera RN  Authorized by: Karen Do MD    Associated wounds:   Wound 09/20/24 Venous Ulcer Leg Right;Lateral  Wound cleansed with:  NSS  Applied primary dressing:  Polymem foam and Silver  Applied secondary dressing:  ABD  Dressing secured with:  Ciara and Tape  Comments:  Circaid

## 2024-10-17 NOTE — PATIENT INSTRUCTIONS
Orders Placed This Encounter   Procedures    Wound cleansing and dressings Venous Ulcer Right;Lateral Leg     Right;Lateral Leg        Wash your hands with soap and water.  Remove old dressing, discard into plastic bag and place in trash.    Cleanse the wound with mild soap and water prior to applying a clean dressing. Do not use tissue or cotton balls. Do not scrub the wound. Pat dry using gauze.  Shower yes.      Right lower leg wound:   Apply Polymem Max Silver to the wound bed.    Cover with ABD pad  Secure with rolled gauze and tape  Change dressing three times a week.         Wear Circaids on both lower legs. You can remove for sleeping.    Elevate your leg above the level of your heart whenever sitting.   Continue using lymphedema pumps for one hour twice a day.    Follow up at the wound center in two weeks.     Standing Status:   Future     Standing Expiration Date:   10/31/2024    Wound Procedure Treatment Venous Ulcer Right;Lateral Leg     This order was created via procedure documentation

## 2024-10-18 DIAGNOSIS — R79.89 ELEVATED TSH: ICD-10-CM

## 2024-10-18 DIAGNOSIS — I10 HYPERTENSION GOAL BP (BLOOD PRESSURE) < 140/90: ICD-10-CM

## 2024-10-18 DIAGNOSIS — Z79.4 TYPE 2 DIABETES MELLITUS WITH HYPERGLYCEMIA, WITH LONG-TERM CURRENT USE OF INSULIN (HCC): ICD-10-CM

## 2024-10-18 DIAGNOSIS — E66.01 OBESITY, MORBID, BMI 50 OR HIGHER (HCC): ICD-10-CM

## 2024-10-18 DIAGNOSIS — E11.65 TYPE 2 DIABETES MELLITUS WITH HYPERGLYCEMIA, WITH LONG-TERM CURRENT USE OF INSULIN (HCC): ICD-10-CM

## 2024-10-18 DIAGNOSIS — E78.00 PURE HYPERCHOLESTEROLEMIA: ICD-10-CM

## 2024-10-18 RX ORDER — PEN NEEDLE, DIABETIC 32GX 5/32"
NEEDLE, DISPOSABLE MISCELLANEOUS 4 TIMES DAILY
Qty: 200 EACH | Refills: 2 | Status: SHIPPED | OUTPATIENT
Start: 2024-10-18

## 2024-10-18 RX ORDER — PEN NEEDLE, DIABETIC 32GX 5/32"
NEEDLE, DISPOSABLE MISCELLANEOUS 4 TIMES DAILY
Qty: 200 EACH | Refills: 1 | Status: SHIPPED | OUTPATIENT
Start: 2024-10-18

## 2024-10-23 RX ORDER — PEN NEEDLE, DIABETIC 32GX 5/32"
NEEDLE, DISPOSABLE MISCELLANEOUS 4 TIMES DAILY
Qty: 200 EACH | Refills: 0 | Status: SHIPPED | OUTPATIENT
Start: 2024-10-23

## 2024-10-24 DIAGNOSIS — E11.65 TYPE 2 DIABETES MELLITUS WITH HYPERGLYCEMIA, WITH LONG-TERM CURRENT USE OF INSULIN (HCC): Primary | ICD-10-CM

## 2024-10-24 DIAGNOSIS — Z79.4 TYPE 2 DIABETES MELLITUS WITH HYPERGLYCEMIA, WITH LONG-TERM CURRENT USE OF INSULIN (HCC): Primary | ICD-10-CM

## 2024-10-25 ENCOUNTER — TELEPHONE (OUTPATIENT)
Age: 59
End: 2024-10-25

## 2024-10-25 DIAGNOSIS — Z79.4 TYPE 2 DIABETES MELLITUS WITH HYPERGLYCEMIA, WITH LONG-TERM CURRENT USE OF INSULIN (HCC): ICD-10-CM

## 2024-10-25 DIAGNOSIS — E11.65 TYPE 2 DIABETES MELLITUS WITH HYPERGLYCEMIA, WITH LONG-TERM CURRENT USE OF INSULIN (HCC): ICD-10-CM

## 2024-10-25 RX ORDER — DULAGLUTIDE 4.5 MG/.5ML
INJECTION, SOLUTION SUBCUTANEOUS
Qty: 2 ML | Refills: 2 | Status: SHIPPED | OUTPATIENT
Start: 2024-10-25

## 2024-10-25 NOTE — TELEPHONE ENCOUNTER
PA for Trulicity 4.5 MG/0.5ML SOAJ SUBMITTED     via    [x]CMM-KEY: [id number]      Office notes sent, clinical questions answered. Awaiting determination    Turnaround time for your insurance to make a decision on your Prior Authorization can take 7-21 business days.

## 2024-10-31 ENCOUNTER — OFFICE VISIT (OUTPATIENT)
Dept: WOUND CARE | Facility: CLINIC | Age: 59
End: 2024-10-31
Payer: MEDICARE

## 2024-10-31 VITALS
RESPIRATION RATE: 16 BRPM | HEART RATE: 76 BPM | DIASTOLIC BLOOD PRESSURE: 84 MMHG | TEMPERATURE: 99.1 F | SYSTOLIC BLOOD PRESSURE: 132 MMHG

## 2024-10-31 DIAGNOSIS — L97.912 NON-PRESSURE CHRONIC ULCER OF RIGHT LOWER LEG WITH FAT LAYER EXPOSED (HCC): ICD-10-CM

## 2024-10-31 DIAGNOSIS — I87.311 CHRONIC VENOUS HYPERTENSION (IDIOPATHIC) WITH ULCER OF RIGHT LOWER EXTREMITY (CODE) (HCC): Primary | ICD-10-CM

## 2024-10-31 DIAGNOSIS — I89.0 LYMPHEDEMA OF BOTH LOWER EXTREMITIES: ICD-10-CM

## 2024-10-31 PROCEDURE — 11042 DBRDMT SUBQ TIS 1ST 20SQCM/<: CPT | Performed by: FAMILY MEDICINE

## 2024-10-31 RX ORDER — LIDOCAINE 40 MG/G
CREAM TOPICAL ONCE
Status: COMPLETED | OUTPATIENT
Start: 2024-10-31 | End: 2024-10-31

## 2024-10-31 RX ADMIN — LIDOCAINE: 40 CREAM TOPICAL at 14:25

## 2024-10-31 NOTE — PROGRESS NOTES
"Patient ID: Ben Mathur is a 59 y.o. male Date of Birth 1965       Chief Complaint   Patient presents with    Follow Up Wound Care Visit     Follow up visit for wound to right leg.  Pt reports pain persists but is slightly better.        Allergies:  Gluten meal - food allergy and Clindamycin    Diagnosis:      Diagnosis ICD-10-CM Associated Orders   1. Chronic venous hypertension (idiopathic) with ulcer of right lower extremity (CODE) ([facility])  I87.311 lidocaine (LMX) 4 % cream     Wound cleansing and dressings Venous Ulcer Right;Lateral Leg      2. Non-pressure chronic ulcer of right lower leg with fat layer exposed ([facility])  L97.912 lidocaine (LMX) 4 % cream     Wound cleansing and dressings Venous Ulcer Right;Lateral Leg      3. Lymphedema of both lower extremities  I89.0               Assessment & Plan:  VSU right lower extremity: Measurements improved.  More skin bridging present.  Periwound is macerated.  No signs of infection.  Surgical debridement   Continue CircAid  Continue use of lymphedema pumps twice daily  Reviewed importance of offloading and frequent repositioning of this area to avoid resting on it to prevent pressure/friction which could deteriorate wound  ER precautions reviewed, they expressed understanding  Follow-up in 2 weeks or sooner if needed    Portions of the record may have been created with voice recognition software. Occasional wrong word or \"sound alike\" substitutions may have occurred due to the inherent limitations of voice recognition software. Read the chart carefully and recognize, using context, where substitutions have occurred.    Subjective:   10/17/2024 Ben is a 59-year-old male with a past medical history including but not limited to venous insufficiency, type 2 diabetes, neuropathy, hypertension who presents to wound center today for follow-up of right lower extremity ulcer.  His wife is present at bedside. He reports he is now using lymphedema pumps twice a day.  " Very compliant with his CircAid's.  Reporting some increased pain at his wound last evening requiring him to take Tylenol and Motrin.  It was resolved when he woke this a.m.  Is unclear as to why he is having the increased pain.  He has not had fever or chills or any other new acute symptoms.    10/31/2024: Hernan presents today for follow-up.  His wife has accompanied him to today's visit.  Reports he feels he is doing well and wound is a bit improved.  Otherwise no new acute complaints.  Denies fever, chills.  Compliant with CircAid's and is using lymphedema pumps twice daily.      The following portions of the patient's history were reviewed and updated as appropriate:   Patient Active Problem List   Diagnosis    Type 2 diabetes mellitus, with long-term current use of insulin (HCC)    Hyperlipidemia    Psoriasis    Venous insufficiency    Gout    Essential hypertension    Gluten intolerance    Diabetic neuropathy (HCC)    Insomnia    Erectile dysfunction    Bilateral leg edema    Elevated CO2 level    DAHIANA (obstructive sleep apnea)    Morbid obesity    Migraine headache    Onychomycosis    Ulcer of left lower extremity, limited to breakdown of skin (HCC)    Pressure injury of right leg    Metabolic syndrome    Low testosterone in male    Hypothyroidism    Pressure injury of left leg    GERD (gastroesophageal reflux disease)    Chronic pain    Type 2 diabetes mellitus with hyperglycemia (HCC)    Current use of insulin (HCC)    Obesity hypoventilation syndrome (HCC)    Sinus tachycardia    Lymphedema    Diabetic polyneuropathy associated with type 2 diabetes mellitus (HCC)    Type 2 diabetes mellitus with hyperglycemia, with long-term current use of insulin (HCC)     Past Medical History:   Diagnosis Date    Acute kidney injury 10/28/2021    Anemia 09/13/2019    Cellulitis     last assessed 12/10/15    Cellulitis of left lower extremity     Cellulitis of right lower extremity 11/19/2021    Cough 10/20/2019    Diabetes  mellitus (HCC)     Disease of thyroid gland     Edema     Elevated liver enzymes     Elevated serum creatinine 11/19/2021    Esophageal reflux     Fungal infection 08/16/2021    Gluten intolerance     Gout     last assessed 09/05/13    Hypercalcemia     Hyperglycemia     Hypertension     Hyponatremia 09/06/2019    IBS (irritable bowel syndrome)     Insomnia     Obesity     Osteoarthritis of knee     last assessed 02/10/14    Scrotal swelling 05/19/2020    Shortness of breath 05/03/2021    Venous insufficiency     last assessed 08/22/17    Villonodular synovitis of the hand, right     last assessed 11/14/2013     Past Surgical History:   Procedure Laterality Date    INCISION AND DRAINAGE OF WOUND Left 9/6/2019    Procedure: INCISION AND DRAINAGE (I&D) GROIN;  Surgeon: Carlos Ruano DO;  Location: [facility];  Service: General    SKIN BIOPSY      WOUND DEBRIDEMENT Left 9/7/2019    Procedure: EXCISIONAL DEBRIDEMENT;  Surgeon: Carlos Ruano DO;  Location: [facility];  Service: General     Family History   Problem Relation Age of Onset    Cancer Mother         gastrc    Colon cancer Father     Heart failure Father       Social History     Socioeconomic History    Marital status: /Civil Union     Spouse name: Not on file    Number of children: Not on file    Years of education: Not on file    Highest education level: Not on file   Occupational History    Not on file   Tobacco Use    Smoking status: Never    Smokeless tobacco: Never   Vaping Use    Vaping status: Never Used   Substance and Sexual Activity    Alcohol use: Not Currently     Alcohol/week: 0.0 standard drinks of alcohol    Drug use: Yes     Types: Marijuana     Comment: medical    Sexual activity: Yes     Partners: Female   Other Topics Concern    Not on file   Social History Narrative    Not on file     Social Determinants of Health     Financial Resource Strain: Not on file   Food Insecurity: No Food Insecurity (9/8/2022)    Hunger Vital Sign      Worried About Running Out of Food in the Last Year: Never true     Ran Out of Food in the Last Year: Never true   Transportation Needs: No Transportation Needs (9/8/2022)    PRAPARE - Transportation     Lack of Transportation (Medical): No     Lack of Transportation (Non-Medical): No   Physical Activity: Not on file   Stress: Not on file   Social Connections: Not on file   Intimate Partner Violence: Not on file   Housing Stability: Unknown (9/8/2022)    Housing Stability Vital Sign     Unable to Pay for Housing in the Last Year: No     Number of Places Lived in the Last Year: Not on file     Unstable Housing in the Last Year: No        Current Outpatient Medications:     acetaminophen (TYLENOL) 325 mg tablet, 325 mg 3 (three) times a day as needed, Disp: , Rfl:     albuterol (PROVENTIL HFA,VENTOLIN HFA) 90 mcg/act inhaler, TAKE 2 PUFFS BY MOUTH EVERY 6 HOURS AS NEEDED FOR WHEEZE, Disp: 8.5 g, Rfl: 0    Blood Glucose Monitoring Suppl (ONETOUCH VERIO) w/Device KIT, Use to test sugar daily (Patient not taking: Reported on 2/7/2024), Disp: 1 kit, Rfl: 0    Continuous Glucose Sensor (FreeStyle Elvin 3 Sensor) MISC, Replace every 14 days, Disp: 2 each, Rfl: 5    dulaglutide (Trulicity) 4.5 MG/0.5ML injection, Inject 0.5 mL (4.5 mg total) under the skin every 7 days, Disp: 2 mL, Rfl: 5    ezetimibe (ZETIA) 10 mg tablet, Take 1 tablet (10 mg total) by mouth daily, Disp: 90 tablet, Rfl: 1    furosemide (LASIX) 20 mg tablet, TAKE 2 TABLETS EVERY DAY IN THE EARLY MORNING AND 1 TABLET DAILY AFTER LUNCH., Disp: 270 tablet, Rfl: 2    gabapentin (NEURONTIN) 100 mg capsule, Take 1 capsule (100 mg total) by mouth 2 (two) times a day With 300mg capsule for a total of 400mg twice a day, Disp: 60 capsule, Rfl: 0    gabapentin (NEURONTIN) 300 mg capsule, TAKE 1 CAPSULE (300 MG TOTAL) BY MOUTH 2 (TWO) TIMES A DAY TAKE 1 TAB WITH YOUR 100MG TAB TWICE DAILY, Disp: 180 capsule, Rfl: 2    insulin aspart (NovoLOG FlexPen) 100 UNIT/ML  injection pen, INJECT 14-22 UNITS WITH MEALS+SCALE up to three times daily, Disp: 60 mL, Rfl: 2    Insulin Glargine Solostar (Lantus SoloStar) 100 UNIT/ML SOPN, INJECT 40 UNITS UNDER THE SKIN DAILY AT BEDTIME, Disp: 45 mL, Rfl: 0    Insulin Pen Needle (BD Pen Needle Ludmila 2nd Gen) 32G X 4 MM MISC, Inject under the skin 4 (four) times a day, Disp: 200 each, Rfl: 0    Insulin Pen Needle (BD Pen Needle Ldumila 2nd Gen) 32G X 4 MM MISC, Inject under the skin 4 (four) times a day, Disp: 200 each, Rfl: 1    Insulin Pen Needle (BD Pen Needle Ludmila 2nd Gen) 32G X 4 MM MISC, Inject under the skin 4 (four) times a day, Disp: 200 each, Rfl: 2    Klayesta powder, APPLY 1 APPLICATION TOPICALLY 2 (TWO) TIMES A DAY TO AFFECTED AREA, Disp: 120 g, Rfl: 3    Lancets (ONETOUCH ULTRASOFT) lancets, Use to test sugar 2 times a day, Disp: 100 each, Rfl: 3    levothyroxine 25 mcg tablet, TAKE 1 TABLET (25 MCG TOTAL) BY MOUTH DAILY IN THE EARLY MORNING, Disp: 90 tablet, Rfl: 1    lisinopril (ZESTRIL) 2.5 mg tablet, Take 1 tablet (2.5 mg total) by mouth daily, Disp: 90 tablet, Rfl: 1    metFORMIN (GLUCOPHAGE) 1000 MG tablet, Take 1 tablet (1,000 mg total) by mouth 2 (two) times a day, Disp: 180 tablet, Rfl: 1    metoprolol succinate (TOPROL-XL) 25 mg 24 hr tablet, Take 1 tablet (25 mg total) by mouth 2 (two) times a day, Disp: 180 tablet, Rfl: 3    omeprazole (PriLOSEC) 40 MG capsule, Take 1 capsule (40 mg total) by mouth 2 (two) times a day, Disp: 60 capsule, Rfl: 0    OneTouch Verio test strip, USE TO TEST SUGAR 2 TIMES A DAY, Disp: 100 strip, Rfl: 3    psyllium (METAMUCIL) packet, Take 1 packet by mouth daily, Disp: , Rfl: 0    traZODone (DESYREL) 50 mg tablet, Take 1 tablet (50 mg total) by mouth daily at bedtime as needed for sleep, Disp: 100 tablet, Rfl: 0    Trulicity 4.5 MG/0.5ML SOAJ, INJECT 0.5ML (4.5MG TOTAL) UNDER THE SKIN EVERY 7 DAYS, Disp: 2 mL, Rfl: 2  No current facility-administered medications for this visit.    Review of  Systems   Constitutional:  Negative for chills, fatigue and fever.   Cardiovascular:  Positive for leg swelling (Lymphedema).   Endocrine: Negative.    Musculoskeletal:  Positive for gait problem (Walker).   Skin:  Positive for wound (RLE).   Allergic/Immunologic: Negative.    Psychiatric/Behavioral:  The patient is not nervous/anxious.        Objective:  /84   Pulse 76   Temp 99.1 °F (37.3 °C) (Tympanic)   Resp 16   Pain Score:   6     Physical Exam  Vitals and nursing note reviewed.   Constitutional:       General: He is not in acute distress.     Appearance: He is morbidly obese. He is not ill-appearing, toxic-appearing or diaphoretic.      Comments: No acute distress.  Significant other at bedside     HENT:      Head: Normocephalic and atraumatic.   Cardiovascular:      Rate and Rhythm: Normal rate.   Pulmonary:      Effort: Pulmonary effort is normal. No respiratory distress.   Musculoskeletal:      Right lower leg: Edema present.   Skin:     General: Skin is warm and dry.      Findings: Wound present. No erythema.             Comments: 1- Measurements improved.  More skin bridging present.  Periwound is macerated.  No signs of infection. No acute erythema, increased warmth, fluctuance, induration.  No purulence or malodor.   Neurological:      Mental Status: He is alert and oriented to person, place, and time.      Gait: Gait abnormal.   Psychiatric:         Attention and Perception: Attention normal.         Mood and Affect: Mood and affect normal.         Behavior: Behavior normal.       Wound 09/20/24 Venous Ulcer Leg Right;Lateral (Active)   Wound Image   10/31/24 1406   Wound Description Granulation tissue;Brown;Yellow;Slough;Epithelialization 10/31/24 1406   Chelsie-wound Assessment Edema;Hyperpigmented;Scar Tissue;Maceration 10/31/24 1406   Wound Length (cm) 4.4 cm 10/31/24 1406   Wound Width (cm) 4 cm 10/31/24 1406   Wound Depth (cm) 0.2 cm 10/31/24 1406   Wound Surface Area (cm^2) 17.6 cm^2  "10/31/24 1406   Wound Volume (cm^3) 3.52 cm^3 10/31/24 1406   Calculated Wound Volume (cm^3) 3.52 cm^3 10/31/24 1406   Change in Wound Size % -35.91 10/31/24 1406   Drainage Amount Moderate 10/31/24 1406   Drainage Description Serosanguineous 10/17/24 1340   Non-staged Wound Description Full thickness 10/17/24 1340   Wound packed? No 09/20/24 1539       Debridement   Wound 09/20/24 Venous Ulcer Leg Right;Lateral    Universal Protocol:  Consent: Verbal consent obtained. Written consent obtained.  Risks and benefits: risks, benefits and alternatives were discussed  Consent given by: patient  Time out: Immediately prior to procedure a \"time out\" was called to verify the correct patient, procedure, equipment, support staff and site/side marked as required.  Patient understanding: patient states understanding of the procedure being performed  Patient identity confirmed: verbally with patient    Debridement Details  Performed by: physician  Debridement type: surgical  Level of debridement: subcutaneous tissue  Pain control: lidocaine 4%      Post-debridement measurements  Length (cm): 4.4  Width (cm): 4  Depth (cm): 0.3  Percent debrided: 85%  Surface Area (cm^2): 17.6  Area Debrided (cm^2): 14.96  Volume (cm^3): 5.28    Tissue and other material debrided: subcutaneous tissue  Devitalized tissue debrided: fibrin and slough  Instrument(s) utilized: curette  Bleeding: medium  Hemostasis obtained with: pressure  Procedural pain (0-10): 0  Post-procedural pain: 0   Response to treatment: procedure was tolerated well                   Lab Results   Component Value Date    HGBA1C 6.5 (H) 09/20/2024       Wound Instructions:  Orders Placed This Encounter   Procedures    Wound cleansing and dressings Venous Ulcer Right;Lateral Leg     Wash your hands with soap and water.  Remove old dressing, discard into plastic bag and place in trash.    Cleanse the wound with mild soap and water prior to applying a clean dressing.   Do not use " tissue or cotton balls. Do not scrub the wound. Pat dry using gauze.    Shower yes.      Right lower leg wound:   Apply Zinc oxide to skin surrounding the wound.   Apply Polymem Max Silver to the wound bed.    Cover with ABD pad  Secure with rolled gauze and tape  Change dressing three times a week.         Wear Circaids on both lower legs. You can remove for sleeping.   Elevate your leg above the level of your heart whenever sitting.  Continue using lymphedema pumps for one hour twice a day.     Follow up at the wound center in two weeks.     Standing Status:   Future     Standing Expiration Date:   11/7/2024    Debridement     This order was created via procedure documentation       Karen Do MD

## 2024-10-31 NOTE — PATIENT INSTRUCTIONS
Orders Placed This Encounter   Procedures    Wound cleansing and dressings Venous Ulcer Right;Lateral Leg     Wash your hands with soap and water.  Remove old dressing, discard into plastic bag and place in trash.    Cleanse the wound with mild soap and water prior to applying a clean dressing.   Do not use tissue or cotton balls. Do not scrub the wound. Pat dry using gauze.    Shower yes.      Right lower leg wound:   Apply Zinc oxide to skin surrounding the wound.   Apply Polymem Max Silver to the wound bed.    Cover with ABD pad  Secure with rolled gauze and tape  Change dressing three times a week.         Wear Circaids on both lower legs. You can remove for sleeping.   Elevate your leg above the level of your heart whenever sitting.  Continue using lymphedema pumps for one hour twice a day.     Follow up at the wound center in two weeks.     Standing Status:   Future     Standing Expiration Date:   11/7/2024

## 2024-11-01 DIAGNOSIS — E11.49 OTHER DIABETIC NEUROLOGICAL COMPLICATION ASSOCIATED WITH TYPE 2 DIABETES MELLITUS (HCC): ICD-10-CM

## 2024-11-01 DIAGNOSIS — K21.9 GASTROESOPHAGEAL REFLUX DISEASE: ICD-10-CM

## 2024-11-01 DIAGNOSIS — I10 ESSENTIAL HYPERTENSION: ICD-10-CM

## 2024-11-01 DIAGNOSIS — I47.10 PSVT (PAROXYSMAL SUPRAVENTRICULAR TACHYCARDIA) (HCC): ICD-10-CM

## 2024-11-01 RX ORDER — GABAPENTIN 100 MG/1
CAPSULE ORAL
Qty: 60 CAPSULE | Refills: 5 | Status: SHIPPED | OUTPATIENT
Start: 2024-11-01

## 2024-11-01 RX ORDER — OMEPRAZOLE 40 MG/1
40 CAPSULE, DELAYED RELEASE ORAL 2 TIMES DAILY
Qty: 60 CAPSULE | Refills: 5 | Status: SHIPPED | OUTPATIENT
Start: 2024-11-01

## 2024-11-01 RX ORDER — METOPROLOL SUCCINATE 25 MG/1
25 TABLET, EXTENDED RELEASE ORAL 2 TIMES DAILY
Qty: 180 TABLET | Refills: 1 | Status: SHIPPED | OUTPATIENT
Start: 2024-11-01

## 2024-11-01 RX ORDER — LISINOPRIL 2.5 MG/1
2.5 TABLET ORAL DAILY
Qty: 90 TABLET | Refills: 1 | Status: SHIPPED | OUTPATIENT
Start: 2024-11-01

## 2024-11-01 RX ORDER — GABAPENTIN 300 MG/1
300 CAPSULE ORAL 2 TIMES DAILY
Qty: 60 CAPSULE | Refills: 5 | Status: SHIPPED | OUTPATIENT
Start: 2024-11-01

## 2024-11-15 ENCOUNTER — OFFICE VISIT (OUTPATIENT)
Dept: WOUND CARE | Facility: CLINIC | Age: 59
End: 2024-11-15
Payer: MEDICARE

## 2024-11-15 VITALS
SYSTOLIC BLOOD PRESSURE: 142 MMHG | HEART RATE: 80 BPM | DIASTOLIC BLOOD PRESSURE: 88 MMHG | TEMPERATURE: 98.1 F | RESPIRATION RATE: 18 BRPM

## 2024-11-15 DIAGNOSIS — I89.0 LYMPHEDEMA OF BOTH LOWER EXTREMITIES: ICD-10-CM

## 2024-11-15 DIAGNOSIS — L97.912 NON-PRESSURE CHRONIC ULCER OF RIGHT LOWER LEG WITH FAT LAYER EXPOSED (HCC): ICD-10-CM

## 2024-11-15 DIAGNOSIS — I87.311 CHRONIC VENOUS HYPERTENSION (IDIOPATHIC) WITH ULCER OF RIGHT LOWER EXTREMITY (CODE) (HCC): Primary | ICD-10-CM

## 2024-11-15 PROCEDURE — 99213 OFFICE O/P EST LOW 20 MIN: CPT | Performed by: FAMILY MEDICINE

## 2024-11-15 RX ORDER — LIDOCAINE 40 MG/G
CREAM TOPICAL ONCE
Status: COMPLETED | OUTPATIENT
Start: 2024-11-15 | End: 2024-11-15

## 2024-11-15 RX ADMIN — LIDOCAINE: 40 CREAM TOPICAL at 15:49

## 2024-11-15 NOTE — PATIENT INSTRUCTIONS
Orders Placed This Encounter   Procedures    Wound cleansing and dressings Venous Ulcer Right;Lateral Leg     Wash your hands with soap and water.  Remove old dressing, discard into plastic bag and place in trash.    Cleanse the wound with mild soap and water prior to applying a clean dressing.   Do not use tissue or cotton balls. Do not scrub the wound. Pat dry using gauze.     Shower yes.      Right lower leg wound:   Apply Zinc oxide to skin surrounding the wound.   Apply Polymem Max Silver to the wound bed.    Cover with ABD pad  Secure with rolled gauze and tape  Change dressing three times a week.         Wear Circaids on both lower legs. You can remove for sleeping.   Elevate your leg above the level of your heart whenever sitting.  Continue using lymphedema pumps for one hour twice a day.     Standing Status:   Future     Expiration Date:   11/29/2024    Wound Procedure Treatment Venous Ulcer Right;Lateral Leg     This order was created via procedure documentation

## 2024-11-15 NOTE — PROGRESS NOTES
Wound Procedure Treatment Venous Ulcer Right;Lateral Leg    Performed by: Tanika Garcia RN  Authorized by: Karen Do MD    Associated wounds:   Wound 09/20/24 Venous Ulcer Leg Right;Lateral  Wound cleansed with:  NSS  Applied to periwound:  Zinc oxide paste  Applied primary dressing:  Silver and Polymem foam  Applied secondary dressing:  ABD  Dressing secured with:  Tape and Ciara    Circaid for compression    No

## 2024-11-15 NOTE — PROGRESS NOTES
"Patient ID: Ben Mathur is a 59 y.o. male Date of Birth 1965       Chief Complaint   Patient presents with    Follow Up Wound Care Visit     Right leg        Allergies:  Gluten meal - food allergy and Clindamycin    Diagnosis:      Diagnosis ICD-10-CM Associated Orders   1. Chronic venous hypertension (idiopathic) with ulcer of right lower extremity (CODE) ([facility])  I87.311 lidocaine (LMX) 4 % cream     Wound cleansing and dressings Venous Ulcer Right;Lateral Leg     Wound Procedure Treatment Venous Ulcer Right;Lateral Leg      2. Lymphedema of both lower extremities  I89.0 lidocaine (LMX) 4 % cream     Wound cleansing and dressings Venous Ulcer Right;Lateral Leg     Wound Procedure Treatment Venous Ulcer Right;Lateral Leg      3. Non-pressure chronic ulcer of right lower leg with fat layer exposed ([facility])  L97.912 lidocaine (LMX) 4 % cream     Wound cleansing and dressings Venous Ulcer Right;Lateral Leg     Wound Procedure Treatment Venous Ulcer Right;Lateral Leg              Assessment & Plan:  VSU RLE: Measurements are improved.  Wound bed clean and pink, granular.  No slough.  Periwound with mild maceration.  Skin bridging has increased.  No acute erythema, lymphangitic streaking, purulent drainage.  No debridement needed today.  Continue same treatment with PolyMem Max Ag continue compression with CircAid  Continue use of lymphedema pumps twice daily  Reviewed importance of offloading and frequent repositioning of this area to avoid resting on it to prevent pressure/friction which could deteriorate wound  ER precautions reviewed, they expressed understanding  Follow-up in 2 weeks or sooner if needed       Portions of the record may have been created with voice recognition software. Occasional wrong word or \"sound alike\" substitutions may have occurred due to the inherent limitations of voice recognition software. Read the chart carefully and recognize, using context, where substitutions have " occurred.    Subjective:   10/17/2024 Ben is a 59-year-old male with a past medical history including but not limited to venous insufficiency, type 2 diabetes, neuropathy, hypertension who presents to wound center today for follow-up of right lower extremity ulcer.  His wife is present at bedside. He reports he is now using lymphedema pumps twice a day.  Very compliant with his CircAid's.  Reporting some increased pain at his wound last evening requiring him to take Tylenol and Motrin.  It was resolved when he woke this a.m.  Is unclear as to why he is having the increased pain.  He has not had fever or chills or any other new acute symptoms.    10/31/2024: Hernan presents today for follow-up.  His wife has accompanied him to today's visit.  Reports he feels he is doing well and wound is a bit improved.  Otherwise no new acute complaints.  Denies fever, chills.  Compliant with CircAid's and is using lymphedema pumps twice daily.    11/15/2024: Hernan presents today along with his wife.  He states that he is doing well and no longer has pain.  Denies new acute complaints.  Continues use of lymphedema pumps twice daily and is compliant with CircAid's.  Denies fever, chills.      The following portions of the patient's history were reviewed and updated as appropriate:   Patient Active Problem List   Diagnosis    Type 2 diabetes mellitus, with long-term current use of insulin (HCC)    Hyperlipidemia    Psoriasis    Venous insufficiency    Gout    Essential hypertension    Gluten intolerance    Diabetic neuropathy (HCC)    Insomnia    Erectile dysfunction    Bilateral leg edema    Elevated CO2 level    DAHIANA (obstructive sleep apnea)    Morbid obesity    Migraine headache    Onychomycosis    Ulcer of left lower extremity, limited to breakdown of skin (HCC)    Pressure injury of right leg    Metabolic syndrome    Low testosterone in male    Hypothyroidism    Pressure injury of left leg    GERD (gastroesophageal reflux disease)     Chronic pain    Type 2 diabetes mellitus with hyperglycemia (HCC)    Current use of insulin (HCC)    Obesity hypoventilation syndrome (HCC)    Sinus tachycardia    Lymphedema    Diabetic polyneuropathy associated with type 2 diabetes mellitus (HCC)    Type 2 diabetes mellitus with hyperglycemia, with long-term current use of insulin (HCC)     Past Medical History:   Diagnosis Date    Acute kidney injury 10/28/2021    Anemia 09/13/2019    Cellulitis     last assessed 12/10/15    Cellulitis of left lower extremity     Cellulitis of right lower extremity 11/19/2021    Cough 10/20/2019    Diabetes mellitus (HCC)     Disease of thyroid gland     Edema     Elevated liver enzymes     Elevated serum creatinine 11/19/2021    Esophageal reflux     Fungal infection 08/16/2021    Gluten intolerance     Gout     last assessed 09/05/13    Hypercalcemia 6/28/2022    Hyperglycemia     Hypertension     Hyponatremia 09/06/2019    IBS (irritable bowel syndrome)     Insomnia     Obesity     Osteoarthritis of knee     last assessed 02/10/14    Scrotal swelling 05/19/2020    Shortness of breath 05/03/2021    Venous insufficiency     last assessed 08/22/17    Villonodular synovitis of the hand, right     last assessed 11/14/2013     Past Surgical History:   Procedure Laterality Date    INCISION AND DRAINAGE OF WOUND Left 9/6/2019    Procedure: INCISION AND DRAINAGE (I&D) GROIN;  Surgeon: Carlos Ruano DO;  Location: [facility];  Service: General    SKIN BIOPSY      WOUND DEBRIDEMENT Left 9/7/2019    Procedure: EXCISIONAL DEBRIDEMENT;  Surgeon: Carlos Ruano DO;  Location: [facility];  Service: General     Family History   Problem Relation Age of Onset    Cancer Mother         gastrc    Colon cancer Father     Heart failure Father       Social History     Socioeconomic History    Marital status: /Civil Union     Spouse name: Not on file    Number of children: Not on file    Years of education: Not on file    Highest education  level: Not on file   Occupational History    Not on file   Tobacco Use    Smoking status: Never    Smokeless tobacco: Never   Vaping Use    Vaping status: Never Used   Substance and Sexual Activity    Alcohol use: Not Currently     Alcohol/week: 0.0 standard drinks of alcohol    Drug use: Yes     Types: Marijuana     Comment: medical    Sexual activity: Yes     Partners: Female   Other Topics Concern    Not on file   Social History Narrative    Not on file     Social Drivers of Health     Financial Resource Strain: Not on file   Food Insecurity: No Food Insecurity (9/8/2022)    Hunger Vital Sign     Worried About Running Out of Food in the Last Year: Never true     Ran Out of Food in the Last Year: Never true   Transportation Needs: No Transportation Needs (9/8/2022)    PRAPARE - Transportation     Lack of Transportation (Medical): No     Lack of Transportation (Non-Medical): No   Physical Activity: Not on file   Stress: Not on file   Social Connections: Not on file   Intimate Partner Violence: Not on file   Housing Stability: Unknown (9/8/2022)    Housing Stability Vital Sign     Unable to Pay for Housing in the Last Year: No     Number of Places Lived in the Last Year: Not on file     Unstable Housing in the Last Year: No        Current Outpatient Medications:     acetaminophen (TYLENOL) 325 mg tablet, 325 mg 3 (three) times a day as needed, Disp: , Rfl:     albuterol (PROVENTIL HFA,VENTOLIN HFA) 90 mcg/act inhaler, TAKE 2 PUFFS BY MOUTH EVERY 6 HOURS AS NEEDED FOR WHEEZE, Disp: 8.5 g, Rfl: 0    Blood Glucose Monitoring Suppl (ONETOUCH VERIO) w/Device KIT, Use to test sugar daily (Patient not taking: Reported on 2/7/2024), Disp: 1 kit, Rfl: 0    Continuous Glucose Sensor (FreeStyle Elvin 3 Sensor) MISC, Replace every 14 days, Disp: 2 each, Rfl: 5    dulaglutide (Trulicity) 4.5 MG/0.5ML injection, Inject 0.5 mL (4.5 mg total) under the skin every 7 days, Disp: 2 mL, Rfl: 5    ezetimibe (ZETIA) 10 mg tablet, Take 1  tablet (10 mg total) by mouth daily, Disp: 90 tablet, Rfl: 1    furosemide (LASIX) 20 mg tablet, TAKE 2 TABLETS EVERY DAY IN THE EARLY MORNING AND 1 TABLET DAILY AFTER LUNCH., Disp: 270 tablet, Rfl: 2    gabapentin (NEURONTIN) 100 mg capsule, TAKE ONE CAPSULE BY MOUTH TWICE A DAY ALONG WITH 300MG CAPSULE, Disp: 60 capsule, Rfl: 5    gabapentin (NEURONTIN) 300 mg capsule, TAKE ONE CAPSULE BY MOUTH TWO TIMES A DAY, Disp: 60 capsule, Rfl: 5    insulin aspart (NovoLOG FlexPen) 100 UNIT/ML injection pen, INJECT 14-22 UNITS WITH MEALS+SCALE up to three times daily, Disp: 60 mL, Rfl: 2    Insulin Glargine Solostar (Lantus SoloStar) 100 UNIT/ML SOPN, INJECT 40 UNITS UNDER THE SKIN DAILY AT BEDTIME, Disp: 45 mL, Rfl: 0    Insulin Pen Needle (BD Pen Needle Ludmila 2nd Gen) 32G X 4 MM MISC, Inject under the skin 4 (four) times a day, Disp: 200 each, Rfl: 0    Insulin Pen Needle (BD Pen Needle Ludmila 2nd Gen) 32G X 4 MM MISC, Inject under the skin 4 (four) times a day, Disp: 200 each, Rfl: 1    Insulin Pen Needle (BD Pen Needle Ludmila 2nd Gen) 32G X 4 MM MISC, Inject under the skin 4 (four) times a day, Disp: 200 each, Rfl: 2    Klayesta powder, APPLY 1 APPLICATION TOPICALLY 2 (TWO) TIMES A DAY TO AFFECTED AREA, Disp: 120 g, Rfl: 3    Lancets (ONETOUCH ULTRASOFT) lancets, Use to test sugar 2 times a day, Disp: 100 each, Rfl: 3    levothyroxine 25 mcg tablet, TAKE 1 TABLET (25 MCG TOTAL) BY MOUTH DAILY IN THE EARLY MORNING, Disp: 90 tablet, Rfl: 1    lisinopril (ZESTRIL) 2.5 mg tablet, Take 1 tablet (2.5 mg total) by mouth daily, Disp: 90 tablet, Rfl: 1    metFORMIN (GLUCOPHAGE) 1000 MG tablet, Take 1 tablet (1,000 mg total) by mouth 2 (two) times a day, Disp: 180 tablet, Rfl: 1    metoprolol succinate (TOPROL-XL) 25 mg 24 hr tablet, Take 1 tablet (25 mg total) by mouth 2 (two) times a day, Disp: 180 tablet, Rfl: 1    omeprazole (PriLOSEC) 40 MG capsule, TAKE ONE CAPSULE BY MOUTH TWICE A DAY, Disp: 60 capsule, Rfl: 5    OneTouch Verio  test strip, USE TO TEST SUGAR 2 TIMES A DAY, Disp: 100 strip, Rfl: 3    psyllium (METAMUCIL) packet, Take 1 packet by mouth daily, Disp: , Rfl: 0    traZODone (DESYREL) 50 mg tablet, Take 1 tablet (50 mg total) by mouth daily at bedtime as needed for sleep, Disp: 100 tablet, Rfl: 0    Trulicity 4.5 MG/0.5ML SOAJ, INJECT 0.5ML (4.5MG TOTAL) UNDER THE SKIN EVERY 7 DAYS, Disp: 2 mL, Rfl: 2  No current facility-administered medications for this visit.    Review of Systems   Constitutional:  Negative for chills, fatigue and fever.   Cardiovascular:  Positive for leg swelling (Lymphedema).   Endocrine: Negative.    Musculoskeletal:  Positive for gait problem (Walker).   Skin:  Positive for wound (RLE).   Allergic/Immunologic: Negative.    Psychiatric/Behavioral:  The patient is not nervous/anxious.        Objective:  /88   Pulse 80   Temp 98.1 °F (36.7 °C)   Resp 18         Physical Exam  Vitals and nursing note reviewed.   Constitutional:       General: He is not in acute distress.     Appearance: He is morbidly obese. He is not ill-appearing, toxic-appearing or diaphoretic.      Comments: No acute distress.  Significant other at bedside     HENT:      Head: Normocephalic and atraumatic.   Cardiovascular:      Rate and Rhythm: Normal rate.   Pulmonary:      Effort: Pulmonary effort is normal. No respiratory distress.   Musculoskeletal:      Right lower leg: Edema present.   Skin:     General: Skin is warm and dry.      Findings: Wound present. No erythema.             Comments: 1- Measurements are improved.  Wound bed clean and pink, granular.  No slough.  Periwound with mild maceration.  Skin bridging has increased.  No acute erythema, lymphangitic streaking, purulent drainage.  No debridement needed today.   Neurological:      Mental Status: He is alert and oriented to person, place, and time.      Gait: Gait abnormal.   Psychiatric:         Attention and Perception: Attention normal.         Mood and Affect:  Mood and affect normal.         Behavior: Behavior normal.       Wound 09/20/24 Venous Ulcer Leg Right;Lateral (Active)   Wound Image   11/15/24 1540   Wound Description Granulation tissue;Epithelialization 11/15/24 1545   Chelsie-wound Assessment Edema;Hyperpigmented;Scar Tissue 11/15/24 1545   Wound Length (cm) 3.5 cm 11/15/24 1545   Wound Width (cm) 3.2 cm 11/15/24 1545   Wound Depth (cm) 0.2 cm 11/15/24 1545   Wound Surface Area (cm^2) 11.2 cm^2 11/15/24 1545   Wound Volume (cm^3) 2.24 cm^3 11/15/24 1545   Calculated Wound Volume (cm^3) 2.24 cm^3 11/15/24 1545   Change in Wound Size % 13.51 11/15/24 1545   Drainage Amount Moderate 11/15/24 1545   Drainage Description Serosanguineous;Yellow 11/15/24 1545   Non-staged Wound Description Full thickness 10/17/24 1340   Wound packed? No 09/20/24 1539   Dressing Status Intact 11/15/24 1545             Lab Results   Component Value Date    HGBA1C 6.5 (H) 09/20/2024       Wound Instructions:  Orders Placed This Encounter   Procedures    Wound cleansing and dressings Venous Ulcer Right;Lateral Leg     Wash your hands with soap and water.  Remove old dressing, discard into plastic bag and place in trash.    Cleanse the wound with mild soap and water prior to applying a clean dressing.   Do not use tissue or cotton balls. Do not scrub the wound. Pat dry using gauze.     Shower yes.      Right lower leg wound:   Apply Zinc oxide to skin surrounding the wound.   Apply Polymem Max Silver to the wound bed.    Cover with ABD pad  Secure with rolled gauze and tape  Change dressing three times a week.         Wear Circaids on both lower legs. You can remove for sleeping.   Elevate your leg above the level of your heart whenever sitting.  Continue using lymphedema pumps for one hour twice a day.     Standing Status:   Future     Expiration Date:   11/29/2024    Wound Procedure Treatment Venous Ulcer Right;Lateral Leg     This order was created via procedure documentation       Karen  MD Ernestina

## 2024-11-19 ENCOUNTER — VBI (OUTPATIENT)
Dept: ADMINISTRATIVE | Facility: OTHER | Age: 59
End: 2024-11-19

## 2024-11-20 NOTE — TELEPHONE ENCOUNTER
11/19/24 7:10 PM     Chart reviewed for Hemoglobin A1c was/were submitted to the patient's insurance.     Whitney Dunne MA   [facility]

## 2024-12-02 ENCOUNTER — OFFICE VISIT (OUTPATIENT)
Dept: WOUND CARE | Facility: CLINIC | Age: 59
End: 2024-12-02
Payer: MEDICARE

## 2024-12-02 VITALS
DIASTOLIC BLOOD PRESSURE: 80 MMHG | RESPIRATION RATE: 18 BRPM | SYSTOLIC BLOOD PRESSURE: 118 MMHG | TEMPERATURE: 97.6 F | HEART RATE: 96 BPM

## 2024-12-02 DIAGNOSIS — L97.912 NON-PRESSURE CHRONIC ULCER OF RIGHT LOWER LEG WITH FAT LAYER EXPOSED (HCC): ICD-10-CM

## 2024-12-02 DIAGNOSIS — I89.0 LYMPHEDEMA OF BOTH LOWER EXTREMITIES: ICD-10-CM

## 2024-12-02 DIAGNOSIS — I87.311 CHRONIC VENOUS HYPERTENSION (IDIOPATHIC) WITH ULCER OF RIGHT LOWER EXTREMITY (CODE) (HCC): Primary | ICD-10-CM

## 2024-12-02 PROCEDURE — 99213 OFFICE O/P EST LOW 20 MIN: CPT | Performed by: FAMILY MEDICINE

## 2024-12-02 NOTE — PATIENT INSTRUCTIONS
Orders Placed This Encounter   Procedures    Wound cleansing and dressings Venous Ulcer Right;Lateral Leg     Wash your hands with soap and water.  Remove old dressing, discard into plastic bag and place in trash.    Cleanse the wound with mild soap and water prior to applying a clean dressing.   Do not use tissue or cotton balls. Do not scrub the wound. Pat dry using gauze.     Shower yes.      Right lower leg wound:   Apply Zinc oxide to skin surrounding the wound.   Apply Polymem Max Silver to the wound bed.    Cover with ABD pad  Secure with rolled gauze and tape  Change dressing three times a week.         Wear Circaids on both lower legs. You can remove for sleeping.   Elevate your leg above the level of your heart whenever sitting.  Continue using lymphedema pumps for one hour twice a day.     Standing Status:   Future     Expiration Date:   12/9/2024      Orders Placed This Encounter   Procedures    Wound cleansing and dressings Venous Ulcer Right;Lateral Leg     Wash your hands with soap and water.  Remove old dressing, discard into plastic bag and place in trash.    Cleanse the wound with mild soap and water prior to applying a clean dressing.   Do not use tissue or cotton balls. Do not scrub the wound. Pat dry using gauze.     Shower yes.      Right lower leg wound:   Apply Zinc oxide to skin surrounding the wound.   Apply Polymem Max Silver to the wound bed.    Cover with ABD pad  Secure with rolled gauze and tape  Change dressing three times a week.         Wear Circaids on both lower legs. You can remove for sleeping.   Elevate your leg above the level of your heart whenever sitting.  Continue using lymphedema pumps for one hour twice a day.     Standing Status:   Future     Expiration Date:   12/9/2024

## 2024-12-02 NOTE — PROGRESS NOTES
"Patient ID: Ben Mathur is a 59 y.o. male Date of Birth 1965       Chief Complaint   Patient presents with    Follow Up Wound Care Visit     Follow up visit for wound to right leg.  Pt denies any issues or concerns since last visit.        Allergies:  Gluten meal - food allergy and Clindamycin    Diagnosis:      Diagnosis ICD-10-CM Associated Orders   1. Chronic venous hypertension (idiopathic) with ulcer of right lower extremity (CODE) ([facility])  I87.311 Wound cleansing and dressings Venous Ulcer Right;Lateral Leg      2. Lymphedema of both lower extremities  I89.0 Wound cleansing and dressings Venous Ulcer Right;Lateral Leg      3. Non-pressure chronic ulcer of right lower leg with fat layer exposed ([facility])  L97.912 Wound cleansing and dressings Venous Ulcer Right;Lateral Leg              Assessment & Plan:  Venous stasis ulceration right lower extremity: Though as a whole measurements appear unchanged, clinically skin bridging has increased and overall open areas are smaller.  Wound bed clean and pink without slough.  No acute erythema, lymphangitic streaking, purulent drainage.  No signs of infection.  Continue same treatment with PolyMem Max Ag  Will continue compressive therapy with CircAid's, as patient's Velcro has worn out and it has been a while since CircAid's ordered from wound center, we will remeasure patient and order CircAid's.  Reviewed importance of continued offloading and frequent repositioning being mindful to avoid pressure/friction to these locations which will contribute to wound deterioration  ER precautions again reviewed patient and significant other at bedside, they expressed understanding  See below wound care instructions for details   Follow-up in 2 weeks or sooner if needed    Portions of the record may have been created with voice recognition software. Occasional wrong word or \"sound alike\" substitutions may have occurred due to the inherent limitations of voice recognition " software. Read the chart carefully and recognize, using context, where substitutions have occurred.    Subjective:   10/17/2024 Ben is a 59-year-old male with a past medical history including but not limited to venous insufficiency, type 2 diabetes, neuropathy, hypertension who presents to wound center today for follow-up of right lower extremity ulcer.  His wife is present at bedside. He reports he is now using lymphedema pumps twice a day.  Very compliant with his CircAid's.  Reporting some increased pain at his wound last evening requiring him to take Tylenol and Motrin.  It was resolved when he woke this a.m.  Is unclear as to why he is having the increased pain.  He has not had fever or chills or any other new acute symptoms.    10/31/2024: Hernan presents today for follow-up.  His wife has accompanied him to today's visit.  Reports he feels he is doing well and wound is a bit improved.  Otherwise no new acute complaints.  Denies fever, chills.  Compliant with CircAid's and is using lymphedema pumps twice daily.    11/15/2024: Hernan presents today along with his wife.  He states that he is doing well and no longer has pain.  Denies new acute complaints.  Continues use of lymphedema pumps twice daily and is compliant with CircAid's.  Denies fever, chills.    12/2/2024: Hernan presents today along with his wife for follow-up.  They report that they feel his wound is improving.  They deny new acute complaints.  Denies fever, chills.  Utilizing CircAid's and lymphedema pumps.      The following portions of the patient's history were reviewed and updated as appropriate:   Patient Active Problem List   Diagnosis    Type 2 diabetes mellitus, with long-term current use of insulin (HCC)    Hyperlipidemia    Psoriasis    Venous insufficiency    Gout    Essential hypertension    Gluten intolerance    Diabetic neuropathy (HCC)    Insomnia    Erectile dysfunction    Bilateral leg edema    Elevated CO2 level    DAHIANA (obstructive  sleep apnea)    Morbid obesity    Migraine headache    Onychomycosis    Ulcer of left lower extremity, limited to breakdown of skin (HCC)    Pressure injury of right leg    Metabolic syndrome    Low testosterone in male    Hypothyroidism    Pressure injury of left leg    GERD (gastroesophageal reflux disease)    Chronic pain    Type 2 diabetes mellitus with hyperglycemia (HCC)    Current use of insulin (HCC)    Obesity hypoventilation syndrome (HCC)    Sinus tachycardia    Lymphedema    Diabetic polyneuropathy associated with type 2 diabetes mellitus (HCC)    Type 2 diabetes mellitus with hyperglycemia, with long-term current use of insulin (HCC)     Past Medical History:   Diagnosis Date    Acute kidney injury 10/28/2021    Anemia 09/13/2019    Cellulitis     last assessed 12/10/15    Cellulitis of left lower extremity     Cellulitis of right lower extremity 11/19/2021    Cough 10/20/2019    Diabetes mellitus (HCC)     Disease of thyroid gland     Edema     Elevated liver enzymes     Elevated serum creatinine 11/19/2021    Esophageal reflux     Fungal infection 08/16/2021    Gluten intolerance     Gout     last assessed 09/05/13    Hypercalcemia 6/28/2022    Hyperglycemia     Hypertension     Hyponatremia 09/06/2019    IBS (irritable bowel syndrome)     Insomnia     Obesity     Osteoarthritis of knee     last assessed 02/10/14    Scrotal swelling 05/19/2020    Shortness of breath 05/03/2021    Venous insufficiency     last assessed 08/22/17    Villonodular synovitis of the hand, right     last assessed 11/14/2013     Past Surgical History:   Procedure Laterality Date    INCISION AND DRAINAGE OF WOUND Left 9/6/2019    Procedure: INCISION AND DRAINAGE (I&D) GROIN;  Surgeon: Carlos Ruano DO;  Location: [facility];  Service: General    SKIN BIOPSY      WOUND DEBRIDEMENT Left 9/7/2019    Procedure: EXCISIONAL DEBRIDEMENT;  Surgeon: Carlos Ruano DO;  Location[facility];  Service: General     Family History    Problem Relation Age of Onset    Cancer Mother         gastrc    Colon cancer Father     Heart failure Father       Social History     Socioeconomic History    Marital status: /Civil Union     Spouse name: Not on file    Number of children: Not on file    Years of education: Not on file    Highest education level: Not on file   Occupational History    Not on file   Tobacco Use    Smoking status: Never    Smokeless tobacco: Never   Vaping Use    Vaping status: Never Used   Substance and Sexual Activity    Alcohol use: Not Currently     Alcohol/week: 0.0 standard drinks of alcohol    Drug use: Yes     Types: Marijuana     Comment: medical    Sexual activity: Yes     Partners: Female   Other Topics Concern    Not on file   Social History Narrative    Not on file     Social Drivers of Health     Financial Resource Strain: Not on file   Food Insecurity: No Food Insecurity (9/8/2022)    Hunger Vital Sign     Worried About Running Out of Food in the Last Year: Never true     Ran Out of Food in the Last Year: Never true   Transportation Needs: No Transportation Needs (9/8/2022)    PRAPARE - Transportation     Lack of Transportation (Medical): No     Lack of Transportation (Non-Medical): No   Physical Activity: Not on file   Stress: Not on file   Social Connections: Not on file   Intimate Partner Violence: Not on file   Housing Stability: Unknown (9/8/2022)    Housing Stability Vital Sign     Unable to Pay for Housing in the Last Year: No     Number of Places Lived in the Last Year: Not on file     Unstable Housing in the Last Year: No        Current Outpatient Medications:     acetaminophen (TYLENOL) 325 mg tablet, 325 mg 3 (three) times a day as needed, Disp: , Rfl:     albuterol (PROVENTIL HFA,VENTOLIN HFA) 90 mcg/act inhaler, TAKE 2 PUFFS BY MOUTH EVERY 6 HOURS AS NEEDED FOR WHEEZE, Disp: 8.5 g, Rfl: 0    Blood Glucose Monitoring Suppl (ONETOUCH VERIO) w/Device KIT, Use to test sugar daily (Patient not taking:  Reported on 2/7/2024), Disp: 1 kit, Rfl: 0    Continuous Glucose Sensor (FreeStyle Elvin 3 Sensor) MISC, Replace every 14 days, Disp: 2 each, Rfl: 5    dulaglutide (Trulicity) 4.5 MG/0.5ML injection, Inject 0.5 mL (4.5 mg total) under the skin every 7 days, Disp: 2 mL, Rfl: 5    ezetimibe (ZETIA) 10 mg tablet, Take 1 tablet (10 mg total) by mouth daily, Disp: 90 tablet, Rfl: 1    furosemide (LASIX) 20 mg tablet, TAKE 2 TABLETS EVERY DAY IN THE EARLY MORNING AND 1 TABLET DAILY AFTER LUNCH., Disp: 270 tablet, Rfl: 2    gabapentin (NEURONTIN) 100 mg capsule, TAKE ONE CAPSULE BY MOUTH TWICE A DAY ALONG WITH 300MG CAPSULE, Disp: 60 capsule, Rfl: 5    gabapentin (NEURONTIN) 300 mg capsule, TAKE ONE CAPSULE BY MOUTH TWO TIMES A DAY, Disp: 60 capsule, Rfl: 5    insulin aspart (NovoLOG FlexPen) 100 UNIT/ML injection pen, INJECT 14-22 UNITS WITH MEALS+SCALE up to three times daily, Disp: 60 mL, Rfl: 2    Insulin Glargine Solostar (Lantus SoloStar) 100 UNIT/ML SOPN, INJECT 40 UNITS UNDER THE SKIN DAILY AT BEDTIME, Disp: 45 mL, Rfl: 0    Insulin Pen Needle (BD Pen Needle Ludmila 2nd Gen) 32G X 4 MM MISC, Inject under the skin 4 (four) times a day, Disp: 200 each, Rfl: 0    Insulin Pen Needle (BD Pen Needle Ludmila 2nd Gen) 32G X 4 MM MISC, Inject under the skin 4 (four) times a day, Disp: 200 each, Rfl: 1    Insulin Pen Needle (BD Pen Needle Ludmila 2nd Gen) 32G X 4 MM MISC, Inject under the skin 4 (four) times a day, Disp: 200 each, Rfl: 2    Klayesta powder, APPLY 1 APPLICATION TOPICALLY 2 (TWO) TIMES A DAY TO AFFECTED AREA, Disp: 120 g, Rfl: 3    Lancets (ONETOUCH ULTRASOFT) lancets, Use to test sugar 2 times a day, Disp: 100 each, Rfl: 3    levothyroxine 25 mcg tablet, TAKE 1 TABLET (25 MCG TOTAL) BY MOUTH DAILY IN THE EARLY MORNING, Disp: 90 tablet, Rfl: 1    lisinopril (ZESTRIL) 2.5 mg tablet, Take 1 tablet (2.5 mg total) by mouth daily, Disp: 90 tablet, Rfl: 1    metFORMIN (GLUCOPHAGE) 1000 MG tablet, Take 1 tablet (1,000 mg  total) by mouth 2 (two) times a day, Disp: 180 tablet, Rfl: 1    metoprolol succinate (TOPROL-XL) 25 mg 24 hr tablet, Take 1 tablet (25 mg total) by mouth 2 (two) times a day, Disp: 180 tablet, Rfl: 1    omeprazole (PriLOSEC) 40 MG capsule, TAKE ONE CAPSULE BY MOUTH TWICE A DAY, Disp: 60 capsule, Rfl: 5    OneTouch Verio test strip, USE TO TEST SUGAR 2 TIMES A DAY, Disp: 100 strip, Rfl: 3    psyllium (METAMUCIL) packet, Take 1 packet by mouth daily, Disp: , Rfl: 0    traZODone (DESYREL) 50 mg tablet, Take 1 tablet (50 mg total) by mouth daily at bedtime as needed for sleep, Disp: 100 tablet, Rfl: 0    Trulicity 4.5 MG/0.5ML SOAJ, INJECT 0.5ML (4.5MG TOTAL) UNDER THE SKIN EVERY 7 DAYS, Disp: 2 mL, Rfl: 2    Review of Systems   Constitutional:  Negative for chills, fatigue and fever.   Cardiovascular:  Positive for leg swelling (Lymphedema).   Endocrine: Negative.    Musculoskeletal:  Positive for gait problem (Walker).   Skin:  Positive for wound (RLE).   Allergic/Immunologic: Negative.    Psychiatric/Behavioral:  The patient is not nervous/anxious.        Objective:  /80   Pulse 96   Temp 97.6 °F (36.4 °C) (Tympanic)   Resp 18   Pain Score: 0-No pain     Physical Exam  Vitals and nursing note reviewed.   Constitutional:       General: He is not in acute distress.     Appearance: He is morbidly obese. He is not ill-appearing, toxic-appearing or diaphoretic.      Comments: No acute distress.  S/o at bedside     HENT:      Head: Normocephalic and atraumatic.   Pulmonary:      Effort: Pulmonary effort is normal. No respiratory distress.   Musculoskeletal:      Right lower leg: Edema present.   Skin:     General: Skin is warm and dry.      Findings: Wound present. No erythema.             Comments: 1- Though as a whole measurements appear unchanged, clinically skin bridging has increased and overall open areas are smaller.  Wound bed clean and pink without slough.  No acute erythema, lymphangitic streaking,  purulent drainage.  No signs of infection.   Neurological:      Mental Status: He is alert and oriented to person, place, and time.      Gait: Gait abnormal.   Psychiatric:         Attention and Perception: Attention normal.         Mood and Affect: Mood and affect normal.         Behavior: Behavior normal.         Wound 09/20/24 Venous Ulcer Leg Right;Lateral (Active)   Wound Image    12/02/24 1423   Wound Description Epithelialization;Pink;Granulation tissue 12/02/24 1430   Chelsie-wound Assessment Edema;Hyperpigmented;Scar Tissue 12/02/24 1430   Wound Length (cm) 3.2 cm 12/02/24 1430   Wound Width (cm) 3.2 cm 12/02/24 1430   Wound Depth (cm) 0.1 cm 12/02/24 1430   Wound Surface Area (cm^2) 10.24 cm^2 12/02/24 1430   Wound Volume (cm^3) 1.024 cm^3 12/02/24 1430   Calculated Wound Volume (cm^3) 1.02 cm^3 12/02/24 1430   Change in Wound Size % 60.62 12/02/24 1430   Drainage Amount Small 12/02/24 1430   Drainage Description Serosanguineous;Yellow 12/02/24 1430   Non-staged Wound Description Full thickness 12/02/24 1430   Wound packed? No 09/20/24 1539   Dressing Status Removed 12/02/24 1430             Lab Results   Component Value Date    HGBA1C 6.5 (H) 09/20/2024       Wound Instructions:  Orders Placed This Encounter   Procedures    Wound cleansing and dressings Venous Ulcer Right;Lateral Leg     Wash your hands with soap and water.  Remove old dressing, discard into plastic bag and place in trash.    Cleanse the wound with mild soap and water prior to applying a clean dressing.   Do not use tissue or cotton balls. Do not scrub the wound. Pat dry using gauze.     Shower yes.      Right lower leg wound:   Apply Zinc oxide to skin surrounding the wound.   Apply Polymem Max Silver to the wound bed.    Cover with ABD pad  Secure with rolled gauze and tape  Change dressing three times a week.         Wear Circaids on both lower legs. You can remove for sleeping.   Elevate your leg above the level of your heart whenever  sitting.  Continue using lymphedema pumps for one hour twice a day.     Standing Status:   Future     Expiration Date:   12/9/2024       Karen Do MD

## 2024-12-04 ENCOUNTER — TELEPHONE (OUTPATIENT)
Dept: INTERNAL MEDICINE CLINIC | Facility: CLINIC | Age: 59
End: 2024-12-04

## 2024-12-04 NOTE — TELEPHONE ENCOUNTER
I called and left message for patient to call back to reschedule his appointment to the [facility] address and be seen by Dr. Hayes. I did inform him the earliest available appointment will be on 12/20/24  ----- Message from Yue LOZANO sent at 12/4/2024  1:13 PM EST -----  Regarding: FW: INcorrect office  Can someone please call this patient to have him scheduled at [facility]?    Thank you  ----- Message -----  From: Susie Hayes MD  Sent: 12/1/2024   8:13 PM EST  To: Yue Claudio MA; Maricarmen Killian  Subject: INcorrect office                                 Hello- This is a [facility] patient scheduled to see me in [place] on 12/16. He has only ever been seen at [facility] and is a patient of Dr. Piña's there too. Please help him get scheduled back in the correct office.   Thank you

## 2024-12-10 ENCOUNTER — TELEPHONE (OUTPATIENT)
Dept: INTERNAL MEDICINE CLINIC | Facility: CLINIC | Age: 59
End: 2024-12-10

## 2024-12-10 NOTE — TELEPHONE ENCOUNTER
I called patient and left message concerning Dr. Hayes wanting to see him at the [facility] not at [place]. Patient is to call us back to schedule. I informed him we have availability to see him on 12/20/24.                                                            ----- Message from Sonam ELDER sent at 12/10/2024  1:05 PM EST -----  Regarding: FW: INcorrect office  Can you please call this patient  ----- Message -----  From: Yue Claudio MA  Sent: 12/4/2024   1:14 PM EST  To: Chey Mohan RN; Sonam Herman MA; #  Subject: FW: INcorrect office                             Can someone please call this patient to have him scheduled at [facility]?    Thank you  ----- Message -----  From: Susie Hayes MD  Sent: 12/1/2024   8:13 PM EST  To: Yue Claudio MA; Maricarmen Killian  Subject: INcorrect office                                 Hello- This is a [facility] patient scheduled to see me in [place] on 12/16. He has only ever been seen at [facility] and is a patient of Dr. Piña's there too. Please help him get scheduled back in the correct office.   Thank you

## 2024-12-16 ENCOUNTER — OFFICE VISIT (OUTPATIENT)
Dept: WOUND CARE | Facility: CLINIC | Age: 59
End: 2024-12-16
Payer: MEDICARE

## 2024-12-16 VITALS
SYSTOLIC BLOOD PRESSURE: 128 MMHG | RESPIRATION RATE: 18 BRPM | TEMPERATURE: 98.2 F | HEART RATE: 96 BPM | DIASTOLIC BLOOD PRESSURE: 78 MMHG

## 2024-12-16 DIAGNOSIS — I89.0 LYMPHEDEMA OF BOTH LOWER EXTREMITIES: ICD-10-CM

## 2024-12-16 DIAGNOSIS — I87.311 CHRONIC VENOUS HYPERTENSION (IDIOPATHIC) WITH ULCER OF RIGHT LOWER EXTREMITY (CODE) (HCC): Primary | ICD-10-CM

## 2024-12-16 DIAGNOSIS — L97.912 NON-PRESSURE CHRONIC ULCER OF RIGHT LOWER LEG WITH FAT LAYER EXPOSED (HCC): ICD-10-CM

## 2024-12-16 PROCEDURE — 97597 DBRDMT OPN WND 1ST 20 CM/<: CPT | Performed by: NURSE PRACTITIONER

## 2024-12-16 RX ORDER — LIDOCAINE 40 MG/G
CREAM TOPICAL ONCE
Status: COMPLETED | OUTPATIENT
Start: 2024-12-16 | End: 2024-12-16

## 2024-12-16 RX ADMIN — LIDOCAINE: 40 CREAM TOPICAL at 14:14

## 2024-12-16 NOTE — PROGRESS NOTES
Name: Ben Mathur      : 1965      MRN: [id number]  Encounter Provider: ANN Vidales  Encounter Date: 2024   Encounter department: [facility] WOUND CARE  :  Assessment & Plan  Chronic venous hypertension (idiopathic) with ulcer of right lower extremity (CODE) (HCC)    Orders:    Wound cleansing and dressings Venous Ulcer Right;Lateral Leg; Future    lidocaine (LMX) 4 % cream    Debridement Venous Ulcer Right;Lateral Leg    Wound Procedure Treatment Venous Ulcer Right;Lateral Leg    Non-pressure chronic ulcer of right lower leg with fat layer exposed (HCC)    Orders:    Wound cleansing and dressings Venous Ulcer Right;Lateral Leg; Future    lidocaine (LMX) 4 % cream    Debridement Venous Ulcer Right;Lateral Leg    Wound Procedure Treatment Venous Ulcer Right;Lateral Leg    Lymphedema of both lower extremities    Orders:    Wound cleansing and dressings Venous Ulcer Right;Lateral Leg; Future    lidocaine (LMX) 4 % cream    Wound Procedure Treatment Venous Ulcer Right;Lateral Leg    Plan:  1.  F/U visit.  Wound debrided.  Wound improving and measuring smaller.  Continue current plan of care    2.  A1C results reviewed with the patient today.  Patient is to continue to follow recommendations to maintain tight glycemic control    3.  Patient will follow-up in 2 weeks    History of Present Illness   Chief Complaint   Patient presents with    Follow Up Wound Care Visit     Follow up visit for wound to right leg.  Pt denies any issues or concerns since last visit.    F/u visit for venous ulcer of right lower leg.  No new complaints.  He denies any pain, fevers, or chills.        Objective   /78   Pulse 96   Temp 98.2 °F (36.8 °C) (Tympanic)   Resp 18       Wound 24 Venous Ulcer Leg Right;Lateral (Active)   Wound Image   24 1357   Wound Description Epithelialization;Pink;Granulation tissue 24 1351   Chelsie-wound Assessment Hyperpigmented;Scar  "Tissue 12/16/24 1356   Wound Length (cm) 0.4 cm 12/16/24 1356   Wound Width (cm) 0.9 cm 12/16/24 1356   Wound Depth (cm) 0.1 cm 12/16/24 1356   Wound Surface Area (cm^2) 0.36 cm^2 12/16/24 1356   Wound Volume (cm^3) 0.036 cm^3 12/16/24 1356   Calculated Wound Volume (cm^3) 0.04 cm^3 12/16/24 1356   Change in Wound Size % 98.46 12/16/24 1356   Drainage Amount Moderate 12/16/24 1356   Drainage Description Serosanguineous;Yellow 12/16/24 1356   Non-staged Wound Description Full thickness 12/16/24 1356   Wound packed? No 09/20/24 1539   Dressing Status Removed 12/16/24 1356       Debridement   Wound 09/20/24 Venous Ulcer Leg Right;Lateral    Universal Protocol:  procedure performed by consultantConsent: Written consent obtained.  Consent given by: patient  Time out: Immediately prior to procedure a \"time out\" was called to verify the correct patient, procedure, equipment, support staff and site/side marked as required.  Timeout called at: 12/16/2024 2:53 PM.  Patient identity confirmed: verbally with patient    Debridement Details  Performed by: NP  Debridement type: selective  Pain control: lidocaine 4%      Post-debridement measurements  Length (cm): 0.4  Width (cm): 0.9  Depth (cm): 0.1  Percent debrided: 100%  Surface Area (cm^2): 0.36  Area Debrided (cm^2): 0.36  Volume (cm^3): 0.04    Devitalized tissue debrided: biofilm, fibrin and slough  Instrument(s) utilized: curette  Bleeding: small  Hemostasis obtained with: pressure  Procedural pain (0-10): 0  Post-procedural pain: 0   Response to treatment: procedure was tolerated well               "

## 2024-12-16 NOTE — PATIENT INSTRUCTIONS
Orders Placed This Encounter   Procedures    Wound cleansing and dressings Venous Ulcer Right;Lateral Leg     Wash your hands with soap and water.  Remove old dressing, discard into plastic bag and place in trash.    Cleanse the wound with mild soap and water prior to applying a clean dressing.   Do not use tissue or cotton balls. Do not scrub the wound. Pat dry using gauze.     Shower yes.      Right lower leg wound:   Apply Zinc oxide to skin surrounding the wound.   Apply Polymem Max Silver to the wound bed.    Cover with ABD pad  Secure with rolled gauze and tape  Change dressing three times a week.         Wear Circaids on both lower legs. You can remove for sleeping.   Elevate your leg above the level of your heart whenever sitting.  Continue using lymphedema pumps for one hour twice a day.     Standing Status:   Future     Expiration Date:   12/23/2024

## 2024-12-16 NOTE — PROGRESS NOTES
Wound Procedure Treatment Venous Ulcer Right;Lateral Leg    Performed by: Marycruz Pompa RN  Authorized by: ANN Vidales    Associated wounds:   Wound 09/20/24 Venous Ulcer Leg Right;Lateral  Wound cleansed with:  Wound aggrssively cleansed with NSS and gauze  Applied to periwound:  Zinc oxide paste  Applied primary dressing:  Polymem foam and Silver  Applied secondary dressing:  ABD  Dressing secured with:  Ciara and Tape  Comments:  Patient to use Circ-aids

## 2024-12-20 ENCOUNTER — CONSULT (OUTPATIENT)
Dept: INTERNAL MEDICINE CLINIC | Facility: CLINIC | Age: 59
End: 2024-12-20
Payer: MEDICARE

## 2024-12-20 VITALS
OXYGEN SATURATION: 98 % | RESPIRATION RATE: 18 BRPM | TEMPERATURE: 98 F | SYSTOLIC BLOOD PRESSURE: 124 MMHG | DIASTOLIC BLOOD PRESSURE: 78 MMHG | HEART RATE: 94 BPM

## 2024-12-20 DIAGNOSIS — E03.9 HYPOTHYROIDISM, UNSPECIFIED TYPE: ICD-10-CM

## 2024-12-20 DIAGNOSIS — Z79.4 CURRENT USE OF INSULIN (HCC): ICD-10-CM

## 2024-12-20 DIAGNOSIS — Z79.4 TYPE 2 DIABETES MELLITUS WITH HYPERGLYCEMIA, WITH LONG-TERM CURRENT USE OF INSULIN (HCC): ICD-10-CM

## 2024-12-20 DIAGNOSIS — E11.65 TYPE 2 DIABETES MELLITUS WITH HYPERGLYCEMIA, WITH LONG-TERM CURRENT USE OF INSULIN (HCC): ICD-10-CM

## 2024-12-20 PROCEDURE — 99214 OFFICE O/P EST MOD 30 MIN: CPT | Performed by: INTERNAL MEDICINE

## 2024-12-20 RX ORDER — HYDROCHLOROTHIAZIDE 12.5 MG/1
CAPSULE ORAL
Qty: 2 EACH | Refills: 5 | Status: SHIPPED | OUTPATIENT
Start: 2024-12-20

## 2024-12-20 NOTE — ASSESSMENT & PLAN NOTE
Continue current regimen  Lantus 40 units qHS  Novolog 10 units TID with meals  Orders:    Dulaglutide 4.5 MG/0.5ML SOAJ; Use 4.5mg once per week    Continuous Glucose Sensor (FreeStyle Elvin 3 Plus Sensor) MISC; Use every 14 days

## 2024-12-20 NOTE — PROGRESS NOTES
Name: Ben Mathur      : 1965      MRN: [id number]  Encounter Provider: Daniel Montoya Resident  Encounter Date: 2024   Encounter department: [facility] INTERNAL MEDICINE [facility]  :  Assessment & Plan  Type 2 diabetes mellitus with hyperglycemia, with long-term current use of insulin (HCC)    Lab Results   Component Value Date    HGBA1C 6.5 (H) 2024     Reviewed Elvin 3 data. Time in range 71%  Followed with Podiatry 2 months ago.   Due for eye exam.    Plan  Will attempt to send in Freestyle Elvin 3 Plus. If not available, will check if patient's insurance covers Dexcom  Trulicity refilled  Follow-up in 4 months. Repeat HbA1c ordered.  Follow-up with Ophthalmology for diabetic eye exam.    Orders:    Dulaglutide 4.5 MG/0.5ML SOAJ; Use 4.5mg once per week    Hemoglobin A1C; Future    Comprehensive metabolic panel; Future    Continuous Glucose Sensor (FreeStyle Elvin 3 Plus Sensor) MISC; Use every 14 days    Current use of insulin (HCC)  Continue current regimen  Lantus 40 units qHS  Novolog 10 units TID with meals  Orders:    Dulaglutide 4.5 MG/0.5ML SOAJ; Use 4.5mg once per week    Continuous Glucose Sensor (FreeStyle Elvin 3 Plus Sensor) MISC; Use every 14 days    Hypothyroidism, unspecified type  Asymptomatic  Continue levothyroxine 25 mcg daily  Repeat TSH in 3 months  Orders:    TSH, 3rd generation with Free T4 reflex; Future        History of Present Illness   HPI  Ben Mathur is a 59 y.o. male who presents for follow-up of T2DM and hypothyroidism. He was last seen in office in 2024. He is administering Trulicity 4.5 mg weekly but states that he has ran out, and authorization is required again. He continues to take metformin 1000 mg BID and Lantus 40 units nightly. For the last month or so, he has only been requiring Novolog 10 units at mealtimes. Patient monitors his blood sugars with the Elvin 3, and he is down to his last one. When emptying to refill,  "his pharmacy stated that they were waiting for \"the new version\" due to the recent recall for faulty sensors. Reviewed blood sugar data on patient's romain; in the last 90 days, his time in range has been 71%. He has not had any hypoglycemic events. In the last 30 days, average BG was 145.  Patient has been trying to modify his diet to remain under 1500 sp daily.  He states that he has started working out 15 minutes daily as tolerable with resistance band and isometric exercises.  He last saw podiatry 2 months ago, and is due for an eye exam. He is following with wound care for a RLE pressure ulcer. Patient declined weight-taking in office today, stating that he will wait until upcoming appointment with PCP. Patient denies any headaches, polyuria, polydipsia, visual disturbances, fatigue, anxiety, palpitations. No other complaints at this time.        Review of Systems   Constitutional:  Negative for chills and fever.   HENT:  Negative for ear pain and sore throat.    Eyes:  Negative for pain and visual disturbance.   Respiratory:  Negative for cough and shortness of breath.    Cardiovascular:  Negative for chest pain and palpitations.   Gastrointestinal:  Negative for abdominal pain and vomiting.   Genitourinary:  Negative for dysuria and hematuria.   Musculoskeletal:  Negative for arthralgias and back pain.   Skin:  Negative for color change and rash.   Neurological:  Negative for seizures and syncope.   All other systems reviewed and are negative.         Objective   /78 (BP Location: Left arm, Patient Position: Sitting, Cuff Size: Extra-Large)   Pulse 94   Temp 98 °F (36.7 °C) (Tympanic)   Resp 18   SpO2 98%      Physical Exam  Vitals and nursing note reviewed.   Constitutional:       General: He is not in acute distress.     Appearance: He is well-developed. He is obese.   HENT:      Head: Normocephalic and atraumatic.   Neck:      Thyroid: No thyromegaly or thyroid tenderness.   Cardiovascular:      " Rate and Rhythm: Normal rate and regular rhythm.      Heart sounds: No murmur heard.  Pulmonary:      Effort: Pulmonary effort is normal. No respiratory distress.      Breath sounds: Normal breath sounds.   Abdominal:      Palpations: Abdomen is soft.      Tenderness: There is no abdominal tenderness.   Musculoskeletal:         General: No swelling.      Cervical back: Neck supple.   Skin:     General: Skin is warm and dry.      Capillary Refill: Capillary refill takes less than 2 seconds.   Neurological:      Mental Status: He is alert.   Psychiatric:         Mood and Affect: Mood normal.

## 2024-12-20 NOTE — ASSESSMENT & PLAN NOTE
Asymptomatic  Continue levothyroxine 25 mcg daily  Repeat TSH in 3 months  Orders:    TSH, 3rd generation with Free T4 reflex; Future

## 2024-12-20 NOTE — ASSESSMENT & PLAN NOTE
Lab Results   Component Value Date    HGBA1C 6.5 (H) 09/20/2024     Reviewed Elvin 3 data. Time in range 71%  Followed with Podiatry 2 months ago.   Due for eye exam.    Plan  Will attempt to send in Freestyle Elvin 3 Plus. If not available, will check if patient's insurance covers Dexcom  Trulicity refilled  Follow-up in 4 months. Repeat HbA1c ordered.  Follow-up with Ophthalmology for diabetic eye exam.    Orders:    Dulaglutide 4.5 MG/0.5ML SOAJ; Use 4.5mg once per week    Hemoglobin A1C; Future    Comprehensive metabolic panel; Future    Continuous Glucose Sensor (FreeStyle Elvin 3 Plus Sensor) MISC; Use every 14 days

## 2024-12-24 DIAGNOSIS — Z79.4 TYPE 2 DIABETES MELLITUS WITH HYPERGLYCEMIA, WITH LONG-TERM CURRENT USE OF INSULIN (HCC): Primary | ICD-10-CM

## 2024-12-24 DIAGNOSIS — G47.33 OSA (OBSTRUCTIVE SLEEP APNEA): ICD-10-CM

## 2024-12-24 DIAGNOSIS — E11.65 TYPE 2 DIABETES MELLITUS WITH HYPERGLYCEMIA, WITH LONG-TERM CURRENT USE OF INSULIN (HCC): Primary | ICD-10-CM

## 2024-12-24 DIAGNOSIS — E66.01 MORBID OBESITY (HCC): ICD-10-CM

## 2024-12-30 ENCOUNTER — OFFICE VISIT (OUTPATIENT)
Dept: WOUND CARE | Facility: CLINIC | Age: 59
End: 2024-12-30
Payer: MEDICARE

## 2024-12-30 VITALS
RESPIRATION RATE: 20 BRPM | DIASTOLIC BLOOD PRESSURE: 80 MMHG | HEART RATE: 104 BPM | SYSTOLIC BLOOD PRESSURE: 102 MMHG | TEMPERATURE: 98.8 F

## 2024-12-30 DIAGNOSIS — I87.311 CHRONIC VENOUS HYPERTENSION (IDIOPATHIC) WITH ULCER OF RIGHT LOWER EXTREMITY (CODE) (HCC): Primary | ICD-10-CM

## 2024-12-30 DIAGNOSIS — Z79.4 TYPE 2 DIABETES MELLITUS WITH OTHER SKIN ULCER, WITH LONG-TERM CURRENT USE OF INSULIN (HCC): ICD-10-CM

## 2024-12-30 DIAGNOSIS — L97.912 NON-PRESSURE CHRONIC ULCER OF RIGHT LOWER LEG WITH FAT LAYER EXPOSED (HCC): ICD-10-CM

## 2024-12-30 DIAGNOSIS — I89.0 LYMPHEDEMA OF BOTH LOWER EXTREMITIES: ICD-10-CM

## 2024-12-30 DIAGNOSIS — E11.622 TYPE 2 DIABETES MELLITUS WITH OTHER SKIN ULCER, WITH LONG-TERM CURRENT USE OF INSULIN (HCC): ICD-10-CM

## 2024-12-30 PROCEDURE — 99212 OFFICE O/P EST SF 10 MIN: CPT | Performed by: NURSE PRACTITIONER

## 2024-12-30 PROCEDURE — 99213 OFFICE O/P EST LOW 20 MIN: CPT | Performed by: NURSE PRACTITIONER

## 2024-12-30 NOTE — PATIENT INSTRUCTIONS
Orders Placed This Encounter   Procedures    Wound cleansing and dressings Venous Ulcer Right;Lateral Leg     The right lower leg wound is healed. No dressing needed.       Shower, yes. Do not scrub the healed area. Pat dry.     Moisturize your legs daily.       Elevate your legs as much as possible to reduce swelling.       Continue using lymphedema pumps twice a day for one hour at a time.       Patient discharged from the wound center.     Standing Status:   Future     Expiration Date:   1/6/2025

## 2024-12-30 NOTE — ASSESSMENT & PLAN NOTE
Lab Results   Component Value Date    HGBA1C 6.5 (H) 09/20/2024       Orders:    Wound cleansing and dressings Venous Ulcer Right;Lateral Leg; Future  Plan:  1.  F/U visit.  Wound epithelialized.  Counseled patient to moisturize skin daily with lotion and to continue to wear his CircAid wrap daily for compression.  Patient verbalized agreement understanding  2.  A1C results reviewed with the patient today.  Patient maintaining tight glycemic control  3.  Patient is discharged in the wound center today.  He may follow-up as needed

## 2024-12-30 NOTE — PROGRESS NOTES
Name: Ben Mathur      : 1965      MRN: [id number]  Encounter Provider: ANN Vidales  Encounter Date: 2024   Encounter department: [facility] WOUND CARE  :  Assessment & Plan  Chronic venous hypertension (idiopathic) with ulcer of right lower extremity (CODE) (Formerly [facility])    Orders:    Wound cleansing and dressings Venous Ulcer Right;Lateral Leg; Future    Non-pressure chronic ulcer of right lower leg with fat layer exposed (Formerly [facility])    Orders:    Wound cleansing and dressings Venous Ulcer Right;Lateral Leg; Future    Lymphedema of both lower extremities    Orders:    Wound cleansing and dressings Venous Ulcer Right;Lateral Leg; Future    Type 2 diabetes mellitus with other skin ulcer, with long-term current use of insulin (Former[facility])    Lab Results   Component Value Date    HGBA1C 6.5 (H) 2024       Orders:    Wound cleansing and dressings Venous Ulcer Right;Lateral Leg; Future  Plan:  1.  F/U visit.  Wound epithelialized.  Counseled patient to moisturize skin daily with lotion and to continue to wear his CircAid wrap daily for compression.  Patient verbalized agreement understanding  2.  A1C results reviewed with the patient today.  Patient maintaining tight glycemic control  3.  Patient is discharged in the wound center today.  He may follow-up as needed      History of Present Illness   Chief Complaint   Patient presents with    Follow Up Wound Care Visit     RLE wound   F/u visit for venous ulcer of right lower leg.  No new complaints.  He denies any pain, fevers, or chills.        Objective   /80   Pulse 104   Temp 98.8 °F (37.1 °C)   Resp 20     Physical Exam  Vitals and nursing note reviewed.   Constitutional:       General: He is not in acute distress.     Appearance: Normal appearance. He is obese.   HENT:      Head: Normocephalic and atraumatic.   Eyes:      General:         Right eye: No discharge.         Left eye: No discharge.   Pulmonary:       Effort: Pulmonary effort is normal. No respiratory distress.   Musculoskeletal:         General: Normal range of motion.      Cervical back: Normal range of motion and neck supple. No rigidity.      Right lower leg: No edema.      Left lower leg: No edema.   Skin:     General: Skin is warm and dry.      Findings: No erythema or wound.          Neurological:      General: No focal deficit present.      Mental Status: He is alert and oriented to person, place, and time. Mental status is at baseline.   Psychiatric:         Mood and Affect: Mood normal.         Behavior: Behavior normal.         Thought Content: Thought content normal.         Judgment: Judgment normal.

## 2024-12-31 ENCOUNTER — TELEPHONE (OUTPATIENT)
Age: 59
End: 2024-12-31

## 2024-12-31 ENCOUNTER — NURSE TRIAGE (OUTPATIENT)
Age: 59
End: 2024-12-31

## 2024-12-31 NOTE — TELEPHONE ENCOUNTER
PA for Mounjaro 7.5mg SUBMITTED to [employer]    via    [x]CMM-KEY: [id number]  []Surescripts-Case ID #   []Availity-Auth ID # NDC #   []Faxed to plan   []Other website   []Phone call Case ID #     [x]PA sent as URGENT    All office notes, labs and other pertaining documents and studies sent. Clinical questions answered. Awaiting determination from insurance company.     Turnaround time for your insurance to make a decision on your Prior Authorization can take 7-21 business days.

## 2024-12-31 NOTE — TELEPHONE ENCOUNTER
"Pt sent a [employer] message in regard to two recent episodes of SVT with HR of 200 bpm and yesterday a low SBP of 80. Pt feels well today. Upon interview it was found that the patient was not taking the metoprolol 25mg twice daily as prescribed by Dr. mendes, only in the morning with his Lisinopril. It was also found that when these SVT episodes occur the pt may be dehydrated and tired, with lack of sleep. Pt's HR and BP today are 114/88 and 86 and he feels well.     Advised pt to start taking his Metoprolol as prescribed by Dr. Mendes (25mg BID) and to continue to monitor his BP. Also discussed the Furosemide he is prescribed by another provider which he is taking for lymphedema. PT is taking 40mg in the am (not the extra 20mg in the afternoon). He had gone down to 20mg at one point and developed wounds from the swelling.      Advised patient will inform provider of symptoms and call patient back with further recommendations.         Answer Assessment - Initial Assessment Questions  1. DESCRIPTION: \"Please describe your heart rate or heartbeat that you are having\" (e.g., fast/slow, regular/irregular, skipped or extra beats, \"palpitations\")      HR up to 200 on two occasions recently   3. DURATION: \"How long does it last\" (e.g., seconds, minutes, hours)      minutes  4. PATTERN \"Does it come and go, or has it been constant since it started?\"  \"Does it get worse with exertion?\"   \"Are you feeling it now?\"      With interview - pt may have been dehydrated and fatigued; pt is feeling well today. HR 86 and /88  6. HEART RATE: \"Can you tell me your heart rate?\" \"How many beats in 15 seconds?\"  Note: Not all patients can do this.        86  7. RECURRENT SYMPTOM: \"Have you ever had this before?\" If Yes, ask: \"When was the last time?\" and \"What happened that time?\"       Yes  8. CAUSE: \"What do you think is causing the palpitations?\"      Possibly dehydration and fatigue   9. CARDIAC HISTORY: \"Do you have any history of " "heart disease?\" (e.g., heart attack, angina, bypass surgery, angioplasty, arrhythmia)       HTN - happened in the past - Consult with Dr. Ibrahim in Feb, 2024   10. OTHER SYMPTOMS: \"Do you have any other symptoms?\" (e.g., dizziness, chest pain, sweating, difficulty breathing)        Dizziness when happening    Protocols used: Heart Rate and Heartbeat Questions-Adult-[place]    "

## 2025-01-07 ENCOUNTER — OFFICE VISIT (OUTPATIENT)
Dept: INTERNAL MEDICINE CLINIC | Facility: CLINIC | Age: 60
End: 2025-01-07
Payer: MEDICARE

## 2025-01-07 VITALS
TEMPERATURE: 99.3 F | OXYGEN SATURATION: 98 % | HEART RATE: 120 BPM | WEIGHT: 315 LBS | SYSTOLIC BLOOD PRESSURE: 110 MMHG | BODY MASS INDEX: 53.78 KG/M2 | RESPIRATION RATE: 16 BRPM | HEIGHT: 64 IN | DIASTOLIC BLOOD PRESSURE: 68 MMHG

## 2025-01-07 DIAGNOSIS — E03.9 ACQUIRED HYPOTHYROIDISM: ICD-10-CM

## 2025-01-07 DIAGNOSIS — Z12.5 SCREENING FOR PROSTATE CANCER: ICD-10-CM

## 2025-01-07 DIAGNOSIS — K76.0 METABOLIC DYSFUNCTION-ASSOCIATED STEATOTIC LIVER DISEASE (MASLD): ICD-10-CM

## 2025-01-07 DIAGNOSIS — Z12.11 COLON CANCER SCREENING: ICD-10-CM

## 2025-01-07 DIAGNOSIS — E78.2 MIXED HYPERLIPIDEMIA: ICD-10-CM

## 2025-01-07 DIAGNOSIS — R23.8 SKIN IRRITATION: ICD-10-CM

## 2025-01-07 DIAGNOSIS — I10 ESSENTIAL HYPERTENSION: ICD-10-CM

## 2025-01-07 DIAGNOSIS — Z00.00 ANNUAL PHYSICAL EXAM: ICD-10-CM

## 2025-01-07 DIAGNOSIS — K21.9 GASTROESOPHAGEAL REFLUX DISEASE WITHOUT ESOPHAGITIS: ICD-10-CM

## 2025-01-07 DIAGNOSIS — Z79.4 TYPE 2 DIABETES MELLITUS WITH DIABETIC MICROALBUMINURIA, WITH LONG-TERM CURRENT USE OF INSULIN (HCC): ICD-10-CM

## 2025-01-07 DIAGNOSIS — E88.810 METABOLIC SYNDROME: Primary | ICD-10-CM

## 2025-01-07 DIAGNOSIS — R80.9 TYPE 2 DIABETES MELLITUS WITH DIABETIC MICROALBUMINURIA, WITH LONG-TERM CURRENT USE OF INSULIN (HCC): ICD-10-CM

## 2025-01-07 DIAGNOSIS — E11.29 TYPE 2 DIABETES MELLITUS WITH DIABETIC MICROALBUMINURIA, WITH LONG-TERM CURRENT USE OF INSULIN (HCC): ICD-10-CM

## 2025-01-07 PROBLEM — L89.893 PRESSURE INJURY OF RIGHT LEG, STAGE 3 (HCC): Status: RESOLVED | Noted: 2022-02-17 | Resolved: 2025-01-07

## 2025-01-07 PROBLEM — G89.29 CHRONIC PAIN: Status: RESOLVED | Noted: 2022-09-12 | Resolved: 2025-01-07

## 2025-01-07 PROBLEM — L89.893 PRESSURE INJURY OF LEFT LEG, STAGE 3 (HCC): Status: RESOLVED | Noted: 2022-09-06 | Resolved: 2025-01-07

## 2025-01-07 PROCEDURE — 90480 ADMN SARSCOV2 VAC 1/ONLY CMP: CPT | Performed by: INTERNAL MEDICINE

## 2025-01-07 PROCEDURE — 99386 PREV VISIT NEW AGE 40-64: CPT | Performed by: INTERNAL MEDICINE

## 2025-01-07 PROCEDURE — 91320 SARSCV2 VAC 30MCG TRS-SUC IM: CPT | Performed by: INTERNAL MEDICINE

## 2025-01-07 PROCEDURE — 99214 OFFICE O/P EST MOD 30 MIN: CPT | Performed by: INTERNAL MEDICINE

## 2025-01-07 RX ORDER — ZINC OXIDE 20 %
OINTMENT (GRAM) TOPICAL AS NEEDED
Qty: 85 G | Refills: 1 | Status: SHIPPED | OUTPATIENT
Start: 2025-01-07

## 2025-01-07 NOTE — ASSESSMENT & PLAN NOTE
Diabetes, FIB-4 score 1.37, screening for MASLD started.   Orders:    CBC and differential; Future    Comprehensive metabolic panel; Future    Iron Panel (Includes Ferritin, Iron Sat%, Iron, and TIBC); Future    Alpha-1-antitrypsin; Future    Hepatitis B surface antigen; Future    Hepatitis B surface antibody; Future    Hepatitis B core antibody, total; Future    Hepatitis C antibody; Future    US right upper quadrant; Future

## 2025-01-07 NOTE — PROGRESS NOTES
Adult Annual Physical  Name: Ben Mathur      : 1965      MRN: [id number]  Encounter Provider: Bernarda Piña MD  Encounter Date: 2025   Encounter department: [facility] INTERNAL MEDICINE [place]    Assessment & Plan  Metabolic syndrome  Lifestyle modification, on GLP1 RA       Essential hypertension  Well controlled, few episodes of low BP since started on metoprolol.       Mixed hyperlipidemia  On zetia, did not want to use statin due to ED.        Metabolic dysfunction-associated steatotic liver disease (MASLD)  Diabetes, FIB-4 score 1.37, screening for MASLD started.   Orders:  •  CBC and differential; Future  •  Comprehensive metabolic panel; Future  •  Iron Panel (Includes Ferritin, Iron Sat%, Iron, and TIBC); Future  •  Alpha-1-antitrypsin; Future  •  Hepatitis B surface antigen; Future  •  Hepatitis B surface antibody; Future  •  Hepatitis B core antibody, total; Future  •  Hepatitis C antibody; Future  •  US right upper quadrant; Future    Gastroesophageal reflux disease without esophagitis  Well controlled on pantoprazole.       Skin irritation    Orders:  •  zinc oxide 20 % ointment; Apply topically as needed for irritation    Acquired hypothyroidism    Orders:  •  TSH, 3rd generation with Free T4 reflex; Future    Annual physical exam  Preventive care done in detail as noted below.  Agreeable with cologuard and PSA.  COVID vaccine today and Flu in 2 weeks.    Orders:  •  COVID-19 Pfizer mRNA vaccine 12 yr and older (Comirnaty pre-filled syringe)    Screening for prostate cancer    Orders:  •  PSA, Total Screen; Future    Colon cancer screening    Orders:  •  Cologuard    Type 2 diabetes mellitus with diabetic microalbuminuria, with long-term current use of insulin (HCC)    Lab Results   Component Value Date    HGBA1C 6.5 (H) 2024       Orders:  •  Albumin / creatinine urine ratio; Future    Immunizations and preventive care screenings were discussed with patient today.  Appropriate education was printed on patient's after visit summary.    Discussed risks and benefits of prostate cancer screening. We discussed the controversial history of PSA screening for prostate cancer in the [place] as well as the risk of over detection and over treatment of prostate cancer by way of PSA screening.  The patient understands that PSA blood testing is an imperfect way to screen for prostate cancer and that elevated PSA levels in the blood may also be caused by infection, inflammation, prostatic trauma or manipulation, urological procedures, or by benign prostatic enlargement.    The role of the digital rectal examination in prostate cancer screening was also discussed and I discussed with him that there is large interobserver variability in the findings of digital rectal examination.    Counseling:  Dental Health: discussed importance of regular tooth brushing, flossing, and dental visits.  Exercise: the importance of regular exercise/physical activity was discussed. Recommend exercise 3-5 times per week for at least 30 minutes.          History of Present Illness     Adult Annual Physical:  Patient presents for annual physical. Patient is here for physical and follow up. Last seen 1 year ago, now follows with Cardiology, Endo, wound care, podiatrist and Pneumologist. Overall doing well. Some episodes of low blood pressure, he was having elevated heart rate Holter monitor was done and he had episodes of tachyarrhythmia and he was recommended start metoprolol, since then noticed low blood pressure but did not have any episode of syncope.  He has been following up with wound care, lesions are healed and he states he will be discharged.  He does have a area of skin irritation on the back of his left thigh that has been painful when he states down.  He was prescribed Mounjaro to help with his weight, diabetes and DAHIANA still waiting on this.  He stated he has been exercising at home daily  with  Elastic bands, and kinetic exercise, he is also has restricted his diet of 1500 sp a day.  He is really motivated to lose weight and be able to be more active.  He is going to have teeth removed soon.   States he is still having episodes of diarrhea, states that he was diagnosed with lactose intolerance about 20 years ago but then he thought he improved, he does not drink milk but he eats dairy  and milk derivate.  He has restricted his diet to gluten-free thinking that he had gluten intolerance but he still having episodes of diarrhea.  .     Diet and Physical Activity:  - Diet/Nutrition: consuming 3-5 servings of fruits/vegetables daily.  - Exercise: 5-7 times a week on average and strength training exercises.    Depression Screening:  - PHQ-2 Score: 0    General Health:  - Sleep: sleeps well.  - Hearing: normal hearing bilateral ears.  - Vision: most recent eye exam > 1 year ago.  - Dental: regular dental visits.    Advanced Care Planning:  - Has an advanced directive?: no    - Has a durable medical POA?: no    - ACP document given to patient?: no      Review of Systems   Constitutional:  Negative for appetite change and fatigue.   Eyes:  Negative for visual disturbance.   Respiratory:  Negative for cough, chest tightness, shortness of breath and wheezing.    Cardiovascular:  Negative for chest pain, palpitations and leg swelling.   Gastrointestinal:  Negative for abdominal pain, nausea and vomiting.   Genitourinary:  Negative for difficulty urinating and frequency.   Musculoskeletal:  Negative for arthralgias and joint swelling.   Skin:  Negative for rash.   Neurological:  Positive for dizziness (when BP is low). Negative for headaches.   Psychiatric/Behavioral:  Negative for confusion and sleep disturbance.      Current Outpatient Medications on File Prior to Visit   Medication Sig Dispense Refill   • acetaminophen (TYLENOL) 325 mg tablet 325 mg 3 (three) times a day as needed     • albuterol (PROVENTIL  HFA,VENTOLIN HFA) 90 mcg/act inhaler TAKE 2 PUFFS BY MOUTH EVERY 6 HOURS AS NEEDED FOR WHEEZE 8.5 g 0   • Blood Glucose Monitoring Suppl (ONETOUCH VERIO) w/Device KIT Use to test sugar daily 1 kit 0   • Continuous Glucose Sensor (FreeStyle Elvin 3 Plus Sensor) MISC Use every 14 days 2 each 5   • Continuous Glucose Sensor (FreeStyle Elvin 3 Sensor) MISC Replace every 14 days 2 each 5   • Dulaglutide 4.5 MG/0.5ML SOAJ Use 4.5mg once per week 2 mL 5   • ezetimibe (ZETIA) 10 mg tablet Take 1 tablet (10 mg total) by mouth daily 90 tablet 1   • furosemide (LASIX) 20 mg tablet TAKE 2 TABLETS EVERY DAY IN THE EARLY MORNING AND 1 TABLET DAILY AFTER LUNCH. 270 tablet 2   • gabapentin (NEURONTIN) 100 mg capsule TAKE ONE CAPSULE BY MOUTH TWICE A DAY ALONG WITH 300MG CAPSULE 60 capsule 5   • gabapentin (NEURONTIN) 300 mg capsule TAKE ONE CAPSULE BY MOUTH TWO TIMES A DAY 60 capsule 5   • insulin aspart (NovoLOG FlexPen) 100 UNIT/ML injection pen INJECT 14-22 UNITS WITH MEALS+SCALE up to three times daily 60 mL 2   • Insulin Glargine Solostar (Lantus SoloStar) 100 UNIT/ML SOPN INJECT 40 UNITS UNDER THE SKIN DAILY AT BEDTIME 45 mL 0   • Insulin Pen Needle (BD Pen Needle Ludmila 2nd Gen) 32G X 4 MM MISC Inject under the skin 4 (four) times a day 200 each 0   • Insulin Pen Needle (BD Pen Needle Ludmila 2nd Gen) 32G X 4 MM MISC Inject under the skin 4 (four) times a day 200 each 1   • Insulin Pen Needle (BD Pen Needle Ludmila 2nd Gen) 32G X 4 MM MISC Inject under the skin 4 (four) times a day 200 each 2   • Klayesta powder APPLY 1 APPLICATION TOPICALLY 2 (TWO) TIMES A DAY TO AFFECTED AREA 120 g 3   • Lancets (ONETOUCH ULTRASOFT) lancets Use to test sugar 2 times a day 100 each 3   • levothyroxine 25 mcg tablet TAKE 1 TABLET (25 MCG TOTAL) BY MOUTH DAILY IN THE EARLY MORNING 90 tablet 1   • lisinopril (ZESTRIL) 2.5 mg tablet Take 1 tablet (2.5 mg total) by mouth daily 90 tablet 1   • metFORMIN (GLUCOPHAGE) 1000 MG tablet Take 1 tablet (1,000 mg  "total) by mouth 2 (two) times a day 180 tablet 1   • metoprolol succinate (TOPROL-XL) 25 mg 24 hr tablet Take 1 tablet (25 mg total) by mouth 2 (two) times a day 180 tablet 1   • omeprazole (PriLOSEC) 40 MG capsule TAKE ONE CAPSULE BY MOUTH TWICE A DAY 60 capsule 5   • OneTouch Verio test strip USE TO TEST SUGAR 2 TIMES A  strip 3   • psyllium (METAMUCIL) packet Take 1 packet by mouth daily  0   • Tirzepatide 7.5 MG/0.5ML SOAJ Inject 7.5mg once per week 2 mL 2   • traZODone (DESYREL) 50 mg tablet Take 1 tablet (50 mg total) by mouth daily at bedtime as needed for sleep 100 tablet 0     No current facility-administered medications on file prior to visit.      Social History     Tobacco Use   • Smoking status: Never   • Smokeless tobacco: Never   Vaping Use   • Vaping status: Never Used   Substance and Sexual Activity   • Alcohol use: Not Currently     Alcohol/week: 0.0 standard drinks of alcohol   • Drug use: Yes     Types: Marijuana     Comment: medical   • Sexual activity: Yes     Partners: Female       Objective   /68 (BP Location: Left arm, Patient Position: Sitting, Cuff Size: Large)   Pulse (!) 120   Temp 99.3 °F (37.4 °C) (Tympanic)   Resp 16   Ht 5' 4\" (1.626 m)   Wt (!) 197 kg (435 lb)   SpO2 98%   BMI 74.67 kg/m²     Physical Exam  Constitutional:       General: He is not in acute distress.     Appearance: He is obese.   HENT:      Head: Normocephalic and atraumatic.   Cardiovascular:      Rate and Rhythm: Tachycardia present.      Heart sounds: No murmur heard.  Pulmonary:      Effort: Pulmonary effort is normal. No respiratory distress.      Breath sounds: Normal breath sounds.   Abdominal:      Palpations: Abdomen is soft.      Tenderness: There is no abdominal tenderness.   Musculoskeletal:      Right lower leg: Edema (non-pitting, chronic) present.      Left lower leg: Edema (non-pitting, chronic) present.   Skin:     Comments: Linear area measuring about 1 cm in width and 5 cm in " length in the posterior aspect of his left side covered with bandage that appears to be a scaring area from prior skin tear or bullae with no open wounds   Neurological:      Mental Status: He is alert and oriented to person, place, and time.      Sensory: No sensory deficit.   Psychiatric:         Mood and Affect: Mood normal.         Thought Content: Thought content normal.         Judgment: Judgment normal.

## 2025-01-07 NOTE — ASSESSMENT & PLAN NOTE
Lab Results   Component Value Date    HGBA1C 6.5 (H) 09/20/2024       Orders:    Albumin / creatinine urine ratio; Future

## 2025-01-07 NOTE — PATIENT INSTRUCTIONS
"Patient Education     Routine physical for adults   The Basics   Written by the doctors and editors at [facility]   What is a physical? -- A physical is a routine visit, or \"check-up,\" with your doctor. You might also hear it called a \"wellness visit\" or \"preventive visit.\"  During each visit, the doctor will:   Ask about your physical and mental health   Ask about your habits, behaviors, and lifestyle   Do an exam   Give you vaccines if needed   Talk to you about any medicines you take   Give advice about your health   Answer your questions  Getting regular check-ups is an important part of taking care of your health. It can help your doctor find and treat any problems you have. But it's also important for preventing health problems.  A routine physical is different from a \"sick visit.\" A sick visit is when you see a doctor because of a health concern or problem. Since physicals are scheduled ahead of time, you can think about what you want to ask the doctor.  How often should I get a physical? -- It depends on your age and health. In general, for people age 21 years and older:   If you are younger than 50 years, you might be able to get a physical every 3 years.   If you are 50 years or older, your doctor might recommend a physical every year.  If you have an ongoing health condition, like diabetes or high blood pressure, your doctor will probably want to see you more often.  What happens during a physical? -- In general, each visit will include:   Physical exam - The doctor or nurse will check your height, weight, heart rate, and blood pressure. They will also look at your eyes and ears. They will ask about how you are feeling and whether you have any symptoms that bother you.   Medicines - It's a good idea to bring a list of all the medicines you take to each doctor visit. Your doctor will talk to you about your medicines and answer any questions. Tell them if you are having any side effects that bother you. You " "should also tell them if you are having trouble paying for any of your medicines.   Habits and behaviors - This includes:   Your diet   Your exercise habits   Whether you smoke, drink alcohol, or use drugs   Whether you are sexually active   Whether you feel safe at home  Your doctor will talk to you about things you can do to improve your health and lower your risk of health problems. They will also offer help and support. For example, if you want to quit smoking, they can give you advice and might prescribe medicines. If you want to improve your diet or get more physical activity, they can help you with this, too.   Lab tests, if needed - The tests you get will depend on your age and situation. For example, your doctor might want to check your:   Cholesterol   Blood sugar   Iron level   Vaccines - The recommended vaccines will depend on your age, health, and what vaccines you already had. Vaccines are very important because they can prevent certain serious or deadly infections.   Discussion of screening - \"Screening\" means checking for diseases or other health problems before they cause symptoms. Your doctor can recommend screening based on your age, risk, and preferences. This might include tests to check for:   Cancer, such as breast, prostate, cervical, ovarian, colorectal, prostate, lung, or skin cancer   Sexually transmitted infections, such as chlamydia and gonorrhea   Mental health conditions like depression and anxiety  Your doctor will talk to you about the different types of screening tests. They can help you decide which screenings to have. They can also explain what the results might mean.   Answering questions - The physical is a good time to ask the doctor or nurse questions about your health. If needed, they can refer you to other doctors or specialists, too.  Adults older than 65 years often need other care, too. As you get older, your doctor will talk to you about:   How to prevent falling at " home   Hearing or vision tests   Memory testing   How to take your medicines safely   Making sure that you have the help and support you need at home  All topics are updated as new evidence becomes available and our peer review process is complete.  This topic retrieved from [employer] on: May 02, 2024.  Topic 304211 Version 1.0  Release: 32.4.3 - C32.122  © 2024 UpToDate, Inc. and/or its affiliates. All rights reserved.  Consumer Information Use and Disclaimer   Disclaimer: This generalized information is a limited summary of diagnosis, treatment, and/or medication information. It is not meant to be comprehensive and should be used as a tool to help the user understand and/or assess potential diagnostic and treatment options. It does NOT include all information about conditions, treatments, medications, side effects, or risks that may apply to a specific patient. It is not intended to be medical advice or a substitute for the medical advice, diagnosis, or treatment of a health care provider based on the health care provider's examination and assessment of a patient's specific and unique circumstances. Patients must speak with a health care provider for complete information about their health, medical questions, and treatment options, including any risks or benefits regarding use of medications. This information does not endorse any treatments or medications as safe, effective, or approved for treating a specific patient. UpToDate, Inc. and its affiliates disclaim any warranty or liability relating to this information or the use thereof.The use of this information is governed by the Terms of Use, available at https://www.[employer] Medicalu[employer].com/en/know/clinical-effectiveness-terms. 2024© UpToDate, Inc. and its affiliates and/or licensors. All rights reserved.  Copyright   © 2024 UpToDate, Inc. and/or its affiliates. All rights reserved.

## 2025-01-14 ENCOUNTER — HOSPITAL ENCOUNTER (OUTPATIENT)
Dept: ULTRASOUND IMAGING | Facility: HOSPITAL | Age: 60
Discharge: HOME | End: 2025-01-14
Attending: INTERNAL MEDICINE
Payer: MEDICARE

## 2025-01-14 DIAGNOSIS — K76.0 METABOLIC DYSFUNCTION-ASSOCIATED STEATOTIC LIVER DISEASE (MASLD): ICD-10-CM

## 2025-01-14 PROCEDURE — 76705 ECHO EXAM OF ABDOMEN: CPT

## 2025-01-16 DIAGNOSIS — E11.65 TYPE 2 DIABETES MELLITUS WITH HYPERGLYCEMIA, WITH LONG-TERM CURRENT USE OF INSULIN (HCC): Primary | ICD-10-CM

## 2025-01-16 DIAGNOSIS — Z79.4 TYPE 2 DIABETES MELLITUS WITH HYPERGLYCEMIA, WITH LONG-TERM CURRENT USE OF INSULIN (HCC): Primary | ICD-10-CM

## 2025-01-17 ENCOUNTER — RESULTS FOLLOW-UP (OUTPATIENT)
Dept: INTERNAL MEDICINE CLINIC | Facility: CLINIC | Age: 60
End: 2025-01-17

## 2025-01-17 LAB
DME PARACHUTE DELIVERY DATE ACTUAL: NORMAL
DME PARACHUTE DELIVERY DATE REQUESTED: NORMAL
DME PARACHUTE ITEM DESCRIPTION: NORMAL
DME PARACHUTE ITEM DESCRIPTION: NORMAL
DME PARACHUTE ORDER STATUS: NORMAL
DME PARACHUTE SUPPLIER NAME: NORMAL
DME PARACHUTE SUPPLIER PHONE: NORMAL

## 2025-01-22 ENCOUNTER — IMMUNIZATIONS (OUTPATIENT)
Dept: INTERNAL MEDICINE CLINIC | Facility: CLINIC | Age: 60
End: 2025-01-22
Payer: MEDICARE

## 2025-01-22 DIAGNOSIS — Z23 ENCOUNTER FOR IMMUNIZATION: Primary | ICD-10-CM

## 2025-01-22 PROCEDURE — 90471 IMMUNIZATION ADMIN: CPT

## 2025-01-22 PROCEDURE — 90673 RIV3 VACCINE NO PRESERV IM: CPT

## 2025-01-24 ENCOUNTER — TELEPHONE (OUTPATIENT)
Age: 60
End: 2025-01-24

## 2025-01-24 NOTE — TELEPHONE ENCOUNTER
Andreia from [facility] calling in. Prior authorization is needed for Ozempic. Please advise, thank you

## 2025-01-24 NOTE — TELEPHONE ENCOUNTER
PA for Ozempic) 4 mg/3 mL SUBMITTED to [facility]    via    [x]CMM-KEY: [id number]      [x]PA sent as URGENT    All office notes, labs and other pertaining documents and studies sent. Clinical questions answered. Awaiting determination from insurance company.     Turnaround time for your insurance to make a decision on your Prior Authorization can take 7-21 business days.

## 2025-01-28 NOTE — TELEPHONE ENCOUNTER
[facility] calling in asking for updated on Ozempic PA. Relayed determination has not been received yet. Stated she will call back in a few days  [email or web address]

## 2025-01-31 NOTE — TELEPHONE ENCOUNTER
PA for Ozempic) 4 mg/3 mL  NOT REQUIRED     Reason           Patient advised by          [] MyChart Message  [] Phone call   []LMOM  []L/M to call office as no active Communication consent on file  []Unable to leave detailed message as VM not approved on Communication consent       Pharmacy advised by    [x]Fax  []Phone call

## 2025-02-07 DIAGNOSIS — E11.65 TYPE 2 DIABETES MELLITUS WITH HYPERGLYCEMIA, WITH LONG-TERM CURRENT USE OF INSULIN (HCC): ICD-10-CM

## 2025-02-07 DIAGNOSIS — Z79.4 TYPE 2 DIABETES MELLITUS WITH HYPERGLYCEMIA, WITH LONG-TERM CURRENT USE OF INSULIN (HCC): ICD-10-CM

## 2025-02-12 LAB — COLOGUARD RESULT REPORTABLE: NEGATIVE

## 2025-02-24 ENCOUNTER — TELEPHONE (OUTPATIENT)
Dept: LAB | Facility: HOSPITAL | Age: 60
End: 2025-02-24

## 2025-02-25 ENCOUNTER — TELEPHONE (OUTPATIENT)
Dept: LAB | Facility: HOSPITAL | Age: 60
End: 2025-02-25

## 2025-02-25 DIAGNOSIS — E78.00 PURE HYPERCHOLESTEROLEMIA: ICD-10-CM

## 2025-02-26 RX ORDER — EZETIMIBE 10 MG/1
10 TABLET ORAL DAILY
Qty: 90 TABLET | Refills: 1 | Status: SHIPPED | OUTPATIENT
Start: 2025-02-26

## 2025-03-06 DIAGNOSIS — Z79.4 TYPE 2 DIABETES MELLITUS WITH HYPERGLYCEMIA, WITH LONG-TERM CURRENT USE OF INSULIN (HCC): Primary | ICD-10-CM

## 2025-03-06 DIAGNOSIS — E11.65 TYPE 2 DIABETES MELLITUS WITH HYPERGLYCEMIA, WITH LONG-TERM CURRENT USE OF INSULIN (HCC): Primary | ICD-10-CM

## 2025-03-06 RX ORDER — DULAGLUTIDE 4.5 MG/.5ML
INJECTION, SOLUTION SUBCUTANEOUS
Qty: 2 ML | Refills: 2 | OUTPATIENT
Start: 2025-03-06

## 2025-03-13 ENCOUNTER — APPOINTMENT (OUTPATIENT)
Dept: LAB | Facility: HOSPITAL | Age: 60
End: 2025-03-13
Payer: MEDICARE

## 2025-03-13 DIAGNOSIS — E11.29 TYPE 2 DIABETES MELLITUS WITH DIABETIC MICROALBUMINURIA, WITH LONG-TERM CURRENT USE OF INSULIN (HCC): ICD-10-CM

## 2025-03-13 DIAGNOSIS — Z79.4 TYPE 2 DIABETES MELLITUS WITH DIABETIC MICROALBUMINURIA, WITH LONG-TERM CURRENT USE OF INSULIN (HCC): ICD-10-CM

## 2025-03-13 DIAGNOSIS — Z79.4 TYPE 2 DIABETES MELLITUS WITH HYPERGLYCEMIA, WITH LONG-TERM CURRENT USE OF INSULIN (HCC): ICD-10-CM

## 2025-03-13 DIAGNOSIS — E03.9 ACQUIRED HYPOTHYROIDISM: ICD-10-CM

## 2025-03-13 DIAGNOSIS — K76.0 METABOLIC DYSFUNCTION-ASSOCIATED STEATOTIC LIVER DISEASE (MASLD): ICD-10-CM

## 2025-03-13 DIAGNOSIS — Z12.5 SCREENING FOR PROSTATE CANCER: ICD-10-CM

## 2025-03-13 DIAGNOSIS — E11.65 TYPE 2 DIABETES MELLITUS WITH HYPERGLYCEMIA, WITH LONG-TERM CURRENT USE OF INSULIN (HCC): ICD-10-CM

## 2025-03-13 DIAGNOSIS — R80.9 TYPE 2 DIABETES MELLITUS WITH DIABETIC MICROALBUMINURIA, WITH LONG-TERM CURRENT USE OF INSULIN (HCC): ICD-10-CM

## 2025-03-13 LAB
ALBUMIN SERPL BCG-MCNC: 4.1 G/DL (ref 3.5–5)
ALP SERPL-CCNC: 57 U/L (ref 34–104)
ALT SERPL W P-5'-P-CCNC: 47 U/L (ref 7–52)
ANION GAP SERPL CALCULATED.3IONS-SCNC: 11 MMOL/L (ref 4–13)
AST SERPL W P-5'-P-CCNC: 40 U/L (ref 13–39)
BASOPHILS # BLD AUTO: 0.06 THOUSANDS/ÂΜL (ref 0–0.1)
BASOPHILS NFR BLD AUTO: 1 % (ref 0–1)
BILIRUB SERPL-MCNC: 0.57 MG/DL (ref 0.2–1)
BUN SERPL-MCNC: 17 MG/DL (ref 5–25)
CALCIUM SERPL-MCNC: 9.8 MG/DL (ref 8.4–10.2)
CHLORIDE SERPL-SCNC: 101 MMOL/L (ref 96–108)
CO2 SERPL-SCNC: 25 MMOL/L (ref 21–32)
CREAT SERPL-MCNC: 1.26 MG/DL (ref 0.6–1.3)
EOSINOPHIL # BLD AUTO: 0.18 THOUSAND/ÂΜL (ref 0–0.61)
EOSINOPHIL NFR BLD AUTO: 3 % (ref 0–6)
ERYTHROCYTE [DISTWIDTH] IN BLOOD BY AUTOMATED COUNT: 15.2 % (ref 11.6–15.1)
EST. AVERAGE GLUCOSE BLD GHB EST-MCNC: 143 MG/DL
FERRITIN SERPL-MCNC: 22 NG/ML (ref 24–336)
GFR SERPL CREATININE-BSD FRML MDRD: 62 ML/MIN/1.73SQ M
GLUCOSE P FAST SERPL-MCNC: 134 MG/DL (ref 65–99)
HBA1C MFR BLD: 6.6 %
HCT VFR BLD AUTO: 47.3 % (ref 36.5–49.3)
HGB BLD-MCNC: 15.1 G/DL (ref 12–17)
IMM GRANULOCYTES # BLD AUTO: 0.01 THOUSAND/UL (ref 0–0.2)
IMM GRANULOCYTES NFR BLD AUTO: 0 % (ref 0–2)
IRON SATN MFR SERPL: 12 % (ref 15–50)
IRON SERPL-MCNC: 64 UG/DL (ref 50–212)
LYMPHOCYTES # BLD AUTO: 1.84 THOUSANDS/ÂΜL (ref 0.6–4.47)
LYMPHOCYTES NFR BLD AUTO: 29 % (ref 14–44)
MCH RBC QN AUTO: 28.3 PG (ref 26.8–34.3)
MCHC RBC AUTO-ENTMCNC: 31.9 G/DL (ref 31.4–37.4)
MCV RBC AUTO: 89 FL (ref 82–98)
MONOCYTES # BLD AUTO: 0.54 THOUSAND/ÂΜL (ref 0.17–1.22)
MONOCYTES NFR BLD AUTO: 8 % (ref 4–12)
NEUTROPHILS # BLD AUTO: 3.82 THOUSANDS/ÂΜL (ref 1.85–7.62)
NEUTS SEG NFR BLD AUTO: 59 % (ref 43–75)
NRBC BLD AUTO-RTO: 0 /100 WBCS
PLATELET # BLD AUTO: 197 THOUSANDS/UL (ref 149–390)
PMV BLD AUTO: 9.4 FL (ref 8.9–12.7)
POTASSIUM SERPL-SCNC: 4.8 MMOL/L (ref 3.5–5.3)
PROT SERPL-MCNC: 7.6 G/DL (ref 6.4–8.4)
PSA SERPL-MCNC: 0.81 NG/ML (ref 0–4)
RBC # BLD AUTO: 5.34 MILLION/UL (ref 3.88–5.62)
SODIUM SERPL-SCNC: 137 MMOL/L (ref 135–147)
TIBC SERPL-MCNC: 541.8 UG/DL (ref 250–450)
TRANSFERRIN SERPL-MCNC: 387 MG/DL (ref 203–362)
TSH SERPL DL<=0.05 MIU/L-ACNC: 2.25 UIU/ML (ref 0.45–4.5)
UIBC SERPL-MCNC: 478 UG/DL (ref 155–355)
WBC # BLD AUTO: 6.45 THOUSAND/UL (ref 4.31–10.16)

## 2025-03-13 PROCEDURE — 83550 IRON BINDING TEST: CPT

## 2025-03-13 PROCEDURE — 83540 ASSAY OF IRON: CPT

## 2025-03-13 PROCEDURE — 36415 COLL VENOUS BLD VENIPUNCTURE: CPT

## 2025-03-13 PROCEDURE — 86704 HEP B CORE ANTIBODY TOTAL: CPT

## 2025-03-13 PROCEDURE — G0103 PSA SCREENING: HCPCS

## 2025-03-13 PROCEDURE — 86706 HEP B SURFACE ANTIBODY: CPT

## 2025-03-13 PROCEDURE — 83036 HEMOGLOBIN GLYCOSYLATED A1C: CPT

## 2025-03-13 PROCEDURE — 84443 ASSAY THYROID STIM HORMONE: CPT

## 2025-03-13 PROCEDURE — 80053 COMPREHEN METABOLIC PANEL: CPT

## 2025-03-13 PROCEDURE — 87340 HEPATITIS B SURFACE AG IA: CPT

## 2025-03-13 PROCEDURE — 85025 COMPLETE CBC W/AUTO DIFF WBC: CPT

## 2025-03-13 PROCEDURE — 86803 HEPATITIS C AB TEST: CPT

## 2025-03-13 PROCEDURE — 82728 ASSAY OF FERRITIN: CPT

## 2025-03-13 PROCEDURE — 82103 ALPHA-1-ANTITRYPSIN TOTAL: CPT

## 2025-03-14 ENCOUNTER — RESULTS FOLLOW-UP (OUTPATIENT)
Dept: INTERNAL MEDICINE CLINIC | Facility: CLINIC | Age: 60
End: 2025-03-14

## 2025-03-14 DIAGNOSIS — Z79.4 TYPE 2 DIABETES MELLITUS WITH HYPERGLYCEMIA, WITH LONG-TERM CURRENT USE OF INSULIN (HCC): ICD-10-CM

## 2025-03-14 DIAGNOSIS — E11.65 TYPE 2 DIABETES MELLITUS WITH HYPERGLYCEMIA, WITH LONG-TERM CURRENT USE OF INSULIN (HCC): ICD-10-CM

## 2025-03-14 LAB
A1AT SERPL-MCNC: 156 MG/DL (ref 101–187)
HBV CORE AB SER QL: REACTIVE
HBV SURFACE AB SER-ACNC: 8.97 MIU/ML
HBV SURFACE AG SER QL: NORMAL
HCV AB SER QL: NORMAL

## 2025-03-14 RX ORDER — INSULIN GLARGINE 100 [IU]/ML
INJECTION, SOLUTION SUBCUTANEOUS
Qty: 45 ML | Refills: 0 | OUTPATIENT
Start: 2025-03-14

## 2025-03-14 RX ORDER — INSULIN GLARGINE 100 [IU]/ML
INJECTION, SOLUTION SUBCUTANEOUS
Qty: 45 ML | Refills: 3 | Status: SHIPPED | OUTPATIENT
Start: 2025-03-14

## 2025-04-02 ENCOUNTER — OFFICE VISIT (OUTPATIENT)
Dept: PODIATRY | Facility: CLINIC | Age: 60
End: 2025-04-02
Payer: MEDICARE

## 2025-04-02 VITALS — WEIGHT: 315 LBS | HEIGHT: 64 IN | BODY MASS INDEX: 53.78 KG/M2 | HEART RATE: 104 BPM | OXYGEN SATURATION: 99 %

## 2025-04-02 DIAGNOSIS — E11.42 DIABETIC POLYNEUROPATHY ASSOCIATED WITH TYPE 2 DIABETES MELLITUS (HCC): ICD-10-CM

## 2025-04-02 DIAGNOSIS — Z79.4 TYPE 2 DIABETES MELLITUS WITH HYPERGLYCEMIA, WITH LONG-TERM CURRENT USE OF INSULIN (HCC): ICD-10-CM

## 2025-04-02 DIAGNOSIS — B35.1 ONYCHOMYCOSIS: Primary | ICD-10-CM

## 2025-04-02 DIAGNOSIS — E11.65 TYPE 2 DIABETES MELLITUS WITH HYPERGLYCEMIA, WITH LONG-TERM CURRENT USE OF INSULIN (HCC): ICD-10-CM

## 2025-04-02 DIAGNOSIS — I89.0 LYMPHEDEMA: ICD-10-CM

## 2025-04-02 PROCEDURE — RECHECK: Performed by: PODIATRIST

## 2025-04-02 PROCEDURE — 11721 DEBRIDE NAIL 6 OR MORE: CPT | Performed by: PODIATRIST

## 2025-04-02 NOTE — PROGRESS NOTES
:  Assessment & Plan  Onychomycosis         Diabetic polyneuropathy associated with type 2 diabetes mellitus (HCC)    Lab Results   Component Value Date    HGBA1C 6.6 (H) 03/13/2025            Type 2 diabetes mellitus with hyperglycemia, with long-term current use of insulin ([facility])    Lab Results   Component Value Date    HGBA1C 6.6 (H) 03/13/2025            Lymphedema           The patient's clinical examination today is significant for brittle, thickened and dystrophic pedal nail plates with discoloration and subungual debris consistent with onychomycosis x10.  The interdigital spaces are clear without maceration.  Pedal pulses are nonpalpable bilaterally secondary to bilateral lower extremity edema.  Vibratory sensation is absent on the right and diminished on the left.  Epicritic sensation is intact bilaterally.     The patient's pedal nail plates are sharply debrided with a sterile nail clipper to patient tolerance x10.  The nail was then mechanically reduced in thickness and girth utilizing a rotary bur.  The callus lesions were debrided with a rotary bur to normal thickness.  He is on gabapentin for peripheral neuropathy.       Recommend follow-up in 6 months for routine diabetic foot care.      History of Present Illness     Ben Mathur is a 59 y.o. male   The patient resents today with a chief complaint of painful elongated pedal nail plates and calluses on his feet.  He does have a history of diabetes and peripheral neuropathy.  He has a history of lymphedema for which he uses compression therapy.  He does note difficulty in finding shoes that fit.              PAST MEDICAL HISTORY:  Past Medical History:   Diagnosis Date    Acute kidney injury 10/28/2021    Anemia 09/13/2019    Cellulitis     last assessed 12/10/15    Cellulitis of left lower extremity     Cellulitis of right lower extremity 11/19/2021    Cough 10/20/2019    Diabetes mellitus (HCC)     Disease of thyroid gland     Edema     Elevated  liver enzymes     Elevated serum creatinine 11/19/2021    Esophageal reflux     Fungal infection 08/16/2021    Gluten intolerance     Gout     last assessed 09/05/13    Hypercalcemia 06/28/2022    Hyperglycemia     Hypertension     Hyponatremia 09/06/2019    IBS (irritable bowel syndrome)     Insomnia     Obesity     Osteoarthritis of knee     last assessed 02/10/14    Pressure injury of left leg 09/06/2022    Pressure injury of right leg 02/17/2022    Scrotal swelling 05/19/2020    Shortness of breath 05/03/2021    Venous insufficiency     last assessed 08/22/17    Villonodular synovitis of the hand, right     last assessed 11/14/2013       PAST SURGICAL HISTORY:  Past Surgical History:   Procedure Laterality Date    INCISION AND DRAINAGE OF WOUND Left 9/6/2019    Procedure: INCISION AND DRAINAGE (I&D) GROIN;  Surgeon: Carlos Ruano DO;  Location: [facility];  Service: General    SKIN BIOPSY      WOUND DEBRIDEMENT Left 9/7/2019    Procedure: EXCISIONAL DEBRIDEMENT;  Surgeon: Carlos Ruano DO;  Location: [facility];  Service: General        ALLERGIES:  Gluten meal - food allergy and Clindamycin    MEDICATIONS:  Current Outpatient Medications   Medication Sig Dispense Refill    acetaminophen (TYLENOL) 325 mg tablet 325 mg 3 (three) times a day as needed      albuterol (PROVENTIL HFA,VENTOLIN HFA) 90 mcg/act inhaler TAKE 2 PUFFS BY MOUTH EVERY 6 HOURS AS NEEDED FOR WHEEZE 8.5 g 0    Blood Glucose Monitoring Suppl (ONETOUCH VERIO) w/Device KIT Use to test sugar daily 1 kit 0    Continuous Glucose Sensor (FreeStyle Elvin 3 Plus Sensor) MISC Use every 14 days 2 each 5    Continuous Glucose Sensor (FreeStyle Elvin 3 Sensor) MISC Replace every 14 days 2 each 5    ezetimibe (ZETIA) 10 mg tablet TAKE ONE TABLET BY MOUTH EVERY DAY 90 tablet 1    furosemide (LASIX) 20 mg tablet TAKE 2 TABLETS EVERY DAY IN THE EARLY MORNING AND 1 TABLET DAILY AFTER LUNCH. 270 tablet 2    gabapentin (NEURONTIN) 100 mg capsule TAKE ONE  CAPSULE BY MOUTH TWICE A DAY ALONG WITH 300MG CAPSULE 60 capsule 5    gabapentin (NEURONTIN) 300 mg capsule TAKE ONE CAPSULE BY MOUTH TWO TIMES A DAY 60 capsule 5    Insulin Pen Needle (BD Pen Needle Ludmila 2nd Gen) 32G X 4 MM MISC Inject under the skin 4 (four) times a day 200 each 0    Insulin Pen Needle (BD Pen Needle Ludmila 2nd Gen) 32G X 4 MM MISC Inject under the skin 4 (four) times a day 200 each 1    Insulin Pen Needle (BD Pen Needle Ludmila 2nd Gen) 32G X 4 MM MISC Inject under the skin 4 (four) times a day 200 each 2    Klayesta powder APPLY 1 APPLICATION TOPICALLY 2 (TWO) TIMES A DAY TO AFFECTED AREA 120 g 3    Lancets (ONETOUCH ULTRASOFT) lancets Use to test sugar 2 times a day 100 each 3    lisinopril (ZESTRIL) 2.5 mg tablet Take 1 tablet (2.5 mg total) by mouth daily 90 tablet 1    metFORMIN (GLUCOPHAGE) 1000 MG tablet TAKE ONE TABLET BY MOUTH TWICE A  tablet 1    metoprolol succinate (TOPROL-XL) 25 mg 24 hr tablet Take 1 tablet (25 mg total) by mouth 2 (two) times a day 180 tablet 1    omeprazole (PriLOSEC) 40 MG capsule TAKE ONE CAPSULE BY MOUTH TWICE A DAY 60 capsule 5    OneTouch Verio test strip USE TO TEST SUGAR 2 TIMES A  strip 3    psyllium (METAMUCIL) packet Take 1 packet by mouth daily  0    semaglutide, 2 mg/dose, (Ozempic, 2 MG/DOSE,) 8 mg/ mL injection pen Inject 0.75 mL (2 mg total) under the skin every 7 days 3 mL 11    traZODone (DESYREL) 50 mg tablet Take 1 tablet (50 mg total) by mouth daily at bedtime as needed for sleep 100 tablet 0    zinc oxide 20 % ointment Apply topically as needed for irritation 85 g 1    insulin aspart (NovoLOG FlexPen) 100 UNIT/ML injection pen INJECT 12-20 UNITS WITH MEALS+SCALE up to three times daily 15 mL 5    Insulin Glargine Solostar (Lantus SoloStar) 100 UNIT/ML SOPN INJECT 30 UNITS UNDER THE SKIN DAILY AT BEDTIME      levothyroxine 25 mcg tablet Take 1 tablet (25 mcg total) by mouth daily in the early morning 90 tablet 2     No current  facility-administered medications for this visit.       SOCIAL HISTORY:  Social History     Socioeconomic History    Marital status: /Civil Union     Spouse name: None    Number of children: None    Years of education: None    Highest education level: None   Occupational History    None   Tobacco Use    Smoking status: Never    Smokeless tobacco: Never   Vaping Use    Vaping status: Never Used   Substance and Sexual Activity    Alcohol use: Not Currently     Alcohol/week: 0.0 standard drinks of alcohol    Drug use: Yes     Types: Marijuana     Comment: medical    Sexual activity: Yes     Partners: Female   Other Topics Concern    None   Social History Narrative    None     Social Drivers of Health     Financial Resource Strain: Not on file   Food Insecurity: No Food Insecurity (9/8/2022)    Hunger Vital Sign     Worried About Running Out of Food in the Last Year: Never true     Ran Out of Food in the Last Year: Never true   Transportation Needs: No Transportation Needs (9/8/2022)    PRAPARE - Transportation     Lack of Transportation (Medical): No     Lack of Transportation (Non-Medical): No   Physical Activity: Sufficiently Active (1/7/2025)    Exercise Vital Sign     Days of Exercise per Week: 7 days     Minutes of Exercise per Session: 60 min   Stress: No Stress Concern Present (1/7/2025)    [employer] - Occupational Stress Questionnaire     Feeling of Stress : Not at all   Social Connections: Not on file   Intimate Partner Violence: Not on file   Housing Stability: Unknown (9/8/2022)    Housing Stability Vital Sign     Unable to Pay for Housing in the Last Year: No     Number of Places Lived in the Last Year: Not on file     Unstable Housing in the Last Year: No      Review of Systems   Constitutional: Negative.    HENT: Negative.     Eyes: Negative.    Respiratory: Negative.     Cardiovascular: Negative.    Endocrine: Negative.    Musculoskeletal: Negative.    Neurological:  "Negative.    Hematological: Negative.    Psychiatric/Behavioral: Negative.       Objective   Pulse 104   Ht 5' 4\" (1.626 m)   Wt (!) 201 kg (443 lb)   SpO2 99%   BMI 76.04 kg/m²      Physical Exam  Constitutional:       Appearance: Normal appearance.   HENT:      Head: Normocephalic and atraumatic.   Cardiovascular:      Pulses:           Dorsalis pedis pulses are 0 on the right side and 0 on the left side.        Posterior tibial pulses are 0 on the right side and 0 on the left side.   Pulmonary:      Effort: Pulmonary effort is normal.   Feet:      Right foot:      Toenail Condition: Right toenails are abnormally thick and long. Fungal disease present.     Left foot:      Toenail Condition: Left toenails are abnormally thick and long. Fungal disease present.     Comments: The patient's clinical examination today is significant for brittle, thickened and dystrophic pedal nail plates with discoloration and subungual debris consistent with onychomycosis x10.  The interdigital spaces are clear without maceration.  Pedal pulses are nonpalpable bilaterally secondary to bilateral lower extremity edema.  Vibratory sensation is absent on the right and diminished on the left.  Epicritic sensation is intact bilaterally.     Skin:     General: Skin is warm and dry.      Capillary Refill: Capillary refill takes less than 2 seconds.   Neurological:      General: No focal deficit present.      Mental Status: He is alert and oriented to person, place, and time.   Psychiatric:         Mood and Affect: Mood normal.           "

## 2025-04-04 ENCOUNTER — CONSULT (OUTPATIENT)
Dept: INTERNAL MEDICINE CLINIC | Facility: CLINIC | Age: 60
End: 2025-04-04
Payer: MEDICARE

## 2025-04-04 VITALS
SYSTOLIC BLOOD PRESSURE: 138 MMHG | OXYGEN SATURATION: 96 % | WEIGHT: 315 LBS | HEIGHT: 64 IN | HEART RATE: 99 BPM | RESPIRATION RATE: 16 BRPM | TEMPERATURE: 99.1 F | BODY MASS INDEX: 53.78 KG/M2 | DIASTOLIC BLOOD PRESSURE: 88 MMHG

## 2025-04-04 DIAGNOSIS — E11.65 TYPE 2 DIABETES MELLITUS WITH HYPERGLYCEMIA, WITH LONG-TERM CURRENT USE OF INSULIN (HCC): ICD-10-CM

## 2025-04-04 DIAGNOSIS — Z79.4 CURRENT USE OF INSULIN (HCC): Primary | ICD-10-CM

## 2025-04-04 DIAGNOSIS — Z79.4 TYPE 2 DIABETES MELLITUS WITH HYPERGLYCEMIA, WITH LONG-TERM CURRENT USE OF INSULIN (HCC): ICD-10-CM

## 2025-04-04 DIAGNOSIS — E66.01 MORBID OBESITY (HCC): ICD-10-CM

## 2025-04-04 DIAGNOSIS — E03.9 HYPOTHYROIDISM, UNSPECIFIED TYPE: ICD-10-CM

## 2025-04-04 PROCEDURE — 99214 OFFICE O/P EST MOD 30 MIN: CPT | Performed by: INTERNAL MEDICINE

## 2025-04-04 RX ORDER — LEVOTHYROXINE SODIUM 25 UG/1
25 TABLET ORAL
Qty: 90 TABLET | Refills: 2 | Status: SHIPPED | OUTPATIENT
Start: 2025-04-04

## 2025-04-04 RX ORDER — INSULIN GLARGINE 100 [IU]/ML
INJECTION, SOLUTION SUBCUTANEOUS
Status: SHIPPED
Start: 2025-04-04

## 2025-04-04 RX ORDER — INSULIN ASPART 100 [IU]/ML
INJECTION, SOLUTION INTRAVENOUS; SUBCUTANEOUS
Qty: 15 ML | Refills: 5 | Status: SHIPPED | OUTPATIENT
Start: 2025-04-04

## 2025-04-04 NOTE — PROGRESS NOTES
Est Patient Progress Note      Chief Complaint   Patient presents with   • Diabetes      Referring Provider  No referring provider defined for this encounter.     History of Present Illness:   Ben Mathur is a 59 y.o. male with a history of type 2 diabetes with long term use of insulin seen in f/u. He was last in the office in Dec 2024. His wife accompanies him for support  He was seeing endocrine practices at [facility], but transferred to the [place] office in 2022.      Insurance now covers Ozempic, and he is using this with good effect  He has a hx of T2DM, now controlled.     His history includes necrotizing fasciitis (Anna's) of the proximal thigh complicated by sepsis, ARDS, and prolonged hospital course in the fall of 2019. I&D were performed of the left thigh and perineum. This has now healed. Diaebtes was uncontrolled at that time.   Of note, he currently is under treatment for lower extremity wounds and lymphedema. Current wound is healing. He does use gabapentin for neuropathic symptoms, but is unsure if this works well.      Current regimen: Lantus 30units once daily, Novolog 12units (+ correctional scale), metformin 1g twice daily, and   Ozempic 2mg weekly        Checks Bgs with a Elvin 3, and this is reviewed. His time in range is 93%  Has       Takes levothyroxine for 25mcg daily without issues. He had an ED visit for palpitations but TSH is normal  On labs, there was a hypercalcemia, which has normalized     Diet: in Dec 2024, he started a 1500cal diet and exercise plan with plan to increase activity. Likely this will be water exercises this summer.   Pos: Dr. Fernandez, seen this week Has lymphedema       Patient Active Problem List   Diagnosis   • Type 2 diabetes mellitus, with long-term current use of insulin (HCC)   • Hyperlipidemia   • Psoriasis   • Venous insufficiency   • Gout   • Essential hypertension   • Gluten intolerance   • Diabetic neuropathy (HCC)   • Insomnia    • Erectile dysfunction   • Bilateral leg edema   • Elevated CO2 level   • DAHIANA (obstructive sleep apnea)   • Morbid obesity   • Migraine headache   • Onychomycosis   • Ulcer of left lower extremity, limited to breakdown of skin (HCC)   • Metabolic syndrome   • Low testosterone in male   • Hypothyroidism   • GERD (gastroesophageal reflux disease)   • Type 2 diabetes mellitus with hyperglycemia (HCC)   • Current use of insulin (HCC)   • Obesity hypoventilation syndrome (HCC)   • Sinus tachycardia   • Lymphedema   • Diabetic polyneuropathy associated with type 2 diabetes mellitus (HCC)   • Type 2 diabetes mellitus with hyperglycemia, with long-term current use of insulin (HCC)   • Skin irritation   • Metabolic dysfunction-associated steatotic liver disease (MASLD)      Past Medical History:   Diagnosis Date   • Acute kidney injury 10/28/2021   • Anemia 09/13/2019   • Cellulitis     last assessed 12/10/15   • Cellulitis of left lower extremity    • Cellulitis of right lower extremity 11/19/2021   • Cough 10/20/2019   • Diabetes mellitus (HCC)    • Disease of thyroid gland    • Edema    • Elevated liver enzymes    • Elevated serum creatinine 11/19/2021   • Esophageal reflux    • Fungal infection 08/16/2021   • Gluten intolerance    • Gout     last assessed 09/05/13   • Hypercalcemia 06/28/2022   • Hyperglycemia    • Hypertension    • Hyponatremia 09/06/2019   • IBS (irritable bowel syndrome)    • Insomnia    • Obesity    • Osteoarthritis of knee     last assessed 02/10/14   • Pressure injury of left leg 09/06/2022   • Pressure injury of right leg 02/17/2022   • Scrotal swelling 05/19/2020   • Shortness of breath 05/03/2021   • Venous insufficiency     last assessed 08/22/17   • Villonodular synovitis of the hand, right     last assessed 11/14/2013      Past Surgical History:   Procedure Laterality Date   • INCISION AND DRAINAGE OF WOUND Left 9/6/2019    Procedure: INCISION AND DRAINAGE (I&D) GROIN;  Surgeon: Carlos  DO Bindu;  Location: [facility];  Service: General   • SKIN BIOPSY     • WOUND DEBRIDEMENT Left 9/7/2019    Procedure: EXCISIONAL DEBRIDEMENT;  Surgeon: Carlos Ruano DO;  Location: [facility];  Service: General      Family History   Problem Relation Age of Onset   • Cancer Mother         gastrc   • Colon cancer Father    • Heart failure Father      Social History     Tobacco Use   • Smoking status: Never   • Smokeless tobacco: Never   Substance Use Topics   • Alcohol use: Not Currently     Alcohol/week: 0.0 standard drinks of alcohol     Allergies   Allergen Reactions   • Gluten Meal - Food Allergy    • Clindamycin Diarrhea         Current Outpatient Medications:   •  acetaminophen (TYLENOL) 325 mg tablet, 325 mg 3 (three) times a day as needed, Disp: , Rfl:   •  albuterol (PROVENTIL HFA,VENTOLIN HFA) 90 mcg/act inhaler, TAKE 2 PUFFS BY MOUTH EVERY 6 HOURS AS NEEDED FOR WHEEZE, Disp: 8.5 g, Rfl: 0  •  Blood Glucose Monitoring Suppl (ONETOUCH VERIO) w/Device KIT, Use to test sugar daily, Disp: 1 kit, Rfl: 0  •  Continuous Glucose Sensor (FreeStyle Elvin 3 Plus Sensor) MISC, Use every 14 days, Disp: 2 each, Rfl: 5  •  Continuous Glucose Sensor (FreeStyle Elvin 3 Sensor) MISC, Replace every 14 days, Disp: 2 each, Rfl: 5  •  ezetimibe (ZETIA) 10 mg tablet, TAKE ONE TABLET BY MOUTH EVERY DAY, Disp: 90 tablet, Rfl: 1  •  furosemide (LASIX) 20 mg tablet, TAKE 2 TABLETS EVERY DAY IN THE EARLY MORNING AND 1 TABLET DAILY AFTER LUNCH., Disp: 270 tablet, Rfl: 2  •  gabapentin (NEURONTIN) 100 mg capsule, TAKE ONE CAPSULE BY MOUTH TWICE A DAY ALONG WITH 300MG CAPSULE, Disp: 60 capsule, Rfl: 5  •  gabapentin (NEURONTIN) 300 mg capsule, TAKE ONE CAPSULE BY MOUTH TWO TIMES A DAY, Disp: 60 capsule, Rfl: 5  •  insulin aspart (NovoLOG FlexPen) 100 UNIT/ML injection pen, INJECT 12-20 UNITS WITH MEALS+SCALE up to three times daily, Disp: 15 mL, Rfl: 5  •  Insulin Glargine Solostar (Lantus SoloStar) 100 UNIT/ML SOPN, INJECT 30 UNITS  UNDER THE SKIN DAILY AT BEDTIME, Disp: , Rfl:   •  Insulin Pen Needle (BD Pen Needle Ludmila 2nd Gen) 32G X 4 MM MISC, Inject under the skin 4 (four) times a day, Disp: 200 each, Rfl: 0  •  Insulin Pen Needle (BD Pen Needle Ludmila 2nd Gen) 32G X 4 MM MISC, Inject under the skin 4 (four) times a day, Disp: 200 each, Rfl: 1  •  Insulin Pen Needle (BD Pen Needle Ludmila 2nd Gen) 32G X 4 MM MISC, Inject under the skin 4 (four) times a day, Disp: 200 each, Rfl: 2  •  Klayesta powder, APPLY 1 APPLICATION TOPICALLY 2 (TWO) TIMES A DAY TO AFFECTED AREA, Disp: 120 g, Rfl: 3  •  Lancets (ONETOUCH ULTRASOFT) lancets, Use to test sugar 2 times a day, Disp: 100 each, Rfl: 3  •  levothyroxine 25 mcg tablet, Take 1 tablet (25 mcg total) by mouth daily in the early morning, Disp: 90 tablet, Rfl: 2  •  lisinopril (ZESTRIL) 2.5 mg tablet, Take 1 tablet (2.5 mg total) by mouth daily, Disp: 90 tablet, Rfl: 1  •  metFORMIN (GLUCOPHAGE) 1000 MG tablet, TAKE ONE TABLET BY MOUTH TWICE A DAY, Disp: 180 tablet, Rfl: 1  •  metoprolol succinate (TOPROL-XL) 25 mg 24 hr tablet, Take 1 tablet (25 mg total) by mouth 2 (two) times a day, Disp: 180 tablet, Rfl: 1  •  omeprazole (PriLOSEC) 40 MG capsule, TAKE ONE CAPSULE BY MOUTH TWICE A DAY, Disp: 60 capsule, Rfl: 5  •  OneTouch Verio test strip, USE TO TEST SUGAR 2 TIMES A DAY, Disp: 100 strip, Rfl: 3  •  psyllium (METAMUCIL) packet, Take 1 packet by mouth daily, Disp: , Rfl: 0  •  semaglutide, 2 mg/dose, (Ozempic, 2 MG/DOSE,) 8 mg/ mL injection pen, Inject 0.75 mL (2 mg total) under the skin every 7 days, Disp: 3 mL, Rfl: 11  •  traZODone (DESYREL) 50 mg tablet, Take 1 tablet (50 mg total) by mouth daily at bedtime as needed for sleep, Disp: 100 tablet, Rfl: 0  •  zinc oxide 20 % ointment, Apply topically as needed for irritation, Disp: 85 g, Rfl: 1  Review of Systems   Constitutional:  Negative for unexpected weight change.   HENT:  Negative for hearing loss, trouble swallowing and voice change.   "  Gastrointestinal:  Negative for constipation and diarrhea.   Neurological:  Negative for tremors.   Psychiatric/Behavioral:  The patient is not nervous/anxious.        Physical Exam:  Body mass index is 75.53 kg/m².  /88 (BP Location: Left arm, Patient Position: Sitting, Cuff Size: Large)   Pulse 99   Temp 99.1 °F (37.3 °C) (Tympanic)   Resp 16   Ht 5' 4\" (1.626 m)   Wt (!) 200 kg (440 lb)   SpO2 96%   BMI 75.53 kg/m²    Wt Readings from Last 3 Encounters:   04/04/25 (!) 200 kg (440 lb)   04/02/25 (!) 201 kg (443 lb)   01/07/25 (!) 197 kg (435 lb)         Physical Exam   Gen: appears well-developed and well-nourished. No apparent distress.   Head: Normocephalic and atraumatic.   Eyes: no stare or proptosis, no periorbital edema  E/N/M nl facies, hearing grossly intact  Neck: range of motion.   Pulmonary/Chest: breathing  comfortably, no accessory muscle use, effort normal.   Musculoskeletal: moves upper extremities  Neurological: alert and oriented to person, place, and time. No upper ext tremor appreciated  Skin: does not appear diaphoretic, no facial plethora  Psychiatric: normal mood and affect; behavior is normal; no gross lapses in memory, answer questions appropriately      Labs:   Lab Results   Component Value Date    HGBA1C 6.6 (H) 03/13/2025          Lab Results   Component Value Date    CREATININE 1.26 03/13/2025    CREATININE 1.27 01/11/2024    CREATININE 1.14 09/14/2023    BUN 17 03/13/2025     (L) 12/03/2015    K 4.8 03/13/2025     03/13/2025    CO2 25 03/13/2025         Lab Results   Component Value Date    CHOL 231 12/03/2015    HDL 43 09/20/2024    TRIG 192 (H) 09/20/2024       Lab Results   Component Value Date    ALT 47 03/13/2025    AST 40 (H) 03/13/2025    ALKPHOS 57 03/13/2025    BILITOT 0.88 12/03/2015         Impression/Plan:  Problem List Items Addressed This Visit     Morbid obesity    Weight is stable. He does have increased satiety with Ozempic. Hopefully, he " will add exercise to benefit his overall health int he coming months         Hypothyroidism    TSH is normal,. Refill levothyroxine 25mcg daily.          Relevant Medications    levothyroxine 25 mcg tablet    Current use of insulin (HCC) - Primary    Type 2 diabetes mellitus with hyperglycemia, with long-term current use of insulin (HCC)      The change to the Ozempic has gone well. He has already lowere the Lantus to 30units as he saw the hypoglycemia. Continue Lantus 30units daily and Novolog 12units with meals plus correction. He will remain on metformin 1g twice daily and Ozempic 2mg per week.          Relevant Medications    Insulin Glargine Solostar (Lantus SoloStar) 100 UNIT/ML SOPN    insulin aspart (NovoLOG FlexPen) 100 UNIT/ML injection pen    Other Relevant Orders    Albumin / creatinine urine ratio    Basic metabolic panel    Hemoglobin A1C              RTC with labs in 4mos    Discussed with the patient and all questioned fully answered. He will call me if any problems arise.    Counseled patient on diagnostic results, prognosis, risk and benefit of treatment options, instruction for management, importance of treatment compliance, Risk  factor reduction and impressions      Susie Hayes MD

## 2025-04-04 NOTE — ASSESSMENT & PLAN NOTE
The change to the Ozempic has gone well. He has already lowere the Lantus to 30units as he saw the hypoglycemia. Continue Lantus 30units daily and Novolog 12units with meals plus correction. He will remain on metformin 1g twice daily and Ozempic 2mg per week.

## 2025-04-04 NOTE — ASSESSMENT & PLAN NOTE
Weight is stable. He does have increased satiety with Ozempic. Hopefully, he will add exercise to benefit his overall health int he coming months

## 2025-04-16 DIAGNOSIS — G47.00 INSOMNIA, UNSPECIFIED TYPE: ICD-10-CM

## 2025-04-16 DIAGNOSIS — L03.90 RECURRENT CELLULITIS: Primary | ICD-10-CM

## 2025-04-16 RX ORDER — TRAZODONE HYDROCHLORIDE 50 MG/1
50 TABLET ORAL
Qty: 100 TABLET | Refills: 1 | Status: SHIPPED | OUTPATIENT
Start: 2025-04-16

## 2025-04-16 RX ORDER — LEVOFLOXACIN 500 MG/1
500 TABLET, FILM COATED ORAL EVERY 24 HOURS
Qty: 10 TABLET | Refills: 0 | Status: SHIPPED | OUTPATIENT
Start: 2025-04-16 | End: 2025-04-26

## 2025-04-16 NOTE — TELEPHONE ENCOUNTER
Medication: traZODone (DESYREL) 50 mg tablet     Dose/Frequency:     Take 1 tablet (50 mg total) by mouth daily at bedtime as needed for sleep          Quantity: 100 tabs    Pharmacy: [id number] - [place] -     Office:   [x] PCP/Provider -   [] Speciality/Provider -     Does the patient have enough for 3 days?   [] Yes   [] No - Send as HP to POD

## 2025-04-23 DIAGNOSIS — E11.49 OTHER DIABETIC NEUROLOGICAL COMPLICATION ASSOCIATED WITH TYPE 2 DIABETES MELLITUS (HCC): ICD-10-CM

## 2025-04-23 DIAGNOSIS — K21.9 GASTROESOPHAGEAL REFLUX DISEASE: ICD-10-CM

## 2025-04-23 RX ORDER — OMEPRAZOLE 40 MG/1
40 CAPSULE, DELAYED RELEASE ORAL 2 TIMES DAILY
Qty: 60 CAPSULE | Refills: 5 | Status: SHIPPED | OUTPATIENT
Start: 2025-04-23

## 2025-04-23 RX ORDER — GABAPENTIN 300 MG/1
300 CAPSULE ORAL 2 TIMES DAILY
Qty: 60 CAPSULE | Refills: 5 | Status: SHIPPED | OUTPATIENT
Start: 2025-04-23

## 2025-04-23 RX ORDER — GABAPENTIN 100 MG/1
CAPSULE ORAL
Qty: 60 CAPSULE | Refills: 5 | Status: SHIPPED | OUTPATIENT
Start: 2025-04-23

## 2025-04-26 DIAGNOSIS — I47.10 PSVT (PAROXYSMAL SUPRAVENTRICULAR TACHYCARDIA) (HCC): ICD-10-CM

## 2025-04-30 ENCOUNTER — TELEPHONE (OUTPATIENT)
Dept: CARDIOLOGY CLINIC | Facility: CLINIC | Age: 60
End: 2025-04-30

## 2025-04-30 RX ORDER — METOPROLOL SUCCINATE 25 MG/1
25 TABLET, EXTENDED RELEASE ORAL 2 TIMES DAILY
Qty: 180 TABLET | Refills: 0 | Status: SHIPPED | OUTPATIENT
Start: 2025-04-30

## 2025-04-30 NOTE — TELEPHONE ENCOUNTER
Refill was sent for 90 days for metoprolol.    Patient was contacted by staff to make appointment for follow up.  He ultimately declined an appointment.  If not seen by [facility], refills will not be provided by cardiology office.

## 2025-05-16 DIAGNOSIS — Z79.4 TYPE 2 DIABETES MELLITUS WITH HYPERGLYCEMIA, WITH LONG-TERM CURRENT USE OF INSULIN (HCC): ICD-10-CM

## 2025-05-16 DIAGNOSIS — I10 HYPERTENSION GOAL BP (BLOOD PRESSURE) < 140/90: ICD-10-CM

## 2025-05-16 DIAGNOSIS — E78.00 PURE HYPERCHOLESTEROLEMIA: ICD-10-CM

## 2025-05-16 DIAGNOSIS — R79.89 ELEVATED TSH: ICD-10-CM

## 2025-05-16 DIAGNOSIS — E66.01 OBESITY, MORBID, BMI 50 OR HIGHER (HCC): ICD-10-CM

## 2025-05-16 DIAGNOSIS — E11.65 TYPE 2 DIABETES MELLITUS WITH HYPERGLYCEMIA, WITH LONG-TERM CURRENT USE OF INSULIN (HCC): ICD-10-CM

## 2025-06-20 RX ORDER — PEN NEEDLE, DIABETIC 32GX 5/32"
NEEDLE, DISPOSABLE MISCELLANEOUS
Qty: 200 EACH | Refills: 1 | Status: SHIPPED | OUTPATIENT
Start: 2025-06-20

## 2025-06-25 ENCOUNTER — TELEPHONE (OUTPATIENT)
Dept: LAB | Facility: HOSPITAL | Age: 60
End: 2025-06-25

## 2025-06-27 DIAGNOSIS — Z79.4 TYPE 2 DIABETES MELLITUS WITH HYPERGLYCEMIA, WITH LONG-TERM CURRENT USE OF INSULIN (HCC): ICD-10-CM

## 2025-06-27 DIAGNOSIS — Z79.4 CURRENT USE OF INSULIN (HCC): ICD-10-CM

## 2025-06-27 DIAGNOSIS — E11.65 TYPE 2 DIABETES MELLITUS WITH HYPERGLYCEMIA, WITH LONG-TERM CURRENT USE OF INSULIN (HCC): ICD-10-CM

## 2025-06-27 RX ORDER — HYDROCHLOROTHIAZIDE 12.5 MG/1
CAPSULE ORAL
Qty: 2 EACH | Refills: 5 | Status: SHIPPED | OUTPATIENT
Start: 2025-06-27

## 2025-07-07 ENCOUNTER — APPOINTMENT (OUTPATIENT)
Dept: LAB | Facility: HOSPITAL | Age: 60
End: 2025-07-07
Attending: INTERNAL MEDICINE
Payer: MEDICARE

## 2025-07-07 DIAGNOSIS — Z79.4 TYPE 2 DIABETES MELLITUS WITH HYPERGLYCEMIA, WITH LONG-TERM CURRENT USE OF INSULIN (HCC): ICD-10-CM

## 2025-07-07 DIAGNOSIS — E11.65 TYPE 2 DIABETES MELLITUS WITH HYPERGLYCEMIA, WITH LONG-TERM CURRENT USE OF INSULIN (HCC): ICD-10-CM

## 2025-07-07 LAB
ANION GAP SERPL CALCULATED.3IONS-SCNC: 9 MMOL/L (ref 4–13)
BUN SERPL-MCNC: 16 MG/DL (ref 5–25)
CALCIUM SERPL-MCNC: 9.1 MG/DL (ref 8.4–10.2)
CHLORIDE SERPL-SCNC: 101 MMOL/L (ref 96–108)
CO2 SERPL-SCNC: 28 MMOL/L (ref 21–32)
CREAT SERPL-MCNC: 1.27 MG/DL (ref 0.6–1.3)
EST. AVERAGE GLUCOSE BLD GHB EST-MCNC: 143 MG/DL
GFR SERPL CREATININE-BSD FRML MDRD: 61 ML/MIN/1.73SQ M
GLUCOSE P FAST SERPL-MCNC: 106 MG/DL (ref 65–99)
HBA1C MFR BLD: 6.6 %
POTASSIUM SERPL-SCNC: 4.2 MMOL/L (ref 3.5–5.3)
SODIUM SERPL-SCNC: 138 MMOL/L (ref 135–147)

## 2025-07-07 PROCEDURE — 36415 COLL VENOUS BLD VENIPUNCTURE: CPT

## 2025-07-07 PROCEDURE — 80048 BASIC METABOLIC PNL TOTAL CA: CPT

## 2025-07-07 PROCEDURE — 83036 HEMOGLOBIN GLYCOSYLATED A1C: CPT

## 2025-07-26 DIAGNOSIS — I10 ESSENTIAL HYPERTENSION: ICD-10-CM

## 2025-07-28 ENCOUNTER — APPOINTMENT (OUTPATIENT)
Dept: LAB | Facility: CLINIC | Age: 60
End: 2025-07-28
Attending: INTERNAL MEDICINE
Payer: MEDICARE

## 2025-07-28 LAB
CREAT UR-MCNC: 167.6 MG/DL
MICROALBUMIN UR-MCNC: 16.7 MG/L
MICROALBUMIN/CREAT 24H UR: 10 MG/G CREATININE (ref 0–30)

## 2025-07-29 RX ORDER — LISINOPRIL 2.5 MG/1
2.5 TABLET ORAL DAILY
Qty: 90 TABLET | Refills: 1 | Status: SHIPPED | OUTPATIENT
Start: 2025-07-29

## (undated) DEVICE — PLUMEPEN PRO 10FT

## (undated) DEVICE — DRAPE EQUIPMENT RF WAND

## (undated) DEVICE — HEAVY DRAINAGE PACK: Brand: CURITY

## (undated) DEVICE — BETHLEHEM UNIVERSAL MINOR GEN: Brand: CARDINAL HEALTH

## (undated) DEVICE — CHLORAPREP HI-LITE 26ML ORANGE

## (undated) DEVICE — INTENDED FOR TISSUE SEPARATION, AND OTHER PROCEDURES THAT REQUIRE A SHARP SURGICAL BLADE TO PUNCTURE OR CUT.: Brand: BARD-PARKER SAFETY BLADES SIZE 15, STERILE

## (undated) DEVICE — GLOVE SRG BIOGEL ORTHOPEDIC 8

## (undated) DEVICE — SUT VICRYL 0 CT-1 27 IN J260H

## (undated) DEVICE — PAD GROUNDING ADULT

## (undated) DEVICE — ADHESIVE SKIN HIGH VISCOSITY EXOFIN 1ML

## (undated) DEVICE — POOLE SUCTION HANDLE: Brand: CARDINAL HEALTH

## (undated) DEVICE — SUT MONOCRYL 4-0 PS-2 27 IN Y426H

## (undated) DEVICE — KERLIX BANDAGE ROLL: Brand: KERLIX

## (undated) DEVICE — LIGHT HANDLE COVER SLEEVE DISP BLUE STELLAR

## (undated) DEVICE — VIAL DECANTER

## (undated) DEVICE — NEEDLE 22 G X 1 1/2 SAFETY

## (undated) DEVICE — TUBING SUCTION 5MM X 12 FT